# Patient Record
Sex: MALE | Race: BLACK OR AFRICAN AMERICAN | NOT HISPANIC OR LATINO | Employment: OTHER | URBAN - METROPOLITAN AREA
[De-identification: names, ages, dates, MRNs, and addresses within clinical notes are randomized per-mention and may not be internally consistent; named-entity substitution may affect disease eponyms.]

---

## 2018-01-23 LAB
DIFF CAP.CO: 13.93 ML/MMHG SEC
FEV1/FVC: 51 %
FEV1: 1.47 LITERS
FVC VOL RESPIRATORY: 2.89 LITERS
TLC: 7.59 LITERS

## 2018-01-23 ASSESSMENT — PULMONARY FUNCTION TESTS
FEV1/FVC: 51
FEV1: 1.47
FVC: 2.89

## 2018-06-06 ENCOUNTER — APPOINTMENT (EMERGENCY)
Dept: RADIOLOGY | Facility: HOSPITAL | Age: 58
DRG: 192 | End: 2018-06-06
Payer: COMMERCIAL

## 2018-06-06 ENCOUNTER — HOSPITAL ENCOUNTER (INPATIENT)
Facility: HOSPITAL | Age: 58
LOS: 1 days | Discharge: HOME | DRG: 192 | End: 2018-06-07
Attending: EMERGENCY MEDICINE | Admitting: HOSPITALIST
Payer: COMMERCIAL

## 2018-06-06 DIAGNOSIS — I21.4 NSTEMI (NON-ST ELEVATED MYOCARDIAL INFARCTION) (HCC): Primary | ICD-10-CM

## 2018-06-06 DIAGNOSIS — F17.200 TOBACCO DEPENDENCE: ICD-10-CM

## 2018-06-06 DIAGNOSIS — E87.6 HYPOKALEMIA: ICD-10-CM

## 2018-06-06 DIAGNOSIS — R07.89 NON-CARDIAC CHEST PAIN: ICD-10-CM

## 2018-06-06 DIAGNOSIS — R06.00 DYSPNEA ON EXERTION: ICD-10-CM

## 2018-06-06 LAB
ALBUMIN SERPL BCP-MCNC: 3.5 G/DL (ref 3.5–5)
ALP SERPL-CCNC: 85 U/L (ref 46–116)
ALT SERPL W P-5'-P-CCNC: 25 U/L (ref 12–78)
ANION GAP SERPL CALCULATED.3IONS-SCNC: 12 MMOL/L (ref 4–13)
AST SERPL W P-5'-P-CCNC: 21 U/L (ref 5–45)
ATRIAL RATE: 73 BPM
ATRIAL RATE: 80 BPM
ATRIAL RATE: 82 BPM
BASOPHILS # BLD AUTO: 0.04 THOUSANDS/ΜL (ref 0–0.1)
BASOPHILS NFR BLD AUTO: 1 % (ref 0–1)
BILIRUB SERPL-MCNC: 0.23 MG/DL (ref 0.2–1)
BUN SERPL-MCNC: 15 MG/DL (ref 5–25)
CALCIUM SERPL-MCNC: 8.5 MG/DL (ref 8.3–10.1)
CHLORIDE SERPL-SCNC: 106 MMOL/L (ref 100–108)
CO2 SERPL-SCNC: 25 MMOL/L (ref 21–32)
CREAT SERPL-MCNC: 1.31 MG/DL (ref 0.6–1.3)
EOSINOPHIL # BLD AUTO: 0.37 THOUSAND/ΜL (ref 0–0.61)
EOSINOPHIL NFR BLD AUTO: 5 % (ref 0–6)
ERYTHROCYTE [DISTWIDTH] IN BLOOD BY AUTOMATED COUNT: 14.7 % (ref 11.6–15.1)
GFR SERPL CREATININE-BSD FRML MDRD: 70 ML/MIN/1.73SQ M
GLUCOSE SERPL-MCNC: 86 MG/DL (ref 65–140)
HCT VFR BLD AUTO: 39.8 % (ref 36.5–49.3)
HGB BLD-MCNC: 13.5 G/DL (ref 12–17)
LYMPHOCYTES # BLD AUTO: 4.03 THOUSANDS/ΜL (ref 0.6–4.47)
LYMPHOCYTES NFR BLD AUTO: 49 % (ref 14–44)
MAGNESIUM SERPL-MCNC: 2 MG/DL (ref 1.6–2.6)
MCH RBC QN AUTO: 28.5 PG (ref 26.8–34.3)
MCHC RBC AUTO-ENTMCNC: 33.9 G/DL (ref 31.4–37.4)
MCV RBC AUTO: 84 FL (ref 82–98)
MONOCYTES # BLD AUTO: 0.61 THOUSAND/ΜL (ref 0.17–1.22)
MONOCYTES NFR BLD AUTO: 7 % (ref 4–12)
NEUTROPHILS # BLD AUTO: 3.24 THOUSANDS/ΜL (ref 1.85–7.62)
NEUTS SEG NFR BLD AUTO: 39 % (ref 43–75)
NRBC BLD AUTO-RTO: 0 /100 WBCS
P AXIS: 85 DEGREES
PLATELET # BLD AUTO: 344 THOUSANDS/UL (ref 149–390)
PMV BLD AUTO: 8.9 FL (ref 8.9–12.7)
POTASSIUM SERPL-SCNC: 2.9 MMOL/L (ref 3.5–5.3)
PR INTERVAL: 152 MS
PR INTERVAL: 152 MS
PR INTERVAL: 160 MS
PROT SERPL-MCNC: 7.6 G/DL (ref 6.4–8.2)
QRS AXIS: 76 DEGREES
QRS AXIS: 78 DEGREES
QRS AXIS: 78 DEGREES
QRSD INTERVAL: 88 MS
QRSD INTERVAL: 90 MS
QRSD INTERVAL: 90 MS
QT INTERVAL: 352 MS
QT INTERVAL: 376 MS
QT INTERVAL: 388 MS
QTC INTERVAL: 411 MS
QTC INTERVAL: 427 MS
QTC INTERVAL: 433 MS
RBC # BLD AUTO: 4.73 MILLION/UL (ref 3.88–5.62)
SODIUM SERPL-SCNC: 143 MMOL/L (ref 136–145)
T WAVE AXIS: 61 DEGREES
T WAVE AXIS: 77 DEGREES
T WAVE AXIS: 80 DEGREES
TROPONIN I SERPL-MCNC: 0.19 NG/ML
VENTRICULAR RATE: 73 BPM
VENTRICULAR RATE: 80 BPM
VENTRICULAR RATE: 82 BPM
WBC # BLD AUTO: 8.29 THOUSAND/UL (ref 4.31–10.16)

## 2018-06-06 PROCEDURE — 93010 ELECTROCARDIOGRAM REPORT: CPT | Performed by: INTERNAL MEDICINE

## 2018-06-06 PROCEDURE — 85730 THROMBOPLASTIN TIME PARTIAL: CPT | Performed by: EMERGENCY MEDICINE

## 2018-06-06 PROCEDURE — 84484 ASSAY OF TROPONIN QUANT: CPT | Performed by: EMERGENCY MEDICINE

## 2018-06-06 PROCEDURE — 85610 PROTHROMBIN TIME: CPT | Performed by: EMERGENCY MEDICINE

## 2018-06-06 PROCEDURE — 71046 X-RAY EXAM CHEST 2 VIEWS: CPT

## 2018-06-06 PROCEDURE — 36415 COLL VENOUS BLD VENIPUNCTURE: CPT | Performed by: EMERGENCY MEDICINE

## 2018-06-06 PROCEDURE — 93005 ELECTROCARDIOGRAM TRACING: CPT

## 2018-06-06 PROCEDURE — 80053 COMPREHEN METABOLIC PANEL: CPT | Performed by: EMERGENCY MEDICINE

## 2018-06-06 PROCEDURE — 85025 COMPLETE CBC W/AUTO DIFF WBC: CPT | Performed by: EMERGENCY MEDICINE

## 2018-06-06 PROCEDURE — 83735 ASSAY OF MAGNESIUM: CPT | Performed by: EMERGENCY MEDICINE

## 2018-06-06 RX ORDER — POTASSIUM CHLORIDE 20 MEQ/1
40 TABLET, EXTENDED RELEASE ORAL ONCE
Status: COMPLETED | OUTPATIENT
Start: 2018-06-06 | End: 2018-06-06

## 2018-06-06 RX ORDER — AMLODIPINE BESYLATE 10 MG/1
10 TABLET ORAL
COMMUNITY
End: 2018-07-27 | Stop reason: SDUPTHER

## 2018-06-06 RX ORDER — HEPARIN SODIUM 10000 [USP'U]/100ML
3-20 INJECTION, SOLUTION INTRAVENOUS
Status: DISCONTINUED | OUTPATIENT
Start: 2018-06-06 | End: 2018-06-07

## 2018-06-06 RX ORDER — HEPARIN SODIUM 1000 [USP'U]/ML
3900 INJECTION, SOLUTION INTRAVENOUS; SUBCUTANEOUS AS NEEDED
Status: DISCONTINUED | OUTPATIENT
Start: 2018-06-06 | End: 2018-06-07

## 2018-06-06 RX ORDER — ASPIRIN 81 MG/1
324 TABLET, CHEWABLE ORAL ONCE
Status: COMPLETED | OUTPATIENT
Start: 2018-06-06 | End: 2018-06-06

## 2018-06-06 RX ORDER — LORATADINE 10 MG/1
10 TABLET ORAL
COMMUNITY
End: 2019-05-02 | Stop reason: SDUPTHER

## 2018-06-06 RX ORDER — METOPROLOL TARTRATE 5 MG/5ML
5 INJECTION INTRAVENOUS ONCE
Status: DISCONTINUED | OUTPATIENT
Start: 2018-06-07 | End: 2018-06-07

## 2018-06-06 RX ORDER — HEPARIN SODIUM 1000 [USP'U]/ML
3900 INJECTION, SOLUTION INTRAVENOUS; SUBCUTANEOUS ONCE
Status: COMPLETED | OUTPATIENT
Start: 2018-06-06 | End: 2018-06-07

## 2018-06-06 RX ORDER — PANTOPRAZOLE SODIUM 40 MG/1
40 TABLET, DELAYED RELEASE ORAL
COMMUNITY
End: 2018-07-21

## 2018-06-06 RX ORDER — METHOCARBAMOL 750 MG/1
750 TABLET, FILM COATED ORAL 4 TIMES DAILY
Status: ON HOLD | COMMUNITY
Start: 2016-12-11 | End: 2018-06-07

## 2018-06-06 RX ORDER — HEPARIN SODIUM 1000 [USP'U]/ML
1950 INJECTION, SOLUTION INTRAVENOUS; SUBCUTANEOUS AS NEEDED
Status: DISCONTINUED | OUTPATIENT
Start: 2018-06-06 | End: 2018-06-07

## 2018-06-06 RX ORDER — ALBUTEROL SULFATE 2.5 MG/3ML
5 SOLUTION RESPIRATORY (INHALATION) ONCE
Status: COMPLETED | OUTPATIENT
Start: 2018-06-06 | End: 2018-06-06

## 2018-06-06 RX ORDER — DIAZEPAM 5 MG/1
5 TABLET ORAL EVERY 8 HOURS
COMMUNITY
Start: 2016-08-16 | End: 2018-06-07 | Stop reason: HOSPADM

## 2018-06-06 RX ADMIN — ASPIRIN 81 MG 324 MG: 81 TABLET ORAL at 23:34

## 2018-06-06 RX ADMIN — POTASSIUM CHLORIDE 40 MEQ: 1500 TABLET, EXTENDED RELEASE ORAL at 23:34

## 2018-06-06 RX ADMIN — IPRATROPIUM BROMIDE 0.5 MG: 0.5 SOLUTION RESPIRATORY (INHALATION) at 22:47

## 2018-06-06 RX ADMIN — ALBUTEROL SULFATE 5 MG: 2.5 SOLUTION RESPIRATORY (INHALATION) at 22:46

## 2018-06-07 ENCOUNTER — APPOINTMENT (INPATIENT)
Dept: NON INVASIVE DIAGNOSTICS | Facility: HOSPITAL | Age: 58
DRG: 192 | End: 2018-06-07
Attending: INTERNAL MEDICINE
Payer: COMMERCIAL

## 2018-06-07 ENCOUNTER — APPOINTMENT (INPATIENT)
Dept: NON INVASIVE DIAGNOSTICS | Facility: HOSPITAL | Age: 58
DRG: 192 | End: 2018-06-07
Payer: COMMERCIAL

## 2018-06-07 VITALS
HEART RATE: 62 BPM | SYSTOLIC BLOOD PRESSURE: 147 MMHG | WEIGHT: 138.67 LBS | RESPIRATION RATE: 16 BRPM | DIASTOLIC BLOOD PRESSURE: 88 MMHG | OXYGEN SATURATION: 99 % | TEMPERATURE: 98.5 F

## 2018-06-07 PROBLEM — I21.4 NSTEMI (NON-ST ELEVATED MYOCARDIAL INFARCTION) (HCC): Status: RESOLVED | Noted: 2018-06-07 | Resolved: 2018-06-07

## 2018-06-07 PROBLEM — J44.9 COPD (CHRONIC OBSTRUCTIVE PULMONARY DISEASE) (HCC): Status: ACTIVE | Noted: 2018-06-07

## 2018-06-07 PROBLEM — E87.6 HYPOKALEMIA: Status: ACTIVE | Noted: 2018-06-07

## 2018-06-07 PROBLEM — E87.6 HYPOKALEMIA: Status: RESOLVED | Noted: 2018-06-07 | Resolved: 2018-06-07

## 2018-06-07 PROBLEM — I21.4 NSTEMI (NON-ST ELEVATED MYOCARDIAL INFARCTION) (HCC): Status: ACTIVE | Noted: 2018-06-07

## 2018-06-07 PROBLEM — I10 HTN (HYPERTENSION): Status: ACTIVE | Noted: 2018-06-07

## 2018-06-07 LAB
ALBUMIN SERPL BCP-MCNC: 3.3 G/DL (ref 3.5–5)
ALP SERPL-CCNC: 74 U/L (ref 46–116)
ALT SERPL W P-5'-P-CCNC: 23 U/L (ref 12–78)
ANION GAP SERPL CALCULATED.3IONS-SCNC: 9 MMOL/L (ref 4–13)
APTT PPP: 115 SECONDS (ref 24–36)
APTT PPP: 34 SECONDS (ref 24–36)
AST SERPL W P-5'-P-CCNC: 25 U/L (ref 5–45)
BILIRUB SERPL-MCNC: 0.34 MG/DL (ref 0.2–1)
BUN SERPL-MCNC: 13 MG/DL (ref 5–25)
CALCIUM SERPL-MCNC: 8.4 MG/DL (ref 8.3–10.1)
CHLORIDE SERPL-SCNC: 106 MMOL/L (ref 100–108)
CHOLEST SERPL-MCNC: 124 MG/DL (ref 50–200)
CO2 SERPL-SCNC: 25 MMOL/L (ref 21–32)
CREAT SERPL-MCNC: 0.96 MG/DL (ref 0.6–1.3)
ERYTHROCYTE [DISTWIDTH] IN BLOOD BY AUTOMATED COUNT: 15.1 % (ref 11.6–15.1)
GFR SERPL CREATININE-BSD FRML MDRD: 102 ML/MIN/1.73SQ M
GLUCOSE SERPL-MCNC: 87 MG/DL (ref 65–140)
HCT VFR BLD AUTO: 38.4 % (ref 36.5–49.3)
HDLC SERPL-MCNC: 68 MG/DL (ref 40–60)
HGB BLD-MCNC: 12.8 G/DL (ref 12–17)
INR PPP: 1.12 (ref 0.86–1.17)
LDLC SERPL CALC-MCNC: 46 MG/DL (ref 0–100)
MAGNESIUM SERPL-MCNC: 2 MG/DL (ref 1.6–2.6)
MCH RBC QN AUTO: 28.1 PG (ref 26.8–34.3)
MCHC RBC AUTO-ENTMCNC: 33.3 G/DL (ref 31.4–37.4)
MCV RBC AUTO: 84 FL (ref 82–98)
NONHDLC SERPL-MCNC: 56 MG/DL
PLATELET # BLD AUTO: 351 THOUSANDS/UL (ref 149–390)
PMV BLD AUTO: 9.1 FL (ref 8.9–12.7)
POTASSIUM SERPL-SCNC: 3.8 MMOL/L (ref 3.5–5.3)
PROT SERPL-MCNC: 7.3 G/DL (ref 6.4–8.2)
PROTHROMBIN TIME: 14.5 SECONDS (ref 11.8–14.2)
RBC # BLD AUTO: 4.55 MILLION/UL (ref 3.88–5.62)
SODIUM SERPL-SCNC: 140 MMOL/L (ref 136–145)
TRIGL SERPL-MCNC: 50 MG/DL
TROPONIN I SERPL-MCNC: 0.18 NG/ML
TROPONIN I SERPL-MCNC: 0.19 NG/ML
WBC # BLD AUTO: 7.72 THOUSAND/UL (ref 4.31–10.16)

## 2018-06-07 PROCEDURE — 85027 COMPLETE CBC AUTOMATED: CPT | Performed by: HOSPITALIST

## 2018-06-07 PROCEDURE — C1894 INTRO/SHEATH, NON-LASER: HCPCS | Performed by: INTERNAL MEDICINE

## 2018-06-07 PROCEDURE — 94640 AIRWAY INHALATION TREATMENT: CPT

## 2018-06-07 PROCEDURE — 83735 ASSAY OF MAGNESIUM: CPT | Performed by: HOSPITALIST

## 2018-06-07 PROCEDURE — B2111ZZ FLUOROSCOPY OF MULTIPLE CORONARY ARTERIES USING LOW OSMOLAR CONTRAST: ICD-10-PCS | Performed by: INTERNAL MEDICINE

## 2018-06-07 PROCEDURE — 80061 LIPID PANEL: CPT | Performed by: HOSPITALIST

## 2018-06-07 PROCEDURE — 84484 ASSAY OF TROPONIN QUANT: CPT | Performed by: HOSPITALIST

## 2018-06-07 PROCEDURE — 99152 MOD SED SAME PHYS/QHP 5/>YRS: CPT | Performed by: INTERNAL MEDICINE

## 2018-06-07 PROCEDURE — 4A023N7 MEASUREMENT OF CARDIAC SAMPLING AND PRESSURE, LEFT HEART, PERCUTANEOUS APPROACH: ICD-10-PCS | Performed by: INTERNAL MEDICINE

## 2018-06-07 PROCEDURE — 93306 TTE W/DOPPLER COMPLETE: CPT | Performed by: INTERNAL MEDICINE

## 2018-06-07 PROCEDURE — 99285 EMERGENCY DEPT VISIT HI MDM: CPT

## 2018-06-07 PROCEDURE — 93306 TTE W/DOPPLER COMPLETE: CPT

## 2018-06-07 PROCEDURE — 99223 1ST HOSP IP/OBS HIGH 75: CPT | Performed by: HOSPITALIST

## 2018-06-07 PROCEDURE — C1769 GUIDE WIRE: HCPCS | Performed by: INTERNAL MEDICINE

## 2018-06-07 PROCEDURE — 99254 IP/OBS CNSLTJ NEW/EST MOD 60: CPT | Performed by: INTERNAL MEDICINE

## 2018-06-07 PROCEDURE — 80053 COMPREHEN METABOLIC PANEL: CPT | Performed by: HOSPITALIST

## 2018-06-07 PROCEDURE — 93458 L HRT ARTERY/VENTRICLE ANGIO: CPT | Performed by: INTERNAL MEDICINE

## 2018-06-07 PROCEDURE — 85730 THROMBOPLASTIN TIME PARTIAL: CPT | Performed by: HOSPITALIST

## 2018-06-07 PROCEDURE — 99239 HOSP IP/OBS DSCHRG MGMT >30: CPT | Performed by: PHYSICIAN ASSISTANT

## 2018-06-07 RX ORDER — SODIUM CHLORIDE 9 MG/ML
200 INJECTION, SOLUTION INTRAVENOUS CONTINUOUS
Status: DISPENSED | OUTPATIENT
Start: 2018-06-07 | End: 2018-06-07

## 2018-06-07 RX ORDER — METOPROLOL TARTRATE 5 MG/5ML
2.5 INJECTION INTRAVENOUS ONCE
Status: COMPLETED | OUTPATIENT
Start: 2018-06-07 | End: 2018-06-07

## 2018-06-07 RX ORDER — PANTOPRAZOLE SODIUM 40 MG/1
40 TABLET, DELAYED RELEASE ORAL
Status: DISCONTINUED | OUTPATIENT
Start: 2018-06-07 | End: 2018-06-07 | Stop reason: SDUPTHER

## 2018-06-07 RX ORDER — LISINOPRIL 5 MG/1
5 TABLET ORAL DAILY
Status: DISCONTINUED | OUTPATIENT
Start: 2018-06-07 | End: 2018-06-07 | Stop reason: HOSPADM

## 2018-06-07 RX ORDER — ATORVASTATIN CALCIUM 40 MG/1
40 TABLET, FILM COATED ORAL EVERY EVENING
Status: DISCONTINUED | OUTPATIENT
Start: 2018-06-07 | End: 2018-06-07 | Stop reason: HOSPADM

## 2018-06-07 RX ORDER — CLOPIDOGREL BISULFATE 75 MG/1
300 TABLET ORAL ONCE
Status: COMPLETED | OUTPATIENT
Start: 2018-06-07 | End: 2018-06-07

## 2018-06-07 RX ORDER — NICOTINE 21 MG/24HR
1 PATCH, TRANSDERMAL 24 HOURS TRANSDERMAL DAILY
Qty: 28 PATCH | Refills: 0 | Status: SHIPPED | OUTPATIENT
Start: 2018-06-08 | End: 2018-07-27 | Stop reason: SDUPTHER

## 2018-06-07 RX ORDER — ACETAMINOPHEN 325 MG/1
650 TABLET ORAL EVERY 6 HOURS PRN
Qty: 30 TABLET | Refills: 0 | Status: SHIPPED | OUTPATIENT
Start: 2018-06-07 | End: 2018-07-30 | Stop reason: ALTCHOICE

## 2018-06-07 RX ORDER — FENTANYL CITRATE 50 UG/ML
INJECTION, SOLUTION INTRAMUSCULAR; INTRAVENOUS CODE/TRAUMA/SEDATION MEDICATION
Status: COMPLETED | OUTPATIENT
Start: 2018-06-07 | End: 2018-06-07

## 2018-06-07 RX ORDER — NITROGLYCERIN 20 MG/100ML
INJECTION INTRAVENOUS CODE/TRAUMA/SEDATION MEDICATION
Status: COMPLETED | OUTPATIENT
Start: 2018-06-07 | End: 2018-06-07

## 2018-06-07 RX ORDER — POTASSIUM CHLORIDE 20 MEQ/1
20 TABLET, EXTENDED RELEASE ORAL ONCE
Status: DISCONTINUED | OUTPATIENT
Start: 2018-06-07 | End: 2018-06-07 | Stop reason: HOSPADM

## 2018-06-07 RX ORDER — IPRATROPIUM BROMIDE AND ALBUTEROL SULFATE 2.5; .5 MG/3ML; MG/3ML
3 SOLUTION RESPIRATORY (INHALATION) 4 TIMES DAILY PRN
Status: DISCONTINUED | OUTPATIENT
Start: 2018-06-07 | End: 2018-06-07 | Stop reason: HOSPADM

## 2018-06-07 RX ORDER — AMLODIPINE BESYLATE 10 MG/1
10 TABLET ORAL DAILY
Status: DISCONTINUED | OUTPATIENT
Start: 2018-06-07 | End: 2018-06-07 | Stop reason: HOSPADM

## 2018-06-07 RX ORDER — VERAPAMIL HYDROCHLORIDE 2.5 MG/ML
INJECTION, SOLUTION INTRAVENOUS CODE/TRAUMA/SEDATION MEDICATION
Status: COMPLETED | OUTPATIENT
Start: 2018-06-07 | End: 2018-06-07

## 2018-06-07 RX ORDER — LABETALOL HYDROCHLORIDE 5 MG/ML
10 INJECTION, SOLUTION INTRAVENOUS EVERY 4 HOURS PRN
Status: DISCONTINUED | OUTPATIENT
Start: 2018-06-07 | End: 2018-06-07 | Stop reason: HOSPADM

## 2018-06-07 RX ORDER — NITROGLYCERIN 0.4 MG/1
0.4 TABLET SUBLINGUAL
Status: DISCONTINUED | OUTPATIENT
Start: 2018-06-07 | End: 2018-06-07 | Stop reason: HOSPADM

## 2018-06-07 RX ORDER — PANTOPRAZOLE SODIUM 40 MG/1
40 TABLET, DELAYED RELEASE ORAL
Qty: 30 TABLET | Refills: 0 | Status: SHIPPED | OUTPATIENT
Start: 2018-06-08 | End: 2018-07-27 | Stop reason: SDUPTHER

## 2018-06-07 RX ORDER — HEPARIN SODIUM 1000 [USP'U]/ML
INJECTION, SOLUTION INTRAVENOUS; SUBCUTANEOUS CODE/TRAUMA/SEDATION MEDICATION
Status: COMPLETED | OUTPATIENT
Start: 2018-06-07 | End: 2018-06-07

## 2018-06-07 RX ORDER — METHOCARBAMOL 500 MG/1
750 TABLET, FILM COATED ORAL 4 TIMES DAILY
Status: DISCONTINUED | OUTPATIENT
Start: 2018-06-07 | End: 2018-06-07 | Stop reason: HOSPADM

## 2018-06-07 RX ORDER — PANTOPRAZOLE SODIUM 40 MG/1
40 TABLET, DELAYED RELEASE ORAL
Status: DISCONTINUED | OUTPATIENT
Start: 2018-06-08 | End: 2018-06-07 | Stop reason: HOSPADM

## 2018-06-07 RX ORDER — NICOTINE 21 MG/24HR
1 PATCH, TRANSDERMAL 24 HOURS TRANSDERMAL DAILY
Status: DISCONTINUED | OUTPATIENT
Start: 2018-06-07 | End: 2018-06-07

## 2018-06-07 RX ORDER — METHOCARBAMOL 500 MG/1
500 TABLET, FILM COATED ORAL EVERY 6 HOURS PRN
Qty: 10 TABLET | Refills: 0 | Status: SHIPPED | OUTPATIENT
Start: 2018-06-07 | End: 2019-01-09 | Stop reason: SDUPTHER

## 2018-06-07 RX ORDER — MORPHINE SULFATE 2 MG/ML
2 INJECTION, SOLUTION INTRAMUSCULAR; INTRAVENOUS EVERY 6 HOURS PRN
Status: DISCONTINUED | OUTPATIENT
Start: 2018-06-07 | End: 2018-06-07 | Stop reason: HOSPADM

## 2018-06-07 RX ORDER — LIDOCAINE HYDROCHLORIDE 10 MG/ML
INJECTION, SOLUTION INFILTRATION; PERINEURAL CODE/TRAUMA/SEDATION MEDICATION
Status: COMPLETED | OUTPATIENT
Start: 2018-06-07 | End: 2018-06-07

## 2018-06-07 RX ORDER — ACETAMINOPHEN 325 MG/1
650 TABLET ORAL EVERY 4 HOURS PRN
Status: DISCONTINUED | OUTPATIENT
Start: 2018-06-07 | End: 2018-06-07 | Stop reason: HOSPADM

## 2018-06-07 RX ORDER — NICOTINE 21 MG/24HR
21 PATCH, TRANSDERMAL 24 HOURS TRANSDERMAL DAILY
Status: DISCONTINUED | OUTPATIENT
Start: 2018-06-07 | End: 2018-06-07 | Stop reason: HOSPADM

## 2018-06-07 RX ORDER — ASPIRIN 81 MG/1
325 TABLET, CHEWABLE ORAL DAILY
Status: DISCONTINUED | OUTPATIENT
Start: 2018-06-08 | End: 2018-06-07 | Stop reason: HOSPADM

## 2018-06-07 RX ORDER — MIDAZOLAM HYDROCHLORIDE 1 MG/ML
INJECTION INTRAMUSCULAR; INTRAVENOUS CODE/TRAUMA/SEDATION MEDICATION
Status: COMPLETED | OUTPATIENT
Start: 2018-06-07 | End: 2018-06-07

## 2018-06-07 RX ADMIN — VERAPAMIL HYDROCHLORIDE 2.5 MG: 2.5 INJECTION, SOLUTION INTRAVENOUS at 11:40

## 2018-06-07 RX ADMIN — METOPROLOL TARTRATE 2.5 MG: 5 INJECTION, SOLUTION INTRAVENOUS at 00:22

## 2018-06-07 RX ADMIN — CLOPIDOGREL BISULFATE 300 MG: 75 TABLET ORAL at 11:19

## 2018-06-07 RX ADMIN — MORPHINE SULFATE 2 MG: 2 INJECTION, SOLUTION INTRAMUSCULAR; INTRAVENOUS at 07:51

## 2018-06-07 RX ADMIN — HEPARIN SODIUM 4000 UNITS: 1000 INJECTION INTRAVENOUS; SUBCUTANEOUS at 11:39

## 2018-06-07 RX ADMIN — LIDOCAINE HYDROCHLORIDE 1 ML: 10 INJECTION, SOLUTION INFILTRATION; PERINEURAL at 11:38

## 2018-06-07 RX ADMIN — LISINOPRIL 5 MG: 5 TABLET ORAL at 08:41

## 2018-06-07 RX ADMIN — METOPROLOL TARTRATE 25 MG: 25 TABLET ORAL at 08:41

## 2018-06-07 RX ADMIN — HEPARIN SODIUM AND DEXTROSE 12 UNITS/KG/HR: 10000; 5 INJECTION INTRAVENOUS at 00:01

## 2018-06-07 RX ADMIN — NICOTINE 21 MG: 21 PATCH, EXTENDED RELEASE TRANSDERMAL at 08:41

## 2018-06-07 RX ADMIN — MORPHINE SULFATE 2 MG: 2 INJECTION, SOLUTION INTRAMUSCULAR; INTRAVENOUS at 02:33

## 2018-06-07 RX ADMIN — METOPROLOL TARTRATE 2.5 MG: 5 INJECTION, SOLUTION INTRAVENOUS at 00:14

## 2018-06-07 RX ADMIN — HEPARIN SODIUM 3900 UNITS: 1000 INJECTION, SOLUTION INTRAVENOUS; SUBCUTANEOUS at 00:01

## 2018-06-07 RX ADMIN — SODIUM CHLORIDE 200 ML/HR: 0.9 INJECTION, SOLUTION INTRAVENOUS at 12:05

## 2018-06-07 RX ADMIN — AMLODIPINE BESYLATE 10 MG: 10 TABLET ORAL at 08:41

## 2018-06-07 RX ADMIN — FENTANYL CITRATE 50 MCG: 50 INJECTION, SOLUTION INTRAMUSCULAR; INTRAVENOUS at 11:30

## 2018-06-07 RX ADMIN — MIDAZOLAM 2 MG: 1 INJECTION INTRAMUSCULAR; INTRAVENOUS at 11:30

## 2018-06-07 RX ADMIN — METHOCARBAMOL 750 MG: 500 TABLET ORAL at 08:41

## 2018-06-07 RX ADMIN — IOHEXOL 45 ML: 350 INJECTION, SOLUTION INTRAVENOUS at 11:48

## 2018-06-07 RX ADMIN — NITROGLYCERIN 200 MCG: 20 INJECTION INTRAVENOUS at 11:39

## 2018-06-07 NOTE — CASE MANAGEMENT
Initial Clinical Review    Admission: Date/Time/Statement: 6/6/18 @ 2351     Orders Placed This Encounter   Procedures    Inpatient Admission (expected length of stay for this patient is greater than two midnights)     Standing Status:   Standing     Number of Occurrences:   1     Order Specific Question:   Admitting Physician     Answer:   Mayra Green     Order Specific Question:   Level of Care     Answer:   Med Surg [16]     Order Specific Question:   Estimated length of stay     Answer:   More than 2 Midnights     Order Specific Question:   Certification     Answer:   I certify that inpatient services are medically necessary for this patient for a duration of greater than two midnights  See H&P and MD Progress Notes for additional information about the patient's course of treatment  ED: Date/Time/Mode of Arrival:   ED Arrival Information     Expected Arrival Acuity Means of Arrival Escorted By Service Admission Type    - 6/6/2018 21:36 Urgent Walk-In Self General Medicine Urgent    Arrival Complaint    Sob; Knee pain        Chief Complaint:   Chief Complaint   Patient presents with    Shortness of Breath     Pt states "feeling sob  took inhalers and nebs and its not getting any better  also back and knees have been hurting"       History of Illness:  56 y o  old male who presents to the ED for evaluation of chest pain and shortness of breath  History of COPD  States his medications are not working for him  Has shooting chest pain that radiates to his right arm as well as shortness of breath at rest and with exertion  His dyspnea on exertion as well  He can't walk up 2 or 3 stairs without stopping huffing and puffing  His pain remits with rest   He is not diaphoretic  He has a cough productive for yellow sputum with an increase in the amount of sputum produced recently    Patient does report having an outpatient stress test done at [facility] her the records are not available  This was done about 5-6 months ago  Denies any history of MI or cardiac catheterization    ED Vital Signs:   ED Triage Vitals   Temperature Pulse Respirations Blood Pressure SpO2   06/06/18 2143 06/06/18 2143 06/06/18 2143 06/06/18 2143 06/06/18 2143   98 5 °F (36 9 °C) 82 18 (!) 189/80 99 %      Temp Source Heart Rate Source Patient Position - Orthostatic VS BP Location FiO2 (%)   06/07/18 0100 06/07/18 0005 06/07/18 0100 06/07/18 0005 --   Temporal Monitor Lying Right arm       Pain Score       06/06/18 2143       8        Wt Readings from Last 1 Encounters:   06/07/18 62 9 kg (138 lb 10 7 oz)       Abnormal Labs/Diagnostic Test Results:  K 2 9,   Cr 1 31,     TROP 0 19  CXR:  No acute cardiopulmonary disease    EKG:  NSR,  PVC's; No ST changes    ED Treatment:   Medication Administration from 06/06/2018 2136 to 06/07/2018 0250       Date/Time Order Dose Route Action Action by Comments     06/06/2018 2246 albuterol inhalation solution 5 mg 5 mg Nebulization Given Tank Rodriguez RN      06/06/2018 2247 ipratropium (ATROVENT) 0 02 % inhalation solution 0 5 mg 0 5 mg Nebulization Given Tank Rodriguez RN      06/06/2018 2334 potassium chloride (K-DUR,KLOR-CON) CR tablet 40 mEq 40 mEq Oral Given Tank Rodriguez RN      06/06/2018 2334 aspirin chewable tablet 324 mg 324 mg Oral Given Tank Rodriguez RN      06/07/2018 0001 heparin (porcine) injection 3,900 Units 3,900 Units Intravenous Given Tank Rodriguez RN      06/07/2018 0001 heparin (porcine) 25,000 units in 250 mL infusion (premix) 12 Units/kg/hr Intravenous [address] Tank Rodriguez RN      06/07/2018 0014 metoprolol (LOPRESSOR) injection 2 5 mg 2 5 mg Intravenous Given Tank Rodriguez RN      06/07/2018 0022 metoprolol (LOPRESSOR) injection 2 5 mg 2 5 mg Intravenous Given Tank Rodriguez RN      06/07/2018 0034 potassium chloride (K-DUR,KLOR-CON) CR tablet 20 mEq 0 mEq Oral Hold Tank Rodriguez RN           Past Medical/Surgical History:    Active Ambulatory Problems     Diagnosis Date Noted    No Active Ambulatory Problems     Resolved Ambulatory Problems     Diagnosis Date Noted    No Resolved Ambulatory Problems     Past Medical History:   Diagnosis Date    COPD (chronic obstructive pulmonary disease) ([facility] [place] [facility] )     Hypertension        Admitting Diagnosis: Hypokalemia [E87 6]  Tobacco dependence [F17 200]  SOB (shortness of breath) [R06 02]  Dyspnea on exertion [R06 09]  NSTEMI (non-ST elevated myocardial infarction) ([facility] [place] [facility] ) [I21 4]    Age/Sex: 56 y o  male    Assessment/Plan:   NSTEMI (non-ST elevated myocardial infarction) ([facility])   Assessment & Plan     - Worsening of CP/SOB  - +ve troponin  - Start patient on ASA/BB/Heparin and statins  - Serial cardiac markers  - PRN Nitro and morphine  - Consult Cardiology  - Echo to evaluate EF       Hypokalemia   Assessment & Plan     - Replace and recheck in am        HTN (hypertension)   Assessment & Plan     - Resume home medications  - Monitor and titrate as needed   - PRN labetalol        COPD (chronic obstructive pulmonary disease) ([facility])   Assessment & Plan     - PRN nebs      Admission Orders:  IP  TELE  Serial trops,   EKGs  EKG with CP or ST Changes  Consult Cardio  CBC,  CMP,  Lipid panel,  Mag  ECHO    Plavix 300 po x 1  6/7  For Cardiac Cath 6/7    Scheduled Meds:   Current Facility-Administered Medications:          amLODIPine 10 mg Oral Daily Linda Phan MD    [START ON 6/8/2018] aspirin 325 mg Oral Daily Linda Phan MD    atorvastatin 40 mg Oral QPM Linda Phan MD                    lisinopril 5 mg Oral Daily Linda Phan MD    methocarbamol 750 mg Oral 4x Daily Linda Phan MD    metoprolol tartrate 25 mg Oral Q12H [facility] Linda Phan MD            nicotine 21 mg Transdermal Daily Linda Phan MD            pantoprazole 40 mg Oral Early Morning Linda Phan MD    potassium chloride 20 mEq Oral Once [facility] Dave Quiñonez MD              Continuous Infusions:   Heparin Drip  sodium chloride 200 mL/hr Last Rate: 200 mL/hr (06/07/18 1205)     PRN Meds:   acetaminophen    ipratropium-albuterol    labetalol    morphine injection IV X 2  6/7    Nitroglycerin  Trops :  0 19,  0 19,   0 18    Per Cardio 6/7: 1  Chronic chest discomfort, with acute presentation associated with minimal troponin elevation at 0 19 and unremarkable ECG:  Overall, chest pain syndrome seems to have mostly atypical and noncardiac features, however significant increased left-sided chest pain yesterday radiating into the left arm associated mild diaphoresis and nausea may be suggestive of unstable angina, especially with minimal troponin elevation at 0 19 which has been noted  He has intermittent chest discomfort while here in the hospital with last episode occurring earlier this morning  In light of patient's risk factors of diabetes, hypertension, ongoing tobacco use as well as chronic chest discomfort and current clinical presentation, I recommended coronary angiography for definitive evaluation and possible PCI if applicable  Thank you,  [address] [place] [address] in the [facility] by Alex Torres for 2017  Network Utilization Review Department  Phone: [phone]; Fax [phone]  ATTENTION: The Network Utilization Review Department is now centralized for our 7 Facilities  Make a note that we have a new phone and fax numbers for our Department  Please call with any questions or concerns to [phone] and carefully follow the prompts so that you are directed to the right person  All voicemails are confidential  Fax any determinations, approvals, denials, and requests for initial or continue stay review clinical to [phone]   Due to HIGH CALL volume, it would be easier if you could please send faxed requests to expedite your requests and in part, help us provide discharge notifications faster

## 2018-06-07 NOTE — ED NOTES
Attempting to reach resident for clarification of lopressor order at this time       Ghazal Morris RN  06/07/18 1616

## 2018-06-07 NOTE — PLAN OF CARE
CARDIOVASCULAR - ADULT     Maintains optimal cardiac output and hemodynamic stability Progressing     Absence of cardiac dysrhythmias or at baseline rhythm Progressing        DISCHARGE PLANNING     Discharge to home or other facility with appropriate resources Progressing        Knowledge Deficit     Patient/family/caregiver demonstrates understanding of disease process, treatment plan, medications, and discharge instructions Progressing        PAIN - ADULT     Verbalizes/displays adequate comfort level or baseline comfort level Progressing        SAFETY ADULT     Patient will remain free of falls Progressing     Maintain or return to baseline ADL function Progressing     Maintain or return mobility status to optimal level Progressing

## 2018-06-07 NOTE — ED PROCEDURE NOTE
PROCEDURE  CriticalCare Time  Performed by: Dell Burr  Authorized by: Dell Burr     Critical care provider statement:     Critical care time (minutes):  30    Critical care time was exclusive of:  Separately billable procedures and treating other patients and teaching time    Critical care was necessary to treat or prevent imminent or life-threatening deterioration of the following conditions:  Cardiac failure    Critical care was time spent personally by me on the following activities:  Blood draw for specimens, obtaining history from patient or surrogate, development of treatment plan with patient or surrogate, discussions with consultants, evaluation of patient's response to treatment, examination of patient, ordering and performing treatments and interventions, ordering and review of laboratory studies, ordering and review of radiographic studies and re-evaluation of patient's condition    I assumed direction of critical care for this patient from another provider in my specialty: no    Comments:      Pt with NSTEMI           Jonathon Chávez 24, DO  06/07/18 Momo, DO  06/07/18 9097

## 2018-06-07 NOTE — ED NOTES
Due to patient just receiving IV lopressor, PO lopressor to be held to observe patient tolerance of medication given prior       Gerardo AKI Jerez  06/07/18 5848

## 2018-06-07 NOTE — H&P
H&P- Berenice Courser Sr  1960, 56 y o  male MRN: [id number]    Unit/Bed#: ED 29 Encounter: [id number]    Primary Care Provider: Nico Shi MD   Date and time admitted to hospital: 6/6/2018  9:44 PM      VTE Prophylaxis: Pharmacologic VTE Prophylaxis contraindicated due to on heparin drip  / reason for no mechanical VTE prophylaxis on heparin drip   Code Status: Full  Discussion with family: sister and girl friend    Anticipated Length of Stay:  Patient will be admitted on an Inpatient basis with an anticipated length of stay of 2 midnights  Justification for Hospital Stay: NSTEMI    Total Time for Visit, including Counseling / Coordination of Care: 1 hour  Greater than 50% of this total time spent on direct patient counseling and coordination of care  Chief Complaint:   Worsening chest, and shortness of breath  History of Present Illness:    Merlinda Joe  is a 56 y o  male who presents with to the complaint of worsening of CP, SOB for the patient few months  Patient said few months back had tredmill stress test which he could not complete due to SOB  Patient denied any LE edema, palpitation, dizzness, N/V  CP is left sided, radiate to the rt or lt arm  Moderate intensity, and intermittent  Workup was done in the ER and showed mild elevation of troponin    Review of Systems:    Review of Systems   Constitutional: Negative for activity change and appetite change  HENT: Negative for congestion  Respiratory: Positive for cough, chest tightness and shortness of breath  Negative for apnea  Cardiovascular: Positive for chest pain  Gastrointestinal: Negative for abdominal distention and abdominal pain  Genitourinary: Negative for dysuria, enuresis and flank pain  Musculoskeletal: Negative for arthralgias and back pain  Neurological: Negative for dizziness and headaches  Psychiatric/Behavioral: Negative for agitation and confusion         Past Medical and Surgical History:     Past Medical History:   Diagnosis Date    COPD [facility]chronic obstructive [facility] disease) ([facility] [address] )     Hypertension        History reviewed  No pertinent surgical history  Meds/Allergies:    Prior to Admission medications    Medication Sig Start Date End Date Taking? Authorizing Provider   diazepam (VALIUM) 5 mg tablet Take 5 mg by mouth every 8 (eight) hours 8/16/16  Yes Historical Provider, MD   methocarbamol (ROBAXIN) 750 mg tablet Take 750 mg by mouth 4 (four) times a day 12/11/16  Yes Historical Provider, MD   amLODIPine (NORVASC) 10 mg tablet Take 10 mg by mouth    Historical Provider, MD   loratadine (CLARITIN) 10 mg tablet Take 10 mg by mouth    Historical Provider, MD   pantoprazole (PROTONIX) 40 mg tablet Take 40 mg by mouth    Historical Provider, MD     I have reviewed home medications using allscripts  Allergies: No Known Allergies    Social History:     Marital Status: Legally    Substance Use History:   History   Alcohol Use    Yes     Comment: social     History   Smoking Status    Current Every Day Smoker   Smokeless Tobacco    Never Used     History   Drug Use    Types: Marijuana       Family History:    History reviewed  No pertinent family history  Physical Exam:     Vitals:   Blood Pressure: 145/71 (06/07/18 0005)  Pulse: 87 (06/07/18 0005)  Temperature: 98 5 °F (36 9 °C) (06/06/18 2143)  Respirations: 18 (06/07/18 0005)  Weight - Scale: 69 9 kg (154 lb) (06/06/18 2143)  SpO2: 100 % (06/06/18 2300)    Physical Exam   Constitutional: He is oriented to person, place, and time  He appears well-developed and well-nourished  No distress  HENT:   Head: Normocephalic and atraumatic  Eyes: EOM are normal  Pupils are equal, round, and reactive to light  Neck: Normal range of motion  Neck supple  No JVD present  Cardiovascular: Normal rate and regular rhythm  No murmur heard  Pulmonary/Chest: Effort normal and breath sounds normal  He has no wheezes  Abdominal: Soft   Bowel sounds are normal  He exhibits no distension  There is no tenderness  Musculoskeletal: Normal range of motion  He exhibits no edema or deformity  Neurological: He is alert and oriented to person, place, and time  Skin: Skin is warm and dry  Psychiatric: He has a normal mood and affect  His behavior is normal        Additional Data:     Lab Results: I have personally reviewed pertinent reports  Results from last 7 days  Lab Units 06/06/18  2246   WBC Thousand/uL 8 29   HEMOGLOBIN g/dL 13 5   HEMATOCRIT % 39 8   PLATELETS Thousands/uL 344   NEUTROS PCT % 39*   LYMPHS PCT % 49*   MONOS PCT % 7   EOS PCT % 5       Results from last 7 days  Lab Units 06/06/18  2246   SODIUM mmol/L 143   POTASSIUM mmol/L 2 9*   CHLORIDE mmol/L 106   CO2 mmol/L 25   BUN mg/dL 15   CREATININE mg/dL 1 31*   CALCIUM mg/dL 8 5   TOTAL PROTEIN g/dL 7 6   BILIRUBIN TOTAL mg/dL 0 23   ALK PHOS U/L 85   ALT U/L 25   AST U/L 21   GLUCOSE RANDOM mg/dL 86                   Imaging: I have personally reviewed pertinent films in PACS    XR chest 2 views   ED Interpretation by Audrey Gates MD (06/07 0026)   No evidence of focal consolidation, pleural effusion, osseous abnormality, or pneumothroax, as interpreted by me  EKG, Pathology, and Other Studies Reviewed on Admission:   · EKG: no ST changesm    Allscripts / Epic Records Reviewed: Yes     ** Please Note: This note has been constructed using a voice recognition system   **  NSTEMI (non-ST elevated myocardial infarction) ([facility] [address] )   Assessment & Plan    - Worsening of CP/SOB  - +ve troponin  - Start patient on ASA/BB/Heparin and statins  - Serial cardiac markers  - PRN Nitro and morphine  - Consult Cardiology  - Echo to evaluate EF        Hypokalemia   Assessment & Plan    - Replace and recheck in am         HTN (hypertension)   Assessment & Plan    - Resume home medications  - Monitor and titrate as needed   - PRN labetalol         [facility]   Assessment & Plan    - PRN nebs

## 2018-06-07 NOTE — ED ATTENDING ATTESTATION
Jonathon EWING 24, DO, saw and evaluated the patient  I have discussed the patient with the resident/non-physician practitioner and agree with the resident's/non-physician practitioner's findings, Plan of Care, and MDM as documented in the resident's/non-physician practitioner's note, except where noted  All available labs and Radiology studies were reviewed  At this point I agree with the current assessment done in the Emergency Department  I have conducted an independent evaluation of this patient a history and physical is as follows:    56 y o  M w/ h/o COPD, HTN p/w SOB/cough x 3-4 days  Having a cough with sputum  Having pleuritic CP and feels "winded "  Denies F/C, abd pain, N/V, LE edema  Doesn't feel better after a neb done in the ED  Lungs with slight wheezing  Plan: SOB/cough - Will check labs, EKG, reassess for dispo      Critical Care Time  CritCare Time    Procedures

## 2018-06-07 NOTE — ASSESSMENT & PLAN NOTE
- Worsening of CP/SOB  - +ve troponin  - Start patient on ASA/BB/Heparin and statins  - Serial cardiac markers  - PRN Nitro and morphine  - Consult Cardiology  - Echo to evaluate EF

## 2018-06-07 NOTE — PLAN OF CARE
CARDIOVASCULAR - ADULT     Maintains optimal cardiac output and hemodynamic stability Progressing     Absence of cardiac dysrhythmias or at baseline rhythm Progressing        DISCHARGE PLANNING     Discharge to home or other facility with appropriate resources Progressing        Knowledge Deficit     Patient/family/caregiver demonstrates understanding of disease process, treatment plan, medications, and discharge instructions Progressing        Nutrition/Hydration-ADULT     Nutrient/Hydration intake appropriate for improving, restoring or maintaining nutritional needs Progressing        PAIN - ADULT     Verbalizes/displays adequate comfort level or baseline comfort level Progressing        SAFETY ADULT     Patient will remain free of falls Progressing     Maintain or return to baseline ADL function Progressing     Maintain or return mobility status to optimal level Progressing

## 2018-06-07 NOTE — DISCHARGE INSTRUCTIONS
Your chest pain was not related to your heart  You had a cardiac catheterization with normal coronary arteries  Your chest pain may have been musculoskeletal from over exertion, the burning sensation may be related to acid reflux, and/or it could be related to stress/anxiety/panic attacks    For the musculoskeletal pain you may take a muscle relaxant as needed  Do not take this medication while driving  You may also take Tylenol or ibuprofen in moderation    For the burning sensation it may be related to acid reflux  You can do a trial Protonix  A prescription has been given to you  You may also take Tums or Mylanta    You should discuss with your family doctor about any stress anxiety or panic attacks you may be having    You must stop smoking!

## 2018-06-07 NOTE — ED NOTES
Per resident request for heparin bolus and drip to be initiated prior to blood work resulted  Spoke with nursing supervisor about orders and to request to initiate prior to return of blood work  Nursing supervisor stated to have resident place nursing communication order to give bolus and initiate heparin drip prior to blood work return  Resident aware of concerns by RN but wants orders completed at this time       Nolvia Bazzi RN  06/06/18 0284

## 2018-06-07 NOTE — ED NOTES
Provider at bedside to explain blood work and possible admission       Cesar Rodrigues, RN  06/06/18 1162

## 2018-06-07 NOTE — CONSULTS
Cardiology Consult  06/07/18    Referring Physian: DO ALISSA Jane    Chief Complain/Reason for Referal:     IMPRESSION/RECOMMENDATIONS/DISCUSSION:    1  Chronic chest discomfort, with acute presentation associated with minimal troponin elevation at 0 19 and unremarkable ECG:  Overall, chest pain syndrome seems to have mostly atypical and noncardiac features, however significant increased left-sided chest pain yesterday radiating into the left arm associated mild diaphoresis and nausea may be suggestive of unstable angina, especially with minimal troponin elevation at 0 19 which has been noted  He has intermittent chest discomfort while here in the hospital with last episode occurring earlier this morning  In light of patient's risk factors of diabetes, hypertension, ongoing tobacco use as well as chronic chest discomfort and current clinical presentation, I recommended coronary angiography for definitive evaluation and possible PCI if applicable  I reviewed extensively the risks of coronary angiography and PCI including but not limited to death, mi, CVA, vascular damage, renal failure, life-threatening bleeding, access site and coronary complications, as well as the indications and benefits of these procedures  Both the patient and his significant other expressed full understanding and are agreeable to proceed  Aspirin, atorvastatin, intravenous heparin will be continued  I will add clopidogrel 300 mg x1 at this time  2   Hypertension:  Controlled, continue at lisinopril, amlodipine, metoprolol   3  Self-reported diabetes:  Check hemoglobin A1c  4    Ongoing tobacco use encouraged complete and indefinite cessation    Will follow-up after coronary angiography    ======================================================    HPI:  I am seeing this patient in cardiology consultation for:  Elevated troponin    Amara Bragg Sr  is a 56 y o  male with history of hypertension and self-reported history of diabetes which he states was diagnosed last year presents with a 2 year history of chest discomfort  His chest pain somewhat atypical, occurring quite randomly, usually at rest, occurring spontaneously occasionally radiating into the left arm, occasionally occurring with exercise or physical exertion but not always  He admits to associated shortness of breath with exertion which has also been ongoing for 2 years  He states he has not had a stress test over the past 2 years, and states it was a few years back, but cannot give me details  2 days ago, he developed a left-sided chest pain radiating into left arm which he states was more intense than usual, intermittent and stuttering which became more severe last night while at home associated with mild nausea and lightheadedness/diaphoresis  This prompted him to present to the emergency department  Troponin was elevated at 0 18 and peaked at 0 19  At this time he is chest pain-free but admits to intermittent chest discomfort which last occurred earlier this morning  ECG has been unremarkable          Past Medical History:   Diagnosis Date    COPD [facility]chronic obstructive pulmonary disease) ([facility] [address] )     Hypertension          Scheduled Meds:  Current Facility-Administered Medications:  acetaminophen 650 mg Oral Q4H PRN Ronnell Landau, MD    amLODIPine 10 mg Oral Daily Ronnell Landau, MD    [START ON 6/8/2018] aspirin 325 mg Oral Daily Ronnell Landau, MD    atorvastatin 40 mg Oral QPM Ronnell Landau, MD    heparin (porcine) 3-20 Units/kg/hr (Order-Specific) Intravenous Titrated Emile Moore MD Last Rate: 10 Units/kg/hr (06/07/18 4560)   heparin (porcine) 1,950 Units Intravenous PRN Emile Moore MD    heparin (porcine) 3,900 Units Intravenous PRN Emile Moore MD    ipratropium-albuterol 3 mL Nebulization 4x Daily PRN Ronnell Landau, MD    labetalol 10 mg Intravenous Q4H PRN Ronnell Landau, MD    lisinopril 5 mg Oral Daily [facility] Selam Johnston MD    methocarbamol 750 mg Oral 4x Daily Denton Negron MD    metoprolol tartrate 25 mg Oral Q12H [address] Denton Negron MD    morphine injection 2 mg Intravenous Q6H PRN Denton Negron MD    nicotine 21 mg Transdermal Daily Denton Negron MD    nitroglycerin 0 4 mg Sublingual Q5 Min PRN Denton Negron MD    pantoprazole 40 mg Oral Early Morning Denton Negron MD    potassium chloride 20 mEq Oral Once Denton Negron MD      Continuous Infusions:  heparin (porcine) 3-20 Units/kg/hr (Order-Specific) Last Rate: 10 Units/kg/hr (06/07/18 0634)     PRN Meds:   acetaminophen    heparin (porcine)    heparin (porcine)    ipratropium-albuterol    labetalol    morphine injection    nitroglycerin  No Known Allergies  I reviewed the Home Medication list in the chart  History reviewed  No pertinent family history  Social History     Social History    Marital status: Legally      Spouse name: N/A    Number of children: N/A    Years of education: N/A     Occupational History    Not on file       Social History Main Topics    Smoking status: Current Every Day Smoker     Packs/day: 0 50     Types: Cigarettes    Smokeless tobacco: Never Used    Alcohol use Yes      Comment: social    Drug use: Yes     Types: Marijuana    Sexual activity: Not on file     Other Topics Concern    Not on file     Social History Narrative    No narrative on file       Review of Systems - as per HPI, all others reviewed and negative  Vitals:    06/07/18 0700   BP: 146/78   Pulse: 65   Resp: 18   Temp: 98 °F (36 7 °C)   SpO2: 97%     I/O     None        Weight (last 2 days)     Date/Time   Weight    06/07/18 0100  62 9 (138 67)    06/06/18 2143  69 9 (154)              GEN: NAD, Alert  HEENT: Mucus membranes moist, pink conjunctivae  EYES: Pupils equal, sclera anicteric  NECK: No JVD/HJR, no carotid bruit  CARDIOVASCULAR: RRR, No murmur, rub, gallops S1,S2, no parasternal heave/thrill  LUNGS: Clear To auscultation bilaterally  ABDOMEN: Soft, nondistended, no hepatic systolic pulsation  EXTREMITIES/VASCULAR: No edema  PSYCH: Normal Affect by limited examination  NEURO: Grossly intact by limited examination    HEME: No significant bleeding, bruising, petechia by limited examination  SKIN: No significant rashes by limited examination  MSK:  Normal upper extremity and trunk strengths by limited examination      EKG:  Sinus rhythm, normal ECG  TELE:  Sinus rhythm  CXR:  No acute pulmonary disease  Echocardiogram is pending    Results from last 7 days  Lab Units 06/07/18  0541 06/06/18  2246   WBC Thousand/uL 7 72 8 29   HEMOGLOBIN g/dL 12 8 13 5   HEMATOCRIT % 38 4 39 8   PLATELETS Thousands/uL 351 344   NEUTROS PCT %  --  39*   MONOS PCT %  --  7       Results from last 7 days  Lab Units 06/07/18  0542 06/06/18  2246   SODIUM mmol/L 140 143   POTASSIUM mmol/L 3 8 2 9*   CHLORIDE mmol/L 106 106   CO2 mmol/L 25 25   BUN mg/dL 13 15   CREATININE mg/dL 0 96 1 31*   GLUCOSE RANDOM mg/dL 87 86   CALCIUM mg/dL 8 4 8 5       Results from last 7 days  Lab Units 06/07/18  0542 06/06/18  2246   SODIUM mmol/L 140 143   POTASSIUM mmol/L 3 8 2 9*   CHLORIDE mmol/L 106 106   CO2 mmol/L 25 25   BUN mg/dL 13 15   CREATININE mg/dL 0 96 1 31*   CALCIUM mg/dL 8 4 8 5   TOTAL PROTEIN g/dL 7 3 7 6   BILIRUBIN TOTAL mg/dL 0 34 0 23   ALK PHOS U/L 74 85   ALT U/L 23 25   AST U/L 25 21   GLUCOSE RANDOM mg/dL 87 86     No results found for: TROPONINT    Results from last 7 days  Lab Units 06/07/18  0542   CHOLESTEROL mg/dL 124       Results from last 7 days  Lab Units 06/07/18  0542   TROPONIN I ng/mL 0 18*           Results from last 7 days  Lab Units 06/06/18  2329   INR  1 12               I have personally reviewed the EKG, CXR and Telemetry images directly        Patient Active Problem List    Diagnosis Date Noted    NSTEMI (non-ST elevated myocardial infarction) ([facility] [address] ) 06/07/2018     Priority: Low    COPD (chronic obstructive pulmonary disease) ([facility] [address] ) 06/07/2018     Priority: Low    HTN (hypertension) 06/07/2018     Priority: Low    Hypokalemia 06/07/2018     Priority: Low       Portions of the record may have been created with voice recognition software  Occasional wrong word or "sound a like" substitutions may have occurred due to the inherent limitations of voice recognition software  Read the chart carefully and recognize, using context, where substitutions have occurred

## 2018-06-07 NOTE — DISCHARGE SUMMARY
Addendum 6/11/18    Upon further review, diagnosis of NSTEMI is not clinically warranted  The patient has chronic chest discomfort and atypical non-cardiac symptoms  The minimal troponin spill is nonspecific, and CAD had been ruled out by cardiac catheterization  Discharge Summary -  Internal Medicine / Hospitalists  Kye Atwood Sr  56 y o  male MRN: [id number]    Jasson Maciel  [address] -* [id number]: [id number]      Admitting Provider: Floridalma Castillo MD  Discharge Provider: Shaun Roman PA-C  Admission Date: 6/6/2018     Discharge Date: 6/7/18  Primary Care Physician at Discharge: Jazmin Allison [phone]    Primary Discharge Diagnosis  Active Problems:    COPD (chronic obstructive pulmonary disease) ([facility] )    HTN (hypertension)  Resolved Problems:    Hypokalemia      Other Problems Addressed  Patient Active Problem List    Diagnosis Date Noted    COPD (chronic obstructive pulmonary disease) ([facility] ) 06/07/2018    HTN (hypertension) 06/07/2018       Consulting Providers   Dr Jacob Foot - Cardio    Diagnostic Procedures Performed  CXR - No acute cardiopulmonary disease  Treatments   Morphine, nitro, robaxin    Procedures   Cardiac Cath  CORONARY VESSELS:   --  The coronary circulation is right dominant  --  Left main: Normal   --  LAD: Normal   --  Circumflex: Normal   --  Mid RCA: There was a 20 % stenosis      IMPRESSIONS:  No significant obstructive epicardial CAD  No angiographic source elevated troponin      Other Pertinent Test Results    Results from last 7 days  Lab Units 06/07/18  0541 06/06/18  2329   WBC Thousand/uL 7 72  --    HEMOGLOBIN g/dL 12 8  --    HEMATOCRIT % 38 4  --    PLATELETS Thousands/uL 351  --    INR   --  1 12       Results from last 7 days  Lab Units 06/07/18  0542   SODIUM mmol/L 140   POTASSIUM mmol/L 3 8   CHLORIDE mmol/L 106   CO2 mmol/L 25   BUN mg/dL 13   CREATININE mg/dL 0 96   CALCIUM mg/dL 8 4   TOTAL PROTEIN g/dL 7 3   BILIRUBIN TOTAL mg/dL 0 34   ALK PHOS U/L 74   ALT U/L 23   AST U/L 25   GLUCOSE RANDOM mg/dL 87       Results from last 7 days  Lab Units 06/07/18  0542 06/07/18  0150 06/06/18  2246   TROPONIN I ng/mL 0 18* 0 19* 0 19*     No results found for: Tennille Sampson, SPUTUMCULTUR      Presenting Problem/History of Present Illness  <principal problem not specified>    HOSPITAL COURSE:  Maira Smith Sr  is a 56 y o  male with history of hypertension and self-reported history of diabetes which he states was diagnosed last year presents with a 2 year history of chest discomfort  His chest pain somewhat atypical, occurring quite randomly, usually at rest, occurring spontaneously occasionally radiating into the left arm, occasionally occurring with exercise or physical exertion but not always  He admits to associated shortness of breath with exertion which has also been ongoing for 2 years  He states he has not had a stress test over the past 2 years, and states it was a few years back, but cannot give me details  2 days ago, he developed a left-sided chest pain radiating into left arm which he states was more intense than usual, intermittent and stuttering which became more severe last night while at home associated with mild nausea and lightheadedness/diaphoresis  This prompted him to present to [facility] ER on 6/6/18  Troponin was elevated at 0 18 and peaked at 0 19  ECG has been unremarkable  He was admitted to rule out acute coronary syndrome and to be seen in consultation with Cardiology    1  Chronic chest discomfort with acute presentation associated with minimal troponin elevation and unremarkable EKG- the patient underwent a cardiac catheterization with normal coronaries  The troponin spill is nonspecific  His chest pain was reproducible to palpation, he admits to over exerting himself  His lipids were within acceptable limits  Will give a short course of Robaxin  2    Essential hypertension-continue outpatient regimen  3  GERD-patient describes a burning sensation at times will provide a trial of PPI  4  Anxiety with depression-may be related to panic attacks  The patient admits to more stressors recently  He is under the care of a therapist any should discuss possible medications with his primary care doctor  5  Hypokalemia-2 9 POA status post repletion 3 8  6  COPD with ongoing tobacco abuse-no exacerbation continue Advair outpatient nicotine patch and the patient was counseled he must stop smoking forever      PHYSICAL EXAM:  Vitals:        General Appearance:    Alert, cooperative, no distress   Head:    Normocephalic, without obvious abnormality, atraumatic   Eyes:    Conjunctiva/corneas clear, EOM's intact      Neck:   Supple, no adenopathy, no JVD   Chest wall:     Tender to palpation L chest wall   Lungs:     Coarse but clear to auscultation bilaterally, no wheezing or rhonchi   Heart:    Regular rate and rhythm, S1 and S2 normal, no murmur, rub   or gallop   Abdomen:     Soft, non-tender, bowel sounds active    Extremities:   Extremities normal, atraumatic, no cyanosis or edema   Psych:   Flat Affect   Neurologic:   CNII-XII intact  Normal strength, sensation and reflexes       Throughout       Discharge Disposition: Home/Self Care          Allergies  No Known Allergies    Diet restrictions: regular  Activity restrictions: as tolerated  Discharge Condition: good      Outpatient Follow-Up  Nico Shi MD  PCP - 0 [facility] [phone] [facility]  [address]     Next Steps: Follow up in 1 week(s)      Instructions: 1 week            Code Status: Prior  Advance Directive and Living Will: <no information>  Power of :    POLST:      Discharge Statement   I spent 33 minutes discharging the patient  This time was spent on the day of discharge   Greater than 50% of total time was spent with the patient and / or family counseling and / or coordination of care  Discharge Medications:  See after visit summary for reconciled discharge medications provided to patient and family        This note has been constructed using a voice recognition system

## 2018-06-07 NOTE — ED PROVIDER NOTES
History  Chief Complaint   Patient presents with    Shortness of Breath     Pt states "feeling sob  took inhalers and nebs and its not getting any better  also back and knees have been hurting"     This is a 56 y o  old male who presents to the ED for evaluation of chest pain and shortness of breath  History of COPD  States his medications are not working for him  Has shooting chest pain that radiates to his right arm as well as shortness of breath at rest and with exertion  His dyspnea on exertion as well  He can't walk up 2 or 3 stairs without stopping huffing and puffing  His pain remits with rest   He is not diaphoretic  He has a cough productive for yellow sputum with an increase in the amount of sputum produced recently  Patient does report having an outpatient stress test done at [facility] her the records are not available  This was done about 5-6 months ago  Denies any history of MI or cardiac catheterization  Otherwise, patient denies fevers, chills, night sweats, congestion, rhinorrhea, abdominal pain, nausea, vomiting, diarrhea, constipation, urinary symptoms, leg pain or swelling  Prior to Admission Medications   Prescriptions Last Dose Informant Patient Reported? Taking? amLODIPine (NORVASC) 10 mg tablet   Yes No   Sig: Take 10 mg by mouth   diazepam (VALIUM) 5 mg tablet   Yes Yes   Sig: Take 5 mg by mouth every 8 (eight) hours   loratadine (CLARITIN) 10 mg tablet   Yes No   Sig: Take 10 mg by mouth   methocarbamol (ROBAXIN) 750 mg tablet   Yes Yes   Sig: Take 750 mg by mouth 4 (four) times a day   pantoprazole (PROTONIX) 40 mg tablet   Yes No   Sig: Take 40 mg by mouth      Facility-Administered Medications: None     Past Medical History:   Diagnosis Date    COPD [facility]chronic obstructive [facility] disease) [facility] [address] [facility]     Hypertension      History reviewed  No pertinent surgical history  History reviewed  No pertinent family history    I have reviewed and agree with the history as documented  Social History   Substance Use Topics    Smoking status: Current Every Day Smoker    Smokeless tobacco: Never Used    Alcohol use Yes      Comment: social      Review of Systems   Constitutional: Positive for activity change  Negative for chills, fatigue, fever and unexpected weight change  HENT: Negative for congestion, rhinorrhea and sore throat  Eyes: Negative for redness and visual disturbance  Respiratory: Positive for cough and shortness of breath  Cardiovascular: Positive for chest pain  Negative for palpitations and leg swelling  Gastrointestinal: Negative for abdominal pain, constipation, diarrhea, nausea and vomiting  Endocrine: Negative for cold intolerance and heat intolerance  Genitourinary: Negative for dysuria, frequency and urgency  Musculoskeletal: Negative for back pain  Skin: Negative for rash  Neurological: Negative for dizziness, syncope and numbness  All other systems reviewed and are negative  Physical Exam  ED Triage Vitals   Temperature Pulse Respirations Blood Pressure SpO2   06/06/18 2143 06/06/18 2143 06/06/18 2143 06/06/18 2143 06/06/18 2143   98 5 °F (36 9 °C) 82 18 (!) 189/80 99 %      Temp src Heart Rate Source Patient Position - Orthostatic VS BP Location FiO2 (%)   -- 06/07/18 0005 -- 06/07/18 0005 --    Monitor  Right arm       Pain Score       06/06/18 2143       8         Physical Exam   Constitutional: He is oriented to person, place, and time  He appears well-developed and well-nourished  No distress  HENT:   Head: Normocephalic and atraumatic  Nose: Nose normal    Eyes: Conjunctivae and EOM are normal  Pupils are equal, round, and reactive to light  Neck: Normal range of motion  Neck supple  Cardiovascular: Normal rate, regular rhythm and normal heart sounds  Exam reveals no gallop  No murmur heard  Pulmonary/Chest: Effort normal  No respiratory distress  He has wheezes (faint SANDRINE wheezing)  He has no rales  Abdominal: Soft  Bowel sounds are normal  He exhibits no distension and no mass  There is no tenderness  There is no rebound and no guarding  Musculoskeletal: Normal range of motion  He exhibits no edema or deformity  Lymphadenopathy:     He has no cervical adenopathy  Neurological: He is alert and oriented to person, place, and time  No cranial nerve deficit  Skin: Skin is warm and dry  No rash noted  He is not diaphoretic  No erythema  Psychiatric: He has a normal mood and affect  Nursing note and vitals reviewed  ED Medications  Medications   heparin (porcine) 25,000 units in 250 mL infusion (premix) (12 Units/kg/hr × 65 kg (Order-Specific) Intravenous New Bag 6/7/18 0001)   heparin (porcine) injection 3,900 Units (not administered)   heparin (porcine) injection 1,950 Units (not administered)   metoprolol tartrate (LOPRESSOR) tablet 25 mg (not administered)   labetalol (NORMODYNE) injection 10 mg (not administered)   albuterol inhalation solution 5 mg (5 mg Nebulization Given 6/6/18 2246)   ipratropium (ATROVENT) 0 02 % inhalation solution 0 5 mg (0 5 mg Nebulization Given 6/6/18 2247)   potassium chloride (K-DUR,KLOR-CON) CR tablet 40 mEq (40 mEq Oral Given 6/6/18 2334)   aspirin chewable tablet 324 mg (324 mg Oral Given 6/6/18 2334)   heparin (porcine) injection 3,900 Units (3,900 Units Intravenous Given 6/7/18 0001)   metoprolol (LOPRESSOR) injection 2 5 mg (2 5 mg Intravenous Given 6/7/18 0014)   metoprolol (LOPRESSOR) injection 2 5 mg (2 5 mg Intravenous Given 6/7/18 0022)     Diagnostic Studies  Results Reviewed     Procedure Component Value Units Date/Time    Protime-INR [[id number]] Collected:  06/06/18 2329    Lab Status: In process Specimen:  Blood from Arm, Left Updated:  06/06/18 2334    APTT six (6) hours after Heparin bolus/drip initiation or dosing change [[id number]] Collected:  06/06/18 2329    Lab Status:   In process Specimen:  Blood from Arm, Left Updated:  06/06/18 2334 Troponin I [[id number]]  (Abnormal) Collected:  06/06/18 2246    Lab Status:  Final result Specimen:  Blood from Arm, Left Updated:  06/06/18 2317     Troponin I 0 19 (H) ng/mL     Narrative:         [employer] Chemistry analyzer 99% cutoff is > 0 04 ng/mL in network labs    o cTnI 99% cutoff is useful only when applied to patients in the clinical setting of myocardial ischemia  o cTnI 99% cutoff should be interpreted in the context of clinical history, ECG findings and possibly cardiac imaging to establish correct diagnosis  o cTnI 99% cutoff may be suggestive but clearly not indicative of a coronary event without the clinical setting of myocardial ischemia  Comprehensive metabolic panel [[id number]]  (Abnormal) Collected:  06/06/18 2246    Lab Status:  Final result Specimen:  Blood from Arm, Left Updated:  06/06/18 2309     Sodium 143 mmol/L      Potassium 2 9 (L) mmol/L      Chloride 106 mmol/L      CO2 25 mmol/L      Anion Gap 12 mmol/L      BUN 15 mg/dL      Creatinine 1 31 (H) mg/dL      Glucose 86 mg/dL      Calcium 8 5 mg/dL      AST 21 U/L      ALT 25 U/L      Alkaline Phosphatase 85 U/L      Total Protein 7 6 g/dL      Albumin 3 5 g/dL      Total Bilirubin 0 23 mg/dL      eGFR 70 ml/min/1 73sq m     Narrative:         National Kidney Disease Education Program recommendations are as follows:  GFR calculation is accurate only with a steady state creatinine  Chronic Kidney disease less than 60 ml/min/1 73 sq  meters  Kidney failure less than 15 ml/min/1 73 sq  meters      Magnesium [[id number]]  (Normal) Collected:  06/06/18 2246    Lab Status:  Final result Specimen:  Blood from Arm, Left Updated:  06/06/18 2309     Magnesium 2 0 mg/dL     CBC and differential [[id number]]  (Abnormal) Collected:  06/06/18 2246    Lab Status:  Final result Specimen:  Blood from Arm, Left Updated:  06/06/18 2255     WBC 8 29 Thousand/uL      RBC 4 73 Million/uL      Hemoglobin 13 5 g/dL      Hematocrit 39 8 %      MCV 84 fL      MCH 28 5 pg      MCHC 33 9 g/dL      RDW 14 7 %      MPV 8 9 fL      Platelets 885 Thousands/uL      nRBC 0 /100 WBCs      Neutrophils Relative 39 (L) %      Lymphocytes Relative 49 (H) %      Monocytes Relative 7 %      Eosinophils Relative 5 %      Basophils Relative 1 %      Neutrophils Absolute 3 24 Thousands/µL      Lymphocytes Absolute 4 03 Thousands/µL      Monocytes Absolute 0 61 Thousand/µL      Eosinophils Absolute 0 37 Thousand/µL      Basophils Absolute 0 04 Thousands/µL         XR chest 2 views   ED Interpretation by Lou Fan MD (06/07 0026)   No evidence of focal consolidation, pleural effusion, osseous abnormality, or pneumothroax, as interpreted by me  Procedures  ECG 12 Lead Documentation  Date/Time: 6/6/2018 10:40 PM  Performed by: Yousif Grant  Authorized by: Yousif Grant     Indications / Diagnosis:  CP  ECG reviewed by me, the ED Provider: yes    Patient location:  ED  Comments:      Normal sinus rhythm, rate 73, normal axis, normal intervals, no ST-T wave changes  No previous EKG available  ECG 12 Lead Documentation  Date/Time: 6/6/2018 11:38 PM  Performed by: Yousif Grant  Authorized by: Yousif Grant     Indications / Diagnosis:  CP  ECG reviewed by me, the ED Provider: yes    Patient location:  ED  Comments:      Normal sinus rhythm, rate 80, normal axis, normal intervals, no ST-T wave changes  Compared with previous EKG dated 6/6/208 2240 showing no significant changes  Phone Consults  ED Phone Contact    ED Course     A/P: This is a 56 y o  male who presents to the ED for evaluation of dypsnea and CP  DDx includes ACS, COPD exacerbation  Labs, EKG, CXR, treatment, reevaluate  2320 patient's troponin is elevated  Reviewed EKG no acute findings to indicate STEMI  Suspect NSTEMI/UA as cause of patient's symptoms  Will give aspirin, heparin, admit to Medicine      HEART Risk Score      Most Recent Value   History  1 Filed at: 06/06/2018 2246   ECG  0 Filed at: 06/06/2018 2246   Age  1 Filed at: 06/06/2018 2246   Risk Factors  1 Filed at: 06/06/2018 2246   Troponin  2 Filed at: 06/06/2018 2246   Heart Score Risk Calculator   History  1 Filed at: 06/06/2018 2246   ECG  0 Filed at: 06/06/2018 2246   Age  1 Filed at: 06/06/2018 2246   Risk Factors  1 Filed at: 06/06/2018 2246   Troponin  2 Filed at: 06/06/2018 2246   HEART Score  5 Filed at: 06/06/2018 2246   HEART Score  5 Filed at: 06/06/2018 2246        MDM  CritCare Time    Disposition  Final diagnoses:   NSTEMI (non-ST elevated myocardial infarction) ([facility] [address] )   Dyspnea on exertion   Tobacco dependence   Hypokalemia     Time reflects when diagnosis was documented in both MDM as applicable and the Disposition within this note     Time User Action Codes Description Comment    6/6/2018 11:27 PM [facility] Add [I21 4] NSTEMI (non-ST elevated myocardial infarction) ([facility] [address] )     6/6/2018 11:27 PM [facility] Add [R06 09] Dyspnea on exertion     6/6/2018 11:27 PM [facility] Add [F17 200] Tobacco dependence     6/6/2018 11:52 PM [facility] Add [E87 6] Hypokalemia       ED Disposition     ED Disposition Condition Comment    Admit  Case was discussed with ALISSA and the patient's admission status was agreed to be Admission Status: inpatient status to the service of Dr Ten Juarez    None       Patient's Medications   Discharge Prescriptions    No medications on file     No discharge procedures on file  ED Provider  Attending physically available and evaluated Yazmin Sauer I managed the patient along with the ED Attending      Electronically Signed by         Tina James MD  06/07/18 4529

## 2018-06-07 NOTE — ED NOTES
Post peak flow at 150  Ambulatory pulse ox  Between 93 and 96 percent with dyspnea on exertion reported       Kaylynn Jaffe RN  06/06/18 4806

## 2018-07-27 ENCOUNTER — OFFICE VISIT (OUTPATIENT)
Dept: FAMILY MEDICINE CLINIC | Facility: CLINIC | Age: 58
End: 2018-07-27
Payer: COMMERCIAL

## 2018-07-27 VITALS
SYSTOLIC BLOOD PRESSURE: 122 MMHG | TEMPERATURE: 97.5 F | RESPIRATION RATE: 16 BRPM | HEIGHT: 68 IN | HEART RATE: 86 BPM | WEIGHT: 139 LBS | DIASTOLIC BLOOD PRESSURE: 80 MMHG | BODY MASS INDEX: 21.07 KG/M2 | OXYGEN SATURATION: 99 %

## 2018-07-27 DIAGNOSIS — F17.200 TOBACCO DEPENDENCE: ICD-10-CM

## 2018-07-27 DIAGNOSIS — M25.511 CHRONIC RIGHT SHOULDER PAIN: Primary | ICD-10-CM

## 2018-07-27 DIAGNOSIS — G89.29 CHRONIC RIGHT SHOULDER PAIN: Primary | ICD-10-CM

## 2018-07-27 DIAGNOSIS — J44.9 CHRONIC OBSTRUCTIVE PULMONARY DISEASE, UNSPECIFIED COPD TYPE (HCC): ICD-10-CM

## 2018-07-27 DIAGNOSIS — G89.29 CHRONIC BILATERAL THORACIC BACK PAIN: ICD-10-CM

## 2018-07-27 DIAGNOSIS — G56.03 BILATERAL CARPAL TUNNEL SYNDROME: ICD-10-CM

## 2018-07-27 DIAGNOSIS — E11.9 DM (DIABETES MELLITUS) (HCC): ICD-10-CM

## 2018-07-27 DIAGNOSIS — R07.89 NON-CARDIAC CHEST PAIN: ICD-10-CM

## 2018-07-27 DIAGNOSIS — I10 HTN (HYPERTENSION): ICD-10-CM

## 2018-07-27 DIAGNOSIS — M54.6 CHRONIC BILATERAL THORACIC BACK PAIN: ICD-10-CM

## 2018-07-27 PROCEDURE — 99213 OFFICE O/P EST LOW 20 MIN: CPT | Performed by: FAMILY MEDICINE

## 2018-07-27 RX ORDER — AMLODIPINE BESYLATE 10 MG/1
10 TABLET ORAL DAILY
Qty: 30 TABLET | Refills: 1 | Status: SHIPPED | OUTPATIENT
Start: 2018-07-27 | End: 2019-05-02 | Stop reason: SDUPTHER

## 2018-07-27 RX ORDER — ALBUTEROL SULFATE 90 UG/1
2 AEROSOL, METERED RESPIRATORY (INHALATION) EVERY 4 HOURS PRN
Qty: 1 INHALER | Refills: 1 | Status: SHIPPED | OUTPATIENT
Start: 2018-07-27 | End: 2018-12-18 | Stop reason: SDUPTHER

## 2018-07-27 RX ORDER — NICOTINE 21 MG/24HR
1 PATCH, TRANSDERMAL 24 HOURS TRANSDERMAL DAILY
Qty: 28 PATCH | Refills: 0 | Status: SHIPPED | OUTPATIENT
Start: 2018-07-27 | End: 2018-07-30 | Stop reason: ALTCHOICE

## 2018-07-27 RX ORDER — PANTOPRAZOLE SODIUM 40 MG/1
40 TABLET, DELAYED RELEASE ORAL
Qty: 30 TABLET | Refills: 0 | Status: SHIPPED | OUTPATIENT
Start: 2018-07-27 | End: 2018-10-10

## 2018-07-27 NOTE — PROGRESS NOTES
Assessment/Plan:    Chronic right shoulder pain  Patient would like to see orthopedic surgery  Chronic bilateral thoracic back pain  Patient would like to see orthopedic surgery  Bilateral carpal tunnel syndrome  Patient says he wants to see the neurologist  Was told by neurology to come here to get a referral      [facility]  Smoked since he was 7years old  - Uses albuterol every day  - doesn't use symbicort, will Rx advair        Diagnoses and all orders for this visit:    Chronic right shoulder pain  -     Ambulatory referral to Orthopedic Surgery; Future    Chronic bilateral thoracic back pain  -     Ambulatory referral to Orthopedic Surgery; Future    Bilateral carpal tunnel syndrome  -     Ambulatory referral to Neurology; Future    Chronic obstructive pulmonary disease, unspecified COPD type (HCC)  -     albuterol (VENTOLIN HFA) 90 mcg/act inhaler; Inhale 2 puffs every 4 (four) hours as needed for wheezing  -     fluticasone-salmeterol (ADVAIR HFA) 45-21 MCG/ACT inhaler; Inhale 2 puffs 2 (two) times a day Rinse mouth after use  Tobacco dependence  -     nicotine (NICODERM CQ) 21 mg/24 hr TD 24 hr patch; Place 1 patch on the skin daily    Non-cardiac chest pain  -     pantoprazole (PROTONIX) 40 mg tablet; Take 1 tablet (40 mg total) by mouth daily in the early morning    HTN (hypertension)  -     amLODIPine (NORVASC) 10 mg tablet; Take 1 tablet (10 mg total) by mouth daily    DM (diabetes mellitus) ([facility] )  -     metFORMIN (GLUCOPHAGE) 500 mg tablet; Take 1 tablet (500 mg total) by mouth 2 (two) times a day with meals          Subjective:      Patient ID: Leroy Bravo  is a 56 y o  male  Patient is here due to complaints of his mid back and right shoulder which he has been experiencing for the past 10+ years  Patient only wants to see orthopedic surgery  Patient also whishes to quit smoking and wants a nicotine patch   Patient also needs a refill on all of his medications  Due to patients extensive medical problems, Patient will RTC in 2 weeks to manage chronic conditions  The following portions of the patient's history were reviewed and updated as appropriate: allergies, current medications, past family history, past medical history, past social history, past surgical history and problem list     Review of Systems   Constitutional: Negative for activity change, appetite change and fever  HENT: Negative for congestion, sore throat and trouble swallowing  Eyes: Negative for discharge and visual disturbance  Respiratory: Positive for cough, shortness of breath and wheezing  Negative for choking  Cardiovascular: Negative for chest pain, palpitations and leg swelling  Gastrointestinal: Negative for abdominal distention, abdominal pain, blood in stool, constipation, nausea and vomiting  Genitourinary: Negative for discharge, dysuria and enuresis  Musculoskeletal: Positive for back pain  Negative for arthralgias  Skin: Negative for rash and wound  Neurological: Negative for dizziness, seizures, syncope and facial asymmetry  Psychiatric/Behavioral: Negative for agitation and behavioral problems  Objective:      /80 (BP Location: Right arm, Patient Position: Sitting, Cuff Size: Standard)   Pulse 86   Temp 97 5 °F (36 4 °C) (Temporal)   Resp 16   Ht 5' 8" (1 727 m)   Wt 63 kg (139 lb)   SpO2 99%   BMI 21 13 kg/m²          Physical Exam   Constitutional: He is oriented to person, place, and time  He appears well-developed and well-nourished  No distress  HENT:   Head: Normocephalic and atraumatic  Nose: Nose normal    Mouth/Throat: Oropharynx is clear and moist    Eyes: Conjunctivae and EOM are normal  Pupils are equal, round, and reactive to light  Right eye exhibits no discharge  Left eye exhibits no discharge  Neck: Normal range of motion  No JVD present  No tracheal deviation present  No thyromegaly present  Cardiovascular: Normal rate, regular rhythm, normal heart sounds and intact distal pulses  Exam reveals no gallop and no friction rub  No murmur heard  Pulmonary/Chest: Effort normal and breath sounds normal  No respiratory distress  He has no wheezes  He exhibits no tenderness  Abdominal: He exhibits no distension and no mass  There is no tenderness  There is no rebound and no guarding  Musculoskeletal: Normal range of motion  He exhibits tenderness  He exhibits no edema or deformity  Patient has pain on thoracic back , right shoulder and B/L wrist + tannal sign  Neurological: He is alert and oriented to person, place, and time  Skin: Skin is warm and dry  No rash noted  He is not diaphoretic  No erythema  No pallor  Psychiatric: He has a normal mood and affect   His behavior is normal  Judgment and thought content normal

## 2018-07-30 ENCOUNTER — HOSPITAL ENCOUNTER (EMERGENCY)
Facility: HOSPITAL | Age: 58
Discharge: HOME | End: 2018-07-30
Attending: EMERGENCY MEDICINE
Payer: COMMERCIAL

## 2018-07-30 VITALS
RESPIRATION RATE: 18 BRPM | SYSTOLIC BLOOD PRESSURE: 113 MMHG | HEART RATE: 75 BPM | WEIGHT: 140 LBS | BODY MASS INDEX: 21.29 KG/M2 | TEMPERATURE: 97.6 F | OXYGEN SATURATION: 100 % | DIASTOLIC BLOOD PRESSURE: 62 MMHG

## 2018-07-30 DIAGNOSIS — Z20.2 TRICHOMONAS EXPOSURE: ICD-10-CM

## 2018-07-30 DIAGNOSIS — Z20.2 STD EXPOSURE: Primary | ICD-10-CM

## 2018-07-30 LAB
BILIRUB UR QL STRIP: NEGATIVE
CHLAMYDIA DNA CVX QL NAA+PROBE: NORMAL
CLARITY UR: CLEAR
COLOR UR: NORMAL
GLUCOSE UR STRIP-MCNC: NEGATIVE MG/DL
HGB UR QL STRIP.AUTO: NEGATIVE
KETONES UR STRIP-MCNC: NEGATIVE MG/DL
LEUKOCYTE ESTERASE UR QL STRIP: NEGATIVE
N GONORRHOEA DNA GENITAL QL NAA+PROBE: NORMAL
NITRITE UR QL STRIP: NEGATIVE
PH UR STRIP.AUTO: 5 [PH] (ref 4.5–8)
PROT UR STRIP-MCNC: NEGATIVE MG/DL
SP GR UR STRIP.AUTO: 1.01 (ref 1–1.04)
UROBILINOGEN UA: NEGATIVE MG/DL

## 2018-07-30 PROCEDURE — 99283 EMERGENCY DEPT VISIT LOW MDM: CPT

## 2018-07-30 PROCEDURE — 87491 CHLMYD TRACH DNA AMP PROBE: CPT | Performed by: PHYSICIAN ASSISTANT

## 2018-07-30 PROCEDURE — 87661 TRICHOMONAS VAGINALIS AMPLIF: CPT | Performed by: EMERGENCY MEDICINE

## 2018-07-30 PROCEDURE — 87591 N.GONORRHOEAE DNA AMP PROB: CPT | Performed by: PHYSICIAN ASSISTANT

## 2018-07-30 RX ORDER — METRONIDAZOLE 500 MG/1
TABLET ORAL
Status: COMPLETED
Start: 2018-07-30 | End: 2018-07-30

## 2018-07-30 RX ORDER — METRONIDAZOLE 500 MG/1
2000 TABLET ORAL ONCE
Status: COMPLETED | OUTPATIENT
Start: 2018-07-30 | End: 2018-07-30

## 2018-07-30 RX ADMIN — METRONIDAZOLE 2000 MG: 500 TABLET ORAL at 09:21

## 2018-07-30 NOTE — ED NOTES
Per lab, trichomonas test will still be run if vag test is cancelled     Diana Landry RN  07/30/18 4874

## 2018-07-30 NOTE — ED PROVIDER NOTES
History  Chief Complaint   Patient presents with    Exposure to STD     Pt received phone call that "a girl he was with has trich so now he got it"  Pt denies discharge or rashes     This is a 65-year-old male patient who received a call from his ex-girlfriend that she had Trichomonas  He is not having any symptoms  No urgency frequency dysuria or penile discharge  No testicular pain  No nausea vomiting diarrhea abdominal pain  No chills no cough congestion  He was seen here several weeks ago was treated for Chlamydia gonorrhea because when exposure  Was advised to use a condom  At this time I will treat him with 2 g of Flagyl p o  patient journal be sent up for Trichomonas and chlamydia gonorrhea  No other complaints            Prior to Admission Medications   Prescriptions Last Dose Informant Patient Reported? Taking?    albuterol (5 mg/mL) 0 5 % nebulizer solution 7/30/2018 at Unknown time  Yes Yes   Sig: 3 (three) times a day   albuterol (VENTOLIN HFA) 90 mcg/act inhaler 7/30/2018 at Unknown time  No Yes   Sig: Inhale 2 puffs every 4 (four) hours as needed for wheezing   amLODIPine (NORVASC) 10 mg tablet 7/29/2018 at Unknown time  No Yes   Sig: Take 1 tablet (10 mg total) by mouth daily   budesonide-formoterol (SYMBICORT) 160-4 5 mcg/act inhaler 7/29/2018 at Unknown time  Yes Yes   Sig: Every 12 hours   loratadine (CLARITIN) 10 mg tablet 7/29/2018 at Unknown time  Yes Yes   Sig: Take 10 mg by mouth   metFORMIN (GLUCOPHAGE) 500 mg tablet 7/29/2018 at Unknown time  No Yes   Sig: Take 1 tablet (500 mg total) by mouth 2 (two) times a day with meals   methocarbamol (ROBAXIN) 500 mg tablet 7/29/2018 at Unknown time  No Yes   Sig: Take 1 tablet (500 mg total) by mouth every 6 (six) hours as needed for muscle spasms   pantoprazole (PROTONIX) 40 mg tablet 7/29/2018 at Unknown time  No Yes   Sig: Take 1 tablet (40 mg total) by mouth daily in the early morning      Facility-Administered Medications: None       Past Medical History:   Diagnosis Date    Back ache     [facility]     Diabetes mellitus [facility]     Hypertension        History reviewed  No pertinent surgical history  History reviewed  No pertinent family history  I have reviewed and agree with the history as documented  Social History   Substance Use Topics    Smoking status: Current Every Day Smoker     Packs/day: 0 50     Types: Cigarettes    Smokeless tobacco: Never Used      Comment: current some day smoker as per [employer]    Alcohol use Yes      Comment: social        Review of Systems   All other systems reviewed and are negative  Physical Exam  Physical Exam   Constitutional: He appears well-developed and well-nourished  HENT:   Head: Normocephalic and atraumatic  Right Ear: External ear normal    Left Ear: External ear normal    Nose: Nose normal    Mouth/Throat: Oropharynx is clear and moist    Eyes: Conjunctivae are normal  Pupils are equal, round, and reactive to light  Neck: Normal range of motion  Neck supple  Cardiovascular: Normal rate and regular rhythm  Pulmonary/Chest: Effort normal and breath sounds normal    Abdominal: Soft  Bowel sounds are normal  There is no tenderness  Hernia confirmed negative in the right inguinal area and confirmed negative in the left inguinal area  Genitourinary: Testes normal and penis normal  Cremasteric reflex is present  Circumcised  No phimosis, paraphimosis, hypospadias, penile erythema or penile tenderness  No discharge found  Lymphadenopathy: No inguinal adenopathy noted on the left side  Neurological: He is alert  Skin: Skin is warm  Psychiatric: He has a normal mood and affect  His behavior is normal    Nursing note and vitals reviewed        Vital Signs  ED Triage Vitals [07/30/18 0851]   Temperature Pulse Respirations Blood Pressure SpO2   97 6 °F (36 4 °C) 75 18 113/62 100 %      Temp Source Heart Rate Source Patient Position - Orthostatic VS BP Location FiO2 (%)   Temporal Monitor Sitting Left arm --      Pain Score       8           Vitals:    07/30/18 0851   BP: 113/62   Pulse: 75   Patient Position - Orthostatic VS: Sitting       Visual Acuity      ED Medications  Medications   metroNIDAZOLE (FLAGYL) tablet 2,000 mg (not administered)       Diagnostic Studies  Results Reviewed     Procedure Component Value Units Date/Time    Vaginosis DNA Probe [[id number]]     Lab Status:  No result Specimen:  Genital from Vaginal     UA w Reflex to Microscopic [[id number]]     Lab Status:  No result Specimen:  Urine     Chlamydia/GC amplified DNA by PCR [[id number]]     Lab Status:  No result Specimen:  Urine from Urine, Other                  No orders to display              Procedures  Procedures       Phone Contacts  ED Phone Contact    ED Course                               MDM  CritCare Time    Disposition  Final diagnoses:   STD exposure   Trichomonas exposure     Time reflects when diagnosis was documented in both MDM as applicable and the Disposition within this note     Time User Action Codes Description Comment    7/30/2018  9:10 AM Ruben Rios Add [Z20 2] STD exposure     7/30/2018  9:10 AM Ruben Rios Add [Z20 2] Trichomonas exposure       ED Disposition     ED Disposition Condition Comment    Discharge  [facility]  discharge to home/self care  Condition at discharge: Good        Follow-up Information     Follow up With Specialties Details Why Contact Info    Laurence Nation MD [facility] Medicine Schedule an appointment as soon as possible for a visit  [address]  [address]  [place] [address]  [phone]            Patient's Medications   Discharge Prescriptions    No medications on file     No discharge procedures on file      ED Provider  Electronically Signed by           Oral Jones PA-C  07/30/18 9453

## 2018-07-30 NOTE — DISCHARGE INSTRUCTIONS
Postexposure Prophylaxis   WHAT YOU NEED TO KNOW:   Postexposure prophylaxis (PEP) is medical care given to prevent HIV, hepatitis B, and other diseases  PEP may include first aid, testing, and medicines  Exposure can occur when you have contact with certain body fluids from another person  These fluids include blood, semen, and vaginal fluid  They also include any other body fluid that may have blood in it, such as saliva or urine  You are exposed if these fluids touch an open area of your skin, such as a cut  You also are exposed if the fluids touch a mucus membrane  This is a moist area, such as in the eyes, nose, and mouth  You can be exposed by a needlestick through the skin  DISCHARGE INSTRUCTIONS:   Medicines:   · Antiretrovirals: These medicines help prevent HIV  They must be taken for 28 days, unless your healthcare provider tells you otherwise  You may be given a starter pack with enough medicine for 1 to 7 days  You may be given medicine for the full 28 days instead  If you receive a starter pack, you must return to your healthcare provider in 1 to 7 days  At this visit, you will receive the rest of the medicine  · Hepatitis B vaccine: This medicine helps prevent hepatitis B  You will need 3 doses (shots) of the vaccine  The second dose should be taken 1 to 2 months after the first dose  The third dose should be taken 4 to 6 months after the first dose  · Antibiotics: These are germ-killing medicines to help treat or prevent sexually transmitted diseases, such as gonorrhea  · Take your medicine as directed  Contact your healthcare provider if you think your medicine is not helping or if you have side effects  Tell him or her if you are allergic to any medicine  Keep a list of the medicines, vitamins, and herbs you take  Include the amounts, and when and why you take them  Bring the list or the pill bottles to follow-up visits   Carry your medicine list with you in case of an emergency  Follow up with your healthcare provider as directed: If you did not receive any treatment after the exposure, you will need to follow up with your healthcare provider within 1 week  If you were given antiretrovirals, you will need a follow-up visit in about 2 weeks  Further HIV testing will be needed 6 weeks, 3 months, and 6 months after the exposure  You may have HIV testing up to 12 months after the exposure  Precautions: In case you are infected, you will need to take steps to keep others from getting sick  These steps can help you avoid being exposed again:  · Avoid sex, or have safe sex  Safe sex means having sex only with one person who is not infected and who is only having sex with you  It also means using condoms each time you have sex  · Avoid injection drug use  If you cannot do this, always use needles that are sterile (germ-free)  · Follow all safety rules at your workplace if you are at risk of exposure  Be sure to use safety equipment as needed  · Get the hepatitis B vaccine if you have not already  · Do not breastfeed unless you know you are not infected  In case of future exposure: If you think you have been exposed, get first aid right away  If you are at work, follow work policy to report the exposure  The type of first aid you need depends on what part of your body was exposed:  · Open skin:  Wash the area right away with soap and water  Use a gel hand  if you do not have soap or running water  Do not use anything harsh, such as bleach  Do not squeeze or rub the skin  · Eyes:  Rinse your eyes with water or saline (a salt solution) right away  Be sure to clean well by moving your eyelids with your fingers as you rinse  Keep contact lenses in while rinsing your eyes, then remove and clean them as usual  Avoid soap or other   · Mouth:  Spit out the blood or body fluid right away  Rinse your mouth with water or saline several times   Avoid putting soap or other  in your mouth  For support and more information: It can be hard to cope if you think you have been exposed to a disease  You may feel scared and concerned about your health  This is normal  It can be helpful to find out all you can about the risk of exposure  Counseling or joining a support group also can help  Talk to your healthcare provider, or contact the following:   · [facility] for HIV/AIDS, Viral Hepatitis, STD, and TB Prevention, [facility]  Web Address: www [facility]/  · [employer] Post-Exposure Prophylaxis Hotline  Web Address: www [email or web address]_[facility]/pepline/  Contact your healthcare provider if:   · You have nausea or diarrhea  · You are more tired than usual      · You have new headaches, or you feel dizzy  · You have trouble sleeping  · Your eyes or skin turn yellow  · You are not eating because of appetite loss  · You are or may be pregnant  Return to the emergency department if:   · You have a fever  · You have a rash  · You have new muscle pain or pain in your back or abdomen  · You urinate more often than usual, have blood in your urine, or have pain while urinating  · You are more thirsty than usual      · You have trouble swallowing or breathing  © 2017 [employer]  Information is for End User's use only and may not be sold, redistributed or otherwise used for commercial purposes  All illustrations and images included in CareNotes® are the copyrighted property of [employer]  or Alex [employer]  The above information is an  only  It is not intended as medical advice for individual conditions or treatments  Talk to your doctor, nurse or pharmacist before following any medical regimen to see if it is safe and effective for you

## 2018-08-01 ENCOUNTER — OFFICE VISIT (OUTPATIENT)
Dept: NEUROLOGY | Facility: CLINIC | Age: 58
End: 2018-08-01
Payer: COMMERCIAL

## 2018-08-01 VITALS
DIASTOLIC BLOOD PRESSURE: 136 MMHG | WEIGHT: 141.4 LBS | RESPIRATION RATE: 18 BRPM | HEART RATE: 81 BPM | SYSTOLIC BLOOD PRESSURE: 171 MMHG | HEIGHT: 69 IN | BODY MASS INDEX: 20.94 KG/M2

## 2018-08-01 DIAGNOSIS — G56.03 BILATERAL CARPAL TUNNEL SYNDROME: ICD-10-CM

## 2018-08-01 DIAGNOSIS — G56.23 CUBITAL TUNNEL SYNDROME, BILATERAL: ICD-10-CM

## 2018-08-01 DIAGNOSIS — G56.22 CUBITAL TUNNEL SYNDROME ON LEFT: Primary | ICD-10-CM

## 2018-08-01 DIAGNOSIS — G56.21 CUBITAL TUNNEL SYNDROME ON RIGHT: ICD-10-CM

## 2018-08-01 PROBLEM — F41.8 OTHER SPECIFIED ANXIETY DISORDERS: Status: ACTIVE | Noted: 2018-08-01

## 2018-08-01 PROBLEM — F31.9 BIPOLAR 1 DISORDER (HCC): Status: ACTIVE | Noted: 2018-08-01

## 2018-08-01 PROBLEM — F43.10 PTSD (POST-TRAUMATIC STRESS DISORDER): Status: ACTIVE | Noted: 2018-08-01

## 2018-08-01 PROBLEM — K21.9 GASTROESOPHAGEAL REFLUX DISEASE WITHOUT ESOPHAGITIS: Status: ACTIVE | Noted: 2018-08-01

## 2018-08-01 PROCEDURE — 99204 OFFICE O/P NEW MOD 45 MIN: CPT | Performed by: PHYSICIAN ASSISTANT

## 2018-08-01 NOTE — ASSESSMENT & PLAN NOTE
Evidence on nerve conduction study of bilateral ulnar neuropathy  I am referring him back to [facility] and also giving him a referral to Neurosurgery

## 2018-08-01 NOTE — PATIENT INSTRUCTIONS
Continue wearing wrist splints at night to bed  We will send you for more occupational therapy and refer you to Neurosurgery for your bilateral carpal tunnel and cubital tunnel syndrome

## 2018-08-01 NOTE — ASSESSMENT & PLAN NOTE
He previously followed with Dr Sil King of [facility] neurosurgery and would like referral to a new neurosurgeon as the physician has left the system  He does have evidence of moderate to severe bilateral carpal tunnel syndrome, right greater than left, by nerve conduction study  He is also requesting referral back to occupational therapy at [facility] where he previously attended therapy for his carpal tunnel syndrome and he will continue to wear his neutral position wrist splints at night

## 2018-08-01 NOTE — LETTER
August 1, 2018     Ciara Marina MD  [address] [place] [address]  [address]    Patient: Adilson Stockton  YOB: 1960   Date of Visit: 8/1/2018       Dear Dr Do Heck: Thank you for referring Maira Smith to me for evaluation  Below are my notes for this consultation  If you have questions, please do not hesitate to call me  I look forward to following your patient along with you  Sincerely,        Ablerto Avery PA-C        CC: No Recipients  Alberto Avery PA-C  8/1/2018  9:53 AM  Sign at close encounter  Patient is here today for a new patient consult for carpal tunnel    Patient ID: Adilson Stockton  is a 56 y o  male  Assessment/Plan:    Bilateral carpal tunnel syndrome  He previously followed with Dr Daniela Billingsley of [facility] neurosurgery and would like referral to a new neurosurgeon as the physician has left the system  He does have evidence of moderate to severe bilateral carpal tunnel syndrome, right greater than left, by nerve conduction study  He is also requesting referral back to occupational therapy at [facility] where he previously attended therapy for his carpal tunnel syndrome and he will continue to wear his neutral position wrist splints at night  Cubital tunnel syndrome, bilateral  Evidence on nerve conduction study of bilateral ulnar neuropathy  I am referring him back to [facility] and also giving him a referral to Neurosurgery  He will follow up in 8 weeks  I spent a total of 45 min with the patient with greater than 50% of that time spent counseling and coordinating his care, specifically discussing his diagnosis and additional tests as detailed above  Subjective:    HPI  The patient is a right-handed 29-year-old male who presents to the office for a new patient appointment regarding his bilateral carpal tunnel syndrome      He previously followed with Dr Daniela Billingsley (neurosurgery) at [facility] until the physician's departure in February of this year  He had EMG/NCS of the bilateral upper extremities in July 2017 which revealed moderate to severe right greater than left median neuropathy involving both sensory and motor fibers and demyelination bilaterally in the median nerve and evidence of bilateral ulnar sensory neuropathy with both demyelinating and axonal loss  While following with Dr Amanda Guerrero, he attended several sessions of occupational therapy for his carpal tunnel syndrome and also started wearing neutral position wrist splints at night, which he still claims to be using  Surgical options were discussed with him but at that time he decided to continue conservative measures  Now, he is interested in possibly considering surgical release procedure  He complains of daily bilateral hand pain and at times numbness and tingling, especially when trying to use the hands, which has been ongoing for approximately 2 years  He does not believe the past attempts with therapy or wrist splinting helped much with the symptoms      Past Medical History:   Diagnosis Date    Back ache     Bipolar 1 disorder (HCC)     Carpal tunnel syndrome     Bilateral    COPD (chronic obstructive pulmonary disease) (HCC)     Cubital tunnel syndrome of both upper extremities     Diabetes mellitus (HCC)     Hypertension     Latent syphilis     Treated    PTSD (post-traumatic stress disorder)      Past Surgical History:   Procedure Laterality Date    HEMORROIDECTOMY      KNEE SURGERY       Social History     Social History    Marital status: Legally      Spouse name: N/A    Number of children: N/A    Years of education: N/A     Social History Main Topics    Smoking status: Current Every Day Smoker     Packs/day: 0 50     Types: Cigarettes    Smokeless tobacco: Current User      Comment: current some day smoker as per [employer]    Alcohol use Yes      Comment: social    Drug use: Yes     Types: Marijuana    Sexual activity: Yes     Other Topics Concern    None     Social History Narrative    None     Family History   Problem Relation Age of Onset    Heart disease Mother     Hypertension Father     Diabetes Family     Asthma Family     Heart disease Family     Cancer Family      Allergies   Allergen Reactions    Lisinopril Other (See Comments)       Current Outpatient Prescriptions:     albuterol (5 mg/mL) 0 5 % nebulizer solution, 3 (three) times a day, Disp: , Rfl:     albuterol (VENTOLIN HFA) 90 mcg/act inhaler, Inhale 2 puffs every 4 (four) hours as needed for wheezing, Disp: 1 Inhaler, Rfl: 1    amLODIPine (NORVASC) 10 mg tablet, Take 1 tablet (10 mg total) by mouth daily, Disp: 30 tablet, Rfl: 1    budesonide-formoterol (SYMBICORT) 160-4 5 mcg/act inhaler, Every 12 hours, Disp: , Rfl:     loratadine (CLARITIN) 10 mg tablet, Take 10 mg by mouth, Disp: , Rfl:     methocarbamol (ROBAXIN) 500 mg tablet, Take 1 tablet (500 mg total) by mouth every 6 (six) hours as needed for muscle spasms, Disp: 10 tablet, Rfl: 0    pantoprazole (PROTONIX) 40 mg tablet, Take 1 tablet (40 mg total) by mouth daily in the early morning, Disp: 30 tablet, Rfl: 0    metFORMIN (GLUCOPHAGE) 500 mg tablet, Take 1 tablet (500 mg total) by mouth 2 (two) times a day with meals, Disp: 60 tablet, Rfl: 1  Patient also reports taking Seroquel and Prozac but does not know the doses  Also recently prescribed an antibiotic by the emergency department  Objective:    Blood pressure (!) 171/136, pulse 81, resp  rate 18, height 5' 8 5" (1 74 m), weight 64 1 kg (141 lb 6 4 oz)  Physical Exam  Patient appears stated age and is in no acute distress  HEENT:  Head normocephalic  Eyes anicteric  Heart:  With regular rhythm  No anterior neck bruits auscultated  Lungs:  Clear to auscultation  Abdomen:  Soft nontender with bowel sounds present  Extremities:  With no significant edema present      Neurological Exam  Patient is alert and pleasantly interactive  No obvious symbolic language difficulty or dysarthria and fully oriented  Unremarkable spontaneous gait with patient able walk on heels and toes and in tandem without difficulty  Cranial Nerves:   I: Olfactory sense impaired bilaterally (patient is congested)  II: Visual fields full to confrontation  Pupils equal, round, reactive to light with normal accomodation  Fundus: normal cup to disc ratio with no edema  III,IV,VI: Extraocular muscles EOMI, no nystagmus  V: Masseter and pterygoid strength  Sensation in the V1 through V3 distributions intact to pinprick and light touch bilaterally  VII:  Left facial asymmetry noted  VIII: Audition intact to finger rub on the left but absent on the right  IX/X: Uvula midline  Soft palate elevation symmetric  XI: Trapezius and SCM strength 5/5 bilaterally  XII: Tongue midline with no atrophy or fasciculations with appropriate movement  Romberg was negative and patient was accurate with finger-to-nose and heel-to-shin maneuvers bilaterally  Motor testing with good symmetrical strength throughout the 4 extremities with the exception of mild thumb abduction weakness on the right; no upper extremity drift  Sensory testing grossly intact to pin position and vibration throughout with the exception of diminished pin appreciation globally in the left hand and on the dorsal aspect of the right hand  Tinel's test was positive only on the left and Phalen's tests was negative bilaterally  There was evidence of bilateral thenar wasting, right greater than left  He was diffusely and symmetrically hyporeflexic throughout  Toe responses were downgoing bilaterally  ROS:    Review of Systems   Constitutional: Negative  Negative for appetite change and fever  Chills:    Diaphoresis:    Unexpected weight change: HENT: Negative  Negative for hearing loss, tinnitus, trouble swallowing and voice change   Mouth sores:       Eyes: Negative  Negative for photophobia and pain  Respiratory: Positive for cough, choking and shortness of breath  COPD   Cardiovascular: Negative  Negative for palpitations  Gastrointestinal: Positive for constipation  Negative for nausea and vomiting  Endocrine: Negative  Negative for cold intolerance and heat intolerance  Genitourinary: Negative  Negative for dysuria, frequency and urgency  Musculoskeletal: Positive for back pain  Negative for myalgias and neck pain  Skin: Negative  Negative for rash  Allergic/Immunologic: Negative  Neurological: Positive for headaches  Negative for dizziness, tremors, seizures, syncope, facial asymmetry, speech difficulty, weakness, light-headedness and numbness  Hematological: Negative  Does not bruise/bleed easily  Psychiatric/Behavioral: Positive for agitation, behavioral problems and sleep disturbance  Negative for confusion and hallucinations  The patient is nervous/anxious  Bipolar       I personally reviewed the ROS that was entered by the medical assistant

## 2018-08-01 NOTE — PROGRESS NOTES
Patient is here today for a new patient consult for carpal tunnel    Patient ID: Sandra Medel  is a 56 y o  male  Assessment/Plan:    Bilateral carpal tunnel syndrome  He previously followed with Dr Tripp Treviño of [address] [place] [address] neurosurgery and would like referral to a new neurosurgeon as the physician has left the system  He does have evidence of moderate to severe bilateral carpal tunnel syndrome, right greater than left, by nerve conduction study  He is also requesting referral back to occupational therapy at [facility] where he previously attended therapy for his carpal tunnel syndrome and he will continue to wear his neutral position wrist splints at night  Cubital tunnel syndrome, bilateral  Evidence on nerve conduction study of bilateral ulnar neuropathy  I am referring him back to [facility] and also giving him a referral to Neurosurgery  He will follow up in 8 weeks  I spent a total of 45 min with the patient with greater than 50% of that time spent counseling and coordinating his care, specifically discussing his diagnosis and additional tests as detailed above  Subjective:    HPI  The patient is a right-handed 65-year-old male who presents to the office for a new patient appointment regarding his bilateral carpal tunnel syndrome  He previously followed with Dr Tripp Treviño (neurosurgery) at [address] [place] [address] until the physician's departure in February of this year  He had EMG/NCS of the bilateral upper extremities in July 2017 which revealed moderate to severe right greater than left median neuropathy involving both sensory and motor fibers and demyelination bilaterally in the median nerve and evidence of bilateral ulnar sensory neuropathy with both demyelinating and axonal loss      While following with Dr Tripp Treviño, he attended several sessions of occupational therapy for his carpal tunnel syndrome and also started wearing neutral position wrist splints at night, which he still claims to be using  Surgical options were discussed with him but at that time he decided to continue conservative measures  Now, he is interested in possibly considering surgical release procedure  He complains of daily bilateral hand pain and at times numbness and tingling, especially when trying to use the hands, which has been ongoing for approximately 2 years  He does not believe the past attempts with therapy or wrist splinting helped much with the symptoms      Past Medical History:   Diagnosis Date    Back ache     Bipolar 1 disorder (HCC)     Carpal tunnel syndrome     Bilateral    COPD (chronic obstructive pulmonary disease) (HCC)     Cubital tunnel syndrome of both upper extremities     Diabetes mellitus (HCC)     Hypertension     Latent syphilis     Treated    PTSD (post-traumatic stress disorder)      Past Surgical History:   Procedure Laterality Date    HEMORROIDECTOMY      KNEE SURGERY       Social History     Social History    Marital status: Legally      Spouse name: N/A    Number of children: N/A    Years of education: N/A     Social History Main Topics    Smoking status: Current Every Day Smoker     Packs/day: 0 50     Types: Cigarettes    Smokeless tobacco: Current User      Comment: current some day smoker as per [employer]    Alcohol use Yes      Comment: social    Drug use: Yes     Types: Marijuana    Sexual activity: Yes     Other Topics Concern    None     Social History Narrative    None     Family History   Problem Relation Age of Onset    Heart disease Mother     Hypertension Father     Diabetes Family     Asthma Family     Heart disease Family     Cancer Family      Allergies   Allergen Reactions    Lisinopril Other (See Comments)       Current Outpatient Prescriptions:     albuterol (5 mg/mL) 0 5 % nebulizer solution, 3 (three) times a day, Disp: , Rfl:     albuterol (VENTOLIN HFA) 90 mcg/act inhaler, Inhale 2 puffs every 4 (four) hours as needed for wheezing, Disp: 1 Inhaler, Rfl: 1    amLODIPine (NORVASC) 10 mg tablet, Take 1 tablet (10 mg total) by mouth daily, Disp: 30 tablet, Rfl: 1    budesonide-formoterol (SYMBICORT) 160-4 5 mcg/act inhaler, Every 12 hours, Disp: , Rfl:     loratadine (CLARITIN) 10 mg tablet, Take 10 mg by mouth, Disp: , Rfl:     methocarbamol (ROBAXIN) 500 mg tablet, Take 1 tablet (500 mg total) by mouth every 6 (six) hours as needed for muscle spasms, Disp: 10 tablet, Rfl: 0    pantoprazole (PROTONIX) 40 mg tablet, Take 1 tablet (40 mg total) by mouth daily in the early morning, Disp: 30 tablet, Rfl: 0    metFORMIN (GLUCOPHAGE) 500 mg tablet, Take 1 tablet (500 mg total) by mouth 2 (two) times a day with meals, Disp: 60 tablet, Rfl: 1  Patient also reports taking Seroquel and Prozac but does not know the doses  Also recently prescribed an antibiotic by the emergency department  Objective:    Blood pressure (!) 171/136, pulse 81, resp  rate 18, height 5' 8 5" (1 74 m), weight 64 1 kg (141 lb 6 4 oz)  Physical Exam  Patient appears stated age and is in no acute distress  HEENT:  Head normocephalic  Eyes anicteric  Heart:  With regular rhythm  No anterior neck bruits auscultated  Lungs:  Clear to auscultation  Abdomen:  Soft nontender with bowel sounds present  Extremities:  With no significant edema present  Neurological Exam  Patient is alert and pleasantly interactive  No obvious symbolic language difficulty or dysarthria and fully oriented  Unremarkable spontaneous gait with patient able walk on heels and toes and in tandem without difficulty  Cranial Nerves:   I: Olfactory sense impaired bilaterally (patient is congested)  II: Visual fields full to confrontation  Pupils equal, round, reactive to light with normal accomodation  Fundus: normal cup to disc ratio with no edema  III,IV,VI: Extraocular muscles EOMI, no nystagmus  V: Masseter and pterygoid strength   Sensation in the V1 through V3 distributions intact to pinprick and light touch bilaterally  VII:  Left facial asymmetry noted  VIII: Audition intact to finger rub on the left but absent on the right  IX/X: Uvula midline  Soft palate elevation symmetric  XI: Trapezius and SCM strength 5/5 bilaterally  XII: Tongue midline with no atrophy or fasciculations with appropriate movement  Romberg was negative and patient was accurate with finger-to-nose and heel-to-shin maneuvers bilaterally  Motor testing with good symmetrical strength throughout the 4 extremities with the exception of mild thumb abduction weakness on the right; no upper extremity drift  Sensory testing grossly intact to pin position and vibration throughout with the exception of diminished pin appreciation globally in the left hand and on the dorsal aspect of the right hand  Tinel's test was positive only on the left and Phalen's tests was negative bilaterally  There was evidence of bilateral thenar wasting, right greater than left  He was diffusely and symmetrically hyporeflexic throughout  Toe responses were downgoing bilaterally  ROS:    Review of Systems   Constitutional: Negative  Negative for appetite change and fever  Chills:    Diaphoresis:    Unexpected weight change: HENT: Negative  Negative for hearing loss, tinnitus, trouble swallowing and voice change  Mouth sores:       Eyes: Negative  Negative for photophobia and pain  Respiratory: Positive for cough, choking and shortness of breath  COPD   Cardiovascular: Negative  Negative for palpitations  Gastrointestinal: Positive for constipation  Negative for nausea and vomiting  Endocrine: Negative  Negative for cold intolerance and heat intolerance  Genitourinary: Negative  Negative for dysuria, frequency and urgency  Musculoskeletal: Positive for back pain  Negative for myalgias and neck pain  Skin: Negative  Negative for rash  Allergic/Immunologic: Negative  Neurological: Positive for headaches  Negative for dizziness, tremors, seizures, syncope, facial asymmetry, speech difficulty, weakness, light-headedness and numbness  Hematological: Negative  Does not bruise/bleed easily  Psychiatric/Behavioral: Positive for agitation, behavioral problems and sleep disturbance  Negative for confusion and hallucinations  The patient is nervous/anxious  Bipolar       I personally reviewed the ROS that was entered by the medical assistant

## 2018-08-02 ENCOUNTER — OFFICE VISIT (OUTPATIENT)
Dept: OBGYN CLINIC | Facility: MEDICAL CENTER | Age: 58
End: 2018-08-02
Payer: COMMERCIAL

## 2018-08-02 ENCOUNTER — APPOINTMENT (OUTPATIENT)
Dept: RADIOLOGY | Facility: CLINIC | Age: 58
End: 2018-08-02
Payer: COMMERCIAL

## 2018-08-02 VITALS
SYSTOLIC BLOOD PRESSURE: 147 MMHG | HEART RATE: 70 BPM | HEIGHT: 69 IN | WEIGHT: 141 LBS | DIASTOLIC BLOOD PRESSURE: 72 MMHG | BODY MASS INDEX: 20.88 KG/M2

## 2018-08-02 DIAGNOSIS — M25.551 BILATERAL HIP PAIN: ICD-10-CM

## 2018-08-02 DIAGNOSIS — M54.41 CHRONIC BILATERAL LOW BACK PAIN WITH BILATERAL SCIATICA: ICD-10-CM

## 2018-08-02 DIAGNOSIS — R63.4 UNINTENTIONAL WEIGHT LOSS: ICD-10-CM

## 2018-08-02 DIAGNOSIS — M54.42 CHRONIC BILATERAL LOW BACK PAIN WITH BILATERAL SCIATICA: ICD-10-CM

## 2018-08-02 DIAGNOSIS — M25.552 BILATERAL HIP PAIN: ICD-10-CM

## 2018-08-02 DIAGNOSIS — Z20.2 EXPOSURE TO STD: ICD-10-CM

## 2018-08-02 DIAGNOSIS — M54.50 LUMBAR PAIN: Primary | ICD-10-CM

## 2018-08-02 DIAGNOSIS — G89.29 CHRONIC BILATERAL LOW BACK PAIN WITH BILATERAL SCIATICA: ICD-10-CM

## 2018-08-02 DIAGNOSIS — M54.50 LUMBAR PAIN: ICD-10-CM

## 2018-08-02 DIAGNOSIS — R61 NIGHT SWEATS: ICD-10-CM

## 2018-08-02 LAB — T VAGINALIS RRNA SPEC QL NAA+PROBE: NEGATIVE

## 2018-08-02 PROCEDURE — 99204 OFFICE O/P NEW MOD 45 MIN: CPT | Performed by: FAMILY MEDICINE

## 2018-08-02 PROCEDURE — 73521 X-RAY EXAM HIPS BI 2 VIEWS: CPT

## 2018-08-02 PROCEDURE — 72100 X-RAY EXAM L-S SPINE 2/3 VWS: CPT

## 2018-08-02 NOTE — PROGRESS NOTES
1  Chronic bilateral low back pain with bilateral sciatica  MRI lumbar spine wo contrast   2  Bilateral hip pain  XR hips bilateral 2 vw w pelvis if performed   3  Unintentional weight loss  CBC and differential    CMP14+EGFR (HISTORICAL)    C-REACTIVE PROTEIN (CRP), HIGHLY SENSITIVE, CSF (HISTORICAL)    Sedimentation rate, automated    Quantiferon TB Gold    HIV 1/2 Antigen/Antibody,Fourth Generation W/Rfl    Hepatitis Panel, General    TSH Stimulation    MRI lumbar spine wo contrast   4  Exposure to STD     5  Night sweats  CBC and differential    CMP14+EGFR (HISTORICAL)    C-REACTIVE PROTEIN (CRP), HIGHLY SENSITIVE, CSF (HISTORICAL)    Sedimentation rate, automated    Quantiferon TB Gold    HIV 1/2 Antigen/Antibody,Fourth Generation W/Rfl    Hepatitis Panel, General    TSH Stimulation    MRI lumbar spine wo contrast     Orders Placed This Encounter   Procedures    XR hips bilateral 2 vw w pelvis if performed    MRI lumbar spine wo contrast    CBC and differential    CMP14+EGFR (HISTORICAL)    C-REACTIVE PROTEIN (CRP), HIGHLY SENSITIVE, CSF (HISTORICAL)    Sedimentation rate, automated    Quantiferon TB Gold    HIV 1/2 Antigen/Antibody,Fourth Generation W/Rfl    Hepatitis Panel, General        Imaging Studies (I personally reviewed images in PACS and report):  X-ray bilateral hips 08/02/2018:  Mild to moderate bilateral hip Oa  X-ray lumbar vertebra 08/02/2018:  Mild degenerative disc disease multilevel worst L5-S1 no acute osseous abnormality  IMPRESSION:  Chronic back pain worsening  Unintentional weight loss tended 15 lb  History of exposure recently to STDs  History of night sweats for 1 year  Currently homeless      Return for Follow-up after MRI  Patient Instructions   MRI evaluation for back pain in setting of unintentional weight loss and night sweats  Patient to have bloodwork performed for low back pain     I explained to patient risk of unintentional weight loss and night sweats including undiagnosed major medical problems that could result in death and recommended patient seek further evaluation with his PCP as soon as possible  CHIEF COMPLAINT:  Low back pain    HPI:  Elis Sotomayor  is a 56 y o  male  who presents for       Visit  08/02/2018:   Evaluation low back pain intermittent for 5+ years with worsening of pain last 3-4 years  Patient has not been evaluated by and/or special T for his back pain  Patient has his primary care physician who prescribed pain medicine as well as muscle relaxers which offer only minimal relief now  He has not performed any physical therapy  Recent history significant for exposure STT with ER visit within last few weeks  Gonorrhea and chlamydia negative  Was not tested for HIV  Patient states he has lost 15+ lb in last 1 month  He also has associated symptoms do include night sweats intermittently for 1 year  Patient points to the bilateral low back as source of pain  Achy sharp moderate intensity radiating down both legs to at least a calf  Associated symptoms do include occasion intermittent left-sided groin pain when entering and exiting his vehicle  Associated symptoms do include numbness and tingling of bilateral feet  Review of Systems   Constitutional: Positive for diaphoresis and unexpected weight change  Negative for chills and fever  HENT: Negative for hearing loss, nosebleeds and sore throat  Eyes: Negative for pain, redness and visual disturbance  Respiratory: Negative for cough, shortness of breath and wheezing  Cardiovascular: Negative for chest pain, palpitations and leg swelling  Gastrointestinal: Positive for constipation (  CHRONIC)  Negative for abdominal distention, diarrhea, nausea and vomiting  Endocrine: Negative for polydipsia and polyuria  Genitourinary: Negative for difficulty urinating, dysuria, hematuria and urgency  Skin: Negative for rash and wound     Neurological: Negative for dizziness, weakness, numbness and headaches  Psychiatric/Behavioral: Negative for decreased concentration and suicidal ideas  Following history reviewed and update:    Past Medical History:   Diagnosis Date    Back ache     Bipolar 1 disorder (HCC)     Carpal tunnel syndrome     Bilateral    COPD (chronic obstructive pulmonary disease) (HCC)     Cubital tunnel syndrome of both upper extremities     Diabetes mellitus (HCC)     Hypertension     Latent syphilis     Treated    PTSD (post-traumatic stress disorder)      Past Surgical History:   Procedure Laterality Date    HEMORROIDECTOMY      KNEE SURGERY       Social History   History   Alcohol Use    Yes     Comment: social     History   Drug Use    Types: Marijuana     History   Smoking Status    Current Every Day Smoker    Packs/day: 0 50    Types: Cigarettes   Smokeless Tobacco    Current User     Comment: current some day smoker as per [employer]     Family History   Problem Relation Age of Onset    Heart disease Mother     Hypertension Father     Diabetes Family     Asthma Family     Heart disease Family     Cancer Family      Allergies   Allergen Reactions    Lisinopril Other (See Comments)          Physical Exam  Constitutional:  see vital signs  Gen:   Well-developed, normocephalic/atraumatic, well-groomed  Eyes: No inflammation or discharge of conjunctiva or lids; sclera clear   Pharynx: no inflammation, lesion, or mass of lips  Neck: supple, no masses, non-distended  MSK: no inflammation, lesion, mass, or clubbing of nails and digits except for other than mentioned below  SKIN: no visible rashes or skin lesions  Pulmonary/Chest: Effort normal  No respiratory distress     NEURO: cranial nerves grossly intact  PSYCH:  Alert and oriented to person, place, and time; recent and remote memory intact; mood normal, no depression, anxiety, or agitation, judgment and insight good and intact     Ortho Exam  BACK EXAM:  Gait: normal, no trendelenberg gait, no antalgic gait    BACK TENDERNESS:  Spinous Processes: no  Paraspinal Muscles: + bilateral lower lumbar  SI Joint: no  Sacrum: no    ROM:  Flexion: intact  Extension: intact    DERMATOMAL SENSATION:  L1: normal   L2: normal   L3: normal   L4: normal   L5: normal   S1: normal    STRENGTH (bilateral):  Knee Extension: 5/5  Knee Flexion: 5/5  Foot Dorsiflexion: 5/5  Great Toe Extension: 5/5  Foot Plantarflexion: 5/5  Hip Flexion: 5/5  Hip Abduction: 5/5    REFLEXES:  Patellar: 2+ bilateral  Achilles: 2+ bilateral  Clonus: negative bilateral    BACK:   SUPINE STRAIGHT LEG: negative  PRONE STRAIGHT LEG:  SLUMP: negative    HIP:  LOG ROLL: negative  JOHNATHAN: negative  FADIR: negative    Procedures

## 2018-08-02 NOTE — PATIENT INSTRUCTIONS
MRI evaluation for back pain in setting of unintentional weight loss and night sweats  Patient to have bloodwork performed for low back pain  I explained to patient risk of unintentional weight loss and night sweats including undiagnosed major medical problems that could result in death and recommended patient seek further evaluation with his PCP as soon as possible

## 2018-08-06 ENCOUNTER — TELEPHONE (OUTPATIENT)
Dept: OBGYN CLINIC | Facility: MEDICAL CENTER | Age: 58
End: 2018-08-06

## 2018-08-06 NOTE — TELEPHONE ENCOUNTER
Patient is scheduled for MRI lumbar spine on 8/14 at [address] [facility]  This has been denied  For peer to peer please call [phone]    Tracking #[id number]

## 2018-08-07 ENCOUNTER — TRANSCRIBE ORDERS (OUTPATIENT)
Dept: ADMINISTRATIVE | Facility: HOSPITAL | Age: 58
End: 2018-08-07

## 2018-08-07 ENCOUNTER — APPOINTMENT (OUTPATIENT)
Dept: LAB | Facility: HOSPITAL | Age: 58
End: 2018-08-07
Payer: COMMERCIAL

## 2018-08-07 DIAGNOSIS — R63.4 UNINTENTIONAL WEIGHT LOSS: ICD-10-CM

## 2018-08-07 DIAGNOSIS — R61 NIGHT SWEATS: ICD-10-CM

## 2018-08-07 LAB
ALBUMIN SERPL BCP-MCNC: 4.3 G/DL (ref 3–5.2)
ALP SERPL-CCNC: 93 U/L (ref 43–122)
ALT SERPL W P-5'-P-CCNC: 31 U/L (ref 9–52)
ANION GAP SERPL CALCULATED.3IONS-SCNC: 9 MMOL/L (ref 5–14)
AST SERPL W P-5'-P-CCNC: 30 U/L (ref 17–59)
BASOPHILS # BLD AUTO: 0.1 THOUSANDS/ΜL (ref 0–0.1)
BASOPHILS NFR BLD AUTO: 1 % (ref 0–1)
BILIRUB SERPL-MCNC: 0.4 MG/DL
BUN SERPL-MCNC: 15 MG/DL (ref 5–25)
CALCIUM SERPL-MCNC: 9.4 MG/DL (ref 8.4–10.2)
CHLORIDE SERPL-SCNC: 103 MMOL/L (ref 97–108)
CO2 SERPL-SCNC: 29 MMOL/L (ref 22–30)
CREAT SERPL-MCNC: 0.98 MG/DL (ref 0.7–1.5)
CRP SERPL HS-MCNC: 12.8 MG/L
EOSINOPHIL # BLD AUTO: 0.4 THOUSAND/ΜL (ref 0–0.4)
EOSINOPHIL NFR BLD AUTO: 6 % (ref 0–6)
ERYTHROCYTE [DISTWIDTH] IN BLOOD BY AUTOMATED COUNT: 15.4 %
ERYTHROCYTE [SEDIMENTATION RATE] IN BLOOD: 43 MM/HOUR (ref 1–20)
GFR SERPL CREATININE-BSD FRML MDRD: 100 ML/MIN/1.73SQ M
GLUCOSE P FAST SERPL-MCNC: 88 MG/DL (ref 70–99)
HCT VFR BLD AUTO: 43.8 % (ref 41–53)
HGB BLD-MCNC: 14.7 G/DL (ref 13.5–17.5)
LYMPHOCYTES # BLD AUTO: 2.8 THOUSANDS/ΜL (ref 0.5–4)
LYMPHOCYTES NFR BLD AUTO: 48 % (ref 20–50)
MCH RBC QN AUTO: 28.1 PG (ref 26–34)
MCHC RBC AUTO-ENTMCNC: 33.6 G/DL (ref 31–36)
MCV RBC AUTO: 84 FL (ref 80–100)
MONOCYTES # BLD AUTO: 0.5 THOUSAND/ΜL (ref 0.2–0.9)
MONOCYTES NFR BLD AUTO: 9 % (ref 1–10)
NEUTROPHILS # BLD AUTO: 2.2 THOUSANDS/ΜL (ref 1.8–7.8)
NEUTS SEG NFR BLD AUTO: 37 % (ref 45–65)
PLATELET # BLD AUTO: 435 THOUSANDS/UL (ref 150–450)
PMV BLD AUTO: 7.5 FL (ref 8.9–12.7)
POTASSIUM SERPL-SCNC: 4 MMOL/L (ref 3.6–5)
PROT SERPL-MCNC: 8.3 G/DL (ref 5.9–8.4)
RBC # BLD AUTO: 5.23 MILLION/UL (ref 4.5–5.9)
SODIUM SERPL-SCNC: 141 MMOL/L (ref 137–147)
WBC # BLD AUTO: 6 THOUSAND/UL (ref 4.5–11)

## 2018-08-07 PROCEDURE — 80074 ACUTE HEPATITIS PANEL: CPT

## 2018-08-07 PROCEDURE — 86480 TB TEST CELL IMMUN MEASURE: CPT

## 2018-08-07 PROCEDURE — 36415 COLL VENOUS BLD VENIPUNCTURE: CPT | Performed by: FAMILY MEDICINE

## 2018-08-07 PROCEDURE — 87389 HIV-1 AG W/HIV-1&-2 AB AG IA: CPT

## 2018-08-07 PROCEDURE — 86141 C-REACTIVE PROTEIN HS: CPT

## 2018-08-07 PROCEDURE — 85652 RBC SED RATE AUTOMATED: CPT | Performed by: FAMILY MEDICINE

## 2018-08-07 PROCEDURE — 85025 COMPLETE CBC W/AUTO DIFF WBC: CPT | Performed by: FAMILY MEDICINE

## 2018-08-07 PROCEDURE — 80053 COMPREHEN METABOLIC PANEL: CPT

## 2018-08-08 ENCOUNTER — TELEPHONE (OUTPATIENT)
Dept: OBGYN CLINIC | Facility: MEDICAL CENTER | Age: 58
End: 2018-08-08

## 2018-08-08 LAB
HAV IGM SER QL: NORMAL
HBV CORE IGM SER QL: NORMAL
HBV SURFACE AG SER QL: NORMAL
HCV AB SER QL: NORMAL
HIV 1+2 AB+HIV1 P24 AG SERPL QL IA: NORMAL

## 2018-08-08 NOTE — TELEPHONE ENCOUNTER
Please inform patient that majority of blood tests normal  Only test positive is elevation for generic inflammation  I still want him to follow-up with his primary care doctor  He may have a diagnosis of polymyalgia rheumatica which causes chronic back pain and may require steroids for treatment  He needs to see his PCP to discuss his labs before finalizing diagnosis and may require a rheumatology visit

## 2018-08-09 LAB
ANNOTATION COMMENT IMP: NORMAL
GAMMA INTERFERON BACKGROUND BLD IA-ACNC: 0.15 IU/ML
M TB IFN-G BLD-IMP: NEGATIVE
M TB IFN-G CD4+ BCKGRND COR BLD-ACNC: <0.01 IU/ML
M TB IFN-G CD4+ T-CELLS BLD-ACNC: 0.07 IU/ML
MITOGEN IGNF BLD-ACNC: 8.12 IU/ML
QUANTIFERON-TB GOLD IN TUBE: NORMAL
SERVICE CMNT-IMP: NORMAL

## 2018-08-09 NOTE — TELEPHONE ENCOUNTER
Called and spoke to patient informing of below  Patient stated that he has an upcoming appt with his PCP

## 2018-08-28 ENCOUNTER — TELEPHONE (OUTPATIENT)
Dept: OBGYN CLINIC | Facility: MEDICAL CENTER | Age: 58
End: 2018-08-28

## 2018-08-28 ENCOUNTER — OFFICE VISIT (OUTPATIENT)
Dept: OBGYN CLINIC | Facility: MEDICAL CENTER | Age: 58
End: 2018-08-28
Payer: COMMERCIAL

## 2018-08-28 VITALS
SYSTOLIC BLOOD PRESSURE: 132 MMHG | DIASTOLIC BLOOD PRESSURE: 74 MMHG | WEIGHT: 140 LBS | HEART RATE: 82 BPM | BODY MASS INDEX: 20.73 KG/M2 | HEIGHT: 69 IN

## 2018-08-28 DIAGNOSIS — M54.41 CHRONIC BILATERAL LOW BACK PAIN WITH BILATERAL SCIATICA: ICD-10-CM

## 2018-08-28 DIAGNOSIS — M79.10 MYALGIA: Primary | ICD-10-CM

## 2018-08-28 DIAGNOSIS — R63.4 UNINTENTIONAL WEIGHT LOSS: ICD-10-CM

## 2018-08-28 DIAGNOSIS — M54.42 CHRONIC BILATERAL LOW BACK PAIN WITH BILATERAL SCIATICA: ICD-10-CM

## 2018-08-28 DIAGNOSIS — G89.29 CHRONIC BILATERAL LOW BACK PAIN WITH BILATERAL SCIATICA: ICD-10-CM

## 2018-08-28 PROCEDURE — 99214 OFFICE O/P EST MOD 30 MIN: CPT | Performed by: FAMILY MEDICINE

## 2018-08-28 RX ORDER — SELENIUM SULFIDE 22.5 MG/ML
SHAMPOO TOPICAL
COMMUNITY
Start: 2018-06-20 | End: 2020-08-05

## 2018-08-28 NOTE — PATIENT INSTRUCTIONS
Explained the patient that the majority of his blood work was normal however he did have elevation of inflammatory markers  I recommended further blood work to include CK to evaluate for irritation to his muscles  I have referred patient to Rheumatology for evaluation of his elevated inflammatory markers and explained to him that these could be elevated due to autoimmune disorder including possibly polymyalgia rheumatica and/or seronegative spondyloarthropathy which cause back pain  I recommend the patient proceed with MRI evaluation was low back pain  I explained that I was able to acquire an approval of his MRI and patient is still within the window to make appointment to have MRI performed  Patient expressed understanding agreed to plan

## 2018-08-28 NOTE — PROGRESS NOTES
1  Myalgia  Creatine Kinase, Total    Ambulatory referral to Rheumatology   2  Chronic bilateral low back pain with bilateral sciatica     3  Unintentional weight loss       Orders Placed This Encounter   Procedures    Creatine Kinase, Total    Ambulatory referral to Rheumatology        Imaging Studies (I personally reviewed images in PACS and report):      Past diagnostics:  X-ray bilateral hips 08/02/2018:  Mild to moderate bilateral hip Oa  X-ray lumbar vertebra 08/02/2018:  Mild degenerative disc disease multilevel worst L5-S1 no acute osseous abnormality  IMPRESSION:  Chronic back pain worsening  Unintentional weight loss 15 lb  History of exposure recently to STDs  History of night sweats for 1 year  Currently homeless    diagnostic investigation:   CMP, HIV, QuantiFERON gold, CBC within normal limits  Elevated CRP 12 8  ESR 43  CK pending    Differential diagnosis:   Axial back pain secondary to Degenerative disc disease  polymyalgia rheumatica or other rheumatism  Occult malignancy    Return for   Follow-up after MRI  Patient Instructions     Explained the patient that the majority of his blood work was normal however he did have elevation of inflammatory markers  I recommended further blood work to include CK to evaluate for irritation to his muscles  I have referred patient to Rheumatology for evaluation of his elevated inflammatory markers and explained to him that these could be elevated due to autoimmune disorder including possibly polymyalgia rheumatica and/or seronegative spondyloarthropathy which cause back pain  I recommend the patient proceed with MRI evaluation was low back pain  I explained that I was able to acquire an approval of his MRI and patient is still within the window to make appointment to have MRI performed  Patient expressed understanding agreed to plan            CHIEF COMPLAINT:  Follow-up  Low back pain    HPI:  Sandra Medel  is a 66 y o  male  who presents for       Visit  08/02/2018:   Evaluation low back pain intermittent for 5+ years with worsening of pain last 3-4 years  Patient has not been evaluated by and/or special T for his back pain  Patient has his primary care physician who prescribed pain medicine as well as muscle relaxers which offer only minimal relief now  He has not performed any physical therapy  Recent history significant for exposure STT with ER visit within last few weeks  Gonorrhea and chlamydia negative  Was not tested for HIV  Patient states he has lost 15+ lb in last 1 month  He also has associated symptoms do include night sweats intermittently for 1 year  Patient points to the bilateral low back as source of pain  Achy sharp moderate intensity radiating down both legs to at least a calf  Associated symptoms do include occasion intermittent left-sided groin pain when entering and exiting his vehicle  Associated symptoms do include numbness and tingling of bilateral feet  08/28/2018: Follow-up low back pain:  No improvement  Ongoing for 5+ years  Last visit patient did have laboratory work ordered for and investigation of weight loss associated with his back pain as well as occasional night sweats  Lab work was overall negative except for elevated inflammatory markers for both CRP as well as ESR  Today, patient states that he has no improvement  Patient states he has associated symptoms include radiation of pain into his thighs and muscle aches   With intermittent burning sensation  patient states weight loss stable  Denies any further weight loss  States that he loses weight usually every summer  Clarifies  that he did lose no more weight than usual this summer  Review of Systems   Constitutional: Negative for chills and fever  Neurological: Positive for numbness  Negative for weakness           Following history reviewed and update:    Past Medical History:   Diagnosis Date    Back ache     Bipolar 1 disorder ([facility] [address] )     Carpal tunnel syndrome     Bilateral    COPD (chronic obstructive pulmonary disease) (HCC)     Cubital tunnel syndrome of both upper extremities     Diabetes mellitus (HCC)     Hypertension     Latent syphilis     Treated    PTSD (post-traumatic stress disorder)      Past Surgical History:   Procedure Laterality Date    HEMORROIDECTOMY      KNEE SURGERY       Social History   History   Alcohol Use    Yes     Comment: social     History   Drug Use    Types: Marijuana     History   Smoking Status    Current Every Day Smoker    Packs/day: 0 50    Types: Cigarettes   Smokeless Tobacco    Current User     Comment: current some day smoker as per [employer]     Family History   Problem Relation Age of Onset    Heart disease Mother     Hypertension Father     Diabetes Family     Asthma Family     Heart disease Family     Cancer Family      Allergies   Allergen Reactions    Lisinopril Other (See Comments)          Physical Exam    Constitutional:  see vital signs  Gen: well-developed, normocephalic/atraumatic, well-groomed  Eyes: No inflammation or discharge of conjunctiva or lids; sclera clear   Pharynx: no inflammation, lesion, or mass of lips  Neck: supple, no masses, non-distended  MSK: no inflammation, lesion, mass, or clubbing of nails and digits except for other than mentioned below  SKIN: no visible rashes or skin lesions  Pulmonary/Chest: Effort normal  No respiratory distress     NEURO: cranial nerves grossly intact  PSYCH:  Alert and oriented to person, place, and time; recent and remote memory intact; mood normal, no depression, anxiety, or agitation, judgment and insight good and intact          Ortho Exam     BACK EXAM:  Gait: normal, no trendelenberg gait, no antalgic gait    BACK TENDERNESS:  Spinous Processes: no  Paraspinal Muscles: no  SI Joint: no  Sacrum: no    ROM:  Flexion: intact  Extension: intact  Sidebending: intact    DERMATOMAL SENSATION:  L1: normal   L2: normal   L3: normal   L4: normal   L5: normal   S1: normal    STRENGTH (bilateral):  Knee Extension: 5/5  Knee Flexion: 5/5  Foot Dorsiflexion: 5/5  Great Toe Extension: 5/5  Foot Plantarflexion: 5/5  Hip Flexion: 5/5  Hip Abduction: 5/5    REFLEXES:  Patellar: 2+ bilateral  Achilles: 2+ bilateral  Clonus: negative bilateral    BACK:   SLUMP: negative    Procedures       Total visit time was 25 minutes of which more than 50% was face to face counseling and/or coordination of care with patient regarding their treatment plan as outlined in note

## 2018-08-28 NOTE — TELEPHONE ENCOUNTER
Please call patient and inform personally of need for PCP  Also have patient sign release form stating we may discuss medical information with his significant other if he wishes us to do so

## 2018-08-28 NOTE — TELEPHONE ENCOUNTER
Please call patient and ensure he has follow-up with his PCP for his night sweats, unintentional weight loss, and back pain as well as recent elevated inflammatory markers  I have referred him to rheumatology  He had appointment scheduled prior to his visit with me today for his PCP  I want to make it clear that he follows up with PCP for these issues

## 2018-08-28 NOTE — TELEPHONE ENCOUNTER
Called and patient was unavailable  Spoke to a woman and Asked if message could be relayed to have patient make an appt  With his PCP with all of his symptoms that he has been having  She confirmed information and would let him know

## 2018-08-30 NOTE — TELEPHONE ENCOUNTER
Will have patient sign release form when he comes in for next visit   Also, checked pt's chart and has an appt scheduled with PCP

## 2018-09-04 ENCOUNTER — APPOINTMENT (OUTPATIENT)
Dept: LAB | Facility: HOSPITAL | Age: 58
End: 2018-09-04
Payer: COMMERCIAL

## 2018-09-04 DIAGNOSIS — M79.10 MYALGIA: ICD-10-CM

## 2018-09-04 LAB
CK MB SERPL-MCNC: 1.4 NG/ML (ref 0–2.4)
CK MB SERPL-MCNC: <1 % (ref 0–2.5)
CK SERPL-CCNC: 220 U/L (ref 55–170)

## 2018-09-04 PROCEDURE — 82550 ASSAY OF CK (CPK): CPT

## 2018-09-04 PROCEDURE — 82553 CREATINE MB FRACTION: CPT

## 2018-09-04 PROCEDURE — 36415 COLL VENOUS BLD VENIPUNCTURE: CPT

## 2018-09-07 ENCOUNTER — HOSPITAL ENCOUNTER (OUTPATIENT)
Dept: MRI IMAGING | Facility: HOSPITAL | Age: 58
Discharge: HOME | End: 2018-09-07
Attending: FAMILY MEDICINE
Payer: COMMERCIAL

## 2018-09-07 DIAGNOSIS — R61 NIGHT SWEATS: ICD-10-CM

## 2018-09-07 DIAGNOSIS — M54.42 CHRONIC BILATERAL LOW BACK PAIN WITH BILATERAL SCIATICA: ICD-10-CM

## 2018-09-07 DIAGNOSIS — R63.4 UNINTENTIONAL WEIGHT LOSS: ICD-10-CM

## 2018-09-07 DIAGNOSIS — M54.41 CHRONIC BILATERAL LOW BACK PAIN WITH BILATERAL SCIATICA: ICD-10-CM

## 2018-09-07 DIAGNOSIS — G89.29 CHRONIC BILATERAL LOW BACK PAIN WITH BILATERAL SCIATICA: ICD-10-CM

## 2018-09-07 PROCEDURE — 72148 MRI LUMBAR SPINE W/O DYE: CPT

## 2018-09-18 ENCOUNTER — TELEPHONE (OUTPATIENT)
Dept: NEUROLOGY | Facility: CLINIC | Age: 58
End: 2018-09-18

## 2018-09-18 DIAGNOSIS — G56.03 BILATERAL CARPAL TUNNEL SYNDROME: ICD-10-CM

## 2018-09-18 DIAGNOSIS — G56.12 LEFT MEDIAN NERVE NEUROPATHY: Primary | ICD-10-CM

## 2018-09-18 NOTE — TELEPHONE ENCOUNTER
Spoke to patient he stated that he completed his physical therapy and Park Daileyw who is his therapist feels that he needs to have surgery because therapy is not helping  I explained to the patient that Neurosurgery is trying to contact him and they have left several messages for him on his voicemail  The patient stated his phone has been dead and he has been without a phone  I gave him the number for Neurosurgery and told him to call right away to schedule and appointment  Progress notes in Epic from [facility]imes[facility]

## 2018-09-18 NOTE — TELEPHONE ENCOUNTER
Thank you, and please find out if he intends to come to his followup here or if he would like to continue to follow only with Neurosurgery

## 2018-09-23 ENCOUNTER — APPOINTMENT (EMERGENCY)
Dept: RADIOLOGY | Facility: HOSPITAL | Age: 58
DRG: 140 | End: 2018-09-23
Payer: COMMERCIAL

## 2018-09-23 ENCOUNTER — HOSPITAL ENCOUNTER (INPATIENT)
Facility: HOSPITAL | Age: 58
LOS: 3 days | Discharge: HOME | DRG: 140 | End: 2018-09-26
Attending: EMERGENCY MEDICINE | Admitting: INTERNAL MEDICINE
Payer: COMMERCIAL

## 2018-09-23 DIAGNOSIS — R77.8 ELEVATED TROPONIN: ICD-10-CM

## 2018-09-23 DIAGNOSIS — I10 HYPERTENSION, UNSPECIFIED TYPE: ICD-10-CM

## 2018-09-23 DIAGNOSIS — R06.00 DYSPNEA ON EXERTION: ICD-10-CM

## 2018-09-23 DIAGNOSIS — J44.1 COPD WITH ACUTE EXACERBATION (HCC): Primary | ICD-10-CM

## 2018-09-23 DIAGNOSIS — J44.1 ACUTE EXACERBATION OF CHRONIC OBSTRUCTIVE PULMONARY DISEASE (COPD) (HCC): ICD-10-CM

## 2018-09-23 PROBLEM — E11.9 DIABETES MELLITUS (HCC): Status: ACTIVE | Noted: 2018-09-23

## 2018-09-23 LAB
ALBUMIN SERPL BCP-MCNC: 3.7 G/DL (ref 3.5–5)
ALP SERPL-CCNC: 110 U/L (ref 46–116)
ALT SERPL W P-5'-P-CCNC: 25 U/L (ref 12–78)
ANION GAP SERPL CALCULATED.3IONS-SCNC: 8 MMOL/L (ref 4–13)
AST SERPL W P-5'-P-CCNC: 22 U/L (ref 5–45)
BASOPHILS # BLD AUTO: 0.05 THOUSANDS/ΜL (ref 0–0.1)
BASOPHILS NFR BLD AUTO: 1 % (ref 0–1)
BILIRUB SERPL-MCNC: 0.32 MG/DL (ref 0.2–1)
BUN SERPL-MCNC: 13 MG/DL (ref 5–25)
CALCIUM SERPL-MCNC: 9 MG/DL (ref 8.3–10.1)
CHLORIDE SERPL-SCNC: 103 MMOL/L (ref 100–108)
CO2 SERPL-SCNC: 29 MMOL/L (ref 21–32)
CREAT SERPL-MCNC: 1.05 MG/DL (ref 0.6–1.3)
EOSINOPHIL # BLD AUTO: 0.49 THOUSAND/ΜL (ref 0–0.61)
EOSINOPHIL NFR BLD AUTO: 6 % (ref 0–6)
ERYTHROCYTE [DISTWIDTH] IN BLOOD BY AUTOMATED COUNT: 15.5 % (ref 11.6–15.1)
GFR SERPL CREATININE-BSD FRML MDRD: 91 ML/MIN/1.73SQ M
GLUCOSE SERPL-MCNC: 96 MG/DL (ref 65–140)
HCT VFR BLD AUTO: 43 % (ref 36.5–49.3)
HGB BLD-MCNC: 14.2 G/DL (ref 12–17)
IMM GRANULOCYTES # BLD AUTO: 0.02 THOUSAND/UL (ref 0–0.2)
IMM GRANULOCYTES NFR BLD AUTO: 0 % (ref 0–2)
LYMPHOCYTES # BLD AUTO: 3.93 THOUSANDS/ΜL (ref 0.6–4.47)
LYMPHOCYTES NFR BLD AUTO: 52 % (ref 14–44)
MCH RBC QN AUTO: 27.4 PG (ref 26.8–34.3)
MCHC RBC AUTO-ENTMCNC: 33 G/DL (ref 31.4–37.4)
MCV RBC AUTO: 83 FL (ref 82–98)
MONOCYTES # BLD AUTO: 0.66 THOUSAND/ΜL (ref 0.17–1.22)
MONOCYTES NFR BLD AUTO: 9 % (ref 4–12)
NEUTROPHILS # BLD AUTO: 2.47 THOUSANDS/ΜL (ref 1.85–7.62)
NEUTS SEG NFR BLD AUTO: 32 % (ref 43–75)
NRBC BLD AUTO-RTO: 0 /100 WBCS
PLATELET # BLD AUTO: 454 THOUSANDS/UL (ref 149–390)
PMV BLD AUTO: 8.7 FL (ref 8.9–12.7)
POTASSIUM SERPL-SCNC: 3.8 MMOL/L (ref 3.5–5.3)
PROT SERPL-MCNC: 8.3 G/DL (ref 6.4–8.2)
RBC # BLD AUTO: 5.18 MILLION/UL (ref 3.88–5.62)
SODIUM SERPL-SCNC: 140 MMOL/L (ref 136–145)
TROPONIN I SERPL-MCNC: 0.09 NG/ML
WBC # BLD AUTO: 7.62 THOUSAND/UL (ref 4.31–10.16)

## 2018-09-23 PROCEDURE — 71046 X-RAY EXAM CHEST 2 VIEWS: CPT

## 2018-09-23 PROCEDURE — 93005 ELECTROCARDIOGRAM TRACING: CPT

## 2018-09-23 PROCEDURE — 99285 EMERGENCY DEPT VISIT HI MDM: CPT

## 2018-09-23 PROCEDURE — 96374 THER/PROPH/DIAG INJ IV PUSH: CPT

## 2018-09-23 PROCEDURE — 94640 AIRWAY INHALATION TREATMENT: CPT

## 2018-09-23 PROCEDURE — 99223 1ST HOSP IP/OBS HIGH 75: CPT | Performed by: INTERNAL MEDICINE

## 2018-09-23 PROCEDURE — 80053 COMPREHEN METABOLIC PANEL: CPT | Performed by: EMERGENCY MEDICINE

## 2018-09-23 PROCEDURE — 84484 ASSAY OF TROPONIN QUANT: CPT | Performed by: EMERGENCY MEDICINE

## 2018-09-23 PROCEDURE — 36415 COLL VENOUS BLD VENIPUNCTURE: CPT | Performed by: EMERGENCY MEDICINE

## 2018-09-23 PROCEDURE — 85025 COMPLETE CBC W/AUTO DIFF WBC: CPT | Performed by: EMERGENCY MEDICINE

## 2018-09-23 RX ORDER — AMLODIPINE BESYLATE 10 MG/1
10 TABLET ORAL DAILY
Status: DISCONTINUED | OUTPATIENT
Start: 2018-09-23 | End: 2018-09-26 | Stop reason: HOSPADM

## 2018-09-23 RX ORDER — METHYLPREDNISOLONE SODIUM SUCCINATE 40 MG/ML
40 INJECTION, POWDER, LYOPHILIZED, FOR SOLUTION INTRAMUSCULAR; INTRAVENOUS EVERY 8 HOURS SCHEDULED
Status: DISCONTINUED | OUTPATIENT
Start: 2018-09-23 | End: 2018-09-25

## 2018-09-23 RX ORDER — FLUOXETINE 10 MG/1
10 TABLET, FILM COATED ORAL
COMMUNITY
End: 2019-05-02

## 2018-09-23 RX ORDER — ALBUTEROL SULFATE 2.5 MG/3ML
5 SOLUTION RESPIRATORY (INHALATION) ONCE
Status: COMPLETED | OUTPATIENT
Start: 2018-09-23 | End: 2018-09-23

## 2018-09-23 RX ORDER — PANTOPRAZOLE SODIUM 40 MG/1
40 TABLET, DELAYED RELEASE ORAL
Status: DISCONTINUED | OUTPATIENT
Start: 2018-09-23 | End: 2018-09-26 | Stop reason: HOSPADM

## 2018-09-23 RX ORDER — FLUOXETINE 10 MG/1
10 CAPSULE ORAL DAILY
Status: DISCONTINUED | OUTPATIENT
Start: 2018-09-23 | End: 2018-09-26 | Stop reason: HOSPADM

## 2018-09-23 RX ORDER — QUETIAPINE FUMARATE 25 MG/1
25 TABLET, FILM COATED ORAL
COMMUNITY
End: 2020-10-29 | Stop reason: SDUPTHER

## 2018-09-23 RX ORDER — NICOTINE 21 MG/24HR
14 PATCH, TRANSDERMAL 24 HOURS TRANSDERMAL ONCE
Status: COMPLETED | OUTPATIENT
Start: 2018-09-23 | End: 2018-09-24

## 2018-09-23 RX ORDER — LEVALBUTEROL 1.25 MG/.5ML
1.25 SOLUTION, CONCENTRATE RESPIRATORY (INHALATION) EVERY 8 HOURS PRN
Status: DISCONTINUED | OUTPATIENT
Start: 2018-09-23 | End: 2018-09-26 | Stop reason: HOSPADM

## 2018-09-23 RX ORDER — MAGNESIUM HYDROXIDE/ALUMINUM HYDROXICE/SIMETHICONE 120; 1200; 1200 MG/30ML; MG/30ML; MG/30ML
30 SUSPENSION ORAL EVERY 6 HOURS PRN
Status: DISCONTINUED | OUTPATIENT
Start: 2018-09-23 | End: 2018-09-26 | Stop reason: HOSPADM

## 2018-09-23 RX ORDER — METHOCARBAMOL 500 MG/1
500 TABLET, FILM COATED ORAL EVERY 6 HOURS PRN
Status: DISCONTINUED | OUTPATIENT
Start: 2018-09-23 | End: 2018-09-26 | Stop reason: HOSPADM

## 2018-09-23 RX ORDER — ALBUTEROL SULFATE 90 UG/1
2 AEROSOL, METERED RESPIRATORY (INHALATION) EVERY 4 HOURS PRN
Status: DISCONTINUED | OUTPATIENT
Start: 2018-09-23 | End: 2018-09-26 | Stop reason: HOSPADM

## 2018-09-23 RX ORDER — METHYLPREDNISOLONE SODIUM SUCCINATE 125 MG/2ML
125 INJECTION, POWDER, LYOPHILIZED, FOR SOLUTION INTRAMUSCULAR; INTRAVENOUS ONCE
Status: COMPLETED | OUTPATIENT
Start: 2018-09-23 | End: 2018-09-23

## 2018-09-23 RX ORDER — NICOTINE 21 MG/24HR
1 PATCH, TRANSDERMAL 24 HOURS TRANSDERMAL DAILY
Status: DISCONTINUED | OUTPATIENT
Start: 2018-09-24 | End: 2018-09-26 | Stop reason: HOSPADM

## 2018-09-23 RX ORDER — ACETAMINOPHEN 325 MG/1
650 TABLET ORAL EVERY 4 HOURS PRN
Status: DISCONTINUED | OUTPATIENT
Start: 2018-09-23 | End: 2018-09-26 | Stop reason: HOSPADM

## 2018-09-23 RX ORDER — LORATADINE 10 MG/1
10 TABLET ORAL DAILY
Status: DISCONTINUED | OUTPATIENT
Start: 2018-09-23 | End: 2018-09-26 | Stop reason: HOSPADM

## 2018-09-23 RX ORDER — FLUTICASONE FUROATE AND VILANTEROL 100; 25 UG/1; UG/1
1 POWDER RESPIRATORY (INHALATION)
Status: DISCONTINUED | OUTPATIENT
Start: 2018-09-23 | End: 2018-09-26 | Stop reason: HOSPADM

## 2018-09-23 RX ORDER — QUETIAPINE FUMARATE 25 MG/1
25 TABLET, FILM COATED ORAL
Status: DISCONTINUED | OUTPATIENT
Start: 2018-09-23 | End: 2018-09-26 | Stop reason: HOSPADM

## 2018-09-23 RX ADMIN — LORATADINE 10 MG: 10 TABLET ORAL at 13:30

## 2018-09-23 RX ADMIN — FLUOXETINE 10 MG: 10 CAPSULE ORAL at 13:30

## 2018-09-23 RX ADMIN — IPRATROPIUM BROMIDE 0.5 MG: 0.5 SOLUTION RESPIRATORY (INHALATION) at 10:42

## 2018-09-23 RX ADMIN — METHYLPREDNISOLONE SODIUM SUCCINATE 40 MG: 40 INJECTION, POWDER, FOR SOLUTION INTRAMUSCULAR; INTRAVENOUS at 13:29

## 2018-09-23 RX ADMIN — PANTOPRAZOLE SODIUM 40 MG: 40 TABLET, DELAYED RELEASE ORAL at 13:30

## 2018-09-23 RX ADMIN — FLUTICASONE FUROATE AND VILANTEROL TRIFENATATE 1 PUFF: 100; 25 POWDER RESPIRATORY (INHALATION) at 13:29

## 2018-09-23 RX ADMIN — METHOCARBAMOL 500 MG: 500 TABLET ORAL at 13:30

## 2018-09-23 RX ADMIN — METHYLPREDNISOLONE SODIUM SUCCINATE 125 MG: 125 INJECTION, POWDER, FOR SOLUTION INTRAMUSCULAR; INTRAVENOUS at 09:37

## 2018-09-23 RX ADMIN — AMLODIPINE BESYLATE 10 MG: 10 TABLET ORAL at 13:30

## 2018-09-23 RX ADMIN — ALBUTEROL SULFATE 5 MG: 2.5 SOLUTION RESPIRATORY (INHALATION) at 10:42

## 2018-09-23 RX ADMIN — QUETIAPINE FUMARATE 25 MG: 25 TABLET ORAL at 21:19

## 2018-09-23 RX ADMIN — METFORMIN HYDROCHLORIDE 500 MG: 500 TABLET, FILM COATED ORAL at 17:27

## 2018-09-23 RX ADMIN — IPRATROPIUM BROMIDE 0.5 MG: 0.5 SOLUTION RESPIRATORY (INHALATION) at 09:37

## 2018-09-23 RX ADMIN — METHYLPREDNISOLONE SODIUM SUCCINATE 40 MG: 40 INJECTION, POWDER, FOR SOLUTION INTRAMUSCULAR; INTRAVENOUS at 21:20

## 2018-09-23 RX ADMIN — ALBUTEROL SULFATE 5 MG: 2.5 SOLUTION RESPIRATORY (INHALATION) at 09:37

## 2018-09-23 RX ADMIN — NICOTINE 14 MG: 14 PATCH, EXTENDED RELEASE TRANSDERMAL at 09:37

## 2018-09-23 RX ADMIN — ENOXAPARIN SODIUM 40 MG: 40 INJECTION SUBCUTANEOUS at 13:30

## 2018-09-23 NOTE — ED PROVIDER NOTES
History  Chief Complaint   Patient presents with    Chest Pain     pt c/o left sided chest pain for "few" days  pt reports pain radiates into left side of neck and arm  pt also reports SOB  A 51-year-old male with past history of bipolar disorder, COPD, hypertension and diabetes; presents with left-sided chest pain and shortness of breath  Patient states symptoms have been present for the past several days  Chest pain does radiate toward the left arm  Patient has been unable to identify a trigger for his chest pain  Patient states the shortness of breath does not seem to be related to the chest pain  Shortness of breath is worse with exertion, reporting he had to stop several times "gasping for air" on the walk to the ED  Patient also complains of a nonproductive cough, which has gotten worse over the past several days  Patient denies recent fever, chills, rhinorrhea, congestion, abdominal pain, nausea, vomiting, diarrhea, peripheral edema and rashes  Patient states he has been compliant with all of his medications  Patient is a smoker  A/P:  Chest pain and shortness of breath  On review of records, patient has been evaluate for chronic chest pain  Patient was admitted in June for similar symptoms and was found to have an elevated troponin, undergoing a cardiac catheterization which was within normal limits  Suspect shortness of breath is secondary to COPD given that patient has significantly diminished air entry throughout  Given patient's history, will check lab work for anemia, renal impairment, electrolyte abnormality and cardiac event  Will obtain chest x-ray to evaluate for pneumonia  Will give DuoNeb and Solu-Medrol  History provided by:  Patient and medical records  Chest Pain   Associated symptoms: cough and shortness of breath      Prior to Admission Medications   Prescriptions Last Dose Informant Patient Reported? Taking?    FLUoxetine (PROzac) 10 MG tablet   Yes Yes   Sig: Take 10 mg by mouth daily   QUEtiapine (SEROquel) 25 mg tablet   Yes Yes   Sig: Take 25 mg by mouth daily at bedtime   Selenium Sulfide 2 25 % SHAM   Yes Yes   Sig: apply by topical route  every week to the affected area(s)   albuterol (5 mg/mL) 0 5 % nebulizer solution   Yes Yes   Sig: 3 (three) times a day   albuterol (VENTOLIN HFA) 90 mcg/act inhaler   No Yes   Sig: Inhale 2 puffs every 4 (four) hours as needed for wheezing   amLODIPine (NORVASC) 10 mg tablet   No Yes   Sig: Take 1 tablet (10 mg total) by mouth daily   budesonide-formoterol (SYMBICORT) 160-4 5 mcg/act inhaler   Yes Yes   Sig: Every 12 hours   loratadine (CLARITIN) 10 mg tablet   Yes Yes   Sig: Take 10 mg by mouth   metFORMIN (GLUCOPHAGE) 500 mg tablet   No Yes   Sig: Take 1 tablet (500 mg total) by mouth 2 (two) times a day with meals   methocarbamol (ROBAXIN) 500 mg tablet   No Yes   Sig: Take 1 tablet (500 mg total) by mouth every 6 (six) hours as needed for muscle spasms   pantoprazole (PROTONIX) 40 mg tablet   No Yes   Sig: Take 1 tablet (40 mg total) by mouth daily in the early morning      Facility-Administered Medications: None       Past Medical History:   Diagnosis Date    Back ache     Bipolar 1 disorder (HCC)     Carpal tunnel syndrome     Bilateral    COPD (chronic obstructive pulmonary disease) (HCC)     Cubital tunnel syndrome of both upper extremities     Diabetes mellitus (HCC)     Hypertension     Latent syphilis     Treated    PTSD (post-traumatic stress disorder)        Past Surgical History:   Procedure Laterality Date    HEMORROIDECTOMY      KNEE SURGERY         Family History   Problem Relation Age of Onset    Heart disease Mother     Hypertension Father     Diabetes Family     Asthma Family     Heart disease Family     Cancer Family      I have reviewed and agree with the history as documented      Social History   Substance Use Topics    Smoking status: Current Every Day Smoker     Packs/day: 0 50     Types: Cigarettes    Smokeless tobacco: Current User      Comment: current some day smoker as per [employer]    Alcohol use Yes      Comment: social        Review of Systems   Respiratory: Positive for cough, shortness of breath and wheezing  Cardiovascular: Positive for chest pain  All other systems reviewed and are negative  Physical Exam  Physical Exam  General Appearance: alert and oriented, nad, non toxic appearing  Skin:  Warm, dry, intact  HEENT: atraumatic, normocephalic  Neck: Supple, trachea midline  Cardiac: RRR; no murmurs, rub, gallops  Pulmonary:  No acute respiratory distress  Significantly diminished air entry bilaterally with expiratory wheezing appreciated  No rhonchi or rales    Gastrointestinal: abdomen soft, nontender, nondistended; no guarding or rebound tenderness; good bowel sounds, no mass or bruits  Extremities:  no pedal edema, 2+ pulses; no calf tenderness, no clubbing, no cyanosis  Neuro:  no focal motor or sensory deficits, CN 2-12 grossly intact  Psych:  Normal mood and affect, normal judgement and insight      Vital Signs  ED Triage Vitals   Temperature Pulse Respirations Blood Pressure SpO2   09/23/18 0925 09/23/18 0910 09/23/18 0910 09/23/18 0910 09/23/18 0910   98 4 °F (36 9 °C) 85 18 152/91 99 %      Temp Source Heart Rate Source Patient Position - Orthostatic VS BP Location FiO2 (%)   09/23/18 1213 09/23/18 0910 09/23/18 0910 09/23/18 0910 --   Temporal Monitor Lying Right arm       Pain Score       09/23/18 1150       7           Vitals:    09/23/18 0910 09/23/18 1026 09/23/18 1115 09/23/18 1213   BP: 152/91 147/73 146/75 153/85   Pulse: 85 72 76 84   Patient Position - Orthostatic VS: Lying  Sitting Sitting       Visual Acuity      ED Medications  Medications   nicotine (NICODERM CQ) 14 mg/24hr TD 24 hr patch 14 mg (14 mg Transdermal Medication Applied 9/23/18 0937)   aluminum-magnesium hydroxide-simethicone (MYLANTA) 200-200-20 mg/5 mL oral suspension 30 mL (not administered) nicotine (NICODERM CQ) 14 mg/24hr TD 24 hr patch 1 patch (not administered)   enoxaparin (LOVENOX) subcutaneous injection 40 mg (40 mg Subcutaneous Given 9/23/18 1330)   acetaminophen (TYLENOL) tablet 650 mg (not administered)   levalbuterol (XOPENEX) inhalation solution 1 25 mg (not administered)   methylPREDNISolone sodium succinate (Solu-MEDROL) injection 40 mg (40 mg Intravenous Given 9/23/18 1329)   fluticasone-vilanterol (BREO ELLIPTA) 100-25 mcg/inh inhaler 1 puff (1 puff Inhalation Given 9/23/18 1329)   albuterol (PROVENTIL HFA,VENTOLIN HFA) inhaler 2 puff (not administered)   amLODIPine (NORVASC) tablet 10 mg (10 mg Oral Given 9/23/18 1330)   FLUoxetine (PROzac) capsule 10 mg (10 mg Oral Given 9/23/18 1330)   loratadine (CLARITIN) tablet 10 mg (10 mg Oral Given 9/23/18 1330)   metFORMIN (GLUCOPHAGE) tablet 500 mg (not administered)   methocarbamol (ROBAXIN) tablet 500 mg (500 mg Oral Given 9/23/18 1330)   pantoprazole (PROTONIX) EC tablet 40 mg (40 mg Oral Given 9/23/18 1330)   QUEtiapine (SEROquel) tablet 25 mg (not administered)   albuterol inhalation solution 5 mg (5 mg Nebulization Given 9/23/18 0937)   ipratropium (ATROVENT) 0 02 % inhalation solution 0 5 mg (0 5 mg Nebulization Given 9/23/18 0937)   methylPREDNISolone sodium succinate (Solu-MEDROL) injection 125 mg (125 mg Intravenous Given 9/23/18 0937)   albuterol inhalation solution 5 mg (5 mg Nebulization Given 9/23/18 1042)   ipratropium (ATROVENT) 0 02 % inhalation solution 0 5 mg (0 5 mg Nebulization Given 9/23/18 1042)       Diagnostic Studies  Results Reviewed     Procedure Component Value Units Date/Time    Troponin I [[id number]]  (Abnormal) Collected:  09/23/18 0942    Lab Status:  Final result Specimen:  Blood from Arm, Right Updated:  09/23/18 1014     Troponin I 0 09 (H) ng/mL     Comprehensive metabolic panel [[id number]]  (Abnormal) Collected:  09/23/18 0942    Lab Status:  Final result Specimen:  Blood from Arm, Right Updated: 09/23/18 1012     Sodium 140 mmol/L      Potassium 3 8 mmol/L      Chloride 103 mmol/L      CO2 29 mmol/L      ANION GAP 8 mmol/L      BUN 13 mg/dL      Creatinine 1 05 mg/dL      Glucose 96 mg/dL      Calcium 9 0 mg/dL      AST 22 U/L      ALT 25 U/L      Alkaline Phosphatase 110 U/L      Total Protein 8 3 (H) g/dL      Albumin 3 7 g/dL      Total Bilirubin 0 32 mg/dL      eGFR 91 ml/min/1 73sq m     Narrative:         National Kidney Disease Education Program recommendations are as follows:  GFR calculation is accurate only with a steady state creatinine  Chronic Kidney disease less than 60 ml/min/1 73 sq  meters  Kidney failure less than 15 ml/min/1 73 sq  meters  CBC and differential [[id number]]  (Abnormal) Collected:  09/23/18 0942    Lab Status:  Final result Specimen:  Blood from Arm, Right Updated:  09/23/18 0952     WBC 7 62 Thousand/uL      RBC 5 18 Million/uL      Hemoglobin 14 2 g/dL      Hematocrit 43 0 %      MCV 83 fL      MCH 27 4 pg      MCHC 33 0 g/dL      RDW 15 5 (H) %      MPV 8 7 (L) fL      Platelets 532 (H) Thousands/uL      nRBC 0 /100 WBCs      Neutrophils Relative 32 (L) %      Immat GRANS % 0 %      Lymphocytes Relative 52 (H) %      Monocytes Relative 9 %      Eosinophils Relative 6 %      Basophils Relative 1 %      Neutrophils Absolute 2 47 Thousands/µL      Immature Grans Absolute 0 02 Thousand/uL      Lymphocytes Absolute 3 93 Thousands/µL      Monocytes Absolute 0 66 Thousand/µL      Eosinophils Absolute 0 49 Thousand/µL      Basophils Absolute 0 05 Thousands/µL                  XR chest 2 views   ED Interpretation by Cisco Martínez DO (09/23 5700)   No acute disease      Final Result by Sara Castorena MD (09/23 5724)      No acute cardiopulmonary disease              Workstation performed: [id number]                    Procedures  Procedures   ECG 12 Lead Documentation  Date/Time: today/date: 9/23/2018  Performed by: Kenzie Guzman    ECG reviewed by me, the ED Provider: yes Patient location:  ED   Previous ECG:  Changes noted   Rate:  75  ECG rate assessment: normal    Rhythm: sinus rhythm    Ectopy:  none    QRS axis:  Normal  Intervals: normal   Q waves: None   ST segments:  Normal  T waves: inverted in aVL and V2      Impression: NSR with nonspecific T wave inversions (new changes)        Phone Contacts  ED Phone Contact    ED Course  ED Course as of Sep 23 1520   Sun Sep 23, 2018   1035 Pt reports SOB has somewhat improved at this time  Pt complains of persistent chest pain  Lungs now with improved air entry, increased wheezing bilaterally  Will give second duo-neb  Suspect symptoms are likely COPD exacerbation  1036 With a history of positive troponins in the past, however due to patient's persistent chest pain will admit for further evaluation  Troponin I: (!) 0 09                               MDM  CritCare Time    Disposition  Final diagnoses:   COPD with acute exacerbation (HCC)   Elevated troponin     Time reflects when diagnosis was documented in both MDM as applicable and the Disposition within this note     Time User Action Codes Description Comment    9/23/2018 10:57 AM Yashira Kay Add [J44 1] COPD with acute exacerbation ([place] [address] )     9/23/2018 10:57 AM Yashira Kay Add [R74 8] Elevated troponin       ED Disposition     ED Disposition Condition Comment    Admit  Case was discussed with ALISSA and the patient's admission status was agreed to be Admission Status: inpatient status to the service of Dr Ruben Bryan           Follow-up Information    None         Current Discharge Medication List      CONTINUE these medications which have NOT CHANGED    Details   albuterol (5 mg/mL) 0 5 % nebulizer solution 3 (three) times a day      albuterol (VENTOLIN HFA) 90 mcg/act inhaler Inhale 2 puffs every 4 (four) hours as needed for wheezing  Qty: 1 Inhaler, Refills: 1    Associated Diagnoses: Chronic obstructive pulmonary disease, unspecified COPD type ([facility] )      amLODIPine (NORVASC) 10 mg tablet Take 1 tablet (10 mg total) by mouth daily  Qty: 30 tablet, Refills: 1    Associated Diagnoses: HTN (hypertension)      budesonide-formoterol (SYMBICORT) 160-4 5 mcg/act inhaler Every 12 hours      FLUoxetine (PROzac) 10 MG tablet Take 10 mg by mouth daily      loratadine (CLARITIN) 10 mg tablet Take 10 mg by mouth      metFORMIN (GLUCOPHAGE) 500 mg tablet Take 1 tablet (500 mg total) by mouth 2 (two) times a day with meals  Qty: 60 tablet, Refills: 1    Associated Diagnoses: DM (diabetes mellitus) (HCC)      methocarbamol (ROBAXIN) 500 mg tablet Take 1 tablet (500 mg total) by mouth every 6 (six) hours as needed for muscle spasms  Qty: 10 tablet, Refills: 0    Associated Diagnoses: Non-cardiac chest pain      pantoprazole (PROTONIX) 40 mg tablet Take 1 tablet (40 mg total) by mouth daily in the early morning  Qty: 30 tablet, Refills: 0    Associated Diagnoses: Non-cardiac chest pain      QUEtiapine (SEROquel) 25 mg tablet Take 25 mg by mouth daily at bedtime      Selenium Sulfide 2 25 % SHAM apply by topical route  every week to the affected area(s)           No discharge procedures on file      ED Provider  Electronically Signed by           Marbella Douglass DO  09/23/18 8205

## 2018-09-23 NOTE — H&P
Unit/Bed#: E4 -01 Encounter: [id number]    Chief complaint:  Shortness of breath    History of Present Illness     HPI:  Edis Joy  is a 66 y o  male who presents with shortness of breath which has been progressive for the last several weeks  He has had some coughing and wheezing  He has had little sputum  He has had no fever  He has had no hemoptysis  The patient also has intermittent chest pain  This is sharp in nature  It is not clearly exercise-related  It does not radiate to his neck arms or back  He has had no palpitations, heartburn, nausea, etc   The patient underwent cardiac catheterization within the past few months  This showed essentially normal coronary arteries  The patient is admitted for exacerbation of COPD since his symptoms were not well responsive to bronchodilator therapy in the emergency room  The patient's past medical history is positive for hypertension and type 2 diabetes  He has a history of bipolar affective disorder  As mentioned, he has a history of COPD  The patient denies hyperlipidemia, congestive heart failure, stroke, peptic ulcer disease, or kidney disease  Past Surgical History:   Procedure Laterality Date    HEMORROIDECTOMY      KNEE SURGERY       The patient's medications at the time of admission include: Albuterol by nebulization 3 times daily as needed  Albuterol by metered dose inhaler 2 puffs every 4 hours as needed  Amlodipine 10 mg daily  Fluoxetine 10 mg daily  Claritin 10 mg daily  Metformin 500 mg twice daily  Methocarbamol 500 mg every 6 hours as needed  Pantoprazole 40 mg daily  Quetiapine 25 mg at bedtime  The patient's med list includes Symbicort but he says that he is not currently using this  The patient is allergic to lisinopril which causes angioedema      Family History:   Family History   Problem Relation Age of Onset    Heart disease Mother     Hypertension Father     Diabetes Family     Asthma Family     Heart disease Family     Cancer Family      Social history reveals the patient smokes about 6 cigarettes per day  He also smokes marijuana occasionally  He drinks in moderation  Review of Systems  A detailed 12 point review of systems was conducted and is negative apart from those mentioned in the HPI  Objective   Vitals: Blood pressure 124/75, pulse 70, temperature 99 5 °F (37 5 °C), temperature source Temporal, resp  rate 18, height 5' 8 5" (1 74 m), weight 62 2 kg (137 lb 2 oz), SpO2 99 %  Physical Exam   The patient is a well-developed, well-nourished man who appears in mild-to-moderate respiratory distress  Head is atraumatic and normocephalic  ENT examination is within normal limits  Eye examination revealed pupils to be equal, round, and reactive to light  Extraocular movements are intact  Neck is supple  Carotids are full without bruits  There is no lymphadenopathy or goiter  Lung examination revealed bilateral wheezing and rhonchi  The patient became significantly tachypneic with minimal exertion  Cardiac exam reveals a regular rhythm  I heard no murmur, gallop, or rub  The abdomen is soft with active bowel sounds  There is no mass, tenderness, or organomegaly  Extremities showed no clubbing, cyanosis, or edema  There was no calf tenderness neurologic examination revealed patient to be alert and oriented  No focal or lateralizing sign is noted      Lab Results:   Results for orders placed or performed during the hospital encounter of 09/23/18   CBC and differential   Result Value Ref Range    WBC 7 62 4 31 - 10 16 Thousand/uL    RBC 5 18 3 88 - 5 62 Million/uL    Hemoglobin 14 2 12 0 - 17 0 g/dL    Hematocrit 43 0 36 5 - 49 3 %    MCV 83 82 - 98 fL    MCH 27 4 26 8 - 34 3 pg    MCHC 33 0 31 4 - 37 4 g/dL    RDW 15 5 (H) 11 6 - 15 1 %    MPV 8 7 (L) 8 9 - 12 7 fL    Platelets 898 (H) 599 - 390 Thousands/uL    nRBC 0 /100 WBCs    Neutrophils Relative 32 (L) 43 - 75 %    Immat GRANS % 0 0 - 2 %    Lymphocytes Relative 52 (H) 14 - 44 %    Monocytes Relative 9 4 - 12 %    Eosinophils Relative 6 0 - 6 %    Basophils Relative 1 0 - 1 %    Neutrophils Absolute 2 47 1 85 - 7 62 Thousands/µL    Immature Grans Absolute 0 02 0 00 - 0 20 Thousand/uL    Lymphocytes Absolute 3 93 0 60 - 4 47 Thousands/µL    Monocytes Absolute 0 66 0 17 - 1 22 Thousand/µL    Eosinophils Absolute 0 49 0 00 - 0 61 Thousand/µL    Basophils Absolute 0 05 0 00 - 0 10 Thousands/µL   Comprehensive metabolic panel   Result Value Ref Range    Sodium 140 136 - 145 mmol/L    Potassium 3 8 3 5 - 5 3 mmol/L    Chloride 103 100 - 108 mmol/L    CO2 29 21 - 32 mmol/L    ANION GAP 8 4 - 13 mmol/L    BUN 13 5 - 25 mg/dL    Creatinine 1 05 0 60 - 1 30 mg/dL    Glucose 96 65 - 140 mg/dL    Calcium 9 0 8 3 - 10 1 mg/dL    AST 22 5 - 45 U/L    ALT 25 12 - 78 U/L    Alkaline Phosphatase 110 46 - 116 U/L    Total Protein 8 3 (H) 6 4 - 8 2 g/dL    Albumin 3 7 3 5 - 5 0 g/dL    Total Bilirubin 0 32 0 20 - 1 00 mg/dL    eGFR 91 ml/min/1 73sq m   Troponin I   Result Value Ref Range    Troponin I 0 09 (H) <=0 04 ng/mL       Assessment/Plan     Assessment:  1  Exacerbation of COPD, refractory to treatment in the emergency room  2  Noncardiac chest pain  3  Hypertension  4  Type 2 diabetes  5  Cigarette abuse   6  Minimally elevated troponin, chronic    Plan:  The patient will be admitted to the hospital and monitored carefully  He will be treated for exacerbation of COPD with bronchodilators and intravenous steroids  The patient's diabetes will require careful monitoring because of his steroid therapy  I emphasized to the patient the necessity to quit smoking  He has done this successfully in the past using a nicotine patch  The patient's chest pain is obviously not cardiac  His EKG remains normal   He had a negative cardiac catheterization within the past few months  I do not believe that any further testing is indicated for this      Considering the refractory nature of the patient's symptoms, I believe that his hospitalization will require at least 2 midnights  He is therefore sign to inpatient status    I discussed resuscitative measures with the patient and he would like all available modalities employed on his behalf in the event of a cardiac arrest   He is therefore sign to code blue level 1           Code Status: Level 1 - Full Code

## 2018-09-23 NOTE — NURSING NOTE
Pt brought to the floor with out a telemetry order  Ruben Mitchell was called at 1150 and asked if pt should be on telemetry since pt was reporting CP 7/10 and troponin  in ED was 0 09  Per Dr Fermin pt has chronic CP and elevated troponin  Pt states he has had CP for the last few months  Pt stated his chest was tight this morning and he was having trouble breathing and catching his breath  Pt is being admitted as a COPD work up per Ruben Mitchell  Pt given Robaxin and informed to ring if CP becomes worse  Will continue to closely monitor, call bell with in reach and pt resting comfortably

## 2018-09-24 PROBLEM — J44.1 ACUTE EXACERBATION OF CHRONIC OBSTRUCTIVE PULMONARY DISEASE (COPD) (HCC): Status: ACTIVE | Noted: 2018-06-07

## 2018-09-24 LAB
ANION GAP SERPL CALCULATED.3IONS-SCNC: 13 MMOL/L (ref 4–13)
ATRIAL RATE: 75 BPM
BASOPHILS # BLD AUTO: 0.01 THOUSANDS/ΜL (ref 0–0.1)
BASOPHILS NFR BLD AUTO: 0 % (ref 0–1)
BUN SERPL-MCNC: 13 MG/DL (ref 5–25)
CALCIUM SERPL-MCNC: 8.9 MG/DL (ref 8.3–10.1)
CHLORIDE SERPL-SCNC: 104 MMOL/L (ref 100–108)
CO2 SERPL-SCNC: 22 MMOL/L (ref 21–32)
CREAT SERPL-MCNC: 0.95 MG/DL (ref 0.6–1.3)
EOSINOPHIL # BLD AUTO: 0 THOUSAND/ΜL (ref 0–0.61)
EOSINOPHIL NFR BLD AUTO: 0 % (ref 0–6)
ERYTHROCYTE [DISTWIDTH] IN BLOOD BY AUTOMATED COUNT: 15.2 % (ref 11.6–15.1)
GFR SERPL CREATININE-BSD FRML MDRD: 103 ML/MIN/1.73SQ M
GLUCOSE SERPL-MCNC: 127 MG/DL (ref 65–140)
HCT VFR BLD AUTO: 37.8 % (ref 36.5–49.3)
HGB BLD-MCNC: 13 G/DL (ref 12–17)
IMM GRANULOCYTES # BLD AUTO: 0.05 THOUSAND/UL (ref 0–0.2)
IMM GRANULOCYTES NFR BLD AUTO: 0 % (ref 0–2)
LYMPHOCYTES # BLD AUTO: 1.96 THOUSANDS/ΜL (ref 0.6–4.47)
LYMPHOCYTES NFR BLD AUTO: 18 % (ref 14–44)
MCH RBC QN AUTO: 28 PG (ref 26.8–34.3)
MCHC RBC AUTO-ENTMCNC: 34.4 G/DL (ref 31.4–37.4)
MCV RBC AUTO: 81 FL (ref 82–98)
MONOCYTES # BLD AUTO: 0.26 THOUSAND/ΜL (ref 0.17–1.22)
MONOCYTES NFR BLD AUTO: 2 % (ref 4–12)
NEUTROPHILS # BLD AUTO: 8.93 THOUSANDS/ΜL (ref 1.85–7.62)
NEUTS SEG NFR BLD AUTO: 80 % (ref 43–75)
NRBC BLD AUTO-RTO: 0 /100 WBCS
NT-PROBNP SERPL-MCNC: 80 PG/ML
P AXIS: 83 DEGREES
PLATELET # BLD AUTO: 437 THOUSANDS/UL (ref 149–390)
PMV BLD AUTO: 8.9 FL (ref 8.9–12.7)
POTASSIUM SERPL-SCNC: 4.2 MMOL/L (ref 3.5–5.3)
PR INTERVAL: 156 MS
QRS AXIS: 77 DEGREES
QRSD INTERVAL: 74 MS
QT INTERVAL: 374 MS
QTC INTERVAL: 417 MS
RBC # BLD AUTO: 4.65 MILLION/UL (ref 3.88–5.62)
SODIUM SERPL-SCNC: 139 MMOL/L (ref 136–145)
T WAVE AXIS: 79 DEGREES
VENTRICULAR RATE: 75 BPM
WBC # BLD AUTO: 11.21 THOUSAND/UL (ref 4.31–10.16)

## 2018-09-24 PROCEDURE — 93010 ELECTROCARDIOGRAM REPORT: CPT | Performed by: INTERNAL MEDICINE

## 2018-09-24 PROCEDURE — 83880 ASSAY OF NATRIURETIC PEPTIDE: CPT | Performed by: HOSPITALIST

## 2018-09-24 PROCEDURE — 80048 BASIC METABOLIC PNL TOTAL CA: CPT | Performed by: INTERNAL MEDICINE

## 2018-09-24 PROCEDURE — 99232 SBSQ HOSP IP/OBS MODERATE 35: CPT | Performed by: HOSPITALIST

## 2018-09-24 PROCEDURE — 85025 COMPLETE CBC W/AUTO DIFF WBC: CPT | Performed by: INTERNAL MEDICINE

## 2018-09-24 RX ORDER — ONDANSETRON 2 MG/ML
4 INJECTION INTRAMUSCULAR; INTRAVENOUS EVERY 4 HOURS PRN
Status: DISCONTINUED | OUTPATIENT
Start: 2018-09-24 | End: 2018-09-26 | Stop reason: HOSPADM

## 2018-09-24 RX ADMIN — METHYLPREDNISOLONE SODIUM SUCCINATE 40 MG: 40 INJECTION, POWDER, FOR SOLUTION INTRAMUSCULAR; INTRAVENOUS at 21:19

## 2018-09-24 RX ADMIN — AMLODIPINE BESYLATE 10 MG: 10 TABLET ORAL at 08:51

## 2018-09-24 RX ADMIN — QUETIAPINE FUMARATE 25 MG: 25 TABLET ORAL at 22:04

## 2018-09-24 RX ADMIN — FLUTICASONE FUROATE AND VILANTEROL TRIFENATATE 1 PUFF: 100; 25 POWDER RESPIRATORY (INHALATION) at 08:51

## 2018-09-24 RX ADMIN — NICOTINE 1 PATCH: 14 PATCH, EXTENDED RELEASE TRANSDERMAL at 08:51

## 2018-09-24 RX ADMIN — METHYLPREDNISOLONE SODIUM SUCCINATE 40 MG: 40 INJECTION, POWDER, FOR SOLUTION INTRAMUSCULAR; INTRAVENOUS at 13:05

## 2018-09-24 RX ADMIN — ONDANSETRON 4 MG: 2 INJECTION INTRAMUSCULAR; INTRAVENOUS at 13:05

## 2018-09-24 RX ADMIN — LORATADINE 10 MG: 10 TABLET ORAL at 08:51

## 2018-09-24 RX ADMIN — FLUOXETINE 10 MG: 10 CAPSULE ORAL at 08:51

## 2018-09-24 RX ADMIN — PANTOPRAZOLE SODIUM 40 MG: 40 TABLET, DELAYED RELEASE ORAL at 05:44

## 2018-09-24 RX ADMIN — METHYLPREDNISOLONE SODIUM SUCCINATE 40 MG: 40 INJECTION, POWDER, FOR SOLUTION INTRAMUSCULAR; INTRAVENOUS at 05:46

## 2018-09-24 NOTE — PROGRESS NOTES
Progress Note - Lidia Hem Sr  1960, 66 y o  male MRN: [id number]    Unit/Bed#: E4 -01 Encounter: [id number]    Primary Care Provider: Vincenzo Ellis MD   Date and time admitted to hospital: 2018  9:06 AM        * Acute exacerbation of chronic obstructive pulmonary disease (COPD) ([address] )   Assessment & Plan    A little better with IV solumedrol  This dyspnea on exertion has been going on for over a year  He has tried multiple inhalers with minimal improvement  He has been trying to get in to see pulmonary, but has not yet been able to get an appointment  With his JVD, I certainly wonder if there is a large component of CHF  Will ask Pulm and cards to see  Check Echocardiogram          Tobacco dependence syndrome   Assessment & Plan    On nicotine patch   I stressed to him that if he wants to get better, he needs to completely quit smoking                Subjective:   Complains of dyspnea on exertion for a year  Cannot walk many steps without SOB  He does have wheezing  Inhalers have only minimally helped  Nebulizer at home helps  He denies leg swelling or orthopnea  Objective:     Vitals:   Temp (24hrs), Av 4 °F (36 9 °C), Min:97 7 °F (36 5 °C), Max:99 2 °F (37 3 °C)    HR:  [73-82] 82  Resp:  [18] 18  BP: (121-146)/(60-71) 121/69  SpO2:  [97 %-98 %] 97 %  Body mass index is 20 55 kg/m²  Input and Output Summary (last 24 hours): Intake/Output Summary (Last 24 hours) at 18 1621  Last data filed at 18 2201   Gross per 24 hour   Intake              580 ml   Output                0 ml   Net              580 ml       Physical Exam:     Physical Exam   HENT:   Head: Normocephalic and atraumatic  Eyes: EOM are normal  Pupils are equal, round, and reactive to light  Neck: JVD (1/2 way up) present  Cardiovascular: Normal rate and regular rhythm  Exam reveals no gallop and no friction rub  No murmur heard    Pulmonary/Chest: Effort normal  He has wheezes (bilat exp wheeze)  He has no rales  Abdominal: Soft  There is no tenderness  Musculoskeletal: He exhibits no edema  Nursing note and vitals reviewed              Additional Data:     Labs:      Results from last 7 days  Lab Units 09/24/18  0555   WBC Thousand/uL 11 21*   HEMOGLOBIN g/dL 13 0   HEMATOCRIT % 37 8   PLATELETS Thousands/uL 437*   NEUTROS PCT % 80*   LYMPHS PCT % 18   MONOS PCT % 2*   EOS PCT % 0       Results from last 7 days  Lab Units 09/24/18  0555 09/23/18  0942   SODIUM mmol/L 139 140   POTASSIUM mmol/L 4 2 3 8   CHLORIDE mmol/L 104 103   CO2 mmol/L 22 29   BUN mg/dL 13 13   CREATININE mg/dL 0 95 1 05   CALCIUM mg/dL 8 9 9 0   ALK PHOS U/L  --  110   ALT U/L  --  25   AST U/L  --  22                       * I Have Reviewed All Lab Data     Recent Cultures (last 7 days):             Last 24 Hours Medication List:     Current Facility-Administered Medications:  acetaminophen 650 mg Oral Q4H PRN Familia Vasquez MD   albuterol 2 puff Inhalation Q4H PRN Familia Vasquez MD   aluminum-magnesium hydroxide-simethicone 30 mL Oral Q6H PRN Familia Vasquez MD   amLODIPine 10 mg Oral Daily Familia Vasquez MD   enoxaparin 40 mg Subcutaneous Daily Familia Vasquez MD   FLUoxetine 10 mg Oral Daily Familia Vasquez MD   fluticasone-vilanterol 1 puff Inhalation Daily Familia Vasquez MD   levalbuterol 1 25 mg Nebulization Q8H PRN Familia Vasquez MD   loratadine 10 mg Oral Daily Familia Vasquez MD   metFORMIN 500 mg Oral BID With Meals Familia Vasquez MD   methocarbamol 500 mg Oral Q6H PRN Familia Vasquez MD   methylPREDNISolone sodium succinate 40 mg Intravenous [facility] Familia Vasquez MD   nicotine 1 patch Transdermal Daily Familia Vasquez MD   ondansetron 4 mg Intravenous Q4H PRN Thienro Green DO   pantoprazole 40 mg Oral Early Morning Familia Vasquez MD   QUEtiapine 25 mg Oral HS Familia Vasquez MD         VTE Pharmacologic Prophylaxis:   Pharmacologic: Enoxaparin (Lovenox)        Current Length of Stay: 1 day(s)    Current Patient Status: Inpatient       Discharge Plan:   Code Status: Level 1 - Full Code           Today, Patient Was Seen By: Tom Balderrama DO    ** Please Note: Dictation voice to text software may have been used in the creation of this document   **

## 2018-09-24 NOTE — CASE MANAGEMENT
Initial Clinical Review    Admission: Date/Time/Statement: 9/23/18 @ 1058     Orders Placed This Encounter   Procedures    Inpatient Admission (expected length of stay for this patient is greater than two midnights)     Standing Status:   Standing     Number of Occurrences:   1     Order Specific Question:   Admitting Physician     Answer:   ANISHA JAMES [[id number]]     Order Specific Question:   Level of Care     Answer:   Med Surg [16]     Order Specific Question:   Estimated length of stay     Answer:   More than 2 Midnights     Order Specific Question:   Certification     Answer:   I certify that inpatient services are medically necessary for this patient for a duration of greater than two midnights  See H&P and MD Progress Notes for additional information about the patient's course of treatment  ED: Date/Time/Mode of Arrival:   ED Arrival Information     Expected Arrival Acuity Means of Arrival Escorted By Service Admission Type    - 9/23/2018 09:04 Emergent Walk-In Self Hospitalist Emergency    Arrival Complaint    Shortness of breath          Chief Complaint:   Chief Complaint   Patient presents with    Chest Pain     pt c/o left sided chest pain for "few" days  pt reports pain radiates into left side of neck and arm  pt also reports SOB  History of Illness:    45-year-old male presents with left-sided chest pain and shortness of breath  symptoms have been present for the past several days       shortness of breath does not seem to be related to the chest pain - worse w/exertion, had to stop several times "gasping for air" on the walk to the ED   Phoebe Diallo   nonproductive cough, which has gotten worse over the past several days    Patient was admitted in June for similar symptoms and was found to have an elevated troponin, undergoing a cardiac catheterization which was within normal limits    Suspect shortness of breath is secondary to COPD (active smoker)  given that patient has significantly diminished air entry throughout    ED Vital Signs:   sats on RA  96 - 99%  ED Triage Vitals   Temperature Pulse Respirations Blood Pressure SpO2   09/23/18 0925 09/23/18 0910 09/23/18 0910 09/23/18 0910 09/23/18 0910   98 4 °F (36 9 °C) 85 18 152/91 99 %      Temp Source Heart Rate Source Patient Position - Orthostatic VS BP Location FiO2 (%)   09/23/18 1213 09/23/18 0910 09/23/18 0910 09/23/18 0910 --   Temporal Monitor Lying Right arm       Pain Score       09/23/18 1150       7        Wt Readings from Last 1 Encounters:   09/23/18 62 2 kg (137 lb 2 oz)     Abnormal Labs/Diagnostic Test Results:   Trop  0 09  Wbc  7 62   11 21    cxr - The lungs are clear   Stable scarring right midlung field   No pneumothorax or pleural effusion  ED Treatment:   Medication Administration from 09/23/2018 0904 to 09/23/2018 1142       Date/Time Order Dose Route Action Action by Comments     09/23/2018 0937 albuterol inhalation solution 5 mg 5 mg Nebulization Given Aubrey Runner, RN      09/23/2018 5617 ipratropium (ATROVENT) 0 02 % inhalation solution 0 5 mg 0 5 mg Nebulization Given Aubrey Runner, RN      09/23/2018 3713 methylPREDNISolone sodium succinate (Solu-MEDROL) injection 125 mg 125 mg Intravenous Given Aubrey Runner, RN      09/23/2018 5358 nicotine (NICODERM CQ) 14 mg/24hr TD 24 hr patch 14 mg 14 mg Transdermal Medication Applied Aubrey Runner, RN      09/23/2018 1042 albuterol inhalation solution 5 mg 5 mg Nebulization Given Aubrey Runner, RN      09/23/2018 1042 ipratropium (ATROVENT) 0 02 % inhalation solution 0 5 mg 0 5 mg Nebulization Given Aubrey Runner, RN           Past Medical/Surgical History:    Active Ambulatory Problems     Diagnosis Date Noted    COPD (chronic obstructive pulmonary disease) ([facility] [address] ) 06/07/2018    HTN (hypertension) 06/07/2018    Tobacco dependence syndrome 09/05/2014    Chronic bilateral thoracic back pain 07/27/2018    Chronic right shoulder pain 07/27/2018    Bilateral carpal tunnel syndrome 07/27/2018    Bipolar 1 disorder ([facility] ) 08/01/2018    Gastroesophageal reflux disease without esophagitis 08/01/2018    Other specified anxiety disorders 08/01/2018    Cubital tunnel syndrome, bilateral 08/01/2018     Resolved Ambulatory Problems     Diagnosis Date Noted    Hypokalemia 06/07/2018     Past Medical History:   Diagnosis Date    Bipolar 1 disorder ([place] [address] )     COPD (chronic obstructive pulmonary disease) ([place] [address] )     Diabetes mellitus ([place] [address] )     Hypertension     Latent syphilis     PTSD (post-traumatic stress disorder)        Admitting Diagnosis: Chest pain [R07 9]  Elevated troponin [R74 8]  COPD with acute exacerbation (HCC) [J44 1]    Assessment/Plan:   Exacerbation of COPD, refractory to treatment in the emergency room  Noncardiac chest pain  Hypertension  Type 2 diabetes  Cigarette abuse   Minimally elevated troponin, chronic   Plan:  Admit telemetry,  IV steroids, bronchodilators  patient's chest pain is obviously not cardiac  His EKG remains normal   He had a negative cardiac catheterization within the past few months  I do not believe that any further testing is indicated for this      Considering the refractory nature of the patient's symptoms, I believe that his hospitalization will require at least 2 midnights      Admission Orders:  Admit telemetry   Cardiac diet   Activity as tolerated   Sequential compression device to b/l LE      Scheduled Meds:   Current Facility-Administered Medications:  acetaminophen 650 mg Oral Q4H PRN Pat Varela, MD   albuterol 2 puff Inhalation Q4H PRN Pat Varela MD   aluminum-magnesium hydroxide-simethicone 30 mL Oral Q6H PRN Pat Varela MD   amLODIPine 10 mg Oral Daily Pat Varela MD   enoxaparin 40 mg Subcutaneous Daily Pat Varela MD   FLUoxetine 10 mg Oral Daily Pat Varela MD   fluticasone-vilanterol 1 puff Inhalation Daily Pat Varela MD   levalbuterol 1 25 mg Nebulization Q8H PRN Pat Varela MD   loratadine 10 mg Oral Daily Iona Castro MD   metFORMIN 500 mg Oral BID With Meals Iona Castro MD   methocarbamol 500 mg Oral Q6H PRN Iona Castro MD   methylPREDNISolone sodium succinate 40 mg Intravenous [facility] Iona Castro MD   nicotine 1 patch Transdermal Daily Iona Castro MD   pantoprazole 40 mg Oral Early Morning Iona Castro MD   QUEtiapine 25 mg Oral HS Iona Castro MD       9/24/2018  TMax  99 5       80   18    146/60    99% on room air              * Acute exacerbation of chronic obstructive pulmonary disease (COPD) ([facility] [address] )   Assessment & Plan     A little better with IV solumedrol  This dyspnea on exertion has been going on for over a year  He has tried multiple inhalers with minimal improvement  He has been trying to get in to see pulmonary, but has not yet been able to get an appointment  With his JVD, I certainly wonder if there is a large component of CHF  Will ask Pulm and cards to see  Check Echocardiogram

## 2018-09-24 NOTE — ASSESSMENT & PLAN NOTE
A little better with IV solumedrol  This dyspnea on exertion has been going on for over a year  He has tried multiple inhalers with minimal improvement  He has been trying to get in to see pulmonary, but has not yet been able to get an appointment  With his JVD, I certainly wonder if there is a large component of CHF  Will ask Pulm and cards to see  Check Echocardiogram

## 2018-09-24 NOTE — ASSESSMENT & PLAN NOTE
On nicotine patch   I stressed to him that if he wants to get better, he needs to completely quit smoking

## 2018-09-24 NOTE — PLAN OF CARE
Problem: PAIN - ADULT  Goal: Verbalizes/displays adequate comfort level or baseline comfort level  Interventions:  - Encourage patient to monitor pain and request assistance  - Assess pain using appropriate pain scale  - Administer analgesics based on type and severity of pain and evaluate response  - Implement non-pharmacological measures as appropriate and evaluate response  - Consider cultural and social influences on pain and pain management  - Notify physician/advanced practitioner if interventions unsuccessful or patient reports new pain   Outcome: Progressing      Problem: INFECTION - ADULT  Goal: Absence or prevention of progression during hospitalization  INTERVENTIONS:  - Assess and monitor for signs and symptoms of infection  - Monitor lab/diagnostic results  - Monitor all insertion sites, i e  indwelling lines, tubes, and drains  - Monitor endotracheal (as able) and nasal secretions for changes in amount and color  - Mobile appropriate cooling/warming therapies per order  - Administer medications as ordered  - Instruct and encourage patient and family to use good hand hygiene technique  - Identify and instruct in appropriate isolation precautions for identified infection/condition   Outcome: Progressing    Goal: Absence of fever/infection during neutropenic period  INTERVENTIONS:  - Monitor WBC  - Implement neutropenic guidelines   Outcome: Progressing      Problem: SAFETY ADULT  Goal: Patient will remain free of falls  INTERVENTIONS:  - Assess patient frequently for physical needs  -  Identify cognitive and physical deficits and behaviors that affect risk of falls    -  Mobile fall precautions as indicated by assessment   - Educate patient/family on patient safety including physical limitations  - Instruct patient to call for assistance with activity based on assessment  - Modify environment to reduce risk of injury  - Consider OT/PT consult to assist with strengthening/mobility   Outcome: Progressing    Goal: Maintain or return to baseline ADL function  INTERVENTIONS:  -  Assess patient's ability to carry out ADLs; assess patient's baseline for ADL function and identify physical deficits which impact ability to perform ADLs (bathing, care of mouth/teeth, toileting, grooming, dressing, etc )  - Assess/evaluate cause of self-care deficits   - Assess range of motion  - Assess patient's mobility; develop plan if impaired  - Assess patient's need for assistive devices and provide as appropriate  - Encourage maximum independence but intervene and supervise when necessary  ¯ Involve family in performance of ADLs  ¯ Assess for home care needs following discharge   ¯ Request OT consult to assist with ADL evaluation and planning for discharge  ¯ Provide patient education as appropriate   Outcome: Progressing    Goal: Maintain or return mobility status to optimal level  INTERVENTIONS:  - Assess patient's baseline mobility status (ambulation, transfers, stairs, etc )    - Identify cognitive and physical deficits and behaviors that affect mobility  - Identify mobility aids required to assist with transfers and/or ambulation (gait belt, sit-to-stand, lift, walker, cane, etc )  - [place] fall precautions as indicated by assessment  - Record patient progress and toleration of activity level on Mobility SBAR; progress patient to next Phase/Stage  - Instruct patient to call for assistance with activity based on assessment  - Request Rehabilitation consult to assist with strengthening/weightbearing, etc    Outcome: Progressing      Problem: DISCHARGE PLANNING  Goal: Discharge to home or other facility with appropriate resources  INTERVENTIONS:  - Identify barriers to discharge w/patient and caregiver  - Arrange for needed discharge resources and transportation as appropriate  - Identify discharge learning needs (meds, wound care, etc )  - Arrange for interpretive services to assist at discharge as needed  - Refer to Case Management Department for coordinating discharge planning if the patient needs post-hospital services based on physician/advanced practitioner order or complex needs related to functional status, cognitive ability, or social support system   Outcome: Progressing      Problem: Knowledge Deficit  Goal: Patient/family/caregiver demonstrates understanding of disease process, treatment plan, medications, and discharge instructions  Complete learning assessment and assess knowledge base    Interventions:  - Provide teaching at level of understanding  - Provide teaching via preferred learning methods   Outcome: Progressing      Problem: RESPIRATORY - ADULT  Goal: Achieves optimal ventilation and oxygenation  INTERVENTIONS:  - Assess for changes in respiratory status  - Assess for changes in mentation and behavior  - Position to facilitate oxygenation and minimize respiratory effort  - Oxygen administration by appropriate delivery method based on oxygen saturation (per order) or ABGs  - Initiate smoking cessation education as indicated  - Encourage broncho-pulmonary hygiene including cough, deep breathe, Incentive Spirometry  - Assess the need for suctioning and aspirate as needed  - Assess and instruct to report SOB or any respiratory difficulty  - Respiratory Therapy support as indicated   Outcome: Progressing

## 2018-09-24 NOTE — PLAN OF CARE
DISCHARGE PLANNING     Discharge to home or other facility with appropriate resources Progressing        INFECTION - ADULT     Absence or prevention of progression during hospitalization Progressing     Absence of fever/infection during neutropenic period Progressing        Knowledge Deficit     Patient/family/caregiver demonstrates understanding of disease process, treatment plan, medications, and discharge instructions Progressing        PAIN - ADULT     Verbalizes/displays adequate comfort level or baseline comfort level Progressing        RESPIRATORY - ADULT     Achieves optimal ventilation and oxygenation Progressing        SAFETY ADULT     Patient will remain free of falls Progressing     Maintain or return to baseline ADL function Progressing     Maintain or return mobility status to optimal level Progressing

## 2018-09-25 ENCOUNTER — APPOINTMENT (INPATIENT)
Dept: NON INVASIVE DIAGNOSTICS | Facility: HOSPITAL | Age: 58
DRG: 140 | End: 2018-09-25
Payer: COMMERCIAL

## 2018-09-25 LAB
ANION GAP SERPL CALCULATED.3IONS-SCNC: 6 MMOL/L (ref 4–13)
BUN SERPL-MCNC: 16 MG/DL (ref 5–25)
CALCIUM SERPL-MCNC: 8.8 MG/DL (ref 8.3–10.1)
CHLORIDE SERPL-SCNC: 106 MMOL/L (ref 100–108)
CO2 SERPL-SCNC: 28 MMOL/L (ref 21–32)
CREAT SERPL-MCNC: 0.97 MG/DL (ref 0.6–1.3)
ERYTHROCYTE [DISTWIDTH] IN BLOOD BY AUTOMATED COUNT: 15.9 % (ref 11.6–15.1)
GFR SERPL CREATININE-BSD FRML MDRD: 100 ML/MIN/1.73SQ M
GLUCOSE SERPL-MCNC: 140 MG/DL (ref 65–140)
HCT VFR BLD AUTO: 38.3 % (ref 36.5–49.3)
HGB BLD-MCNC: 12.8 G/DL (ref 12–17)
MCH RBC QN AUTO: 27.7 PG (ref 26.8–34.3)
MCHC RBC AUTO-ENTMCNC: 33.4 G/DL (ref 31.4–37.4)
MCV RBC AUTO: 83 FL (ref 82–98)
PLATELET # BLD AUTO: 423 THOUSANDS/UL (ref 149–390)
PMV BLD AUTO: 9.1 FL (ref 8.9–12.7)
POTASSIUM SERPL-SCNC: 4.3 MMOL/L (ref 3.5–5.3)
RBC # BLD AUTO: 4.62 MILLION/UL (ref 3.88–5.62)
SODIUM SERPL-SCNC: 140 MMOL/L (ref 136–145)
WBC # BLD AUTO: 18.94 THOUSAND/UL (ref 4.31–10.16)

## 2018-09-25 PROCEDURE — 93321 DOPPLER ECHO F-UP/LMTD STD: CPT | Performed by: INTERNAL MEDICINE

## 2018-09-25 PROCEDURE — 85027 COMPLETE CBC AUTOMATED: CPT | Performed by: HOSPITALIST

## 2018-09-25 PROCEDURE — 93325 DOPPLER ECHO COLOR FLOW MAPG: CPT | Performed by: INTERNAL MEDICINE

## 2018-09-25 PROCEDURE — 94760 N-INVAS EAR/PLS OXIMETRY 1: CPT

## 2018-09-25 PROCEDURE — 99255 IP/OBS CONSLTJ NEW/EST HI 80: CPT | Performed by: INTERNAL MEDICINE

## 2018-09-25 PROCEDURE — 80048 BASIC METABOLIC PNL TOTAL CA: CPT | Performed by: HOSPITALIST

## 2018-09-25 PROCEDURE — 93308 TTE F-UP OR LMTD: CPT | Performed by: INTERNAL MEDICINE

## 2018-09-25 PROCEDURE — 99254 IP/OBS CNSLTJ NEW/EST MOD 60: CPT | Performed by: INTERNAL MEDICINE

## 2018-09-25 PROCEDURE — 99232 SBSQ HOSP IP/OBS MODERATE 35: CPT | Performed by: HOSPITALIST

## 2018-09-25 PROCEDURE — 94640 AIRWAY INHALATION TREATMENT: CPT

## 2018-09-25 PROCEDURE — 93308 TTE F-UP OR LMTD: CPT

## 2018-09-25 RX ORDER — LEVALBUTEROL 1.25 MG/.5ML
1.25 SOLUTION, CONCENTRATE RESPIRATORY (INHALATION)
Status: DISCONTINUED | OUTPATIENT
Start: 2018-09-25 | End: 2018-09-26 | Stop reason: HOSPADM

## 2018-09-25 RX ORDER — PREDNISONE 20 MG/1
40 TABLET ORAL DAILY
Status: DISCONTINUED | OUTPATIENT
Start: 2018-09-26 | End: 2018-09-26 | Stop reason: HOSPADM

## 2018-09-25 RX ADMIN — QUETIAPINE FUMARATE 25 MG: 25 TABLET ORAL at 21:56

## 2018-09-25 RX ADMIN — IPRATROPIUM BROMIDE 0.5 MG: 0.5 SOLUTION RESPIRATORY (INHALATION) at 19:15

## 2018-09-25 RX ADMIN — METHYLPREDNISOLONE SODIUM SUCCINATE 40 MG: 40 INJECTION, POWDER, FOR SOLUTION INTRAMUSCULAR; INTRAVENOUS at 06:13

## 2018-09-25 RX ADMIN — LEVALBUTEROL HYDROCHLORIDE 1.25 MG: 1.25 SOLUTION, CONCENTRATE RESPIRATORY (INHALATION) at 19:15

## 2018-09-25 RX ADMIN — FLUOXETINE 10 MG: 10 CAPSULE ORAL at 08:47

## 2018-09-25 RX ADMIN — NICOTINE 1 PATCH: 14 PATCH, EXTENDED RELEASE TRANSDERMAL at 08:46

## 2018-09-25 RX ADMIN — PANTOPRAZOLE SODIUM 40 MG: 40 TABLET, DELAYED RELEASE ORAL at 06:13

## 2018-09-25 RX ADMIN — METHOCARBAMOL 500 MG: 500 TABLET ORAL at 08:48

## 2018-09-25 RX ADMIN — LORATADINE 10 MG: 10 TABLET ORAL at 08:47

## 2018-09-25 RX ADMIN — FLUTICASONE FUROATE AND VILANTEROL TRIFENATATE 1 PUFF: 100; 25 POWDER RESPIRATORY (INHALATION) at 10:01

## 2018-09-25 RX ADMIN — METHOCARBAMOL 500 MG: 500 TABLET ORAL at 22:27

## 2018-09-25 RX ADMIN — ALBUTEROL SULFATE 2 PUFF: 90 AEROSOL, METERED RESPIRATORY (INHALATION) at 16:55

## 2018-09-25 RX ADMIN — AMLODIPINE BESYLATE 10 MG: 10 TABLET ORAL at 08:47

## 2018-09-25 NOTE — PROGRESS NOTES
Progress Note - Melita Hill Sr  1960, 66 y o  male MRN: [id number]    Unit/Bed#: E4 -01 Encounter: [id number]    Primary Care Provider: Angelika Roldan MD   Date and time admitted to hospital: 2018  9:06 AM        * Acute exacerbation of chronic obstructive pulmonary disease (COPD) ([facility] [address] )   Assessment & Plan    Does feel a little better today  Walked out in hallway but became short winded, but that is farther than yesterday  Appreciate pulmonary help  Waiting on PFTs on from [address]:   A little better  Walked in hallway, which is farther than yesterday  But, he better than yesterday      Objective:     Vitals:   Temp (24hrs), Av 3 °F (37 4 °C), Min:98 5 °F (36 9 °C), Max:99 9 °F (37 7 °C)    HR:  [68-78] 68  Resp:  [18-19] 19  BP: (135-150)/(65-83) 135/67  SpO2:  [98 %-99 %] 98 %  Body mass index is 20 55 kg/m²  Input and Output Summary (last 24 hours): Intake/Output Summary (Last 24 hours) at 18 1556  Last data filed at 18 0900   Gross per 24 hour   Intake              330 ml   Output                0 ml   Net              330 ml       Physical Exam:     Physical Exam   HENT:   Head: Normocephalic and atraumatic  Eyes: EOM are normal  Pupils are equal, round, and reactive to light  Cardiovascular: Normal rate and regular rhythm  Exam reveals no gallop and no friction rub  No murmur heard  Pulmonary/Chest: Effort normal and breath sounds normal  He has no wheezes  He has no rales  Less wheezing than yesterday     Abdominal: Soft  There is no tenderness  Musculoskeletal: He exhibits no edema  Nursing note and vitals reviewed              Additional Data:     Labs:      Results from last 7 days  Lab Units 18  0610 18  0555   WBC Thousand/uL 18 94* 11 21*   HEMOGLOBIN g/dL 12 8 13 0   HEMATOCRIT % 38 3 37 8   PLATELETS Thousands/uL 423* 437*   NEUTROS PCT %  --  80*   LYMPHS PCT %  --  18   MONOS PCT %  --  2*   EOS PCT %  --  0       Results from last 7 days  Lab Units 09/25/18  0610  09/23/18  0942   SODIUM mmol/L 140  < > 140   POTASSIUM mmol/L 4 3  < > 3 8   CHLORIDE mmol/L 106  < > 103   CO2 mmol/L 28  < > 29   BUN mg/dL 16  < > 13   CREATININE mg/dL 0 97  < > 1 05   CALCIUM mg/dL 8 8  < > 9 0   ALK PHOS U/L  --   --  110   ALT U/L  --   --  25   AST U/L  --   --  22   < > = values in this interval not displayed  * I Have Reviewed All Lab Data     Recent Cultures (last 7 days):             Last 24 Hours Medication List:     Current Facility-Administered Medications:  acetaminophen 650 mg Oral Q4H PRN Maciej ySed MD   albuterol 2 puff Inhalation Q4H PRN Maciej Syed MD   aluminum-magnesium hydroxide-simethicone 30 mL Oral Q6H PRN Maciej Syed MD   amLODIPine 10 mg Oral Daily Maciej Syed MD   enoxaparin 40 mg Subcutaneous Daily Maciej Syed MD   FLUoxetine 10 mg Oral Daily Maciej Syed MD   fluticasone-vilanterol 1 puff Inhalation Daily Maciej Syed MD   ip[facility]ropium [facility]   levalbuterol 1 25 mg Nebulization Q8H PRN Maciej Syed MD   levalbuterol 1 25 mg Nebulization [facility]ID [facility]   loratadine 10 mg Oral Daily Maciej Syed MD   metFORMIN 500 mg Oral BID With Meals Maciej Syed MD   methocarbamol 500 mg Oral Q6H PRN Maciej Syed MD   nicotine 1 patch Transdermal Daily Maciej Syed MD   ondansetron 4 mg Intravenous Q4H PRN Chaka Sheriff DO   pantoprazole 40 mg Oral Early Morning Maciej Syed MD   [START ON 9/26/2018] predniSONE 40 mg Oral Daily BROOKS Stacy   QUEtiapine 25 mg Oral HS Maciej Syed MD         VTE Pharmacologic Prophylaxis:   Pharmacologic: Enoxaparin (Lovenox)      Current Length of Stay: 2 day(s)    Current Patient Status: Inpatient       Discharge Plan:       Code Status: Level 1 - Full Code           Today, Patient Was Seen By: Chaka Sheriff DO    ** Please Note: Dictation voice to text software may have been used in the creation of this document   **

## 2018-09-25 NOTE — ASSESSMENT & PLAN NOTE
Does feel a little better today  Walked out in hallway but became short winded, but that is farther than yesterday  Appreciate pulmonary help  Waiting on PFTs on from [facility]

## 2018-09-25 NOTE — CONSULTS
Consult - Cardiology   Maira Smith Sr  66 y o  male MRN: [id number]  Unit/Bed#: E4 -01 Encounter: [id number]        Reason For Consult:  Dyspnea               Assessment and Plan:     1  Chronic dyspnea  with some acute exacerbation:   -Symptoms present for 1-2 years  Specific etiology not well identified though COPD with possible exacerbation seems probable  Other pulmonary etiologies are also possible and we will await consultation of pulmonologist    -Somewhat subjectively improved with initiation of IV steroids   -Patient does not seem to have a cardiac etiology for his symptoms with no radiographic, exam, or laboratory testing to suggest CHF  Similarly, he has had recent cardiac catheterization and echocardiogram showing trivial single-vessel CAD, normal LVEF and no significant structural heart disease nor echocardiographic signs of pulmonary hypertension, RV strain  He does not have sign or symptom to endorse a dysrhythmia with ongoing symptoms in the setting of a normal sinus rhythm seemingly excluding this as well  -The patient had a repeat echocardiogram today which will be reviewed though this is unlikely to show any interval change since June    -No additional cardiac testing planned at this point  2  Ongoing tobacco abuse  3  Mild nonobstructive single-vessel CAD:  Catheterization 06/2000 18-20% mid RCA lesion   -Continue modulation of CAD risk factors - hypertension and tobacco abuse   -current lipids quite favorable with HDL 68, LDL 46, triglycerides 50 and total cholesterol 124 - no Rx needed  4  Hypertension:  Controlled  Continue amlodipine      History Of Present Illness: This gentleman is a 61-year-old who indicates the [address] at [facility] is where he receives PCP care    His medical history is notable for diabetes mellitus, bipolar disorder, PTSD, ADHD, prior stroke (details unclear), possible seizure disorder, hypertension, tobacco abuse and probable COPD   In June of this year the patient was hospitalized here at [facility] and seen in consultation by Dr Helena Heredia of our group  He had then reported a 2 year history of atypical chest discomfort as well as recurrent dyspnea present for an equal amount of time  On the day of admission he describes some chest pain which was stronger than usual and perhaps with some more typical features  He had had a troponin which peaked at 0 19  Catheterization was performed showing mild nonocclusive single-vessel CAD (20% mid RCA lesion)  Echocardiogram during that same hospitalization showed normal LVEF with no regional wall motion abnormalities and only mild TR and trace MR  Mr Santiago Anderson is currently hospitalized having come to the emergency department yesterday  He indicates that he continues to have continued symptoms of dyspnea  He states his symptoms occur on a daily basis with some variability regarding their recurrence with rest and/or effort  On the day of admission it seems he was particularly bothered so he walked to the hospital seeking care  In doing so his dyspnea worsened  Since arrival he reported or admitted to symptoms of atypical chest pain which again occur without clear correlation to activity often lasting hours at a time with the patient admitting to varying character including sharp, pinching, burning, or ache  Troponin 0 09 with proBNP level normal at 80  Chest x-ray is rather unremarkable  Room air oxygen saturation on arrival was 99%  Concern for a possible cardiology component to his dyspnea prompts our consultation request   This patient believes he has had previous pulmonary function testing with unknown results  It does not seem that he has had a dedicated CT evaluation of his lungs  He believes that he has not previously seen a pulmonologist   His home regimen does include inhaled beta agonist and steroid as well as albuterol             Past Medical History:        Past Medical History:   Diagnosis Date    Bipolar 1 disorder ([facility] )     [facility] disease) ([facility])     Diabetes mellitus [facility] )     Hypertension     Latent syphilis     Treated    PTSD (post-traumatic stress disorder)     Past Surgical History:   Procedure Laterality Date    HEMORROIDECTOMY      KNEE SURGERY          Allergy:        Allergies   Allergen Reactions    Lisinopril Other (See Comments)       Medications:       Prior to Admission medications    Medication Sig Start Date End Date Taking?  Authorizing Provider   albuterol (5 mg/mL) 0 5 % nebulizer solution 3 (three) times a day 2/15/18  Yes Historical Provider, MD   albuterol (VENTOLIN HFA) 90 mcg/act inhaler Inhale 2 puffs every 4 (four) hours as needed for wheezing 7/27/18  Yes Cait Alba MD   amLODIPine (NORVASC) 10 mg tablet Take 1 tablet (10 mg total) by mouth daily 7/27/18  Yes Cait Alba MD   budesonide-formoterol [facility]) 160-4 5 mcg/act inhaler Every 12 hours 3/19/18  Yes Historical Provider, MD   FLUoxetine (PROzac) 10 MG tablet Take 10 mg by mouth daily   Yes Historical Provider, MD   loratadine (CLARITIN) 10 mg tablet Take 10 mg by mouth   Yes Historical Provider, MD   metFORMIN (GLUCOPHAGE) 500 mg tablet Take 1 tablet (500 mg total) by mouth 2 (two) times a day with meals 7/27/18  Yes Cait Alba MD   methocarbamol (ROBAXIN) 500 mg tablet Take 1 tablet (500 mg total) by mouth every 6 (six) hours as needed for muscle spasms 6/7/18  Yes Mary Ann Wilson PA-C   pantoprazole (PROTONIX) 40 mg tablet Take 1 tablet (40 mg total) by mouth daily in the early morning 7/27/18  Yes Cait Alba MD   QUEtiapine (SEROquel) 25 mg tablet Take 25 mg by mouth daily at bedtime   Yes Historical Provider, MD   Selenium Sulfide 2 25 % SHAM apply by topical route  every week to the affected area(s) 6/20/18  Yes Historical Provider, MD       Family History:     Family History   Problem Relation Age of Onset    Heart disease Mother     Hypertension Father     Diabetes Family     Asthma Family     Heart disease Family     Cancer Family         Social History:       Social History     Social History    Marital status: Legally      Spouse name: N/A    Number of children: N/A    Years of education: N/A     Social History Main Topics    Smoking status: Current Every Day Smoker     Packs/day: 0 50     Types: Cigarettes    Smokeless tobacco: Current User      Comment: current some day smoker as per [employer]    Alcohol use Yes      Comment: social    Drug use: Yes     Types: Marijuana    Sexual activity: Yes     Other Topics Concern    None     Social History Narrative    None       ROS:  Symptoms per HPI  Remainder review of systems is negative    Exam:  General:  Alert, normally conversant, comfortable appearing  Head: Normocephalic, atraumatic  Eyes:  EOMI  Pupils - equal, round, reactive to accomodation  No icterus  Normal Conjunctiva  Oropharynx:  Moist without lesion  Neck:  Bilateral bruit, no thyromegaly, or lymphadenopathy  Heart:  Regular with controlled rate  No rub nor pathologic murmur  Lungs: Moderate excursion air exchange with diffuse end-expiratory wheezes  No rales  Abdomen:  Soft and nontender with normal bowel sounds  No organomegaly or mass  Lower Limbs:  No edema  Pulses[de-identified]  RLE - DP:  2+                 LLE - DP:  2+  Musculoskeletal: Independent movement of limbs observed, Formal ROM and strength eval not performed  Neurologic:    Oriented to: person , place, situation  Cranial Nerves: grossly intact - vision, smell, taste, and hearing  were not tested       Motor function:grossly normal, symmetric   Sensation: Was not tested

## 2018-09-25 NOTE — CONSULTS
Pulmonary Consultation   Janett Bhat   66 y o  male MRN: [id number]  Unit/Bed#: E4 -01 Encounter: [id number]      Reason for consultation: COPD exacerbation    Requesting physician: Dr Joan Marie    Impressions/Recommendations:    1  Suspected COPD of unknown severity with acute exacerbation  1  Discontinue Solumedrol-transition to Prednisone taper tomorrow: 40mg daily with reduction by 10 mg every 3 days  2  Xopenex/atrovent TID  3  Breo daily  4  Pulmonary toilet: increase ambulation and time OOB as tolerated  5  Per Roderick Prader PFTs completed at [facility], will attempt to obtain data, if not would recommend repeat PFTs  6  Outpatient pulmonary follow up  7  Home regimen at discharge: Breo-as he reports minimal improvement with Advair or symbicort, albuterol nebulizer PRN and albuterol MDI  2  Dyspnea  1  Likely secondary to suspected severe COPD and continued tobacco exposure  2  Echocardiogram ordered  3  Recent cardiac workup 6/2018 negative for cardiac cause of dyspnea or chest pain  3  Tobacco/marijuana abuse  1  Cessation encouraged  4  Atypical Chest pain  1  EKG NSR  2  Echocardiogram ordered  3  Cardiac catheterization  6/2018 normal      *outpatient pulmonary follow up per discharge    History of Present Illness   HPI:  Sandra Medel  is a 66 y o  male who was seen in consult for COPD exacerbation  He has a PMH significant for: suspected COPD, tobacco abuse with estimated 68 pack year smoking history, dyspnea, DM, HTN, and bipolar disorder  Room reports increasing shortness of breath over the last 4-5 years  He feels that his shortness of breath has increased in severity over the last 6 months  He was hospitalized most recently in June of 2018 with increasing shortness of breath that was thought to be contribute to cardiac causes he had an elevation troponins  Cardiac catheterization was negative-Negative for NSTEMI      Prior to his current admission he reports significant shortness of breath at rest and most noted with exertion  He reports he was walking few blocks from his son's home toward the hospital which time he had to rest every 5 feet  He also reports chronic atypical chest discomfort that started on his left side is reproducible with palpation and deep inspiration at times transfer to the right side  This chest pain is similar to the chronic chest pain he has been evaluated for in the past   At baseline he has a daily cough, but reports intermittent sputum production that time is white to  yellow and thick and difficult to expectorate-he denies change in cough or sputum production with this admission  He also reports unintentional weight loss and night sweats although he reports weight loss is typical for him the summer and he notices weight gain in the winter     He did not experience:  Fevers, chills,nausea, vomiting, diarrhea, headache, dizziness, bronchospasm or hemoptysis  Chest x-ray in the ED was notable for hyperinflated lungs, no acute cardiopulmonary process  He was started on IV Solu-Medrol and received bronchodilators and was admitted for further evaluation  From a pulmonary standpoint he does not follow patient with pulmonologist   He reports having pulmonary function tests in the past at [facility] within the last 6 months was diagnosed with  D  He maintains on a regimen of Symbicort b i d , albuterol nebulizer every other day PRN, and albuterol nebulizer 5 times a day  He he reports minimal improvement with this medication regimen, and all improvement short lived  He did not increase use of medications prior to admission  He denies significant exposures, sick contacts or recent travel  He does not have pets/birds exposure  He denies symptoms of dysphagia, but reports intermittent symptoms of GERD, treated with Protonix  He is a current half a pack per day smoker and reports intermittent marijuana use    He does have a estimated 68 pack year smoking history  She denies symptoms of obstructive sleep apnea                                                                                       Review of systems:  12 point review of systems was completed and was otherwise negative except as listed in HPI  Historical Information   Past Medical History:   Diagnosis Date    [facility] disorder ([facility] )     COPD [facility]chronic obstructive pulmonary disease) ([facility] )     Diabetes mellitus ([facility] )     Hypertension     Latent syphilis     Treated    PTSD (post-traumatic stress disorder)      Past Surgical History:   Procedure Laterality Date    HEMORROIDECTOMY      KNEE SURGERY       Family History   Problem Relation Age of Onset    Heart disease Mother     Hypertension Father     Diabetes Family     Asthma Family     Heart disease Family     Cancer Family        Occupational history: disabled, 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    Tobacco history: current 1/2 pack per day smoker    66 5pack year smoking history    Meds/Allergies   Current Facility-Administered Medications   Medication Dose Route Frequency    acetaminophen (TYLENOL) tablet 650 mg  650 mg Oral Q4H PRN    albuterol (PROVENTIL HFA,VENTOLIN HFA) inhaler 2 puff  2 puff Inhalation Q4H PRN    aluminum-magnesium hydroxide-simethicone (MYLANTA) 200-200-20 mg/5 mL oral suspension 30 mL  30 mL Oral Q6H PRN    amLODIPine (NORVASC) tablet 10 mg  10 mg Oral Daily    enoxaparin (LOVENOX) subcutaneous injection 40 mg  40 mg Subcutaneous Daily    FLUoxetine (PROzac) capsule 10 mg  10 mg Oral Daily    fluticasone-vilanterol (BREO ELLIPTA) 100-25 mcg/inh inhaler 1 puff  1 puff Inhalation Daily    ipratropium (ATROVENT) 0 02 % inhalation solution 0 5 mg  0 5 mg Nebulization TID    levalbuterol (XOPENEX) inhalation solution 1 25 mg  1 25 mg Nebulization Q8H PRN    levalbuterol (XOPENEX) inhalation solution 1 25 mg  1 25 mg Nebulization TID    loratadine (CLARITIN) tablet 10 mg  10 mg Oral Daily    metFORMIN (GLUCOPHAGE) tablet 500 mg  500 mg Oral BID With Meals    methocarbamol (ROBAXIN) tablet 500 mg  500 mg Oral Q6H PRN    nicotine (NICODERM CQ) 14 mg/24hr TD 24 hr patch 1 patch  1 patch Transdermal Daily    ondansetron (ZOFRAN) injection 4 mg  4 mg Intravenous Q4H PRN    pantoprazole (PROTONIX) EC tablet 40 mg  40 mg Oral Early Morning    [START ON 9/26/2018] predniSONE tablet 40 mg  40 mg Oral Daily    QUEtiapine (SEROquel) tablet 25 mg  25 mg Oral HS     Prescriptions Prior to Admission   Medication    albuterol (5 mg/mL) 0 5 % nebulizer solution    albuterol (VENTOLIN HFA) 90 mcg/act inhaler    amLODIPine (NORVASC) 10 mg tablet    budesonide-formoterol (SYMBICORT) 160-4 5 mcg/act inhaler    FLUoxetine (PROzac) 10 MG tablet    loratadine (CLARITIN) 10 mg tablet    metFORMIN (GLUCOPHAGE) 500 mg tablet    methocarbamol (ROBAXIN) 500 mg tablet    pantoprazole (PROTONIX) 40 mg tablet    QUEtiapine (SEROquel) 25 mg tablet    Selenium Sulfide 2 25 % SHAM     Allergies   Allergen Reactions    Lisinopril Other (See Comments)       Vitals: Blood pressure 150/83, pulse 68, temperature 99 6 °F (37 6 °C), temperature source Temporal, resp  rate 18, height 5' 8 5" (1 74 m), weight 62 2 kg (137 lb 2 oz), SpO2 98 % , RA, Body mass index is 20 55 kg/m²        Intake/Output Summary (Last 24 hours) at 09/25/18 1038  Last data filed at 09/25/18 0900   Gross per 24 hour   Intake              330 ml   Output                0 ml   Net              330 ml       Physical exam:    General Appearance:    Alert, cooperative, no conversational dyspnea or accessory     muscle use       Head/eyes:    Normocephalic, without obvious abnormality, atraumatic,         PERRL, extraocular muscles intact, no scleral icterus    Nose:   Nares normal, septum midline, mucosa normal, no drainage    or sinus tenderness   Throat:   Moist mucous membranes, no thrush   Neck:   Supple, trachea midline, no adenopathy; no carotid    bruit or JVD   Lungs:     Faint expiratory wheezes noted in the bases bilaterally, no rhonchi or rales   Chest Wall:    No tenderness or deformity    Heart:    Regular rate and rhythm, S1 and S2 normal, no murmur, rub   or gallop   Abdomen:     Soft, non-tender, bowel sounds active all four quadrants,     no masses, no organomegaly   Extremities:   Extremities normal, atraumatic, no cyanosis or edema   Skin:   Warm, dry, turgor normal, no rashes or lesions   Lymph nodes:   Cervical and supraclavicular nodes normal   Neurologic:    non-focal         Labs: I have personally reviewed pertinent lab results  , CBC:   Lab Results   Component Value Date    WBC 18 94 (H) 09/25/2018    HGB 12 8 09/25/2018    HCT 38 3 09/25/2018    MCV 83 09/25/2018     (H) 09/25/2018    MCH 27 7 09/25/2018    MCHC 33 4 09/25/2018    RDW 15 9 (H) 09/25/2018    MPV 9 1 09/25/2018   , CMP:   Lab Results   Component Value Date     09/25/2018    K 4 3 09/25/2018     09/25/2018    CO2 28 09/25/2018    BUN 16 09/25/2018    CREATININE 0 97 09/25/2018    CALCIUM 8 8 09/25/2018    EGFR 100 09/25/2018       Imaging and other studies: I have personally reviewed pertinent reports  and I have personally reviewed pertinent films in PACS  CXR 9/24/2018  Hyperinflated lungs, no acute consolidation/infiltrate or volume overload  Pulmonary function testing: none on file, [facility]    EKG, Pathology, and Other Studies: I have personally reviewed pertinent reports      EKG 9/24/2018: NSR    Code Status: Level 1 - Full Code      BROOKS Davenport

## 2018-09-25 NOTE — PLAN OF CARE
Problem: PAIN - ADULT  Goal: Verbalizes/displays adequate comfort level or baseline comfort level  Interventions:  - Encourage patient to monitor pain and request assistance  - Assess pain using appropriate pain scale  - Administer analgesics based on type and severity of pain and evaluate response  - Implement non-pharmacological measures as appropriate and evaluate response  - Consider cultural and social influences on pain and pain management  - Notify physician/advanced practitioner if interventions unsuccessful or patient reports new pain   Outcome: Progressing      Problem: INFECTION - ADULT  Goal: Absence or prevention of progression during hospitalization  INTERVENTIONS:  - Assess and monitor for signs and symptoms of infection  - Monitor lab/diagnostic results  - Monitor all insertion sites, i e  indwelling lines, tubes, and drains  - Monitor endotracheal (as able) and nasal secretions for changes in amount and color  - [place] appropriate cooling/warming therapies per order  - Administer medications as ordered  - Instruct and encourage patient and family to use good hand hygiene technique  - Identify and instruct in appropriate isolation precautions for identified infection/condition   Outcome: Progressing    Goal: Absence of fever/infection during neutropenic period  INTERVENTIONS:  - Monitor WBC  - Implement neutropenic guidelines   Outcome: Progressing      Problem: SAFETY ADULT  Goal: Patient will remain free of falls  INTERVENTIONS:  - Assess patient frequently for physical needs  -  Identify cognitive and physical deficits and behaviors that affect risk of falls    -  [place] fall precautions as indicated by assessment   - Educate patient/family on patient safety including physical limitations  - Instruct patient to call for assistance with activity based on assessment  - Modify environment to reduce risk of injury  - Consider OT/PT consult to assist with strengthening/mobility   Outcome: Progressing    Goal: Maintain or return to baseline ADL function  INTERVENTIONS:  -  Assess patient's ability to carry out ADLs; assess patient's baseline for ADL function and identify physical deficits which impact ability to perform ADLs (bathing, care of mouth/teeth, toileting, grooming, dressing, etc )  - Assess/evaluate cause of self-care deficits   - Assess range of motion  - Assess patient's mobility; develop plan if impaired  - Assess patient's need for assistive devices and provide as appropriate  - Encourage maximum independence but intervene and supervise when necessary  ¯ Involve family in performance of ADLs  ¯ Assess for home care needs following discharge   ¯ Request OT consult to assist with ADL evaluation and planning for discharge  ¯ Provide patient education as appropriate   Outcome: Progressing    Goal: Maintain or return mobility status to optimal level  INTERVENTIONS:  - Assess patient's baseline mobility status (ambulation, transfers, stairs, etc )    - Identify cognitive and physical deficits and behaviors that affect mobility  - Identify mobility aids required to assist with transfers and/or ambulation (gait belt, sit-to-stand, lift, walker, cane, etc )  - [place] fall precautions as indicated by assessment  - Record patient progress and toleration of activity level on Mobility SBAR; progress patient to next Phase/Stage  - Instruct patient to call for assistance with activity based on assessment  - Request Rehabilitation consult to assist with strengthening/weightbearing, etc    Outcome: Progressing      Problem: DISCHARGE PLANNING  Goal: Discharge to home or other facility with appropriate resources  INTERVENTIONS:  - Identify barriers to discharge w/patient and caregiver  - Arrange for needed discharge resources and transportation as appropriate  - Identify discharge learning needs (meds, wound care, etc )  - Arrange for interpretive services to assist at discharge as needed  - Refer to Case Management Department for coordinating discharge planning if the patient needs post-hospital services based on physician/advanced practitioner order or complex needs related to functional status, cognitive ability, or social support system   Outcome: Progressing      Problem: Knowledge Deficit  Goal: Patient/family/caregiver demonstrates understanding of disease process, treatment plan, medications, and discharge instructions  Complete learning assessment and assess knowledge base    Interventions:  - Provide teaching at level of understanding  - Provide teaching via preferred learning methods   Outcome: Progressing      Problem: RESPIRATORY - ADULT  Goal: Achieves optimal ventilation and oxygenation  INTERVENTIONS:  - Assess for changes in respiratory status  - Assess for changes in mentation and behavior  - Position to facilitate oxygenation and minimize respiratory effort  - Oxygen administration by appropriate delivery method based on oxygen saturation (per order) or ABGs  - Initiate smoking cessation education as indicated  - Encourage broncho-pulmonary hygiene including cough, deep breathe, Incentive Spirometry  - Assess the need for suctioning and aspirate as needed  - Assess and instruct to report SOB or any respiratory difficulty  - Respiratory Therapy support as indicated   Outcome: Progressing      Comments: Continuing to education pt on the dangers of smoking and importance of treatment compliance

## 2018-09-26 ENCOUNTER — APPOINTMENT (INPATIENT)
Dept: CT IMAGING | Facility: HOSPITAL | Age: 58
DRG: 140 | End: 2018-09-26
Payer: COMMERCIAL

## 2018-09-26 VITALS
DIASTOLIC BLOOD PRESSURE: 78 MMHG | RESPIRATION RATE: 16 BRPM | HEART RATE: 83 BPM | HEIGHT: 69 IN | WEIGHT: 137.13 LBS | TEMPERATURE: 98.7 F | SYSTOLIC BLOOD PRESSURE: 153 MMHG | OXYGEN SATURATION: 99 % | BODY MASS INDEX: 20.31 KG/M2

## 2018-09-26 PROCEDURE — 97166 OT EVAL MOD COMPLEX 45 MIN: CPT

## 2018-09-26 PROCEDURE — 94760 N-INVAS EAR/PLS OXIMETRY 1: CPT

## 2018-09-26 PROCEDURE — G8978 MOBILITY CURRENT STATUS: HCPCS

## 2018-09-26 PROCEDURE — G8979 MOBILITY GOAL STATUS: HCPCS

## 2018-09-26 PROCEDURE — 97163 PT EVAL HIGH COMPLEX 45 MIN: CPT

## 2018-09-26 PROCEDURE — 99239 HOSP IP/OBS DSCHRG MGMT >30: CPT | Performed by: HOSPITALIST

## 2018-09-26 PROCEDURE — G8988 SELF CARE GOAL STATUS: HCPCS

## 2018-09-26 PROCEDURE — G8989 SELF CARE D/C STATUS: HCPCS

## 2018-09-26 PROCEDURE — G8987 SELF CARE CURRENT STATUS: HCPCS

## 2018-09-26 PROCEDURE — 99232 SBSQ HOSP IP/OBS MODERATE 35: CPT | Performed by: INTERNAL MEDICINE

## 2018-09-26 PROCEDURE — 94640 AIRWAY INHALATION TREATMENT: CPT

## 2018-09-26 RX ORDER — LEVALBUTEROL 1.25 MG/.5ML
1.25 SOLUTION, CONCENTRATE RESPIRATORY (INHALATION)
Qty: 1 EACH | Refills: 0 | Status: SHIPPED | OUTPATIENT
Start: 2018-09-26 | End: 2018-11-12 | Stop reason: SDUPTHER

## 2018-09-26 RX ORDER — PREDNISONE 10 MG/1
TABLET ORAL
Qty: 42 TABLET | Refills: 0 | Status: SHIPPED | OUTPATIENT
Start: 2018-09-26 | End: 2018-10-10

## 2018-09-26 RX ORDER — NICOTINE 21 MG/24HR
1 PATCH, TRANSDERMAL 24 HOURS TRANSDERMAL DAILY
Qty: 28 PATCH | Refills: 0 | Status: SHIPPED | OUTPATIENT
Start: 2018-09-27 | End: 2018-11-13 | Stop reason: ALTCHOICE

## 2018-09-26 RX ORDER — LEVALBUTEROL 1.25 MG/.5ML
1.25 SOLUTION, CONCENTRATE RESPIRATORY (INHALATION) EVERY 8 HOURS PRN
Qty: 1 EACH | Refills: 0 | Status: SHIPPED | OUTPATIENT
Start: 2018-09-26 | End: 2019-06-19 | Stop reason: SDUPTHER

## 2018-09-26 RX ORDER — FLUTICASONE FUROATE AND VILANTEROL 100; 25 UG/1; UG/1
1 POWDER RESPIRATORY (INHALATION)
Qty: 60 EACH | Refills: 1 | Status: SHIPPED | OUTPATIENT
Start: 2018-09-27 | End: 2018-09-26 | Stop reason: HOSPADM

## 2018-09-26 RX ADMIN — AMLODIPINE BESYLATE 10 MG: 10 TABLET ORAL at 08:13

## 2018-09-26 RX ADMIN — METFORMIN HYDROCHLORIDE 500 MG: 500 TABLET, FILM COATED ORAL at 08:12

## 2018-09-26 RX ADMIN — LORATADINE 10 MG: 10 TABLET ORAL at 08:13

## 2018-09-26 RX ADMIN — LEVALBUTEROL HYDROCHLORIDE 1.25 MG: 1.25 SOLUTION, CONCENTRATE RESPIRATORY (INHALATION) at 13:43

## 2018-09-26 RX ADMIN — PREDNISONE 40 MG: 20 TABLET ORAL at 08:13

## 2018-09-26 RX ADMIN — NICOTINE 1 PATCH: 14 PATCH, EXTENDED RELEASE TRANSDERMAL at 08:13

## 2018-09-26 RX ADMIN — FLUTICASONE FUROATE AND VILANTEROL TRIFENATATE 1 PUFF: 100; 25 POWDER RESPIRATORY (INHALATION) at 08:12

## 2018-09-26 RX ADMIN — IPRATROPIUM BROMIDE 0.5 MG: 0.5 SOLUTION RESPIRATORY (INHALATION) at 13:43

## 2018-09-26 RX ADMIN — LEVALBUTEROL HYDROCHLORIDE 1.25 MG: 1.25 SOLUTION, CONCENTRATE RESPIRATORY (INHALATION) at 07:52

## 2018-09-26 RX ADMIN — PANTOPRAZOLE SODIUM 40 MG: 40 TABLET, DELAYED RELEASE ORAL at 06:11

## 2018-09-26 RX ADMIN — FLUOXETINE 10 MG: 10 CAPSULE ORAL at 08:13

## 2018-09-26 RX ADMIN — IPRATROPIUM BROMIDE 0.5 MG: 0.5 SOLUTION RESPIRATORY (INHALATION) at 07:52

## 2018-09-26 NOTE — ASSESSMENT & PLAN NOTE
Doing much better with steroids and nebulizers  Will send home with pulmonary recommendations  Will have the patient follow up with pulmonary as outpt   counselled patient that he needs to absolutely quit smoking

## 2018-09-26 NOTE — PLAN OF CARE

## 2018-09-26 NOTE — PHYSICAL THERAPY NOTE
PT EVALUATION    66 y o     [id number]    Chest pain [R07 9]  Elevated troponin [R74 8]  COPD with acute exacerbation (HCC) [J44 1]    Past Medical History:   Diagnosis Date    Bipolar 1 disorder ([facility] )     COPD (chronic obstructive pulmonary disease) ([facility] )     Diabetes mellitus ([place] [address] )     Hypertension     Latent syphilis     Treated    PTSD (post-traumatic stress disorder)          Past Surgical History:   Procedure Laterality Date    HEMORROIDECTOMY      KNEE SURGERY        09/26/18 1129   Note Type   Note type Eval only   Pain Assessment   Pain Assessment No/denies pain   Home Living   Type of [address] One level  (5 MARCIO with rail )   Bathroom Shower/Tub Walk-in shower   Bathroom Toilet Standard   Bathroom Accessibility Accessible   Home Equipment (had cane but lost it in the move )   Additional Comments Lives with family, was using cane at all times, but lost it in his recent move  Prior Function   Level of [place] Independent with ADLs and functional mobility   Lives With Family;Significant other  (never home alone)   Receives Help From Family   ADL Assistance Independent   IADLs Independent   Falls in the last 6 months 0   Comments I PTA  Notes used cane at all times, but has been lost   Care recently broke down and having to walk more  Notes just prior to admisison ambulating 4 blocks took him one hour  Restrictions/Precautions   Weight Bearing Precautions Per Order No   Other Precautions Fall Risk   General   Additional Pertinent History Pt is 65 y/o male admitted with COPD exacerbation  PT consulted     Family/Caregiver Present No   Cognition   Overall Cognitive Status WFL   RUE Assessment   RUE Assessment WFL  (decreased shoulder ROM but WFL)   RUE Strength   RUE Overall Strength Within Functional Limits - strength 5/5   LUE Assessment   LUE Assessment WFL   RLE Assessment   RLE Assessment X  (grossly 4/5, ankle 3+/5  DF with mild inversion with DF )   LLE Assessment LLE Assessment X   Strength LLE   L Hip Flexion 3-/5   L Knee Flexion 4-/5   L Knee Extension 4/5   L Ankle Plantar Flexion 4-/5   L Ankle Dorsiflexion 4-/5   Light Touch   RLE Light Touch Grossly intact   LLE Light Touch Grossly intact   Bed Mobility   Supine to Sit 7  Independent   Transfers   Sit to Stand 5  Supervision   Additional items Assist x 1   Stand to Sit 5  Supervision   Additional items Assist x 1   Ambulation/Elevation   Gait pattern Improper Weight shift;Decreased foot clearance; Inconsistent mc  (toed out gait LLE, decreased R foot clearance )   Gait Assistance 5  Supervision   Additional items Verbal cues   Assistive Device [facility]  (issued and fit for SPC  Pt prefers cane to be higher )   Distance Amb with 'x1, 100'x1, standing rest 2* CLINE  Sats 97% on RA despite  Balance   Static Sitting Normal   Dynamic Sitting Good   Static Standing Fair +   Dynamic Standing Fair   Ambulatory Fair   Endurance Deficit   Endurance Deficit Yes   Endurance Deficit Description fatigue, CLINE  Sats on RA 97% despite  Activity Tolerance   Activity Tolerance Patient tolerated treatment well;Patient limited by fatigue  (CLINE)   Medical Staff Made Aware yes   Nurse Made Aware per Jerry, appropriate for PT to see  Assessment   Prognosis Good   Problem List Decreased strength;Decreased range of motion;Decreased endurance; Impaired balance;Decreased mobility   Assessment Pt is 65 y/o male admitted on 9/23 with COPD exacerbation  PT consulted  Baseline independent with use of cane   however reports losing cane in his recent move  Notes fatigue and increased SOB requiring frequent rests with ambulation 2* CLINE  Presents with impairments in LLE proximal strength, R ankle ROM and DF strength limitations  Gait with deviations of increased LLE ER, decreased R foot clearance  Issued and sized cane, however pt requesting to have cane elevated above recommended height    S for transfers and ambulation with use of cane  No overt LOB but deviations noted  O2 sats on RA remain stable but fatigue and standing rests noted  May benefit from IPPT in order to optimize LE strength, minimize gait deviations and optimize activity tolerance for improved quality of life and ability to negotiate community distances  Barriers to Discharge Inaccessible home environment  (5 MARCOI)   Goals   Patient Goals go home   STG Expiration Date 10/03/18   Short Term Goal #1 7 days: 1) Amb with least restrictive AD > 500'x1 with mod I in order to demonstrate ability to negotiate in home environment and in community  2)  Improve overall strength and balance 1/2 grade in order to optimize ability to perform functional tasks and reduce fall risk  3) Increase activity tolerance to 45 minutes in order to improve endurance to functional tasks  4)  Negotiate stairs using most appropriate technique and Ty in order to be able to negotiate safely in home environment  5) PT for ongoing patient and family/caregiver education, DME needs and d/c planning in order to promote highest level of function in least restrictive environment  6) I with HEP for LE strengthening  Treatment Day 0   Plan   Treatment/Interventions Functional transfer training;LE strengthening/ROM; Elevations; Therapeutic exercise; Endurance training;Patient/family training;Equipment eval/education; Bed mobility;Gait training; Compensatory technique education;Continued evaluation;Spoke to nursing;OT   PT Frequency 2-3x/wk   Recommendation   Recommendation Home with family support; Outpatient PT   Equipment Recommended (issued SPC)   PT - OK to Discharge Yes   Modified Diana Scale   Modified [place] Scale 2   Barthel Index   Feeding 10   Bathing 5   Grooming Score 5   Dressing Score 10   Bladder Score 10   Bowels Score 10   Toilet Use Score 10   Transfers (Bed/Chair) Score 10   Mobility (Level Surface) Score 10   Stairs Score 0   Barthel Index Score 80   History: co - morbidities, fall risk, use of assistive device, MARCIO  Exam: impairments in locomotion, musculoskeletal, balance,posture,pulmonary, barthel 80  Clinical: unstable/unpredictable  Complexity:high    Ramona Hadley, PT

## 2018-09-26 NOTE — OCCUPATIONAL THERAPY NOTE
[address]  Evaluation     Patient Name: José Luis Valerio  Today's Date: 9/26/2018  Problem List  Patient Active Problem List   Diagnosis    [facility] exacerb[facility]D) ([facility] )    HTN (hypertension)    Tobacco dependence syndrome    Chronic bilateral thoracic back pain    Chronic right shoulder pain    Bilateral carpal tunnel syndrome    Bipolar 1 disorder ([place] [address] )    Gastroesophageal reflux disease without esophagitis    Other specified anxiety disorders    Cubital tunnel syndrome, bilateral    Hypertension    Diabetes mellitus ([place] [address] )     Past Medical History  Past Medical History:   Diagnosis Date    Bipolar 1 disorder ([place] [address] )     COPD (chronic obstructive pulmonary disease) ([place] [address] )     Diabetes mellitus ([place] [address] )     Hypertension     Latent syphilis     Treated    PTSD (post-traumatic stress disorder)      Past Surgical History  Past Surgical History:   Procedure Laterality Date    HEMORROIDECTOMY      KNEE SURGERY           09/26/18 1130   Note Type   Note type Eval only   Restrictions/Precautions   Weight Bearing Precautions Per Order No   Other Precautions Fall Risk   Pain Assessment   Pain Assessment No/denies pain   Pain Score No Pain   Home Living   Type of Home Apartment   Home Layout One level;Stairs to enter with rails  (5 MARCIO)   Bathroom Shower/Tub Walk-in shower   Bathroom Toilet Standard   Bathroom Equipment Other (Comment)  (none per pt report)   [address] Other (Comment)  (none per pt report; Recently lost SPC when moving)   Additional Comments Pt lives family (significant other, son, son's significant other, and son's two children) in a one level apt with 5 MARCIO     Prior Function   Level of [place] Independent with ADLs and functional mobility   Lives With Family;Significant other  (significant other, son, son's S O , and son's two children)   Receives Help From Family   ADL Assistance Independent IADLs Independent   Falls in the last 6 months 0   Comments At baseline, pt was I w/ ADLs, IADLs, and functional mobility/transfers w/ use of SPC (however lost SPC, so has been walking w/o AD recently), (+) -however car recently broke down, and reports 0 falls PTA  Lifestyle   Autonomy At baseline, pt was I w/ ADLs, IADLs, and functional mobility/transfers w/ use of SPC (however lost SPC, so has been walking w/o AD recently), (+) -however car recently broke down, and reports 0 falls PTA  Reciprocal Relationships Supportive family   Intrinsic Gratification Watching TV   Psychosocial   Psychosocial (WDL) WDL   ADL   Eating Assistance 7  Independent   Grooming Assistance 7  Independent   UB Bathing Assistance 6  Modified Independent   LB Bathing Assistance 6  Modified Independent   UB Dressing Assistance 6  Modified independent   LB Dressing Assistance 6  Modified independent   Toileting Assistance  6  Modified independent   Bed Mobility   Supine to Sit 7  Independent   Sit to Supine Unable to assess  (Pt seated EOB at end of session)   Additional Comments Pt seated EOB at end of session with call bell and phone within reach   All needs met and pt reports no further questions for OT at this time   Transfers   Sit to Stand 5  Supervision   Stand to Sit 5  Supervision   Functional Mobility   Functional Mobility 5  Supervision   Additional Comments Assist x1   Additional items SPC   Balance   Static Sitting Normal   Dynamic Sitting Good   Static Standing Fair +   Dynamic Standing Fair   Ambulatory Fair   Activity Tolerance   Activity Tolerance Patient tolerated treatment well;Patient limited by fatigue   Nurse Made Aware Pt appropriate to be seen per Nicole Olvera RN   RUE Assessment   RUE Assessment WFL  (decreased shldr ROM, however WFL )   RUE Strength   RUE Overall Strength Within Functional Limits - able to perform ADL tasks with strength  (3/5 throughout)   LUE Assessment   LUE Assessment WFL   LUE Strength LUE Overall Strength Within Functional Limits - able to perform ADL tasks with strength  (4/5 throughout)   Hand Function   Gross Motor Coordination Functional   Fine Motor Coordination Functional   Sensation   Light Touch No apparent deficits   Sharp/Dull No apparent deficits   Proprioception   Proprioception No apparent deficits   Vision-Basic Assessment   Current Vision No visual deficits   Vision - Complex Assessment   Ocular Range of Motion WFL   Acuity Able to read employee name badge without difficulty; Able to read clock/calendar on wall without difficulty   Perception   Inattention/Neglect Appears intact   Cognition   Overall Cognitive Status [facility]   Arousal/Participation Alert; Cooperative   Attention Within functional limits   Orientation Level Oriented X4   Memory Within functional limits   Following Commands Follows all commands and directions without difficulty   Assessment   Prognosis Good   Assessment Pt is a 66 y o  male seen for OT evaluation s/p adm to [facility] on 9/23/2018 w/ SOB, coughing, and wheezing and dx'd with acute exacerbation of COPD  Comorbidities affecting pts functional performance include a significant PMH of Bipolar 1 disorder, COPD, DM, HTN, Latent syphilis, and PTSD  Pt with active OT orders and activity orders for Activity as tolerated  Pt lives family (significant other, son, son's significant other, and son's two children) in a one level apt with 5 MARCIO  At baseline, pt was I w/ ADLs, IADLs, and functional mobility/transfers w/ use of SPC (however lost SPC, so has been walking w/o AD recently), (+) -however car recently broke down, and reports 0 falls PTA  Upon evaluation, pt currently functioning at an overall Mod I level for ADLs and Supervision  for functional mobility/transfers 2* the following deficits impacting occupational performance: weakness, decreased balance and decreased tolerance   These impairments, however do not limit pts ability to safely engage in all baseline areas of occupation, as pt is functioning at baseline level of performance and reports no concerns regarding ADLs or IADLs  Pt scored overall 80/100 on the Barthel Index  Based on the aforementioned OT evaluation, functional performance deficits, and assessments, pt has been identified as a moderate complexity evaluation  No further acute OT needs identified at this time  Recommend continued mobilization with hospital staff while in the hospital to increase pts endurance and strength upon D/C  From OT standpoint, recommend D/C home with family support when medically cleared  D/C pt from OT caseload at this time      Goals   Patient Goals To go home   Plan   OT Frequency Eval only   Recommendation   OT Discharge Recommendation Home with family support   OT - OK to Discharge Yes  (when medically cleared)   Barthel Index   Feeding 10   Bathing 5   Grooming Score 5   Dressing Score 10   Bladder Score 10   Bowels Score 10   Toilet Use Score 10   Transfers (Bed/Chair) Score 10   Mobility (Level Surface) Score 10   Stairs Score 0   Barthel Index Score 80   Modified Diana Scale   Modified Diana Scale 2        Paradise Asher OTR/L

## 2018-09-26 NOTE — PROGRESS NOTES
Progress Note - Pulmonary   Amber Godwin Sr  66 y o  male MRN: [id number]  Unit/Bed#: E4 -01 Encounter: [id number]      Assessment/Plan:    1  Moderate/severe Chronic obstructive pulmonary disease with mild acute exacerbation  1  Prednisone taper 40mg daily with reduction by 10 mg every 3 days: today is day 1 of taper  2  Xopenx/atrovent TID  3  Breo daily  4  Pulmonary toilet: IS, increase ambulation  5  Most recent PFTs completed 1/2018 per interpretation: normal TLC, markedly increased residual volume with air trapping, severe airflow limitation, no improvement with bronchodilators, reduced diffusing capacity  6  Home O2 evaluation prior to discharge  7  Home regimen at discharge: continue Breo-add Anoro Ellipta   2  Dyspnea  1  Echocardiogram demonstrated: EF 65-70% with grade 1 diastolic dysfunction, no signs of volume overload on CXR or clinical exam  3  Tobacco /marijuanna abuse  1  Cessation encouraged  2  NRT  3  Chest CT from [place] 12/2017 demonstrated multiple pulmonary nodules-no repeat CT  images found-recommend outpatient CT chest  4  Atypical chest pain  1  Echocardiogram EF 65-70% with grade 1 diastolic dysfucntion    *pending home O2 evaluation, appropriate for discharge from pulmonary standpoint  *outpatient pulmonary follow up per discharge    Subjective:     Jacques Valdez was seen in bed, he denies acute overnight events  He reports persistent chest discomfort/tightness and dyspnea on exertion  Currently denies:  Fevers, chills, night sweats, nausea, vomiting diarrhea, headache, dizziness, bronchospasm hemoptysis    Objective:         Vitals: Blood pressure 146/90, pulse 59, temperature 98 5 °F (36 9 °C), temperature source Temporal, resp  rate 18, height 5' 8 5" (1 74 m), weight 62 2 kg (137 lb 2 oz), SpO2 96 % , RA, Body mass index is 20 55 kg/m²        Intake/Output Summary (Last 24 hours) at 09/26/18 1300  Last data filed at 09/25/18 1809   Gross per 24 hour   Intake 240 ml   Output                0 ml   Net              240 ml         Physical Exam  Gen: Awake, alert, oriented x 3, no acute distress  HEENT: Mucous membranes moist, no oral lesions, no thrush  NECK: No accessory muscle use, JVP not elevated  Cardiac: Regular, single S1, single S2, no murmurs, no rubs, no gallops  Lungs: lungs clear bilaterally with a few scattered wheezes noted left upper lobe, no rhonchi or rales  Abdomen: normoactive bowel sounds, soft nontender, nondistended, no rebound or rigidity, no guarding  Extremities: no cyanosis, no clubbing, no edema    Labs: I have personally reviewed pertinent lab results  , CBC: No results found for: WBC, HGB, HCT, MCV, PLT, ADJUSTEDWBC, MCH, MCHC, RDW, MPV, NRBC, CMP: No results found for: NA, K, CL, CO2, ANIONGAP, BUN, CREATININE, GLUCOSE, CALCIUM, AST, ALT, ALKPHOS, PROT, ALBUMIN, BILITOT, EGFR  Imaging and other studies: none      Bailey Hurt

## 2018-09-26 NOTE — PLAN OF CARE
DISCHARGE PLANNING     Discharge to home or other facility with appropriate resources Completed        DISCHARGE PLANNING - CARE MANAGEMENT     Discharge to post-acute care or home with appropriate resources Completed        INFECTION - ADULT     Absence or prevention of progression during hospitalization Completed     Absence of fever/infection during neutropenic period Completed        Knowledge Deficit     Patient/family/caregiver demonstrates understanding of disease process, treatment plan, medications, and discharge instructions Completed        PAIN - ADULT     Verbalizes/displays adequate comfort level or baseline comfort level Completed        Potential for Falls     Patient will remain free of falls Completed        RESPIRATORY - ADULT     Achieves optimal ventilation and oxygenation Completed        SAFETY ADULT     Patient will remain free of falls Completed     Maintain or return to baseline ADL function Completed     Maintain or return mobility status to optimal level Completed

## 2018-09-26 NOTE — ASSESSMENT & PLAN NOTE
Patient counseled that he needs to quit smoking  He wants to try nicotine patch which I have provided for him

## 2018-09-26 NOTE — DISCHARGE SUMMARY
Discharge- Lidia Hem Sr  1960, 66 y o  male MRN: [id number]    Unit/Bed#: E4 -01 Encounter: [id number]    Primary Care Provider: Mateusz Mcdonald MD   Date and time admitted to hospital: 9/23/2018  9:06 AM        * Acute exacerbation of chronic obstructive pulmonary disease (COPD) ([address] )   Assessment & Plan    Doing much better with steroids and nebulizers  Will send home with pulmonary recommendations  Will have the patient follow up with pulmonary as outpt   counselled patient that he needs to absolutely quit smoking          Tobacco dependence syndrome   Assessment & Plan    Patient counseled that he needs to quit smoking  He wants to try nicotine patch which I have provided for him              Discharging Physician / Practitioner: Eric Barros DO  PCP: Mateusz Mcdonald MD  Admission Date:   Admission Orders     Ordered        09/23/18 1058  Inpatient Admission (expected length of stay for this patient is greater than two midnights)  Once             Discharge Date: 09/26/18    Resolved Problems  Date Reviewed: 9/26/2018    None            Consultations During Hospital Stay:  · Pulmonary  · cardiology      Procedures Performed:     · echocardiogram      Reason for Admission: shortness of breath      Hospital Course:     Gm Grover  is a 66 y o  male patient who originally presented to the hospital on 9/23/2018 due to shortness of breath and dsypnea on exertion  This has been ongoing for several months  He has been trying to get in to see pulmonary, but has not yet had a visit  He was seen by cardiology, echocardiogram was relatively normal, so they did not believe that this was cardiac  Patient was seen by pulmonary  PFTs from [facility] where reviewed  Pulmonary changed his inhalers to Cherilynn Viktoriya, and Xopenex/Atrovent Nebtreatments as well as a Prednisone taper  We will have the patient follow up with Pulmonary as an outpt      Please see above list of diagnoses and related plan for additional information  Condition at Discharge: good       Discharge Day Visit / Exam:     Subjective:  Doing better  Less dypsnea on exertion  Can ambulate in the hallways without oxygen      Vitals: Blood Pressure: 146/90 (09/26/18 0736)  Pulse: 71 (09/26/18 1346)  Temperature: 98 5 °F (36 9 °C) (09/26/18 0736)  Temp Source: Temporal (09/26/18 0736)  Respirations: 18 (09/26/18 1346)  Height: 5' 8 5" (174 cm) (09/23/18 1213)  Weight - Scale: 62 2 kg (137 lb 2 oz) (09/23/18 1213)  SpO2: 95 % (09/26/18 1346)    Exam:     Physical Exam   HENT:   Head: Normocephalic and atraumatic  Eyes: EOM are normal  Pupils are equal, round, and reactive to light  Cardiovascular: Normal rate and regular rhythm  Exam reveals no gallop and no friction rub  No murmur heard  Pulmonary/Chest: Effort normal and breath sounds normal  He has no wheezes  He has no rales  Abdominal: Soft  There is no tenderness  Musculoskeletal: He exhibits no edema  Nursing note and vitals reviewed            Discharge instructions/Information to patient and family:   See after visit summary for information provided to patient and family  Provisions for Follow-Up Care:  See after visit summary for information related to follow-up care and any pertinent home health orders  Disposition:     Home       Discharge Statement:  I spent 42 minutes discharging the patient  This time was spent on the day of discharge  I had direct contact with the patient on the day of discharge  Greater than 50% of the total time was spent examining patient, answering all patient questions, arranging and discussing plan of care with patient as well as directly providing post-discharge instructions  Additional time then spent on discharge activities  Discharge Medications:  See after visit summary for reconciled discharge medications provided to patient and family        ** Please Note: This note has been constructed using a voice recognition system **

## 2018-09-26 NOTE — SOCIAL WORK
CM met with patient to discuss d/c plan and do general open, introduced self and explained role  Patient lives in [place] in a one level apartment with his family, this includes his SO, son, son's SO and 2 grandchildren  PTA independent with adl's, used a SPC however recently lost it in a move, a new SPC was provided this admission, he still drives but his car recently broke down  Hx of Bipolar disorder noted in EPIC  No hx of VNA or STR  Patient's son should be able to  at d/c  Patient states he's currently, "fighting for SSD" and does not have any income  He does have insurance coverage with [place]  Rx coverage available and uses [address], patient states he would like to use [facility] at time of d/c  PCP is Leatha Evans MD, review of record shows he was seen 2 months ago for pain  Patient encouraged to follow up with PCP after discharge  No POA or AD, does not want information  No needs identified by CM or Patient  Plan: D/C home when medically cleared

## 2018-09-26 NOTE — PLAN OF CARE
Problem: PHYSICAL THERAPY ADULT  Goal: Performs mobility at highest level of function for planned discharge setting  See evaluation for individualized goals  Treatment/Interventions: Functional transfer training, LE strengthening/ROM, Elevations, Therapeutic exercise, Endurance training, Patient/family training, Equipment eval/education, Bed mobility, Gait training, Compensatory technique education, Continued evaluation, Spoke to nursing, OT  Equipment Recommended:  (issued [facility])       See flowsheet documentation for full assessment, interventions and recommendations  Outcome: Progressing  Prognosis: Good  Problem List: Decreased strength, Decreased range of motion, Decreased endurance, Impaired balance, Decreased mobility  Assessment: Pt is 67 y/o male admitted on 9/23 with COPD exacerbation  PT consulted  Baseline independent with use of cane   however reports losing cane in his recent move  Notes fatigue and increased SOB requiring frequent rests with ambulation 2* CLINE  Presents with impairments in LLE proximal strength, R ankle ROM and DF strength limitations  Gait with deviations of increased LLE ER, decreased R foot clearance  Issued and sized cane, however pt requesting to have cane elevated above recommended height  S for transfers and ambulation with use of cane  No overt LOB but deviations noted  O2 sats on RA remain stable but fatigue and standing rests noted  May benefit from IPPT in order to optimize LE strength, minimize gait deviations and optimize activity tolerance for improved quality of life and ability to negotiate community distances  Barriers to Discharge: Inaccessible home environment ([address])     Recommendation: Home with family support, Outpatient PT     PT - OK to Discharge: Yes    See flowsheet documentation for full assessment

## 2018-09-27 ENCOUNTER — TELEPHONE (OUTPATIENT)
Dept: PULMONOLOGY | Facility: CLINIC | Age: 58
End: 2018-09-27

## 2018-09-27 DIAGNOSIS — R93.89 ABNORMAL CHEST CT: Primary | ICD-10-CM

## 2018-09-27 DIAGNOSIS — J44.9 CHRONIC OBSTRUCTIVE PULMONARY DISEASE, UNSPECIFIED COPD TYPE (HCC): Primary | ICD-10-CM

## 2018-09-27 NOTE — TELEPHONE ENCOUNTER
Pharmacy called saying Jose Juan Lee can not be prescribed because patient is on Breo  They said he can have Spiriva

## 2018-09-27 NOTE — TELEPHONE ENCOUNTER
Patient was seen in the hospital and has a follow up with Travis Barber and Yg are not covered by insurance  Stiolto is

## 2018-09-27 NOTE — TELEPHONE ENCOUNTER
Wife calling saying Jessica Gonzalez was discharged from the hospital and prescribed Anoro which was not covered by patients insurance  So the doctor ordered [place] [employer] which is also nor covered  Please see if Stiolto is covered and if not complete a prior auth  Patient scheduled to see Lexis Monroe on 11/14

## 2018-09-28 ENCOUNTER — PATIENT OUTREACH (OUTPATIENT)
Dept: PULMONOLOGY | Age: 58
End: 2018-09-28

## 2018-10-03 ENCOUNTER — HOSPITAL ENCOUNTER (OUTPATIENT)
Dept: CT IMAGING | Facility: HOSPITAL | Age: 58
Discharge: HOME | End: 2018-10-03
Payer: COMMERCIAL

## 2018-10-03 DIAGNOSIS — R93.89 ABNORMAL CHEST CT: ICD-10-CM

## 2018-10-03 PROCEDURE — 71250 CT THORAX DX C-: CPT

## 2018-10-08 ENCOUNTER — TELEPHONE (OUTPATIENT)
Dept: OTHER | Facility: HOSPITAL | Age: 58
End: 2018-10-08

## 2018-10-08 DIAGNOSIS — R93.89 ABNORMAL CHEST CT: Primary | ICD-10-CM

## 2018-10-08 NOTE — TELEPHONE ENCOUNTER
I called Elvira Babb and provided abnormal chest Ct results  I have instructed him that the next step will be to obtain PET scan  I have instructed him that the office will call and help and set up scan    He verbalized understanding of plan and denied further questions

## 2018-10-08 NOTE — TELEPHONE ENCOUNTER
Called patient to provide CT results    Currently not home, left message with significant other for Amish Douglas to call the office when he is available to go over testing

## 2018-10-10 ENCOUNTER — OFFICE VISIT (OUTPATIENT)
Dept: FAMILY MEDICINE CLINIC | Facility: CLINIC | Age: 58
End: 2018-10-10
Payer: COMMERCIAL

## 2018-10-10 VITALS
DIASTOLIC BLOOD PRESSURE: 60 MMHG | TEMPERATURE: 97.6 F | SYSTOLIC BLOOD PRESSURE: 130 MMHG | OXYGEN SATURATION: 98 % | WEIGHT: 147 LBS | HEART RATE: 85 BPM | RESPIRATION RATE: 18 BRPM | BODY MASS INDEX: 22.03 KG/M2

## 2018-10-10 DIAGNOSIS — E11.9 TYPE 2 DIABETES MELLITUS WITHOUT COMPLICATION, WITHOUT LONG-TERM CURRENT USE OF INSULIN (HCC): Primary | ICD-10-CM

## 2018-10-10 DIAGNOSIS — K21.9 GASTROESOPHAGEAL REFLUX DISEASE WITHOUT ESOPHAGITIS: ICD-10-CM

## 2018-10-10 DIAGNOSIS — I10 ESSENTIAL HYPERTENSION: ICD-10-CM

## 2018-10-10 DIAGNOSIS — K59.00 CONSTIPATION, UNSPECIFIED CONSTIPATION TYPE: ICD-10-CM

## 2018-10-10 PROCEDURE — 3075F SYST BP GE 130 - 139MM HG: CPT | Performed by: FAMILY MEDICINE

## 2018-10-10 PROCEDURE — 99213 OFFICE O/P EST LOW 20 MIN: CPT | Performed by: FAMILY MEDICINE

## 2018-10-10 PROCEDURE — 3078F DIAST BP <80 MM HG: CPT | Performed by: FAMILY MEDICINE

## 2018-10-10 PROCEDURE — 1111F DSCHRG MED/CURRENT MED MERGE: CPT | Performed by: FAMILY MEDICINE

## 2018-10-10 RX ORDER — POLYETHYLENE GLYCOL 3350 17 G/17G
17 POWDER, FOR SOLUTION ORAL DAILY
Qty: 14 EACH | Refills: 0 | Status: SHIPPED | OUTPATIENT
Start: 2018-10-10 | End: 2019-01-07 | Stop reason: CLARIF

## 2018-10-10 RX ORDER — RANITIDINE 150 MG/1
150 CAPSULE ORAL 2 TIMES DAILY
Qty: 60 CAPSULE | Refills: 0 | Status: SHIPPED | OUTPATIENT
Start: 2018-10-10 | End: 2018-12-18 | Stop reason: SDUPTHER

## 2018-10-10 RX ORDER — METFORMIN HYDROCHLORIDE 500 MG/1
500 TABLET, EXTENDED RELEASE ORAL 2 TIMES DAILY WITH MEALS
Qty: 60 TABLET | Refills: 3 | Status: SHIPPED | OUTPATIENT
Start: 2018-10-10 | End: 2018-12-04 | Stop reason: SDUPTHER

## 2018-10-10 NOTE — ASSESSMENT & PLAN NOTE
Stop Metformin and start him on Metformin XR   He is advised to take this medicine with food and call us with questions

## 2018-10-10 NOTE — PROGRESS NOTES
Assessment/Plan:    Gastroesophageal reflux disease without esophagitis  Discussed harmful affects of using PPI on long term basis  Plan to start him on Zantac BID    Diabetes mellitus ([address] )  Stop Metformin and start him on Metformin XR   He is advised to take this medicine with food and call us with questions      HTN (hypertension)  BP is well controlled on Norvasc 10mg   He is advised to continue to take his meds as prescribed         Diagnoses and all orders for this visit:    Type 2 diabetes mellitus without complication, without long-term current use of insulin (HCC)  -     metFORMIN (GLUCOPHAGE-XR) 500 mg 24 hr tablet; Take 1 tablet (500 mg total) by mouth 2 (two) times a day with meals    Essential hypertension    Gastroesophageal reflux disease without esophagitis  -     ranitidine (ZANTAC) 150 MG capsule; Take 1 capsule (150 mg total) by mouth 2 (two) times a day    Constipation, unspecified constipation type  -     polyethylene glycol (MIRALAX) 17 g packet; Take 17 g by mouth daily          Subjective:      Patient ID: Mathew Webb  is a 66 y o  male  58M with PMH significant for COPD, DM 2, HTN, GERD presents for follow-up  Patient is a poor historian  Does not know the name of his meds or his medical problems  On review of records, he was hospitalized with COPD exacerbation in Sept, 2018  He was noted to have R lung mass on CT and is scheduled to undergo PET scan  He reports he has quit smoking completely since being discharged from hospital      DM: reports abdominal pain from taking Metformin  He was not taking it with food  I had a lengthy discussion with patient and educated him about his medications and proper way of taking them  He is advised to call his psychiatrist for refills of his seroquel and prozac as they meds are not prescribed to him by [facility]           The following portions of the patient's history were reviewed and updated as appropriate: allergies, current medications, past family history, past medical history, past social history, past surgical history and problem list     Review of Systems   Constitutional: Negative for appetite change, chills, fatigue and fever  HENT: Negative for congestion  Respiratory: Negative for cough and shortness of breath  Cardiovascular: Negative for chest pain, palpitations and leg swelling  Gastrointestinal: Negative for abdominal pain, constipation, diarrhea and nausea  Musculoskeletal: Negative for arthralgias  Skin: Negative for rash  Allergic/Immunologic: Negative for environmental allergies and food allergies  Neurological: Negative for syncope  Objective:      /60 (BP Location: Right arm, Patient Position: Sitting, Cuff Size: Standard)   Pulse 85   Temp 97 6 °F (36 4 °C) (Temporal)   Resp 18   Wt 66 7 kg (147 lb)   SpO2 98%   BMI 22 03 kg/m²          Physical Exam   Constitutional: He is oriented to person, place, and time  He appears well-developed and well-nourished  No distress  HENT:   Head: Normocephalic  Eyes: Pupils are equal, round, and reactive to light  Neck: Normal range of motion  Cardiovascular: Normal rate, regular rhythm and normal heart sounds  Pulmonary/Chest: Effort normal and breath sounds normal  No respiratory distress  He has no wheezes  Abdominal: Soft  Bowel sounds are normal  He exhibits no distension  There is no tenderness  Musculoskeletal: Normal range of motion  He exhibits no edema or tenderness  Neurological: He is alert and oriented to person, place, and time  Skin: Skin is warm and dry

## 2018-10-15 ENCOUNTER — APPOINTMENT (OUTPATIENT)
Dept: LAB | Facility: HOSPITAL | Age: 58
End: 2018-10-15
Payer: COMMERCIAL

## 2018-10-15 ENCOUNTER — OFFICE VISIT (OUTPATIENT)
Dept: OBGYN CLINIC | Facility: MEDICAL CENTER | Age: 58
End: 2018-10-15
Payer: COMMERCIAL

## 2018-10-15 VITALS
HEIGHT: 69 IN | DIASTOLIC BLOOD PRESSURE: 66 MMHG | HEART RATE: 69 BPM | WEIGHT: 147.8 LBS | BODY MASS INDEX: 21.89 KG/M2 | SYSTOLIC BLOOD PRESSURE: 143 MMHG

## 2018-10-15 DIAGNOSIS — M79.10 MYALGIA: ICD-10-CM

## 2018-10-15 DIAGNOSIS — M54.50 LUMBAR PAIN: ICD-10-CM

## 2018-10-15 DIAGNOSIS — M54.50 LUMBAR PAIN: Primary | ICD-10-CM

## 2018-10-15 LAB
CK SERPL-CCNC: 156 U/L (ref 55–170)
TSH SERPL DL<=0.05 MIU/L-ACNC: 1.25 UIU/ML (ref 0.47–4.68)

## 2018-10-15 PROCEDURE — 84443 ASSAY THYROID STIM HORMONE: CPT

## 2018-10-15 PROCEDURE — 82550 ASSAY OF CK (CPK): CPT

## 2018-10-15 PROCEDURE — 99214 OFFICE O/P EST MOD 30 MIN: CPT | Performed by: FAMILY MEDICINE

## 2018-10-15 PROCEDURE — 36415 COLL VENOUS BLD VENIPUNCTURE: CPT

## 2018-10-15 RX ORDER — GABAPENTIN 100 MG/1
100 CAPSULE ORAL 3 TIMES DAILY
Qty: 90 CAPSULE | Refills: 0 | Status: SHIPPED | OUTPATIENT
Start: 2018-10-15 | End: 2019-01-09 | Stop reason: SDUPTHER

## 2018-10-15 NOTE — PROGRESS NOTES
1  Lumbar pain  Creatine Kinase, Total    Ambulatory referral to Rheumatology    gabapentin (NEURONTIN) 100 mg capsule    Ambulatory referral to Pain Management   2  Myalgia  Creatine Kinase, Total    Ambulatory referral to Rheumatology     Orders Placed This Encounter   Procedures    Creatine Kinase, Total    Ambulatory referral to Rheumatology    Ambulatory referral to Pain Management        Imaging Studies (I personally reviewed images in PACS and report): MRI LUMBAR VERTEBRA 09/07/2018:  Mild degenerative discogenic disease in the lower lumbar spine at L4-L5 with mild left greater than right foraminal stenosis  Past diagnostics:  X-ray bilateral hips 08/02/2018:  Mild to moderate bilateral hip Oa  X-ray lumbar vertebra 08/02/2018:  Mild degenerative disc disease multilevel worst L5-S1 no acute osseous abnormality  IMPRESSION:  Chronic back pain worsening  Unintentional weight loss 15 lb  History of exposure recently to STDs  History of night sweats for 1 year  Currently homeless    diagnostic investigation:   CMP, HIV, QuantiFERON gold, CBC within normal limits  Elevated CRP 12 8  ESR 43      Differential diagnosis:   Axial back pain secondary to Degenerative disc disease  polymyalgia rheumatica or other rheumatism      Return for follow-up for right knee pain  Patient Instructions   Explained the patient has elevated inflammation in his blood tests as well as elevated CK indicative of myalgias  No recommend he follow-up with Rheumatology is for further evaluation and also repeat his CK lab test to see if there is any trending change  Start neurontin (gabapentin) 100mg by mouth once daily for 1 week, then increase to 100 mg twice a day for 1 week, then increase to 100mg three times a day  Please confirm with your primary care doctor than you may start neurontin (gabapentin) and that will not have any other indications with your other medications      I offered patient referral to pain management  Patient initially declined due to fear of back injections due to family member who had significant complication; however, he did agree to meet with pain management specialist and discuss options  See below for further details regarding back pain treatment plans  I recommended the patient follow-up with his primary care doctor to have his diabetes evaluated and have an hemoglobin A1c test performed to check control of his diabetes  Patient may follow up with sports medicine for his right knee for further evaluation  Back pain can often be caused by a spectrum of issues ranging from back strain which is a muscle tear in the back and heals with time to spasm associated with chronic back arthritis (degenerative disc disease) to Sciatica which is radiation of pain down the leg with or without numbness and tingling that is caused by a pinch of a nerve in the spine from bony arthritis or herniated disc or pinching of a nerve due to buttock muscle spasm known as piriformis syndrome  Mainstay of treatment is physical therapy coupled with pharmacologic management of pain  Bed rest is no longer recommended as it results in stiffness and delayed recovery  Physical therapy to toleration at first is recommended and proceeded by progression to strengthening  Drugs often used to treat back pain are anti-inflammatories (NSAIDs), Tylenol (acetaminophen), muscle relaxers  In recent years, study have shown that Tylenol does not offer much relief however it is used along with nsaids for increased relief especially when patients cannot take other medicines such as muscle relaxers  Occassionally, for refractory pain, steroid packs such as Medrol are used but studies show that these medications do not offer long term relief   Opiods/pain killers (Percocet, oxycodone, hydrocodone) carry a significant addictive and dependence risk and as such are used only in cases of severe pain and per the CDC limited to a few days of use to avoid dependence  Mris are reserved for patients with red flags including fevers, chills, unintentional weight loss, dropped foot or severe weakness, bowel or bladder incontinence which are signs of medical emergencies including cancer, infection, severely pinched nerve, and a medical complication known as cauda equina syndrome  If you ever have any of these symptoms then please notify physician immediately and present to the ER  If you continue to have symptoms then we will pursue MRI of your back after failing 4 weeks of physical therapy  Unless there are red flags as above, most insurances do not cover MRIs to be performed until after a trial of physical therapy  This is because most back pain resolves within 1-3 months and rarely requires invasive intervention  If back pain fails to improve with conservative measures (therapy, medicine, time) then I will order MRI and if appropriate refer to pain management to consider injections and other invasive intervention  Surgery is rarely warranted early on in back pain unless there is a significant red flag  CHIEF COMPLAINT:  Follow-up  Low back pain    HPI:  Ilene Borden  is a 66 y o  male  who presents for       Visit  08/02/2018:   Evaluation low back pain intermittent for 5+ years with worsening of pain last 3-4 years  Patient has not been evaluated by and/or special T for his back pain  Patient has his primary care physician who prescribed pain medicine as well as muscle relaxers which offer only minimal relief now  He has not performed any physical therapy  Recent history significant for exposure STT with ER visit within last few weeks  Gonorrhea and chlamydia negative  Was not tested for HIV  Patient states he has lost 15+ lb in last 1 month  He also has associated symptoms do include night sweats intermittently for 1 year  Patient points to the bilateral low back as source of pain    Achy sharp moderate intensity radiating down both legs to at least a calf  Associated symptoms do include occasion intermittent left-sided groin pain when entering and exiting his vehicle  Associated symptoms do include numbness and tingling of bilateral feet  08/28/2018: Follow-up low back pain:  No improvement  Ongoing for 5+ years  Last visit patient did have laboratory work ordered for and investigation of weight loss associated with his back pain as well as occasional night sweats  Lab work was overall negative except for elevated inflammatory markers for both CRP as well as ESR  Today, patient states that he has no improvement  Patient states he has associated symptoms include radiation of pain into his thighs and muscle aches   With intermittent burning sensation  patient states weight loss stable  Denies any further weight loss  States that he loses weight usually every summer  Clarifies  that he did lose no more weight than usual this summer  10/15/2018: Follow-up low back pain:  Persistent uncontrolled  Patient still has low back pain with pain radiating down to his right leg  Associated symptoms do include right lower extremity numbness and tingling below his knee into his foot  He has had extensive workup due to significant risk factors with recent MRI evaluation that showed lower lumbar foraminal stenosis  Follow-up abnormal CK: He also has had mildly elevated CK on past laboratory evaluation as well as CRP  He states that he does have associated right knee pain intermittent for a number of years but denies any other significant joint pains than his low back  He does have muscle aches but usually associated to his low back bilaterally moderate intensity intermittent worse with lying flat  Patient tells me that he has a family member at is significant complication associated with steroid injections into back and therefore is reluctant to undergo invasive intervention      Review of Systems   Constitutional: Negative for chills and fever  Neurological: Positive for weakness (Mild right lower extremity intermittent)  Following history reviewed and update:    Past Medical History:   Diagnosis Date    Bipolar 1 disorder ([facility] )     COPD (chronic obstructive pulmonary disease) ([facility] )     Diabetes mellitus ([facility] )     Hypertension     Latent syphilis     Treated    PTSD (post-traumatic stress disorder)      Past Surgical History:   Procedure Laterality Date    HEMORROIDECTOMY      KNEE SURGERY       Social History   History   Alcohol Use    Yes     Comment: social     History   Drug Use    Types: Marijuana     History   Smoking Status    Former Smoker    Packs/day: 0 50    Types: Cigarettes   Smokeless Tobacco    Current User     Comment: current some day smoker as per [employer]     Family History   Problem Relation Age of Onset    Heart disease Mother     Hypertension Father     Diabetes Family     Asthma Family     Heart disease Family     Cancer Family      Allergies   Allergen Reactions    Lisinopril Other (See Comments)          Physical Exam    Constitutional:  see vital signs  Gen: well-developed, normocephalic/atraumatic, well-groomed  Eyes: No inflammation or discharge of conjunctiva or lids; sclera clear   Pharynx: no inflammation, lesion, or mass of lips  Neck: supple, no masses, non-distended  MSK: no inflammation, lesion, mass, or clubbing of nails and digits except for other than mentioned below  SKIN: no visible rashes or skin lesions  Pulmonary/Chest: Effort normal  No respiratory distress     NEURO: cranial nerves grossly intact  PSYCH:  Alert and oriented to person, place, and time; recent and remote memory intact; mood normal, no depression, anxiety, or agitation, judgment and insight good and intact      Ortho Exam   BACK EXAM:  Gait: normal, no trendelenberg gait, no antalgic gait    BACK TENDERNESS:  Spinous Processes: no  Paraspinal Muscles: + bilateral lower lumbar  SI Joint: no  Sacrum: no    ROM:  Flexion: intact  Extension: intact  Sidebending: intact    DERMATOMAL SENSATION:  L1: normal   L2: normal   L3: normal   L4: normal   L5: normal   S1: normal    STRENGTH (bilateral):  Knee Extension: 5/5  Knee Flexion: 5/5  Foot Dorsiflexion: 5/5  Great Toe Extension: 5/5  Foot Plantarflexion: 5/5  Hip Flexion: 5/5  Hip Abduction: 5/5    REFLEXES:  Patellar: 2+ bilateral  Achilles: 2+ bilateral  Clonus: negative bilateral    BACK:   SUPINE STRAIGHT LEG: + right  PRONE STRAIGHT LEG:  SLUMP: negative    HIP:  LOG ROLL: negative  JOHNATHAN: negative  FADIR: negative    Procedures

## 2018-10-15 NOTE — PATIENT INSTRUCTIONS
Explained the patient has elevated inflammation in his blood tests as well as elevated CK indicative of myalgias  No recommend he follow-up with Rheumatology is for further evaluation and also repeat his CK lab test to see if there is any trending change  Start neurontin (gabapentin) 100mg by mouth once daily for 1 week, then increase to 100 mg twice a day for 1 week, then increase to 100mg three times a day  Please confirm with your primary care doctor than you may start neurontin (gabapentin) and that will not have any other indications with your other medications  I offered patient referral to pain management  Patient initially declined due to fear of back injections due to family member who had significant complication; however, he did agree to meet with pain management specialist and discuss options  See below for further details regarding back pain treatment plans  I recommended the patient follow-up with his primary care doctor to have his diabetes evaluated and have an hemoglobin A1c test performed to check control of his diabetes  Patient may follow up with sports medicine for his right knee for further evaluation  Back pain can often be caused by a spectrum of issues ranging from back strain which is a muscle tear in the back and heals with time to spasm associated with chronic back arthritis (degenerative disc disease) to Sciatica which is radiation of pain down the leg with or without numbness and tingling that is caused by a pinch of a nerve in the spine from bony arthritis or herniated disc or pinching of a nerve due to buttock muscle spasm known as piriformis syndrome  Mainstay of treatment is physical therapy coupled with pharmacologic management of pain  Bed rest is no longer recommended as it results in stiffness and delayed recovery  Physical therapy to toleration at first is recommended and proceeded by progression to strengthening       Drugs often used to treat back pain are anti-inflammatories (NSAIDs), Tylenol (acetaminophen), muscle relaxers  In recent years, study have shown that Tylenol does not offer much relief however it is used along with nsaids for increased relief especially when patients cannot take other medicines such as muscle relaxers  Occassionally, for refractory pain, steroid packs such as Medrol are used but studies show that these medications do not offer long term relief  Opiods/pain killers (Percocet, oxycodone, hydrocodone) carry a significant addictive and dependence risk and as such are used only in cases of severe pain and per the CDC limited to a few days of use to avoid dependence  Mris are reserved for patients with red flags including fevers, chills, unintentional weight loss, dropped foot or severe weakness, bowel or bladder incontinence which are signs of medical emergencies including cancer, infection, severely pinched nerve, and a medical complication known as cauda equina syndrome  If you ever have any of these symptoms then please notify physician immediately and present to the ER  If you continue to have symptoms then we will pursue MRI of your back after failing 4 weeks of physical therapy  Unless there are red flags as above, most insurances do not cover MRIs to be performed until after a trial of physical therapy  This is because most back pain resolves within 1-3 months and rarely requires invasive intervention  If back pain fails to improve with conservative measures (therapy, medicine, time) then I will order MRI and if appropriate refer to pain management to consider injections and other invasive intervention  Surgery is rarely warranted early on in back pain unless there is a significant red flag

## 2018-10-17 ENCOUNTER — HOSPITAL ENCOUNTER (OUTPATIENT)
Dept: NUCLEAR MEDICINE | Facility: HOSPITAL | Age: 58
Discharge: HOME | End: 2018-10-17
Attending: NURSE PRACTITIONER

## 2018-10-17 DIAGNOSIS — R93.89 ABNORMAL CHEST CT: ICD-10-CM

## 2018-10-24 ENCOUNTER — HOSPITAL ENCOUNTER (OUTPATIENT)
Dept: NUCLEAR MEDICINE | Facility: HOSPITAL | Age: 58
Discharge: HOME | End: 2018-10-24
Attending: NURSE PRACTITIONER
Payer: COMMERCIAL

## 2018-10-24 LAB — GLUCOSE SERPL-MCNC: 84 MG/DL (ref 70–99)

## 2018-10-24 PROCEDURE — A9552 F18 FDG: HCPCS

## 2018-10-24 PROCEDURE — 82948 REAGENT STRIP/BLOOD GLUCOSE: CPT

## 2018-10-24 PROCEDURE — 78815 PET IMAGE W/CT SKULL-THIGH: CPT

## 2018-10-26 ENCOUNTER — PATIENT OUTREACH (OUTPATIENT)
Dept: PULMONOLOGY | Age: 58
End: 2018-10-26

## 2018-10-26 NOTE — PROGRESS NOTES
Communication: follow-up call  Date of Discharge: 9/26/18  Readmission Date: N/A    Identified Risk for Readmission Per Risk Stratification: Medium Risk  Current Disposition: Home  Has patient seen PCP since discharge? Yes, 10/10/18  Has patient seen Pulmonologist since discharge? No    History   Smoking Status    Former Smoker    Packs/day: 0 50    Types: Cigarettes   Smokeless Tobacco    Current User     Comment: current some day smoker as per [employer]        mMrc Scale Rating:  II -- Walks slower than people of the same age on level ground because of SOB and/or have to stop for breath    Counseling and Topics Reviewed:  Maintenance Inhaler/Frequency: Anoro, Rescue Inhaler/Frequency: Albuterol, Nebulizer: Albuterol, Infection prevention, Medication adherence and Physician follow-up      Narrative Summary: (ie: respiratory status, sputum production, persistent wheezing or coughing)  Patient was contacted after hospital discharge a month ago  He is still breathing with slight effort, taking medication as ordered and following up with MD's  His Pulmonary MD visit is on 11/14  I encouraged him to call the office if he develops distress  Also encouraged rest, activity limitation and smoking cessation

## 2018-10-29 DIAGNOSIS — R93.89 ABNORMAL CHEST CT: Primary | ICD-10-CM

## 2018-10-29 NOTE — PROGRESS NOTES
Telephone note- Pulmonary Medicine   Lloyd Wylie Sr  66 y o  male MRN: [id number]    Reason for call:  Walter Chou underwent PET scan to follow pulmonary nodules seen on prior scans at outpatient site  Due to appearance on CT scan, PET scan was then obtained  The PET CT demonstrated:     Dominant right middle lung nodule demonstrates intense FDG activity, most compatible with malignancy  2   Numerous confluent hypermetabolic nodular densities along the right minor major fissure, as well as scattered right lung nodules, most concerning for metastases, as above  3   Mildly hypermetabolic right hilar and mediastinal nodes most concerning for metastases  4   Hypermetabolic periportal nodes, most concerning for metastases  Mildly hypermetabolic left retrocrural and retroperitoneal nodes may also be metastatic  5   Nonspecific right upper cervical hypermetabolic node may be reactive or possibly metastatic as well  This may be reassessed on follow-up exam            Pertinent details:  Images were reviewed and presented to Tumor board  It was determined that the post treatment plan would be to pursue lung biopsy with IR, and to obtain outpatient Pulmonary function tests  Possible differentials include: inflammatory process such as sarcoidosis vs  neoplasm    The results of PET scan and plan were called to patient  Our office will call Walter Chou tomorrow to help schedule PFTs and Lung biopsy      Her verbalized understanding of the plan and denied further questions    BROOKS Campuzano

## 2018-11-05 ENCOUNTER — HOSPITAL ENCOUNTER (OUTPATIENT)
Dept: NEUROLOGY | Facility: HOSPITAL | Age: 58
Discharge: HOME | End: 2018-11-05
Payer: COMMERCIAL

## 2018-11-05 ENCOUNTER — HOSPITAL ENCOUNTER (OUTPATIENT)
Dept: PULMONOLOGY | Facility: HOSPITAL | Age: 58
Discharge: HOME | End: 2018-11-05
Payer: COMMERCIAL

## 2018-11-05 DIAGNOSIS — G56.03 BILATERAL CARPAL TUNNEL SYNDROME: ICD-10-CM

## 2018-11-05 DIAGNOSIS — R93.89 ABNORMAL CHEST CT: ICD-10-CM

## 2018-11-05 PROCEDURE — 94726 PLETHYSMOGRAPHY LUNG VOLUMES: CPT | Performed by: INTERNAL MEDICINE

## 2018-11-05 PROCEDURE — 94060 EVALUATION OF WHEEZING: CPT | Performed by: INTERNAL MEDICINE

## 2018-11-05 PROCEDURE — 94060 EVALUATION OF WHEEZING: CPT

## 2018-11-05 PROCEDURE — 94726 PLETHYSMOGRAPHY LUNG VOLUMES: CPT

## 2018-11-05 PROCEDURE — 95908 NRV CNDJ TST 3-4 STUDIES: CPT | Performed by: PHYSICAL MEDICINE & REHABILITATION

## 2018-11-05 PROCEDURE — 95885 MUSC TST DONE W/NERV TST LIM: CPT | Performed by: PHYSICAL MEDICINE & REHABILITATION

## 2018-11-05 PROCEDURE — 94729 DIFFUSING CAPACITY: CPT

## 2018-11-05 PROCEDURE — 94760 N-INVAS EAR/PLS OXIMETRY 1: CPT

## 2018-11-05 PROCEDURE — 94729 DIFFUSING CAPACITY: CPT | Performed by: INTERNAL MEDICINE

## 2018-11-05 RX ORDER — ALBUTEROL SULFATE 2.5 MG/3ML
2.5 SOLUTION RESPIRATORY (INHALATION) ONCE AS NEEDED
Status: COMPLETED | OUTPATIENT
Start: 2018-11-05 | End: 2018-11-05

## 2018-11-05 RX ADMIN — ALBUTEROL SULFATE 2.5 MG: 2.5 SOLUTION RESPIRATORY (INHALATION) at 12:57

## 2018-11-13 ENCOUNTER — HOSPITAL ENCOUNTER (OUTPATIENT)
Dept: CT IMAGING | Facility: HOSPITAL | Age: 58
Discharge: HOME | DRG: 143 | End: 2018-11-13
Payer: COMMERCIAL

## 2018-11-13 ENCOUNTER — APPOINTMENT (OUTPATIENT)
Dept: RADIOLOGY | Facility: HOSPITAL | Age: 58
DRG: 143 | End: 2018-11-13
Payer: COMMERCIAL

## 2018-11-13 ENCOUNTER — HOSPITAL ENCOUNTER (OUTPATIENT)
Dept: RADIOLOGY | Facility: HOSPITAL | Age: 58
Discharge: HOME | End: 2018-11-13

## 2018-11-13 ENCOUNTER — HOSPITAL ENCOUNTER (INPATIENT)
Facility: HOSPITAL | Age: 58
LOS: 3 days | Discharge: HOME | DRG: 143 | End: 2018-11-17
Attending: FAMILY MEDICINE | Admitting: INTERNAL MEDICINE
Payer: COMMERCIAL

## 2018-11-13 VITALS
HEIGHT: 69 IN | BODY MASS INDEX: 22.22 KG/M2 | WEIGHT: 150 LBS | DIASTOLIC BLOOD PRESSURE: 78 MMHG | HEART RATE: 62 BPM | RESPIRATION RATE: 18 BRPM | SYSTOLIC BLOOD PRESSURE: 148 MMHG | TEMPERATURE: 97.8 F | OXYGEN SATURATION: 100 %

## 2018-11-13 DIAGNOSIS — J95.811 PNEUMOTHORAX OF RIGHT LUNG AFTER BIOPSY: Primary | ICD-10-CM

## 2018-11-13 DIAGNOSIS — C34.2 LUNG CANCER, MIDDLE LOBE (HCC): ICD-10-CM

## 2018-11-13 DIAGNOSIS — E11.9 TYPE 2 DIABETES MELLITUS WITHOUT COMPLICATION, WITHOUT LONG-TERM CURRENT USE OF INSULIN (HCC): ICD-10-CM

## 2018-11-13 DIAGNOSIS — R93.89 ABNORMAL CHEST CT: ICD-10-CM

## 2018-11-13 DIAGNOSIS — R07.9 CHEST PAIN: ICD-10-CM

## 2018-11-13 DIAGNOSIS — J95.811 PNEUMOTHORAX AFTER BIOPSY: Primary | ICD-10-CM

## 2018-11-13 PROBLEM — J43.1 PANLOBULAR EMPHYSEMA (HCC): Chronic | Status: ACTIVE | Noted: 2018-11-13

## 2018-11-13 PROBLEM — Z72.0 TOBACCO ABUSE: Chronic | Status: ACTIVE | Noted: 2018-11-13

## 2018-11-13 PROBLEM — R91.8 MASS OF RIGHT LUNG: Status: ACTIVE | Noted: 2018-11-13

## 2018-11-13 LAB
EST. AVERAGE GLUCOSE BLD GHB EST-MCNC: 120 MG/DL
GLUCOSE SERPL-MCNC: 141 MG/DL (ref 65–140)
HBA1C MFR BLD: 5.8 % (ref 4.2–6.3)
PLATELET # BLD AUTO: 359 THOUSANDS/UL (ref 149–390)
PMV BLD AUTO: 8.9 FL (ref 8.9–12.7)

## 2018-11-13 PROCEDURE — 32405 PR BIOPSY LUNG/MEDIASTINUM PERCUTANEOUS NEEDLE: CPT | Performed by: RADIOLOGY

## 2018-11-13 PROCEDURE — 88305 TISSUE EXAM BY PATHOLOGIST: CPT | Performed by: PATHOLOGY

## 2018-11-13 PROCEDURE — G0379 DIRECT REFER HOSPITAL OBSERV: HCPCS

## 2018-11-13 PROCEDURE — 88341 IMHCHEM/IMCYTCHM EA ADD ANTB: CPT | Performed by: PATHOLOGY

## 2018-11-13 PROCEDURE — 83036 HEMOGLOBIN GLYCOSYLATED A1C: CPT | Performed by: PHYSICIAN ASSISTANT

## 2018-11-13 PROCEDURE — 99153 MOD SED SAME PHYS/QHP EA: CPT

## 2018-11-13 PROCEDURE — 71045 X-RAY EXAM CHEST 1 VIEW: CPT

## 2018-11-13 PROCEDURE — 82948 REAGENT STRIP/BLOOD GLUCOSE: CPT

## 2018-11-13 PROCEDURE — 94762 N-INVAS EAR/PLS OXIMTRY CONT: CPT

## 2018-11-13 PROCEDURE — 71250 CT THORAX DX C-: CPT

## 2018-11-13 PROCEDURE — 99152 MOD SED SAME PHYS/QHP 5/>YRS: CPT | Performed by: RADIOLOGY

## 2018-11-13 PROCEDURE — 88363 XM ARCHIVE TISSUE MOLEC ANAL: CPT | Performed by: PATHOLOGY

## 2018-11-13 PROCEDURE — 88342 IMHCHEM/IMCYTCHM 1ST ANTB: CPT | Performed by: PATHOLOGY

## 2018-11-13 PROCEDURE — 85049 AUTOMATED PLATELET COUNT: CPT | Performed by: PHYSICIAN ASSISTANT

## 2018-11-13 PROCEDURE — 99220 PR INITIAL OBSERVATION CARE/DAY 70 MINUTES: CPT | Performed by: PHYSICIAN ASSISTANT

## 2018-11-13 PROCEDURE — 88333 PATH CONSLTJ SURG CYTO XM 1: CPT | Performed by: PATHOLOGY

## 2018-11-13 PROCEDURE — 99152 MOD SED SAME PHYS/QHP 5/>YRS: CPT

## 2018-11-13 PROCEDURE — 32405 HB PERCUT BX LUNG/MEDIASTINUM: CPT

## 2018-11-13 PROCEDURE — 77012 CT SCAN FOR NEEDLE BIOPSY: CPT | Performed by: RADIOLOGY

## 2018-11-13 RX ORDER — HEPARIN SODIUM 5000 [USP'U]/ML
5000 INJECTION, SOLUTION INTRAVENOUS; SUBCUTANEOUS EVERY 8 HOURS SCHEDULED
Status: DISCONTINUED | OUTPATIENT
Start: 2018-11-13 | End: 2018-11-17

## 2018-11-13 RX ORDER — ALBUTEROL SULFATE 90 UG/1
2 AEROSOL, METERED RESPIRATORY (INHALATION) EVERY 4 HOURS PRN
Status: DISCONTINUED | OUTPATIENT
Start: 2018-11-13 | End: 2018-11-17

## 2018-11-13 RX ORDER — FLUTICASONE FUROATE AND VILANTEROL 100; 25 UG/1; UG/1
1 POWDER RESPIRATORY (INHALATION)
COMMUNITY
End: 2019-01-09 | Stop reason: SDUPTHER

## 2018-11-13 RX ORDER — BUDESONIDE AND FORMOTEROL FUMARATE DIHYDRATE 160; 4.5 UG/1; UG/1
2 AEROSOL RESPIRATORY (INHALATION) 2 TIMES DAILY
Status: DISCONTINUED | OUTPATIENT
Start: 2018-11-13 | End: 2018-11-15

## 2018-11-13 RX ORDER — HYDROCODONE BITARTRATE AND ACETAMINOPHEN 5; 325 MG/1; MG/1
1 TABLET ORAL EVERY 6 HOURS PRN
Status: DISCONTINUED | OUTPATIENT
Start: 2018-11-13 | End: 2018-11-14

## 2018-11-13 RX ORDER — METFORMIN HYDROCHLORIDE 500 MG/1
500 TABLET, EXTENDED RELEASE ORAL 2 TIMES DAILY WITH MEALS
Status: DISCONTINUED | OUTPATIENT
Start: 2018-11-14 | End: 2018-11-17 | Stop reason: HOSPADM

## 2018-11-13 RX ORDER — GABAPENTIN 100 MG/1
100 CAPSULE ORAL 3 TIMES DAILY
Status: DISCONTINUED | OUTPATIENT
Start: 2018-11-13 | End: 2018-11-17 | Stop reason: HOSPADM

## 2018-11-13 RX ORDER — LEVALBUTEROL 1.25 MG/.5ML
1.25 SOLUTION, CONCENTRATE RESPIRATORY (INHALATION) EVERY 8 HOURS PRN
Status: DISCONTINUED | OUTPATIENT
Start: 2018-11-13 | End: 2018-11-13

## 2018-11-13 RX ORDER — AMLODIPINE BESYLATE 10 MG/1
10 TABLET ORAL DAILY
Status: DISCONTINUED | OUTPATIENT
Start: 2018-11-14 | End: 2018-11-17 | Stop reason: HOSPADM

## 2018-11-13 RX ORDER — BUDESONIDE AND FORMOTEROL FUMARATE DIHYDRATE 160; 4.5 UG/1; UG/1
2 AEROSOL RESPIRATORY (INHALATION) 2 TIMES DAILY
COMMUNITY
End: 2019-01-08 | Stop reason: SDUPTHER

## 2018-11-13 RX ORDER — METHOCARBAMOL 500 MG/1
500 TABLET, FILM COATED ORAL EVERY 6 HOURS PRN
Status: DISCONTINUED | OUTPATIENT
Start: 2018-11-13 | End: 2018-11-15

## 2018-11-13 RX ORDER — FENTANYL CITRATE 50 UG/ML
INJECTION, SOLUTION INTRAMUSCULAR; INTRAVENOUS CODE/TRAUMA/SEDATION MEDICATION
Status: COMPLETED | OUTPATIENT
Start: 2018-11-13 | End: 2018-11-13

## 2018-11-13 RX ORDER — POLYETHYLENE GLYCOL 3350 17 G/17G
17 POWDER, FOR SOLUTION ORAL DAILY
Status: DISCONTINUED | OUTPATIENT
Start: 2018-11-14 | End: 2018-11-17

## 2018-11-13 RX ORDER — HYDROCODONE BITARTRATE AND ACETAMINOPHEN 5; 325 MG/1; MG/1
1 TABLET ORAL EVERY 6 HOURS PRN
Status: DISCONTINUED | OUTPATIENT
Start: 2018-11-13 | End: 2018-11-14 | Stop reason: HOSPADM

## 2018-11-13 RX ORDER — LEVALBUTEROL 1.25 MG/.5ML
1.25 SOLUTION, CONCENTRATE RESPIRATORY (INHALATION) EVERY 6 HOURS PRN
Status: DISCONTINUED | OUTPATIENT
Start: 2018-11-13 | End: 2018-11-15

## 2018-11-13 RX ORDER — FAMOTIDINE 20 MG/1
20 TABLET, FILM COATED ORAL 2 TIMES DAILY
Status: DISCONTINUED | OUTPATIENT
Start: 2018-11-13 | End: 2018-11-17 | Stop reason: HOSPADM

## 2018-11-13 RX ORDER — QUETIAPINE FUMARATE 25 MG/1
25 TABLET, FILM COATED ORAL
Status: DISCONTINUED | OUTPATIENT
Start: 2018-11-13 | End: 2018-11-17 | Stop reason: HOSPADM

## 2018-11-13 RX ORDER — FLUOXETINE 10 MG/1
10 CAPSULE ORAL DAILY
Status: DISCONTINUED | OUTPATIENT
Start: 2018-11-14 | End: 2018-11-17 | Stop reason: HOSPADM

## 2018-11-13 RX ORDER — MIDAZOLAM HYDROCHLORIDE 1 MG/ML
INJECTION INTRAMUSCULAR; INTRAVENOUS CODE/TRAUMA/SEDATION MEDICATION
Status: COMPLETED | OUTPATIENT
Start: 2018-11-13 | End: 2018-11-13

## 2018-11-13 RX ORDER — FLUTICASONE FUROATE AND VILANTEROL 100; 25 UG/1; UG/1
1 POWDER RESPIRATORY (INHALATION) DAILY
Status: DISCONTINUED | OUTPATIENT
Start: 2018-11-14 | End: 2018-11-15

## 2018-11-13 RX ORDER — ACETAMINOPHEN 325 MG/1
650 TABLET ORAL EVERY 6 HOURS PRN
Status: DISCONTINUED | OUTPATIENT
Start: 2018-11-13 | End: 2018-11-17 | Stop reason: HOSPADM

## 2018-11-13 RX ORDER — SODIUM CHLORIDE 9 MG/ML
75 INJECTION, SOLUTION INTRAVENOUS CONTINUOUS
Status: DISCONTINUED | OUTPATIENT
Start: 2018-11-13 | End: 2018-11-14 | Stop reason: HOSPADM

## 2018-11-13 RX ADMIN — MIDAZOLAM 0.5 MG: 1 INJECTION INTRAMUSCULAR; INTRAVENOUS at 16:08

## 2018-11-13 RX ADMIN — SODIUM CHLORIDE 75 ML/HR: 0.9 INJECTION, SOLUTION INTRAVENOUS at 14:20

## 2018-11-13 RX ADMIN — FENTANYL CITRATE 50 MCG: 50 INJECTION INTRAMUSCULAR; INTRAVENOUS at 16:01

## 2018-11-13 RX ADMIN — MIDAZOLAM 0.5 MG: 1 INJECTION INTRAMUSCULAR; INTRAVENOUS at 16:01

## 2018-11-13 RX ADMIN — FENTANYL CITRATE 50 MCG: 50 INJECTION INTRAMUSCULAR; INTRAVENOUS at 15:51

## 2018-11-13 RX ADMIN — BUDESONIDE AND FORMOTEROL FUMARATE DIHYDRATE 2 PUFF: 160; 4.5 AEROSOL RESPIRATORY (INHALATION) at 22:36

## 2018-11-13 RX ADMIN — FENTANYL CITRATE 25 MCG: 50 INJECTION INTRAMUSCULAR; INTRAVENOUS at 16:08

## 2018-11-13 RX ADMIN — FAMOTIDINE 20 MG: 20 TABLET ORAL at 22:36

## 2018-11-13 RX ADMIN — FENTANYL CITRATE 50 MCG: 50 INJECTION INTRAMUSCULAR; INTRAVENOUS at 15:42

## 2018-11-13 RX ADMIN — MIDAZOLAM 1 MG: 1 INJECTION INTRAMUSCULAR; INTRAVENOUS at 15:51

## 2018-11-13 RX ADMIN — HYDROCODONE BITARTRATE AND ACETAMINOPHEN 1 TABLET: 5; 325 TABLET ORAL at 23:35

## 2018-11-13 RX ADMIN — SODIUM CHLORIDE 75 ML/HR: 0.9 INJECTION, SOLUTION INTRAVENOUS at 12:04

## 2018-11-13 RX ADMIN — FENTANYL CITRATE 25 MCG: 50 INJECTION INTRAMUSCULAR; INTRAVENOUS at 16:20

## 2018-11-13 RX ADMIN — QUETIAPINE FUMARATE 25 MG: 25 TABLET ORAL at 22:08

## 2018-11-13 RX ADMIN — METHOCARBAMOL 500 MG: 500 TABLET, FILM COATED ORAL at 22:08

## 2018-11-13 RX ADMIN — MIDAZOLAM 1 MG: 1 INJECTION INTRAMUSCULAR; INTRAVENOUS at 15:42

## 2018-11-13 RX ADMIN — HYDROCODONE BITARTRATE AND ACETAMINOPHEN 1 TABLET: 5; 325 TABLET ORAL at 17:20

## 2018-11-13 RX ADMIN — GABAPENTIN 100 MG: 100 CAPSULE ORAL at 22:08

## 2018-11-13 NOTE — H&P
H&P Exam - Luis Ott Sr  66 y o  male MRN: [id number]    Unit/Bed#:  Encounter: [id number]      Assessment/Plan     Assessment:  Right lung mass PET positive with a history of smoking    Plan:  Right middle lobe biopsy    Review of Systems    Historical Information   Past Medical History:   Diagnosis Date    Bipolar 1 disorder ([facility] )     COPD (chronic obstructive pulmonary disease) ([facility] )     Diabetes mellitus ([place] [address] )     Herniated lumbar intervertebral disc     Hypertension     Latent syphilis     Treated    PTSD (post-traumatic stress disorder)      Past Surgical History:   Procedure Laterality Date    HEMORROIDECTOMY      KNEE SURGERY       Social History   History   Alcohol Use    Yes     Comment: social     History   Drug Use    Types: Marijuana     History   Smoking Status    Former Smoker    Packs/day: 0 50    Types: Cigarettes    Quit date: 8/31/2018   Smokeless Tobacco    Current User     Comment: current some day smoker as per [employer]     Family History: non-contributory    Meds/Allergies   all medications and allergies reviewed  Allergies   Allergen Reactions    Lisinopril Other (See Comments)       Objective   Vitals: Blood pressure 142/67, pulse 75, temperature 98 8 °F (37 1 °C), temperature source Temporal, resp  rate 16, height 5' 8 5" (1 74 m), weight 68 kg (150 lb), SpO2 100 %  Intake/Output Summary (Last 24 hours) at 11/13/18 1630  Last data filed at 11/13/18 1420   Gross per 24 hour   Intake              900 ml   Output                0 ml   Net              900 ml       Invasive Devices     Peripheral Intravenous Line            Peripheral IV 11/13/18 Left Antecubital less than 1 day                Physical Exam    Lab Results: I have personally reviewed pertinent reports  Imaging: I have personally reviewed pertinent reports  EKG, Pathology, and Other Studies: I have personally reviewed pertinent reports        Code Status: Prior  Advance Directive and Living Will: Power of :    POLST:      Counseling / Coordination of Care  Total floor / unit time spent today 15 minutes  Greater than 50% of total time was spent with the patient and / or family counseling and / or coordination of care  A description of the counseling / coordination of care

## 2018-11-13 NOTE — DISCHARGE INSTRUCTIONS
Needle Biopsy of the Lung    WHAT YOU NEED TO KNOW:  A needle biopsy of the lung is a procedure to remove cells or tissue from your lung  You may have a fine needle aspiration biopsy (FNAB), or a core needle biopsy (CNB)  A FNAB is used to remove cells through a thin needle  CNB uses a thicker needle to remove lung tissue  The samples are collected and tested for inflammation, infection, or cancer  DISCHARGE INSTRUCTIONS:   Resume your normal diet  Small sips of flat soda will help with nausea  Limit your activity for 24 hours  Wound Care:      - Remove band aid in 24 hours      Contact Interventional Radiology at [phone] [place] PATIENTS: Contact Interventional Radiology at [phone]) Audi Santamaria PATIENTS: Contact Interventional Radiology at [phone]) if any of the following occur:    - You have a fever greater than 101*    - You cough up large amounts of bright red blood     - You have chest pain with breathing    - You have shortness of breath    -You have persistent nausea and vomiting    - You have pus, redness or swelling around your biopsy site    - You have questions or concerns about your condition or care

## 2018-11-13 NOTE — BRIEF OP NOTE (RAD/CATH)
IR IMAGE GUIDED BIOPSY/ASPIRATION LUNG  Procedure Note    PATIENT NAME: Lynette Ariza Sr   : 1960  MRN: [id number]     Pre-op Diagnosis:   1  Abnormal chest CT    2  Chest pain      Post-op Diagnosis:   1  Abnormal chest CT    2   Chest pain        Surgeon:   Aaron Grant MD  Assistants:     No qualified resident was available, Resident is only observing    Estimated Blood Loss: minimal  Findings: Successful right middle lobe lung biopsy    Specimens: 18 gauge cores x 5    Complications:  None immediate    Anesthesia: Conscious sedation    Aaron Grant MD     Date: 2018  Time: 4:32 PM

## 2018-11-14 ENCOUNTER — APPOINTMENT (OUTPATIENT)
Dept: RADIOLOGY | Facility: HOSPITAL | Age: 58
DRG: 143 | End: 2018-11-14
Payer: COMMERCIAL

## 2018-11-14 ENCOUNTER — APPOINTMENT (OUTPATIENT)
Dept: CT IMAGING | Facility: HOSPITAL | Age: 58
DRG: 143 | End: 2018-11-14
Payer: COMMERCIAL

## 2018-11-14 LAB
ANION GAP SERPL CALCULATED.3IONS-SCNC: 9 MMOL/L (ref 4–13)
BASOPHILS # BLD AUTO: 0.02 THOUSANDS/ΜL (ref 0–0.1)
BASOPHILS NFR BLD AUTO: 0 % (ref 0–1)
BUN SERPL-MCNC: 18 MG/DL (ref 5–25)
CALCIUM SERPL-MCNC: 8.2 MG/DL (ref 8.3–10.1)
CHLORIDE SERPL-SCNC: 105 MMOL/L (ref 100–108)
CO2 SERPL-SCNC: 25 MMOL/L (ref 21–32)
CREAT SERPL-MCNC: 1.06 MG/DL (ref 0.6–1.3)
EOSINOPHIL # BLD AUTO: 0.29 THOUSAND/ΜL (ref 0–0.61)
EOSINOPHIL NFR BLD AUTO: 3 % (ref 0–6)
ERYTHROCYTE [DISTWIDTH] IN BLOOD BY AUTOMATED COUNT: 16.1 % (ref 11.6–15.1)
GFR SERPL CREATININE-BSD FRML MDRD: 90 ML/MIN/1.73SQ M
GLUCOSE SERPL-MCNC: 102 MG/DL (ref 65–140)
GLUCOSE SERPL-MCNC: 119 MG/DL (ref 65–140)
GLUCOSE SERPL-MCNC: 85 MG/DL (ref 65–140)
GLUCOSE SERPL-MCNC: 89 MG/DL (ref 65–140)
GLUCOSE SERPL-MCNC: 90 MG/DL (ref 65–140)
HCT VFR BLD AUTO: 35.8 % (ref 36.5–49.3)
HGB BLD-MCNC: 11.8 G/DL (ref 12–17)
IMM GRANULOCYTES # BLD AUTO: 0.03 THOUSAND/UL (ref 0–0.2)
IMM GRANULOCYTES NFR BLD AUTO: 0 % (ref 0–2)
LYMPHOCYTES # BLD AUTO: 2.34 THOUSANDS/ΜL (ref 0.6–4.47)
LYMPHOCYTES NFR BLD AUTO: 27 % (ref 14–44)
MCH RBC QN AUTO: 28.2 PG (ref 26.8–34.3)
MCHC RBC AUTO-ENTMCNC: 33 G/DL (ref 31.4–37.4)
MCV RBC AUTO: 85 FL (ref 82–98)
MONOCYTES # BLD AUTO: 0.73 THOUSAND/ΜL (ref 0.17–1.22)
MONOCYTES NFR BLD AUTO: 8 % (ref 4–12)
NEUTROPHILS # BLD AUTO: 5.31 THOUSANDS/ΜL (ref 1.85–7.62)
NEUTS SEG NFR BLD AUTO: 62 % (ref 43–75)
NRBC BLD AUTO-RTO: 0 /100 WBCS
PLATELET # BLD AUTO: 303 THOUSANDS/UL (ref 149–390)
PMV BLD AUTO: 8.6 FL (ref 8.9–12.7)
POTASSIUM SERPL-SCNC: 4 MMOL/L (ref 3.5–5.3)
RBC # BLD AUTO: 4.19 MILLION/UL (ref 3.88–5.62)
SODIUM SERPL-SCNC: 139 MMOL/L (ref 136–145)
WBC # BLD AUTO: 8.72 THOUSAND/UL (ref 4.31–10.16)

## 2018-11-14 PROCEDURE — 71046 X-RAY EXAM CHEST 2 VIEWS: CPT

## 2018-11-14 PROCEDURE — C1729 CATH, DRAINAGE: HCPCS

## 2018-11-14 PROCEDURE — 85025 COMPLETE CBC W/AUTO DIFF WBC: CPT | Performed by: PHYSICIAN ASSISTANT

## 2018-11-14 PROCEDURE — 80048 BASIC METABOLIC PNL TOTAL CA: CPT | Performed by: PHYSICIAN ASSISTANT

## 2018-11-14 PROCEDURE — 32557 INSERT CATH PLEURA W/ IMAGE: CPT | Performed by: RADIOLOGY

## 2018-11-14 PROCEDURE — 0W9930Z DRAINAGE OF RIGHT PLEURAL CAVITY WITH DRAINAGE DEVICE, PERCUTANEOUS APPROACH: ICD-10-PCS | Performed by: RADIOLOGY

## 2018-11-14 PROCEDURE — 99232 SBSQ HOSP IP/OBS MODERATE 35: CPT | Performed by: INTERNAL MEDICINE

## 2018-11-14 PROCEDURE — 82948 REAGENT STRIP/BLOOD GLUCOSE: CPT

## 2018-11-14 PROCEDURE — 99255 IP/OBS CONSLTJ NEW/EST HI 80: CPT | Performed by: NURSE PRACTITIONER

## 2018-11-14 PROCEDURE — 32557 INSERT CATH PLEURA W/ IMAGE: CPT

## 2018-11-14 RX ORDER — OXYCODONE HYDROCHLORIDE 10 MG/1
10 TABLET ORAL EVERY 4 HOURS PRN
Status: DISCONTINUED | OUTPATIENT
Start: 2018-11-14 | End: 2018-11-15

## 2018-11-14 RX ORDER — FENTANYL CITRATE 50 UG/ML
INJECTION, SOLUTION INTRAMUSCULAR; INTRAVENOUS CODE/TRAUMA/SEDATION MEDICATION
Status: COMPLETED | OUTPATIENT
Start: 2018-11-14 | End: 2018-11-14

## 2018-11-14 RX ADMIN — AMLODIPINE BESYLATE 10 MG: 10 TABLET ORAL at 08:56

## 2018-11-14 RX ADMIN — METFORMIN HYDROCHLORIDE 500 MG: 500 TABLET, EXTENDED RELEASE ORAL at 08:57

## 2018-11-14 RX ADMIN — BUDESONIDE AND FORMOTEROL FUMARATE DIHYDRATE 2 PUFF: 160; 4.5 AEROSOL RESPIRATORY (INHALATION) at 08:56

## 2018-11-14 RX ADMIN — POLYETHYLENE GLYCOL (3350) 17 G: 17 POWDER, FOR SOLUTION ORAL at 09:01

## 2018-11-14 RX ADMIN — FENTANYL CITRATE 100 MCG: 50 INJECTION, SOLUTION INTRAMUSCULAR; INTRAVENOUS at 10:01

## 2018-11-14 RX ADMIN — ACETAMINOPHEN 650 MG: 325 TABLET, FILM COATED ORAL at 11:25

## 2018-11-14 RX ADMIN — GABAPENTIN 100 MG: 100 CAPSULE ORAL at 17:19

## 2018-11-14 RX ADMIN — GABAPENTIN 100 MG: 100 CAPSULE ORAL at 22:10

## 2018-11-14 RX ADMIN — BUDESONIDE AND FORMOTEROL FUMARATE DIHYDRATE 2 PUFF: 160; 4.5 AEROSOL RESPIRATORY (INHALATION) at 17:19

## 2018-11-14 RX ADMIN — FAMOTIDINE 20 MG: 20 TABLET ORAL at 17:18

## 2018-11-14 RX ADMIN — METFORMIN HYDROCHLORIDE 500 MG: 500 TABLET, EXTENDED RELEASE ORAL at 17:19

## 2018-11-14 RX ADMIN — HYDROCODONE BITARTRATE AND ACETAMINOPHEN 1 TABLET: 5; 325 TABLET ORAL at 09:02

## 2018-11-14 RX ADMIN — QUETIAPINE FUMARATE 25 MG: 25 TABLET ORAL at 22:08

## 2018-11-14 RX ADMIN — TIOTROPIUM BROMIDE 18 MCG: 18 CAPSULE ORAL; RESPIRATORY (INHALATION) at 08:56

## 2018-11-14 RX ADMIN — FAMOTIDINE 20 MG: 20 TABLET ORAL at 08:56

## 2018-11-14 RX ADMIN — FLUOXETINE 10 MG: 10 CAPSULE ORAL at 08:56

## 2018-11-14 RX ADMIN — GABAPENTIN 100 MG: 100 CAPSULE ORAL at 08:56

## 2018-11-14 RX ADMIN — OXYCODONE HYDROCHLORIDE 10 MG: 10 TABLET ORAL at 22:09

## 2018-11-14 RX ADMIN — OXYCODONE HYDROCHLORIDE 10 MG: 10 TABLET ORAL at 17:19

## 2018-11-14 RX ADMIN — FLUTICASONE FUROATE AND VILANTEROL TRIFENATATE 1 PUFF: 100; 25 POWDER RESPIRATORY (INHALATION) at 08:56

## 2018-11-14 RX ADMIN — OXYCODONE HYDROCHLORIDE 10 MG: 10 TABLET ORAL at 11:25

## 2018-11-14 NOTE — ASSESSMENT & PLAN NOTE
· S/P biopsy  · Pathology pending  · PET Scan 10/24/2018:  1  Dominant right middle lung nodule demonstrates intense FDG activity, most compatible with malignancy  2   Numerous confluent hypermetabolic nodular densities along the right minor major fissure, as well as scattered right lung nodules, most concerning for metastases, as above  3   Mildly hypermetabolic right hilar and mediastinal nodes most concerning for metastases  4   Hypermetabolic periportal nodes, most concerning for metastases  Mildly hypermetabolic left retrocrural and retroperitoneal nodes may also be metastatic  5   Nonspecific right upper cervical hypermetabolic node may be reactive or possibly metastatic as well    This may be reassessed on follow-up exam

## 2018-11-14 NOTE — ASSESSMENT & PLAN NOTE
· Repeat CXR now and repeat in AM  · IR Closely following as well  · Stable O2 sat on [facility] and will place continuous pulse ox

## 2018-11-14 NOTE — UTILIZATION REVIEW
Initial Clinical Review    Admission: Date/Time/Statement:   OBS  ORDER    11/13  @    2115     IP ORDER  ENTERED   73/79  @  0468  Certification Statement: The patient will continue to require additional inpatient hospital stay due to need for further acute intervention for enlarging pneumothorax, with chest tube placed, currently a water seal   Repeat x-ray in a m     11/14/18 1810  Inpatient Admission Once     Transfer Service: General Medicine    Expected Discharge Date: 11/14/18       Question Answer Comment   Admitting Physician FRANCINE CAST    Level of Care Med Surg    Estimated length of stay More than 2 Midnights    Certification I certify that inpatient services are medically necessary for this patient for a duration of greater than two midnights  See H&P and MD Progress Notes for additional information about the patient's course of treatment  11/14/18 1810              Chief Complaint: No chief complaint on file  History of Illness: Dilip Frias  is a 66 y o  male with a history of right lung masses, possible malignancy with metastatic disease, who presented to interventional Radiology for biopsy of the right lung mass  Biopsy was successful however he was found to have a small right-sided pneumothorax following the procedure  He was evaluated by interventional radiology and a chest tube was deemed not necessary  The a repeat chest x-ray was requested a few hours after the procedure to confirm stability of the pneumothorax        He is complaining of shortness of breath with deep inspiration and some global heaviness in the chest   Denies chest pain  He denies diaphoresis      He has had some weight fluctuation over the past year but this is considered normal for him as he normally fluctuates between cold weather and warm weather      He quit smoking on or about 9/26/18, following his admission for an acute exacerbation of COPD/emphysema     Denies:  Nausea, Vomiting, Diarrhea, Dysuria  Pulmonary/Chest: No tachypnea  He is not intubated  No respiratory distress  He has no decreased breath sounds  He has wheezes in the right upper field, the right middle field, the left upper field and the left middle field  He has rhonchi in the right middle field, the right lower field, the left middle field and the left lower field  He has no rales    ED Vital Signs:   ED Triage Vitals   Temperature Pulse Respirations Blood Pressure SpO2   11/13/18 2044 11/13/18 2044 11/13/18 2044 11/13/18 2044 11/13/18 2044   97 8 °F (36 6 °C) 73 18 156/74 97 %      Temp Source Heart Rate Source Patient Position - Orthostatic VS BP Location FiO2 (%)   11/14/18 0725 11/14/18 0725 11/14/18 0725 11/14/18 0725 --   Temporal Monitor Sitting Left arm       Pain Score       11/13/18 2057       7        Wt Readings from Last 1 Encounters:   11/13/18 68 kg (150 lb)       Vital Signs (abnormal):    above    Abnormal Labs/Diagnostic Test Results: CXR:  1 hour chest x-ray after biopsy:  Stable right middle lobe contusion unchanged from the PA chest performed approximately 1 hour prior; no pneumothorax  (reviewed by IR and felt to have small pneumothorax)                Past Medical/Surgical History:    Active Ambulatory Problems     Diagnosis Date Noted    Acute exacerbation of chronic obstructive pulmonary disease (COPD) ([facility] ) 06/07/2018    HTN (hypertension) 06/07/2018    Chronic bilateral thoracic back pain 07/27/2018    Chronic right shoulder pain 07/27/2018    Bilateral carpal tunnel syndrome 07/27/2018    [facility] ) 08/01/2018    Gastroesophageal reflux disease without esophagitis 08/01/2018    Other specified anxiety disorders 08/01/2018    Cubital tunnel syndrome, bilateral 08/01/2018    Diabetes mellitus ([facility] ) 09/23/2018     Resolved Ambulatory Problems     Diagnosis Date Noted    Hypokalemia 06/07/2018    Tobacco dependence syndrome 09/05/2014    Hypertension 09/23/2018     Past Medical History: Diagnosis Date    [facility] 1 disorder ([facility] )     COPD (chronic obstructive pulmonary disease) ([facility] )     Diabetes mellitus (HCC)     Herniated lumbar intervertebral disc     Hypertension     Latent syphilis     PTSD (post-traumatic stress disorder)     Tobacco abuse 11/13/2018       Admitting Diagnosis: Pneumothorax of right lung after biopsy [J95 [id number]]    Age/Sex: 66 y o  male    Assessment/Plan:   Pneumothorax of right lung after biopsy   Assessment & Plan     · Repeat CXR now and repeat in AM  · IR Closely following as well  · Stable O2 sat on [facility]      Mass of right lung   Assessment & Plan     · S/P biopsy  · Pathology pending  · PET Scan 10/24/2018:  1   Dominant right middle lung nodule demonstrates intense FDG activity, most compatible with malignancy  2   Numerous confluent hypermetabolic nodular densities along the right minor major fissure, as well as scattered right lung nodules, most concerning for metastases, as above  3   Mildly hypermetabolic right hilar and mediastinal nodes most concerning for metastases  4   Hypermetabolic periportal nodes, most concerning for metastases   Mildly hypermetabolic left retrocrural and retroperitoneal nodes may also be metastatic    5   Nonspecific right upper cervical hypermetabolic node may be reactive or possibly metastatic as well   This may be reassessed on follow-up exam      HTN (hypertension)   Assessment & Plan     · Continue Norvasc      Gastroesophageal reflux disease without esophagitis   Assessment & Plan     · Continue Zantac      Diabetes mellitus ([facility] )     Bipolar 1 disorder (HCC)   Assessment & Plan     · Continue outpatient regimen      Panlobular emphysema ([facility] )   Assessment & Plan     · Continue outpatient regimen      Tobacco abuse   Assessment & Plan     · Smoking cessation following 9/26/18  · Declines nicotine patch                 Admission Orders:   OBS  ORDER   11/13  @   2116  Scheduled Meds:   Current Facility-Administered Medications:  acetaminophen 650 mg Oral Q6H PRN Mauro Guerrero PA-C   albuterol 2 puff Inhalation Q4H PRN Mauro Guerrero PA-C   amLODIPine 10 mg Oral Daily Mauro Guerrero PA-C   budesonide-formoterol 2 puff Inhalation BID Mauro Guerrero PA-C   famotidine 20 mg Oral BID Mauro Guerrero PA-C   FLUoxetine 10 mg Oral Daily Mauro Guerrero PA-C   fluticasone-vilanterol 1 puff Inhalation Daily Mauro Guerrero PA-C   gabapentin 100 mg Oral TID Mauro Guerrero PA-C   heparin (porcine) 5,000 Units Subcutaneous [facility] Mauro Guerrero PA-C   HYDROcodone-acetaminophen 1 tablet Oral Q6H PRN Mauro Guerrero PA-C   ipratropium 0 5 mg Nebulization Q6H PRN Alecia Gomez MD   levalbuterol 1 25 mg Nebulization Q6H PRN Alecia Gomez MD   metFORMIN 500 mg Oral BID With Meals Muaro Guerrero PA-C   methocarbamol 500 mg Oral Q6H PRN Mauro Guerrero PA-C   polyethylene glycol 17 g Oral Daily Mauro Guerrero PA-C   QUEtiapine 25 mg Oral HS Mauro Guerrero PA-C   tiotropium 18 mcg Inhalation Daily Mauro Guerrero PA-C     Continuous Infusions:    PRN Meds:   acetaminophen    albuterol    HYDROcodone-acetaminophen    ipratropium    levalbuterol    methocarbamol     O2  2L  Reg  Diet    PROCEDURE  11/14  Pre-op Diagnosis: Right pneumothorax  Post-op Diagnosis: Right pneumothorax     Surgeon:   Vane Saleh MD     Estimated Blood Loss: None     Findings: Successful placement of right chest tube using 8 5 Fr catheter  Per  Pulmonary  Consult:  Traumatic pneumothorax       *  chest tube placed in right chest wall, no crepitus noted       *  chest tube set to -20 H2O water seal, no bubbling noted       *  will clamp the morning, and repeat chest x-ray 11/15/18 am       *  will continue 2 L nasal cannulae, encourage IS     2  Right pleuritic chest pain secondary to chest tube placement       *  analgesics per primary team        3   Moderate COPD group D without exacerbation       *  PFT's:11/9/18- FEV1/FVC ratio 52%, FEV1 49%, FVC 74%       *  Home regimen includes:  Symbicort b i d , Breo daily, Spiriva daily, albuterol rescue for p r n , Atrovent and Xopenex nebulizer q 2h p r n  *  Will continue home regimen      4  Right middle lobe 1 3 cm solid lung nodule        *  biopsy of lung mass taken on 11/13/2018        *  currently pending biopsy results, will follow-up outpatient     5  Tobacco/marijuana abuse       *  nicotine replacement therapy managed by primary team       *  encouraged alternative treatments       [address]  Utilization Review Department  Phone: [phone]; Fax [phone]  [email or web address]  ATTENTION: Please call with any questions or concerns to [phone]  and carefully listen to the prompts so that you are directed to the right person  Send all requests for admission clinical reviews, approved or denied determinations and any other requests to fax [phone]   All voicemails are confidential

## 2018-11-14 NOTE — H&P
History and Physical - [facility]    Patient Information: Mary Varma  66 y o  male MRN: [id number]  Unit/Bed#: E5 -01 Encounter: [id number]  Admitting Physician: Te Alberts PA-C  PCP: Sanaz Rod MD  Date of Admission:  11/13/18    * Pneumothorax of right lung after biopsy   Assessment & Plan    · Repeat CXR now and repeat in AM  · IR Closely following as well  · Stable O2 sat on [facility]     Mass of right lung   Assessment & Plan    · S/P biopsy  · Pathology pending  · PET Scan 10/24/2018:  1  Dominant right middle lung nodule demonstrates intense FDG activity, most compatible with malignancy  2   Numerous confluent hypermetabolic nodular densities along the right minor major fissure, as well as scattered right lung nodules, most concerning for metastases, as above  3   Mildly hypermetabolic right hilar and mediastinal nodes most concerning for metastases  4   Hypermetabolic periportal nodes, most concerning for metastases  Mildly hypermetabolic left retrocrural and retroperitoneal nodes may also be metastatic  5   Nonspecific right upper cervical hypermetabolic node may be reactive or possibly metastatic as well    This may be reassessed on follow-up exam      HTN (hypertension)   Assessment & Plan    · Continue Norvasc     Gastroesophageal reflux disease without esophagitis   Assessment & Plan    · Continue Zantac     Diabetes mellitus ([facility] )   Assessment & Plan    No results found for: HGBA1C    Recent Labs      11/13/18   2158   POCGLU  141*       Blood Sugar Average: Last 72 hrs:  (P) 141   · Check HgbA1C  · Continue PO medications and sliding scale     Bipolar 1 disorder (HCC)   Assessment & Plan    · Continue outpatient regimen     Panlobular emphysema (HCC)   Assessment & Plan    · Continue outpatient regimen     Tobacco abuse   Assessment & Plan    · Smoking cessation following 9/26/18  · Declines nicotine patch           HPI:   Elizabeth Eisenberg  is a 66 y o  male with a history of right lung masses, possible malignancy with metastatic disease, who presented to interventional Radiology for biopsy of the right lung mass  Biopsy was successful however he was found to have a small right-sided pneumothorax following the procedure  He was evaluated by interventional radiology and a chest tube was deemed not necessary  The a repeat chest x-ray was requested a few hours after the procedure to confirm stability of the pneumothorax  He is complaining of shortness of breath with deep inspiration and some global heaviness in the chest   Denies chest pain  He denies diaphoresis  He has had some weight fluctuation over the past year but this is considered normal for him as he normally fluctuates between cold weather and warm weather  He quit smoking on or about 9/26/18, following his admission for an acute exacerbation of COPD/emphysema    Denies:  Nausea, Vomiting, Diarrhea, Dysuria    ROS:  A 12-point review of systems was done  Please see the HPI for the full details  All other systems negative      PMH:  Principal Problem:    Pneumothorax of right lung after biopsy  Active Problems:    Mass of right lung    HTN (hypertension)    Gastroesophageal reflux disease without esophagitis    Diabetes mellitus (HCC)    Bipolar 1 disorder (HCC)    Panlobular emphysema (HCC)    Tobacco abuse      Past Medical History:   Diagnosis Date    [facility]     COPD (chronic obstructive pulmonary disease) (HCC)     [facility]     Herniated lumbar intervertebral disc     Hypertension     Latent syphilis     Treated    PTSD (post-traumatic stress disorder)     Tobacco abuse 11/13/2018     Past Surgical History:   Procedure Laterality Date    HEMORROIDECTOMY      IR IMAGE GUIDED BIOPSY/ASPIRATION LUNG  11/13/2018    KNEE SURGERY       Social History     Social History    Marital status: Legally      Spouse name: N/A    Number of children: N/A    Years of education: N/A     Social History Main Topics    Smoking status: Former Smoker     Packs/day: 0 50     Types: Cigarettes     Quit date: 8/31/2018    Smokeless tobacco: Current User      Comment: current some day smoker as per [employer]    Alcohol use Yes      Comment: social    Drug use: Yes     Types: Marijuana    Sexual activity: Yes     Other Topics Concern    None     Social History Narrative    None     Family History   Problem Relation Age of Onset    Heart disease Mother     Hypertension Father     Diabetes Family     Asthma Family     Heart disease Family     Cancer Family        MED/ALLERGIES:  Current Facility-Administered Medications   Medication Dose Route Frequency Provider Last Rate Last Dose    acetaminophen (TYLENOL) tablet 650 mg  650 mg Oral Q6H PRN Ally Six, PA-C        albuterol (PROVENTIL HFA,VENTOLIN HFA) inhaler 2 puff  2 puff Inhalation Q4H PRN Laly Six, PA-C        [START ON 11/14/2018] amLODIPine (NORVASC) tablet 10 mg  10 mg Oral Daily Ally Six, PA-C        budesonide-formoterol (SYMBICORT) 160-4 5 mcg/act inhaler 2 puff  2 puff Inhalation BID Ally Six, PA-C        famotidine (PEPCID) tablet 20 mg  20 mg Oral BID Ally Six, PA-C        [START ON 11/14/2018] FLUoxetine (PROzac) capsule 10 mg  10 mg Oral Daily Ally Six, PA-C        [START ON 11/14/2018] fluticasone-vilanterol (BREO ELLIPTA) 100-25 mcg/inh inhaler 1 puff  1 puff Inhalation Daily Ally Six, PA-C        gabapentin (NEURONTIN) capsule 100 mg  100 mg Oral TID Ally Six, PA-C   100 mg at 11/13/18 2208    heparin (porcine) subcutaneous injection 5,000 Units  5,000 Units Subcutaneous [facility] Ally Six, PA-C        ipratropium (ATROVENT) 0 02 % inhalation solution 0 5 mg  0 5 mg Nebulization TID Ally Six, PA-C        levalbuterol [facility]) inhalation solution 1 25 mg  1 25 mg Nebulization Q8H PRN Ally Six, PA-C        [START ON 11/14/2018] metFORMIN (GLUCOPHAGE-XR) 24 hr tablet 500 mg  500 mg Oral BID With Meals Mauro Guerrero PA-C        methocarbamol (ROBAXIN) tablet 500 mg  500 mg Oral Q6H PRN Mauro Guerrero PA-C   500 mg at 11/13/18 2208    [START ON 11/14/2018] polyethylene glycol (MIRALAX) packet 17 g  17 g Oral Daily Mauro Guerrero PA-C        QUEtiapine (SEROquel) tablet 25 mg  25 mg Oral HS Mauro Guerrero PA-C   25 mg at 11/13/18 2208    [START ON 11/14/2018] tiotropium (SPIRIVA) capsule for inhaler 18 mcg  18 mcg Inhalation Daily Mauro Guerrero PA-C         Facility-Administered Medications Ordered in Other Encounters   Medication Dose Route Frequency Provider Last Rate Last Dose    HYDROcodone-acetaminophen (NORCO) 5-325 mg per tablet 1 tablet  1 tablet Oral Q6H PRN Екатерина Henson MD   1 tablet at 11/13/18 1720    sodium chloride 0 9 % infusion  75 mL/hr Intravenous Continuous Екатерина Henson MD   Stopped at 11/13/18 2020     Allergies   Allergen Reactions    Lisinopril Other (See Comments)       OBJECTIVE:    Current Vitals:   Blood Pressure: 156/74 (11/13/18 2044)  Pulse: 73 (11/13/18 2044)  Temperature: 97 8 °F (36 6 °C) (11/13/18 2044)  Respirations: 18 (11/13/18 2044)  SpO2: 97 % (11/13/18 2044)    No intake or output data in the 24 hours ending 11/13/18 2220    Invasive Devices     Peripheral Intravenous Line            Peripheral IV 11/13/18 Left Antecubital less than 1 day                  Physical Exam   Constitutional: He is oriented to person, place, and time  He appears well-developed and well-nourished  No distress  He is not intubated  HENT:   Head: Normocephalic and atraumatic  Right Ear: External ear normal    Left Ear: External ear normal    Neck: Neck supple  No thyromegaly present  Cardiovascular: Normal rate, regular rhythm and normal heart sounds  Exam reveals no gallop  No murmur heard  Pulses:       Radial pulses are 2+ on the right side, and 2+ on the left side          Dorsalis pedis pulses are 2+ on the right side, and 2+ on the left side  Pulmonary/Chest: No tachypnea  He is not intubated  No respiratory distress  He has no decreased breath sounds  He has wheezes in the right upper field, the right middle field, the left upper field and the left middle field  He has rhonchi in the right middle field, the right lower field, the left middle field and the left lower field  He has no rales  Abdominal: Soft  Bowel sounds are normal  He exhibits no distension  There is no tenderness  Musculoskeletal: He exhibits no edema  Lymphadenopathy:     He has no cervical adenopathy  Neurological: He is alert and oriented to person, place, and time  No cranial nerve deficit  Skin: Skin is warm and dry  No erythema  Psychiatric: He has a normal mood and affect  His behavior is normal  Judgment and thought content normal                                                      Lab Results:   Results from last 7 days  Lab Units 11/13/18  2125   PLATELETS Thousands/uL 359          Invalid input(s): LABALBU  Lab Results   Component Value Date    CKTOTAL 156 10/15/2018    CKMB 1 4 09/04/2018    CKMBINDEX <1 0 09/04/2018    TROPONINI 0 09 (H) 09/23/2018       Imaging:     CXR:  1 hour chest x-ray after biopsy:  Stable right middle lobe contusion unchanged from the PA chest performed approximately 1 hour prior; no pneumothorax  (reviewed by IR and felt to have small pneumothorax)    VTE Prophylaxis: Heparin    Code Status: FULL    Counseling / Coordination of Care: Total floor / unit time spent today 45 minutes  Anticipated Length of Stay will be: LESS THAN 2 (TWO) Midnights:     Lonny Blanco PA-C    This note has been constructed using a voice recognition system

## 2018-11-14 NOTE — PLAN OF CARE
DISCHARGE PLANNING     Discharge to home or other facility with appropriate resources Progressing        INFECTION - ADULT     Absence or prevention of progression during hospitalization Progressing     Absence of fever/infection during neutropenic period Progressing        Knowledge Deficit     Patient/family/caregiver demonstrates understanding of disease process, treatment plan, medications, and discharge instructions Progressing        PAIN - ADULT     Verbalizes/displays adequate comfort level or baseline comfort level Progressing        RESPIRATORY - ADULT     Achieves optimal ventilation and oxygenation Progressing        SAFETY ADULT     Patient will remain free of falls Progressing     Maintain or return to baseline ADL function Progressing     Maintain or return mobility status to optimal level Progressing        SKIN/TISSUE INTEGRITY - ADULT     Incision(s), wounds(s) or drain site(s) healing without S/S of infection Progressing

## 2018-11-14 NOTE — PROGRESS NOTES
Patient developed a small delayed pneumothorax 1 hour after his right middle lobe lung biopsy  He does complain of mild anterior superior mid chest discomfort and difficulty breathing but his VSS are stable and pulse ox is in the high 90's unchanged  The repeat CXR does now show the very small apical pneumothorax which was not present 1 hour ago  This is confirmed on the CT scan which I ordered due to his chest discomfort  Plan:  Patient admitted to [facility] overnight  We ordered a CXR at 8:40pm and one in the AM   If the pneumothorax is stable or only increases mildly, we will just observe overnight  If the pneumothorax increases a large amount and the patient is more symptomatic, we will place a chest tube tonight  I spoke with the On-call IR Dr Radha Colin who will be following the patient tonight  Please notify Dr Radha Colin with any concerns

## 2018-11-14 NOTE — ASSESSMENT & PLAN NOTE
No results found for: HGBA1C    Recent Labs      11/13/18   2158   POCGLU  141*       Blood Sugar Average: Last 72 hrs:  (P) 141   · Check HgbA1C  · Continue PO medications and sliding scale

## 2018-11-14 NOTE — PROGRESS NOTES
Progress Note - Danilo Lopez Sr  66 y o  male MRN: [id number]    Unit/Bed#: E5 -01 Encounter: [id number]      Assessment/Plan:  1-postprocedural pneumothorax:  Patient was evaluated in intervention Radiology yesterday for a right middle lobe lung nodule and underwent  biopsy  Unfortunately he had a postprocedural pneumothorax  Patient was monitored overnight and given oxygen and supportive measures  Unfortunately the pneumothorax enlarged    -patient was evaluated by intervention Radiology and had a chest tube placed today    -was evaluated by the pulmonary team   Chest tube is set to water seal   Continue oxygen, incentive spirometry, and analgesia  -repeat chest x-ray in a m  to evaluate progression of pneumothorax    -patient will require greater than 2 midnight stay for further treatment of the pneumothorax, and chest tube    2-right middle lobe lung nodule/mass:  Patient is followed by the pulmonary service as an outpatient for a right middle lung nodule  Unfortunately this was positive on a PET scan, concerning for malignancy  Patient had satellite nodules concerning for metastasis in the lung, as well as hilar and mediastinal lymphadenopathy    -pathology currently pending  Patient follow up with his Pulmonary team as an outpatient    3-COPD:  Patient has a history of COPD without acute exacerbation  Home regimen includes Symbicort, Breo, Spiriva, albuterol p r n , as well as atrovent/Xopenex    -will confer with the pulmonary service regarding the duplication of spiriva plus atrovent, and breo/spiriva in his regimen  4-essential hypertension:  Blood pressure currently adequately controlled on Norvasc 10 mg daily  Continue outpatient follow-up    5-GERD:  Without esophagitis:  Continue H2 blocker    6-diabetes type 2:  Without complication:  Without long-term use of insulin  Home metformin continued      7-bipolar disorder:  Continue home medications and outpatient follow-up    8-bilateral carpal tunnel syndrome:  Patient follows in outpatient recently had EMG  Continue outpatient follow-up    9-chronic low back pain:  Patient follows with the orthopedic surgery service  He was noted to have axial back pain  This was possibly secondary to degenerative disc disease, versus PMR, or other rheumatic disease  He was referred to Rheumatology  4-tobacco/marijuana abuse:  Patient be counseled for cessation    VTE Pharmacologic Prophylaxis: Heparin  VTE Mechanical Prophylaxis:  SCDs    Certification Statement: The patient will continue to require additional inpatient hospital stay due to need for further acute intervention for enlarging pneumothorax, with chest tube placed, currently a water seal   Repeat x-ray in a m  Status: inpatient     ==================================================    Subjective:  Patient relates he currently has pain at the chest tube site  He notes his current doses of pain medication are improving his pain  He declines any change in his regimen  He denies any pain anywhere else  He denies any headache, chest pain, back pain, abdominal or extremity pain  Notes he feels as though he has difficulty taking a deep breath  He denies any chest congestion or cough  He denies any nausea, vomiting, diarrhea  He denies any dizziness or lightheadedness  He notes fatigue  Physical Exam:   Temp:  [97 8 °F (36 6 °C)-99 4 °F (37 4 °C)] 99 4 °F (37 4 °C)  HR:  [62-87] 75  Resp:  [16-18] 18  BP: (136-160)/(68-83) 153/70    Gen:  Pleasant, non-tachypnic, non-dyspnic  Conversant  Heart: regular rate and rhythm, S1S2 present, no murmur, rub or gallop  Lungs:  Decreased breath sounds right base  No wheezing, crackles, or rhonchi  No accessory muscle use or respiratory distress  Abd: soft, non-tender, non-distended  NABS, no guarding, rebound or peritoneal signs  Extremities: no clubbing, cyanosis or edema  2+pedal pulses bilaterally   Full range of motion  Neuro: awake, alert and oriented  Fluent and goal directed speech    Skin: warm and dry: no petechiae, purpura and rash  LABS:     Results from last 7 days  Lab Units 11/14/18  0536 11/13/18  2125   WBC Thousand/uL 8 72  --    HEMOGLOBIN g/dL 11 8*  --    HEMATOCRIT % 35 8*  --    PLATELETS Thousands/uL 303 359       Results from last 7 days  Lab Units 11/14/18  0536   POTASSIUM mmol/L 4 0   CHLORIDE mmol/L 105   CO2 mmol/L 25   BUN mg/dL 18   CREATININE mg/dL 1 06   CALCIUM mg/dL 8 2*       Chest x-ray 11/13/2018:  Small right pneumothorax  Procedure:  11/14/2018:  CT-guided right chest tube placement    ---------------------------------------------------------------------------------------------------------------  This note has been constructed using a voice recognition system

## 2018-11-14 NOTE — BRIEF OP NOTE (RAD/CATH)
Right chest tube   Procedure Note    PATIENT NAME: Mauri Moritz Sr   : 1960  MRN: [id number]     Pre-op Diagnosis: Right pneumothorax  Post-op Diagnosis: Right pneumothorax    Surgeon:   Vane Saleh MD    Estimated Blood Loss: None    Findings: Successful placement of right chest tube using 8 5 Fr catheter      Specimens: None    Complications:  None    Anesthesia: Local and Fentanyl IV    Vane Saleh MD     Date: 2018  Time: 10:25 AM

## 2018-11-14 NOTE — CONSULTS
Consultation - Pulmonary Medicine   Albaro Navarrete   66 y o  male MRN: [id number]  Unit/Bed#: E5 -01 Encounter: [id number]      Assessment/Plan:    1  Traumatic pneumothorax       *  chest tube placed in right chest wall, no crepitus noted       *  chest tube set to -20 H2O water seal, no bubbling noted       *  will clamp the morning, and repeat chest x-ray 11/15/18 am       *  will continue 2 L nasal cannulae, encourage IS    2  Right pleuritic chest pain secondary to chest tube placement       *  analgesics per primary team        3  Moderate COPD group D without exacerbation       *  PFT's:11/9/18- FEV1/FVC ratio 52%, FEV1 49%, FVC 74%       *  Home regimen includes:  Symbicort b i d , Breo daily, Spiriva daily, albuterol rescue for p r n , Atrovent and Xopenex nebulizer q 2h p r n  *  Will continue home regimen     4  Right middle lobe 1 3 cm solid lung nodule        *  biopsy of lung mass taken on 11/13/2018        *  currently pending biopsy results, will follow-up outpatient    5  Tobacco/marijuana abuse       *  nicotine replacement therapy managed by primary team       *  encouraged alternative treatments    Will schedule Outpatient Endobronchial ultrasound for next week    History of Present Illness   Physician Requesting Consult: Klaus Starks MD  Reason for Consult / Principal Problem:  Pneumothorax of right lung  Hx and PE limited by:  Nothing  Chief Complaint:  Pleuritic chest pain  HPI: Jovanna Vera  is a 66 y o  male who presented to [place] [id number]  for biopsy of his right middle lobe  Patient has a history of severe right median neuropathy, moderate COPD, hypertension, mid RCA lesion, diabetes mellitus, bipolar, PTSD, ADHD, CVA and positive smoker  Patient recently received a PET scan on 10/24/2018 which was positive for a right middle lobe lung nodule  He had a biopsy under IR guided imagery on 11/13/2018    Patient developed a small apical pneumothorax approximately 1 hr post biopsy  Patient was placed on surgical floor under observation  Repeat imaging in the morning revealed a significant increase in size the patient was symptomatic for shortness of breath and pleuritic chest pain  Interventional Radiology then placed an 8 5 fr   Chest tube in the patient's right chest wall  Mecca Moura was sitting on the side of his bed eating his lunch upon examination  He stated that his right chest wall is extremely sore  He reports that overnight his shortness of breath and chest pain began to increase  He stated after the chest tube was placed his shortness of breath seems to be resolving  He reports that he recently quit smoking and marijuana use on approximately 09/26/2018  Patient reports smoking approximately half a pack of cigarettes a day for approximately 30 years  Patient stated that approximately a year ago, 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  He reports that this is when he noticed all his respiratory symptoms beginning  Patient currently describes some mild right pleuritic chest pain with a dry cough  Patient denies any fever, chills, headache, palpitations, and edema  Inpatient consult to Pulmonology  Consult performed by: Sherry Salvage  Consult ordered by: Dalila Stewart          Review of Systems   Constitutional: Negative for activity change and appetite change  HENT: Negative for congestion, facial swelling and sinus pressure  Eyes: Negative for pain  Respiratory: Positive for cough, chest tightness and shortness of breath  Negative for choking, wheezing and stridor  Cardiovascular: Positive for chest pain  Negative for palpitations and leg swelling  Gastrointestinal: Negative for abdominal pain and nausea  Genitourinary: Negative for flank pain and frequency  Musculoskeletal: Negative for back pain and joint swelling  Neurological: Negative for dizziness and headaches  Psychiatric/Behavioral: Negative for agitation and behavioral problems  Historical Information   Past Medical History:   Diagnosis Date    B[facility] disorder ([facility] [address] [facility]     [facility] obstructive pulmonary disease) [facility]     Diabetes mellitus ([facility] )     Herniated lumbar intervertebral disc     Hypertension     Latent syphilis     Treated    PTSD (post-traumatic stress disorder)     Tobacco abuse 11/13/2018     Past Surgical History:   Procedure Laterality Date    HEMORROIDECTOMY      IR CHEST TUBE  11/14/2018    IR IMAGE GUIDED BIOPSY/ASPIRATION LUNG  11/13/2018    KNEE SURGERY       Social History   History   Alcohol Use    Yes     Comment: social     History   Drug Use    Types: Marijuana     History   Smoking Status    Former Smoker    Packs/day: 0 50    Types: Cigarettes    Quit date: 8/31/2018   Smokeless Tobacco    Current User     Comment: current some day smoker as per [employer]     Occupational History:      Family History:   Family History   Problem Relation Age of Onset    Heart disease Mother     Hypertension Father     Diabetes Family     Asthma Family     Heart disease Family     Cancer Family        Meds/Allergies   pertinent pulmonary meds have been reviewed    Allergies   Allergen Reactions    Lisinopril Other (See Comments)       Objective   Vitals: Blood pressure 150/72, pulse 74, temperature 98 3 °F (36 8 °C), temperature source Temporal, resp  rate 16, SpO2 99 %  2L,There is no height or weight on file to calculate BMI  No intake or output data in the 24 hours ending 11/14/18 1300  Invasive Devices     Peripheral Intravenous Line            Peripheral IV 11/13/18 Left Antecubital 1 day          Drain            Chest Tube 1 Right Pleural 8 Fr  less than 1 day                Physical Exam   Constitutional: He is oriented to person, place, and time  He appears well-developed and well-nourished  HENT:   Head: Normocephalic and atraumatic     Neck: Normal range of motion  Neck supple  No tracheal deviation present  No thyromegaly present  Cardiovascular: Normal rate and regular rhythm  Exam reveals no gallop and no friction rub  No murmur heard  Pulmonary/Chest: Effort normal  No accessory muscle usage  No respiratory distress  He has decreased breath sounds in the right upper field, the right middle field and the right lower field  He has wheezes in the right upper field, the right middle field and the right lower field  He has no rhonchi  He has no rales  He exhibits tenderness  8 5 FR catheter placed right upper chest wall, no crepitus felt on chest, back, neck  No bubbling noted in chest tube   Abdominal: Soft  Bowel sounds are normal    Musculoskeletal: Normal range of motion  He exhibits no edema or deformity  Neurological: He is alert and oriented to person, place, and time  Skin: Skin is warm and dry  Psychiatric: He has a normal mood and affect  His behavior is normal        Lab Results:   I have personally reviewed pertinent lab results  , ABG: No results found for: PHART, UVG1MIM, PO2ART, GXB4VRM, M7OMUOHC, BEART, SOURCE, BNP: No results found for: BNP, CBC:   Lab Results   Component Value Date    WBC 8 72 11/14/2018    HGB 11 8 (L) 11/14/2018    HCT 35 8 (L) 11/14/2018    MCV 85 11/14/2018     11/14/2018    MCH 28 2 11/14/2018    MCHC 33 0 11/14/2018    RDW 16 1 (H) 11/14/2018    MPV 8 6 (L) 11/14/2018    NRBC 0 11/14/2018   , CMP:   Lab Results   Component Value Date    SODIUM 139 11/14/2018    K 4 0 11/14/2018     11/14/2018    CO2 25 11/14/2018    BUN 18 11/14/2018    CREATININE 1 06 11/14/2018    CALCIUM 8 2 (L) 11/14/2018    EGFR 90 11/14/2018   , PT/INR: No results found for: PT, INR, Troponin: No results found for: TROPONINI    Imaging Studies: I have personally reviewed pertinent films in PACS   Chest x-ray 11/13/2018- right middle lobe contusion secondary to biopsy    Chest x-ray 11/13/2018- small right apical pneumothorax    Chest x-ray 11/14/2018- moderate right apical pneumothorax      EKG, Pathology, and Other Studies: I have personally reviewed pertinent films in PACS    Cardiac catheterization 06/2018- 18-20% mid RCA lesion  ECHO 09/25/2018- EF 12-51%-RAXFD 1 diastolic dysfunction    Pulmonary Results (PFTs, PSG): I have personally reviewed pertinent films in PACS    11/9/18- FEV1/FVC ratio 52%, FEV1 49%, FVC 74%    VTE Prophylaxis: Heparin    Code Status: Level 1 - Full Code    Counseling/Coordination of Care: Total floor / unit time spent today 20 minutes  Greater than 50% of total time was spent with the patient and / or family counseling and / or coordination of care  A description of the counseling / coordination of care: 20    Portions of the record may have been created with voice recognition software  Occasional wrong word or "sound a like" substitutions may have occurred due to the inherent limitations of voice recognition software  Read the chart carefully and recognize, using context, where substitutions have occurred

## 2018-11-15 ENCOUNTER — APPOINTMENT (INPATIENT)
Dept: RADIOLOGY | Facility: HOSPITAL | Age: 58
DRG: 143 | End: 2018-11-15
Payer: COMMERCIAL

## 2018-11-15 LAB
GLUCOSE SERPL-MCNC: 111 MG/DL (ref 65–140)
GLUCOSE SERPL-MCNC: 116 MG/DL (ref 65–140)
GLUCOSE SERPL-MCNC: 66 MG/DL (ref 65–140)
GLUCOSE SERPL-MCNC: 79 MG/DL (ref 65–140)
GLUCOSE SERPL-MCNC: 84 MG/DL (ref 65–140)
GLUCOSE SERPL-MCNC: 94 MG/DL (ref 65–140)

## 2018-11-15 PROCEDURE — 99232 SBSQ HOSP IP/OBS MODERATE 35: CPT | Performed by: INTERNAL MEDICINE

## 2018-11-15 PROCEDURE — 94640 AIRWAY INHALATION TREATMENT: CPT

## 2018-11-15 PROCEDURE — 94762 N-INVAS EAR/PLS OXIMTRY CONT: CPT

## 2018-11-15 PROCEDURE — 82948 REAGENT STRIP/BLOOD GLUCOSE: CPT

## 2018-11-15 PROCEDURE — 71046 X-RAY EXAM CHEST 2 VIEWS: CPT

## 2018-11-15 PROCEDURE — 71045 X-RAY EXAM CHEST 1 VIEW: CPT

## 2018-11-15 RX ORDER — BUDESONIDE 0.5 MG/2ML
0.5 INHALANT ORAL
Status: DISCONTINUED | OUTPATIENT
Start: 2018-11-15 | End: 2018-11-17

## 2018-11-15 RX ORDER — LEVALBUTEROL INHALATION SOLUTION 0.63 MG/3ML
0.63 SOLUTION RESPIRATORY (INHALATION) EVERY 6 HOURS PRN
Status: DISCONTINUED | OUTPATIENT
Start: 2018-11-15 | End: 2018-11-15

## 2018-11-15 RX ORDER — MORPHINE SULFATE 4 MG/ML
4 INJECTION, SOLUTION INTRAMUSCULAR; INTRAVENOUS EVERY 4 HOURS PRN
Status: DISCONTINUED | OUTPATIENT
Start: 2018-11-15 | End: 2018-11-16

## 2018-11-15 RX ORDER — METHOCARBAMOL 500 MG/1
750 TABLET, FILM COATED ORAL EVERY 6 HOURS PRN
Status: DISCONTINUED | OUTPATIENT
Start: 2018-11-15 | End: 2018-11-17

## 2018-11-15 RX ORDER — LEVALBUTEROL 1.25 MG/.5ML
1.25 SOLUTION, CONCENTRATE RESPIRATORY (INHALATION)
Status: DISCONTINUED | OUTPATIENT
Start: 2018-11-15 | End: 2018-11-17

## 2018-11-15 RX ADMIN — MORPHINE SULFATE 4 MG: 4 INJECTION INTRAVENOUS at 21:21

## 2018-11-15 RX ADMIN — IPRATROPIUM BROMIDE 0.5 MG: 0.5 SOLUTION RESPIRATORY (INHALATION) at 19:14

## 2018-11-15 RX ADMIN — FLUTICASONE FUROATE AND VILANTEROL TRIFENATATE 1 PUFF: 100; 25 POWDER RESPIRATORY (INHALATION) at 08:17

## 2018-11-15 RX ADMIN — METFORMIN HYDROCHLORIDE 500 MG: 500 TABLET, EXTENDED RELEASE ORAL at 16:04

## 2018-11-15 RX ADMIN — OXYCODONE HYDROCHLORIDE 10 MG: 10 TABLET ORAL at 08:19

## 2018-11-15 RX ADMIN — GABAPENTIN 100 MG: 100 CAPSULE ORAL at 08:19

## 2018-11-15 RX ADMIN — QUETIAPINE FUMARATE 25 MG: 25 TABLET ORAL at 21:21

## 2018-11-15 RX ADMIN — GABAPENTIN 100 MG: 100 CAPSULE ORAL at 16:04

## 2018-11-15 RX ADMIN — LEVALBUTEROL HYDROCHLORIDE 1.25 MG: 1.25 SOLUTION, CONCENTRATE RESPIRATORY (INHALATION) at 13:46

## 2018-11-15 RX ADMIN — AMLODIPINE BESYLATE 10 MG: 10 TABLET ORAL at 08:19

## 2018-11-15 RX ADMIN — BUDESONIDE AND FORMOTEROL FUMARATE DIHYDRATE 2 PUFF: 160; 4.5 AEROSOL RESPIRATORY (INHALATION) at 08:17

## 2018-11-15 RX ADMIN — GABAPENTIN 100 MG: 100 CAPSULE ORAL at 21:21

## 2018-11-15 RX ADMIN — TIOTROPIUM BROMIDE 18 MCG: 18 CAPSULE ORAL; RESPIRATORY (INHALATION) at 08:17

## 2018-11-15 RX ADMIN — LEVALBUTEROL HYDROCHLORIDE 1.25 MG: 1.25 SOLUTION, CONCENTRATE RESPIRATORY (INHALATION) at 19:14

## 2018-11-15 RX ADMIN — FAMOTIDINE 20 MG: 20 TABLET ORAL at 08:19

## 2018-11-15 RX ADMIN — FAMOTIDINE 20 MG: 20 TABLET ORAL at 17:14

## 2018-11-15 RX ADMIN — FLUOXETINE 10 MG: 10 CAPSULE ORAL at 08:19

## 2018-11-15 RX ADMIN — POLYETHYLENE GLYCOL (3350) 17 G: 17 POWDER, FOR SOLUTION ORAL at 08:19

## 2018-11-15 RX ADMIN — BUDESONIDE 0.5 MG: 0.5 INHALANT RESPIRATORY (INHALATION) at 19:14

## 2018-11-15 RX ADMIN — MORPHINE SULFATE 4 MG: 4 INJECTION INTRAVENOUS at 14:55

## 2018-11-15 RX ADMIN — IPRATROPIUM BROMIDE 0.5 MG: 0.5 SOLUTION RESPIRATORY (INHALATION) at 13:46

## 2018-11-15 RX ADMIN — METFORMIN HYDROCHLORIDE 500 MG: 500 TABLET, EXTENDED RELEASE ORAL at 08:18

## 2018-11-15 NOTE — PROGRESS NOTES
Progress Note - Jet Robles Sr  66 y o  male MRN: [id number]    Unit/Bed#: E5 -01 Encounter: [id number]    Assessment/Plan:    Postprocedure pneumothorax chest tube placed, chest tube was been clamped review x-ray with pulmonary, add morphine for pain control    Pulmonary adenocarcinoma  follow-up with Oncology    COPD     patient reports he has discontinued smoking, per Pulmonary continued nebulizer treatments    Diabetes    controlled with Glucophage    Hypertension    been elevated with pain continue Norvasc and monitor    GERD     continue Pepcid for acid control    Subjective:   Severe pain at the chest tube site/difficulty with deep breathing denies nausea vomiting diarrhea no fevers chills appetite is okay    Objective:     Vitals: Blood pressure 148/93, pulse 80, temperature 99 °F (37 2 °C), temperature source Temporal, resp  rate 18, SpO2 98 %  ,There is no height or weight on file to calculate BMI          Results from last 7 days  Lab Units 11/14/18  0536   WBC Thousand/uL 8 72   HEMOGLOBIN g/dL 11 8*   HEMATOCRIT % 35 8*   PLATELETS Thousands/uL 303       Results from last 7 days  Lab Units 11/14/18  0536   POTASSIUM mmol/L 4 0   CHLORIDE mmol/L 105   CO2 mmol/L 25   BUN mg/dL 18   CREATININE mg/dL 1 06   CALCIUM mg/dL 8 2*       Scheduled Meds:    Current Facility-Administered Medications:  acetaminophen 650 mg Oral Q6H PRN Brit Cifuentes PA-C   albuterol 2 puff Inhalation Q4H PRN Brit Cifuentes PA-C   amLODIPine 10 mg Oral Daily Roosevelt Arreola PA-C   budesonide 0 5 mg Nebulization Q12H Josefina Jean-Baptiste MD   famotidine 20 mg Oral BID Brit Cifuentes PA-C   FLUoxetine 10 mg Oral Daily Brit Cifuentes PA-C   gabapentin 100 mg Oral TID Brit Cifuentes PA-C   heparin (porcine) 5,000 Units Subcutaneous [facility] Brit Cifuentes PA-C   ipratropium 0 5 mg Nebulization TID Iuka Chard, DO   levalbuterol 1 25 mg Nebulization TID Iuka Chard, DO   metFORMIN 500 mg Oral BID With Meals Brit Cifuentes PA-C methocarbamol 750 mg Oral Q6H PRN [employer], DO   morphine injection 4 mg Intravenous Q4H PRN [employer], DO   polyethylene glycol 17 g Oral Daily Refugia RODNEY Brumfield   QUEtiapine 25 mg Oral HS Refugia JOSSELINE Brumfield-C       Continuous Infusions:     Physical exam:  General appearance:  Alert mild distress interaction appropriate  Head/Eyes:  Nonicteric PERRL EOMI  Neck:  Supple  Lungs:  Decreased BS bilateral no wheezing rhonchi or rales  Heart: normal S1 S2 regular  Abdomen: Soft nontender with bowel sounds  Extremities: no edema  Skin: no rash    Invasive Devices     Peripheral Intravenous Line            Peripheral IV 11/13/18 Left Antecubital 2 days          Drain            Chest Tube 1 Right Pleural 8 Fr  1 day                  VTE Pharmacologic Prophylaxis:  Heparin  VTE Mechanical Prophylaxis:  SCDs                     Counseling / Coordination of Care  Total floor / unit time spent today  30   minutes  Greater than 50% of total time was spent with the patient and / or family counseling and / or coordination of care    A description of the counseling / coordination of care:

## 2018-11-15 NOTE — PROGRESS NOTES
Progress Note - Pulmonary   Patricia Constantino Sr  66 y o  male MRN: [id number]  Unit/Bed#: E5 -01 Encounter: [id number]    Assessment/Plan:  1  Traumatic pneumothorax       *  chest tube placed in right chest wall, no crepitus noted       *  chest tube was set to -20 H2O water seal overnight, however; due to patient's increasing symptoms, chest to was set to -20 H2O water suction low continuous       *  chest tube has frequent intermittent bubbling especially upon coughing and deep inhalation       *  will repeat chest x-ray this a m  *  will continue 2 L nasal cannulae, encourage IS     2  Right pleuritic chest pain secondary to chest tube placement       *  analgesics per primary team        3  Severe COPD group D without exacerbation       *  PFT's:11/9/18- FEV1/FVC ratio 52%, FEV1 49%, FVC 74%, hospitalized >1 this year for COPD       *  Home regimen includes:  Symbicort b i d , Breo daily, Spiriva daily, albuterol rescue for p r n , Atrovent and Xopenex nebulizer q 2h p r n        *   will continue nebulizers q 4h and Pulmicort Q 12 per Dr Neva Ivey     4  Right middle lobe 1 3 cm solid lung nodule in the setting of lung adenocarcinoma        *  Dr Neva Ivey will schedule an EBUS for staging next week        *  biopsy of lung mass taken on 11/13/2018        *  currently pending biopsy results, will follow-up outpatient     5  Tobacco/marijuana abuse       *   continue nicotine replacement therapy managed by primary team       *  encouraged alternative treatments     Will schedule Outpatient Endobronchial ultrasound for next week    Follow-up appointment 12/14/2018 per discharge instructions      Chief Complaint:    "My chest is still sore"    Subjective:    Corry De Luna was sitting on the side of his bed eating breakfast   His chest tube was on water-seal throughout the evening and upon morning examination  He stated that he is having significantly increased pleuritic chest pain    He reports when he takes a deep breath or coughs, the pain becomes a 10/10  He reports that his pain is being adequately managed with analgesics  Patient denies cough, fever, chills, headache, palpation, and shortness of Breath  Objective:    Vitals: Blood pressure 148/93, pulse 80, temperature 99 °F (37 2 °C), temperature source Temporal, resp  rate 18, SpO2 99 %  2L,There is no height or weight on file to calculate BMI  No intake or output data in the 24 hours ending 11/15/18 0910    Invasive Devices     Peripheral Intravenous Line            Peripheral IV 11/13/18 Left Antecubital 1 day          Drain            Chest Tube 1 Right Pleural 8 Fr  less than 1 day                Physical Exam:   Physical Exam   Constitutional: He is oriented to person, place, and time  He appears well-developed and well-nourished  HENT:   Head: Normocephalic and atraumatic  Neck: Normal range of motion  Neck supple  No tracheal deviation present  Cardiovascular: Normal rate, regular rhythm and normal heart sounds  Exam reveals no gallop and no friction rub  No murmur heard  Pulmonary/Chest: Effort normal  No accessory muscle usage or stridor  No respiratory distress  He has decreased breath sounds in the right upper field, the right middle field and the right lower field  He has wheezes in the right upper field  He has no rhonchi  He has no rales  He exhibits tenderness  Chest tube inserted into right chest wall, dressing clean dry and intact, no crepitus on right chest back or neck, intermittent bubbling   Abdominal: Soft  Bowel sounds are normal    Musculoskeletal: Normal range of motion  He exhibits no edema or deformity  Neurological: He is alert and oriented to person, place, and time  Skin: Skin is warm and dry  Psychiatric: He has a normal mood and affect  His behavior is normal        Labs:  I have personally reviewed pertinent lab results from 11/14/18    Imaging and other studies: I have personally reviewed pertinent films in PACS Chest x-ray 11/13/2018- right middle lobe contusion secondary to biopsy     Chest x-ray 11/13/2018- small right apical pneumothorax     Chest x-ray 11/14/2018- moderate right apical pneumothorax

## 2018-11-16 ENCOUNTER — APPOINTMENT (INPATIENT)
Dept: RADIOLOGY | Facility: HOSPITAL | Age: 58
DRG: 143 | End: 2018-11-16
Payer: COMMERCIAL

## 2018-11-16 LAB
ANION GAP SERPL CALCULATED.3IONS-SCNC: 11 MMOL/L (ref 4–13)
APTT PPP: 33 SECONDS (ref 26–38)
BUN SERPL-MCNC: 11 MG/DL (ref 5–25)
CALCIUM SERPL-MCNC: 9 MG/DL (ref 8.3–10.1)
CHLORIDE SERPL-SCNC: 103 MMOL/L (ref 100–108)
CO2 SERPL-SCNC: 27 MMOL/L (ref 21–32)
CREAT SERPL-MCNC: 1.04 MG/DL (ref 0.6–1.3)
GFR SERPL CREATININE-BSD FRML MDRD: 92 ML/MIN/1.73SQ M
GLUCOSE SERPL-MCNC: 119 MG/DL (ref 65–140)
GLUCOSE SERPL-MCNC: 86 MG/DL (ref 65–140)
GLUCOSE SERPL-MCNC: 95 MG/DL (ref 65–140)
GLUCOSE SERPL-MCNC: 95 MG/DL (ref 65–140)
INR PPP: 1.09 (ref 0.86–1.17)
POTASSIUM SERPL-SCNC: 3.8 MMOL/L (ref 3.5–5.3)
PROTHROMBIN TIME: 14.2 SECONDS (ref 11.8–14.2)
SODIUM SERPL-SCNC: 141 MMOL/L (ref 136–145)

## 2018-11-16 PROCEDURE — 99232 SBSQ HOSP IP/OBS MODERATE 35: CPT | Performed by: INTERNAL MEDICINE

## 2018-11-16 PROCEDURE — 85730 THROMBOPLASTIN TIME PARTIAL: CPT | Performed by: INTERNAL MEDICINE

## 2018-11-16 PROCEDURE — 94760 N-INVAS EAR/PLS OXIMETRY 1: CPT

## 2018-11-16 PROCEDURE — 85610 PROTHROMBIN TIME: CPT | Performed by: INTERNAL MEDICINE

## 2018-11-16 PROCEDURE — 80048 BASIC METABOLIC PNL TOTAL CA: CPT | Performed by: INTERNAL MEDICINE

## 2018-11-16 PROCEDURE — 94762 N-INVAS EAR/PLS OXIMTRY CONT: CPT

## 2018-11-16 PROCEDURE — 71045 X-RAY EXAM CHEST 1 VIEW: CPT

## 2018-11-16 PROCEDURE — 94640 AIRWAY INHALATION TREATMENT: CPT

## 2018-11-16 PROCEDURE — 82948 REAGENT STRIP/BLOOD GLUCOSE: CPT

## 2018-11-16 RX ORDER — TRAMADOL HYDROCHLORIDE 50 MG/1
50 TABLET ORAL EVERY 6 HOURS PRN
Status: DISCONTINUED | OUTPATIENT
Start: 2018-11-16 | End: 2018-11-17 | Stop reason: HOSPADM

## 2018-11-16 RX ADMIN — ALBUTEROL SULFATE 2 PUFF: 90 AEROSOL, METERED RESPIRATORY (INHALATION) at 08:14

## 2018-11-16 RX ADMIN — IPRATROPIUM BROMIDE 0.5 MG: 0.5 SOLUTION RESPIRATORY (INHALATION) at 07:27

## 2018-11-16 RX ADMIN — FAMOTIDINE 20 MG: 20 TABLET ORAL at 07:58

## 2018-11-16 RX ADMIN — GABAPENTIN 100 MG: 100 CAPSULE ORAL at 07:59

## 2018-11-16 RX ADMIN — LEVALBUTEROL HYDROCHLORIDE 1.25 MG: 1.25 SOLUTION, CONCENTRATE RESPIRATORY (INHALATION) at 07:27

## 2018-11-16 RX ADMIN — AMLODIPINE BESYLATE 10 MG: 10 TABLET ORAL at 07:58

## 2018-11-16 RX ADMIN — QUETIAPINE FUMARATE 25 MG: 25 TABLET ORAL at 20:31

## 2018-11-16 RX ADMIN — LEVALBUTEROL HYDROCHLORIDE 1.25 MG: 1.25 SOLUTION, CONCENTRATE RESPIRATORY (INHALATION) at 13:45

## 2018-11-16 RX ADMIN — MORPHINE SULFATE 4 MG: 4 INJECTION INTRAVENOUS at 08:03

## 2018-11-16 RX ADMIN — BUDESONIDE 0.5 MG: 0.5 INHALANT RESPIRATORY (INHALATION) at 07:27

## 2018-11-16 RX ADMIN — LEVALBUTEROL HYDROCHLORIDE 1.25 MG: 1.25 SOLUTION, CONCENTRATE RESPIRATORY (INHALATION) at 19:20

## 2018-11-16 RX ADMIN — METFORMIN HYDROCHLORIDE 500 MG: 500 TABLET, EXTENDED RELEASE ORAL at 16:55

## 2018-11-16 RX ADMIN — GABAPENTIN 100 MG: 100 CAPSULE ORAL at 20:31

## 2018-11-16 RX ADMIN — POLYETHYLENE GLYCOL (3350) 17 G: 17 POWDER, FOR SOLUTION ORAL at 07:59

## 2018-11-16 RX ADMIN — BUDESONIDE 0.5 MG: 0.5 INHALANT RESPIRATORY (INHALATION) at 19:20

## 2018-11-16 RX ADMIN — METFORMIN HYDROCHLORIDE 500 MG: 500 TABLET, EXTENDED RELEASE ORAL at 07:58

## 2018-11-16 RX ADMIN — FAMOTIDINE 20 MG: 20 TABLET ORAL at 17:43

## 2018-11-16 RX ADMIN — GABAPENTIN 100 MG: 100 CAPSULE ORAL at 16:55

## 2018-11-16 RX ADMIN — HEPARIN SODIUM 5000 UNITS: 5000 INJECTION INTRAVENOUS; SUBCUTANEOUS at 20:31

## 2018-11-16 RX ADMIN — IPRATROPIUM BROMIDE 0.5 MG: 0.5 SOLUTION RESPIRATORY (INHALATION) at 19:20

## 2018-11-16 RX ADMIN — FLUOXETINE 10 MG: 10 CAPSULE ORAL at 07:58

## 2018-11-16 RX ADMIN — IPRATROPIUM BROMIDE 0.5 MG: 0.5 SOLUTION RESPIRATORY (INHALATION) at 13:45

## 2018-11-16 NOTE — PLAN OF CARE

## 2018-11-16 NOTE — PROGRESS NOTES
Progress Note - Luis Ott Sr  66 y o  male MRN: [id number]    Unit/Bed#: E5 -01 Encounter: [id number]    Assessment/Plan:    Pneumothorax   monitoring with serial x-rays, no residual pneumothorax removed chest tube    Adenocarcinoma  lung primary, procedure scheduled by Pulmonary for staging next week    Tobacco abuse  cessation counseling provided, promises to quit forever    Diabetes   well controlled with Glucophage    Hypertension   control with Norvasc    GERD    continue Pepcid    Subjective:   Less pain at CT site, denies shortness of breath, chronic cough, no nausea vomiting diarrhea no fevers chills appetite good    Objective:     Vitals: Blood pressure 144/70, pulse 85, temperature 98 5 °F (36 9 °C), temperature source Temporal, resp  rate 18, SpO2 95 %  ,There is no height or weight on file to calculate BMI          Results from last 7 days  Lab Units 11/16/18  0954 11/14/18  0536   WBC Thousand/uL  --  8 72   HEMOGLOBIN g/dL  --  11 8*   HEMATOCRIT %  --  35 8*   PLATELETS Thousands/uL  --  303   INR  1 09  --        Results from last 7 days  Lab Units 11/16/18  0954   POTASSIUM mmol/L 3 8   CHLORIDE mmol/L 103   CO2 mmol/L 27   BUN mg/dL 11   CREATININE mg/dL 1 04   CALCIUM mg/dL 9 0       Scheduled Meds:    Current Facility-Administered Medications:  acetaminophen 650 mg Oral Q6H PRN Beatriz Caballero PA-C   albuterol 2 puff Inhalation Q4H PRN Beatriz Caballero PA-C   amLODIPine 10 mg Oral Daily Roosevelt Arreola PA-C   budesonide 0 5 mg Nebulization Q12H Bernarda Johnson MD   famotidine 20 mg Oral BID Beatriz Caballero PA-C   FLUoxetine 10 mg Oral Daily Beatriz Caballero PA-C   gabapentin 100 mg Oral TID Beatriz Caballero PA-C   heparin (porcine) 5,000 Units Subcutaneous [facility] Beatriz Caballero PA-C   ipratropium 0 5 mg Nebulization TID Jessica Barfield,    levalbuterol 1 25 mg Nebulization TID Jessica Barfield, DO   metFORMIN 500 mg Oral BID With Meals Beatriz Caballero PA-C   methocarbamol 750 mg Oral Q6H PRN Evelyn Hale Marilynn White DO   polyethylene glycol 17 g Oral Daily Ny Banks PA-C   QUEtiapine 25 mg Oral HS Ny Banks PA-C   traMADol 50 mg Oral Q6H PRN Manohar Sebastian DO       Continuous Infusions:     Physical exam:  General appearance:  Alert no distress interaction appropriate  Head/Eyes:  Nonicteric PERRL EOMI  Neck:  Supple  Lungs:  Decreased BS bilateral no wheezing rhonchi or rales  Heart: normal S1 S2 regular  Abdomen: Soft nontender with bowel sounds  Extremities: no edema  Skin: no rash    Invasive Devices     Peripheral Intravenous Line            Peripheral IV 11/13/18 Left Antecubital 3 days                  Counseling / Coordination of Care  Total floor / unit time spent today 30  minutes  Greater than 50% of total time was spent with the patient and / or family counseling and / or coordination of care    A description of the counseling / coordination of care:  Discussed with Pulmonary

## 2018-11-16 NOTE — PROGRESS NOTES
Patient resting in bed, states pain is persistent at chest tube site  Medicated with PRN pain medication  Pt is requesting his inhalers, these were discontinued yesterday, will notify MD   Denies any SOB/questions/concerns at this time  No changes from previous assessment, will continue to monitor

## 2018-11-16 NOTE — SEDATION DOCUMENTATION
Patient's chest tube has been clamped since yesterday, requested to remove chest tube  No air leak noted

## 2018-11-16 NOTE — PROGRESS NOTES
Progress Note - Pulmonary   Dominik Ordaz Sr  66 y o  male MRN: [id number]  Unit/Bed#: E5 -01 Encounter: [id number]    Assessment/Plan:    1  Traumatic pneumothorax secondary to transthoracic needle aspiration       *  chest tube placed in right chest wall, no crepitus noted       *  chest tube was clamped overnight overnight, with no respiratory distress       *  chest tube will be discontinued by IR       *  will repeat chest x-ray this a m       *  will continue 2 L nasal cannulae p r n , encourage IS, and OOB as tolerated     2  Right pleuritic chest pain secondary to chest tube placement       *  analgesics per primary team     3  Severe COPD group D without exacerbation       *  PFT's:11/9/18- FEV1/FVC ratio 52%, FEV1 49%, FVC 74%, hospitalized >1 this year for COPD       *  Home regimen includes:  Symbicort b i d , Breo daily, Spiriva daily, albuterol rescue for p r n , Atrovent and Xopenex nebulizer q 2h p r n        *   will continue nebulizers q 4h and Pulmicort Q 12 per Dr Amira Santoro     4  Right middle lobe 1 3 cm solid lung nodule in the setting of lung adenocarcinoma        *  Dr Amira Santoro will schedule an EBUS for staging next week in [place]        *  biopsy of lung mass taken on 11/13/2018        *  biopsy results consistent with adenocarcinoma     5  Tobacco/marijuana abuse       *   continue nicotine replacement therapy managed by primary team       *  encouraged alternative treatments     Will schedule Outpatient Endobronchial ultrasound for next week     Follow-up appointment 12/14/2018 per discharge instructions         Chief Complaint:    "I am having intractable pain"    Subjective:    Patient was laying in the bed watching TV  He appeared to be in pain and uncomfortable  He reports that he is having uncontrollable pleuritic chest pain especially on deep inhalation and coughing  He stated that he was anxious to get the chest tube out    Patient denies fever, chills, shortness of breath, cough, and headache  Objective:    Vitals: Blood pressure 144/70, pulse 85, temperature 98 5 °F (36 9 °C), temperature source Temporal, resp  rate 18, SpO2 95 %  2L,There is no height or weight on file to calculate BMI  No intake or output data in the 24 hours ending 11/16/18 1453    Invasive Devices     Peripheral Intravenous Line            Peripheral IV 11/13/18 Left Antecubital 3 days                Physical Exam:   Physical Exam   Constitutional: He is oriented to person, place, and time  He appears well-developed and well-nourished  HENT:   Head: Normocephalic and atraumatic  Neck: Normal range of motion  Neck supple  Cardiovascular: Normal rate, regular rhythm and normal heart sounds  Exam reveals no gallop and no friction rub  No murmur heard  Pulmonary/Chest: Effort normal and breath sounds normal  No accessory muscle usage  No tachypnea  No respiratory distress  He has no decreased breath sounds  He has no wheezes  He has no rhonchi  He has no rales  He exhibits no tenderness  Chest tube inserted in right chest wall, chest tube was clamped  Negative for crepitus and right chest wall, neck, back  Abdominal: Soft  Bowel sounds are normal  He exhibits no distension  There is no tenderness  Musculoskeletal: Normal range of motion  He exhibits no edema  Neurological: He is alert and oriented to person, place, and time  Skin: Skin is warm and dry  Psychiatric: He has a normal mood and affect  His behavior is normal        Labs:    I have personally reviewed pertinent lab results CMP:   Lab Results   Component Value Date    SODIUM 141 11/16/2018    K 3 8 11/16/2018     11/16/2018    CO2 27 11/16/2018    BUN 11 11/16/2018    CREATININE 1 04 11/16/2018    CALCIUM 9 0 11/16/2018    EGFR 92 11/16/2018   , PT/INR:   Lab Results   Component Value Date    INR 1 09 11/16/2018     Imaging and other studies: I have personally reviewed pertinent films in PACS     Chest x-ray 11/13/2018- right middle lobe contusion secondary to biopsy     Chest x-ray 11/13/2018- small right apical pneumothorax     Chest x-ray 11/14/2018- moderate right apical pneumothorax

## 2018-11-16 NOTE — SEDATION DOCUMENTATION
Dry occlusive dressing placed over chest tube insertion site  Patient tolerated procedure without incident  Dry and intact  Report called to [employer]

## 2018-11-16 NOTE — SOCIAL WORK
Met with pt who provided information for initial assessment  Pt stated he lives in a 2nd floor apt with his cousin  Prior to admission, pt ambulated with a cane and independent with ADLs  Hx of VNA but unsure of agency name  No hx of STR  PCP- Dr Meek Blackmon with [facility]  Pharmacy- [facility]  Pt anticipated for discharge tomorrow to home and scheduled for an outpt [facility] on 11/21 at 2pm at [address] [place] [address] Way  Pt stating he does not have transport  CM attempted to see if he had an outpt coordinator to assist  Email received from Josef stating pt does not have an outpt coordinator in that office and would not be able to take the case  Contacted [employer] to see if Lyft could be setup but they would need to know which dept would cover the costs  Dr Dayana Braga stated he spoke with Pulmonary and the office will follow-up with the pt Monday and arrange transport  Pt informed

## 2018-11-17 VITALS
HEART RATE: 84 BPM | RESPIRATION RATE: 20 BRPM | TEMPERATURE: 98.2 F | SYSTOLIC BLOOD PRESSURE: 142 MMHG | OXYGEN SATURATION: 97 % | DIASTOLIC BLOOD PRESSURE: 67 MMHG

## 2018-11-17 LAB
GLUCOSE SERPL-MCNC: 93 MG/DL (ref 65–140)
GLUCOSE SERPL-MCNC: 97 MG/DL (ref 65–140)

## 2018-11-17 PROCEDURE — 94762 N-INVAS EAR/PLS OXIMTRY CONT: CPT

## 2018-11-17 PROCEDURE — 99239 HOSP IP/OBS DSCHRG MGMT >30: CPT | Performed by: INTERNAL MEDICINE

## 2018-11-17 PROCEDURE — 94760 N-INVAS EAR/PLS OXIMETRY 1: CPT

## 2018-11-17 PROCEDURE — 94640 AIRWAY INHALATION TREATMENT: CPT

## 2018-11-17 PROCEDURE — 82948 REAGENT STRIP/BLOOD GLUCOSE: CPT

## 2018-11-17 RX ORDER — TRAMADOL HYDROCHLORIDE 50 MG/1
50 TABLET ORAL EVERY 6 HOURS PRN
Qty: 20 TABLET | Refills: 0 | Status: SHIPPED | OUTPATIENT
Start: 2018-11-17 | End: 2018-11-27

## 2018-11-17 RX ADMIN — AMLODIPINE BESYLATE 10 MG: 10 TABLET ORAL at 08:20

## 2018-11-17 RX ADMIN — LEVALBUTEROL HYDROCHLORIDE 1.25 MG: 1.25 SOLUTION, CONCENTRATE RESPIRATORY (INHALATION) at 08:14

## 2018-11-17 RX ADMIN — FAMOTIDINE 20 MG: 20 TABLET ORAL at 08:19

## 2018-11-17 RX ADMIN — IPRATROPIUM BROMIDE 0.5 MG: 0.5 SOLUTION RESPIRATORY (INHALATION) at 08:14

## 2018-11-17 RX ADMIN — BUDESONIDE 0.5 MG: 0.5 INHALANT RESPIRATORY (INHALATION) at 08:14

## 2018-11-17 RX ADMIN — POLYETHYLENE GLYCOL (3350) 17 G: 17 POWDER, FOR SOLUTION ORAL at 08:20

## 2018-11-17 RX ADMIN — GABAPENTIN 100 MG: 100 CAPSULE ORAL at 08:20

## 2018-11-17 RX ADMIN — FLUOXETINE 10 MG: 10 CAPSULE ORAL at 08:20

## 2018-11-17 RX ADMIN — METFORMIN HYDROCHLORIDE 500 MG: 500 TABLET, EXTENDED RELEASE ORAL at 08:20

## 2018-11-17 NOTE — DISCHARGE SUMMARY
Discharge Summary - Medical Jacqueline Stable  66 y o  male MRN: [id number]    [address] [address] MED SURG Room / Bed: [address] [address] [address] 559/E5 -* Encounter: [id number]    BRIEF OVERVIEW    Admitting Provider: Marylen Kill, MD  Discharge Provider: Zeynep Chen DO  Admission Date: 11/13/2018     Discharge Date: No discharge date for patient encounter    Primary Care Physician at Discharge: Lorie Salmon [phone]    Primary Discharge Diagnosis  Right pneumothorax    Other Problems Addressed  Patient Active Problem List    Diagnosis Date Noted    Pneumothorax of right lung after biopsy 11/13/2018    Panlobular emphysema ([facility] [address] ) 11/13/2018    Mass of right lung 11/13/2018    Tobacco abuse 11/13/2018    Type 2 diabetes mellitus without complication, without long-term current use of insulin ([facility] [address] ) 09/23/2018    Bipolar 1 disorder ([facility] [address] ) 08/01/2018    Gastroesophageal reflux disease without esophagitis 08/01/2018    Other specified anxiety disorders 08/01/2018    Cubital tunnel syndrome, bilateral 08/01/2018    Chronic bilateral thoracic back pain 07/27/2018    Chronic right shoulder pain 07/27/2018    Bilateral carpal tunnel syndrome 07/27/2018    Acute exacerbation of chronic obstructive pulmonary disease (COPD) ([address] ) 06/07/2018    Essential hypertension 06/07/2018       Consulting Providers   Pulmonary Dr Radha Childers  Therapeutic Operative Procedures Performed  Right chest tube placement    Discharge Disposition: home    Allergies  Allergies   Allergen Reactions    Lisinopril Other (See Comments)     Diet restrictions:  diabetic low-salt diet   Activity restrictions:  none   Discharge Condition:  fair         Follow up with consulting providers  Pulmonary Dr Radha Childers 11/21/18      [address] STAY    Presenting Problem/History of Present Illness  Pneumothorax of right lung after biopsy  Hospital Course  53-year-old male presented Interventional Radiology for biopsy of right lung mass, biopsy successful but then had a small right-sided pneumothorax and a chest tube was placed  Right pneumothorax   chest tube placed and followed with serial x-rays, patient was also seen in consultation with Pulmonary, chest tube was removed on 11/16/2018, x-ray no residual right-sided pneumothorax evident  Adenocarcinoma of the lung  biopsy above revealed adenocarcinoma, he is to follow up next Wednesday with Pulmonary for brachial ultrasound to stage his cancer and determine treatment options    Tobacco abuse    recommend cessation patient promises to discontinue use for ever    Patient's other medical conditions stable at discharge    Discharge  Statement   I spent 35 minutes discharging the patient  This time was spent on the day of discharge  I had direct contact with the patient on the day of discharge  Additional documentation is required if more than 30 minutes were spent on discharge  Discussed follow-up    Discharge instructions/Information to patient and family:   See after visit summary for information provided to patient and family

## 2018-11-17 NOTE — PROGRESS NOTES
Progress Note - Az Acosta Sr  66 y o  male MRN: [id number]    Unit/Bed#: E5 -01 Encounter: [id number]    Assessment/Plan:    Pneumothorax    chest tube removed, pain controlled, chest x-ray no residual pneumothorax continue Pulmonary follow-up    Adenocarcinoma   appears primary lung, will follow up with Pulmonary for staging next week    Tobacco abuse   cessation counseling provided    Diabetes    controlled with Glucophage    Hypertension    controlled with Norvasc    Subjective:   Chest pain much last, denies shortness of breath nausea vomiting diarrhea no fevers chills appetite is okay    Objective:     Vitals: Blood pressure 142/67, pulse 84, temperature 98 2 °F (36 8 °C), temperature source Temporal, resp  rate 20, SpO2 97 %  ,There is no height or weight on file to calculate BMI          Results from last 7 days  Lab Units 11/16/18  0954 11/14/18  0536   WBC Thousand/uL  --  8 72   HEMOGLOBIN g/dL  --  11 8*   HEMATOCRIT %  --  35 8*   PLATELETS Thousands/uL  --  303   INR  1 09  --        Results from last 7 days  Lab Units 11/16/18  0954   POTASSIUM mmol/L 3 8   CHLORIDE mmol/L 103   CO2 mmol/L 27   BUN mg/dL 11   CREATININE mg/dL 1 04   CALCIUM mg/dL 9 0       Scheduled Meds:    Current Facility-Administered Medications:  acetaminophen 650 mg Oral Q6H PRN Cathryn White PA-C   amLODIPine 10 mg Oral Daily Cathryn White PA-C   famotidine 20 mg Oral BID Cathryn White PA-C   FLUoxetine 10 mg Oral Daily Cathryn White PA-C   gabapentin 100 mg Oral TID Cathryn White PA-C   metFORMIN 500 mg Oral BID With Meals Cathryn White PA-C   QUEtiapine 25 mg Oral HS Cathryn White PA-C   traMADol 50 mg Oral Q6H PRN Winston Daniele, DO       Continuous Infusions:     Physical exam:  General appearance:  Alert no distress interaction appropriate  Head/Eyes:  Nonicteric PERRL EOMI  Neck:  Supple  Lungs:  Decreased BS bilateral no wheezing rhonchi or rales  Heart: normal S1 S2 regular  Abdomen: Soft nontender with bowel sounds  Extremities: no edema  Skin: no rash    Invasive Devices     Peripheral Intravenous Line            Peripheral IV 11/13/18 Left Antecubital 3 days                        Counseling / Coordination of Care  Total floor / unit time spent today 30  minutes  Greater than 50% of total time was spent with the patient and / or family counseling and / or coordination of care    A description of the counseling / coordination of care:

## 2018-11-17 NOTE — PLAN OF CARE
DISCHARGE PLANNING     Discharge to home or other facility with appropriate resources Progressing        DISCHARGE PLANNING - CARE MANAGEMENT     Discharge to post-acute care or home with appropriate resources Progressing        INFECTION - ADULT     Absence or prevention of progression during hospitalization Progressing     Absence of fever/infection during neutropenic period Progressing        Knowledge Deficit     Patient/family/caregiver demonstrates understanding of disease process, treatment plan, medications, and discharge instructions Progressing        PAIN - ADULT     Verbalizes/displays adequate comfort level or baseline comfort level Progressing        Potential for Falls     Patient will remain free of falls Progressing        RESPIRATORY - ADULT     Achieves optimal ventilation and oxygenation Progressing        SAFETY ADULT     Patient will remain free of falls Progressing     Maintain or return to baseline ADL function Progressing     Maintain or return mobility status to optimal level Progressing        SKIN/TISSUE INTEGRITY - ADULT     Incision(s), wounds(s) or drain site(s) healing without S/S of infection Progressing

## 2018-11-19 ENCOUNTER — TELEPHONE (OUTPATIENT)
Dept: PULMONOLOGY | Facility: CLINIC | Age: 58
End: 2018-11-19

## 2018-11-19 NOTE — TELEPHONE ENCOUNTER
Called and spoke with pt about his EBUS that is scheuled on 11/21 with Dr Gonzalez  Informed the pt that we can provide transportation for the pt via lyft  Informed pt that he needs to stop into the office in [place] either today or tomorrow at the latest to sign the waiver for the lyft  The pt agreed and stated that he will stop by the office tomorrow sometime  Also, told pt that if the form is not signed that he will be responsible for the cost of the lyft

## 2018-11-19 NOTE — PRE-PROCEDURE INSTRUCTIONS
Pre-Surgery Instructions:   Medication Instructions    albuterol (VENTOLIN HFA) 90 mcg/act inhaler Instructed patient per Anesthesia Guidelines   amLODIPine (NORVASC) 10 mg tablet Instructed patient per Anesthesia Guidelines   budesonide-formoterol (SYMBICORT) 160-4 5 mcg/act inhaler Instructed patient per Anesthesia Guidelines   FLUoxetine (PROzac) 10 MG tablet Instructed patient per Anesthesia Guidelines   fluticasone-vilanterol (BREO ELLIPTA) 100-25 mcg/inh inhaler Instructed patient per Anesthesia Guidelines   gabapentin (NEURONTIN) 100 mg capsule Instructed patient per Anesthesia Guidelines   ipratropium (ATROVENT) 0 02 % nebulizer solution Instructed patient per Anesthesia Guidelines   levalbuterol (XOPENEX) 1 25 mg/0 5 mL nebulizer solution Instructed patient per Anesthesia Guidelines   loratadine (CLARITIN) 10 mg tablet Instructed patient per Anesthesia Guidelines   metFORMIN (GLUCOPHAGE-XR) 500 mg 24 hr tablet Instructed patient per Anesthesia Guidelines   methocarbamol (ROBAXIN) 500 mg tablet Instructed patient per Anesthesia Guidelines   polyethylene glycol (MIRALAX) 17 g packet Instructed patient per Anesthesia Guidelines   QUEtiapine (SEROquel) 25 mg tablet Instructed patient per Anesthesia Guidelines   ranitidine (ZANTAC) 150 MG capsule Instructed patient per Anesthesia Guidelines   Selenium Sulfide 2 25 % SHAM Instructed patient per Anesthesia Guidelines   tiotropium (SPIRIVA RESPIMAT) 2 5 MCG/ACT AERS inhaler Instructed patient per Anesthesia Guidelines   traMADol (ULTRAM) 50 mg tablet Instructed patient per Anesthesia Guidelines      Pre procedural instructions reviewed

## 2018-11-20 ENCOUNTER — PATIENT OUTREACH (OUTPATIENT)
Dept: FAMILY MEDICINE CLINIC | Facility: CLINIC | Age: 58
End: 2018-11-20

## 2018-11-20 ENCOUNTER — ANESTHESIA EVENT (OUTPATIENT)
Dept: PERIOP | Facility: HOSPITAL | Age: 58
End: 2018-11-20
Payer: COMMERCIAL

## 2018-11-20 DIAGNOSIS — Z78.9 NEED FOR FOLLOW-UP BY SOCIAL WORKER: Primary | ICD-10-CM

## 2018-11-21 ENCOUNTER — ANESTHESIA (OUTPATIENT)
Dept: PERIOP | Facility: HOSPITAL | Age: 58
End: 2018-11-21
Payer: COMMERCIAL

## 2018-11-21 ENCOUNTER — HOSPITAL ENCOUNTER (INPATIENT)
Facility: HOSPITAL | Age: 58
LOS: 6 days | Discharge: HOME | DRG: 143 | End: 2018-11-27
Attending: INTERNAL MEDICINE | Admitting: INTERNAL MEDICINE
Payer: COMMERCIAL

## 2018-11-21 ENCOUNTER — HOSPITAL ENCOUNTER (OUTPATIENT)
Facility: HOSPITAL | Age: 58
Setting detail: OUTPATIENT SURGERY
End: 2018-11-21
Attending: INTERNAL MEDICINE | Admitting: INTERNAL MEDICINE
Payer: COMMERCIAL

## 2018-11-21 ENCOUNTER — APPOINTMENT (EMERGENCY)
Dept: CT IMAGING | Facility: HOSPITAL | Age: 58
End: 2018-11-21
Attending: EMERGENCY MEDICINE
Payer: COMMERCIAL

## 2018-11-21 ENCOUNTER — TELEPHONE (OUTPATIENT)
Dept: PULMONOLOGY | Facility: CLINIC | Age: 58
End: 2018-11-21

## 2018-11-21 ENCOUNTER — HOSPITAL ENCOUNTER (EMERGENCY)
Facility: HOSPITAL | Age: 58
End: 2018-11-21
Attending: EMERGENCY MEDICINE | Admitting: EMERGENCY MEDICINE
Payer: COMMERCIAL

## 2018-11-21 ENCOUNTER — APPOINTMENT (OUTPATIENT)
Dept: RADIOLOGY | Facility: HOSPITAL | Age: 58
End: 2018-11-21
Payer: COMMERCIAL

## 2018-11-21 VITALS
TEMPERATURE: 98.3 F | RESPIRATION RATE: 18 BRPM | BODY MASS INDEX: 22.79 KG/M2 | HEART RATE: 78 BPM | WEIGHT: 149.91 LBS | DIASTOLIC BLOOD PRESSURE: 73 MMHG | OXYGEN SATURATION: 97 % | SYSTOLIC BLOOD PRESSURE: 133 MMHG

## 2018-11-21 VITALS
BODY MASS INDEX: 22.73 KG/M2 | HEART RATE: 68 BPM | HEIGHT: 68 IN | RESPIRATION RATE: 18 BRPM | DIASTOLIC BLOOD PRESSURE: 83 MMHG | OXYGEN SATURATION: 98 % | WEIGHT: 150 LBS | TEMPERATURE: 98.3 F | SYSTOLIC BLOOD PRESSURE: 144 MMHG

## 2018-11-21 DIAGNOSIS — C34.90 ADENOCARCINOMA, LUNG (HCC): ICD-10-CM

## 2018-11-21 DIAGNOSIS — J93.9 PNEUMOTHORAX: Primary | ICD-10-CM

## 2018-11-21 DIAGNOSIS — J93.9 PNEUMOTHORAX ON RIGHT: Primary | ICD-10-CM

## 2018-11-21 LAB — GLUCOSE SERPL-MCNC: 80 MG/DL (ref 65–140)

## 2018-11-21 PROCEDURE — 99284 EMERGENCY DEPT VISIT MOD MDM: CPT

## 2018-11-21 PROCEDURE — 99153 MOD SED SAME PHYS/QHP EA: CPT

## 2018-11-21 PROCEDURE — 99223 1ST HOSP IP/OBS HIGH 75: CPT | Performed by: INTERNAL MEDICINE

## 2018-11-21 PROCEDURE — C1729 CATH, DRAINAGE: HCPCS

## 2018-11-21 PROCEDURE — 32557 INSERT CATH PLEURA W/ IMAGE: CPT

## 2018-11-21 PROCEDURE — 99152 MOD SED SAME PHYS/QHP 5/>YRS: CPT

## 2018-11-21 PROCEDURE — C1769 GUIDE WIRE: HCPCS

## 2018-11-21 PROCEDURE — 82948 REAGENT STRIP/BLOOD GLUCOSE: CPT

## 2018-11-21 PROCEDURE — 96374 THER/PROPH/DIAG INJ IV PUSH: CPT

## 2018-11-21 PROCEDURE — 96375 TX/PRO/DX INJ NEW DRUG ADDON: CPT

## 2018-11-21 PROCEDURE — 71045 X-RAY EXAM CHEST 1 VIEW: CPT

## 2018-11-21 RX ORDER — MIDAZOLAM HYDROCHLORIDE 1 MG/ML
INJECTION INTRAMUSCULAR; INTRAVENOUS CODE/TRAUMA/SEDATION MEDICATION
Status: COMPLETED | OUTPATIENT
Start: 2018-11-21 | End: 2018-11-21

## 2018-11-21 RX ORDER — FENTANYL CITRATE 50 UG/ML
INJECTION, SOLUTION INTRAMUSCULAR; INTRAVENOUS CODE/TRAUMA/SEDATION MEDICATION
Status: COMPLETED | OUTPATIENT
Start: 2018-11-21 | End: 2018-11-21

## 2018-11-21 RX ORDER — QUETIAPINE FUMARATE 25 MG/1
25 TABLET, FILM COATED ORAL
Status: DISCONTINUED | OUTPATIENT
Start: 2018-11-22 | End: 2018-11-27 | Stop reason: HOSPADM

## 2018-11-21 RX ORDER — ALBUTEROL SULFATE 90 UG/1
2 AEROSOL, METERED RESPIRATORY (INHALATION) EVERY 4 HOURS PRN
Status: DISCONTINUED | OUTPATIENT
Start: 2018-11-21 | End: 2018-11-22

## 2018-11-21 RX ORDER — FAMOTIDINE 20 MG/1
20 TABLET, FILM COATED ORAL 2 TIMES DAILY
Status: DISCONTINUED | OUTPATIENT
Start: 2018-11-22 | End: 2018-11-27 | Stop reason: HOSPADM

## 2018-11-21 RX ORDER — KETOROLAC TROMETHAMINE 30 MG/ML
15 INJECTION, SOLUTION INTRAMUSCULAR; INTRAVENOUS ONCE
Status: COMPLETED | OUTPATIENT
Start: 2018-11-21 | End: 2018-11-21

## 2018-11-21 RX ORDER — SENNOSIDES 8.6 MG
1 TABLET ORAL
Status: DISCONTINUED | OUTPATIENT
Start: 2018-11-21 | End: 2018-11-27 | Stop reason: HOSPADM

## 2018-11-21 RX ORDER — OXYCODONE HYDROCHLORIDE AND ACETAMINOPHEN 5; 325 MG/1; MG/1
2 TABLET ORAL ONCE
Status: COMPLETED | OUTPATIENT
Start: 2018-11-21 | End: 2018-11-21

## 2018-11-21 RX ORDER — FLUTICASONE FUROATE AND VILANTEROL 100; 25 UG/1; UG/1
1 POWDER RESPIRATORY (INHALATION)
Status: DISCONTINUED | OUTPATIENT
Start: 2018-11-22 | End: 2018-11-24

## 2018-11-21 RX ORDER — OXYCODONE HYDROCHLORIDE AND ACETAMINOPHEN 5; 325 MG/1; MG/1
1 TABLET ORAL EVERY 4 HOURS PRN
Status: DISCONTINUED | OUTPATIENT
Start: 2018-11-21 | End: 2018-11-26

## 2018-11-21 RX ORDER — AMLODIPINE BESYLATE 10 MG/1
10 TABLET ORAL DAILY
Status: DISCONTINUED | OUTPATIENT
Start: 2018-11-22 | End: 2018-11-27 | Stop reason: HOSPADM

## 2018-11-21 RX ORDER — BUDESONIDE AND FORMOTEROL FUMARATE DIHYDRATE 160; 4.5 UG/1; UG/1
2 AEROSOL RESPIRATORY (INHALATION) 2 TIMES DAILY
Status: DISCONTINUED | OUTPATIENT
Start: 2018-11-22 | End: 2018-11-27 | Stop reason: HOSPADM

## 2018-11-21 RX ORDER — SODIUM CHLORIDE, SODIUM LACTATE, POTASSIUM CHLORIDE, CALCIUM CHLORIDE 600; 310; 30; 20 MG/100ML; MG/100ML; MG/100ML; MG/100ML
125 INJECTION, SOLUTION INTRAVENOUS CONTINUOUS
Status: DISCONTINUED | OUTPATIENT
Start: 2018-11-21 | End: 2018-11-21 | Stop reason: HOSPADM

## 2018-11-21 RX ORDER — OXYCODONE HYDROCHLORIDE AND ACETAMINOPHEN 5; 325 MG/1; MG/1
1 TABLET ORAL ONCE
Status: DISCONTINUED | OUTPATIENT
Start: 2018-11-21 | End: 2018-11-21

## 2018-11-21 RX ORDER — POLYETHYLENE GLYCOL 3350 17 G/17G
17 POWDER, FOR SOLUTION ORAL DAILY
Status: DISCONTINUED | OUTPATIENT
Start: 2018-11-22 | End: 2018-11-27 | Stop reason: HOSPADM

## 2018-11-21 RX ORDER — FLUOXETINE 10 MG/1
10 CAPSULE ORAL DAILY
Status: DISCONTINUED | OUTPATIENT
Start: 2018-11-22 | End: 2018-11-27 | Stop reason: HOSPADM

## 2018-11-21 RX ORDER — LORATADINE 10 MG/1
10 TABLET ORAL
Status: DISCONTINUED | OUTPATIENT
Start: 2018-11-22 | End: 2018-11-27 | Stop reason: HOSPADM

## 2018-11-21 RX ORDER — GABAPENTIN 100 MG/1
100 CAPSULE ORAL 3 TIMES DAILY
Status: DISCONTINUED | OUTPATIENT
Start: 2018-11-22 | End: 2018-11-27 | Stop reason: HOSPADM

## 2018-11-21 RX ORDER — LEVALBUTEROL 1.25 MG/.5ML
1.25 SOLUTION, CONCENTRATE RESPIRATORY (INHALATION) EVERY 8 HOURS PRN
Status: DISCONTINUED | OUTPATIENT
Start: 2018-11-21 | End: 2018-11-27 | Stop reason: HOSPADM

## 2018-11-21 RX ORDER — LIDOCAINE HYDROCHLORIDE 10 MG/ML
INJECTION, SOLUTION EPIDURAL; INFILTRATION; INTRACAUDAL; PERINEURAL
Status: DISCONTINUED
Start: 2018-11-21 | End: 2018-11-21 | Stop reason: HOSPADM

## 2018-11-21 RX ADMIN — MIDAZOLAM HYDROCHLORIDE 1 MG: 1 INJECTION, SOLUTION INTRAMUSCULAR; INTRAVENOUS at 17:07

## 2018-11-21 RX ADMIN — KETOROLAC TROMETHAMINE 15 MG: 30 INJECTION, SOLUTION INTRAMUSCULAR at 20:04

## 2018-11-21 RX ADMIN — MIDAZOLAM HYDROCHLORIDE 1 MG: 1 INJECTION, SOLUTION INTRAMUSCULAR; INTRAVENOUS at 17:13

## 2018-11-21 RX ADMIN — OXYCODONE HYDROCHLORIDE AND ACETAMINOPHEN 2 TABLET: 5; 325 TABLET ORAL at 17:57

## 2018-11-21 RX ADMIN — OXYCODONE HYDROCHLORIDE AND ACETAMINOPHEN 2 TABLET: 5; 325 TABLET ORAL at 23:22

## 2018-11-21 RX ADMIN — FENTANYL CITRATE 50 MCG: 50 INJECTION, SOLUTION INTRAMUSCULAR; INTRAVENOUS at 17:13

## 2018-11-21 RX ADMIN — FENTANYL CITRATE 50 MCG: 50 INJECTION, SOLUTION INTRAMUSCULAR; INTRAVENOUS at 17:07

## 2018-11-21 NOTE — INTERVAL H&P NOTE
Update: (This section must be completed if the H&P was completed greater than 24 hrs to procedure or admission)    H&P reviewed  After examining the patient, I find no changed to the H&P since it had been written  Patient re-evaluated   Accept as history and physical     Nakita Perera MD/November 21, 2018/5:53 PM

## 2018-11-21 NOTE — INTERVAL H&P NOTE
H&P reviewed  After examining the patient I find no changes in the patients condition since the H&P had been written  Labs were reviewed and the patient consented for the procedure  The patient had a pneumothorax a week ago after TTNA, and he has higher than usual risk for another pneumothorax due to his emphysema and recent pneumothorax  This was explained in details to the patient and his spouse  Madonna Suh MD/PhD,  [facility]

## 2018-11-21 NOTE — BRIEF OP NOTE (RAD/CATH)
CT guided right chest tube placement - Procedure Note    PATIENT NAME: Janeth Zafar Sr   : 1960  MRN: [id number]     Pre-op Diagnosis: No diagnosis found  Post-op Diagnosis: No diagnosis found  Surgeon:   Leighann Ferguson MD  Assistants:     No qualified resident was available, Resident is only observing    Estimated Blood Loss: minimal  Findings: Successful CT guided 10 [place] chest tube placement right side  PTX resolved after several minutes of suction      Specimens: none    Complications:  None immediate    Anesthesia: Conscious sedation    Leighann Ferguson MD     Date: 2018  Time: 5:54 PM

## 2018-11-21 NOTE — H&P (VIEW-ONLY)
Due to the patient history of pneumothorax last week I ordered chest X-ray that showed large right pneumothorax  I discussed the results with the patient and his spouce  He will go from here to [facility] for placement of a chest tube  The patient said he would like to be hospitalized in [facility]  I told him that after placing the chest tube his transfer will be considered based on his clinical situation  Regarding the EBUS - we will need to reschedule to [facility] as an outpatient after discharge  Sara Castle MD/PhD,  [facility]

## 2018-11-21 NOTE — TELEPHONE ENCOUNTER
Received a call from the pt and his girlfriend, they were both upset and not happy with being in the [facility] ER after the pt was supposed to have an EBUS done but could not because he had a pneumothorax they found on an xray before the procedure  The pt was advised to go to the ER at [facility]  They do not want to stay there because the girlfriend would have no ride home due to lack of transportation  There was a lyft ride to the hospital that I myself scheduled  They were both cursing and angry when speaking with me when I tried to explain the ride is for the pt the girlfriend was going as a responsible party to escort  the pt home from his procedure he was supposed to have  Due to him not having the procedure the girl friend had no ride home and was cursing at the nurses because they would not help her  I advised her to go out and cool off and try to go ask someone if they can help her  I did advise the pt if his signs out AMA we will not be paying for the lyft ride home  They began to yell and curse at me so I advised them that I am going to end the conversation until they both calm down and can speak to me and not yell at me  The girlfriend before I hung up called me explicit names  She then stated that she was going to call back and report myself and the staff for the way we all treated them  I then hung up the phone, then contacted the physician and advised him of the conversation

## 2018-11-21 NOTE — PROGRESS NOTES
Patient's significant other named Nadine/Rachell used profanity and inappropriate verbiage directed towards Iris and Arsenio Boudreaux while the patient was being prepared for surgery

## 2018-11-21 NOTE — CONSULTS
Due to the patient history of pneumothorax last week I ordered chest X-ray that showed large right pneumothorax  I discussed the results with the patient and his spouce  He will go from here to [facility] ED for placement of a chest tube  The patient said he would like to be hospitalized in [facility]  I told him that after placing the chest tube his transfer will be considered based on his clinical situation  Regarding the EBUS - we will need to reschedule to [facility] as an outpatient after discharge  Lance Mcnair MD/PhD,  [facility]

## 2018-11-21 NOTE — ANESTHESIA PREPROCEDURE EVALUATION
Review of Systems/Medical History  Patient summary reviewed  Chart reviewed      Cardiovascular  Hypertension ,    Pulmonary  Smoker (quit smoking 8/2018) , COPD (severe obstruction on PFTs) ,   Comment: Marijuana use  Pulmonary mass, new dx adenocarcinoma  Recent right PTX after lung biopsy  Cough     GI/Hepatic    GERD well controlled,        Negative  ROS        Endo/Other  Diabetes (BG 93) well controlled type 2 Oral agent,      GYN  Negative gynecology ROS          Hematology  Negative hematology ROS      Musculoskeletal  Negative musculoskeletal ROS        Neurology  Negative neurology ROS      Psychology   Anxiety, Depression , bipolar disorder,            Physical Exam    Airway    Mallampati score: II  TM Distance: >3 FB  Neck ROM: full     Dental   No notable dental hx     Cardiovascular      Pulmonary  Decreased breath sounds, No wheezes,     Other Findings       Lab Results   Component Value Date    WBC 8 72 11/14/2018    HGB 11 8 (L) 11/14/2018     11/14/2018     Lab Results   Component Value Date    K 3 8 11/16/2018    BUN 11 11/16/2018    CREATININE 1 04 11/16/2018     Lab Results   Component Value Date    PTT 33 11/16/2018      Lab Results   Component Value Date    INR 1 09 11/16/2018     Lab Results   Component Value Date    HGBA1C 5 8 11/13/2018     TTE 9/2018 SUMMARY  LEFT VENTRICLE:  Systolic function was vigorous  Ejection fraction was estimated in the range of 65 % to 70 %  There were no regional wall motion abnormalities  Doppler parameters were consistent with abnormal left ventricular relaxation (grade 1 diastolic dysfunction)      PFTs 11/2018   1  Severe obstructive ventilatory flow limitation without significant improvement after bronchodilator  2  Normal total lung capacity but with evidence of hyperinflation and air trapping  3  Severe diffusing capacity impairment  4   Elevated airway resistance    Anesthesia Plan  ASA Score- 3     Anesthesia Type- general with ASA Monitors  Additional Monitors:   Airway Plan: ETT  Plan Factors-    Induction- intravenous  Postoperative Plan-     Informed Consent- Anesthetic plan and risks discussed with patient  I personally reviewed this patient with the CRNA  Discussed and agreed on the Anesthesia Plan with the CRNA  Ludin Pascual

## 2018-11-21 NOTE — H&P (VIEW-ONLY)
Progress Note - Pulmonary   Sriram Cantu Sr  66 y o  male MRN: [id number]  Unit/Bed#: E5 -01 Encounter: [id number]    Assessment/Plan:    1  Traumatic pneumothorax secondary to transthoracic needle aspiration       *  chest tube placed in right chest wall, no crepitus noted       *  chest tube was clamped overnight overnight, with no respiratory distress       *  chest tube will be discontinued by IR       *  will repeat chest x-ray this a m       *  will continue 2 L nasal cannulae p r n , encourage IS, and OOB as tolerated     2  Right pleuritic chest pain secondary to chest tube placement       *  analgesics per primary team     3  Severe COPD group D without exacerbation       *  PFT's:11/9/18- FEV1/FVC ratio 52%, FEV1 49%, FVC 74%, hospitalized >1 this year for COPD       *  Home regimen includes:  Symbicort b i d , Breo daily, Spiriva daily, albuterol rescue for p r n , Atrovent and Xopenex nebulizer q 2h p r n        *   will continue nebulizers q 4h and Pulmicort Q 12 per Dr Radha Childers     4  Right middle lobe 1 3 cm solid lung nodule in the setting of lung adenocarcinoma        *  Dr Radha Childers will schedule an EBUS for staging next week in [place]        *  biopsy of lung mass taken on 11/13/2018        *  biopsy results consistent with adenocarcinoma     5  Tobacco/marijuana abuse       *   continue nicotine replacement therapy managed by primary team       *  encouraged alternative treatments     Will schedule Outpatient Endobronchial ultrasound for next week     Follow-up appointment 12/14/2018 per discharge instructions         Chief Complaint:    "I am having intractable pain"    Subjective:    Patient was laying in the bed watching TV  He appeared to be in pain and uncomfortable  He reports that he is having uncontrollable pleuritic chest pain especially on deep inhalation and coughing  He stated that he was anxious to get the chest tube out    Patient denies fever, chills, shortness of breath, cough, and headache  Objective:    Vitals: Blood pressure 144/70, pulse 85, temperature 98 5 °F (36 9 °C), temperature source Temporal, resp  rate 18, SpO2 95 %  2L,There is no height or weight on file to calculate BMI  No intake or output data in the 24 hours ending 11/16/18 1453    Invasive Devices     Peripheral Intravenous Line            Peripheral IV 11/13/18 Left Antecubital 3 days                Physical Exam:   Physical Exam   Constitutional: He is oriented to person, place, and time  He appears well-developed and well-nourished  HENT:   Head: Normocephalic and atraumatic  Neck: Normal range of motion  Neck supple  Cardiovascular: Normal rate, regular rhythm and normal heart sounds  Exam reveals no gallop and no friction rub  No murmur heard  Pulmonary/Chest: Effort normal and breath sounds normal  No accessory muscle usage  No tachypnea  No respiratory distress  He has no decreased breath sounds  He has no wheezes  He has no rhonchi  He has no rales  He exhibits no tenderness  Chest tube inserted in right chest wall, chest tube was clamped  Negative for crepitus and right chest wall, neck, back  Abdominal: Soft  Bowel sounds are normal  He exhibits no distension  There is no tenderness  Musculoskeletal: Normal range of motion  He exhibits no edema  Neurological: He is alert and oriented to person, place, and time  Skin: Skin is warm and dry  Psychiatric: He has a normal mood and affect  His behavior is normal        Labs:    I have personally reviewed pertinent lab results CMP:   Lab Results   Component Value Date    SODIUM 141 11/16/2018    K 3 8 11/16/2018     11/16/2018    CO2 27 11/16/2018    BUN 11 11/16/2018    CREATININE 1 04 11/16/2018    CALCIUM 9 0 11/16/2018    EGFR 92 11/16/2018   , PT/INR:   Lab Results   Component Value Date    INR 1 09 11/16/2018     Imaging and other studies: I have personally reviewed pertinent films in PACS     Chest x-ray 11/13/2018- right middle lobe contusion secondary to biopsy     Chest x-ray 11/13/2018- small right apical pneumothorax     Chest x-ray 11/14/2018- moderate right apical pneumothorax

## 2018-11-22 ENCOUNTER — APPOINTMENT (INPATIENT)
Dept: RADIOLOGY | Facility: HOSPITAL | Age: 58
DRG: 143 | End: 2018-11-22
Payer: COMMERCIAL

## 2018-11-22 LAB
ANION GAP SERPL CALCULATED.3IONS-SCNC: 9 MMOL/L (ref 4–13)
BASOPHILS # BLD AUTO: 0.04 THOUSANDS/ΜL (ref 0–0.1)
BASOPHILS NFR BLD AUTO: 1 % (ref 0–1)
BUN SERPL-MCNC: 20 MG/DL (ref 5–25)
CALCIUM SERPL-MCNC: 8 MG/DL (ref 8.3–10.1)
CHLORIDE SERPL-SCNC: 105 MMOL/L (ref 100–108)
CO2 SERPL-SCNC: 26 MMOL/L (ref 21–32)
CREAT SERPL-MCNC: 1.17 MG/DL (ref 0.6–1.3)
EOSINOPHIL # BLD AUTO: 0.9 THOUSAND/ΜL (ref 0–0.61)
EOSINOPHIL NFR BLD AUTO: 15 % (ref 0–6)
ERYTHROCYTE [DISTWIDTH] IN BLOOD BY AUTOMATED COUNT: 15.7 % (ref 11.6–15.1)
GFR SERPL CREATININE-BSD FRML MDRD: 80 ML/MIN/1.73SQ M
GLUCOSE SERPL-MCNC: 119 MG/DL (ref 65–140)
GLUCOSE SERPL-MCNC: 122 MG/DL (ref 65–140)
GLUCOSE SERPL-MCNC: 138 MG/DL (ref 65–140)
GLUCOSE SERPL-MCNC: 89 MG/DL (ref 65–140)
GLUCOSE SERPL-MCNC: 93 MG/DL (ref 65–140)
GLUCOSE SERPL-MCNC: 94 MG/DL (ref 65–140)
HCT VFR BLD AUTO: 35.4 % (ref 36.5–49.3)
HGB BLD-MCNC: 11.6 G/DL (ref 12–17)
IMM GRANULOCYTES # BLD AUTO: 0.01 THOUSAND/UL (ref 0–0.2)
IMM GRANULOCYTES NFR BLD AUTO: 0 % (ref 0–2)
LYMPHOCYTES # BLD AUTO: 2.69 THOUSANDS/ΜL (ref 0.6–4.47)
LYMPHOCYTES NFR BLD AUTO: 45 % (ref 14–44)
MCH RBC QN AUTO: 28.2 PG (ref 26.8–34.3)
MCHC RBC AUTO-ENTMCNC: 32.8 G/DL (ref 31.4–37.4)
MCV RBC AUTO: 86 FL (ref 82–98)
MONOCYTES # BLD AUTO: 0.42 THOUSAND/ΜL (ref 0.17–1.22)
MONOCYTES NFR BLD AUTO: 7 % (ref 4–12)
NEUTROPHILS # BLD AUTO: 1.9 THOUSANDS/ΜL (ref 1.85–7.62)
NEUTS SEG NFR BLD AUTO: 32 % (ref 43–75)
NRBC BLD AUTO-RTO: 0 /100 WBCS
PLATELET # BLD AUTO: 360 THOUSANDS/UL (ref 149–390)
PLATELET # BLD AUTO: 375 THOUSANDS/UL (ref 149–390)
PMV BLD AUTO: 8.6 FL (ref 8.9–12.7)
PMV BLD AUTO: 8.8 FL (ref 8.9–12.7)
POTASSIUM SERPL-SCNC: 3.9 MMOL/L (ref 3.5–5.3)
RBC # BLD AUTO: 4.12 MILLION/UL (ref 3.88–5.62)
SODIUM SERPL-SCNC: 140 MMOL/L (ref 136–145)
WBC # BLD AUTO: 5.96 THOUSAND/UL (ref 4.31–10.16)

## 2018-11-22 PROCEDURE — 99232 SBSQ HOSP IP/OBS MODERATE 35: CPT | Performed by: INTERNAL MEDICINE

## 2018-11-22 PROCEDURE — 99222 1ST HOSP IP/OBS MODERATE 55: CPT | Performed by: INTERNAL MEDICINE

## 2018-11-22 PROCEDURE — 80048 BASIC METABOLIC PNL TOTAL CA: CPT | Performed by: NURSE PRACTITIONER

## 2018-11-22 PROCEDURE — 71046 X-RAY EXAM CHEST 2 VIEWS: CPT

## 2018-11-22 PROCEDURE — 85049 AUTOMATED PLATELET COUNT: CPT | Performed by: NURSE PRACTITIONER

## 2018-11-22 PROCEDURE — 82948 REAGENT STRIP/BLOOD GLUCOSE: CPT

## 2018-11-22 PROCEDURE — 85025 COMPLETE CBC W/AUTO DIFF WBC: CPT | Performed by: NURSE PRACTITIONER

## 2018-11-22 RX ADMIN — FLUOXETINE 10 MG: 10 CAPSULE ORAL at 08:34

## 2018-11-22 RX ADMIN — AMLODIPINE BESYLATE 10 MG: 10 TABLET ORAL at 08:34

## 2018-11-22 RX ADMIN — POLYETHYLENE GLYCOL (3350) 17 G: 17 POWDER, FOR SOLUTION ORAL at 08:34

## 2018-11-22 RX ADMIN — QUETIAPINE FUMARATE 25 MG: 25 TABLET ORAL at 21:29

## 2018-11-22 RX ADMIN — BUDESONIDE AND FORMOTEROL FUMARATE DIHYDRATE 2 PUFF: 160; 4.5 AEROSOL RESPIRATORY (INHALATION) at 17:07

## 2018-11-22 RX ADMIN — FAMOTIDINE 20 MG: 20 TABLET ORAL at 08:34

## 2018-11-22 RX ADMIN — GABAPENTIN 100 MG: 100 CAPSULE ORAL at 08:34

## 2018-11-22 RX ADMIN — FLUTICASONE FUROATE AND VILANTEROL TRIFENATATE 1 PUFF: 100; 25 POWDER RESPIRATORY (INHALATION) at 06:24

## 2018-11-22 RX ADMIN — OXYCODONE AND ACETAMINOPHEN 1 TABLET: 5; 325 TABLET ORAL at 08:47

## 2018-11-22 RX ADMIN — GABAPENTIN 100 MG: 100 CAPSULE ORAL at 01:23

## 2018-11-22 RX ADMIN — GABAPENTIN 100 MG: 100 CAPSULE ORAL at 21:29

## 2018-11-22 RX ADMIN — LORATADINE 10 MG: 10 TABLET ORAL at 06:24

## 2018-11-22 RX ADMIN — FAMOTIDINE 20 MG: 20 TABLET ORAL at 17:07

## 2018-11-22 RX ADMIN — QUETIAPINE FUMARATE 25 MG: 25 TABLET ORAL at 01:24

## 2018-11-22 RX ADMIN — GABAPENTIN 100 MG: 100 CAPSULE ORAL at 17:07

## 2018-11-22 RX ADMIN — BUDESONIDE AND FORMOTEROL FUMARATE DIHYDRATE 2 PUFF: 160; 4.5 AEROSOL RESPIRATORY (INHALATION) at 08:38

## 2018-11-22 RX ADMIN — OXYCODONE AND ACETAMINOPHEN 1 TABLET: 5; 325 TABLET ORAL at 21:33

## 2018-11-22 RX ADMIN — OXYCODONE AND ACETAMINOPHEN 1 TABLET: 5; 325 TABLET ORAL at 17:09

## 2018-11-22 NOTE — ASSESSMENT & PLAN NOTE
Lab Results   Component Value Date    HGBA1C 5 8 11/13/2018     · Hold oral meds, start Accu-Cheks and sliding scale  Recent Labs      11/21/18   1326   POCGLU  80       Blood Sugar Average: Last 72 hrs:

## 2018-11-22 NOTE — H&P
H&P- Vanita Rasheed Sr  1960, 66 y o  male MRN: [id number]    Unit/Bed#: E2 -01 Encounter: [id number]    Primary Care Provider: Roseanna Brown MD   Date and time admitted to hospital: 11/21/2018  9:30 PM      * Pneumothorax on right   Assessment & Plan    · Recurrent pneumothorax  H/o traumatic pneumothorax after lung biopsy on 11/13/18, s/p chest tube insertion and removal   · Today patient had follow-up philipp't with his outpatient pulmonologist, CXR: showed 30% right-sided pneumothorax, s/p IR guided chest tube placement  · Supplemental O2 via nasal cannula p r n  SOB  · Repeat chest x-ray in the a m  · Pain control  · Pulmonary consult     Tobacco abuse   Assessment & Plan    · Reports he quit smoking     Panlobular emphysema (HCC)   Assessment & Plan    · No acute exacerbation, continue home inhalers     Type 2 diabetes mellitus without complication, without long-term current use of insulin ([facility] )   Assessment & Plan    Lab Results   Component Value Date    HGBA1C 5 8 11/13/2018     · Hold oral meds, start Accu-Cheks and sliding scale  Recent Labs      11/21/18   1326   POCGLU  80       Blood Sugar Average: Last 72 hrs:       Gastroesophageal reflux disease without esophagitis   Assessment & Plan    · On ranitidine, non formulary, will order Pepcid     Bipolar 1 disorder ([facility] [address] )   Assessment & Plan    · On Prozac and Seroquel, continue     Essential hypertension   Assessment & Plan    · On amlodipine, continue       VTE Prophylaxis: Enoxaparin (Lovenox)  / reason for no mechanical VTE prophylaxis Ambulate   Code Status:  Full code  POLST: POLST is not applicable to this patient  Discussion with family:     Anticipated Length of Stay:  Patient will be admitted on an Inpatient basis with an anticipated length of stay of  > 2 midnights  Justification for Hospital Stay:  Pneumothorax    Total Time for Visit, including Counseling / Coordination of Care: 45 minutes    Greater than 50% of this total time spent on direct patient counseling and coordination of care  Chief Complaint:   Shortness of breath and pain when breathing    History of Present Illness:    Issa Fu  is a 66 y o  male who presents with c/o pleuritic chest pain and dyspnea  Patient admitted to [facility] 11/13-11/17 for pneumothorax after lung biopsy requiring chest tube placement  Today he had a follow-up with Pulmonary for bronchoscopy but reported pleuritic chest pain so his pulmonologist ordered a CXR which showed a 30% right pneumothorax  Patient transferred to [address] [place] [address] for chest tube placement  Transferred here at patient's request  Reports blood-tinged sputum, pain with inspiration and mild nausea  Denies fever, abdominal pain, emesis, diarrhea or dysuria  Review of Systems:    Review of Systems   Respiratory: Positive for shortness of breath  CLINE, blood-tinged sputum   Cardiovascular: Positive for chest pain  Negative for palpitations and leg swelling  Gastrointestinal: Positive for constipation  Negative for abdominal distention, abdominal pain, diarrhea, nausea and vomiting  Past Medical and Surgical History:     Past Medical History:   Diagnosis Date    Bipolar 1 disorder ([place] [address] )     COPD (chronic obstructive pulmonary disease) ([place] [address] )     Diabetes mellitus ([place] [address] )     GERD (gastroesophageal reflux disease)     Herniated lumbar intervertebral disc     Hypertension     Latent syphilis     Treated    PTSD (post-traumatic stress disorder)     Tobacco abuse 11/13/2018       Past Surgical History:   Procedure Laterality Date    CT GUIDED CHEST TUBE  11/21/2018    HEMORROIDECTOMY      IR CHEST TUBE  11/14/2018    IR IMAGE GUIDED BIOPSY/ASPIRATION LUNG  11/13/2018    KNEE SURGERY         Meds/Allergies:    Prior to Admission medications    Medication Sig Start Date End Date Taking?  Authorizing Provider   albuterol (VENTOLIN HFA) 90 mcg/act inhaler Inhale 2 puffs every 4 (four) hours as needed for wheezing 7/27/18   Bia Mosley MD   amLODIPine (NORVASC) 10 mg tablet Take 1 tablet (10 mg total) by mouth daily  Patient taking differently: Take 10 mg by mouth daily in the early morning   7/27/18   Bia Mosley MD   budesonide-formoterol [facility]) 160-4 5 mcg/act inhaler Inhale 2 puffs 2 (two) times a day Rinse mouth after use  Historical Provider, MD   FLUoxetine (PROzac) 10 MG tablet Take 10 mg by mouth daily in the early morning      Historical Provider, MD   fluticasone-vilanterol (BREO ELLIPTA) 100-25 mcg/inh inhaler Inhale 1 puff daily in the early morning Rinse mouth after use        Historical Provider, MD   gabapentin (NEURONTIN) 100 mg capsule Take 1 capsule (100 mg total) by mouth 3 (three) times a day 10/15/18   Areli Gauthier III, DO   ipratropium (ATROVENT) 0 02 % nebulizer solution Take 1 vial (0 5 mg total) by nebulization 3 (three) times a day 9/26/18   Charly Ariza DO   levalbuterol (XOPENEX) 1 25 mg/0 5 mL nebulizer solution Take 0 5 mL (1 25 mg total) by nebulization every 8 (eight) hours as needed for wheezing 9/26/18   Charly Ariza DO   loratadine (CLARITIN) 10 mg tablet Take 10 mg by mouth daily in the early morning      Historical Provider, MD   metFORMIN (GLUCOPHAGE-XR) 500 mg 24 hr tablet Take 1 tablet (500 mg total) by mouth 2 (two) times a day with meals 10/10/18   Yoli Cm MD   methocarbamol (ROBAXIN) 500 mg tablet Take 1 tablet (500 mg total) by mouth every 6 (six) hours as needed for muscle spasms 6/7/18   Mary Ann Wilson PA-C   polyethylene glycol (MIRALAX) 17 g packet Take 17 g by mouth daily  Patient taking differently: Take 17 g by mouth daily in the early morning   10/10/18   Yoli Cm MD   QUEtiapine (SEROquel) 25 mg tablet Take 25 mg by mouth daily at bedtime    Historical Provider, MD   ranitidine (ZANTAC) 150 MG capsule Take 1 capsule (150 mg total) by mouth 2 (two) times a day 10/10/18   Yoli mC MD   Selenium Sulfide 2 25 % SHAM apply by topical route  every PM to the affected area(s) 6/20/18   Historical Provider, MD   tiotropium (SPIRIVA RESPIMAT) 2 5 MCG/ACT AERS inhaler Inhale 2 puffs daily  Patient taking differently: Inhale 2 puffs 2 (two) times a day   9/27/18   Grant Buitrago PA-C   traMADol [facility]) 50 mg tablet Take 1 tablet (50 mg total) by mouth every 6 (six) hours as needed for moderate pain for up to 10 days 11/17/18 11/27/18  Latoya Sheehan,      I have reviewed home medications with patient personally  Allergies: Allergies   Allergen Reactions    Lisinopril Anaphylaxis     Took medication a while ago and had a "swelling reaction"  Does not carry epi-pen       Social History:     Marital Status: Legally    Occupation:  Unemployed  Patient Pre-hospital Living Situation:  Resides with cousin  Patient Pre-hospital Level of Mobility:  Ambulates with cane  Patient Pre-hospital Diet Restrictions:   Substance Use History:   History   Alcohol Use    Yes     Comment: social     History   Smoking Status    Former Smoker    Packs/day: 0 50    Types: Cigarettes    Quit date: 8/31/2018   Smokeless Tobacco    Former User     Comment: "i quit one month ago when i quit weed"     History   Drug Use    Types: Marijuana     Comment: stopped 8-2018, "i smoked weed and stopped 1 month ago"       Family History:    Family History   Problem Relation Age of Onset    Heart disease Mother     Hypertension Father     Diabetes Family     Asthma Family     Heart disease Family     Cancer Family        Physical Exam:     Vitals:   Blood Pressure: 140/69 (11/21/18 2317)  Pulse: 77 (11/21/18 2317)  Temperature: (!) 97 3 °F (36 3 °C) (11/21/18 2317)  Temp Source: Tympanic (11/21/18 2317)  Respirations: 18 (11/21/18 2317)  SpO2: 98 % (11/21/18 2317)    Physical Exam   Constitutional: He is oriented to person, place, and time  He appears well-developed and well-nourished  No distress  HENT:   Head: Normocephalic and atraumatic     Eyes: Conjunctivae are normal  No scleral icterus  Neck: Neck supple  Cardiovascular: Normal rate, regular rhythm, normal heart sounds and intact distal pulses  No murmur heard  Pulmonary/Chest: Effort normal and breath sounds normal  No respiratory distress  He has no wheezes  He has no rales  He exhibits no tenderness  Right-sided chest tube with scant serosanguineous drainage   Abdominal: Soft  Bowel sounds are normal  He exhibits no distension and no mass  There is no tenderness  There is no rebound and no guarding  Musculoskeletal: He exhibits no edema  Neurological: He is alert and oriented to person, place, and time  Skin: Skin is warm and dry  No rash noted  He is not diaphoretic  No erythema  No pallor  Psychiatric: He has a normal mood and affect  His behavior is normal  Judgment and thought content normal      Additional Data:     Lab Results: I have personally reviewed pertinent reports  Results from last 7 days  Lab Units 11/16/18  0954   SODIUM mmol/L 141   POTASSIUM mmol/L 3 8   CHLORIDE mmol/L 103   CO2 mmol/L 27   BUN mg/dL 11   CREATININE mg/dL 1 04   ANION GAP mmol/L 11   CALCIUM mg/dL 9 0   GLUCOSE RANDOM mg/dL 119       Results from last 7 days  Lab Units 11/16/18  0954   INR  1 09       Results from last 7 days  Lab Units 11/21/18  1326 11/17/18  1212 11/17/18  0753 11/16/18  1538 11/16/18  1117 11/16/18  0748 11/15/18  2110 11/15/18  1716 11/15/18  1619 11/15/18  1528 11/15/18  1115 11/15/18  0733   POC GLUCOSE mg/dl 80 93 97 95 86 95 94 79 116 66 111 84               Imaging: I have personally reviewed pertinent reports  XR chest pa & lateral    (Results Pending)       EKG, Pathology, and Other Studies Reviewed on Admission:   · CXR    Allscripts / Epic Records Reviewed: Yes     ** Please Note: This note has been constructed using a voice recognition system   **

## 2018-11-22 NOTE — PROGRESS NOTES
[facility] Progress Note  Patient: Memo Ruth  66 y o  male   MRN: [id number]  PCP: Jeremi Thornton MD  Unit/Bed#: E2 -39 Encounter: [id number]  Date Of Visit: 11/22/18      Assessment/plan  1  Recurrent right pneumothorax- 1st pneumothorax was after a biopsy at the beginning of this month  He was recently discharged on 11/17  He had chest tube placed  Awaiting pulmonary eval  Question if pt would be a candidate for pleurodesis although pt may not be a candidate with history of copd  Continue pain medications  2  Right lung mass- s/p biopsy with result of invasive adenocarcinoma  Await pulmonary recommendations  Pt was to follow up for staging outpt    3  Tobacco abuse- pt reported he quit smoking  4  Copd- continue current treatment    5  Type 2 diabetes- last a1c was 5 8  Hold oral medications and continue insulin sliding scale    6  gerd- continue pepcid    7  Bipolar- continue current treatment    8  htn- continue current treatment  Subjective:   Pt seen and examined  Pt reports he is having increased pain from the chest tube  No f/c no sob no n/v/d no abd pain    Objective:     Vitals: Blood pressure 150/86, pulse 69, temperature (!) 95 9 °F (35 5 °C), temperature source Tympanic, resp  rate 16, SpO2 96 %  ,There is no height or weight on file to calculate BMI  Lab, Imaging and other studies:    Results from last 7 days  Lab Units 11/22/18  0616  11/16/18  0954   WBC Thousand/uL 5 96  --   --    HEMOGLOBIN g/dL 11 6*  --   --    HEMATOCRIT % 35 4*  --   --    PLATELETS Thousands/uL 360  < >  --    INR   --   --  1 09   < > = values in this interval not displayed      Results from last 7 days  Lab Units 11/22/18  0616   POTASSIUM mmol/L 3 9   CHLORIDE mmol/L 105   CO2 mmol/L 26   BUN mg/dL 20   CREATININE mg/dL 1 17   CALCIUM mg/dL 8 0*         No results found for: Vito Orlando, WOUNDCULT, SPUTUMCULTUR    Scheduled Meds:   Current Facility-Administered Medications:  amLODIPine 10 mg Oral Daily Colton Speck, CRNP   budesonide-formoterol 2 puff Inhalation BID Shama Speck, CRNP   enoxaparin 40 mg Subcutaneous Daily Shama Speck, CRNP   famotidine 20 mg Oral BID Shama Speck, CRNP   FLUoxetine 10 mg Oral Daily Colton Speck, CRNP   fluticasone-vilanterol 1 puff Inhalation Early Morning Colton Speck, CRNP   gabapentin 100 mg Oral TID Shama Speck, CRNP   insulin lispro 1-5 Units Subcutaneous TID AC Shama Speck, CRNP   insulin lispro 1-5 Units Subcutaneous HS Colton Speck, CRNP   ipratropium 0 5 mg Nebulization 4x Daily PRN Colton Speck, CRNP   levalbuterol 1 25 mg Nebulization Q8H PRN Shama Speck, CRNP   loratadine 10 mg Oral Early Morning Shama Speck, CRNP   morphine injection 2 mg Intravenous Q6H PRN Colton Speck, CRNP   oxyCODONE-acetaminophen 1 tablet Oral Q4H PRN Shama Speck, CRNP   polyethylene glycol 17 g Oral Daily Shama Speck, CRNP   QUEtiapine 25 mg Oral HS Shama Speck, CRNP   senna 1 tablet Oral HS PRN Shama Speck, CRNP     Continuous Infusions:    PRN Meds: ipratropium    levalbuterol    morphine injection    oxyCODONE-acetaminophen    senna      Physical exam:  Physical Exam  General appearance: alert and oriented, in no acute distress  Head: Normocephalic, without obvious abnormality, atraumatic  Eyes: conjunctivae/corneas clear  PERRL, EOM's intact  Fundi benign    Neck: no adenopathy, no carotid bruit, no JVD, supple, symmetrical, trachea midline and thyroid not enlarged, symmetric, no tenderness/mass/nodules  Lungs: clear to auscultation bilaterally and chest tube in place  Heart: regular rate and rhythm, S1, S2 normal, no murmur, click, rub or gallop  Abdomen: soft, non-tender; bowel sounds normal; no masses,  no organomegaly  Extremities: extremities normal, warm and well-perfused; no cyanosis, clubbing, or edema  Pulses: 2+ and symmetric  Skin: Skin color, texture, turgor normal  No rashes or lesions  Neurologic: Grossly normal      VTE Pharmacologic Prophylaxis: Enoxaparin (Lovenox)  VTE Mechanical Prophylaxis: sequential compression device    Counseling / Coordination of Care  Total floor / unit time spent today 20 minutes   Current Length of Stay: 1 day(s)    Current Patient Status: Inpatient       Code Status: Level 1 - Full Code

## 2018-11-22 NOTE — EMTALA/ACUTE CARE TRANSFER
[address] [place] [address]  Dept: [phone]      EMTALA TRANSFER CONSENT    NAME Eric Keys Sr   1960                              MRN [id number]    I have been informed of my rights regarding examination, treatment, and transfer   by Dr Cori Irizarry MD    Benefits: Patient preference    Risks: Potential for delay in receiving treatment, Potential deterioration of medical condition, Loss of IV, Increased discomfort during transfer, Possible worsening of condition or death during transfer      Transfer Request   I acknowledge that my medical condition has been evaluated and explained to me by the emergency department physician or other qualified medical person and/or my attending physician who has recommended and offered to me further medical examination and treatment  I understand the Hospital's obligation with respect to the treatment and stabilization of my emergency medical condition  I nevertheless request to be transferred  I release the Hospital, the doctor, and any other persons caring for me from all responsibility or liability for any injury or ill effects that may result from my transfer and agree to accept all responsibility for the consequences of my choice to transfer, rather than receive stabilizing treatment at the Hospital  I understand that because the transfer is my request, my insurance may not provide reimbursement for the services  The Hospital will assist and direct me and my family in how to make arrangements for transfer, but the hospital is not liable for any fees charged by the transport service  In spite of this understanding, I refuse to consent to further medical examination and treatment which has been offered to me, and request transfer to  Ellen Miranda Name, [address] : [employer]   I authorize the performance of emergency medical procedures and treatments upon me in both transit and upon arrival at the receiving facility  Additionally, I authorize the release of any and all medical records to the receiving facility and request they be transported with me, if possible  I authorize the performance of emergency medical procedures and treatments upon me in both transit and upon arrival at the receiving facility  Additionally, I authorize the release of any and all medical records to the receiving facility and request they be transported with me, if possible  I understand that the safest mode of transportation during a medical emergency is an ambulance and that the Hospital advocates the use of this mode of transport  Risks of traveling to the receiving facility by car, including absence of medical control, life sustaining equipment, such as oxygen, and medical personnel has been explained to me and I fully understand them  (TOÑITO CORRECT BOX BELOW)  [  ]  I consent to the stated transfer and to be transported by ambulance/helicopter  [  ]  I consent to the stated transfer, but refuse transportation by ambulance and accept full responsibility for my transportation by car  I understand the risks of non-ambulance transfers and I exonerate the Hospital and its staff from any deterioration in my condition that results from this refusal     X___________________________________________    DATE  18  TIME________  Signature of patient or legally responsible individual signing on patient behalf           RELATIONSHIP TO PATIENT_________________________          Provider Certification    NAME Roberto Hickman   1960                              MRN [id number]    A medical screening exam was performed on the above named patient  Based on the examination:    Condition Necessitating Transfer The encounter diagnosis was Pneumothorax      Patient Condition: The patient has been stabilized such that within reasonable medical probability, no material deterioration of the patient condition or the condition of the unborn child(vicente) is likely to result from the transfer    Reason for Transfer: Patient/Family request    Transfer Requirements: [address]  [facility]   · Space available and qualified personnel available for treatment as acknowledged by    · Agreed to accept transfer and to provide appropriate medical treatment as acknowledged by       Dr Neha Reed  · Appropriate medical records of the examination and treatment of the patient are provided at the time of transfer   [address] _______  · Transfer will be performed by qualified personnel from    and appropriate transfer equipment as required, including the use of necessary and appropriate life support measures  Provider Certification: I have examined the patient and explained the following risks and benefits of being transferred/refusing transfer to the patient/family:  General risk, such as traffic hazards, adverse weather conditions, rough terrain or turbulence, possible failure of equipment (including vehicle or aircraft), or consequences of actions of persons outside the control of the transport personnel      Based on these reasonable risks and benefits to the patient and/or the unborn child(vicente), and based upon the information available at the time of the patients examination, I certify that the medical benefits reasonably to be expected from the provision of appropriate medical treatments at another medical facility outweigh the increasing risks, if any, to the individuals medical condition, and in the case of labor to the unborn child, from effecting the transfer      X____________________________________________ DATE 11/21/18        TIME_______      ORIGINAL - SEND TO MEDICAL RECORDS   COPY - SEND WITH PATIENT DURING TRANSFER

## 2018-11-22 NOTE — PLAN OF CARE
Problem: Potential for Falls  Goal: Patient will remain free of falls  INTERVENTIONS:  - Assess patient frequently for physical needs  -  Identify cognitive and physical deficits and behaviors that affect risk of falls    -  [place] fall precautions as indicated by assessment   - Educate patient/family on patient safety including physical limitations  - Instruct patient to call for assistance with activity based on assessment  - Modify environment to reduce risk of injury  - Consider OT/PT consult to assist with strengthening/mobility   Outcome: Progressing

## 2018-11-22 NOTE — UTILIZATION REVIEW
Initial Clinical Review    Admission: Date/Time/Statement: 11/21/18 @ 2130     Orders Placed This Encounter   Procedures    Inpatient Admission     Standing Status:   Standing     Number of Occurrences:   1     Order Specific Question:   Admitting Physician     Answer:   Thong Patel [[id number]]     Order Specific Question:   Level of Care     Answer:   Med Surg [[id number]]     Order Specific Question:   Estimated length of stay     Answer:   More than 2 Midnights     Order Specific Question:   Certification     Answer:   I certify that inpatient services are medically necessary for this patient for a duration of greater than two midnights  See H&P and MD Progress Notes for additional information about the patient's course of treatment  PLEASE NOTE: Patient transferred to [place] [address]  from [facility] ED  @ Pt's request    ED: Date/Time/Mode of Arrival:   ED Arrival Information     Patient not seen in ED                       History of Illness: Patient admitted to [facility]  11/13-11/17 for pneumothorax after lung biopsy requiring chest tube placement   Today he had a follow-up with Pulmonary for bronchoscopy but reported pleuritic chest pain so his pulmonologist ordered a CXR which showed a 30% right pneumothorax   Patient transferred to [facility] for chest tube placement  At [facility] - Successful CT guided 10 [place] chest tube placement right side  PTX resolved after several minutes of suction  Transferred here at patient's request  Reports blood-tinged sputum, pain with inspiration and mild nausea    Denies fever, abdominal pain, emesis, diarrhea or dysuria    ADM Vital Signs:  99 7 - 77 - 18   144/79   97%  Wt: 68 kg    Abnormal Labs/Diagnostic Test Results:         Past Medical/Surgical History:   Past Medical History:   Diagnosis Date    Bipolar 1 disorder ([facility] )     [facility]chronic obstructive [facility] disease) ([facility]     Diabetes mellitus ([facility] )     GERD (gastroesophageal reflux disease)     Herniated lumbar intervertebral disc     Hypertension     Latent syphilis     PTSD (post-traumatic stress disorder)     Tobacco abuse 11/13/2018       Admitting Diagnosis: Pneumothorax [J93 9]    Age/Sex: 66 y o  male    Assessment/Plan:  67 yo male admitted with Recurrent Right Pneumothorax  Chest tube in place and to suction  Consult Pulm  Right lung mass- s/p biopsy with result of invasive adenocarcinoma  Await pulmonary recommendations   Pt was to follow up for staging outpt       Admission Orders:  IP  Consult Pulm  CBC, BMP in am  CXR in am  O2 prn for increased work of breathing  Chest Tube  To 20 cm wall suction  Scheduled Meds:   Current Facility-Administered Medications:  amLODIPine 10 mg Oral Daily Lilli Lizette, CRNP   budesonide-formoterol 2 puff Inhalation BID Lilli Lizette, CRNP   enoxaparin 40 mg Subcutaneous Daily Lilli Lizette, CRNP   famotidine 20 mg Oral BID Lilli Lizette, CRNP   FLUoxetine 10 mg Oral Daily Lilli Lizette, CRNP   fluticasone-vilanterol 1 puff Inhalation Early Morning Lilli Lizette, CRNP   gabapentin 100 mg Oral TID Lilli Lizette, CRNP   insulin lispro 1-5 Units Subcutaneous TID AC Lilli Lizette, CRNP   insulin lispro 1-5 Units Subcutaneous HS Lilli Lizette, CRNP   ipratropium 0 5 mg Nebulization 4x Daily PRN Lilli Lizette, CRNP   levalbuterol 1 25 mg Nebulization Q8H PRN Lilli Lizette, CRNP   loratadine 10 mg Oral Early Morning Lilli Lizette, CRNP   morphine injection 2 mg Intravenous Q6H PRN Lilli Lizette, CRNP   oxyCODONE-acetaminophen 1 tablet Oral Q4H PRN Lilli Lizette, CRNP   polyethylene glycol 17 g Oral Daily Lilli Lizette, CRNP   QUEtiapine 25 mg Oral HS Lilli Lizette, CRNP   senna 1 tablet Oral HS PRN Lilli Lizette, CRNP     Continuous Infusions:    PRN Meds: ipratropium    levalbuterol    morphine injection    oxyCODONE-acetaminophen x 1 11/22    senna  11/22: 95 9 - 69 - 16 150/86   RA   H&H 11 6 / 35 4,    CA 8 0  Per Pulm: Continue chest tube to suction, monitor air leak, repeat cxr  Continue Breo Ellipta,  P r n  Nebulizer treatments    [address] Utilization Review Department  Phone: [phone]; Fax [phone]  [email or web address]  ATTENTION: Please call with any questions or concerns to [phone]  and carefully listen to the prompts so that you are directed to the right person  Send all requests for admission clinical reviews, approved or denied determinations and any other requests to fax [phone]   All voicemails are confidential

## 2018-11-22 NOTE — CONSULTS
Consultation - Pulmonary Medicine   Issa Fu  66 y o  male MRN: [id number]  Unit/Bed#: E2 -01 Encounter: [id number]      Physician Requesting Consult: Jordan GUY  Reason for Consult:  Pneumothorax    Assessment/Plan:    1  Right-sided pneumothorax, recurrent  · Original pneumothorax was status post lung biopsy 11/13/2018  · Continue chest tube, monitor air leak  · Continue wall suction today  · Await repeat chest x-ray  · Anticipate transition to water seal and possible clamp trial tomorrow  · Pain control  2  Severe emphysema, FEV1 1 49 L, 49% predicted  · Continue Breo Ellipta  · P r n  Nebulizer treatments  3  Invasive adenocarcinoma of the lung, PET scan imaging is most consistent with advanced stage (suspect stage IV given the periportal and retrocrural nodes)  · Awaiting EBUS for lymph node confirmation  · Consider oncology consultation, will most likely need systemic chemotherapy based on PET imaging pattern  Could consider coordination of port placement for chemotherapy         ______________________________________________________________________    HPI:    Issa Fu  is a 66 y o  male who presents for recurrent pneumothorax  He was scheduled for outpatient endobronchial ultrasound with biopsy of the lymph nodes to confirm staging  PET scan imaging is highly suggestive of advanced stage adenocarcinoma of the lung  He has previously had needle biopsy which did confirm adenocarcinoma which was invasive  He has a heavy smoker with significant risk factors for malignancy  He does have emphysema  He has had shortness of breath  Shortness of breath has been chronic  Prior to the procedure a chest x-ray was performed which did show recurrence of the pneumothorax  He was previously hospitalized for a post biopsy pneumothorax with resolution    He did not have increasing symptoms however but on the chest x-ray did show return of the pneumothorax which was approximately a 30% pneumothorax  He was referred for repeat chest tube placement and then was transferred to the [facility] for hospitalization per his request     Denies fever, chills, or infection symptoms  He is having some mild chest pain  This is at the chest tube site  Denies any cough or phlegm production  No wheezing  No abdominal pain or GERD symptoms  Denies arthralgias or myalgias  No headache, lightheadedness, or dizziness    Review of Systems:    Full 12 point review of systems was performed  Aside from what was mentioned in the HPI, it is otherwise negative  Historical Information   Past Medical History:   Diagnosis Date    [facility] )     [facility]     Diabetes mellitus (H[facility])     GERD (gastroesophageal reflux disease)     Herniated lumbar intervertebral disc     Hypertension     Latent syphilis     Treated    PTSD (post-traumatic stress disorder)     Tobacco abuse 11/13/2018     Past Surgical History:   Procedure Laterality Date    CT GUIDED CHEST TUBE  11/21/2018    HEMORROIDECTOMY      IR CHEST TUBE  11/14/2018    IR IMAGE GUIDED BIOPSY/ASPIRATION LUNG  11/13/2018    KNEE SURGERY       Social History   History   Alcohol Use    Yes     Comment: social     History   Smoking Status    Former Smoker    Packs/day: 0 50    Types: Cigarettes    Quit date: 8/31/2018   Smokeless Tobacco    Former User     Comment: "i quit one month ago when i quit weed"       Family History:   Family History   Problem Relation Age of Onset    Heart disease Mother     Hypertension Father     Diabetes Family     Asthma Family     Heart disease Family     Cancer Family        Medications: The patient's active and prehospital medications were reviewed      Current Facility-Administered Medications:  amLODIPine 10 mg Oral Daily BROOKS Solis   budesonide-formoterol 2 puff Inhalation BID BROOKS Solis   enoxaparin 40 mg Subcutaneous Daily Parker Alexandra, CRNP   famotidine 20 mg Oral BID Rowley [place], CRNP   FLUoxetine 10 mg Oral Daily Parker [place], CRNP   fluticasone-vilanterol 1 puff Inhalation Early Morning Keith Morris, CRNP   gabapentin 100 mg Oral TID Parker Alexandra, CRNP   insulin lispro 1-5 Units Subcutaneous TID AC Keith Magañady, CRNP   insulin lispro 1-5 Units Subcutaneous HS Parker Alexandra, CRNP   ipratropium 0 5 mg Nebulization 4x Daily PRN Rowley [place], CRNP   levalbuterol 1 25 mg Nebulization Q8H PRN Rowley [place], CRNP   loratadine 10 mg Oral Early Morning [facility], CRNP   morphine injection 2 mg Intravenous Q6H PRN Parker Alexandra, CRNP   oxyCODONE-acetaminophen 1 tablet Oral Q4H PRN Rowley [place], CRNP   polyethylene glycol 17 g Oral Daily Parker [place], CRNP   QUEtiapine 25 mg Oral HS [facility], CRNP   senna 1 tablet Oral HS PRN [facility], CRNP         PhysicalExamination:  Vitals:   Vitals:    11/21/18 2133 11/21/18 2317   BP: 144/79 140/69   BP Location: Left arm Left arm   Pulse: 77 77   Resp: 18 18   Temp: 99 7 °F (37 6 °C) (!) 97 3 °F (36 3 °C)   TempSrc: Temporal Tympanic   SpO2: 97% 98%     There is no height or weight on file to calculate BMI  Temp  Min: 97 3 °F (36 3 °C)  Max: 99 7 °F (37 6 °C)       SpO2: 98 %,   SpO2 Activity: At Rest,   O2 Device: None (Room air)    Gen:  Comfortable on room air  HEENT: PERRL  Oropharynx is clear, moist  Neck: Supple  There is no JVD, lymphadenopathy or thyromegaly  Trachea is midline  Chest:  Chest excursion symmetric  Lungs are hyperinflated  Breath sounds are distant bilaterally but otherwise clear  Hyper-resonant to percussion  Cardiac:  Regular  There are no murmurs  Abdomen: Soft and nontender  Benign  Extremities: No clubbing, cyanosis or edema  Neurologic: No focal deficits  Normal affect  Skin: No appreciable rashes        Diagnostic Data:  CBC:     Results from last 7 days  Lab Units 11/22/18  0616 11/22/18  0120   WBC Thousand/uL 5 96  --    HEMOGLOBIN g/dL 11 6*  --    HEMATOCRIT % 35 4*  --    PLATELETS Thousands/uL 360 375       CMP:     Results from last 7 days  Lab Units 11/22/18  0616 11/16/18  0954   POTASSIUM mmol/L 3 9 3 8   CHLORIDE mmol/L 105 103   CO2 mmol/L 26 27   BUN mg/dL 20 11   CREATININE mg/dL 1 17 1 04   CALCIUM mg/dL 8 0* 9 0         Imaging:  I reviewed the studies personally on the [facility] system  PET scan was reviewed and is as described above    Chest x-ray 11/21/2018 shows 20 to 30% pneumothorax    Taras Bravo MD

## 2018-11-22 NOTE — ASSESSMENT & PLAN NOTE
· Recurrent pneumothorax  H/o traumatic pneumothorax after lung biopsy on 11/13/18, s/p chest tube insertion and removal   · Today patient had follow-up philipp't with his outpatient pulmonologist, CXR: showed 30% right-sided pneumothorax, s/p IR guided chest tube placement  · Supplemental O2 via nasal cannula p r n  SOB  · Repeat chest x-ray in the a m    · Pain control  · Pulmonary consult

## 2018-11-22 NOTE — EMTALA/ACUTE CARE TRANSFER
[address] [address] [address]  Dept: [phone]      EMTALA TRANSFER CONSENT    NAME Matthias Montenegro Sr   1960                              MRN [id number]    I have been informed of my rights regarding examination, treatment, and transfer   by Dr Danielle Palmer MD    Benefits: Patient preference    Risks: Potential for delay in receiving treatment, Potential deterioration of medical condition, Loss of IV, Increased discomfort during transfer, Possible worsening of condition or death during transfer      Transfer Request   I acknowledge that my medical condition has been evaluated and explained to me by the emergency department physician or other qualified medical person and/or my attending physician who has recommended and offered to me further medical examination and treatment  I understand the Hospital's obligation with respect to the treatment and stabilization of my emergency medical condition  I nevertheless request to be transferred  I release the Hospital, the doctor, and any other persons caring for me from all responsibility or liability for any injury or ill effects that may result from my transfer and agree to accept all responsibility for the consequences of my choice to transfer, rather than receive stabilizing treatment at the Hospital  I understand that because the transfer is my request, my insurance may not provide reimbursement for the services  The Hospital will assist and direct me and my family in how to make arrangements for transfer, but the hospital is not liable for any fees charged by the transport service  In spite of this understanding, I refuse to consent to further medical examination and treatment which has been offered to me, and request transfer to  Ellen Miranda Name, [address] : [address]   I authorize the performance of emergency medical procedures and treatments upon me in both transit and upon arrival at the receiving facility  Additionally, I authorize the release of any and all medical records to the receiving facility and request they be transported with me, if possible  I authorize the performance of emergency medical procedures and treatments upon me in both transit and upon arrival at the receiving facility  Additionally, I authorize the release of any and all medical records to the receiving facility and request they be transported with me, if possible  I understand that the safest mode of transportation during a medical emergency is an ambulance and that the Hospital advocates the use of this mode of transport  Risks of traveling to the receiving facility by car, including absence of medical control, life sustaining equipment, such as oxygen, and medical personnel has been explained to me and I fully understand them  (TOÑITO CORRECT BOX BELOW)  [  ]  I consent to the stated transfer and to be transported by ambulance/helicopter  [  ]  I consent to the stated transfer, but refuse transportation by ambulance and accept full responsibility for my transportation by car  I understand the risks of non-ambulance transfers and I exonerate the Hospital and its staff from any deterioration in my condition that results from this refusal     X___________________________________________    DATE  18  TIME________  Signature of patient or legally responsible individual signing on patient behalf           RELATIONSHIP TO PATIENT_________________________          Provider Certification    NAME Washington Swan  [facility] 1960                              MRN [id number]    A medical screening exam was performed on the above named patient  Based on the examination:    Condition Necessitating Transfer The encounter diagnosis was Pneumothorax      Patient Condition: The patient has been stabilized such that within reasonable medical probability, no material deterioration of the patient condition or the condition of the unborn child(vicente) is likely to result from the transfer    Reason for Transfer: Patient/Family request    Transfer Requirements: [address]  [facility]   · Space available and qualified personnel available for treatment as acknowledged by    · Agreed to accept transfer and to provide appropriate medical treatment as acknowledged by       Dr Ryan Schmitz  · Appropriate medical records of the examination and treatment of the patient are provided at the time of transfer   [address] _______  · Transfer will be performed by qualified personnel from    and appropriate transfer equipment as required, including the use of necessary and appropriate life support measures  Provider Certification: I have examined the patient and explained the following risks and benefits of being transferred/refusing transfer to the patient/family:  General risk, such as traffic hazards, adverse weather conditions, rough terrain or turbulence, possible failure of equipment (including vehicle or aircraft), or consequences of actions of persons outside the control of the transport personnel      Based on these reasonable risks and benefits to the patient and/or the unborn child(vicente), and based upon the information available at the time of the patients examination, I certify that the medical benefits reasonably to be expected from the provision of appropriate medical treatments at another medical facility outweigh the increasing risks, if any, to the individuals medical condition, and in the case of labor to the unborn child, from effecting the transfer      X____________________________________________ DATE 11/21/18        TIME_______      ORIGINAL - SEND TO MEDICAL RECORDS   COPY - SEND WITH PATIENT DURING TRANSFER

## 2018-11-22 NOTE — ED PROVIDER NOTES
History  Chief Complaint   Patient presents with    Chest Injury     patient from outpatient center with report of "small pneumothorax" and needs to go to IR for placement of chest tube  reports, "hating [facility] and i dont want to be here"     HPI   51-year-old male presenting from his pulmonologist's office for IR guided chest tube for 30% right pneumothorax  Patient was recently admitted to [facility] last week for lung biopsy of what has subsequently been found to be adenocarcinoma to his right long  Following that biopsy, he developed a right-sided pneumothorax requiring chest tube placement  The chest tube was subsequently removed 5 days ago and patient was discharged home  He had routine follow-up with his pulmonologist today, was experiencing some pleuritic chest pain so his pulmonologist ordered a chest x-ray which showed a 30% right pneumothorax  Patient sent here for chest tube placement  On arrival, patient denies shortness of breath  Does report some central pleuritic chest pain  Nonexertional, sharp in nature, feels like his prior pneumothorax  Denies lightheadedness, dizziness, or palpitations  No cough, fevers, or chills  Prior to Admission Medications   Prescriptions Last Dose Informant Patient Reported? Taking?    FLUoxetine (PROzac) 10 MG tablet   Yes No   Sig: Take 10 mg by mouth daily in the early morning     QUEtiapine (SEROquel) 25 mg tablet   Yes No   Sig: Take 25 mg by mouth daily at bedtime   Selenium Sulfide 2 25 % SHAM   Yes No   Sig: apply by topical route  every PM to the affected area(s)   albuterol (VENTOLIN HFA) 90 mcg/act inhaler   No No   Sig: Inhale 2 puffs every 4 (four) hours as needed for wheezing   amLODIPine (NORVASC) 10 mg tablet   No No   Sig: Take 1 tablet (10 mg total) by mouth daily   Patient taking differently: Take 10 mg by mouth daily in the early morning     budesonide-formoterol (SYMBICORT) 160-4 5 mcg/act inhaler   Yes No   Sig: Inhale 2 puffs 2 (two) times a day Rinse mouth after use     fluticasone-vilanterol (BREO ELLIPTA) 100-25 mcg/inh inhaler   Yes No   Sig: Inhale 1 puff daily in the early morning Rinse mouth after use      gabapentin (NEURONTIN) 100 mg capsule   No No   Sig: Take 1 capsule (100 mg total) by mouth 3 (three) times a day   ipratropium (ATROVENT) 0 02 % nebulizer solution   No No   Sig: Take 1 vial (0 5 mg total) by nebulization 3 (three) times a day   levalbuterol (XOPENEX) 1 25 mg/0 5 mL nebulizer solution   No No   Sig: Take 0 5 mL (1 25 mg total) by nebulization every 8 (eight) hours as needed for wheezing   loratadine (CLARITIN) 10 mg tablet   Yes No   Sig: Take 10 mg by mouth daily in the early morning     metFORMIN (GLUCOPHAGE-XR) 500 mg 24 hr tablet   No No   Sig: Take 1 tablet (500 mg total) by mouth 2 (two) times a day with meals   methocarbamol (ROBAXIN) 500 mg tablet   No No   Sig: Take 1 tablet (500 mg total) by mouth every 6 (six) hours as needed for muscle spasms   polyethylene glycol (MIRALAX) 17 g packet   No No   Sig: Take 17 g by mouth daily   Patient taking differently: Take 17 g by mouth daily in the early morning     ranitidine (ZANTAC) 150 MG capsule   No No   Sig: Take 1 capsule (150 mg total) by mouth 2 (two) times a day   tiotropium (SPIRIVA RESPIMAT) 2 5 MCG/ACT AERS inhaler   No No   Sig: Inhale 2 puffs daily   Patient taking differently: Inhale 2 puffs 2 (two) times a day     traMADol (ULTRAM) 50 mg tablet   No No   Sig: Take 1 tablet (50 mg total) by mouth every 6 (six) hours as needed for moderate pain for up to 10 days      Facility-Administered Medications: None       Past Medical History:   Diagnosis Date    Bipolar 1 disorder ([facility] [address] )     COPD (chronic obstructive pulmonary disease) (HCC)     Diabetes mellitus (HCC)     GERD (gastroesophageal reflux disease)     Herniated lumbar intervertebral disc     Hypertension     Latent syphilis     Treated    PTSD (post-traumatic stress disorder)     Tobacco abuse 11/13/2018       Past Surgical History:   Procedure Laterality Date    CT GUIDED CHEST TUBE  11/21/2018    HEMORROIDECTOMY      IR CHEST TUBE  11/14/2018    IR IMAGE GUIDED BIOPSY/ASPIRATION LUNG  11/13/2018    KNEE SURGERY         Family History   Problem Relation Age of Onset    Heart disease Mother     Hypertension Father     Diabetes Family     Asthma Family     Heart disease Family     Cancer Family      I have reviewed and agree with the history as documented  Social History   Substance Use Topics    Smoking status: Former Smoker     Packs/day: 0 50     Types: Cigarettes     Quit date: 8/31/2018    Smokeless tobacco: Former User      Comment: "i quit one month ago when i quit weed"    Alcohol use Yes      Comment: social        Review of Systems   Constitutional: Negative for chills and fever  HENT: Negative for congestion  Eyes: Negative for visual disturbance  Respiratory: Negative for cough, chest tightness and shortness of breath  Cardiovascular: Positive for chest pain (mid-chest, pleuritic)  Negative for leg swelling  Gastrointestinal: Negative for abdominal pain, diarrhea, nausea and vomiting  Genitourinary: Negative for dysuria and frequency  Musculoskeletal: Negative for arthralgias, back pain, neck pain and neck stiffness  Skin: Negative for rash  Neurological: Negative for dizziness, weakness, light-headedness, numbness and headaches  Psychiatric/Behavioral: Negative for agitation, behavioral problems and confusion  Physical Exam  Physical Exam   Constitutional: He is oriented to person, place, and time  He appears well-developed and well-nourished  No distress  HENT:   Head: Normocephalic and atraumatic  Right Ear: External ear normal    Left Ear: External ear normal    Nose: Nose normal    Mouth/Throat: Oropharynx is clear and moist    Eyes: Conjunctivae are normal    Neck: Normal range of motion  Neck supple     Cardiovascular: Normal rate, regular rhythm, normal heart sounds and intact distal pulses  Exam reveals no gallop and no friction rub  No murmur heard  Pulmonary/Chest: Effort normal and breath sounds normal  No respiratory distress  He has no wheezes  He has no rales  Abdominal: Soft  Bowel sounds are normal  He exhibits no distension  There is no tenderness  There is no guarding  Musculoskeletal: Normal range of motion  He exhibits no edema or deformity  Neurological: He is alert and oriented to person, place, and time  He exhibits normal muscle tone  Skin: Skin is warm and dry  He is not diaphoretic  Vital Signs  ED Triage Vitals [11/21/18 1452]   Temperature Pulse Respirations Blood Pressure SpO2   98 3 °F (36 8 °C) 64 18 163/77 98 %      Temp Source Heart Rate Source Patient Position - Orthostatic VS BP Location FiO2 (%)   Oral Monitor Lying Right arm --      Pain Score       6           Vitals:    11/21/18 1830 11/21/18 1845 11/21/18 1945 11/21/18 2007   BP:   148/76 133/73   Pulse: 72 74 80 78   Patient Position - Orthostatic VS:   Lying Lying       Visual Acuity      ED Medications  Medications   midazolam (VERSED) injection (1 mg Intravenous Given 11/21/18 1713)   fentanyl citrate (PF) 100 MCG/2ML (50 mcg Intravenous Given 11/21/18 1713)   oxyCODONE-acetaminophen (PERCOCET) 5-325 mg per tablet 2 tablet (2 tablets Oral Given 11/21/18 1757)   ketorolac (TORADOL) injection 15 mg (15 mg Intravenous Given 11/21/18 2004)       Diagnostic Studies  Results Reviewed     None                 CT guided chest tube   Final Result by Evita Lange MD (11/21 1809)   Impression:   1  Successful placement of a therapeutic 10 [place] all-purpose drainage catheter into the right pleural space under CT guidance   2  Complete resolution of the pneumothorax several minutes after suction  3   Follow-up chest x-ray tomorrow    The patient should probably have this tube left in place for several days due to the recurrent pneumothorax  The biopsy was performed adjacent to the right middle lobe fissure and may take more time to heal                   Workstation performed: [id number]                    Procedures  Procedures  Conscious Sedation Assessment      Classification Score   ASA Scale Assessment  3-Severe systemic disease that results in functional limitation filed at 11/21/2018 1653   Mallampati Classification  Class II: soft palate, uvula, fauces visible - No Difficulty filed at 11/21/2018 1653           Phone Contacts  ED Phone Contact    ED Course                               MDM  Number of Diagnoses or Management Options  Pneumothorax:   Diagnosis management comments: Generally well appearing  Afebrile and hemodynamically stable  Patient is satting well on room air  He has normal breath sounds bilaterally  Does report some mild central pleuritic CP, feels like his last pneumothorax, denies exertional component, diaphoresis, nausea, or radiation of his pain  I personally interpreted his EKG, which shows normal rate, normal sinus rhythm, normal axis, normal intervals, PVCs, no ischemic changes  History not consistent with cardiogenic pain  I reviewed his chest x-ray performed at his pulmonologist's office which does show a 30% right-sided pneumothorax  IR contacted, and successfully placed in IR guided chest tube to the right chest   Patient will require admission to the hospital for monitoring following chest tube placement  Unfortunately, both the patient and his girlfriend at bedside adamantly refused to be admitted here, stating that they prefer to be admitted closer to home in [facility]  We discussed my concerns about transfer given the lack of medical necessity  They became verbally abusive to both myself and emergency department staff, cursing and yelling  The hospital supervisor was contacted, who discussed the patient's case with Risk Management, and arranged for transfer to [facility]    Chest tube will be placed to water seal prior to transfer  Pt transferred in stable condition  Percocet and toradol given for chest wall pain around chest tube site prior to transfer  Amount and/or Complexity of Data Reviewed  Review and summarize past medical records: yes  Discuss the patient with other providers: yes  Independent visualization of images, tracings, or specimens: yes    Patient Progress  Patient progress: improved    CritCare Time         Disposition  Final diagnoses:   Pneumothorax     Time reflects when diagnosis was documented in both MDM as applicable and the Disposition within this note     Time User Action Codes Description Comment    11/21/2018  7:13 PM Winsomeashley Linear Add [J93 9] Pneumothorax       ED Disposition     ED Disposition Condition Comment    Transfer to Another [facility]  should be transferred out to [facility]        MD Documentation      Most Recent Value   Patient Condition  The patient has been stabilized such that within reasonable medical probability, no material deterioration of the patient condition or the condition of the unborn child(vicente) is likely to result from the transfer   Reason for Transfer  Patient/Family request   Benefits of Transfer  Patient preference   Risks of Transfer  Potential for delay in receiving treatment, Potential deterioration of medical condition, Loss of IV, Increased discomfort during transfer, Possible worsening of condition or death during transfer   Accepting Physician  Dr Luisa Wade Name, Clement Greene Has   Provider Certification  General risk, such as traffic hazards, adverse weather conditions, rough terrain or turbulence, possible failure of equipment (including vehicle or aircraft), or consequences of actions of persons outside the control of the transport personnel      RN Documentation      [facility] Name, [address] [place] [address]  [facility]   Level of Care  Advanced life support      Follow-up Information    None         Discharge Medication List as of 11/21/2018  8:43 PM      CONTINUE these medications which have NOT CHANGED    Details   albuterol (VENTOLIN HFA) 90 mcg/act inhaler Inhale 2 puffs every 4 (four) hours as needed for wheezing, Starting Fri 7/27/2018, Normal      amLODIPine (NORVASC) 10 mg tablet Take 1 tablet (10 mg total) by mouth daily, Starting Fri 7/27/2018, Normal      budesonide-formoterol (SYMBICORT) 160-4 5 mcg/act inhaler Inhale 2 puffs 2 (two) times a day Rinse mouth after use , Historical Med      FLUoxetine (PROzac) 10 MG tablet Take 10 mg by mouth daily in the early morning  , Historical Med      fluticasone-vilanterol (BREO ELLIPTA) 100-25 mcg/inh inhaler Inhale 1 puff daily in the early morning Rinse mouth after use   , Historical Med      gabapentin (NEURONTIN) 100 mg capsule Take 1 capsule (100 mg total) by mouth 3 (three) times a day, Starting Mon 10/15/2018, Normal      ipratropium (ATROVENT) 0 02 % nebulizer solution Take 1 vial (0 5 mg total) by nebulization 3 (three) times a day, Starting Wed 9/26/2018, Print      levalbuterol (XOPENEX) 1 25 mg/0 5 mL nebulizer solution Take 0 5 mL (1 25 mg total) by nebulization every 8 (eight) hours as needed for wheezing, Starting Wed 9/26/2018, Print      loratadine (CLARITIN) 10 mg tablet Take 10 mg by mouth daily in the early morning  , Historical Med      metFORMIN (GLUCOPHAGE-XR) 500 mg 24 hr tablet Take 1 tablet (500 mg total) by mouth 2 (two) times a day with meals, Starting Wed 10/10/2018, Normal      methocarbamol (ROBAXIN) 500 mg tablet Take 1 tablet (500 mg total) by mouth every 6 (six) hours as needed for muscle spasms, Starting Thu 6/7/2018, Print      polyethylene glycol (MIRALAX) 17 g packet Take 17 g by mouth daily, Starting Wed 10/10/2018, Normal      QUEtiapine (SEROquel) 25 mg tablet Take 25 mg by mouth daily at bedtime, Historical Med      ranitidine (ZANTAC) 150 MG capsule Take 1 capsule (150 mg total) by mouth 2 (two) times a day, Starting Wed 10/10/2018, Normal      Selenium Sulfide 2 25 % SHAM apply by topical route  every PM to the affected area(s), Historical Med      tiotropium (SPIRIVA RESPIMAT) 2 5 MCG/ACT AERS inhaler Inhale 2 puffs daily, Starting Thu 9/27/2018, Normal      traMADol (ULTRAM) 50 mg tablet Take 1 tablet (50 mg total) by mouth every 6 (six) hours as needed for moderate pain for up to 10 days, Starting Sat 11/17/2018, Until Tue 11/27/2018, Print           No discharge procedures on file      ED Provider  Electronically Signed by           Vicky Landrum MD  11/21/18 2040       Vicky Landrum MD  11/21/18 5401

## 2018-11-23 PROBLEM — C34.91 ADENOCARCINOMA OF LUNG, RIGHT (HCC): Status: ACTIVE | Noted: 2018-11-13

## 2018-11-23 LAB
GLUCOSE SERPL-MCNC: 118 MG/DL (ref 65–140)
GLUCOSE SERPL-MCNC: 90 MG/DL (ref 65–140)
GLUCOSE SERPL-MCNC: 92 MG/DL (ref 65–140)
GLUCOSE SERPL-MCNC: 99 MG/DL (ref 65–140)

## 2018-11-23 PROCEDURE — 82948 REAGENT STRIP/BLOOD GLUCOSE: CPT

## 2018-11-23 PROCEDURE — 99232 SBSQ HOSP IP/OBS MODERATE 35: CPT | Performed by: INTERNAL MEDICINE

## 2018-11-23 PROCEDURE — 99222 1ST HOSP IP/OBS MODERATE 55: CPT | Performed by: INTERNAL MEDICINE

## 2018-11-23 RX ORDER — KETOROLAC TROMETHAMINE 30 MG/ML
15 INJECTION, SOLUTION INTRAMUSCULAR; INTRAVENOUS DAILY PRN
Status: DISCONTINUED | OUTPATIENT
Start: 2018-11-23 | End: 2018-11-27 | Stop reason: HOSPADM

## 2018-11-23 RX ADMIN — BUDESONIDE AND FORMOTEROL FUMARATE DIHYDRATE 2 PUFF: 160; 4.5 AEROSOL RESPIRATORY (INHALATION) at 08:00

## 2018-11-23 RX ADMIN — BUDESONIDE AND FORMOTEROL FUMARATE DIHYDRATE 2 PUFF: 160; 4.5 AEROSOL RESPIRATORY (INHALATION) at 17:16

## 2018-11-23 RX ADMIN — TIOTROPIUM BROMIDE 18 MCG: 18 CAPSULE ORAL; RESPIRATORY (INHALATION) at 15:00

## 2018-11-23 RX ADMIN — POLYETHYLENE GLYCOL (3350) 17 G: 17 POWDER, FOR SOLUTION ORAL at 08:00

## 2018-11-23 RX ADMIN — ENOXAPARIN SODIUM 40 MG: 40 INJECTION SUBCUTANEOUS at 16:08

## 2018-11-23 RX ADMIN — FAMOTIDINE 20 MG: 20 TABLET ORAL at 17:16

## 2018-11-23 RX ADMIN — LORATADINE 10 MG: 10 TABLET ORAL at 06:10

## 2018-11-23 RX ADMIN — OXYCODONE AND ACETAMINOPHEN 1 TABLET: 5; 325 TABLET ORAL at 22:19

## 2018-11-23 RX ADMIN — GABAPENTIN 100 MG: 100 CAPSULE ORAL at 17:16

## 2018-11-23 RX ADMIN — OXYCODONE AND ACETAMINOPHEN 1 TABLET: 5; 325 TABLET ORAL at 07:59

## 2018-11-23 RX ADMIN — FLUTICASONE FUROATE AND VILANTEROL TRIFENATATE 1 PUFF: 100; 25 POWDER RESPIRATORY (INHALATION) at 06:14

## 2018-11-23 RX ADMIN — GABAPENTIN 100 MG: 100 CAPSULE ORAL at 08:00

## 2018-11-23 RX ADMIN — FAMOTIDINE 20 MG: 20 TABLET ORAL at 08:00

## 2018-11-23 RX ADMIN — KETOROLAC TROMETHAMINE 15 MG: 30 INJECTION, SOLUTION INTRAMUSCULAR at 15:00

## 2018-11-23 RX ADMIN — OXYCODONE AND ACETAMINOPHEN 1 TABLET: 5; 325 TABLET ORAL at 17:20

## 2018-11-23 RX ADMIN — AMLODIPINE BESYLATE 10 MG: 10 TABLET ORAL at 08:00

## 2018-11-23 RX ADMIN — GABAPENTIN 100 MG: 100 CAPSULE ORAL at 22:06

## 2018-11-23 RX ADMIN — FLUOXETINE 10 MG: 10 CAPSULE ORAL at 08:00

## 2018-11-23 NOTE — ASSESSMENT & PLAN NOTE
· Recurrent pneumothorax  H/o traumatic pneumothorax after lung biopsy on 11/13/18, s/p chest tube insertion and removal  · Unclear if candidate for pleurodesis given adenocarcinoma of lung if felt to be possible surgical candidate  · Today patient had follow-up philipp't with his outpatient pulmonologist, CXR: showed 30% right-sided pneumothorax, s/p IR guided chest tube placement  · Repeat CXR on 11/22/18 demonstrates resolution although still w/air leak    Per pulm hopeful for transition to water seal in 24H  · Pain control  ·

## 2018-11-23 NOTE — PROGRESS NOTES
Progress Note - Pulmonary   Lynette Ariza Sr  66 y o  male MRN: [id number]  Unit/Bed#: E2 -01 Encounter: [id number]      Assessment/Plan:    1  Recurrent right-sided pneumothorax  1  Initial pneumothorax status post lung biopsy on 11/13/2018  2  Continue current chest tube to suction, noted air leak  3  Repeat chest imaging yesterday demonstrated resolution of right-sided pneumothorax-although continues to have air leak  4  Possible transition to water seal tomorrow  5  Continue with pain control  2  Severe emphysema-FEV1 49% predicted  1  Continue daily Breo  2  Continue as needed nebulized bronchodilators  3  Invasive adenocarcinoma of the lung-suspected stage IV with lymph node involvement  1  Status post  EBUS for lymph node evaluation-results pending  2  Oncology consult placed as suspected stage IV adenocarcinoma based on PET scan imaging  * outpatient Pulmonary follow-up per discharge instructions    Subjective:     Fito Clark was seen lying in bed upon entering the room  He reports significant right-sided chest pain at chest tube insertion site  Denies significant cough or sputum production, fevers, chills, night sweats, nausea vomiting diarrhea, headache, dizziness, bronchospasm hemoptysis    Objective:         Vitals: Blood pressure 151/88, pulse 75, temperature (!) 96 5 °F (35 8 °C), temperature source Tympanic, resp  rate 16, height 5' 8" (1 727 m), weight 68 kg (149 lb 14 6 oz), SpO2 96 % , RA, Body mass index is 22 79 kg/m²        Intake/Output Summary (Last 24 hours) at 11/23/18 1121  Last data filed at 11/23/18 1100   Gross per 24 hour   Intake              540 ml   Output               25 ml   Net              515 ml         Physical Exam  Gen: Awake, alert, oriented x 3, no acute distress  HEENT: Mucous membranes moist, no oral lesions, no thrush  NECK: No accessory muscle use, JVP not elevated  Cardiac: Regular, single S1, single S2, no murmurs, no rubs, no gallops  Lungs:   Clear lung sounds bilaterally, no wheezes rhonchi or rales appreciated  Abdomen: normoactive bowel sounds, soft nontender, nondistended, no rebound or rigidity, no guarding  Extremities: no cyanosis, no clubbing, no edema    Labs: I have personally reviewed pertinent lab results  , CBC: No results found for: WBC, HGB, HCT, MCV, PLT, ADJUSTEDWBC, MCH, MCHC, RDW, MPV, NRBC, CMP: No results found for: SODIUM, K, CL, CO2, ANIONGAP, BUN, CREATININE, GLUCOSE, CALCIUM, AST, ALT, ALKPHOS, PROT, BILITOT, EGFR  Imaging and other studies: I have personally reviewed pertinent reports     and Chest x-ray 11/22/2018 demonstrate resolution of right-sided pneumothorax      Bailey Schmitt

## 2018-11-23 NOTE — CONSULTS
Oncology Consult Note  Veronica Fuchs Sr  66 y o  male MRN: [id number]  Unit/Bed#: E2 -01 Encounter: [id number]      Presenting Complaint:  Adenocarcinoma of the lung stage IV  History of Presenting Illness:    31-year-old  male heavy smoker used to smoke 2 packs of cigarettes daily for many years with subsequent COPD, he presented with dyspnea, CT scan of the chest on 10/03/2018 showed right middle lobe spiculated 1 3 mass with multiple solid and ground-glass nodules along the major and minor fissures, subcentimeter pulmonary nodules, left adrenal 1 2 cm nodule    A PET scan on 10/24/2018 showed 1 3 cm right middle lung nodule with SUV of 6 8, numerous nodular densities along the right major minor fissures, hilar right activity with SUV of 3 4, subcarinal lymph node measuring 7 mm with SUV of 2 7, periportal enlarged lymph node concerning for metastatic disease measuring 2 4 cm with SUV of 5, prominent left retrocrural lymph node measuring 1 1 x 0 9 cm with SUV of 1 1, retroperitoneal lymph node medial to the left kidney measuring 2 2 cm    Right core needle biopsies of the spiculated nodule showed invasive adenocarcinoma positive for CK7, TTF 1, napsin a consistent with adenocarcinoma of the lung    After the biopsy the patient had recurrent pneumothorax requiring admission to the hospital with chest tube insertion    He used to drink heavily in the past, he drinks beer occasionally    Denies any headache blurred vision diplopia odynophagia abdominal pain dysuria hematuria melena hematochezia or bone pain              Review of Systems - As stated in the HPI otherwise the fourteen point review of systems was negative      Past Medical History:   Diagnosis Date    Bipolar 1 disorder ([facility] [address] [facility]     COPD (chronic obstructive pulmonary disease) ([facility] [address] [facility]     Diabetes mellitus (HCC)     GERD (gastroesophageal reflux disease)     Herniated lumbar intervertebral disc     Hypertension     Latent syphilis Treated    PTSD (post-traumatic stress disorder)     Tobacco abuse 11/13/2018       Social History     Social History    Marital status: Legally      Spouse name: N/A    Number of children: N/A    Years of education: N/A     Social History Main Topics    Smoking status: Former Smoker     Packs/day: 0 50     Types: Cigarettes     Quit date: 8/31/2018    Smokeless tobacco: Former User      Comment: "i quit one month ago when i quit weed"    Alcohol use Yes      Comment: social    Drug use: Yes     Types: Marijuana      Comment: stopped 8-2018, "i smoked weed and stopped 1 month ago"    Sexual activity: Yes     Other Topics Concern    Not on file     Social History Narrative    No narrative on file       Family History   Problem Relation Age of Onset    Heart disease Mother     Hypertension Father     Diabetes Family     Asthma Family     Heart disease Family     Cancer Family        Allergies   Allergen Reactions    Lisinopril Anaphylaxis     Took medication a while ago and had a "swelling reaction"  Does not carry epi-pen         Current Facility-Administered Medications:     amLODIPine (NORVASC) tablet 10 mg, 10 mg, Oral, Daily, Bonifay Dearth, CRNP, 10 mg at 11/23/18 0800    budesonide-formoterol (SYMBICORT) 160-4 5 mcg/act inhaler 2 puff, 2 puff, Inhalation, BID, Bonifay Dearth, CRNP, 2 puff at 11/23/18 1716    enoxaparin (LOVENOX) subcutaneous injection 40 mg, 40 mg, Subcutaneous, Daily, Bonifay Dearth, CRNP, 40 mg at 11/23/18 1608    famotidine (PEPCID) tablet 20 mg, 20 mg, Oral, BID, Bonifay Dearth, CRNP, 20 mg at 11/23/18 1716    FLUoxetine (PROzac) capsule 10 mg, 10 mg, Oral, Daily, Bonifay Dearth, CRNP, 10 mg at 11/23/18 0800    fluticasone-vilanterol (BREO ELLIPTA) 100-25 mcg/inh inhaler 1 puff, 1 puff, Inhalation, Early Morning, Bonifay Dearth, CRNP, 1 puff at 11/23/18 0614    gabapentin (NEURONTIN) capsule 100 mg, 100 mg, Oral, TID, Orion Palmer BROOKS Butler, 100 mg at 11/23/18 1716    insulin lispro (HumaLOG) 100 units/mL subcutaneous injection 1-5 Units, 1-5 Units, Subcutaneous, TID AC **AND** Fingerstick Glucose (POCT), , , TID AC, BROOKS Lopes    insulin lispro (HumaLOG) 100 units/mL subcutaneous injection 1-5 Units, 1-5 Units, Subcutaneous, HS, BROOKS Lopes    ketorolac (TORADOL) injection 15 mg, 15 mg, Intravenous, Daily PRN, Rosette Erickson PA-C, 15 mg at 11/23/18 1500    levalbuterol (XOPENEX) inhalation solution 1 25 mg, 1 25 mg, Nebulization, Q8H PRN, BROOKS Lopes    loratadine (CLARITIN) tablet 10 mg, 10 mg, Oral, Early Morning, BROOKS Lopes, 10 mg at 11/23/18 0610    morphine injection 2 mg, 2 mg, Intravenous, Q6H PRN, BROOKS Lopes    oxyCODONE-acetaminophen (PERCOCET) 5-325 mg per tablet 1 tablet, 1 tablet, Oral, Q4H PRN, BROOKS Lopes, 1 tablet at 11/23/18 1720    polyethylene glycol (MIRALAX) packet 17 g, 17 g, Oral, Daily, BROOKS Lopes, 17 g at 11/23/18 0800    QUEtiapine (SEROquel) tablet 25 mg, 25 mg, Oral, HS, BROOKS Lopes, 25 mg at 11/22/18 2129    senna (SENOKOT) tablet 8 6 mg, 1 tablet, Oral, HS PRN, BROOKS Lopes    tiotropium (SPIRIVA) capsule for inhaler 18 mcg, 18 mcg, Inhalation, Daily, Rosette Erickson PA-C, 18 mcg at 11/23/18 1500      /95 (BP Location: Left arm)   Pulse 75   Temp 98 °F (36 7 °C) (Rectal)   Resp 18   Ht 5' 8" (1 727 m)   Wt 68 kg (149 lb 14 6 oz)   SpO2 96%   BMI 22 79 kg/m²       General Appearance:    Alert, oriented        Eyes:    PERRL   Ears:    Normal external ear canals, both ears   Nose:   Nares normal, septum midline   Throat:   Mucosa moist  Pharynx without injection      Neck:   Supple       Lungs:     Clear to auscultation bilaterally   Chest Wall:    No tenderness or deformity, chest tube on the right side    Heart:    Regular rate and rhythm       Abdomen: Soft, non-tender, bowel sounds +, no organomegaly           Extremities:   Extremities no cyanosis or edema       Skin:   no rash or icterus      Lymph nodes:   Cervical, supraclavicular, and axillary nodes normal   Neurologic:   CNII-XII intact, normal strength, sensation and reflexes     Throughout               Recent Results (from the past 48 hour(s))   Platelet count    Collection Time: 11/22/18  1:20 AM   Result Value Ref Range    Platelets 948 586 - 744 Thousands/uL    MPV 8 8 (L) 8 9 - 12 7 fL   Fingerstick Glucose (POCT)    Collection Time: 11/22/18  1:38 AM   Result Value Ref Range    POC Glucose 138 65 - 140 mg/dl   Basic metabolic panel    Collection Time: 11/22/18  6:16 AM   Result Value Ref Range    Sodium 140 136 - 145 mmol/L    Potassium 3 9 3 5 - 5 3 mmol/L    Chloride 105 100 - 108 mmol/L    CO2 26 21 - 32 mmol/L    ANION GAP 9 4 - 13 mmol/L    BUN 20 5 - 25 mg/dL    Creatinine 1 17 0 60 - 1 30 mg/dL    Glucose 94 65 - 140 mg/dL    Calcium 8 0 (L) 8 3 - 10 1 mg/dL    eGFR 80 ml/min/1 73sq m   CBC and differential    Collection Time: 11/22/18  6:16 AM   Result Value Ref Range    WBC 5 96 4 31 - 10 16 Thousand/uL    RBC 4 12 3 88 - 5 62 Million/uL    Hemoglobin 11 6 (L) 12 0 - 17 0 g/dL    Hematocrit 35 4 (L) 36 5 - 49 3 %    MCV 86 82 - 98 fL    MCH 28 2 26 8 - 34 3 pg    MCHC 32 8 31 4 - 37 4 g/dL    RDW 15 7 (H) 11 6 - 15 1 %    MPV 8 6 (L) 8 9 - 12 7 fL    Platelets 784 394 - 242 Thousands/uL    nRBC 0 /100 WBCs    Neutrophils Relative 32 (L) 43 - 75 %    Immat GRANS % 0 0 - 2 %    Lymphocytes Relative 45 (H) 14 - 44 %    Monocytes Relative 7 4 - 12 %    Eosinophils Relative 15 (H) 0 - 6 %    Basophils Relative 1 0 - 1 %    Neutrophils Absolute 1 90 1 85 - 7 62 Thousands/µL    Immature Grans Absolute 0 01 0 00 - 0 20 Thousand/uL    Lymphocytes Absolute 2 69 0 60 - 4 47 Thousands/µL    Monocytes Absolute 0 42 0 17 - 1 22 Thousand/µL    Eosinophils Absolute 0 90 (H) 0 00 - 0 61 Thousand/µL Basophils Absolute 0 04 0 00 - 0 10 Thousands/µL   Fingerstick Glucose (POCT)    Collection Time: 11/22/18  7:45 AM   Result Value Ref Range    POC Glucose 89 65 - 140 mg/dl   Fingerstick Glucose (POCT)    Collection Time: 11/22/18 11:04 AM   Result Value Ref Range    POC Glucose 119 65 - 140 mg/dl   Fingerstick Glucose (POCT)    Collection Time: 11/22/18  3:10 PM   Result Value Ref Range    POC Glucose 93 65 - 140 mg/dl   Fingerstick Glucose (POCT)    Collection Time: 11/22/18  9:05 PM   Result Value Ref Range    POC Glucose 122 65 - 140 mg/dl   Fingerstick Glucose (POCT)    Collection Time: 11/23/18  7:13 AM   Result Value Ref Range    POC Glucose 92 65 - 140 mg/dl   Fingerstick Glucose (POCT)    Collection Time: 11/23/18 10:52 AM   Result Value Ref Range    POC Glucose 90 65 - 140 mg/dl   Fingerstick Glucose (POCT)    Collection Time: 11/23/18  5:18 PM   Result Value Ref Range    POC Glucose 99 65 - 140 mg/dl         Xr Chest Portable    Result Date: 11/21/2018  Narrative: CHEST INDICATION:   Preprocedural assessment  Evaluate for pneumothorax  COMPARISON:  11/16/2018 EXAM PERFORMED/VIEWS:  XR CHEST PORTABLE FINDINGS:  Previously seen right pleural catheter is no longer present  Cardiomediastinal silhouette appears unremarkable  A right-sided pneumothorax is identified, of approximately 30% in size  There is a right perihilar opacity as on prior examination  The left hemithorax is unremarkable  Impression: Right-sided pneumothorax of approximately 30% in size  I personally discussed this study with Dr Rudolph Wagner on 11/21/2018 at 2:08 PM  Workstation performed: [id number]     Xr Chest Portable    Result Date: 11/16/2018  Narrative: CHEST INDICATION:   hypoxia  COMPARISON:  None EXAM PERFORMED/VIEWS:  XR CHEST PORTABLE FINDINGS: Cardiomediastinal silhouette appears unremarkable  There is persistent rounded opacity at the right infrahilar region without significant change    A right-sided pigtail catheter is present, coiled overlying the right upper thorax  No persistent pneumothorax evident  No pleural fluid collections  Osseous structures appear within normal limits for patient age  Persistent soft tissue emphysema seen within the right chest wall  Impression: No residual right-sided pneumothorax evident  Stable position pigtail catheter  Persistent stable right infrahilar opacity  Workstation performed: [id number]     Xr Chest Portable    Result Date: 11/15/2018  Narrative: CHEST INDICATION:   Pneumothorax chest tube off suction  COMPARISON:  11/15/2018  EXAM PERFORMED/VIEWS:  XR CHEST PORTABLE FINDINGS: Cardiomediastinal silhouette appears unremarkable  Right chest tube is again seen  No definite pneumothorax is identified  Right infrahilar lung mass again seen  Right axillary subcutaneous emphysema is present  Osseous structures appear within normal limits for patient age  Impression: No pneumothorax identified  Workstation performed: [id number]     Xr Chest Pa & Lateral    Result Date: 11/22/2018  Narrative: CHEST INDICATION:   Follow-up pneumothorax after chest tube placement  COMPARISON:  11/21/2018 EXAM PERFORMED/VIEWS:  XR CHEST PA & LATERAL FINDINGS: Cardiomediastinal silhouette appears unremarkable  Interval resolution of right apical pneumothorax status post pigtail thoracostomy tube placement  Stable appearance of right hilar opacity  Some linear atelectasis in the right upper lobe also noted  The left lung is clear  No pleural effusions  Osseous structures appear within normal limits for patient age  Impression: Resolution of right pneumothorax following chest tube placement  Workstation performed: [id number]     Xr Chest Pa & Lateral    Result Date: 11/15/2018  Narrative: CHEST INDICATION:   pneumothorax s/p chest tube  Postprocedure pneumothorax, status post CT-guided lung biopsy  Status post chest tube placement   COMPARISON:  CT lung biopsy November 14, 2018 EXAM PERFORMED/VIEWS:  XR CHEST PA & LATERAL  The frontal view was performed utilizing dual energy radiographic technique  Images: 4 FINDINGS: Heart shadow appears unremarkable  Atherosclerotic vascular calcifications are noted  Lungs are well aerated  Small bore right-sided chest tube is present  Persistent small right apical pneumothorax  No mediastinal shift  Stable right middle lobe soft tissue mass  Postbiopsy changes again identified  Left lung clear  Small amount of subcutaneous emphysema along the right lateral chest wall  Osseous structures appear within normal limits for patient age  Impression: Persistent small right apical pneumothorax, despite small bore chest tube  Workstation performed: [id number]     Xr Chest Pa & Lateral    Result Date: 11/15/2018  Narrative: CHEST INDICATION:   pneumothorax  COMPARISON:  Chest radiograph 11/13/2018 EXAM PERFORMED/VIEWS:  XR CHEST PA & LATERAL FINDINGS: Heart shadow appears unremarkable  Atherosclerotic vascular calcifications are noted  There has been slight interval enlargement of the right apical pneumothorax  Stable right infrahilar mass  No pleural effusion  The left lung is clear  Osseous structures appear within normal limits for patient age  Impression: 1  Slight interval enlargement of the right-sided pneumothorax  (Subsequent studies demonstrate placement of a right-sided chest tube prior to this dictation)  2   Stable right infrahilar mass  Workstation performed: [id number]     Ct Chest Wo Contrast    Result Date: 11/20/2018  Narrative: CT CHEST WITHOUT IV CONTRAST INDICATION:   Chest wall pain s/p right lung biopsy with anterior chest pain  COMPARISON:  Biopsy from same day and CT 10/3/2018 TECHNIQUE: CT examination of the chest was performed without intravenous contrast   Axial, sagittal, and coronal 2D reformatted images were created from the source data and submitted for interpretation   Radiation dose length product (DLP) for this visit:  224 mGy-cm   This examination, like all CT scans performed in the [facility], was performed utilizing techniques to minimize radiation dose exposure, including the use of iterative reconstruction and automated exposure control  FINDINGS: Please note, there was a technical error and images became available to interpretation on 11/20/2018 LUNGS:  Patchy airspace opacities are seen throughout the middle lobe  Pleural-based nodularity at the major and minor fissure are again noted, measuring up to 9 mm, this has increased from the prior CT PLEURA:  Small right pneumothorax HEART/GREAT VESSELS:  Unremarkable for patient's age  MEDIASTINUM AND GARIMA:  Unremarkable  CHEST WALL AND LOWER NECK:   Unremarkable  VISUALIZED STRUCTURES IN THE UPPER ABDOMEN:  Unremarkable  OSSEOUS STRUCTURES:  No acute fracture or destructive osseous lesion  Impression: 1  Diffuse opacities throughout the middle lobe, significantly increased compared to the CT in October 2  Pleural-based nodularity, consistent with malignancy 3  Small right anterior pneumothorax status post biopsy, this is known to the primary team Workstation performed: [id number]     Xr Chest 1 View    Result Date: 11/15/2018  Narrative: CHEST INDICATION:   ptx  COMPARISON:  Chest radiograph 11/13/2018 EXAM PERFORMED/VIEWS:  XR CHEST 1 VIEW FINDINGS: Heart shadow appears unremarkable  Atherosclerotic vascular calcifications are noted  There is a persistent small right apical pneumothorax  Right-sided infrahilar mass appears stable  No pleural effusion  The left lung is clear  Osseous structures appear within normal limits for patient age  Impression: 1  Slight interval enlargement of a small right apical pneumothorax  (Subsequent studies demonstrate placement of a right-sided chest tube prior to this dictation)  2   Persistent right infrahilar mass   Workstation performed: [id number]     Xr Chest 1 View    Result Date: 11/14/2018  Narrative: CHEST INDICATION:   Shortness of breath  COMPARISON:  11/13/2018 EXAM PERFORMED/VIEWS:  XR CHEST 1 VIEW FINDINGS: Cardiomediastinal silhouette appears unremarkable  Right middle lobe masslike opacity is again seen  Small right pneumothorax is present  Osseous structures appear within normal limits for patient age  Impression: Small right pneumothorax  Small right pneumothorax  Workstation performed: [id number]     Xr Chest 1 View    Result Date: 11/13/2018  Narrative: CHEST INDICATION:   right lung biopsy, 1 hour cxr after biopsy  COMPARISON:  None EXAM PERFORMED/VIEWS:  XR CHEST 1 VIEW FINDINGS: Cardiomediastinal silhouette appears unremarkable  Stable right middle lobe contusion unchanged from the PA chest performed approximately 1 hour earlier  No pneumothorax  Osseous structures appear within normal limits for patient age  Impression: Stable right middle lobe contusion unchanged from the PA chest performed approximately 1 hour earlier  No pneumothorax  Workstation performed: [id number]     Xr Chest 1 View    Result Date: 11/13/2018  Narrative: CHEST INDICATION:   R07 9: Chest pain, unspecified  Post lung biopsy  COMPARISON:  Chest series 9/23/2018; CT chest 10/3/2018 EXAM PERFORMED/VIEWS:  XR CHEST 1 VIEW  The frontal view was performed utilizing dual energy radiographic technique  Images: 3 FINDINGS: Cardiomediastinal silhouette appears unremarkable  Status post CT-guided lung biopsy, small contusion in the right middle lobe noted  No pneumothorax  Left lung clear  No pleural effusions  Osseous structures appear within normal limits for patient age  Impression: Small contusion right middle lobe status post CT-guided lung biopsy  No pneumothorax   Workstation performed: [id number]     Ct Guided Chest Tube    Result Date: 11/21/2018  Narrative: CT scan guided right chest tube placement Clinical History: Recurrent right pneumothorax status post lung biopsy on November 13 followed by chest tube placement the following day  Conscious sedation time: 23 minutes Technique: This examination, like all CT scans performed in the [facility], was performed utilizing techniques to minimize radiation dose exposure, including the use of iterative reconstruction and automated exposure control  The patient was brought to the interventional radiology area and placed supine on the CT scan table   Axial images through the region of interest were obtained  The skin was then marked, prepped, and draped in usual sterile fashion  Lidocaine was administered to the skin and a small skin incision was made  A 19 gauge needle was advanced into the right pleural space  A Acosta wire was advanced through the needle, and the needle was removed  After tract dilatation, a 10 Iranian all-purpose drainage catheter was advanced, and the loop formed in the pleural space  The catheter was connected to a Pleur-evac  Follow-up imaging demonstrated complete resolution of the pneumothorax after several minutes of suction   The patient tolerated the procedure well and suffered no complications  Impression: Impression: 1  Successful placement of a therapeutic 10 [place] all-purpose drainage catheter into the right pleural space under CT guidance 2  Complete resolution of the pneumothorax several minutes after suction  3   Follow-up chest x-ray tomorrow  The patient should probably have this tube left in place for several days due to the recurrent pneumothorax  The biopsy was performed adjacent to the right middle lobe fissure and may take more time to heal  Workstation performed: [id number]     Ir Image Guided Biopsy/aspiration Lung    Result Date: 11/13/2018  Narrative: CT-scan guided core biopsy of a right middle lobe lung lesion History: PET positive right lung mass Conscious sedation time: 40 minutes Technique:  This examination, like all CT scans performed in the [facility], was performed utilizing techniques to minimize radiation dose exposure, including the use of iterative reconstruction and automated exposure control  The patient was brought to the CT scanner and placed supine on the table  After axial images were obtained through the region of interest an area of the skin was then marked, prepped, and draped in usual sterile fashion  Lidocaine was administered to the  skin and a small skin incision was made  A 17 gauge cannula was advanced up to the lesion  Through the cannula, an18-gauge core biopsy was performed  Four additional passes were made  The specimen was found to be adequate for evaluation  The patient tolerated the procedure well and suffered no complications  There was parenchymal bleeding around tteh mass which was self-limiting with no pneumothorax     Impression: Impression: Successful percutaneous core biopsy of the PET positive right middle lobe lung lesion  A full pathology report will follow  Workstation performed: [id number]     Ir Chest Tube    Result Date: 11/14/2018  Narrative: EXAMINATION: CT-guided chest tube placement INDICATION: 82-year-old male underwent right lung biopsy and was noted to have slowly enlarging right pneumothorax  IMAGES:  124 ANESTHESIA: Local lidocaine and fentanyl IV PROCEDURE:  The patient was identified verbally and by wristband  Timeout was performed  Informed consent was obtained  Following obtaining informed consent, the patient was prepped and draped in the usual sterile fashion  All elements of maximal sterile barrier technique, cap and mask and sterile gown and sterile gloves and sterile drape and hand hygiene and 2% chlorhexidine for cutaneous antisepsis  1% local lidocaine was infiltrated in the skin and subcutaneous tissue overlying the right anterior chest wall  Under serial CT guidance, a 5-Armenian Yueh centesis needle catheter was advanced into the right anterior pleural space    A Heavy wire was advanced through the Yueh catheter into the right apical chest cavity  The Yueh centesis catheter was removed, tract dilated, and an 8 5-Malawian catheter was advanced over the wire into the right apical chest cavity  Chest tube was secured to skin using  2-0 Prolene suture and connected to Vacutainer  Postprocedure CT was performed  The patient tolerated the procedure well without complication  The patient left the IR department in stable condition  Findings: 1  Preprocedure CT showed right anterior chest wall subcutaneous emphysema and moderate right pneumothorax  2   Successful CT-guided right chest tube placement using 8 5-Malawian catheter  Impression: Impression: Successful CT-guided right chest tube placement  Workstation performed: [id number]       Assessment and plan:   Adenocarcinoma primary in the right middle lobe of the lung with involvement of the right minor and major fissures, right hilar involvement, possible mediastinal lymphadenopathy, portal lymphadenopathy, this is stage IV adenocarcinoma of the lung positive for CK7, TTF 1    We reviewed the PET scan to gather,    The patient would like to start chemotherapy as an outpatient after taking the right chest tube of  The patient would like to start the treatment at [facility] infusion center,    The patient to be evaluated by Dr Caleb Fallon next week would be please call and arrange every and Monday to see him

## 2018-11-23 NOTE — ASSESSMENT & PLAN NOTE
Agree likely stage IV given review of PET scan demonstrating R upper cervical LAD w/SUV 3 1, abd periportal LAD w/SUV 5 and L retrocural LAD SUV 1 2 and L retroperitoneal LAD w/SUV of 1 5 medial to left kidney  EBUS pending for LAD  Will await further pulm recommendations, may require CT surgical tx which preempts pleurodesis for recurrent PTX  Agree with consultation for hem/onc

## 2018-11-23 NOTE — PROGRESS NOTES
Progress Note - Maximilian Montano Sr  1960, 66 y o  male MRN: [id number]    Unit/Bed#: E2 -01 Encounter: [id number]    Primary Care Provider: Lia Mullen MD   Date and time admitted to hospital: 11/21/2018  9:30 PM        Tobacco abuse   Assessment & Plan    · Reports he quit smoking     Adenocarcinoma of lung, right [facility])   Assessment & Plan    Agree likely stage IV given review of PET scan demonstrating R upper cervical LAD w/SUV 3 1, abd periportal LAD w/SUV 5 and L retrocural LAD SUV 1 2 and L retroperitoneal LAD w/SUV of 1 5 medial to left kidney  EBUS pending for LAD  Will await further pulm recommendations, may require CT surgical tx which preempts pleurodesis for recurrent PTX  Agree with consultation for hem/onc     Panlobular emphysema ([facility] [address] )   Assessment & Plan    · No acute exacerbation, continue symbicort/breo, d/c atrovent and restart spiriva nebs prn     Type 2 diabetes mellitus without complication, without long-term current use of insulin [facility])   Assessment & Plan    Lab Results   Component Value Date    HGBA1C 5 8 11/13/2018     · Hold oral meds, start Accu-Cheks and sliding scale  Recent Labs      11/22/18   1510  11/22/18   2105  11/23/18   0713  11/23/18   1052   POCGLU  93  122  92  90       Blood Sugar Average: Last 72 hrs:  (P) 106 [id number]     Well controlled  Continue op regimen, hold metformin     Gastroesophageal reflux disease without esophagitis   Assessment & Plan    · Continue pepcid as substitute for zantac     Bipolar 1 disorder (HCC)   Assessment & Plan    · Continue seroquel prozac     Essential hypertension   Assessment & Plan    · bp acceptable  · Continue norvasc     * Pneumothorax on right   Assessment & Plan    · Recurrent pneumothorax   H/o traumatic pneumothorax after lung biopsy on 11/13/18, s/p chest tube insertion and removal  · Unclear if candidate for pleurodesis given adenocarcinoma of lung if felt to be possible surgical candidate  · Today patient had follow-up philipp't with his outpatient pulmonologist, CXR: showed 30% right-sided pneumothorax, s/p IR guided chest tube placement  · Repeat CXR on 18 demonstrates resolution although still w/air leak  Per pulm hopeful for transition to water seal in 24H  · Pain control  ·        VTE Pharmacologic Prophylaxis:   Pharmacologic: Enoxaparin (Lovenox)  Mechanical VTE Prophylaxis in Place: Yes    Patient Centered Rounds: I have performed bedside rounds with nursing staff today  Discussions with Specialists or Other Care Team Provider:     Education and Discussions with Family / Patient:     Time Spent for Care: 20 minutes  More than 50% of total time spent on counseling and coordination of care as described above  Current Length of Stay: 2 day(s)    Current Patient Status: Inpatient   Certification Statement: The patient will continue to require additional inpatient hospital stay due to ptx    Discharge Plan:     Code Status: Level 1 - Full Code      Subjective:   Patient seen examined  No events overnight  Patient reports that his shortness of breath is stable his cough is productive of dry blood in although less than 30 cc  Still some pleuritic chest pain, no nausea vomiting no lightheadedness  He is on room air  Objective:     Vitals:   Temp (24hrs), Av 2 °F (36 8 °C), Min:96 5 °F (35 8 °C), Max:99 3 °F (37 4 °C)    Temp:  [96 5 °F (35 8 °C)-99 3 °F (37 4 °C)] 96 5 °F (35 8 °C)  HR:  [67-75] 75  Resp:  [16] 16  BP: (126-151)/(67-88) 151/88  SpO2:  [91 %-96 %] 96 %  Body mass index is 22 79 kg/m²  Input and Output Summary (last 24 hours): Intake/Output Summary (Last 24 hours) at 18 1230  Last data filed at 18 1100   Gross per 24 hour   Intake              540 ml   Output               25 ml   Net              515 ml       Physical Exam:     Physical Exam   Constitutional: He appears well-developed  HENT:   Head: Normocephalic and atraumatic     Right Ear: External ear normal    Left Ear: External ear normal    Mouth/Throat: No oropharyngeal exudate  Eyes: Conjunctivae are normal  Right eye exhibits no discharge  Left eye exhibits no discharge  No scleral icterus  Cardiovascular: Normal rate, regular rhythm, normal heart sounds and intact distal pulses  Exam reveals no gallop and no friction rub  No murmur heard  Pulmonary/Chest: Effort normal  No respiratory distress  He has no wheezes  He has no rales  Abdominal: Soft  He exhibits no distension  There is no tenderness  There is no rebound and no guarding  Musculoskeletal: He exhibits no edema  Neurological: He is alert  Skin: Skin is warm and dry  He is not diaphoretic  Psychiatric: He has a normal mood and affect  Vitals reviewed  (  Be Sure to Include Physical Exam: Delete this entire line when you have entered your exam)    Additional Data:     Labs:      Results from last 7 days  Lab Units 11/22/18  0616   WBC Thousand/uL 5 96   HEMOGLOBIN g/dL 11 6*   HEMATOCRIT % 35 4*   PLATELETS Thousands/uL 360   NEUTROS PCT % 32*   LYMPHS PCT % 45*   MONOS PCT % 7   EOS PCT % 15*       Results from last 7 days  Lab Units 11/22/18  0616   SODIUM mmol/L 140   POTASSIUM mmol/L 3 9   CHLORIDE mmol/L 105   CO2 mmol/L 26   BUN mg/dL 20   CREATININE mg/dL 1 17   ANION GAP mmol/L 9   CALCIUM mg/dL 8 0*   GLUCOSE RANDOM mg/dL 94           Results from last 7 days  Lab Units 11/23/18  1052 11/23/18  0713 11/22/18  2105 11/22/18  1510 11/22/18  1104 11/22/18  0745 11/22/18  0138 11/21/18  1326 11/17/18  1212 11/17/18  0753 11/16/18  1538   POC GLUCOSE mg/dl 90 92 122 93 119 89 138 80 93 97 95                   * I Have Reviewed All Lab Data Listed Above  * Additional Pertinent Lab Tests Reviewed:  All Labs Within Last 24 Hours Reviewed    Imaging:    Imaging Reports Reviewed Today Include:   Imaging Personally Reviewed by Myself Includes:      Recent Cultures (last 7 days):           Last 24 Hours Medication List: Current Facility-Administered Medications:  amLODIPine 10 mg Oral Daily Omega Crank, CRNP   budesonide-formoterol 2 puff Inhalation BID Omega Crank, CRNP   enoxaparin 40 mg Subcutaneous Daily Omega Crank, CRNP   famotidine 20 mg Oral BID Omega Crank, CRNP   FLUoxetine 10 mg Oral Daily Omega Crank, CRNP   fluticasone-vilanterol 1 puff Inhalation Early Morning Omega Crank, CRNP   gabapentin 100 mg Oral TID Omega Crank, CRNP   insulin lispro 1-5 Units Subcutaneous TID AC Omega Crank, CRNP   insulin lispro 1-5 Units Subcutaneous HS Friendship Crank, CRNP   ketorolac 15 mg Intravenous Daily PRN Rosette Erickson PA-C   levalbuterol 1 25 mg Nebulization Q8H PRN Omega Crank, CRNP   loratadine 10 mg Oral Early Morning Omega Crank, CRNP   morphine injection 2 mg Intravenous Q6H PRN Omega Crank, CRNP   oxyCODONE-acetaminophen 1 tablet Oral Q4H PRN Omega Crank, CRNP   polyethylene glycol 17 g Oral Daily Omega Crank, CRNP   QUEtiapine 25 mg Oral HS Omega Crank, CRNP   senna 1 tablet Oral HS PRN Friend[place] Crank, CRNP   tiotropium 18 mcg Inhalation Daily Thuan Cee PA-C        Today, Patient Was Seen By: Thaun Cee PA-C    ** Please Note: Dictation voice to text software may have been used in the creation of this document   **

## 2018-11-23 NOTE — PLAN OF CARE
Problem: Potential for Falls  Goal: Patient will remain free of falls  INTERVENTIONS:  - Assess patient frequently for physical needs  -  Identify cognitive and physical deficits and behaviors that affect risk of falls  -  [place] fall precautions as indicated by assessment   - Educate patient/family on patient safety including physical limitations  - Instruct patient to call for assistance with activity based on assessment  - Modify environment to reduce risk of injury  - Consider OT/PT consult to assist with strengthening/mobility   Outcome: Progressing  No falls or injuries this shift  Pt remained in bed this shift 1900 - present  Problem: PAIN - ADULT  Goal: Verbalizes/displays adequate comfort level or baseline comfort level  Interventions:  - Encourage patient to monitor pain and request assistance  - Assess pain using appropriate pain scale  - Administer analgesics based on type and severity of pain and evaluate response  - Implement non-pharmacological measures as appropriate and evaluate response  - Consider cultural and social influences on pain and pain management  - Notify physician/advanced practitioner if interventions unsuccessful or patient reports new pain  Outcome: Progressing  Medicated once this shift 1900-present with one percocet tab for pain at site of chest tube in r anterior upper chest, with good relief obtained       Problem: INFECTION - ADULT  Goal: Absence or prevention of progression during hospitalization  INTERVENTIONS:  - Assess and monitor for signs and symptoms of infection  - Monitor lab/diagnostic results  - Monitor all insertion sites, i e  indwelling lines, tubes, and drains  - Monitor endotracheal (as able) and nasal secretions for changes in amount and color  - [place] appropriate cooling/warming therapies per order  - Administer medications as ordered  - Instruct and encourage patient and family to use good hand hygiene technique  - Identify and instruct in appropriate isolation precautions for identified infection/condition  Outcome: Progressing      Problem: SAFETY ADULT  Goal: Patient will remain free of falls  INTERVENTIONS:  - Assess patient frequently for physical needs  -  Identify cognitive and physical deficits and behaviors that affect risk of falls  -  [place] fall precautions as indicated by assessment   - Educate patient/family on patient safety including physical limitations  - Instruct patient to call for assistance with activity based on assessment  - Modify environment to reduce risk of injury  - Consider OT/PT consult to assist with strengthening/mobility   Outcome: Progressing  No falls or injuries this shift  Pt remained in bed this shift 1900 - present  Problem: DISCHARGE PLANNING  Goal: Discharge to home or other facility with appropriate resources  INTERVENTIONS:  - Identify barriers to discharge w/patient and caregiver  - Arrange for needed discharge resources and transportation as appropriate  - Identify discharge learning needs (meds, wound care, etc )  - Arrange for interpretive services to assist at discharge as needed  - Refer to Case Management Department for coordinating discharge planning if the patient needs post-hospital services based on physician/advanced practitioner order or complex needs related to functional status, cognitive ability, or social support system  Outcome: Progressing  Pending hospital course  Problem: Knowledge Deficit  Goal: Patient/family/caregiver demonstrates understanding of disease process, treatment plan, medications, and discharge instructions  Complete learning assessment and assess knowledge base    Interventions:  - Provide teaching at level of understanding  - Provide teaching via preferred learning methods  Outcome: Progressing      Problem: RESPIRATORY - ADULT  Goal: Achieves optimal ventilation and oxygenation  INTERVENTIONS:  - Assess for changes in respiratory status  - Assess for changes in mentation and behavior  - Position to facilitate oxygenation and minimize respiratory effort  - Oxygen administration by appropriate delivery method based on oxygen saturation (per order) or ABGs  - Initiate smoking cessation education as indicated  - Encourage broncho-pulmonary hygiene including cough, deep breathe, Incentive Spirometry  - Assess the need for suctioning and aspirate as needed  - Assess and instruct to report SOB or any respiratory difficulty  - Respiratory Therapy support as indicated  Outcome: Progressing  Chest tube remains in place to R anterior upper chest, with bloody drainage in collection device  o2 sats low to mid 90s on room air  Problem: METABOLIC, FLUID AND ELECTROLYTES - ADULT  Goal: Glucose maintained within target range  INTERVENTIONS:  - Monitor Blood Glucose as ordered  - Assess for signs and symptoms of hyperglycemia and hypoglycemia  - Administer ordered medications to maintain glucose within target range  - Assess nutritional intake and initiate nutrition service referral as needed  Outcome: Progressing  POCT glucose 122 at bedtime, no coverage needed

## 2018-11-23 NOTE — ASSESSMENT & PLAN NOTE
Lab Results   Component Value Date    HGBA1C 5 8 11/13/2018     · Hold oral meds, start Accu-Cheks and sliding scale  Recent Labs      11/22/18   1510  11/22/18   2105  11/23/18   0713  11/23/18   1052   POCGLU  93  122  92  90       Blood Sugar Average: Last 72 hrs:  (P) 474 [id number]     Well controlled  Continue op regimen, hold metformin

## 2018-11-24 ENCOUNTER — APPOINTMENT (INPATIENT)
Dept: RADIOLOGY | Facility: HOSPITAL | Age: 58
DRG: 143 | End: 2018-11-24
Payer: COMMERCIAL

## 2018-11-24 LAB
GLUCOSE SERPL-MCNC: 111 MG/DL (ref 65–140)
GLUCOSE SERPL-MCNC: 119 MG/DL (ref 65–140)
GLUCOSE SERPL-MCNC: 120 MG/DL (ref 65–140)
GLUCOSE SERPL-MCNC: 81 MG/DL (ref 65–140)

## 2018-11-24 PROCEDURE — 82948 REAGENT STRIP/BLOOD GLUCOSE: CPT

## 2018-11-24 PROCEDURE — 99232 SBSQ HOSP IP/OBS MODERATE 35: CPT | Performed by: INTERNAL MEDICINE

## 2018-11-24 PROCEDURE — 71046 X-RAY EXAM CHEST 2 VIEWS: CPT

## 2018-11-24 RX ADMIN — POLYETHYLENE GLYCOL (3350) 17 G: 17 POWDER, FOR SOLUTION ORAL at 08:53

## 2018-11-24 RX ADMIN — AMLODIPINE BESYLATE 10 MG: 10 TABLET ORAL at 08:53

## 2018-11-24 RX ADMIN — FLUOXETINE 10 MG: 10 CAPSULE ORAL at 08:53

## 2018-11-24 RX ADMIN — TIOTROPIUM BROMIDE 18 MCG: 18 CAPSULE ORAL; RESPIRATORY (INHALATION) at 08:57

## 2018-11-24 RX ADMIN — OXYCODONE AND ACETAMINOPHEN 1 TABLET: 5; 325 TABLET ORAL at 08:56

## 2018-11-24 RX ADMIN — OXYCODONE AND ACETAMINOPHEN 1 TABLET: 5; 325 TABLET ORAL at 14:29

## 2018-11-24 RX ADMIN — GABAPENTIN 100 MG: 100 CAPSULE ORAL at 08:53

## 2018-11-24 RX ADMIN — BUDESONIDE AND FORMOTEROL FUMARATE DIHYDRATE 2 PUFF: 160; 4.5 AEROSOL RESPIRATORY (INHALATION) at 08:57

## 2018-11-24 RX ADMIN — FAMOTIDINE 20 MG: 20 TABLET ORAL at 18:14

## 2018-11-24 RX ADMIN — FLUTICASONE FUROATE AND VILANTEROL TRIFENATATE 1 PUFF: 100; 25 POWDER RESPIRATORY (INHALATION) at 05:23

## 2018-11-24 RX ADMIN — GABAPENTIN 100 MG: 100 CAPSULE ORAL at 16:49

## 2018-11-24 RX ADMIN — BUDESONIDE AND FORMOTEROL FUMARATE DIHYDRATE 2 PUFF: 160; 4.5 AEROSOL RESPIRATORY (INHALATION) at 18:14

## 2018-11-24 RX ADMIN — LORATADINE 10 MG: 10 TABLET ORAL at 05:23

## 2018-11-24 RX ADMIN — QUETIAPINE FUMARATE 25 MG: 25 TABLET ORAL at 20:52

## 2018-11-24 RX ADMIN — ENOXAPARIN SODIUM 40 MG: 40 INJECTION SUBCUTANEOUS at 09:53

## 2018-11-24 RX ADMIN — FAMOTIDINE 20 MG: 20 TABLET ORAL at 08:53

## 2018-11-24 RX ADMIN — GABAPENTIN 100 MG: 100 CAPSULE ORAL at 20:00

## 2018-11-24 RX ADMIN — SENNOSIDES 8.6 MG: 8.6 TABLET, FILM COATED ORAL at 20:52

## 2018-11-24 RX ADMIN — OXYCODONE AND ACETAMINOPHEN 1 TABLET: 5; 325 TABLET ORAL at 19:57

## 2018-11-24 RX ADMIN — KETOROLAC TROMETHAMINE 15 MG: 30 INJECTION, SOLUTION INTRAMUSCULAR at 08:51

## 2018-11-24 NOTE — ASSESSMENT & PLAN NOTE
Lab Results   Component Value Date    HGBA1C 5 8 11/13/2018     · Hold oral meds, start Accu-Cheks and sliding scale  Recent Labs      11/23/18   1718  11/23/18   2110  11/24/18   0744  11/24/18   1123   POCGLU  99  118  81  120       Blood Sugar Average: Last 72 hrs:  (P) 082 [id number]     Well controlled  Continue op regimen, hold metformin

## 2018-11-24 NOTE — ASSESSMENT & PLAN NOTE
Agree likely stage IV given review of PET scan demonstrating R upper cervical LAD w/SUV 3 1, abd periportal LAD w/SUV 5 and L retrocural LAD SUV 1 2 and L retroperitoneal LAD w/SUV of 1 5 medial to left kidney  EBUS pending for LAD  Hem/onc recommendations appreciated for systemic chemotherapy    Pt to f/u with hem/onc at [facility] Dr Gloria Sarabia upon d/c

## 2018-11-24 NOTE — PLAN OF CARE
DISCHARGE PLANNING     Discharge to home or other facility with appropriate resources Progressing        INFECTION - ADULT     Absence or prevention of progression during hospitalization Progressing        Knowledge Deficit     Patient/family/caregiver demonstrates understanding of disease process, treatment plan, medications, and discharge instructions Progressing        METABOLIC, FLUID AND ELECTROLYTES - ADULT     Glucose maintained within target range Progressing        PAIN - ADULT     Verbalizes/displays adequate comfort level or baseline comfort level Progressing        Potential for Falls     Patient will remain free of falls Progressing        RESPIRATORY - ADULT     Achieves optimal ventilation and oxygenation Progressing        SAFETY ADULT     Patient will remain free of falls Progressing

## 2018-11-24 NOTE — PROGRESS NOTES
Progress Note - Cesia Sheth Sr  1960, 66 y o  male MRN: [id number]    Unit/Bed#: E2 -01 Encounter: [id number]    Primary Care Provider: Jass Torres MD   Date and time admitted to hospital: 11/21/2018  9:30 PM        * Pneumothorax on right   Assessment & Plan    · Recurrent pneumothorax  H/o traumatic pneumothorax after lung biopsy on 11/13/18, s/p chest tube insertion and removal  · Pt on water seal today  Repeat cxr today pending read but appears that ptx remains resolved  Possible clamp of CT tube x24H per pulm  · Pain control  ·      Tobacco abuse   Assessment & Plan    · Reports he quit smoking     Adenocarcinoma of lung, right ([address] )   Assessment & Plan    Agree likely stage IV given review of PET scan demonstrating R upper cervical LAD w/SUV 3 1, abd periportal LAD w/SUV 5 and L retrocural LAD SUV 1 2 and L retroperitoneal LAD w/SUV of 1 5 medial to left kidney  EBUS pending for LAD  Hem/onc recommendations appreciated for systemic chemotherapy    Pt to f/u with hem/onc at [facility] Dr Chrystal Dumont upon d/c     Panlobular emphysema [facility])   Assessment & Plan    · No acute exacerbation, continue symbicort/breo, t spiriva  · xopenex prn     Type 2 diabetes mellitus without complication, without long-term current use of insulin [facility])   Assessment & Plan    Lab Results   Component Value Date    HGBA1C 5 8 11/13/2018     · Hold oral meds, start Accu-Cheks and sliding scale  Recent Labs      11/23/18   1718  11/23/18   2110  11/24/18   0744  11/24/18   1123   POCGLU  99  118  81  120       Blood Sugar Average: Last 72 hrs:  (P) 815 [id number]     Well controlled  Continue op regimen, hold metformin     Gastroesophageal reflux disease without esophagitis   Assessment & Plan    · Continue pepcid as substitute for zantac     Bipolar 1 disorder (HCC)   Assessment & Plan    · Continue seroquel prozac     Essential hypertension   Assessment & Plan    · BP elevated likely 2* ptx/ct tube   · Continue norvasc, op f/u with pcp       Constipation   Add senna daily prn    VTE Pharmacologic Prophylaxis:   Pharmacologic: Enoxaparin (Lovenox)  Mechanical VTE Prophylaxis in Place: Yes    Patient Centered Rounds: I have performed bedside rounds with nursing staff today  Discussions with Specialists or Other Care Team Provider:     Education and Discussions with Family / Patient: disposition    Time Spent for Care: 20 minutes  More than 50% of total time spent on counseling and coordination of care as described above  Current Length of Stay: 3 day(s)    Current Patient Status: Inpatient   Certification Statement: The patient will continue to require additional inpatient hospital stay due to ptx    Discharge Plan:     Code Status: Level 1 - Full Code      Subjective:   Pt seen and examined no events overnight  Still w/pleuritic CP but sob improving  Cough still productive of dried blood but stable  No fevers/chills  Has had some issues w/constipation per nursing but no abd pain  Objective:     Vitals:   Temp (24hrs), Av 3 °F (36 8 °C), Min:98 °F (36 7 °C), Max:98 6 °F (37 °C)    Temp:  [98 °F (36 7 °C)-98 6 °F (37 °C)] 98 4 °F (36 9 °C)  HR:  [69-75] 69  Resp:  [16-18] 16  BP: (148-159)/(68-95) 158/80  SpO2:  [91 %-97 %] 97 %  Body mass index is 22 79 kg/m²  Input and Output Summary (last 24 hours): Intake/Output Summary (Last 24 hours) at 18 1206  Last data filed at 18 1100   Gross per 24 hour   Intake              740 ml   Output               10 ml   Net              730 ml       Physical Exam:     Physical Exam   Constitutional: He appears well-developed and well-nourished  No distress  HENT:   Head: Normocephalic and atraumatic  Right Ear: External ear normal    Left Ear: External ear normal    Mouth/Throat: No oropharyngeal exudate  Eyes: Conjunctivae are normal  Right eye exhibits no discharge  Left eye exhibits no discharge  No scleral icterus     Neck: Normal range of motion  Cardiovascular: Normal rate, regular rhythm and normal heart sounds  Exam reveals no gallop and no friction rub  No murmur heard  Pulmonary/Chest: Effort normal  No respiratory distress  He has no wheezes  He has no rales  Abdominal: Soft  He exhibits no distension  There is no tenderness  There is no rebound and no guarding  Musculoskeletal: He exhibits no edema  Neurological: He is alert  Skin: Skin is warm and dry  He is not diaphoretic  Psychiatric: He has a normal mood and affect  Vitals reviewed  (  Be Sure to Include Physical Exam: Delete this entire line when you have entered your exam)    Additional Data:     Labs:      Results from last 7 days  Lab Units 11/22/18  0616   WBC Thousand/uL 5 96   HEMOGLOBIN g/dL 11 6*   HEMATOCRIT % 35 4*   PLATELETS Thousands/uL 360   NEUTROS PCT % 32*   LYMPHS PCT % 45*   MONOS PCT % 7   EOS PCT % 15*       Results from last 7 days  Lab Units 11/22/18  0616   SODIUM mmol/L 140   POTASSIUM mmol/L 3 9   CHLORIDE mmol/L 105   CO2 mmol/L 26   BUN mg/dL 20   CREATININE mg/dL 1 17   ANION GAP mmol/L 9   CALCIUM mg/dL 8 0*   GLUCOSE RANDOM mg/dL 94           Results from last 7 days  Lab Units 11/24/18  1123 11/24/18  0744 11/23/18  2110 11/23/18  1718 11/23/18  1052 11/23/18  0713 11/22/18  2105 11/22/18  1510 11/22/18  1104 11/22/18  0745 11/22/18  0138 11/21/18  1326   POC GLUCOSE mg/dl 120 81 118 99 90 92 122 93 119 89 138 80                   * I Have Reviewed All Lab Data Listed Above  * Additional Pertinent Lab Tests Reviewed:  All Labs Within Last 24 Hours Reviewed    Imaging:    Imaging Reports Reviewed Today Include:   Imaging Personally Reviewed by Myself Includes:      Recent Cultures (last 7 days):           Last 24 Hours Medication List:     Current Facility-Administered Medications:  amLODIPine 10 mg Oral Daily BROOKS Potter   budesonide-formoterol 2 puff Inhalation BID BROOKS Potter   enoxaparin 40 mg Subcutaneous Daily Gurney Cables, CRNP   famotidine 20 mg Oral BID Gurney Cables, CRNP   FLUoxetine 10 mg Oral Daily Gurney Cables, CRNP   gabapentin 100 mg Oral TID Gurney Cables, CRNP   insulin lispro 1-5 Units Subcutaneous TID AC Gurney Cables, CRNP   insulin lispro 1-5 Units Subcutaneous HS Gurney Cables, CRNP   ketorolac 15 mg Intravenous Daily PRN Frederick Simms PA-C   levalbuterol 1 25 mg Nebulization Q8H PRN Gurney Cables, CRNP   loratadine 10 mg Oral Early Morning Gurney Cables, CRNP   morphine injection 2 mg Intravenous Q6H PRN Gur[place]ia Cables, CRNP   oxyCODONE-acetaminophen 1 tablet Oral Q4H PRN Gurney Cables, CRNP   polyethylene glycol 17 g Oral Daily Gurney Cables, CRNP   QUEtiapine 25 mg Oral HS Gurney Cables, CRNP   senna 1 tablet Oral HS PRN rColumbia Cables, CRNP   tiotropium 18 mcg Inhalation Daily Frederick Simms PA-C        Today, Patient Was Seen By: Frederick Simms PA-C    ** Please Note: Dictation voice to text software may have been used in the creation of this document   **

## 2018-11-24 NOTE — PROGRESS NOTES
Progress Note - Pulmonary   Winfield Faraz Sr  66 y o  male MRN: [id number]  Unit/Bed#: E2 -01 Encounter: [id number]    Assessment/Plan:  1  Recurrent pneumothorax after prior biopsy  · No air leak today  · Transition to water seal  · Check chest x-ray after 2 hours  · Will maintain on water seal until tomorrow  If no recurrence of the pneumothorax and no air leak, will clamp for 24 hours  2  Severe emphysema  · Continue Symbicort, no need for Breo in addition to Symbicort  · Continue Spiriva  · Nebulizers when necessary  · O2 for comfort while in the hospital  3  Stage IV adenocarcinoma of the lung  · Agree with Oncology that we should transition to chemotherapy as soon as possible  · Patient would like to go to [facility], Dr Mega Wooten after discharge per Dr Hawk Boswell note  · From my perspective, pursuing EBUS is likely unnecessary    ----------------------------------------------------------------------------------------------------------------------    HPI/Interval History:   Has some cough  Brought up a minor amount of blood clot  Has some chest discomfort in the right back at the site of the chest tube  Vitals:   Blood pressure 158/80, pulse 69, temperature 98 4 °F (36 9 °C), temperature source Tympanic, resp  rate 16, height 5' 8" (1 727 m), weight 68 kg (149 lb 14 6 oz), SpO2 97 %  ,Body mass index is 22 79 kg/m²  SpO2: 97 %  SpO2 Activity: At Rest  O2 Device: None (Room air)    Physical Exam:   Gen:  Affect is somewhat flat  HEENT:  Pupils reactive    Oropharynx moist  Neck:  No JVD or adenopathy  Chest:  Distant, no wheezes or rhonchi  Cardiac:  Regular, no murmur  Abdomen:  Soft, benign  Extremities:  No edema      Labs:  No new laboratory data      Imaging studies:  No new imaging studies      Duane Dias MD

## 2018-11-24 NOTE — ASSESSMENT & PLAN NOTE
Lab Results   Component Value Date    HGBA1C 5 8 11/13/2018   ·   Recent Labs      11/23/18   1718  11/23/18   2110  11/24/18   0744  11/24/18   1123   POCGLU  99  118  81  120       Blood Sugar Average: Last 72 hrs:  (P) 127 [id number]     Well controlled on SSI w/ metformin on hold    Restart upon d/c

## 2018-11-24 NOTE — ASSESSMENT & PLAN NOTE
· Recurrent pneumothorax  H/o traumatic pneumothorax after lung biopsy on 11/13/18, s/p chest tube insertion and removal  · Pt on water seal today  Repeat cxr today pending read but appears that ptx remains resolved    Possible clamp of CT tube x24H per pulm  · Pain control  ·

## 2018-11-25 LAB
GLUCOSE SERPL-MCNC: 110 MG/DL (ref 65–140)
GLUCOSE SERPL-MCNC: 119 MG/DL (ref 65–140)
GLUCOSE SERPL-MCNC: 121 MG/DL (ref 65–140)
GLUCOSE SERPL-MCNC: 85 MG/DL (ref 65–140)

## 2018-11-25 PROCEDURE — 82948 REAGENT STRIP/BLOOD GLUCOSE: CPT

## 2018-11-25 PROCEDURE — 99232 SBSQ HOSP IP/OBS MODERATE 35: CPT | Performed by: INTERNAL MEDICINE

## 2018-11-25 RX ADMIN — AMLODIPINE BESYLATE 10 MG: 10 TABLET ORAL at 08:17

## 2018-11-25 RX ADMIN — QUETIAPINE FUMARATE 25 MG: 25 TABLET ORAL at 21:00

## 2018-11-25 RX ADMIN — OXYCODONE AND ACETAMINOPHEN 1 TABLET: 5; 325 TABLET ORAL at 19:33

## 2018-11-25 RX ADMIN — SENNOSIDES 8.6 MG: 8.6 TABLET, FILM COATED ORAL at 21:05

## 2018-11-25 RX ADMIN — FAMOTIDINE 20 MG: 20 TABLET ORAL at 17:30

## 2018-11-25 RX ADMIN — GABAPENTIN 100 MG: 100 CAPSULE ORAL at 21:00

## 2018-11-25 RX ADMIN — POLYETHYLENE GLYCOL (3350) 17 G: 17 POWDER, FOR SOLUTION ORAL at 08:17

## 2018-11-25 RX ADMIN — TIOTROPIUM BROMIDE 18 MCG: 18 CAPSULE ORAL; RESPIRATORY (INHALATION) at 08:19

## 2018-11-25 RX ADMIN — FLUOXETINE 10 MG: 10 CAPSULE ORAL at 08:17

## 2018-11-25 RX ADMIN — OXYCODONE AND ACETAMINOPHEN 1 TABLET: 5; 325 TABLET ORAL at 00:19

## 2018-11-25 RX ADMIN — LORATADINE 10 MG: 10 TABLET ORAL at 05:41

## 2018-11-25 RX ADMIN — KETOROLAC TROMETHAMINE 15 MG: 30 INJECTION, SOLUTION INTRAMUSCULAR at 08:21

## 2018-11-25 RX ADMIN — OXYCODONE AND ACETAMINOPHEN 1 TABLET: 5; 325 TABLET ORAL at 15:05

## 2018-11-25 RX ADMIN — GABAPENTIN 100 MG: 100 CAPSULE ORAL at 17:30

## 2018-11-25 RX ADMIN — FAMOTIDINE 20 MG: 20 TABLET ORAL at 08:17

## 2018-11-25 RX ADMIN — BUDESONIDE AND FORMOTEROL FUMARATE DIHYDRATE 2 PUFF: 160; 4.5 AEROSOL RESPIRATORY (INHALATION) at 08:19

## 2018-11-25 RX ADMIN — OXYCODONE AND ACETAMINOPHEN 1 TABLET: 5; 325 TABLET ORAL at 05:41

## 2018-11-25 RX ADMIN — ENOXAPARIN SODIUM 40 MG: 40 INJECTION SUBCUTANEOUS at 09:32

## 2018-11-25 RX ADMIN — BUDESONIDE AND FORMOTEROL FUMARATE DIHYDRATE 2 PUFF: 160; 4.5 AEROSOL RESPIRATORY (INHALATION) at 17:30

## 2018-11-25 RX ADMIN — GABAPENTIN 100 MG: 100 CAPSULE ORAL at 08:17

## 2018-11-25 NOTE — SOCIAL WORK
CM met with patient to do initial assessment and discuss any d/c needs/concerns, patient known to CM from previous admissions and patient states no changes  Patient lives on 2nd floor apartment with his cousin and her   PTA patient independent with adl's, ambulated with [facility]PC, does not drive, uses Public transit  Patient states he is not working and his SSD is "pending"  Hx of VNA in past, does not remember name of agency and states he does not want any home care again until he is living in his own place  Rx coverage available and uses [facility], aware of [facility] if needed  PCP is David Bower  No POA/AD, did want info, provided, "Decide for yourslef" booklet  Emergency Contact is SO, Jannie Mcnair  Transportation is an issue for patient as his cousin is not dependable for rides and him and his SO do not have a car, Patient requested Bus Pass at discharge  CM will provide at time of d/c  No other needs or concerns at this time  CM will continue to follow as needed       Gena Tejada RN  11/25/18  5:18 PM

## 2018-11-25 NOTE — ASSESSMENT & PLAN NOTE
· BP elevated likely 2* ptx/ct tube   · Start low dose lisinopril 5mg daily  · Continue norvasc, op f/u with pcp

## 2018-11-25 NOTE — PROGRESS NOTES
Progress Note - Pulmonary   Patricia Constantino Sr  66 y o  male MRN: [id number]  Unit/Bed#: E2 -01 Encounter: [id number]    Assessment/Plan:  1  Recurrent pneumothorax after prior biopsy  · Doing well on water seal  · Follow-up chest x-ray does not show recurrent pneumothorax  · Will clamp the tube today and check a chest x-ray in the morning  · Would favor checking a chest x-ray again after tube removal to ensure there isn't recurrent collapse  2  Severe emphysema  · Continue Symbicort and Spiriva  3  Stage IV adenocarcinoma of the lung  · Outpatient follow-up with Dr Imani Xavier at [facility] per Dr Jackelin Davenport is recommendation  · Would not pursue endobronchial ultrasound at this point  Definitive therapy is primary chemotherapy    ----------------------------------------------------------------------------------------------------------------------    HPI/Interval History:   He has some anxiety about having the tube removed in having his lung collapse again  Vitals:   Blood pressure 155/76, pulse 62, temperature (!) 96 4 °F (35 8 °C), temperature source Tympanic, resp  rate 16, height 5' 8" (1 727 m), weight 68 kg (149 lb 14 6 oz), SpO2 97 %  ,Body mass index is 22 79 kg/m²  SpO2: 97 %  SpO2 Activity: At Rest  O2 Device: None (Room air)    Physical Exam:   Gen:  Comfortable on nasal cannula  Chest:  Distant breath sounds without any wheeze  No air leak in the chest tube chamber off of suction  We will clamp the chest tube  Cardiac:  Regular, no murmur  Abdomen:  Benign  Extremities:  No edema      Labs:  No new laboratory data    Imaging studies:  Chest x-ray from last night was viewed personally on the [facility] system  This was performed on water seal   There is no evidence of pneumothorax on the current film    Formal radiology interpretation however is pending      Mauri Calvert MD

## 2018-11-25 NOTE — PLAN OF CARE

## 2018-11-25 NOTE — PROGRESS NOTES
Progress Note - Christy Ybarra Sr  1960, 66 y o  male MRN: [id number]    Unit/Bed#: E2 -01 Encounter: [id number]    Primary Care Provider: Wing Salo MD   Date and time admitted to hospital: 11/21/2018  9:30 PM        * Pneumothorax on right   Assessment & Plan    · Recurrent pneumothorax  H/o traumatic pneumothorax after lung biopsy on 11/13/18, s/p chest tube insertion and removal  · S/p pig tail thoracostomy transitioned from suction to water seal   Repeat cxr pending read but appears that ptx remains resolved  Possible clamp of CT tube today per pulm  · Pain control  ·      Tobacco abuse   Assessment & Plan    · Reports he quit smoking     Panlobular emphysema ([address] )   Assessment & Plan    · No acute exacerbation, continue symbicort/breo, spiriva  · xopenex prn     Type 2 diabetes mellitus without complication, without long-term current use of insulin [facility])   Assessment & Plan    Lab Results   Component Value Date    HGBA1C 5 8 11/13/2018 ·     Recent Labs      11/23/18   1718  11/23/18   2110  11/24/18   0744  11/24/18   1123   POCGLU  99  118  81  120       Blood Sugar Average: Last 72 hrs:  (P) 614 [id number]     Well controlled on SSI w/ metformin on hold  Restart upon d/c     Gastroesophageal reflux disease without esophagitis   Assessment & Plan    · Continue pepcid as substitute for zantac     Essential hypertension   Assessment & Plan    · BP elevated likely 2* ptx/ct tube   · Start low dose lisinopril 5mg daily  · Continue norvasc, op f/u with pcp       VTE Pharmacologic Prophylaxis:   Pharmacologic: Enoxaparin (Lovenox)  Mechanical VTE Prophylaxis in Place: No    Patient Centered Rounds: I have performed bedside rounds with nursing staff today  Discussions with Specialists or Other Care Team Provider:     Education and Discussions with Family / Patient: dispo    Time Spent for Care: 20 minutes    More than 50% of total time spent on counseling and coordination of care as described above  Current Length of Stay: 4 day(s)    Current Patient Status: Inpatient   Certification Statement: The patient will continue to require additional inpatient hospital stay due to ptx    Discharge Plan: pending clamped CT tube w/o recurrent ptx and removal    Code Status: Level 1 - Full Code      Subjective:   Pt seen and examined no events overnight per nursing  His sob is improved  Still coughing up dried blood but this is stable  Pleuritic right sided chest pain at thoracostomy site w/rotation of back flexion/extension/e rotation of right shoulder  Objective:     Vitals:   Temp (24hrs), Av 9 °F (36 1 °C), Min:96 4 °F (35 8 °C), Max:97 5 °F (36 4 °C)    Temp:  [96 4 °F (35 8 °C)-97 5 °F (36 4 °C)] 96 4 °F (35 8 °C)  HR:  [62-71] 62  Resp:  [16-20] 16  BP: (142-165)/(69-85) 155/76  SpO2:  [97 %] 97 %  Body mass index is 22 79 kg/m²  Input and Output Summary (last 24 hours): Intake/Output Summary (Last 24 hours) at 18 1128  Last data filed at 18 0900   Gross per 24 hour   Intake              240 ml   Output                0 ml   Net              240 ml       Physical Exam:     Physical Exam   Constitutional: He appears well-developed  No distress  HENT:   Head: Normocephalic and atraumatic  Right Ear: External ear normal    Left Ear: External ear normal    Eyes: Conjunctivae are normal  Right eye exhibits no discharge  Left eye exhibits no discharge  No scleral icterus  Neck: Normal range of motion  Cardiovascular: Normal rate, regular rhythm and normal heart sounds  Exam reveals no gallop and no friction rub  No murmur heard  Pulmonary/Chest: Effort normal and breath sounds normal  No respiratory distress  He has no wheezes  He has no rales  Abdominal: Soft  He exhibits no distension  There is no tenderness  There is no rebound and no guarding  Musculoskeletal: He exhibits no edema  Neurological: He is alert  Skin: Skin is warm and dry   He is not diaphoretic  Psychiatric: He has a normal mood and affect  Vitals reviewed  (  Be Sure to Include Physical Exam: Delete this entire line when you have entered your exam)    Additional Data:     Labs:      Results from last 7 days  Lab Units 11/22/18  0616   WBC Thousand/uL 5 96   HEMOGLOBIN g/dL 11 6*   HEMATOCRIT % 35 4*   PLATELETS Thousands/uL 360   NEUTROS PCT % 32*   LYMPHS PCT % 45*   MONOS PCT % 7   EOS PCT % 15*       Results from last 7 days  Lab Units 11/22/18  0616   SODIUM mmol/L 140   POTASSIUM mmol/L 3 9   CHLORIDE mmol/L 105   CO2 mmol/L 26   BUN mg/dL 20   CREATININE mg/dL 1 17   ANION GAP mmol/L 9   CALCIUM mg/dL 8 0*   GLUCOSE RANDOM mg/dL 94           Results from last 7 days  Lab Units 11/25/18  0725 11/24/18  2054 11/24/18  1602 11/24/18  1123 11/24/18  0744 11/23/18  2110 11/23/18  1718 11/23/18  1052 11/23/18  0713 11/22/18  2105 11/22/18  1510 11/22/18  1104   POC GLUCOSE mg/dl 85 111 119 120 81 118 99 90 92 122 93 119                   * I Have Reviewed All Lab Data Listed Above  * Additional Pertinent Lab Tests Reviewed:  All Labs Within Last 24 Hours Reviewed    Imaging:    Imaging Reports Reviewed Today Include:   Imaging Personally Reviewed by Myself Includes:      Recent Cultures (last 7 days):           Last 24 Hours Medication List:     Current Facility-Administered Medications:  amLODIPine 10 mg Oral Daily Verba Forte, CRNP   budesonide-formoterol 2 puff Inhalation BID Verba Forte, CRNP   enoxaparin 40 mg Subcutaneous Daily Verba Forte, CRNP   famotidine 20 mg Oral BID Verba Forte, CRNP   FLUoxetine 10 mg Oral Daily Verba Forte, CRNP   gabapentin 100 mg Oral TID Verba Forte, CRNP   insulin lispro 1-5 Units Subcutaneous TID AC Verba Forte, CRNP   insulin lispro 1-5 Units Subcutaneous HS Verba Forte, CRNP   ketorolac 15 mg Intravenous Daily PRN Rosette Erickson PA-C   levalbuterol 1 25 mg Nebulization Q8H PRN Boogie Peterson Cira Guy, BROOKS   loratadine 10 mg Oral Early Morning Silvia Del Valle, BROOKS   morphine injection 2 mg Intravenous Q6H PRN Silvia Del Valle, BROOKS   oxyCODONE-acetaminophen 1 tablet Oral Q4H PRN Silvia Del Valle, BROOKS   polyethylene glycol 17 g Oral Daily Silvia Del Valle, BROOKS   QUEtiapine 25 mg Oral HS Silvia Del Valle, BROOKS   senna 1 tablet Oral HS PRN Silvia Del Valle, BROOKS   tiotropium 18 mcg Inhalation Daily Pamela Joyner PA-C        Today, Patient Was Seen By: Pamela Joyner PA-C    ** Please Note: Dictation voice to text software may have been used in the creation of this document   **

## 2018-11-25 NOTE — ASSESSMENT & PLAN NOTE
· Recurrent pneumothorax  H/o traumatic pneumothorax after lung biopsy on 11/13/18, s/p chest tube insertion and removal  · S/p pig tail thoracostomy transitioned from suction to water seal   Repeat cxr pending read but appears that ptx remains resolved    Possible clamp of CT tube today per pulm  · Pain control  ·

## 2018-11-26 ENCOUNTER — APPOINTMENT (INPATIENT)
Dept: RADIOLOGY | Facility: HOSPITAL | Age: 58
DRG: 143 | End: 2018-11-26
Payer: COMMERCIAL

## 2018-11-26 LAB
GLUCOSE SERPL-MCNC: 130 MG/DL (ref 65–140)
GLUCOSE SERPL-MCNC: 82 MG/DL (ref 65–140)
GLUCOSE SERPL-MCNC: 96 MG/DL (ref 65–140)

## 2018-11-26 PROCEDURE — 99232 SBSQ HOSP IP/OBS MODERATE 35: CPT | Performed by: INTERNAL MEDICINE

## 2018-11-26 PROCEDURE — 82948 REAGENT STRIP/BLOOD GLUCOSE: CPT

## 2018-11-26 PROCEDURE — 71046 X-RAY EXAM CHEST 2 VIEWS: CPT

## 2018-11-26 PROCEDURE — 99233 SBSQ HOSP IP/OBS HIGH 50: CPT | Performed by: FAMILY MEDICINE

## 2018-11-26 RX ORDER — OXYCODONE HYDROCHLORIDE 5 MG/1
5 TABLET ORAL EVERY 4 HOURS PRN
Status: DISCONTINUED | OUTPATIENT
Start: 2018-11-26 | End: 2018-11-27 | Stop reason: HOSPADM

## 2018-11-26 RX ORDER — ACETAMINOPHEN 325 MG/1
975 TABLET ORAL EVERY 8 HOURS SCHEDULED
Status: DISCONTINUED | OUTPATIENT
Start: 2018-11-26 | End: 2018-11-27 | Stop reason: HOSPADM

## 2018-11-26 RX ORDER — OXYCODONE HYDROCHLORIDE 10 MG/1
10 TABLET ORAL EVERY 4 HOURS PRN
Status: DISCONTINUED | OUTPATIENT
Start: 2018-11-26 | End: 2018-11-27 | Stop reason: HOSPADM

## 2018-11-26 RX ORDER — MORPHINE SULFATE 4 MG/ML
4 INJECTION, SOLUTION INTRAMUSCULAR; INTRAVENOUS EVERY 6 HOURS PRN
Status: DISCONTINUED | OUTPATIENT
Start: 2018-11-26 | End: 2018-11-27 | Stop reason: HOSPADM

## 2018-11-26 RX ADMIN — MORPHINE SULFATE 4 MG: 4 INJECTION, SOLUTION INTRAMUSCULAR; INTRAVENOUS at 17:11

## 2018-11-26 RX ADMIN — FLUOXETINE 10 MG: 10 CAPSULE ORAL at 08:06

## 2018-11-26 RX ADMIN — ENOXAPARIN SODIUM 40 MG: 40 INJECTION SUBCUTANEOUS at 09:10

## 2018-11-26 RX ADMIN — OXYCODONE HYDROCHLORIDE 10 MG: 10 TABLET ORAL at 15:23

## 2018-11-26 RX ADMIN — OXYCODONE HYDROCHLORIDE 10 MG: 10 TABLET ORAL at 11:03

## 2018-11-26 RX ADMIN — AMLODIPINE BESYLATE 10 MG: 10 TABLET ORAL at 08:06

## 2018-11-26 RX ADMIN — BUDESONIDE AND FORMOTEROL FUMARATE DIHYDRATE 2 PUFF: 160; 4.5 AEROSOL RESPIRATORY (INHALATION) at 08:06

## 2018-11-26 RX ADMIN — POLYETHYLENE GLYCOL (3350) 17 G: 17 POWDER, FOR SOLUTION ORAL at 08:06

## 2018-11-26 RX ADMIN — LORATADINE 10 MG: 10 TABLET ORAL at 06:32

## 2018-11-26 RX ADMIN — BUDESONIDE AND FORMOTEROL FUMARATE DIHYDRATE 2 PUFF: 160; 4.5 AEROSOL RESPIRATORY (INHALATION) at 17:13

## 2018-11-26 RX ADMIN — GABAPENTIN 100 MG: 100 CAPSULE ORAL at 21:12

## 2018-11-26 RX ADMIN — GABAPENTIN 100 MG: 100 CAPSULE ORAL at 15:18

## 2018-11-26 RX ADMIN — FAMOTIDINE 20 MG: 20 TABLET ORAL at 08:06

## 2018-11-26 RX ADMIN — KETOROLAC TROMETHAMINE 15 MG: 30 INJECTION, SOLUTION INTRAMUSCULAR at 08:13

## 2018-11-26 RX ADMIN — TIOTROPIUM BROMIDE 18 MCG: 18 CAPSULE ORAL; RESPIRATORY (INHALATION) at 08:06

## 2018-11-26 RX ADMIN — GABAPENTIN 100 MG: 100 CAPSULE ORAL at 08:06

## 2018-11-26 RX ADMIN — FAMOTIDINE 20 MG: 20 TABLET ORAL at 17:13

## 2018-11-26 RX ADMIN — QUETIAPINE FUMARATE 25 MG: 25 TABLET ORAL at 21:12

## 2018-11-26 RX ADMIN — OXYCODONE AND ACETAMINOPHEN 1 TABLET: 5; 325 TABLET ORAL at 06:32

## 2018-11-26 RX ADMIN — OXYCODONE HYDROCHLORIDE 10 MG: 10 TABLET ORAL at 21:16

## 2018-11-26 NOTE — PROGRESS NOTES
Progress Note - Pulmonary   Jet Robles Sr  66 y o  male MRN: [id number]  Unit/Bed#: E2 -01 Encounter: [id number]      Assessment/Plan:  1  Recurrent right pneumothorax         *  Initial PTX s/p lung biopsy 11/13/18        *  CT currently to water seal   Await repeat CXR today and if no PTX will remove CT  2  Severe emphysema without acute exacerbation        *  Continue Symbicort and Spiriva --> this should be continued at D/C  3  Stage IV adenocarcinoma of the right lung        *  Outpatient chemotherapy at [facility] with RN at [address] as able, increase activity as able  -Outpatient pulmonary follow up as per D/C instructions    Subjective:   Mr Geneva Chapman is seen laying in bed this morning  Chest tube remains in place and to water seal   He continues to complain of discomfort at chest tube site  He is otherwise comfortable on room air in denies any pulmonary complaints at this time  He denies any other chest pain, shortness of breath, dyspnea on exertion, cough, sputum production, fever or bronchospasm  He is very anxious and nervous to have chest tube removed as he tells me it really hurts    He is also very anxious that lung may collapse again  He is hoping to be able to remain hospitalized for 24 hours after chest tube is removed to be monitored  Objective:     Vitals: Blood pressure 158/84, pulse 69, temperature 97 5 °F (36 4 °C), temperature source Tympanic, resp  rate 16, height 5' 8" (1 727 m), weight 68 kg (149 lb 14 6 oz), SpO2 98 %  ,RA, Body mass index is 22 79 kg/m²        Intake/Output Summary (Last 24 hours) at 11/26/18 0923  Last data filed at 11/25/18 1933   Gross per 24 hour   Intake                0 ml   Output                0 ml   Net                0 ml         Physical Exam  Gen: Awake, alert, oriented x 3, no acute distress  HEENT: Mucous membranes moist, no oral lesions, no thrush  NECK: No accessory muscle use, JVP not elevated  Cardiac: Regular, single S1, single S2, no murmurs, no rubs, no gallops  Lungs:  Decreased breath sounds throughout bilaterally  No wheezes, rhonchi or rales noted  No air leak noted with chest tube to suction  Abdomen: normoactive bowel sounds, soft nontender, nondistended, no rebound or rigidity, no guarding  Extremities: no cyanosis, no clubbing, no edema    Labs: I have personally reviewed pertinent lab results  , ABG: No results found for: PHART, YMY3TVG, PO2ART, TDD1KXA, M6FBPYHF, BEART, SOURCE, BNP: No results found for: BNP, CBC: No results found for: WBC, HGB, HCT, MCV, PLT, ADJUSTEDWBC, MCH, MCHC, RDW, MPV, NRBC, CMP: No results found for: SODIUM, K, CL, CO2, ANIONGAP, BUN, CREATININE, GLUCOSE, CALCIUM, AST, ALT, ALKPHOS, PROT, BILITOT, EGFR, PT/INR: No results found for: PT, INR, Troponin: No results found for: TROPONINI     Imaging and other studies: I have personally reviewed pertinent films in PACS    PA & Lat CXR 11/26/18  Left chest tube in place    Stable right hilar mass and no PTX seen    Ada Self PA-C

## 2018-11-26 NOTE — PROGRESS NOTES
10f right sided apical chest tube removed  Xeroform, gauze, and tegaderm applied to site  Patient tolerated well

## 2018-11-26 NOTE — PLAN OF CARE
DISCHARGE PLANNING     Discharge to home or other facility with appropriate resources Progressing        DISCHARGE PLANNING - CARE MANAGEMENT     Discharge to post-acute care or home with appropriate resources Progressing        INFECTION - ADULT     Absence or prevention of progression during hospitalization Progressing        Knowledge Deficit     Patient/family/caregiver demonstrates understanding of disease process, treatment plan, medications, and discharge instructions Progressing        METABOLIC, FLUID AND ELECTROLYTES - ADULT     Glucose maintained within target range Progressing        PAIN - ADULT     Verbalizes/displays adequate comfort level or baseline comfort level Progressing        Potential for Falls     Patient will remain free of falls Progressing        RESPIRATORY - ADULT     Achieves optimal ventilation and oxygenation Progressing        SAFETY ADULT     Patient will remain free of falls Progressing

## 2018-11-26 NOTE — ASSESSMENT & PLAN NOTE
Appreciate Oncology input  Adenocarcinoma stage IV  S/p PET scan   Hem/onc recommendations appreciated for systemic chemotherapy    Pt to f/u with hem/onc at [facility] Dr Joy Rees upon d/c

## 2018-11-26 NOTE — UTILIZATION REVIEW
Continued Stay Review    Date:   11/26/2018    Vital Signs: /84 (BP Location: Left arm)   Pulse 69   Temp 97 5 °F (36 4 °C) (Tympanic)   Resp 16   Ht 5' 8" (1 727 m)   Wt 68 kg (149 lb 14 6 oz)   SpO2 98%   BMI 22 79 kg/m²     Medications:   Scheduled Meds:   Current Facility-Administered Medications:  acetaminophen 975 mg Oral Q8H [facility] Leila Hernandez MD   amLODIPine 10 mg Oral Daily Tiffanie Peon, CRNP   budesonide-formoterol 2 puff Inhalation BID Tiffanie Peon, CRNP   enoxaparin 40 mg Subcutaneous Daily Tiffanie Peon, CRNP   famotidine 20 mg Oral BID Tiffanie Peon, CRNP   FLUoxetine 10 mg Oral Daily Tiffanie Peon, CRNP   gabapentin 100 mg Oral TID Tiffanie Peon, CRNP   insulin lispro 1-5 Units Subcutaneous TID AC Tiffanie Peon, CRNP   insulin lispro 1-5 Units Subcutaneous HS Tiffanie Peon, CRNP   ketorolac 15 mg Intravenous Daily PRN Rosette Erickson PA-C   levalbuterol 1 25 mg Nebulization Q8H PRN Tiffanie Peon, CRNP   loratadine 10 mg Oral Early Morning Tiffanie Peon, CRNP   morphine injection 4 mg Intravenous Q6H PRN Jann Cook MD   oxyCODONE 10 mg Oral Q4H PRN Jann Cook MD   oxyCODONE 5 mg Oral Q4H PRN Jann Cook MD   polyethylene glycol 17 g Oral Daily Tiffanie Peon, CRNP   QUEtiapine 25 mg Oral HS Tiffanie Peon, CRNP   senna 1 tablet Oral HS PRN Tiffanie Peon, CRNP   tiotropium 18 mcg Inhalation Daily Rosette Erickson PA-C     Continuous Infusions:    PRN Meds: ketorolac    levalbuterol    morphine injection    oxyCODONE    oxyCODONE    senna    Abnormal Labs/Diagnostic Results:   No labs  11/26    Age/Sex: 66 y o  male     Assessment/Plan:   Pneumothorax on right   Assessment & Plan     · Recurrent pneumothorax   H/o traumatic pneumothorax after lung biopsy on 11/13/18, s/p chest tube insertion and removal  · S/p pig tail thoracostomy transitioned from suction to water seal   Repeat cxr shows ptx remains resolved  Will discuss with Pulmonology further plans on removal of CT  · Optimize pain control         Adenocarcinoma of lung, right [facility])   Assessment & Plan     Appreciate Oncology input  Adenocarcinoma stage IV  S/p PET scan   Hem/onc recommendations appreciated for systemic chemotherapy  Pt to f/u with hem/onc at [facility] Dr Jd Lin upon d/c      Panlobular emphysema [facility])   Assessment & Plan     · No acute exacerbation, continue symbicort/breo, spiriva  · Xopenex prn        Tobacco abuse   Assessment & Plan     · Reports he quit smoking      Type 2 diabetes mellitus without complication, without long-term current use of insulin (HCC)     Gastroesophageal reflux disease without esophagitis   Assessment & Plan     · Continue pepcid as substitute for zantac      Bipolar 1 disorder (HCC)   Assessment & Plan     · Continue Seroquel and Prozac      Essential hypertension   Assessment & Plan     · BP slightly elevated at times with pain likely contributing  · Continue Norvasc, op f/u with pcp   The patient will continue to require additional inpatient hospital stay due to need for close monitoring, pain control     Patient seen and examined  He is c/o pain associated with chest tube, states it is constant and pulsating improves to 5-7 after pain medications  Denies SOB  No o/n events  Remains afebrile  Discharge Plan:   [facility] Utilization Review Department  Phone: [phone]; Fax [phone]  [email or web address]  ATTENTION: Please call with any questions or concerns to [phone]  and carefully listen to the prompts so that you are directed to the right person  Send all requests for admission clinical reviews, approved or denied determinations and any other requests to fax [phone]   All voicemails are confidential

## 2018-11-26 NOTE — ASSESSMENT & PLAN NOTE
Lab Results   Component Value Date    HGBA1C 5 8 11/13/2018     Recent Labs      11/25/18   1637  11/25/18   2100  11/26/18   0722  11/26/18   1104   POCGLU  119  121  82  96       Blood Sugar Average: Last 72 hrs:  (P) 993 [id number]     Well controlled on SSI w/ metformin on hold    Restart upon d/c  Will d/c Accuchecks

## 2018-11-26 NOTE — PROGRESS NOTES
Dr Izzy Pro 'melvin IV access to remain intact anticipating patients discharge 11/27/2018  IV CDI, flushed

## 2018-11-26 NOTE — ASSESSMENT & PLAN NOTE
· Recurrent pneumothorax  H/o traumatic pneumothorax after lung biopsy on 11/13/18, s/p chest tube insertion and removal  · S/p pig tail thoracostomy transitioned from suction to water seal   Repeat cxr shows ptx remains resolved    Will discuss with Pulmonology further plans on removal of CT  · Optimize pain control

## 2018-11-27 ENCOUNTER — TRANSITIONAL CARE MANAGEMENT (OUTPATIENT)
Dept: FAMILY MEDICINE CLINIC | Facility: CLINIC | Age: 58
End: 2018-11-27

## 2018-11-27 VITALS
WEIGHT: 149.91 LBS | TEMPERATURE: 100.2 F | OXYGEN SATURATION: 97 % | BODY MASS INDEX: 22.72 KG/M2 | RESPIRATION RATE: 18 BRPM | HEART RATE: 73 BPM | DIASTOLIC BLOOD PRESSURE: 77 MMHG | HEIGHT: 68 IN | SYSTOLIC BLOOD PRESSURE: 144 MMHG

## 2018-11-27 LAB
ANION GAP SERPL CALCULATED.3IONS-SCNC: 10 MMOL/L (ref 4–13)
BASOPHILS # BLD AUTO: 0.07 THOUSANDS/ΜL (ref 0–0.1)
BASOPHILS NFR BLD AUTO: 1 % (ref 0–1)
BUN SERPL-MCNC: 15 MG/DL (ref 5–25)
CALCIUM SERPL-MCNC: 8.6 MG/DL (ref 8.3–10.1)
CHLORIDE SERPL-SCNC: 104 MMOL/L (ref 100–108)
CO2 SERPL-SCNC: 26 MMOL/L (ref 21–32)
CREAT SERPL-MCNC: 1.04 MG/DL (ref 0.6–1.3)
EOSINOPHIL # BLD AUTO: 0.86 THOUSAND/ΜL (ref 0–0.61)
EOSINOPHIL NFR BLD AUTO: 11 % (ref 0–6)
ERYTHROCYTE [DISTWIDTH] IN BLOOD BY AUTOMATED COUNT: 15.8 % (ref 11.6–15.1)
GFR SERPL CREATININE-BSD FRML MDRD: 92 ML/MIN/1.73SQ M
GLUCOSE SERPL-MCNC: 117 MG/DL (ref 65–140)
GLUCOSE SERPL-MCNC: 89 MG/DL (ref 65–140)
HCT VFR BLD AUTO: 36.7 % (ref 36.5–49.3)
HGB BLD-MCNC: 12 G/DL (ref 12–17)
IMM GRANULOCYTES # BLD AUTO: 0.04 THOUSAND/UL (ref 0–0.2)
IMM GRANULOCYTES NFR BLD AUTO: 1 % (ref 0–2)
LYMPHOCYTES # BLD AUTO: 2.55 THOUSANDS/ΜL (ref 0.6–4.47)
LYMPHOCYTES NFR BLD AUTO: 33 % (ref 14–44)
MCH RBC QN AUTO: 28 PG (ref 26.8–34.3)
MCHC RBC AUTO-ENTMCNC: 32.7 G/DL (ref 31.4–37.4)
MCV RBC AUTO: 86 FL (ref 82–98)
MONOCYTES # BLD AUTO: 0.81 THOUSAND/ΜL (ref 0.17–1.22)
MONOCYTES NFR BLD AUTO: 10 % (ref 4–12)
NEUTROPHILS # BLD AUTO: 3.47 THOUSANDS/ΜL (ref 1.85–7.62)
NEUTS SEG NFR BLD AUTO: 44 % (ref 43–75)
NRBC BLD AUTO-RTO: 0 /100 WBCS
PLATELET # BLD AUTO: 416 THOUSANDS/UL (ref 149–390)
PMV BLD AUTO: 9.1 FL (ref 8.9–12.7)
POTASSIUM SERPL-SCNC: 4.1 MMOL/L (ref 3.5–5.3)
RBC # BLD AUTO: 4.29 MILLION/UL (ref 3.88–5.62)
SODIUM SERPL-SCNC: 140 MMOL/L (ref 136–145)
WBC # BLD AUTO: 7.8 THOUSAND/UL (ref 4.31–10.16)

## 2018-11-27 PROCEDURE — 99232 SBSQ HOSP IP/OBS MODERATE 35: CPT | Performed by: INTERNAL MEDICINE

## 2018-11-27 PROCEDURE — 99239 HOSP IP/OBS DSCHRG MGMT >30: CPT | Performed by: FAMILY MEDICINE

## 2018-11-27 PROCEDURE — 80048 BASIC METABOLIC PNL TOTAL CA: CPT | Performed by: FAMILY MEDICINE

## 2018-11-27 PROCEDURE — 82948 REAGENT STRIP/BLOOD GLUCOSE: CPT

## 2018-11-27 PROCEDURE — 85025 COMPLETE CBC W/AUTO DIFF WBC: CPT | Performed by: FAMILY MEDICINE

## 2018-11-27 PROCEDURE — 94760 N-INVAS EAR/PLS OXIMETRY 1: CPT

## 2018-11-27 RX ADMIN — ACETAMINOPHEN 975 MG: 325 TABLET, FILM COATED ORAL at 09:01

## 2018-11-27 RX ADMIN — KETOROLAC TROMETHAMINE 15 MG: 30 INJECTION, SOLUTION INTRAMUSCULAR at 09:01

## 2018-11-27 RX ADMIN — POLYETHYLENE GLYCOL (3350) 17 G: 17 POWDER, FOR SOLUTION ORAL at 09:03

## 2018-11-27 RX ADMIN — TIOTROPIUM BROMIDE 18 MCG: 18 CAPSULE ORAL; RESPIRATORY (INHALATION) at 10:19

## 2018-11-27 RX ADMIN — FAMOTIDINE 20 MG: 20 TABLET ORAL at 09:00

## 2018-11-27 RX ADMIN — AMLODIPINE BESYLATE 10 MG: 10 TABLET ORAL at 09:00

## 2018-11-27 RX ADMIN — LORATADINE 10 MG: 10 TABLET ORAL at 06:06

## 2018-11-27 RX ADMIN — BUDESONIDE AND FORMOTEROL FUMARATE DIHYDRATE 2 PUFF: 160; 4.5 AEROSOL RESPIRATORY (INHALATION) at 09:03

## 2018-11-27 RX ADMIN — ENOXAPARIN SODIUM 40 MG: 40 INJECTION SUBCUTANEOUS at 10:19

## 2018-11-27 RX ADMIN — FLUOXETINE 10 MG: 10 CAPSULE ORAL at 08:59

## 2018-11-27 RX ADMIN — GABAPENTIN 100 MG: 100 CAPSULE ORAL at 09:01

## 2018-11-27 NOTE — PLAN OF CARE
DISCHARGE PLANNING     Discharge to home or other facility with appropriate resources Adequate for Discharge        DISCHARGE PLANNING - CARE MANAGEMENT     Discharge to post-acute care or home with appropriate resources Adequate for Discharge        INFECTION - ADULT     Absence or prevention of progression during hospitalization Adequate for Discharge        Knowledge Deficit     Patient/family/caregiver demonstrates understanding of disease process, treatment plan, medications, and discharge instructions Adequate for Discharge        METABOLIC, FLUID AND ELECTROLYTES - ADULT     Glucose maintained within target range Adequate for Discharge        PAIN - ADULT     Verbalizes/displays adequate comfort level or baseline comfort level Adequate for Discharge        Potential for Falls     Patient will remain free of falls Adequate for Discharge        RESPIRATORY - ADULT     Achieves optimal ventilation and oxygenation Adequate for Discharge        SAFETY ADULT     Patient will remain free of falls Adequate for Discharge

## 2018-11-27 NOTE — PROGRESS NOTES
Progress Note - Pulmonary   Maximilian Montano Sr  66 y o  male MRN: [id number]  Unit/Bed#: E2 -01 Encounter: [id number]      Assessment/Plan:  1  Recurrent right pneumothorax         *  Initial PTX s/p lung biopsy 11/13/18        *  CT removed 11/26/18  2  Severe emphysema without acute exacerbation        *  Continue Symbicort and Spiriva --> this should be continued at D/C  3  Stage IV adenocarcinoma of the right lung        *  Outpatient chemotherapy at [address] follow up as per D/C instructions  -Stable for D/C from a pulmonary standpoint  Will sign off, please call with questions    Subjective:   Mr Mallorie Douglas is seen lying in bed this morning  Chest tube was removed yesterday without any difficulty  He continues to complain of some occasional discomfort at old chest tube site  He remains very anxious and nervous about going home  He would like to stay another day to make sure that his lung stays up    He did have a mild temperature this morning at 100 2  He otherwise feels well and denies chest pain, resting shortness of breath, cough or bronchospasm  Objective:     Vitals: Blood pressure 144/77, pulse 74, temperature 100 2 °F (37 9 °C), temperature source Tympanic, resp  rate 18, height 5' 8" (1 727 m), weight 68 kg (149 lb 14 6 oz), SpO2 97 % , RA, Body mass index is 22 79 kg/m²  Intake/Output Summary (Last 24 hours) at 11/27/18 1046  Last data filed at 11/26/18 1055   Gross per 24 hour   Intake              240 ml   Output                0 ml   Net              240 ml         Physical Exam  Gen: Awake, alert, oriented x 3, no acute distress  HEENT: Mucous membranes moist, no oral lesions, no thrush  NECK: No accessory muscle use, JVP not elevated  Cardiac: Regular, single S1, single S2, no murmurs, no rubs, no gallops  Lungs:  Decreased breath sounds throughout bilaterally  No wheezes, rhonchi or rales noted    Abdomen: normoactive bowel sounds, soft nontender, nondistended, no rebound or rigidity, no guarding  Extremities: no cyanosis, no clubbing, no edema    Labs: I have personally reviewed pertinent lab results  , ABG: No results found for: PHART, ZDZ5VNF, PO2ART, TEW0RPZ, O6ZQHYUZ, BEART, SOURCE, BNP: No results found for: BNP, CBC:   Lab Results   Component Value Date    WBC 7 80 11/27/2018    HGB 12 0 11/27/2018    HCT 36 7 11/27/2018    MCV 86 11/27/2018     (H) 11/27/2018    MCH 28 0 11/27/2018    MCHC 32 7 11/27/2018    RDW 15 8 (H) 11/27/2018    MPV 9 1 11/27/2018    NRBC 0 11/27/2018   , CMP:   Lab Results   Component Value Date    SODIUM 140 11/27/2018    K 4 1 11/27/2018     11/27/2018    CO2 26 11/27/2018    BUN 15 11/27/2018    CREATININE 1 04 11/27/2018    CALCIUM 8 6 11/27/2018    EGFR 92 11/27/2018   , PT/INR: No results found for: PT, INR, Troponin: No results found for: TROPONINI     Imaging and other studies: I have personally reviewed pertinent films in PACS    No new pulmonary imaging since November 26, 2018    [place], [place]

## 2018-11-28 NOTE — ASSESSMENT & PLAN NOTE
· Recurrent pneumothorax   H/o traumatic pneumothorax after lung biopsy on 11/13/18, s/p chest tube insertion and removal  · S/p chest tube placement and removal  · Xray shows resolution of pneumothorax  · Patient cleared for discharge by Pulmonology

## 2018-11-28 NOTE — ASSESSMENT & PLAN NOTE
Appreciate Oncology input  Adenocarcinoma stage IV  S/p PET scan   Hem/onc recommendations appreciated for systemic chemotherapy    Pt to f/u with hem/onc at [facility] Dr Barillas Devon upon d/c

## 2018-11-28 NOTE — ASSESSMENT & PLAN NOTE
Lab Results   Component Value Date    HGBA1C 5 8 11/13/2018     Recent Labs      11/26/18   0722  11/26/18   1104  11/26/18   2111  11/27/18   1121   POCGLU  82  96  130  117       Blood Sugar Average: Last 72 hrs:  (P) 107 5     Well controlled on SSI w/ metformin on hold    Restart metformin upon d/c

## 2018-11-28 NOTE — DISCHARGE SUMMARY
Discharge- Lucinda Zacarias Sr  1960, 66 y o  male MRN: [id number]    Unit/Bed#: E2 -01 Encounter: [id number]    Primary Care Provider: Uyen Nagy MD   Date and time admitted to hospital: 11/21/2018  9:30 PM        * Pneumothorax on right   Assessment & Plan    · Recurrent pneumothorax  H/o traumatic pneumothorax after lung biopsy on 11/13/18, s/p chest tube insertion and removal  · S/p chest tube placement and removal  · Xray shows resolution of pneumothorax  · Patient cleared for discharge by Pulmonology         Adenocarcinoma of lung, right [facility])   Assessment & Plan    Appreciate Oncology input  Adenocarcinoma stage IV  S/p PET scan   Hem/onc recommendations appreciated for systemic chemotherapy  Pt to f/u with hem/onc at [facility] Dr Lizette Blanca upon d/c     Panlobular emphysema [facility])   Assessment & Plan    · No acute exacerbation, continue symbicort/breo, spiriva  · Outpatient Pulmonology f/u     Tobacco abuse   Assessment & Plan    · History of tobacco use, patient discontinued smoking     Type 2 diabetes mellitus without complication, without long-term current use of insulin [facility])   Assessment & Plan    Lab Results   Component Value Date    HGBA1C 5 8 11/13/2018     Recent Labs      11/26/18   0722  11/26/18   1104  11/26/18   2111  11/27/18   1121   POCGLU  82  96  130  117       Blood Sugar Average: Last 72 hrs:  (P) 107 5     Well controlled on SSI w/ metformin on hold    Restart metformin upon d/c       Gastroesophageal reflux disease without esophagitis   Assessment & Plan    · Continue pepcid as substitute for zantac     Bipolar 1 disorder (HCC)   Assessment & Plan    · Continue Seroquel and Prozac     Essential hypertension   Assessment & Plan    · BP trend generally improved  · Continue Norvasc, op f/u with pcp       Discharging Physician / Practitioner: Evelia Fajardo MD  PCP: Uyen Nagy MD  Admission Date:   Admission Orders     Ordered        11/21/18 2200  Inpatient Admission Once             Discharge Date: 11/27/18    Resolved Problems  Date Reviewed: 11/28/2018    None          Consultations During Hospital Stay:  · Pulmonology, Case management    Procedures Performed:     Right-sided chest tube placement and removal    Significant Findings / Test Results:     XR chest pa & lateral   Final Result by Addison Ponce DO (11/26 2867)      Stable chest with right hilar mass  No pneumothorax  Chest tube in place  Workstation performed: [id number]         XR chest pa & lateral   Final Result by Loluou Hinds MD (11/26 7480)      No acute cardiopulmonary disease  Workstation performed: [id number]         XR chest pa & lateral   Final Result by Hadley Rico DO (11/22 4995)      Resolution of right pneumothorax following chest tube placement  Workstation performed: [id number]               Incidental Findings:   · None    Test Results Pending at Discharge (will require follow up): · None     Outpatient Tests Requested:  · None    Complications:  None    Reason for Admission: pleuritic chest pain and SOB    Hospital Course:     Albaro Navarrete Sr  is a 66 y o  male patient with history of recently diagnosed lung adenocarcinoma s/p recent biopsy, history of tobacco use who originally presented to the hospital on 11/21/2018 due to pleuritic chest pain and SOB after recent lung biopsy  The patient was found to have pneumothorax requiring chest tube placement  The patient was noted for resolution of the pneumothorax s/p chest tube placement  The chest tube was subsequently removed with stable resolution of pneumothorax evident on chest xray  Pulmonology was following the patient and cleared him for discharge  The patient is to f/u with Pulmonology in 1 week upon discharge  Return precautions explained to the patient  Please see above list of diagnoses and related plan for additional information       Condition at Discharge: good     Discharge Day Visit / Exam:     Subjective:  Patient seen and examined  He states he is feeling well and significantly improved since admission in regards to respiratory status, ambulating around the room, states pleuritic pain mostly resolved  denies SOB or palpitations  Normal appetite, no nausea, vomiting, diarrhea or constipation  Vitals: Blood Pressure: 144/77 (11/27/18 0849)  Pulse: 73 (11/27/18 1307)  Temperature: 100 2 °F (37 9 °C) (11/27/18 0849)  Temp Source: Tympanic (11/27/18 0849)  Respirations: 18 (11/27/18 1307)  Height: 5' 8" (172 7 cm) (11/22/18 1506)  Weight - Scale: 68 kg (149 lb 14 6 oz) (11/22/18 1506)  SpO2: 97 % (11/27/18 1307)  Exam:     Physical Exam   Constitutional: He is oriented to person, place, and time  No distress  HENT:   Head: Normocephalic and atraumatic  Eyes: Conjunctivae are normal    Neck: No JVD present  Cardiovascular: Normal rate and regular rhythm  No murmur heard  Pulmonary/Chest: Effort normal  No respiratory distress  He has no wheezes  He has no rales  Abdominal: Soft  Musculoskeletal: He exhibits no edema  Neurological: He is alert and oriented to person, place, and time  Skin: Skin is warm and dry  Psychiatric: He has a normal mood and affect  Discussion with Family: Patient stated he would update family    Discharge instructions/Information to patient and family:   See after visit summary for information provided to patient and family  Provisions for Follow-Up Care:  See after visit summary for information related to follow-up care and any pertinent home health orders  Disposition:     Home    For Discharges to [facility]:   · Not Applicable to this Patient - Not Applicable to this Patient    Planned Readmission: No     Discharge Statement:  I spent 35 minutes discharging the patient  This time was spent on the day of discharge  I had direct contact with the patient on the day of discharge   Greater than 50% of the total time was spent examining patient, answering all patient questions, arranging and discussing plan of care with patient as well as directly providing post-discharge instructions  Additional time then spent on discharge activities  Discharge Medications:  See after visit summary for reconciled discharge medications provided to patient and family        ** Please Note: This note has been constructed using a voice recognition system **

## 2018-11-30 ENCOUNTER — PATIENT OUTREACH (OUTPATIENT)
Dept: FAMILY MEDICINE CLINIC | Facility: CLINIC | Age: 58
End: 2018-11-30

## 2018-12-04 ENCOUNTER — OFFICE VISIT (OUTPATIENT)
Dept: FAMILY MEDICINE CLINIC | Facility: CLINIC | Age: 58
End: 2018-12-04
Payer: COMMERCIAL

## 2018-12-04 ENCOUNTER — TELEPHONE (OUTPATIENT)
Dept: FAMILY MEDICINE CLINIC | Facility: CLINIC | Age: 58
End: 2018-12-04

## 2018-12-04 VITALS
OXYGEN SATURATION: 99 % | HEIGHT: 69 IN | BODY MASS INDEX: 22.07 KG/M2 | HEART RATE: 92 BPM | DIASTOLIC BLOOD PRESSURE: 80 MMHG | WEIGHT: 149 LBS | TEMPERATURE: 97.8 F | RESPIRATION RATE: 20 BRPM | SYSTOLIC BLOOD PRESSURE: 128 MMHG

## 2018-12-04 DIAGNOSIS — Z23 NEED FOR PNEUMOCOCCAL VACCINATION: Primary | ICD-10-CM

## 2018-12-04 DIAGNOSIS — R05.9 COUGH: ICD-10-CM

## 2018-12-04 DIAGNOSIS — E11.9 TYPE 2 DIABETES MELLITUS WITHOUT COMPLICATION, WITHOUT LONG-TERM CURRENT USE OF INSULIN (HCC): ICD-10-CM

## 2018-12-04 DIAGNOSIS — Z09 HOSPITAL DISCHARGE FOLLOW-UP: ICD-10-CM

## 2018-12-04 DIAGNOSIS — Z23 NEED FOR INFLUENZA VACCINATION: ICD-10-CM

## 2018-12-04 PROBLEM — C34.90 LUNG CANCER (HCC): Status: ACTIVE | Noted: 2018-12-04

## 2018-12-04 PROCEDURE — 99213 OFFICE O/P EST LOW 20 MIN: CPT | Performed by: FAMILY MEDICINE

## 2018-12-04 RX ORDER — METFORMIN HYDROCHLORIDE 500 MG/1
500 TABLET, EXTENDED RELEASE ORAL 2 TIMES DAILY WITH MEALS
Qty: 60 TABLET | Refills: 0 | Status: SHIPPED | OUTPATIENT
Start: 2018-12-04 | End: 2019-05-02 | Stop reason: SDUPTHER

## 2018-12-04 RX ORDER — GUAIFENESIN 600 MG
1200 TABLET, EXTENDED RELEASE 12 HR ORAL EVERY 12 HOURS SCHEDULED
Qty: 30 TABLET | Refills: 0 | Status: SHIPPED | OUTPATIENT
Start: 2018-12-04 | End: 2019-03-15 | Stop reason: SDUPTHER

## 2018-12-04 NOTE — ASSESSMENT & PLAN NOTE
Recurrent  Status post hospitalization    Encouraged patient to go to ER or come back to the office if he experiences recurrent chest pain and shortness of breath

## 2018-12-04 NOTE — ASSESSMENT & PLAN NOTE
Lab Results   Component Value Date    HGBA1C 5 8 11/13/2018       No results for input(s): POCGLU in the last 72 hours  Blood Sugar Average: Last 72 hrs:  Very well controlled  I offered him to stop metformin however he desires to continue it    I gave him refills

## 2018-12-04 NOTE — PROGRESS NOTES
The Assessment/Plan:    Adenocarcinoma of lung, right [facility])  Has an appointment with Oncology Brandon Mobley on December 6  Plan is to start chemotherapy  Requesting medication for cough  Will give Mucinex    Encounter for immunization  Refused a flu shot and PSSV 23     Pneumothorax of right lung after biopsy  Recurrent  Status post hospitalization  Encouraged patient to go to ER or come back to the office if he experiences recurrent chest pain and shortness of breath    Bipolar 1 disorder ([address] )  Follows up with Psychiatry  No homicidal suicidal ideation    Essential hypertension  Currently blood pressure is controlled at this time  Reviewed BP target goal with patient  Continue to maintain healthy balanced diet with focus on low salt intake  Limit alcohol intake  Type 2 diabetes mellitus without complication, without long-term current use of insulin (HCC)  Lab Results   Component Value Date    HGBA1C 5 8 11/13/2018       No results for input(s): POCGLU in the last 72 hours  Blood Sugar Average: Last 72 hrs:  Very well controlled  I offered him to stop metformin however he desires to continue it  I gave him refills         Diagnoses and all orders for this visit:    Need for pneumococcal vaccination  -     Pneumococcal Polysaccharide Vaccine 23-Valent =>3yo SQ IM    Need for influenza vaccination  -     PREFERRED: influenza vaccine, [id number], quadrivalent, recombinant, PF, 0 5 mL, for patients 18 yr+ (FLUBLOK)    Hospital discharge follow-up    Type 2 diabetes mellitus without complication, without long-term current use of insulin (HCC)  -     metFORMIN (GLUCOPHAGE-XR) 500 mg 24 hr tablet; Take 1 tablet (500 mg total) by mouth 2 (two) times a day with meals    Cough  -     guaiFENesin (MUCINEX) 600 mg 12 hr tablet; Take 2 tablets (1,200 mg total) by mouth every 12 (twelve) hours          Subjective:      Patient ID: Mary Varma  is a 66 y o  male      HPI   Rainer Srivastava is a pleasant but unfortunate 77-year-old male who was recently diagnosed with metastatic adenocarcinoma of the right lung, underwent lung biopsy  And had pneumothorax twice for which she was hospitalized and had a chest placed  Upon discharge his pneumothorax was resolved  Today he is here for follow-up after his hospitalization  He denies any chest tightness but repeat who reports occasional cough productive of sputum in addition to intermittent mild chest pain which is way milder than the chest pain he was experiencing while he had pneumothorax  He denies any anorexia fever or chills but reports sweating at night  He has a follow-up appointment with Oncology on December 6 to discuss chemotherapy  He seems at acceptance stage of his cancer diagnoses, said initially he was sad when he was informed about his diagnosis and prognosis however he he is determined to fight it and does not want to just sit down and cry as this will only make his family and to 13years old kids more sad  I offered him support and encouraged him to return to our office any time he desires to discuss this further  He also has a follow-up appointment with pulmonology  Declined both flu shot and PSSV 23  He is more worried about recurrence of pneumothorax  He desires to come back for a follow-up in 2 week as he has a lot going on and would like to be seen before his next appointment next month    The following portions of the patient's history were reviewed and updated as appropriate: allergies, current medications, past family history, past medical history, past social history, past surgical history and problem list     Review of Systems   Constitutional: Negative for chills, fatigue and fever  HENT: Negative for ear discharge, sneezing and sore throat  Eyes: Negative for pain and visual disturbance  Respiratory: Positive for cough  Negative for chest tightness and shortness of breath      Cardiovascular: Negative for chest pain and palpitations  Gastrointestinal: Negative for abdominal distention, abdominal pain, blood in stool, diarrhea, nausea and vomiting  Genitourinary: Negative for difficulty urinating, dysuria and flank pain  Musculoskeletal: Negative for arthralgias and joint swelling  Skin: Negative for pallor and rash  Neurological: Negative for dizziness, syncope and headaches  Hematological: Negative for adenopathy  Psychiatric/Behavioral: Negative for agitation and confusion  Objective:      /80 (BP Location: Right arm, Patient Position: Sitting, Cuff Size: Standard)   Pulse 92   Temp 97 8 °F (36 6 °C) (Temporal)   Resp 20   Ht 5' 8 5" (1 74 m)   Wt 67 6 kg (149 lb)   SpO2 99%   BMI 22 33 kg/m²          Physical Exam   Constitutional: He is oriented to person, place, and time  He appears well-developed and well-nourished  HENT:   Head: Normocephalic and atraumatic  Mouth/Throat: Oropharynx is clear and moist    Eyes: Right pupil is reactive  Left pupil is reactive  Neck: No tracheal deviation present  No thyromegaly present  Cardiovascular: Regular rhythm and normal heart sounds  Exam reveals no friction rub  No murmur heard  Pulmonary/Chest: No respiratory distress  He has no wheezes  He has no rales  He exhibits no tenderness  Abdominal: He exhibits no distension  There is no tenderness  Musculoskeletal: Normal range of motion  He exhibits no deformity  Neurological: He is alert and oriented to person, place, and time  Skin: Skin is warm and dry  No erythema

## 2018-12-04 NOTE — ASSESSMENT & PLAN NOTE
Has an appointment with Oncology Brandon Cleverly on December 6  Plan is to start chemotherapy  Requesting medication for cough    Will give Mucinex

## 2018-12-04 NOTE — TELEPHONE ENCOUNTER
Per Dr Narayan Marquez     Return in about 2 weeks (around 12/18/2018) for [address], [address] [place] to double book  recent dx of cancer, he prefers to come back before his appt with Nikos        Please let me know date/time I will contact pt

## 2018-12-05 ENCOUNTER — PATIENT OUTREACH (OUTPATIENT)
Dept: FAMILY MEDICINE CLINIC | Facility: CLINIC | Age: 58
End: 2018-12-05

## 2018-12-05 NOTE — PROGRESS NOTES
Pt presented for hospital d/c f/u  SW was not in the office at time of appt, but did ask PCP and RN to report any psychosocial needs to SW at time of visit  No psychosocial concerns expressed in any documentation at time of this encounter  SW will remove pt from active caseload at this time  SW is available for additional support as needed via order to ambulatory social work care management

## 2018-12-06 ENCOUNTER — DOCUMENTATION (OUTPATIENT)
Dept: HEMATOLOGY ONCOLOGY | Facility: CLINIC | Age: 58
End: 2018-12-06

## 2018-12-06 ENCOUNTER — OFFICE VISIT (OUTPATIENT)
Dept: HEMATOLOGY ONCOLOGY | Facility: CLINIC | Age: 58
End: 2018-12-06
Payer: COMMERCIAL

## 2018-12-06 VITALS
BODY MASS INDEX: 22.81 KG/M2 | RESPIRATION RATE: 18 BRPM | HEART RATE: 79 BPM | HEIGHT: 69 IN | SYSTOLIC BLOOD PRESSURE: 132 MMHG | DIASTOLIC BLOOD PRESSURE: 68 MMHG | WEIGHT: 154 LBS | TEMPERATURE: 98.1 F | OXYGEN SATURATION: 99 %

## 2018-12-06 DIAGNOSIS — C34.91 ADENOCARCINOMA OF LUNG, RIGHT (HCC): Primary | ICD-10-CM

## 2018-12-06 PROCEDURE — 1111F DSCHRG MED/CURRENT MED MERGE: CPT | Performed by: INTERNAL MEDICINE

## 2018-12-06 PROCEDURE — 99215 OFFICE O/P EST HI 40 MIN: CPT | Performed by: INTERNAL MEDICINE

## 2018-12-06 RX ORDER — OXYCODONE HYDROCHLORIDE AND ACETAMINOPHEN 5; 325 MG/1; MG/1
1 TABLET ORAL EVERY 4 HOURS PRN
Qty: 60 TABLET | Refills: 0 | Status: SHIPPED | OUTPATIENT
Start: 2018-12-06 | End: 2018-12-18 | Stop reason: SDUPTHER

## 2018-12-06 NOTE — LETTER
December 6, 2018     Lia Mullen MD  [address] [place] [address]  [address]  [address]  [id number]    Patient: Eric Valerio YOB: 1960   Date of Visit: 12/6/2018       Dear Dr Wu Islas: Thank you for referring Maximilian Montano to me for evaluation  Below are my notes for this consultation  If you have questions, please do not hesitate to call me  I look forward to following your patient along with you  Sincerely,        Dane Duncan MD        CC: MD Dane Moreno MD  12/6/2018  4:50 PM  Sign at close encounter  Hematology Outpatient Follow - Up Note  Maximilian Montano Sr  66 y o  male MRN: @ Encounter: [id number]        Date:  12/6/2018        Assessment/ Plan:      1  Stage IV adenocarcinoma of the lung with primary in the right middle lobe with major and minor fissure involvement, possible mediastinal, subcarinal, retrocrural lymphadenopathy, also evidence of domenic hepaticus lymph node involvement by PET scan positive for TTF 1, CK7, napsin a a core biopsy was obtained from the right middle lobe, complicated with pneumothorax in October 2018 requiring admission to the hospital and chest tube insertion    The patient is a heavy smoker he used to smoke 2 packs of cigarettes daily for many years    2  We talked in detail about stage IV lung cancer this is not a curable disease, I believe the patient will benefit from upfront treatment with Alimta 500 milligram/meter squared, carboplatin AUC 5, Pembrolizumab 200 mg flat dose every 3 weeks, patient to have vitamin B12 1 week prior to each Alimta and then with every chemotherapy, patient to have multivitamin with folic acid 1 mg p  O  Daily  3  Will send for molecular test hoping for target mutation at [facility]  4  Percocet 1 tab every 4-6 hours p r n  60 tab prescription was given for pleuritic right-sided chest pain after chest tube removal  5   Patient to have chemotherapy at [facility] and he will follow up with Dr Caleb Fallon HPI: 57-year-old [facility] American male heavy smoker who used to smoke 2 packs of cigarettes daily for many years, COPD, he presented with dyspnea, CT scan of the chest on October 2018 showed right middle lobe spiculated 1 3 cm mass with multiple solid and ground-glass nodules along the major and minor fissures  Sub cm pulmonary nodules bilaterally, left adrenal 1 2 cm nodule, PET scan showed 1 3 cm right middle lung nodule with SUV of 6 8, numerous nodular densities along the right major and minor fissures, right hilar activity with SUV of 3 4, subcarinal lymph node measuring 7 mm with SUV of 2 7, periportal enlarged lymph nodes concerning for metastatic disease measuring 2 4 cm with SUV of 5, prominent left retrocrural lymph node measuring 1 cm x 9 mm with SUV of 1 1, core biopsy of the right spiculated nodule showed invasive adenocarcinoma consistent with lung primary positive for CK7, TTF 1, napsin a      he used to drink heavily in the past he drinks beer occasionally  Biopsy was complicated with pneumothorax requiring admission to the hospital        Interval History:        Previous Treatment:         Test Results:    Imaging: Xr Chest Portable    Result Date: 11/21/2018  Narrative: CHEST INDICATION:   Preprocedural assessment  Evaluate for pneumothorax  COMPARISON:  11/16/2018 EXAM PERFORMED/VIEWS:  XR CHEST PORTABLE FINDINGS:  Previously seen right pleural catheter is no longer present  Cardiomediastinal silhouette appears unremarkable  A right-sided pneumothorax is identified, of approximately 30% in size  There is a right perihilar opacity as on prior examination  The left hemithorax is unremarkable  Impression: Right-sided pneumothorax of approximately 30% in size  I personally discussed this study with Dr Annice Holter on 11/21/2018 at 2:08 PM  Workstation performed: [id number]     Xr Chest Portable    Result Date: 11/16/2018  Narrative: CHEST INDICATION:   hypoxia   COMPARISON:  None EXAM PERFORMED/VIEWS:  XR CHEST PORTABLE FINDINGS: Cardiomediastinal silhouette appears unremarkable  There is persistent rounded opacity at the right infrahilar region without significant change  A right-sided pigtail catheter is present, coiled overlying the right upper thorax  No persistent pneumothorax evident  No pleural fluid collections  Osseous structures appear within normal limits for patient age  Persistent soft tissue emphysema seen within the right chest wall  Impression: No residual right-sided pneumothorax evident  Stable position pigtail catheter  Persistent stable right infrahilar opacity  Workstation performed: [id number]     Xr Chest Portable    Result Date: 11/15/2018  Narrative: CHEST INDICATION:   Pneumothorax chest tube off suction  COMPARISON:  11/15/2018  EXAM PERFORMED/VIEWS:  XR CHEST PORTABLE FINDINGS: Cardiomediastinal silhouette appears unremarkable  Right chest tube is again seen  No definite pneumothorax is identified  Right infrahilar lung mass again seen  Right axillary subcutaneous emphysema is present  Osseous structures appear within normal limits for patient age  Impression: No pneumothorax identified  Workstation performed: [id number]     Xr Chest Pa & Lateral    Result Date: 11/26/2018  Narrative: CHEST INDICATION:   Pneumothorax  COMPARISON:  November 24, 2018 EXAM PERFORMED/VIEWS:  XR CHEST PA & LATERAL  The frontal view was performed utilizing dual energy radiographic technique  FINDINGS:  Right-sided chest tube in place  Cardiomediastinal silhouette appears unremarkable  Stable appearance of right hilar mass  Osseous structures appear within normal limits for patient age  Impression: Stable chest with right hilar mass  No pneumothorax  Chest tube in place  Workstation performed: [id number]     Xr Chest Pa & Lateral    Result Date: 11/26/2018  Narrative: CHEST INDICATION:   Pneumothorax   COMPARISON:  Chest x-ray 11/22/2018 EXAM PERFORMED/VIEWS:  XR CHEST PA & LATERAL FINDINGS:  Right-sided pigtail thoracostomy tube is noted  Cardiomediastinal silhouette appears unremarkable  Stable appearance of right hilar opacity  No pneumothorax or pleural effusion  Osseous structures appear within normal limits for patient age  Impression: No acute cardiopulmonary disease  Workstation performed: [id number]     Xr Chest Pa & Lateral    Result Date: 11/22/2018  Narrative: CHEST INDICATION:   Follow-up pneumothorax after chest tube placement  COMPARISON:  11/21/2018 EXAM PERFORMED/VIEWS:  XR CHEST PA & LATERAL FINDINGS: Cardiomediastinal silhouette appears unremarkable  Interval resolution of right apical pneumothorax status post pigtail thoracostomy tube placement  Stable appearance of right hilar opacity  Some linear atelectasis in the right upper lobe also noted  The left lung is clear  No pleural effusions  Osseous structures appear within normal limits for patient age  Impression: Resolution of right pneumothorax following chest tube placement  Workstation performed: [id number]     Xr Chest Pa & Lateral    Result Date: 11/15/2018  Narrative: CHEST INDICATION:   pneumothorax s/p chest tube  Postprocedure pneumothorax, status post CT-guided lung biopsy  Status post chest tube placement  COMPARISON:  CT lung biopsy November 14, 2018 EXAM PERFORMED/VIEWS:  XR CHEST PA & LATERAL  The frontal view was performed utilizing dual energy radiographic technique  Images: 4 FINDINGS: Heart shadow appears unremarkable  Atherosclerotic vascular calcifications are noted  Lungs are well aerated  Small bore right-sided chest tube is present  Persistent small right apical pneumothorax  No mediastinal shift  Stable right middle lobe soft tissue mass  Postbiopsy changes again identified  Left lung clear  Small amount of subcutaneous emphysema along the right lateral chest wall  Osseous structures appear within normal limits for patient age       Impression: Persistent small right apical pneumothorax, despite small bore chest tube  Workstation performed: [id number]     Xr Chest Pa & Lateral    Result Date: 11/15/2018  Narrative: CHEST INDICATION:   pneumothorax  COMPARISON:  Chest radiograph 11/13/2018 EXAM PERFORMED/VIEWS:  XR CHEST PA & LATERAL FINDINGS: Heart shadow appears unremarkable  Atherosclerotic vascular calcifications are noted  There has been slight interval enlargement of the right apical pneumothorax  Stable right infrahilar mass  No pleural effusion  The left lung is clear  Osseous structures appear within normal limits for patient age  Impression: 1  Slight interval enlargement of the right-sided pneumothorax  (Subsequent studies demonstrate placement of a right-sided chest tube prior to this dictation)  2   Stable right infrahilar mass  Workstation performed: [id number]     Ct Chest Wo Contrast    Result Date: 11/20/2018  Narrative: CT CHEST WITHOUT IV CONTRAST INDICATION:   Chest wall pain s/p right lung biopsy with anterior chest pain  COMPARISON:  Biopsy from same day and CT 10/3/2018 TECHNIQUE: CT examination of the chest was performed without intravenous contrast   Axial, sagittal, and coronal 2D reformatted images were created from the source data and submitted for interpretation  Radiation dose length product (DLP) for this visit:  224 mGy-cm   This examination, like all CT scans performed in the [facility], was performed utilizing techniques to minimize radiation dose exposure, including the use of iterative reconstruction and automated exposure control  FINDINGS: Please note, there was a technical error and images became available to interpretation on 11/20/2018 LUNGS:  Patchy airspace opacities are seen throughout the middle lobe    Pleural-based nodularity at the major and minor fissure are again noted, measuring up to 9 mm, this has increased from the prior CT PLEURA:  Small right pneumothorax HEART/GREAT VESSELS: Unremarkable for patient's age  MEDIASTINUM AND GARIMA:  Unremarkable  CHEST WALL AND LOWER NECK:   Unremarkable  VISUALIZED STRUCTURES IN THE UPPER ABDOMEN:  Unremarkable  OSSEOUS STRUCTURES:  No acute fracture or destructive osseous lesion  Impression: 1  Diffuse opacities throughout the middle lobe, significantly increased compared to the CT in October 2  Pleural-based nodularity, consistent with malignancy 3  Small right anterior pneumothorax status post biopsy, this is known to the primary team Workstation performed: [id number]     Xr Chest 1 View    Result Date: 11/15/2018  Narrative: CHEST INDICATION:   ptx  COMPARISON:  Chest radiograph 11/13/2018 EXAM PERFORMED/VIEWS:  XR CHEST 1 VIEW FINDINGS: Heart shadow appears unremarkable  Atherosclerotic vascular calcifications are noted  There is a persistent small right apical pneumothorax  Right-sided infrahilar mass appears stable  No pleural effusion  The left lung is clear  Osseous structures appear within normal limits for patient age  Impression: 1  Slight interval enlargement of a small right apical pneumothorax  (Subsequent studies demonstrate placement of a right-sided chest tube prior to this dictation)  2   Persistent right infrahilar mass  Workstation performed: [id number]     Xr Chest 1 View    Result Date: 11/14/2018  Narrative: CHEST INDICATION:   Shortness of breath  COMPARISON:  11/13/2018 EXAM PERFORMED/VIEWS:  XR CHEST 1 VIEW FINDINGS: Cardiomediastinal silhouette appears unremarkable  Right middle lobe masslike opacity is again seen  Small right pneumothorax is present  Osseous structures appear within normal limits for patient age  Impression: Small right pneumothorax  Small right pneumothorax  Workstation performed: [id number]     Xr Chest 1 View    Result Date: 11/13/2018  Narrative: CHEST INDICATION:   right lung biopsy, 1 hour cxr after biopsy   COMPARISON:  None EXAM PERFORMED/VIEWS:  XR CHEST 1 VIEW FINDINGS: Cardiomediastinal silhouette appears unremarkable  Stable right middle lobe contusion unchanged from the PA chest performed approximately 1 hour earlier  No pneumothorax  Osseous structures appear within normal limits for patient age  Impression: Stable right middle lobe contusion unchanged from the PA chest performed approximately 1 hour earlier  No pneumothorax  Workstation performed: [id number]     Xr Chest 1 View    Result Date: 11/13/2018  Narrative: CHEST INDICATION:   R07 9: Chest pain, unspecified  Post lung biopsy  COMPARISON:  Chest series 9/23/2018; CT chest 10/3/2018 EXAM PERFORMED/VIEWS:  XR CHEST 1 VIEW  The frontal view was performed utilizing dual energy radiographic technique  Images: 3 FINDINGS: Cardiomediastinal silhouette appears unremarkable  Status post CT-guided lung biopsy, small contusion in the right middle lobe noted  No pneumothorax  Left lung clear  No pleural effusions  Osseous structures appear within normal limits for patient age  Impression: Small contusion right middle lobe status post CT-guided lung biopsy  No pneumothorax  Workstation performed: [id number]     Ct Guided Chest Tube    Result Date: 11/21/2018  Narrative: CT scan guided right chest tube placement Clinical History: Recurrent right pneumothorax status post lung biopsy on November 13 followed by chest tube placement the following day  Conscious sedation time: 23 minutes Technique: This examination, like all CT scans performed in the [facility], was performed utilizing techniques to minimize radiation dose exposure, including the use of iterative reconstruction and automated exposure control  The patient was brought to the interventional radiology area and placed supine on the CT scan table   Axial images through the region of interest were obtained  The skin was then marked, prepped, and draped in usual sterile fashion   Lidocaine was administered to the skin and a small skin incision was made  A 19 gauge needle was advanced into the right pleural space  A Acosta wire was advanced through the needle, and the needle was removed  After tract dilatation, a 10 Sami all-purpose drainage catheter was advanced, and the loop formed in the pleural space  The catheter was connected to a Pleur-evac  Follow-up imaging demonstrated complete resolution of the pneumothorax after several minutes of suction   The patient tolerated the procedure well and suffered no complications  Impression: Impression: 1  Successful placement of a therapeutic 10 [place] all-purpose drainage catheter into the right pleural space under CT guidance 2  Complete resolution of the pneumothorax several minutes after suction  3   Follow-up chest x-ray tomorrow  The patient should probably have this tube left in place for several days due to the recurrent pneumothorax  The biopsy was performed adjacent to the right middle lobe fissure and may take more time to heal  Workstation performed: [id number]     Ir Image Guided Biopsy/aspiration Lung    Result Date: 11/13/2018  Narrative: CT-scan guided core biopsy of a right middle lobe lung lesion History: PET positive right lung mass Conscious sedation time: 40 minutes Technique: This examination, like all CT scans performed in the [facility], was performed utilizing techniques to minimize radiation dose exposure, including the use of iterative reconstruction and automated exposure control  The patient was brought to the CT scanner and placed supine on the table  After axial images were obtained through the region of interest an area of the skin was then marked, prepped, and draped in usual sterile fashion  Lidocaine was administered to the  skin and a small skin incision was made  A 17 gauge cannula was advanced up to the lesion  Through the cannula, an18-gauge core biopsy was performed  Four additional passes were made   The specimen was found to be adequate for evaluation  The patient tolerated the procedure well and suffered no complications  There was parenchymal bleeding around tteh mass which was self-limiting with no pneumothorax     Impression: Impression: Successful percutaneous core biopsy of the PET positive right middle lobe lung lesion  A full pathology report will follow  Workstation performed: [id number]     Ir Chest Tube    Result Date: 11/14/2018  Narrative: EXAMINATION: CT-guided chest tube placement INDICATION: 19-year-old male underwent right lung biopsy and was noted to have slowly enlarging right pneumothorax  IMAGES:  124 ANESTHESIA: Local lidocaine and fentanyl IV PROCEDURE:  The patient was identified verbally and by wristband  Timeout was performed  Informed consent was obtained  Following obtaining informed consent, the patient was prepped and draped in the usual sterile fashion  All elements of maximal sterile barrier technique, cap and mask and sterile gown and sterile gloves and sterile drape and hand hygiene and 2% chlorhexidine for cutaneous antisepsis  1% local lidocaine was infiltrated in the skin and subcutaneous tissue overlying the right anterior chest wall  Under serial CT guidance, a 5-Thai Yueh centesis needle catheter was advanced into the right anterior pleural space  A Heavy wire was advanced through the Yueh catheter into the right apical chest cavity  The Yueh centesis catheter was removed, tract dilated, and an 8 5-Thai catheter was advanced over the wire into the right apical chest cavity  Chest tube was secured to skin using  2-0 Prolene suture and connected to Vacutainer  Postprocedure CT was performed  The patient tolerated the procedure well without complication  The patient left the IR department in stable condition  Findings: 1  Preprocedure CT showed right anterior chest wall subcutaneous emphysema and moderate right pneumothorax   2   Successful CT-guided right chest tube placement using 8 5-Thai catheter  Impression: Impression: Successful CT-guided right chest tube placement  Workstation performed: [id number]       Labs:   Lab Results   Component Value Date    WBC 7 80 11/27/2018    HGB 12 0 11/27/2018    HCT 36 7 11/27/2018    MCV 86 11/27/2018     (H) 11/27/2018     Lab Results   Component Value Date    K 4 1 11/27/2018     11/27/2018    CO2 26 11/27/2018    BUN 15 11/27/2018    CREATININE 1 04 11/27/2018    GLUF 88 08/07/2018    CALCIUM 8 6 11/27/2018    AST 22 09/23/2018    ALT 25 09/23/2018    ALKPHOS 110 09/23/2018    EGFR 92 11/27/2018       No results found for: IRON, TIBC, FERRITIN    No results found for: TGKGQGHQ11      ROS:   Review of Systems   Constitutional: Negative for activity change, appetite change, chills, diaphoresis, fatigue, fever and unexpected weight change  HENT: Negative for congestion, dental problem, facial swelling, hearing loss, mouth sores, nosebleeds, postnasal drip, rhinorrhea, sore throat, trouble swallowing and voice change  Eyes: Negative for photophobia, pain, discharge, redness, itching and visual disturbance  Respiratory: Positive for chest tightness ( right pleuritic chest pain)  Negative for cough, choking, shortness of breath and wheezing  Cardiovascular: Negative for chest pain, palpitations and leg swelling  Gastrointestinal: Negative for abdominal distention, abdominal pain, anal bleeding, blood in stool, constipation, diarrhea, nausea, rectal pain and vomiting  Endocrine: Negative for cold intolerance and heat intolerance  Genitourinary: Negative for decreased urine volume, difficulty urinating, dysuria, flank pain, frequency, hematuria and urgency  Musculoskeletal: Negative for arthralgias, back pain, gait problem, joint swelling, myalgias, neck pain and neck stiffness  Skin: Negative for color change, pallor, rash and wound  Allergic/Immunologic: Negative for immunocompromised state     Neurological: Negative for dizziness, tremors, seizures, syncope, facial asymmetry, speech difficulty, weakness, light-headedness, numbness and headaches  Hematological: Negative for adenopathy  Does not bruise/bleed easily  Psychiatric/Behavioral: Negative for agitation, confusion, decreased concentration, dysphoric mood and sleep disturbance  The patient is not nervous/anxious  All other systems reviewed and are negative  Current Medications: Reviewed  Allergies: Reviewed  PMH/FH/SH:  Reviewed      Physical Exam:    Body surface area is 1 84 meters squared  Wt Readings from Last 3 Encounters:   12/06/18 69 9 kg (154 lb)   12/04/18 67 6 kg (149 lb)   11/22/18 68 kg (149 lb 14 6 oz)        Temp Readings from Last 3 Encounters:   12/06/18 98 1 °F (36 7 °C) (Oral)   12/04/18 97 8 °F (36 6 °C) (Temporal)   11/27/18 100 2 °F (37 9 °C) (Tympanic)        BP Readings from Last 3 Encounters:   12/06/18 132/68   12/04/18 128/80   11/27/18 144/77         Pulse Readings from Last 3 Encounters:   12/06/18 79   12/04/18 92   11/27/18 73        Physical Exam   Constitutional: He is oriented to person, place, and time  He appears well-developed and well-nourished  No distress  HENT:   Head: Normocephalic and atraumatic  Mouth/Throat: Oropharynx is clear and moist  No oropharyngeal exudate  Eyes: Pupils are equal, round, and reactive to light  Conjunctivae and EOM are normal    Neck: Normal range of motion  Neck supple  No tracheal deviation present  No thyromegaly present  Cardiovascular: Normal rate and regular rhythm  Exam reveals no gallop and no friction rub  No murmur heard  Pulmonary/Chest: Effort normal and breath sounds normal  No respiratory distress  He has no wheezes  He has no rales  He exhibits no tenderness  Abdominal: Soft  Bowel sounds are normal  He exhibits no distension and no mass  There is no tenderness  There is no rebound and no guarding  Musculoskeletal: Normal range of motion  He exhibits no edema  Lymphadenopathy:     He has no cervical adenopathy  Neurological: He is alert and oriented to person, place, and time  Skin: Skin is warm and dry  No rash noted  He is not diaphoretic  No erythema  No pallor  Psychiatric: He has a normal mood and affect  His behavior is normal  Judgment and thought content normal    Vitals reviewed  Goals and Barriers:  Current Goal: Minimize effects of disease  Barriers: None  Patient's Capacity to Self Care:  Patient is able to self care      Code Status: @[facility]@

## 2018-12-06 NOTE — PROGRESS NOTES
Hematology Outpatient Follow - Up Note  Rolo Engle Sr  66 y o  male MRN: @ Encounter: [id number]        Date:  12/6/2018        Assessment/ Plan:      1  Stage IV adenocarcinoma of the lung with primary in the right middle lobe with major and minor fissure involvement, possible mediastinal, subcarinal, retrocrural lymphadenopathy, also evidence of domenic hepaticus lymph node involvement by PET scan positive for TTF 1, CK7, napsin a a core biopsy was obtained from the right middle lobe, complicated with pneumothorax in October 2018 requiring admission to the hospital and chest tube insertion    The patient is a heavy smoker he used to smoke 2 packs of cigarettes daily for many years    2  We talked in detail about stage IV lung cancer this is not a curable disease, I believe the patient will benefit from upfront treatment with Alimta 500 milligram/meter squared, carboplatin AUC 5, Pembrolizumab 200 mg flat dose every 3 weeks, patient to have vitamin B12 1 week prior to each Alimta and then with every chemotherapy, patient to have multivitamin with folic acid 1 mg p  O  Daily  3  Will send for molecular test hoping for target mutation at [facility]  4  Percocet 1 tab every 4-6 hours p r n  60 tab prescription was given for pleuritic right-sided chest pain after chest tube removal  5   Patient to have chemotherapy at [facility] and he will follow up with Dr Curtis Vick           HPI: 51-year-old  male heavy smoker who used to smoke 2 packs of cigarettes daily for many years, COPD, he presented with dyspnea, CT scan of the chest on October 2018 showed right middle lobe spiculated 1 3 cm mass with multiple solid and ground-glass nodules along the major and minor fissures  Sub cm pulmonary nodules bilaterally, left adrenal 1 2 cm nodule, PET scan showed 1 3 cm right middle lung nodule with SUV of 6 8, numerous nodular densities along the right major and minor fissures, right hilar activity with SUV of 3 4, subcarinal lymph node measuring 7 mm with SUV of 2 7, periportal enlarged lymph nodes concerning for metastatic disease measuring 2 4 cm with SUV of 5, prominent left retrocrural lymph node measuring 1 cm x 9 mm with SUV of 1 1, core biopsy of the right spiculated nodule showed invasive adenocarcinoma consistent with lung primary positive for CK7, TTF 1, napsin a      he used to drink heavily in the past he drinks beer occasionally  Biopsy was complicated with pneumothorax requiring admission to the hospital        Interval History:        Previous Treatment:         Test Results:    Imaging: Xr Chest Portable    Result Date: 11/21/2018  Narrative: CHEST INDICATION:   Preprocedural assessment  Evaluate for pneumothorax  COMPARISON:  11/16/2018 EXAM PERFORMED/VIEWS:  XR CHEST PORTABLE FINDINGS:  Previously seen right pleural catheter is no longer present  Cardiomediastinal silhouette appears unremarkable  A right-sided pneumothorax is identified, of approximately 30% in size  There is a right perihilar opacity as on prior examination  The left hemithorax is unremarkable  Impression: Right-sided pneumothorax of approximately 30% in size  I personally discussed this study with Dr Vielka Allison on 11/21/2018 at 2:08 PM  Workstation performed: [id number]     Xr Chest Portable    Result Date: 11/16/2018  Narrative: CHEST INDICATION:   hypoxia  COMPARISON:  None EXAM PERFORMED/VIEWS:  XR CHEST PORTABLE FINDINGS: Cardiomediastinal silhouette appears unremarkable  There is persistent rounded opacity at the right infrahilar region without significant change  A right-sided pigtail catheter is present, coiled overlying the right upper thorax  No persistent pneumothorax evident  No pleural fluid collections  Osseous structures appear within normal limits for patient age  Persistent soft tissue emphysema seen within the right chest wall  Impression: No residual right-sided pneumothorax evident    Stable position pigtail catheter  Persistent stable right infrahilar opacity  Workstation performed: [id number]     Xr Chest Portable    Result Date: 11/15/2018  Narrative: CHEST INDICATION:   Pneumothorax chest tube off suction  COMPARISON:  11/15/2018  EXAM PERFORMED/VIEWS:  XR CHEST PORTABLE FINDINGS: Cardiomediastinal silhouette appears unremarkable  Right chest tube is again seen  No definite pneumothorax is identified  Right infrahilar lung mass again seen  Right axillary subcutaneous emphysema is present  Osseous structures appear within normal limits for patient age  Impression: No pneumothorax identified  Workstation performed: [id number]     Xr Chest Pa & Lateral    Result Date: 11/26/2018  Narrative: CHEST INDICATION:   Pneumothorax  COMPARISON:  November 24, 2018 EXAM PERFORMED/VIEWS:  XR CHEST PA & LATERAL  The frontal view was performed utilizing dual energy radiographic technique  FINDINGS:  Right-sided chest tube in place  Cardiomediastinal silhouette appears unremarkable  Stable appearance of right hilar mass  Osseous structures appear within normal limits for patient age  Impression: Stable chest with right hilar mass  No pneumothorax  Chest tube in place  Workstation performed: [id number]     Xr Chest Pa & Lateral    Result Date: 11/26/2018  Narrative: CHEST INDICATION:   Pneumothorax  COMPARISON:  Chest x-ray 11/22/2018 EXAM PERFORMED/VIEWS:  XR CHEST PA & LATERAL FINDINGS:  Right-sided pigtail thoracostomy tube is noted  Cardiomediastinal silhouette appears unremarkable  Stable appearance of right hilar opacity  No pneumothorax or pleural effusion  Osseous structures appear within normal limits for patient age  Impression: No acute cardiopulmonary disease  Workstation performed: [id number]     Xr Chest Pa & Lateral    Result Date: 11/22/2018  Narrative: CHEST INDICATION:   Follow-up pneumothorax after chest tube placement   COMPARISON:  11/21/2018 EXAM PERFORMED/VIEWS:  XR CHEST PA & LATERAL FINDINGS: Cardiomediastinal silhouette appears unremarkable  Interval resolution of right apical pneumothorax status post pigtail thoracostomy tube placement  Stable appearance of right hilar opacity  Some linear atelectasis in the right upper lobe also noted  The left lung is clear  No pleural effusions  Osseous structures appear within normal limits for patient age  Impression: Resolution of right pneumothorax following chest tube placement  Workstation performed: [id number]     Xr Chest Pa & Lateral    Result Date: 11/15/2018  Narrative: CHEST INDICATION:   pneumothorax s/p chest tube  Postprocedure pneumothorax, status post CT-guided lung biopsy  Status post chest tube placement  COMPARISON:  CT lung biopsy November 14, 2018 EXAM PERFORMED/VIEWS:  XR CHEST PA & LATERAL  The frontal view was performed utilizing dual energy radiographic technique  Images: 4 FINDINGS: Heart shadow appears unremarkable  Atherosclerotic vascular calcifications are noted  Lungs are well aerated  Small bore right-sided chest tube is present  Persistent small right apical pneumothorax  No mediastinal shift  Stable right middle lobe soft tissue mass  Postbiopsy changes again identified  Left lung clear  Small amount of subcutaneous emphysema along the right lateral chest wall  Osseous structures appear within normal limits for patient age  Impression: Persistent small right apical pneumothorax, despite small bore chest tube  Workstation performed: [id number]     Xr Chest Pa & Lateral    Result Date: 11/15/2018  Narrative: CHEST INDICATION:   pneumothorax  COMPARISON:  Chest radiograph 11/13/2018 EXAM PERFORMED/VIEWS:  XR CHEST PA & LATERAL FINDINGS: Heart shadow appears unremarkable  Atherosclerotic vascular calcifications are noted  There has been slight interval enlargement of the right apical pneumothorax  Stable right infrahilar mass  No pleural effusion  The left lung is clear   Osseous structures appear within normal limits for patient age  Impression: 1  Slight interval enlargement of the right-sided pneumothorax  (Subsequent studies demonstrate placement of a right-sided chest tube prior to this dictation)  2   Stable right infrahilar mass  Workstation performed: [id number]     Ct Chest Wo Contrast    Result Date: 11/20/2018  Narrative: CT CHEST WITHOUT IV CONTRAST INDICATION:   Chest wall pain s/p right lung biopsy with anterior chest pain  COMPARISON:  Biopsy from same day and CT 10/3/2018 TECHNIQUE: CT examination of the chest was performed without intravenous contrast   Axial, sagittal, and coronal 2D reformatted images were created from the source data and submitted for interpretation  Radiation dose length product (DLP) for this visit:  224 mGy-cm   This examination, like all CT scans performed in the [facility], was performed utilizing techniques to minimize radiation dose exposure, including the use of iterative reconstruction and automated exposure control  FINDINGS: Please note, there was a technical error and images became available to interpretation on 11/20/2018 LUNGS:  Patchy airspace opacities are seen throughout the middle lobe  Pleural-based nodularity at the major and minor fissure are again noted, measuring up to 9 mm, this has increased from the prior CT PLEURA:  Small right pneumothorax HEART/GREAT VESSELS:  Unremarkable for patient's age  MEDIASTINUM AND GARIMA:  Unremarkable  CHEST WALL AND LOWER NECK:   Unremarkable  VISUALIZED STRUCTURES IN THE UPPER ABDOMEN:  Unremarkable  OSSEOUS STRUCTURES:  No acute fracture or destructive osseous lesion  Impression: 1  Diffuse opacities throughout the middle lobe, significantly increased compared to the CT in October 2  Pleural-based nodularity, consistent with malignancy 3    Small right anterior pneumothorax status post biopsy, this is known to the primary team Workstation performed: [id number]     Xr Chest 1 View    Result Date: 11/15/2018  Narrative: CHEST INDICATION:   ptx  COMPARISON:  Chest radiograph 11/13/2018 EXAM PERFORMED/VIEWS:  XR CHEST 1 VIEW FINDINGS: Heart shadow appears unremarkable  Atherosclerotic vascular calcifications are noted  There is a persistent small right apical pneumothorax  Right-sided infrahilar mass appears stable  No pleural effusion  The left lung is clear  Osseous structures appear within normal limits for patient age  Impression: 1  Slight interval enlargement of a small right apical pneumothorax  (Subsequent studies demonstrate placement of a right-sided chest tube prior to this dictation)  2   Persistent right infrahilar mass  Workstation performed: [id number]     Xr Chest 1 View    Result Date: 11/14/2018  Narrative: CHEST INDICATION:   Shortness of breath  COMPARISON:  11/13/2018 EXAM PERFORMED/VIEWS:  XR CHEST 1 VIEW FINDINGS: Cardiomediastinal silhouette appears unremarkable  Right middle lobe masslike opacity is again seen  Small right pneumothorax is present  Osseous structures appear within normal limits for patient age  Impression: Small right pneumothorax  Small right pneumothorax  Workstation performed: [id number]     Xr Chest 1 View    Result Date: 11/13/2018  Narrative: CHEST INDICATION:   right lung biopsy, 1 hour cxr after biopsy  COMPARISON:  None EXAM PERFORMED/VIEWS:  XR CHEST 1 VIEW FINDINGS: Cardiomediastinal silhouette appears unremarkable  Stable right middle lobe contusion unchanged from the PA chest performed approximately 1 hour earlier  No pneumothorax  Osseous structures appear within normal limits for patient age  Impression: Stable right middle lobe contusion unchanged from the PA chest performed approximately 1 hour earlier  No pneumothorax  Workstation performed: [id number]     Xr Chest 1 View    Result Date: 11/13/2018  Narrative: CHEST INDICATION:   R07 9: Chest pain, unspecified  Post lung biopsy   COMPARISON:  Chest series 9/23/2018; CT chest 10/3/2018 EXAM PERFORMED/VIEWS:  XR CHEST 1 VIEW  The frontal view was performed utilizing dual energy radiographic technique  Images: 3 FINDINGS: Cardiomediastinal silhouette appears unremarkable  Status post CT-guided lung biopsy, small contusion in the right middle lobe noted  No pneumothorax  Left lung clear  No pleural effusions  Osseous structures appear within normal limits for patient age  Impression: Small contusion right middle lobe status post CT-guided lung biopsy  No pneumothorax  Workstation performed: [id number]     Ct Guided Chest Tube    Result Date: 11/21/2018  Narrative: CT scan guided right chest tube placement Clinical History: Recurrent right pneumothorax status post lung biopsy on November 13 followed by chest tube placement the following day  Conscious sedation time: 23 minutes Technique: This examination, like all CT scans performed in the [facility], was performed utilizing techniques to minimize radiation dose exposure, including the use of iterative reconstruction and automated exposure control  The patient was brought to the interventional radiology area and placed supine on the CT scan table   Axial images through the region of interest were obtained  The skin was then marked, prepped, and draped in usual sterile fashion  Lidocaine was administered to the skin and a small skin incision was made  A 19 gauge needle was advanced into the right pleural space  A Acosta wire was advanced through the needle, and the needle was removed  After tract dilatation, a 10 Vietnamese all-purpose drainage catheter was advanced, and the loop formed in the pleural space  The catheter was connected to a Pleur-evac  Follow-up imaging demonstrated complete resolution of the pneumothorax after several minutes of suction   The patient tolerated the procedure well and suffered no complications  Impression: Impression: 1    Successful placement of a therapeutic 10 Velma Arabella all-purpose drainage catheter into the right pleural space under CT guidance 2  Complete resolution of the pneumothorax several minutes after suction  3   Follow-up chest x-ray tomorrow  The patient should probably have this tube left in place for several days due to the recurrent pneumothorax  The biopsy was performed adjacent to the right middle lobe fissure and may take more time to heal  Workstation performed: [id number]     Ir Image Guided Biopsy/aspiration Lung    Result Date: 11/13/2018  Narrative: CT-scan guided core biopsy of a right middle lobe lung lesion History: PET positive right lung mass Conscious sedation time: 40 minutes Technique: This examination, like all CT scans performed in the [place], was performed utilizing techniques to minimize radiation dose exposure, including the use of iterative reconstruction and automated exposure control  The patient was brought to the CT scanner and placed supine on the table  After axial images were obtained through the region of interest an area of the skin was then marked, prepped, and draped in usual sterile fashion  Lidocaine was administered to the  skin and a small skin incision was made  A 17 gauge cannula was advanced up to the lesion  Through the cannula, an18-gauge core biopsy was performed  Four additional passes were made  The specimen was found to be adequate for evaluation  The patient tolerated the procedure well and suffered no complications  There was parenchymal bleeding around tteh mass which was self-limiting with no pneumothorax     Impression: Impression: Successful percutaneous core biopsy of the PET positive right middle lobe lung lesion  A full pathology report will follow   Workstation performed: [id number]     Ir Chest Tube    Result Date: 11/14/2018  Narrative: EXAMINATION: CT-guided chest tube placement INDICATION: 70-year-old male underwent right lung biopsy and was noted to have slowly enlarging right pneumothorax  IMAGES:  124 ANESTHESIA: Local lidocaine and fentanyl IV PROCEDURE:  The patient was identified verbally and by wristband  Timeout was performed  Informed consent was obtained  Following obtaining informed consent, the patient was prepped and draped in the usual sterile fashion  All elements of maximal sterile barrier technique, cap and mask and sterile gown and sterile gloves and sterile drape and hand hygiene and 2% chlorhexidine for cutaneous antisepsis  1% local lidocaine was infiltrated in the skin and subcutaneous tissue overlying the right anterior chest wall  Under serial CT guidance, a 5-Norwegian Yueh centesis needle catheter was advanced into the right anterior pleural space  A Heavy wire was advanced through the Yueh catheter into the right apical chest cavity  The Yueh centesis catheter was removed, tract dilated, and an 8 5-Norwegian catheter was advanced over the wire into the right apical chest cavity  Chest tube was secured to skin using  2-0 Prolene suture and connected to Vacutainer  Postprocedure CT was performed  The patient tolerated the procedure well without complication  The patient left the IR department in stable condition  Findings: 1  Preprocedure CT showed right anterior chest wall subcutaneous emphysema and moderate right pneumothorax  2   Successful CT-guided right chest tube placement using 8 5-Norwegian catheter  Impression: Impression: Successful CT-guided right chest tube placement   Workstation performed: [id number]       Labs:   Lab Results   Component Value Date    WBC 7 80 11/27/2018    HGB 12 0 11/27/2018    HCT 36 7 11/27/2018    MCV 86 11/27/2018     (H) 11/27/2018     Lab Results   Component Value Date    K 4 1 11/27/2018     11/27/2018    CO2 26 11/27/2018    BUN 15 11/27/2018    CREATININE 1 04 11/27/2018    GLUF 88 08/07/2018    CALCIUM 8 6 11/27/2018    AST 22 09/23/2018    ALT 25 09/23/2018    ALKPHOS 110 09/23/2018    EGFR 92 11/27/2018       No results found for: IRON, TIBC, FERRITIN    No results found for: LTOCZOEF70      ROS:   Review of Systems   Constitutional: Negative for activity change, appetite change, chills, diaphoresis, fatigue, fever and unexpected weight change  HENT: Negative for congestion, dental problem, facial swelling, hearing loss, mouth sores, nosebleeds, postnasal drip, rhinorrhea, sore throat, trouble swallowing and voice change  Eyes: Negative for photophobia, pain, discharge, redness, itching and visual disturbance  Respiratory: Positive for chest tightness ( right pleuritic chest pain)  Negative for cough, choking, shortness of breath and wheezing  Cardiovascular: Negative for chest pain, palpitations and leg swelling  Gastrointestinal: Negative for abdominal distention, abdominal pain, anal bleeding, blood in stool, constipation, diarrhea, nausea, rectal pain and vomiting  Endocrine: Negative for cold intolerance and heat intolerance  Genitourinary: Negative for decreased urine volume, difficulty urinating, dysuria, flank pain, frequency, hematuria and urgency  Musculoskeletal: Negative for arthralgias, back pain, gait problem, joint swelling, myalgias, neck pain and neck stiffness  Skin: Negative for color change, pallor, rash and wound  Allergic/Immunologic: Negative for immunocompromised state  Neurological: Negative for dizziness, tremors, seizures, syncope, facial asymmetry, speech difficulty, weakness, light-headedness, numbness and headaches  Hematological: Negative for adenopathy  Does not bruise/bleed easily  Psychiatric/Behavioral: Negative for agitation, confusion, decreased concentration, dysphoric mood and sleep disturbance  The patient is not nervous/anxious  All other systems reviewed and are negative  Current Medications: Reviewed  Allergies: Reviewed  PMH/FH/SH:  Reviewed      Physical Exam:    Body surface area is 1 84 meters squared      Wt Readings from Last 3 Encounters:   12/06/18 69 9 kg (154 lb)   12/04/18 67 6 kg (149 lb)   11/22/18 68 kg (149 lb 14 6 oz)        Temp Readings from Last 3 Encounters:   12/06/18 98 1 °F (36 7 °C) (Oral)   12/04/18 97 8 °F (36 6 °C) (Temporal)   11/27/18 100 2 °F (37 9 °C) (Tympanic)        BP Readings from Last 3 Encounters:   12/06/18 132/68   12/04/18 128/80   11/27/18 144/77         Pulse Readings from Last 3 Encounters:   12/06/18 79   12/04/18 92   11/27/18 73        Physical Exam   Constitutional: He is oriented to person, place, and time  He appears well-developed and well-nourished  No distress  HENT:   Head: Normocephalic and atraumatic  Mouth/Throat: Oropharynx is clear and moist  No oropharyngeal exudate  Eyes: Pupils are equal, round, and reactive to light  Conjunctivae and EOM are normal    Neck: Normal range of motion  Neck supple  No tracheal deviation present  No thyromegaly present  Cardiovascular: Normal rate and regular rhythm  Exam reveals no gallop and no friction rub  No murmur heard  Pulmonary/Chest: Effort normal and breath sounds normal  No respiratory distress  He has no wheezes  He has no rales  He exhibits no tenderness  Abdominal: Soft  Bowel sounds are normal  He exhibits no distension and no mass  There is no tenderness  There is no rebound and no guarding  Musculoskeletal: Normal range of motion  He exhibits no edema  Lymphadenopathy:     He has no cervical adenopathy  Neurological: He is alert and oriented to person, place, and time  Skin: Skin is warm and dry  No rash noted  He is not diaphoretic  No erythema  No pallor  Psychiatric: He has a normal mood and affect  His behavior is normal  Judgment and thought content normal    Vitals reviewed  Goals and Barriers:  Current Goal: Minimize effects of disease  Barriers: None  Patient's Capacity to Self Care:  Patient is able to self care      Code Status: [unfilled]

## 2018-12-06 NOTE — PROGRESS NOTES
Nurse Navigator Note:  Patient seen during his visit with Dr Jone Holguin this afternoon  I gave him my contact information  Patient given paperwork for [employer] application  He is aware I will follow up on this with him  He is aware that the process for approval takes about 48 hours after its submitted  Patient currently using LYFT  Patient is aware I will follow up with him regarding his chemotherapy regimen to answer questions or concerns he has since his visit today  He requests to be followed and receive infusions at [facility]  Appointment for infusion is not scheduled due to they are closed for the day  I will follow up with the clinic team tomorrow

## 2018-12-07 ENCOUNTER — HOSPITAL ENCOUNTER (OUTPATIENT)
Dept: INFUSION CENTER | Facility: HOSPITAL | Age: 58
Discharge: HOME | End: 2018-12-07
Payer: COMMERCIAL

## 2018-12-07 ENCOUNTER — TELEPHONE (OUTPATIENT)
Dept: HEMATOLOGY ONCOLOGY | Facility: CLINIC | Age: 58
End: 2018-12-07

## 2018-12-07 DIAGNOSIS — T45.1X5A ADVERSE EFFECT OF CHEMOTHERAPY, INITIAL ENCOUNTER: Primary | ICD-10-CM

## 2018-12-07 DIAGNOSIS — C34.91 ADENOCARCINOMA OF LUNG, RIGHT (HCC): ICD-10-CM

## 2018-12-07 DIAGNOSIS — C34.31 MALIGNANT NEOPLASM OF LOWER LOBE OF RIGHT LUNG (HCC): Primary | ICD-10-CM

## 2018-12-07 LAB
ALBUMIN SERPL BCP-MCNC: 4.1 G/DL (ref 3–5.2)
ALP SERPL-CCNC: 75 U/L (ref 43–122)
ALT SERPL W P-5'-P-CCNC: 37 U/L (ref 9–52)
ANION GAP SERPL CALCULATED.3IONS-SCNC: 9 MMOL/L (ref 5–14)
AST SERPL W P-5'-P-CCNC: 32 U/L (ref 17–59)
BASOPHILS # BLD AUTO: 0.1 THOUSANDS/ΜL (ref 0–0.1)
BASOPHILS NFR BLD AUTO: 1 % (ref 0–1)
BILIRUB SERPL-MCNC: 0.3 MG/DL
BUN SERPL-MCNC: 15 MG/DL (ref 5–25)
CALCIUM SERPL-MCNC: 8.9 MG/DL (ref 8.4–10.2)
CHLORIDE SERPL-SCNC: 104 MMOL/L (ref 97–108)
CO2 SERPL-SCNC: 25 MMOL/L (ref 22–30)
CREAT SERPL-MCNC: 1.03 MG/DL (ref 0.7–1.5)
EOSINOPHIL # BLD AUTO: 0.5 THOUSAND/ΜL (ref 0–0.4)
EOSINOPHIL NFR BLD AUTO: 7 % (ref 0–6)
ERYTHROCYTE [DISTWIDTH] IN BLOOD BY AUTOMATED COUNT: 15.8 %
GFR SERPL CREATININE-BSD FRML MDRD: 93 ML/MIN/1.73SQ M
GLUCOSE SERPL-MCNC: 125 MG/DL (ref 70–99)
HCT VFR BLD AUTO: 39.9 % (ref 41–53)
HGB BLD-MCNC: 12.8 G/DL (ref 13.5–17.5)
LYMPHOCYTES # BLD AUTO: 3.3 THOUSANDS/ΜL (ref 0.5–4)
LYMPHOCYTES NFR BLD AUTO: 46 % (ref 25–45)
MCH RBC QN AUTO: 27 PG (ref 26–34)
MCHC RBC AUTO-ENTMCNC: 32.2 G/DL (ref 31–36)
MCV RBC AUTO: 84 FL (ref 80–100)
MONOCYTES # BLD AUTO: 0.6 THOUSAND/ΜL (ref 0.2–0.9)
MONOCYTES NFR BLD AUTO: 8 % (ref 1–10)
NEUTROPHILS # BLD AUTO: 2.8 THOUSANDS/ΜL (ref 1.8–7.8)
NEUTS SEG NFR BLD AUTO: 39 % (ref 45–65)
PLATELET # BLD AUTO: 422 THOUSANDS/UL (ref 150–450)
PMV BLD AUTO: 7.5 FL (ref 8.9–12.7)
POTASSIUM SERPL-SCNC: 4.1 MMOL/L (ref 3.6–5)
PROT SERPL-MCNC: 7.6 G/DL (ref 5.9–8.4)
RBC # BLD AUTO: 4.75 MILLION/UL (ref 4.5–5.9)
SODIUM SERPL-SCNC: 138 MMOL/L (ref 137–147)
WBC # BLD AUTO: 7.3 THOUSAND/UL (ref 4.5–11)

## 2018-12-07 PROCEDURE — 80053 COMPREHEN METABOLIC PANEL: CPT

## 2018-12-07 PROCEDURE — 96372 THER/PROPH/DIAG INJ SC/IM: CPT

## 2018-12-07 PROCEDURE — 85025 COMPLETE CBC W/AUTO DIFF WBC: CPT

## 2018-12-07 RX ORDER — FOLIC ACID 1 MG/1
1 TABLET ORAL DAILY
Qty: 30 TABLET | Refills: 2 | Status: SHIPPED | OUTPATIENT
Start: 2018-12-07 | End: 2020-01-20 | Stop reason: SDUPTHER

## 2018-12-07 RX ORDER — CYANOCOBALAMIN 1000 UG/ML
1000 INJECTION INTRAMUSCULAR; SUBCUTANEOUS ONCE
Status: COMPLETED | OUTPATIENT
Start: 2018-12-07 | End: 2018-12-07

## 2018-12-07 RX ORDER — DEXAMETHASONE 4 MG/1
4 TABLET ORAL 2 TIMES DAILY WITH MEALS
Qty: 60 TABLET | Refills: 0 | Status: SHIPPED | OUTPATIENT
Start: 2018-12-07 | End: 2019-05-06 | Stop reason: ALTCHOICE

## 2018-12-07 RX ADMIN — CYANOCOBALAMIN 1000 MCG: 1000 INJECTION INTRAMUSCULAR; SUBCUTANEOUS at 14:25

## 2018-12-07 NOTE — TELEPHONE ENCOUNTER
Nurse Navigator Note:  Patient completed [employer] Form Questionnaire with me over the phone  He is aware I will mail him copies of the completed form and update him with approval or not? He is aware he has B12 injection this afternoon and his first infusion starts on 12/13 at [facility]  He is needing a LMN for the Oxycodone prescription  I contacted [place]  And they will fax the form needed to me momentarily  The insurance rep said it may take 24 hours for review of the LMN  Patient reported the pharmacy did not receive a script for Decardon for prior to his infusion  I am alerting the team to submit this script for him  He is aware I will follow up with him shortly to update him on the medications and when he can expect to pick them up at his pharmacy  3:46PM: I called and left a message on patients [phone] phone to update him on the status of the pre authorization form submission and prescription submitted to his [address] [place] [address] pharmacy for the pre chemo decadron  [address] [place] [address] Avenue has received the LMN form and I'm told are currently reviewing it  I spoke to [address] [place] [address] pharmacy and they advised if I receive an approval letter I would need to call them to notify them  As it is nearing end of day and the approval review may take 24 hours, I was told patient may call the pharmacy at anytime and request they run the script and that way they would know if it has been approved   I advised Nelida Storey of this in my voice message in case I do not receive an approval letter by [facility]

## 2018-12-07 NOTE — TELEPHONE ENCOUNTER
Patient called with multiple concerns  He wants to make sure someone is working on the prior auth for the oxycodone that was prescribed by Dr Kari Blair  He also states there was a medication that was supposed to be sent to the pharmacy that he needs to take prior to chemo treatments - a steriod  He seems quite anxious to get everything in place prior to beginning treatment at [address] [place] [address]  Please advise and I would happy to follow up with him

## 2018-12-07 NOTE — TELEPHONE ENCOUNTER
I received a fax from pharmacy notifying us of the prior authorization needed for his pain medication  I forwarded it to Felix Wade who takes care of prior auths on meds

## 2018-12-07 NOTE — TELEPHONE ENCOUNTER
I called Shari, patient's significant other, to inform that patient needs to go to a Vitamin B12 injection today, 12/07/2018 @[facility]  Also his chemo is scheduled for 12/13/18 and and 01/03/2019 along with a follow up with Dr Klaus Mathias Heart for 12/31/18 as well  Shari was going to relate the message to the patient since the first number was not working to reach the patient

## 2018-12-07 NOTE — TELEPHONE ENCOUNTER
Called and spoke with the patients girlfriend who is on the patients communication consent  The patient is aware that his pain medication is approved  He also went to get his b12 shot prior to chemotherapy   If the patient has any more concerns please direct it to me

## 2018-12-07 NOTE — TELEPHONE ENCOUNTER
Rey Perez,    I did receive the notice of the pain medication needing an authorization  I am working on obtaining it        Thanks

## 2018-12-07 NOTE — TELEPHONE ENCOUNTER
Patient called to confirm his injection this afternoon, along with his chemo schedule and also to inform Dr Yousuf Saul that he needs to call patient's insurance regarding the prescription  Insurance has some questions regarding the prescription  Please call back and advise  Thank you

## 2018-12-07 NOTE — TELEPHONE ENCOUNTER
I called pt to relate message, and spoke to [place] who states pt isn't available and will relate the message to him

## 2018-12-10 ENCOUNTER — TELEPHONE (OUTPATIENT)
Dept: HEMATOLOGY ONCOLOGY | Facility: CLINIC | Age: 58
End: 2018-12-10

## 2018-12-10 ENCOUNTER — DOCUMENTATION (OUTPATIENT)
Dept: HEMATOLOGY ONCOLOGY | Facility: CLINIC | Age: 58
End: 2018-12-10

## 2018-12-10 NOTE — PROGRESS NOTES
12/7/2018 received notification from [facility] that the Oxycodone-Acetaminophen 5-325 mg needs authorization  Completed auth request form and sent it to Dr Obdulio Rich for review and signature  Received the form back and submitted for auth     12/10/2018  Received approval from [employer]  Auth is valid from 12/7/2018 through 6/7/2019  [address] [place] [address] at 9:29 ([phone]) spoke with Karyn Morgan and informed her of the auth  She stated that the claim went through and they will get the order ready for       Notified clinical

## 2018-12-10 NOTE — TELEPHONE ENCOUNTER
Nurse Navigator Note:  I spoke with the [address] [place] [address] pharmacy and they verified Mr Shi Bullard prescription is being filled  I was told the patient is aware and will be picking it up shortly

## 2018-12-11 ENCOUNTER — TELEPHONE (OUTPATIENT)
Dept: HEMATOLOGY ONCOLOGY | Facility: CLINIC | Age: 58
End: 2018-12-11

## 2018-12-11 DIAGNOSIS — C34.90 MALIGNANT NEOPLASM OF LUNG, UNSPECIFIED LATERALITY, UNSPECIFIED PART OF LUNG (HCC): Primary | ICD-10-CM

## 2018-12-11 NOTE — TELEPHONE ENCOUNTER
Nurse Navigator Note:  I returned Ms Ortiz's phone call from Monday 12/10/18  I explained to her how [facility] works and gave her the [employer] number  She is aware Mr Ebony España has his first chemotherapy infusion scheduled on Thursday 12/13 at 8:30am  She is aware that Mr Ebony España or herself will need to call the [facility] dispatch # to confirm  for this and all future appointments for him  I reassured her that I will be speaking with Mr Ebony España before Thursday to review what he can expect before, during, and after the infusion  I asked that she call me if any other questions or concerns arise

## 2018-12-12 ENCOUNTER — TELEPHONE (OUTPATIENT)
Dept: HEMATOLOGY ONCOLOGY | Facility: CLINIC | Age: 58
End: 2018-12-12

## 2018-12-12 RX ORDER — CYANOCOBALAMIN 1000 UG/ML
1000 INJECTION INTRAMUSCULAR; SUBCUTANEOUS ONCE
Status: COMPLETED | OUTPATIENT
Start: 2018-12-13 | End: 2018-12-13

## 2018-12-12 RX ORDER — HEPARIN SODIUM (PORCINE) LOCK FLUSH IV SOLN 100 UNIT/ML 100 UNIT/ML
300 SOLUTION INTRAVENOUS AS NEEDED
Status: ACTIVE | OUTPATIENT
Start: 2018-12-13 | End: 2018-12-14

## 2018-12-12 RX ORDER — SODIUM CHLORIDE 9 MG/ML
20 INJECTION, SOLUTION INTRAVENOUS ONCE
Status: COMPLETED | OUTPATIENT
Start: 2018-12-13 | End: 2018-12-13

## 2018-12-12 NOTE — TELEPHONE ENCOUNTER
Nurse Navigator Note:  I spoke with Jt Lovett to discuss expectations of his first infusion tomorrow morning  His questions were answered and emotion support given  He reports  He continues to have pain but is taking his pain medication as needed with some relief  I encouraged him to take the pain medication as needed before his pain level is high  He verbalized understanding  He reports no issues with constipation at this time  He reports that he has been taking the decadron medication since picking it up from the pharmacy last Friday  He is aware I will let Dr Summer Anne know  I reminded him that the [address] is to be taken the day before, day of, and the day after chemotherapy  He is aware I am planning to visit with him tomorrow during his infusion to provide support and answer any questions he may have as he progresses through treatment  He is aware to call [facility] [employer] for any transportation needs for appointments  The dispatch # for [facility] was provided  He confirmed [facility] has notified him of his  time for tomorrow morning

## 2018-12-13 ENCOUNTER — HOSPITAL ENCOUNTER (OUTPATIENT)
Dept: INFUSION CENTER | Facility: HOSPITAL | Age: 58
Discharge: HOME | End: 2018-12-13
Payer: COMMERCIAL

## 2018-12-13 ENCOUNTER — TELEPHONE (OUTPATIENT)
Dept: FAMILY MEDICINE CLINIC | Facility: CLINIC | Age: 58
End: 2018-12-13

## 2018-12-13 ENCOUNTER — DOCUMENTATION (OUTPATIENT)
Dept: HEMATOLOGY ONCOLOGY | Facility: CLINIC | Age: 58
End: 2018-12-13

## 2018-12-13 VITALS
SYSTOLIC BLOOD PRESSURE: 152 MMHG | TEMPERATURE: 98.9 F | RESPIRATION RATE: 18 BRPM | DIASTOLIC BLOOD PRESSURE: 70 MMHG | WEIGHT: 154.32 LBS | BODY MASS INDEX: 22.86 KG/M2 | HEART RATE: 80 BPM | HEIGHT: 69 IN

## 2018-12-13 DIAGNOSIS — C34.90 MALIGNANT NEOPLASM OF LUNG, UNSPECIFIED LATERALITY, UNSPECIFIED PART OF LUNG (HCC): Primary | ICD-10-CM

## 2018-12-13 DIAGNOSIS — R11.0 NAUSEA: ICD-10-CM

## 2018-12-13 PROCEDURE — 96372 THER/PROPH/DIAG INJ SC/IM: CPT

## 2018-12-13 PROCEDURE — 96413 CHEMO IV INFUSION 1 HR: CPT

## 2018-12-13 PROCEDURE — 96367 TX/PROPH/DG ADDL SEQ IV INF: CPT

## 2018-12-13 PROCEDURE — 96417 CHEMO IV INFUS EACH ADDL SEQ: CPT

## 2018-12-13 RX ORDER — ONDANSETRON 4 MG/1
4 TABLET, FILM COATED ORAL EVERY 8 HOURS PRN
Qty: 20 TABLET | Refills: 0 | Status: SHIPPED | OUTPATIENT
Start: 2018-12-13 | End: 2019-01-09 | Stop reason: SDUPTHER

## 2018-12-13 RX ORDER — ONDANSETRON HYDROCHLORIDE 8 MG/1
4 TABLET, FILM COATED ORAL EVERY 8 HOURS PRN
Qty: 20 TABLET | Refills: 0 | Status: CANCELLED | OUTPATIENT
Start: 2018-12-13

## 2018-12-13 RX ADMIN — CARBOPLATIN 519 MG: 10 INJECTION, SOLUTION INTRAVENOUS at 10:10

## 2018-12-13 RX ADMIN — SODIUM CHLORIDE 200 MG: 9 INJECTION, SOLUTION INTRAVENOUS at 11:24

## 2018-12-13 RX ADMIN — CYANOCOBALAMIN 1000 MCG: 1000 INJECTION INTRAMUSCULAR; SUBCUTANEOUS at 12:19

## 2018-12-13 RX ADMIN — SODIUM CHLORIDE 920 MG: 9 INJECTION, SOLUTION INTRAVENOUS at 09:36

## 2018-12-13 RX ADMIN — SODIUM CHLORIDE 20 ML/HR: 9 INJECTION, SOLUTION INTRAVENOUS at 08:44

## 2018-12-13 RX ADMIN — DEXAMETHASONE SODIUM PHOSPHATE: 10 INJECTION, SOLUTION INTRAMUSCULAR; INTRAVENOUS at 08:44

## 2018-12-13 NOTE — PROGRESS NOTES
Pt admitted to infusion center today for first chemotherapy treatment  Education given of carboplatin, keytruda, and alimta  Pt has been taking his vitamin b 12 injections as ordered  No recent changes in health  Afebrile  No cough noted today  Peripheral iv inserted without difficulty  Pt agitated while receiving multiple phone calls from family  Support given

## 2018-12-13 NOTE — PROGRESS NOTES
Pt tolerated all meds without side effects/ reactions  Vitamin b 12 injection given at the completion of chemotherapy  Iv discontinued jelco intact  pts nurse navigator in with pt for much of his visit  Pt states he has no questions upon discharge  Ambulated to lobby to meet [facility]

## 2018-12-13 NOTE — PROGRESS NOTES
Nurse Navigator Note:  I met with patient at the [place] [id number] this morning during his first chemotherapy infusion  I reviewed his current medication list with him  He was given the folic acid medication from his pharmacy and advised to take one tablet daily starting tomorrow  He confirms he has been taking the decadron tablets- 1 in AM and 1 in PM daily since last Friday  I advised he continue to take 1 tablet in AM and 1 in PM today and tomorrow then to take 1 tablet daily 12/15, 12/16, and 12/17, then stop taking this medication per Dr Janet Power  I reviewed with him that he only needs to take the steroid-Decadron, the day before his next cycle, day of chemotherapy and the day after  Dr Janet Power submitted for Zofran prn for nausea and I reviewed this medication with him  I gave him Shweta Tenorio's contact number and asked that he contact her for his financial concerns related to receiving cancer treatment  Emotional support and encouragement given related to beginning cancer treatment and managing other aspects of life  He was encouraged to stay active and to rest as needed  He has my contact number and was asked to call me with any questions or concerns  He asked that I contact [facility] [employer] to clarify with them his future appointment destinations  He mentioned there was some confusion about his drop off location for his infusion appointment at [facility] this morning  He has their dispatch number and is aware he should call them to confirm  times

## 2018-12-13 NOTE — TELEPHONE ENCOUNTER
I called the patient  He needs supplies to check his sugar  He doesn't have a machine either  Please send him everything that is needed ( the whole kit including glucometer, strips, lancet   etc)  Thanks !

## 2018-12-13 NOTE — TELEPHONE ENCOUNTER
Patient requesting Dr Narayan Marquez to send script over for him to check his blood sugars  Bere Mayberry states he is finishing up with his chemo therapy and [facility] and needs to check his sugars daily

## 2018-12-14 ENCOUNTER — CONSULT (OUTPATIENT)
Dept: PAIN MEDICINE | Facility: MEDICAL CENTER | Age: 58
End: 2018-12-14
Payer: COMMERCIAL

## 2018-12-14 VITALS
HEIGHT: 69 IN | SYSTOLIC BLOOD PRESSURE: 138 MMHG | WEIGHT: 153.2 LBS | BODY MASS INDEX: 22.69 KG/M2 | RESPIRATION RATE: 18 BRPM | HEART RATE: 73 BPM | DIASTOLIC BLOOD PRESSURE: 81 MMHG

## 2018-12-14 DIAGNOSIS — E11.9 TYPE 2 DIABETES MELLITUS WITHOUT COMPLICATION, WITHOUT LONG-TERM CURRENT USE OF INSULIN (HCC): Primary | ICD-10-CM

## 2018-12-14 DIAGNOSIS — M79.18 MYOFASCIAL PAIN SYNDROME: Primary | ICD-10-CM

## 2018-12-14 PROCEDURE — 99204 OFFICE O/P NEW MOD 45 MIN: CPT | Performed by: PHYSICAL MEDICINE & REHABILITATION

## 2018-12-14 NOTE — LETTER
December 14, 2018     [address] [place] [address]    Patient: Jacqui Vasquez  YOB: 1960   Date of Visit: 12/14/2018       Dear Dr Liliana Enriquez: Thank you for referring Dominik Ordaz to me for evaluation  Below are my notes for this consultation  If you have questions, please do not hesitate to call me  I look forward to following your patient along with you  Sincerely,        Teresita Kinney DO        CC: No Recipients  Teresita Kinney DO  12/14/2018 11:01 AM  Sign at close encounter  Assessment:  1  Myofascial pain syndrome        Plan:  1  At this time the patient is complaining of widespread myofascial pain  He does have a referral placed from Dr Liliana Enriquez for evaluation with Rheumatology  I reviewed with the patient that that is likely where he would obtain the best relief  He does not require any interventional approaches  His MRI does not reveal any significant disc changes, nerve root compression, or facet changes  He also states he is not interested in any interventional approaches  My impressions and treatment recommendations were discussed in detail with the patient who verbalized understanding and had no further questions  Discharge instructions were provided  I personally saw and examined the patient and I agree with the above discussed plan of care  No orders of the defined types were placed in this encounter  No orders of the defined types were placed in this encounter  History of Present Illness:    Dominik Ordaz Sr  is a 66 y o  male Sent from Dr Liliana Enriquez for further evaluation and management of his back pain complaints  The patient did undergo an MRI of his lumbar spine which is not reveal any significant disc herniations, nerve root compression, or arthritis  On exam he is tender to palpation throughout the musculature of essentially his entire back    He also has some tenderness over his posterior buttocks and hips as well as lateral thighs  He has been experiencing pain for the past 8 years without any inciting event or trauma  He states this is severe pain rated at its worst a 10/10 which is nearly constant without any typical pattern  He characterizes the pain as burning, shooting, numbness, sharp, pins and needles, pressure-like, and throbbing  Aggravating factors include lying down, standing, bending, sitting, walking, and bowel movements  Alleviating factors include coughing or sneezing  He has tried physical therapy without relief  Recently diagnosed with stage IV lung cancer  Continues to smoke  I have personally reviewed and/or updated the patient's past medical history, past surgical history, family history, social history, current medications, allergies, and vital signs today  Review of Systems:    Review of Systems   Constitutional: Positive for unexpected weight change  Negative for fever  HENT: Negative for trouble swallowing  Eyes: Negative for visual disturbance  Respiratory: Positive for shortness of breath and wheezing  Cardiovascular: Positive for chest pain  Negative for palpitations  Gastrointestinal: Positive for constipation  Negative for diarrhea, nausea and vomiting  Endocrine: Negative for cold intolerance, heat intolerance and polydipsia  Genitourinary: Negative for difficulty urinating and frequency  Musculoskeletal: Positive for back pain, gait problem and joint swelling  Negative for arthralgias and myalgias  Skin: Negative for rash  Neurological: Positive for weakness  Negative for dizziness, seizures, syncope and headaches  Hematological: Does not bruise/bleed easily  Psychiatric/Behavioral: Positive for dysphoric mood  All other systems reviewed and are negative        Patient Active Problem List   Diagnosis    Acute exacerbation of chronic obstructive pulmonary disease (COPD) ([facility] [address] )    Essential hypertension  Chronic bilateral thoracic back pain    Chronic right shoulder pain    Bilateral carpal tunnel syndrome    Bipolar 1 disorder (HCC)    Gastroesophageal reflux disease without esophagitis    Other specified anxiety disorders    Cubital tunnel syndrome, bilateral    Type 2 diabetes mellitus without complication, without long-term current use of insulin (HCC)    Pneumothorax of right lung after biopsy    Panlobular emphysema ([facility] [address] )    Adenocarcinoma of lung, right ([facility] [address] )    Tobacco abuse    Pneumothorax on right    Encounter for immunization       Past Medical History:   Diagnosis Date    Bipolar 1 disorder (HCC)     Chronic pain disorder     COPD (chronic obstructive pulmonary disease) (HCC)     Depression     Diabetes mellitus (HCC)     GERD (gastroesophageal reflux disease)     Herniated lumbar intervertebral disc     Hypertension     Latent syphilis     Treated    Low back pain     Lumbosacral disc disease     PTSD (post-traumatic stress disorder)     Tobacco abuse 11/13/2018       Past Surgical History:   Procedure Laterality Date    CT GUIDED CHEST TUBE  11/21/2018    HEMORROIDECTOMY      IR CHEST TUBE  11/14/2018    IR IMAGE GUIDED BIOPSY/ASPIRATION LUNG  11/13/2018    KNEE SURGERY         Family History   Problem Relation Age of Onset    Heart disease Mother     Hypertension Father     Diabetes Family     Asthma Family     Heart disease Family     Cancer Family        Social History     Occupational History    part time employment      Social History Main Topics    Smoking status: Former Smoker     Packs/day: 0 50     Types: Cigarettes     Quit date: 8/31/2018    Smokeless tobacco: Former User      Comment: "i quit one month ago when i quit weed"    Alcohol use Yes      Comment: social    Drug use: Yes     Types: Marijuana      Comment: stopped 8-2018, "i smoked weed and stopped 1 month ago"    Sexual activity: Yes       Current Outpatient Prescriptions on File Prior to Visit   Medication Sig    albuterol (VENTOLIN HFA) 90 mcg/act inhaler Inhale 2 puffs every 4 (four) hours as needed for wheezing    amLODIPine (NORVASC) 10 mg tablet Take 1 tablet (10 mg total) by mouth daily (Patient taking differently: Take 10 mg by mouth daily in the early morning  )    budesonide-formoterol (SYMBICORT) 160-4 5 mcg/act inhaler Inhale 2 puffs 2 (two) times a day Rinse mouth after use   dexamethasone (DECADRON) 4 mg tablet Take 1 tablet (4 mg total) by mouth 2 (two) times a day with meals P o  B i d  With food For 3 days  24 hr prior to chemotherapy    FLUoxetine (PROzac) 10 MG tablet Take 10 mg by mouth daily in the early morning      fluticasone-vilanterol (BREO ELLIPTA) 100-25 mcg/inh inhaler Inhale 1 puff daily in the early morning Rinse mouth after use        folic acid (FOLVITE) 1 mg tablet Take 1 tablet (1 mg total) by mouth daily    gabapentin (NEURONTIN) 100 mg capsule Take 1 capsule (100 mg total) by mouth 3 (three) times a day    guaiFENesin (MUCINEX) 600 mg 12 hr tablet Take 2 tablets (1,200 mg total) by mouth every 12 (twelve) hours    ipratropium (ATROVENT) 0 02 % nebulizer solution Take 1 vial (0 5 mg total) by nebulization 3 (three) times a day    levalbuterol (XOPENEX) 1 25 mg/0 5 mL nebulizer solution Take 0 5 mL (1 25 mg total) by nebulization every 8 (eight) hours as needed for wheezing    loratadine (CLARITIN) 10 mg tablet Take 10 mg by mouth daily in the early morning      metFORMIN (GLUCOPHAGE-XR) 500 mg 24 hr tablet Take 1 tablet (500 mg total) by mouth 2 (two) times a day with meals    methocarbamol (ROBAXIN) 500 mg tablet Take 1 tablet (500 mg total) by mouth every 6 (six) hours as needed for muscle spasms    ondansetron (ZOFRAN) 4 mg tablet Take 1 tablet (4 mg total) by mouth every 8 (eight) hours as needed for nausea or vomiting    oxyCODONE-acetaminophen (PERCOCET) 5-325 mg per tablet Take 1 tablet by mouth every 4 (four) hours as needed for moderate pain Max Daily Amount: 6 tablets    polyethylene glycol (MIRALAX) 17 g packet Take 17 g by mouth daily (Patient taking differently: Take 17 g by mouth daily in the early morning  )    QUEtiapine (SEROquel) 25 mg tablet Take 25 mg by mouth daily at bedtime    ranitidine (ZANTAC) 150 MG capsule Take 1 capsule (150 mg total) by mouth 2 (two) times a day    Selenium Sulfide 2 25 % SHAM apply by topical route  every PM to the affected area(s)    tiotropium (SPIRIVA RESPIMAT) 2 5 MCG/ACT AERS inhaler Inhale 2 puffs daily (Patient taking differently: Inhale 2 puffs 2 (two) times a day  )     Current Facility-Administered Medications on File Prior to Visit   Medication    [COMPLETED] CARBOplatin (PARAPLATIN) 519 mg in sodium chloride 0 9 % 250 mL IVPB    [COMPLETED] cyanocobalamin injection 1,000 mcg    [] heparin flush (porcine) 100 units/mL injection 300 Units    [COMPLETED] pembrolizumab (KEYTRUDA) 200 mg in sodium chloride 0 9 % 50 mL IVPB    [COMPLETED] sodium chloride 0 9 % infusion       Allergies   Allergen Reactions    Lisinopril Anaphylaxis     Took medication a while ago and had a "swelling reaction"  Does not carry epi-pen       Physical Exam:    /81   Pulse 73   Resp 18   Ht 5' 8 5" (1 74 m)   Wt 69 5 kg (153 lb 3 2 oz)   BMI 22 96 kg/m²        LUMBAR  General: Well-developed, well-nourished individual in no acute distress  Mental: Appropriate mood and affect  Grossly oriented with coherent speech and thought processing   Neuro:  Cranial nerves: Cranial nerve function is grossly intact bilaterally   Strength: Bilateral lower extremity strength is normal and symmetric   No atrophy or tone abnormalities noted   Reflexes: Bilateral lower extremity muscle stretch reflexes are physiologic and symmetric   No ankle clonus is noted   Sensation: No loss of sensation is noted     SLR/Foraminal Compression Maneuvers: Straight leg raising in the sitting position is negative for radicular pain   Gait:  Gait/gross motor: Gait is normal  Station is normal  Toe walking, heel walking  are normal     Musculoskeletal:  Palpation: Inspection and palpation of the spine and extremities are unremarkable except for tenderness to palpation widespread over the entire back, posterior hips, lateral thighs, and buttocks  Lymph: No lymphadenopathy is appreciated in the involved extremity   Vessels: No lower extremity edema   Lungs: Breathing is comfortable and regular  No dyspnea noted during examination   Eyes: Visual field grossly intact to confrontation  No redness appreciated  ENT: No craniofacial deformities or asymmetry  No neck masses appreciated  ImagingMRI lumbar spine wo contrast   Status: Final result   PACS Images     Show images for MRI lumbar spine wo contrast   Order Report      Order Details   Order Questions     Question Answer Comment   What is the patient's sedation requirement? No Sedation           Reason For Exam     back pain unintentional weight loss   Dx: Chronic bilateral low back pain with bilateral sciatica [M54 42, M54 41, G89 29 (ICD-10-CM)]; Unintentional weight loss [R63 4 (ICD-10-CM)]; Night sweats [R61 (ICD-10-CM)]   Study Result     MRI LUMBAR SPINE WITHOUT CONTRAST     INDICATION: M54 42: Lumbago with sciatica, left side  M54 41: Lumbago with sciatica, right side  G89 29: Other chronic pain  R63 4: Abnormal weight loss  R61: Generalized hyperhidrosis  28-year-old male with chronic back pain radiating down both legs to the knees for 5 years      COMPARISON:  Radiographs of the lumbar spine from August 2, 2018     TECHNIQUE:  Sagittal T1, sagittal T2, sagittal inversion recovery, axial T1 and axial T2, coronal T2        IMAGE QUALITY:  Diagnostic     FINDINGS:     ALIGNMENT:  There is a mild rightward curvature to the lumbar spine  The vertebral bodies are normal in alignment  A lordotic curvature is preserved    There is no evidence of spondylolisthesis      MARROW SIGNAL:  Mildly heterogeneous but no suspicious foci of marrow edema or focal signal abnormality      DISTAL CORD AND CONUS:  Normal appearance to the distal thoracic cord  The conus tapers at the L1 level with an unremarkable configuration  The cauda equina is normal in signal intensity and morphology         PARASPINAL SOFT TISSUES:  Paraspinal soft tissues are unremarkable      SACRUM:  Normal signal within the sacrum  No evidence of insufficiency or stress fracture      LOWER THORACIC DISC SPACES:  Normal disc height and signal   No disc herniation, canal stenosis or foraminal narrowing      LUMBAR DISC SPACES:  There is disc desiccation and mild disc height loss at L4-L5            L1-L2:  Normal      L2-L3:  Normal      L3-L4:  Normal      L4-L5:  Minimal bulging of the disc annulus  Left foraminal annular fissure  Slight accentuation of the disc bulge in the left foraminal location with mild left foraminal stenosis  There is minimal right inferior foraminal stenosis as well  The   spinal canal remains patent  There is no significant facet arthropathy      L5-S1:  Mild circumferential disc bulge without spinal canal or foraminal stenosis  No facet arthropathy      IMPRESSION:     Mild degenerative discogenic disease in the lower lumbar spine at L4-L5 with mild left greater than right foraminal stenosis          Workstation performed: [id number]

## 2018-12-14 NOTE — PROGRESS NOTES
Assessment:  1  Myofascial pain syndrome        Plan:  1  At this time the patient is complaining of widespread myofascial pain  He does have a referral placed from Dr Charleen Francois for evaluation with Rheumatology  I reviewed with the patient that that is likely where he would obtain the best relief  He does not require any interventional approaches  His MRI does not reveal any significant disc changes, nerve root compression, or facet changes  He also states he is not interested in any interventional approaches  My impressions and treatment recommendations were discussed in detail with the patient who verbalized understanding and had no further questions  Discharge instructions were provided  I personally saw and examined the patient and I agree with the above discussed plan of care  No orders of the defined types were placed in this encounter  No orders of the defined types were placed in this encounter  History of Present Illness:    Tracy Foote Sr  is a 66 y o  male Sent from Dr Charleen Francois for further evaluation and management of his back pain complaints  The patient did undergo an MRI of his lumbar spine which is not reveal any significant disc herniations, nerve root compression, or arthritis  On exam he is tender to palpation throughout the musculature of essentially his entire back  He also has some tenderness over his posterior buttocks and hips as well as lateral thighs  He has been experiencing pain for the past 8 years without any inciting event or trauma  He states this is severe pain rated at its worst a 10/10 which is nearly constant without any typical pattern  He characterizes the pain as burning, shooting, numbness, sharp, pins and needles, pressure-like, and throbbing  Aggravating factors include lying down, standing, bending, sitting, walking, and bowel movements  Alleviating factors include coughing or sneezing        He has tried physical therapy without relief  Recently diagnosed with stage IV lung cancer  Continues to smoke  I have personally reviewed and/or updated the patient's past medical history, past surgical history, family history, social history, current medications, allergies, and vital signs today  Review of Systems:    Review of Systems   Constitutional: Positive for unexpected weight change  Negative for fever  HENT: Negative for trouble swallowing  Eyes: Negative for visual disturbance  Respiratory: Positive for shortness of breath and wheezing  Cardiovascular: Positive for chest pain  Negative for palpitations  Gastrointestinal: Positive for constipation  Negative for diarrhea, nausea and vomiting  Endocrine: Negative for cold intolerance, heat intolerance and polydipsia  Genitourinary: Negative for difficulty urinating and frequency  Musculoskeletal: Positive for back pain, gait problem and joint swelling  Negative for arthralgias and myalgias  Skin: Negative for rash  Neurological: Positive for weakness  Negative for dizziness, seizures, syncope and headaches  Hematological: Does not bruise/bleed easily  Psychiatric/Behavioral: Positive for dysphoric mood  All other systems reviewed and are negative        Patient Active Problem List   Diagnosis    Acute exacerbation of chronic obstructive pulmonary disease (COPD) ([facility]    Essential hypertension    Chronic bilateral thoracic back pain    Chronic right shoulder pain    Bilateral carpal tunnel syndrome    Bipolar 1 disorder ([facility]    Gastroesophageal reflux disease without esophagitis    Other specified anxiety disorders    Cubital tunnel syndrome, bilateral    Type 2 diabetes mellitus without complication, without long-term current use of insulin ([facility] [address] )    Pneumothorax of right lung after biopsy    Panlobular emphysema ([facility] )    Adenocarcinoma of lung, right ([facility] )    Tobacco abuse    Pneumothorax on right    Encounter for immunization       Past Medical History:   Diagnosis Date    Bipolar 1 disorder (HCC)     Chronic pain disorder     COPD (chronic obstructive pulmonary disease) (HCC)     Depression     Diabetes mellitus (HCC)     GERD (gastroesophageal reflux disease)     Herniated lumbar intervertebral disc     Hypertension     Latent syphilis     Treated    Low back pain     Lumbosacral disc disease     PTSD (post-traumatic stress disorder)     Tobacco abuse 11/13/2018       Past Surgical History:   Procedure Laterality Date    CT GUIDED CHEST TUBE  11/21/2018    HEMORROIDECTOMY      IR CHEST TUBE  11/14/2018    IR IMAGE GUIDED BIOPSY/ASPIRATION LUNG  11/13/2018    KNEE SURGERY         Family History   Problem Relation Age of Onset    Heart disease Mother     Hypertension Father     Diabetes Family     Asthma Family     Heart disease Family     Cancer Family        Social History     Occupational History    part time employment      Social History Main Topics    Smoking status: Former Smoker     Packs/day: 0 50     Types: Cigarettes     Quit date: 8/31/2018    Smokeless tobacco: Former User      Comment: "i quit one month ago when i quit weed"    Alcohol use Yes      Comment: social    Drug use: Yes     Types: Marijuana      Comment: stopped 8-2018, "i smoked weed and stopped 1 month ago"    Sexual activity: Yes       Current Outpatient Prescriptions on File Prior to Visit   Medication Sig    albuterol (VENTOLIN HFA) 90 mcg/act inhaler Inhale 2 puffs every 4 (four) hours as needed for wheezing    amLODIPine (NORVASC) 10 mg tablet Take 1 tablet (10 mg total) by mouth daily (Patient taking differently: Take 10 mg by mouth daily in the early morning  )    budesonide-formoterol (SYMBICORT) 160-4 5 mcg/act inhaler Inhale 2 puffs 2 (two) times a day Rinse mouth after use   dexamethasone (DECADRON) 4 mg tablet Take 1 tablet (4 mg total) by mouth 2 (two) times a day with meals P o  B i d  With food For 3 days    24 hr prior to chemotherapy    FLUoxetine (PROzac) 10 MG tablet Take 10 mg by mouth daily in the early morning      fluticasone-vilanterol (BREO ELLIPTA) 100-25 mcg/inh inhaler Inhale 1 puff daily in the early morning Rinse mouth after use        folic acid (FOLVITE) 1 mg tablet Take 1 tablet (1 mg total) by mouth daily    gabapentin (NEURONTIN) 100 mg capsule Take 1 capsule (100 mg total) by mouth 3 (three) times a day    guaiFENesin (MUCINEX) 600 mg 12 hr tablet Take 2 tablets (1,200 mg total) by mouth every 12 (twelve) hours    ipratropium (ATROVENT) 0 02 % nebulizer solution Take 1 vial (0 5 mg total) by nebulization 3 (three) times a day    levalbuterol (XOPENEX) 1 25 mg/0 5 mL nebulizer solution Take 0 5 mL (1 25 mg total) by nebulization every 8 (eight) hours as needed for wheezing    loratadine (CLARITIN) 10 mg tablet Take 10 mg by mouth daily in the early morning      metFORMIN (GLUCOPHAGE-XR) 500 mg 24 hr tablet Take 1 tablet (500 mg total) by mouth 2 (two) times a day with meals    methocarbamol (ROBAXIN) 500 mg tablet Take 1 tablet (500 mg total) by mouth every 6 (six) hours as needed for muscle spasms    ondansetron (ZOFRAN) 4 mg tablet Take 1 tablet (4 mg total) by mouth every 8 (eight) hours as needed for nausea or vomiting    oxyCODONE-acetaminophen (PERCOCET) 5-325 mg per tablet Take 1 tablet by mouth every 4 (four) hours as needed for moderate pain Max Daily Amount: 6 tablets    polyethylene glycol (MIRALAX) 17 g packet Take 17 g by mouth daily (Patient taking differently: Take 17 g by mouth daily in the early morning  )    QUEtiapine (SEROquel) 25 mg tablet Take 25 mg by mouth daily at bedtime    ranitidine (ZANTAC) 150 MG capsule Take 1 capsule (150 mg total) by mouth 2 (two) times a day    Selenium Sulfide 2 25 % SHAM apply by topical route  every PM to the affected area(s)    tiotropium (SPIRIVA RESPIMAT) 2 5 MCG/ACT AERS inhaler Inhale 2 puffs daily (Patient taking differently: Inhale 2 puffs 2 (two) times a day  )     Current Facility-Administered Medications on File Prior to Visit   Medication    [COMPLETED] CARBOplatin (PARAPLATIN) 519 mg in sodium chloride 0 9 % 250 mL IVPB    [COMPLETED] cyanocobalamin injection 1,000 mcg    [] heparin flush (porcine) 100 units/mL injection 300 Units    [COMPLETED] pembrolizumab (KEYTRUDA) 200 mg in sodium chloride 0 9 % 50 mL IVPB    [COMPLETED] sodium chloride 0 9 % infusion       Allergies   Allergen Reactions    Lisinopril Anaphylaxis     Took medication a while ago and had a "swelling reaction"  Does not carry epi-pen       Physical Exam:    /81   Pulse 73   Resp 18   Ht 5' 8 5" (1 74 m)   Wt 69 5 kg (153 lb 3 2 oz)   BMI 22 96 kg/m²       LUMBAR  General: Well-developed, well-nourished individual in no acute distress  Mental: Appropriate mood and affect  Grossly oriented with coherent speech and thought processing   Neuro:  Cranial nerves: Cranial nerve function is grossly intact bilaterally   Strength: Bilateral lower extremity strength is normal and symmetric   No atrophy or tone abnormalities noted   Reflexes: Bilateral lower extremity muscle stretch reflexes are physiologic and symmetric   No ankle clonus is noted   Sensation: No loss of sensation is noted   SLR/Foraminal Compression Maneuvers: Straight leg raising in the sitting position is negative for radicular pain   Gait:  Gait/gross motor: Gait is normal  Station is normal  Toe walking, heel walking  are normal     Musculoskeletal:  Palpation: Inspection and palpation of the spine and extremities are unremarkable except for tenderness to palpation widespread over the entire back, posterior hips, lateral thighs, and buttocks  Lymph: No lymphadenopathy is appreciated in the involved extremity   Vessels: No lower extremity edema   Lungs: Breathing is comfortable and regular  No dyspnea noted during examination       Eyes: Visual field grossly intact to confrontation  No redness appreciated  ENT: No craniofacial deformities or asymmetry  No neck masses appreciated  ImagingMRI lumbar spine wo contrast   Status: Final result   PACS Images     Show images for MRI lumbar spine wo contrast   Order Report      Order Details   Order Questions     Question Answer Comment   What is the patient's sedation requirement? No Sedation           Reason For Exam     back pain unintentional weight loss   Dx: Chronic bilateral low back pain with bilateral sciatica [M54 42, M54 41, G89 29 (ICD-10-CM)]; Unintentional weight loss [R63 4 (ICD-10-CM)]; Night sweats [R61 (ICD-10-CM)]   Study Result     MRI LUMBAR SPINE WITHOUT CONTRAST     INDICATION: M54 42: Lumbago with sciatica, left side  M54 41: Lumbago with sciatica, right side  G89 29: Other chronic pain  R63 4: Abnormal weight loss  R61: Generalized hyperhidrosis  79-year-old male with chronic back pain radiating down both legs to the knees for 5 years      COMPARISON:  Radiographs of the lumbar spine from August 2, 2018     TECHNIQUE:  Sagittal T1, sagittal T2, sagittal inversion recovery, axial T1 and axial T2, coronal T2        IMAGE QUALITY:  Diagnostic     FINDINGS:     ALIGNMENT:  There is a mild rightward curvature to the lumbar spine  The vertebral bodies are normal in alignment  A lordotic curvature is preserved  There is no evidence of spondylolisthesis      MARROW SIGNAL:  Mildly heterogeneous but no suspicious foci of marrow edema or focal signal abnormality      DISTAL CORD AND CONUS:  Normal appearance to the distal thoracic cord  The conus tapers at the L1 level with an unremarkable configuration  The cauda equina is normal in signal intensity and morphology         PARASPINAL SOFT TISSUES:  Paraspinal soft tissues are unremarkable      SACRUM:  Normal signal within the sacrum   No evidence of insufficiency or stress fracture      LOWER THORACIC DISC SPACES:  Normal disc height and signal   No disc herniation, canal stenosis or foraminal narrowing      LUMBAR DISC SPACES:  There is disc desiccation and mild disc height loss at L4-L5            L1-L2:  Normal      L2-L3:  Normal      L3-L4:  Normal      L4-L5:  Minimal bulging of the disc annulus  Left foraminal annular fissure  Slight accentuation of the disc bulge in the left foraminal location with mild left foraminal stenosis  There is minimal right inferior foraminal stenosis as well  The   spinal canal remains patent  There is no significant facet arthropathy      L5-S1:  Mild circumferential disc bulge without spinal canal or foraminal stenosis  No facet arthropathy      IMPRESSION:     Mild degenerative discogenic disease in the lower lumbar spine at L4-L5 with mild left greater than right foraminal stenosis          Workstation performed: [id number]

## 2018-12-18 ENCOUNTER — TELEPHONE (OUTPATIENT)
Dept: HEMATOLOGY ONCOLOGY | Facility: CLINIC | Age: 58
End: 2018-12-18

## 2018-12-18 DIAGNOSIS — K21.9 GASTROESOPHAGEAL REFLUX DISEASE WITHOUT ESOPHAGITIS: ICD-10-CM

## 2018-12-18 DIAGNOSIS — J44.9 CHRONIC OBSTRUCTIVE PULMONARY DISEASE, UNSPECIFIED COPD TYPE (HCC): ICD-10-CM

## 2018-12-18 DIAGNOSIS — C34.91 ADENOCARCINOMA OF LUNG, RIGHT (HCC): ICD-10-CM

## 2018-12-18 RX ORDER — OXYCODONE HYDROCHLORIDE AND ACETAMINOPHEN 5; 325 MG/1; MG/1
1 TABLET ORAL EVERY 4 HOURS PRN
Qty: 60 TABLET | Refills: 0 | Status: SHIPPED | OUTPATIENT
Start: 2018-12-18 | End: 2018-12-27 | Stop reason: SDUPTHER

## 2018-12-18 RX ORDER — RANITIDINE 150 MG/1
150 CAPSULE ORAL 2 TIMES DAILY
Qty: 60 CAPSULE | Refills: 0 | Status: SHIPPED | OUTPATIENT
Start: 2018-12-18 | End: 2019-02-22 | Stop reason: SDUPTHER

## 2018-12-18 RX ORDER — ALBUTEROL SULFATE 90 UG/1
2 AEROSOL, METERED RESPIRATORY (INHALATION) EVERY 4 HOURS PRN
Qty: 1 INHALER | Refills: 0 | Status: SHIPPED | OUTPATIENT
Start: 2018-12-18 | End: 2019-01-03 | Stop reason: SDUPTHER

## 2018-12-20 ENCOUNTER — OFFICE VISIT (OUTPATIENT)
Dept: FAMILY MEDICINE CLINIC | Facility: CLINIC | Age: 58
End: 2018-12-20
Payer: COMMERCIAL

## 2018-12-20 ENCOUNTER — APPOINTMENT (OUTPATIENT)
Dept: LAB | Facility: HOSPITAL | Age: 58
End: 2018-12-20
Payer: COMMERCIAL

## 2018-12-20 VITALS
HEIGHT: 69 IN | RESPIRATION RATE: 16 BRPM | TEMPERATURE: 99.1 F | OXYGEN SATURATION: 98 % | HEART RATE: 81 BPM | WEIGHT: 155 LBS | BODY MASS INDEX: 22.96 KG/M2 | DIASTOLIC BLOOD PRESSURE: 64 MMHG | SYSTOLIC BLOOD PRESSURE: 116 MMHG

## 2018-12-20 DIAGNOSIS — R52 BODY ACHES: ICD-10-CM

## 2018-12-20 DIAGNOSIS — C34.91 ADENOCARCINOMA OF LUNG, RIGHT (HCC): Primary | ICD-10-CM

## 2018-12-20 DIAGNOSIS — C80.1 ADENOCARCINOMA (HCC): Primary | ICD-10-CM

## 2018-12-20 DIAGNOSIS — J44.9 CHRONIC OBSTRUCTIVE PULMONARY DISEASE, UNSPECIFIED COPD TYPE (HCC): ICD-10-CM

## 2018-12-20 DIAGNOSIS — Z23 NEED FOR VACCINATION: ICD-10-CM

## 2018-12-20 DIAGNOSIS — Z09 FOLLOW UP: Primary | ICD-10-CM

## 2018-12-20 LAB
ALBUMIN SERPL BCP-MCNC: 3.8 G/DL (ref 3–5.2)
ALP SERPL-CCNC: 82 U/L (ref 43–122)
ALT SERPL W P-5'-P-CCNC: 34 U/L (ref 9–52)
ANION GAP SERPL CALCULATED.3IONS-SCNC: 5 MMOL/L (ref 5–14)
AST SERPL W P-5'-P-CCNC: 20 U/L (ref 17–59)
BILIRUB SERPL-MCNC: 0.5 MG/DL
BUN SERPL-MCNC: 24 MG/DL (ref 5–25)
CALCIUM SERPL-MCNC: 8.8 MG/DL (ref 8.4–10.2)
CHLORIDE SERPL-SCNC: 96 MMOL/L (ref 97–108)
CK SERPL-CCNC: 58 U/L (ref 55–170)
CO2 SERPL-SCNC: 33 MMOL/L (ref 22–30)
CREAT SERPL-MCNC: 1.05 MG/DL (ref 0.7–1.5)
ERYTHROCYTE [DISTWIDTH] IN BLOOD BY AUTOMATED COUNT: 15.4 %
GFR SERPL CREATININE-BSD FRML MDRD: 91 ML/MIN/1.73SQ M
GLUCOSE SERPL-MCNC: 99 MG/DL (ref 70–99)
HCT VFR BLD AUTO: 41.1 % (ref 41–53)
HGB BLD-MCNC: 13.5 G/DL (ref 13.5–17.5)
LYMPHOCYTES # BLD AUTO: 3.4 THOUSAND/UL (ref 0.5–4)
LYMPHOCYTES # BLD AUTO: 42 % (ref 25–45)
MCH RBC QN AUTO: 27.5 PG (ref 26–34)
MCHC RBC AUTO-ENTMCNC: 32.8 G/DL (ref 31–36)
MCV RBC AUTO: 84 FL (ref 80–100)
MONOCYTES # BLD AUTO: 0.73 THOUSAND/UL (ref 0.2–0.9)
MONOCYTES NFR BLD AUTO: 9 % (ref 1–10)
NEUTS SEG # BLD: 3.97 THOUSAND/UL (ref 1.8–7.8)
NEUTS SEG NFR BLD AUTO: 49 %
PLATELET # BLD AUTO: 306 THOUSANDS/UL (ref 150–450)
PLATELET BLD QL SMEAR: ADEQUATE
PMV BLD AUTO: 7.1 FL (ref 8.9–12.7)
POTASSIUM SERPL-SCNC: 4.3 MMOL/L (ref 3.6–5)
PROT SERPL-MCNC: 7.1 G/DL (ref 5.9–8.4)
RBC # BLD AUTO: 4.89 MILLION/UL (ref 4.5–5.9)
RBC MORPH BLD: NORMAL
SODIUM SERPL-SCNC: 134 MMOL/L (ref 137–147)
TOTAL CELLS COUNTED SPEC: 100
WBC # BLD AUTO: 8.1 THOUSAND/UL (ref 4.5–11)

## 2018-12-20 PROCEDURE — 80053 COMPREHEN METABOLIC PANEL: CPT

## 2018-12-20 PROCEDURE — 85007 BL SMEAR W/DIFF WBC COUNT: CPT

## 2018-12-20 PROCEDURE — 99213 OFFICE O/P EST LOW 20 MIN: CPT | Performed by: FAMILY MEDICINE

## 2018-12-20 PROCEDURE — 82550 ASSAY OF CK (CPK): CPT

## 2018-12-20 PROCEDURE — 85027 COMPLETE CBC AUTOMATED: CPT

## 2018-12-20 PROCEDURE — 36415 COLL VENOUS BLD VENIPUNCTURE: CPT

## 2018-12-20 RX ORDER — METHYLPREDNISOLONE 4 MG/1
TABLET ORAL
Qty: 21 TABLET | Refills: 0 | Status: CANCELLED | OUTPATIENT
Start: 2018-12-20

## 2018-12-20 RX ORDER — METHYLPREDNISOLONE 4 MG/1
TABLET ORAL
Qty: 21 TABLET | Refills: 0 | Status: SHIPPED | OUTPATIENT
Start: 2018-12-20 | End: 2018-12-27

## 2018-12-20 NOTE — PROGRESS NOTES
The Assessment/Plan:    Generalized body aches  Most secondary to cancer diagnoses in addition to recent chemotherapy  however due to complaint of bilateral thighs pain and lower back pain I will check CPK for rhabdomyolysis especially with recent chemotherapy  Patient is on Percocet  Encouraged patient to call his oncologist and inform him of these symptoms and ask if pain medications can be increased  I encouraged him to do the blood work as well    Essential hypertension  Currently blood pressure is controlled at this time  Reviewed BP target goal with patient  Continue to maintain healthy balanced diet with focus on low salt intake  Limit alcohol intake  Adenocarcinoma of lung, right ([address] )  Follows up with Oncology and started chemotherapy on 12/13  Denies any fever cough  I encouraged him to call our office or oncologist or go to the ER if he develops fever as he is immunocompromised due to the chemotherapy  He verbalized understanding       Diagnoses and all orders for this visit:    Follow up    Need for vaccination  -     TDAP VACCINE GREATER THAN OR EQUAL TO 6YO IM  -     influenza vaccine, 1645-8303, quadrivalent, recombinant, PF, 0 5 mL, for patients 18 yr+ (FLUBLOK)    Body aches  -     CK (with reflex to MB); Future    Chronic obstructive pulmonary disease, unspecified COPD type (HCC)  -     tiotropium (SPIRIVA RESPIMAT) 2 5 MCG/ACT AERS inhaler; Inhale 2 puffs daily          Subjective:      Patient ID: Memo Ruth  is a 66 y o  male  ADONIS   Mitul Watson is a pleasant but unfortunate 47-year-old male who was recently diagnosed with metastatic adenocarcinoma of the right lung  who recently started chemotherapy  He is here for follow-up today  His complain generalized body ache mainly localized on his lower back and anterior thighs  Complain of nausea this morning which he contributes to his recent chemo  Denies any fever or chills    I had discussed with him on previous visit immunizations  I counseled him again today about the importance of vaccines like flu vaccine and PSSV 23 especially that he is immunocompromised and at higher risk of having severe infections  He verbalized understanding however still declined the vaccines  Denies any urinary symptoms including dysuria or blood in his urine    The following portions of the patient's history were reviewed and updated as appropriate: allergies, current medications, past family history, past medical history, past social history, past surgical history and problem list     Review of Systems   Constitutional: Negative for chills, fatigue and fever  HENT: Negative for ear discharge, sneezing and sore throat  Eyes: Negative for pain and visual disturbance  Respiratory: Positive for cough  Negative for chest tightness and shortness of breath  Cardiovascular: Negative for chest pain and palpitations  Gastrointestinal: Negative for abdominal distention, abdominal pain, blood in stool, diarrhea, nausea and vomiting  Genitourinary: Negative for difficulty urinating, dysuria and flank pain  Musculoskeletal: Negative for arthralgias and joint swelling  Skin: Negative for pallor and rash  Neurological: Negative for dizziness, syncope and headaches  Hematological: Negative for adenopathy  Psychiatric/Behavioral: Negative for agitation and confusion  Objective:      /64 (BP Location: Right arm, Patient Position: Sitting, Cuff Size: Adult)   Pulse 81   Temp 99 1 °F (37 3 °C) (Tympanic)   Resp 16   Ht 5' 8 5" (1 74 m)   Wt 70 3 kg (155 lb)   SpO2 98%   BMI 23 22 kg/m²          Physical Exam   Constitutional: He is oriented to person, place, and time  He appears well-developed  No distress  HENT:   Head: Normocephalic and atraumatic  Mouth/Throat: Oropharynx is clear and moist  No oropharyngeal exudate  Eyes: Pupils are equal, round, and reactive to light  Right eye exhibits no discharge   Left eye exhibits no discharge  Right pupil is reactive  Left pupil is reactive  Neck: Normal range of motion  Neck supple  No tracheal deviation present  No thyromegaly present  Cardiovascular: Regular rhythm and normal heart sounds  Exam reveals no friction rub  No murmur heard  Pulmonary/Chest: Effort normal and breath sounds normal  No respiratory distress  He has no wheezes  He has no rales  He exhibits no tenderness  Abdominal: Soft  Bowel sounds are normal  He exhibits no distension  There is no tenderness  Musculoskeletal: Normal range of motion  He exhibits no deformity  Arms:  Paraspinal muscle tenderness   Neurological: He is alert and oriented to person, place, and time  Skin: Skin is warm and dry  No erythema  Vitals reviewed

## 2018-12-20 NOTE — ASSESSMENT & PLAN NOTE
There is known history of chronic pain syndrome and previous complaints of lower back pain  His pain today is the probably secondary to cancer diagnoses in addition to recent chemotherapy  however due to complaint of bilateral thighs pain and lower back pain I will check CPK for rhabdomyolysis especially with recent chemotherapy  Patient is on Percocet  Encouraged patient to call his oncologist and inform him of these symptoms and ask if pain medications can be increased    I encouraged him to do the blood work as well

## 2018-12-20 NOTE — ASSESSMENT & PLAN NOTE
Follows up with Oncology and started chemotherapy on 12/13  Denies any fever cough  I encouraged him to call our office or oncologist or go to the ER if he develops fever as he is immunocompromised due to the chemotherapy    He verbalized understanding

## 2018-12-20 NOTE — TELEPHONE ENCOUNTER
Patient called reporting increased body aches from shoulder to ankles  He reports he always has body pain/ache but that when waking this morning his pain was increased greatly  He reported seeing his PCP this morning and advised to follow up with his oncology provider for pain management  He had blood work done today including CPK which was 58  His other lab work was not significantly out of normal limits  He is currently taking Percocet every 4 hours but reports he continues to have pain despite taking this medication  Dr Brittani Espinoza was notified, Methylprednisolone pack was prescribed and submitted to [address] [place] [address] pharmacy  Patient informed of plan  He was advised if pain is not controlled he should report to ED

## 2018-12-26 ENCOUNTER — TELEPHONE (OUTPATIENT)
Dept: HEMATOLOGY ONCOLOGY | Facility: CLINIC | Age: 58
End: 2018-12-26

## 2018-12-26 NOTE — TELEPHONE ENCOUNTER
req a refill of the Percocet sent to   [address] [place] [address]   [id number], [address], [address]

## 2018-12-26 NOTE — TELEPHONE ENCOUNTER
Pt was given a refill of percocet on 12/18/18 and reported to our nurse navigator that pain medication does not help with pain on 12/20/18  Please address/advise   Thanks

## 2018-12-27 ENCOUNTER — TELEPHONE (OUTPATIENT)
Dept: FAMILY MEDICINE CLINIC | Facility: CLINIC | Age: 58
End: 2018-12-27

## 2018-12-27 ENCOUNTER — TELEPHONE (OUTPATIENT)
Dept: PALLIATIVE MEDICINE | Facility: CLINIC | Age: 58
End: 2018-12-27

## 2018-12-27 ENCOUNTER — TELEPHONE (OUTPATIENT)
Dept: HEMATOLOGY ONCOLOGY | Facility: CLINIC | Age: 58
End: 2018-12-27

## 2018-12-27 ENCOUNTER — OFFICE VISIT (OUTPATIENT)
Dept: NEUROSURGERY | Facility: CLINIC | Age: 58
End: 2018-12-27
Payer: COMMERCIAL

## 2018-12-27 VITALS
TEMPERATURE: 97.7 F | RESPIRATION RATE: 16 BRPM | HEART RATE: 75 BPM | BODY MASS INDEX: 23.22 KG/M2 | WEIGHT: 156.8 LBS | SYSTOLIC BLOOD PRESSURE: 139 MMHG | HEIGHT: 69 IN | DIASTOLIC BLOOD PRESSURE: 68 MMHG

## 2018-12-27 DIAGNOSIS — G56.22 CUBITAL TUNNEL SYNDROME ON LEFT: ICD-10-CM

## 2018-12-27 DIAGNOSIS — G56.23 CUBITAL TUNNEL SYNDROME, BILATERAL: Primary | ICD-10-CM

## 2018-12-27 DIAGNOSIS — G56.03 BILATERAL CARPAL TUNNEL SYNDROME: ICD-10-CM

## 2018-12-27 DIAGNOSIS — C34.91 ADENOCARCINOMA OF LUNG, RIGHT (HCC): ICD-10-CM

## 2018-12-27 DIAGNOSIS — G56.21 CUBITAL TUNNEL SYNDROME ON RIGHT: ICD-10-CM

## 2018-12-27 PROCEDURE — 99203 OFFICE O/P NEW LOW 30 MIN: CPT | Performed by: NEUROLOGICAL SURGERY

## 2018-12-27 RX ORDER — OXYCODONE HYDROCHLORIDE AND ACETAMINOPHEN 5; 325 MG/1; MG/1
1 TABLET ORAL EVERY 4 HOURS PRN
Qty: 60 TABLET | Refills: 0 | Status: SHIPPED | OUTPATIENT
Start: 2018-12-27 | End: 2019-01-09

## 2018-12-27 NOTE — TELEPHONE ENCOUNTER
Nurse Navigator Note:  Mr Maria L Combs called to request a refill of his Percocet  Dr Thom Calderon aware  No refills given at this time  Patients last refill for 60 tablets was filled on 12/18/18  Patient reports that the medrol pack he took last week helped with his pain  He reports taking 4-6 tablets of Percocet for his body aches daily  He said he has not taken all of his pain pills but wanted to call to request a refill before he ran out of pills  I informed him that myself and Palliative Care are working to try to have his appointment sooner than 1/9/19 and that I will notify him if an appointment can be made sooner  He is aware he will see Dr Eliazar Roman on Monday 12/31

## 2018-12-27 NOTE — TELEPHONE ENCOUNTER
[facility] palliative care called for ins ref     Provider# [id number]  Group mpi #[id number]  Fax#[phone]  DX# [phone]    Group #[id number] for gateway because [employer] doesn't recognize palliative care     [address]     [phone]([id number])

## 2018-12-27 NOTE — TELEPHONE ENCOUNTER
Received call from [address] [place] [address] with Hem/Onc  Inquiring on availability of dates to possibly move up visit date before scheduled 01 09 19  Note pt cancelled ov 12/26/18 with José Wylie  This office reviewed insurance  Per pt pcp pt needs referral for visit  PCP office staff Jyoti Lockett to process referral  No change made for ov  Callback to [facility] re above   Await approval

## 2018-12-27 NOTE — LETTER
December 27, 2018     Skye Johnson PA-C  [address] [facility] [address]    Patient: Bogdan Trevino  YOB: 1960   Date of Visit: 12/27/2018       Dear Dr Shima Camacho: Thank you for referring Sourav Mcbride to me for evaluation  Below are my notes for this consultation  If you have questions, please do not hesitate to call me  I look forward to following your patient along with you  Sincerely,        Sofi Davenport MD        CC: No Recipients  Sofi Davenport MD  12/27/2018  4:25 PM  Sign at close encounter  Neurosurgery Office Note  Bogdan Trevino  is a 66 y o  male    Type of Visit: Consult        Diagnoses and all orders for this visit:    Cubital tunnel syndrome, bilateral    Bilateral carpal tunnel syndrome  -     Ambulatory referral to Neurosurgery    Cubital tunnel syndrome on left  -     Ambulatory referral to Neurosurgery    Cubital tunnel syndrome on right  -     Ambulatory referral to Neurosurgery          DISCUSSION SUMMARY  This is a 44-year-old gentleman who recently started chemotherapy for the treatment of a malignant neoplasm  He has a long-term history of carpal tunnel syndrome and cubital tunnel syndrome  A recent EMG nerve conduction study confirms this  I do believe he will ventrally come to need an ulnar nerve release although his carpal tunnel syndrome is slightly improved from how severe it had been previously  I recommend that he use cock-up splints  He is not a candidate for surgical intervention at this time  Was he has concluded his chemotherapy regimen that he may be a candidate for the surgery in the future  We will see him back on a p r n  basis  CHIEF COMPLAINT  Patient presents for initial consult for bilateral Carpal tunnel syndrome and bilateral Cubital tunnel syndrome      [facility] NEUROSURGERY PROCEDURES  None    TEATMENT HX  Physical therapy: Yes, at    Epidural injections: No      HISTORY OF PRESENT ILLNESS  Referred by Erum Eduardo PA-C for evaluation regarding complaints of hand pain  Per patient, ongoing for a couple years  He noticed 2-3 years ago he felt a very strong pain that dropped him to his knees when he was coming out of the shower  He went to see Dr Oksana Brown at [facility] and he recommended going for an EMG and he was diagnosed with CTS  He was offered surgery for carpal tunnel release by Dr Oksana Brown but due to the risks he held off on going through with it  Now he presents to re-discuss his options  Patient has a long history of carpal tunnel syndrome and cubital tunnel syndrome  He has numbness and tingling that wakes him from his sleep  He in fact, says that he has difficulty sleeping at all  He drops things with his hands  He recently was started on chemotherapy for an adenocarcinoma  He has tried cock-up splints which do help  He did go through physical therapy and occupational therapy which helped is symptoms of his hand  He denies having specific trauma to his hands or arms  REVIEW OF SYSTEMS  Review of Systems   Constitutional: Positive for activity change (poor grasp)  HENT: Negative  Eyes: Negative  Respiratory: Negative  Cardiovascular: Negative  Gastrointestinal: Negative  Endocrine: Negative  Genitourinary: Negative  Musculoskeletal: Positive for arthralgias (all over due to  history of cancer)  Specially the pain in both wrists and hands is severe at times   Skin: Negative  Allergic/Immunologic: Negative  Neurological: Positive for weakness (both hands) and numbness (and tingling in both hands)  Hematological: Negative  Psychiatric/Behavioral: Negative  All other systems reviewed and are negative        The patient's ROS was reviewed by MD   I reviewed the ROS    Active Ambulatory Problems     Diagnosis Date Noted    Acute exacerbation of chronic obstructive pulmonary disease (COPD) ([facility] ) 06/07/2018    Essential hypertension 06/07/2018    Chronic bilateral thoracic back pain 07/27/2018    Chronic right shoulder pain 07/27/2018    Bilateral carpal tunnel syndrome 07/27/2018    Bipolar 1 disorder ([facility] ) 08/01/2018    Gastroesophageal reflux disease without esophagitis 08/01/2018    Other specified anxiety disorders 08/01/2018    Cubital tunnel syndrome, bilateral 08/01/2018    Type 2 diabetes mellitus without complication, without long-term current use of insulin ([facility] ) 09/23/2018    Pneumothorax of right lung after biopsy 11/13/2018    Panlobular emphysema ([facility] ) 11/13/2018    Adenocarcinoma of lung, right ([facility] ) 11/13/2018    Tobacco abuse 11/13/2018    Pneumothorax on right 11/21/2018    Encounter for immunization 12/04/2018    Generalized body aches 12/20/2018     Resolved Ambulatory Problems     Diagnosis Date Noted    Hypokalemia 06/07/2018    Tobacco dependence syndrome 09/05/2014    Hypertension 09/23/2018     Past Medical History:   Diagnosis Date    Bipolar 1 disorder (HCC)     Chronic pain disorder     COPD (chronic obstructive pulmonary disease) ([facility] )     Depression     Diabetes mellitus (HCC)     GERD (gastroesophageal reflux disease)     Herniated lumbar intervertebral disc     Hypertension     Latent syphilis     Low back pain     Lumbosacral disc disease     PTSD (post-traumatic stress disorder)     Tobacco abuse 11/13/2018       Past Surgical History:   Procedure Laterality Date    CT GUIDED CHEST TUBE  11/21/2018    HEMORROIDECTOMY      IR CHEST TUBE  11/14/2018    IR IMAGE GUIDED BIOPSY/ASPIRATION LUNG  11/13/2018    KNEE SURGERY         History   Smoking Status    Former Smoker    Packs/day: 0 50    Types: Cigarettes    Quit date: 8/31/2018   Smokeless Tobacco    Never Used     Comment: "i quit one month ago when i quit weed"       History   Alcohol Use    Yes     Comment: social       History   Drug Use No     Comment: stopped 8-2018, "i smoked weed and stopped 1 month ago"       Vitals:    12/27/18 1024   BP: 139/68   BP Location: Right arm   Patient Position: Sitting   Cuff Size: Adult   Pulse: 75   Resp: 16   Temp: 97 7 °F (36 5 °C)   TempSrc: Tympanic   Weight: 71 1 kg (156 lb 12 8 oz)   Height: 5' 8 5" (1 74 m)         Current Outpatient Prescriptions:     albuterol (VENTOLIN HFA) 90 mcg/act inhaler, Inhale 2 puffs every 4 (four) hours as needed for wheezing, Disp: 1 Inhaler, Rfl: 0    amLODIPine (NORVASC) 10 mg tablet, Take 1 tablet (10 mg total) by mouth daily (Patient taking differently: Take 10 mg by mouth daily in the early morning  ), Disp: 30 tablet, Rfl: 1    Blood Glucose Monitoring Suppl KIT, by Does not apply route daily, Disp: 1 each, Rfl: 0    budesonide-formoterol (SYMBICORT) 160-4 5 mcg/act inhaler, Inhale 2 puffs 2 (two) times a day Rinse mouth after use , Disp: , Rfl:     dexamethasone (DECADRON) 4 mg tablet, Take 1 tablet (4 mg total) by mouth 2 (two) times a day with meals P o  B i d  With food For 3 days    24 hr prior to chemotherapy, Disp: 60 tablet, Rfl: 0    FLUoxetine (PROzac) 10 MG tablet, Take 10 mg by mouth daily in the early morning  , Disp: , Rfl:     fluticasone-vilanterol (BREO ELLIPTA) 100-25 mcg/inh inhaler, Inhale 1 puff daily in the early morning Rinse mouth after use   , Disp: , Rfl:     folic acid (FOLVITE) 1 mg tablet, Take 1 tablet (1 mg total) by mouth daily, Disp: 30 tablet, Rfl: 2    gabapentin (NEURONTIN) 100 mg capsule, Take 1 capsule (100 mg total) by mouth 3 (three) times a day, Disp: 90 capsule, Rfl: 0    guaiFENesin (MUCINEX) 600 mg 12 hr tablet, Take 2 tablets (1,200 mg total) by mouth every 12 (twelve) hours, Disp: 30 tablet, Rfl: 0    ipratropium (ATROVENT) 0 02 % nebulizer solution, Take 1 vial (0 5 mg total) by nebulization 3 (three) times a day, Disp: 90 vial, Rfl: 1    levalbuterol (XOPENEX) 1 25 mg/0 5 mL nebulizer solution, Take 0 5 mL (1 25 mg total) by nebulization every 8 (eight) hours as needed for wheezing, Disp: 1 each, Rfl: 0    loratadine (CLARITIN) 10 mg tablet, Take 10 mg by mouth daily in the early morning  , Disp: , Rfl:     metFORMIN (GLUCOPHAGE-XR) 500 mg 24 hr tablet, Take 1 tablet (500 mg total) by mouth 2 (two) times a day with meals, Disp: 60 tablet, Rfl: 0    methocarbamol (ROBAXIN) 500 mg tablet, Take 1 tablet (500 mg total) by mouth every 6 (six) hours as needed for muscle spasms, Disp: 10 tablet, Rfl: 0    ondansetron (ZOFRAN) 4 mg tablet, Take 1 tablet (4 mg total) by mouth every 8 (eight) hours as needed for nausea or vomiting, Disp: 20 tablet, Rfl: 0    polyethylene glycol (MIRALAX) 17 g packet, Take 17 g by mouth daily (Patient taking differently: Take 17 g by mouth daily in the early morning  ), Disp: 14 each, Rfl: 0    QUEtiapine (SEROquel) 25 mg tablet, Take 25 mg by mouth daily at bedtime, Disp: , Rfl:     ranitidine (ZANTAC) 150 MG capsule, Take 1 capsule (150 mg total) by mouth 2 (two) times a day, Disp: 60 capsule, Rfl: 0    Selenium Sulfide 2 25 % SHAM, apply by topical route  every PM to the affected area(s), Disp: , Rfl:     tiotropium (SPIRIVA RESPIMAT) 2 5 MCG/ACT AERS inhaler, Inhale 2 puffs daily, Disp: 1 Inhaler, Rfl: 2    oxyCODONE-acetaminophen (PERCOCET) 5-325 mg per tablet, Take 1 tablet by mouth every 4 (four) hours as needed for moderate pain Max Daily Amount: 6 tablets, Disp: 60 tablet, Rfl: 0    The following portions of the patient's history were reviewed by MD and updated by MA as appropriate: allergies, current medications, past family history, past medical history, past social history, past surgical history and problem list       Physical Exam  Awake alert and oriented  His facial expression is intact in grimace and at rest  Extraocular movements are intact  His speech is clear and comprehensible  He has sensory changes in the ulnar nerve distribution bilaterally more severe on the left than on the right   on the right side he has a 50 percent reduction in fine touch and pinprick in comparison to the face  On the left side this is nearly 90 percent  The abductor digiti minimi on the left is severely paretic  The intrinsics of the hand are also affected  But to a lesser extent  The ulnar nerve innervated muscles of the wrist are affected on the left at 4/5  He has a positive Tinel's over the median nerve at the wrist and at the ulnar nerves bilaterally at the cubital tunnel      RESULTS/DATA  An EMG nerve conduction study demonstrates bilateral cubital tunnel syndrome and carpal tunnel syndrome  The carpal tunnel is improved after his occupational therapy but there is no change in the cubital tunnel syndrome    This is an incomplete study interrupted by his inability to tolerate the needle EMG fully

## 2018-12-27 NOTE — PROGRESS NOTES
Neurosurgery Office Note  Bea Roblero  is a 66 y o  male    Type of Visit: Consult        Diagnoses and all orders for this visit:    Cubital tunnel syndrome, bilateral    Bilateral carpal tunnel syndrome  -     Ambulatory referral to Neurosurgery    Cubital tunnel syndrome on left  -     Ambulatory referral to Neurosurgery    Cubital tunnel syndrome on right  -     Ambulatory referral to Neurosurgery          DISCUSSION SUMMARY  This is a 51-year-old gentleman who recently started chemotherapy for the treatment of a malignant neoplasm  He has a long-term history of carpal tunnel syndrome and cubital tunnel syndrome  A recent EMG nerve conduction study confirms this  I do believe he will ventrally come to need an ulnar nerve release although his carpal tunnel syndrome is slightly improved from how severe it had been previously  I recommend that he use cock-up splints  He is not a candidate for surgical intervention at this time  Was he has concluded his chemotherapy regimen that he may be a candidate for the surgery in the future  We will see him back on a p r n  basis  CHIEF COMPLAINT  Patient presents for initial consult for bilateral Carpal tunnel syndrome and bilateral Cubital tunnel syndrome  [facility] NEUROSURGERY PROCEDURES  None    TEATMENT HX  Physical therapy: Yes, at    Epidural injections: No      HISTORY OF PRESENT ILLNESS  Referred by Estefania Post PA-C for evaluation regarding complaints of hand pain  Per patient, ongoing for a couple years  He noticed 2-3 years ago he felt a very strong pain that dropped him to his knees when he was coming out of the shower  He went to see Dr Denny Orlando at [facility] and he recommended going for an EMG and he was diagnosed with CTS  He was offered surgery for carpal tunnel release by Dr Denny Orlando but due to the risks he held off on going through with it  Now he presents to re-discuss his options       Patient has a long history of carpal tunnel syndrome and cubital tunnel syndrome  He has numbness and tingling that wakes him from his sleep  He in fact, says that he has difficulty sleeping at all  He drops things with his hands  He recently was started on chemotherapy for an adenocarcinoma  He has tried cock-up splints which do help  He did go through physical therapy and occupational therapy which helped is symptoms of his hand  He denies having specific trauma to his hands or arms  REVIEW OF SYSTEMS  Review of Systems   Constitutional: Positive for activity change (poor grasp)  HENT: Negative  Eyes: Negative  Respiratory: Negative  Cardiovascular: Negative  Gastrointestinal: Negative  Endocrine: Negative  Genitourinary: Negative  Musculoskeletal: Positive for arthralgias (all over due to  history of cancer)  Specially the pain in both wrists and hands is severe at times   Skin: Negative  Allergic/Immunologic: Negative  Neurological: Positive for weakness (both hands) and numbness (and tingling in both hands)  Hematological: Negative  Psychiatric/Behavioral: Negative  All other systems reviewed and are negative        The patient's ROS was reviewed by MD   I reviewed the ROS    Active Ambulatory Problems     Diagnosis Date Noted    Acute exacerbation of chronic obstructive pulmonary disease (COPD) ([facility] [address] ) 06/07/2018    Essential hypertension 06/07/2018    Chronic bilateral thoracic back pain 07/27/2018    Chronic right shoulder pain 07/27/2018    Bilateral carpal tunnel syndrome 07/27/2018    Bipolar 1 disorder ([address] ) 08/01/2018    Gastroesophageal reflux disease without esophagitis 08/01/2018    Other specified anxiety disorders 08/01/2018    Cubital tunnel syndrome, bilateral 08/01/2018    Type 2 diabetes mellitus without complication, without long-term current use of insulin ([address] ) 09/23/2018    Pneumothorax of right lung after biopsy 11/13/2018    Panlobular emphysema ([facility] [address] ) 11/13/2018    Adenocarcinoma of lung, right ([facility] [address] ) 11/13/2018    Tobacco abuse 11/13/2018    Pneumothorax on right 11/21/2018    Encounter for immunization 12/04/2018    Generalized body aches 12/20/2018     Resolved Ambulatory Problems     Diagnosis Date Noted    Hypokalemia 06/07/2018    Tobacco dependence syndrome 09/05/2014    Hypertension 09/23/2018     Past Medical History:   Diagnosis Date    Bipolar 1 disorder (HCC)     Chronic pain disorder     COPD (chronic obstructive pulmonary disease) ([facility] )     Depression     Diabetes mellitus ([facility] )     GERD (gastroesophageal reflux disease)     Herniated lumbar intervertebral disc     Hypertension     Latent syphilis     Low back pain     Lumbosacral disc disease     PTSD (post-traumatic stress disorder)     Tobacco abuse 11/13/2018       Past Surgical History:   Procedure Laterality Date    CT GUIDED CHEST TUBE  11/21/2018    HEMORROIDECTOMY      IR CHEST TUBE  11/14/2018    IR IMAGE GUIDED BIOPSY/ASPIRATION LUNG  11/13/2018    KNEE SURGERY         History   Smoking Status    Former Smoker    Packs/day: 0 50    Types: Cigarettes    Quit date: 8/31/2018   Smokeless Tobacco    Never Used     Comment: "i quit one month ago when i quit weed"       History   Alcohol Use    Yes     Comment: social       History   Drug Use No     Comment: stopped 8-2018, "i smoked weed and stopped 1 month ago"       Vitals:    12/27/18 1024   BP: 139/68   BP Location: Right arm   Patient Position: Sitting   Cuff Size: Adult   Pulse: 75   Resp: 16   Temp: 97 7 °F (36 5 °C)   TempSrc: Tympanic   Weight: 71 1 kg (156 lb 12 8 oz)   Height: 5' 8 5" (1 74 m)         Current Outpatient Prescriptions:     albuterol (VENTOLIN HFA) 90 mcg/act inhaler, Inhale 2 puffs every 4 (four) hours as needed for wheezing, Disp: 1 Inhaler, Rfl: 0    amLODIPine (NORVASC) 10 mg tablet, Take 1 tablet (10 mg total) by mouth daily (Patient taking differently: Take 10 mg by mouth daily in the early morning  ), Disp: 30 tablet, Rfl: 1    Blood Glucose Monitoring Suppl KIT, by Does not apply route daily, Disp: 1 each, Rfl: 0    budesonide-formoterol (SYMBICORT) 160-4 5 mcg/act inhaler, Inhale 2 puffs 2 (two) times a day Rinse mouth after use , Disp: , Rfl:     dexamethasone (DECADRON) 4 mg tablet, Take 1 tablet (4 mg total) by mouth 2 (two) times a day with meals P o  B i d  With food For 3 days    24 hr prior to chemotherapy, Disp: 60 tablet, Rfl: 0    FLUoxetine (PROzac) 10 MG tablet, Take 10 mg by mouth daily in the early morning  , Disp: , Rfl:     fluticasone-vilanterol (BREO ELLIPTA) 100-25 mcg/inh inhaler, Inhale 1 puff daily in the early morning Rinse mouth after use   , Disp: , Rfl:     folic acid (FOLVITE) 1 mg tablet, Take 1 tablet (1 mg total) by mouth daily, Disp: 30 tablet, Rfl: 2    gabapentin (NEURONTIN) 100 mg capsule, Take 1 capsule (100 mg total) by mouth 3 (three) times a day, Disp: 90 capsule, Rfl: 0    guaiFENesin (MUCINEX) 600 mg 12 hr tablet, Take 2 tablets (1,200 mg total) by mouth every 12 (twelve) hours, Disp: 30 tablet, Rfl: 0    ipratropium (ATROVENT) 0 02 % nebulizer solution, Take 1 vial (0 5 mg total) by nebulization 3 (three) times a day, Disp: 90 vial, Rfl: 1    levalbuterol (XOPENEX) 1 25 mg/0 5 mL nebulizer solution, Take 0 5 mL (1 25 mg total) by nebulization every 8 (eight) hours as needed for wheezing, Disp: 1 each, Rfl: 0    loratadine (CLARITIN) 10 mg tablet, Take 10 mg by mouth daily in the early morning  , Disp: , Rfl:     metFORMIN (GLUCOPHAGE-XR) 500 mg 24 hr tablet, Take 1 tablet (500 mg total) by mouth 2 (two) times a day with meals, Disp: 60 tablet, Rfl: 0    methocarbamol (ROBAXIN) 500 mg tablet, Take 1 tablet (500 mg total) by mouth every 6 (six) hours as needed for muscle spasms, Disp: 10 tablet, Rfl: 0    ondansetron (ZOFRAN) 4 mg tablet, Take 1 tablet (4 mg total) by mouth every 8 (eight) hours as needed for nausea or vomiting, Disp: 20 tablet, Rfl: 0    polyethylene glycol (MIRALAX) 17 g packet, Take 17 g by mouth daily (Patient taking differently: Take 17 g by mouth daily in the early morning  ), Disp: 14 each, Rfl: 0    QUEtiapine (SEROquel) 25 mg tablet, Take 25 mg by mouth daily at bedtime, Disp: , Rfl:     ranitidine (ZANTAC) 150 MG capsule, Take 1 capsule (150 mg total) by mouth 2 (two) times a day, Disp: 60 capsule, Rfl: 0    Selenium Sulfide 2 25 % SHAM, apply by topical route  every PM to the affected area(s), Disp: , Rfl:     tiotropium (SPIRIVA RESPIMAT) 2 5 MCG/ACT AERS inhaler, Inhale 2 puffs daily, Disp: 1 Inhaler, Rfl: 2    oxyCODONE-acetaminophen (PERCOCET) 5-325 mg per tablet, Take 1 tablet by mouth every 4 (four) hours as needed for moderate pain Max Daily Amount: 6 tablets, Disp: 60 tablet, Rfl: 0    The following portions of the patient's history were reviewed by MD and updated by MA as appropriate: allergies, current medications, past family history, past medical history, past social history, past surgical history and problem list       Physical Exam  Awake alert and oriented  His facial expression is intact in grimace and at rest  Extraocular movements are intact  His speech is clear and comprehensible  He has sensory changes in the ulnar nerve distribution bilaterally more severe on the left than on the right   on the right side he has a 50 percent reduction in fine touch and pinprick in comparison to the face  On the left side this is nearly 90 percent  The abductor digiti minimi on the left is severely paretic  The intrinsics of the hand are also affected  But to a lesser extent  The ulnar nerve innervated muscles of the wrist are affected on the left at 4/5  He has a positive Tinel's over the median nerve at the wrist and at the ulnar nerves bilaterally at the cubital tunnel      RESULTS/DATA  An EMG nerve conduction study demonstrates bilateral cubital tunnel syndrome and carpal tunnel syndrome    The carpal tunnel is improved after his occupational therapy but there is no change in the cubital tunnel syndrome    This is an incomplete study interrupted by his inability to tolerate the needle EMG fully

## 2018-12-31 ENCOUNTER — TELEPHONE (OUTPATIENT)
Dept: FAMILY MEDICINE CLINIC | Facility: CLINIC | Age: 58
End: 2018-12-31

## 2018-12-31 ENCOUNTER — OFFICE VISIT (OUTPATIENT)
Dept: HEMATOLOGY ONCOLOGY | Facility: CLINIC | Age: 58
End: 2018-12-31
Payer: COMMERCIAL

## 2018-12-31 VITALS
HEIGHT: 69 IN | WEIGHT: 153 LBS | OXYGEN SATURATION: 98 % | HEART RATE: 84 BPM | DIASTOLIC BLOOD PRESSURE: 82 MMHG | TEMPERATURE: 96.1 F | BODY MASS INDEX: 22.66 KG/M2 | SYSTOLIC BLOOD PRESSURE: 142 MMHG | RESPIRATION RATE: 16 BRPM

## 2018-12-31 DIAGNOSIS — C34.91 ADENOCARCINOMA OF LUNG, RIGHT (HCC): Primary | ICD-10-CM

## 2018-12-31 PROCEDURE — 99214 OFFICE O/P EST MOD 30 MIN: CPT | Performed by: INTERNAL MEDICINE

## 2018-12-31 NOTE — PROGRESS NOTES
Hematology/Oncology Outpatient Follow-up  Luis Ott Sr  66 y o  male 1960 [id number]    Date:  12/31/2018        Assessment and Plan:  1  Adenocarcinoma of lung, right ([facility] [address] )  I had a lengthy discussion with the patient regarding his overall diagnosis, prognosis and side effects of the current palliative chemotherapy in combination of immunotherapy for his metastatic non-small cell lung cancer  The patient was told that we will pursue cycle 2  On the 3rd of January as it was originally planned  We will pursue some blood work prior to that  He also needs to get an MRI of the brain to rule out metastatic disease since it was never done  We will also pursue genomic testing of his tumor to see if targeted therapy could be offered in the future  Regarding his pain, the exact etiology is not entirely clear he does have pre-existing carpal tunnel and cubital tunnel syndromes  I think would be appropriate to have his pain evaluated and managed by the palliative care team   - CBC and differential; Future  - Comprehensive metabolic panel; Future  - Magnesium  - MRI brain w wo contrast; Future  - CBC and differential; Future  - Comprehensive metabolic panel; Future  - Magnesium; Future        HPI:  The patient came in today for a follow-up visit  He received his 1st cycle of palliative chemotherapy in combination with immunotherapy with the carboplatin/Alimta plus Luvenia Waqar on the 13th December 2018 which she tolerated that relatively well  However, he complained about significant bony pain and achiness all over his body mainly in the upper extremities and lower extremities  He is currently taking Percocet 1 pill every 4 hr around the clock  He did also complain about insomnia, dyspnea on exertion and chronic constipation    Oncology History    12year-old  male heavy smoker who used to smoke 2 packs of cigarettes daily for many years, COPD, he presented with dyspnea, CT scan of the chest on October 2018 showed right middle lobe spiculated 1 3 cm mass with multiple solid and ground-glass nodules along the major and minor fissures  Sub cm pulmonary nodules bilaterally, left adrenal 1 2 cm nodule, PET scan showed 1 3 cm right middle lung nodule with SUV of 6 8, numerous nodular densities along the right major and minor fissures, right hilar activity with SUV of 3 4, subcarinal lymph node measuring 7 mm with SUV of 2 7, periportal enlarged lymph nodes concerning for metastatic disease measuring 2 4 cm with SUV of 5, prominent left retrocrural lymph node measuring 1 cm x 9 mm with SUV of 1 1, core biopsy of the right spiculated nodule showed invasive adenocarcinoma consistent with lung primary positive for CK7, TTF 1, napsin a        Adenocarcinoma of lung, right ([facility] [address] )    11/13/2018 Initial Diagnosis     Adenocarcinoma of lung, right ([facility] [address] )  Stage IV         12/13/2018 -  Chemotherapy     Started Alimta, Carboplatin (AUC 5) + Keytruda            Interval history:    ROS: Review of Systems   Constitutional: Positive for fatigue  Negative for appetite change, diaphoresis and fever  HENT: Negative for congestion, dental problem, facial swelling, hearing loss, tinnitus and trouble swallowing  Eyes: Negative for visual disturbance  Respiratory: Positive for shortness of breath (On exertion )  Negative for cough, chest tightness and wheezing  Cardiovascular: Negative for chest pain and leg swelling  Gastrointestinal: Positive for constipation  Negative for abdominal distention, abdominal pain, blood in stool, diarrhea, nausea and vomiting  Genitourinary: Negative for dysuria, hematuria and urgency  Musculoskeletal: Positive for arthralgias and myalgias  Negative for neck pain  Bone pain all or his body mainly in the upper and lower extremities   Skin: Negative  Negative for color change, pallor, rash and wound  Neurological: Positive for dizziness   Negative for weakness, numbness and headaches  Hematological: Negative for adenopathy  Psychiatric/Behavioral: Positive for sleep disturbance  Negative for agitation, behavioral problems, confusion, hallucinations and self-injury  The patient is not nervous/anxious and is not hyperactive          Past Medical History:   Diagnosis Date    Bipolar 1 disorder ([address] )     Chronic pain disorder     COPD (chronic obstructive pulmonary disease) (HCC)     Depression     Diabetes mellitus (HCC)     GERD (gastroesophageal reflux disease)     Herniated lumbar intervertebral disc     Hypertension     Latent syphilis     Treated    Low back pain     Lumbosacral disc disease     PTSD (post-traumatic stress disorder)     Tobacco abuse 11/13/2018       Past Surgical History:   Procedure Laterality Date    CT GUIDED CHEST TUBE  11/21/2018    HEMORROIDECTOMY      IR CHEST TUBE  11/14/2018    IR IMAGE GUIDED BIOPSY/ASPIRATION LUNG  11/13/2018    KNEE SURGERY         Social History     Social History    Marital status: Legally      Spouse name: N/A    Number of children: N/A    Years of education: N/A     Occupational History    part time employment      Social History Main Topics    Smoking status: Former Smoker     Packs/day: 0 50     Types: Cigarettes     Quit date: 8/31/2018    Smokeless tobacco: Never Used      Comment: "i quit one month ago when i quit weed"    Alcohol use Yes      Comment: social    Drug use: No      Comment: stopped 8-2018, "i smoked weed and stopped 1 month ago"    Sexual activity: Yes     Other Topics Concern    None     Social History Narrative    Lives with girlfriend       Family History   Problem Relation Age of Onset    Heart disease Mother     Hypertension Father     Diabetes Family     Asthma Family     Heart disease Family     Cancer Family        Allergies   Allergen Reactions    Lisinopril Anaphylaxis     Took medication a while ago and had a "swelling reaction"  Does not carry epi-pen Current Outpatient Prescriptions:     albuterol (VENTOLIN HFA) 90 mcg/act inhaler, Inhale 2 puffs every 4 (four) hours as needed for wheezing, Disp: 1 Inhaler, Rfl: 0    amLODIPine (NORVASC) 10 mg tablet, Take 1 tablet (10 mg total) by mouth daily (Patient taking differently: Take 10 mg by mouth daily in the early morning  ), Disp: 30 tablet, Rfl: 1    Blood Glucose Monitoring Suppl KIT, by Does not apply route daily, Disp: 1 each, Rfl: 0    budesonide-formoterol (SYMBICORT) 160-4 5 mcg/act inhaler, Inhale 2 puffs 2 (two) times a day Rinse mouth after use , Disp: , Rfl:     dexamethasone (DECADRON) 4 mg tablet, Take 1 tablet (4 mg total) by mouth 2 (two) times a day with meals P o  B i d  With food For 3 days    24 hr prior to chemotherapy, Disp: 60 tablet, Rfl: 0    FLUoxetine (PROzac) 10 MG tablet, Take 10 mg by mouth daily in the early morning  , Disp: , Rfl:     fluticasone-vilanterol (BREO ELLIPTA) 100-25 mcg/inh inhaler, Inhale 1 puff daily in the early morning Rinse mouth after use   , Disp: , Rfl:     folic acid (FOLVITE) 1 mg tablet, Take 1 tablet (1 mg total) by mouth daily, Disp: 30 tablet, Rfl: 2    gabapentin (NEURONTIN) 100 mg capsule, Take 1 capsule (100 mg total) by mouth 3 (three) times a day, Disp: 90 capsule, Rfl: 0    guaiFENesin (MUCINEX) 600 mg 12 hr tablet, Take 2 tablets (1,200 mg total) by mouth every 12 (twelve) hours, Disp: 30 tablet, Rfl: 0    ipratropium (ATROVENT) 0 02 % nebulizer solution, Take 1 vial (0 5 mg total) by nebulization 3 (three) times a day, Disp: 90 vial, Rfl: 1    levalbuterol (XOPENEX) 1 25 mg/0 5 mL nebulizer solution, Take 0 5 mL (1 25 mg total) by nebulization every 8 (eight) hours as needed for wheezing, Disp: 1 each, Rfl: 0    loratadine (CLARITIN) 10 mg tablet, Take 10 mg by mouth daily in the early morning  , Disp: , Rfl:     metFORMIN (GLUCOPHAGE-XR) 500 mg 24 hr tablet, Take 1 tablet (500 mg total) by mouth 2 (two) times a day with meals, Disp: 60 tablet, Rfl: 0    methocarbamol (ROBAXIN) 500 mg tablet, Take 1 tablet (500 mg total) by mouth every 6 (six) hours as needed for muscle spasms, Disp: 10 tablet, Rfl: 0    ondansetron (ZOFRAN) 4 mg tablet, Take 1 tablet (4 mg total) by mouth every 8 (eight) hours as needed for nausea or vomiting, Disp: 20 tablet, Rfl: 0    oxyCODONE-acetaminophen (PERCOCET) 5-325 mg per tablet, Take 1 tablet by mouth every 4 (four) hours as needed for moderate pain Max Daily Amount: 6 tablets, Disp: 60 tablet, Rfl: 0    polyethylene glycol (MIRALAX) 17 g packet, Take 17 g by mouth daily (Patient taking differently: Take 17 g by mouth daily in the early morning  ), Disp: 14 each, Rfl: 0    QUEtiapine (SEROquel) 25 mg tablet, Take 25 mg by mouth daily at bedtime, Disp: , Rfl:     ranitidine (ZANTAC) 150 MG capsule, Take 1 capsule (150 mg total) by mouth 2 (two) times a day, Disp: 60 capsule, Rfl: 0    Selenium Sulfide 2 25 % SHAM, apply by topical route  every PM to the affected area(s), Disp: , Rfl:     tiotropium (SPIRIVA RESPIMAT) 2 5 MCG/ACT AERS inhaler, Inhale 2 puffs daily, Disp: 1 Inhaler, Rfl: 2      Physical Exam:  /82 (BP Location: Left arm, Patient Position: Sitting, Cuff Size: Adult)   Pulse 84   Temp (!) 96 1 °F (35 6 °C) (Tympanic)   Resp 16   Ht 5' 8 5" (1 74 m)   Wt 69 4 kg (153 lb)   SpO2 98%   BMI 22 93 kg/m²     Physical Exam   Constitutional: He is oriented to person, place, and time  He appears well-developed and well-nourished  HENT:   Head: Normocephalic and atraumatic  Nose: Nose normal    Mouth/Throat: Oropharynx is clear and moist    Eyes: Pupils are equal, round, and reactive to light  Conjunctivae and EOM are normal  Right eye exhibits no discharge  Left eye exhibits no discharge  No scleral icterus  Neck: Normal range of motion  Neck supple  No tracheal deviation present  No thyromegaly present  Cardiovascular: Normal rate, regular rhythm and normal heart sounds    Exam reveals no friction rub  No murmur heard  Pulmonary/Chest: Effort normal and breath sounds normal  No respiratory distress  He has no wheezes  He has no rales  He exhibits no tenderness  Abdominal: Soft  Bowel sounds are normal  He exhibits no distension and no mass  There is no hepatosplenomegaly, splenomegaly or hepatomegaly  There is tenderness (To mild palpation all over his abdomen mainly in the epigastric area)  There is no rebound and no guarding  Musculoskeletal: Normal range of motion  He exhibits no edema, tenderness or deformity  Lymphadenopathy:     He has no cervical adenopathy  Neurological: He is alert and oriented to person, place, and time  He has normal reflexes  No cranial nerve deficit  Coordination normal    Skin: Skin is warm and dry  No rash noted  No erythema  No pallor  Psychiatric: He has a normal mood and affect  His behavior is normal  Judgment and thought content normal          Labs:  Lab Results   Component Value Date    WBC 8 10 12/20/2018    HGB 13 5 12/20/2018    HCT 41 1 12/20/2018    MCV 84 12/20/2018     12/20/2018     Lab Results   Component Value Date    K 4 3 12/20/2018    CL 96 (L) 12/20/2018    CO2 33 (H) 12/20/2018    BUN 24 12/20/2018    CREATININE 1 05 12/20/2018    GLUF 88 08/07/2018    CALCIUM 8 8 12/20/2018    AST 20 12/20/2018    ALT 34 12/20/2018    ALKPHOS 82 12/20/2018    EGFR 91 12/20/2018     No results found for: TSH    Patient voiced understanding and agreement in the above discussion  Aware to contact our office with questions/symptoms in the interim

## 2019-01-02 ENCOUNTER — TELEPHONE (OUTPATIENT)
Dept: OTHER | Facility: OTHER | Age: 59
End: 2019-01-02

## 2019-01-02 ENCOUNTER — HOSPITAL ENCOUNTER (EMERGENCY)
Facility: HOSPITAL | Age: 59
Discharge: HOME | End: 2019-01-02
Attending: EMERGENCY MEDICINE | Admitting: EMERGENCY MEDICINE
Payer: COMMERCIAL

## 2019-01-02 ENCOUNTER — APPOINTMENT (EMERGENCY)
Dept: RADIOLOGY | Facility: HOSPITAL | Age: 59
End: 2019-01-02
Payer: COMMERCIAL

## 2019-01-02 ENCOUNTER — APPOINTMENT (EMERGENCY)
Dept: CT IMAGING | Facility: HOSPITAL | Age: 59
End: 2019-01-02
Payer: COMMERCIAL

## 2019-01-02 ENCOUNTER — LAB (OUTPATIENT)
Dept: LAB | Facility: HOSPITAL | Age: 59
End: 2019-01-02
Attending: INTERNAL MEDICINE
Payer: COMMERCIAL

## 2019-01-02 VITALS
HEART RATE: 58 BPM | SYSTOLIC BLOOD PRESSURE: 127 MMHG | DIASTOLIC BLOOD PRESSURE: 62 MMHG | TEMPERATURE: 97.5 F | OXYGEN SATURATION: 98 % | RESPIRATION RATE: 16 BRPM

## 2019-01-02 DIAGNOSIS — R06.00 DYSPNEA: Primary | ICD-10-CM

## 2019-01-02 DIAGNOSIS — C34.91 ADENOCARCINOMA OF LUNG, RIGHT (HCC): ICD-10-CM

## 2019-01-02 DIAGNOSIS — R07.9 CHEST PAIN: ICD-10-CM

## 2019-01-02 LAB
ALBUMIN SERPL BCP-MCNC: 3.3 G/DL (ref 3.5–5)
ALP SERPL-CCNC: 94 U/L (ref 46–116)
ALT SERPL W P-5'-P-CCNC: 31 U/L (ref 12–78)
ANION GAP SERPL CALCULATED.3IONS-SCNC: 7 MMOL/L (ref 4–13)
AST SERPL W P-5'-P-CCNC: 19 U/L (ref 5–45)
ATRIAL RATE: 71 BPM
ATRIAL RATE: 83 BPM
BASOPHILS # BLD AUTO: 0.05 THOUSANDS/ΜL (ref 0–0.1)
BASOPHILS NFR BLD AUTO: 1 % (ref 0–1)
BILIRUB SERPL-MCNC: 0.16 MG/DL (ref 0.2–1)
BUN SERPL-MCNC: 15 MG/DL (ref 5–25)
CALCIUM SERPL-MCNC: 9 MG/DL (ref 8.3–10.1)
CHLORIDE SERPL-SCNC: 104 MMOL/L (ref 100–108)
CO2 SERPL-SCNC: 28 MMOL/L (ref 21–32)
CREAT SERPL-MCNC: 1.13 MG/DL (ref 0.6–1.3)
EOSINOPHIL # BLD AUTO: 0.29 THOUSAND/ΜL (ref 0–0.61)
EOSINOPHIL NFR BLD AUTO: 5 % (ref 0–6)
ERYTHROCYTE [DISTWIDTH] IN BLOOD BY AUTOMATED COUNT: 15.2 % (ref 11.6–15.1)
GFR SERPL CREATININE-BSD FRML MDRD: 84 ML/MIN/1.73SQ M
GLUCOSE SERPL-MCNC: 93 MG/DL (ref 65–140)
HCT VFR BLD AUTO: 38.1 % (ref 36.5–49.3)
HGB BLD-MCNC: 12.6 G/DL (ref 12–17)
IMM GRANULOCYTES # BLD AUTO: 0.05 THOUSAND/UL (ref 0–0.2)
IMM GRANULOCYTES NFR BLD AUTO: 1 % (ref 0–2)
LYMPHOCYTES # BLD AUTO: 2.23 THOUSANDS/ΜL (ref 0.6–4.47)
LYMPHOCYTES NFR BLD AUTO: 39 % (ref 14–44)
MAGNESIUM SERPL-MCNC: 1.9 MG/DL (ref 1.6–2.3)
MCH RBC QN AUTO: 27.9 PG (ref 26.8–34.3)
MCHC RBC AUTO-ENTMCNC: 33.1 G/DL (ref 31.4–37.4)
MCV RBC AUTO: 84 FL (ref 82–98)
MONOCYTES # BLD AUTO: 0.61 THOUSAND/ΜL (ref 0.17–1.22)
MONOCYTES NFR BLD AUTO: 11 % (ref 4–12)
NEUTROPHILS # BLD AUTO: 2.47 THOUSANDS/ΜL (ref 1.85–7.62)
NEUTS SEG NFR BLD AUTO: 43 % (ref 43–75)
NRBC BLD AUTO-RTO: 0 /100 WBCS
NT-PROBNP SERPL-MCNC: 42 PG/ML
P AXIS: 43 DEGREES
P AXIS: 47 DEGREES
PLATELET # BLD AUTO: 507 THOUSANDS/UL (ref 149–390)
PMV BLD AUTO: 8.5 FL (ref 8.9–12.7)
POTASSIUM SERPL-SCNC: 3.9 MMOL/L (ref 3.5–5.3)
PR INTERVAL: 154 MS
PR INTERVAL: 184 MS
PROT SERPL-MCNC: 7.4 G/DL (ref 6.4–8.2)
QRS AXIS: 67 DEGREES
QRS AXIS: 81 DEGREES
QRSD INTERVAL: 76 MS
QRSD INTERVAL: 82 MS
QT INTERVAL: 366 MS
QT INTERVAL: 374 MS
QTC INTERVAL: 406 MS
QTC INTERVAL: 430 MS
RBC # BLD AUTO: 4.52 MILLION/UL (ref 3.88–5.62)
SODIUM SERPL-SCNC: 139 MMOL/L (ref 136–145)
T WAVE AXIS: 65 DEGREES
T WAVE AXIS: 69 DEGREES
TROPONIN I SERPL-MCNC: 0.02 NG/ML
TROPONIN I SERPL-MCNC: <0.02 NG/ML
VENTRICULAR RATE: 71 BPM
VENTRICULAR RATE: 83 BPM
WBC # BLD AUTO: 5.7 THOUSAND/UL (ref 4.31–10.16)

## 2019-01-02 PROCEDURE — 93010 ELECTROCARDIOGRAM REPORT: CPT | Performed by: INTERNAL MEDICINE

## 2019-01-02 PROCEDURE — 96375 TX/PRO/DX INJ NEW DRUG ADDON: CPT

## 2019-01-02 PROCEDURE — 83880 ASSAY OF NATRIURETIC PEPTIDE: CPT | Performed by: EMERGENCY MEDICINE

## 2019-01-02 PROCEDURE — 71046 X-RAY EXAM CHEST 2 VIEWS: CPT

## 2019-01-02 PROCEDURE — 71275 CT ANGIOGRAPHY CHEST: CPT

## 2019-01-02 PROCEDURE — 93005 ELECTROCARDIOGRAM TRACING: CPT

## 2019-01-02 PROCEDURE — 84484 ASSAY OF TROPONIN QUANT: CPT | Performed by: EMERGENCY MEDICINE

## 2019-01-02 PROCEDURE — 83735 ASSAY OF MAGNESIUM: CPT | Performed by: INTERNAL MEDICINE

## 2019-01-02 PROCEDURE — 85025 COMPLETE CBC W/AUTO DIFF WBC: CPT | Performed by: EMERGENCY MEDICINE

## 2019-01-02 PROCEDURE — 80053 COMPREHEN METABOLIC PANEL: CPT | Performed by: EMERGENCY MEDICINE

## 2019-01-02 PROCEDURE — 96374 THER/PROPH/DIAG INJ IV PUSH: CPT

## 2019-01-02 PROCEDURE — 99285 EMERGENCY DEPT VISIT HI MDM: CPT

## 2019-01-02 PROCEDURE — 84484 ASSAY OF TROPONIN QUANT: CPT | Performed by: PHYSICIAN ASSISTANT

## 2019-01-02 RX ORDER — PREDNISONE 20 MG/1
60 TABLET ORAL ONCE
Status: COMPLETED | OUTPATIENT
Start: 2019-01-02 | End: 2019-01-02

## 2019-01-02 RX ORDER — SODIUM CHLORIDE 9 MG/ML
20 INJECTION, SOLUTION INTRAVENOUS ONCE
Status: COMPLETED | OUTPATIENT
Start: 2019-01-03 | End: 2019-01-03

## 2019-01-02 RX ORDER — MAGNESIUM HYDROXIDE/ALUMINUM HYDROXICE/SIMETHICONE 120; 1200; 1200 MG/30ML; MG/30ML; MG/30ML
30 SUSPENSION ORAL ONCE
Status: COMPLETED | OUTPATIENT
Start: 2019-01-02 | End: 2019-01-02

## 2019-01-02 RX ORDER — MORPHINE SULFATE 4 MG/ML
4 INJECTION, SOLUTION INTRAMUSCULAR; INTRAVENOUS ONCE
Status: COMPLETED | OUTPATIENT
Start: 2019-01-02 | End: 2019-01-02

## 2019-01-02 RX ORDER — PREDNISONE 20 MG/1
60 TABLET ORAL DAILY
Qty: 12 TABLET | Refills: 0 | Status: SHIPPED | OUTPATIENT
Start: 2019-01-02 | End: 2019-01-06

## 2019-01-02 RX ADMIN — FAMOTIDINE 20 MG: 10 INJECTION, SOLUTION INTRAVENOUS at 17:19

## 2019-01-02 RX ADMIN — LIDOCAINE HYDROCHLORIDE 10 ML: 20 SOLUTION ORAL; TOPICAL at 17:22

## 2019-01-02 RX ADMIN — MORPHINE SULFATE 4 MG: 4 INJECTION INTRAVENOUS at 15:18

## 2019-01-02 RX ADMIN — PREDNISONE 60 MG: 20 TABLET ORAL at 17:44

## 2019-01-02 RX ADMIN — ALUMINUM HYDROXIDE, MAGNESIUM HYDROXIDE, AND SIMETHICONE 30 ML: 200; 200; 20 SUSPENSION ORAL at 17:22

## 2019-01-02 RX ADMIN — IOHEXOL 85 ML: 350 INJECTION, SOLUTION INTRAVENOUS at 15:42

## 2019-01-02 NOTE — DISCHARGE INSTRUCTIONS
Dyspnea   WHAT YOU NEED TO KNOW:   Dyspnea is breathing difficulty or discomfort  You may have labored, painful, or shallow breathing  You may feel breathless or short of breath  Dyspnea can occur during rest or with activity  You may have dyspnea for a short time, or it might become chronic  Dyspnea is often a symptom of a disease or condition  DISCHARGE INSTRUCTIONS:   Return to the emergency department if:   · Your signs and symptoms are the same or worse within 24 hours of treatment  · You have shaking chills or a fever over 102°F      · You have new pain, pressure, or tightness in your chest      · You have a new or worse cough or wheezing, or you cough up blood  · You feel like you cannot get enough air  · The skin over your ribs or on your neck sinks in when you breathe  · You have a severe headache with vomiting and abdominal pain  · You feel confused or dizzy  Contact your healthcare provider or specialist if:   · You have questions or concerns about your condition or care  Medicines:   · Medicines  may be used to treat the cause of your dyspnea  Medicines may reduce swelling in your airway or decrease extra fluid from around your heart or lungs  Other medicines may be used to decrease anxiety and help you feel calm and relaxed  · Take your medicine as directed  Contact your healthcare provider if you think your medicine is not helping or if you have side effects  Tell him or her if you are allergic to any medicine  Keep a list of the medicines, vitamins, and herbs you take  Include the amounts, and when and why you take them  Bring the list or the pill bottles to follow-up visits  Carry your medicine list with you in case of an emergency  Manage long-term dyspnea:   · Create an action plan  You and your healthcare provider can work together to create a plan for how to handle episodes of dyspnea   The plan can include daily activities, treatment changes, and what to do if you have severe breathing problems  · Lean forward on your elbows when you sit  This helps your lungs expand and may make it easier to breathe  · Use pursed-lip breathing any time you feel short of breath  Breathe in through your nose and then slowly breathe out through your mouth with your lips slightly puckered  It should take you twice as long to breathe out as it did to breathe in  · Do not smoke  Nicotine and other chemicals in cigarettes and cigars can cause lung damage and make it harder to breathe  Ask your healthcare provider for information if you currently smoke and need help to quit  E-cigarettes or smokeless tobacco still contain nicotine  Talk to your healthcare provider before you use these products  · Reach or maintain a healthy weight  Your healthcare provider can help you create a safe weight loss plan if you are overweight  · Exercise as directed  Exercise can help your lungs work more easily  Exercise can also help you lose weight if needed  Try to get at least 30 minutes of exercise most days of the week  Your healthcare provider can help you create an exercise plan that is safe for you  Follow up with your healthcare provider or specialist as directed:  Write down your questions so you remember to ask them during your visits  © 2017 [employer]  Information is for End User's use only and may not be sold, redistributed or otherwise used for commercial purposes  All illustrations and images included in CareNotes® are the copyrighted property of [employer]  or [employer]  The above information is an  only  It is not intended as medical advice for individual conditions or treatments  Talk to your doctor, nurse or pharmacist before following any medical regimen to see if it is safe and effective for you  Chest Pain   WHAT YOU NEED TO KNOW:   What causes chest pain?   Chest pain can be caused by a range of conditions, from not serious to life-threatening  Chest pain can be a symptom of a digestive problem, such as acid reflux or a stomach ulcer  An anxiety attack or a strong emotion, such as anger, can also cause chest pain  Infection, inflammation, or a fracture in the bones or cartilage in your chest can cause pain or discomfort  Sometimes chest pain or pressure is caused by poor blood flow to your heart (angina)  Chest pain may also be caused by life-threatening conditions such as a heart attack or blood clot in your lungs  What other symptoms might I have with chest pain? · A burning feeling behind your breastbone    · A racing or slow heartbeat     · Fever or sweating     · Nausea or vomiting     · Shortness of breath     · Discomfort or pressure that spreads from your chest to your back, jaw, or arm     · Feeling weak, tired, or faint  How is the cause of chest pain diagnosed? Your healthcare provider will examine you  Describe your chest pain in as much detail as possible  Tell him or her where your pain is and when it began  Tell the provider if you notice anything that makes the pain worse or better  Tell him or her if it is constant or comes and goes  Your healthcare provider will ask about any medicines you use and medical conditions you have  He or she will also examine you  You may also need any of the following tests:  · An EKG  is a test that records your heart's electrical activity  · Blood tests  check for heart damage and signs of a heart attack  · An echocardiogram  uses sound waves to see if blood is flowing normally through your heart  · An ultrasound, x-ray, CT, or MRI scan  may show the cause of your chest pain  You may be given contrast liquid to help your heart show up better in the pictures  Tell the healthcare provider if you have ever had an allergic reaction to contrast liquid  Do not enter the MRI room with anything metal  Metal can cause serious injury   Tell the healthcare provider if you have any metal in or on your body  · An endoscopy  may be done to check for ulcers or problem with your esophagus  How is chest pain treated? Medicines may be given to treat the cause of your chest pain  Examples include pain medicine, anxiety medicine, or medicines to increase blood flow to your heart  Do not  take certain medicines without asking your healthcare provider first  These include NSAIDs, herbal or vitamin supplements, or hormones (estrogen or progestin)  What are some healthy living tips? The following are general healthy guidelines  If your chest pain is caused by a heart problem, your healthcare provider will give you specific guidelines to follow  · Do not smoke  Nicotine and other chemicals in cigarettes and cigars can cause lung and heart damage  Ask your healthcare provider for information if you currently smoke and need help to quit  E-cigarettes or smokeless tobacco still contain nicotine  Talk to your healthcare provider before you use these products  · Eat a variety of healthy, low-fat foods  Healthy foods include fruits, vegetables, whole-grain breads, low-fat dairy products, beans, lean meats, and fish  Ask for more information about a heart healthy diet  · Ask about activity  Your healthcare provider will tell you which activities to limit or avoid  Ask when you can drive, return to work, and have sex  Ask about the best exercise plan for you  · Maintain a healthy weight  Ask your healthcare provider how much you should weigh  Ask him or her to help you create a weight loss plan if you are overweight    Call 911 if:   · You have any of the following signs of a heart attack:      ¨ Squeezing, pressure, or pain in your chest that lasts longer than 5 minutes or returns    ¨ Discomfort or pain in your back, neck, jaw, stomach, or arm     ¨ Trouble breathing    ¨ Nausea or vomiting    ¨ Lightheadedness or a sudden cold sweat, especially with chest pain or trouble breathing    When should I seek immediate care? · You have chest discomfort that gets worse, even with medicine  · You cough or vomit blood  · Your bowel movements are black or bloody  · You cannot stop vomiting, or it hurts to swallow  When should I contact my healthcare provider? · You have questions or concerns about your condition or care  CARE AGREEMENT:   You have the right to help plan your care  Learn about your health condition and how it may be treated  Discuss treatment options with your caregivers to decide what care you want to receive  You always have the right to refuse treatment  The above information is an  only  It is not intended as medical advice for individual conditions or treatments  Talk to your doctor, nurse or pharmacist before following any medical regimen to see if it is safe and effective for you  © 2017 [employer] Information is for End User's use only and may not be sold, redistributed or otherwise used for commercial purposes  All illustrations and images included in CareNotes® are the copyrighted property of [employer]  or [employer]

## 2019-01-02 NOTE — ED NOTES
Pt requesting pain medication at this time  RN made aware        Forrest Zamarripa RN  01/02/19 [id number]

## 2019-01-02 NOTE — ED CARE HANDOFF
Emergency Department Sign Out Note        Sign out and transfer of care from [facility]  See Separate Emergency Department note  The patient, Radha Riding , was evaluated by the previous provider for Chest pain and Dyspnea  Workup Completed:  Labs, EKG, CT PE     ED Course / Workup Pending (followup):  Please seen note below  ED Course as of Jan 03 1224 Wed Jan 02, 2019   [phone] Patient received in sign-out to follow-up on delta EKG and troponin, and if negative patient can be discharged on prednisone for possible asthma/COPD exacerbation  Delta EKG and troponin results reviewed _ negative  We will discharge as per sign out, advised outpatient follow-up, return instructions for any worsening symptoms  Troponin I: 0 02     Procedures  MDM  CritCare Time      Disposition  Final diagnoses:   Dyspnea   Chest pain     Time reflects when diagnosis was documented in both MDM as applicable and the Disposition within this note     Time User Action Codes Description Comment    1/2/2019  5:55 PM Yoni Matters Add [R06 00] Dyspnea     1/2/2019  6:08 PM Yoni Matters Add [R07 9] Chest pain       ED Disposition     ED Disposition Condition Comment    Discharge  [facility]  discharge to home/self care  Condition at discharge: Stable        Follow-up Information     Follow up With Specialties Details Why [address], RETURN TO EMERGENCY FOR PERSISTENT OR WORSENING CHEST PAIN OR TROUBLE BREATHING OR ANY OTHER CONCERNS          Discharge Medication List as of 1/2/2019  6:09 PM      CONTINUE these medications which have NOT CHANGED    Details   albuterol (VENTOLIN HFA) 90 mcg/act inhaler Inhale 2 puffs every 4 (four) hours as needed for wheezing, Starting Tue 12/18/2018, Normal      amLODIPine (NORVASC) 10 mg tablet Take 1 tablet (10 mg total) by mouth daily, Starting Fri 7/27/2018, Normal      Blood Glucose Monitoring Suppl KIT by Does not apply route daily, Starting Fri 12/14/2018, Normal      budesonide-formoterol (SYMBICORT) 160-4 5 mcg/act inhaler Inhale 2 puffs 2 (two) times a day Rinse mouth after use , Historical Med      dexamethasone (DECADRON) 4 mg tablet Take 1 tablet (4 mg total) by mouth 2 (two) times a day with meals P o  B i d  With food For 3 days    24 hr prior to chemotherapy, Starting Fri 12/7/2018, Normal      FLUoxetine (PROzac) 10 MG tablet Take 10 mg by mouth daily in the early morning  , Historical Med      fluticasone-vilanterol (BREO ELLIPTA) 100-25 mcg/inh inhaler Inhale 1 puff daily in the early morning Rinse mouth after use   , Historical Med      folic acid (FOLVITE) 1 mg tablet Take 1 tablet (1 mg total) by mouth daily, Starting Fri 12/7/2018, Normal      gabapentin (NEURONTIN) 100 mg capsule Take 1 capsule (100 mg total) by mouth 3 (three) times a day, Starting Mon 10/15/2018, Normal      guaiFENesin (MUCINEX) 600 mg 12 hr tablet Take 2 tablets (1,200 mg total) by mouth every 12 (twelve) hours, Starting Tue 12/4/2018, Normal      ipratropium (ATROVENT) 0 02 % nebulizer solution Take 1 vial (0 5 mg total) by nebulization 3 (three) times a day, Starting Wed 9/26/2018, Print      levalbuterol (XOPENEX) 1 25 mg/0 5 mL nebulizer solution Take 0 5 mL (1 25 mg total) by nebulization every 8 (eight) hours as needed for wheezing, Starting Wed 9/26/2018, Print      loratadine (CLARITIN) 10 mg tablet Take 10 mg by mouth daily in the early morning  , Historical Med      metFORMIN (GLUCOPHAGE-XR) 500 mg 24 hr tablet Take 1 tablet (500 mg total) by mouth 2 (two) times a day with meals, Starting Tue 12/4/2018, Normal      methocarbamol (ROBAXIN) 500 mg tablet Take 1 tablet (500 mg total) by mouth every 6 (six) hours as needed for muscle spasms, Starting Thu 6/7/2018, Print      ondansetron (ZOFRAN) 4 mg tablet Take 1 tablet (4 mg total) by mouth every 8 (eight) hours as needed for nausea or vomiting, Starting u 12/13/2018, Normal oxyCODONE-acetaminophen (PERCOCET) 5-325 mg per tablet Take 1 tablet by mouth every 4 (four) hours as needed for moderate pain Max Daily Amount: 6 tablets, Starting Thu 12/27/2018, Normal      polyethylene glycol (MIRALAX) 17 g packet Take 17 g by mouth daily, Starting Wed 10/10/2018, Normal      QUEtiapine (SEROquel) 25 mg tablet Take 25 mg by mouth daily at bedtime, Historical Med      ranitidine (ZANTAC) 150 MG capsule Take 1 capsule (150 mg total) by mouth 2 (two) times a day, Starting Tue 12/18/2018, Normal      Selenium Sulfide 2 25 % SHAM apply by topical route  every PM to the affected area(s), Historical Med      tiotropium (SPIRIVA RESPIMAT) 2 5 MCG/ACT AERS inhaler Inhale 2 puffs daily, Starting Thu 12/20/2018, Normal           No discharge procedures on file         ED Provider  Electronically Signed by     Harman Barrett MD  01/03/19 0747

## 2019-01-02 NOTE — ED PROVIDER NOTES
History  Chief Complaint   Patient presents with    Shortness of Breath     Patient reports shortness of breath which began a few days ago  Reports shortness of breath does not resolve with rest  Also c/o left sided chest pain  Denies cardiac hx  History of cancer and COPD  Reports hx of multiple pneumothorax     65-year-old male with past medical history of COPD, GERD, pneumothorax, lung cancer on chemotherapy, who presents to the emergency department for shortness of breath and left-sided chest pain/tightness that started 2 days prior  Patient states that he has increasing difficulty with ambulation secondary to shortness of breath  States that he is only able to walk approximately 15 feet before feeling short of breath, which is new  Patient also states that he feels short of breath even at rest   He denies any fevers, chills, cough, palpitations, calf pain or swelling  Has been using albuterol nebulizer with minimal relief attained  History provided by:  Patient   used: No        Prior to Admission Medications   Prescriptions Last Dose Informant Patient Reported? Taking?    Blood Glucose Monitoring Suppl KIT  Self No No   Sig: by Does not apply route daily   FLUoxetine (PROzac) 10 MG tablet  Self Yes No   Sig: Take 10 mg by mouth daily in the early morning     QUEtiapine (SEROquel) 25 mg tablet  Self Yes No   Sig: Take 25 mg by mouth daily at bedtime   Selenium Sulfide 2 25 % SHAM  Self Yes No   Sig: apply by topical route  every PM to the affected area(s)   albuterol (VENTOLIN HFA) 90 mcg/act inhaler  Self No No   Sig: Inhale 2 puffs every 4 (four) hours as needed for wheezing   amLODIPine (NORVASC) 10 mg tablet  Self No No   Sig: Take 1 tablet (10 mg total) by mouth daily   Patient taking differently: Take 10 mg by mouth daily in the early morning     budesonide-formoterol (SYMBICORT) 160-4 5 mcg/act inhaler  Self Yes No   Sig: Inhale 2 puffs 2 (two) times a day Rinse mouth after use    dexamethasone (DECADRON) 4 mg tablet  Self No No   Sig: Take 1 tablet (4 mg total) by mouth 2 (two) times a day with meals P o  B i d  With food For 3 days  24 hr prior to chemotherapy   fluticasone-vilanterol (BREO ELLIPTA) 100-25 mcg/inh inhaler  Self Yes No   Sig: Inhale 1 puff daily in the early morning Rinse mouth after use       folic acid (FOLVITE) 1 mg tablet  Self No No   Sig: Take 1 tablet (1 mg total) by mouth daily   gabapentin (NEURONTIN) 100 mg capsule  Self No No   Sig: Take 1 capsule (100 mg total) by mouth 3 (three) times a day   guaiFENesin (MUCINEX) 600 mg 12 hr tablet  Self No No   Sig: Take 2 tablets (1,200 mg total) by mouth every 12 (twelve) hours   ipratropium (ATROVENT) 0 02 % nebulizer solution  Self No No   Sig: Take 1 vial (0 5 mg total) by nebulization 3 (three) times a day   levalbuterol (XOPENEX) 1 25 mg/0 5 mL nebulizer solution  Self No No   Sig: Take 0 5 mL (1 25 mg total) by nebulization every 8 (eight) hours as needed for wheezing   loratadine (CLARITIN) 10 mg tablet  Self Yes No   Sig: Take 10 mg by mouth daily in the early morning     metFORMIN (GLUCOPHAGE-XR) 500 mg 24 hr tablet  Self No No   Sig: Take 1 tablet (500 mg total) by mouth 2 (two) times a day with meals   methocarbamol (ROBAXIN) 500 mg tablet  Self No No   Sig: Take 1 tablet (500 mg total) by mouth every 6 (six) hours as needed for muscle spasms   ondansetron (ZOFRAN) 4 mg tablet  Self No No   Sig: Take 1 tablet (4 mg total) by mouth every 8 (eight) hours as needed for nausea or vomiting   oxyCODONE-acetaminophen (PERCOCET) 5-325 mg per tablet   No No   Sig: Take 1 tablet by mouth every 4 (four) hours as needed for moderate pain Max Daily Amount: 6 tablets   polyethylene glycol (MIRALAX) 17 g packet  Self No No   Sig: Take 17 g by mouth daily   Patient taking differently: Take 17 g by mouth daily in the early morning     ranitidine (ZANTAC) 150 MG capsule  Self No No   Sig: Take 1 capsule (150 mg total) by mouth 2 (two) times a day   tiotropium (SPIRIVA RESPIMAT) 2 5 MCG/ACT AERS inhaler  Self No No   Sig: Inhale 2 puffs daily      Facility-Administered Medications: None       Past Medical History:   Diagnosis Date    Bipolar 1 disorder (HCC)     Chronic pain disorder     COPD (chronic obstructive pulmonary disease) (HCC)     Depression     Diabetes mellitus (HCC)     GERD (gastroesophageal reflux disease)     Herniated lumbar intervertebral disc     Hypertension     Latent syphilis     Treated    Low back pain     Lumbosacral disc disease     PTSD (post-traumatic stress disorder)     Tobacco abuse 11/13/2018       Past Surgical History:   Procedure Laterality Date    CT GUIDED CHEST TUBE  11/21/2018    HEMORROIDECTOMY      IR CHEST TUBE  11/14/2018    IR IMAGE GUIDED BIOPSY/ASPIRATION LUNG  11/13/2018    KNEE SURGERY         Family History   Problem Relation Age of Onset    Heart disease Mother     Hypertension Father     Diabetes Family     Asthma Family     Heart disease Family     Cancer Family      I have reviewed and agree with the history as documented  Social History   Substance Use Topics    Smoking status: Former Smoker     Packs/day: 0 50     Types: Cigarettes     Quit date: 8/31/2018    Smokeless tobacco: Never Used      Comment: "i quit one month ago when i quit weed"    Alcohol use Yes      Comment: social        Review of Systems   Constitutional: Negative for chills and fever  HENT: Negative for congestion, ear pain, postnasal drip, rhinorrhea, sinus pain, sinus pressure, sore throat and trouble swallowing  Eyes: Negative  Respiratory: Positive for chest tightness and shortness of breath  Negative for apnea, cough, choking, wheezing and stridor  Cardiovascular: Positive for chest pain  Negative for palpitations and leg swelling  Gastrointestinal: Negative for abdominal pain, anal bleeding, constipation, diarrhea, nausea and vomiting     Genitourinary: Negative for dysuria, flank pain, frequency, hematuria and urgency  Musculoskeletal: Negative for arthralgias, back pain, gait problem, joint swelling, myalgias, neck pain and neck stiffness  Skin: Negative for color change, pallor, rash and wound  Neurological: Negative for dizziness, syncope, weakness, light-headedness, numbness and headaches  Psychiatric/Behavioral: Negative  Physical Exam  Physical Exam   Constitutional: He is oriented to person, place, and time  He appears well-developed and well-nourished  No distress  HENT:   Head: Normocephalic and atraumatic  Mouth/Throat: Oropharynx is clear and moist    Eyes: Pupils are equal, round, and reactive to light  Conjunctivae and EOM are normal    Neck: Normal range of motion  Neck supple  Cardiovascular: Normal rate, regular rhythm and intact distal pulses  Pulmonary/Chest: Effort normal and breath sounds normal  No respiratory distress  He has no wheezes  He has no rales  He exhibits no tenderness  Decreased breath sounds bilaterally  Speaking in full sentences  No tachypnea  No accessory muscle use  Abdominal: Soft  Bowel sounds are normal  He exhibits no distension  There is no tenderness  There is no rebound and no guarding  Musculoskeletal: Normal range of motion  He exhibits no edema or tenderness  Neurological: He is alert and oriented to person, place, and time  No cranial nerve deficit or sensory deficit  He exhibits normal muscle tone  Coordination normal    Skin: Skin is warm and dry  Capillary refill takes less than 2 seconds  He is not diaphoretic  Psychiatric: He has a normal mood and affect  His behavior is normal    Nursing note and vitals reviewed        Vital Signs  ED Triage Vitals   Temperature Pulse Respirations Blood Pressure SpO2   01/02/19 1324 01/02/19 1323 01/02/19 1323 01/02/19 1323 01/02/19 1323   97 5 °F (36 4 °C) 86 20 160/77 100 %      Temp Source Heart Rate Source Patient Position - Orthostatic VS BP Location FiO2 (%)   01/02/19 1324 01/02/19 1323 01/02/19 1323 01/02/19 1323 --   Oral Monitor Lying Right arm       Pain Score       01/02/19 1323       8           Vitals:    01/02/19 1323 01/02/19 1415 01/02/19 1519 01/02/19 1715   BP: 160/77 131/78 148/69 127/62   Pulse: 86 80 76 58   Patient Position - Orthostatic VS: Lying          Visual Acuity      ED Medications  Medications   morphine (PF) 4 mg/mL injection 4 mg (4 mg Intravenous Given 1/2/19 1518)   iohexol (OMNIPAQUE) 350 MG/ML injection (MULTI-DOSE) 85 mL (85 mL Intravenous Given 1/2/19 1542)   aluminum-magnesium hydroxide-simethicone (MYLANTA) 200-200-20 mg/5 mL oral suspension 30 mL (30 mL Oral Given 1/2/19 1722)   lidocaine viscous (XYLOCAINE) 2 % mucosal solution 10 mL (10 mL Oral Given 1/2/19 1722)   famotidine (PEPCID) injection 20 mg (20 mg Intravenous Given 1/2/19 1719)   predniSONE tablet 60 mg (60 mg Oral Given 1/2/19 1744)       Diagnostic Studies  Results Reviewed     Procedure Component Value Units Date/Time    Troponin I [[id number]]  (Normal) Collected:  01/02/19 1718    Lab Status:  Final result Specimen:  Blood from Arm, Right Updated:  01/02/19 1751     Troponin I 0 02 ng/mL     B-type natriuretic peptide [[id number]]  (Normal) Collected:  01/02/19 1356    Lab Status:  Final result Specimen:  Blood from Arm, Right Updated:  01/02/19 1423     NT-proBNP 42 pg/mL     Troponin I [[id number]]  (Normal) Collected:  01/02/19 1356    Lab Status:  Final result Specimen:  Blood from Arm, Right Updated:  01/02/19 1418     Troponin I <0 02 ng/mL     Comprehensive metabolic panel [[id number]]  (Abnormal) Collected:  01/02/19 1356    Lab Status:  Final result Specimen:  Blood from Arm, Right Updated:  01/02/19 1415     Sodium 139 mmol/L      Potassium 3 9 mmol/L      Chloride 104 mmol/L      CO2 28 mmol/L      ANION GAP 7 mmol/L      BUN 15 mg/dL      Creatinine 1 13 mg/dL      Glucose 93 mg/dL      Calcium 9 0 mg/dL      AST 19 U/L      ALT 31 U/L Alkaline Phosphatase 94 U/L      Total Protein 7 4 g/dL      Albumin 3 3 (L) g/dL      Total Bilirubin 0 16 (L) mg/dL      eGFR 84 ml/min/1 73sq m     Narrative:         National Kidney Disease Education Program recommendations are as follows:  GFR calculation is accurate only with a steady state creatinine  Chronic Kidney disease less than 60 ml/min/1 73 sq  meters  Kidney failure less than 15 ml/min/1 73 sq  meters  CBC and differential [[id number]]  (Abnormal) Collected:  01/02/19 1356    Lab Status:  Final result Specimen:  Blood from Arm, Right Updated:  01/02/19 1408     WBC 5 70 Thousand/uL      RBC 4 52 Million/uL      Hemoglobin 12 6 g/dL      Hematocrit 38 1 %      MCV 84 fL      MCH 27 9 pg      MCHC 33 1 g/dL      RDW 15 2 (H) %      MPV 8 5 (L) fL      Platelets 220 (H) Thousands/uL      nRBC 0 /100 WBCs      Neutrophils Relative 43 %      Immat GRANS % 1 %      Lymphocytes Relative 39 %      Monocytes Relative 11 %      Eosinophils Relative 5 %      Basophils Relative 1 %      Neutrophils Absolute 2 47 Thousands/µL      Immature Grans Absolute 0 05 Thousand/uL      Lymphocytes Absolute 2 23 Thousands/µL      Monocytes Absolute 0 61 Thousand/µL      Eosinophils Absolute 0 29 Thousand/µL      Basophils Absolute 0 05 Thousands/µL                  CTA ED chest PE study   Final Result by Aneesh Campbell MD (01/02 1564)      1  No pulmonary embolism, residual pneumothorax, or other acute thoracic findings  2   Resolving postbiopsy changes in the right middle lobe, largely obscuring the patient's known primary malignancy  Unchanged nodular studding of both fissures in the right lung concerning for pleural spread of disease  3   Mediastinal lymphadenopathy shows mild increase in size compared to October, but is stable since most recent prior  Largest node in the AP window  4   Suspicious left adrenal nodule suspicious for metastatic disease appears unchanged at 14 mm  Workstation performed: [id number]         X-ray chest 2 views   ED Interpretation by Jon Shaffer PA-C (01/02 1114)   No acute cardiopulmonary disease, No PTX  Final Result by Marion Tnag DO (01/02 1443)   Stable exam    No pneumothorax  Workstation performed: [id number]                    Procedures  ECG 12 Lead Documentation  Date/Time: 1/2/2019 1:27 PM  Performed by: Meera Jean-Baptiste  Authorized by: Diandra Cook     Indications / Diagnosis:  Shortness of breath  ECG reviewed by me, the ED Provider: no    Patient location:  ED  Interpretation:     Interpretation: normal    Rate:     ECG rate:  83    ECG rate assessment: normal    Rhythm:     Rhythm: sinus rhythm    Ectopy:     Ectopy: none    QRS:     QRS axis:  Normal    QRS intervals:  Normal  Conduction:     Conduction: normal    ST segments:     ST segments:  Normal  T waves:     T waves: inverted      Inverted:  AVL           Phone Contacts  ED Phone Contact    ED Course  ED Course as of Jan 03 0926   Wed Jan 02, 2019   1700 Discussed findings with patient  Patient able to ambulate with oxygen saturation at 97%  Now points to epigastric abdomen when describing chest pain that was previously left sided  Will trial maalox and viscous lidocaine and pepcid  [place] with Dr Surjit Fatima, who states that it is safe to start patient on steroids for possible COPD exacerbation  MDM  Number of Diagnoses or Management Options  Chest pain:   Dyspnea:   Diagnosis management comments: Differential Diagnosis includes but is not limited to:  ACS, pneumothorax, pulmonary embolism, pneumonia, viral URI, COPD exacerbation  Labs are generally unremarkable  Troponin is negative  Chest x-ray is negative for acute infiltrate or cardiopulmonary disease  As patient has a history of cancer, need to rule out PE  CT scan to of the chest to rule out pulmonary embolism is negative for PE   Does show cancer that is generally unchanged compared to previous scan  Impression is that the shortness of breath could be due to COPD exacerbation or from the cancer  Discussed patient with Dr Kevin Paige (patient's oncologist)  As patient is currently undergoing chemotherapy, discussed starting patient on prednisone course with his oncologist who states that it is safe to do so  Patient can follow up with him next week  Echo and catheterization performed June 2018 shows that patient has EF of 65-70% and Mid RCA with a 20 % stenosis  As patient is having continued chest discomfort, a 2nd troponin and EKG will be obtained  If this is negative, he can be discharged home with course of prednisone  Patient has albuterol nebulizer at home  Patient also given a trial of a GI cocktail as on reevaluation after the CT scan, he pointed to his mid-epigastrium instead of the previous left chest  Patient is signed out to Dr Zac Solorio pending repeat EKG and trop and final dispo  Amount and/or Complexity of Data Reviewed  Clinical lab tests: ordered and reviewed  Tests in the radiology section of CPT®: ordered and reviewed  Review and summarize past medical records: yes  Discuss the patient with other providers: yes      CritCare Time    Disposition  Final diagnoses:   Dyspnea   Chest pain     Time reflects when diagnosis was documented in both MDM as applicable and the Disposition within this note     Time User Action Codes Description Comment    1/2/2019  5:55 PM Maia Running Add [R06 00] Dyspnea     1/2/2019  6:08 PM Maia Running Add [R07 9] Chest pain       ED Disposition     ED Disposition Condition Comment    Discharge  [facility]  discharge to home/self care  Condition at discharge: Stable        Follow-up Information     Follow up With Specialties Details Why [address], RETURN TO EMERGENCY FOR PERSISTENT OR WORSENING CHEST PAIN OR TROUBLE BREATHING OR ANY OTHER CONCERNS            Discharge Medication List as of 1/2/2019  6:09 PM      CONTINUE these medications which have NOT CHANGED    Details   albuterol (VENTOLIN HFA) 90 mcg/act inhaler Inhale 2 puffs every 4 (four) hours as needed for wheezing, Starting Tue 12/18/2018, Normal      amLODIPine (NORVASC) 10 mg tablet Take 1 tablet (10 mg total) by mouth daily, Starting Fri 7/27/2018, Normal      Blood Glucose Monitoring Suppl KIT by Does not apply route daily, Starting Fri 12/14/2018, Normal      budesonide-formoterol (SYMBICORT) 160-4 5 mcg/act inhaler Inhale 2 puffs 2 (two) times a day Rinse mouth after use , Historical Med      dexamethasone (DECADRON) 4 mg tablet Take 1 tablet (4 mg total) by mouth 2 (two) times a day with meals P o  B i d  With food For 3 days    24 hr prior to chemotherapy, Starting Fri 12/7/2018, Normal      FLUoxetine (PROzac) 10 MG tablet Take 10 mg by mouth daily in the early morning  , Historical Med      fluticasone-vilanterol (BREO ELLIPTA) 100-25 mcg/inh inhaler Inhale 1 puff daily in the early morning Rinse mouth after use   , Historical Med      folic acid (FOLVITE) 1 mg tablet Take 1 tablet (1 mg total) by mouth daily, Starting Fri 12/7/2018, Normal      gabapentin (NEURONTIN) 100 mg capsule Take 1 capsule (100 mg total) by mouth 3 (three) times a day, Starting Mon 10/15/2018, Normal      guaiFENesin (MUCINEX) 600 mg 12 hr tablet Take 2 tablets (1,200 mg total) by mouth every 12 (twelve) hours, Starting Tue 12/4/2018, Normal      ipratropium (ATROVENT) 0 02 % nebulizer solution Take 1 vial (0 5 mg total) by nebulization 3 (three) times a day, Starting Wed 9/26/2018, Print      levalbuterol (XOPENEX) 1 25 mg/0 5 mL nebulizer solution Take 0 5 mL (1 25 mg total) by nebulization every 8 (eight) hours as needed for wheezing, Starting Wed 9/26/2018, Print      loratadine (CLARITIN) 10 mg tablet Take 10 mg by mouth daily in the early morning  , Historical Med      metFORMIN (GLUCOPHAGE-XR) 500 mg 24 hr tablet Take 1 tablet (500 mg total) by mouth 2 (two) times a day with meals, Starting Tue 12/4/2018, Normal      methocarbamol (ROBAXIN) 500 mg tablet Take 1 tablet (500 mg total) by mouth every 6 (six) hours as needed for muscle spasms, Starting Thu 6/7/2018, Print      ondansetron (ZOFRAN) 4 mg tablet Take 1 tablet (4 mg total) by mouth every 8 (eight) hours as needed for nausea or vomiting, Starting Thu 12/13/2018, Normal      oxyCODONE-acetaminophen (PERCOCET) 5-325 mg per tablet Take 1 tablet by mouth every 4 (four) hours as needed for moderate pain Max Daily Amount: 6 tablets, Starting Thu 12/27/2018, Normal      polyethylene glycol (MIRALAX) 17 g packet Take 17 g by mouth daily, Starting Wed 10/10/2018, Normal      QUEtiapine (SEROquel) 25 mg tablet Take 25 mg by mouth daily at bedtime, Historical Med      ranitidine (ZANTAC) 150 MG capsule Take 1 capsule (150 mg total) by mouth 2 (two) times a day, Starting Tue 12/18/2018, Normal      Selenium Sulfide 2 25 % SHAM apply by topical route  every PM to the affected area(s), Historical Med      tiotropium (SPIRIVA RESPIMAT) 2 5 MCG/ACT AERS inhaler Inhale 2 puffs daily, Starting Thu 12/20/2018, Normal           No discharge procedures on file      ED Provider  Electronically Signed by           Amadeo Davenport PA-C  01/03/19 1847

## 2019-01-03 ENCOUNTER — HOSPITAL ENCOUNTER (OUTPATIENT)
Dept: INFUSION CENTER | Facility: HOSPITAL | Age: 59
Discharge: HOME | End: 2019-01-03
Payer: COMMERCIAL

## 2019-01-03 VITALS
TEMPERATURE: 97.9 F | WEIGHT: 157.85 LBS | HEART RATE: 101 BPM | HEIGHT: 69 IN | SYSTOLIC BLOOD PRESSURE: 166 MMHG | DIASTOLIC BLOOD PRESSURE: 81 MMHG | RESPIRATION RATE: 16 BRPM | BODY MASS INDEX: 23.38 KG/M2

## 2019-01-03 DIAGNOSIS — J44.9 CHRONIC OBSTRUCTIVE PULMONARY DISEASE, UNSPECIFIED COPD TYPE (HCC): ICD-10-CM

## 2019-01-03 PROCEDURE — 96413 CHEMO IV INFUSION 1 HR: CPT

## 2019-01-03 PROCEDURE — 96417 CHEMO IV INFUS EACH ADDL SEQ: CPT

## 2019-01-03 PROCEDURE — 96367 TX/PROPH/DG ADDL SEQ IV INF: CPT

## 2019-01-03 RX ORDER — ALBUTEROL SULFATE 90 UG/1
2 AEROSOL, METERED RESPIRATORY (INHALATION) EVERY 4 HOURS PRN
Qty: 1 INHALER | Refills: 0 | Status: SHIPPED | OUTPATIENT
Start: 2019-01-03 | End: 2019-05-02 | Stop reason: SDUPTHER

## 2019-01-03 RX ADMIN — SODIUM CHLORIDE 920 MG: 9 INJECTION, SOLUTION INTRAVENOUS at 10:32

## 2019-01-03 RX ADMIN — SODIUM CHLORIDE 20 ML/HR: 9 INJECTION, SOLUTION INTRAVENOUS at 09:12

## 2019-01-03 RX ADMIN — DEXAMETHASONE SODIUM PHOSPHATE 10 MG: 10 INJECTION, SOLUTION INTRAMUSCULAR; INTRAVENOUS at 10:10

## 2019-01-03 RX ADMIN — CARBOPLATIN 519 MG: 10 INJECTION, SOLUTION INTRAVENOUS at 10:59

## 2019-01-03 RX ADMIN — SODIUM CHLORIDE 200 MG: 9 INJECTION, SOLUTION INTRAVENOUS at 09:12

## 2019-01-03 RX ADMIN — ONDANSETRON 16 MG: 2 INJECTION, SOLUTION INTRAMUSCULAR; INTRAVENOUS at 09:52

## 2019-01-03 NOTE — PROGRESS NOTES
Tolerated all medications without issue  Iv discontinued jelco intact  Pt given instructions to not take oral dexamethasone today as he received it iv but per Rosalie Camacho, he is to take his bid dose tomorrow  Pt verbalized understanding  Discharged ambulatory to meet [facility]

## 2019-01-03 NOTE — PROGRESS NOTES
Pt admitted to infusion dept today for chemotherapy/immunotherapy  Reports that he went to the er yesterday for acute shortness of breath  "felt like it did when they punctured my lung during the biopsy"  He states he feels "much better today"  Vitals noted  Yesterdays labs noted  Maylon Andes given without adverse reaction  Pt resting quietly at present

## 2019-01-03 NOTE — TELEPHONE ENCOUNTER
Patient is all out of inhaler needs it to be called in today would like to know if you can please give him refills on inhaler

## 2019-01-04 DIAGNOSIS — E11.65 TYPE 2 DIABETES MELLITUS WITH HYPERGLYCEMIA, WITHOUT LONG-TERM CURRENT USE OF INSULIN (HCC): Primary | ICD-10-CM

## 2019-01-04 RX ORDER — BLOOD-GLUCOSE METER
KIT MISCELLANEOUS
Qty: 100 EACH | Refills: 1 | Status: SHIPPED | OUTPATIENT
Start: 2019-01-04

## 2019-01-04 RX ORDER — LANCETS 28 GAUGE
EACH MISCELLANEOUS
Qty: 100 EACH | Refills: 4 | Status: SHIPPED | OUTPATIENT
Start: 2019-01-04

## 2019-01-04 RX ORDER — PSYLLIUM HUSK (WITH SUGAR) 3 G/12 G
POWDER (GRAM) ORAL
Qty: 369 G | Refills: 4 | Status: SHIPPED | OUTPATIENT
Start: 2019-01-04 | End: 2021-01-01

## 2019-01-07 DIAGNOSIS — K59.00 CONSTIPATION, UNSPECIFIED CONSTIPATION TYPE: Primary | ICD-10-CM

## 2019-01-08 ENCOUNTER — OFFICE VISIT (OUTPATIENT)
Dept: PULMONOLOGY | Facility: CLINIC | Age: 59
End: 2019-01-08
Payer: COMMERCIAL

## 2019-01-08 ENCOUNTER — TELEPHONE (OUTPATIENT)
Dept: PULMONOLOGY | Facility: CLINIC | Age: 59
End: 2019-01-08

## 2019-01-08 VITALS
TEMPERATURE: 98.4 F | WEIGHT: 151 LBS | RESPIRATION RATE: 19 BRPM | SYSTOLIC BLOOD PRESSURE: 118 MMHG | OXYGEN SATURATION: 96 % | DIASTOLIC BLOOD PRESSURE: 80 MMHG | HEART RATE: 99 BPM | HEIGHT: 69 IN | BODY MASS INDEX: 22.36 KG/M2

## 2019-01-08 DIAGNOSIS — C34.91 PRIMARY LUNG CANCER WITH METASTASIS FROM LUNG TO OTHER SITE, RIGHT (HCC): ICD-10-CM

## 2019-01-08 DIAGNOSIS — J44.9 OBSTRUCTIVE CHRONIC BRONCHITIS WITHOUT EXACERBATION (HCC): Primary | ICD-10-CM

## 2019-01-08 DIAGNOSIS — J43.2 CENTRILOBULAR EMPHYSEMA (HCC): Primary | ICD-10-CM

## 2019-01-08 PROCEDURE — 94618 PULMONARY STRESS TESTING: CPT | Performed by: INTERNAL MEDICINE

## 2019-01-08 PROCEDURE — 99215 OFFICE O/P EST HI 40 MIN: CPT | Performed by: INTERNAL MEDICINE

## 2019-01-08 RX ORDER — POLYETHYLENE GLYCOL 3350 17 G/17G
17 POWDER, FOR SOLUTION ORAL DAILY
Qty: 527 G | Refills: 1 | Status: SHIPPED | OUTPATIENT
Start: 2019-01-08 | End: 2019-04-22 | Stop reason: SDUPTHER

## 2019-01-08 NOTE — LETTER
January 8, 2019     Jass Torres MD  [address] [place] [address]  [address]  [id number]    Patient: Eugune Libman YOB: 1960   Date of Visit: 1/8/2019       Dear Dr Arleen Brenner: Thank you for referring Cesiasarah Sheth to me for evaluation  Below are my notes for this consultation  If you have questions, please do not hesitate to call me  I look forward to following your patient along with you  Sincerely,        Hair Santiago MD        CC: No Recipients  Hair Santiago MD  1/8/2019 11:08 AM  Sign at close encounter  Pulmonary outpatient note   Eugune Libman  66 y o  male MRN: [id number]  1/8/2019      Assessment and plan:  Eugune Libman  has the following medical problems:  1  COPD  Gold stage D  severe emphysema based on PFTs  The patient is taking Breo and Spiriva but not on daily basis  I told him to take them every day to prevent hospitalizations and decrease the risk for pneumothorax  We did a 6 min walk test in the office today and he desaturated down 85 with increase to 90 with 3 liters/minute of oxygen  We will prescribe oxygen for home  2   Lung cancer  Metastatic adenocarcinoma  Had to chemotherapy sessions so far  Will continue palliative chemotherapy with Oncology  3   Systemic pain  Secondary to metastatic cancer/chemotherapy side-effects  He is seeing tomorrow pain specialist to manage his symptoms  Follow-up in 2 months  Of note, he was not vaccinated for flu decision and is not interested to be vaccinated  Hair Santiago MD/PhD,  [facility]      No Follow-up on file  History of Present Illness  This is 66y o  year old male with previous medical history of severe COPD with heavy smoking history 100 pack years  He had a biopsy from right middle lobe lesion in November 2018 that showed adenocarcinoma  Then he had a pneumothorax after the procedure and was hospitalized for few days until the pneumothorax resolved    He came for an EBUS on 11/21/2018 that was canceled since the pneumothorax was seen on x-ray for the procedure  He was hospitalized again and discharge when the pneumothorax has resolved  He saw an oncologist the decided based on the imaging and the PET the patient has metastatic lung cancer any started chemotherapy  He completed 2 courses of chemotherapy so far  Regarding his COPD, he is using Breo and Spiriva not every day  He feels better from the respiratory standpoint since starting this treatment  Is mildly limited with physical activity  He went again to the ED on January 2nd 2019 due to shortness of breath and fear of another pneumothorax which was found negative  Social history:  Smoked 100 pack years  Drinks alcohol socially  Occupational history:    Review of Systems   Constitutional: Positive for fatigue  HENT: Negative  Eyes: Negative  Respiratory: Positive for shortness of breath  Cardiovascular: Negative  Gastrointestinal: Negative  Endocrine: Negative  Genitourinary: Negative  Musculoskeletal: Positive for arthralgias, back pain, myalgias and neck pain  Skin: Negative  Allergic/Immunologic: Negative  Neurological: Negative  Hematological: Negative  Psychiatric/Behavioral: Negative          Historical Information   Past Medical History:   Diagnosis Date    Bipolar 1 disorder ([facility] [address] )     Chronic pain disorder     COPD (chronic obstructive pulmonary disease) (HCC)     Depression     Diabetes mellitus (HCC)     GERD (gastroesophageal reflux disease)     Herniated lumbar intervertebral disc     Hypertension     Latent syphilis     Treated    Low back pain     Lumbosacral disc disease     PTSD (post-traumatic stress disorder)     Tobacco abuse 11/13/2018     Past Surgical History:   Procedure Laterality Date    CT GUIDED CHEST TUBE  11/21/2018    HEMORROIDECTOMY      IR CHEST TUBE  11/14/2018    IR IMAGE GUIDED BIOPSY/ASPIRATION LUNG  11/13/2018  KNEE SURGERY       Family History   Problem Relation Age of Onset    Heart disease Mother     Hypertension Father     Diabetes Family     Asthma Family     Heart disease Family     Cancer Family        Meds/Allergies     Current Outpatient Prescriptions:     albuterol (VENTOLIN HFA) 90 mcg/act inhaler, Inhale 2 puffs every 4 (four) hours as needed for wheezing, Disp: 1 Inhaler, Rfl: 0    amLODIPine (NORVASC) 10 mg tablet, Take 1 tablet (10 mg total) by mouth daily (Patient taking differently: Take 10 mg by mouth daily in the early morning  ), Disp: 30 tablet, Rfl: 1    Blood Glucose Monitoring Suppl KIT, by Does not apply route daily, Disp: 1 each, Rfl: 0    budesonide-formoterol (SYMBICORT) 160-4 5 mcg/act inhaler, Inhale 2 puffs 2 (two) times a day Rinse mouth after use , Disp: , Rfl:     dexamethasone (DECADRON) 4 mg tablet, Take 1 tablet (4 mg total) by mouth 2 (two) times a day with meals P o  B i d  With food For 3 days    24 hr prior to chemotherapy, Disp: 60 tablet, Rfl: 0    FLUoxetine (PROzac) 10 MG tablet, Take 10 mg by mouth daily in the early morning  , Disp: , Rfl:     fluticasone-vilanterol (BREO ELLIPTA) 100-25 mcg/inh inhaler, Inhale 1 puff daily in the early morning Rinse mouth after use   , Disp: , Rfl:     folic acid (FOLVITE) 1 mg tablet, Take 1 tablet (1 mg total) by mouth daily, Disp: 30 tablet, Rfl: 2    FREESTYLE LITE test strip, TEST BLOOD SUGAR DAILY, Disp: 100 each, Rfl: 1    gabapentin (NEURONTIN) 100 mg capsule, Take 1 capsule (100 mg total) by mouth 3 (three) times a day, Disp: 90 capsule, Rfl: 0    guaiFENesin (MUCINEX) 600 mg 12 hr tablet, Take 2 tablets (1,200 mg total) by mouth every 12 (twelve) hours, Disp: 30 tablet, Rfl: 0    ipratropium (ATROVENT) 0 02 % nebulizer solution, Take 1 vial (0 5 mg total) by nebulization 3 (three) times a day, Disp: 90 vial, Rfl: 1    Lancets (FREESTYLE) lancets, TEST BLOOD SUGAR DAILY, Disp: 100 each, Rfl: 4    levalbuterol Shiloh Bauman) 1 25 mg/0 5 mL nebulizer solution, Take 0 5 mL (1 25 mg total) by nebulization every 8 (eight) hours as needed for wheezing, Disp: 1 each, Rfl: 0    loratadine (CLARITIN) 10 mg tablet, Take 10 mg by mouth daily in the early morning  , Disp: , Rfl:     metFORMIN (GLUCOPHAGE-XR) 500 mg 24 hr tablet, Take 1 tablet (500 mg total) by mouth 2 (two) times a day with meals, Disp: 60 tablet, Rfl: 0    methocarbamol (ROBAXIN) 500 mg tablet, Take 1 tablet (500 mg total) by mouth every 6 (six) hours as needed for muscle spasms, Disp: 10 tablet, Rfl: 0    ondansetron (ZOFRAN) 4 mg tablet, Take 1 tablet (4 mg total) by mouth every 8 (eight) hours as needed for nausea or vomiting, Disp: 20 tablet, Rfl: 0    oxyCODONE-acetaminophen (PERCOCET) 5-325 mg per tablet, Take 1 tablet by mouth every 4 (four) hours as needed for moderate pain Max Daily Amount: 6 tablets, Disp: 60 tablet, Rfl: 0    polyethylene glycol (GLYCOLAX) powder, Take 17 g by mouth daily, Disp: 527 g, Rfl: 1    QUEtiapine (SEROquel) 25 mg tablet, Take 25 mg by mouth daily at bedtime, Disp: , Rfl:     ranitidine (ZANTAC) 150 MG capsule, Take 1 capsule (150 mg total) by mouth 2 (two) times a day, Disp: 60 capsule, Rfl: 0    REGULOID 28 3 % POWD, USE AS DIRECTED, Disp: 369 g, Rfl: 4    Selenium Sulfide 2 25 % SHAM, apply by topical route  every PM to the affected area(s), Disp: , Rfl:     tiotropium (SPIRIVA RESPIMAT) 2 5 MCG/ACT AERS inhaler, Inhale 2 puffs daily, Disp: 1 Inhaler, Rfl: 2  Allergies   Allergen Reactions    Lisinopril Anaphylaxis     Took medication a while ago and had a "swelling reaction"  Does not carry epi-pen       Vitals: Blood pressure 118/80, pulse 99, temperature 98 4 °F (36 9 °C), temperature source Tympanic, resp  rate 19, height 5' 8 5" (1 74 m), weight 68 5 kg (151 lb), SpO2 96 %  Body mass index is 22 63 kg/m²   Oxygen Therapy  SpO2: 96 %  Oxygen Therapy: None (Room air)    Physical Exam  Physical Exam   Constitutional: He is oriented to person, place, and time  He appears well-developed and well-nourished  No distress  HENT:   Head: Normocephalic and atraumatic  Eyes: Pupils are equal, round, and reactive to light  EOM are normal    Neck: Normal range of motion  Neck supple  No JVD present  Cardiovascular: Normal rate, regular rhythm and normal heart sounds  Exam reveals no friction rub  No murmur heard  Pulmonary/Chest: Effort normal  He has wheezes  He has no rales  Decreased breath sounds bilaterally   Abdominal: Soft  He exhibits no distension  Musculoskeletal: Normal range of motion  He exhibits no edema or deformity  Neurological: He is alert and oriented to person, place, and time  Skin: Skin is warm  He is not diaphoretic  Psychiatric: He has a normal mood and affect  Labs: I have personally reviewed pertinent lab results  Lab Results   Component Value Date    WBC 5 70 01/02/2019    HGB 12 6 01/02/2019    HCT 38 1 01/02/2019    MCV 84 01/02/2019     (H) 01/02/2019     Lab Results   Component Value Date    CALCIUM 9 0 01/02/2019    K 3 9 01/02/2019    CO2 28 01/02/2019     01/02/2019    BUN 15 01/02/2019    CREATININE 1 13 01/02/2019     No results found for: IGE  Lab Results   Component Value Date    ALT 31 01/02/2019    AST 19 01/02/2019    ALKPHOS 94 01/02/2019       Imaging and other studies:   I have personally reviewed pertinent imaging studies in PACS  The patient has multiple chest CTs in the system  The last 1 is from January 2018 that showed no pneumothorax, no pulmonary embolism and improved picture in the right middle lobe  Also severe emphysema is seen and suspicious left adrenal nodule of metastatic disease 14 mm  Pulmonary function testing: Dinora Valdovinos MD/PhD,  [facility]    FEV1/FVC Ratio is 52%  FEV1 is 1 49 L which is 49% predicted  FVC is 2 87 L which is 74% predicted    No improvement after the administration of bronchodilator     Flow volume loop:  Obstructed appearance     Lung volumes: Total lung capacity 101% predicted  Residual volume 175% predicted    RV/TLC ratio 174% predicted     Diffusing capacity:  DLCO 46% predicted      Airway resistance:  Airway conductance 38% predicted

## 2019-01-08 NOTE — PROGRESS NOTES
Pulmonary outpatient note   Phyllis Shelton  66 y o  male MRN: [id number]  1/8/2019      Assessment and plan:  Marli Lees Sr  has the following medical problems:  1  COPD  Gold stage D  severe emphysema based on PFTs  The patient is taking Breo and Spiriva but not on daily basis  I told him to take them every day to prevent hospitalizations and decrease the risk for pneumothorax  We did a 6 min walk test in the office today and he desaturated down 85 with increase to 90 with 3 liters/minute of oxygen  We will prescribe oxygen for home  2   Lung cancer  Metastatic adenocarcinoma  Had to chemotherapy sessions so far  Will continue palliative chemotherapy with Oncology  3   Systemic pain  Secondary to metastatic cancer/chemotherapy side-effects  He is seeing tomorrow pain specialist to manage his symptoms  Follow-up in 2 months  Of note, he was not vaccinated for flu decision and is not interested to be vaccinated  Linda Lombardi MD/PhD,  [facility]      No Follow-up on file  History of Present Illness  This is 66y o  year old male with previous medical history of severe COPD with heavy smoking history 100 pack years  He had a biopsy from right middle lobe lesion in November 2018 that showed adenocarcinoma  Then he had a pneumothorax after the procedure and was hospitalized for few days until the pneumothorax resolved  He came for an EBUS on 11/21/2018 that was canceled since the pneumothorax was seen on x-ray for the procedure  He was hospitalized again and discharge when the pneumothorax has resolved  He saw an oncologist the decided based on the imaging and the PET the patient has metastatic lung cancer any started chemotherapy  He completed 2 courses of chemotherapy so far  Regarding his COPD, he is using Breo and Spiriva not every day  He feels better from the respiratory standpoint since starting this treatment    Is mildly limited with physical activity  He went again to the ED on January 2nd 2019 due to shortness of breath and fear of another pneumothorax which was found negative  Social history:  Smoked 100 pack years  Drinks alcohol socially  Occupational history:    Review of Systems   Constitutional: Positive for fatigue  HENT: Negative  Eyes: Negative  Respiratory: Positive for shortness of breath  Cardiovascular: Negative  Gastrointestinal: Negative  Endocrine: Negative  Genitourinary: Negative  Musculoskeletal: Positive for arthralgias, back pain, myalgias and neck pain  Skin: Negative  Allergic/Immunologic: Negative  Neurological: Negative  Hematological: Negative  Psychiatric/Behavioral: Negative          Historical Information   Past Medical History:   Diagnosis Date    [facility] disorder ([facility] [address] )     Chronic pain disorder     [facility]     Depression     Diabetes mellitus (HCC)     GERD (gastroesophageal reflux disease)     Herniated lumbar intervertebral disc     Hypertension     Latent syphilis     Treated    Low back pain     Lumbosacral disc disease     PTSD (post-traumatic stress disorder)     Tobacco abuse 11/13/2018     Past Surgical History:   Procedure Laterality Date    CT GUIDED CHEST TUBE  11/21/2018    HEMORROIDECTOMY      IR CHEST TUBE  11/14/2018    IR IMAGE GUIDED BIOPSY/ASPIRATION LUNG  11/13/2018    KNEE SURGERY       Family History   Problem Relation Age of Onset    Heart disease Mother     Hypertension Father     Diabetes Family     Asthma Family     Heart disease Family     Cancer Family        Meds/Allergies     Current Outpatient Prescriptions:     albuterol (VENTOLIN HFA) 90 mcg/act inhaler, Inhale 2 puffs every 4 (four) hours as needed for wheezing, Disp: 1 Inhaler, Rfl: 0    amLODIPine (NORVASC) 10 mg tablet, Take 1 tablet (10 mg total) by mouth daily (Patient taking differently: Take 10 mg by mouth daily in the early morning  ), Disp: 30 tablet, Rfl: 1    Blood Glucose Monitoring Suppl KIT, by Does not apply route daily, Disp: 1 each, Rfl: 0    budesonide-formoterol (SYMBICORT) 160-4 5 mcg/act inhaler, Inhale 2 puffs 2 (two) times a day Rinse mouth after use , Disp: , Rfl:     dexamethasone (DECADRON) 4 mg tablet, Take 1 tablet (4 mg total) by mouth 2 (two) times a day with meals P o  B i d  With food For 3 days    24 hr prior to chemotherapy, Disp: 60 tablet, Rfl: 0    FLUoxetine (PROzac) 10 MG tablet, Take 10 mg by mouth daily in the early morning  , Disp: , Rfl:     fluticasone-vilanterol (BREO ELLIPTA) 100-25 mcg/inh inhaler, Inhale 1 puff daily in the early morning Rinse mouth after use   , Disp: , Rfl:     folic acid (FOLVITE) 1 mg tablet, Take 1 tablet (1 mg total) by mouth daily, Disp: 30 tablet, Rfl: 2    FREESTYLE LITE test strip, TEST BLOOD SUGAR DAILY, Disp: 100 each, Rfl: 1    gabapentin (NEURONTIN) 100 mg capsule, Take 1 capsule (100 mg total) by mouth 3 (three) times a day, Disp: 90 capsule, Rfl: 0    guaiFENesin (MUCINEX) 600 mg 12 hr tablet, Take 2 tablets (1,200 mg total) by mouth every 12 (twelve) hours, Disp: 30 tablet, Rfl: 0    ipratropium (ATROVENT) 0 02 % nebulizer solution, Take 1 vial (0 5 mg total) by nebulization 3 (three) times a day, Disp: 90 vial, Rfl: 1    Lancets (FREESTYLE) lancets, TEST BLOOD SUGAR DAILY, Disp: 100 each, Rfl: 4    levalbuterol (XOPENEX) 1 25 mg/0 5 mL nebulizer solution, Take 0 5 mL (1 25 mg total) by nebulization every 8 (eight) hours as needed for wheezing, Disp: 1 each, Rfl: 0    loratadine (CLARITIN) 10 mg tablet, Take 10 mg by mouth daily in the early morning  , Disp: , Rfl:     metFORMIN (GLUCOPHAGE-XR) 500 mg 24 hr tablet, Take 1 tablet (500 mg total) by mouth 2 (two) times a day with meals, Disp: 60 tablet, Rfl: 0    methocarbamol (ROBAXIN) 500 mg tablet, Take 1 tablet (500 mg total) by mouth every 6 (six) hours as needed for muscle spasms, Disp: 10 tablet, Rfl: 0    ondansetron (ZOFRAN) 4 mg tablet, Take 1 tablet (4 mg total) by mouth every 8 (eight) hours as needed for nausea or vomiting, Disp: 20 tablet, Rfl: 0    oxyCODONE-acetaminophen (PERCOCET) 5-325 mg per tablet, Take 1 tablet by mouth every 4 (four) hours as needed for moderate pain Max Daily Amount: 6 tablets, Disp: 60 tablet, Rfl: 0    polyethylene glycol (GLYCOLAX) powder, Take 17 g by mouth daily, Disp: 527 g, Rfl: 1    QUEtiapine (SEROquel) 25 mg tablet, Take 25 mg by mouth daily at bedtime, Disp: , Rfl:     ranitidine (ZANTAC) 150 MG capsule, Take 1 capsule (150 mg total) by mouth 2 (two) times a day, Disp: 60 capsule, Rfl: 0    REGULOID 28 3 % POWD, USE AS DIRECTED, Disp: 369 g, Rfl: 4    Selenium Sulfide 2 25 % SHAM, apply by topical route  every PM to the affected area(s), Disp: , Rfl:     tiotropium (SPIRIVA RESPIMAT) 2 5 MCG/ACT AERS inhaler, Inhale 2 puffs daily, Disp: 1 Inhaler, Rfl: 2  Allergies   Allergen Reactions    Lisinopril Anaphylaxis     Took medication a while ago and had a "swelling reaction"  Does not carry epi-pen       Vitals: Blood pressure 118/80, pulse 99, temperature 98 4 °F (36 9 °C), temperature source Tympanic, resp  rate 19, height 5' 8 5" (1 74 m), weight 68 5 kg (151 lb), SpO2 96 %  Body mass index is 22 63 kg/m²  Oxygen Therapy  SpO2: 96 %  Oxygen Therapy: None (Room air)    Physical Exam  Physical Exam   Constitutional: He is oriented to person, place, and time  He appears well-developed and well-nourished  No distress  HENT:   Head: Normocephalic and atraumatic  Eyes: Pupils are equal, round, and reactive to light  EOM are normal    Neck: Normal range of motion  Neck supple  No JVD present  Cardiovascular: Normal rate, regular rhythm and normal heart sounds  Exam reveals no friction rub  No murmur heard  Pulmonary/Chest: Effort normal  He has wheezes  He has no rales  Decreased breath sounds bilaterally   Abdominal: Soft  He exhibits no distension  Musculoskeletal: Normal range of motion  He exhibits no edema or deformity  Neurological: He is alert and oriented to person, place, and time  Skin: Skin is warm  He is not diaphoretic  Psychiatric: He has a normal mood and affect  Labs: I have personally reviewed pertinent lab results  Lab Results   Component Value Date    WBC 5 70 01/02/2019    HGB 12 6 01/02/2019    HCT 38 1 01/02/2019    MCV 84 01/02/2019     (H) 01/02/2019     Lab Results   Component Value Date    CALCIUM 9 0 01/02/2019    K 3 9 01/02/2019    CO2 28 01/02/2019     01/02/2019    BUN 15 01/02/2019    CREATININE 1 13 01/02/2019     No results found for: IGE  Lab Results   Component Value Date    ALT 31 01/02/2019    AST 19 01/02/2019    ALKPHOS 94 01/02/2019       Imaging and other studies:   I have personally reviewed pertinent imaging studies in PACS  The patient has multiple chest CTs in the system  The last 1 is from January 2018 that showed no pneumothorax, no pulmonary embolism and improved picture in the right middle lobe  Also severe emphysema is seen and suspicious left adrenal nodule of metastatic disease 14 mm  Pulmonary function testing: Santo Castaneda MD/PhD,  [facility]    FEV1/FVC Ratio is 52%  FEV1 is 1 49 L which is 49% predicted  FVC is 2 87 L which is 74% predicted  No improvement after the administration of bronchodilator     Flow volume loop:  Obstructed appearance     Lung volumes: Total lung capacity 101% predicted  Residual volume 175% predicted    RV/TLC ratio 174% predicted     Diffusing capacity:  DLCO 46% predicted      Airway resistance:  Airway conductance 38% predicted

## 2019-01-09 ENCOUNTER — SOCIAL WORK (OUTPATIENT)
Dept: PALLIATIVE MEDICINE | Facility: CLINIC | Age: 59
End: 2019-01-09
Payer: COMMERCIAL

## 2019-01-09 ENCOUNTER — OFFICE VISIT (OUTPATIENT)
Dept: PALLIATIVE MEDICINE | Facility: CLINIC | Age: 59
End: 2019-01-09
Payer: COMMERCIAL

## 2019-01-09 ENCOUNTER — TELEPHONE (OUTPATIENT)
Dept: PALLIATIVE MEDICINE | Facility: CLINIC | Age: 59
End: 2019-01-09

## 2019-01-09 VITALS
SYSTOLIC BLOOD PRESSURE: 111 MMHG | RESPIRATION RATE: 18 BRPM | DIASTOLIC BLOOD PRESSURE: 78 MMHG | TEMPERATURE: 98.7 F | WEIGHT: 155.4 LBS | HEIGHT: 68 IN | OXYGEN SATURATION: 97 % | BODY MASS INDEX: 23.55 KG/M2 | HEART RATE: 86 BPM

## 2019-01-09 DIAGNOSIS — G89.29 CHRONIC RIGHT SHOULDER PAIN: ICD-10-CM

## 2019-01-09 DIAGNOSIS — R52 GENERALIZED BODY ACHES: ICD-10-CM

## 2019-01-09 DIAGNOSIS — M54.6 CHRONIC BILATERAL THORACIC BACK PAIN: ICD-10-CM

## 2019-01-09 DIAGNOSIS — M25.511 CHRONIC RIGHT SHOULDER PAIN: ICD-10-CM

## 2019-01-09 DIAGNOSIS — Z71.89 COMPLEX CARE COORDINATION: Primary | ICD-10-CM

## 2019-01-09 DIAGNOSIS — G89.3 CANCER ASSOCIATED PAIN: Primary | ICD-10-CM

## 2019-01-09 DIAGNOSIS — G89.4 CHRONIC PAIN DISORDER: ICD-10-CM

## 2019-01-09 DIAGNOSIS — R07.89 NON-CARDIAC CHEST PAIN: ICD-10-CM

## 2019-01-09 DIAGNOSIS — G89.29 CHRONIC BILATERAL THORACIC BACK PAIN: ICD-10-CM

## 2019-01-09 DIAGNOSIS — M54.50 LUMBAR PAIN: ICD-10-CM

## 2019-01-09 DIAGNOSIS — C34.90 MALIGNANT NEOPLASM OF LUNG, UNSPECIFIED LATERALITY, UNSPECIFIED PART OF LUNG (HCC): ICD-10-CM

## 2019-01-09 DIAGNOSIS — R11.0 NAUSEA: ICD-10-CM

## 2019-01-09 DIAGNOSIS — J43.8 OTHER EMPHYSEMA (HCC): Primary | ICD-10-CM

## 2019-01-09 DIAGNOSIS — C34.91 ADENOCARCINOMA OF LUNG, RIGHT (HCC): ICD-10-CM

## 2019-01-09 PROCEDURE — 99204 OFFICE O/P NEW MOD 45 MIN: CPT | Performed by: NURSE PRACTITIONER

## 2019-01-09 RX ORDER — OXYCODONE HYDROCHLORIDE 10 MG/1
10 TABLET ORAL EVERY 4 HOURS PRN
Qty: 180 TABLET | Refills: 0 | Status: SHIPPED | OUTPATIENT
Start: 2019-01-09 | End: 2019-02-06 | Stop reason: SDUPTHER

## 2019-01-09 RX ORDER — ONDANSETRON 4 MG/1
4 TABLET, FILM COATED ORAL EVERY 4 HOURS PRN
Refills: 0
Start: 2019-01-09 | End: 2019-04-01

## 2019-01-09 RX ORDER — GABAPENTIN 100 MG/1
CAPSULE ORAL
Qty: 150 CAPSULE | Refills: 0 | Status: SHIPPED | OUTPATIENT
Start: 2019-01-09 | End: 2019-02-27 | Stop reason: SDUPTHER

## 2019-01-09 RX ORDER — METHOCARBAMOL 750 MG/1
750 TABLET, FILM COATED ORAL EVERY 6 HOURS PRN
Qty: 90 TABLET | Refills: 0 | Status: SHIPPED | OUTPATIENT
Start: 2019-01-09 | End: 2019-05-02 | Stop reason: SDUPTHER

## 2019-01-09 RX ORDER — ACETAMINOPHEN 500 MG
1000 TABLET ORAL EVERY 8 HOURS PRN
Refills: 0
Start: 2019-01-09 | End: 2019-05-02 | Stop reason: SDUPTHER

## 2019-01-09 RX ORDER — FLUTICASONE FUROATE AND VILANTEROL 100; 25 UG/1; UG/1
1 POWDER RESPIRATORY (INHALATION)
Qty: 1 INHALER | Refills: 5 | Status: SHIPPED | OUTPATIENT
Start: 2019-01-09 | End: 2019-05-02 | Stop reason: SDUPTHER

## 2019-01-09 NOTE — PROGRESS NOTES
Palliative and Supportive Care   New Patient Consultation  Deidra Vick  66 y o  male [id number]    Assessment/Plan:  1  Cancer associated pain    2  Generalized body aches    3  Adenocarcinoma of lung, right ([facility] [address] )    4  Chronic bilateral thoracic back pain    5  Lumbar pain    6  Chronic pain disorder    7  Nausea    8  Chronic right shoulder pain    9  Malignant neoplasm of lung, unspecified laterality, unspecified part of lung ([facility] [address] )    10   Non-cardiac chest pain        Requested Prescriptions     Signed Prescriptions Disp Refills    methocarbamol (ROBAXIN) 750 mg tablet 90 tablet 0     Sig: Take 1 tablet (750 mg total) by mouth every 6 (six) hours as needed for muscle spasms    gabapentin (NEURONTIN) 100 mg capsule 150 capsule 0     Sig: Take 1 capsule PO in morning, 1 capsule PO in afternoon, and 3 capsules PO at bedtime    ondansetron (ZOFRAN) 4 mg tablet  0     Sig: Take 1 tablet (4 mg total) by mouth every 4 (four) hours as needed for nausea or vomiting    acetaminophen (TYLENOL) 500 mg tablet  0     Sig: Take 2 tablets (1,000 mg total) by mouth every 8 (eight) hours as needed for mild pain    oxyCODONE (ROXICODONE) 10 MG TABS 180 tablet 0     Sig: Take 1 tablet (10 mg total) by mouth every 4 (four) hours as needed for severe pain Max Daily Amount: 60 mg     Pain- unclear etiology, history of chronic pain, significantly worsened after starting chemo  - stop Percocet  - start oxycodoneIR 10mg q4h PRN  - increase gabapentin to [phone]  - increase methocarbamol to 750mg q6h PRN muscle spasms  - acetaminophen 1000mg TID PRN    Constipation- will continue to happen due to pain meds  - Miralax daily - can increase to BID if needed  - encouraged fluid intake, especially with use of fiber supplement; recommend discontinuing fiber if does not have good fluid intake  - consider adding senna     Nausea  - change ondansetron to 4mg q 4h PRN  - encouraged to call if nausea is uncontrolled      Follow up in one month      Subjective    Chief Concern  New patient consultation for:  Pain, newly diagnosed lung cancer         History of Present Illness  Patient ID: Jacqueline Velazquez  is a 66 y o  male  with newly diagnosed metastatic NSCLC  He has completed two cycles of chemo thus far  He has brain MRI scheduled for tomorrow  He has been experiencing widespread, diffuse pain throughout his body, most especially in low back, shoulders, legs, head  Pain is located in joints but not exclusively  Described as sharp, occasionally burning pain  Pain sometimes has a throbbing character  Pain is constant but intermittently more intense  Has had some degree of chronic pain for years in low back, right knee, shoulder but was at a tolerable level and was not taking pain medication  Since beginning chemotherapy, this pain has worsened in intensity and is affecting quality of life  Currently taking Percocet for the pain, 5 mg does not provide relief however when he takes 2 tablets experiences some relief in the pain  Is taking about 4 or 5 tablets of 5-325mg  Has been taking gabapentin 100 mg t i d  for the past 2 months  Long-standing issues with difficulty sleeping have been worsened by the pain  Difficult both to fall asleep and stay asleep  Occasionally naps during the day  Sleep is an escape from the pain for him  Some constipation, uses a fiber supplement and gets benefit from this; some days takes Miralax  +nausea/ no vomiting  He is very fatigued  He is dyspneic on exertion  He was approved yesterday for oxygen  Stopped smoking after the diagnosis of cancer  Has a history of bipolar  Follows with psychiatry (Gwen in [facility])  Currently living with a cousin and cousin's   He has a girlfriend  He has 8 children  The youngest are twin 14yo boy and girl  He is not working anymore           The following portions of the patient's history were reviewed and updated as appropriate: allergies, current medications, past family history, past medical history, past social history, past surgical history and problem list       Visit Information    Accompanied By: No one  Source of History: Self  History Limitations: None    ROS  Review of Systems   Constitutional: Positive for fatigue  Respiratory: Positive for shortness of breath  Gastrointestinal: Positive for constipation and nausea  Musculoskeletal: Positive for arthralgias, back pain and myalgias  Psychiatric/Behavioral: Positive for sleep disturbance  Objective     Physical Exam   Constitutional: He is oriented to person, place, and time  He appears well-developed and well-nourished  He is cooperative  HENT:   Head: Normocephalic and atraumatic  Right Ear: Hearing and external ear normal    Left Ear: Hearing and external ear normal    Mouth/Throat: Mucous membranes are normal    Eyes: Conjunctivae, EOM and lids are normal    Neck: Trachea normal  Neck supple  Cardiovascular: Normal rate  Pulmonary/Chest: Effort normal    Neurological: He is alert and oriented to person, place, and time  Skin: Skin is warm and dry  Psychiatric: His mood appears anxious   Cognition and memory are normal          /78 (BP Location: Left arm, Patient Position: Sitting, Cuff Size: Standard)   Pulse 86   Temp 98 7 °F (37 1 °C) (Oral)   Resp 18   Ht 5' 8" (1 727 m)   Wt 70 5 kg (155 lb 6 4 oz)   SpO2 97%   BMI 23 63 kg/m²       - ECOG Performance Status: 0-1      Current Outpatient Prescriptions:     acetaminophen (TYLENOL) 500 mg tablet, Take 2 tablets (1,000 mg total) by mouth every 8 (eight) hours as needed for mild pain, Disp: , Rfl: 0    albuterol (VENTOLIN HFA) 90 mcg/act inhaler, Inhale 2 puffs every 4 (four) hours as needed for wheezing, Disp: 1 Inhaler, Rfl: 0    amLODIPine (NORVASC) 10 mg tablet, Take 1 tablet (10 mg total) by mouth daily (Patient taking differently: Take 10 mg by mouth daily in the early morning  ), Disp: 30 tablet, Rfl: 1    Blood Glucose Monitoring Suppl KIT, by Does not apply route daily, Disp: 1 each, Rfl: 0    dexamethasone (DECADRON) 4 mg tablet, Take 1 tablet (4 mg total) by mouth 2 (two) times a day with meals P o  B i d  With food For 3 days    24 hr prior to chemotherapy, Disp: 60 tablet, Rfl: 0    FLUoxetine (PROzac) 10 MG tablet, Take 10 mg by mouth daily in the early morning  , Disp: , Rfl:     fluticasone-vilanterol (BREO ELLIPTA) 100-25 mcg/inh inhaler, Inhale 1 puff daily in the early morning Rinse mouth after use , Disp: 1 Inhaler, Rfl: 5    folic acid (FOLVITE) 1 mg tablet, Take 1 tablet (1 mg total) by mouth daily, Disp: 30 tablet, Rfl: 2    FREESTYLE LITE test strip, TEST BLOOD SUGAR DAILY, Disp: 100 each, Rfl: 1    gabapentin (NEURONTIN) 100 mg capsule, Take 1 capsule PO in morning, 1 capsule PO in afternoon, and 3 capsules PO at bedtime, Disp: 150 capsule, Rfl: 0    guaiFENesin (MUCINEX) 600 mg 12 hr tablet, Take 2 tablets (1,200 mg total) by mouth every 12 (twelve) hours, Disp: 30 tablet, Rfl: 0    ipratropium (ATROVENT) 0 02 % nebulizer solution, Take 1 vial (0 5 mg total) by nebulization 3 (three) times a day, Disp: 90 vial, Rfl: 1    Lancets (FREESTYLE) lancets, TEST BLOOD SUGAR DAILY, Disp: 100 each, Rfl: 4    levalbuterol (XOPENEX) 1 25 mg/0 5 mL nebulizer solution, Take 0 5 mL (1 25 mg total) by nebulization every 8 (eight) hours as needed for wheezing, Disp: 1 each, Rfl: 0    loratadine (CLARITIN) 10 mg tablet, Take 10 mg by mouth daily in the early morning  , Disp: , Rfl:     metFORMIN (GLUCOPHAGE-XR) 500 mg 24 hr tablet, Take 1 tablet (500 mg total) by mouth 2 (two) times a day with meals, Disp: 60 tablet, Rfl: 0    methocarbamol (ROBAXIN) 750 mg tablet, Take 1 tablet (750 mg total) by mouth every 6 (six) hours as needed for muscle spasms, Disp: 90 tablet, Rfl: 0    ondansetron (ZOFRAN) 4 mg tablet, Take 1 tablet (4 mg total) by mouth every 4 (four) hours as needed for nausea or vomiting, Disp: , Rfl: 0    oxyCODONE (ROXICODONE) 10 MG TABS, Take 1 tablet (10 mg total) by mouth every 4 (four) hours as needed for severe pain Max Daily Amount: 60 mg, Disp: 180 tablet, Rfl: 0    polyethylene glycol (GLYCOLAX) powder, Take 17 g by mouth daily, Disp: 527 g, Rfl: 1    QUEtiapine (SEROquel) 25 mg tablet, Take 25 mg by mouth daily at bedtime, Disp: , Rfl:     ranitidine (ZANTAC) 150 MG capsule, Take 1 capsule (150 mg total) by mouth 2 (two) times a day, Disp: 60 capsule, Rfl: 0    REGULOID 28 3 % POWD, USE AS DIRECTED, Disp: 369 g, Rfl: 4    Selenium Sulfide 2 25 % SHAM, apply by topical route  every PM to the affected area(s), Disp: , Rfl:     tiotropium (SPIRIVA RESPIMAT) 2 5 MCG/ACT AERS inhaler, Inhale 2 puffs daily, Disp: 1 Inhaler, Rfl: [address], [address] [facility]  [phone]        Representatives have queried the patient's controlled substance dispensing history in the Prescription Drug Monitoring Program in compliance with the [facility] regulations before I have prescribed any controlled substances  The prescription history is consistent with prescribed therapy and our practice policies

## 2019-01-09 NOTE — TELEPHONE ENCOUNTER
Pt is need of PA for Oxycodone which has been initiated  Paperwork has been placed on [employer] for further clinical information      Phone#[phone]  XI#[id number]

## 2019-01-09 NOTE — PATIENT INSTRUCTIONS
Pain  - stop Percocet  - start oxycodone 10mg tabs- take 1 tab every 4 hr as needed for pain  - increase gabapentin to 100mg (1 capsule) morning, 100mg (1 capsule) afternoon, 300mg (3 capsules) bedtime  - increase methocarbamol (Robaxin) to 750mg every 6 hr as needed for muscle spasms  - take Tylenol (acetaminophen) - 2 extra-strength tabs (500mg each) up to 3 times daily as needed (max total 6 tabs daily)    Constipation- will continue to happen due to pain meds  - continue Miralax daily - can increase to twice daily if needed  - make sure you are taking in lots of fluids, especially with the fiber supplement  - try OTC senna or sennakot- 1 or 2 tabs once or twice daily    Nausea  - change ondansetron (Zofran) - 1 tab every 4 hr as needed for nausea  - call if nausea is uncontrolled

## 2019-01-10 ENCOUNTER — HOSPITAL ENCOUNTER (OUTPATIENT)
Dept: MRI IMAGING | Facility: HOSPITAL | Age: 59
Discharge: HOME | End: 2019-01-10
Attending: INTERNAL MEDICINE
Payer: COMMERCIAL

## 2019-01-10 ENCOUNTER — OFFICE VISIT (OUTPATIENT)
Dept: FAMILY MEDICINE CLINIC | Facility: CLINIC | Age: 59
End: 2019-01-10

## 2019-01-10 VITALS
TEMPERATURE: 97 F | HEART RATE: 84 BPM | WEIGHT: 153 LBS | OXYGEN SATURATION: 95 % | BODY MASS INDEX: 23.26 KG/M2 | SYSTOLIC BLOOD PRESSURE: 146 MMHG | RESPIRATION RATE: 18 BRPM | DIASTOLIC BLOOD PRESSURE: 80 MMHG

## 2019-01-10 DIAGNOSIS — K21.9 GASTROESOPHAGEAL REFLUX DISEASE WITHOUT ESOPHAGITIS: ICD-10-CM

## 2019-01-10 DIAGNOSIS — J44.9 CHRONIC OBSTRUCTIVE PULMONARY DISEASE, UNSPECIFIED COPD TYPE (HCC): ICD-10-CM

## 2019-01-10 DIAGNOSIS — C34.91 ADENOCARCINOMA OF LUNG, RIGHT (HCC): ICD-10-CM

## 2019-01-10 DIAGNOSIS — I10 ESSENTIAL HYPERTENSION: ICD-10-CM

## 2019-01-10 DIAGNOSIS — Z23 NEED FOR INFLUENZA VACCINATION: Primary | ICD-10-CM

## 2019-01-10 PROBLEM — G89.3 CANCER ASSOCIATED PAIN: Status: ACTIVE | Noted: 2019-01-10

## 2019-01-10 PROBLEM — G89.4 CHRONIC PAIN DISORDER: Status: ACTIVE | Noted: 2019-01-10

## 2019-01-10 PROCEDURE — A9585 GADOBUTROL INJECTION: HCPCS | Performed by: INTERNAL MEDICINE

## 2019-01-10 PROCEDURE — 99213 OFFICE O/P EST LOW 20 MIN: CPT | Performed by: FAMILY MEDICINE

## 2019-01-10 PROCEDURE — 70553 MRI BRAIN STEM W/O & W/DYE: CPT

## 2019-01-10 RX ORDER — PANTOPRAZOLE SODIUM 40 MG/1
40 TABLET, DELAYED RELEASE ORAL DAILY
Qty: 30 TABLET | Refills: 5 | Status: SHIPPED | OUTPATIENT
Start: 2019-01-10 | End: 2019-05-02 | Stop reason: SDUPTHER

## 2019-01-10 RX ADMIN — GADOBUTROL 8 ML: 604.72 INJECTION INTRAVENOUS at 10:53

## 2019-01-10 NOTE — PROGRESS NOTES
MAHESH joined [facility]) for patient's first appointment with palliative medicine  Patient is currently living with his cousin and her   He is residing with her because he struggles with everyday living activities  He recently stopped working and has been approved for [employer]  Patient has moved back and forth from [place] and [place]  Patient has family and supports in both states  Patient has a long-term significant other but continues to be legally  to another woman  Patient has a total of 8 children but worries the most about his two 13year old twins (male and female)  Patient is very interested in completing the 5 wishes document at a later time  Patient follows closely with [facility] for both psychiatry and psychology  He does feel that at times he needs a little more support and is grateful for the offer of MAHESH support

## 2019-01-10 NOTE — ASSESSMENT & PLAN NOTE
Will continue Breo- nely 100 - 25  Patient also has Dx of Adenocarcinoma   - Home Oxygen  - Follow up with Pulm

## 2019-01-10 NOTE — PROGRESS NOTES
Assessment/Plan:    Gastroesophageal reflux disease without esophagitis  Patient states that zantac isnt working  Will start on protonix     COPD (chronic obstructive pulmonary disease) ([facility] [address] )  Will continue Breo- elipta 100 - 25  Patient also has Dx of Adenocarcinoma   - Home Oxygen  - Follow up with Pulm     Essential hypertension  BP is well controlled at this time       Diagnoses and all orders for this visit:    Need for influenza vaccination  -     PREFERRED: influenza vaccine, 1117-6018, quadrivalent, recombinant, PF, 0 5 mL, for patients 18 yr+ (FLUBLOK)    Gastroesophageal reflux disease without esophagitis    Chronic obstructive pulmonary disease, unspecified COPD type ([facility] [address] )    Essential hypertension          Subjective:      Patient ID: Mauri Moritz Sr  is a 66 y o  male  This is a very pleasant 51-year-old gentleman who presents to the clinic for refills and management of his chronic medical conditions  Patient states that his acid reflux is been getting worse would like to try something else will start patient on Protonix  Patient also was recently diagnosed with adenocarcinoma of the lung was seen by pulmonology was started on home oxygen as well as Breo Ellipta  Patient states he is short of breath at times however the medication has helped him a lot  Patient defers getting the flu shot  Patient has no other complaints at this time  The following portions of the patient's history were reviewed and updated as appropriate: allergies, current medications, past family history, past medical history, past social history, past surgical history and problem list     Review of Systems   Constitutional: Negative for chills and fever  HENT: Negative for congestion and sore throat  Eyes: Negative for visual disturbance  Respiratory: Positive for shortness of breath  Negative for wheezing  Cardiovascular: Negative for chest pain and palpitations     Gastrointestinal: Negative for nausea and vomiting  Endocrine: Negative for polydipsia and polyphagia  Genitourinary: Negative for discharge and dysuria  Musculoskeletal: Negative for arthralgias and myalgias  Skin: Negative for rash and wound  Neurological: Negative for syncope and facial asymmetry  Psychiatric/Behavioral: Negative for agitation  Objective:      /80 (BP Location: Right arm, Patient Position: Sitting, Cuff Size: Standard)   Pulse 84   Temp (!) 97 °F (36 1 °C) (Temporal)   Resp 18   Wt 69 4 kg (153 lb)   SpO2 95%   BMI 23 26 kg/m²          Physical Exam   Constitutional: He is oriented to person, place, and time  He appears well-developed and well-nourished  No distress  HENT:   Head: Normocephalic and atraumatic  Nose: Nose normal    Mouth/Throat: Oropharynx is clear and moist    Eyes: Pupils are equal, round, and reactive to light  Conjunctivae and EOM are normal  Right eye exhibits no discharge  Left eye exhibits no discharge  Neck: Normal range of motion  No JVD present  No tracheal deviation present  No thyromegaly present  Cardiovascular: Normal rate, regular rhythm, normal heart sounds and intact distal pulses  Exam reveals no gallop and no friction rub  No murmur heard  Pulmonary/Chest: Effort normal and breath sounds normal  No respiratory distress  He has no wheezes  He exhibits no tenderness  Abdominal: He exhibits no distension and no mass  There is no tenderness  There is no rebound and no guarding  Musculoskeletal: Normal range of motion  He exhibits no edema, tenderness or deformity  Neurological: He is alert and oriented to person, place, and time  Skin: Skin is warm and dry  No rash noted  He is not diaphoretic  No erythema  No pallor  Psychiatric: He has a normal mood and affect   His behavior is normal  Judgment and thought content normal

## 2019-01-11 ENCOUNTER — TELEPHONE (OUTPATIENT)
Dept: PALLIATIVE MEDICINE | Facility: CLINIC | Age: 59
End: 2019-01-11

## 2019-01-11 NOTE — TELEPHONE ENCOUNTER
Patient contacted W for assistance with financial resources and housing  LSW suggested that he complete the [place] Application for people with disabilities - this was mailed to patient  LSW did state that there a a few agencies that assist with payment for something specific and usually only a one time payment  Patient was requesting assistance with paying regular bills  Patient also requested information about how to obtain furnishing  LSW did state that once a residence is identified that I could assist with finding furnishings

## 2019-01-23 ENCOUNTER — APPOINTMENT (OUTPATIENT)
Dept: LAB | Facility: HOSPITAL | Age: 59
End: 2019-01-23
Attending: INTERNAL MEDICINE
Payer: COMMERCIAL

## 2019-01-23 ENCOUNTER — OFFICE VISIT (OUTPATIENT)
Dept: HEMATOLOGY ONCOLOGY | Facility: CLINIC | Age: 59
End: 2019-01-23
Payer: COMMERCIAL

## 2019-01-23 VITALS
BODY MASS INDEX: 23.08 KG/M2 | HEART RATE: 94 BPM | OXYGEN SATURATION: 95 % | SYSTOLIC BLOOD PRESSURE: 130 MMHG | TEMPERATURE: 97.7 F | HEIGHT: 69 IN | WEIGHT: 155.8 LBS | DIASTOLIC BLOOD PRESSURE: 78 MMHG | RESPIRATION RATE: 20 BRPM

## 2019-01-23 DIAGNOSIS — C34.91 ADENOCARCINOMA OF LUNG, RIGHT (HCC): Primary | ICD-10-CM

## 2019-01-23 DIAGNOSIS — C34.91 ADENOCARCINOMA OF LUNG, RIGHT (HCC): ICD-10-CM

## 2019-01-23 LAB — MAGNESIUM SERPL-MCNC: 2 MG/DL (ref 1.6–2.3)

## 2019-01-23 PROCEDURE — 83735 ASSAY OF MAGNESIUM: CPT

## 2019-01-23 PROCEDURE — 99214 OFFICE O/P EST MOD 30 MIN: CPT | Performed by: INTERNAL MEDICINE

## 2019-01-23 RX ORDER — SODIUM CHLORIDE 9 MG/ML
20 INJECTION, SOLUTION INTRAVENOUS ONCE
Status: COMPLETED | OUTPATIENT
Start: 2019-01-24 | End: 2019-01-24

## 2019-01-23 NOTE — PROGRESS NOTES
Hematology/Oncology Outpatient Follow-up  Jeronimo Allen Sr  66 y o  male 1960 [id number]    Date:  1/23/2019        Assessment and Plan:  1  Adenocarcinoma of lung, right [facility])  The patient will be continue on the palliative chemotherapy/immunotherapy with carboplatin/Alimta/Kyetruda cycle 3 tomorrow  He was told that the genomic evaluation through [facility] showed in PDL 1 at 1% the rest of the evaluation cannot be done since the biopsy was too small for further analysis  We will aim for liquid genomic evaluation if possible  Otherwise a biopsy from the lung cancer could be done in the future  We discussed the prognosis and treatment recommendation again  We will aim to pursue at least 4-6 cycles before we repeated a CT scan or PET-CT scan for evaluation  The patient was educated about the need for Port-A-Cath placement he seems to agree with the plan  - Ambulatory referral to General Surgery; Future  - CBC and differential; Future  - Comprehensive metabolic panel; Future  - Magnesium; Future        HPI:  The patient came in today for a follow-up visit  He tolerated cycle 2 of palliative chemotherapy and immunotherapy relatively well  He had an MRI of the brain on the 10th of January which was negative for metastatic disease  He does have nasal polyp in the anterior nasal phos of the right side  He also had a CT scan of the chest with PE protocol on the 2nd of January which showed  IMPRESSION:     1  No pulmonary embolism, residual pneumothorax, or other acute thoracic findings      2  Resolving postbiopsy changes in the right middle lobe, largely obscuring the patient's known primary malignancy  Unchanged nodular studding of both fissures in the right lung concerning for pleural spread of disease      3   Mediastinal lymphadenopathy shows mild increase in size compared to October, but is stable since most recent prior    Largest node in the AP window      4   Suspicious left adrenal nodule suspicious for metastatic disease appears unchanged at 14 mm  Oncology History    12year-old [facility] American male heavy smoker who used to smoke 2 packs of cigarettes daily for many years, COPD, he presented with dyspnea, CT scan of the chest on October 2018 showed right middle lobe spiculated 1 3 cm mass with multiple solid and ground-glass nodules along the major and minor fissures  Sub cm pulmonary nodules bilaterally, left adrenal 1 2 cm nodule, PET scan showed 1 3 cm right middle lung nodule with SUV of 6 8, numerous nodular densities along the right major and minor fissures, right hilar activity with SUV of 3 4, subcarinal lymph node measuring 7 mm with SUV of 2 7, periportal enlarged lymph nodes concerning for metastatic disease measuring 2 4 cm with SUV of 5, prominent left retrocrural lymph node measuring 1 cm x 9 mm with SUV of 1 1, core biopsy of the right spiculated nodule showed invasive adenocarcinoma consistent with lung primary positive for CK7, TTF 1, napsin a        Adenocarcinoma of lung, right ([facility] [address] )    11/13/2018 Initial Diagnosis     Adenocarcinoma of lung, right ([facility] [address] )  Stage IV         12/13/2018 -  Chemotherapy     Started Alimta, Carboplatin (AUC 5) + Keytruda            Interval history:    ROS: Review of Systems   Constitutional: Positive for fatigue  Negative for appetite change, diaphoresis and fever  HENT: Negative for congestion, dental problem, facial swelling, hearing loss, tinnitus and trouble swallowing  Eyes: Negative for visual disturbance  Respiratory: Positive for cough and shortness of breath  Negative for chest tightness and wheezing  Cardiovascular: Negative for chest pain and leg swelling  Gastrointestinal: Positive for constipation  Negative for abdominal distention, abdominal pain, blood in stool, diarrhea, nausea and vomiting  Genitourinary: Negative for dysuria, hematuria and urgency  Musculoskeletal: Negative for arthralgias, myalgias and neck pain  Skin: Negative  Negative for color change, pallor, rash and wound  Neurological: Positive for numbness and headaches  Negative for dizziness and weakness  Hematological: Negative for adenopathy  Psychiatric/Behavioral: Positive for sleep disturbance  Negative for agitation, behavioral problems, confusion, hallucinations and self-injury  The patient is not nervous/anxious and is not hyperactive          Past Medical History:   Diagnosis Date    Bipolar 1 disorder ([facility] [address] )     Chronic pain disorder     COPD (chronic obstructive pulmonary disease) (HCC)     Depression     Diabetes mellitus (HCC)     GERD (gastroesophageal reflux disease)     Herniated lumbar intervertebral disc     Hypertension     Latent syphilis     Treated    Low back pain     Lumbosacral disc disease     PTSD (post-traumatic stress disorder)     Tobacco abuse 11/13/2018       Past Surgical History:   Procedure Laterality Date    CT GUIDED CHEST TUBE  11/21/2018    HEMORROIDECTOMY      IR CHEST TUBE  11/14/2018    IR IMAGE GUIDED BIOPSY/ASPIRATION LUNG  11/13/2018    KNEE SURGERY         Social History     Social History    Marital status: Legally      Spouse name: N/A    Number of children: N/A    Years of education: N/A     Occupational History    part time employment      Social History Main Topics    Smoking status: Former Smoker     Packs/day: 0 50     Types: Cigarettes     Quit date: 8/31/2018    Smokeless tobacco: Never Used      Comment: "i quit one month ago when i quit weed"    Alcohol use Yes      Comment: social    Drug use: No      Comment: stopped 8-2018, "i smoked weed and stopped 1 month ago"    Sexual activity: Yes     Other Topics Concern    None     Social History Narrative    Lives with girlfriend       Family History   Problem Relation Age of Onset    Heart disease Mother     Hypertension Father     Diabetes Family     Asthma Family     Heart disease Family     Cancer Family Allergies   Allergen Reactions    Lisinopril Anaphylaxis     Took medication a while ago and had a "swelling reaction"  Does not carry epi-pen         Current Outpatient Prescriptions:     acetaminophen (TYLENOL) 500 mg tablet, Take 2 tablets (1,000 mg total) by mouth every 8 (eight) hours as needed for mild pain, Disp: , Rfl: 0    albuterol (VENTOLIN HFA) 90 mcg/act inhaler, Inhale 2 puffs every 4 (four) hours as needed for wheezing, Disp: 1 Inhaler, Rfl: 0    amLODIPine (NORVASC) 10 mg tablet, Take 1 tablet (10 mg total) by mouth daily (Patient taking differently: Take 10 mg by mouth daily in the early morning  ), Disp: 30 tablet, Rfl: 1    Blood Glucose Monitoring Suppl KIT, by Does not apply route daily, Disp: 1 each, Rfl: 0    dexamethasone (DECADRON) 4 mg tablet, Take 1 tablet (4 mg total) by mouth 2 (two) times a day with meals P o  B i d  With food For 3 days    24 hr prior to chemotherapy, Disp: 60 tablet, Rfl: 0    FLUoxetine (PROzac) 10 MG tablet, Take 10 mg by mouth daily in the early morning  , Disp: , Rfl:     fluticasone-vilanterol (BREO ELLIPTA) 100-25 mcg/inh inhaler, Inhale 1 puff daily in the early morning Rinse mouth after use , Disp: 1 Inhaler, Rfl: 5    folic acid (FOLVITE) 1 mg tablet, Take 1 tablet (1 mg total) by mouth daily, Disp: 30 tablet, Rfl: 2    FREESTYLE LITE test strip, TEST BLOOD SUGAR DAILY, Disp: 100 each, Rfl: 1    gabapentin (NEURONTIN) 100 mg capsule, Take 1 capsule PO in morning, 1 capsule PO in afternoon, and 3 capsules PO at bedtime, Disp: 150 capsule, Rfl: 0    guaiFENesin (MUCINEX) 600 mg 12 hr tablet, Take 2 tablets (1,200 mg total) by mouth every 12 (twelve) hours, Disp: 30 tablet, Rfl: 0    ipratropium (ATROVENT) 0 02 % nebulizer solution, Take 1 vial (0 5 mg total) by nebulization 3 (three) times a day, Disp: 90 vial, Rfl: 1    Lancets (FREESTYLE) lancets, TEST BLOOD SUGAR DAILY, Disp: 100 each, Rfl: 4    levalbuterol (XOPENEX) 1 25 mg/0 5 mL nebulizer solution, Take 0 5 mL (1 25 mg total) by nebulization every 8 (eight) hours as needed for wheezing, Disp: 1 each, Rfl: 0    loratadine (CLARITIN) 10 mg tablet, Take 10 mg by mouth daily in the early morning  , Disp: , Rfl:     metFORMIN (GLUCOPHAGE-XR) 500 mg 24 hr tablet, Take 1 tablet (500 mg total) by mouth 2 (two) times a day with meals, Disp: 60 tablet, Rfl: 0    methocarbamol (ROBAXIN) 750 mg tablet, Take 1 tablet (750 mg total) by mouth every 6 (six) hours as needed for muscle spasms, Disp: 90 tablet, Rfl: 0    ondansetron (ZOFRAN) 4 mg tablet, Take 1 tablet (4 mg total) by mouth every 4 (four) hours as needed for nausea or vomiting, Disp: , Rfl: 0    oxyCODONE (ROXICODONE) 10 MG TABS, Take 1 tablet (10 mg total) by mouth every 4 (four) hours as needed for severe pain Max Daily Amount: 60 mg, Disp: 180 tablet, Rfl: 0    pantoprazole (PROTONIX) 40 mg tablet, Take 1 tablet (40 mg total) by mouth daily, Disp: 30 tablet, Rfl: 5    polyethylene glycol (GLYCOLAX) powder, Take 17 g by mouth daily, Disp: 527 g, Rfl: 1    QUEtiapine (SEROquel) 25 mg tablet, Take 25 mg by mouth daily at bedtime, Disp: , Rfl:     ranitidine (ZANTAC) 150 MG capsule, Take 1 capsule (150 mg total) by mouth 2 (two) times a day, Disp: 60 capsule, Rfl: 0    REGULOID 28 3 % POWD, USE AS DIRECTED, Disp: 369 g, Rfl: 4    Selenium Sulfide 2 25 % SHAM, apply by topical route  every PM to the affected area(s), Disp: , Rfl:     tiotropium (SPIRIVA RESPIMAT) 2 5 MCG/ACT AERS inhaler, Inhale 2 puffs daily, Disp: 1 Inhaler, Rfl: 2      Physical Exam:  /78 (BP Location: Left arm, Cuff Size: Standard)   Pulse 94   Temp 97 7 °F (36 5 °C) (Tympanic)   Resp 20   Ht 5' 8 5" (1 74 m)   Wt 70 7 kg (155 lb 12 8 oz)   SpO2 95%   BMI 23 34 kg/m²     Physical Exam   Constitutional: He is oriented to person, place, and time  He appears well-developed and well-nourished  HENT:   Head: Normocephalic and atraumatic     Nose: Nose normal    Mouth/Throat: Oropharynx is clear and moist    Eyes: Pupils are equal, round, and reactive to light  Conjunctivae and EOM are normal  Right eye exhibits no discharge  Left eye exhibits no discharge  No scleral icterus  Neck: Normal range of motion  Neck supple  No tracheal deviation present  No thyromegaly present  Cardiovascular: Normal rate, regular rhythm and normal heart sounds  Exam reveals no friction rub  No murmur heard  Pulmonary/Chest: Effort normal  No respiratory distress  He has wheezes (On the right more than left)  He exhibits no tenderness  Abdominal: Soft  Bowel sounds are normal  He exhibits no distension and no mass  There is no hepatosplenomegaly, splenomegaly or hepatomegaly  There is no tenderness  There is no rebound and no guarding  Musculoskeletal: Normal range of motion  He exhibits no edema, tenderness or deformity  Lymphadenopathy:     He has no cervical adenopathy  Neurological: He is alert and oriented to person, place, and time  He has normal reflexes  No cranial nerve deficit  Coordination normal    Skin: Skin is warm and dry  No rash noted  No erythema  No pallor  Psychiatric: He has a normal mood and affect  His behavior is normal  Judgment and thought content normal          Labs:  Lab Results   Component Value Date    WBC 5 70 01/02/2019    HGB 12 6 01/02/2019    HCT 38 1 01/02/2019    MCV 84 01/02/2019     (H) 01/02/2019     Lab Results   Component Value Date    K 3 9 01/02/2019     01/02/2019    CO2 28 01/02/2019    BUN 15 01/02/2019    CREATININE 1 13 01/02/2019    GLUF 88 01/02/2019    CALCIUM 9 0 01/02/2019    AST 19 01/02/2019    ALT 31 01/02/2019    ALKPHOS 94 01/02/2019    EGFR 84 01/02/2019     No results found for: TSH    Patient voiced understanding and agreement in the above discussion  Aware to contact our office with questions/symptoms in the interim

## 2019-01-24 ENCOUNTER — HOSPITAL ENCOUNTER (OUTPATIENT)
Dept: INFUSION CENTER | Facility: HOSPITAL | Age: 59
Discharge: HOME | End: 2019-01-24
Payer: COMMERCIAL

## 2019-01-24 VITALS
HEIGHT: 69 IN | HEART RATE: 93 BPM | TEMPERATURE: 98.9 F | BODY MASS INDEX: 23.25 KG/M2 | DIASTOLIC BLOOD PRESSURE: 77 MMHG | RESPIRATION RATE: 16 BRPM | WEIGHT: 156.97 LBS | SYSTOLIC BLOOD PRESSURE: 146 MMHG

## 2019-01-24 PROCEDURE — 96367 TX/PROPH/DG ADDL SEQ IV INF: CPT

## 2019-01-24 PROCEDURE — 96417 CHEMO IV INFUS EACH ADDL SEQ: CPT

## 2019-01-24 PROCEDURE — 96411 CHEMO IV PUSH ADDL DRUG: CPT

## 2019-01-24 PROCEDURE — 96413 CHEMO IV INFUSION 1 HR: CPT

## 2019-01-24 RX ADMIN — DEXAMETHASONE SODIUM PHOSPHATE: 10 INJECTION, SOLUTION INTRAMUSCULAR; INTRAVENOUS at 09:23

## 2019-01-24 RX ADMIN — SODIUM CHLORIDE 20 ML/HR: 0.9 INJECTION, SOLUTION INTRAVENOUS at 09:10

## 2019-01-24 RX ADMIN — CARBOPLATIN 481 MG: 10 INJECTION, SOLUTION INTRAVENOUS at 10:03

## 2019-01-24 RX ADMIN — SODIUM CHLORIDE 915 MG: 9 INJECTION, SOLUTION INTRAVENOUS at 09:50

## 2019-01-24 RX ADMIN — SODIUM CHLORIDE 200 MG: 9 INJECTION, SOLUTION INTRAVENOUS at 11:06

## 2019-01-24 NOTE — PLAN OF CARE
Problem: Potential for Falls  Goal: Patient will remain free of falls  INTERVENTIONS:  - Assess patient frequently for physical needs  -  Identify cognitive and physical deficits and behaviors that affect risk of falls    -  Smith fall precautions as indicated by assessment   - Educate patient/family on patient safety including physical limitations  - Instruct patient to call for assistance with activity based on assessment  - Modify environment to reduce risk of injury  - Consider OT/PT consult to assist with strengthening/mobility   Outcome: Progressing

## 2019-02-06 ENCOUNTER — TELEPHONE (OUTPATIENT)
Dept: HEMATOLOGY ONCOLOGY | Facility: CLINIC | Age: 59
End: 2019-02-06

## 2019-02-06 ENCOUNTER — SOCIAL WORK (OUTPATIENT)
Dept: PALLIATIVE MEDICINE | Facility: CLINIC | Age: 59
End: 2019-02-06
Payer: COMMERCIAL

## 2019-02-06 ENCOUNTER — OFFICE VISIT (OUTPATIENT)
Dept: PALLIATIVE MEDICINE | Facility: CLINIC | Age: 59
End: 2019-02-06
Payer: COMMERCIAL

## 2019-02-06 VITALS
HEART RATE: 79 BPM | TEMPERATURE: 98.4 F | SYSTOLIC BLOOD PRESSURE: 134 MMHG | BODY MASS INDEX: 23.42 KG/M2 | DIASTOLIC BLOOD PRESSURE: 72 MMHG | HEIGHT: 69 IN | WEIGHT: 158.13 LBS | RESPIRATION RATE: 16 BRPM | OXYGEN SATURATION: 93 %

## 2019-02-06 DIAGNOSIS — C34.90 MALIGNANT NEOPLASM OF LUNG, UNSPECIFIED LATERALITY, UNSPECIFIED PART OF LUNG (HCC): ICD-10-CM

## 2019-02-06 DIAGNOSIS — C34.91 ADENOCARCINOMA OF LUNG, RIGHT (HCC): ICD-10-CM

## 2019-02-06 DIAGNOSIS — R52 GENERALIZED BODY ACHES: ICD-10-CM

## 2019-02-06 DIAGNOSIS — Z71.89 COUNSELING AND COORDINATION OF CARE: Primary | ICD-10-CM

## 2019-02-06 DIAGNOSIS — R11.0 CHEMOTHERAPY-INDUCED NAUSEA: ICD-10-CM

## 2019-02-06 DIAGNOSIS — M54.50 LUMBAR PAIN: ICD-10-CM

## 2019-02-06 DIAGNOSIS — G89.3 CANCER ASSOCIATED PAIN: Primary | ICD-10-CM

## 2019-02-06 DIAGNOSIS — T45.1X5A CHEMOTHERAPY-INDUCED NAUSEA: ICD-10-CM

## 2019-02-06 PROCEDURE — 99214 OFFICE O/P EST MOD 30 MIN: CPT | Performed by: NURSE PRACTITIONER

## 2019-02-06 RX ORDER — PROCHLORPERAZINE MALEATE 10 MG
10 TABLET ORAL EVERY 6 HOURS PRN
Qty: 60 TABLET | Refills: 1 | Status: SHIPPED | OUTPATIENT
Start: 2019-02-06 | End: 2019-04-01

## 2019-02-06 RX ORDER — OXYCODONE HYDROCHLORIDE 15 MG/1
15 TABLET ORAL EVERY 4 HOURS PRN
Qty: 150 TABLET | Refills: 0 | Status: SHIPPED | OUTPATIENT
Start: 2019-02-06 | End: 2019-02-27 | Stop reason: SDUPTHER

## 2019-02-06 NOTE — PROGRESS NOTES
Outpatient Palliative and Supportive Care Social Work Note:    LCSW met with patient in the office for an follow up appointment  Family dynamics and social history:   Marital status:  but     Name of spouse or significant other: Unknown   Children: 8 children total    Name of children if any:  Cheko Farr and Jerel Garcia, 13year old twins, youngest of patients children  Other family information: Patient is living with his cousin and his wife in [place]  Patients two youngest children are also living with them  Patients other children are adults and live between [place] and [place]  Living Will: No   POA medical: No    POA agent: No   5 Wishes document: Provided 5 wishes today at visit, and will return at next visit   history: No   Employment history: Disability    Cultural information (if applicable): Important race, gender identification, cultural, or ethnicity areas to note:     Patient strengths, social supports, and resources:   - Patient does have support from his family even though they are spread out between states  - Patient as a strong spirit and has hope of getting thru his treatments  - Looking into resources for housing   - Consistently attending [place] [id number] counseling/psychiatry, and looking into getting a     Needs and areas of concern:   - Symptoms of pain and nausea   - patient is sleeping more than he ever has in his life   - Housing/supports    Environment concerns/barriers:   - Living with cousin and his wife and needs housing   - Limited finances    Discussed   - Medical history and how treatment is going   - Syptoms   - Patient and family dynamics    - Gave blank copy of 5 wishes for patient to review   - Housing    - Provided the address to mail his application into for submission   - Mental health counseling   - Any other additional support needs    Patient appeared alert, and oriented  Patient presented as tired and fatigued   Patient was not too open with his feelings but did answer questions as able regarding his life and family dynamics  Patient appears to be coping okay at this time and utilizing the social supports in his life ie  Family, friends, and counsleing  Pt did express understanding of and agreement with our discussion  We will continue to follow  I will return to assist/ patient in a month      Stefan Mario MSW, LCSW

## 2019-02-06 NOTE — TELEPHONE ENCOUNTER
Returned phone call to patient and explained the reason for port placement  Patient verbalizes understanding of the need for port but stated that he believed Dr told him eventually he would be on a pill, explained to patient that he would need at least 6 cycles of chemo and multiple blood draws  Patient is apprehensive about being "cut" for port placement

## 2019-02-06 NOTE — PATIENT INSTRUCTIONS
Start morphine ER 15mg (1 tab) twice daily (morning and bedtime)  This is a long-acting medication  Don't cut pills  Oxycodone is now 15mg tabs - take 1 tab every 4 hr as needed for breakthrough pain  Start prochlorperazine (Compazine) as needed for nausea/ vomiting  Ok to take with ondansetron

## 2019-02-06 NOTE — PROGRESS NOTES
Palliative and Supportive Care   Matthias Montenegro   66 y o  male [id number]    Assessment/Plan:  1  Cancer associated pain    2  Adenocarcinoma of lung, right ([facility] [address] )    3  Generalized body aches    4  Lumbar pain    5  Malignant neoplasm of lung, unspecified laterality, unspecified part of lung ([facility] [address] )    6  Chemotherapy-induced nausea        Requested Prescriptions     Signed Prescriptions Disp Refills    Morphine Sulfate ER 15 MG T12A 60 tablet 0     Sig: Take 15 mg by mouth every 12 (twelve) hours Max Daily Amount: 30 mg    oxyCODONE (ROXICODONE) 15 mg immediate release tablet 150 tablet 0     Sig: Take 1 tablet (15 mg total) by mouth every 4 (four) hours as needed for severe pain Max Daily Amount: 90 mg     Patient taking differently: Take 15 mg by mouth every 4 (four) hours as needed for severe pain 10mg is at present being increased to 15mg    prochlorperazine (COMPAZINE) 10 mg tablet 60 tablet 1     Sig: Take 1 tablet (10 mg total) by mouth every 6 (six) hours as needed for nausea or vomiting     Pain- unclear etiology, history of chronic pain, significantly worsened after starting chemo  - start morphine ER 15mg BID  - increase oxycodoneIR to 15mg q4h PRN  - gabapentin [phone]  - methocarbamol 750mg q6h PRN muscle spasms  - acetaminophen 1000mg TID PRN      Nausea  - ondansetron PRN  - add Compazine 10mg q 6h PRN    Goals of care:  - disease-directed plan of care  - is working on Five Wishes advance care planning document      Follow up in one month      Subjective    Chief Concern  Follow up visit for:  Pain, lung cancer         History of Present Illness  Patient ID: Ting Byrne  is a 66 y o  male with metastatic NSCLC diagnosed in fall 2018  He has completed three cycles of chemo thus far  He will be having port placement later this week  Ongoing diffuse pain, worst after chemo  PRN oxycodone sometimes but does not always improve pain  He has been taking 3-4x/day   It is unclear if increase in dose provided increased relief  Has been feeling fatigued  He has been nauseated following chemo  Ondansetron has not provided much relief  Constipation controlled with fiber supplement and Miralax  Living [facility] cousin and her   He has a girlfriend  He has 8 children  Youngest two children are 14yo twins who are living with him part time  The following portions of the patient's history were reviewed and updated as appropriate: allergies, current medications, past family history, past medical history, past social history, past surgical history and problem list       Visit Information    Accompanied By: No one  Source of History: Self  History Limitations: None    ROS  Review of Systems   Constitutional: Positive for fatigue  Respiratory: Positive for shortness of breath  Gastrointestinal: Positive for nausea  Negative for constipation  Musculoskeletal: Positive for arthralgias and myalgias  Objective     Physical Exam   Constitutional: He is oriented to person, place, and time  He appears well-developed and well-nourished  He is cooperative  fatigued   Pulmonary/Chest: Effort normal    Neurological: He is alert and oriented to person, place, and time  Psychiatric: He has a normal mood and affect  Cognition and memory are normal          /72 (BP Location: Left arm, Cuff Size: Standard)   Pulse 79   Temp 98 4 °F (36 9 °C) (Oral)   Resp 16   Ht 5' 8 5" (1 74 m)   Wt 71 7 kg (158 lb 2 oz)   SpO2 93%   BMI 23 69 kg/m²       - ECOG Performance Status: 1    No current facility-administered medications for this visit  No current outpatient prescriptions on file      Facility-Administered Medications Ordered in Other Visits:     acetaminophen (TYLENOL) tablet 650 mg, 650 mg, Oral, Q6H PRN, Kaden Hurt MD    lactated ringers infusion, 125 mL/hr, Intravenous, Continuous, Leatha Clarke MD, Last Rate: 125 mL/hr at 02/08/19 0855, 125 mL/hr at 02/08/19 0855    ondansetron [facility]) injection 4 mg, 4 mg, Intravenous, Q8H PRN, Radha Richmond MD    oxyCODONE-acetaminophen (PERCOCET) 5-325 mg per tablet 1 tablet, 1 tablet, Oral, Q6H PRN, Radha Richmond MD, 1 tablet at 02/08/19 1148    sodium chloride 0 9 % infusion, 50 mL/hr, Intravenous, Continuous, Radha Richmond MD      [address] and Supportive Care  [phone]        Representatives have queried the patient's controlled substance dispensing history in the Prescription Drug Monitoring Program in compliance with the [facility] regulations before I have prescribed any controlled substances  The prescription history is consistent with prescribed therapy and our practice policies

## 2019-02-06 NOTE — TELEPHONE ENCOUNTER
Patient is scheduled to have his port placed on Friday 02/08/19  Patient wants to know if there is a pill he can take instead of getting the port  Patient can be reached at [phone]

## 2019-02-07 ENCOUNTER — ANESTHESIA EVENT (OUTPATIENT)
Dept: PERIOP | Facility: HOSPITAL | Age: 59
End: 2019-02-07
Payer: COMMERCIAL

## 2019-02-07 NOTE — PRE-PROCEDURE INSTRUCTIONS
Pre-Surgery Instructions:   Medication Instructions    albuterol (VENTOLIN HFA) 90 mcg/act inhaler Instructed patient per Anesthesia Guidelines   amLODIPine (NORVASC) 10 mg tablet Instructed patient per Anesthesia Guidelines   FLUoxetine (PROzac) 10 MG tablet Instructed patient per Anesthesia Guidelines   fluticasone-vilanterol (BREO ELLIPTA) 100-25 mcg/inh inhaler Instructed patient per Anesthesia Guidelines   guaiFENesin (MUCINEX) 600 mg 12 hr tablet Instructed patient per Anesthesia Guidelines   ipratropium (ATROVENT) 0 02 % nebulizer solution Instructed patient per Anesthesia Guidelines   levalbuterol (XOPENEX) 1 25 mg/0 5 mL nebulizer solution Instructed patient per Anesthesia Guidelines   loratadine (CLARITIN) 10 mg tablet Instructed patient per Anesthesia Guidelines   oxyCODONE (ROXICODONE) 15 mg immediate release tablet Instructed patient per Anesthesia Guidelines  Pre-op Showering Instructions for Surgery Patients    Before your operation, you play an important role in decreasing your risk for infection by washing with special antiseptic soap  This is an effective way to reduce bacteria on the skin which may help to prevent infections at the surgical site  Please read the following directions in advance  1  In the week before your operation, purchase a 4 ounce bottle of antiseptic soap containing chlorhexidine gluconate (CHG)  4%  Some brand names include: Aplicare®, Endure, and Hibiclens®  The cost is usually less than $5 00   For your convenience, the [employer] carries the soap   It may also be available at your doctors office or pre-admission testing center, and at most retail pharmacies   If you are allergic or sensitive to soaps containing CHG, please let your doctor know so another antiseptic can be suggested   CHG antiseptic soap is for external use only  2   The day before your operation, follow these instructions carefully to get ready   Please clean linens (sheets) on your bed; you should sleep on clean sheets after your evening shower   Get clean towels and washcloth ready - you need enough for 2 showers   Set aside clean underwear, pajamas, and clothing to wear after the showers     Reminders:   DO NOT use any other soap or body rinse on your skin during or after the antiseptic showers   DO NOT use lotion, powder, deodorant, or perfume/aftershave of any kind on your skin after your antiseptic shower   DO NOT shave any body parts in the 24 hours/day before your operation   DO NOT get the antiseptic soap in your eyes, ears, nose, mouth, or vaginal area    3  You will need to shower the night before AND the morning of your surgery  Shower 1:   The first evening before the operation, take the first shower   First, shampoo your hair with regular shampoo and rinse it completely before you use the antiseptic soap  After washing and rinsing your hair, rinse your body   Next, use a clean washcloth to apply the antiseptic soap and wash your body from the neck down to your toes using ½ bottle of the antiseptic soap  You will use the other ½ bottle for the second shower   Clean the area where your incision will be; lather this area well for about 2 minutes   If you are having head or neck surgery, wash areas with the antiseptic soap   Rinse yourself completely with running water   Use a clean towel to dry off   Wear clean underwear and clothing/pajamas  Shower 2   The morning of your operation, take the second shower following the same steps as Shower 1 using the second ½ of the bottle of antiseptic soap   Use clean cloths and towels to wash and dry yourself   Wear clean underwear and clothing        pantoprazole (PROTONIX) 40 mg tablet Instructed patient per Anesthesia Guidelines   ranitidine (ZANTAC) 150 MG capsule Instructed patient per Anesthesia Guidelines      tiotropium (SPIRIVA RESPIMAT) 2 5 MCG/ACT AERS inhaler Instructed patient per Anesthesia Guidelines

## 2019-02-08 ENCOUNTER — APPOINTMENT (OUTPATIENT)
Dept: RADIOLOGY | Facility: HOSPITAL | Age: 59
End: 2019-02-08
Payer: COMMERCIAL

## 2019-02-08 ENCOUNTER — ANESTHESIA (OUTPATIENT)
Dept: PERIOP | Facility: HOSPITAL | Age: 59
End: 2019-02-08
Payer: COMMERCIAL

## 2019-02-08 ENCOUNTER — TELEPHONE (OUTPATIENT)
Dept: PALLIATIVE MEDICINE | Facility: CLINIC | Age: 59
End: 2019-02-08

## 2019-02-08 ENCOUNTER — HOSPITAL ENCOUNTER (OUTPATIENT)
Facility: HOSPITAL | Age: 59
Setting detail: OUTPATIENT SURGERY
Discharge: HOME | End: 2019-02-08
Attending: SURGERY | Admitting: SURGERY
Payer: COMMERCIAL

## 2019-02-08 VITALS
RESPIRATION RATE: 20 BRPM | DIASTOLIC BLOOD PRESSURE: 77 MMHG | SYSTOLIC BLOOD PRESSURE: 127 MMHG | OXYGEN SATURATION: 97 % | TEMPERATURE: 97.9 F | HEART RATE: 84 BPM

## 2019-02-08 LAB
GLUCOSE SERPL-MCNC: 83 MG/DL (ref 65–140)
GLUCOSE SERPL-MCNC: 87 MG/DL (ref 65–140)

## 2019-02-08 PROCEDURE — 82948 REAGENT STRIP/BLOOD GLUCOSE: CPT

## 2019-02-08 PROCEDURE — 71045 X-RAY EXAM CHEST 1 VIEW: CPT

## 2019-02-08 PROCEDURE — C1788 PORT, INDWELLING, IMP: HCPCS | Performed by: SURGERY

## 2019-02-08 PROCEDURE — 77001 FLUOROGUIDE FOR VEIN DEVICE: CPT

## 2019-02-08 DEVICE — 8F PLASTIC PRO-FUSE® CT PORT W/ATTACHABLE CHRONOFLEX® POLYURETHANE CATHETER
Type: IMPLANTABLE DEVICE | Site: CHEST | Status: FUNCTIONAL
Brand: PRO-FUSE® CT PORT

## 2019-02-08 RX ORDER — FENTANYL CITRATE 50 UG/ML
INJECTION, SOLUTION INTRAMUSCULAR; INTRAVENOUS AS NEEDED
Status: DISCONTINUED | OUTPATIENT
Start: 2019-02-08 | End: 2019-02-08 | Stop reason: SURG

## 2019-02-08 RX ORDER — SODIUM CHLORIDE 9 MG/ML
50 INJECTION, SOLUTION INTRAVENOUS CONTINUOUS
Status: DISPENSED | OUTPATIENT
Start: 2019-02-08 | End: 2019-02-08

## 2019-02-08 RX ORDER — ACETAMINOPHEN 325 MG/1
650 TABLET ORAL EVERY 6 HOURS PRN
Status: DISCONTINUED | OUTPATIENT
Start: 2019-02-08 | End: 2019-02-08 | Stop reason: HOSPADM

## 2019-02-08 RX ORDER — PROPOFOL 10 MG/ML
INJECTION, EMULSION INTRAVENOUS AS NEEDED
Status: DISCONTINUED | OUTPATIENT
Start: 2019-02-08 | End: 2019-02-08 | Stop reason: SURG

## 2019-02-08 RX ORDER — PROPOFOL 10 MG/ML
INJECTION, EMULSION INTRAVENOUS CONTINUOUS PRN
Status: DISCONTINUED | OUTPATIENT
Start: 2019-02-08 | End: 2019-02-08 | Stop reason: SURG

## 2019-02-08 RX ORDER — OXYCODONE HYDROCHLORIDE AND ACETAMINOPHEN 5; 325 MG/1; MG/1
1 TABLET ORAL EVERY 6 HOURS PRN
Status: DISCONTINUED | OUTPATIENT
Start: 2019-02-08 | End: 2019-02-08 | Stop reason: HOSPADM

## 2019-02-08 RX ORDER — SODIUM CHLORIDE, SODIUM LACTATE, POTASSIUM CHLORIDE, CALCIUM CHLORIDE 600; 310; 30; 20 MG/100ML; MG/100ML; MG/100ML; MG/100ML
125 INJECTION, SOLUTION INTRAVENOUS CONTINUOUS
Status: DISCONTINUED | OUTPATIENT
Start: 2019-02-08 | End: 2019-02-08 | Stop reason: HOSPADM

## 2019-02-08 RX ORDER — FENTANYL CITRATE/PF 50 MCG/ML
25 SYRINGE (ML) INJECTION
Status: COMPLETED | OUTPATIENT
Start: 2019-02-08 | End: 2019-02-08

## 2019-02-08 RX ORDER — ONDANSETRON 2 MG/ML
4 INJECTION INTRAMUSCULAR; INTRAVENOUS EVERY 8 HOURS PRN
Status: DISCONTINUED | OUTPATIENT
Start: 2019-02-08 | End: 2019-02-08 | Stop reason: HOSPADM

## 2019-02-08 RX ORDER — LIDOCAINE HYDROCHLORIDE 10 MG/ML
INJECTION, SOLUTION INFILTRATION; PERINEURAL AS NEEDED
Status: DISCONTINUED | OUTPATIENT
Start: 2019-02-08 | End: 2019-02-08 | Stop reason: SURG

## 2019-02-08 RX ORDER — CEFAZOLIN SODIUM 1 G/50ML
1000 SOLUTION INTRAVENOUS ONCE
Status: COMPLETED | OUTPATIENT
Start: 2019-02-08 | End: 2019-02-08

## 2019-02-08 RX ORDER — MIDAZOLAM HYDROCHLORIDE 1 MG/ML
INJECTION INTRAMUSCULAR; INTRAVENOUS AS NEEDED
Status: DISCONTINUED | OUTPATIENT
Start: 2019-02-08 | End: 2019-02-08 | Stop reason: SURG

## 2019-02-08 RX ADMIN — FENTANYL CITRATE 25 MCG: 50 INJECTION INTRAMUSCULAR; INTRAVENOUS at 10:54

## 2019-02-08 RX ADMIN — OXYCODONE HYDROCHLORIDE AND ACETAMINOPHEN 1 TABLET: 5; 325 TABLET ORAL at 11:48

## 2019-02-08 RX ADMIN — FENTANYL CITRATE 100 MCG: 50 INJECTION INTRAMUSCULAR; INTRAVENOUS at 09:18

## 2019-02-08 RX ADMIN — FENTANYL CITRATE 25 MCG: 50 INJECTION INTRAMUSCULAR; INTRAVENOUS at 11:02

## 2019-02-08 RX ADMIN — PROPOFOL 60 MG: 10 INJECTION, EMULSION INTRAVENOUS at 09:20

## 2019-02-08 RX ADMIN — CEFAZOLIN SODIUM 1000 MG: 1 SOLUTION INTRAVENOUS at 09:20

## 2019-02-08 RX ADMIN — FENTANYL CITRATE 25 MCG: 50 INJECTION INTRAMUSCULAR; INTRAVENOUS at 10:33

## 2019-02-08 RX ADMIN — SODIUM CHLORIDE, SODIUM LACTATE, POTASSIUM CHLORIDE, AND CALCIUM CHLORIDE 125 ML/HR: .6; .31; .03; .02 INJECTION, SOLUTION INTRAVENOUS at 08:55

## 2019-02-08 RX ADMIN — LIDOCAINE HYDROCHLORIDE 50 MG: 10 INJECTION, SOLUTION INFILTRATION; PERINEURAL at 09:20

## 2019-02-08 RX ADMIN — FENTANYL CITRATE 25 MCG: 50 INJECTION INTRAMUSCULAR; INTRAVENOUS at 10:42

## 2019-02-08 RX ADMIN — MIDAZOLAM HYDROCHLORIDE 2 MG: 1 INJECTION, SOLUTION INTRAMUSCULAR; INTRAVENOUS at 09:15

## 2019-02-08 RX ADMIN — PROPOFOL 75 MCG/KG/MIN: 10 INJECTION, EMULSION INTRAVENOUS at 09:20

## 2019-02-08 NOTE — DISCHARGE INSTRUCTIONS
Implanted Venous Access Port   WHAT YOU NEED TO KNOW:   An implanted venous access port is a device used to give treatments and take blood  It may also be called a central venous access device (CVAD)  The port is a small container that is placed under your skin, usually in your upper chest  A port can also be placed in your arm or abdomen  The port is attached to a catheter that enters a large vein  DISCHARGE INSTRUCTIONS:   Prevent an infection:   · Wash your hands often  Use soap and water  Clean your hands before and after you care for your port  Remind everyone who cares for your port to wash their hands  · Wear clean medical gloves when you care for your port  Do not touch or handle your port unless you need to care for it  · Clean the skin around your port every day  Ask your healthcare provider what to use to clean your skin  · Check your skin for infection every day  Look for redness, swelling, or fluid oozing from the port site  Care for your port:   · Use your port correctly at home  Your healthcare provider may show you or a family member how to give medicines or liquids through your port  A healthcare provider may also visit you at home to give you medicines or treatments  Do not use your port without proper training  Ask how often to change the needle and tubing  · Flush your port as directed  This helps prevent the catheter from becoming blocked and medicines from mixing  A syringe is used to push a small amount of saline (salt water) or heparin into the port and catheter  Heparin is a medicine that helps prevent blood clots from forming inside the catheter  Saline is usually flushed between medicines and treatments  Heparin is normally flushed between each port use  Medicines:   · Topical medicine  may be needed to numb your skin before your port is accessed with a needle  Ask your healthcare provider if and when you should use the medicine      · Take your medicine as directed  Contact your healthcare provider if you think your medicine is not helping or if you have side effects  Tell him if you are allergic to any medicine  Keep a list of the medicines, vitamins, and herbs you take  Include the amounts, and when and why you take them  Bring the list or the pill bottles to follow-up visits  Carry your medicine list with you in case of an emergency  Follow up with your healthcare provider as directed: You may need to return to have your stitches removed in 1 week  Medical glue will peel off on its own in 5 to 10 days  Write down your questions so you remember to ask them during your visits  Implanted venous access information card: Your healthcare provider will give you a card with information about your port type  Keep this information in a safe place that is easy to find  Activity:  You may return to your daily activities when the area heals  You will be able to bathe, shower, or swim once it heals  Contact your healthcare provider if:   · You have a fever  · You run out of supplies to care for your skin or port  · Your port site is red, swollen, or draining pus  · Your port site turns cold, changes color, or you cannot feel it  · The veins in your neck or chest bulge  · You have trouble using your port  · You have questions or concerns about your condition or care  Seek care immediately or call 911 if:  · Blood soaks through your bandage  · You hear a bubbling noise when your port is flushed  · The skin over or around your port breaks open  · Your heart is jumping or fluttering  · You have a headache, blurred vision, and feel confused  · You have pain in your arm, neck, shoulder, or chest     · You have trouble breathing that is getting worse over time  © 2017 [employer] Information is for End User's use only and may not be sold, redistributed or otherwise used for commercial purposes   All illustrations and images included in [facility] are the copyrighted property of [employer]  or Alex [employer]  The above information is an  only  It is not intended as medical advice for individual conditions or treatments  Talk to your doctor, nurse or pharmacist before following any medical regimen to see if it is safe and effective for you

## 2019-02-08 NOTE — TELEPHONE ENCOUNTER
Both PA'S have been approved and confirmed with Pharmacy   Family has been informed   /  Effective through 08/08/2019

## 2019-02-08 NOTE — NURSING NOTE
Pt is awake alert, ambulatory, voided  Pt somewhat upset about Rx for pain med  [facility] pharmacist could not fill the med due to the pt already having pain meds at home  Dr Karma Moody made aware by the pharmacist and the Rx for Tylenol #3 was canceled by him  Pt was advised to call Dr Carla Agustin for further pain med orders as needed  Steri strip was falling off the incision, area was dressed with a sterile 2X2 and tegaderm  Pt instructed in dressing care, what to do for any postop complications   ID card and information packet given for the port a cath  Printed instructions given  Pt verbalizes an understanding of all instructions  All questions were answered  Surgical site is clean dry and intact  Pt was very anxious to leave and refused the [facility] home, states his friend was in the lobby for him

## 2019-02-08 NOTE — OP NOTE
OPERATIVE REPORT  PATIENT NAME: Clara Yuan Sr     :  1960  MRN: [id number]  Pt Location:  OR ROOM 10    SURGERY DATE: 2019    Surgeon(s) and Role:    Cesia Manuel MD - Primary    Preop Diagnosis:  Malignant neoplasm of unspecified part of right bronchus or lung ([address] ) [C34 91]    Post-Op Diagnosis Codes:    Malignant neoplasm of unspecified part of right bronchus or lung ([address] ) [C34 91]    Procedure(s) (LRB):  PLACEMENT OF PORT-A-CATH (N/A)    Specimen(s):  No specimens in log *    Estimated Blood Loss:   Minimal    Drains:       Anesthesia Type:   Choice    Operative Indications:  Malignant neoplasm of unspecified part of right bronchus or lung ([address] ) [C34 91]  Requirement for chemotherapy  Operative Findings:  Patent right internal jugular vein with the small diameter  Complications:   None    Procedure and Technique:  Patient is adequately prepped draped and identified on the operating table and then placed in Trendelenburg position  Using a micropuncture needle and ultrasound guidance the internal jugular vein was entered venous flow was obtained guidewire was easily advanced towards the superior vena cava  Micropuncture sheath was introduced  Incision was then made over the right anterior chest wall and a pocket was created to accommodate the port  Catheter was then placed between these 2 incisions in the subcutaneous tissues and attached to the port  Port and the catheter were flushed  At this 0 035 guidewire was advanced through the lumen of the micropuncture sheath and placed was the superior vena cava  The trocar and the peel-away sheath were then introduced over the wire then the wire and the trocar were removed  Catheter was then advanced within the lumen of the peel-away sheath which was then peeled away  Position of the catheter was found to be satisfactory  Good venous return and inflow were ascertained  Port and catheter were then hep-locked  Incisions were closed  Dressings were done  Blood loss was minimal and no drains were used  Counts were correct  Patient tolerated the procedure     I was present for the entire procedure    Patient Disposition:  PACU     SIGNATURE: Cindy Sánchez MD  DATE: February 8, 2019  TIME: 10:25 AM

## 2019-02-08 NOTE — NURSING NOTE
Returned from PACU at [phone]  Drowsy, but easily arouseable  C/o pain 5/10 right chest/neck due to surgery and generalized chronic pain  Steri strips intact right neck and right chest  No drainage  Respirations easy and non labored  HOB elevated Oxygen via nasal cannula at 2L  IV fluids continue  Call bell in reach

## 2019-02-08 NOTE — TELEPHONE ENCOUNTER
PA paperwork has been initiated for both Oxycodone and Morphine Sulfate 15mg  Both have been marked as urgent and given to nurse for further clinical information      [place]  ID# [id number]

## 2019-02-08 NOTE — ANESTHESIA PREPROCEDURE EVALUATION
Review of Systems/Medical History  Patient summary reviewed  Chart reviewed      Cardiovascular  Hypertension ,    Pulmonary  Smoker ex-smoker  , COPD moderate- medication dependent , Pneumothorax (complicaiton of biopsy): history of  Comment: [place] cancer of right lung     GI/Hepatic    GERD well controlled,        Negative  ROS        Endo/Other  Diabetes type 2 Oral agent,      GYN       Hematology  Negative hematology ROS      Musculoskeletal  Back pain , lumbar pain,        Neurology  Negative neurology ROS      Psychology   Anxiety, Depression ,              Physical Exam    Airway    Mallampati score: II  TM Distance: >3 FB  Neck ROM: full     Dental   Comment: Missing many lower and upper,     Cardiovascular  Rhythm: regular, Rate: normal,     Pulmonary  Comment: Diminished breath sounds, Breath sounds clear to auscultation,     Other Findings        Anesthesia Plan  ASA Score- 3     Anesthesia Type- IV sedation with anesthesia with ASA Monitors  Additional Monitors:   Airway Plan:         Plan Factors-    Induction-     Postoperative Plan-     Informed Consent- Anesthetic plan and risks discussed with patient  I personally reviewed this patient with the CRNA  Discussed and agreed on the Anesthesia Plan with the CRNA  Otis Joy

## 2019-02-08 NOTE — TELEPHONE ENCOUNTER
Forms completed for PA on 1   Morphine Sulfate 15 mg  And Oxycodone 15 mg   Faxed to [facility]  At     Await response

## 2019-02-13 ENCOUNTER — OFFICE VISIT (OUTPATIENT)
Dept: HEMATOLOGY ONCOLOGY | Facility: CLINIC | Age: 59
End: 2019-02-13
Payer: COMMERCIAL

## 2019-02-13 ENCOUNTER — APPOINTMENT (OUTPATIENT)
Dept: LAB | Facility: HOSPITAL | Age: 59
End: 2019-02-13
Attending: INTERNAL MEDICINE
Payer: COMMERCIAL

## 2019-02-13 ENCOUNTER — TRANSCRIBE ORDERS (OUTPATIENT)
Dept: ADMINISTRATIVE | Facility: HOSPITAL | Age: 59
End: 2019-02-13

## 2019-02-13 VITALS
HEIGHT: 69 IN | WEIGHT: 158.2 LBS | SYSTOLIC BLOOD PRESSURE: 150 MMHG | DIASTOLIC BLOOD PRESSURE: 80 MMHG | BODY MASS INDEX: 23.43 KG/M2 | RESPIRATION RATE: 18 BRPM | OXYGEN SATURATION: 95 % | TEMPERATURE: 98.1 F | HEART RATE: 88 BPM

## 2019-02-13 DIAGNOSIS — C34.91 ADENOCARCINOMA OF LUNG, RIGHT (HCC): Primary | ICD-10-CM

## 2019-02-13 LAB — MAGNESIUM SERPL-MCNC: 1.9 MG/DL (ref 1.6–2.3)

## 2019-02-13 PROCEDURE — 99214 OFFICE O/P EST MOD 30 MIN: CPT | Performed by: INTERNAL MEDICINE

## 2019-02-13 PROCEDURE — 83735 ASSAY OF MAGNESIUM: CPT | Performed by: INTERNAL MEDICINE

## 2019-02-13 RX ORDER — CYANOCOBALAMIN 1000 UG/ML
1000 INJECTION INTRAMUSCULAR; SUBCUTANEOUS ONCE
Status: COMPLETED | OUTPATIENT
Start: 2019-02-14 | End: 2019-02-14

## 2019-02-13 RX ORDER — SODIUM CHLORIDE 9 MG/ML
20 INJECTION, SOLUTION INTRAVENOUS ONCE
Status: COMPLETED | OUTPATIENT
Start: 2019-02-14 | End: 2019-02-14

## 2019-02-13 NOTE — PROGRESS NOTES
Hematology/Oncology Outpatient Follow-up  Lucinda Zacarias Sr  66 y o  male 1960 [id number]    Date:  2/13/2019        Assessment and Plan:  1  Adenocarcinoma of lung, right [facility])  The patient will be getting cycle 4 of palliative chemotherapy and immunotherapy tomorrow  We will pursue then a PET-CT scan before his next visit in 3 weeks from now to evaluate the status of his metastatic non-small cell lung cancer  The patient was instructed to follow up with the surgeon who put the Port-A-Cath if he continues to have significant tenderness at the site of the insertion   - CBC and differential  - Comprehensive metabolic panel  - Magnesium  - NM PET CT skull base to mid thigh; Future        HPI:  The patient came in today for a follow-up visit  He tolerated cycle 3 of palliative chemotherapy and immunotherapy very well  He complained today about significant chest pain at the site of the Port-A-Cath insertion and right neck area  He stated that the pain is not getting better since the placement of the Port-A-Cath about a week ago  He also continues to complain about significant constipation which seems to be chronic  He did not get blood work today prior to the office visit    Oncology History    12year-old [facility] American male heavy smoker who used to smoke 2 packs of cigarettes daily for many years, COPD, he presented with dyspnea, CT scan of the chest on October 2018 showed right middle lobe spiculated 1 3 cm mass with multiple solid and ground-glass nodules along the major and minor fissures  Sub cm pulmonary nodules bilaterally, left adrenal 1 2 cm nodule, PET scan showed 1 3 cm right middle lung nodule with SUV of 6 8, numerous nodular densities along the right major and minor fissures, right hilar activity with SUV of 3 4, subcarinal lymph node measuring 7 mm with SUV of 2 7, periportal enlarged lymph nodes concerning for metastatic disease measuring 2 4 cm with SUV of 5, prominent left retrocrural lymph node measuring 1 cm x 9 mm with SUV of 1 1, core biopsy of the right spiculated nodule showed invasive adenocarcinoma consistent with lung primary positive for CK7, TTF 1, napsin a        [facility] right ([facility] )    11/13/2018 Initial Diagnosis     Adenocarcinoma of lung, right ([facility] )  Stage IV         12/13/2018 -  Chemotherapy     Started Alimta, Carboplatin (AUC 5) + Keytruda            Interval history:    ROS: Review of Systems   Constitutional: Positive for fatigue  Negative for appetite change, diaphoresis and fever  HENT: Negative for congestion, dental problem, facial swelling, hearing loss, tinnitus and trouble swallowing  Eyes: Negative for visual disturbance  Respiratory: Positive for shortness of breath  Negative for cough, chest tightness and wheezing  Cardiovascular: Positive for chest pain (On the right anterior chest wall where his Port-A-Cath was placed)  Negative for leg swelling  Gastrointestinal: Positive for constipation  Negative for abdominal distention, abdominal pain, blood in stool, diarrhea, nausea and vomiting  Genitourinary: Negative for dysuria, hematuria and urgency  Musculoskeletal: Negative for arthralgias, myalgias and neck pain  Skin: Negative  Negative for color change, pallor, rash and wound  Neurological: Positive for headaches  Negative for dizziness, weakness and numbness  Hematological: Negative for adenopathy  Psychiatric/Behavioral: Positive for sleep disturbance  Negative for agitation, behavioral problems, confusion, hallucinations and self-injury  The patient is not nervous/anxious and is not hyperactive          Past Medical History:   Diagnosis Date    Bipolar 1 disorder ([facility] )     Cancer ([place] [address] )     adeno lung    Chronic pain disorder     back and right shoulder    COPD (chronic obstructive pulmonary disease) (HCC)     Depression     Diabetes mellitus (HCC)     GERD (gastroesophageal reflux disease)     Herniated lumbar intervertebral disc     Hypertension     Insomnia     Latent syphilis     Treated    Low back pain     Lumbosacral disc disease     Pneumothorax after biopsy     PTSD (post-traumatic stress disorder)     PTSD (post-traumatic stress disorder)     Pulmonary emphysema (HCC)     Tobacco abuse 2018       Past Surgical History:   Procedure Laterality Date    CT GUIDED CHEST TUBE  2018    FL GUIDED CENTRAL VENOUS ACCESS REPLACEMENT  2019    HEMORROIDECTOMY      IR CHEST TUBE  2018    IR IMAGE GUIDED BIOPSY/ASPIRATION LUNG  2018    KNEE SURGERY      NE INSJ TUNNELED CTR VAD W/SUBQ PORT AGE 5 YR/> N/A 2019    Procedure: PLACEMENT OF PORT-A-CATH;  Surgeon: Lissette Thornton MD;  Location:  MAIN OR;  Service: Vascular       Social History     Socioeconomic History    Marital status: Legally      Spouse name: None    Number of children: None    Years of education: None    Highest education level: None   Occupational History    Occupation: part time employment   Social Needs    Financial resource strain: None    Food insecurity:     Worry: None     Inability: None    Transportation needs:     Medical: None     Non-medical: None   Tobacco Use    Smoking status: Former Smoker     Packs/day: 0 50     Types: Cigarettes     Last attempt to quit: 2018     Years since quittin 4    Smokeless tobacco: Never Used    Tobacco comment: "i quit one month ago when i quit weed"   Substance and Sexual Activity    Alcohol use: Yes     Comment: social    Drug use: No     Types: Marijuana     Comment: stopped , "i smoked weed and stopped 1 month ago"    Sexual activity: Yes   Lifestyle    Physical activity:     Days per week: None     Minutes per session: None    Stress: None   Relationships    Social connections:     Talks on phone: None     Gets together: None     Attends Worship service: None     Active member of club or organization: None     Attends meetings of clubs or organizations: None     Relationship status: None    Intimate partner violence:     Fear of current or ex partner: None     Emotionally abused: None     Physically abused: None     Forced sexual activity: None   Other Topics Concern    None   Social History Narrative    Lives with girlfriend       Family History   Problem Relation Age of Onset    Heart disease Mother     Hypertension Father     Diabetes Family     Asthma Family     Heart disease Family     Cancer Family        Allergies   Allergen Reactions    Lisinopril Anaphylaxis     Took medication a while ago and had a "swelling reaction"  Does not carry epi-pen         Current Outpatient Medications:     acetaminophen (TYLENOL) 500 mg tablet, Take 2 tablets (1,000 mg total) by mouth every 8 (eight) hours as needed for mild pain, Disp: , Rfl: 0    albuterol (VENTOLIN HFA) 90 mcg/act inhaler, Inhale 2 puffs every 4 (four) hours as needed for wheezing, Disp: 1 Inhaler, Rfl: 0    amLODIPine (NORVASC) 10 mg tablet, Take 1 tablet (10 mg total) by mouth daily (Patient taking differently: Take 10 mg by mouth daily in the early morning  ), Disp: 30 tablet, Rfl: 1    Blood Glucose Monitoring Suppl KIT, by Does not apply route daily, Disp: 1 each, Rfl: 0    dexamethasone (DECADRON) 4 mg tablet, Take 1 tablet (4 mg total) by mouth 2 (two) times a day with meals P o  B i d  With food For 3 days    24 hr prior to chemotherapy, Disp: 60 tablet, Rfl: 0    FLUoxetine (PROzac) 10 MG tablet, Take 10 mg by mouth daily in the early morning  , Disp: , Rfl:     fluticasone-vilanterol (BREO ELLIPTA) 100-25 mcg/inh inhaler, Inhale 1 puff daily in the early morning Rinse mouth after use , Disp: 1 Inhaler, Rfl: 5    folic acid (FOLVITE) 1 mg tablet, Take 1 tablet (1 mg total) by mouth daily, Disp: 30 tablet, Rfl: 2    FREESTYLE LITE test strip, TEST BLOOD SUGAR DAILY, Disp: 100 each, Rfl: 1    gabapentin (NEURONTIN) 100 mg capsule, Take 1 capsule PO in morning, 1 capsule PO in afternoon, and 3 capsules PO at bedtime (Patient taking differently: Take 300 mg by mouth daily at bedtime Take 1 capsule PO in morning, 1 capsule PO in afternoon, and 3 capsules PO at bedtime ), Disp: 150 capsule, Rfl: 0    guaiFENesin (MUCINEX) 600 mg 12 hr tablet, Take 2 tablets (1,200 mg total) by mouth every 12 (twelve) hours, Disp: 30 tablet, Rfl: 0    ipratropium (ATROVENT) 0 02 % nebulizer solution, Take 1 vial (0 5 mg total) by nebulization 3 (three) times a day, Disp: 90 vial, Rfl: 1    Lancets (FREESTYLE) lancets, TEST BLOOD SUGAR DAILY, Disp: 100 each, Rfl: 4    levalbuterol (XOPENEX) 1 25 mg/0 5 mL nebulizer solution, Take 0 5 mL (1 25 mg total) by nebulization every 8 (eight) hours as needed for wheezing, Disp: 1 each, Rfl: 0    loratadine (CLARITIN) 10 mg tablet, Take 10 mg by mouth daily in the early morning  , Disp: , Rfl:     metFORMIN (GLUCOPHAGE-XR) 500 mg 24 hr tablet, Take 1 tablet (500 mg total) by mouth 2 (two) times a day with meals, Disp: 60 tablet, Rfl: 0    methocarbamol (ROBAXIN) 750 mg tablet, Take 1 tablet (750 mg total) by mouth every 6 (six) hours as needed for muscle spasms, Disp: 90 tablet, Rfl: 0    Morphine Sulfate ER 15 MG T12A, Take 15 mg by mouth every 12 (twelve) hours Max Daily Amount: 30 mg, Disp: 60 tablet, Rfl: 0    ondansetron (ZOFRAN) 4 mg tablet, Take 1 tablet (4 mg total) by mouth every 4 (four) hours as needed for nausea or vomiting, Disp: , Rfl: 0    oxyCODONE (ROXICODONE) 15 mg immediate release tablet, Take 1 tablet (15 mg total) by mouth every 4 (four) hours as needed for severe pain Max Daily Amount: 90 mg (Patient taking differently: Take 15 mg by mouth every 4 (four) hours as needed for severe pain 10mg is at present being increased to 15mg ), Disp: 150 tablet, Rfl: 0    pantoprazole (PROTONIX) 40 mg tablet, Take 1 tablet (40 mg total) by mouth daily, Disp: 30 tablet, Rfl: 5    polyethylene glycol (GLYCOLAX) powder, Take 17 g by mouth daily, Disp: 527 g, Rfl: 1    prochlorperazine (COMPAZINE) 10 mg tablet, Take 1 tablet (10 mg total) by mouth every 6 (six) hours as needed for nausea or vomiting, Disp: 60 tablet, Rfl: 1    QUEtiapine (SEROquel) 25 mg tablet, Take 25 mg by mouth daily at bedtime, Disp: , Rfl:     ranitidine (ZANTAC) 150 MG capsule, Take 1 capsule (150 mg total) by mouth 2 (two) times a day, Disp: 60 capsule, Rfl: 0    REGULOID 28 3 % POWD, USE AS DIRECTED, Disp: 369 g, Rfl: 4    Selenium Sulfide 2 25 % SHAM, apply by topical route  every PM to the affected area(s), Disp: , Rfl:     tiotropium (SPIRIVA RESPIMAT) 2 5 MCG/ACT AERS inhaler, Inhale 2 puffs daily, Disp: 1 Inhaler, Rfl: 2  No current facility-administered medications for this visit       Facility-Administered Medications Ordered in Other Visits:     [START ON 2/14/2019] CARBOplatin (PARAPLATIN) 478 mg in sodium chloride 0 9 % 250 mL IVPB, 478 mg, Intravenous, Once, Ellen Gleason MD    [START ON 2/14/2019] cyanocobalamin injection 1,000 mcg, 1,000 mcg, Intramuscular, Once, Ellen Gleason MD    [START ON 2/14/2019] dexamethasone (DECADRON) 10 mg in sodium chloride 0 9 % 50 mL IVPB, 10 mg, Intravenous, Once PRN, Ellen Gleason MD    [START ON 2/14/2019] ondansetron (ZOFRAN) 16 mg in sodium chloride 0 9 % 50 mL IVPB, 16 mg, Intravenous, Once, Ellen Gleason MD    [START ON 2/14/2019] pembrolizumab (Serge Yaneli) 200 mg in sodium chloride 0 9 % 50 mL IVPB, 200 mg, Intravenous, Once, Ellen Gleason MD    [START ON 2/14/2019] PEMEtrexed (ALIMTA) 915 mg in sodium chloride 0 9 % 100 mL chemo infusion, 915 mg, Intravenous, Once, Ellen Gleason MD    [START ON 2/14/2019] sodium chloride 0 9 % infusion, 20 mL/hr, Intravenous, Once, Ellen Gleason MD      Physical Exam:  /80 (BP Location: Left arm, Cuff Size: Adult)   Pulse 88   Temp 98 1 °F (36 7 °C) (Tympanic)   Resp 18   Ht 5' 8 5" (1 74 m)   Wt 71 8 kg (158 lb 3 2 oz)   SpO2 95%   BMI 23 70 kg/m² Physical Exam   Constitutional: He is oriented to person, place, and time  He appears well-developed and well-nourished  HENT:   Head: Normocephalic and atraumatic  Nose: Nose normal    Mouth/Throat: Oropharynx is clear and moist    Eyes: Pupils are equal, round, and reactive to light  Conjunctivae and EOM are normal  Right eye exhibits no discharge  Left eye exhibits no discharge  No scleral icterus  Neck: Normal range of motion  Neck supple  No tracheal deviation present  No thyromegaly present  Cardiovascular: Normal rate, regular rhythm and normal heart sounds  Exam reveals no friction rub  No murmur heard  He has some tenderness at the site of the Port-A-Cath on the right anterior chest wall without any hint of infection   Pulmonary/Chest: Effort normal and breath sounds normal  No respiratory distress  He has no wheezes  He has no rales  He exhibits no tenderness  Abdominal: Soft  Bowel sounds are normal  He exhibits no distension and no mass  There is no hepatosplenomegaly, splenomegaly or hepatomegaly  There is no tenderness  There is no rebound and no guarding  Musculoskeletal: Normal range of motion  He exhibits no edema, tenderness or deformity  Lymphadenopathy:     He has no cervical adenopathy  Neurological: He is alert and oriented to person, place, and time  He has normal reflexes  He displays normal reflexes  No cranial nerve deficit  Coordination normal    Skin: Skin is warm and dry  No rash noted  No erythema  No pallor  Psychiatric: He has a normal mood and affect   His behavior is normal  Judgment and thought content normal          Labs:  Lab Results   Component Value Date    WBC 5 70 01/23/2019    HGB 12 5 (L) 01/23/2019    HCT 37 8 (L) 01/23/2019    MCV 83 01/23/2019     01/23/2019     Lab Results   Component Value Date    K 4 3 01/23/2019     01/23/2019    CO2 27 01/23/2019    BUN 17 01/23/2019    CREATININE 1 14 01/23/2019    GLUF 88 01/02/2019    CALCIUM 9 3 01/23/2019    AST 21 01/23/2019    ALT 23 01/23/2019    ALKPHOS 91 01/23/2019    EGFR 83 01/23/2019     No results found for: TSH    Patient voiced understanding and agreement in the above discussion  Aware to contact our office with questions/symptoms in the interim

## 2019-02-14 ENCOUNTER — HOSPITAL ENCOUNTER (OUTPATIENT)
Dept: INFUSION CENTER | Facility: HOSPITAL | Age: 59
Discharge: HOME | End: 2019-02-14
Payer: COMMERCIAL

## 2019-02-14 VITALS
TEMPERATURE: 99.3 F | WEIGHT: 158.5 LBS | HEIGHT: 69 IN | RESPIRATION RATE: 18 BRPM | BODY MASS INDEX: 23.47 KG/M2 | HEART RATE: 94 BPM | DIASTOLIC BLOOD PRESSURE: 77 MMHG | SYSTOLIC BLOOD PRESSURE: 132 MMHG

## 2019-02-14 DIAGNOSIS — Z51.11 ENCOUNTER FOR ANTINEOPLASTIC CHEMOTHERAPY: Primary | ICD-10-CM

## 2019-02-14 DIAGNOSIS — Z95.828 PORT-A-CATH IN PLACE: Primary | ICD-10-CM

## 2019-02-14 PROCEDURE — 96372 THER/PROPH/DIAG INJ SC/IM: CPT

## 2019-02-14 PROCEDURE — 96413 CHEMO IV INFUSION 1 HR: CPT

## 2019-02-14 PROCEDURE — 96411 CHEMO IV PUSH ADDL DRUG: CPT

## 2019-02-14 PROCEDURE — 96367 TX/PROPH/DG ADDL SEQ IV INF: CPT

## 2019-02-14 PROCEDURE — 96417 CHEMO IV INFUS EACH ADDL SEQ: CPT

## 2019-02-14 RX ORDER — LIDOCAINE 40 MG/G
CREAM TOPICAL AS NEEDED
Qty: 30 G | Refills: 5 | Status: SHIPPED | OUTPATIENT
Start: 2019-02-14 | End: 2019-07-11 | Stop reason: SDUPTHER

## 2019-02-14 RX ADMIN — SODIUM CHLORIDE 915 MG: 9 INJECTION, SOLUTION INTRAVENOUS at 10:21

## 2019-02-14 RX ADMIN — ONDANSETRON 16 MG: 2 INJECTION, SOLUTION INTRAMUSCULAR; INTRAVENOUS at 08:26

## 2019-02-14 RX ADMIN — SODIUM CHLORIDE 20 ML/HR: 9 INJECTION, SOLUTION INTRAVENOUS at 07:59

## 2019-02-14 RX ADMIN — CYANOCOBALAMIN 1000 MCG: 1000 INJECTION INTRAMUSCULAR; SUBCUTANEOUS at 08:15

## 2019-02-14 RX ADMIN — SODIUM CHLORIDE 200 MG: 9 INJECTION, SOLUTION INTRAVENOUS at 09:47

## 2019-02-14 RX ADMIN — CARBOPLATIN 550 MG: 10 INJECTION, SOLUTION INTRAVENOUS at 10:39

## 2019-02-14 NOTE — PLAN OF CARE
Problem: Potential for Falls  Goal: Patient will remain free of falls  Description  INTERVENTIONS:  - Assess patient frequently for physical needs  -  Identify cognitive and physical deficits and behaviors that affect risk of falls    -  [place] fall precautions as indicated by assessment   - Educate patient/family on patient safety including physical limitations  - Instruct patient to call for assistance with activity based on assessment  - Modify environment to reduce risk of injury  - Consider OT/PT consult to assist with strengthening/mobility  Outcome: Progressing

## 2019-02-22 DIAGNOSIS — K21.9 GASTROESOPHAGEAL REFLUX DISEASE WITHOUT ESOPHAGITIS: ICD-10-CM

## 2019-02-22 RX ORDER — RANITIDINE 150 MG/1
TABLET ORAL
Qty: 60 TABLET | Refills: 4 | Status: SHIPPED | OUTPATIENT
Start: 2019-02-22 | End: 2019-05-02 | Stop reason: SDUPTHER

## 2019-02-27 ENCOUNTER — OFFICE VISIT (OUTPATIENT)
Dept: PALLIATIVE MEDICINE | Facility: CLINIC | Age: 59
End: 2019-02-27
Payer: COMMERCIAL

## 2019-02-27 VITALS
WEIGHT: 157.8 LBS | RESPIRATION RATE: 18 BRPM | OXYGEN SATURATION: 98 % | DIASTOLIC BLOOD PRESSURE: 82 MMHG | TEMPERATURE: 98.3 F | HEIGHT: 69 IN | SYSTOLIC BLOOD PRESSURE: 150 MMHG | BODY MASS INDEX: 23.37 KG/M2 | HEART RATE: 79 BPM

## 2019-02-27 DIAGNOSIS — G89.3 CANCER ASSOCIATED PAIN: ICD-10-CM

## 2019-02-27 DIAGNOSIS — R52 GENERALIZED BODY ACHES: ICD-10-CM

## 2019-02-27 DIAGNOSIS — M54.50 LUMBAR PAIN: ICD-10-CM

## 2019-02-27 DIAGNOSIS — C34.90 MALIGNANT NEOPLASM OF LUNG, UNSPECIFIED LATERALITY, UNSPECIFIED PART OF LUNG (HCC): ICD-10-CM

## 2019-02-27 DIAGNOSIS — C34.91 ADENOCARCINOMA OF LUNG, RIGHT (HCC): ICD-10-CM

## 2019-02-27 PROCEDURE — 99214 OFFICE O/P EST MOD 30 MIN: CPT | Performed by: NURSE PRACTITIONER

## 2019-02-27 RX ORDER — GABAPENTIN 300 MG/1
300 CAPSULE ORAL 2 TIMES DAILY
Qty: 60 CAPSULE | Refills: 0 | Status: SHIPPED | OUTPATIENT
Start: 2019-02-27 | End: 2019-04-01 | Stop reason: SDUPTHER

## 2019-02-27 RX ORDER — OXYCODONE HYDROCHLORIDE 15 MG/1
15 TABLET ORAL EVERY 4 HOURS PRN
Qty: 150 TABLET | Refills: 0 | Status: SHIPPED | OUTPATIENT
Start: 2019-02-27 | End: 2019-04-01 | Stop reason: SDUPTHER

## 2019-02-27 NOTE — PROGRESS NOTES
Palliative and Supportive Care   Marli Lees   66 y o  male [id number]    Assessment/Plan:  1  Adenocarcinoma of lung, right ([facility] )    2  Generalized body aches    3  Lumbar pain    4  Malignant neoplasm of lung, unspecified laterality, unspecified part of lung ([facility] )    5  Cancer associated pain        Requested Prescriptions     Signed Prescriptions Disp Refills    oxyCODONE (ROXICODONE) 15 mg immediate release tablet 150 tablet 0     Sig: Take 1 tablet (15 mg total) by mouth every 4 (four) hours as needed for severe painMax Daily Amount: 90 mg    gabapentin (NEURONTIN) 300 mg capsule 60 capsule 0     Sig: Take 1 capsule (300 mg total) by mouth 2 (two) times a day    Morphine Sulfate ER 15 MG T12A 60 tablet 0     Sig: Take 15 mg by mouth every 12 (twelve) hoursMax Daily Amount: 30 mg       Pain- diffuse generalized pains, significantly worse after chemo  - morphine ER 15mg BID  - oxycodoneIR 15mg q4h PRN  - increase gabapentin to 300mg BID  - methocarbamol 750mg q6h PRN muscle spasms      Nausea  - ondansetron PRN  - Compazine 10mg q 6h PRN     Goals of care:  - disease-directed plan of care  - needs to complete advance care planning document      Follow up in one month      Subjective    Chief Concern  Follow up visit for:  Pain, lung cancer         History of Present Illness  Patient ID: Phyllis Shelton  is a 66 y o  male with metastatic NSCLC diagnosed in fall 2018  He has completed four cycles of palliative chemo and immunotherapy  He has a follow up PET scan later this week  He had port placed a couple weeks ago  Ongoing diffuse pains, described as generalized, achy, burning, worst for first few days after chemo  Some days morphine ER improves pain, oxycodone usually improves pain somewhat - taking 2-3x/day  He has been sleeping ok  He is still having pain at site of port placement from over two weeks ago although is slightly improved from initially    He had a fall in the snow a few days ago with resultant right knee and right ankle pain due to twisting  He has a history of trauma to this ankle and ankle pain  For first 6 days after chemo, he is nauseated and fatigued with poor appetite  Spends most of time in bed  Following that, appetite improved  Living wth cousin and her   Cousin has been very supportive  He has a girlfriend  He has 8 children  Youngest two children are 14yo twins who are living with him part time  The following portions of the patient's history were reviewed and updated as appropriate: allergies, current medications, past family history, past medical history, past social history, past surgical history and problem list       Visit Information    Accompanied By: No one  Source of History: Self  History Limitations: None    ROS  Review of Systems   Constitutional: Positive for fatigue  Negative for appetite change  Musculoskeletal: Positive for arthralgias  Psychiatric/Behavioral: Negative for sleep disturbance  Objective     Physical Exam   Constitutional: He is oriented to person, place, and time  He appears well-developed and well-nourished  He is cooperative  Pulmonary/Chest: Effort normal    Musculoskeletal:   Mild effusion of right knee and right ankle; no redness or warmth  Decreased ROM of right ankle   Neurological: He is alert and oriented to person, place, and time  Psychiatric: He has a normal mood and affect   Cognition and memory are normal          /82 (BP Location: Left arm, Patient Position: Sitting, Cuff Size: Standard)   Pulse 79   Temp 98 3 °F (36 8 °C) (Oral)   Resp 18   Ht 5' 8 5" (1 74 m)   Wt 71 6 kg (157 lb 12 8 oz)   SpO2 98%   BMI 23 64 kg/m²         Current Outpatient Medications:     acetaminophen (TYLENOL) 500 mg tablet, Take 2 tablets (1,000 mg total) by mouth every 8 (eight) hours as needed for mild pain, Disp: , Rfl: 0    albuterol (VENTOLIN HFA) 90 mcg/act inhaler, Inhale 2 puffs every 4 (four) hours as needed for wheezing, Disp: 1 Inhaler, Rfl: 0    amLODIPine (NORVASC) 10 mg tablet, Take 1 tablet (10 mg total) by mouth daily (Patient taking differently: Take 10 mg by mouth daily in the early morning  ), Disp: 30 tablet, Rfl: 1    dexamethasone (DECADRON) 4 mg tablet, Take 1 tablet (4 mg total) by mouth 2 (two) times a day with meals P o  B i d  With food For 3 days    24 hr prior to chemotherapy, Disp: 60 tablet, Rfl: 0    FLUoxetine (PROzac) 10 MG tablet, Take 10 mg by mouth daily in the early morning  , Disp: , Rfl:     fluticasone-vilanterol (BREO ELLIPTA) 100-25 mcg/inh inhaler, Inhale 1 puff daily in the early morning Rinse mouth after use , Disp: 1 Inhaler, Rfl: 5    folic acid (FOLVITE) 1 mg tablet, Take 1 tablet (1 mg total) by mouth daily, Disp: 30 tablet, Rfl: 2    gabapentin (NEURONTIN) 300 mg capsule, Take 1 capsule (300 mg total) by mouth 2 (two) times a day, Disp: 60 capsule, Rfl: 0    guaiFENesin (MUCINEX) 600 mg 12 hr tablet, Take 2 tablets (1,200 mg total) by mouth every 12 (twelve) hours, Disp: 30 tablet, Rfl: 0    ipratropium (ATROVENT) 0 02 % nebulizer solution, Take 1 vial (0 5 mg total) by nebulization 3 (three) times a day, Disp: 90 vial, Rfl: 1    levalbuterol (XOPENEX) 1 25 mg/0 5 mL nebulizer solution, Take 0 5 mL (1 25 mg total) by nebulization every 8 (eight) hours as needed for wheezing, Disp: 1 each, Rfl: 0    loratadine (CLARITIN) 10 mg tablet, Take 10 mg by mouth daily in the early morning  , Disp: , Rfl:     metFORMIN (GLUCOPHAGE-XR) 500 mg 24 hr tablet, Take 1 tablet (500 mg total) by mouth 2 (two) times a day with meals, Disp: 60 tablet, Rfl: 0    methocarbamol (ROBAXIN) 750 mg tablet, Take 1 tablet (750 mg total) by mouth every 6 (six) hours as needed for muscle spasms, Disp: 90 tablet, Rfl: 0    Morphine Sulfate ER 15 MG T12A, Take 15 mg by mouth every 12 (twelve) hoursMax Daily Amount: 30 mg, Disp: 60 tablet, Rfl: 0    ondansetron (ZOFRAN) 4 mg tablet, Take 1 tablet (4 mg total) by mouth every 4 (four) hours as needed for nausea or vomiting, Disp: , Rfl: 0    oxyCODONE (ROXICODONE) 15 mg immediate release tablet, Take 1 tablet (15 mg total) by mouth every 4 (four) hours as needed for severe painMax Daily Amount: 90 mg, Disp: 150 tablet, Rfl: 0    pantoprazole (PROTONIX) 40 mg tablet, Take 1 tablet (40 mg total) by mouth daily, Disp: 30 tablet, Rfl: 5    prochlorperazine (COMPAZINE) 10 mg tablet, Take 1 tablet (10 mg total) by mouth every 6 (six) hours as needed for nausea or vomiting, Disp: 60 tablet, Rfl: 1    QUEtiapine (SEROquel) 25 mg tablet, Take 25 mg by mouth daily at bedtime, Disp: , Rfl:     ranitidine (ZANTAC) 150 mg tablet, TAKE 1 TABLET BY MOUTH TWICE DAILY  , Disp: 60 tablet, Rfl: 4    tiotropium (SPIRIVA RESPIMAT) 2 5 MCG/ACT AERS inhaler, Inhale 2 puffs daily, Disp: 1 Inhaler, Rfl: 2    Blood Glucose Monitoring Suppl KIT, by Does not apply route daily, Disp: 1 each, Rfl: 0    FREESTYLE LITE test strip, TEST BLOOD SUGAR DAILY, Disp: 100 each, Rfl: 1    Lancets (FREESTYLE) lancets, TEST BLOOD SUGAR DAILY, Disp: 100 each, Rfl: 4    lidocaine (LMX) 4 % cream, Apply topically as needed for mild pain Apply 1/2-1 inch to port 30-60 mins prior to needle insertion, cover with serrain wrap, Disp: 30 g, Rfl: 5    polyethylene glycol (GLYCOLAX) powder, Take 17 g by mouth daily, Disp: 527 g, Rfl: 1    REGULOID 28 3 % POWD, USE AS DIRECTED, Disp: 369 g, Rfl: 4    Selenium Sulfide 2 25 % SHAM, apply by topical route  every PM to the affected area(s), Disp: , Rfl:       Eduardo Stewart [address] [facility]  [phone]        Representatives have queried the patient's controlled substance dispensing history in the Prescription Drug Monitoring Program in compliance with the [facility] regulations before I have prescribed any controlled substances  The prescription history is consistent with prescribed therapy and our practice policies

## 2019-02-28 ENCOUNTER — TELEPHONE (OUTPATIENT)
Dept: HEMATOLOGY ONCOLOGY | Facility: CLINIC | Age: 59
End: 2019-02-28

## 2019-02-28 NOTE — TELEPHONE ENCOUNTER
Detail Level: Detailed Patient states the dexamethasone he takes before his treatments are making him sick  Patient would like to know if there is anything else he can take  Patient can be reached at [phone]  Note Text (......Xxx Chief Complaint.): This diagnosis correlates with the Other (Free Text): Melissa stepped in to evaluate the site on the back. Patient thought the wound was healing well over the past few week and it is now pink, draining and very painful. She has been using Scar Away on the site. Plan to take a culture today and start Cipro. She will apply sample of Mupirocin bid with a bandage.

## 2019-03-01 ENCOUNTER — HOSPITAL ENCOUNTER (OUTPATIENT)
Dept: NUCLEAR MEDICINE | Facility: HOSPITAL | Age: 59
Discharge: HOME | End: 2019-03-01
Attending: INTERNAL MEDICINE
Payer: COMMERCIAL

## 2019-03-01 DIAGNOSIS — C34.91 ADENOCARCINOMA OF LUNG, RIGHT (HCC): ICD-10-CM

## 2019-03-01 LAB — GLUCOSE SERPL-MCNC: 91 MG/DL (ref 65–140)

## 2019-03-01 PROCEDURE — 78815 PET IMAGE W/CT SKULL-THIGH: CPT

## 2019-03-01 PROCEDURE — A9552 F18 FDG: HCPCS

## 2019-03-01 PROCEDURE — 82948 REAGENT STRIP/BLOOD GLUCOSE: CPT

## 2019-03-04 ENCOUNTER — TELEPHONE (OUTPATIENT)
Dept: PULMONOLOGY | Facility: CLINIC | Age: 59
End: 2019-03-04

## 2019-03-04 NOTE — TELEPHONE ENCOUNTER
Pt r/s todays apt and rescheduled to 4/25 with dr Bandar Sánchez and will need transportation arranged

## 2019-03-05 ENCOUNTER — TELEPHONE (OUTPATIENT)
Dept: HEMATOLOGY ONCOLOGY | Facility: CLINIC | Age: 59
End: 2019-03-05

## 2019-03-05 NOTE — TELEPHONE ENCOUNTER
I left  for Mr Nolberto Sue, Re: [facility] is aware of his upcoming appts through Dr Krista Archibald visit on April 25th  He will see Dr Loyda Reyes tomorrow at [facility] check in at 10:05  I left my contact information incase he has questions or concerns

## 2019-03-05 NOTE — TELEPHONE ENCOUNTER
Kelly Ponce returned my call from earlier this morning  I reminded him of my role and reassured him that I check in on him and read his latest physician visits noted in his medical record and am in communication with his teams as needed to ensure his care is moving forward and he is receiving his treatments  Also, I reassured him that I keep in communication with [facility] [employer] to ensure they are aware of his upcoming appointments  He is aware that his medical team will reach out to me to discuss any needs and that he has my contact information and can call me with any questions, concerns, or needs  Support and encouragement given related to continued treatment for his disease and his continued c/o all body ache  Palliative is managing his pain and chemotherapy induced nausea  I advised he take the pain and antinausea medication (Compazine) as prescribed  We reviewed his medications and schedule  He verbalized he always takes his medication as prescribed and understands the medications he is taking and when to take them  I will continue to follow

## 2019-03-06 ENCOUNTER — APPOINTMENT (OUTPATIENT)
Dept: LAB | Facility: HOSPITAL | Age: 59
End: 2019-03-06
Attending: INTERNAL MEDICINE
Payer: COMMERCIAL

## 2019-03-06 ENCOUNTER — OFFICE VISIT (OUTPATIENT)
Dept: HEMATOLOGY ONCOLOGY | Facility: CLINIC | Age: 59
End: 2019-03-06
Payer: COMMERCIAL

## 2019-03-06 VITALS
BODY MASS INDEX: 24.2 KG/M2 | HEART RATE: 84 BPM | SYSTOLIC BLOOD PRESSURE: 130 MMHG | OXYGEN SATURATION: 93 % | DIASTOLIC BLOOD PRESSURE: 78 MMHG | WEIGHT: 163.4 LBS | RESPIRATION RATE: 18 BRPM | TEMPERATURE: 96.9 F | HEIGHT: 69 IN

## 2019-03-06 DIAGNOSIS — C34.91 ADENOCARCINOMA OF LUNG, RIGHT (HCC): Primary | ICD-10-CM

## 2019-03-06 LAB — MAGNESIUM SERPL-MCNC: 2.1 MG/DL (ref 1.6–2.3)

## 2019-03-06 PROCEDURE — 83735 ASSAY OF MAGNESIUM: CPT | Performed by: INTERNAL MEDICINE

## 2019-03-06 PROCEDURE — 99214 OFFICE O/P EST MOD 30 MIN: CPT | Performed by: INTERNAL MEDICINE

## 2019-03-06 RX ORDER — SODIUM CHLORIDE 9 MG/ML
20 INJECTION, SOLUTION INTRAVENOUS ONCE
Status: COMPLETED | OUTPATIENT
Start: 2019-03-07 | End: 2019-03-07

## 2019-03-06 NOTE — PROGRESS NOTES
Hematology/Oncology Outpatient Follow-up  Patricia Constantino Sr  66 y o  male 1960 [id number]    Date:  3/6/2019        Assessment and Plan:  1  Adenocarcinoma of lung, right ([address] )  I did discuss the results findings of the recent PET-CT scan after 4 cycles  of palliative chemotherapy/immunotherapy with the patient, we reviewed also the images extensively  He was told that there is mild progression of his disease on the current therapy  I did discuss with him the fact that we were not able to obtain genomic analysis of his tumor tissue due to the small size of the biopsy  Genomic analysis from the peripheral blood through [facility] was also attempted but no tumor DNA were found  The decision was made to continue with the current line of therapy with cycle 5  Of Alimta, carboplatin, Docia Hire, hoping that this will stabilize his disease  We will aim for 2 more cycles then will have to discuss maintenance therapy  I will also send him to the interventional radiology team to see if we can obtain another a good sized biopsy for genomic analysis hoping that this may provide some clinical value to choose the right next step of therapy for his metastatic adenocarcinoma of the lung  The patient seems to be very upset due to the the news will continue to treat him and monitor him closely  - CBC and differential  - Comprehensive metabolic panel  - Magnesium  - IR consult; Future        HPI:  Patient came today for a follow-up visit  He had 4 cycles worth of palliative chemotherapy and immunotherapy with Alimta, carboplatin and Keytruda  He then had a PET-CT scan on the 1st of March which showed:  IMPRESSION:  1  Persistent hypermetabolic soft tissue in the right middle lung perihilar region, extending to the minor fissure  Additional hypermetabolic nodularity along the right major fissure and right upper medial pleura  These findings are most concerning   for viable tumor    2   Variable change in scattered mildly hypermetabolic nodes in the neck, mediastinum, bilateral axilla, and upper abdomen, with some nodes demonstrating decreased, stable or mildly increased FDG uptake  These remain concerning for metastases  3   Slightly increased activity in the nodular left adrenal, for which developing metastasis is not excluded  This may be reassessed on follow-up exam   The patient is relatively asymptomatic  Oncology History    61-year-old [facility] [employer] male heavy smoker who used to smoke 2 packs of cigarettes daily for many years, COPD, he presented with dyspnea, CT scan of the chest on October 2018 showed right middle lobe spiculated 1 3 cm mass with multiple solid and ground-glass nodules along the major and minor fissures  Sub cm pulmonary nodules bilaterally, left adrenal 1 2 cm nodule, PET scan showed 1 3 cm right middle lung nodule with SUV of 6 8, numerous nodular densities along the right major and minor fissures, right hilar activity with SUV of 3 4, subcarinal lymph node measuring 7 mm with SUV of 2 7, periportal enlarged lymph nodes concerning for metastatic disease measuring 2 4 cm with SUV of 5, prominent left retrocrural lymph node measuring 1 cm x 9 mm with SUV of 1 1, core biopsy of the right spiculated nodule showed invasive adenocarcinoma consistent with lung primary positive for CK7, TTF 1, napsin a        Adenocarcinoma of lung, right ([address] )    11/13/2018 Initial Diagnosis     Adenocarcinoma of lung, right ([address] )  Stage IV         12/13/2018 -  Chemotherapy     Started Alimta, Carboplatin (AUC 5) + Keytruda            Interval history:    ROS: Review of Systems   Constitutional: Positive for fatigue  Negative for appetite change, diaphoresis and fever  HENT: Negative for congestion, dental problem, facial swelling, hearing loss, tinnitus and trouble swallowing  Eyes: Negative for visual disturbance  Respiratory: Positive for shortness of breath   Negative for cough, chest tightness and wheezing  Cardiovascular: Negative for chest pain and leg swelling  Gastrointestinal: Positive for constipation and nausea  Negative for abdominal distention, abdominal pain, blood in stool, diarrhea and vomiting  Genitourinary: Negative for dysuria, hematuria and urgency  Musculoskeletal: Negative for arthralgias, myalgias and neck pain  Skin: Negative  Negative for color change, pallor, rash and wound  Neurological: Positive for numbness  Negative for dizziness, weakness and headaches  Hematological: Negative for adenopathy  Psychiatric/Behavioral: Negative for agitation, behavioral problems, confusion, hallucinations, self-injury and sleep disturbance  The patient is not nervous/anxious and is not hyperactive          Past Medical History:   Diagnosis Date    Bipolar 1 disorder ([place] [address] )     Cancer ([place] [address] )     adeno lung    Chronic pain disorder     back and right shoulder    [facility])     Depression     Diabetes mellitus ([place] [address] )     GERD (gastroesophageal reflux disease)     Herniated lumbar intervertebral disc     Hypertension     Insomnia     Latent syphilis     Treated    Low back pain     Lumbosacral disc disease     Pneumothorax after biopsy     PTSD (post-traumatic stress disorder)     PTSD (post-traumatic stress disorder)     Pulmonary emphysema ([place] [address] )     Tobacco abuse 11/13/2018       Past Surgical History:   Procedure Laterality Date    CT GUIDED CHEST TUBE  11/21/2018    FL GUIDED CENTRAL VENOUS ACCESS REPLACEMENT  2/8/2019    HEMORROIDECTOMY      IR CHEST TUBE  11/14/2018    IR IMAGE GUIDED BIOPSY/ASPIRATION LUNG  11/13/2018    KNEE SURGERY      DE INSJ TUNNELED CTR VAD W/SUBQ PORT AGE 5 YR/> N/A 2/8/2019    Procedure: PLACEMENT OF PORT-A-CATH;  Surgeon: Ramona Ignacio MD;  Location:  MAIN OR;  Service: Vascular       Social History     Socioeconomic History    Marital status: Legally      Spouse name: None    Number of children: None    Years of education: None    Highest education level: None   Occupational History    Occupation: part time employment   Social Needs    Financial resource strain: None    Food insecurity:     Worry: None     Inability: None    Transportation needs:     Medical: None     Non-medical: None   Tobacco Use    Smoking status: Former Smoker     Packs/day: 0 50     Types: Cigarettes     Last attempt to quit: 2018     Years since quittin 5    Smokeless tobacco: Never Used    Tobacco comment: "i quit one month ago when i quit weed"   Substance and Sexual Activity    Alcohol use: Yes     Comment: social    Drug use: No     Types: Marijuana     Comment: stopped , "i smoked weed and stopped 1 month ago"    Sexual activity: Yes   Lifestyle    Physical activity:     Days per week: None     Minutes per session: None    Stress: None   Relationships    Social connections:     Talks on phone: None     Gets together: None     Attends Jewish service: None     Active member of club or organization: None     Attends meetings of clubs or organizations: None     Relationship status: None    Intimate partner violence:     Fear of current or ex partner: None     Emotionally abused: None     Physically abused: None     Forced sexual activity: None   Other Topics Concern    None   Social History Narrative    Lives with girlfriend       Family History   Problem Relation Age of Onset    Heart disease Mother     Hypertension Father     Diabetes Family     Asthma Family     Heart disease Family     Cancer Family        Allergies   Allergen Reactions    Lisinopril Anaphylaxis     Took medication a while ago and had a "swelling reaction"  Does not carry epi-pen         Current Outpatient Medications:     acetaminophen (TYLENOL) 500 mg tablet, Take 2 tablets (1,000 mg total) by mouth every 8 (eight) hours as needed for mild pain, Disp: , Rfl: 0    albuterol (VENTOLIN HFA) 90 mcg/act inhaler, Inhale 2 puffs every 4 (four) hours as needed for wheezing, Disp: 1 Inhaler, Rfl: 0    amLODIPine (NORVASC) 10 mg tablet, Take 1 tablet (10 mg total) by mouth daily (Patient taking differently: Take 10 mg by mouth daily in the early morning  ), Disp: 30 tablet, Rfl: 1    Blood Glucose Monitoring Suppl KIT, by Does not apply route daily, Disp: 1 each, Rfl: 0    dexamethasone (DECADRON) 4 mg tablet, Take 1 tablet (4 mg total) by mouth 2 (two) times a day with meals P o  B i d  With food For 3 days    24 hr prior to chemotherapy, Disp: 60 tablet, Rfl: 0    FLUoxetine (PROzac) 10 MG tablet, Take 10 mg by mouth daily in the early morning  , Disp: , Rfl:     fluticasone-vilanterol (BREO ELLIPTA) 100-25 mcg/inh inhaler, Inhale 1 puff daily in the early morning Rinse mouth after use , Disp: 1 Inhaler, Rfl: 5    folic acid (FOLVITE) 1 mg tablet, Take 1 tablet (1 mg total) by mouth daily, Disp: 30 tablet, Rfl: 2    FREESTYLE LITE test strip, TEST BLOOD SUGAR DAILY, Disp: 100 each, Rfl: 1    gabapentin (NEURONTIN) 300 mg capsule, Take 1 capsule (300 mg total) by mouth 2 (two) times a day, Disp: 60 capsule, Rfl: 0    guaiFENesin (MUCINEX) 600 mg 12 hr tablet, Take 2 tablets (1,200 mg total) by mouth every 12 (twelve) hours, Disp: 30 tablet, Rfl: 0    ipratropium (ATROVENT) 0 02 % nebulizer solution, Take 1 vial (0 5 mg total) by nebulization 3 (three) times a day, Disp: 90 vial, Rfl: 1    Lancets (FREESTYLE) lancets, TEST BLOOD SUGAR DAILY, Disp: 100 each, Rfl: 4    levalbuterol (XOPENEX) 1 25 mg/0 5 mL nebulizer solution, Take 0 5 mL (1 25 mg total) by nebulization every 8 (eight) hours as needed for wheezing, Disp: 1 each, Rfl: 0    lidocaine (LMX) 4 % cream, Apply topically as needed for mild pain Apply 1/2-1 inch to port 30-60 mins prior to needle insertion, cover with serrain wrap, Disp: 30 g, Rfl: 5    loratadine (CLARITIN) 10 mg tablet, Take 10 mg by mouth daily in the early morning  , Disp: , Rfl:     metFORMIN (GLUCOPHAGE-XR) 500 mg 24 hr tablet, Take 1 tablet (500 mg total) by mouth 2 (two) times a day with meals, Disp: 60 tablet, Rfl: 0    methocarbamol (ROBAXIN) 750 mg tablet, Take 1 tablet (750 mg total) by mouth every 6 (six) hours as needed for muscle spasms, Disp: 90 tablet, Rfl: 0    Morphine Sulfate ER 15 MG T12A, Take 15 mg by mouth every 12 (twelve) hoursMax Daily Amount: 30 mg, Disp: 60 tablet, Rfl: 0    ondansetron (ZOFRAN) 4 mg tablet, Take 1 tablet (4 mg total) by mouth every 4 (four) hours as needed for nausea or vomiting, Disp: , Rfl: 0    oxyCODONE (ROXICODONE) 15 mg immediate release tablet, Take 1 tablet (15 mg total) by mouth every 4 (four) hours as needed for severe painMax Daily Amount: 90 mg, Disp: 150 tablet, Rfl: 0    pantoprazole (PROTONIX) 40 mg tablet, Take 1 tablet (40 mg total) by mouth daily, Disp: 30 tablet, Rfl: 5    polyethylene glycol (GLYCOLAX) powder, Take 17 g by mouth daily, Disp: 527 g, Rfl: 1    prochlorperazine (COMPAZINE) 10 mg tablet, Take 1 tablet (10 mg total) by mouth every 6 (six) hours as needed for nausea or vomiting, Disp: 60 tablet, Rfl: 1    QUEtiapine (SEROquel) 25 mg tablet, Take 25 mg by mouth daily at bedtime, Disp: , Rfl:     ranitidine (ZANTAC) 150 mg tablet, TAKE 1 TABLET BY MOUTH TWICE DAILY  , Disp: 60 tablet, Rfl: 4    REGULOID 28 3 % POWD, USE AS DIRECTED, Disp: 369 g, Rfl: 4    Selenium Sulfide 2 25 % SHAM, apply by topical route  every PM to the affected area(s), Disp: , Rfl:     tiotropium (SPIRIVA RESPIMAT) 2 5 MCG/ACT AERS inhaler, Inhale 2 puffs daily, Disp: 1 Inhaler, Rfl: 2  No current facility-administered medications for this visit       Facility-Administered Medications Ordered in Other Visits:     [START ON 3/7/2019] CARBOplatin (PARAPLATIN) 550 mg in sodium chloride 0 9 % 250 mL IVPB, 550 mg, Intravenous, Once, Lizette Blanca MD    [START ON 3/7/2019] ondansetron (ZOFRAN) 16 mg, dexamethasone (DECADRON) 10 mg in sodium chloride 0 9 % 50 mL IVPB, , Intravenous, Once, Gilma Beverly MD    [START ON 3/7/2019] pembrolizumab (KEYTRUDA) 200 mg in sodium chloride 0 9 % 50 mL IVPB, 200 mg, Intravenous, Once, Gilma Beverly MD    [START ON 3/7/2019] PEMEtrexed (ALIMTA) 930 mg in sodium chloride 0 9 % 100 mL chemo infusion, 930 mg, Intravenous, Once, Gilma Beverly MD    [START ON 3/7/2019] sodium chloride 0 9 % infusion, 20 mL/hr, Intravenous, Once, Gilma Beverly MD      Physical Exam:  /78 (BP Location: Left arm, Cuff Size: Adult)   Pulse 84   Temp (!) 96 9 °F (36 1 °C) (Tympanic)   Resp 18   Ht 5' 8 5" (1 74 m)   Wt 74 1 kg (163 lb 6 4 oz)   SpO2 93%   BMI 24 48 kg/m²     Physical Exam   Constitutional: He is oriented to person, place, and time  He appears well-developed and well-nourished  HENT:   Head: Normocephalic and atraumatic  Nose: Nose normal    Mouth/Throat: Oropharynx is clear and moist    Eyes: Pupils are equal, round, and reactive to light  Conjunctivae and EOM are normal  Right eye exhibits no discharge  Left eye exhibits no discharge  No scleral icterus  Neck: Normal range of motion  Neck supple  No tracheal deviation present  No thyromegaly present  Cardiovascular: Normal rate, regular rhythm and normal heart sounds  Exam reveals no friction rub  No murmur heard  Pulmonary/Chest: Effort normal and breath sounds normal  No respiratory distress  He has no wheezes  He has no rales  He exhibits no tenderness  Abdominal: Soft  Bowel sounds are normal  He exhibits no distension and no mass  There is no hepatosplenomegaly, splenomegaly or hepatomegaly  There is no tenderness  There is no rebound and no guarding  Musculoskeletal: Normal range of motion  He exhibits no edema, tenderness or deformity  Lymphadenopathy:     He has no cervical adenopathy  Neurological: He is alert and oriented to person, place, and time  He has normal reflexes  He displays normal reflexes  No cranial nerve deficit  Coordination normal    Skin: Skin is warm and dry  No rash noted  No erythema  No pallor  Psychiatric: He has a normal mood and affect  His behavior is normal  Judgment and thought content normal          Labs:  Lab Results   Component Value Date    WBC 4 60 02/13/2019    HGB 11 7 (L) 02/13/2019    HCT 35 3 (L) 02/13/2019    MCV 83 02/13/2019     02/13/2019     Lab Results   Component Value Date    K 4 2 02/13/2019     02/13/2019    CO2 26 02/13/2019    BUN 14 02/13/2019    CREATININE 0 98 02/13/2019    GLUF 126 (H) 02/13/2019    CALCIUM 9 2 02/13/2019    AST 31 02/13/2019    ALT 11 02/13/2019    ALKPHOS 93 02/13/2019    EGFR 99 02/13/2019     No results found for: TSH    Patient voiced understanding and agreement in the above discussion  Aware to contact our office with questions/symptoms in the interim

## 2019-03-07 ENCOUNTER — HOSPITAL ENCOUNTER (OUTPATIENT)
Dept: INFUSION CENTER | Facility: HOSPITAL | Age: 59
End: 2019-03-07

## 2019-03-07 ENCOUNTER — HOSPITAL ENCOUNTER (OUTPATIENT)
Dept: INFUSION CENTER | Facility: HOSPITAL | Age: 59
Discharge: HOME | End: 2019-03-07
Payer: COMMERCIAL

## 2019-03-07 VITALS
WEIGHT: 164.02 LBS | HEIGHT: 69 IN | HEART RATE: 92 BPM | BODY MASS INDEX: 24.29 KG/M2 | TEMPERATURE: 98 F | SYSTOLIC BLOOD PRESSURE: 140 MMHG | RESPIRATION RATE: 16 BRPM | DIASTOLIC BLOOD PRESSURE: 80 MMHG

## 2019-03-07 PROCEDURE — 96367 TX/PROPH/DG ADDL SEQ IV INF: CPT

## 2019-03-07 PROCEDURE — 96413 CHEMO IV INFUSION 1 HR: CPT

## 2019-03-07 PROCEDURE — 96417 CHEMO IV INFUS EACH ADDL SEQ: CPT

## 2019-03-07 PROCEDURE — 96411 CHEMO IV PUSH ADDL DRUG: CPT

## 2019-03-07 RX ADMIN — DEXAMETHASONE SODIUM PHOSPHATE: 10 INJECTION, SOLUTION INTRAMUSCULAR; INTRAVENOUS at 08:36

## 2019-03-07 RX ADMIN — CARBOPLATIN 449 MG: 10 INJECTION, SOLUTION INTRAVENOUS at 10:15

## 2019-03-07 RX ADMIN — SODIUM CHLORIDE 20 ML/HR: 9 INJECTION, SOLUTION INTRAVENOUS at 08:26

## 2019-03-07 RX ADMIN — SODIUM CHLORIDE 200 MG: 9 INJECTION, SOLUTION INTRAVENOUS at 09:14

## 2019-03-07 RX ADMIN — SODIUM CHLORIDE 930 MG: 9 INJECTION, SOLUTION INTRAVENOUS at 09:55

## 2019-03-07 NOTE — PLAN OF CARE
Problem: Potential for Falls  Goal: Patient will remain free of falls  Description  INTERVENTIONS:  - Assess patient frequently for physical needs  -  Identify cognitive and physical deficits and behaviors that affect risk of falls    -  [place] fall precautions as indicated by assessment   - Educate patient/family on patient safety including physical limitations  - Instruct patient to call for assistance with activity based on assessment  - Modify environment to reduce risk of injury  - Consider OT/PT consult to assist with strengthening/mobility  Outcome: Progressing

## 2019-03-07 NOTE — PROGRESS NOTES
Pt arrived today for treatment  Offers no complaints  States he had some nausea lasting 5 days, no vomiting after last treatment  States he does feel the nausea meds help and is aware to call Dr Lauren Huggins office if he needs more  Patient otherwise tolerated treatmentt well today  Stitches still in from port insertion and Dr Christina Calzada came to infusion center and removed them  Confirmed next appts and AVS provided

## 2019-03-07 NOTE — PLAN OF CARE
Problem: Potential for Falls  Goal: Patient will remain free of falls  Description  INTERVENTIONS:  - Assess patient frequently for physical needs  -  Identify cognitive and physical deficits and behaviors that affect risk of falls    -  [place] fall precautions as indicated by assessment   - Educate patient/family on patient safety including physical limitations  - Instruct patient to call for assistance with activity based on assessment  - Modify environment to reduce risk of injury  - Consider OT/PT consult to assist with strengthening/mobility  3/7/2019 0851 by Iwona Reynolds RN  Outcome: Progressing  3/7/2019 0848 by Iwona Reynolds RN  Outcome: Progressing

## 2019-03-12 ENCOUNTER — HOSPITAL ENCOUNTER (EMERGENCY)
Facility: HOSPITAL | Age: 59
Discharge: HOME | End: 2019-03-12
Attending: EMERGENCY MEDICINE | Admitting: EMERGENCY MEDICINE
Payer: COMMERCIAL

## 2019-03-12 ENCOUNTER — APPOINTMENT (EMERGENCY)
Dept: RADIOLOGY | Facility: HOSPITAL | Age: 59
End: 2019-03-12
Payer: COMMERCIAL

## 2019-03-12 VITALS
HEART RATE: 96 BPM | OXYGEN SATURATION: 98 % | WEIGHT: 152.56 LBS | RESPIRATION RATE: 18 BRPM | DIASTOLIC BLOOD PRESSURE: 73 MMHG | BODY MASS INDEX: 22.86 KG/M2 | TEMPERATURE: 96.9 F | SYSTOLIC BLOOD PRESSURE: 129 MMHG

## 2019-03-12 DIAGNOSIS — R05.9 COUGH: Primary | ICD-10-CM

## 2019-03-12 LAB
ALBUMIN SERPL BCP-MCNC: 4.5 G/DL (ref 3–5.2)
ALP SERPL-CCNC: 95 U/L (ref 43–122)
ALT SERPL W P-5'-P-CCNC: 34 U/L (ref 9–52)
ANION GAP SERPL CALCULATED.3IONS-SCNC: 11 MMOL/L (ref 5–14)
AST SERPL W P-5'-P-CCNC: 41 U/L (ref 17–59)
BASOPHILS # BLD AUTO: 0.1 THOUSANDS/ΜL (ref 0–0.1)
BASOPHILS NFR BLD AUTO: 1 % (ref 0–1)
BILIRUB SERPL-MCNC: 1.1 MG/DL
BUN SERPL-MCNC: 23 MG/DL (ref 5–25)
CALCIUM SERPL-MCNC: 9.8 MG/DL (ref 8.4–10.2)
CHLORIDE SERPL-SCNC: 100 MMOL/L (ref 97–108)
CO2 SERPL-SCNC: 26 MMOL/L (ref 22–30)
CREAT SERPL-MCNC: 1.51 MG/DL (ref 0.7–1.5)
EOSINOPHIL # BLD AUTO: 0.2 THOUSAND/ΜL (ref 0–0.4)
EOSINOPHIL NFR BLD AUTO: 4 % (ref 0–6)
ERYTHROCYTE [DISTWIDTH] IN BLOOD BY AUTOMATED COUNT: 18.2 %
GFR SERPL CREATININE-BSD FRML MDRD: 59 ML/MIN/1.73SQ M
GLUCOSE SERPL-MCNC: 103 MG/DL (ref 70–99)
HCT VFR BLD AUTO: 38.1 % (ref 41–53)
HGB BLD-MCNC: 12.7 G/DL (ref 13.5–17.5)
LIPASE SERPL-CCNC: 105 U/L (ref 23–300)
LYMPHOCYTES # BLD AUTO: 1.8 THOUSANDS/ΜL (ref 0.5–4)
LYMPHOCYTES NFR BLD AUTO: 43 % (ref 25–45)
MCH RBC QN AUTO: 27.6 PG (ref 26–34)
MCHC RBC AUTO-ENTMCNC: 33.3 G/DL (ref 31–36)
MCV RBC AUTO: 83 FL (ref 80–100)
MONOCYTES # BLD AUTO: 0.1 THOUSAND/ΜL (ref 0.2–0.9)
MONOCYTES NFR BLD AUTO: 3 % (ref 1–10)
NEUTROPHILS # BLD AUTO: 2 THOUSANDS/ΜL (ref 1.8–7.8)
NEUTS SEG NFR BLD AUTO: 48 % (ref 45–65)
PLATELET # BLD AUTO: 405 THOUSANDS/UL (ref 150–450)
PMV BLD AUTO: 6.7 FL (ref 8.9–12.7)
POTASSIUM SERPL-SCNC: 4.3 MMOL/L (ref 3.6–5)
PROT SERPL-MCNC: 8.5 G/DL (ref 5.9–8.4)
RBC # BLD AUTO: 4.6 MILLION/UL (ref 4.5–5.9)
SODIUM SERPL-SCNC: 137 MMOL/L (ref 137–147)
WBC # BLD AUTO: 4 THOUSAND/UL (ref 4.5–11)

## 2019-03-12 PROCEDURE — 83690 ASSAY OF LIPASE: CPT | Performed by: EMERGENCY MEDICINE

## 2019-03-12 PROCEDURE — 36415 COLL VENOUS BLD VENIPUNCTURE: CPT | Performed by: EMERGENCY MEDICINE

## 2019-03-12 PROCEDURE — 80053 COMPREHEN METABOLIC PANEL: CPT | Performed by: EMERGENCY MEDICINE

## 2019-03-12 PROCEDURE — 71046 X-RAY EXAM CHEST 2 VIEWS: CPT

## 2019-03-12 PROCEDURE — 96360 HYDRATION IV INFUSION INIT: CPT

## 2019-03-12 PROCEDURE — 85025 COMPLETE CBC W/AUTO DIFF WBC: CPT | Performed by: EMERGENCY MEDICINE

## 2019-03-12 PROCEDURE — 99284 EMERGENCY DEPT VISIT MOD MDM: CPT

## 2019-03-12 RX ORDER — ONDANSETRON 4 MG/1
4 TABLET, FILM COATED ORAL EVERY 8 HOURS PRN
Qty: 16 TABLET | Refills: 0 | Status: SHIPPED | OUTPATIENT
Start: 2019-03-12 | End: 2019-04-01

## 2019-03-12 RX ORDER — GUAIFENESIN AND CODEINE PHOSPHATE 100; 10 MG/5ML; MG/5ML
10 SOLUTION ORAL 3 TIMES DAILY PRN
Qty: 120 ML | Refills: 0 | Status: SHIPPED | OUTPATIENT
Start: 2019-03-12 | End: 2019-03-22

## 2019-03-12 RX ADMIN — SODIUM CHLORIDE 1000 ML: 9 INJECTION, SOLUTION INTRAVENOUS at 11:43

## 2019-03-12 NOTE — ED PROVIDER NOTES
History  Chief Complaint   Patient presents with    Cough     Patient is a 82-year-old male with a history of adenocarcinoma along who is currently taking a presents with a six-day history of feeling sick    Patient's of the symptoms started after his last round of chemo last week  Patient states that he is coughing up blood worse in the morning  Also reports some nausea and decreased appetite  Denies any fevers sweats or chills  Did not call his doctor or his oncologist   Patient is unsure the medications and has a home, but believes he does have some Zofran  Any attempted p  O  Makes it worse and nothing makes it better  Prior to Admission Medications   Prescriptions Last Dose Informant Patient Reported? Taking?    Blood Glucose Monitoring Suppl KIT  Self No No   Sig: by Does not apply route daily   FLUoxetine (PROzac) 10 MG tablet  Self Yes No   Sig: Take 10 mg by mouth daily in the early morning     FREESTYLE LITE test strip  Self No No   Sig: TEST BLOOD SUGAR DAILY   Lancets (FREESTYLE) lancets  Self No No   Sig: TEST BLOOD SUGAR DAILY   Morphine Sulfate ER 15 MG T12A   No No   Sig: Take 15 mg by mouth every 12 (twelve) hoursMax Daily Amount: 30 mg   QUEtiapine (SEROquel) 25 mg tablet  Self Yes No   Sig: Take 25 mg by mouth daily at bedtime   REGULOID 28 3 % POWD  Self No No   Sig: USE AS DIRECTED   Selenium Sulfide 2 25 % SHAM  Self Yes No   Sig: apply by topical route  every PM to the affected area(s)   acetaminophen (TYLENOL) 500 mg tablet   No No   Sig: Take 2 tablets (1,000 mg total) by mouth every 8 (eight) hours as needed for mild pain   albuterol (VENTOLIN HFA) 90 mcg/act inhaler  Self No No   Sig: Inhale 2 puffs every 4 (four) hours as needed for wheezing   amLODIPine (NORVASC) 10 mg tablet  Self No No   Sig: Take 1 tablet (10 mg total) by mouth daily   Patient taking differently: Take 10 mg by mouth daily in the early morning     dexamethasone (DECADRON) 4 mg tablet  Self No No   Sig: Take 1 tablet (4 mg total) by mouth 2 (two) times a day with meals P o  B i d  With food For 3 days  24 hr prior to chemotherapy   fluticasone-vilanterol (BREO ELLIPTA) 100-25 mcg/inh inhaler   No No   Sig: Inhale 1 puff daily in the early morning Rinse mouth after use     folic acid (FOLVITE) 1 mg tablet  Self No No   Sig: Take 1 tablet (1 mg total) by mouth daily   gabapentin (NEURONTIN) 300 mg capsule   No No   Sig: Take 1 capsule (300 mg total) by mouth 2 (two) times a day   guaiFENesin (MUCINEX) 600 mg 12 hr tablet  Self No No   Sig: Take 2 tablets (1,200 mg total) by mouth every 12 (twelve) hours   ipratropium (ATROVENT) 0 02 % nebulizer solution  Self No No   Sig: Take 1 vial (0 5 mg total) by nebulization 3 (three) times a day   levalbuterol (XOPENEX) 1 25 mg/0 5 mL nebulizer solution  Self No No   Sig: Take 0 5 mL (1 25 mg total) by nebulization every 8 (eight) hours as needed for wheezing   lidocaine (LMX) 4 % cream   No No   Sig: Apply topically as needed for mild pain Apply 1/2-1 inch to port 30-60 mins prior to needle insertion, cover with serrain wrap   loratadine (CLARITIN) 10 mg tablet  Self Yes No   Sig: Take 10 mg by mouth daily in the early morning     metFORMIN (GLUCOPHAGE-XR) 500 mg 24 hr tablet  Self No No   Sig: Take 1 tablet (500 mg total) by mouth 2 (two) times a day with meals   methocarbamol (ROBAXIN) 750 mg tablet   No No   Sig: Take 1 tablet (750 mg total) by mouth every 6 (six) hours as needed for muscle spasms   ondansetron (ZOFRAN) 4 mg tablet   No No   Sig: Take 1 tablet (4 mg total) by mouth every 4 (four) hours as needed for nausea or vomiting   oxyCODONE (ROXICODONE) 15 mg immediate release tablet   No No   Sig: Take 1 tablet (15 mg total) by mouth every 4 (four) hours as needed for severe painMax Daily Amount: 90 mg   pantoprazole (PROTONIX) 40 mg tablet   No No   Sig: Take 1 tablet (40 mg total) by mouth daily   polyethylene glycol (GLYCOLAX) powder  Self No No   Sig: Take 17 g by mouth daily   prochlorperazine (COMPAZINE) 10 mg tablet   No No   Sig: Take 1 tablet (10 mg total) by mouth every 6 (six) hours as needed for nausea or vomiting   ranitidine (ZANTAC) 150 mg tablet   No No   Sig: TAKE 1 TABLET BY MOUTH TWICE DAILY  tiotropium (SPIRIVA RESPIMAT) 2 5 MCG/ACT AERS inhaler  Self No No   Sig: Inhale 2 puffs daily      Facility-Administered Medications: None       Past Medical History:   Diagnosis Date    Bipolar 1 disorder (HCC)     Cancer ([facility] )     adeno lung    Chronic pain disorder     back and right shoulder    [facility]     Depression     Diabetes mellitus (HCC)     GERD (gastroesophageal reflux disease)     Herniated lumbar intervertebral disc     Hypertension     Insomnia     Latent syphilis     Treated    Low back pain     Lumbosacral disc disease     Pneumothorax after biopsy     PTSD (post-traumatic stress disorder)     PTSD (post-traumatic stress disorder)     [facility]     Tobacco abuse 2018       Past Surgical History:   Procedure Laterality Date    CT GUIDED CHEST TUBE  2018    FL GUIDED CENTRAL VENOUS ACCESS REPLACEMENT  2019    HEMORROIDECTOMY      IR CHEST TUBE  2018    IR IMAGE GUIDED BIOPSY/ASPIRATION LUNG  2018    KNEE SURGERY      [place] INSJ TUNNELED CTR VAD W/SUBQ PORT AGE 5 YR/> N/A 2019    Procedure: PLACEMENT OF PORT-A-CATH;  Surgeon: Amish Cedillo MD;  Location: [address] [place] [address];  Service: Vascular       Family History   Problem Relation Age of Onset    Heart disease Mother     Hypertension Father     Diabetes Family     Asthma Family     Heart disease Family     Cancer Family      I have reviewed and agree with the history as documented      Social History     Tobacco Use    Smoking status: Former Smoker     Packs/day: 0 50     Types: Cigarettes     Last attempt to quit: 2018     Years since quittin 5    Smokeless tobacco: Never Used   [facility] Tobacco comment: "i quit one month ago when i quit weed"   Substance Use Topics    Alcohol use: Not Currently     Comment: social    Drug use: No     Types: Marijuana     Comment: stopped 8-2018, "i smoked weed and stopped 1 month ago"        Review of Systems   Constitutional: Positive for appetite change  Negative for fever  HENT: Negative  Eyes: Negative  Respiratory: Positive for cough  Negative for shortness of breath  Cardiovascular: Negative  Negative for chest pain  Gastrointestinal: Positive for nausea  Endocrine: Negative  Genitourinary: Negative  Musculoskeletal: Negative  Skin: Negative  Allergic/Immunologic: Negative  Neurological: Negative  Hematological: Negative  Psychiatric/Behavioral: Negative  All other systems reviewed and are negative  Physical Exam  Physical Exam   Constitutional: He is oriented to person, place, and time  He appears well-developed and well-nourished  HENT:   Head: Normocephalic  Nose: Nose normal    Eyes: Pupils are equal, round, and reactive to light  Conjunctivae are normal    Neck: Normal range of motion  Neck supple  Cardiovascular: Normal rate, regular rhythm, normal heart sounds and intact distal pulses  Pulmonary/Chest: Effort normal and breath sounds normal  No stridor  No respiratory distress  He has no wheezes  Abdominal: Soft  Bowel sounds are normal    Musculoskeletal: Normal range of motion  Neurological: He is alert and oriented to person, place, and time  Skin: Skin is warm and dry  Capillary refill takes less than 2 seconds  Nursing note and vitals reviewed        Vital Signs  ED Triage Vitals [03/12/19 1123]   Temperature Pulse Respirations Blood Pressure SpO2   (!) 96 9 °F (36 1 °C) 101 20 (!) 163/101 98 %      Temp Source Heart Rate Source Patient Position - Orthostatic VS BP Location FiO2 (%)   Tympanic Monitor Sitting Left arm --      Pain Score       --           Vitals:    03/12/19 1123   BP: (!) 163/101   Pulse: 101   Patient Position - Orthostatic VS: Sitting       qSOFA     Row Name 03/12/19 1123                Altered mental status GCS < 15  --        Respiratory Rate > / =49  0        Systolic BP < / =710  0        Q Sofa Score  0              Visual Acuity      ED Medications  Medications   sodium chloride 0 9 % bolus 1,000 mL (has no administration in time range)       Diagnostic Studies  Results Reviewed     Procedure Component Value Units Date/Time    CBC and differential [[id number]]     Lab Status:  No result Specimen:  Blood     Comprehensive metabolic panel [[id number]]     Lab Status:  No result Specimen:  Blood     Lipase [[id number]]     Lab Status:  No result Specimen:  Blood                  XR chest pa & lateral    (Results Pending)              Procedures  Procedures       Phone Contacts  ED Phone Contact    ED Course                               MDM  Number of Diagnoses or Management Options  Cough:      Amount and/or Complexity of Data Reviewed  Clinical lab tests: ordered and reviewed  Tests in the radiology section of CPT®: ordered and reviewed  Independent visualization of images, tracings, or specimens: yes        Disposition  Final diagnoses:   None     ED Disposition     None      Follow-up Information    None         Patient's Medications   Discharge Prescriptions    No medications on file     No discharge procedures on file      ED Provider  Electronically Signed by           Mark Dior MD  03/12/19 8028

## 2019-03-12 NOTE — ED TRIAGE NOTES
Reports he is waking up spitting blood, losing weight, a lot of pain and aching  No fevers  Reports coughing - yellowish/green  Denies abd pain  Reports he has been sick for 6 days   Reports he had chemo last week - reports he has lung Ca

## 2019-03-15 DIAGNOSIS — R05.9 COUGH: ICD-10-CM

## 2019-03-15 RX ORDER — GUAIFENESIN 600 MG
1200 TABLET, EXTENDED RELEASE 12 HR ORAL EVERY 12 HOURS SCHEDULED
Qty: 30 TABLET | Refills: 1 | Status: SHIPPED | OUTPATIENT
Start: 2019-03-15 | End: 2019-04-21 | Stop reason: SDUPTHER

## 2019-03-22 DIAGNOSIS — G89.3 CANCER ASSOCIATED PAIN: ICD-10-CM

## 2019-03-22 DIAGNOSIS — T45.1X5A ADVERSE EFFECT OF CHEMOTHERAPY, INITIAL ENCOUNTER: ICD-10-CM

## 2019-03-22 DIAGNOSIS — E11.9 TYPE 2 DIABETES MELLITUS WITHOUT COMPLICATION, WITHOUT LONG-TERM CURRENT USE OF INSULIN (HCC): ICD-10-CM

## 2019-03-22 DIAGNOSIS — M54.50 LUMBAR PAIN: ICD-10-CM

## 2019-03-22 DIAGNOSIS — R52 GENERALIZED BODY ACHES: ICD-10-CM

## 2019-03-22 DIAGNOSIS — J44.1 ACUTE EXACERBATION OF CHRONIC OBSTRUCTIVE PULMONARY DISEASE (COPD) (HCC): ICD-10-CM

## 2019-03-22 DIAGNOSIS — C34.31 MALIGNANT NEOPLASM OF LOWER LOBE OF RIGHT LUNG (HCC): ICD-10-CM

## 2019-03-22 DIAGNOSIS — R05.9 COUGH: ICD-10-CM

## 2019-03-22 DIAGNOSIS — R11.0 NAUSEA: ICD-10-CM

## 2019-03-22 DIAGNOSIS — R07.89 NON-CARDIAC CHEST PAIN: ICD-10-CM

## 2019-03-22 DIAGNOSIS — J44.9 CHRONIC OBSTRUCTIVE PULMONARY DISEASE, UNSPECIFIED COPD TYPE (HCC): ICD-10-CM

## 2019-03-22 DIAGNOSIS — C34.91 ADENOCARCINOMA OF LUNG, RIGHT (HCC): ICD-10-CM

## 2019-03-22 DIAGNOSIS — C34.90 MALIGNANT NEOPLASM OF LUNG, UNSPECIFIED LATERALITY, UNSPECIFIED PART OF LUNG (HCC): ICD-10-CM

## 2019-03-22 RX ORDER — METFORMIN HYDROCHLORIDE 500 MG/1
500 TABLET, EXTENDED RELEASE ORAL 2 TIMES DAILY WITH MEALS
Qty: 60 TABLET | Refills: 0 | Status: CANCELLED | OUTPATIENT
Start: 2019-03-22

## 2019-03-22 RX ORDER — FOLIC ACID 1 MG/1
1 TABLET ORAL DAILY
Qty: 30 TABLET | Refills: 2 | Status: CANCELLED | OUTPATIENT
Start: 2019-03-22

## 2019-03-22 RX ORDER — OXYCODONE HYDROCHLORIDE 15 MG/1
15 TABLET ORAL EVERY 4 HOURS PRN
Qty: 150 TABLET | Refills: 0 | Status: CANCELLED | OUTPATIENT
Start: 2019-03-22

## 2019-03-22 RX ORDER — LEVALBUTEROL 1.25 MG/.5ML
1.25 SOLUTION, CONCENTRATE RESPIRATORY (INHALATION) EVERY 8 HOURS PRN
Status: CANCELLED | OUTPATIENT
Start: 2019-03-22

## 2019-03-22 RX ORDER — ALBUTEROL SULFATE 90 UG/1
2 AEROSOL, METERED RESPIRATORY (INHALATION) EVERY 4 HOURS PRN
Qty: 1 INHALER | Refills: 0 | Status: CANCELLED | OUTPATIENT
Start: 2019-03-22

## 2019-03-22 RX ORDER — ONDANSETRON 4 MG/1
4 TABLET, FILM COATED ORAL EVERY 4 HOURS PRN
Qty: 20 TABLET | Refills: 0 | Status: CANCELLED
Start: 2019-03-22

## 2019-03-22 RX ORDER — ONDANSETRON 4 MG/1
4 TABLET, FILM COATED ORAL EVERY 8 HOURS PRN
Qty: 16 TABLET | Refills: 0 | Status: CANCELLED | OUTPATIENT
Start: 2019-03-22

## 2019-03-22 RX ORDER — METHOCARBAMOL 750 MG/1
750 TABLET, FILM COATED ORAL EVERY 6 HOURS PRN
Qty: 90 TABLET | Refills: 0 | Status: CANCELLED | OUTPATIENT
Start: 2019-03-22

## 2019-03-22 RX ORDER — ACETAMINOPHEN 500 MG
1000 TABLET ORAL EVERY 8 HOURS PRN
Qty: 30 TABLET | Refills: 0 | Status: CANCELLED
Start: 2019-03-22

## 2019-03-22 RX ORDER — DEXAMETHASONE 4 MG/1
4 TABLET ORAL 2 TIMES DAILY WITH MEALS
Qty: 60 TABLET | Refills: 0 | Status: CANCELLED | OUTPATIENT
Start: 2019-03-22

## 2019-03-22 RX ORDER — GUAIFENESIN AND CODEINE PHOSPHATE 100; 10 MG/5ML; MG/5ML
10 SOLUTION ORAL 3 TIMES DAILY PRN
Qty: 120 ML | Refills: 0 | Status: CANCELLED | OUTPATIENT
Start: 2019-03-22 | End: 2019-04-01

## 2019-03-25 ENCOUNTER — TRANSCRIBE ORDERS (OUTPATIENT)
Dept: ADMINISTRATIVE | Facility: HOSPITAL | Age: 59
End: 2019-03-25

## 2019-03-25 ENCOUNTER — OFFICE VISIT (OUTPATIENT)
Dept: HEMATOLOGY ONCOLOGY | Facility: CLINIC | Age: 59
End: 2019-03-25
Payer: COMMERCIAL

## 2019-03-25 ENCOUNTER — APPOINTMENT (OUTPATIENT)
Dept: LAB | Facility: HOSPITAL | Age: 59
End: 2019-03-25
Attending: INTERNAL MEDICINE
Payer: COMMERCIAL

## 2019-03-25 ENCOUNTER — TELEPHONE (OUTPATIENT)
Dept: FAMILY MEDICINE CLINIC | Facility: CLINIC | Age: 59
End: 2019-03-25

## 2019-03-25 VITALS
HEART RATE: 85 BPM | HEIGHT: 69 IN | DIASTOLIC BLOOD PRESSURE: 70 MMHG | WEIGHT: 160.4 LBS | RESPIRATION RATE: 16 BRPM | SYSTOLIC BLOOD PRESSURE: 140 MMHG | OXYGEN SATURATION: 95 % | TEMPERATURE: 97.7 F | BODY MASS INDEX: 23.76 KG/M2

## 2019-03-25 DIAGNOSIS — C34.91 ADENOCARCINOMA OF LUNG, RIGHT (HCC): Primary | ICD-10-CM

## 2019-03-25 PROCEDURE — 99214 OFFICE O/P EST MOD 30 MIN: CPT | Performed by: NURSE PRACTITIONER

## 2019-03-25 RX ORDER — SODIUM CHLORIDE 9 MG/ML
75 INJECTION, SOLUTION INTRAVENOUS CONTINUOUS
Status: CANCELLED | OUTPATIENT
Start: 2019-03-25

## 2019-03-25 NOTE — PROGRESS NOTES
Hematology/Oncology Outpatient Follow-up  Petar Roca Sr  66 y o  male 1960 [id number]    Date:  3/25/2019      Assessment and Plan:  1  Adenocarcinoma of lung, right [facility])  Patient will proceed with cycle #6 Keytruda, Alimta and Carboplatin this Thursday as scheduled  We will add IV Emend to his pre med regimen as this will likely improve his chemo induced nausea and vomiting  His at home p r n  Nausea medications of Compazine and Zofran do not seem to be improving this symptom  Patient will be getting a repeat biopsy in IR on Wednesday 03/27/2019  Patient will be back in about 3 weeks after the completion of his final cycle for follow-up, review biopsy results and plan for maintenance/next line of therapy  - CBC and differential; Future  - Comprehensive metabolic panel; Future  - Magnesium; Future    HPI:  Patient presents today for his 3 week follow-up appointment  He had his lab work drawn this morning which is pending and is due for chemotherapy this Thursday 03/28/2019  He is also scheduled to have a repeat biopsy done in IR on Wednesday 03/27/2019  Patient reports that he is having generalized myalgias and arthralgias that he currently rates 7-1/2 out of 10  His pain is being managed by the palliative care team and he is currently on MS Contin 15 milligrams every 12 hours with oxycodone 15 milligrams as needed for breakthrough  He states that he has a follow-up appointment the palliative team on Wednesday and he will discuss his pain management with them  He also states that he was in the emergency department on 03/12/2019 after his last chemotherapy he was feeling ill, nauseous and coughing  He states at the time he did have a small amount of blood in his sputum x1 but believes that this was due to gum bleeding; he had no further episodes of blood in the sputum  He continues to have a mild cough which is typically dry, occasionally productive    The the patient reports to me that he has been having significant nausea after his chemotherapy despite using his p r n  Compazine and Zofran which do not completely resolve the nausea  Oncology History    53-year-old [facility] male heavy smoker who used to smoke 2 packs of cigarettes daily for many years, COPD, he presented with dyspnea, CT scan of the chest on October 2018 showed right middle lobe spiculated 1 3 cm mass with multiple solid and ground-glass nodules along the major and minor fissures  Sub cm pulmonary nodules bilaterally, left adrenal 1 2 cm nodule, PET scan showed 1 3 cm right middle lung nodule with SUV of 6 8, numerous nodular densities along the right major and minor fissures, right hilar activity with SUV of 3 4, subcarinal lymph node measuring 7 mm with SUV of 2 7, periportal enlarged lymph nodes concerning for metastatic disease measuring 2 4 cm with SUV of 5, prominent left retrocrural lymph node measuring 1 cm x 9 mm with SUV of 1 1, core biopsy of the right spiculated nodule showed invasive adenocarcinoma consistent with lung primary positive for CK7, TTF 1, napsin a        Adenocarcinoma of lung, right ([facility] )    11/13/2018 Initial Diagnosis     Adenocarcinoma of lung, right ([facility] )  Stage IV         12/13/2018 -  Chemotherapy     Started Alimta, Carboplatin (AUC 5) + Keytruda            Interval history:    ROS: Review of Systems   Constitutional: Positive for fatigue (at times, mild)  Negative for activity change, appetite change, chills, fever and unexpected weight change  HENT: Positive for hearing loss (right ear)  Negative for congestion, mouth sores, nosebleeds, sore throat and trouble swallowing  Eyes: Negative  Respiratory: Positive for cough, shortness of breath (exertional, has COPD) and wheezing (at times)  Negative for chest tightness  Cardiovascular: Negative for chest pain, palpitations and leg swelling     Gastrointestinal: Positive for constipation (mild occasional, using stool softeners and Miralax prn) and nausea  Negative for abdominal distention, abdominal pain, blood in stool, diarrhea and vomiting  Genitourinary: Negative for difficulty urinating, dysuria, frequency, hematuria and urgency  Musculoskeletal: Positive for arthralgias and myalgias  Negative for back pain, gait problem and joint swelling  Skin: Negative for color change, pallor and rash  Neurological: Positive for numbness (and tingling mild to the toes)  Negative for dizziness, weakness, light-headedness and headaches  Hematological: Negative for adenopathy  Does not bruise/bleed easily  Psychiatric/Behavioral: Positive for dysphoric mood and sleep disturbance (occasional)          Stress 5/10       Past Medical History:   Diagnosis Date    [facility]     Cancer [facility]     adeno lung    Chronic pain disorder     back and right shoulder    COPD (chronic obstructive pulmonary disease) (HCC)     Depression     Diabetes mellitus (HCC)     GERD (gastroesophageal reflux disease)     Herniated lumbar intervertebral disc     Hypertension     Insomnia     Latent syphilis     Treated    Low back pain     Lumbosacral disc disease     Pneumothorax after biopsy     PTSD (post-traumatic stress disorder)     PTSD (post-traumatic stress disorder)     [facility]ma [facility] )     Tobacco abuse 11/13/2018       Past Surgical History:   Procedure Laterality Date    CT GUIDED CHEST TUBE  11/21/2018    FL GUIDED CENTRAL VENOUS ACCESS REPLACEMENT  2/8/2019    HEMORROIDECTOMY      IR CHEST TUBE  11/14/2018    IR IMAGE GUIDED BIOPSY/ASPIRATION LUNG  11/13/2018    KNEE SURGERY      [place] INSJ TUNNELED CTR VAD W/SUBQ PORT AGE 5 YR/> N/A 2/8/2019    Procedure: PLACEMENT OF PORT-A-CATH;  Surgeon: Kaden Hurt MD;  Location:  MAIN OR;  Service: Vascular       Social History     Socioeconomic History    Marital status: Legally      Spouse name: None    Number of children: None    Years of education: None    Highest education level: None   Occupational History    Occupation: part time employment   Social Needs    Financial resource strain: None    Food insecurity:     Worry: None     Inability: None    Transportation needs:     Medical: None     Non-medical: None   Tobacco Use    Smoking status: Former Smoker     Packs/day: 0 50     Types: Cigarettes     Last attempt to quit: 2018     Years since quittin 5    Smokeless tobacco: Never Used    Tobacco comment: "i quit one month ago when i quit weed"   Substance and Sexual Activity    Alcohol use: Not Currently     Comment: social    Drug use: No     Types: Marijuana     Comment: stopped , "i smoked weed and stopped 1 month ago"    Sexual activity: Yes   Lifestyle    Physical activity:     Days per week: None     Minutes per session: None    Stress: None   Relationships    Social connections:     Talks on phone: None     Gets together: None     Attends [employer] service: None     Active member of club or organization: None     Attends meetings of clubs or organizations: None     Relationship status: None    Intimate partner violence:     Fear of current or ex partner: None     Emotionally abused: None     Physically abused: None     Forced sexual activity: None   Other Topics Concern    None   Social History Narrative    Lives with girlfriend       Family History   Problem Relation Age of Onset    Heart disease Mother     Hypertension Father     Diabetes Family     Asthma Family     Heart disease Family     Cancer Family        Allergies   Allergen Reactions    Lisinopril Anaphylaxis     Took medication a while ago and had a "swelling reaction"  Does not carry epi-pen         Current Outpatient Medications:     acetaminophen (TYLENOL) 500 mg tablet, Take 2 tablets (1,000 mg total) by mouth every 8 (eight) hours as needed for mild pain, Disp: , Rfl: 0    albuterol (VENTOLIN HFA) 90 mcg/act inhaler, Inhale 2 puffs every 4 (four) hours as needed for wheezing, Disp: 1 Inhaler, Rfl: 0    amLODIPine (NORVASC) 10 mg tablet, Take 1 tablet (10 mg total) by mouth daily (Patient taking differently: Take 10 mg by mouth daily in the early morning  ), Disp: 30 tablet, Rfl: 1    Blood Glucose Monitoring Suppl KIT, by Does not apply route daily, Disp: 1 each, Rfl: 0    dexamethasone (DECADRON) 4 mg tablet, Take 1 tablet (4 mg total) by mouth 2 (two) times a day with meals P o  B i d  With food For 3 days    24 hr prior to chemotherapy, Disp: 60 tablet, Rfl: 0    FLUoxetine (PROzac) 10 MG tablet, Take 10 mg by mouth daily in the early morning  , Disp: , Rfl:     fluticasone-vilanterol (BREO ELLIPTA) 100-25 mcg/inh inhaler, Inhale 1 puff daily in the early morning Rinse mouth after use , Disp: 1 Inhaler, Rfl: 5    folic acid (FOLVITE) 1 mg tablet, Take 1 tablet (1 mg total) by mouth daily, Disp: 30 tablet, Rfl: 2    FREESTYLE LITE test strip, TEST BLOOD SUGAR DAILY, Disp: 100 each, Rfl: 1    gabapentin (NEURONTIN) 300 mg capsule, Take 1 capsule (300 mg total) by mouth 2 (two) times a day, Disp: 60 capsule, Rfl: 0    guaiFENesin (MUCINEX) 600 mg 12 hr tablet, Take 2 tablets (1,200 mg total) by mouth every 12 (twelve) hours, Disp: 30 tablet, Rfl: 1    ipratropium (ATROVENT) 0 02 % nebulizer solution, Take 1 vial (0 5 mg total) by nebulization 3 (three) times a day, Disp: 90 vial, Rfl: 1    Lancets (FREESTYLE) lancets, TEST BLOOD SUGAR DAILY, Disp: 100 each, Rfl: 4    levalbuterol (XOPENEX) 1 25 mg/0 5 mL nebulizer solution, Take 0 5 mL (1 25 mg total) by nebulization every 8 (eight) hours as needed for wheezing, Disp: 1 each, Rfl: 0    lidocaine (LMX) 4 % cream, Apply topically as needed for mild pain Apply 1/2-1 inch to port 30-60 mins prior to needle insertion, cover with serrain wrap, Disp: 30 g, Rfl: 5    loratadine (CLARITIN) 10 mg tablet, Take 10 mg by mouth daily in the early morning  , Disp: , Rfl:     metFORMIN (GLUCOPHAGE-XR) 500 mg 24 hr tablet, Take 1 tablet (500 mg total) by mouth 2 (two) times a day with meals, Disp: 60 tablet, Rfl: 0    methocarbamol (ROBAXIN) 750 mg tablet, Take 1 tablet (750 mg total) by mouth every 6 (six) hours as needed for muscle spasms, Disp: 90 tablet, Rfl: 0    Morphine Sulfate ER 15 MG T12A, Take 15 mg by mouth every 12 (twelve) hoursMax Daily Amount: 30 mg, Disp: 60 tablet, Rfl: 0    ondansetron (ZOFRAN) 4 mg tablet, Take 1 tablet (4 mg total) by mouth every 4 (four) hours as needed for nausea or vomiting, Disp: , Rfl: 0    ondansetron (ZOFRAN) 4 mg tablet, Take 1 tablet (4 mg total) by mouth every 8 (eight) hours as needed for nausea or vomiting, Disp: 16 tablet, Rfl: 0    oxyCODONE (ROXICODONE) 15 mg immediate release tablet, Take 1 tablet (15 mg total) by mouth every 4 (four) hours as needed for severe painMax Daily Amount: 90 mg, Disp: 150 tablet, Rfl: 0    pantoprazole (PROTONIX) 40 mg tablet, Take 1 tablet (40 mg total) by mouth daily, Disp: 30 tablet, Rfl: 5    polyethylene glycol (GLYCOLAX) powder, Take 17 g by mouth daily, Disp: 527 g, Rfl: 1    prochlorperazine (COMPAZINE) 10 mg tablet, Take 1 tablet (10 mg total) by mouth every 6 (six) hours as needed for nausea or vomiting, Disp: 60 tablet, Rfl: 1    QUEtiapine (SEROquel) 25 mg tablet, Take 25 mg by mouth daily at bedtime, Disp: , Rfl:     ranitidine (ZANTAC) 150 mg tablet, TAKE 1 TABLET BY MOUTH TWICE DAILY  , Disp: 60 tablet, Rfl: 4    REGULOID 28 3 % POWD, USE AS DIRECTED, Disp: 369 g, Rfl: 4    Selenium Sulfide 2 25 % SHAM, apply by topical route  every PM to the affected area(s), Disp: , Rfl:     tiotropium (SPIRIVA RESPIMAT) 2 5 MCG/ACT AERS inhaler, Inhale 2 puffs daily, Disp: 1 Inhaler, Rfl: 2      Physical Exam:  /70 (BP Location: Left arm, Cuff Size: Adult)   Pulse 85   Temp 97 7 °F (36 5 °C) (Tympanic)   Resp 16   Ht 5' 8 5" (1 74 m)   Wt 72 8 kg (160 lb 6 4 oz)   SpO2 95%   BMI 24 03 kg/m²     Physical Exam Constitutional: He is oriented to person, place, and time  He appears well-developed and well-nourished  No distress  HENT:   Head: Normocephalic and atraumatic  Mouth/Throat: Oropharynx is clear and moist  No oropharyngeal exudate  Eyes: Pupils are equal, round, and reactive to light  Conjunctivae are normal  No scleral icterus  Neck: Normal range of motion  Neck supple  No thyromegaly present  Cardiovascular: Normal rate, regular rhythm, normal heart sounds and intact distal pulses  No murmur heard  Pulmonary/Chest: Effort normal  No respiratory distress  He has wheezes (expiratory) in the right upper field, the right middle field, the right lower field, the left upper field, the left middle field and the left lower field  Abdominal: Soft  Bowel sounds are normal  He exhibits no distension  There is no hepatosplenomegaly  There is no tenderness  Musculoskeletal: Normal range of motion  He exhibits no edema  Lymphadenopathy:     He has no cervical adenopathy  He has no axillary adenopathy  Neurological: He is alert and oriented to person, place, and time  Skin: Skin is warm and dry  No rash noted  He is not diaphoretic  No erythema  No pallor  Psychiatric: His behavior is normal  Judgment and thought content normal  His affect is blunt (flat affect)  Vitals reviewed  Labs:  Lab Results   Component Value Date    WBC 4 00 (L) 03/12/2019    HGB 12 7 (L) 03/12/2019    HCT 38 1 (L) 03/12/2019    MCV 83 03/12/2019     03/12/2019     Lab Results   Component Value Date    K 4 3 03/12/2019     03/12/2019    CO2 26 03/12/2019    BUN 23 03/12/2019    CREATININE 1 51 (H) 03/12/2019    GLUF 86 03/06/2019    CALCIUM 9 8 03/12/2019    AST 41 03/12/2019    ALT 34 03/12/2019    ALKPHOS 95 03/12/2019    EGFR 59 (L) 03/12/2019     Patient had repeat labs this morning results are pending  Will review on address when available      Patient voiced understanding and agreement in the above discussion  Aware to contact our office with questions/symptoms in the interim

## 2019-03-27 ENCOUNTER — HOSPITAL ENCOUNTER (OUTPATIENT)
Dept: CT IMAGING | Facility: HOSPITAL | Age: 59
Discharge: HOME | End: 2019-03-27
Attending: INTERNAL MEDICINE

## 2019-03-27 RX ORDER — SODIUM CHLORIDE 9 MG/ML
20 INJECTION, SOLUTION INTRAVENOUS ONCE
Status: COMPLETED | OUTPATIENT
Start: 2019-03-28 | End: 2019-03-28

## 2019-03-28 ENCOUNTER — HOSPITAL ENCOUNTER (OUTPATIENT)
Dept: INFUSION CENTER | Facility: HOSPITAL | Age: 59
Discharge: HOME | End: 2019-03-28
Payer: COMMERCIAL

## 2019-03-28 VITALS
HEIGHT: 69 IN | DIASTOLIC BLOOD PRESSURE: 77 MMHG | RESPIRATION RATE: 20 BRPM | HEART RATE: 77 BPM | SYSTOLIC BLOOD PRESSURE: 140 MMHG | TEMPERATURE: 98.3 F | BODY MASS INDEX: 23.8 KG/M2 | WEIGHT: 160.72 LBS

## 2019-03-28 DIAGNOSIS — C34.91 ADENOCARCINOMA OF LUNG, RIGHT (HCC): Primary | ICD-10-CM

## 2019-03-28 DIAGNOSIS — J06.9 UPPER RESPIRATORY TRACT INFECTION, UNSPECIFIED TYPE: ICD-10-CM

## 2019-03-28 PROCEDURE — 96413 CHEMO IV INFUSION 1 HR: CPT

## 2019-03-28 PROCEDURE — 96417 CHEMO IV INFUS EACH ADDL SEQ: CPT

## 2019-03-28 PROCEDURE — 96367 TX/PROPH/DG ADDL SEQ IV INF: CPT

## 2019-03-28 PROCEDURE — 96411 CHEMO IV PUSH ADDL DRUG: CPT

## 2019-03-28 RX ORDER — AZITHROMYCIN 250 MG/1
TABLET, FILM COATED ORAL
Qty: 6 TABLET | Refills: 0 | Status: SHIPPED | OUTPATIENT
Start: 2019-03-28 | End: 2019-04-01

## 2019-03-28 RX ADMIN — CARBOPLATIN 400 MG: 10 INJECTION, SOLUTION INTRAVENOUS at 11:10

## 2019-03-28 RX ADMIN — SODIUM CHLORIDE 200 MG: 9 INJECTION, SOLUTION INTRAVENOUS at 10:17

## 2019-03-28 RX ADMIN — SODIUM CHLORIDE 945 MG: 9 INJECTION, SOLUTION INTRAVENOUS at 10:53

## 2019-03-28 RX ADMIN — SODIUM CHLORIDE 20 ML/HR: 9 INJECTION, SOLUTION INTRAVENOUS at 09:00

## 2019-03-28 RX ADMIN — SODIUM CHLORIDE 16 MG: 9 INJECTION, SOLUTION INTRAVENOUS at 09:15

## 2019-03-28 RX ADMIN — SODIUM CHLORIDE 150 MG: 9 INJECTION, SOLUTION INTRAVENOUS at 09:38

## 2019-03-28 NOTE — PROGRESS NOTES
Patient here for chemotherapy  Patient afebrile, c/o 2 day duration of nonproductive cough, nasal stuffiness, no furhter complaints  Bhavesh Isabel RN aware

## 2019-04-01 ENCOUNTER — OFFICE VISIT (OUTPATIENT)
Dept: PALLIATIVE MEDICINE | Facility: CLINIC | Age: 59
End: 2019-04-01
Payer: COMMERCIAL

## 2019-04-01 ENCOUNTER — SOCIAL WORK (OUTPATIENT)
Dept: PALLIATIVE MEDICINE | Facility: CLINIC | Age: 59
End: 2019-04-01
Payer: COMMERCIAL

## 2019-04-01 VITALS
HEIGHT: 69 IN | WEIGHT: 153.8 LBS | HEART RATE: 78 BPM | BODY MASS INDEX: 22.78 KG/M2 | SYSTOLIC BLOOD PRESSURE: 130 MMHG | DIASTOLIC BLOOD PRESSURE: 80 MMHG | OXYGEN SATURATION: 97 % | RESPIRATION RATE: 18 BRPM | TEMPERATURE: 98.1 F

## 2019-04-01 DIAGNOSIS — R11.0 CHEMOTHERAPY-INDUCED NAUSEA: Primary | ICD-10-CM

## 2019-04-01 DIAGNOSIS — R52 GENERALIZED BODY ACHES: ICD-10-CM

## 2019-04-01 DIAGNOSIS — Z71.89 COUNSELING AND COORDINATION OF CARE: Primary | ICD-10-CM

## 2019-04-01 DIAGNOSIS — G89.3 CANCER ASSOCIATED PAIN: ICD-10-CM

## 2019-04-01 DIAGNOSIS — M54.50 LUMBAR PAIN: ICD-10-CM

## 2019-04-01 DIAGNOSIS — C34.91 ADENOCARCINOMA OF LUNG, RIGHT (HCC): ICD-10-CM

## 2019-04-01 DIAGNOSIS — C34.90 MALIGNANT NEOPLASM OF LUNG, UNSPECIFIED LATERALITY, UNSPECIFIED PART OF LUNG (HCC): ICD-10-CM

## 2019-04-01 DIAGNOSIS — T45.1X5A CHEMOTHERAPY-INDUCED NAUSEA: Primary | ICD-10-CM

## 2019-04-01 PROCEDURE — NC001 PR NO CHARGE

## 2019-04-01 PROCEDURE — 99214 OFFICE O/P EST MOD 30 MIN: CPT | Performed by: NURSE PRACTITIONER

## 2019-04-01 RX ORDER — GABAPENTIN 300 MG/1
300 CAPSULE ORAL 3 TIMES DAILY
Qty: 90 CAPSULE | Refills: 0 | Status: SHIPPED | OUTPATIENT
Start: 2019-04-01 | End: 2019-04-22 | Stop reason: SDUPTHER

## 2019-04-01 RX ORDER — CARVEDILOL 12.5 MG/1
12.5 TABLET ORAL 2 TIMES DAILY WITH MEALS
COMMUNITY
End: 2019-04-01 | Stop reason: CLARIF

## 2019-04-01 RX ORDER — ONDANSETRON 8 MG/1
8 TABLET, ORALLY DISINTEGRATING ORAL EVERY 8 HOURS PRN
Qty: 90 TABLET | Refills: 0 | Status: SHIPPED | OUTPATIENT
Start: 2019-04-01 | End: 2019-04-22

## 2019-04-01 RX ORDER — PROMETHAZINE HYDROCHLORIDE 12.5 MG/1
12.5 TABLET ORAL EVERY 6 HOURS PRN
Qty: 60 TABLET | Refills: 0 | Status: SHIPPED | OUTPATIENT
Start: 2019-04-01 | End: 2019-04-22

## 2019-04-01 RX ORDER — OXYCODONE HYDROCHLORIDE 15 MG/1
15 TABLET ORAL EVERY 4 HOURS PRN
Qty: 150 TABLET | Refills: 0 | Status: SHIPPED | OUTPATIENT
Start: 2019-04-01 | End: 2019-04-22 | Stop reason: SDUPTHER

## 2019-04-09 ENCOUNTER — HOSPITAL ENCOUNTER (OUTPATIENT)
Dept: CT IMAGING | Facility: HOSPITAL | Age: 59
Discharge: HOME | End: 2019-04-09
Attending: INTERNAL MEDICINE
Payer: COMMERCIAL

## 2019-04-09 VITALS
OXYGEN SATURATION: 97 % | BODY MASS INDEX: 23.67 KG/M2 | DIASTOLIC BLOOD PRESSURE: 63 MMHG | RESPIRATION RATE: 18 BRPM | SYSTOLIC BLOOD PRESSURE: 131 MMHG | WEIGHT: 158 LBS | TEMPERATURE: 97.3 F | HEART RATE: 72 BPM

## 2019-04-09 DIAGNOSIS — C34.91 ADENOCARCINOMA OF LUNG, RIGHT (HCC): ICD-10-CM

## 2019-04-09 DIAGNOSIS — G89.3 CANCER ASSOCIATED PAIN: ICD-10-CM

## 2019-04-09 LAB
INR PPP: 1.19 (ref 0.86–1.17)
PROTHROMBIN TIME: 15.2 SECONDS (ref 11.8–14.2)

## 2019-04-09 PROCEDURE — NC001 PR NO CHARGE: Performed by: RADIOLOGY

## 2019-04-09 PROCEDURE — 85610 PROTHROMBIN TIME: CPT | Performed by: RADIOLOGY

## 2019-04-09 PROCEDURE — 71250 CT THORAX DX C-: CPT

## 2019-04-09 RX ORDER — SODIUM CHLORIDE 9 MG/ML
75 INJECTION, SOLUTION INTRAVENOUS CONTINUOUS
Status: DISCONTINUED | OUTPATIENT
Start: 2019-04-09 | End: 2019-04-10 | Stop reason: HOSPADM

## 2019-04-09 RX ADMIN — SODIUM CHLORIDE 75 ML/HR: 0.9 INJECTION, SOLUTION INTRAVENOUS at 11:52

## 2019-04-09 RX ADMIN — IOHEXOL 100 ML: 350 INJECTION, SOLUTION INTRAVENOUS at 14:35

## 2019-04-15 ENCOUNTER — OFFICE VISIT (OUTPATIENT)
Dept: HEMATOLOGY ONCOLOGY | Facility: CLINIC | Age: 59
End: 2019-04-15
Payer: COMMERCIAL

## 2019-04-15 VITALS
HEIGHT: 69 IN | TEMPERATURE: 97.7 F | RESPIRATION RATE: 18 BRPM | WEIGHT: 154.6 LBS | SYSTOLIC BLOOD PRESSURE: 126 MMHG | DIASTOLIC BLOOD PRESSURE: 80 MMHG | HEART RATE: 92 BPM | BODY MASS INDEX: 22.9 KG/M2 | OXYGEN SATURATION: 97 %

## 2019-04-15 DIAGNOSIS — C34.91 ADENOCARCINOMA OF LUNG, RIGHT (HCC): Primary | ICD-10-CM

## 2019-04-15 PROCEDURE — 99214 OFFICE O/P EST MOD 30 MIN: CPT | Performed by: INTERNAL MEDICINE

## 2019-04-16 ENCOUNTER — APPOINTMENT (OUTPATIENT)
Dept: LAB | Facility: HOSPITAL | Age: 59
End: 2019-04-16
Payer: COMMERCIAL

## 2019-04-16 DIAGNOSIS — C34.91 ADENOCARCINOMA OF LUNG, RIGHT (HCC): ICD-10-CM

## 2019-04-16 LAB
ALBUMIN SERPL BCP-MCNC: 4.2 G/DL (ref 3–5.2)
ALP SERPL-CCNC: 78 U/L (ref 43–122)
ALT SERPL W P-5'-P-CCNC: 25 U/L (ref 9–52)
ANION GAP SERPL CALCULATED.3IONS-SCNC: 7 MMOL/L (ref 5–14)
AST SERPL W P-5'-P-CCNC: 34 U/L (ref 17–59)
BILIRUB SERPL-MCNC: 0.2 MG/DL
BUN SERPL-MCNC: 16 MG/DL (ref 5–25)
CALCIUM SERPL-MCNC: 9.4 MG/DL (ref 8.4–10.2)
CHLORIDE SERPL-SCNC: 105 MMOL/L (ref 97–108)
CO2 SERPL-SCNC: 29 MMOL/L (ref 22–30)
CREAT SERPL-MCNC: 1.35 MG/DL (ref 0.7–1.5)
EOSINOPHIL # BLD AUTO: 0.35 THOUSAND/UL (ref 0–0.4)
EOSINOPHIL NFR BLD MANUAL: 6 % (ref 0–6)
ERYTHROCYTE [DISTWIDTH] IN BLOOD BY AUTOMATED COUNT: 20.4 %
GFR SERPL CREATININE-BSD FRML MDRD: 67 ML/MIN/1.73SQ M
GLUCOSE SERPL-MCNC: 97 MG/DL (ref 70–99)
HCT VFR BLD AUTO: 33.4 % (ref 41–53)
HGB BLD-MCNC: 10.9 G/DL (ref 13.5–17.5)
LYMPHOCYTES # BLD AUTO: 2.73 THOUSAND/UL (ref 0.5–4)
LYMPHOCYTES # BLD AUTO: 47 % (ref 25–45)
MAGNESIUM SERPL-MCNC: 1.9 MG/DL (ref 1.6–2.3)
MCH RBC QN AUTO: 28.5 PG (ref 26–34)
MCHC RBC AUTO-ENTMCNC: 32.7 G/DL (ref 31–36)
MCV RBC AUTO: 87 FL (ref 80–100)
METAMYELOCYTES NFR BLD MANUAL: 1 % (ref 0–1)
MONOCYTES # BLD AUTO: 0.64 THOUSAND/UL (ref 0.2–0.9)
MONOCYTES NFR BLD AUTO: 11 % (ref 1–10)
NEUTS SEG # BLD: 1.97 THOUSAND/UL (ref 1.8–7.8)
NEUTS SEG NFR BLD AUTO: 34 %
PLATELET # BLD AUTO: 461 THOUSANDS/UL (ref 150–450)
PLATELET BLD QL SMEAR: ADEQUATE
PMV BLD AUTO: 6.9 FL (ref 8.9–12.7)
POTASSIUM SERPL-SCNC: 4.1 MMOL/L (ref 3.6–5)
PROT SERPL-MCNC: 8 G/DL (ref 5.9–8.4)
RBC # BLD AUTO: 3.84 MILLION/UL (ref 4.5–5.9)
RBC MORPH BLD: NORMAL
SODIUM SERPL-SCNC: 141 MMOL/L (ref 137–147)
TOTAL CELLS COUNTED SPEC: 100
VARIANT LYMPHS # BLD AUTO: 1 % (ref 0–0)
WBC # BLD AUTO: 5.8 THOUSAND/UL (ref 4.5–11)

## 2019-04-16 PROCEDURE — 83735 ASSAY OF MAGNESIUM: CPT | Performed by: INTERNAL MEDICINE

## 2019-04-16 PROCEDURE — 36415 COLL VENOUS BLD VENIPUNCTURE: CPT | Performed by: INTERNAL MEDICINE

## 2019-04-16 PROCEDURE — 80053 COMPREHEN METABOLIC PANEL: CPT | Performed by: INTERNAL MEDICINE

## 2019-04-16 PROCEDURE — 85007 BL SMEAR W/DIFF WBC COUNT: CPT | Performed by: INTERNAL MEDICINE

## 2019-04-16 PROCEDURE — 85027 COMPLETE CBC AUTOMATED: CPT | Performed by: INTERNAL MEDICINE

## 2019-04-16 RX ORDER — SODIUM CHLORIDE 9 MG/ML
20 INJECTION, SOLUTION INTRAVENOUS ONCE
Status: COMPLETED | OUTPATIENT
Start: 2019-04-17 | End: 2019-04-17

## 2019-04-17 ENCOUNTER — HOSPITAL ENCOUNTER (OUTPATIENT)
Dept: INFUSION CENTER | Facility: HOSPITAL | Age: 59
Discharge: HOME | End: 2019-04-17
Payer: COMMERCIAL

## 2019-04-17 VITALS
SYSTOLIC BLOOD PRESSURE: 143 MMHG | TEMPERATURE: 98.1 F | RESPIRATION RATE: 18 BRPM | HEIGHT: 69 IN | HEART RATE: 78 BPM | DIASTOLIC BLOOD PRESSURE: 86 MMHG | WEIGHT: 153.44 LBS | BODY MASS INDEX: 22.73 KG/M2

## 2019-04-17 PROCEDURE — 96367 TX/PROPH/DG ADDL SEQ IV INF: CPT

## 2019-04-17 PROCEDURE — 96411 CHEMO IV PUSH ADDL DRUG: CPT

## 2019-04-17 PROCEDURE — 96413 CHEMO IV INFUSION 1 HR: CPT

## 2019-04-17 PROCEDURE — 96372 THER/PROPH/DIAG INJ SC/IM: CPT

## 2019-04-17 RX ORDER — CYANOCOBALAMIN 1000 UG/ML
1000 INJECTION INTRAMUSCULAR; SUBCUTANEOUS ONCE
Status: COMPLETED | OUTPATIENT
Start: 2019-04-17 | End: 2019-04-17

## 2019-04-17 RX ADMIN — SODIUM CHLORIDE 200 MG: 9 INJECTION, SOLUTION INTRAVENOUS at 08:41

## 2019-04-17 RX ADMIN — SODIUM CHLORIDE 20 ML/HR: 9 INJECTION, SOLUTION INTRAVENOUS at 08:15

## 2019-04-17 RX ADMIN — SODIUM CHLORIDE 920 MG: 9 INJECTION, SOLUTION INTRAVENOUS at 09:15

## 2019-04-17 RX ADMIN — CYANOCOBALAMIN 1000 MCG: 1000 INJECTION INTRAMUSCULAR; SUBCUTANEOUS at 08:15

## 2019-04-17 RX ADMIN — DEXAMETHASONE SODIUM PHOSPHATE: 10 INJECTION, SOLUTION INTRAMUSCULAR; INTRAVENOUS at 08:15

## 2019-04-17 NOTE — PLAN OF CARE
Problem: Knowledge Deficit  Goal: Patient/family/caregiver demonstrates understanding of disease process, treatment plan, medications, and discharge instructions  Description  Complete learning assessment and assess knowledge base  Interventions:  - Provide teaching at level of understanding  - Provide teaching via preferred learning methods  Outcome: Progressing     Problem: SAFETY ADULT  Goal: Patient will remain free of falls  Description  INTERVENTIONS:  - Assess patient frequently for physical needs  -  Identify cognitive and physical deficits and behaviors that affect risk of falls    -  [place] fall precautions as indicated by assessment   - Educate patient/family on patient safety including physical limitations  - Instruct patient to call for assistance with activity based on assessment  - Modify environment to reduce risk of injury  - Consider OT/PT consult to assist with strengthening/mobility  Outcome: Progressing

## 2019-04-21 RX ORDER — GUAIFENESIN 600 MG
1200 TABLET, EXTENDED RELEASE 12 HR ORAL EVERY 12 HOURS SCHEDULED
Qty: 60 TABLET | Refills: 0 | Status: SHIPPED | OUTPATIENT
Start: 2019-04-21 | End: 2019-07-30

## 2019-04-22 ENCOUNTER — OFFICE VISIT (OUTPATIENT)
Dept: PALLIATIVE MEDICINE | Facility: CLINIC | Age: 59
End: 2019-04-22
Payer: COMMERCIAL

## 2019-04-22 ENCOUNTER — TELEPHONE (OUTPATIENT)
Dept: PALLIATIVE MEDICINE | Facility: CLINIC | Age: 59
End: 2019-04-22

## 2019-04-22 VITALS
HEART RATE: 75 BPM | HEIGHT: 69 IN | WEIGHT: 149.4 LBS | RESPIRATION RATE: 18 BRPM | BODY MASS INDEX: 22.13 KG/M2 | DIASTOLIC BLOOD PRESSURE: 74 MMHG | OXYGEN SATURATION: 95 % | SYSTOLIC BLOOD PRESSURE: 132 MMHG | TEMPERATURE: 98.1 F

## 2019-04-22 DIAGNOSIS — R52 GENERALIZED BODY ACHES: ICD-10-CM

## 2019-04-22 DIAGNOSIS — M54.50 LUMBAR PAIN: ICD-10-CM

## 2019-04-22 DIAGNOSIS — K59.00 CONSTIPATION, UNSPECIFIED CONSTIPATION TYPE: ICD-10-CM

## 2019-04-22 DIAGNOSIS — C34.91 ADENOCARCINOMA OF LUNG, RIGHT (HCC): ICD-10-CM

## 2019-04-22 DIAGNOSIS — G47.9 DIFFICULTY SLEEPING: ICD-10-CM

## 2019-04-22 DIAGNOSIS — R11.0 CHEMOTHERAPY-INDUCED NAUSEA: Primary | ICD-10-CM

## 2019-04-22 DIAGNOSIS — C34.90 MALIGNANT NEOPLASM OF LUNG, UNSPECIFIED LATERALITY, UNSPECIFIED PART OF LUNG (HCC): ICD-10-CM

## 2019-04-22 DIAGNOSIS — G89.3 CANCER ASSOCIATED PAIN: ICD-10-CM

## 2019-04-22 DIAGNOSIS — T45.1X5A CHEMOTHERAPY-INDUCED NAUSEA: Primary | ICD-10-CM

## 2019-04-22 PROCEDURE — 99214 OFFICE O/P EST MOD 30 MIN: CPT | Performed by: NURSE PRACTITIONER

## 2019-04-22 RX ORDER — POLYETHYLENE GLYCOL 3350 17 G/17G
17 POWDER, FOR SOLUTION ORAL DAILY
Qty: 527 G | Refills: 1 | Status: SHIPPED | OUTPATIENT
Start: 2019-04-22 | End: 2021-01-01

## 2019-04-22 RX ORDER — PROCHLORPERAZINE MALEATE 10 MG
10 TABLET ORAL EVERY 6 HOURS PRN
Qty: 90 TABLET | Refills: 1 | Status: CANCELLED | OUTPATIENT
Start: 2019-04-22

## 2019-04-22 RX ORDER — PROCHLORPERAZINE MALEATE 10 MG
10 TABLET ORAL EVERY 6 HOURS PRN
Qty: 90 TABLET | Refills: 0 | Status: SHIPPED | OUTPATIENT
Start: 2019-04-22 | End: 2019-05-02 | Stop reason: SDUPTHER

## 2019-04-22 RX ORDER — GABAPENTIN 300 MG/1
300 CAPSULE ORAL 3 TIMES DAILY
Qty: 90 CAPSULE | Refills: 0 | Status: SHIPPED | OUTPATIENT
Start: 2019-04-22 | End: 2019-05-02 | Stop reason: SDUPTHER

## 2019-04-22 RX ORDER — LANOLIN ALCOHOL/MO/W.PET/CERES
3 CREAM (GRAM) TOPICAL
Qty: 30 TABLET | Refills: 1 | Status: SHIPPED | OUTPATIENT
Start: 2019-04-22 | End: 2019-05-02 | Stop reason: SDUPTHER

## 2019-04-22 RX ORDER — CYANOCOBALAMIN 1000 UG/ML
1000 INJECTION INTRAMUSCULAR; SUBCUTANEOUS ONCE
Status: CANCELLED | OUTPATIENT
Start: 2019-05-30

## 2019-04-22 RX ORDER — DIPHENHYDRAMINE HCL 25 MG
25 TABLET ORAL EVERY 6 HOURS PRN
Qty: 30 TABLET | Refills: 0 | Status: SHIPPED | OUTPATIENT
Start: 2019-04-22 | End: 2019-05-02 | Stop reason: SDUPTHER

## 2019-04-22 RX ORDER — OXYCODONE HYDROCHLORIDE 20 MG/1
20 TABLET ORAL EVERY 4 HOURS PRN
Qty: 150 TABLET | Refills: 0 | Status: SHIPPED | OUTPATIENT
Start: 2019-04-22 | End: 2019-05-20 | Stop reason: SDUPTHER

## 2019-04-22 RX ORDER — SENNA AND DOCUSATE SODIUM 50; 8.6 MG/1; MG/1
1 TABLET, FILM COATED ORAL 2 TIMES DAILY
Qty: 60 TABLET | Refills: 2 | Status: SHIPPED | OUTPATIENT
Start: 2019-04-22 | End: 2020-02-19 | Stop reason: SDUPTHER

## 2019-04-22 RX ORDER — SODIUM CHLORIDE 9 MG/ML
20 INJECTION, SOLUTION INTRAVENOUS ONCE
Status: CANCELLED | OUTPATIENT
Start: 2019-05-30

## 2019-04-25 ENCOUNTER — OFFICE VISIT (OUTPATIENT)
Dept: PULMONOLOGY | Facility: CLINIC | Age: 59
End: 2019-04-25
Payer: COMMERCIAL

## 2019-04-25 VITALS
WEIGHT: 155 LBS | BODY MASS INDEX: 22.96 KG/M2 | RESPIRATION RATE: 18 BRPM | OXYGEN SATURATION: 97 % | HEART RATE: 76 BPM | SYSTOLIC BLOOD PRESSURE: 136 MMHG | HEIGHT: 69 IN | TEMPERATURE: 97.8 F | DIASTOLIC BLOOD PRESSURE: 80 MMHG

## 2019-04-25 DIAGNOSIS — J43.2 CENTRILOBULAR EMPHYSEMA (HCC): Primary | ICD-10-CM

## 2019-04-25 PROCEDURE — 99215 OFFICE O/P EST HI 40 MIN: CPT | Performed by: INTERNAL MEDICINE

## 2019-04-25 RX ORDER — PREDNISONE 20 MG/1
40 TABLET ORAL DAILY
Qty: 10 TABLET | Refills: 0 | Status: SHIPPED | OUTPATIENT
Start: 2019-04-25 | End: 2019-05-30 | Stop reason: ALTCHOICE

## 2019-04-25 RX ORDER — AZITHROMYCIN 250 MG/1
TABLET, FILM COATED ORAL
Qty: 6 TABLET | Refills: 0 | Status: SHIPPED | OUTPATIENT
Start: 2019-04-25 | End: 2019-04-29

## 2019-05-02 ENCOUNTER — TELEPHONE (OUTPATIENT)
Dept: FAMILY MEDICINE CLINIC | Facility: CLINIC | Age: 59
End: 2019-05-02

## 2019-05-02 ENCOUNTER — OFFICE VISIT (OUTPATIENT)
Dept: FAMILY MEDICINE CLINIC | Facility: CLINIC | Age: 59
End: 2019-05-02

## 2019-05-02 VITALS
RESPIRATION RATE: 16 BRPM | BODY MASS INDEX: 22.48 KG/M2 | DIASTOLIC BLOOD PRESSURE: 82 MMHG | OXYGEN SATURATION: 95 % | WEIGHT: 150 LBS | SYSTOLIC BLOOD PRESSURE: 140 MMHG | HEART RATE: 80 BPM | TEMPERATURE: 97.9 F

## 2019-05-02 DIAGNOSIS — J43.8 OTHER EMPHYSEMA (HCC): ICD-10-CM

## 2019-05-02 DIAGNOSIS — R11.0 CHEMOTHERAPY-INDUCED NAUSEA: ICD-10-CM

## 2019-05-02 DIAGNOSIS — R52 GENERALIZED BODY ACHES: ICD-10-CM

## 2019-05-02 DIAGNOSIS — C34.91 ADENOCARCINOMA OF LUNG, RIGHT (HCC): ICD-10-CM

## 2019-05-02 DIAGNOSIS — T45.1X5A CHEMOTHERAPY-INDUCED NAUSEA: ICD-10-CM

## 2019-05-02 DIAGNOSIS — I10 HTN (HYPERTENSION): ICD-10-CM

## 2019-05-02 DIAGNOSIS — J30.1 ALLERGIC RHINITIS DUE TO POLLEN, UNSPECIFIED SEASONALITY: ICD-10-CM

## 2019-05-02 DIAGNOSIS — E11.9 TYPE 2 DIABETES MELLITUS WITHOUT COMPLICATION, WITHOUT LONG-TERM CURRENT USE OF INSULIN (HCC): ICD-10-CM

## 2019-05-02 DIAGNOSIS — R07.89 NON-CARDIAC CHEST PAIN: ICD-10-CM

## 2019-05-02 DIAGNOSIS — M54.50 LUMBAR PAIN: ICD-10-CM

## 2019-05-02 DIAGNOSIS — G47.9 DIFFICULTY SLEEPING: ICD-10-CM

## 2019-05-02 DIAGNOSIS — F32.A DEPRESSION, UNSPECIFIED DEPRESSION TYPE: ICD-10-CM

## 2019-05-02 DIAGNOSIS — J44.9 CHRONIC OBSTRUCTIVE PULMONARY DISEASE, UNSPECIFIED COPD TYPE (HCC): ICD-10-CM

## 2019-05-02 DIAGNOSIS — N52.9 ERECTILE DYSFUNCTION, UNSPECIFIED ERECTILE DYSFUNCTION TYPE: Primary | ICD-10-CM

## 2019-05-02 DIAGNOSIS — K21.9 GASTROESOPHAGEAL REFLUX DISEASE WITHOUT ESOPHAGITIS: ICD-10-CM

## 2019-05-02 PROCEDURE — 99213 OFFICE O/P EST LOW 20 MIN: CPT | Performed by: INTERNAL MEDICINE

## 2019-05-02 RX ORDER — LORATADINE 10 MG/1
10 TABLET ORAL
Qty: 30 TABLET | Refills: 2 | Status: SHIPPED | OUTPATIENT
Start: 2019-05-02 | End: 2020-01-09 | Stop reason: SDUPTHER

## 2019-05-02 RX ORDER — PANTOPRAZOLE SODIUM 40 MG/1
40 TABLET, DELAYED RELEASE ORAL DAILY
Qty: 30 TABLET | Refills: 5 | Status: SHIPPED | OUTPATIENT
Start: 2019-05-02 | End: 2020-01-02 | Stop reason: SDUPTHER

## 2019-05-02 RX ORDER — METFORMIN HYDROCHLORIDE 500 MG/1
500 TABLET, EXTENDED RELEASE ORAL 2 TIMES DAILY WITH MEALS
Qty: 60 TABLET | Refills: 0 | Status: SHIPPED | OUTPATIENT
Start: 2019-05-02 | End: 2020-01-03 | Stop reason: SDUPTHER

## 2019-05-02 RX ORDER — AMLODIPINE BESYLATE 10 MG/1
10 TABLET ORAL DAILY
Qty: 30 TABLET | Refills: 1 | Status: SHIPPED | OUTPATIENT
Start: 2019-05-02 | End: 2019-07-24 | Stop reason: SDUPTHER

## 2019-05-02 RX ORDER — LANOLIN ALCOHOL/MO/W.PET/CERES
3 CREAM (GRAM) TOPICAL
Qty: 30 TABLET | Refills: 1 | Status: SHIPPED | OUTPATIENT
Start: 2019-05-02 | End: 2020-02-19 | Stop reason: SDUPTHER

## 2019-05-02 RX ORDER — ACETAMINOPHEN 500 MG
1000 TABLET ORAL EVERY 8 HOURS PRN
Qty: 30 TABLET | Refills: 0
Start: 2019-05-02 | End: 2019-08-22 | Stop reason: ALTCHOICE

## 2019-05-02 RX ORDER — RANITIDINE 150 MG/1
150 TABLET ORAL 2 TIMES DAILY
Qty: 60 TABLET | Refills: 4 | Status: SHIPPED | OUTPATIENT
Start: 2019-05-02 | End: 2020-07-09

## 2019-05-02 RX ORDER — SILDENAFIL 50 MG/1
50 TABLET, FILM COATED ORAL AS NEEDED
Qty: 6 TABLET | Refills: 0 | Status: SHIPPED | OUTPATIENT
Start: 2019-05-02 | End: 2020-10-29

## 2019-05-02 RX ORDER — FLUTICASONE FUROATE AND VILANTEROL 100; 25 UG/1; UG/1
1 POWDER RESPIRATORY (INHALATION)
Qty: 1 INHALER | Refills: 5 | Status: SHIPPED | OUTPATIENT
Start: 2019-05-02 | End: 2020-01-09

## 2019-05-02 RX ORDER — ALBUTEROL SULFATE 90 UG/1
2 AEROSOL, METERED RESPIRATORY (INHALATION) EVERY 4 HOURS PRN
Qty: 1 INHALER | Refills: 0 | Status: SHIPPED | OUTPATIENT
Start: 2019-05-02 | End: 2019-05-30 | Stop reason: SDUPTHER

## 2019-05-02 RX ORDER — GABAPENTIN 300 MG/1
300 CAPSULE ORAL 3 TIMES DAILY
Qty: 90 CAPSULE | Refills: 0 | Status: SHIPPED | OUTPATIENT
Start: 2019-05-02 | End: 2019-06-17 | Stop reason: SDUPTHER

## 2019-05-02 RX ORDER — DIPHENHYDRAMINE HCL 25 MG
25 TABLET ORAL EVERY 6 HOURS PRN
Qty: 30 TABLET | Refills: 0 | Status: SHIPPED | OUTPATIENT
Start: 2019-05-02 | End: 2020-07-09

## 2019-05-02 RX ORDER — METHOCARBAMOL 750 MG/1
750 TABLET, FILM COATED ORAL EVERY 6 HOURS PRN
Qty: 90 TABLET | Refills: 0 | Status: SHIPPED | OUTPATIENT
Start: 2019-05-02 | End: 2020-01-09

## 2019-05-02 RX ORDER — PROCHLORPERAZINE MALEATE 10 MG
10 TABLET ORAL EVERY 6 HOURS PRN
Qty: 90 TABLET | Refills: 0 | Status: SHIPPED | OUTPATIENT
Start: 2019-05-02 | End: 2019-11-04 | Stop reason: SDUPTHER

## 2019-05-02 RX ORDER — FLUOXETINE 10 MG/1
10 TABLET, FILM COATED ORAL DAILY
Qty: 30 TABLET | Refills: 5 | Status: SHIPPED | OUTPATIENT
Start: 2019-05-02

## 2019-05-03 ENCOUNTER — TELEPHONE (OUTPATIENT)
Dept: FAMILY MEDICINE CLINIC | Facility: CLINIC | Age: 59
End: 2019-05-03

## 2019-05-06 ENCOUNTER — OFFICE VISIT (OUTPATIENT)
Dept: HEMATOLOGY ONCOLOGY | Facility: CLINIC | Age: 59
End: 2019-05-06
Payer: COMMERCIAL

## 2019-05-06 ENCOUNTER — APPOINTMENT (OUTPATIENT)
Dept: LAB | Facility: HOSPITAL | Age: 59
End: 2019-05-06
Payer: COMMERCIAL

## 2019-05-06 ENCOUNTER — TRANSCRIBE ORDERS (OUTPATIENT)
Dept: ADMINISTRATIVE | Facility: HOSPITAL | Age: 59
End: 2019-05-06

## 2019-05-06 VITALS
SYSTOLIC BLOOD PRESSURE: 140 MMHG | BODY MASS INDEX: 22.93 KG/M2 | RESPIRATION RATE: 16 BRPM | TEMPERATURE: 98.9 F | HEART RATE: 88 BPM | DIASTOLIC BLOOD PRESSURE: 74 MMHG | HEIGHT: 69 IN | WEIGHT: 154.8 LBS | OXYGEN SATURATION: 97 %

## 2019-05-06 DIAGNOSIS — E11.9 TYPE 2 DIABETES MELLITUS WITHOUT COMPLICATION, WITHOUT LONG-TERM CURRENT USE OF INSULIN (HCC): ICD-10-CM

## 2019-05-06 DIAGNOSIS — C34.91 ADENOCARCINOMA OF LUNG, RIGHT (HCC): ICD-10-CM

## 2019-05-06 DIAGNOSIS — J30.89 ENVIRONMENTAL AND SEASONAL ALLERGIES: ICD-10-CM

## 2019-05-06 DIAGNOSIS — C34.91 ADENOCARCINOMA OF LUNG, RIGHT (HCC): Primary | ICD-10-CM

## 2019-05-06 LAB
ALBUMIN SERPL BCP-MCNC: 4.2 G/DL (ref 3–5.2)
ALP SERPL-CCNC: 88 U/L (ref 43–122)
ALT SERPL W P-5'-P-CCNC: 21 U/L (ref 9–52)
ANION GAP SERPL CALCULATED.3IONS-SCNC: 11 MMOL/L (ref 5–14)
AST SERPL W P-5'-P-CCNC: 27 U/L (ref 17–59)
BASOPHILS # BLD AUTO: 0.09 THOUSAND/UL (ref 0–0.1)
BASOPHILS NFR MAR MANUAL: 1 % (ref 0–1)
BILIRUB SERPL-MCNC: 0.4 MG/DL
BUN SERPL-MCNC: 13 MG/DL (ref 5–25)
CALCIUM SERPL-MCNC: 9.8 MG/DL (ref 8.4–10.2)
CHLORIDE SERPL-SCNC: 100 MMOL/L (ref 97–108)
CO2 SERPL-SCNC: 27 MMOL/L (ref 22–30)
CREAT SERPL-MCNC: 1.37 MG/DL (ref 0.7–1.5)
EOSINOPHIL # BLD AUTO: 2.09 THOUSAND/UL (ref 0–0.4)
EOSINOPHIL NFR BLD MANUAL: 24 % (ref 0–6)
ERYTHROCYTE [DISTWIDTH] IN BLOOD BY AUTOMATED COUNT: 20.4 %
EST. AVERAGE GLUCOSE BLD GHB EST-MCNC: 117 MG/DL
GFR SERPL CREATININE-BSD FRML MDRD: 66 ML/MIN/1.73SQ M
GLUCOSE P FAST SERPL-MCNC: 105 MG/DL (ref 70–99)
HBA1C MFR BLD: 5.7 % (ref 4.2–6.3)
HCT VFR BLD AUTO: 35.7 % (ref 41–53)
HGB BLD-MCNC: 11.7 G/DL (ref 13.5–17.5)
LYMPHOCYTES # BLD AUTO: 2.18 THOUSAND/UL (ref 0.5–4)
LYMPHOCYTES # BLD AUTO: 25 % (ref 25–45)
MCH RBC QN AUTO: 28.6 PG (ref 26–34)
MCHC RBC AUTO-ENTMCNC: 32.6 G/DL (ref 31–36)
MCV RBC AUTO: 88 FL (ref 80–100)
MONOCYTES # BLD AUTO: 0.78 THOUSAND/UL (ref 0.2–0.9)
MONOCYTES NFR BLD AUTO: 9 % (ref 1–10)
NEUTS SEG # BLD: 3.57 THOUSAND/UL (ref 1.8–7.8)
NEUTS SEG NFR BLD AUTO: 41 %
PLATELET # BLD AUTO: 501 THOUSANDS/UL (ref 150–450)
PLATELET BLD QL SMEAR: ABNORMAL
PMV BLD AUTO: 7.2 FL (ref 8.9–12.7)
POTASSIUM SERPL-SCNC: 4.7 MMOL/L (ref 3.6–5)
PROT SERPL-MCNC: 7.5 G/DL (ref 5.9–8.4)
RBC # BLD AUTO: 4.07 MILLION/UL (ref 4.5–5.9)
RBC MORPH BLD: NORMAL
SODIUM SERPL-SCNC: 138 MMOL/L (ref 137–147)
TOTAL CELLS COUNTED SPEC: 100
TSH SERPL DL<=0.05 MIU/L-ACNC: 1.26 UIU/ML (ref 0.47–4.68)
WBC # BLD AUTO: 8.7 THOUSAND/UL (ref 4.5–11)

## 2019-05-06 PROCEDURE — 85007 BL SMEAR W/DIFF WBC COUNT: CPT

## 2019-05-06 PROCEDURE — 80053 COMPREHEN METABOLIC PANEL: CPT

## 2019-05-06 PROCEDURE — 36415 COLL VENOUS BLD VENIPUNCTURE: CPT

## 2019-05-06 PROCEDURE — 84443 ASSAY THYROID STIM HORMONE: CPT

## 2019-05-06 PROCEDURE — 85027 COMPLETE CBC AUTOMATED: CPT

## 2019-05-06 PROCEDURE — 99214 OFFICE O/P EST MOD 30 MIN: CPT | Performed by: NURSE PRACTITIONER

## 2019-05-06 PROCEDURE — 83036 HEMOGLOBIN GLYCOSYLATED A1C: CPT

## 2019-05-06 RX ORDER — FLUTICASONE PROPIONATE 50 MCG
SPRAY, SUSPENSION (ML) NASAL
Qty: 1 BOTTLE | Refills: 3 | Status: SHIPPED | OUTPATIENT
Start: 2019-05-06 | End: 2021-01-01 | Stop reason: SDUPTHER

## 2019-05-08 ENCOUNTER — HOSPITAL ENCOUNTER (OUTPATIENT)
Dept: INFUSION CENTER | Facility: HOSPITAL | Age: 59
Discharge: HOME | End: 2019-05-08

## 2019-05-08 RX ORDER — SODIUM CHLORIDE 9 MG/ML
20 INJECTION, SOLUTION INTRAVENOUS ONCE
Status: COMPLETED | OUTPATIENT
Start: 2019-05-09 | End: 2019-05-09

## 2019-05-09 ENCOUNTER — HOSPITAL ENCOUNTER (OUTPATIENT)
Dept: INFUSION CENTER | Facility: HOSPITAL | Age: 59
Discharge: HOME | End: 2019-05-09
Payer: COMMERCIAL

## 2019-05-09 VITALS
HEIGHT: 69 IN | DIASTOLIC BLOOD PRESSURE: 84 MMHG | BODY MASS INDEX: 22.33 KG/M2 | HEART RATE: 94 BPM | SYSTOLIC BLOOD PRESSURE: 142 MMHG | TEMPERATURE: 97 F | WEIGHT: 150.79 LBS | RESPIRATION RATE: 16 BRPM

## 2019-05-09 PROCEDURE — 96411 CHEMO IV PUSH ADDL DRUG: CPT

## 2019-05-09 PROCEDURE — 96413 CHEMO IV INFUSION 1 HR: CPT

## 2019-05-09 PROCEDURE — 96367 TX/PROPH/DG ADDL SEQ IV INF: CPT

## 2019-05-09 RX ADMIN — DEXAMETHASONE SODIUM PHOSPHATE: 10 INJECTION, SOLUTION INTRAMUSCULAR; INTRAVENOUS at 11:51

## 2019-05-09 RX ADMIN — SODIUM CHLORIDE 200 MG: 9 INJECTION, SOLUTION INTRAVENOUS at 12:17

## 2019-05-09 RX ADMIN — SODIUM CHLORIDE 20 ML/HR: 9 INJECTION, SOLUTION INTRAVENOUS at 11:19

## 2019-05-09 RX ADMIN — SODIUM CHLORIDE 920 MG: 9 INJECTION, SOLUTION INTRAVENOUS at 12:53

## 2019-05-09 NOTE — PROGRESS NOTES
Pt here for treatment today  Tolerated well without complications  Confirmed next appts and AVS provided

## 2019-05-09 NOTE — PLAN OF CARE
Problem: Potential for Falls  Goal: Patient will remain free of falls  Description  INTERVENTIONS:  - Assess patient frequently for physical needs  -  Identify cognitive and physical deficits and behaviors that affect risk of falls    -  [place] fall precautions as indicated by assessment   - Educate patient/family on patient safety including physical limitations  - Instruct patient to call for assistance with activity based on assessment  - Modify environment to reduce risk of injury  - Consider OT/PT consult to assist with strengthening/mobility  Outcome: Progressing

## 2019-05-20 ENCOUNTER — OFFICE VISIT (OUTPATIENT)
Dept: PALLIATIVE MEDICINE | Facility: CLINIC | Age: 59
End: 2019-05-20
Payer: COMMERCIAL

## 2019-05-20 VITALS
TEMPERATURE: 98.3 F | HEIGHT: 69 IN | RESPIRATION RATE: 18 BRPM | DIASTOLIC BLOOD PRESSURE: 82 MMHG | SYSTOLIC BLOOD PRESSURE: 138 MMHG | OXYGEN SATURATION: 92 % | BODY MASS INDEX: 22.07 KG/M2 | WEIGHT: 149 LBS | HEART RATE: 83 BPM

## 2019-05-20 DIAGNOSIS — M54.50 LUMBAR PAIN: ICD-10-CM

## 2019-05-20 DIAGNOSIS — R52 GENERALIZED BODY ACHES: ICD-10-CM

## 2019-05-20 DIAGNOSIS — G89.3 CANCER ASSOCIATED PAIN: ICD-10-CM

## 2019-05-20 DIAGNOSIS — C34.91 ADENOCARCINOMA OF LUNG, RIGHT (HCC): ICD-10-CM

## 2019-05-20 DIAGNOSIS — C34.90 MALIGNANT NEOPLASM OF LUNG, UNSPECIFIED LATERALITY, UNSPECIFIED PART OF LUNG (HCC): ICD-10-CM

## 2019-05-20 PROCEDURE — 99214 OFFICE O/P EST MOD 30 MIN: CPT | Performed by: NURSE PRACTITIONER

## 2019-05-20 RX ORDER — OXYCODONE HYDROCHLORIDE 20 MG/1
20 TABLET ORAL EVERY 4 HOURS PRN
Qty: 150 TABLET | Refills: 0 | Status: SHIPPED | OUTPATIENT
Start: 2019-05-20 | End: 2019-06-17 | Stop reason: SDUPTHER

## 2019-05-21 NOTE — ASSESSMENT & PLAN NOTE
PAT - SURGICAL PRE-ADMISSION INSTRUCTIONS    NAME:  Tano Smiley                                                          TODAY'S DATE:  5/21/2019    SURGERY DATE:  5/23/2019                                  SURGERY ARRIVAL TIME:   TBV    1. Do NOT eat or drink anything, including candy or gum, after MIDNIGHT on 05/22/2019 , unless you have specific instructions from your Surgeon or Anesthesia Provider to do so. 2. No smoking on the day of surgery. 3. No alcohol 24 hours prior to the day of surgery. 4. No recreational drugs for one week prior to the day of surgery. 5. Leave all valuables, including money/purse, at home. 6. Remove all jewelry, nail polish, makeup (including mascara); no lotions, powders, deodorant, or perfume/cologne/after shave. 7. Glasses/Contact lenses and Dentures may be worn to the hospital.  They will be removed prior to surgery. 8. Call your doctor if symptoms of a cold or illness develop within 24 ours prior to surgery. 9. AN ADULT MUST DRIVE YOU HOME AFTER OUTPATIENT SURGERY. 10. If you are having an OUTPATIENT procedure, please make arrangements for a responsible adult to be with you for 24 hours after your surgery. 11. If you are admitted to the hospital, you will be assigned to a bed after surgery is complete. Normally a family member will not be able to see you until you are in your assigned bed. 15. Family is encouraged to accompany you to the hospital.  We ask visitors in the treatment area to be limited to ONE person at a time to ensure patient privacy. EXCEPTIONS WILL BE MADE AS NEEDED. 15. Children under 12 are discouraged from entering the treatment area and need to be supervised by an adult when in the waiting room.     Special Instructions:    STOP anticoagulants AT LEAST 1 WEEK PRIOR to your surgery or, follow other MD instructions:  No NSAIDS    Patient Prep:    shower with anti-bacterial soap    These surgical instructions were reviewed with Gunner Dickinson · Reports he quit smoking during the PAT phone call    Directions: On the morning of surgery, please go to the [facility]. Enter the building from the [facility] entrance, 1st floor (next to the Emergency Room entrance). Take the elevator to the 2nd floor. Sign in at the Registration Desk.     If you have any questions and/or concerns, please do not hesitate to call:  (Prior to the day of surgery)  [facility] unit:  [phone]  (Day of surgery)  [facility] unit:  [phone]

## 2019-05-29 ENCOUNTER — APPOINTMENT (OUTPATIENT)
Dept: LAB | Facility: HOSPITAL | Age: 59
End: 2019-05-29
Attending: INTERNAL MEDICINE
Payer: COMMERCIAL

## 2019-05-29 DIAGNOSIS — C34.91 ADENOCARCINOMA OF LUNG, RIGHT (HCC): ICD-10-CM

## 2019-05-29 LAB
ALBUMIN SERPL BCP-MCNC: 4.1 G/DL (ref 3–5.2)
ALP SERPL-CCNC: 106 U/L (ref 43–122)
ALT SERPL W P-5'-P-CCNC: 40 U/L (ref 9–52)
ANION GAP SERPL CALCULATED.3IONS-SCNC: 10 MMOL/L (ref 5–14)
ANISOCYTOSIS BLD QL SMEAR: PRESENT
AST SERPL W P-5'-P-CCNC: 50 U/L (ref 17–59)
BASOPHILS # BLD AUTO: 0.07 THOUSAND/UL (ref 0–0.1)
BASOPHILS NFR MAR MANUAL: 1 % (ref 0–1)
BILIRUB SERPL-MCNC: 0.3 MG/DL
BUN SERPL-MCNC: 17 MG/DL (ref 5–25)
CALCIUM SERPL-MCNC: 9.7 MG/DL (ref 8.4–10.2)
CHLORIDE SERPL-SCNC: 104 MMOL/L (ref 97–108)
CO2 SERPL-SCNC: 26 MMOL/L (ref 22–30)
CREAT SERPL-MCNC: 1.67 MG/DL (ref 0.7–1.5)
EOSINOPHIL # BLD AUTO: 1.43 THOUSAND/UL (ref 0–0.4)
EOSINOPHIL NFR BLD MANUAL: 21 % (ref 0–6)
ERYTHROCYTE [DISTWIDTH] IN BLOOD BY AUTOMATED COUNT: 18.8 %
GFR SERPL CREATININE-BSD FRML MDRD: 52 ML/MIN/1.73SQ M
GLUCOSE P FAST SERPL-MCNC: 100 MG/DL (ref 70–99)
HCT VFR BLD AUTO: 36 % (ref 41–53)
HGB BLD-MCNC: 12 G/DL (ref 13.5–17.5)
LYMPHOCYTES # BLD AUTO: 2.24 THOUSAND/UL (ref 0.5–4)
LYMPHOCYTES # BLD AUTO: 33 % (ref 25–45)
MCH RBC QN AUTO: 29.1 PG (ref 26–34)
MCHC RBC AUTO-ENTMCNC: 33.4 G/DL (ref 31–36)
MCV RBC AUTO: 87 FL (ref 80–100)
MONOCYTES # BLD AUTO: 0.34 THOUSAND/UL (ref 0.2–0.9)
MONOCYTES NFR BLD AUTO: 5 % (ref 1–10)
NEUTS SEG # BLD: 2.72 THOUSAND/UL (ref 1.8–7.8)
NEUTS SEG NFR BLD AUTO: 40 %
PLATELET # BLD AUTO: 474 THOUSANDS/UL (ref 150–450)
PLATELET BLD QL SMEAR: ABNORMAL
PMV BLD AUTO: 7.3 FL (ref 8.9–12.7)
POTASSIUM SERPL-SCNC: 5 MMOL/L (ref 3.6–5)
PROT SERPL-MCNC: 7.9 G/DL (ref 5.9–8.4)
RBC # BLD AUTO: 4.14 MILLION/UL (ref 4.5–5.9)
RBC MORPH BLD: ABNORMAL
SODIUM SERPL-SCNC: 140 MMOL/L (ref 137–147)
T3FREE SERPL-MCNC: 2.62 PG/ML (ref 2.3–4.2)
TOTAL CELLS COUNTED SPEC: 100
TSH SERPL DL<=0.05 MIU/L-ACNC: 1.37 UIU/ML (ref 0.47–4.68)
WBC # BLD AUTO: 6.8 THOUSAND/UL (ref 4.5–11)

## 2019-05-29 PROCEDURE — 84481 FREE ASSAY (FT-3): CPT

## 2019-05-29 PROCEDURE — 85027 COMPLETE CBC AUTOMATED: CPT

## 2019-05-29 PROCEDURE — 84443 ASSAY THYROID STIM HORMONE: CPT

## 2019-05-29 PROCEDURE — 85007 BL SMEAR W/DIFF WBC COUNT: CPT

## 2019-05-29 PROCEDURE — 80053 COMPREHEN METABOLIC PANEL: CPT

## 2019-05-29 PROCEDURE — 36415 COLL VENOUS BLD VENIPUNCTURE: CPT

## 2019-05-30 ENCOUNTER — HOSPITAL ENCOUNTER (OUTPATIENT)
Dept: INFUSION CENTER | Facility: HOSPITAL | Age: 59
Discharge: HOME | End: 2019-05-30
Payer: COMMERCIAL

## 2019-05-30 ENCOUNTER — TELEPHONE (OUTPATIENT)
Dept: HEMATOLOGY ONCOLOGY | Facility: CLINIC | Age: 59
End: 2019-05-30

## 2019-05-30 ENCOUNTER — OFFICE VISIT (OUTPATIENT)
Dept: HEMATOLOGY ONCOLOGY | Facility: CLINIC | Age: 59
End: 2019-05-30
Payer: COMMERCIAL

## 2019-05-30 VITALS
WEIGHT: 146 LBS | DIASTOLIC BLOOD PRESSURE: 70 MMHG | HEIGHT: 69 IN | HEART RATE: 76 BPM | TEMPERATURE: 97.8 F | OXYGEN SATURATION: 94 % | SYSTOLIC BLOOD PRESSURE: 120 MMHG | BODY MASS INDEX: 21.62 KG/M2 | RESPIRATION RATE: 18 BRPM

## 2019-05-30 VITALS
WEIGHT: 146 LBS | BODY MASS INDEX: 21.62 KG/M2 | RESPIRATION RATE: 18 BRPM | SYSTOLIC BLOOD PRESSURE: 120 MMHG | HEART RATE: 76 BPM | TEMPERATURE: 97.8 F | HEIGHT: 69 IN | DIASTOLIC BLOOD PRESSURE: 70 MMHG

## 2019-05-30 DIAGNOSIS — C34.91 ADENOCARCINOMA OF LUNG, RIGHT (HCC): Primary | ICD-10-CM

## 2019-05-30 DIAGNOSIS — N28.9 RENAL DYSFUNCTION: Primary | ICD-10-CM

## 2019-05-30 DIAGNOSIS — J44.9 CHRONIC OBSTRUCTIVE PULMONARY DISEASE, UNSPECIFIED COPD TYPE (HCC): ICD-10-CM

## 2019-05-30 DIAGNOSIS — N28.9 RENAL DYSFUNCTION: ICD-10-CM

## 2019-05-30 DIAGNOSIS — R63.4 WEIGHT LOSS: ICD-10-CM

## 2019-05-30 PROBLEM — J95.811 PNEUMOTHORAX OF RIGHT LUNG AFTER BIOPSY: Status: RESOLVED | Noted: 2018-11-13 | Resolved: 2019-05-30

## 2019-05-30 PROBLEM — J93.9 PNEUMOTHORAX ON RIGHT: Status: RESOLVED | Noted: 2018-11-21 | Resolved: 2019-05-30

## 2019-05-30 LAB
ANION GAP SERPL CALCULATED.3IONS-SCNC: 4 MMOL/L (ref 5–14)
BACTERIA UR QL AUTO: ABNORMAL /HPF
BILIRUB UR QL STRIP: NEGATIVE
BUN SERPL-MCNC: 17 MG/DL (ref 5–25)
CALCIUM SERPL-MCNC: 8.5 MG/DL (ref 8.4–10.2)
CHLORIDE SERPL-SCNC: 107 MMOL/L (ref 97–108)
CLARITY UR: CLEAR
CO2 SERPL-SCNC: 27 MMOL/L (ref 22–30)
COARSE GRAN CASTS URNS QL MICRO: ABNORMAL /LPF
COLOR UR: YELLOW
CREAT SERPL-MCNC: 1.42 MG/DL (ref 0.7–1.5)
CREAT UR-MCNC: 289 MG/DL
GFR SERPL CREATININE-BSD FRML MDRD: 63 ML/MIN/1.73SQ M
GLUCOSE SERPL-MCNC: 96 MG/DL (ref 70–99)
GLUCOSE UR STRIP-MCNC: NEGATIVE MG/DL
HGB UR QL STRIP.AUTO: NEGATIVE
HYALINE CASTS #/AREA URNS LPF: ABNORMAL /LPF
KETONES UR STRIP-MCNC: NEGATIVE MG/DL
LEUKOCYTE ESTERASE UR QL STRIP: NEGATIVE
NITRITE UR QL STRIP: NEGATIVE
NON-SQ EPI CELLS URNS QL MICRO: ABNORMAL /HPF
PH UR STRIP.AUTO: 5 [PH]
POTASSIUM SERPL-SCNC: 4.3 MMOL/L (ref 3.6–5)
PROT UR STRIP-MCNC: ABNORMAL MG/DL
PROT UR-MCNC: 20 MG/DL
PROT/CREAT UR: 0.07 MG/G{CREAT} (ref 0–0.1)
RBC #/AREA URNS AUTO: ABNORMAL /HPF
SODIUM SERPL-SCNC: 138 MMOL/L (ref 137–147)
SP GR UR STRIP.AUTO: 1.02 (ref 1–1.04)
UROBILINOGEN UA: NEGATIVE MG/DL
WBC #/AREA URNS AUTO: ABNORMAL /HPF

## 2019-05-30 PROCEDURE — 96372 THER/PROPH/DIAG INJ SC/IM: CPT

## 2019-05-30 PROCEDURE — 81001 URINALYSIS AUTO W/SCOPE: CPT

## 2019-05-30 PROCEDURE — 96361 HYDRATE IV INFUSION ADD-ON: CPT

## 2019-05-30 PROCEDURE — 99215 OFFICE O/P EST HI 40 MIN: CPT | Performed by: NURSE PRACTITIONER

## 2019-05-30 PROCEDURE — 96367 TX/PROPH/DG ADDL SEQ IV INF: CPT

## 2019-05-30 PROCEDURE — 96413 CHEMO IV INFUSION 1 HR: CPT

## 2019-05-30 PROCEDURE — 80048 BASIC METABOLIC PNL TOTAL CA: CPT

## 2019-05-30 PROCEDURE — 96411 CHEMO IV PUSH ADDL DRUG: CPT

## 2019-05-30 PROCEDURE — 82570 ASSAY OF URINE CREATININE: CPT

## 2019-05-30 PROCEDURE — 84156 ASSAY OF PROTEIN URINE: CPT

## 2019-05-30 RX ORDER — SODIUM CHLORIDE 9 MG/ML
20 INJECTION, SOLUTION INTRAVENOUS ONCE
Status: CANCELLED | OUTPATIENT
Start: 2019-06-20

## 2019-05-30 RX ORDER — SODIUM CHLORIDE 9 MG/ML
20 INJECTION, SOLUTION INTRAVENOUS ONCE
Status: COMPLETED | OUTPATIENT
Start: 2019-05-30 | End: 2019-05-30

## 2019-05-30 RX ORDER — CYANOCOBALAMIN 1000 UG/ML
1000 INJECTION INTRAMUSCULAR; SUBCUTANEOUS ONCE
Status: COMPLETED | OUTPATIENT
Start: 2019-05-30 | End: 2019-05-30

## 2019-05-30 RX ORDER — SODIUM CHLORIDE 9 MG/ML
999 INJECTION, SOLUTION INTRAVENOUS ONCE
Status: COMPLETED | OUTPATIENT
Start: 2019-05-30 | End: 2019-05-30

## 2019-05-30 RX ORDER — ALBUTEROL SULFATE 90 UG/1
2 AEROSOL, METERED RESPIRATORY (INHALATION) EVERY 4 HOURS PRN
Qty: 1 INHALER | Refills: 5 | Status: SHIPPED | OUTPATIENT
Start: 2019-05-30 | End: 2019-10-23 | Stop reason: SDUPTHER

## 2019-05-30 RX ADMIN — DEXAMETHASONE SODIUM PHOSPHATE: 10 INJECTION, SOLUTION INTRAMUSCULAR; INTRAVENOUS at 10:51

## 2019-05-30 RX ADMIN — SODIUM CHLORIDE 20 ML/HR: 9 INJECTION, SOLUTION INTRAVENOUS at 10:30

## 2019-05-30 RX ADMIN — SODIUM CHLORIDE 999 ML/HR: 9 INJECTION, SOLUTION INTRAVENOUS at 12:27

## 2019-05-30 RX ADMIN — CYANOCOBALAMIN 1000 MCG: 1000 INJECTION INTRAMUSCULAR; SUBCUTANEOUS at 13:31

## 2019-05-30 RX ADMIN — SODIUM CHLORIDE 200 MG: 9 INJECTION, SOLUTION INTRAVENOUS at 11:23

## 2019-05-30 RX ADMIN — SODIUM CHLORIDE 920 MG: 9 INJECTION, SOLUTION INTRAVENOUS at 12:08

## 2019-05-30 NOTE — PLAN OF CARE
Problem: Potential for Falls  Goal: Patient will remain free of falls  Description  INTERVENTIONS:  - Assess patient frequently for physical needs  -  Identify cognitive and physical deficits and behaviors that affect risk of falls    -  [place] fall precautions as indicated by assessment   - Educate patient/family on patient safety including physical limitations  - Instruct patient to call for assistance with activity based on assessment  - Modify environment to reduce risk of injury  - Consider OT/PT consult to assist with strengthening/mobility  Outcome: Progressing

## 2019-05-30 NOTE — PROGRESS NOTES
Spoke with Jeanne Connell RN/Lauren Meryle Kennedy and ok to give Vitamin B12 injection today at 6 weeks since last dose  Patient's schedule is going to continue at every 9 weeks after today

## 2019-05-30 NOTE — PROGRESS NOTES
Pt here for chemo today  Patient is also getting 1 liter NSS over one hour and then draw BMP, no need to wait for result post hydration per BROOKS Perez  U/a and urine protein/creat also done as well  Patient tolerated treatment well today  Printer down and unable to print AVS, hand written copy provided to pt with upcoming appts

## 2019-06-07 ENCOUNTER — TELEPHONE (OUTPATIENT)
Dept: HEMATOLOGY ONCOLOGY | Facility: CLINIC | Age: 59
End: 2019-06-07

## 2019-06-07 ENCOUNTER — APPOINTMENT (OUTPATIENT)
Dept: LAB | Facility: HOSPITAL | Age: 59
End: 2019-06-07
Attending: INTERNAL MEDICINE
Payer: COMMERCIAL

## 2019-06-07 DIAGNOSIS — N28.9 RENAL DYSFUNCTION: ICD-10-CM

## 2019-06-07 DIAGNOSIS — C34.91 ADENOCARCINOMA OF LUNG, RIGHT (HCC): ICD-10-CM

## 2019-06-07 DIAGNOSIS — N28.9 RENAL DYSFUNCTION: Primary | ICD-10-CM

## 2019-06-07 LAB
ALBUMIN SERPL BCP-MCNC: 4.1 G/DL (ref 3–5.2)
ALP SERPL-CCNC: 90 U/L (ref 43–122)
ALT SERPL W P-5'-P-CCNC: 19 U/L (ref 9–52)
ANION GAP SERPL CALCULATED.3IONS-SCNC: 9 MMOL/L (ref 5–14)
AST SERPL W P-5'-P-CCNC: 36 U/L (ref 17–59)
BILIRUB SERPL-MCNC: 0.6 MG/DL
BUN SERPL-MCNC: 23 MG/DL (ref 5–25)
CALCIUM SERPL-MCNC: 9.5 MG/DL (ref 8.4–10.2)
CHLORIDE SERPL-SCNC: 101 MMOL/L (ref 97–108)
CO2 SERPL-SCNC: 29 MMOL/L (ref 22–30)
CREAT SERPL-MCNC: 1.86 MG/DL (ref 0.7–1.5)
EOSINOPHIL # BLD AUTO: 0.46 THOUSAND/UL (ref 0–0.4)
EOSINOPHIL NFR BLD MANUAL: 14 % (ref 0–6)
ERYTHROCYTE [DISTWIDTH] IN BLOOD BY AUTOMATED COUNT: 17.2 %
GFR SERPL CREATININE-BSD FRML MDRD: 46 ML/MIN/1.73SQ M
GLUCOSE P FAST SERPL-MCNC: 155 MG/DL (ref 70–99)
HCT VFR BLD AUTO: 35.4 % (ref 41–53)
HGB BLD-MCNC: 11.5 G/DL (ref 13.5–17.5)
LYMPHOCYTES # BLD AUTO: 2.05 THOUSAND/UL (ref 0.5–4)
LYMPHOCYTES # BLD AUTO: 62 % (ref 25–45)
MAGNESIUM SERPL-MCNC: 1.9 MG/DL (ref 1.6–2.3)
MCH RBC QN AUTO: 28.5 PG (ref 26–34)
MCHC RBC AUTO-ENTMCNC: 32.5 G/DL (ref 31–36)
MCV RBC AUTO: 88 FL (ref 80–100)
MONOCYTES # BLD AUTO: 0.3 THOUSAND/UL (ref 0.2–0.9)
MONOCYTES NFR BLD AUTO: 9 % (ref 1–10)
NEUTS SEG # BLD: 0.5 THOUSAND/UL (ref 1.8–7.8)
NEUTS SEG NFR BLD AUTO: 15 %
PLATELET # BLD AUTO: 190 THOUSANDS/UL (ref 150–450)
PLATELET BLD QL SMEAR: ADEQUATE
PMV BLD AUTO: 7.3 FL (ref 8.9–12.7)
POTASSIUM SERPL-SCNC: 4.6 MMOL/L (ref 3.6–5)
PROT SERPL-MCNC: 8.2 G/DL (ref 5.9–8.4)
RBC # BLD AUTO: 4.04 MILLION/UL (ref 4.5–5.9)
RBC MORPH BLD: NORMAL
SODIUM SERPL-SCNC: 139 MMOL/L (ref 137–147)
T3FREE SERPL-MCNC: 2.44 PG/ML (ref 2.3–4.2)
TOTAL CELLS COUNTED SPEC: 100
TSH SERPL DL<=0.05 MIU/L-ACNC: 1.79 UIU/ML (ref 0.47–4.68)
URATE SERPL-MCNC: 8.3 MG/DL (ref 3.5–8.5)
WBC # BLD AUTO: 3.3 THOUSAND/UL (ref 4.5–11)

## 2019-06-07 PROCEDURE — 84550 ASSAY OF BLOOD/URIC ACID: CPT

## 2019-06-07 PROCEDURE — 80053 COMPREHEN METABOLIC PANEL: CPT

## 2019-06-07 PROCEDURE — 83735 ASSAY OF MAGNESIUM: CPT

## 2019-06-07 PROCEDURE — 84443 ASSAY THYROID STIM HORMONE: CPT

## 2019-06-07 PROCEDURE — 84481 FREE ASSAY (FT-3): CPT

## 2019-06-07 PROCEDURE — 85007 BL SMEAR W/DIFF WBC COUNT: CPT

## 2019-06-07 PROCEDURE — 85027 COMPLETE CBC AUTOMATED: CPT

## 2019-06-07 PROCEDURE — 36415 COLL VENOUS BLD VENIPUNCTURE: CPT

## 2019-06-10 ENCOUNTER — TELEPHONE (OUTPATIENT)
Dept: HEMATOLOGY ONCOLOGY | Facility: CLINIC | Age: 59
End: 2019-06-10

## 2019-06-17 ENCOUNTER — OFFICE VISIT (OUTPATIENT)
Dept: PALLIATIVE MEDICINE | Facility: CLINIC | Age: 59
End: 2019-06-17
Payer: COMMERCIAL

## 2019-06-17 VITALS
BODY MASS INDEX: 21.48 KG/M2 | RESPIRATION RATE: 18 BRPM | DIASTOLIC BLOOD PRESSURE: 78 MMHG | OXYGEN SATURATION: 92 % | TEMPERATURE: 97.8 F | HEART RATE: 76 BPM | HEIGHT: 69 IN | WEIGHT: 145 LBS | SYSTOLIC BLOOD PRESSURE: 124 MMHG

## 2019-06-17 DIAGNOSIS — C34.91 ADENOCARCINOMA OF LUNG, RIGHT (HCC): Primary | ICD-10-CM

## 2019-06-17 DIAGNOSIS — R63.4 WEIGHT LOSS: ICD-10-CM

## 2019-06-17 DIAGNOSIS — G89.3 CANCER ASSOCIATED PAIN: ICD-10-CM

## 2019-06-17 DIAGNOSIS — M54.50 LUMBAR PAIN: ICD-10-CM

## 2019-06-17 DIAGNOSIS — R52 GENERALIZED BODY ACHES: ICD-10-CM

## 2019-06-17 DIAGNOSIS — K59.00 CONSTIPATION, UNSPECIFIED CONSTIPATION TYPE: ICD-10-CM

## 2019-06-17 PROCEDURE — 99214 OFFICE O/P EST MOD 30 MIN: CPT | Performed by: NURSE PRACTITIONER

## 2019-06-17 RX ORDER — GABAPENTIN 300 MG/1
300 CAPSULE ORAL 3 TIMES DAILY
Qty: 90 CAPSULE | Refills: 0 | Status: SHIPPED | OUTPATIENT
Start: 2019-06-17 | End: 2019-07-17 | Stop reason: SDUPTHER

## 2019-06-17 RX ORDER — OXYCODONE HYDROCHLORIDE 20 MG/1
20 TABLET ORAL EVERY 4 HOURS PRN
Qty: 150 TABLET | Refills: 0 | Status: SHIPPED | OUTPATIENT
Start: 2019-06-17 | End: 2019-07-17 | Stop reason: SDUPTHER

## 2019-06-19 ENCOUNTER — TELEPHONE (OUTPATIENT)
Dept: HEMATOLOGY ONCOLOGY | Facility: CLINIC | Age: 59
End: 2019-06-19

## 2019-06-19 ENCOUNTER — APPOINTMENT (OUTPATIENT)
Dept: LAB | Facility: HOSPITAL | Age: 59
End: 2019-06-19
Attending: INTERNAL MEDICINE
Payer: COMMERCIAL

## 2019-06-19 ENCOUNTER — OFFICE VISIT (OUTPATIENT)
Dept: HEMATOLOGY ONCOLOGY | Facility: CLINIC | Age: 59
End: 2019-06-19
Payer: COMMERCIAL

## 2019-06-19 VITALS
WEIGHT: 143.8 LBS | DIASTOLIC BLOOD PRESSURE: 88 MMHG | HEART RATE: 79 BPM | OXYGEN SATURATION: 92 % | BODY MASS INDEX: 21.3 KG/M2 | HEIGHT: 69 IN | TEMPERATURE: 96.8 F | RESPIRATION RATE: 16 BRPM | SYSTOLIC BLOOD PRESSURE: 130 MMHG

## 2019-06-19 DIAGNOSIS — C34.91 ADENOCARCINOMA OF LUNG, RIGHT (HCC): Primary | ICD-10-CM

## 2019-06-19 DIAGNOSIS — N28.9 RENAL DYSFUNCTION: ICD-10-CM

## 2019-06-19 DIAGNOSIS — C34.91 ADENOCARCINOMA OF LUNG, RIGHT (HCC): ICD-10-CM

## 2019-06-19 DIAGNOSIS — J44.1 ACUTE EXACERBATION OF CHRONIC OBSTRUCTIVE PULMONARY DISEASE (COPD) (HCC): ICD-10-CM

## 2019-06-19 LAB
ALBUMIN SERPL BCP-MCNC: 4.1 G/DL (ref 3–5.2)
ALP SERPL-CCNC: 110 U/L (ref 43–122)
ALT SERPL W P-5'-P-CCNC: 48 U/L (ref 9–52)
ANION GAP SERPL CALCULATED.3IONS-SCNC: 9 MMOL/L (ref 5–14)
ANISOCYTOSIS BLD QL SMEAR: PRESENT
AST SERPL W P-5'-P-CCNC: 64 U/L (ref 17–59)
BASOPHILS # BLD AUTO: 0.07 THOUSAND/UL (ref 0–0.1)
BASOPHILS NFR MAR MANUAL: 1 % (ref 0–1)
BILIRUB SERPL-MCNC: 0.2 MG/DL
BUN SERPL-MCNC: 15 MG/DL (ref 5–25)
CALCIUM SERPL-MCNC: 9.3 MG/DL (ref 8.4–10.2)
CHLORIDE SERPL-SCNC: 103 MMOL/L (ref 97–108)
CO2 SERPL-SCNC: 30 MMOL/L (ref 22–30)
CREAT SERPL-MCNC: 1.79 MG/DL (ref 0.7–1.5)
EOSINOPHIL # BLD AUTO: 1.59 THOUSAND/UL (ref 0–0.4)
EOSINOPHIL NFR BLD MANUAL: 23 % (ref 0–6)
ERYTHROCYTE [DISTWIDTH] IN BLOOD BY AUTOMATED COUNT: 17.7 %
GFR SERPL CREATININE-BSD FRML MDRD: 48 ML/MIN/1.73SQ M
GLUCOSE P FAST SERPL-MCNC: 105 MG/DL (ref 70–99)
HCT VFR BLD AUTO: 34.1 % (ref 41–53)
HGB BLD-MCNC: 11.3 G/DL (ref 13.5–17.5)
LYMPHOCYTES # BLD AUTO: 2.97 THOUSAND/UL (ref 0.5–4)
LYMPHOCYTES # BLD AUTO: 43 % (ref 25–45)
MAGNESIUM SERPL-MCNC: 1.9 MG/DL (ref 1.6–2.3)
MCH RBC QN AUTO: 28.7 PG (ref 26–34)
MCHC RBC AUTO-ENTMCNC: 33.2 G/DL (ref 31–36)
MCV RBC AUTO: 86 FL (ref 80–100)
MONOCYTES # BLD AUTO: 0.9 THOUSAND/UL (ref 0.2–0.9)
MONOCYTES NFR BLD AUTO: 13 % (ref 1–10)
NEUTS SEG # BLD: 1.38 THOUSAND/UL (ref 1.8–7.8)
NEUTS SEG NFR BLD AUTO: 20 %
PLATELET # BLD AUTO: 545 THOUSANDS/UL (ref 150–450)
PLATELET BLD QL SMEAR: ABNORMAL
PMV BLD AUTO: 7.1 FL (ref 8.9–12.7)
POIKILOCYTOSIS BLD QL SMEAR: PRESENT
POTASSIUM SERPL-SCNC: 4.3 MMOL/L (ref 3.6–5)
PROT SERPL-MCNC: 8.3 G/DL (ref 5.9–8.4)
RBC # BLD AUTO: 3.95 MILLION/UL (ref 4.5–5.9)
RBC MORPH BLD: ABNORMAL
SCHISTOCYTES BLD QL SMEAR: PRESENT
SODIUM SERPL-SCNC: 142 MMOL/L (ref 137–147)
T3FREE SERPL-MCNC: 2.85 PG/ML (ref 2.3–4.2)
TOTAL CELLS COUNTED SPEC: 100
URATE SERPL-MCNC: 8.5 MG/DL (ref 3.5–8.5)
WBC # BLD AUTO: 6.9 THOUSAND/UL (ref 4.5–11)

## 2019-06-19 PROCEDURE — 80053 COMPREHEN METABOLIC PANEL: CPT

## 2019-06-19 PROCEDURE — 99215 OFFICE O/P EST HI 40 MIN: CPT | Performed by: INTERNAL MEDICINE

## 2019-06-19 PROCEDURE — 85027 COMPLETE CBC AUTOMATED: CPT

## 2019-06-19 PROCEDURE — 84550 ASSAY OF BLOOD/URIC ACID: CPT

## 2019-06-19 PROCEDURE — 85007 BL SMEAR W/DIFF WBC COUNT: CPT

## 2019-06-19 PROCEDURE — 36415 COLL VENOUS BLD VENIPUNCTURE: CPT

## 2019-06-19 PROCEDURE — 83735 ASSAY OF MAGNESIUM: CPT

## 2019-06-19 PROCEDURE — 84481 FREE ASSAY (FT-3): CPT

## 2019-06-19 RX ORDER — LEVALBUTEROL 1.25 MG/.5ML
1.25 SOLUTION, CONCENTRATE RESPIRATORY (INHALATION) EVERY 8 HOURS PRN
Qty: 1 VIAL | Refills: 3 | Status: SHIPPED | OUTPATIENT
Start: 2019-06-19 | End: 2019-06-19 | Stop reason: SDUPTHER

## 2019-06-19 RX ORDER — LEVALBUTEROL 1.25 MG/.5ML
1.25 SOLUTION, CONCENTRATE RESPIRATORY (INHALATION) EVERY 8 HOURS PRN
Qty: 90 VIAL | Refills: 5 | Status: SHIPPED | OUTPATIENT
Start: 2019-06-19 | End: 2019-06-21

## 2019-06-19 RX ORDER — LEVOFLOXACIN 750 MG/1
750 TABLET ORAL EVERY 24 HOURS
Qty: 7 TABLET | Refills: 0 | Status: SHIPPED | OUTPATIENT
Start: 2019-06-19 | End: 2019-06-26

## 2019-06-19 RX ORDER — SODIUM CHLORIDE 9 MG/ML
20 INJECTION, SOLUTION INTRAVENOUS ONCE
Status: CANCELLED | OUTPATIENT
Start: 2019-07-11

## 2019-06-20 ENCOUNTER — HOSPITAL ENCOUNTER (OUTPATIENT)
Dept: INFUSION CENTER | Facility: HOSPITAL | Age: 59
Discharge: HOME | End: 2019-06-20
Attending: INTERNAL MEDICINE
Payer: COMMERCIAL

## 2019-06-20 VITALS
HEART RATE: 83 BPM | DIASTOLIC BLOOD PRESSURE: 65 MMHG | HEIGHT: 69 IN | WEIGHT: 143 LBS | SYSTOLIC BLOOD PRESSURE: 130 MMHG | BODY MASS INDEX: 21.18 KG/M2 | TEMPERATURE: 97.3 F | RESPIRATION RATE: 16 BRPM

## 2019-06-20 DIAGNOSIS — N28.9 RENAL DYSFUNCTION: ICD-10-CM

## 2019-06-20 DIAGNOSIS — C34.91 ADENOCARCINOMA OF LUNG, RIGHT (HCC): Primary | ICD-10-CM

## 2019-06-20 PROCEDURE — 96367 TX/PROPH/DG ADDL SEQ IV INF: CPT

## 2019-06-20 PROCEDURE — 96413 CHEMO IV INFUSION 1 HR: CPT

## 2019-06-20 PROCEDURE — 96411 CHEMO IV PUSH ADDL DRUG: CPT

## 2019-06-20 RX ORDER — SODIUM CHLORIDE 9 MG/ML
20 INJECTION, SOLUTION INTRAVENOUS ONCE
Status: COMPLETED | OUTPATIENT
Start: 2019-06-20 | End: 2019-06-20

## 2019-06-20 RX ADMIN — DEXAMETHASONE SODIUM PHOSPHATE: 10 INJECTION, SOLUTION INTRAMUSCULAR; INTRAVENOUS at 08:39

## 2019-06-20 RX ADMIN — SODIUM CHLORIDE 200 MG: 9 INJECTION, SOLUTION INTRAVENOUS at 09:06

## 2019-06-20 RX ADMIN — SODIUM CHLORIDE 920 MG: 9 INJECTION, SOLUTION INTRAVENOUS at 09:56

## 2019-06-20 RX ADMIN — SODIUM CHLORIDE 20 ML/HR: 9 INJECTION, SOLUTION INTRAVENOUS at 08:05

## 2019-06-20 NOTE — PLAN OF CARE
Problem: Potential for Falls  Goal: Patient will remain free of falls  Description  INTERVENTIONS:  - Assess patient frequently for physical needs  -  Identify cognitive and physical deficits and behaviors that affect risk of falls    -  [place] fall precautions as indicated by assessment   - Educate patient/family on patient safety including physical limitations  - Instruct patient to call for assistance with activity based on assessment  - Modify environment to reduce risk of injury  - Consider OT/PT consult to assist with strengthening/mobility  Outcome: Progressing

## 2019-06-20 NOTE — PROGRESS NOTES
Pt refused to have port accessed today    Creatinine 1 79, Dr Allen Race aware and patient is seeing nephrology on 6-25-19

## 2019-06-21 ENCOUNTER — TELEPHONE (OUTPATIENT)
Dept: PULMONOLOGY | Facility: CLINIC | Age: 59
End: 2019-06-21

## 2019-06-21 DIAGNOSIS — J44.9 CHRONIC OBSTRUCTIVE PULMONARY DISEASE, UNSPECIFIED COPD TYPE (HCC): ICD-10-CM

## 2019-06-21 DIAGNOSIS — C34.91 ADENOCARCINOMA OF LUNG, RIGHT (HCC): Primary | ICD-10-CM

## 2019-06-21 RX ORDER — ALBUTEROL SULFATE 2.5 MG/3ML
2.5 SOLUTION RESPIRATORY (INHALATION) EVERY 4 HOURS PRN
Qty: 120 VIAL | Refills: 3 | Status: SHIPPED | OUTPATIENT
Start: 2019-06-21 | End: 2019-11-25 | Stop reason: SDUPTHER

## 2019-06-24 ENCOUNTER — TELEPHONE (OUTPATIENT)
Dept: NEPHROLOGY | Facility: CLINIC | Age: 59
End: 2019-06-24

## 2019-07-11 ENCOUNTER — OFFICE VISIT (OUTPATIENT)
Dept: HEMATOLOGY ONCOLOGY | Facility: CLINIC | Age: 59
End: 2019-07-11
Payer: COMMERCIAL

## 2019-07-11 ENCOUNTER — HOSPITAL ENCOUNTER (OUTPATIENT)
Dept: INFUSION CENTER | Facility: HOSPITAL | Age: 59
Discharge: HOME | End: 2019-07-11
Attending: INTERNAL MEDICINE
Payer: COMMERCIAL

## 2019-07-11 ENCOUNTER — APPOINTMENT (OUTPATIENT)
Dept: LAB | Facility: HOSPITAL | Age: 59
End: 2019-07-11
Attending: INTERNAL MEDICINE
Payer: COMMERCIAL

## 2019-07-11 VITALS
TEMPERATURE: 97.5 F | SYSTOLIC BLOOD PRESSURE: 132 MMHG | BODY MASS INDEX: 20.59 KG/M2 | HEIGHT: 69 IN | OXYGEN SATURATION: 94 % | WEIGHT: 139 LBS | HEART RATE: 104 BPM | RESPIRATION RATE: 16 BRPM | DIASTOLIC BLOOD PRESSURE: 79 MMHG

## 2019-07-11 VITALS — HEIGHT: 69 IN | BODY MASS INDEX: 20.57 KG/M2 | WEIGHT: 138.89 LBS

## 2019-07-11 DIAGNOSIS — C34.91 ADENOCARCINOMA OF LUNG, RIGHT (HCC): ICD-10-CM

## 2019-07-11 DIAGNOSIS — Z51.11 ENCOUNTER FOR ANTINEOPLASTIC CHEMOTHERAPY: ICD-10-CM

## 2019-07-11 DIAGNOSIS — N28.9 RENAL DYSFUNCTION: ICD-10-CM

## 2019-07-11 DIAGNOSIS — C34.91 ADENOCARCINOMA OF LUNG, RIGHT (HCC): Primary | ICD-10-CM

## 2019-07-11 DIAGNOSIS — N28.9 RENAL DYSFUNCTION: Primary | ICD-10-CM

## 2019-07-11 LAB
ALBUMIN SERPL BCP-MCNC: 4.1 G/DL (ref 3–5.2)
ALP SERPL-CCNC: 95 U/L (ref 43–122)
ALT SERPL W P-5'-P-CCNC: 48 U/L (ref 9–52)
ANION GAP SERPL CALCULATED.3IONS-SCNC: 11 MMOL/L (ref 5–14)
ANION GAP SERPL CALCULATED.3IONS-SCNC: 8 MMOL/L (ref 5–14)
AST SERPL W P-5'-P-CCNC: 67 U/L (ref 17–59)
BASOPHILS # BLD AUTO: 0.35 THOUSAND/UL (ref 0–0.1)
BASOPHILS NFR MAR MANUAL: 4 % (ref 0–1)
BILIRUB SERPL-MCNC: 0.2 MG/DL
BUN SERPL-MCNC: 10 MG/DL (ref 5–25)
BUN SERPL-MCNC: 11 MG/DL (ref 5–25)
CALCIUM SERPL-MCNC: 8.3 MG/DL (ref 8.4–10.2)
CALCIUM SERPL-MCNC: 9.6 MG/DL (ref 8.4–10.2)
CHLORIDE SERPL-SCNC: 103 MMOL/L (ref 97–108)
CHLORIDE SERPL-SCNC: 105 MMOL/L (ref 97–108)
CO2 SERPL-SCNC: 27 MMOL/L (ref 22–30)
CO2 SERPL-SCNC: 30 MMOL/L (ref 22–30)
CREAT SERPL-MCNC: 1.89 MG/DL (ref 0.7–1.5)
CREAT SERPL-MCNC: 2.23 MG/DL (ref 0.7–1.5)
CREAT UR-MCNC: 145 MG/DL
EOSINOPHIL # BLD AUTO: 0.78 THOUSAND/UL (ref 0–0.4)
EOSINOPHIL NFR BLD MANUAL: 9 % (ref 0–6)
ERYTHROCYTE [DISTWIDTH] IN BLOOD BY AUTOMATED COUNT: 18 %
GFR SERPL CREATININE-BSD FRML MDRD: 37 ML/MIN/1.73SQ M
GFR SERPL CREATININE-BSD FRML MDRD: 45 ML/MIN/1.73SQ M
GLUCOSE P FAST SERPL-MCNC: 115 MG/DL (ref 70–99)
GLUCOSE SERPL-MCNC: 107 MG/DL (ref 70–99)
HCT VFR BLD AUTO: 33.8 % (ref 41–53)
HGB BLD-MCNC: 11 G/DL (ref 13.5–17.5)
LYMPHOCYTES # BLD AUTO: 2.44 THOUSAND/UL (ref 0.5–4)
LYMPHOCYTES # BLD AUTO: 28 % (ref 25–45)
MCH RBC QN AUTO: 28.7 PG (ref 26–34)
MCHC RBC AUTO-ENTMCNC: 32.6 G/DL (ref 31–36)
MCV RBC AUTO: 88 FL (ref 80–100)
MONOCYTES # BLD AUTO: 1.31 THOUSAND/UL (ref 0.2–0.9)
MONOCYTES NFR BLD AUTO: 15 % (ref 1–10)
NEUTS SEG # BLD: 3.83 THOUSAND/UL (ref 1.8–7.8)
NEUTS SEG NFR BLD AUTO: 44 %
PLATELET # BLD AUTO: 555 THOUSANDS/UL (ref 150–450)
PLATELET BLD QL SMEAR: ABNORMAL
PMV BLD AUTO: 7.1 FL (ref 8.9–12.7)
POTASSIUM SERPL-SCNC: 3.3 MMOL/L (ref 3.6–5)
POTASSIUM SERPL-SCNC: 4.4 MMOL/L (ref 3.6–5)
PROT SERPL-MCNC: 8.4 G/DL (ref 5.9–8.4)
PROT UR-MCNC: 14 MG/DL
PROT/CREAT UR: 0.1 MG/G{CREAT} (ref 0–0.1)
RBC # BLD AUTO: 3.84 MILLION/UL (ref 4.5–5.9)
RBC MORPH BLD: NORMAL
SODIUM SERPL-SCNC: 140 MMOL/L (ref 137–147)
SODIUM SERPL-SCNC: 144 MMOL/L (ref 137–147)
T3FREE SERPL-MCNC: 3.15 PG/ML (ref 2.3–4.2)
TOTAL CELLS COUNTED SPEC: 100
TSH SERPL DL<=0.05 MIU/L-ACNC: 4.85 UIU/ML (ref 0.47–4.68)
URATE SERPL-MCNC: 7.2 MG/DL (ref 3.5–8.5)
WBC # BLD AUTO: 8.7 THOUSAND/UL (ref 4.5–11)

## 2019-07-11 PROCEDURE — 85027 COMPLETE CBC AUTOMATED: CPT

## 2019-07-11 PROCEDURE — 84550 ASSAY OF BLOOD/URIC ACID: CPT

## 2019-07-11 PROCEDURE — 99215 OFFICE O/P EST HI 40 MIN: CPT | Performed by: NURSE PRACTITIONER

## 2019-07-11 PROCEDURE — 80048 BASIC METABOLIC PNL TOTAL CA: CPT

## 2019-07-11 PROCEDURE — 84481 FREE ASSAY (FT-3): CPT

## 2019-07-11 PROCEDURE — 85007 BL SMEAR W/DIFF WBC COUNT: CPT

## 2019-07-11 PROCEDURE — 84156 ASSAY OF PROTEIN URINE: CPT

## 2019-07-11 PROCEDURE — 82570 ASSAY OF URINE CREATININE: CPT

## 2019-07-11 PROCEDURE — 80053 COMPREHEN METABOLIC PANEL: CPT

## 2019-07-11 PROCEDURE — 96360 HYDRATION IV INFUSION INIT: CPT

## 2019-07-11 PROCEDURE — 84443 ASSAY THYROID STIM HORMONE: CPT

## 2019-07-11 RX ORDER — CYANOCOBALAMIN 1000 UG/ML
1000 INJECTION INTRAMUSCULAR; SUBCUTANEOUS ONCE
Status: CANCELLED | OUTPATIENT
Start: 2019-08-01

## 2019-07-11 RX ORDER — DEXAMETHASONE 4 MG/1
4 TABLET ORAL 2 TIMES DAILY WITH MEALS
Qty: 4 TABLET | Refills: 3 | Status: SHIPPED | OUTPATIENT
Start: 2019-07-11 | End: 2019-12-23 | Stop reason: ALTCHOICE

## 2019-07-11 RX ORDER — SODIUM CHLORIDE 9 MG/ML
20 INJECTION, SOLUTION INTRAVENOUS ONCE
Status: CANCELLED | OUTPATIENT
Start: 2019-08-01

## 2019-07-11 RX ORDER — LIDOCAINE 40 MG/G
CREAM TOPICAL ONCE
Status: COMPLETED | OUTPATIENT
Start: 2019-07-11 | End: 2019-07-11

## 2019-07-11 RX ORDER — LIDOCAINE 40 MG/G
CREAM TOPICAL ONCE
Status: CANCELLED
Start: 2019-07-11

## 2019-07-11 RX ORDER — LIDOCAINE 40 MG/G
CREAM TOPICAL AS NEEDED
Qty: 30 G | Refills: 5 | Status: SHIPPED | OUTPATIENT
Start: 2019-07-11 | End: 2020-03-11 | Stop reason: SDUPTHER

## 2019-07-11 RX ORDER — SODIUM CHLORIDE 9 MG/ML
999 INJECTION, SOLUTION INTRAVENOUS ONCE
Status: COMPLETED | OUTPATIENT
Start: 2019-07-11 | End: 2019-07-11

## 2019-07-11 RX ORDER — DIPHENHYDRAMINE HYDROCHLORIDE 25 MG/1
CAPSULE ORAL
COMMUNITY
Start: 2019-05-02 | End: 2019-12-10 | Stop reason: HOSPADM

## 2019-07-11 RX ORDER — SODIUM CHLORIDE 9 MG/ML
999 INJECTION, SOLUTION INTRAVENOUS ONCE
Status: CANCELLED
Start: 2019-07-11

## 2019-07-11 RX ORDER — PREDNISONE 10 MG/1
30 TABLET ORAL DAILY
Qty: 90 TABLET | Refills: 0 | Status: SHIPPED | OUTPATIENT
Start: 2019-07-11 | End: 2019-10-09 | Stop reason: SDUPTHER

## 2019-07-11 RX ADMIN — LIDOCAINE: 40 CREAM TOPICAL at 10:00

## 2019-07-11 RX ADMIN — SODIUM CHLORIDE 999 ML/HR: 9 INJECTION, SOLUTION INTRAVENOUS at 12:09

## 2019-07-11 NOTE — PROGRESS NOTES
Hematology/Oncology Outpatient Follow-up  Morgan Mendiola Sr  66 y o  male 1960 [id number]    Date:  7/11/2019      Assessment and Plan:  1  Adenocarcinoma of lung, right ([address] )  After review of today's repeat blood work patient appears to have worsening renal dysfunction with a creatinine of 2 23 GFR 37 (his baseline appears to be about 1 3 creatinine)  We will hydrate him with 1 L of normal saline over an hour today and repeat his BMP post   Will also check a spot urine for protein/creatinine today and at add uric acid on level on to his labs from this morning  Patient's chemotherapy will be placed on hold today  Immune mediated renal dysfunction from his Keytruda cannot be ruled out  We will start patient on prednisone 0 5 milligrams/kilogram per day- his dose will be 30 mg if this is in fact an immune mediated process we will likely see some improvement in his renal function  His chemistry will be repeated Tuesday or Wednesday next week for close monitoring; patient with results/further instructions regarding his prednisone dosing  He will be back for follow-up in about 3 weeks with Dr Sterling Coats  We will repeat his PET-CT scan prior to his next visit to evaluate for progression of his disease with his current reported symptoms  - Infusion Calculated Appointment Request; Future  - CBC and differential; Future  - Comprehensive metabolic panel; Future  - TSH, 3rd generation with Free T4 reflex; Future  - T3, free; Future  - CBC and differential; Future  - Comprehensive metabolic panel; Future  - Magnesium; Future  - TSH, 3rd generation with Free T4 reflex; Future  - Uric acid; Future  - LD,Blood; Future  - NM PET CT skull base to mid thigh; Future  - CBC and differential; Future  - Comprehensive metabolic panel; Future  - Magnesium; Future  - TSH, 3rd generation with Free T4 reflex; Future  - dexamethasone (DECADRON) 4 mg tablet;  Take 1 tablet (4 mg total) by mouth 2 (two) times a day with meals For 2 days after chemo  Dispense: 4 tablet; Refill: 3  - predniSONE 10 mg tablet; Take 3 tablets (30 mg total) by mouth daily  Dispense: 90 tablet; Refill: 0    2  Renal dysfunction  As above  We will continue to monitor his kidney function closely  Patient was encouraged to keep his nephrology appointment that is scheduled for the 30th of July  If we do not see any improvement/worsening in his renal function over the next week or so we will see if the appointment can be moved up sooner  - Infusion Calculated Appointment Request; Future  - CBC and differential; Future  - Comprehensive metabolic panel; Future  - TSH, 3rd generation with Free T4 reflex; Future  - T3, free; Future  - Protein / creatinine ratio, urine; Future  - Basic metabolic panel; Future  - Uric acid; Future  - predniSONE 10 mg tablet; Take 3 tablets (30 mg total) by mouth daily  Dispense: 90 tablet; Refill: 0      HPI:  Patient presents today for a 3 week follow-up visit, he is due for his maintenance Alimta with Keytruda treatment # 5  He states that he has been feeling lousy over the past few weeks with significant exhaustion, poor appetite on further weight loss; he lost about 4 lb since his last office visit  He was having some abdominal pain but admits he was extremely constipated which was the likely cause  The abdominal pain has improved significantly now that he is back on taking his laxatives and having regular bowel movements  The patient states that his respiratory symptoms did improve somewhat after he completed his dose of Levaquin 3 weeks ago  He continues to report cough and dyspnea which are back to his baseline; he does get some improvement in his dyspnea when he uses his p r n  Albuterol inhaler  Patient also continues to complain about significant rhinorrhea and congestion secondary to his allergies; they are slightly improved when he takes his Claritin    Patient did not get his blood work done prior to the visit today and is planning to do it afterward  He was supposed to see Nephrology last week for his worsening renal dysfunction the appointment need to be rescheduled to the 30th of July due to a transportation issue  Oncology History    61-year-old [facility] American male heavy smoker who used to smoke 2 packs of cigarettes daily for many years, COPD, he presented with dyspnea, CT scan of the chest on October 2018 showed right middle lobe spiculated 1 3 cm mass with multiple solid and ground-glass nodules along the major and minor fissures sub cm pulmonary nodules bilaterally, left adrenal 1 2 cm nodule     PET scan showed 1 3 cm right middle lung nodule with SUV of 6 8, numerous nodular densities along the right major and minor fissures, right hilar activity with SUV of 3 4, subcarinal lymph node measuring 7 mm with SUV of 2 7, periportal enlarged lymph nodes concerning for metastatic disease measuring 2 4 cm with SUV of 5, prominent left retrocrural lymph node measuring 1 cm x 9 mm with SUV of 1 1    Core biopsy of the right spiculated nodule showed invasive adenocarcinoma consistent with lung primary positive for CK7, TTF 1, napsin a        Adenocarcinoma of lung, right ([address] )    11/13/2018 Initial Diagnosis     Adenocarcinoma of lung, right ([address] )  Stage IV      12/13/2018 - 3/28/2019 Chemotherapy      Alimta, Carboplatin (AUC 5) + Keytruda (6 cycles total)      4/17/2019 -  Chemotherapy     Started maintenance Alimta and Keytruda         Interval history:    ROS: Review of Systems   Constitutional: Positive for activity change, appetite change, fatigue and unexpected weight change  Negative for chills and fever  HENT: Positive for congestion, postnasal drip and rhinorrhea  Negative for mouth sores, nosebleeds, sore throat and trouble swallowing  Eyes: Negative  Respiratory: Positive for cough, shortness of breath and wheezing  Negative for chest tightness      Cardiovascular: Negative for chest pain, palpitations and leg swelling  Gastrointestinal: Positive for abdominal pain, constipation and nausea  Negative for abdominal distention, blood in stool, diarrhea and vomiting  Genitourinary: Negative for difficulty urinating, dysuria, frequency, hematuria and urgency  Musculoskeletal: Positive for arthralgias and myalgias  Negative for back pain, gait problem and joint swelling  Rates generalized arthralgias/myalgias/pain 7/10  Pain is managed by palliative care team    Skin: Negative for color change, pallor and rash  Allergic/Immunologic: Positive for environmental allergies  Neurological: Positive for numbness (and tingling moderate)  Negative for dizziness, weakness, light-headedness and headaches  Hematological: Negative for adenopathy  Does not bruise/bleed easily  Psychiatric/Behavioral: Positive for dysphoric mood (mild) and sleep disturbance  The patient is not nervous/anxious          Past Medical History:   Diagnosis Date    Bipolar 1 disorder ([address] )     Cancer ([address] )     adeno lung  dx 11/2018-lung bx today 4/9/2019-ongoing chemo    Chronic pain disorder     back and right shoulder    COPD (chronic obstructive pulmonary disease) ([address] )     Depression     Diabetes mellitus ([address] )     GERD (gastroesophageal reflux disease)     Herniated lumbar intervertebral disc     Hypertension     Insomnia     Latent syphilis     Treated    Low back pain     Lumbosacral disc disease     Pneumothorax after biopsy     R lung    PTSD (post-traumatic stress disorder)     PTSD (post-traumatic stress disorder)     PTSD (post-traumatic stress disorder)     Pulmonary emphysema ([address] )     Tobacco abuse 11/13/2018       Past Surgical History:   Procedure Laterality Date    CT GUIDED CHEST TUBE  11/21/2018    FL GUIDED CENTRAL VENOUS ACCESS DEVICE INSERTION  2/8/2019    HEMORROIDECTOMY      IR CHEST TUBE  11/14/2018    IR IMAGE GUIDED BIOPSY/ASPIRATION LUNG  11/13/2018    KNEE SURGERY      SD INSJ TUNNELED CTR VAD W/SUBQ PORT AGE 5 YR/> N/A 2019    Procedure: Cherise Roof;  Surgeon: Jam Lancaster MD;  Location:  MAIN OR;  Service: Vascular       Social History     Socioeconomic History    Marital status: Legally      Spouse name: Not on file    Number of children: Not on file    Years of education: Not on file    Highest education level: Not on file   Occupational History    Occupation: part time employment   Social Needs    Financial resource strain: Not on file    Food insecurity:     Worry: Not on file     Inability: Not on file    Transportation needs:     Medical: Not on file     Non-medical: Not on file   Tobacco Use    Smoking status: Former Smoker     Packs/day: 0 50     Years: 40 00     Pack years: 20 00     Types: Cigarettes     Last attempt to quit: 2018     Years since quittin 8    Smokeless tobacco: Never Used    Tobacco comment: "i quit one month ago when i quit weed"   Substance and Sexual Activity    Alcohol use: Not Currently     Comment: social    Drug use: No     Types: Marijuana     Comment: stopped , "i smoked weed and stopped 1 month ago"    Sexual activity: Yes   Lifestyle    Physical activity:     Days per week: Not on file     Minutes per session: Not on file    Stress: Not on file   Relationships    Social connections:     Talks on phone: Not on file     Gets together: Not on file     Attends Restorationism service: Not on file     Active member of club or organization: Not on file     Attends meetings of clubs or organizations: Not on file     Relationship status: Not on file    Intimate partner violence:     Fear of current or ex partner: Not on file     Emotionally abused: Not on file     Physically abused: Not on file     Forced sexual activity: Not on file   Other Topics Concern    Not on file   Social History Narrative    Lives with girlfriend       Family History   Problem Relation Age of Onset    Heart disease Mother    Maggie Jacob Hypertension Father     Diabetes Family     Asthma Family     Heart disease Family     Cancer Family        Allergies   Allergen Reactions    Lisinopril Anaphylaxis     Took medication a while ago and had a "swelling reaction"  Does not carry epi-pen         Current Outpatient Medications:     acetaminophen (TYLENOL) 500 mg tablet, Take 2 tablets (1,000 mg total) by mouth every 8 (eight) hours as needed for mild pain, Disp: 30 tablet, Rfl: 0    albuterol (2 5 mg/3 mL) 0 083 % nebulizer solution, Take 1 vial (2 5 mg total) by nebulization every 4 (four) hours as needed for wheezing or shortness of breath, Disp: 120 vial, Rfl: 3    albuterol (VENTOLIN HFA) 90 mcg/act inhaler, Inhale 2 puffs every 4 (four) hours as needed for wheezing, Disp: 1 Inhaler, Rfl: 5    amLODIPine (NORVASC) 10 mg tablet, Take 1 tablet (10 mg total) by mouth daily, Disp: 30 tablet, Rfl: 1    BANOPHEN 25 MG capsule, , Disp: , Rfl:     Blood Glucose Monitoring Suppl KIT, by Does not apply route daily, Disp: 1 each, Rfl: 0    diphenhydrAMINE (BENADRYL) 25 mg tablet, Take 1 tablet (25 mg total) by mouth every 6 (six) hours as needed (nausea), Disp: 30 tablet, Rfl: 0    FLUoxetine (PROzac) 10 MG tablet, Take 1 tablet (10 mg total) by mouth daily, Disp: 30 tablet, Rfl: 5    fluticasone (FLONASE) 50 mcg/act nasal spray, 1-2 sprays each nostril daily for allergies, Disp: 1 Bottle, Rfl: 3    fluticasone-vilanterol (BREO ELLIPTA) 100-25 mcg/inh inhaler, Inhale 1 puff daily in the early morning Rinse mouth after use , Disp: 1 Inhaler, Rfl: 5    folic acid (FOLVITE) 1 mg tablet, Take 1 tablet (1 mg total) by mouth daily, Disp: 30 tablet, Rfl: 2    FREESTYLE LITE test strip, TEST BLOOD SUGAR DAILY, Disp: 100 each, Rfl: 1    gabapentin (NEURONTIN) 300 mg capsule, Take 1 capsule (300 mg total) by mouth 3 (three) times a day, Disp: 90 capsule, Rfl: 0    guaiFENesin (MUCINEX) 600 mg 12 hr tablet, Take 2 tablets (1,200 mg total) by mouth every 12 (twelve) hours, Disp: 60 tablet, Rfl: 0    ipratropium (ATROVENT) 0 02 % nebulizer solution, Take 1 vial (0 5 mg total) by nebulization 3 (three) times a day, Disp: 90 vial, Rfl: 1    Lancets (FREESTYLE) lancets, TEST BLOOD SUGAR DAILY, Disp: 100 each, Rfl: 4    lidocaine (LMX) 4 % cream, Apply topically as needed for mild pain Apply 1/2-1 inch to port 30-60 mins prior to needle insertion, cover with serrain wrap, Disp: 30 g, Rfl: 5    loratadine (CLARITIN) 10 mg tablet, Take 1 tablet (10 mg total) by mouth daily in the early morning, Disp: 30 tablet, Rfl: 2    melatonin 3 mg, Take 1 tablet (3 mg total) by mouth daily at bedtime, Disp: 30 tablet, Rfl: 1    metFORMIN (GLUCOPHAGE-XR) 500 mg 24 hr tablet, Take 1 tablet (500 mg total) by mouth 2 (two) times a day with meals, Disp: 60 tablet, Rfl: 0    methocarbamol (ROBAXIN) 750 mg tablet, Take 1 tablet (750 mg total) by mouth every 6 (six) hours as needed for muscle spasms, Disp: 90 tablet, Rfl: 0    Morphine Sulfate ER 30 MG T12A, Take 30 mg by mouth every 12 (twelve) hoursMax Daily Amount: 60 mg, Disp: 60 tablet, Rfl: 0    oxyCODONE (ROXICODONE) 20 MG TABS, Take 1 tablet (20 mg total) by mouth every 4 (four) hours as needed for severe painMax Daily Amount: 120 mg, Disp: 150 tablet, Rfl: 0    pantoprazole (PROTONIX) 40 mg tablet, Take 1 tablet (40 mg total) by mouth daily, Disp: 30 tablet, Rfl: 5    polyethylene glycol (GLYCOLAX) powder, Take 17 g by mouth daily, Disp: 527 g, Rfl: 1    prochlorperazine (COMPAZINE) 10 mg tablet, Take 1 tablet (10 mg total) by mouth every 6 (six) hours as needed for nausea or vomiting, Disp: 90 tablet, Rfl: 0    QUEtiapine (SEROquel) 25 mg tablet, Take 25 mg by mouth daily at bedtime, Disp: , Rfl:     ranitidine (ZANTAC) 150 mg tablet, Take 1 tablet (150 mg total) by mouth 2 (two) times a day, Disp: 60 tablet, Rfl: 4    REGULOID 28 3 % POWD, USE AS DIRECTED, Disp: 369 g, Rfl: 4    Selenium Sulfide 2 25 % SHAM, apply by topical route  every PM to the affected area(s), Disp: , Rfl:     senna-docusate sodium (SENOKOT-S) 8 6-50 mg per tablet, Take 1 tablet by mouth 2 (two) times a day, Disp: 60 tablet, Rfl: 2    sildenafil (VIAGRA) 50 MG tablet, Take 1 tablet (50 mg total) by mouth as needed for erectile dysfunction, Disp: 6 tablet, Rfl: 0    tiotropium (SPIRIVA RESPIMAT) 2 5 MCG/ACT AERS inhaler, Inhale 2 puffs daily, Disp: 1 Inhaler, Rfl: 2    dexamethasone (DECADRON) 4 mg tablet, Take 1 tablet (4 mg total) by mouth 2 (two) times a day with meals For 2 days after chemo, Disp: 4 tablet, Rfl: 3    predniSONE 10 mg tablet, Take 3 tablets (30 mg total) by mouth daily, Disp: 90 tablet, Rfl: 0  No current facility-administered medications for this visit  Facility-Administered Medications Ordered in Other Visits:     lidocaine (LMX) 4 % cream, , Topical, Once, Macrina Bell MD      Physical Exam:  /79 (BP Location: Left arm, Cuff Size: Adult)   Pulse 104   Temp 97 5 °F (36 4 °C) (Tympanic)   Resp 16   Ht 5' 9" (1 753 m)   Wt 63 kg (139 lb)   SpO2 94%   BMI 20 53 kg/m²     Physical Exam   Constitutional: He is oriented to person, place, and time  He appears well-developed and well-nourished  No distress  HENT:   Head: Normocephalic and atraumatic  Mouth/Throat: Oropharynx is clear and moist  No oropharyngeal exudate  Eyes: Pupils are equal, round, and reactive to light  Conjunctivae are normal  No scleral icterus  Neck: Normal range of motion  Neck supple  No thyromegaly present  Cardiovascular: Normal rate, regular rhythm, normal heart sounds and intact distal pulses  No murmur heard  Pulmonary/Chest: Effort normal  No respiratory distress  He has wheezes (inspiratory and expiratory throughout)  He has rhonchi  Abdominal: Soft  Bowel sounds are normal  He exhibits no distension  There is no hepatosplenomegaly  There is tenderness (mild RUQ with deep palpation)     Musculoskeletal: Normal range of motion  He exhibits no edema  Lymphadenopathy:     He has no cervical adenopathy  He has no axillary adenopathy  Neurological: He is alert and oriented to person, place, and time  Skin: Skin is warm and dry  No rash noted  He is not diaphoretic  No erythema  No pallor  Psychiatric: His behavior is normal  Judgment and thought content normal  His affect is blunt  He exhibits a depressed mood  Vitals reviewed  Labs:  Lab Results   Component Value Date    WBC 8 70 07/11/2019    HGB 11 0 (L) 07/11/2019    HCT 33 8 (L) 07/11/2019    MCV 88 07/11/2019     (H) 07/11/2019     Lab Results   Component Value Date    K 4 4 07/11/2019     07/11/2019    CO2 30 07/11/2019    BUN 11 07/11/2019    CREATININE 2 23 (H) 07/11/2019    GLUF 115 (H) 07/11/2019    CALCIUM 9 6 07/11/2019    AST 67 (H) 07/11/2019    ALT 48 07/11/2019    ALKPHOS 95 07/11/2019    EGFR 37 (L) 07/11/2019         Patient voiced understanding and agreement in the above discussion  Aware to contact our office with questions/symptoms in the interim

## 2019-07-11 NOTE — PLAN OF CARE
Problem: Potential for Falls  Goal: Patient will remain free of falls  Description  INTERVENTIONS:  - Assess patient frequently for physical needs  -  Identify cognitive and physical deficits and behaviors that affect risk of falls    -  [place] fall precautions as indicated by assessment   - Educate patient/family on patient safety including physical limitations  - Instruct patient to call for assistance with activity based on assessment  - Modify environment to reduce risk of injury  - Consider OT/PT consult to assist with strengthening/mobility  Outcome: Progressing

## 2019-07-11 NOTE — PROGRESS NOTES
Hydration complete  Bmp uric acid and urine protein sent to the lab upon completion  Port flushed and deaccessed per routine  Discharged to pharmacy to  prednisone

## 2019-07-11 NOTE — PROGRESS NOTES
Pt arrived to unit this am for chemotherapy  Just had MD appt and stat labs drawn peripherally  Stated he needed "emla to numb the port"  Same applied  Creatinine 2 23 noted  Tc to leno GUY to relay labwork results  Order noted for hydration  Same initiated  Pt resting quietly in recliner

## 2019-07-11 NOTE — PROGRESS NOTES
Hydration continued  Pt informed that he would have a bmp and uric acid drawn at discharge today  Also that we need a urine specimen for a protein level  Per Aravind GUY, pt will start Prednisone 30 mg po daily  Repeat labs due next Wednesday and office will follow up with pt in regards to taper  Pt made aware of the same  Notes written out for pt for his records

## 2019-07-15 DIAGNOSIS — J44.9 CHRONIC OBSTRUCTIVE PULMONARY DISEASE, UNSPECIFIED COPD TYPE (HCC): ICD-10-CM

## 2019-07-17 ENCOUNTER — OFFICE VISIT (OUTPATIENT)
Dept: PALLIATIVE MEDICINE | Facility: CLINIC | Age: 59
End: 2019-07-17
Payer: COMMERCIAL

## 2019-07-17 ENCOUNTER — TELEPHONE (OUTPATIENT)
Dept: PALLIATIVE MEDICINE | Facility: CLINIC | Age: 59
End: 2019-07-17

## 2019-07-17 VITALS
RESPIRATION RATE: 16 BRPM | DIASTOLIC BLOOD PRESSURE: 90 MMHG | BODY MASS INDEX: 20.71 KG/M2 | WEIGHT: 139.8 LBS | OXYGEN SATURATION: 97 % | HEART RATE: 70 BPM | TEMPERATURE: 98 F | SYSTOLIC BLOOD PRESSURE: 160 MMHG

## 2019-07-17 DIAGNOSIS — C34.91 ADENOCARCINOMA OF LUNG, RIGHT (HCC): ICD-10-CM

## 2019-07-17 DIAGNOSIS — G89.3 CANCER ASSOCIATED PAIN: Primary | ICD-10-CM

## 2019-07-17 DIAGNOSIS — R52 GENERALIZED BODY ACHES: ICD-10-CM

## 2019-07-17 DIAGNOSIS — M54.50 LUMBAR PAIN: ICD-10-CM

## 2019-07-17 PROCEDURE — 99214 OFFICE O/P EST MOD 30 MIN: CPT | Performed by: NURSE PRACTITIONER

## 2019-07-17 RX ORDER — OXYCODONE HYDROCHLORIDE 20 MG/1
20 TABLET ORAL EVERY 4 HOURS PRN
Qty: 150 TABLET | Refills: 0 | Status: SHIPPED | OUTPATIENT
Start: 2019-07-17 | End: 2019-08-13 | Stop reason: SDUPTHER

## 2019-07-17 RX ORDER — OXYCODONE HCL 20 MG/1
20 TABLET, FILM COATED, EXTENDED RELEASE ORAL EVERY 12 HOURS SCHEDULED
Qty: 60 TABLET | Refills: 0 | Status: SHIPPED | OUTPATIENT
Start: 2019-07-17 | End: 2019-08-13 | Stop reason: SDUPTHER

## 2019-07-17 RX ORDER — GABAPENTIN 300 MG/1
300 CAPSULE ORAL 3 TIMES DAILY
Qty: 90 CAPSULE | Refills: 0 | Status: SHIPPED | OUTPATIENT
Start: 2019-07-17 | End: 2019-08-13 | Stop reason: SDUPTHER

## 2019-07-17 NOTE — PATIENT INSTRUCTIONS
New pain regimen:  - oxycodone ER (OxyContin) 20mg twice daily [long-acting, take morning and evening]  - stop morphine ER once you have OxyContin    - continue oxycodone IR - 1 tab every 4 hr as needed, no change    Call if you are not able to  new medication  Stop morphine ER once you have new medication

## 2019-07-17 NOTE — TELEPHONE ENCOUNTER
Prior authorization for pt's Oxycodone 20mg has been initiated and sent along with last office note  Awaiting determination      ID# [id number]  Phone# [phone]

## 2019-07-17 NOTE — PROGRESS NOTES
Palliative and Supportive Care   Janett Bhat   66 y o  male [id number]    Assessment/Plan:  1  Cancer associated pain    2  Adenocarcinoma of lung, right ([address] )    3  Generalized body aches    4  Lumbar pain        Requested Prescriptions     Signed Prescriptions Disp Refills    oxyCODONE (OxyCONTIN) 20 mg 12 hr tablet 60 tablet 0     Sig: Take 1 tablet (20 mg total) by mouth every 12 (twelve) hoursMax Daily Amount: 40 mg    oxyCODONE (ROXICODONE) 20 MG TABS 150 tablet 0     Sig: Take 1 tablet (20 mg total) by mouth every 4 (four) hours as needed for severe painMax Daily Amount: 120 mg    gabapentin (NEURONTIN) 300 mg capsule 90 capsule 0     Sig: Take 1 capsule (300 mg total) by mouth 3 (three) times a day         - Given worsened kidney function, will rotate from morphine ER to oxycodone ER/OxyContin as long-acting agent  - Continue oxycodone IR as PRN, no change to dose  (Selecting OxyContin over fentanyl patch due to patient's weight loss and decreased body fat; fentanyl patch is not best option )      Follow up in one month      Subjective    Chief Concern  Follow up visit for:  Symptom management         History of Present Illness  Patient ID: Sandra Medel  is a 66 y o  male with metastatic NSCLC diagnosed in fall 2018  He is s/p 6 cycles of palliative chemo Alimta, carboplatin, and keytruda, and s/p 4 cycles of maintenance Alimta and Keytruda, however this is currently on hold due to worsening kidney function and concern for possible immune-mediated renal dysfunction  He has been sleeping a lot lately  He continues with generalized arthralgias, worst in low back and legs  More intense than previously lately  He is taking morphine ER 30mg BID and oxycodone IR 20mg about 3-5x/day  Intermittent constipation is controlled with senna and Miralax  Occasional nausea, antiemetics are sometimes helpful    He is no longer living with cousin, he has his own apartment and his youngest two kids (16yo twins) are staying with him  He is still on list for housing assistance  The following portions of the patient's history were reviewed and updated as appropriate: allergies, current medications, past family history, past medical history, past social history, past surgical history and problem list       Visit Information    Accompanied By: No one  Source of History: Self  History Limitations: None    ROS  Review of Systems   Constitutional: Positive for fatigue  Gastrointestinal: Positive for constipation  Musculoskeletal: Positive for arthralgias  Objective     Physical Exam   Constitutional: He is oriented to person, place, and time  He is cooperative  fatigued   Pulmonary/Chest: Effort normal    Neurological: He is alert and oriented to person, place, and time     Psychiatric: Cognition and memory are normal          /90 (BP Location: Left arm, Patient Position: Sitting, Cuff Size: Standard)   Pulse 70   Temp 98 °F (36 7 °C) (Oral)   Resp 16   Wt 63 4 kg (139 lb 12 8 oz)   SpO2 97%   BMI 20 71 kg/m²           Current Outpatient Medications:     acetaminophen (TYLENOL) 500 mg tablet, Take 2 tablets (1,000 mg total) by mouth every 8 (eight) hours as needed for mild pain, Disp: 30 tablet, Rfl: 0    albuterol (2 5 mg/3 mL) 0 083 % nebulizer solution, Take 1 vial (2 5 mg total) by nebulization every 4 (four) hours as needed for wheezing or shortness of breath, Disp: 120 vial, Rfl: 3    albuterol (VENTOLIN HFA) 90 mcg/act inhaler, Inhale 2 puffs every 4 (four) hours as needed for wheezing, Disp: 1 Inhaler, Rfl: 5    amLODIPine (NORVASC) 10 mg tablet, Take 1 tablet (10 mg total) by mouth daily, Disp: 30 tablet, Rfl: 1    BANOPHEN 25 MG capsule, , Disp: , Rfl:     Blood Glucose Monitoring Suppl KIT, by Does not apply route daily, Disp: 1 each, Rfl: 0    dexamethasone (DECADRON) 4 mg tablet, Take 1 tablet (4 mg total) by mouth 2 (two) times a day with meals For 2 days after chemo, Disp: 4 tablet, Rfl: 3    diphenhydrAMINE (BENADRYL) 25 mg tablet, Take 1 tablet (25 mg total) by mouth every 6 (six) hours as needed (nausea), Disp: 30 tablet, Rfl: 0    FLUoxetine (PROzac) 10 MG tablet, Take 1 tablet (10 mg total) by mouth daily, Disp: 30 tablet, Rfl: 5    fluticasone (FLONASE) 50 mcg/act nasal spray, 1-2 sprays each nostril daily for allergies, Disp: 1 Bottle, Rfl: 3    fluticasone-vilanterol (BREO ELLIPTA) 100-25 mcg/inh inhaler, Inhale 1 puff daily in the early morning Rinse mouth after use , Disp: 1 Inhaler, Rfl: 5    folic acid (FOLVITE) 1 mg tablet, Take 1 tablet (1 mg total) by mouth daily, Disp: 30 tablet, Rfl: 2    FREESTYLE LITE test strip, TEST BLOOD SUGAR DAILY, Disp: 100 each, Rfl: 1    gabapentin (NEURONTIN) 300 mg capsule, Take 1 capsule (300 mg total) by mouth 3 (three) times a day, Disp: 90 capsule, Rfl: 0    ipratropium (ATROVENT) 0 02 % nebulizer solution, Take 1 vial (0 5 mg total) by nebulization 3 (three) times a day, Disp: 90 vial, Rfl: 1    Lancets (FREESTYLE) lancets, TEST BLOOD SUGAR DAILY, Disp: 100 each, Rfl: 4    lidocaine (LMX) 4 % cream, Apply topically as needed for mild pain Apply 1/2-1 inch to port 30-60 mins prior to needle insertion, cover with serrain wrap, Disp: 30 g, Rfl: 5    loratadine (CLARITIN) 10 mg tablet, Take 1 tablet (10 mg total) by mouth daily in the early morning, Disp: 30 tablet, Rfl: 2    melatonin 3 mg, Take 1 tablet (3 mg total) by mouth daily at bedtime, Disp: 30 tablet, Rfl: 1    metFORMIN (GLUCOPHAGE-XR) 500 mg 24 hr tablet, Take 1 tablet (500 mg total) by mouth 2 (two) times a day with meals, Disp: 60 tablet, Rfl: 0    methocarbamol (ROBAXIN) 750 mg tablet, Take 1 tablet (750 mg total) by mouth every 6 (six) hours as needed for muscle spasms, Disp: 90 tablet, Rfl: 0    oxyCODONE (ROXICODONE) 20 MG TABS, Take 1 tablet (20 mg total) by mouth every 4 (four) hours as needed for severe painMax Daily Amount: 120 mg, Disp: 150 tablet, Rfl: 0    pantoprazole (PROTONIX) 40 mg tablet, Take 1 tablet (40 mg total) by mouth daily, Disp: 30 tablet, Rfl: 5    polyethylene glycol (GLYCOLAX) powder, Take 17 g by mouth daily, Disp: 527 g, Rfl: 1    predniSONE 10 mg tablet, Take 3 tablets (30 mg total) by mouth daily, Disp: 90 tablet, Rfl: 0    prochlorperazine (COMPAZINE) 10 mg tablet, Take 1 tablet (10 mg total) by mouth every 6 (six) hours as needed for nausea or vomiting, Disp: 90 tablet, Rfl: 0    QUEtiapine (SEROquel) 25 mg tablet, Take 25 mg by mouth daily at bedtime, Disp: , Rfl:     ranitidine (ZANTAC) 150 mg tablet, Take 1 tablet (150 mg total) by mouth 2 (two) times a day, Disp: 60 tablet, Rfl: 4    Selenium Sulfide 2 25 % SHAM, apply by topical route  every PM to the affected area(s), Disp: , Rfl:     senna-docusate sodium (SENOKOT-S) 8 6-50 mg per tablet, Take 1 tablet by mouth 2 (two) times a day, Disp: 60 tablet, Rfl: 2    sildenafil (VIAGRA) 50 MG tablet, Take 1 tablet (50 mg total) by mouth as needed for erectile dysfunction, Disp: 6 tablet, Rfl: 0    tiotropium (SPIRIVA RESPIMAT) 2 5 MCG/ACT AERS inhaler, Inhale 2 puffs daily, Disp: 1 Inhaler, Rfl: 2    guaiFENesin (MUCINEX) 600 mg 12 hr tablet, Take 2 tablets (1,200 mg total) by mouth every 12 (twelve) hours, Disp: 60 tablet, Rfl: 0    oxyCODONE (OxyCONTIN) 20 mg 12 hr tablet, Take 1 tablet (20 mg total) by mouth every 12 (twelve) hoursMax Daily Amount: 40 mg, Disp: 60 tablet, Rfl: 0    REGULOID 28 3 % POWD, USE AS DIRECTED, Disp: 369 g, Rfl: [address], [address] [facility] and [facility]          Representatives have queried the patient's controlled substance dispensing history in the Prescription Drug Monitoring Program in compliance with the [facility] regulations before I have prescribed any controlled substances  The prescription history is consistent with prescribed therapy and our practice policies

## 2019-07-18 NOTE — TELEPHONE ENCOUNTER
Received prior authorization approval for Oxycodone ER 20mg through 07/18/2020  Confirmed with pharmacy  Pt has been informed

## 2019-07-19 ENCOUNTER — TELEPHONE (OUTPATIENT)
Dept: HEMATOLOGY ONCOLOGY | Facility: CLINIC | Age: 59
End: 2019-07-19

## 2019-07-19 NOTE — TELEPHONE ENCOUNTER
Phoned patient to remind him to have labs drawn as instructed by Julio C Echols, [address]   His girlfriend answered phone and said he did not start the Prednisone until two days after it was prescribed and he can not go for labs today, he will have them drawn on Monday  Hulan Gaucher understood the importance of having labs redrawn on Monday

## 2019-07-22 ENCOUNTER — APPOINTMENT (OUTPATIENT)
Dept: LAB | Facility: HOSPITAL | Age: 59
End: 2019-07-22
Payer: COMMERCIAL

## 2019-07-22 DIAGNOSIS — C34.91 ADENOCARCINOMA OF LUNG, RIGHT (HCC): ICD-10-CM

## 2019-07-22 LAB
ALBUMIN SERPL BCP-MCNC: 3.9 G/DL (ref 3–5.2)
ALP SERPL-CCNC: 98 U/L (ref 43–122)
ALT SERPL W P-5'-P-CCNC: 57 U/L (ref 9–52)
ANION GAP SERPL CALCULATED.3IONS-SCNC: 10 MMOL/L (ref 5–14)
ANISOCYTOSIS BLD QL SMEAR: PRESENT
AST SERPL W P-5'-P-CCNC: 42 U/L (ref 17–59)
BILIRUB SERPL-MCNC: 0.1 MG/DL
BUN SERPL-MCNC: 27 MG/DL (ref 5–25)
CALCIUM SERPL-MCNC: 9.2 MG/DL (ref 8.4–10.2)
CHLORIDE SERPL-SCNC: 104 MMOL/L (ref 97–108)
CO2 SERPL-SCNC: 29 MMOL/L (ref 22–30)
CREAT SERPL-MCNC: 1.9 MG/DL (ref 0.7–1.5)
ERYTHROCYTE [DISTWIDTH] IN BLOOD BY AUTOMATED COUNT: 19.6 %
GFR SERPL CREATININE-BSD FRML MDRD: 45 ML/MIN/1.73SQ M
GLUCOSE P FAST SERPL-MCNC: 96 MG/DL (ref 70–99)
HCT VFR BLD AUTO: 32.4 % (ref 41–53)
HGB BLD-MCNC: 10.4 G/DL (ref 13.5–17.5)
LDH SERPL-CCNC: 641 U/L (ref 313–618)
LYMPHOCYTES # BLD AUTO: 22 % (ref 25–45)
LYMPHOCYTES # BLD AUTO: 3.37 THOUSAND/UL (ref 0.5–4)
MAGNESIUM SERPL-MCNC: 2 MG/DL (ref 1.6–2.3)
MCH RBC QN AUTO: 28.3 PG (ref 26–34)
MCHC RBC AUTO-ENTMCNC: 32.2 G/DL (ref 31–36)
MCV RBC AUTO: 88 FL (ref 80–100)
MONOCYTES # BLD AUTO: 1.22 THOUSAND/UL (ref 0.2–0.9)
MONOCYTES NFR BLD AUTO: 8 % (ref 1–10)
NEUTS SEG # BLD: 10.71 THOUSAND/UL (ref 1.8–7.8)
NEUTS SEG NFR BLD AUTO: 70 %
PLATELET # BLD AUTO: 371 THOUSANDS/UL (ref 150–450)
PLATELET BLD QL SMEAR: ADEQUATE
PMV BLD AUTO: 7.6 FL (ref 8.9–12.7)
POTASSIUM SERPL-SCNC: 4.1 MMOL/L (ref 3.6–5)
PROT SERPL-MCNC: 7.9 G/DL (ref 5.9–8.4)
RBC # BLD AUTO: 3.68 MILLION/UL (ref 4.5–5.9)
RBC MORPH BLD: PRESENT
SODIUM SERPL-SCNC: 143 MMOL/L (ref 137–147)
TOTAL CELLS COUNTED SPEC: 100
TSH SERPL DL<=0.05 MIU/L-ACNC: 1.4 UIU/ML (ref 0.47–4.68)
URATE SERPL-MCNC: 6.6 MG/DL (ref 3.5–8.5)
WBC # BLD AUTO: 15.3 THOUSAND/UL (ref 4.5–11)

## 2019-07-22 PROCEDURE — 85007 BL SMEAR W/DIFF WBC COUNT: CPT

## 2019-07-22 PROCEDURE — 85027 COMPLETE CBC AUTOMATED: CPT

## 2019-07-22 PROCEDURE — 83615 LACTATE (LD) (LDH) ENZYME: CPT

## 2019-07-22 PROCEDURE — 36415 COLL VENOUS BLD VENIPUNCTURE: CPT

## 2019-07-22 PROCEDURE — 84550 ASSAY OF BLOOD/URIC ACID: CPT

## 2019-07-22 PROCEDURE — 84443 ASSAY THYROID STIM HORMONE: CPT

## 2019-07-22 PROCEDURE — 80053 COMPREHEN METABOLIC PANEL: CPT

## 2019-07-22 PROCEDURE — 83735 ASSAY OF MAGNESIUM: CPT

## 2019-07-23 ENCOUNTER — TELEPHONE (OUTPATIENT)
Dept: HEMATOLOGY ONCOLOGY | Facility: CLINIC | Age: 59
End: 2019-07-23

## 2019-07-23 DIAGNOSIS — C34.91 ADENOCARCINOMA OF LUNG, RIGHT (HCC): Primary | ICD-10-CM

## 2019-07-23 NOTE — TELEPHONE ENCOUNTER
Phoned patient per BROOKS Saenz request to instruct him to reduce his prednisone dose to 2 pills per day ( 20 mg) and repeat lab work on Monday 7/29/19  Advised patient to keep his appointment with nephrologist on 7/30/19  Jessica Gonzalez verbalized understanding

## 2019-07-24 ENCOUNTER — OFFICE VISIT (OUTPATIENT)
Dept: FAMILY MEDICINE CLINIC | Facility: CLINIC | Age: 59
End: 2019-07-24

## 2019-07-24 VITALS
BODY MASS INDEX: 20.74 KG/M2 | DIASTOLIC BLOOD PRESSURE: 94 MMHG | SYSTOLIC BLOOD PRESSURE: 142 MMHG | OXYGEN SATURATION: 97 % | HEART RATE: 68 BPM | RESPIRATION RATE: 18 BRPM | WEIGHT: 140 LBS | TEMPERATURE: 97 F

## 2019-07-24 DIAGNOSIS — E11.9 TYPE 2 DIABETES MELLITUS WITHOUT COMPLICATION, WITHOUT LONG-TERM CURRENT USE OF INSULIN (HCC): Primary | ICD-10-CM

## 2019-07-24 DIAGNOSIS — I10 ESSENTIAL HYPERTENSION: ICD-10-CM

## 2019-07-24 DIAGNOSIS — I10 HTN (HYPERTENSION): ICD-10-CM

## 2019-07-24 PROCEDURE — 99213 OFFICE O/P EST LOW 20 MIN: CPT | Performed by: FAMILY MEDICINE

## 2019-07-24 RX ORDER — AMLODIPINE BESYLATE 10 MG/1
10 TABLET ORAL DAILY
Qty: 30 TABLET | Refills: 1 | Status: SHIPPED | OUTPATIENT
Start: 2019-07-24 | End: 2019-10-09 | Stop reason: SDUPTHER

## 2019-07-24 NOTE — PROGRESS NOTES
Assessment/Plan:    Type 2 diabetes mellitus without complication, without long-term current use of insulin ([facility])  Lab Results   Component Value Date    HGBA1C 5 7 05/06/2019       No results for input(s): POCGLU in the last 72 hours  Blood Sugar Average: Last 72 hrs:  Appears well controlled, states he has been off of metformin for the past 6 months  Will monitor glucose levels and adjust as needed  Adenocarcinoma of lung, right ([facility] [address] )  Follows up with Hematology-Oncology, pulmonology, Nephrology and palliative care  Undergoes chemotherapy every 3 weeks, treatment initiated December 2018  Currently afebrile  Encouraged ambulation and hydration  Denies any pain appears to be well controlled    Essential hypertension  Appears well controlled  Amlodipine refilled         Diagnoses and all orders for this visit:    Type 2 diabetes mellitus without complication, without long-term current use of insulin ([facility] )    Essential hypertension    HTN (hypertension)  -     amLODIPine (NORVASC) 10 mg tablet; Take 1 tablet (10 mg total) by mouth daily          Subjective:      Patient ID: Ana Sinclair  is a 66 y o  male  HPI  17-year-old male presents to clinic for follow-up visit requesting medication refills of amlodipine  Has a history noted for hypertension adenocarcinoma right lung of which she is undergoing chemotherapy every 3 weeks since December 2018 and COPD  Overall patient states he is feeling Alla Mello  has followed up routinely with Hematology-Oncology and pulmonology palliative care and Nephrology  Has a follow-up with Oncology in the coming week and will be having his PET scan repeated to assess progress, encouraged to keep appointment    Appears have a good support system which is comprised of his girlfriend and twin daughters who are 13years of age as he states Tish Boswell keep me pretty active and busy"  Additionally patient complaining of nausea with occasional vomiting and diarrhea, states this is not new      The following portions of the patient's history were reviewed and updated as appropriate: allergies, current medications, past family history, past medical history, past social history, past surgical history and problem list     Review of Systems   Constitutional: Negative for activity change, appetite change, chills, diaphoresis, fatigue and fever  HENT: Negative for congestion, ear pain, hearing loss, postnasal drip, rhinorrhea, sneezing, sore throat and tinnitus  Eyes: Negative for pain  Respiratory: Positive for shortness of breath and wheezing  Negative for apnea, cough, choking and chest tightness  Cardiovascular: Negative for chest pain, palpitations and leg swelling  Gastrointestinal: Positive for constipation, diarrhea and nausea  Negative for abdominal distention, abdominal pain and vomiting  Genitourinary: Negative for decreased urine volume and dysuria  Musculoskeletal: Negative for back pain  Skin: Negative for rash  Neurological: Negative for dizziness, tremors and syncope  Psychiatric/Behavioral: Positive for decreased concentration and sleep disturbance  Negative for agitation and suicidal ideas  Objective:      /94 (BP Location: Left arm, Patient Position: Sitting, Cuff Size: Standard)   Pulse 68   Temp (!) 97 °F (36 1 °C) (Temporal)   Resp 18   Wt 63 5 kg (140 lb)   SpO2 97%   BMI 20 74 kg/m²          Physical Exam   Constitutional: He is oriented to person, place, and time  He appears well-developed and well-nourished  No distress  HENT:   Head: Normocephalic and atraumatic  Right Ear: External ear normal    Left Ear: External ear normal    Mouth/Throat: Oropharynx is clear and moist  No oropharyngeal exudate  Eyes: EOM are normal  No scleral icterus  Neck: Normal range of motion  Neck supple  No JVD present  No tracheal deviation present  No thyromegaly present  Cardiovascular: Normal rate and normal heart sounds     No murmur heard   Pulmonary/Chest: Effort normal  No respiratory distress  He has wheezes  Decreased breath sound at bases  Currently not in exacerbation   Abdominal: Soft  He exhibits no distension  There is no rebound  Musculoskeletal: Normal range of motion  He exhibits no edema  Lymphadenopathy:     He has no cervical adenopathy  Neurological: He is alert and oriented to person, place, and time  Skin: Skin is warm and dry  He is not diaphoretic     Port noted on right upper chest   Psychiatric:   Flat affect

## 2019-07-24 NOTE — ASSESSMENT & PLAN NOTE
Lab Results   Component Value Date    HGBA1C 5 7 05/06/2019       No results for input(s): POCGLU in the last 72 hours  Blood Sugar Average: Last 72 hrs:  Appears well controlled, states he has been off of metformin for the past 6 months  Will monitor glucose levels and adjust as needed

## 2019-07-24 NOTE — ASSESSMENT & PLAN NOTE
Follows up with Hematology-Oncology, pulmonology, Nephrology and palliative care  Undergoes chemotherapy every 3 weeks, treatment initiated December 2018  Currently afebrile  Encouraged ambulation and hydration    Denies any pain appears to be well controlled

## 2019-07-26 ENCOUNTER — HOSPITAL ENCOUNTER (OUTPATIENT)
Dept: NUCLEAR MEDICINE | Facility: HOSPITAL | Age: 59
Discharge: HOME | End: 2019-07-26
Payer: COMMERCIAL

## 2019-07-26 DIAGNOSIS — C34.91 ADENOCARCINOMA OF LUNG, RIGHT (HCC): ICD-10-CM

## 2019-07-26 LAB — GLUCOSE SERPL-MCNC: 98 MG/DL (ref 65–140)

## 2019-07-26 PROCEDURE — 78815 PET IMAGE W/CT SKULL-THIGH: CPT

## 2019-07-26 PROCEDURE — A9552 F18 FDG: HCPCS

## 2019-07-26 PROCEDURE — 82948 REAGENT STRIP/BLOOD GLUCOSE: CPT

## 2019-07-29 ENCOUNTER — OFFICE VISIT (OUTPATIENT)
Dept: HEMATOLOGY ONCOLOGY | Facility: CLINIC | Age: 59
End: 2019-07-29
Payer: COMMERCIAL

## 2019-07-29 ENCOUNTER — APPOINTMENT (OUTPATIENT)
Dept: LAB | Facility: HOSPITAL | Age: 59
End: 2019-07-29
Payer: COMMERCIAL

## 2019-07-29 ENCOUNTER — TELEPHONE (OUTPATIENT)
Dept: NEPHROLOGY | Facility: CLINIC | Age: 59
End: 2019-07-29

## 2019-07-29 VITALS
TEMPERATURE: 97.6 F | HEIGHT: 69 IN | WEIGHT: 144.4 LBS | RESPIRATION RATE: 16 BRPM | BODY MASS INDEX: 21.39 KG/M2 | DIASTOLIC BLOOD PRESSURE: 70 MMHG | SYSTOLIC BLOOD PRESSURE: 150 MMHG | HEART RATE: 92 BPM | OXYGEN SATURATION: 97 %

## 2019-07-29 DIAGNOSIS — C34.91 ADENOCARCINOMA OF LUNG, RIGHT (HCC): ICD-10-CM

## 2019-07-29 DIAGNOSIS — C34.91 ADENOCARCINOMA OF LUNG, RIGHT (HCC): Primary | ICD-10-CM

## 2019-07-29 LAB
ALBUMIN SERPL BCP-MCNC: 4.1 G/DL (ref 3–5.2)
ALP SERPL-CCNC: 101 U/L (ref 43–122)
ALT SERPL W P-5'-P-CCNC: 52 U/L (ref 9–52)
ANION GAP SERPL CALCULATED.3IONS-SCNC: 9 MMOL/L (ref 5–14)
AST SERPL W P-5'-P-CCNC: 47 U/L (ref 17–59)
BILIRUB SERPL-MCNC: 0.3 MG/DL
BUN SERPL-MCNC: 28 MG/DL (ref 5–25)
CALCIUM SERPL-MCNC: 9.4 MG/DL (ref 8.4–10.2)
CHLORIDE SERPL-SCNC: 103 MMOL/L (ref 97–108)
CO2 SERPL-SCNC: 29 MMOL/L (ref 22–30)
CREAT SERPL-MCNC: 1.94 MG/DL (ref 0.7–1.5)
GFR SERPL CREATININE-BSD FRML MDRD: 43 ML/MIN/1.73SQ M
GLUCOSE P FAST SERPL-MCNC: 57 MG/DL (ref 70–99)
POTASSIUM SERPL-SCNC: 4.5 MMOL/L (ref 3.6–5)
PROT SERPL-MCNC: 8.2 G/DL (ref 5.9–8.4)
SODIUM SERPL-SCNC: 141 MMOL/L (ref 137–147)

## 2019-07-29 PROCEDURE — 36415 COLL VENOUS BLD VENIPUNCTURE: CPT | Performed by: NURSE PRACTITIONER

## 2019-07-29 PROCEDURE — 99214 OFFICE O/P EST MOD 30 MIN: CPT | Performed by: INTERNAL MEDICINE

## 2019-07-29 PROCEDURE — 80053 COMPREHEN METABOLIC PANEL: CPT | Performed by: NURSE PRACTITIONER

## 2019-07-29 NOTE — PROGRESS NOTES
Hematology/Oncology Outpatient Follow-up  Wilmer Alpers Sr  66 y o  male 1960 [id number]    Date:  7/29/2019        Assessment and Plan:  1  Adenocarcinoma of lung, right [facility])  The patient was educated about the PET-CT scan findings which showed response of the therapy in the right middle lobe area without any hint of progression of his disease  The patient seems to be hesitant in going back on the treatment with Alimta and Arnaud Christiano since his quality of life has improved significantly after putting the treatment on hold  I did explain to the patient if we do not treat him then the cancer is going to get worse  He finally agreed to go back on the same therapy with the Alimta and Keytruda this week  I did also ask him to decrease the prednisone down to 10 mg once a day with the goal of tapering him off in the near future  The patient also was encouraged to see his nephrologist for further evaluation of his renal dysfunction     - CBC and differential; Future  - Comprehensive metabolic panel; Future  - Magnesium; Future        HPI:  The patient came today for a follow-up visit  He continues to be on a tapering dose of prednisone currently taking 20 mg once a day  His kidney function continues to be abnormal with stable creatinine around 1 9  He had a PET-CT scan done on the 26 of July which showed:     IMPRESSION:  1  Right middle lung opacity extending along the minor and major fissures demonstrate decreasing FDG activity, suggesting response to therapy  Hypermetabolic nodule along the right upper medial lung pleura and AP window node also has diminished  2   Scattered small retroperitoneal nodes appear similar  3   There are no new hypermetabolic lesions that are concerning for new metastases  4   Increased bladder wall thickening  Correlate clinically to exclude cystitis  He stated that he is doing much better while he is off of the chemotherapy most likely related to the steroid effect    He did gained about 5 lb since last visit  Oncology History    61-[facility]-old [facility] [employer] male heavy smoker who used to smoke 2 packs of cigarettes daily for many years, COPD, he presented with dyspnea, CT scan of the chest on October 2018 showed right middle lobe spiculated 1 3 cm mass with multiple solid and ground-glass nodules along the major and minor fissures sub cm pulmonary nodules bilaterally, left adrenal 1 2 cm nodule     PET scan showed 1 3 cm right middle lung nodule with SUV of 6 8, numerous nodular densities along the right major and minor fissures, right hilar activity with SUV of 3 4, subcarinal lymph node measuring 7 mm with SUV of 2 7, periportal enlarged lymph nodes concerning for metastatic disease measuring 2 4 cm with SUV of 5, prominent left retrocrural lymph node measuring 1 cm x 9 mm with SUV of 1 1    Core biopsy of the right spiculated nodule showed invasive adenocarcinoma consistent with lung primary positive for CK7, TTF 1, napsin a        Adenocarcinoma of lung, right ([facility] [address] )    11/13/2018 Initial Diagnosis     Adenocarcinoma of lung, right ([facility] [address] )  Stage IV      12/13/2018 - 3/28/2019 Chemotherapy      Alimta, Carboplatin (AUC 5) + Keytruda (6 cycles total)      4/17/2019 -  Chemotherapy     Started maintenance Alimta and Keytruda         Interval history:    ROS: Review of Systems   Constitutional: Positive for appetite change  Negative for diaphoresis, fatigue and fever  HENT: Negative for congestion, dental problem, facial swelling, hearing loss, tinnitus and trouble swallowing  Eyes: Negative for visual disturbance  Respiratory: Positive for shortness of breath  Negative for cough, chest tightness and wheezing  Cardiovascular: Negative for chest pain and leg swelling  Gastrointestinal: Positive for constipation  Negative for abdominal distention, abdominal pain, blood in stool, diarrhea, nausea and vomiting  Genitourinary: Negative for dysuria, hematuria and urgency  Musculoskeletal: Negative for arthralgias, myalgias and neck pain  Skin: Negative  Negative for color change, pallor, rash and wound  Neurological: Positive for numbness  Negative for dizziness, weakness and headaches  Hematological: Negative for adenopathy  Psychiatric/Behavioral: Positive for sleep disturbance  Negative for agitation, behavioral problems, confusion, hallucinations and self-injury  The patient is not nervous/anxious and is not hyperactive          Past Medical History:   Diagnosis Date    Bipolar 1 disorder ([facility] [address] )     Cancer ([facility] [address] )     adeno lung  dx 11/2018-lung bx today 4/9/2019-ongoing chemo    Chronic pain disorder     back and right shoulder    CKD (chronic kidney disease)     COPD (chronic obstructive pulmonary disease) (HCC)     Depression     Diabetes mellitus (HCC)     GERD (gastroesophageal reflux disease)     Herniated lumbar intervertebral disc     Hypertension     Insomnia     Latent syphilis     Treated    Low back pain     Lumbosacral disc disease     Pneumothorax after biopsy     R lung    PTSD (post-traumatic stress disorder)     PTSD (post-traumatic stress disorder)     PTSD (post-traumatic stress disorder)     Pulmonary emphysema ([facility] [address] )     Tobacco abuse 11/13/2018       Past Surgical History:   Procedure Laterality Date    COLONOSCOPY  2011    CT GUIDED CHEST TUBE  11/21/2018    FL GUIDED CENTRAL VENOUS ACCESS DEVICE INSERTION  2/8/2019    HEMORROIDECTOMY      IR CHEST TUBE  11/14/2018    IR IMAGE GUIDED BIOPSY/ASPIRATION LUNG  11/13/2018    KNEE SURGERY      RI INSJ TUNNELED CTR VAD W/SUBQ PORT AGE 5 YR/> N/A 2/8/2019    Procedure: PLACEMENT OF PORT-A-CATH;  Surgeon: Moe Montano MD;  Location:  MAIN OR;  Service: Vascular       Social History     Socioeconomic History    Marital status: Legally      Spouse name: None    Number of children: None    Years of education: None    Highest education level: None   Occupational History    Occupation: part time employment   Social Needs    Financial resource strain: None    Food insecurity:     Worry: None     Inability: None    Transportation needs:     Medical: None     Non-medical: None   Tobacco Use    Smoking status: Former Smoker     Packs/day: 0 50     Years: 40 00     Pack years: 20 00     Types: Cigarettes     Last attempt to quit: 2018     Years since quittin 9    Smokeless tobacco: Never Used    Tobacco comment: "i quit one month ago when i quit weed"   Substance and Sexual Activity    Alcohol use: Not Currently     Comment: social    Drug use: No     Types: Marijuana     Comment: stopped , "i smoked weed and stopped 1 month ago"    Sexual activity: Yes   Lifestyle    Physical activity:     Days per week: None     Minutes per session: None    Stress: None   Relationships    Social connections:     Talks on phone: None     Gets together: None     Attends Congregation service: None     Active member of club or organization: None     Attends meetings of clubs or organizations: None     Relationship status: None    Intimate partner violence:     Fear of current or ex partner: None     Emotionally abused: None     Physically abused: None     Forced sexual activity: None   Other Topics Concern    None   Social History Narrative    Lives with girlfriend       Family History   Problem Relation Age of Onset    Heart disease Mother     Hypertension Father     Diabetes Family     Asthma Family     Heart disease Family     Cancer Family        Allergies   Allergen Reactions    Lisinopril Anaphylaxis     Took medication a while ago and had a "swelling reaction"  Does not carry epi-pen         Current Outpatient Medications:     acetaminophen (TYLENOL) 500 mg tablet, Take 2 tablets (1,000 mg total) by mouth every 8 (eight) hours as needed for mild pain, Disp: 30 tablet, Rfl: 0    albuterol (2 5 mg/3 mL) 0 083 % nebulizer solution, Take 1 vial (2 5 mg total) by nebulization every 4 (four) hours as needed for wheezing or shortness of breath, Disp: 120 vial, Rfl: 3    albuterol (VENTOLIN HFA) 90 mcg/act inhaler, Inhale 2 puffs every 4 (four) hours as needed for wheezing, Disp: 1 Inhaler, Rfl: 5    amLODIPine (NORVASC) 10 mg tablet, Take 1 tablet (10 mg total) by mouth daily, Disp: 30 tablet, Rfl: 1    BANOPHEN 25 MG capsule, , Disp: , Rfl:     Blood Glucose Monitoring Suppl KIT, by Does not apply route daily, Disp: 1 each, Rfl: 0    dexamethasone (DECADRON) 4 mg tablet, Take 1 tablet (4 mg total) by mouth 2 (two) times a day with meals For 2 days after chemo, Disp: 4 tablet, Rfl: 3    diphenhydrAMINE (BENADRYL) 25 mg tablet, Take 1 tablet (25 mg total) by mouth every 6 (six) hours as needed (nausea), Disp: 30 tablet, Rfl: 0    FLUoxetine (PROzac) 10 MG tablet, Take 1 tablet (10 mg total) by mouth daily, Disp: 30 tablet, Rfl: 5    fluticasone (FLONASE) 50 mcg/act nasal spray, 1-2 sprays each nostril daily for allergies, Disp: 1 Bottle, Rfl: 3    fluticasone-vilanterol (BREO ELLIPTA) 100-25 mcg/inh inhaler, Inhale 1 puff daily in the early morning Rinse mouth after use , Disp: 1 Inhaler, Rfl: 5    folic acid (FOLVITE) 1 mg tablet, Take 1 tablet (1 mg total) by mouth daily, Disp: 30 tablet, Rfl: 2    FREESTYLE LITE test strip, TEST BLOOD SUGAR DAILY, Disp: 100 each, Rfl: 1    gabapentin (NEURONTIN) 300 mg capsule, Take 1 capsule (300 mg total) by mouth 3 (three) times a day, Disp: 90 capsule, Rfl: 0    guaiFENesin (MUCINEX) 600 mg 12 hr tablet, Take 2 tablets (1,200 mg total) by mouth every 12 (twelve) hours, Disp: 60 tablet, Rfl: 0    ipratropium (ATROVENT) 0 02 % nebulizer solution, Take 1 vial (0 5 mg total) by nebulization 3 (three) times a day, Disp: 90 vial, Rfl: 1    Lancets (FREESTYLE) lancets, TEST BLOOD SUGAR DAILY, Disp: 100 each, Rfl: 4    lidocaine (LMX) 4 % cream, Apply topically as needed for mild pain Apply 1/2-1 inch to port 30-60 mins prior to needle insertion, cover with serrain wrap, Disp: 30 g, Rfl: 5    loratadine (CLARITIN) 10 mg tablet, Take 1 tablet (10 mg total) by mouth daily in the early morning, Disp: 30 tablet, Rfl: 2    melatonin 3 mg, Take 1 tablet (3 mg total) by mouth daily at bedtime, Disp: 30 tablet, Rfl: 1    metFORMIN (GLUCOPHAGE-XR) 500 mg 24 hr tablet, Take 1 tablet (500 mg total) by mouth 2 (two) times a day with meals, Disp: 60 tablet, Rfl: 0    methocarbamol (ROBAXIN) 750 mg tablet, Take 1 tablet (750 mg total) by mouth every 6 (six) hours as needed for muscle spasms, Disp: 90 tablet, Rfl: 0    oxyCODONE (OxyCONTIN) 20 mg 12 hr tablet, Take 1 tablet (20 mg total) by mouth every 12 (twelve) hoursMax Daily Amount: 40 mg, Disp: 60 tablet, Rfl: 0    oxyCODONE (ROXICODONE) 20 MG TABS, Take 1 tablet (20 mg total) by mouth every 4 (four) hours as needed for severe painMax Daily Amount: 120 mg, Disp: 150 tablet, Rfl: 0    pantoprazole (PROTONIX) 40 mg tablet, Take 1 tablet (40 mg total) by mouth daily, Disp: 30 tablet, Rfl: 5    polyethylene glycol (GLYCOLAX) powder, Take 17 g by mouth daily, Disp: 527 g, Rfl: 1    predniSONE 10 mg tablet, Take 3 tablets (30 mg total) by mouth daily, Disp: 90 tablet, Rfl: 0    prochlorperazine (COMPAZINE) 10 mg tablet, Take 1 tablet (10 mg total) by mouth every 6 (six) hours as needed for nausea or vomiting, Disp: 90 tablet, Rfl: 0    QUEtiapine (SEROquel) 25 mg tablet, Take 25 mg by mouth daily at bedtime, Disp: , Rfl:     ranitidine (ZANTAC) 150 mg tablet, Take 1 tablet (150 mg total) by mouth 2 (two) times a day, Disp: 60 tablet, Rfl: 4    REGULOID 28 3 % POWD, USE AS DIRECTED, Disp: 369 g, Rfl: 4    Selenium Sulfide 2 25 % SHAM, apply by topical route  every PM to the affected area(s), Disp: , Rfl:     senna-docusate sodium (SENOKOT-S) 8 6-50 mg per tablet, Take 1 tablet by mouth 2 (two) times a day, Disp: 60 tablet, Rfl: 2    sildenafil (VIAGRA) 50 MG tablet, Take 1 tablet (50 mg total) by mouth as needed for erectile dysfunction, Disp: 6 tablet, Rfl: 0    tiotropium (SPIRIVA RESPIMAT) 2 5 MCG/ACT AERS inhaler, Inhale 2 puffs daily, Disp: 1 Inhaler, Rfl: 2    VENTOLIN  (90 Base) MCG/ACT inhaler, Inhale 2 puffs every 4 (four) hours as needed for wheezing, Disp: 18 g, Rfl: 0      Physical Exam:  /70 (BP Location: Left arm, Cuff Size: Adult)   Pulse 92   Temp 97 6 °F (36 4 °C) (Tympanic)   Resp 16   Ht 5' 8 9" (1 75 m)   Wt 65 5 kg (144 lb 6 4 oz)   SpO2 97%   BMI 21 39 kg/m²     Physical Exam   Constitutional: He is oriented to person, place, and time  He appears well-developed and well-nourished  HENT:   Head: Normocephalic and atraumatic  Nose: Nose normal    Mouth/Throat: Oropharynx is clear and moist    Eyes: Pupils are equal, round, and reactive to light  Conjunctivae and EOM are normal  Right eye exhibits no discharge  Left eye exhibits no discharge  No scleral icterus  Neck: Normal range of motion  Neck supple  No tracheal deviation present  No thyromegaly present  Cardiovascular: Normal rate, regular rhythm and normal heart sounds  Exam reveals no friction rub  No murmur heard  Pulmonary/Chest: Effort normal and breath sounds normal  No respiratory distress  He has no wheezes  He has no rales  He exhibits no tenderness  Abdominal: Soft  Bowel sounds are normal  He exhibits no distension and no mass  There is no hepatosplenomegaly, splenomegaly or hepatomegaly  There is no tenderness  There is no rebound and no guarding  Musculoskeletal: Normal range of motion  He exhibits no edema, tenderness or deformity  Lymphadenopathy:     He has no cervical adenopathy  Neurological: He is alert and oriented to person, place, and time  He has normal reflexes  He displays normal reflexes  No cranial nerve deficit  Coordination normal    Skin: Skin is warm and dry  No rash noted  No erythema  No pallor  Psychiatric: He has a normal mood and affect   His behavior is normal  Judgment and thought content normal          Labs:  Lab Results   Component Value Date    WBC 15 30 (H) 07/22/2019    HGB 10 4 (L) 07/22/2019    HCT 32 4 (L) 07/22/2019    MCV 88 07/22/2019     07/22/2019     Lab Results   Component Value Date    K 4 5 07/29/2019     07/29/2019    CO2 29 07/29/2019    BUN 28 (H) 07/29/2019    CREATININE 1 94 (H) 07/29/2019    GLUF 57 (L) 07/29/2019    CALCIUM 9 4 07/29/2019    AST 47 07/29/2019    ALT 52 07/29/2019    ALKPHOS 101 07/29/2019    EGFR 43 (L) 07/29/2019     No results found for: TSH    Patient voiced understanding and agreement in the above discussion  Aware to contact our office with questions/symptoms in the interim

## 2019-07-29 NOTE — TELEPHONE ENCOUNTER
I spoke with Consuelo Peters from [employer] I made him aware our office was unsure if the patient used their transportation or not  He confirmed that yes he does and I then set up for Ivan to pick and drop off the patient tomorrow for his appointment with Dr Shama Gonzalez

## 2019-07-30 ENCOUNTER — CONSULT (OUTPATIENT)
Dept: NEPHROLOGY | Facility: CLINIC | Age: 59
End: 2019-07-30
Payer: COMMERCIAL

## 2019-07-30 VITALS
RESPIRATION RATE: 22 BRPM | HEART RATE: 72 BPM | DIASTOLIC BLOOD PRESSURE: 76 MMHG | WEIGHT: 139 LBS | SYSTOLIC BLOOD PRESSURE: 140 MMHG | BODY MASS INDEX: 21.07 KG/M2 | HEIGHT: 68 IN

## 2019-07-30 DIAGNOSIS — J44.9 CHRONIC OBSTRUCTIVE PULMONARY DISEASE, UNSPECIFIED COPD TYPE (HCC): ICD-10-CM

## 2019-07-30 DIAGNOSIS — C34.91 ADENOCARCINOMA OF LUNG, RIGHT (HCC): ICD-10-CM

## 2019-07-30 DIAGNOSIS — E11.9 TYPE 2 DIABETES MELLITUS WITHOUT COMPLICATION, WITHOUT LONG-TERM CURRENT USE OF INSULIN (HCC): ICD-10-CM

## 2019-07-30 DIAGNOSIS — I10 ESSENTIAL HYPERTENSION: ICD-10-CM

## 2019-07-30 DIAGNOSIS — N18.30 STAGE 3 CHRONIC KIDNEY DISEASE (HCC): Primary | ICD-10-CM

## 2019-07-30 DIAGNOSIS — N28.9 RENAL DYSFUNCTION: ICD-10-CM

## 2019-07-30 DIAGNOSIS — K21.9 GASTROESOPHAGEAL REFLUX DISEASE WITHOUT ESOPHAGITIS: ICD-10-CM

## 2019-07-30 DIAGNOSIS — F31.9 BIPOLAR 1 DISORDER (HCC): ICD-10-CM

## 2019-07-30 PROCEDURE — 99244 OFF/OP CNSLTJ NEW/EST MOD 40: CPT | Performed by: INTERNAL MEDICINE

## 2019-07-30 RX ORDER — CYANOCOBALAMIN 1000 UG/ML
1000 INJECTION INTRAMUSCULAR; SUBCUTANEOUS ONCE
Status: CANCELLED | OUTPATIENT
Start: 2019-09-06

## 2019-07-30 RX ORDER — SODIUM CHLORIDE 9 MG/ML
20 INJECTION, SOLUTION INTRAVENOUS ONCE
Status: CANCELLED | OUTPATIENT
Start: 2019-09-06

## 2019-07-30 NOTE — PROGRESS NOTES
Nephrology Initial Consultation  Malik Acevedo  66 y o  male MRN: [id number]            REASON FOR CONSULTATION:  Malik Acevedo is a 66 y o male who was referred by Keiry Miller MD for evaluation of Consult and Chronic Kidney Disease    ASSESSMENT / PLAN:   66 y o   male with pmh of multiple co-morbidities including  hypertension (x 10yrs) , diabetes (x 3yrs) , GERD , bipolar disorder, emphysema, CHF, right lung adenocarcinoma and CKD stage 3 presented to the office for evaluation and management of abnormal renal parameters  1  CKD stage III :  - Patient has a baseline creatinine of 1 94 mg/dL  Most recent labs show a Creatinine of 1 7-2 mg/dL  Renal function remains stable  - likely has underlying CKD secondary to age-related nephron loss plus hypertensive nephrosclerosis plus diabetic glomerular sclerosis plus nephrotoxicity secondary to chemotherapy with Alimta and keytruda  Will still rule out paraproteinemia and cystic disease, check SPEP, UPEP, free light chains  - Will check renal ultrasound to evaluate kidney size, echogenicity and r/o cysts  - Proteinuria - most recent protein to creatinine ratio of 100 mg in July 2019  Will check UA, spot urine protein and creatinine    - Acid base and lytes stable  - Clinically the patient appears to be euvolemic  - Recommend to avoid use of NSAIDs, nephrotoxins  Caution advised with regards to exposure to IV contrast dye    - Discussed with the patient in depth his renal status, including the possible etiologies for CKD  - Advised the patient that when his GFR is close to 20mL/min then will start discussing about RRT(renal replacement therapy) options such as renal transplant, peritoneal dialysis and hemodialysis  - Informed the patient about the various options for Renal Replacement therapy    - Discussed with the patient how we need to work together to delay the progression of CKD with optimal BP control based on their age and co-morbidities, optimal BS control with HbA1c of <7% and trying to reduce proteinuria by the use of anti-proteinuric agents  2  Hypertension:  - Patient is on norvasc 10mg po Q24    - Goal BP of <  140/90 based on age and comorbidities  - Instructed to follow low sodium (2gm)diet  3  Hemoglobin:  - Goal Hb of 10-12 g/dL  - Most recent labs suggestive of 10 4 grams/deciliter  - recheck CBC  - no role for IV iron at this time    4  CKD-MBD(Mineral Bone Disease): - Based on patients CKD stage following is the goal of therapy  - Maintain calcium phosphorus product of < 55   - Stage 3 CKD - Goal Ca 8 5-10 mg/dL , goal Phos 2 7-4 6 mg/dL  , goal iPTH 30-70 pg/mL  - Continue patient on no vitamin-D  - check intact PTH and vitamin-D level    5  Lipids:  - goal LDL less than 70  - Management as per PCP    6  DM:  - management as per Primary team  - most recent A1c of 5 7% in May of 2019   - on metformin     7  CHF:  - Management as per primary team  - most recent echo from September 25, 2018 showed EF of 20-88%, grade 1 diastolic dysfunction    8  Right lung adenocarcinoma:  - follow up with Heme-Onc  - status post treatment with alimta and Keutruda 6 cycles from December 2018 to March 2019  - restarted on maintenance therapy with alimta and Keytruda from July 2019, next dose August 1, 2019   - discussed with the patient that both drugs are nephrotoxic and likely responsible for his elevated creatinine since March  All risks benefits of the medication explained to the patient and advised him to follow up with Heme-Onc in terms of dose adjustment of medications and possible other options, if he is to continue these medications that he needs close monitoring of his renal parameters  9  Nutrition:  - Encouraged patient to follow a renal diet comprising of moderate potassium, low phosphorus and protein restriction to 0 8gm/kg  - Will check serum albumin with next blood work       10  Followup:  - Patient is to follow-up in 4 months, with lab work to be performed after the visit and then again in a few days prior to the visit  Advised patient to call me in 10 days to review the results if they do not hear back from me, as I may have not received the results  Message sent over to Heme-Onc  Whit Nagy MD, [facility], 7/30/2019, 10:22 AM             HISTORY OF PRESENT ILLNESS: 66 y o  male with past medical history of hypertension (x 10yrs) , diabetes (x 3yrs) , GERD , bipolar disorder, emphysema, CHF, right lung adenocarcinoma and CKD stage 3 presented to the office for evaluation and management of abnormal renal parameters  Review of records shows that patient has a baseline creatinine of 1 7-2 mg/dL since May of 2019, prior to that his baseline creatinine was around 1 4 mg/dL  No h/o of NURIA needing RRT, no kidney stones  No recent IV contrast exposure  H/o kidney disease in family but not on dialysis  Not sure why they had kidney disease  No NSAID use  Next chemo dose is august 1st 2019, last infusion on July 11th 2019  Reports feels well off of chemo and while on prednisone  Not checking BP at home  Chronic pain from the cancer  Happy with all the care information that he has gotten today  Review of Systems   Constitutional: Negative for appetite change, fatigue and fever  HENT: Negative for congestion and sore throat  Respiratory: Negative for cough, shortness of breath and wheezing  Cardiovascular: Negative for chest pain, palpitations and leg swelling  Gastrointestinal: Positive for constipation  Negative for abdominal pain, diarrhea, nausea and vomiting  Genitourinary: Negative for difficulty urinating, dysuria and hematuria  Musculoskeletal: Positive for back pain  Skin: Negative for pallor and rash  Neurological: Negative for dizziness, light-headedness and headaches  Psychiatric/Behavioral: Negative for confusion  All other systems reviewed and are negative        PAST MEDICAL HISTORY:  Past Medical History:   Diagnosis Date    Bipolar 1 disorder ([address] )     Cancer ([address] )     adeno lung  dx 11/2018-lung bx today 4/9/2019-ongoing chemo    Chronic pain disorder     back and right shoulder    CKD (chronic kidney disease)     COPD (chronic obstructive pulmonary disease) (HCC)     Depression     Diabetes mellitus (HCC)     GERD (gastroesophageal reflux disease)     Herniated lumbar intervertebral disc     Hypertension     Insomnia     Latent syphilis     Treated    Low back pain     Lumbosacral disc disease     Pneumothorax after biopsy     R lung    PTSD (post-traumatic stress disorder)     PTSD (post-traumatic stress disorder)     PTSD (post-traumatic stress disorder)     Pulmonary emphysema ([address] )     Tobacco abuse 11/13/2018       PROBLEM LIST    Patient Active Problem List   Diagnosis    COPD (chronic obstructive pulmonary disease) ([address] )    Essential hypertension    Chronic bilateral thoracic back pain    Chronic right shoulder pain    Bilateral carpal tunnel syndrome    Bipolar 1 disorder ([address] )    Gastroesophageal reflux disease without esophagitis    Other specified anxiety disorders    Cubital tunnel syndrome, bilateral    Type 2 diabetes mellitus without complication, without long-term current use of insulin (HCC)    Panlobular emphysema ([address] )    Adenocarcinoma of lung, right ([address] )    Tobacco abuse    Encounter for immunization    Generalized body aches    Chronic pain disorder    Cancer associated pain    Chemotherapy-induced nausea    Erectile dysfunction    Renal dysfunction    Weight loss    Stage 3 chronic kidney disease ([address] )       PAST SURGICAL HISTORY:  Past Surgical History:   Procedure Laterality Date    COLONOSCOPY  2011    CT GUIDED CHEST TUBE  11/21/2018    FL GUIDED CENTRAL VENOUS ACCESS DEVICE INSERTION  2/8/2019    HEMORROIDECTOMY      IR CHEST TUBE  11/14/2018    IR IMAGE GUIDED BIOPSY/ASPIRATION LUNG  11/13/2018    KNEE SURGERY      [place] INSJ TUNNELED CTR VAD W/SUBQ PORT AGE 5 YR/> N/A 2/8/2019    Procedure: PLACEMENT OF PORT-A-CATH;  Surgeon: Juan Manuel Jain MD;  Location: [address] [place] [address];  Service: Vascular       SOCIAL HISTORY :   reports that he quit smoking about 10 months ago  His smoking use included cigarettes  He has a 20 00 pack-year smoking history  He has never used smokeless tobacco  He reports that he drank alcohol  He reports that he does not use drugs  FAMILY HISTORY:  Family History   Problem Relation Age of Onset    Heart disease Mother     Hypertension Father     Diabetes Family     Asthma Family     Heart disease Family     Cancer Family        ALLERGIES:  Allergies   Allergen Reactions    Lisinopril Anaphylaxis     Took medication a while ago and had a "swelling reaction"  Does not carry epi-pen           PHYSICAL EXAM:  Vitals:    07/30/19 0936   BP: 140/76   BP Location: Left arm   Patient Position: Sitting   Cuff Size: Standard   Pulse: 72   Resp: 22   Weight: 63 kg (139 lb)   Height: 5' 8" (1 727 m)     Body mass index is 21 13 kg/m²  Physical Exam   Constitutional: He is oriented to person, place, and time  He appears well-developed and well-nourished  No distress  HENT:   Head: Normocephalic and atraumatic  Mouth/Throat: Oropharynx is clear and moist  No oropharyngeal exudate  Eyes: Conjunctivae are normal  No scleral icterus  Neck: Neck supple  No JVD present  No tracheal deviation present  Cardiovascular: Normal heart sounds  Exam reveals no friction rub  Pulmonary/Chest: Effort normal  He has wheezes  He has no rales  Abdominal: Soft  He exhibits no mass  There is no tenderness  Musculoskeletal: He exhibits no edema  Neurological: He is alert and oriented to person, place, and time  Skin: Skin is warm  He is not diaphoretic  No pallor  Psychiatric: He has a normal mood and affect  His behavior is normal    Nursing note and vitals reviewed          LABORATORY DATA:     Results from last 6 Months Lab Units 07/29/19  0621 07/22/19  1336 07/11/19  1313 07/11/19  1043 06/19/19  1042 06/07/19  1131   WBC Thousand/uL  --  15 30*  --  8 70 6 90 3 30*   HEMOGLOBIN g/dL  --  10 4*  --  11 0* 11 3* 11 5*   HEMATOCRIT %  --  32 4*  --  33 8* 34 1* 35 4*   PLATELETS Thousands/uL  --  371  --  555* 545* 190   POTASSIUM mmol/L 4 5 4 1 3 3* 4 4 4 3 4 6   CHLORIDE mmol/L 103 104 105 103 103 101   CO2 mmol/L 29 29 27 30 30 29   BUN mg/dL 28* 27* 10 11 15 23   CREATININE mg/dL 1 94* 1 90* 1 89* 2 23* 1 79* 1 86*   CALCIUM mg/dL 9 4 9 2 8 3* 9 6 9 3 9 5   MAGNESIUM mg/dL  --  2 0  --   --  1 9 1 9        rest all reviewed    RADIOLOGY:  US retroperitoneal complete    (Results Pending)     Rest all reviewed        MEDICATIONS:    Current Outpatient Medications:     acetaminophen (TYLENOL) 500 mg tablet, Take 2 tablets (1,000 mg total) by mouth every 8 (eight) hours as needed for mild pain, Disp: 30 tablet, Rfl: 0    albuterol (2 5 mg/3 mL) 0 083 % nebulizer solution, Take 1 vial (2 5 mg total) by nebulization every 4 (four) hours as needed for wheezing or shortness of breath, Disp: 120 vial, Rfl: 3    albuterol (VENTOLIN HFA) 90 mcg/act inhaler, Inhale 2 puffs every 4 (four) hours as needed for wheezing, Disp: 1 Inhaler, Rfl: 5    amLODIPine (NORVASC) 10 mg tablet, Take 1 tablet (10 mg total) by mouth daily, Disp: 30 tablet, Rfl: 1    BANOPHEN 25 MG capsule, , Disp: , Rfl:     Blood Glucose Monitoring Suppl KIT, by Does not apply route daily, Disp: 1 each, Rfl: 0    dexamethasone (DECADRON) 4 mg tablet, Take 1 tablet (4 mg total) by mouth 2 (two) times a day with meals For 2 days after chemo, Disp: 4 tablet, Rfl: 3    diphenhydrAMINE (BENADRYL) 25 mg tablet, Take 1 tablet (25 mg total) by mouth every 6 (six) hours as needed (nausea), Disp: 30 tablet, Rfl: 0    FLUoxetine (PROzac) 10 MG tablet, Take 1 tablet (10 mg total) by mouth daily, Disp: 30 tablet, Rfl: 5    fluticasone (FLONASE) 50 mcg/act nasal spray, 1-2 sprays each nostril daily for allergies, Disp: 1 Bottle, Rfl: 3    fluticasone-vilanterol (BREO ELLIPTA) 100-25 mcg/inh inhaler, Inhale 1 puff daily in the early morning Rinse mouth after use , Disp: 1 Inhaler, Rfl: 5    folic acid (FOLVITE) 1 mg tablet, Take 1 tablet (1 mg total) by mouth daily, Disp: 30 tablet, Rfl: 2    FREESTYLE LITE test strip, TEST BLOOD SUGAR DAILY, Disp: 100 each, Rfl: 1    gabapentin (NEURONTIN) 300 mg capsule, Take 1 capsule (300 mg total) by mouth 3 (three) times a day, Disp: 90 capsule, Rfl: 0    ipratropium (ATROVENT) 0 02 % nebulizer solution, Take 1 vial (0 5 mg total) by nebulization 3 (three) times a day, Disp: 90 vial, Rfl: 1    Lancets (FREESTYLE) lancets, TEST BLOOD SUGAR DAILY, Disp: 100 each, Rfl: 4    lidocaine (LMX) 4 % cream, Apply topically as needed for mild pain Apply 1/2-1 inch to port 30-60 mins prior to needle insertion, cover with serrain wrap, Disp: 30 g, Rfl: 5    loratadine (CLARITIN) 10 mg tablet, Take 1 tablet (10 mg total) by mouth daily in the early morning, Disp: 30 tablet, Rfl: 2    melatonin 3 mg, Take 1 tablet (3 mg total) by mouth daily at bedtime, Disp: 30 tablet, Rfl: 1    metFORMIN (GLUCOPHAGE-XR) 500 mg 24 hr tablet, Take 1 tablet (500 mg total) by mouth 2 (two) times a day with meals, Disp: 60 tablet, Rfl: 0    methocarbamol (ROBAXIN) 750 mg tablet, Take 1 tablet (750 mg total) by mouth every 6 (six) hours as needed for muscle spasms, Disp: 90 tablet, Rfl: 0    oxyCODONE (OxyCONTIN) 20 mg 12 hr tablet, Take 1 tablet (20 mg total) by mouth every 12 (twelve) hoursMax Daily Amount: 40 mg, Disp: 60 tablet, Rfl: 0    oxyCODONE (ROXICODONE) 20 MG TABS, Take 1 tablet (20 mg total) by mouth every 4 (four) hours as needed for severe painMax Daily Amount: 120 mg, Disp: 150 tablet, Rfl: 0    pantoprazole (PROTONIX) 40 mg tablet, Take 1 tablet (40 mg total) by mouth daily, Disp: 30 tablet, Rfl: 5    polyethylene glycol (GLYCOLAX) powder, Take 17 g by mouth daily, Disp: 527 g, Rfl: 1    predniSONE 10 mg tablet, Take 3 tablets (30 mg total) by mouth daily, Disp: 90 tablet, Rfl: 0    prochlorperazine (COMPAZINE) 10 mg tablet, Take 1 tablet (10 mg total) by mouth every 6 (six) hours as needed for nausea or vomiting, Disp: 90 tablet, Rfl: 0    QUEtiapine (SEROquel) 25 mg tablet, Take 25 mg by mouth daily at bedtime, Disp: , Rfl:     ranitidine (ZANTAC) 150 mg tablet, Take 1 tablet (150 mg total) by mouth 2 (two) times a day, Disp: 60 tablet, Rfl: 4    REGULOID 28 3 % POWD, USE AS DIRECTED, Disp: 369 g, Rfl: 4    Selenium Sulfide 2 25 % SHAM, apply by topical route  every PM to the affected area(s), Disp: , Rfl:     senna-docusate sodium (SENOKOT-S) 8 6-50 mg per tablet, Take 1 tablet by mouth 2 (two) times a day, Disp: 60 tablet, Rfl: 2    sildenafil (VIAGRA) 50 MG tablet, Take 1 tablet (50 mg total) by mouth as needed for erectile dysfunction, Disp: 6 tablet, Rfl: 0    tiotropium (SPIRIVA RESPIMAT) 2 5 MCG/ACT AERS inhaler, Inhale 2 puffs daily, Disp: 1 Inhaler, Rfl: 2    VENTOLIN  (90 Base) MCG/ACT inhaler, Inhale 2 puffs every 4 (four) hours as needed for wheezing, Disp: 18 g, Rfl: 0        Portions of the record may have been created with voice recognition software  Occasional wrong word or "sound a like" substitutions may have occurred due to the inherent limitations of voice recognition software  Read the chart carefully and recognize, using context, where substitutions have occurred  If you have any questions, please contact the dictating provider

## 2019-07-30 NOTE — PATIENT INSTRUCTIONS
- get kidney ultrasound before next visit    - Please call me in 10 days after having your blood work done to review the results if you do not hear back from me or my office, as I may have not received the results  - please remember to perform blood work prior to the next visit  - Please call if the blood pressure top number is greater than 150 or less than 110 consistently  - Please call if you are gaining more than 2lbs in 2 days for adjustment of water pills   ~ Please AVOID the following pain medications  LIST OF NSAIDS (NONSTEROIDAL ANTI-INFLAMMATORY DRUGS) AND FOSS-2 INHIBITORS    DIFLUNISAL (DOLOBID)  IBUPROFEN (MOTRIN, ADVIL)  FLURBIPROFEN (ANSAID)  KETOPROFEN (ORUDIS, ORUVAIL)  FENOPROFEN (NALFON)  NABUMETONE (RELAFEN)  PIROXICAM (FELDENE)  NAPROXEN (ALEVE, NAPROSYN, NAPRELAN, ANAPROX)  DICLOFENAC (VOLTAREN, CATAFLAM)  INDOMETHACIN (INDOCIN)  SULINDAC (CLINORIL)  TOLMETIN (TOLETIN)  ETODOLAC (LODINE)  MELOXICAM (MOBIC)  KETOROLAC (TORADOL)  OXAPROZIN (DAYPRO)  CELECOXIB (CELEBREX)    Phosphorus diet  Follow a low phosphorus diet      Avoid these higher phosphorus foods: Choose these lower phosphorus foods:   Milk, pudding or yogurt (from animals and from many soy varieties) Rice milk (unfortified), nondairy creamer (if it doesn't have terms in the ingredients list that contain the letters "phos")   Hard cheeses, ricotta or cottage cheese, fat-free cream cheese Regular and low-fat cream cheese   Ice cream or frozen yogurt Sherbet or frozen fruit pops   Soups made with higher phosphorus ingredients (milk, dried peas, beans, lentils) Soups made with lower phosphorus ingredients (broth- or water-based with other lower phosphorus ingredients)   Whole grains, including whole-grain breads, crackers, cereal, rice and pasta Refined grains, including white bread, crackers, cereals, rice and pasta   Quick breads, biscuits, cornbread, muffins, pancakes or waffles Homemade refined (white) dinner rolls, bagels or English muffins   Dried peas (split, black-eyed), beans (black, garbanzo, lima, kidney, navy, juan) or lentils Green peas (canned, frozen), green beans or wax beans   Organ meats, walleye, pollock or sardines Lean beef, pork, lamb, poultry or other fish   Nuts and seeds Popcorn   Peanut butter and other nut butters Jam, jelly or honey   Chocolate, including chocolate drinks Carob (chocolate-flavored) candy, hard candy or gumdrops   Mason and pepper-type sodas, flavored mcguire, bottled teas (if a term in the ingredients list contains the letters "phos") Lemon-lime soda, ginger ale or root beer, plain water     Follow a moderate potassium diet

## 2019-07-31 DIAGNOSIS — N28.9 RENAL DYSFUNCTION: Primary | ICD-10-CM

## 2019-08-01 ENCOUNTER — HOSPITAL ENCOUNTER (OUTPATIENT)
Dept: INFUSION CENTER | Facility: HOSPITAL | Age: 59
Discharge: HOME | End: 2019-08-01
Attending: INTERNAL MEDICINE
Payer: COMMERCIAL

## 2019-08-01 ENCOUNTER — LAB REQUISITION (OUTPATIENT)
Dept: LAB | Facility: HOSPITAL | Age: 59
End: 2019-08-01
Payer: COMMERCIAL

## 2019-08-01 VITALS
HEART RATE: 91 BPM | DIASTOLIC BLOOD PRESSURE: 70 MMHG | TEMPERATURE: 97.4 F | HEIGHT: 69 IN | BODY MASS INDEX: 21.62 KG/M2 | SYSTOLIC BLOOD PRESSURE: 151 MMHG | WEIGHT: 145.94 LBS | RESPIRATION RATE: 18 BRPM

## 2019-08-01 DIAGNOSIS — C34.91 ADENOCARCINOMA OF LUNG, RIGHT (HCC): ICD-10-CM

## 2019-08-01 DIAGNOSIS — C34.91 ADENOCARCINOMA OF LUNG, RIGHT (HCC): Primary | ICD-10-CM

## 2019-08-01 DIAGNOSIS — N28.9 RENAL DYSFUNCTION: Primary | ICD-10-CM

## 2019-08-01 DIAGNOSIS — C34.91 MALIGNANT NEOPLASM OF UNSPECIFIED PART OF RIGHT BRONCHUS OR LUNG (HCC): ICD-10-CM

## 2019-08-01 DIAGNOSIS — N18.30 CHRONIC KIDNEY DISEASE, STAGE III (MODERATE) (HCC): ICD-10-CM

## 2019-08-01 LAB
25(OH)D3 SERPL-MCNC: 8.8 NG/ML (ref 30–100)
BACTERIA UR QL AUTO: ABNORMAL /HPF
BASOPHILS # BLD AUTO: 0.1 THOUSANDS/ΜL (ref 0–0.1)
BASOPHILS NFR BLD AUTO: 1 % (ref 0–1)
BILIRUB UR QL STRIP: NEGATIVE
CLARITY UR: CLEAR
COLOR UR: ABNORMAL
CREAT UR-MCNC: 49.4 MG/DL
EOSINOPHIL # BLD AUTO: 0.4 THOUSAND/ΜL (ref 0–0.4)
EOSINOPHIL NFR BLD AUTO: 4 % (ref 0–6)
ERYTHROCYTE [DISTWIDTH] IN BLOOD BY AUTOMATED COUNT: 20.4 %
GLUCOSE UR STRIP-MCNC: NEGATIVE MG/DL
HCT VFR BLD AUTO: 32.2 % (ref 41–53)
HGB BLD-MCNC: 10.5 G/DL (ref 13.5–17.5)
HGB UR QL STRIP.AUTO: NEGATIVE
KETONES UR STRIP-MCNC: NEGATIVE MG/DL
LEUKOCYTE ESTERASE UR QL STRIP: NEGATIVE
LYMPHOCYTES # BLD AUTO: 1.7 THOUSANDS/ΜL (ref 0.5–4)
LYMPHOCYTES NFR BLD AUTO: 18 % (ref 25–45)
MCH RBC QN AUTO: 28.5 PG (ref 26–34)
MCHC RBC AUTO-ENTMCNC: 32.6 G/DL (ref 31–36)
MCV RBC AUTO: 87 FL (ref 80–100)
MICROALBUMIN UR-MCNC: 6 MG/L (ref 0–20)
MICROALBUMIN/CREAT 24H UR: 12 MG/G CREATININE (ref 0–30)
MONOCYTES # BLD AUTO: 0.5 THOUSAND/ΜL (ref 0.2–0.9)
MONOCYTES NFR BLD AUTO: 6 % (ref 1–10)
NEUTROPHILS # BLD AUTO: 6.8 THOUSANDS/ΜL (ref 1.8–7.8)
NEUTS SEG NFR BLD AUTO: 72 % (ref 45–65)
NITRITE UR QL STRIP: NEGATIVE
NON-SQ EPI CELLS URNS QL MICRO: ABNORMAL /HPF
PH UR STRIP.AUTO: 6.5 [PH]
PLATELET # BLD AUTO: 354 THOUSANDS/UL (ref 150–450)
PMV BLD AUTO: 7.7 FL (ref 8.9–12.7)
PROT UR STRIP-MCNC: NEGATIVE MG/DL
PTH-INTACT SERPL-MCNC: 257.8 PG/ML (ref 16.7–78.9)
RBC # BLD AUTO: 3.68 MILLION/UL (ref 4.5–5.9)
RBC #/AREA URNS AUTO: ABNORMAL /HPF
SP GR UR STRIP.AUTO: 1.01 (ref 1–1.04)
UROBILINOGEN UA: NEGATIVE MG/DL
WBC # BLD AUTO: 9.5 THOUSAND/UL (ref 4.5–11)
WBC #/AREA URNS AUTO: ABNORMAL /HPF

## 2019-08-01 PROCEDURE — 3061F NEG MICROALBUMINURIA REV: CPT | Performed by: FAMILY MEDICINE

## 2019-08-01 PROCEDURE — 81001 URINALYSIS AUTO W/SCOPE: CPT | Performed by: INTERNAL MEDICINE

## 2019-08-01 PROCEDURE — 82570 ASSAY OF URINE CREATININE: CPT | Performed by: INTERNAL MEDICINE

## 2019-08-01 PROCEDURE — 82043 UR ALBUMIN QUANTITATIVE: CPT | Performed by: INTERNAL MEDICINE

## 2019-08-01 PROCEDURE — 96367 TX/PROPH/DG ADDL SEQ IV INF: CPT

## 2019-08-01 PROCEDURE — 82306 VITAMIN D 25 HYDROXY: CPT | Performed by: INTERNAL MEDICINE

## 2019-08-01 PROCEDURE — 85025 COMPLETE CBC W/AUTO DIFF WBC: CPT

## 2019-08-01 PROCEDURE — 96413 CHEMO IV INFUSION 1 HR: CPT

## 2019-08-01 PROCEDURE — 96411 CHEMO IV PUSH ADDL DRUG: CPT

## 2019-08-01 PROCEDURE — 84165 PROTEIN E-PHORESIS SERUM: CPT | Performed by: INTERNAL MEDICINE

## 2019-08-01 PROCEDURE — 84165 PROTEIN E-PHORESIS SERUM: CPT | Performed by: PATHOLOGY

## 2019-08-01 PROCEDURE — 96372 THER/PROPH/DIAG INJ SC/IM: CPT

## 2019-08-01 PROCEDURE — 83970 ASSAY OF PARATHORMONE: CPT | Performed by: INTERNAL MEDICINE

## 2019-08-01 RX ORDER — CYANOCOBALAMIN 1000 UG/ML
1000 INJECTION INTRAMUSCULAR; SUBCUTANEOUS ONCE
Status: COMPLETED | OUTPATIENT
Start: 2019-08-01 | End: 2019-08-01

## 2019-08-01 RX ORDER — SODIUM CHLORIDE 9 MG/ML
20 INJECTION, SOLUTION INTRAVENOUS ONCE
Status: COMPLETED | OUTPATIENT
Start: 2019-08-01 | End: 2019-08-01

## 2019-08-01 RX ADMIN — SODIUM CHLORIDE 20 ML/HR: 9 INJECTION, SOLUTION INTRAVENOUS at 09:50

## 2019-08-01 RX ADMIN — CYANOCOBALAMIN 1000 MCG: 1000 INJECTION INTRAMUSCULAR; SUBCUTANEOUS at 10:43

## 2019-08-01 RX ADMIN — SODIUM CHLORIDE 200 MG: 9 INJECTION, SOLUTION INTRAVENOUS at 11:16

## 2019-08-01 RX ADMIN — ONDANSETRON HYDROCHLORIDE: 2 INJECTION, SOLUTION INTRAMUSCULAR; INTRAVENOUS at 10:42

## 2019-08-01 RX ADMIN — SODIUM CHLORIDE 875 MG: 9 INJECTION, SOLUTION INTRAVENOUS at 12:00

## 2019-08-01 NOTE — PLAN OF CARE
Problem: Potential for Falls  Goal: Patient will remain free of falls  Description  INTERVENTIONS:  - Assess patient frequently for physical needs  -  Identify cognitive and physical deficits and behaviors that affect risk of falls    -  [place] fall precautions as indicated by assessment   - Educate patient/family on patient safety including physical limitations  - Instruct patient to call for assistance with activity based on assessment  - Modify environment to reduce risk of injury  - Consider OT/PT consult to assist with strengthening/mobility  Outcome: Progressing

## 2019-08-01 NOTE — PROGRESS NOTES
Pt here for chemo today  Central labs drawn, CBC for today's treatment and also labs and urine taken per orders from Dr Tiny Reed  Patient also advised per BROOKS Thakkar, pt must have weekly CMPs drawn, pt aware and agreeable

## 2019-08-03 LAB
ALBUMIN SERPL ELPH-MCNC: 3.88 G/DL (ref 3.5–5)
ALBUMIN SERPL ELPH-MCNC: 51 % (ref 52–65)
ALPHA1 GLOB SERPL ELPH-MCNC: 0.34 G/DL (ref 0.1–0.4)
ALPHA1 GLOB SERPL ELPH-MCNC: 4.5 % (ref 2.5–5)
ALPHA2 GLOB SERPL ELPH-MCNC: 0.87 G/DL (ref 0.4–1.2)
ALPHA2 GLOB SERPL ELPH-MCNC: 11.5 % (ref 7–13)
BETA GLOB ABNORMAL SERPL ELPH-MCNC: 0.41 G/DL (ref 0.4–0.8)
BETA1 GLOB SERPL ELPH-MCNC: 5.4 % (ref 5–13)
BETA2 GLOB SERPL ELPH-MCNC: 7 % (ref 2–8)
BETA2+GAMMA GLOB SERPL ELPH-MCNC: 0.53 G/DL (ref 0.2–0.5)
GAMMA GLOB ABNORMAL SERPL ELPH-MCNC: 1.57 G/DL (ref 0.5–1.6)
GAMMA GLOB SERPL ELPH-MCNC: 20.6 % (ref 12–22)
IGG/ALB SER: 1.04 {RATIO} (ref 1.1–1.8)
PROT PATTERN SERPL ELPH-IMP: ABNORMAL
PROT SERPL-MCNC: 7.6 G/DL (ref 6.4–8.2)

## 2019-08-05 DIAGNOSIS — E55.9 VITAMIN D DEFICIENCY: ICD-10-CM

## 2019-08-05 DIAGNOSIS — N25.81 SECONDARY HYPERPARATHYROIDISM OF RENAL ORIGIN (HCC): Primary | ICD-10-CM

## 2019-08-05 RX ORDER — ERGOCALCIFEROL (VITAMIN D2) 1250 MCG
50000 CAPSULE ORAL WEEKLY
Qty: 12 CAPSULE | Refills: 0 | Status: SHIPPED | OUTPATIENT
Start: 2019-08-05 | End: 2020-01-14

## 2019-08-05 NOTE — PROGRESS NOTES
Medical assistant to call patient inform him of most recent blood work stable creatinine  Vitamin-D deficiency with elevated parathyroidism  Patient to start on ergocalciferol 04042 units p o  Q weekly for 12 weeks

## 2019-08-06 ENCOUNTER — TELEPHONE (OUTPATIENT)
Dept: NEPHROLOGY | Facility: CLINIC | Age: 59
End: 2019-08-06

## 2019-08-06 NOTE — TELEPHONE ENCOUNTER
----- Message from Rani Santana MD sent at 8/5/2019  1:57 PM EDT -----  Please call the patient let him know most recent blood work creatinine stable at baseline electrolytes all workup is negative and stable  His vitamin-D level was low and subsequently parathyroid hormone was elevated  He needs to start on vitamin-D 47502 units once a week for 12 weeks  Scripts sent to his pharmacy  Thanks with me know if he has any questions or concerns

## 2019-08-06 NOTE — TELEPHONE ENCOUNTER
I spoke with the pt and informed him that blood work came back stable- he will start on the vitamin D once weekly for 12 weeks  No questions/concerns at this time

## 2019-08-13 ENCOUNTER — OFFICE VISIT (OUTPATIENT)
Dept: PALLIATIVE MEDICINE | Facility: CLINIC | Age: 59
End: 2019-08-13
Payer: COMMERCIAL

## 2019-08-13 VITALS
OXYGEN SATURATION: 96 % | DIASTOLIC BLOOD PRESSURE: 80 MMHG | SYSTOLIC BLOOD PRESSURE: 150 MMHG | HEIGHT: 69 IN | WEIGHT: 143 LBS | BODY MASS INDEX: 21.18 KG/M2 | TEMPERATURE: 98 F | HEART RATE: 83 BPM | RESPIRATION RATE: 18 BRPM

## 2019-08-13 DIAGNOSIS — C34.91 ADENOCARCINOMA OF LUNG, RIGHT (HCC): Primary | ICD-10-CM

## 2019-08-13 DIAGNOSIS — R52 GENERALIZED BODY ACHES: ICD-10-CM

## 2019-08-13 DIAGNOSIS — G89.3 CANCER ASSOCIATED PAIN: ICD-10-CM

## 2019-08-13 DIAGNOSIS — T45.1X5A CHEMOTHERAPY-INDUCED NAUSEA: ICD-10-CM

## 2019-08-13 DIAGNOSIS — M54.50 LUMBAR PAIN: ICD-10-CM

## 2019-08-13 DIAGNOSIS — R11.0 CHEMOTHERAPY-INDUCED NAUSEA: ICD-10-CM

## 2019-08-13 PROCEDURE — 99214 OFFICE O/P EST MOD 30 MIN: CPT | Performed by: NURSE PRACTITIONER

## 2019-08-13 RX ORDER — OXYCODONE HCL 20 MG/1
20 TABLET, FILM COATED, EXTENDED RELEASE ORAL EVERY 12 HOURS SCHEDULED
Qty: 60 TABLET | Refills: 0 | Status: SHIPPED | OUTPATIENT
Start: 2019-08-13 | End: 2019-09-09 | Stop reason: SDUPTHER

## 2019-08-13 RX ORDER — OXYCODONE HYDROCHLORIDE 20 MG/1
20 TABLET ORAL EVERY 4 HOURS PRN
Qty: 150 TABLET | Refills: 0 | Status: SHIPPED | OUTPATIENT
Start: 2019-08-13 | End: 2019-09-09 | Stop reason: SDUPTHER

## 2019-08-13 RX ORDER — GABAPENTIN 300 MG/1
300 CAPSULE ORAL 3 TIMES DAILY
Qty: 90 CAPSULE | Refills: 0 | Status: SHIPPED | OUTPATIENT
Start: 2019-08-13 | End: 2019-09-09 | Stop reason: SDUPTHER

## 2019-08-13 NOTE — PROGRESS NOTES
Palliative and Supportive Care   Becky Tidwell   66 y o  male [id number]    Assessment/Plan:  1  Adenocarcinoma of lung, right ([address] )    2  Generalized body aches    3  Cancer associated pain    4  Chemotherapy-induced nausea    5  Lumbar pain        Requested Prescriptions     Signed Prescriptions Disp Refills    gabapentin (NEURONTIN) 300 mg capsule 90 capsule 0     Sig: Take 1 capsule (300 mg total) by mouth 3 (three) times a day    oxyCODONE (OxyCONTIN) 20 mg 12 hr tablet 60 tablet 0     Sig: Take 1 tablet (20 mg total) by mouth every 12 (twelve) hoursMax Daily Amount: 40 mg    oxyCODONE (ROXICODONE) 20 MG TABS 150 tablet 0     Sig: Take 1 tablet (20 mg total) by mouth every 4 (four) hours as needed for severe painMax Daily Amount: 120 mg       Continue meds as above plus bowel regimen  Follow up in one month      Subjective    Chief Concern  Follow up visit for:  Symptom  management         History of Present Illness  Patient ID: Jeffrey Damon  is a 66 y o  male with metastatic NSCLC diagnosed in fall 2018  He is s/p 6 cycles of palliative chemo Alimta, carboplatin, and keytruda, and s/p 4 cycles of maintenance Alimta and Keytruda, stopped due to worsened kidney function but recently resumed  He has had good response to this treatment  Ongoing generalized arthralgias, constant achiness  Relatively stable on OxyContin BID and oxycodone IR taking 3-4x/day  He is much more fatigued since restarting chemotherapy and spends much of the day in bed for a time after receiving  Constipation is controlled  He is nauseated and getting benefit from antiemetic, he is unsure if he is taking Compazine or Zofran  He has also had improvement of nausea due to currently taking prednisone per onc instructions  No vomiting  He has been sleeping a lot lately  He continues with generalized arthralgias, worst in low back and legs  More intense than previously lately   He is taking morphine ER 30mg BID and oxycodone IR 20mg about 3-5x/day  Intermittent constipation is controlled with senna and Miralax  Occasional nausea, antiemetics are sometimes helpful  He is no longer living with cousin, he has his own apartment and his youngest two kids (16yo twins) are staying with him  He is still on list for housing assistance  The following portions of the patient's history were reviewed and updated as appropriate: allergies, current medications, past family history, past medical history, past social history, past surgical history and problem list       Visit Information    Accompanied By: No one  Source of History: Self  History Limitations: None    ROS  Review of Systems   Constitutional: Positive for fatigue  Gastrointestinal: Positive for nausea  Negative for constipation and vomiting  Musculoskeletal: Positive for arthralgias  Objective     Physical Exam   Constitutional: He is oriented to person, place, and time  He is cooperative  fatigued   Pulmonary/Chest: Effort normal and breath sounds normal    Neurological: He is alert and oriented to person, place, and time  Skin: Skin is warm and dry     Psychiatric: Cognition and memory are normal    Mildly flat affect         /80 (BP Location: Left arm, Patient Position: Sitting, Cuff Size: Standard)   Pulse 83   Temp 98 °F (36 7 °C) (Oral)   Resp 18   Ht 5' 8 9" (1 75 m)   Wt 64 9 kg (143 lb)   SpO2 96%   BMI 21 18 kg/m²           Current Outpatient Medications:     acetaminophen (TYLENOL) 500 mg tablet, Take 2 tablets (1,000 mg total) by mouth every 8 (eight) hours as needed for mild pain, Disp: 30 tablet, Rfl: 0    albuterol (2 5 mg/3 mL) 0 083 % nebulizer solution, Take 1 vial (2 5 mg total) by nebulization every 4 (four) hours as needed for wheezing or shortness of breath, Disp: 120 vial, Rfl: 3    albuterol (VENTOLIN HFA) 90 mcg/act inhaler, Inhale 2 puffs every 4 (four) hours as needed for wheezing, Disp: 1 Inhaler, Rfl: 5    amLODIPine (NORVASC) 10 mg tablet, Take 1 tablet (10 mg total) by mouth daily, Disp: 30 tablet, Rfl: 1    BANOPHEN 25 MG capsule, , Disp: , Rfl:     diphenhydrAMINE (BENADRYL) 25 mg tablet, Take 1 tablet (25 mg total) by mouth every 6 (six) hours as needed (nausea), Disp: 30 tablet, Rfl: 0    ergocalciferol (ERGOCALCIFEROL) 16007 units capsule, Take 1 capsule (50,000 Units total) by mouth once a week, Disp: 12 capsule, Rfl: 0    FLUoxetine (PROzac) 10 MG tablet, Take 1 tablet (10 mg total) by mouth daily, Disp: 30 tablet, Rfl: 5    fluticasone (FLONASE) 50 mcg/act nasal spray, 1-2 sprays each nostril daily for allergies, Disp: 1 Bottle, Rfl: 3    fluticasone-vilanterol (BREO ELLIPTA) 100-25 mcg/inh inhaler, Inhale 1 puff daily in the early morning Rinse mouth after use , Disp: 1 Inhaler, Rfl: 5    folic acid (FOLVITE) 1 mg tablet, Take 1 tablet (1 mg total) by mouth daily, Disp: 30 tablet, Rfl: 2    gabapentin (NEURONTIN) 300 mg capsule, Take 1 capsule (300 mg total) by mouth 3 (three) times a day, Disp: 90 capsule, Rfl: 0    ipratropium (ATROVENT) 0 02 % nebulizer solution, Take 1 vial (0 5 mg total) by nebulization 3 (three) times a day, Disp: 90 vial, Rfl: 1    loratadine (CLARITIN) 10 mg tablet, Take 1 tablet (10 mg total) by mouth daily in the early morning, Disp: 30 tablet, Rfl: 2    melatonin 3 mg, Take 1 tablet (3 mg total) by mouth daily at bedtime, Disp: 30 tablet, Rfl: 1    metFORMIN (GLUCOPHAGE-XR) 500 mg 24 hr tablet, Take 1 tablet (500 mg total) by mouth 2 (two) times a day with meals, Disp: 60 tablet, Rfl: 0    methocarbamol (ROBAXIN) 750 mg tablet, Take 1 tablet (750 mg total) by mouth every 6 (six) hours as needed for muscle spasms, Disp: 90 tablet, Rfl: 0    oxyCODONE (OxyCONTIN) 20 mg 12 hr tablet, Take 1 tablet (20 mg total) by mouth every 12 (twelve) hoursMax Daily Amount: 40 mg, Disp: 60 tablet, Rfl: 0    oxyCODONE (ROXICODONE) 20 MG TABS, Take 1 tablet (20 mg total) by mouth every 4 (four) hours as needed for severe painMax Daily Amount: 120 mg, Disp: 150 tablet, Rfl: 0    pantoprazole (PROTONIX) 40 mg tablet, Take 1 tablet (40 mg total) by mouth daily, Disp: 30 tablet, Rfl: 5    predniSONE 10 mg tablet, Take 3 tablets (30 mg total) by mouth daily (Patient taking differently: Take 10 mg by mouth daily ), Disp: 90 tablet, Rfl: 0    prochlorperazine (COMPAZINE) 10 mg tablet, Take 1 tablet (10 mg total) by mouth every 6 (six) hours as needed for nausea or vomiting, Disp: 90 tablet, Rfl: 0    QUEtiapine (SEROquel) 25 mg tablet, Take 25 mg by mouth daily at bedtime, Disp: , Rfl:     ranitidine (ZANTAC) 150 mg tablet, Take 1 tablet (150 mg total) by mouth 2 (two) times a day, Disp: 60 tablet, Rfl: 4    senna-docusate sodium (SENOKOT-S) 8 6-50 mg per tablet, Take 1 tablet by mouth 2 (two) times a day, Disp: 60 tablet, Rfl: 2    sildenafil (VIAGRA) 50 MG tablet, Take 1 tablet (50 mg total) by mouth as needed for erectile dysfunction, Disp: 6 tablet, Rfl: 0    tiotropium (SPIRIVA RESPIMAT) 2 5 MCG/ACT AERS inhaler, Inhale 2 puffs daily, Disp: 1 Inhaler, Rfl: 2    VENTOLIN  (90 Base) MCG/ACT inhaler, Inhale 2 puffs every 4 (four) hours as needed for wheezing, Disp: 18 g, Rfl: 0    Blood Glucose Monitoring Suppl KIT, by Does not apply route daily, Disp: 1 each, Rfl: 0    dexamethasone (DECADRON) 4 mg tablet, Take 1 tablet (4 mg total) by mouth 2 (two) times a day with meals For 2 days after chemo (Patient not taking: Reported on 8/14/2019), Disp: 4 tablet, Rfl: 3    FREESTYLE LITE test strip, TEST BLOOD SUGAR DAILY, Disp: 100 each, Rfl: 1    Lancets (FREESTYLE) lancets, TEST BLOOD SUGAR DAILY, Disp: 100 each, Rfl: 4    lidocaine (LMX) 4 % cream, Apply topically as needed for mild pain Apply 1/2-1 inch to port 30-60 mins prior to needle insertion, cover with serrain wrap, Disp: 30 g, Rfl: 5    polyethylene glycol (GLYCOLAX) powder, Take 17 g by mouth daily, Disp: 527 g, Rfl: 1    REGULOID 28 3 % POWD, USE AS DIRECTED, Disp: 369 g, Rfl: 4    Selenium Sulfide 2 25 % SHAM, apply by topical route  every PM to the affected area(s), Disp: , Rfl:       Pamella Desai, [facility] [place] [address] [phone]        Representatives have queried the patient's controlled substance dispensing history in the Prescription Drug Monitoring Program in compliance with the [facility] regulations before I have prescribed any controlled substances  The prescription history is consistent with prescribed therapy and our practice policies

## 2019-08-15 ENCOUNTER — OFFICE VISIT (OUTPATIENT)
Dept: PULMONOLOGY | Facility: CLINIC | Age: 59
End: 2019-08-15
Payer: COMMERCIAL

## 2019-08-15 VITALS
HEART RATE: 80 BPM | SYSTOLIC BLOOD PRESSURE: 138 MMHG | BODY MASS INDEX: 22.19 KG/M2 | RESPIRATION RATE: 16 BRPM | TEMPERATURE: 97.7 F | HEIGHT: 68 IN | WEIGHT: 146.4 LBS | DIASTOLIC BLOOD PRESSURE: 70 MMHG | OXYGEN SATURATION: 99 %

## 2019-08-15 DIAGNOSIS — J43.2 CENTRILOBULAR EMPHYSEMA (HCC): Primary | ICD-10-CM

## 2019-08-15 PROCEDURE — 99214 OFFICE O/P EST MOD 30 MIN: CPT | Performed by: INTERNAL MEDICINE

## 2019-08-15 NOTE — PROGRESS NOTES
Pulmonary outpatient note   Gm Grover  66 y o  male MRN: [id number]  8/15/2019      Assessment and plan:  Lidia Melton Sr  has the following medical problems:  1  COPD  Gold stage D COPD on maximal therapy with Breo, Spiriva and oxygen 24 hours  Stable from the breathing standpoint  He is complaining of suspected allergies to dogs in the building he lives in  He would like to move  I referred him to [facility] allergy blood test to evaluate allergies and if significant we recommend to prevent exposure to the specific allergens  Referred to pulmonary rehab  2   Lung cancer  Status post chemotherapy, currently on Alimta and Keytruda  Last PET-CT in July 2018 with good response of the disease to the treatment  He will continue chemotherapy/biologic treatment per Oncology  Follow-up in 3 months  I will call the patient with results of [facility] allergy blood test     Shamar Jay MD/PhD,  [facility]      No follow-ups on file  History of Present Illness  This is 66y o  year old male with previous medical history of severe COPD with heavy smoking history 100 pack years  He had a biopsy from right middle lobe lesion in November 2018 that showed lung adenocarcinoma  He was treated with chemotherapy and currently on Alimta and Keytruda every 3 weeks  He had PET-CT in July 2019 that showed good response of the disease to the treatment  He is being followed by Oncology for this and will continue Alimta and Keytruda  Regarding his COPD, he is using Breo and Spiriva on a daily basis  He feels better from the respiratory standpoint since starting this treatment  He is using oxygen 24 hours per day 2 liters/minute  Social history:  Smoked 100 pack years  Drinks alcohol socially   with 8 kids  Occupational history:  Worked as delivery and construction  Currently unemployed        Occupational history:  Review of Systems   Constitutional: Positive for fatigue  Negative for chills and fever  HENT: Negative for congestion  Eyes: Negative  Respiratory: Positive for shortness of breath (Improved)  Cardiovascular: Negative  Gastrointestinal: Negative  Endocrine: Negative  Genitourinary: Negative  Musculoskeletal: Negative  Skin: Negative  Allergic/Immunologic: Negative  Neurological: Negative  Hematological: Negative  Psychiatric/Behavioral: Negative          Historical Information   Past Medical History:   Diagnosis Date    Bipolar [address] disorder [address] [address] )     Cancer ([facility] [address] )     adeno lung  dx 11/2018-lung bx today 4/9/2019-ongoing chemo    Chronic pain disorder     back and right shoulder    CKD (chronic kidney disease)     COPD (chronic obstructive pulmonary disease) (HCC)     Depression     Diabetes mellitus (HCC)     GERD (gastroesophageal reflux disease)     Herniated lumbar intervertebral disc     Hypertension     Insomnia     Latent syphilis     Treated    Low back pain     Lumbosacral disc disease     Pneumothorax after biopsy     R lung    PTSD (post-traumatic stress disorder)     PTSD (post-traumatic stress disorder)     PTSD (post-traumatic stress disorder)     Pulmonary emphysema ([facility] [address] )     Tobacco abuse 11/13/2018     Past Surgical History:   Procedure Laterality Date    COLONOSCOPY  2011    CT GUIDED CHEST TUBE  11/21/2018    FL GUIDED CENTRAL VENOUS ACCESS DEVICE INSERTION  2/8/2019    HEMORROIDECTOMY      IR CHEST TUBE  11/14/2018    IR IMAGE GUIDED BIOPSY/ASPIRATION LUNG  11/13/2018    KNEE SURGERY      IL INSJ TUNNELED CTR VAD W/SUBQ PORT AGE 5 YR/> N/A 2/8/2019    Procedure: PLACEMENT OF PORT-A-CATH;  Surgeon: Lisa Carl MD;  Location: [facility] MAIN OR;  Service: Vascular     Family History   Problem Relation Age of Onset    Heart disease Mother     Hypertension Father     Diabetes Family     Asthma Family     Heart disease Family     Cancer Family        Meds/Allergies Current Outpatient Medications:     acetaminophen (TYLENOL) 500 mg tablet, Take 2 tablets (1,000 mg total) by mouth every 8 (eight) hours as needed for mild pain, Disp: 30 tablet, Rfl: 0    albuterol (2 5 mg/3 mL) 0 083 % nebulizer solution, Take 1 vial (2 5 mg total) by nebulization every 4 (four) hours as needed for wheezing or shortness of breath, Disp: 120 vial, Rfl: 3    albuterol (VENTOLIN HFA) 90 mcg/act inhaler, Inhale 2 puffs every 4 (four) hours as needed for wheezing, Disp: 1 Inhaler, Rfl: 5    amLODIPine (NORVASC) 10 mg tablet, Take 1 tablet (10 mg total) by mouth daily, Disp: 30 tablet, Rfl: 1    BANOPHEN 25 MG capsule, , Disp: , Rfl:     Blood Glucose Monitoring Suppl KIT, by Does not apply route daily, Disp: 1 each, Rfl: 0    dexamethasone (DECADRON) 4 mg tablet, Take 1 tablet (4 mg total) by mouth 2 (two) times a day with meals For 2 days after chemo, Disp: 4 tablet, Rfl: 3    diphenhydrAMINE (BENADRYL) 25 mg tablet, Take 1 tablet (25 mg total) by mouth every 6 (six) hours as needed (nausea), Disp: 30 tablet, Rfl: 0    ergocalciferol (ERGOCALCIFEROL) 85286 units capsule, Take 1 capsule (50,000 Units total) by mouth once a week, Disp: 12 capsule, Rfl: 0    FLUoxetine (PROzac) 10 MG tablet, Take 1 tablet (10 mg total) by mouth daily, Disp: 30 tablet, Rfl: 5    fluticasone (FLONASE) 50 mcg/act nasal spray, 1-2 sprays each nostril daily for allergies, Disp: 1 Bottle, Rfl: 3    fluticasone-vilanterol (BREO ELLIPTA) 100-25 mcg/inh inhaler, Inhale 1 puff daily in the early morning Rinse mouth after use , Disp: 1 Inhaler, Rfl: 5    folic acid (FOLVITE) 1 mg tablet, Take 1 tablet (1 mg total) by mouth daily, Disp: 30 tablet, Rfl: 2    FREESTYLE LITE test strip, TEST BLOOD SUGAR DAILY, Disp: 100 each, Rfl: 1    gabapentin (NEURONTIN) 300 mg capsule, Take 1 capsule (300 mg total) by mouth 3 (three) times a day, Disp: 90 capsule, Rfl: 0    ipratropium (ATROVENT) 0 02 % nebulizer solution, Take 1 vial (0 5 mg total) by nebulization 3 (three) times a day, Disp: 90 vial, Rfl: 1    Lancets (FREESTYLE) lancets, TEST BLOOD SUGAR DAILY, Disp: 100 each, Rfl: 4    lidocaine (LMX) 4 % cream, Apply topically as needed for mild pain Apply 1/2-1 inch to port 30-60 mins prior to needle insertion, cover with serrain wrap, Disp: 30 g, Rfl: 5    loratadine (CLARITIN) 10 mg tablet, Take 1 tablet (10 mg total) by mouth daily in the early morning, Disp: 30 tablet, Rfl: 2    melatonin 3 mg, Take 1 tablet (3 mg total) by mouth daily at bedtime, Disp: 30 tablet, Rfl: 1    metFORMIN (GLUCOPHAGE-XR) 500 mg 24 hr tablet, Take 1 tablet (500 mg total) by mouth 2 (two) times a day with meals, Disp: 60 tablet, Rfl: 0    methocarbamol (ROBAXIN) 750 mg tablet, Take 1 tablet (750 mg total) by mouth every 6 (six) hours as needed for muscle spasms, Disp: 90 tablet, Rfl: 0    oxyCODONE (OxyCONTIN) 20 mg 12 hr tablet, Take 1 tablet (20 mg total) by mouth every 12 (twelve) hoursMax Daily Amount: 40 mg, Disp: 60 tablet, Rfl: 0    oxyCODONE (ROXICODONE) 20 MG TABS, Take 1 tablet (20 mg total) by mouth every 4 (four) hours as needed for severe painMax Daily Amount: 120 mg, Disp: 150 tablet, Rfl: 0    pantoprazole (PROTONIX) 40 mg tablet, Take 1 tablet (40 mg total) by mouth daily, Disp: 30 tablet, Rfl: 5    polyethylene glycol (GLYCOLAX) powder, Take 17 g by mouth daily, Disp: 527 g, Rfl: 1    predniSONE 10 mg tablet, Take 3 tablets (30 mg total) by mouth daily (Patient taking differently: Take 10 mg by mouth daily ), Disp: 90 tablet, Rfl: 0    prochlorperazine (COMPAZINE) 10 mg tablet, Take 1 tablet (10 mg total) by mouth every 6 (six) hours as needed for nausea or vomiting, Disp: 90 tablet, Rfl: 0    QUEtiapine (SEROquel) 25 mg tablet, Take 25 mg by mouth daily at bedtime, Disp: , Rfl:     ranitidine (ZANTAC) 150 mg tablet, Take 1 tablet (150 mg total) by mouth 2 (two) times a day, Disp: 60 tablet, Rfl: 4    REGULOID 28 3 % POWD, USE AS DIRECTED, Disp: 369 g, Rfl: 4    Selenium Sulfide 2 25 % SHAM, apply by topical route  every PM to the affected area(s), Disp: , Rfl:     senna-docusate sodium (SENOKOT-S) 8 6-50 mg per tablet, Take 1 tablet by mouth 2 (two) times a day, Disp: 60 tablet, Rfl: 2    sildenafil (VIAGRA) 50 MG tablet, Take 1 tablet (50 mg total) by mouth as needed for erectile dysfunction, Disp: 6 tablet, Rfl: 0    tiotropium (SPIRIVA RESPIMAT) 2 5 MCG/ACT AERS inhaler, Inhale 2 puffs daily, Disp: 1 Inhaler, Rfl: 2    VENTOLIN  (90 Base) MCG/ACT inhaler, Inhale 2 puffs every 4 (four) hours as needed for wheezing, Disp: 18 g, Rfl: 0  Allergies   Allergen Reactions    Lisinopril Anaphylaxis     Took medication a while ago and had a "swelling reaction"  Does not carry epi-pen       Vitals: Blood pressure 138/70, pulse 80, temperature 97 7 °F (36 5 °C), temperature source Tympanic, resp  rate 16, height 5' 8" (1 727 m), weight 66 4 kg (146 lb 6 4 oz), SpO2 99 %  Body mass index is 22 26 kg/m²  Oxygen Therapy  SpO2: 99 %  Oxygen Therapy: Supplemental oxygen  O2 Delivery Method: Nasal cannula  O2 Flow Rate (L/min): 2 L/min    Physical Exam  Physical Exam   Constitutional: He is oriented to person, place, and time  He appears well-developed and well-nourished  No distress  Very thin   HENT:   Head: Normocephalic and atraumatic  Eyes: Pupils are equal, round, and reactive to light  EOM are normal    Neck: Normal range of motion  Neck supple  Cardiovascular: Normal rate, regular rhythm and normal heart sounds  Exam reveals no friction rub  No murmur heard  Pulmonary/Chest: No stridor  No respiratory distress  He has no wheezes  He has no rales  Using oxygen 2 liters/minute continuously   Abdominal: Soft  He exhibits no distension  Musculoskeletal: Normal range of motion  He exhibits no edema or deformity  Neurological: He is alert and oriented to person, place, and time  Skin: Skin is warm   He is not diaphoretic  Psychiatric: He has a normal mood and affect  Labs: I have personally reviewed pertinent lab results  Lab Results   Component Value Date    WBC 9 50 08/01/2019    HGB 10 5 (L) 08/01/2019    HCT 32 2 (L) 08/01/2019    MCV 87 08/01/2019     08/01/2019     Lab Results   Component Value Date    CALCIUM 9 4 07/29/2019    K 4 5 07/29/2019    CO2 29 07/29/2019     07/29/2019    BUN 28 (H) 07/29/2019    CREATININE 1 94 (H) 07/29/2019     No results found for: IGE  Lab Results   Component Value Date    ALT 52 07/29/2019    AST 47 07/29/2019    ALKPHOS 101 07/29/2019       Imaging and other studies:   I have personally reviewed pertinent imaging studies in PACS  The patient had PET-CT in July 2019  IMPRESSION:  1  Right middle lung opacity extending along the minor and major fissures demonstrate decreasing FDG activity, suggesting response to therapy  Hypermetabolic nodule along the right upper medial lung pleura and AP window node also has diminished  2   Scattered small retroperitoneal nodes appear similar  3   There are no new hypermetabolic lesions that are concerning for new metastases  4   Increased bladder wall thickening  Correlate clinically to exclude cystitis  Pulmonary function testing:   PFTs November 2018  Spirometry:  FEV1/FVC Ratio is 52%  FEV1 is 1 49 L which is 49% predicted  FVC is 2 87 L which is 74% predicted  No improvement after the administration of bronchodilator     Flow volume loop:  Obstructed appearance     Lung volumes: Total lung capacity 101% predicted  Residual volume 175% predicted    RV/TLC ratio 174% predicted     Diffusing capacity:  DLCO 46% predicted      Corey Pérez MD/PhD,  [facility]

## 2019-08-21 ENCOUNTER — APPOINTMENT (OUTPATIENT)
Dept: LAB | Facility: HOSPITAL | Age: 59
End: 2019-08-21
Attending: INTERNAL MEDICINE
Payer: COMMERCIAL

## 2019-08-21 DIAGNOSIS — E11.9 TYPE 2 DIABETES MELLITUS WITHOUT COMPLICATION, WITHOUT LONG-TERM CURRENT USE OF INSULIN (HCC): ICD-10-CM

## 2019-08-21 DIAGNOSIS — R77.9 ELEVATED BLOOD PROTEIN: ICD-10-CM

## 2019-08-21 DIAGNOSIS — J43.2 CENTRILOBULAR EMPHYSEMA (HCC): ICD-10-CM

## 2019-08-21 DIAGNOSIS — N28.9 RENAL DYSFUNCTION: ICD-10-CM

## 2019-08-21 DIAGNOSIS — C34.91 ADENOCARCINOMA OF LUNG, RIGHT (HCC): ICD-10-CM

## 2019-08-21 DIAGNOSIS — N18.30 STAGE 3 CHRONIC KIDNEY DISEASE (HCC): ICD-10-CM

## 2019-08-21 LAB
25(OH)D3 SERPL-MCNC: 18.2 NG/ML (ref 30–100)
ALBUMIN SERPL BCP-MCNC: 4.3 G/DL (ref 3–5.2)
ALP SERPL-CCNC: 111 U/L (ref 43–122)
ALT SERPL W P-5'-P-CCNC: 92 U/L (ref 9–52)
ANION GAP SERPL CALCULATED.3IONS-SCNC: 9 MMOL/L (ref 5–14)
ANISOCYTOSIS BLD QL SMEAR: PRESENT
AST SERPL W P-5'-P-CCNC: 68 U/L (ref 17–59)
BACTERIA UR QL AUTO: NORMAL /HPF
BILIRUB SERPL-MCNC: 0.2 MG/DL
BILIRUB UR QL STRIP: NEGATIVE
BUN SERPL-MCNC: 30 MG/DL (ref 5–25)
CALCIUM SERPL-MCNC: 9.3 MG/DL (ref 8.4–10.2)
CHLORIDE SERPL-SCNC: 99 MMOL/L (ref 97–108)
CLARITY UR: CLEAR
CO2 SERPL-SCNC: 30 MMOL/L (ref 22–30)
COLOR UR: NORMAL
CREAT SERPL-MCNC: 2.31 MG/DL (ref 0.7–1.5)
CREAT UR-MCNC: 112 MG/DL
EOSINOPHIL # BLD AUTO: 0.15 THOUSAND/UL (ref 0–0.4)
EOSINOPHIL NFR BLD MANUAL: 2 % (ref 0–6)
ERYTHROCYTE [DISTWIDTH] IN BLOOD BY AUTOMATED COUNT: 21.3 %
GFR SERPL CREATININE-BSD FRML MDRD: 35 ML/MIN/1.73SQ M
GLUCOSE P FAST SERPL-MCNC: 109 MG/DL (ref 70–99)
GLUCOSE UR STRIP-MCNC: NEGATIVE MG/DL
HCT VFR BLD AUTO: 34.3 % (ref 41–53)
HGB BLD-MCNC: 11.2 G/DL (ref 13.5–17.5)
HGB UR QL STRIP.AUTO: NEGATIVE
KETONES UR STRIP-MCNC: NEGATIVE MG/DL
LEUKOCYTE ESTERASE UR QL STRIP: NEGATIVE
LYMPHOCYTES # BLD AUTO: 0.77 THOUSAND/UL (ref 0.5–4)
LYMPHOCYTES # BLD AUTO: 10 % (ref 25–45)
MAGNESIUM SERPL-MCNC: 1.8 MG/DL (ref 1.6–2.3)
MCH RBC QN AUTO: 28.6 PG (ref 26–34)
MCHC RBC AUTO-ENTMCNC: 32.8 G/DL (ref 31–36)
MCV RBC AUTO: 87 FL (ref 80–100)
MICROALBUMIN UR-MCNC: 11.6 MG/L (ref 0–20)
MICROALBUMIN/CREAT 24H UR: 10 MG/G CREATININE (ref 0–30)
MONOCYTES # BLD AUTO: 0.62 THOUSAND/UL (ref 0.2–0.9)
MONOCYTES NFR BLD AUTO: 8 % (ref 1–10)
NEUTS SEG # BLD: 6.16 THOUSAND/UL (ref 1.8–7.8)
NEUTS SEG NFR BLD AUTO: 80 %
NITRITE UR QL STRIP: NEGATIVE
NON-SQ EPI CELLS URNS QL MICRO: NORMAL /HPF
PH UR STRIP.AUTO: 5 [PH]
PLATELET # BLD AUTO: 510 THOUSANDS/UL (ref 150–450)
PLATELET BLD QL SMEAR: ABNORMAL
PMV BLD AUTO: 7.2 FL (ref 8.9–12.7)
POTASSIUM SERPL-SCNC: 4.2 MMOL/L (ref 3.6–5)
PROT SERPL-MCNC: 8.7 G/DL (ref 5.9–8.4)
PROT UR STRIP-MCNC: NEGATIVE MG/DL
PTH-INTACT SERPL-MCNC: 105.4 PG/ML (ref 16.7–78.9)
RBC # BLD AUTO: 3.94 MILLION/UL (ref 4.5–5.9)
RBC #/AREA URNS AUTO: NORMAL /HPF
RBC MORPH BLD: ABNORMAL
SODIUM SERPL-SCNC: 138 MMOL/L (ref 137–147)
SP GR UR STRIP.AUTO: 1.01 (ref 1–1.04)
T3FREE SERPL-MCNC: 2.49 PG/ML (ref 2.3–4.2)
TOTAL CELLS COUNTED SPEC: 100
TSH SERPL DL<=0.05 MIU/L-ACNC: 2.07 UIU/ML (ref 0.47–4.68)
UROBILINOGEN UA: NEGATIVE MG/DL
WBC # BLD AUTO: 7.7 THOUSAND/UL (ref 4.5–11)
WBC #/AREA URNS AUTO: NORMAL /HPF

## 2019-08-21 PROCEDURE — 82232 ASSAY OF BETA-2 PROTEIN: CPT

## 2019-08-21 PROCEDURE — 84443 ASSAY THYROID STIM HORMONE: CPT

## 2019-08-21 PROCEDURE — 82785 ASSAY OF IGE: CPT

## 2019-08-21 PROCEDURE — 83883 ASSAY NEPHELOMETRY NOT SPEC: CPT

## 2019-08-21 PROCEDURE — 3061F NEG MICROALBUMINURIA REV: CPT | Performed by: FAMILY MEDICINE

## 2019-08-21 PROCEDURE — 36415 COLL VENOUS BLD VENIPUNCTURE: CPT

## 2019-08-21 PROCEDURE — 82306 VITAMIN D 25 HYDROXY: CPT

## 2019-08-21 PROCEDURE — 86003 ALLG SPEC IGE CRUDE XTRC EA: CPT

## 2019-08-21 PROCEDURE — 85027 COMPLETE CBC AUTOMATED: CPT

## 2019-08-21 PROCEDURE — 85007 BL SMEAR W/DIFF WBC COUNT: CPT

## 2019-08-21 PROCEDURE — 84165 PROTEIN E-PHORESIS SERUM: CPT | Performed by: PATHOLOGY

## 2019-08-21 PROCEDURE — 82570 ASSAY OF URINE CREATININE: CPT

## 2019-08-21 PROCEDURE — 80053 COMPREHEN METABOLIC PANEL: CPT | Performed by: NURSE PRACTITIONER

## 2019-08-21 PROCEDURE — 81001 URINALYSIS AUTO W/SCOPE: CPT

## 2019-08-21 PROCEDURE — 83735 ASSAY OF MAGNESIUM: CPT

## 2019-08-21 PROCEDURE — 84481 FREE ASSAY (FT-3): CPT

## 2019-08-21 PROCEDURE — 84166 PROTEIN E-PHORESIS/URINE/CSF: CPT | Performed by: PATHOLOGY

## 2019-08-21 PROCEDURE — 84166 PROTEIN E-PHORESIS/URINE/CSF: CPT

## 2019-08-21 PROCEDURE — 82043 UR ALBUMIN QUANTITATIVE: CPT

## 2019-08-21 PROCEDURE — 84165 PROTEIN E-PHORESIS SERUM: CPT

## 2019-08-21 PROCEDURE — 83970 ASSAY OF PARATHORMONE: CPT

## 2019-08-22 ENCOUNTER — HOSPITAL ENCOUNTER (OUTPATIENT)
Dept: INFUSION CENTER | Facility: HOSPITAL | Age: 59
Discharge: HOME | End: 2019-08-22
Attending: INTERNAL MEDICINE

## 2019-08-22 ENCOUNTER — OFFICE VISIT (OUTPATIENT)
Dept: HEMATOLOGY ONCOLOGY | Facility: CLINIC | Age: 59
End: 2019-08-22
Payer: COMMERCIAL

## 2019-08-22 VITALS
WEIGHT: 146.6 LBS | BODY MASS INDEX: 21.71 KG/M2 | SYSTOLIC BLOOD PRESSURE: 150 MMHG | TEMPERATURE: 97.6 F | HEIGHT: 69 IN | DIASTOLIC BLOOD PRESSURE: 78 MMHG | HEART RATE: 72 BPM | RESPIRATION RATE: 18 BRPM | OXYGEN SATURATION: 96 %

## 2019-08-22 DIAGNOSIS — R06.6 HICCUPS: ICD-10-CM

## 2019-08-22 DIAGNOSIS — C34.91 ADENOCARCINOMA OF LUNG, RIGHT (HCC): Primary | ICD-10-CM

## 2019-08-22 DIAGNOSIS — N28.9 RENAL DYSFUNCTION: ICD-10-CM

## 2019-08-22 DIAGNOSIS — R77.9 ELEVATED BLOOD PROTEIN: ICD-10-CM

## 2019-08-22 LAB
A ALTERNATA IGE QN: <0.1 KUA/I
A FUMIGATUS IGE QN: <0.1 KUA/I
ALBUMIN SERPL ELPH-MCNC: 4.03 G/DL (ref 3.5–5)
ALBUMIN SERPL ELPH-MCNC: 48.5 % (ref 52–65)
ALBUMIN UR ELPH-MCNC: 100 %
ALLERGEN COMMENT: ABNORMAL
ALPHA1 GLOB MFR UR ELPH: 0 %
ALPHA1 GLOB SERPL ELPH-MCNC: 0.46 G/DL (ref 0.1–0.4)
ALPHA1 GLOB SERPL ELPH-MCNC: 5.5 % (ref 2.5–5)
ALPHA2 GLOB MFR UR ELPH: 0 %
ALPHA2 GLOB SERPL ELPH-MCNC: 1 G/DL (ref 0.4–1.2)
ALPHA2 GLOB SERPL ELPH-MCNC: 12 % (ref 7–13)
B-GLOBULIN MFR UR ELPH: 0 %
BERMUDA GRASS IGE QN: <0.1 KUA/I
BETA GLOB ABNORMAL SERPL ELPH-MCNC: 0.42 G/DL (ref 0.4–0.8)
BETA1 GLOB SERPL ELPH-MCNC: 5.1 % (ref 5–13)
BETA2 GLOB SERPL ELPH-MCNC: 7.3 % (ref 2–8)
BETA2+GAMMA GLOB SERPL ELPH-MCNC: 0.61 G/DL (ref 0.2–0.5)
BOXELDER IGE QN: <0.1 KUA/I
C HERBARUM IGE QN: <0.1 KUA/I
CAT DANDER IGE QN: <0.1 KUA/I
CMN PIGWEED IGE QN: <0.1 KUA/I
COMMON RAGWEED IGE QN: <0.1 KUA/I
COTTONWOOD IGE QN: <0.1 KUA/I
D FARINAE IGE QN: <0.1 KUA/I
D PTERONYSS IGE QN: <0.1 KUA/I
DOG DANDER IGE QN: <0.1 KUA/I
GAMMA GLOB ABNORMAL SERPL ELPH-MCNC: 1.79 G/DL (ref 0.5–1.6)
GAMMA GLOB MFR UR ELPH: 0 %
GAMMA GLOB SERPL ELPH-MCNC: 21.6 % (ref 12–22)
IGG/ALB SER: 0.94 {RATIO} (ref 1.1–1.8)
KAPPA LC FREE SER-MCNC: 69.3 MG/L (ref 3.3–19.4)
KAPPA LC FREE/LAMBDA FREE SER: 2.22 {RATIO} (ref 0.26–1.65)
LAMBDA LC FREE SERPL-MCNC: 31.2 MG/L (ref 5.7–26.3)
LONDON PLANE IGE QN: <0.1 KUA/I
MOUSE URINE PROT IGE QN: <0.1 KUA/I
MT JUNIPER IGE QN: <0.1 KUA/I
MUGWORT IGE QN: <0.1 KUA/I
P NOTATUM IGE QN: <0.1 KUA/I
PROT PATTERN SERPL ELPH-IMP: ABNORMAL
PROT PATTERN UR ELPH-IMP: ABNORMAL
PROT SERPL-MCNC: 8.3 G/DL (ref 6.4–8.2)
PROT UR-MCNC: 18 MG/DL
ROACH IGE QN: <0.1 KUA/I
SHEEP SORREL IGE QN: <0.1 KUA/I
SILVER BIRCH IGE QN: <0.1 KUA/I
TIMOTHY IGE QN: <0.1 KUA/I
TOTAL IGE SMQN RAST: 237 KU/L (ref 0–113)
WALNUT IGE QN: <0.1 KUA/I
WHITE ASH IGE QN: <0.1 KUA/I
WHITE ELM IGE QN: <0.1 KUA/I
WHITE MULBERRY IGE QN: <0.1 KUA/I
WHITE OAK IGE QN: <0.1 KUA/I

## 2019-08-22 PROCEDURE — 99215 OFFICE O/P EST HI 40 MIN: CPT | Performed by: NURSE PRACTITIONER

## 2019-08-22 RX ORDER — CHLORPROMAZINE HYDROCHLORIDE 25 MG/1
25 TABLET, FILM COATED ORAL EVERY 6 HOURS PRN
Qty: 30 TABLET | Refills: 0 | Status: SHIPPED | OUTPATIENT
Start: 2019-08-22 | End: 2019-12-10 | Stop reason: HOSPADM

## 2019-08-22 NOTE — PROGRESS NOTES
Hematology/Oncology Outpatient Follow-up  Yessica Trinidad Sr  64 y o  male 1960 [id number]    Date:  8/22/2019      Assessment and Plan:  1  Adenocarcinoma of lung, right [facility])  Patient continues to have renal dysfunction which seems to be slightly worse this week we will hold his chemotherapy treatment this week  He is currently being worked up by the Nephrology team and has an ultrasound of the kidney scheduled in the near future  The etiology of his renal dysfunction is unclear at this time his chemotherapy may be a contributing factor likely the Alimta and less likely the immunotherapy [employer]ams since we did not see any improvement in his kidney function when he was on a trial of high-dose steroids  The patient was encouraged to continue to hydrate himself well least 2 L of fluid per day but she states he is doing  We will follow up again with him next week with repeat laboratory studies and make a decision regarding his treatment  We will continue the patient on the current low dose of prednisone 10 mg daily and monitor him closely since it is improving his chronic symptoms     - CBC and differential; Future  - Comprehensive metabolic panel; Future  - Magnesium; Future    2  Renal dysfunction  As above  - CBC and differential; Future  - Comprehensive metabolic panel; Future  - Beta 2 microglobulin, serum; Future  - Kappa / lambda light chains, urine, 24 hour; Future  - Immunoglobulin free LT chains blood; Future  - Magnesium; Future    3  Elevated blood protein  Patient's SPEP and UPEP were negative for monoclonal gammopathy  We will order additional plasma cell dyscrasia workup for completeness sake  - CBC and differential; Future  - Comprehensive metabolic panel; Future  - Beta 2 microglobulin, serum; Future  - Kappa / lambda light chains, urine, 24 hour; Future  - Immunoglobulin free LT chains blood; Future    4   Hiccups  Patient feels his hiccups are related to something he ate recently, may be secondary to corticosteroid use  They have been present for about 24 hours so we will do a trial of Thorazine to see if this will control his symptom  Patient was instructed on new medication and encouraged to call us if there is no improvement  - chlorproMAZINE (THORAZINE) 25 mg tablet; Take 1 tablet (25 mg total) by mouth every 6 (six) hours as needed (hiccups)  Dispense: 30 tablet; Refill: 0    HPI:  The patient presents today for a follow-up visit he is scheduled for his treatment today after the visit  The patient mentions that he is somewhat stressed due to issues with his significant other and her children at home; he is considering moving out in the near future  He reports that he has been having hiccups for the past 24 hours and attributes it to something that he ate  He continues to report fatigue, dyspnea, decreased appetite and occasional nausea no more than his baseline  His nausea is well controlled on his p r n  antiemetic  He continues to be on the low-dose prednisone 10 mg a day which she states is helping improve a lot of his chronic symptoms including the fatigue and a poor appetite  The patient continues to report chronic generalized pain which is being managed by the palliative care team   He saw Nephrology on the 30th of July for further evaluation of his worsening renal dysfunction which is being worked up  He had additional laboratory workup done for them and will be getting an ultrasound of the kidney stone in the near future  His most recent laboratory studies from yesterday show WBC 7 7, stable normocytic anemia H&H 11 2/34 3, MCV 87, and elevated platelets 092,862 which is chronic for him and likely reactive in nature  His renal dysfunction is slightly worse BUN 30 creatinine 2 31 GFR 35  He is mild increase of his LFTs AST 68, ALT 92 with normal bilirubin and alkaline phos  His total protein is slightly elevated 8 7  Magnesium and TSH are in the normal range    He is serum protein electrophoresis done by the Nephrology team which did not reveal any monoclonal band  He also had a urine electrophoresis done which showed selective proteinuria without any monoclonal bands noted  Oncology History    70-year-old [facility] American male heavy smoker who used to smoke 2 packs of cigarettes daily for many years, COPD, he presented with dyspnea, CT scan of the chest on October 2018 showed right middle lobe spiculated 1 3 cm mass with multiple solid and ground-glass nodules along the major and minor fissures sub cm pulmonary nodules bilaterally, left adrenal 1 2 cm nodule     PET scan showed 1 3 cm right middle lung nodule with SUV of 6 8, numerous nodular densities along the right major and minor fissures, right hilar activity with SUV of 3 4, subcarinal lymph node measuring 7 mm with SUV of 2 7, periportal enlarged lymph nodes concerning for metastatic disease measuring 2 4 cm with SUV of 5, prominent left retrocrural lymph node measuring 1 cm x 9 mm with SUV of 1 1    Core biopsy of the right spiculated nodule showed invasive adenocarcinoma consistent with lung primary positive for CK7, TTF 1, napsin a        Adenocarcinoma of lung, right ([address] )    11/13/2018 Initial Diagnosis     Adenocarcinoma of lung, right ([address] )  Stage IV      12/13/2018 - 3/28/2019 Chemotherapy      Alimta, Carboplatin (AUC 5) + Keytruda (6 cycles total)      4/17/2019 -  Chemotherapy     Started maintenance Alimta and Keytruda         Interval history:    ROS: Review of Systems   Constitutional: Positive for appetite change (Continues to drink boost supplements on a regular basis and his weight remains stable) and fatigue  Negative for activity change, chills, fever and unexpected weight change  HENT: Negative for congestion, mouth sores, nosebleeds, sore throat and trouble swallowing  Eyes: Negative  Respiratory: Positive for shortness of breath  Negative for cough and chest tightness  Cardiovascular: Negative for chest pain, palpitations and leg swelling  Gastrointestinal: Positive for constipation and nausea  Negative for abdominal distention, abdominal pain, blood in stool, diarrhea and vomiting  Genitourinary: Negative for difficulty urinating, dysuria, frequency, hematuria and urgency  Musculoskeletal: Positive for arthralgias and myalgias  Negative for back pain, gait problem and joint swelling  Skin: Negative for color change, pallor and rash  Neurological: Positive for numbness (and tingling)  Negative for dizziness, weakness, light-headedness and headaches  Hematological: Negative for adenopathy  Does not bruise/bleed easily  Psychiatric/Behavioral: Positive for dysphoric mood and sleep disturbance  The patient is not nervous/anxious          Past Medical History:   Diagnosis Date    Bipolar 1 disorder ([facility] )     Cancer ([facility] )     adeno lung  dx 11/2018-lung bx today 4/9/2019-ongoing chemo    Chronic pain disorder     back and right shoulder    CKD (chronic kidney disease)     COPD (chronic obstructive pulmonary disease) ([facility] )     Depression     Diabetes mellitus (HCC)     GERD (gastroesophageal reflux disease)     Herniated lumbar intervertebral disc     Hypertension     Insomnia     Latent syphilis     Treated    Low back pain     Lumbosacral disc disease     Pneumothorax after biopsy     R lung    PTSD (post-traumatic stress disorder)     PTSD (post-traumatic stress disorder)     PTSD (post-traumatic stress disorder)     Pulmonary emphysema ([facility] )     Tobacco abuse 11/13/2018       Past Surgical History:   Procedure Laterality Date    COLONOSCOPY  2011    CT GUIDED CHEST TUBE  11/21/2018    FL GUIDED CENTRAL VENOUS ACCESS DEVICE INSERTION  2/8/2019    HEMORROIDECTOMY      IR CHEST TUBE  11/14/2018    IR IMAGE GUIDED BIOPSY/ASPIRATION LUNG  11/13/2018    KNEE SURGERY      AR INSJ TUNNELED CTR VAD W/SUBQ PORT AGE 5 YR/> N/A 2/8/2019    Procedure: PLACEMENT OF PORT-A-CATH;  Surgeon: Landry Little MD;  Location:  MAIN OR;  Service: Vascular       Social History     Socioeconomic History    Marital status: Legally      Spouse name: Not on file    Number of children: Not on file    Years of education: Not on file    Highest education level: Not on file   Occupational History    Occupation: part time employment   Social Needs    Financial resource strain: Not on file    Food insecurity:     Worry: Not on file     Inability: Not on file    Transportation needs:     Medical: Not on file     Non-medical: Not on file   Tobacco Use    Smoking status: Former Smoker     Packs/day: 0 50     Years: 40 00     Pack years: 20 00     Types: Cigarettes     Start date:      Last attempt to quit: 2019     Years since quittin 0    Smokeless tobacco: Never Used   Substance and Sexual Activity    Alcohol use: Not Currently    Drug use: No     Types: Marijuana     Comment: stopped , "i smoked weed and stopped 1 month ago"    Sexual activity: Yes   Lifestyle    Physical activity:     Days per week: Not on file     Minutes per session: Not on file    Stress: Not on file   Relationships    Social connections:     Talks on phone: Not on file     Gets together: Not on file     Attends Anabaptist service: Not on file     Active member of club or organization: Not on file     Attends meetings of clubs or organizations: Not on file     Relationship status: Not on file    Intimate partner violence:     Fear of current or ex partner: Not on file     Emotionally abused: Not on file     Physically abused: Not on file     Forced sexual activity: Not on file   Other Topics Concern    Not on file   Social History Narrative    Lives with girlfriend       Family History   Problem Relation Age of Onset    Heart disease Mother     Hypertension Father     Diabetes Family     Asthma Family     Heart disease Family     Cancer Family        Allergies Allergen Reactions    Lisinopril Anaphylaxis     Took medication a while ago and had a "swelling reaction"  Does not carry epi-pen         Current Outpatient Medications:     albuterol (2 5 mg/3 mL) 0 083 % nebulizer solution, Take 1 vial (2 5 mg total) by nebulization every 4 (four) hours as needed for wheezing or shortness of breath, Disp: 120 vial, Rfl: 3    albuterol (VENTOLIN HFA) 90 mcg/act inhaler, Inhale 2 puffs every 4 (four) hours as needed for wheezing, Disp: 1 Inhaler, Rfl: 5    amLODIPine (NORVASC) 10 mg tablet, Take 1 tablet (10 mg total) by mouth daily, Disp: 30 tablet, Rfl: 1    BANOPHEN 25 MG capsule, , Disp: , Rfl:     Blood Glucose Monitoring Suppl KIT, by Does not apply route daily, Disp: 1 each, Rfl: 0    dexamethasone (DECADRON) 4 mg tablet, Take 1 tablet (4 mg total) by mouth 2 (two) times a day with meals For 2 days after chemo, Disp: 4 tablet, Rfl: 3    diphenhydrAMINE (BENADRYL) 25 mg tablet, Take 1 tablet (25 mg total) by mouth every 6 (six) hours as needed (nausea), Disp: 30 tablet, Rfl: 0    ergocalciferol (ERGOCALCIFEROL) 86511 units capsule, Take 1 capsule (50,000 Units total) by mouth once a week, Disp: 12 capsule, Rfl: 0    FLUoxetine (PROzac) 10 MG tablet, Take 1 tablet (10 mg total) by mouth daily, Disp: 30 tablet, Rfl: 5    fluticasone (FLONASE) 50 mcg/act nasal spray, 1-2 sprays each nostril daily for allergies, Disp: 1 Bottle, Rfl: 3    fluticasone-vilanterol (BREO ELLIPTA) 100-25 mcg/inh inhaler, Inhale 1 puff daily in the early morning Rinse mouth after use , Disp: 1 Inhaler, Rfl: 5    folic acid (FOLVITE) 1 mg tablet, Take 1 tablet (1 mg total) by mouth daily, Disp: 30 tablet, Rfl: 2    FREESTYLE LITE test strip, TEST BLOOD SUGAR DAILY, Disp: 100 each, Rfl: 1    gabapentin (NEURONTIN) 300 mg capsule, Take 1 capsule (300 mg total) by mouth 3 (three) times a day, Disp: 90 capsule, Rfl: 0    ipratropium (ATROVENT) 0 02 % nebulizer solution, Take 1 vial (0 5 mg total) by nebulization 3 (three) times a day, Disp: 90 vial, Rfl: 1    Lancets (FREESTYLE) lancets, TEST BLOOD SUGAR DAILY, Disp: 100 each, Rfl: 4    lidocaine (LMX) 4 % cream, Apply topically as needed for mild pain Apply 1/2-1 inch to port 30-60 mins prior to needle insertion, cover with serrain wrap, Disp: 30 g, Rfl: 5    loratadine (CLARITIN) 10 mg tablet, Take 1 tablet (10 mg total) by mouth daily in the early morning, Disp: 30 tablet, Rfl: 2    melatonin 3 mg, Take 1 tablet (3 mg total) by mouth daily at bedtime, Disp: 30 tablet, Rfl: 1    metFORMIN (GLUCOPHAGE-XR) 500 mg 24 hr tablet, Take 1 tablet (500 mg total) by mouth 2 (two) times a day with meals, Disp: 60 tablet, Rfl: 0    methocarbamol (ROBAXIN) 750 mg tablet, Take 1 tablet (750 mg total) by mouth every 6 (six) hours as needed for muscle spasms, Disp: 90 tablet, Rfl: 0    oxyCODONE (OxyCONTIN) 20 mg 12 hr tablet, Take 1 tablet (20 mg total) by mouth every 12 (twelve) hoursMax Daily Amount: 40 mg, Disp: 60 tablet, Rfl: 0    oxyCODONE (ROXICODONE) 20 MG TABS, Take 1 tablet (20 mg total) by mouth every 4 (four) hours as needed for severe painMax Daily Amount: 120 mg, Disp: 150 tablet, Rfl: 0    pantoprazole (PROTONIX) 40 mg tablet, Take 1 tablet (40 mg total) by mouth daily, Disp: 30 tablet, Rfl: 5    polyethylene glycol (GLYCOLAX) powder, Take 17 g by mouth daily, Disp: 527 g, Rfl: 1    predniSONE 10 mg tablet, Take 3 tablets (30 mg total) by mouth daily (Patient taking differently: Take 10 mg by mouth daily ), Disp: 90 tablet, Rfl: 0    prochlorperazine (COMPAZINE) 10 mg tablet, Take 1 tablet (10 mg total) by mouth every 6 (six) hours as needed for nausea or vomiting, Disp: 90 tablet, Rfl: 0    QUEtiapine (SEROquel) 25 mg tablet, Take 25 mg by mouth daily at bedtime, Disp: , Rfl:     ranitidine (ZANTAC) 150 mg tablet, Take 1 tablet (150 mg total) by mouth 2 (two) times a day, Disp: 60 tablet, Rfl: 4    REGULOID 28 3 % POWD, USE AS DIRECTED, Disp: 369 g, Rfl: 4    Selenium Sulfide 2 25 % SHAM, apply by topical route  every PM to the affected area(s), Disp: , Rfl:     senna-docusate sodium (SENOKOT-S) 8 6-50 mg per tablet, Take 1 tablet by mouth 2 (two) times a day, Disp: 60 tablet, Rfl: 2    sildenafil (VIAGRA) 50 MG tablet, Take 1 tablet (50 mg total) by mouth as needed for erectile dysfunction, Disp: 6 tablet, Rfl: 0    tiotropium (SPIRIVA RESPIMAT) 2 5 MCG/ACT AERS inhaler, Inhale 2 puffs daily, Disp: 1 Inhaler, Rfl: 2    VENTOLIN  (90 Base) MCG/ACT inhaler, Inhale 2 puffs every 4 (four) hours as needed for wheezing, Disp: 18 g, Rfl: 0    chlorproMAZINE (THORAZINE) 25 mg tablet, Take 1 tablet (25 mg total) by mouth every 6 (six) hours as needed (hiccups), Disp: 30 tablet, Rfl: 0      Physical Exam:  /78 (BP Location: Left arm)   Pulse 72   Temp 97 6 °F (36 4 °C) (Tympanic Core)   Resp 18   Ht 5' 8 5" (1 74 m)   Wt 66 5 kg (146 lb 9 6 oz)   SpO2 96%   BMI 21 97 kg/m²     Physical Exam   Constitutional: He is oriented to person, place, and time  He appears well-developed and well-nourished  No distress  Witnessed several bouts of hiccuping during encounter   HENT:   Head: Normocephalic and atraumatic  Mouth/Throat: Oropharynx is clear and moist  No oropharyngeal exudate  Eyes: Pupils are equal, round, and reactive to light  Conjunctivae are normal  No scleral icterus  Neck: Normal range of motion  Neck supple  No thyromegaly present  Cardiovascular: Normal rate, regular rhythm, normal heart sounds and intact distal pulses  No murmur heard  Pulmonary/Chest: Effort normal  No respiratory distress  He has decreased breath sounds  Abdominal: Soft  Bowel sounds are normal  He exhibits no distension  There is no hepatosplenomegaly  There is no tenderness  Musculoskeletal: Normal range of motion  He exhibits no edema  Lymphadenopathy:     He has no cervical adenopathy  He has no axillary adenopathy  Neurological: He is alert and oriented to person, place, and time  Skin: Skin is warm and dry  No rash noted  He is not diaphoretic  No erythema  No pallor  Psychiatric: His behavior is normal  Judgment and thought content normal  His affect is blunt  Vitals reviewed  Labs:  Lab Results   Component Value Date    WBC 7 70 08/21/2019    HGB 11 2 (L) 08/21/2019    HCT 34 3 (L) 08/21/2019    MCV 87 08/21/2019     (H) 08/21/2019     Lab Results   Component Value Date    K 4 2 08/21/2019    CL 99 08/21/2019    CO2 30 08/21/2019    BUN 30 (H) 08/21/2019    CREATININE 2 31 (H) 08/21/2019    GLUF 109 (H) 08/21/2019    CALCIUM 9 3 08/21/2019    AST 68 (H) 08/21/2019    ALT 92 (H) 08/21/2019    ALKPHOS 111 08/21/2019    EGFR 35 (L) 08/21/2019         Patient voiced understanding and agreement in the above discussion  Aware to contact our office with questions/symptoms in the interim

## 2019-08-22 NOTE — PROGRESS NOTES
Time out called by erma gaytan  pts treatment will be held today and rescheduled for next Thursday after md jim reddy and jennifer  [facility] in pharmacy notified at [phone]

## 2019-08-23 ENCOUNTER — HOSPITAL ENCOUNTER (OUTPATIENT)
Dept: ULTRASOUND IMAGING | Facility: HOSPITAL | Age: 59
Discharge: HOME | End: 2019-08-23
Attending: INTERNAL MEDICINE
Payer: COMMERCIAL

## 2019-08-23 DIAGNOSIS — C34.91 ADENOCARCINOMA OF LUNG, RIGHT (HCC): ICD-10-CM

## 2019-08-23 DIAGNOSIS — I10 ESSENTIAL HYPERTENSION: ICD-10-CM

## 2019-08-23 DIAGNOSIS — N18.30 STAGE 3 CHRONIC KIDNEY DISEASE (HCC): ICD-10-CM

## 2019-08-23 PROCEDURE — 76770 US EXAM ABDO BACK WALL COMP: CPT

## 2019-08-26 ENCOUNTER — TELEPHONE (OUTPATIENT)
Dept: NEPHROLOGY | Facility: CLINIC | Age: 59
End: 2019-08-26

## 2019-08-26 LAB — B2 MICROGLOB SERPL-MCNC: 4.9 MG/L (ref 0.6–2.4)

## 2019-08-26 NOTE — TELEPHONE ENCOUNTER
Called and spoke to patient with regards to blood work from 08/21/2019  Elevated kappa lambda light chain ratio  Advised patient to follow up with Heme-Onc for further evaluation for paraproteinemia  Message also sent over to Hematology Oncology following the patient  Patient is status post renal ultrasound results still pending will call him back with the results once they are received

## 2019-08-28 ENCOUNTER — DOCUMENTATION (OUTPATIENT)
Dept: HEMATOLOGY ONCOLOGY | Facility: CLINIC | Age: 59
End: 2019-08-28

## 2019-08-28 ENCOUNTER — TELEPHONE (OUTPATIENT)
Dept: HEMATOLOGY ONCOLOGY | Facility: CLINIC | Age: 59
End: 2019-08-28

## 2019-08-28 ENCOUNTER — APPOINTMENT (OUTPATIENT)
Dept: LAB | Facility: HOSPITAL | Age: 59
End: 2019-08-28
Payer: COMMERCIAL

## 2019-08-28 DIAGNOSIS — N28.9 RENAL DYSFUNCTION: ICD-10-CM

## 2019-08-28 DIAGNOSIS — R77.9 ELEVATED BLOOD PROTEIN: ICD-10-CM

## 2019-08-28 DIAGNOSIS — C34.91 ADENOCARCINOMA OF LUNG, RIGHT (HCC): ICD-10-CM

## 2019-08-28 LAB
ALBUMIN SERPL BCP-MCNC: 4.3 G/DL (ref 3–5.2)
ALP SERPL-CCNC: 110 U/L (ref 43–122)
ALT SERPL W P-5'-P-CCNC: 42 U/L (ref 9–52)
ANION GAP SERPL CALCULATED.3IONS-SCNC: 9 MMOL/L (ref 5–14)
ANISOCYTOSIS BLD QL SMEAR: PRESENT
AST SERPL W P-5'-P-CCNC: 40 U/L (ref 17–59)
BILIRUB SERPL-MCNC: 0.5 MG/DL
BUN SERPL-MCNC: 37 MG/DL (ref 5–25)
CALCIUM SERPL-MCNC: 9.6 MG/DL (ref 8.4–10.2)
CHLORIDE SERPL-SCNC: 97 MMOL/L (ref 97–108)
CO2 SERPL-SCNC: 31 MMOL/L (ref 22–30)
CREAT SERPL-MCNC: 2.42 MG/DL (ref 0.7–1.5)
EOSINOPHIL # BLD AUTO: 0.38 THOUSAND/UL (ref 0–0.4)
EOSINOPHIL NFR BLD MANUAL: 4 % (ref 0–6)
ERYTHROCYTE [DISTWIDTH] IN BLOOD BY AUTOMATED COUNT: 21.4 %
GFR SERPL CREATININE-BSD FRML MDRD: 33 ML/MIN/1.73SQ M
GLUCOSE P FAST SERPL-MCNC: 98 MG/DL (ref 70–99)
HCT VFR BLD AUTO: 34.4 % (ref 41–53)
HGB BLD-MCNC: 11.1 G/DL (ref 13.5–17.5)
HYPERCHROMIA BLD QL SMEAR: PRESENT
LYMPHOCYTES # BLD AUTO: 1.71 THOUSAND/UL (ref 0.5–4)
LYMPHOCYTES # BLD AUTO: 18 % (ref 25–45)
MAGNESIUM SERPL-MCNC: 1.9 MG/DL (ref 1.6–2.3)
MCH RBC QN AUTO: 28.3 PG (ref 26–34)
MCHC RBC AUTO-ENTMCNC: 32.4 G/DL (ref 31–36)
MCV RBC AUTO: 87 FL (ref 80–100)
MONOCYTES # BLD AUTO: 1.9 THOUSAND/UL (ref 0.2–0.9)
MONOCYTES NFR BLD AUTO: 20 % (ref 1–10)
NEUTS BAND NFR BLD MANUAL: 1 % (ref 0–8)
NEUTS SEG # BLD: 5.51 THOUSAND/UL (ref 1.8–7.8)
NEUTS SEG NFR BLD AUTO: 57 %
PLATELET # BLD AUTO: 360 THOUSANDS/UL (ref 150–450)
PLATELET BLD QL SMEAR: ADEQUATE
PMV BLD AUTO: 7.4 FL (ref 8.9–12.7)
POTASSIUM SERPL-SCNC: 4.8 MMOL/L (ref 3.6–5)
PROT SERPL-MCNC: 8.6 G/DL (ref 5.9–8.4)
RBC # BLD AUTO: 3.94 MILLION/UL (ref 4.5–5.9)
RBC MORPH BLD: ABNORMAL
SODIUM SERPL-SCNC: 137 MMOL/L (ref 137–147)
TOTAL CELLS COUNTED SPEC: 100
WBC # BLD AUTO: 9.5 THOUSAND/UL (ref 4.5–11)

## 2019-08-28 PROCEDURE — 36415 COLL VENOUS BLD VENIPUNCTURE: CPT

## 2019-08-28 PROCEDURE — 85007 BL SMEAR W/DIFF WBC COUNT: CPT

## 2019-08-28 PROCEDURE — 82784 ASSAY IGA/IGD/IGG/IGM EACH: CPT

## 2019-08-28 PROCEDURE — 85027 COMPLETE CBC AUTOMATED: CPT

## 2019-08-28 PROCEDURE — 80053 COMPREHEN METABOLIC PANEL: CPT

## 2019-08-28 PROCEDURE — 83735 ASSAY OF MAGNESIUM: CPT

## 2019-08-28 NOTE — TELEPHONE ENCOUNTER
Called and spoke with patient  I reminded him to have his labwork completed before his appt tomorrow with Jayleen Lopez  He stated that he will get them done today

## 2019-08-29 ENCOUNTER — HOSPITAL ENCOUNTER (OUTPATIENT)
Dept: INFUSION CENTER | Facility: HOSPITAL | Age: 59
Discharge: HOME | End: 2019-08-29
Attending: INTERNAL MEDICINE

## 2019-08-29 ENCOUNTER — APPOINTMENT (EMERGENCY)
Dept: RADIOLOGY | Facility: HOSPITAL | Age: 59
End: 2019-08-29
Payer: COMMERCIAL

## 2019-08-29 ENCOUNTER — HOSPITAL ENCOUNTER (EMERGENCY)
Facility: HOSPITAL | Age: 59
Discharge: HOME | End: 2019-08-29
Attending: EMERGENCY MEDICINE | Admitting: EMERGENCY MEDICINE
Payer: COMMERCIAL

## 2019-08-29 ENCOUNTER — APPOINTMENT (EMERGENCY)
Dept: CT IMAGING | Facility: HOSPITAL | Age: 59
End: 2019-08-29
Payer: COMMERCIAL

## 2019-08-29 ENCOUNTER — OFFICE VISIT (OUTPATIENT)
Dept: HEMATOLOGY ONCOLOGY | Facility: CLINIC | Age: 59
End: 2019-08-29
Payer: COMMERCIAL

## 2019-08-29 ENCOUNTER — TELEPHONE (OUTPATIENT)
Dept: UROLOGY | Facility: AMBULATORY SURGERY CENTER | Age: 59
End: 2019-08-29

## 2019-08-29 VITALS
TEMPERATURE: 97.4 F | DIASTOLIC BLOOD PRESSURE: 72 MMHG | WEIGHT: 138.4 LBS | HEART RATE: 93 BPM | SYSTOLIC BLOOD PRESSURE: 132 MMHG | RESPIRATION RATE: 18 BRPM | BODY MASS INDEX: 20.5 KG/M2 | HEIGHT: 69 IN | OXYGEN SATURATION: 98 %

## 2019-08-29 VITALS
SYSTOLIC BLOOD PRESSURE: 138 MMHG | HEART RATE: 81 BPM | TEMPERATURE: 98.1 F | WEIGHT: 141.54 LBS | BODY MASS INDEX: 21.21 KG/M2 | DIASTOLIC BLOOD PRESSURE: 73 MMHG | OXYGEN SATURATION: 92 % | RESPIRATION RATE: 20 BRPM

## 2019-08-29 DIAGNOSIS — R30.0 DYSURIA: ICD-10-CM

## 2019-08-29 DIAGNOSIS — C34.90 LUNG CANCER (HCC): ICD-10-CM

## 2019-08-29 DIAGNOSIS — N28.9 RENAL DYSFUNCTION: ICD-10-CM

## 2019-08-29 DIAGNOSIS — R77.9 ELEVATED BLOOD PROTEIN: Primary | ICD-10-CM

## 2019-08-29 DIAGNOSIS — R11.0 NAUSEA: ICD-10-CM

## 2019-08-29 DIAGNOSIS — R07.89 NON-CARDIAC CHEST PAIN: ICD-10-CM

## 2019-08-29 DIAGNOSIS — N32.89 BLADDER WALL THICKENING: Primary | ICD-10-CM

## 2019-08-29 DIAGNOSIS — G89.29 CHRONIC SUPRAPUBIC PAIN: ICD-10-CM

## 2019-08-29 DIAGNOSIS — R10.2 CHRONIC SUPRAPUBIC PAIN: ICD-10-CM

## 2019-08-29 DIAGNOSIS — R77.9 ELEVATED BLOOD PROTEIN: ICD-10-CM

## 2019-08-29 DIAGNOSIS — R39.89 SENSATION OF PRESSURE IN BLADDER AREA: ICD-10-CM

## 2019-08-29 DIAGNOSIS — N18.9 CHRONIC RENAL INSUFFICIENCY: ICD-10-CM

## 2019-08-29 DIAGNOSIS — C34.91 ADENOCARCINOMA OF LUNG, RIGHT (HCC): Primary | ICD-10-CM

## 2019-08-29 LAB
ALBUMIN SERPL BCP-MCNC: 3.2 G/DL (ref 3.5–5)
ALP SERPL-CCNC: 103 U/L (ref 46–116)
ALT SERPL W P-5'-P-CCNC: 41 U/L (ref 12–78)
ANION GAP SERPL CALCULATED.3IONS-SCNC: 9 MMOL/L (ref 4–13)
AST SERPL W P-5'-P-CCNC: 30 U/L (ref 5–45)
BACTERIA UR QL AUTO: ABNORMAL /HPF
BASOPHILS # BLD AUTO: 0.05 THOUSANDS/ΜL (ref 0–0.1)
BASOPHILS NFR BLD AUTO: 1 % (ref 0–1)
BILIRUB SERPL-MCNC: 0.48 MG/DL (ref 0.2–1)
BILIRUB UR QL STRIP: NEGATIVE
BUN SERPL-MCNC: 36 MG/DL (ref 5–25)
CALCIUM SERPL-MCNC: 9.3 MG/DL (ref 8.3–10.1)
CHLORIDE SERPL-SCNC: 96 MMOL/L (ref 100–108)
CLARITY UR: CLEAR
CO2 SERPL-SCNC: 28 MMOL/L (ref 21–32)
COARSE GRAN CASTS URNS QL MICRO: ABNORMAL /LPF
COLOR UR: YELLOW
CREAT SERPL-MCNC: 2.74 MG/DL (ref 0.6–1.3)
EOSINOPHIL # BLD AUTO: 0.13 THOUSAND/ΜL (ref 0–0.61)
EOSINOPHIL NFR BLD AUTO: 2 % (ref 0–6)
ERYTHROCYTE [DISTWIDTH] IN BLOOD BY AUTOMATED COUNT: 19 % (ref 11.6–15.1)
GFR SERPL CREATININE-BSD FRML MDRD: 28 ML/MIN/1.73SQ M
GLUCOSE SERPL-MCNC: 95 MG/DL (ref 65–140)
GLUCOSE UR STRIP-MCNC: NEGATIVE MG/DL
HCT VFR BLD AUTO: 35.8 % (ref 36.5–49.3)
HGB BLD-MCNC: 11.3 G/DL (ref 12–17)
HGB UR QL STRIP.AUTO: ABNORMAL
IGA SERPL-MCNC: 523 MG/DL (ref 70–400)
IGG SERPL-MCNC: 1580 MG/DL (ref 700–1600)
IGM SERPL-MCNC: 177 MG/DL (ref 40–230)
IMM GRANULOCYTES # BLD AUTO: 0.12 THOUSAND/UL (ref 0–0.2)
IMM GRANULOCYTES NFR BLD AUTO: 1 % (ref 0–2)
KETONES UR STRIP-MCNC: NEGATIVE MG/DL
LEUKOCYTE ESTERASE UR QL STRIP: NEGATIVE
LYMPHOCYTES # BLD AUTO: 1.21 THOUSANDS/ΜL (ref 0.6–4.47)
LYMPHOCYTES NFR BLD AUTO: 14 % (ref 14–44)
MCH RBC QN AUTO: 28.1 PG (ref 26.8–34.3)
MCHC RBC AUTO-ENTMCNC: 31.6 G/DL (ref 31.4–37.4)
MCV RBC AUTO: 89 FL (ref 82–98)
MONOCYTES # BLD AUTO: 1.07 THOUSAND/ΜL (ref 0.17–1.22)
MONOCYTES NFR BLD AUTO: 12 % (ref 4–12)
NEUTROPHILS # BLD AUTO: 6.27 THOUSANDS/ΜL (ref 1.85–7.62)
NEUTS SEG NFR BLD AUTO: 70 % (ref 43–75)
NITRITE UR QL STRIP: NEGATIVE
NON-SQ EPI CELLS URNS QL MICRO: ABNORMAL /HPF
NRBC BLD AUTO-RTO: 0 /100 WBCS
PH UR STRIP.AUTO: 5.5 [PH] (ref 4.5–8)
PLATELET # BLD AUTO: 304 THOUSANDS/UL (ref 149–390)
PMV BLD AUTO: 8.7 FL (ref 8.9–12.7)
POTASSIUM SERPL-SCNC: 4.6 MMOL/L (ref 3.5–5.3)
PROT SERPL-MCNC: 8.8 G/DL (ref 6.4–8.2)
PROT UR STRIP-MCNC: ABNORMAL MG/DL
RBC # BLD AUTO: 4.02 MILLION/UL (ref 3.88–5.62)
RBC #/AREA URNS AUTO: ABNORMAL /HPF
SODIUM SERPL-SCNC: 133 MMOL/L (ref 136–145)
SP GR UR STRIP.AUTO: 1.01 (ref 1–1.03)
TROPONIN I SERPL-MCNC: <0.02 NG/ML
UROBILINOGEN UR QL STRIP.AUTO: 0.2 E.U./DL
WBC # BLD AUTO: 8.85 THOUSAND/UL (ref 4.31–10.16)
WBC #/AREA URNS AUTO: ABNORMAL /HPF

## 2019-08-29 PROCEDURE — 96374 THER/PROPH/DIAG INJ IV PUSH: CPT

## 2019-08-29 PROCEDURE — 99285 EMERGENCY DEPT VISIT HI MDM: CPT

## 2019-08-29 PROCEDURE — 80053 COMPREHEN METABOLIC PANEL: CPT | Performed by: EMERGENCY MEDICINE

## 2019-08-29 PROCEDURE — 93005 ELECTROCARDIOGRAM TRACING: CPT

## 2019-08-29 PROCEDURE — 99215 OFFICE O/P EST HI 40 MIN: CPT | Performed by: NURSE PRACTITIONER

## 2019-08-29 PROCEDURE — 85025 COMPLETE CBC W/AUTO DIFF WBC: CPT | Performed by: EMERGENCY MEDICINE

## 2019-08-29 PROCEDURE — 96375 TX/PRO/DX INJ NEW DRUG ADDON: CPT

## 2019-08-29 PROCEDURE — 36415 COLL VENOUS BLD VENIPUNCTURE: CPT | Performed by: EMERGENCY MEDICINE

## 2019-08-29 PROCEDURE — 74176 CT ABD & PELVIS W/O CONTRAST: CPT

## 2019-08-29 PROCEDURE — 96361 HYDRATE IV INFUSION ADD-ON: CPT

## 2019-08-29 PROCEDURE — 71046 X-RAY EXAM CHEST 2 VIEWS: CPT

## 2019-08-29 PROCEDURE — 81001 URINALYSIS AUTO W/SCOPE: CPT

## 2019-08-29 PROCEDURE — 84484 ASSAY OF TROPONIN QUANT: CPT | Performed by: EMERGENCY MEDICINE

## 2019-08-29 PROCEDURE — 99285 EMERGENCY DEPT VISIT HI MDM: CPT | Performed by: EMERGENCY MEDICINE

## 2019-08-29 RX ORDER — ONDANSETRON 2 MG/ML
4 INJECTION INTRAMUSCULAR; INTRAVENOUS ONCE
Status: COMPLETED | OUTPATIENT
Start: 2019-08-29 | End: 2019-08-29

## 2019-08-29 RX ORDER — FENTANYL CITRATE 50 UG/ML
50 INJECTION, SOLUTION INTRAMUSCULAR; INTRAVENOUS
Status: DISCONTINUED | OUTPATIENT
Start: 2019-08-29 | End: 2019-08-30 | Stop reason: HOSPADM

## 2019-08-29 RX ADMIN — ONDANSETRON 4 MG: 2 INJECTION INTRAMUSCULAR; INTRAVENOUS at 22:11

## 2019-08-29 RX ADMIN — FENTANYL CITRATE 50 MCG: 50 INJECTION INTRAMUSCULAR; INTRAVENOUS at 22:12

## 2019-08-29 RX ADMIN — SODIUM CHLORIDE 1000 ML: 0.9 INJECTION, SOLUTION INTRAVENOUS at 22:10

## 2019-08-29 NOTE — PROGRESS NOTES
Time out with [facility]  Pt will get [place] only tomorrow  Pt is not feeling well and will be going to the er later this afternoon for evaluation  Treatment to be held today  Silvano in pharmacy made aware

## 2019-08-29 NOTE — TELEPHONE ENCOUNTER
Reason for appointment/Complaint/Diagnosis : bladder wall thickening / spt pain    Insurance: [employer]    History of Cancer? yes                       If yes, what kind? LUNG     Previous urologist?     no                 Records requested/where?  no    Outside testing/where? no    [facility] visit[facility] [place]    Patients  office called to schedule a new patient appointment for bladder wall thickening and spt pain/pressure  Scheduled for 9/11  They said that will be ok, but please triage just in case patient should be seen sooner  Imaging is in epic

## 2019-08-29 NOTE — PROGRESS NOTES
Hematology/Oncology Outpatient Follow-up  Jose Ramon Nunez Sr  64 y o  male 1960 [id number]    Date:  8/29/2019      Assessment and Plan:  1  Adenocarcinoma of lung, right [facility])  The patient has multiple complaints today and is not feeling well, he has not had any improvement in his renal dysfunction over the past week  He is particularly concerned about the bladder pressure and discomfort that he is experiencing  He did have a UA done 1 week ago that was completely normal without any hint of protein, blood or infection  His ultrasound of the kidneys and bladder was read as normal   Review of his recent PET-CT scan 07/26/2019 did reveal increase bladder wall thickening  Suspicion of interstitial cystitis which could possibly be immune mediated secondary to his immunotherapy treatment with Keytruda; he did have elevated IgE on his recent allergy testing not sure if this related or not  We will continue to hold patient's chemotherapy/immunotherapy until his symptoms are further evaluated  If other etiologies of his symptoms are ruled out a trial of high-dose steroids could be entertained if this is in fact an immune mediated process  The patient states that he is planning to go to the emergency department later today after he takes care of a few things at home because he feels so lousy and has been having significant discomfort with his bladder symptoms and pain which is affecting his quality of life  He was encouraged to do so  We will arrange for a follow-up in the office in approximately 1 week for close monitoring      - Comprehensive metabolic panel; Standing  - CBC and differential; Future  - TSH, 3rd generation with Free T4 reflex; Future  - Comprehensive metabolic panel    2  Renal dysfunction  Will continue to monitor his renal function closely and check CMP on a weekly basis  He is currently being worked up by Nephrology    Is still not clear the etiology of his worsening renal dysfunction could be multifactorial including history of diabetes, hypertension and treatment related  His SPEP and UPEP came back without evidence of monoclonal gammopathy but he did have elevated serum light chains with abnormal ratio which we will further investigate  24 hour Urine free light chain test was ordered but patient has not submitted his specimen yet and is aware to do so  We will complete the workup and make a decision if further evaluation will be warranted  It is unclear if his elevated light chain ratio is related to the abnormal renal dysfunction vs plasma cell dyscrasia  3  Elevated blood protein  As above, we will complete the plasma cell dyscrasia workup and continue to monitor  Immunoglobulins have been added to his lab work from yesterday results are pending  4  Sensation of pressure in bladder area  As above  We will refer patient to the Urology team for further workup and evaluation and if he ends up being admitted to the hospital which is likely- perhaps he could be evaluated during his admission     - Ambulatory referral to Urology; Future  - UA w Reflex to Microscopic w Reflex to Culture; Future      HPI:  The patient presents today for a 1 week follow-up appointment  His treatment with Keytruda and Alimta were held last week due to worsening renal dysfunction  Today he reports that he is feeling lousy and does not want to stay for his treatment  He states he is fatigued and his generalized pain is increased today that he is rating 9/10  The patient is also reporting nausea which is chronic but somewhat increased today  He mentions that he has been feeling lousy and ill for the past 2 days  He also states that he has been experiencing some suprapubic pressure and pain which is worse when he tries to urinate    He denies dysuria or trouble starting and stopping his stream   The patient admits that he has been experiencing this issue for a few months around the time he started having renal dysfunction but never mentioned it  It has gotten significantly worse over the past few days  He is also complaining of increased dyspnea with exertion over the past month  Patient's repeat laboratory studies from yesterday showed WBC 9 5 he does have evidence of monocytosis, stable normocytic anemia H&H 11 1/34 4 MCV 87 platelet count 961  His renal function is stable in comparison to last week BUN 37, creatinine 2 42 GFR 33, his total protein continues to be slightly elevated 0 6 remaining chemistry is within normal limits  His beta 2 microglobulin to come back elevated 4 9  And his serum free light chains showed elevated kappa 69 3 milligrams/liter, elevated lambda of 31 2 milligrams/liter with increased ratio of 2 22  He had allergy testing done by another physician which did not reveal any abnormalities other than an elevated IgE level 237  He had ultrasound of the kidneys and bladder done on 08/23/2019 the results came back shortly after patient checked out today but the report was read as a normal study      Oncology History    43-year-old [facility] male heavy smoker who used to smoke 2 packs of cigarettes daily for many years, COPD, he presented with dyspnea, CT scan of the chest on October 2018 showed right middle lobe spiculated 1 3 cm mass with multiple solid and ground-glass nodules along the major and minor fissures sub cm pulmonary nodules bilaterally, left adrenal 1 2 cm nodule     PET scan showed 1 3 cm right middle lung nodule with SUV of 6 8, numerous nodular densities along the right major and minor fissures, right hilar activity with SUV of 3 4, subcarinal lymph node measuring 7 mm with SUV of 2 7, periportal enlarged lymph nodes concerning for metastatic disease measuring 2 4 cm with SUV of 5, prominent left retrocrural lymph node measuring 1 cm x 9 mm with SUV of 1 1    Core biopsy of the right spiculated nodule showed invasive adenocarcinoma consistent with lung primary positive for CK7, TTF 1, napsin a        Adenocarcinoma of lung, right ([facility] )    11/13/2018 Initial Diagnosis     Adenocarcinoma of lung, right ([facility] )  Stage IV      12/13/2018 - 3/28/2019 Chemotherapy      Alimta, Carboplatin (AUC 5) + Keytruda (6 cycles total)      4/17/2019 -  Chemotherapy     Started maintenance Alimta and Keytruda         Interval history:    ROS: Review of Systems   Constitutional: Positive for appetite change (Worse this week)  Negative for activity change, chills, fatigue, fever and unexpected weight change  HENT: Positive for hearing loss  Negative for congestion, mouth sores, nosebleeds, sore throat and trouble swallowing  Eyes: Negative  Respiratory: Positive for shortness of breath  Negative for cough and chest tightness  Cardiovascular: Negative for chest pain, palpitations and leg swelling  Gastrointestinal: Positive for nausea  Negative for abdominal distention, abdominal pain, blood in stool, constipation, diarrhea and vomiting  Genitourinary: Positive for difficulty urinating  Negative for dysuria, frequency, hematuria and urgency  Suprapubic pressure and pain increased with urination   Musculoskeletal: Positive for arthralgias and myalgias  Negative for back pain, gait problem and joint swelling  Rates his arthralgias and myalgias 9/10 despite taking his pain medication   Skin: Negative for color change, pallor and rash  Neurological: Positive for numbness ( and tingling mild) and headaches (mild)  Negative for dizziness, weakness and light-headedness  Hematological: Negative for adenopathy  Does not bruise/bleed easily  Psychiatric/Behavioral: Positive for sleep disturbance  Negative for dysphoric mood         Past Medical History:   Diagnosis Date    Bipolar 1 disorder ([place] [address] )     Cancer ([place] [address] )     adeno lung  dx 11/2018-lung bx today 4/9/2019-ongoing chemo    Chronic pain disorder     back and right shoulder    CKD (chronic kidney disease)     COPD [facility]chronic obstructive pulmonary disease) [facility] )     Depression     Diabetes mellitus (HCC)     GERD (gastroesophageal reflux disease)     Herniated lumbar intervertebral disc     Hypertension     Insomnia     Latent syphilis     Treated    Low back pain     Lumbosacral disc disease     Pneumothorax after biopsy     R lung    PTSD (post-traumatic stress disorder)     PTSD (post-traumatic stress disorder)     PTSD (post-traumatic stress disorder)     [facility] )     Tobacco abuse 2018       Past Surgical History:   Procedure Laterality Date    COLONOSCOPY      CT GUIDED CHEST TUBE  2018    FL GUIDED CENTRAL VENOUS ACCESS DEVICE INSERTION  2019    HEMORROIDECTOMY      IR CHEST TUBE  2018    IR IMAGE GUIDED BIOPSY/ASPIRATION LUNG  2018    KNEE SURGERY      NH INSJ TUNNELED CTR VAD W/SUBQ PORT AGE 5 YR/> N/A 2019    Procedure: PLACEMENT OF PORT-A-CATH;  Surgeon: Bernard Fernando MD;  Location:  MAIN OR;  Service: Vascular       Social History     Socioeconomic History    Marital status: Legally      Spouse name: Not on file    Number of children: Not on file    Years of education: Not on file    Highest education level: Not on file   Occupational History    Occupation: part time employment   Social Needs    Financial resource strain: Not on file    Food insecurity:     Worry: Not on file     Inability: Not on file   [employer] needs:     Medical: Not on file     Non-medical: Not on file   Tobacco Use    Smoking status: Former Smoker     Packs/day: 0 50     Years: 40 00     Pack years: 20 00     Types: Cigarettes     Start date:      Last attempt to quit: 2019     Years since quittin 0    Smokeless tobacco: Never Used   Substance and Sexual Activity    Alcohol use: Not Currently    Drug use: No     Types: Marijuana     Comment: stopped , "i smoked weed and stopped 1 month ago"    Sexual activity: Yes Lifestyle    Physical activity:     Days per week: Not on file     Minutes per session: Not on file    Stress: Not on file   Relationships    Social connections:     Talks on phone: Not on file     Gets together: Not on file     Attends Druze service: Not on file     Active member of club or organization: Not on file     Attends meetings of clubs or organizations: Not on file     Relationship status: Not on file    Intimate partner violence:     Fear of current or ex partner: Not on file     Emotionally abused: Not on file     Physically abused: Not on file     Forced sexual activity: Not on file   Other Topics Concern    Not on file   Social History Narrative    Lives with girlfriend       Family History   Problem Relation Age of Onset    Heart disease Mother     Hypertension Father     Diabetes Family     Asthma Family     Heart disease Family     Cancer Family        Allergies   Allergen Reactions    Lisinopril Anaphylaxis     Took medication a while ago and had a "swelling reaction"  Does not carry epi-pen         Current Outpatient Medications:     albuterol (2 5 mg/3 mL) 0 083 % nebulizer solution, Take 1 vial (2 5 mg total) by nebulization every 4 (four) hours as needed for wheezing or shortness of breath, Disp: 120 vial, Rfl: 3    albuterol (VENTOLIN HFA) 90 mcg/act inhaler, Inhale 2 puffs every 4 (four) hours as needed for wheezing, Disp: 1 Inhaler, Rfl: 5    amLODIPine (NORVASC) 10 mg tablet, Take 1 tablet (10 mg total) by mouth daily, Disp: 30 tablet, Rfl: 1    BANOPHEN 25 MG capsule, , Disp: , Rfl:     Blood Glucose Monitoring Suppl KIT, by Does not apply route daily, Disp: 1 each, Rfl: 0    chlorproMAZINE (THORAZINE) 25 mg tablet, Take 1 tablet (25 mg total) by mouth every 6 (six) hours as needed (hiccups), Disp: 30 tablet, Rfl: 0    dexamethasone (DECADRON) 4 mg tablet, Take 1 tablet (4 mg total) by mouth 2 (two) times a day with meals For 2 days after chemo, Disp: 4 tablet, Rfl: 3    diphenhydrAMINE (BENADRYL) 25 mg tablet, Take 1 tablet (25 mg total) by mouth every 6 (six) hours as needed (nausea), Disp: 30 tablet, Rfl: 0    ergocalciferol (ERGOCALCIFEROL) 20802 units capsule, Take 1 capsule (50,000 Units total) by mouth once a week, Disp: 12 capsule, Rfl: 0    FLUoxetine (PROzac) 10 MG tablet, Take 1 tablet (10 mg total) by mouth daily, Disp: 30 tablet, Rfl: 5    fluticasone (FLONASE) 50 mcg/act nasal spray, 1-2 sprays each nostril daily for allergies, Disp: 1 Bottle, Rfl: 3    fluticasone-vilanterol (BREO ELLIPTA) 100-25 mcg/inh inhaler, Inhale 1 puff daily in the early morning Rinse mouth after use , Disp: 1 Inhaler, Rfl: 5    folic acid (FOLVITE) 1 mg tablet, Take 1 tablet (1 mg total) by mouth daily, Disp: 30 tablet, Rfl: 2    FREESTYLE LITE test strip, TEST BLOOD SUGAR DAILY, Disp: 100 each, Rfl: 1    gabapentin (NEURONTIN) 300 mg capsule, Take 1 capsule (300 mg total) by mouth 3 (three) times a day, Disp: 90 capsule, Rfl: 0    ipratropium (ATROVENT) 0 02 % nebulizer solution, Take 1 vial (0 5 mg total) by nebulization 3 (three) times a day, Disp: 90 vial, Rfl: 1    Lancets (FREESTYLE) lancets, TEST BLOOD SUGAR DAILY, Disp: 100 each, Rfl: 4    lidocaine (LMX) 4 % cream, Apply topically as needed for mild pain Apply 1/2-1 inch to port 30-60 mins prior to needle insertion, cover with serrain wrap, Disp: 30 g, Rfl: 5    loratadine (CLARITIN) 10 mg tablet, Take 1 tablet (10 mg total) by mouth daily in the early morning, Disp: 30 tablet, Rfl: 2    melatonin 3 mg, Take 1 tablet (3 mg total) by mouth daily at bedtime, Disp: 30 tablet, Rfl: 1    metFORMIN (GLUCOPHAGE-XR) 500 mg 24 hr tablet, Take 1 tablet (500 mg total) by mouth 2 (two) times a day with meals, Disp: 60 tablet, Rfl: 0    methocarbamol (ROBAXIN) 750 mg tablet, Take 1 tablet (750 mg total) by mouth every 6 (six) hours as needed for muscle spasms, Disp: 90 tablet, Rfl: 0    oxyCODONE (OxyCONTIN) 20 mg 12 hr tablet, Take 1 tablet (20 mg total) by mouth every 12 (twelve) hoursMax Daily Amount: 40 mg, Disp: 60 tablet, Rfl: 0    oxyCODONE (ROXICODONE) 20 MG TABS, Take 1 tablet (20 mg total) by mouth every 4 (four) hours as needed for severe painMax Daily Amount: 120 mg, Disp: 150 tablet, Rfl: 0    pantoprazole (PROTONIX) 40 mg tablet, Take 1 tablet (40 mg total) by mouth daily, Disp: 30 tablet, Rfl: 5    polyethylene glycol (GLYCOLAX) powder, Take 17 g by mouth daily, Disp: 527 g, Rfl: 1    predniSONE 10 mg tablet, Take 3 tablets (30 mg total) by mouth daily (Patient taking differently: Take 10 mg by mouth daily ), Disp: 90 tablet, Rfl: 0    prochlorperazine (COMPAZINE) 10 mg tablet, Take 1 tablet (10 mg total) by mouth every 6 (six) hours as needed for nausea or vomiting, Disp: 90 tablet, Rfl: 0    QUEtiapine (SEROquel) 25 mg tablet, Take 25 mg by mouth daily at bedtime, Disp: , Rfl:     ranitidine (ZANTAC) 150 mg tablet, Take 1 tablet (150 mg total) by mouth 2 (two) times a day, Disp: 60 tablet, Rfl: 4    REGULOID 28 3 % POWD, USE AS DIRECTED, Disp: 369 g, Rfl: 4    Selenium Sulfide 2 25 % SHAM, apply by topical route  every PM to the affected area(s), Disp: , Rfl:     senna-docusate sodium (SENOKOT-S) 8 6-50 mg per tablet, Take 1 tablet by mouth 2 (two) times a day, Disp: 60 tablet, Rfl: 2    sildenafil (VIAGRA) 50 MG tablet, Take 1 tablet (50 mg total) by mouth as needed for erectile dysfunction, Disp: 6 tablet, Rfl: 0    tiotropium (SPIRIVA RESPIMAT) 2 5 MCG/ACT AERS inhaler, Inhale 2 puffs daily, Disp: 1 Inhaler, Rfl: 2    VENTOLIN  (90 Base) MCG/ACT inhaler, Inhale 2 puffs every 4 (four) hours as needed for wheezing, Disp: 18 g, Rfl: 0      Physical Exam:  /72 (BP Location: Left arm)   Pulse 93   Temp (!) 97 4 °F (36 3 °C) (Tympanic Core)   Resp 18   Ht 5' 8 5" (1 74 m)   Wt 62 8 kg (138 lb 6 4 oz)   SpO2 98%   BMI 20 74 kg/m²     Physical Exam   Constitutional: He is oriented to person, place, and time  He appears well-developed and well-nourished  He appears distressed  HENT:   Head: Normocephalic and atraumatic  Mouth/Throat: Oropharynx is clear and moist  No oropharyngeal exudate  Eyes: Pupils are equal, round, and reactive to light  Conjunctivae are normal  No scleral icterus  Neck: Normal range of motion  Neck supple  No thyromegaly present  Cardiovascular: Normal rate, regular rhythm, normal heart sounds and intact distal pulses  No murmur heard  Pulmonary/Chest: Effort normal  No respiratory distress  He has decreased breath sounds  Abdominal: Soft  Bowel sounds are normal  He exhibits no distension  There is no hepatosplenomegaly  There is no tenderness  Musculoskeletal: Normal range of motion  He exhibits no edema  Lymphadenopathy:     He has no cervical adenopathy  He has no axillary adenopathy  Neurological: He is alert and oriented to person, place, and time  Skin: Skin is warm and dry  No rash noted  He is not diaphoretic  No erythema  No pallor  Psychiatric: He has a normal mood and affect  His behavior is normal  Judgment and thought content normal    Vitals reviewed  Labs:  Lab Results   Component Value Date    WBC 9 50 08/28/2019    HGB 11 1 (L) 08/28/2019    HCT 34 4 (L) 08/28/2019    MCV 87 08/28/2019     08/28/2019     Lab Results   Component Value Date    K 4 8 08/28/2019    CL 97 08/28/2019    CO2 31 (H) 08/28/2019    BUN 37 (H) 08/28/2019    CREATININE 2 42 (H) 08/28/2019    GLUF 98 08/28/2019    CALCIUM 9 6 08/28/2019    AST 40 08/28/2019    ALT 42 08/28/2019    ALKPHOS 110 08/28/2019    EGFR 33 (L) 08/28/2019         Patient voiced understanding and agreement in the above discussion  Aware to contact our office with questions/symptoms in the interim

## 2019-08-30 ENCOUNTER — TELEPHONE (OUTPATIENT)
Dept: NEPHROLOGY | Facility: CLINIC | Age: 59
End: 2019-08-30

## 2019-08-30 NOTE — ED PROVIDER NOTES
History  Chief Complaint   Patient presents with    Chest Pain     pt reports midsternal chest pain for three days, denies pain radiation, pt reports worsening SOB as well, hx of COPD    Abdominal Pain     pt reports abdominal pain, "feels like my bladder is full", pt reports finding out today "my bladder wall is thick"     Yet 82-year-old male presents for evaluation of several days of intermittent substernal nonradiating chest pressure  The patient is also complaining of about 1 month of bladder fullness with frequent urination and associated lower abdominal pain  The patient denies any associated fevers or chills  The patient is chronically short of breath  He states that his shortness of breath is worse with exertion  Prior to Admission Medications   Prescriptions Last Dose Informant Patient Reported? Taking?    BANOPHEN 25 MG capsule  Self Yes Yes   Blood Glucose Monitoring Suppl KIT  Self No No   Sig: by Does not apply route daily   FLUoxetine (PROzac) 10 MG tablet  Self No Yes   Sig: Take 1 tablet (10 mg total) by mouth daily   FREESTYLE LITE test strip  Self No No   Sig: TEST BLOOD SUGAR DAILY   Lancets (FREESTYLE) lancets  Self No No   Sig: TEST BLOOD SUGAR DAILY   QUEtiapine (SEROquel) 25 mg tablet  Self Yes Yes   Sig: Take 25 mg by mouth daily at bedtime   REGULOID 28 3 % POWD  Self No No   Sig: USE AS DIRECTED   Selenium Sulfide 2 25 % SHAM  Self Yes No   Sig: apply by topical route  every PM to the affected area(s)   VENTOLIN  (90 Base) MCG/ACT inhaler  Self No Yes   Sig: Inhale 2 puffs every 4 (four) hours as needed for wheezing   albuterol (2 5 mg/3 mL) 0 083 % nebulizer solution  Self No Yes   Sig: Take 1 vial (2 5 mg total) by nebulization every 4 (four) hours as needed for wheezing or shortness of breath   albuterol (VENTOLIN HFA) 90 mcg/act inhaler  Self No Yes   Sig: Inhale 2 puffs every 4 (four) hours as needed for wheezing   amLODIPine (NORVASC) 10 mg tablet  Self No Yes Sig: Take 1 tablet (10 mg total) by mouth daily   chlorproMAZINE (THORAZINE) 25 mg tablet  Self No Yes   Sig: Take 1 tablet (25 mg total) by mouth every 6 (six) hours as needed (hiccups)   dexamethasone (DECADRON) 4 mg tablet  Self No Yes   Sig: Take 1 tablet (4 mg total) by mouth 2 (two) times a day with meals For 2 days after chemo   diphenhydrAMINE (BENADRYL) 25 mg tablet  Self No Yes   Sig: Take 1 tablet (25 mg total) by mouth every 6 (six) hours as needed (nausea)   ergocalciferol (ERGOCALCIFEROL) 61532 units capsule  Self No Yes   Sig: Take 1 capsule (50,000 Units total) by mouth once a week   fluticasone (FLONASE) 50 mcg/act nasal spray  Self No Yes   Si-2 sprays each nostril daily for allergies   fluticasone-vilanterol (BREO ELLIPTA) 100-25 mcg/inh inhaler  Self No Yes   Sig: Inhale 1 puff daily in the early morning Rinse mouth after use     folic acid (FOLVITE) 1 mg tablet  Self No Yes   Sig: Take 1 tablet (1 mg total) by mouth daily   gabapentin (NEURONTIN) 300 mg capsule  Self No Yes   Sig: Take 1 capsule (300 mg total) by mouth 3 (three) times a day   ipratropium (ATROVENT) 0 02 % nebulizer solution  Self No Yes   Sig: Take 1 vial (0 5 mg total) by nebulization 3 (three) times a day   lidocaine (LMX) 4 % cream  Self No Yes   Sig: Apply topically as needed for mild pain Apply 1/2-1 inch to port 30-60 mins prior to needle insertion, cover with serrain wrap   loratadine (CLARITIN) 10 mg tablet  Self No Yes   Sig: Take 1 tablet (10 mg total) by mouth daily in the early morning   melatonin 3 mg  Self No Yes   Sig: Take 1 tablet (3 mg total) by mouth daily at bedtime   metFORMIN (GLUCOPHAGE-XR) 500 mg 24 hr tablet  Self No Yes   Sig: Take 1 tablet (500 mg total) by mouth 2 (two) times a day with meals   methocarbamol (ROBAXIN) 750 mg tablet  Self No Yes   Sig: Take 1 tablet (750 mg total) by mouth every 6 (six) hours as needed for muscle spasms   oxyCODONE (OxyCONTIN) 20 mg 12 hr tablet  Self No Yes Sig: Take 1 tablet (20 mg total) by mouth every 12 (twelve) hoursMax Daily Amount: 40 mg   oxyCODONE (ROXICODONE) 20 MG TABS  Self No Yes   Sig: Take 1 tablet (20 mg total) by mouth every 4 (four) hours as needed for severe painMax Daily Amount: 120 mg   pantoprazole (PROTONIX) 40 mg tablet  Self No Yes   Sig: Take 1 tablet (40 mg total) by mouth daily   polyethylene glycol (GLYCOLAX) powder  Self No No   Sig: Take 17 g by mouth daily   predniSONE 10 mg tablet  Self No No   Sig: Take 3 tablets (30 mg total) by mouth daily   Patient taking differently: Take 10 mg by mouth daily    prochlorperazine (COMPAZINE) 10 mg tablet  Self No Yes   Sig: Take 1 tablet (10 mg total) by mouth every 6 (six) hours as needed for nausea or vomiting   ranitidine (ZANTAC) 150 mg tablet  Self No Yes   Sig: Take 1 tablet (150 mg total) by mouth 2 (two) times a day   senna-docusate sodium (SENOKOT-S) 8 6-50 mg per tablet  Self No Yes   Sig: Take 1 tablet by mouth 2 (two) times a day   sildenafil (VIAGRA) 50 MG tablet  Self No No   Sig: Take 1 tablet (50 mg total) by mouth as needed for erectile dysfunction   tiotropium (SPIRIVA RESPIMAT) 2 5 MCG/ACT AERS inhaler  Self No Yes   Sig: Inhale 2 puffs daily      Facility-Administered Medications: None       Past Medical History:   Diagnosis Date    Bipolar 1 disorder ([facility] )     Cancer ([facility] )     adeno lung  dx 11/2018-lung bx today 4/9/2019-ongoing chemo    Chronic pain disorder     back and right shoulder    CKD (chronic kidney disease)     COPD (chronic obstructive pulmonary disease) (HCC)     Depression     Diabetes mellitus (HCC)     GERD (gastroesophageal reflux disease)     Herniated lumbar intervertebral disc     Hypertension     Insomnia     Latent syphilis     Treated    Low back pain     Lumbosacral disc disease     Pneumothorax after biopsy     R lung    PTSD (post-traumatic stress disorder)     PTSD (post-traumatic stress disorder)     PTSD (post-traumatic stress disorder)     [facility] emph[facility]ma [facility]     Tobacco abuse 2018       Past Surgical History:   Procedure Laterality Date    COLONOSCOPY      CT GUIDED CHEST TUBE  2018    FL GUIDED CENTRAL VENOUS ACCESS DEVICE INSERTION  2019    HEMORROIDECTOMY      IR CHEST TUBE  2018    IR IMAGE GUIDED BIOPSY/ASPIRATION LUNG  2018    KNEE SURGERY      [place] INSJ TUNNELED CTR VAD W/SUBQ PORT AGE 5 YR/> N/A 2019    Procedure: PLACEMENT OF PORT-A-CATH;  Surgeon: Deandra Pinto MD;  Location: [facility] MAIN OR;  Service: Vascular       Family History   Problem Relation Age of Onset    Heart disease Mother     Hypertension Father     Diabetes Family     Asthma Family     Heart disease Family     Cancer Family      I have reviewed and agree with the history as documented  Social History     Tobacco Use    Smoking status: Former Smoker     Packs/day: 0 50     Years: 40 00     Pack years: 20 00     Types: Cigarettes     Start date:      Last attempt to quit: 2019     Years since quittin 0    Smokeless tobacco: Never Used   Substance Use Topics    Alcohol use: Not Currently    Drug use: No     Types: Marijuana     Comment: stopped , "i smoked weed and stopped 1 month ago"        Review of Systems   Constitutional: Negative for chills and fever  Respiratory: Positive for chest tightness and shortness of breath  Negative for cough and wheezing  Cardiovascular: Positive for chest pain  Negative for palpitations and leg swelling  Gastrointestinal: Positive for abdominal pain and nausea  Negative for diarrhea and vomiting  Genitourinary: Positive for difficulty urinating  All other systems reviewed and are negative  Physical Exam  Physical Exam   Constitutional: He is oriented to person, place, and time  He appears well-developed and well-nourished  No distress  HENT:   Head: Normocephalic and atraumatic     Right Ear: External ear normal    Left Ear: External ear normal    Mouth/Throat: No oropharyngeal exudate  Eyes: Pupils are equal, round, and reactive to light  EOM are normal  No scleral icterus  Neck: Normal range of motion  Neck supple  Cardiovascular: Normal rate, regular rhythm and normal heart sounds  Pulmonary/Chest: Effort normal  No tachypnea  No respiratory distress  He has decreased breath sounds ( Diffuse)  Abdominal: Soft  Bowel sounds are normal  There is tenderness in the right lower quadrant, suprapubic area and left lower quadrant  There is no rebound and no guarding  Musculoskeletal: Normal range of motion  Neurological: He is alert and oriented to person, place, and time  Skin: Skin is warm and dry  No rash noted  Psychiatric: He has a normal mood and affect  Nursing note and vitals reviewed        Vital Signs  ED Triage Vitals   Temperature Pulse Respirations Blood Pressure SpO2   08/29/19 2129 08/29/19 2127 08/29/19 2127 08/29/19 2129 08/29/19 2129   98 1 °F (36 7 °C) 91 21 148/90 95 %      Temp src Heart Rate Source Patient Position - Orthostatic VS BP Location FiO2 (%)   -- 08/29/19 2127 -- -- --    Monitor         Pain Score       08/29/19 2127       9           Vitals:    08/29/19 2127 08/29/19 2129   BP:  148/90   Pulse: 91          Visual Acuity      ED Medications  Medications   sodium chloride 0 9 % bolus 1,000 mL (1,000 mL Intravenous New Bag 8/29/19 2210)   fentanyl citrate (PF) 100 MCG/2ML 50 mcg (50 mcg Intravenous Given 8/29/19 2212)   ondansetron (ZOFRAN) injection 4 mg (4 mg Intravenous Given 8/29/19 2211)       Diagnostic Studies  Results Reviewed     Procedure Component Value Units Date/Time    Urine Microscopic [[id number]]  (Abnormal) Collected:  08/29/19 2150    Lab Status:  Final result Specimen:  Urine, Clean Catch Updated:  08/29/19 2221     RBC, UA 1-2 /hpf      WBC, UA None Seen /hpf      Epithelial Cells None Seen /hpf      Bacteria, UA Occasional /hpf      COARSE GRANULAR CASTS 1-2 /lpf     Troponin I [[id number]]  (Normal) Collected:  08/29/19 2151    Lab Status:  Final result Specimen:  Blood from Arm, Left Updated:  08/29/19 2215     Troponin I <0 02 ng/mL     Comprehensive metabolic panel [[id number]]  (Abnormal) Collected:  08/29/19 2151    Lab Status:  Final result Specimen:  Blood from Arm, Left Updated:  08/29/19 2214     Sodium 133 mmol/L      Potassium 4 6 mmol/L      Chloride 96 mmol/L      CO2 28 mmol/L      ANION GAP 9 mmol/L      BUN 36 mg/dL      Creatinine 2 74 mg/dL      Glucose 95 mg/dL      Calcium 9 3 mg/dL      AST 30 U/L      ALT 41 U/L      Alkaline Phosphatase 103 U/L      Total Protein 8 8 g/dL      Albumin 3 2 g/dL      Total Bilirubin 0 48 mg/dL      eGFR 28 ml/min/1 73sq m     Narrative:       [place] guidelines for Chronic Kidney Disease (CKD):     Stage 1 with normal or high GFR (GFR > 90 mL/min/1 73 square meters)    Stage 2 Mild CKD (GFR = 60-89 mL/min/1 73 square meters)    Stage 3A Moderate CKD (GFR = 45-59 mL/min/1 73 square meters)    Stage 3B Moderate CKD (GFR = 30-44 mL/min/1 73 square meters)    Stage 4 Severe CKD (GFR = 15-29 mL/min/1 73 square meters)    Stage 5 End Stage CKD (GFR <15 mL/min/1 73 square meters)  Note: GFR calculation is accurate only with a steady state creatinine    CBC and differential [[id number]]  (Abnormal) Collected:  08/29/19 2151    Lab Status:  Final result Specimen:  Blood from Arm, Left Updated:  08/29/19 2159     WBC 8 85 Thousand/uL      RBC 4 02 Million/uL      Hemoglobin 11 3 g/dL      Hematocrit 35 8 %      MCV 89 fL      MCH 28 1 pg      MCHC 31 6 g/dL      RDW 19 0 %      MPV 8 7 fL      Platelets 149 Thousands/uL      nRBC 0 /100 WBCs      Neutrophils Relative 70 %      Immat GRANS % 1 %      Lymphocytes Relative 14 %      Monocytes Relative 12 %      Eosinophils Relative 2 %      Basophils Relative 1 %      Neutrophils Absolute 6 27 Thousands/µL      Immature Grans Absolute 0 12 Thousand/uL      Lymphocytes Absolute 1 21 Thousands/µL      Monocytes Absolute 1 07 Thousand/µL      Eosinophils Absolute 0 13 Thousand/µL      Basophils Absolute 0 05 Thousands/µL     POCT urinalysis dipstick [[id number]]  (Abnormal) Resulted:  08/29/19 2154    Lab Status:  Final result Specimen:  Urine Updated:  08/29/19 2154    ED Urine Macroscopic [[id number]]  (Abnormal) Collected:  08/29/19 2150    Lab Status:  Final result Specimen:  Urine Updated:  08/29/19 2152     Color, UA Yellow     Clarity, UA Clear     pH, UA 5 5     Leukocytes, UA Negative     Nitrite, UA Negative     Protein, UA 30 (1+) mg/dl      Glucose, UA Negative mg/dl      Ketones, UA Negative mg/dl      Urobilinogen, UA 0 2 E U /dl      Bilirubin, UA Negative     Blood, UA Trace     Specific Lutts, UA 1 010    Narrative:       CLINITEK RESULT                 XR chest 2 views   ED Interpretation by Destiny Ward DO (08/29 2220)   No acute cardiopulmonary pathology      CT abdomen pelvis wo contrast   Final Result by Britany Todd MD (08/29 2227)      Diffuse circumferential thickening of the urinary bladder wall  Correlate with urinalysis for cystitis  Workstation performed: [id number]                    Procedures  ECG 12 Lead Documentation Only  Date/Time: 8/29/2019 9:33 PM  Performed by: Destiny Ward DO  Authorized by:  Destiny Ward DO     Indications / Diagnosis:  Chest pain  ECG reviewed by me, the ED Provider: yes    Patient location:  ED  Previous ECG:     Previous ECG:  Compared to current    Comparison ECG info:  New PVC noted when compared to 1/2/2019    Similarity:  Changes noted  Interpretation:     Interpretation: normal    Rate:     ECG rate:  83    ECG rate assessment: normal    Rhythm:     Rhythm: sinus rhythm    Ectopy:     Ectopy: PVCs    QRS:     QRS axis:  Normal    QRS intervals:  Normal  Conduction:     Conduction: normal    ST segments:     ST segments:  Normal  T waves:     T waves: normal             ED Course         HEART Risk Score      Most Recent Value   History  0 Filed at: 08/29/2019 2242   ECG  0 Filed at: 08/29/2019 2242   Age  1 Filed at: 08/29/2019 2242   Risk Factors  1 Filed at: 08/29/2019 2242   Troponin  0 Filed at: 08/29/2019 2242   Heart Score Risk Calculator   History  0 Filed at: 08/29/2019 2242   ECG  0 Filed at: 08/29/2019 2242   Age  1 Filed at: 08/29/2019 2242   Risk Factors  1 Filed at: 08/29/2019 2242   Troponin  0 Filed at: 08/29/2019 2242   HEART Score  2 Filed at: 08/29/2019 2242   HEART Score  2 Filed at: 08/29/2019 2242                            MDM  Number of Diagnoses or Management Options  Bladder wall thickening:   Chronic renal insufficiency:   Chronic suprapubic pain:   Lung cancer [facility]):   Nausea:   Non-cardiac chest pain:   Diagnosis management comments: Differential diagnosis:  Urinary tract infection, worsening cancer, anemia, worsening renal failure, bowel obstruction, metastatic spread, myocardial infarction, pneumothorax, other    All labs reviewed and utilized in the medical decision making process    All radiology studies independently viewed by me and interpreted by the radiologist     Discussed the results of the laboratories and CT scan with the patient  The patient is aware the bladder wall thickening from an ultrasound already has a previously scheduled urology appointment which I encouraged the patient to keep  He was also encouraged to continue his Zofran and his pain medication follow-up with the rest of his care team including Oncology  The patient is aware of his chronic renal insufficiency was urged to continue fluid intake  The patient (and any family present) verbalized understanding of the discharge instructions and warnings that would necessitate return to the Emergency Department  All questions were answered prior to discharge           Amount and/or Complexity of Data Reviewed  Clinical lab tests: ordered and reviewed  Tests in the radiology section of CPT®: ordered and reviewed  Tests in the medicine section of CPT®: reviewed and ordered  Review and summarize past medical records: yes  Independent visualization of images, tracings, or specimens: yes        Disposition  Final diagnoses:   Nausea   Chronic suprapubic pain   Non-cardiac chest pain   Chronic renal insufficiency   Bladder wall thickening   Lung cancer ([address] )     Time reflects when diagnosis was documented in both MDM as applicable and the Disposition within this note     Time User Action Codes Description Comment    8/29/2019 10:40 PM Pedro Jackson Add [R11 0] Nausea     8/29/2019 10:40 PM Te Feeling [R10 2,  G89 29] Chronic suprapubic pain     8/29/2019 10:40 PM Pedro Jackson Add [R07 89] Non-cardiac chest pain     8/29/2019 10:40 PM Pedro Jackson Add [N18 9] Chronic renal insufficiency     8/29/2019 10:41 PM Blima Climes B Add [N32 89] Bladder wall thickening     8/29/2019 10:41 PM Pedro Jackson Modify [R11 0] Nausea     8/29/2019 10:41 PM Blima Climes B Modify [N32 89] Bladder wall thickening     8/29/2019 10:41 PM Pedro Jackson Add [C34 90] Lung cancer [facility])       ED Disposition     ED Disposition Condition Date/Time Comment    Discharge Stable Thu Aug 29, 2019 10:40 PM Shayna End Sr  discharge to home/self care  Follow-up Information     Follow up With Specialties Details Why Contact Info Additional [address] [facility]  For further evaluation of bladder wall thickening [facility] [address]  [place] [phone]  [phone]  [facility], [address] [facility] [place] [facility] [place], [id number]          Patient's Medications   Discharge Prescriptions    No medications on file     No discharge procedures on file      ED Provider  Electronically Signed by           Tamra Kent DO  08/29/19 [id number]

## 2019-08-30 NOTE — TELEPHONE ENCOUNTER
I spoke to the patient and he is aware his Renal US is normal and they will discuss it further at the next appointment

## 2019-08-30 NOTE — TELEPHONE ENCOUNTER
----- Message from Anselmo Ormond, MD sent at 8/30/2019  1:41 PM EDT -----  Please let the patient know that the renal US is normal and will discuss further at his upcoming visit  Let me know if any questions or concerns   Tks

## 2019-09-03 LAB
ATRIAL RATE: 83 BPM
P AXIS: 83 DEGREES
PR INTERVAL: 140 MS
QRS AXIS: 75 DEGREES
QRSD INTERVAL: 70 MS
QT INTERVAL: 342 MS
QTC INTERVAL: 401 MS
T WAVE AXIS: 82 DEGREES
VENTRICULAR RATE: 83 BPM

## 2019-09-03 PROCEDURE — 93010 ELECTROCARDIOGRAM REPORT: CPT | Performed by: INTERNAL MEDICINE

## 2019-09-06 ENCOUNTER — OFFICE VISIT (OUTPATIENT)
Dept: HEMATOLOGY ONCOLOGY | Facility: CLINIC | Age: 59
End: 2019-09-06
Payer: COMMERCIAL

## 2019-09-06 ENCOUNTER — APPOINTMENT (OUTPATIENT)
Dept: LAB | Facility: HOSPITAL | Age: 59
End: 2019-09-06
Payer: COMMERCIAL

## 2019-09-06 ENCOUNTER — HOSPITAL ENCOUNTER (OUTPATIENT)
Dept: INFUSION CENTER | Facility: HOSPITAL | Age: 59
Discharge: HOME | End: 2019-09-06
Payer: COMMERCIAL

## 2019-09-06 VITALS
TEMPERATURE: 96.5 F | BODY MASS INDEX: 21.24 KG/M2 | DIASTOLIC BLOOD PRESSURE: 80 MMHG | HEART RATE: 75 BPM | SYSTOLIC BLOOD PRESSURE: 140 MMHG | RESPIRATION RATE: 16 BRPM | OXYGEN SATURATION: 94 % | WEIGHT: 143.4 LBS | HEIGHT: 69 IN

## 2019-09-06 DIAGNOSIS — R39.89 SENSATION OF PRESSURE IN BLADDER AREA: ICD-10-CM

## 2019-09-06 DIAGNOSIS — C34.91 ADENOCARCINOMA OF LUNG, RIGHT (HCC): Primary | ICD-10-CM

## 2019-09-06 DIAGNOSIS — C34.91 ADENOCARCINOMA OF LUNG, RIGHT (HCC): ICD-10-CM

## 2019-09-06 DIAGNOSIS — R30.0 DYSURIA: ICD-10-CM

## 2019-09-06 DIAGNOSIS — N28.9 RENAL DYSFUNCTION: ICD-10-CM

## 2019-09-06 LAB
ALBUMIN SERPL BCP-MCNC: 4 G/DL (ref 3–5.2)
ALP SERPL-CCNC: 101 U/L (ref 43–122)
ALT SERPL W P-5'-P-CCNC: 36 U/L (ref 9–52)
ANION GAP SERPL CALCULATED.3IONS-SCNC: 11 MMOL/L (ref 5–14)
AST SERPL W P-5'-P-CCNC: 36 U/L (ref 17–59)
BASOPHILS # BLD AUTO: 0.07 THOUSAND/UL (ref 0–0.1)
BASOPHILS NFR MAR MANUAL: 1 % (ref 0–1)
BILIRUB SERPL-MCNC: 0.3 MG/DL
BILIRUB UR QL STRIP: NEGATIVE
BUN SERPL-MCNC: 31 MG/DL (ref 5–25)
CALCIUM SERPL-MCNC: 9.7 MG/DL (ref 8.4–10.2)
CHLORIDE SERPL-SCNC: 100 MMOL/L (ref 97–108)
CLARITY UR: CLEAR
CO2 SERPL-SCNC: 28 MMOL/L (ref 22–30)
COLOR UR: NORMAL
CREAT SERPL-MCNC: 2.46 MG/DL (ref 0.7–1.5)
EOSINOPHIL # BLD AUTO: 0.22 THOUSAND/UL (ref 0–0.4)
EOSINOPHIL NFR BLD MANUAL: 3 % (ref 0–6)
ERYTHROCYTE [DISTWIDTH] IN BLOOD BY AUTOMATED COUNT: 19.5 %
GFR SERPL CREATININE-BSD FRML MDRD: 32 ML/MIN/1.73SQ M
GLUCOSE P FAST SERPL-MCNC: 96 MG/DL (ref 70–99)
GLUCOSE UR STRIP-MCNC: NEGATIVE MG/DL
HCT VFR BLD AUTO: 32.1 % (ref 41–53)
HGB BLD-MCNC: 10.7 G/DL (ref 13.5–17.5)
HGB UR QL STRIP.AUTO: NEGATIVE
HYPERCHROMIA BLD QL SMEAR: PRESENT
KETONES UR STRIP-MCNC: NEGATIVE MG/DL
LEUKOCYTE ESTERASE UR QL STRIP: NEGATIVE
LYMPHOCYTES # BLD AUTO: 2.22 THOUSAND/UL (ref 0.5–4)
LYMPHOCYTES # BLD AUTO: 30 % (ref 25–45)
MCH RBC QN AUTO: 28.5 PG (ref 26–34)
MCHC RBC AUTO-ENTMCNC: 33.2 G/DL (ref 31–36)
MCV RBC AUTO: 86 FL (ref 80–100)
MONOCYTES # BLD AUTO: 0.81 THOUSAND/UL (ref 0.2–0.9)
MONOCYTES NFR BLD AUTO: 11 % (ref 1–10)
NEUTS SEG # BLD: 4.07 THOUSAND/UL (ref 1.8–7.8)
NEUTS SEG NFR BLD AUTO: 55 %
NITRITE UR QL STRIP: NEGATIVE
PH UR STRIP.AUTO: 6 [PH]
PLATELET # BLD AUTO: 437 THOUSANDS/UL (ref 150–450)
PLATELET BLD QL SMEAR: ABNORMAL
PMV BLD AUTO: 7.4 FL (ref 8.9–12.7)
POTASSIUM SERPL-SCNC: 5 MMOL/L (ref 3.6–5)
PROT SERPL-MCNC: 7.9 G/DL (ref 5.9–8.4)
PROT UR STRIP-MCNC: NEGATIVE MG/DL
RBC # BLD AUTO: 3.74 MILLION/UL (ref 4.5–5.9)
RBC MORPH BLD: ABNORMAL
SODIUM SERPL-SCNC: 139 MMOL/L (ref 137–147)
SP GR UR STRIP.AUTO: 1.01 (ref 1–1.04)
T3FREE SERPL-MCNC: 2.31 PG/ML (ref 2.3–4.2)
TOTAL CELLS COUNTED SPEC: 100
TSH SERPL DL<=0.05 MIU/L-ACNC: 1.83 UIU/ML (ref 0.47–4.68)
UROBILINOGEN UA: NEGATIVE MG/DL
WBC # BLD AUTO: 7.4 THOUSAND/UL (ref 4.5–11)

## 2019-09-06 PROCEDURE — 85007 BL SMEAR W/DIFF WBC COUNT: CPT

## 2019-09-06 PROCEDURE — 99214 OFFICE O/P EST MOD 30 MIN: CPT | Performed by: INTERNAL MEDICINE

## 2019-09-06 PROCEDURE — 84443 ASSAY THYROID STIM HORMONE: CPT

## 2019-09-06 PROCEDURE — 36415 COLL VENOUS BLD VENIPUNCTURE: CPT | Performed by: NURSE PRACTITIONER

## 2019-09-06 PROCEDURE — 81003 URINALYSIS AUTO W/O SCOPE: CPT

## 2019-09-06 PROCEDURE — 84481 FREE ASSAY (FT-3): CPT

## 2019-09-06 PROCEDURE — 80053 COMPREHEN METABOLIC PANEL: CPT | Performed by: NURSE PRACTITIONER

## 2019-09-06 PROCEDURE — 85027 COMPLETE CBC AUTOMATED: CPT

## 2019-09-06 NOTE — PROGRESS NOTES
Hematology/Oncology Outpatient Follow-up  Addie العراقي  64 y o  male 1960 [id number]    Date:  9/6/2019        Assessment and Plan:  1  Adenocarcinoma of lung, right ([address] )  I had a lengthy discussion with the patient regarding his symptoms and the palliative chemotherapy  He was told that we will wait for the evaluation of the urologist next week to see what is the etiology of the symptoms in the urinary bladder area  His symptoms could be related to the immunotherapy side effect which may need to be treated with high-dose prednisone with the low taper  The patient has renal dysfunction and poor creatinine clearance which would not allow us to continue with the Alimta chemotherapy  I discussed with the patient that most likely were going to switch the palliative chemotherapy to the 2nd line of chemotherapy with single agent Taxotere  The plan at this point is to wait for the urology evaluation and then see him again in about 7-10 days before we can get him restarted on second-line chemotherapy  - CBC and differential; Future  - Comprehensive metabolic panel; Future  - Urinalysis with reflex to microscopic; Future        HPI:  The patient came in today for a follow-up visit  He received 1 treatment of Alimta and Keytruda around the beginning of August after we discuss the PET-CT scan result  He was then seen by the nephrologist and going to be seen by the Urology team next week for his bladder issue  The ultrasound of the kidney on the 23rd of August showed normal studies  He also had a CT scan of the abdomen and pelvis on the 29th of August which showed diffuse circumferential thickening of the urinary bladder wall possibly due to cystitis  I urinalysis was checked on multiple occasions in came back normal on the 1st of August the 21st of August    The UA on the 29th of August showed trace protein and trace blood  The UA from today the 6 of September showed normal UA    His creatinine continues to be abnormal with of 2 46 with the GFR of 32  Continues to have some anemia with hemoglobin of 10 7  The patient continues to complain about pressure feeling in the bladder area  He denied burning on urination or bleeding from any site  Oncology History    42-year-old [facility] American male heavy smoker who used to smoke 2 packs of cigarettes daily for many years, COPD, he presented with dyspnea, CT scan of the chest on October 2018 showed right middle lobe spiculated 1 3 cm mass with multiple solid and ground-glass nodules along the major and minor fissures sub cm pulmonary nodules bilaterally, left adrenal 1 2 cm nodule     PET scan showed 1 3 cm right middle lung nodule with SUV of 6 8, numerous nodular densities along the right major and minor fissures, right hilar activity with SUV of 3 4, subcarinal lymph node measuring 7 mm with SUV of 2 7, periportal enlarged lymph nodes concerning for metastatic disease measuring 2 4 cm with SUV of 5, prominent left retrocrural lymph node measuring 1 cm x 9 mm with SUV of 1 1    Core biopsy of the right spiculated nodule showed invasive adenocarcinoma consistent with lung primary positive for CK7, TTF 1, napsin a        Adenocarcinoma of lung, right ([address] )    11/13/2018 Initial Diagnosis     Adenocarcinoma of lung, right ([address] )  Stage IV      12/13/2018 - 3/28/2019 Chemotherapy      Alimta, Carboplatin (AUC 5) + Keytruda (6 cycles total)      4/17/2019 -  Chemotherapy     Started maintenance Alimta and Keytruda         Interval history:    ROS: Review of Systems   Constitutional: Negative for appetite change, diaphoresis, fatigue and fever  HENT: Negative for congestion, dental problem, facial swelling, hearing loss, tinnitus and trouble swallowing  Eyes: Negative for visual disturbance  Respiratory: Positive for shortness of breath  Negative for cough, chest tightness and wheezing  Cardiovascular: Negative for chest pain and leg swelling     Gastrointestinal: Positive for constipation and nausea  Negative for abdominal distention, abdominal pain, blood in stool, diarrhea and vomiting  Genitourinary: Positive for dysuria  Negative for hematuria and urgency  Significant pressure feeling in the bladder  Musculoskeletal: Negative for arthralgias, myalgias and neck pain  Skin: Negative  Negative for color change, pallor, rash and wound  Neurological: Positive for numbness  Negative for dizziness, weakness and headaches  Hematological: Negative for adenopathy  Psychiatric/Behavioral: Negative for agitation, behavioral problems, confusion, hallucinations, self-injury and sleep disturbance  The patient is not nervous/anxious and is not hyperactive          Past Medical History:   Diagnosis Date    Bipolar 1 disorder ([facility] )     Cancer ([facility] )     adeno lung  dx 11/2018-lung bx today 4/9/2019-ongoing chemo    Chronic pain disorder     back and right shoulder    CKD (chronic kidney disease)     COPD (chronic obstructive pulmonary disease) ([facility] )     Depression     Diabetes mellitus (HCC)     GERD (gastroesophageal reflux disease)     Herniated lumbar intervertebral disc     Hypertension     Insomnia     Latent syphilis     Treated    Low back pain     Lumbosacral disc disease     Pneumothorax after biopsy     R lung    PTSD (post-traumatic stress disorder)     PTSD (post-traumatic stress disorder)     PTSD (post-traumatic stress disorder)     Pulmonary emphysema ([facility] )     Tobacco abuse 11/13/2018       Past Surgical History:   Procedure Laterality Date    COLONOSCOPY  2011    CT GUIDED CHEST TUBE  11/21/2018    FL GUIDED CENTRAL VENOUS ACCESS DEVICE INSERTION  2/8/2019    HEMORROIDECTOMY      IR CHEST TUBE  11/14/2018    IR IMAGE GUIDED BIOPSY/ASPIRATION LUNG  11/13/2018    KNEE SURGERY      LA INSJ TUNNELED CTR VAD W/SUBQ PORT AGE 5 YR/> N/A 2/8/2019    Procedure: PLACEMENT OF PORT-A-CATH;  Surgeon: Fan Quintana MD;  Location: [address] [place] [address] OR;  Service: Vascular       Social History     Socioeconomic History    Marital status: Legally      Spouse name: None    Number of children: None    Years of education: None    Highest education level: None   Occupational History    Occupation: part time employment   Social Needs    Financial resource strain: None    Food insecurity:     Worry: None     Inability: None    Transportation needs:     Medical: None     Non-medical: None   Tobacco Use    Smoking status: Former Smoker     Packs/day: 0 50     Years: 40 00     Pack years: 20 00     Types: Cigarettes     Start date:      Last attempt to quit: 2019     Years since quittin 1    Smokeless tobacco: Never Used   Substance and Sexual Activity    Alcohol use: Not Currently    Drug use: No     Types: Marijuana     Comment: stopped , "i smoked weed and stopped 1 month ago"    Sexual activity: Yes   Lifestyle    Physical activity:     Days per week: None     Minutes per session: None    Stress: None   Relationships    Social connections:     Talks on phone: None     Gets together: None     Attends Yazidism service: None     Active member of club or organization: None     Attends meetings of clubs or organizations: None     Relationship status: None    Intimate partner violence:     Fear of current or ex partner: None     Emotionally abused: None     Physically abused: None     Forced sexual activity: None   Other Topics Concern    None   Social History Narrative    Lives with girlfriend       Family History   Problem Relation Age of Onset    Heart disease Mother     Hypertension Father     Diabetes Family     Asthma Family     Heart disease Family     Cancer Family        Allergies   Allergen Reactions    Lisinopril Anaphylaxis     Took medication a while ago and had a "swelling reaction"  Does not carry epi-pen         Current Outpatient Medications:     albuterol (2 5 mg/3 mL) 0 083 % nebulizer solution, Take 1 vial (2 5 mg total) by nebulization every 4 (four) hours as needed for wheezing or shortness of breath, Disp: 120 vial, Rfl: 3    albuterol (VENTOLIN HFA) 90 mcg/act inhaler, Inhale 2 puffs every 4 (four) hours as needed for wheezing, Disp: 1 Inhaler, Rfl: 5    amLODIPine (NORVASC) 10 mg tablet, Take 1 tablet (10 mg total) by mouth daily, Disp: 30 tablet, Rfl: 1    BANOPHEN 25 MG capsule, , Disp: , Rfl:     Blood Glucose Monitoring Suppl KIT, by Does not apply route daily, Disp: 1 each, Rfl: 0    chlorproMAZINE (THORAZINE) 25 mg tablet, Take 1 tablet (25 mg total) by mouth every 6 (six) hours as needed (hiccups), Disp: 30 tablet, Rfl: 0    dexamethasone (DECADRON) 4 mg tablet, Take 1 tablet (4 mg total) by mouth 2 (two) times a day with meals For 2 days after chemo, Disp: 4 tablet, Rfl: 3    diphenhydrAMINE (BENADRYL) 25 mg tablet, Take 1 tablet (25 mg total) by mouth every 6 (six) hours as needed (nausea), Disp: 30 tablet, Rfl: 0    ergocalciferol (ERGOCALCIFEROL) 47624 units capsule, Take 1 capsule (50,000 Units total) by mouth once a week, Disp: 12 capsule, Rfl: 0    FLUoxetine (PROzac) 10 MG tablet, Take 1 tablet (10 mg total) by mouth daily, Disp: 30 tablet, Rfl: 5    fluticasone (FLONASE) 50 mcg/act nasal spray, 1-2 sprays each nostril daily for allergies, Disp: 1 Bottle, Rfl: 3    fluticasone-vilanterol (BREO ELLIPTA) 100-25 mcg/inh inhaler, Inhale 1 puff daily in the early morning Rinse mouth after use , Disp: 1 Inhaler, Rfl: 5    folic acid (FOLVITE) 1 mg tablet, Take 1 tablet (1 mg total) by mouth daily, Disp: 30 tablet, Rfl: 2    FREESTYLE LITE test strip, TEST BLOOD SUGAR DAILY, Disp: 100 each, Rfl: 1    gabapentin (NEURONTIN) 300 mg capsule, Take 1 capsule (300 mg total) by mouth 3 (three) times a day, Disp: 90 capsule, Rfl: 0    ipratropium (ATROVENT) 0 02 % nebulizer solution, Take 1 vial (0 5 mg total) by nebulization 3 (three) times a day, Disp: 90 vial, Rfl: 1    Lancets (FREESTYLE) lancets, TEST BLOOD SUGAR DAILY, Disp: 100 each, Rfl: 4    lidocaine (LMX) 4 % cream, Apply topically as needed for mild pain Apply 1/2-1 inch to port 30-60 mins prior to needle insertion, cover with serrain wrap, Disp: 30 g, Rfl: 5    loratadine (CLARITIN) 10 mg tablet, Take 1 tablet (10 mg total) by mouth daily in the early morning, Disp: 30 tablet, Rfl: 2    melatonin 3 mg, Take 1 tablet (3 mg total) by mouth daily at bedtime, Disp: 30 tablet, Rfl: 1    metFORMIN (GLUCOPHAGE-XR) 500 mg 24 hr tablet, Take 1 tablet (500 mg total) by mouth 2 (two) times a day with meals, Disp: 60 tablet, Rfl: 0    methocarbamol (ROBAXIN) 750 mg tablet, Take 1 tablet (750 mg total) by mouth every 6 (six) hours as needed for muscle spasms, Disp: 90 tablet, Rfl: 0    oxyCODONE (OxyCONTIN) 20 mg 12 hr tablet, Take 1 tablet (20 mg total) by mouth every 12 (twelve) hoursMax Daily Amount: 40 mg, Disp: 60 tablet, Rfl: 0    oxyCODONE (ROXICODONE) 20 MG TABS, Take 1 tablet (20 mg total) by mouth every 4 (four) hours as needed for severe painMax Daily Amount: 120 mg, Disp: 150 tablet, Rfl: 0    pantoprazole (PROTONIX) 40 mg tablet, Take 1 tablet (40 mg total) by mouth daily, Disp: 30 tablet, Rfl: 5    polyethylene glycol (GLYCOLAX) powder, Take 17 g by mouth daily, Disp: 527 g, Rfl: 1    predniSONE 10 mg tablet, Take 3 tablets (30 mg total) by mouth daily (Patient taking differently: Take 10 mg by mouth daily ), Disp: 90 tablet, Rfl: 0    prochlorperazine (COMPAZINE) 10 mg tablet, Take 1 tablet (10 mg total) by mouth every 6 (six) hours as needed for nausea or vomiting, Disp: 90 tablet, Rfl: 0    QUEtiapine (SEROquel) 25 mg tablet, Take 25 mg by mouth daily at bedtime, Disp: , Rfl:     ranitidine (ZANTAC) 150 mg tablet, Take 1 tablet (150 mg total) by mouth 2 (two) times a day, Disp: 60 tablet, Rfl: 4    REGULOID 28 3 % POWD, USE AS DIRECTED, Disp: 369 g, Rfl: 4    Selenium Sulfide 2 25 % SHAM, apply by topical route every PM to the affected area(s), Disp: , Rfl:     senna-docusate sodium (SENOKOT-S) 8 6-50 mg per tablet, Take 1 tablet by mouth 2 (two) times a day, Disp: 60 tablet, Rfl: 2    sildenafil (VIAGRA) 50 MG tablet, Take 1 tablet (50 mg total) by mouth as needed for erectile dysfunction, Disp: 6 tablet, Rfl: 0    tiotropium (SPIRIVA RESPIMAT) 2 5 MCG/ACT AERS inhaler, Inhale 2 puffs daily, Disp: 1 Inhaler, Rfl: 2    VENTOLIN  (90 Base) MCG/ACT inhaler, Inhale 2 puffs every 4 (four) hours as needed for wheezing, Disp: 18 g, Rfl: 0      Physical Exam:  /80 (BP Location: Left arm, Cuff Size: Adult)   Pulse 75   Temp (!) 96 5 °F (35 8 °C) (Tympanic)   Resp 16   Ht 5' 8 5" (1 74 m)   Wt 65 kg (143 lb 6 4 oz)   SpO2 94%   BMI 21 49 kg/m²     Physical Exam   Constitutional: He is oriented to person, place, and time  He appears well-developed and well-nourished  HENT:   Head: Normocephalic and atraumatic  Nose: Nose normal    Mouth/Throat: Oropharynx is clear and moist    Eyes: Pupils are equal, round, and reactive to light  Conjunctivae and EOM are normal  Right eye exhibits no discharge  Left eye exhibits no discharge  No scleral icterus  Neck: Normal range of motion  Neck supple  No tracheal deviation present  No thyromegaly present  Cardiovascular: Normal rate, regular rhythm and normal heart sounds  Exam reveals no friction rub  No murmur heard  Pulmonary/Chest: Effort normal and breath sounds normal  No respiratory distress  He has no wheezes  He has no rales  He exhibits no tenderness  Abdominal: Soft  Bowel sounds are normal  He exhibits no distension and no mass  There is no hepatosplenomegaly, splenomegaly or hepatomegaly  There is tenderness (On mild palpation in different area including the epigastric right upper quadrant and suprapubic area)  There is no rebound and no guarding  Musculoskeletal: Normal range of motion  He exhibits no edema, tenderness or deformity  Lymphadenopathy:     He has no cervical adenopathy  Neurological: He is alert and oriented to person, place, and time  He has normal reflexes  He displays normal reflexes  No cranial nerve deficit  Coordination normal    Skin: Skin is warm and dry  No rash noted  No erythema  No pallor  Psychiatric: He has a normal mood and affect  His behavior is normal  Judgment and thought content normal          Labs:  Lab Results   Component Value Date    WBC 7 40 09/06/2019    HGB 10 7 (L) 09/06/2019    HCT 32 1 (L) 09/06/2019    MCV 86 09/06/2019     09/06/2019     Lab Results   Component Value Date    K 5 0 09/06/2019     09/06/2019    CO2 28 09/06/2019    BUN 31 (H) 09/06/2019    CREATININE 2 46 (H) 09/06/2019    GLUF 96 09/06/2019    CALCIUM 9 7 09/06/2019    AST 36 09/06/2019    ALT 36 09/06/2019    ALKPHOS 101 09/06/2019    EGFR 32 (L) 09/06/2019     No results found for: TSH    Patient voiced understanding and agreement in the above discussion  Aware to contact our office with questions/symptoms in the interim

## 2019-09-06 NOTE — PROGRESS NOTES
Time out with Mirian De La O RN at Dr Duong Cali office  Pt will not have treamtent today due to uninary/kidney issues  Pt will see renal and Dr Wellington Nguyen again before scheduling treatments

## 2019-09-09 ENCOUNTER — TELEPHONE (OUTPATIENT)
Dept: PALLIATIVE MEDICINE | Facility: CLINIC | Age: 59
End: 2019-09-09

## 2019-09-09 DIAGNOSIS — R52 GENERALIZED BODY ACHES: ICD-10-CM

## 2019-09-09 DIAGNOSIS — G89.3 CANCER ASSOCIATED PAIN: ICD-10-CM

## 2019-09-09 DIAGNOSIS — C34.91 ADENOCARCINOMA OF LUNG, RIGHT (HCC): ICD-10-CM

## 2019-09-09 DIAGNOSIS — M54.50 LUMBAR PAIN: ICD-10-CM

## 2019-09-09 RX ORDER — GABAPENTIN 300 MG/1
300 CAPSULE ORAL 2 TIMES DAILY
Qty: 60 CAPSULE | Refills: 0 | Status: SHIPPED | OUTPATIENT
Start: 2019-09-09 | End: 2019-10-08 | Stop reason: SDUPTHER

## 2019-09-09 RX ORDER — OXYCODONE HCL 20 MG/1
20 TABLET, FILM COATED, EXTENDED RELEASE ORAL EVERY 12 HOURS SCHEDULED
Qty: 60 TABLET | Refills: 0 | Status: SHIPPED | OUTPATIENT
Start: 2019-09-09 | End: 2019-09-16 | Stop reason: SDUPTHER

## 2019-09-09 RX ORDER — OXYCODONE HYDROCHLORIDE 20 MG/1
20 TABLET ORAL EVERY 4 HOURS PRN
Qty: 150 TABLET | Refills: 0 | Status: SHIPPED | OUTPATIENT
Start: 2019-09-09 | End: 2019-09-16 | Stop reason: SDUPTHER

## 2019-09-11 ENCOUNTER — OFFICE VISIT (OUTPATIENT)
Dept: UROLOGY | Facility: MEDICAL CENTER | Age: 59
End: 2019-09-11
Payer: COMMERCIAL

## 2019-09-11 VITALS
WEIGHT: 143 LBS | SYSTOLIC BLOOD PRESSURE: 140 MMHG | BODY MASS INDEX: 21.43 KG/M2 | DIASTOLIC BLOOD PRESSURE: 80 MMHG | HEART RATE: 66 BPM

## 2019-09-11 DIAGNOSIS — N41.9 PROSTATITIS, UNSPECIFIED PROSTATITIS TYPE: ICD-10-CM

## 2019-09-11 DIAGNOSIS — R30.0 STRANGURIA: ICD-10-CM

## 2019-09-11 DIAGNOSIS — N32.89 BLADDER WALL THICKENING: Primary | ICD-10-CM

## 2019-09-11 LAB — POST-VOID RESIDUAL VOLUME, ML POC: 25 ML

## 2019-09-11 PROCEDURE — 99244 OFF/OP CNSLTJ NEW/EST MOD 40: CPT | Performed by: UROLOGY

## 2019-09-11 PROCEDURE — 51798 US URINE CAPACITY MEASURE: CPT | Performed by: UROLOGY

## 2019-09-11 RX ORDER — CIPROFLOXACIN 500 MG/1
500 TABLET, FILM COATED ORAL EVERY 12 HOURS SCHEDULED
Qty: 14 TABLET | Refills: 0 | Status: SHIPPED | OUTPATIENT
Start: 2019-09-11 | End: 2019-09-18

## 2019-09-11 NOTE — PROGRESS NOTES
Assessment/Plan:      Diagnoses and all orders for this visit:    Bladder wall thickening    Stranguria  -     ciprofloxacin (CIPRO) 500 mg tablet; Take 1 tablet (500 mg total) by mouth every 12 (twelve) hours for 7 days  -     POCT Measure PVR    Prostatitis, unspecified prostatitis type  -     ciprofloxacin (CIPRO) 500 mg tablet; Take 1 tablet (500 mg total) by mouth every 12 (twelve) hours for 7 days        Assessment/Plan:  Type of urinary symptoms he describes- probably involve his prostate, possible prostatitis  Patient does not have any obstructive voiding symptoms, nor does he have an elevated PVR  We will trial with Cipro empirically  Subjective:  Discomfort during burning     Patient ID: Illona Hashimoto is a 64 y o  male  HPI  Patient is receiving palliative chemotherapy for advanced adenocarcinoma of the lung  He was having urinary symptoms that involved more pain the dysuria  He denies any hematuria  CT scan the abdomen pelvis demonstrated bladder wall thickening  He denies any hematuria, history of nephrolithiasis, flank or abdominal pains  He has poor appetite weight loss related to treatment for his lung cancer  Review of Systems   Constitutional: Positive for fatigue and unexpected weight change  HENT: Positive for congestion and trouble swallowing  Eyes: Negative  Respiratory: Positive for choking and shortness of breath  Cardiovascular: Positive for chest pain  Gastrointestinal: Negative  Endocrine: Positive for cold intolerance  Genitourinary: Positive for difficulty urinating  Musculoskeletal: Negative  Skin: Negative  Allergic/Immunologic: Negative  Neurological: Negative  Hematological: Negative  Psychiatric/Behavioral: Negative  Objective:     Physical Exam   Constitutional: He is oriented to person, place, and time  He appears well-developed  No distress  HENT:   Head: Normocephalic and atraumatic     Nose: Nose normal  Mouth/Throat: Oropharynx is clear and moist    Eyes: Pupils are equal, round, and reactive to light  Conjunctivae and EOM are normal  No scleral icterus  Neck: Normal range of motion  Neck supple  Pulmonary/Chest: No respiratory distress  Abdominal: Soft  Bowel sounds are normal  He exhibits no distension and no mass  There is no tenderness  Musculoskeletal: Normal range of motion  He exhibits no edema or tenderness  Lymphadenopathy:     He has no cervical adenopathy  Neurological: He is alert and oriented to person, place, and time  No cranial nerve deficit  Skin: Skin is warm and dry  No rash noted  No erythema  No pallor  Psychiatric: He has a normal mood and affect  His behavior is normal  Judgment and thought content normal    Nursing note and vitals reviewed        Bladder scan PVR today was 25 cc

## 2019-09-16 ENCOUNTER — TRANSCRIBE ORDERS (OUTPATIENT)
Dept: ADMINISTRATIVE | Facility: HOSPITAL | Age: 59
End: 2019-09-16

## 2019-09-16 ENCOUNTER — OFFICE VISIT (OUTPATIENT)
Dept: PALLIATIVE MEDICINE | Facility: CLINIC | Age: 59
End: 2019-09-16
Payer: COMMERCIAL

## 2019-09-16 ENCOUNTER — OFFICE VISIT (OUTPATIENT)
Dept: HEMATOLOGY ONCOLOGY | Facility: CLINIC | Age: 59
End: 2019-09-16
Payer: COMMERCIAL

## 2019-09-16 ENCOUNTER — APPOINTMENT (OUTPATIENT)
Dept: LAB | Facility: HOSPITAL | Age: 59
End: 2019-09-16
Attending: INTERNAL MEDICINE
Payer: COMMERCIAL

## 2019-09-16 VITALS
OXYGEN SATURATION: 94 % | RESPIRATION RATE: 20 BRPM | HEIGHT: 69 IN | WEIGHT: 147.4 LBS | HEART RATE: 72 BPM | DIASTOLIC BLOOD PRESSURE: 80 MMHG | BODY MASS INDEX: 21.83 KG/M2 | TEMPERATURE: 98.4 F | SYSTOLIC BLOOD PRESSURE: 130 MMHG

## 2019-09-16 VITALS
TEMPERATURE: 97.6 F | HEART RATE: 80 BPM | HEIGHT: 69 IN | OXYGEN SATURATION: 95 % | DIASTOLIC BLOOD PRESSURE: 90 MMHG | RESPIRATION RATE: 18 BRPM | BODY MASS INDEX: 21.71 KG/M2 | SYSTOLIC BLOOD PRESSURE: 160 MMHG | WEIGHT: 146.6 LBS

## 2019-09-16 DIAGNOSIS — C34.91 ADENOCARCINOMA OF LUNG, RIGHT (HCC): Primary | ICD-10-CM

## 2019-09-16 DIAGNOSIS — N28.9 RENAL DYSFUNCTION: ICD-10-CM

## 2019-09-16 DIAGNOSIS — C34.91 ADENOCARCINOMA OF LUNG, RIGHT (HCC): ICD-10-CM

## 2019-09-16 DIAGNOSIS — M54.50 LUMBAR PAIN: ICD-10-CM

## 2019-09-16 DIAGNOSIS — R52 GENERALIZED BODY ACHES: ICD-10-CM

## 2019-09-16 DIAGNOSIS — N63.20 LEFT BREAST LUMP: ICD-10-CM

## 2019-09-16 DIAGNOSIS — G89.3 CANCER ASSOCIATED PAIN: ICD-10-CM

## 2019-09-16 DIAGNOSIS — R77.9 ELEVATED BLOOD PROTEIN: ICD-10-CM

## 2019-09-16 LAB
BASOPHILS # BLD AUTO: 0.1 THOUSANDS/ΜL (ref 0–0.1)
BASOPHILS NFR BLD AUTO: 1 % (ref 0–1)
EOSINOPHIL # BLD AUTO: 0.2 THOUSAND/ΜL (ref 0–0.4)
EOSINOPHIL NFR BLD AUTO: 2 % (ref 0–6)
ERYTHROCYTE [DISTWIDTH] IN BLOOD BY AUTOMATED COUNT: 19.5 %
HCT VFR BLD AUTO: 35.6 % (ref 41–53)
HGB BLD-MCNC: 11.9 G/DL (ref 13.5–17.5)
LYMPHOCYTES # BLD AUTO: 2.7 THOUSANDS/ΜL (ref 0.5–4)
LYMPHOCYTES NFR BLD AUTO: 26 % (ref 25–45)
MCH RBC QN AUTO: 28.4 PG (ref 26–34)
MCHC RBC AUTO-ENTMCNC: 33.3 G/DL (ref 31–36)
MCV RBC AUTO: 86 FL (ref 80–100)
MONOCYTES # BLD AUTO: 0.9 THOUSAND/ΜL (ref 0.2–0.9)
MONOCYTES NFR BLD AUTO: 9 % (ref 1–10)
NEUTROPHILS # BLD AUTO: 6.5 THOUSANDS/ΜL (ref 1.8–7.8)
NEUTS SEG NFR BLD AUTO: 62 % (ref 45–65)
PLATELET # BLD AUTO: 447 THOUSANDS/UL (ref 150–450)
PMV BLD AUTO: 6.7 FL (ref 8.9–12.7)
RBC # BLD AUTO: 4.17 MILLION/UL (ref 4.5–5.9)
WBC # BLD AUTO: 10.4 THOUSAND/UL (ref 4.5–11)

## 2019-09-16 PROCEDURE — 99215 OFFICE O/P EST HI 40 MIN: CPT | Performed by: NURSE PRACTITIONER

## 2019-09-16 PROCEDURE — 99214 OFFICE O/P EST MOD 30 MIN: CPT | Performed by: NURSE PRACTITIONER

## 2019-09-16 PROCEDURE — 85025 COMPLETE CBC W/AUTO DIFF WBC: CPT

## 2019-09-16 RX ORDER — OXYCODONE HCL 20 MG/1
20 TABLET, FILM COATED, EXTENDED RELEASE ORAL EVERY 12 HOURS SCHEDULED
Start: 2019-09-16 | End: 2019-10-08 | Stop reason: SDUPTHER

## 2019-09-16 RX ORDER — OXYCODONE HYDROCHLORIDE 20 MG/1
20 TABLET ORAL EVERY 4 HOURS PRN
Qty: 150 TABLET | Refills: 0 | Status: CANCELLED | OUTPATIENT
Start: 2019-09-16

## 2019-09-16 RX ORDER — OXYCODONE HCL 20 MG/1
20 TABLET, FILM COATED, EXTENDED RELEASE ORAL EVERY 12 HOURS SCHEDULED
Qty: 60 TABLET | Refills: 0 | Status: CANCELLED | OUTPATIENT
Start: 2019-09-16

## 2019-09-16 RX ORDER — OXYCODONE HYDROCHLORIDE 20 MG/1
30 TABLET ORAL EVERY 4 HOURS PRN
Start: 2019-09-16 | End: 2019-10-04 | Stop reason: SDUPTHER

## 2019-09-16 RX ORDER — SODIUM CHLORIDE 9 MG/ML
20 INJECTION, SOLUTION INTRAVENOUS ONCE
Status: CANCELLED | OUTPATIENT
Start: 2019-09-19

## 2019-09-16 NOTE — PATIENT INSTRUCTIONS
Increase oxycodone IR (immediate release) to 1 5 tabs every 4 hr as needed for pain    Continue OxyContin (long-acting) at 1 tab every 12 hr on schedule    Call early when you are getting low-  - will change oxycodone IR to 30mg tabs - 1 tab every 4 hr as needed    - will change OxyContin to 20mg every 8 hr scheduled (or three times daily)

## 2019-09-16 NOTE — PROGRESS NOTES
Palliative and Supportive Care   Amber Godwin   64 y o  male [id number]    Assessment/Plan:  1  Adenocarcinoma of lung, right ([address] )    2  Generalized body aches    3  Lumbar pain    4  Cancer associated pain        Requested Prescriptions     Signed Prescriptions Disp Refills    oxyCODONE (ROXICODONE) 20 MG TABS       Sig: Take 1 5 tablets (30 mg total) by mouth every 4 (four) hours as needed for severe painMax Daily Amount: 180 mg    oxyCODONE (OxyCONTIN) 20 mg 12 hr tablet       Sig: Take 1 tablet (20 mg total) by mouth every 12 (twelve) hoursMax Daily Amount: 40 mg       Increase in pain recently- will increase dose of oxycodone IR to 30mg q 4h PRN [he will use meds recently refilled (1 5 tabs of 20mg)]  In about two weeks or when next refill needed, will increase OxyContin to 20mg q 8h [currently 20mg q 12h]  Continue decreased dose of gabapentin 300mg BID due to kidney dysfunction  Follow up in one month      Subjective    Chief Concern  Follow up visit for:  Symptom management         History of Present Illness  Patient ID: Lewis Interiano  is a 64 y o  male with metastatic NSCLC diagnosed in fall 2018  He is s/p 6 cycles of palliative chemo Alimta, carboplatin, and keytruda, then on maintenance Alimta and Keytruda  This is currently on hold  Recent issues with worsened kidney function, also recently found to have thickening of urinary bladder wall, urinary symptoms etc  Recently started on course of abx by urologist for possible prostatitis  He is seeing oncology in follow up later today  Follows with Dr Ian White  He has been having increased pain over past 2 5 weeks  In addition to generalized arthralgias, he notes aching pain of lateral trunk / lower abdomen down into thighs  Pain is worse in morning after first getting up, for first couple hours  Pain is improved with pain medication but not fully relieved or adequately controlled  He is taking OxyContin BID and oxycodone IR about 3-4x/day  He is sleeping a lot, both day and night  Also having numbness of fingers and feet  The following portions of the patient's history were reviewed and updated as appropriate: allergies, current medications, past family history, past medical history, past social history, past surgical history and problem list       Visit Information    Accompanied By: No one  Source of History: Self  History Limitations: None    ROS  Review of Systems   Constitutional: Positive for fatigue  Musculoskeletal: Positive for arthralgias  Objective     Physical Exam   Constitutional: He is oriented to person, place, and time  He is cooperative  Pulmonary/Chest: Effort normal    Neurological: He is alert and oriented to person, place, and time  Psychiatric: He has a normal mood and affect   Cognition and memory are normal          /90 (BP Location: Left arm, Patient Position: Sitting, Cuff Size: Standard)   Pulse 80   Temp 97 6 °F (36 4 °C) (Oral)   Resp 18   Ht 5' 8 5" (1 74 m)   Wt 66 5 kg (146 lb 9 6 oz)   SpO2 95%   BMI 21 97 kg/m²           Current Outpatient Medications:     albuterol (2 5 mg/3 mL) 0 083 % nebulizer solution, Take 1 vial (2 5 mg total) by nebulization every 4 (four) hours as needed for wheezing or shortness of breath, Disp: 120 vial, Rfl: 3    albuterol (VENTOLIN HFA) 90 mcg/act inhaler, Inhale 2 puffs every 4 (four) hours as needed for wheezing, Disp: 1 Inhaler, Rfl: 5    amLODIPine (NORVASC) 10 mg tablet, Take 1 tablet (10 mg total) by mouth daily, Disp: 30 tablet, Rfl: 1    chlorproMAZINE (THORAZINE) 25 mg tablet, Take 1 tablet (25 mg total) by mouth every 6 (six) hours as needed (hiccups), Disp: 30 tablet, Rfl: 0    ciprofloxacin (CIPRO) 500 mg tablet, Take 1 tablet (500 mg total) by mouth every 12 (twelve) hours for 7 days, Disp: 14 tablet, Rfl: 0    dexamethasone (DECADRON) 4 mg tablet, Take 1 tablet (4 mg total) by mouth 2 (two) times a day with meals For 2 days after chemo, Disp: 4 tablet, Rfl: 3    diphenhydrAMINE (BENADRYL) 25 mg tablet, Take 1 tablet (25 mg total) by mouth every 6 (six) hours as needed (nausea), Disp: 30 tablet, Rfl: 0    ergocalciferol (ERGOCALCIFEROL) 31700 units capsule, Take 1 capsule (50,000 Units total) by mouth once a week, Disp: 12 capsule, Rfl: 0    FLUoxetine (PROzac) 10 MG tablet, Take 1 tablet (10 mg total) by mouth daily, Disp: 30 tablet, Rfl: 5    fluticasone-vilanterol (BREO ELLIPTA) 100-25 mcg/inh inhaler, Inhale 1 puff daily in the early morning Rinse mouth after use , Disp: 1 Inhaler, Rfl: 5    folic acid (FOLVITE) 1 mg tablet, Take 1 tablet (1 mg total) by mouth daily, Disp: 30 tablet, Rfl: 2    gabapentin (NEURONTIN) 300 mg capsule, Take 1 capsule (300 mg total) by mouth 2 (two) times a day, Disp: 60 capsule, Rfl: 0    ipratropium (ATROVENT) 0 02 % nebulizer solution, Take 1 vial (0 5 mg total) by nebulization 3 (three) times a day, Disp: 90 vial, Rfl: 1    loratadine (CLARITIN) 10 mg tablet, Take 1 tablet (10 mg total) by mouth daily in the early morning, Disp: 30 tablet, Rfl: 2    melatonin 3 mg, Take 1 tablet (3 mg total) by mouth daily at bedtime, Disp: 30 tablet, Rfl: 1    metFORMIN (GLUCOPHAGE-XR) 500 mg 24 hr tablet, Take 1 tablet (500 mg total) by mouth 2 (two) times a day with meals, Disp: 60 tablet, Rfl: 0    methocarbamol (ROBAXIN) 750 mg tablet, Take 1 tablet (750 mg total) by mouth every 6 (six) hours as needed for muscle spasms, Disp: 90 tablet, Rfl: 0    oxyCODONE (OxyCONTIN) 20 mg 12 hr tablet, Take 1 tablet (20 mg total) by mouth every 12 (twelve) hoursMax Daily Amount: 40 mg, Disp: , Rfl:     oxyCODONE (ROXICODONE) 20 MG TABS, Take 1 5 tablets (30 mg total) by mouth every 4 (four) hours as needed for severe painMax Daily Amount: 180 mg, Disp: , Rfl:     pantoprazole (PROTONIX) 40 mg tablet, Take 1 tablet (40 mg total) by mouth daily, Disp: 30 tablet, Rfl: 5    predniSONE 10 mg tablet, Take 3 tablets (30 mg total) by mouth daily (Patient taking differently: Take 10 mg by mouth daily ), Disp: 90 tablet, Rfl: 0    prochlorperazine (COMPAZINE) 10 mg tablet, Take 1 tablet (10 mg total) by mouth every 6 (six) hours as needed for nausea or vomiting, Disp: 90 tablet, Rfl: 0    QUEtiapine (SEROquel) 25 mg tablet, Take 25 mg by mouth daily at bedtime, Disp: , Rfl:     ranitidine (ZANTAC) 150 mg tablet, Take 1 tablet (150 mg total) by mouth 2 (two) times a day, Disp: 60 tablet, Rfl: 4    senna-docusate sodium (SENOKOT-S) 8 6-50 mg per tablet, Take 1 tablet by mouth 2 (two) times a day, Disp: 60 tablet, Rfl: 2    tiotropium (SPIRIVA RESPIMAT) 2 5 MCG/ACT AERS inhaler, Inhale 2 puffs daily, Disp: 1 Inhaler, Rfl: 2    VENTOLIN  (90 Base) MCG/ACT inhaler, Inhale 2 puffs every 4 (four) hours as needed for wheezing, Disp: 18 g, Rfl: 0    BANOPHEN 25 MG capsule, , Disp: , Rfl:     Blood Glucose Monitoring Suppl KIT, by Does not apply route daily, Disp: 1 each, Rfl: 0    fluticasone (FLONASE) 50 mcg/act nasal spray, 1-2 sprays each nostril daily for allergies (Patient not taking: Reported on 9/16/2019), Disp: 1 Bottle, Rfl: 3    FREESTYLE LITE test strip, TEST BLOOD SUGAR DAILY, Disp: 100 each, Rfl: 1    Lancets (FREESTYLE) lancets, TEST BLOOD SUGAR DAILY, Disp: 100 each, Rfl: 4    lidocaine (LMX) 4 % cream, Apply topically as needed for mild pain Apply 1/2-1 inch to port 30-60 mins prior to needle insertion, cover with serrain wrap, Disp: 30 g, Rfl: 5    polyethylene glycol (GLYCOLAX) powder, Take 17 g by mouth daily, Disp: 527 g, Rfl: 1    REGULOID 28 3 % POWD, USE AS DIRECTED, Disp: 369 g, Rfl: 4    Selenium Sulfide 2 25 % SHAM, apply by topical route  every PM to the affected area(s), Disp: , Rfl:     sildenafil (VIAGRA) 50 MG tablet, Take 1 tablet (50 mg total) by mouth as needed for erectile dysfunction, Disp: 6 tablet, Rfl: 0      Shayna Blinks, CRNP  [facility]          Representatives have queried the patient's controlled substance dispensing history in the Prescription Drug Monitoring Program in compliance with the [facility] regulations before I have prescribed any controlled substances  The prescription history is consistent with prescribed therapy and our practice policies

## 2019-09-16 NOTE — PROGRESS NOTES
Hematology/Oncology Outpatient Follow-up  Rosalva Mccain Sr  64 y o  male 1960 [id number]    Date:  9/16/2019      Assessment and Plan:  1  Adenocarcinoma of lung, right [facility])  Patient is no longer a candidate for Alimta chemotherapy due to his renal dysfunction which will be discontinued  His most recent PET-CT scan from the 26th of July did show that he was responding to the Alimta/Keytruda regimen  His options at this point are to continue with single agent immunotherapy every 3 weeks versus switching to his next line of chemotherapy with single agent Taxotere  We did discuss the 2 options and the side effects of both agents  Patient seems to be interested in continuing with this single agent Keytruda for now which will be scheduled for later this  We will continue to monitor him closely on an every three-week basis  He is aware to contact us in the interim should he develop any new or worsening symptoms, especially increase in his current urinary symptoms  Above plan discussed and confirmed with Dr Mcclure Feeling  - CBC and differential; Future  - Comprehensive metabolic panel; Future  - Magnesium; Future  - TSH, 3rd generation with Free T4 reflex; Future  - Infusion Calculated Appointment Request; Future  - CBC and differential; Future  - Comprehensive metabolic panel; Future  - TSH, 3rd generation with Free T4 reflex; Future  - T3, free; Future  - Infusion Calculated Appointment Request; Future  - CBC and differential; Future  - Comprehensive metabolic panel; Future  - TSH, 3rd generation with Free T4 reflex; Future  - T3, free; Future  - Infusion Calculated Appointment Request; Future  - CBC and differential; Future  - Comprehensive metabolic panel; Future  - TSH, 3rd generation with Free T4 reflex; Future  - T3, free; Future    2  Renal dysfunction  Patient's renal dysfunction is stable at this point possibly his new baseline    He was advised to continue to follow-up closely with his nephrologist  - Infusion Calculated Appointment Request; Future  - CBC and differential; Future  - Comprehensive metabolic panel; Future  - TSH, 3rd generation with Free T4 reflex; Future  - T3, free; Future  - Infusion Calculated Appointment Request; Future  - CBC and differential; Future  - Comprehensive metabolic panel; Future  - TSH, 3rd generation with Free T4 reflex; Future  - T3, free; Future  - Infusion Calculated Appointment Request; Future  - CBC and differential; Future  - Comprehensive metabolic panel; Future  - TSH, 3rd generation with Free T4 reflex; Future  - T3, free; Future    3  Elevated blood protein  Patient continues to have elevated serum total protein  He did have some additional workup done which showed slightly elevated IgA 523 an abnormal kappa lambda free light chain ratio which may or may not be due to his renal dysfunction  His serum and urine protein electrophoresis were negative for monoclonal bands  Patient never submitted his urine for free light chains  We will continue to monitor patient closely and repeat his workup for monoclonal gammopathy in a few months and make a decision at that point if further workup will be pursued  4  Left breast lump  Patient seems to be concerned about some tenderness that he has been experiencing below the left nipple for the past few months  We will further evaluate the area with an ultrasound  - US breast left limited (diagnostic); Future    HPI:  Patient presents today for a follow-up appointment  His chemotherapy treatment with Alimta and Rani Ramal has been on hold since 08/01/2019 due to worsening renal dysfunction and urinary symptoms  Patient was seen and evaluated by Urology on 09/11/2019 for his urinary symptoms and thickening of the bladder found on imaging  Urology felt that his symptoms were related to his prostate likely prostatitis since he was without obstructive voiding or elevated PVR    He is currently being treated with a 7 day course of Cipro and has a follow-up with his urologist in about 3 weeks  He continues to report some pressure with urination and occasionally bladder pressure at random times but mentions that he has noticed some improvement after starting the antibiotics  The patient continues to report chronic dyspnea and generalized arthralgias or myalgias which he feels is well controlled on his current pain regimen  Otherwise the patient states that he is feeling well with better energy and appetite  He continues to take the low-dose prednisone 10 mg daily  He seems to be concerned about some tenderness and swelling below his left nipple/areola that he mentions has been present for a few months  Patient's most recent laboratory studies from this morning show WBC 10 4, mild normocytic anemia H&H 11 9/35 6, MCV 86 normal platelet count 328  He continues to have renal dysfunction which appears to be stable BUN 38, creatinine 2 55 and GFR 31  His total protein is elevated 8 8 remaining metabolic panel is within normal limits      Oncology History    65-year-old [facility] American male heavy smoker who used to smoke 2 packs of cigarettes daily for many years, COPD, he presented with dyspnea, CT scan of the chest on October 2018 showed right middle lobe spiculated 1 3 cm mass with multiple solid and ground-glass nodules along the major and minor fissures sub cm pulmonary nodules bilaterally, left adrenal 1 2 cm nodule     PET scan showed 1 3 cm right middle lung nodule with SUV of 6 8, numerous nodular densities along the right major and minor fissures, right hilar activity with SUV of 3 4, subcarinal lymph node measuring 7 mm with SUV of 2 7, periportal enlarged lymph nodes concerning for metastatic disease measuring 2 4 cm with SUV of 5, prominent left retrocrural lymph node measuring 1 cm x 9 mm with SUV of 1 1    Core biopsy of the right spiculated nodule showed invasive adenocarcinoma consistent with lung primary positive for CK7, TTF 1, napsin a        [facility]    11/13/2018 Initial Diagnosis     Adenocarcinoma of lung, right ([facility] )  Stage IV      12/13/2018 - 3/28/2019 Chemotherapy      Alimta, Carboplatin (AUC 5) + Keytruda (6 cycles total)      4/17/2019 -  Chemotherapy     Started maintenance Alimta and Keytruda         Interval history:    ROS: Review of Systems   Constitutional: Negative for activity change, appetite change, chills, fatigue, fever and unexpected weight change  HENT: Positive for hearing loss  Negative for mouth sores, nosebleeds, sore throat and trouble swallowing  Eyes: Negative  Respiratory: Positive for shortness of breath  Negative for cough and chest tightness  Cardiovascular: Negative for chest pain, palpitations and leg swelling  Gastrointestinal: Negative for abdominal distention, abdominal pain, blood in stool, constipation, diarrhea, nausea and vomiting  Genitourinary: Negative for difficulty urinating, dysuria, frequency, hematuria and urgency  Musculoskeletal: Positive for arthralgias and myalgias  Negative for back pain, gait problem and joint swelling  Skin: Negative for color change, pallor and rash  Neurological: Negative for dizziness, weakness, light-headedness, numbness and headaches  Hematological: Negative for adenopathy  Does not bruise/bleed easily  Psychiatric/Behavioral: Positive for dysphoric mood  Negative for sleep disturbance  The patient is not nervous/anxious          Past Medical History:   Diagnosis Date    Bipolar 1 disorder ([place] [address] )     Cancer ([place] [address] )     adeno lung  dx 11/2018-lung bx today 4/9/2019-ongoing chemo    Chronic pain disorder     back and right shoulder    CKD (chronic kidney disease)     COPD (chronic obstructive pulmonary disease) ([place] [address] )     Depression     Diabetes mellitus (HCC)     GERD (gastroesophageal reflux disease)     Herniated lumbar intervertebral disc     Hypertension     Insomnia     Latent syphilis     Treated    Low back pain     Lumbosacral disc disease     Pneumothorax after biopsy     R lung    PTSD (post-traumatic stress disorder)     PTSD (post-traumatic stress disorder)     PTSD (post-traumatic stress disorder)     Pulmonary emphysema (HCC)     Tobacco abuse 2018       Past Surgical History:   Procedure Laterality Date    COLONOSCOPY      CT GUIDED CHEST TUBE  2018    FL GUIDED CENTRAL VENOUS ACCESS DEVICE INSERTION  2019    HEMORROIDECTOMY      IR CHEST TUBE  2018    IR IMAGE GUIDED BIOPSY/ASPIRATION LUNG  2018    KNEE SURGERY      IN INSJ TUNNELED CTR VAD W/SUBQ PORT AGE 5 YR/> N/A 2019    Procedure: PLACEMENT OF PORT-A-CATH;  Surgeon: Juan Yu MD;  Location:  MAIN OR;  Service: Vascular       Social History     Socioeconomic History    Marital status: Legally      Spouse name: Not on file    Number of children: Not on file    Years of education: Not on file    Highest education level: Not on file   Occupational History    Occupation: part time employment   Social Needs    Financial resource strain: Not on file    Food insecurity:     Worry: Not on file     Inability: Not on file   [employer] needs:     Medical: Not on file     Non-medical: Not on file   Tobacco Use    Smoking status: Former Smoker     Packs/day: 0 50     Years: 40 00     Pack years: 20 00     Types: Cigarettes     Start date:      Last attempt to quit: 2019     Years since quittin 1    Smokeless tobacco: Never Used   Substance and Sexual Activity    Alcohol use: Not Currently    Drug use: No     Types: Marijuana     Comment: stopped , "i smoked weed and stopped 1 month ago"    Sexual activity: Yes   Lifestyle    Physical activity:     Days per week: Not on file     Minutes per session: Not on file    Stress: Not on file   Relationships    Social connections:     Talks on phone: Not on file     Gets together: Not on file     Attends Denominational service: Not on file     Active member of club or organization: Not on file     Attends meetings of clubs or organizations: Not on file     Relationship status: Not on file    Intimate partner violence:     Fear of current or ex partner: Not on file     Emotionally abused: Not on file     Physically abused: Not on file     Forced sexual activity: Not on file   Other Topics Concern    Not on file   Social History Narrative    Lives with girlfriend       Family History   Problem Relation Age of Onset    Heart disease Mother     Hypertension Father     Diabetes Family     Asthma Family     Heart disease Family     Cancer Family        Allergies   Allergen Reactions    Lisinopril Anaphylaxis     Took medication a while ago and had a "swelling reaction"  Does not carry epi-pen         Current Outpatient Medications:     albuterol (2 5 mg/3 mL) 0 083 % nebulizer solution, Take 1 vial (2 5 mg total) by nebulization every 4 (four) hours as needed for wheezing or shortness of breath, Disp: 120 vial, Rfl: 3    albuterol (VENTOLIN HFA) 90 mcg/act inhaler, Inhale 2 puffs every 4 (four) hours as needed for wheezing, Disp: 1 Inhaler, Rfl: 5    amLODIPine (NORVASC) 10 mg tablet, Take 1 tablet (10 mg total) by mouth daily, Disp: 30 tablet, Rfl: 1    BANOPHEN 25 MG capsule, , Disp: , Rfl:     Blood Glucose Monitoring Suppl KIT, by Does not apply route daily, Disp: 1 each, Rfl: 0    chlorproMAZINE (THORAZINE) 25 mg tablet, Take 1 tablet (25 mg total) by mouth every 6 (six) hours as needed (hiccups), Disp: 30 tablet, Rfl: 0    ciprofloxacin (CIPRO) 500 mg tablet, Take 1 tablet (500 mg total) by mouth every 12 (twelve) hours for 7 days, Disp: 14 tablet, Rfl: 0    dexamethasone (DECADRON) 4 mg tablet, Take 1 tablet (4 mg total) by mouth 2 (two) times a day with meals For 2 days after chemo, Disp: 4 tablet, Rfl: 3    diphenhydrAMINE (BENADRYL) 25 mg tablet, Take 1 tablet (25 mg total) by mouth every 6 (six) hours as needed (nausea), Disp: 30 tablet, Rfl: 0    ergocalciferol (ERGOCALCIFEROL) 79444 units capsule, Take 1 capsule (50,000 Units total) by mouth once a week, Disp: 12 capsule, Rfl: 0    FLUoxetine (PROzac) 10 MG tablet, Take 1 tablet (10 mg total) by mouth daily, Disp: 30 tablet, Rfl: 5    fluticasone-vilanterol (BREO ELLIPTA) 100-25 mcg/inh inhaler, Inhale 1 puff daily in the early morning Rinse mouth after use , Disp: 1 Inhaler, Rfl: 5    folic acid (FOLVITE) 1 mg tablet, Take 1 tablet (1 mg total) by mouth daily, Disp: 30 tablet, Rfl: 2    FREESTYLE LITE test strip, TEST BLOOD SUGAR DAILY, Disp: 100 each, Rfl: 1    gabapentin (NEURONTIN) 300 mg capsule, Take 1 capsule (300 mg total) by mouth 2 (two) times a day, Disp: 60 capsule, Rfl: 0    ipratropium (ATROVENT) 0 02 % nebulizer solution, Take 1 vial (0 5 mg total) by nebulization 3 (three) times a day, Disp: 90 vial, Rfl: 1    Lancets (FREESTYLE) lancets, TEST BLOOD SUGAR DAILY, Disp: 100 each, Rfl: 4    lidocaine (LMX) 4 % cream, Apply topically as needed for mild pain Apply 1/2-1 inch to port 30-60 mins prior to needle insertion, cover with serrain wrap, Disp: 30 g, Rfl: 5    loratadine (CLARITIN) 10 mg tablet, Take 1 tablet (10 mg total) by mouth daily in the early morning, Disp: 30 tablet, Rfl: 2    melatonin 3 mg, Take 1 tablet (3 mg total) by mouth daily at bedtime, Disp: 30 tablet, Rfl: 1    metFORMIN (GLUCOPHAGE-XR) 500 mg 24 hr tablet, Take 1 tablet (500 mg total) by mouth 2 (two) times a day with meals, Disp: 60 tablet, Rfl: 0    methocarbamol (ROBAXIN) 750 mg tablet, Take 1 tablet (750 mg total) by mouth every 6 (six) hours as needed for muscle spasms, Disp: 90 tablet, Rfl: 0    oxyCODONE (OxyCONTIN) 20 mg 12 hr tablet, Take 1 tablet (20 mg total) by mouth every 12 (twelve) hoursMax Daily Amount: 40 mg, Disp: , Rfl:     oxyCODONE (ROXICODONE) 20 MG TABS, Take 1 5 tablets (30 mg total) by mouth every 4 (four) hours as needed for severe painMax Daily Amount: 180 mg, Disp: , Rfl:     pantoprazole (PROTONIX) 40 mg tablet, Take 1 tablet (40 mg total) by mouth daily, Disp: 30 tablet, Rfl: 5    polyethylene glycol (GLYCOLAX) powder, Take 17 g by mouth daily, Disp: 527 g, Rfl: 1    predniSONE 10 mg tablet, Take 3 tablets (30 mg total) by mouth daily (Patient taking differently: Take 10 mg by mouth daily ), Disp: 90 tablet, Rfl: 0    prochlorperazine (COMPAZINE) 10 mg tablet, Take 1 tablet (10 mg total) by mouth every 6 (six) hours as needed for nausea or vomiting, Disp: 90 tablet, Rfl: 0    QUEtiapine (SEROquel) 25 mg tablet, Take 25 mg by mouth daily at bedtime, Disp: , Rfl:     ranitidine (ZANTAC) 150 mg tablet, Take 1 tablet (150 mg total) by mouth 2 (two) times a day, Disp: 60 tablet, Rfl: 4    REGULOID 28 3 % POWD, USE AS DIRECTED, Disp: 369 g, Rfl: 4    Selenium Sulfide 2 25 % SHAM, apply by topical route  every PM to the affected area(s), Disp: , Rfl:     senna-docusate sodium (SENOKOT-S) 8 6-50 mg per tablet, Take 1 tablet by mouth 2 (two) times a day, Disp: 60 tablet, Rfl: 2    sildenafil (VIAGRA) 50 MG tablet, Take 1 tablet (50 mg total) by mouth as needed for erectile dysfunction, Disp: 6 tablet, Rfl: 0    tiotropium (SPIRIVA RESPIMAT) 2 5 MCG/ACT AERS inhaler, Inhale 2 puffs daily, Disp: 1 Inhaler, Rfl: 2    VENTOLIN  (90 Base) MCG/ACT inhaler, Inhale 2 puffs every 4 (four) hours as needed for wheezing, Disp: 18 g, Rfl: 0    fluticasone (FLONASE) 50 mcg/act nasal spray, 1-2 sprays each nostril daily for allergies (Patient not taking: Reported on 9/16/2019), Disp: 1 Bottle, Rfl: 3      Physical Exam:  /80 (BP Location: Left arm)   Pulse 72   Temp 98 4 °F (36 9 °C) (Oral)   Resp 20   Ht 5' 8 5" (1 74 m)   Wt 66 9 kg (147 lb 6 4 oz)   SpO2 94%   BMI 22 09 kg/m²     Physical Exam   Constitutional: He is oriented to person, place, and time  He appears well-developed and well-nourished   No distress  HENT:   Head: Normocephalic and atraumatic  Mouth/Throat: Oropharynx is clear and moist  No oropharyngeal exudate  Eyes: Pupils are equal, round, and reactive to light  Conjunctivae are normal  No scleral icterus  Neck: Normal range of motion  Neck supple  No thyromegaly present  Cardiovascular: Normal rate, regular rhythm, normal heart sounds and intact distal pulses  No murmur heard  Pulmonary/Chest: Effort normal  No respiratory distress  He has decreased breath sounds  Left breast exhibits tenderness (below nipple/areola- mild edema noted)  Left breast exhibits no mass, no nipple discharge and no skin change  Abdominal: Soft  Bowel sounds are normal  He exhibits no distension  There is no hepatosplenomegaly  There is no tenderness  Musculoskeletal: Normal range of motion  He exhibits no edema  Lymphadenopathy:     He has no cervical adenopathy  He has no axillary adenopathy  Neurological: He is alert and oriented to person, place, and time  Skin: Skin is warm and dry  No rash noted  He is not diaphoretic  No erythema  No pallor  Psychiatric: His behavior is normal  Judgment and thought content normal  He exhibits a depressed mood  Flat affect   Vitals reviewed  Labs:  Lab Results   Component Value Date    WBC 10 40 09/16/2019    HGB 11 9 (L) 09/16/2019    HCT 35 6 (L) 09/16/2019    MCV 86 09/16/2019     09/16/2019     Lab Results   Component Value Date    K 4 6 09/16/2019     09/16/2019    CO2 29 09/16/2019    BUN 38 (H) 09/16/2019    CREATININE 2 55 (H) 09/16/2019    GLUF 103 (H) 09/16/2019    CALCIUM 9 5 09/16/2019    AST 37 09/16/2019    ALT 33 09/16/2019    ALKPHOS 99 09/16/2019    EGFR 31 (L) 09/16/2019         Patient voiced understanding and agreement in the above discussion  Aware to contact our office with questions/symptoms in the interim

## 2019-09-19 ENCOUNTER — TRANSCRIBE ORDERS (OUTPATIENT)
Dept: ADMINISTRATIVE | Facility: HOSPITAL | Age: 59
End: 2019-09-19

## 2019-09-19 ENCOUNTER — HOSPITAL ENCOUNTER (OUTPATIENT)
Dept: INFUSION CENTER | Facility: HOSPITAL | Age: 59
Discharge: HOME | End: 2019-09-19
Attending: INTERNAL MEDICINE
Payer: COMMERCIAL

## 2019-09-19 VITALS
RESPIRATION RATE: 18 BRPM | SYSTOLIC BLOOD PRESSURE: 124 MMHG | HEIGHT: 69 IN | HEART RATE: 68 BPM | WEIGHT: 147.27 LBS | BODY MASS INDEX: 21.81 KG/M2 | DIASTOLIC BLOOD PRESSURE: 68 MMHG | TEMPERATURE: 97.7 F

## 2019-09-19 DIAGNOSIS — C34.91 ADENOCARCINOMA OF LUNG, RIGHT (HCC): ICD-10-CM

## 2019-09-19 DIAGNOSIS — N28.9 RENAL DYSFUNCTION: Primary | ICD-10-CM

## 2019-09-19 DIAGNOSIS — IMO0002 LUMP: Primary | ICD-10-CM

## 2019-09-19 LAB
T3FREE SERPL-MCNC: 2.1 PG/ML (ref 2.3–4.2)
TSH SERPL DL<=0.05 MIU/L-ACNC: 1.28 UIU/ML (ref 0.47–4.68)

## 2019-09-19 PROCEDURE — 96413 CHEMO IV INFUSION 1 HR: CPT

## 2019-09-19 PROCEDURE — 84481 FREE ASSAY (FT-3): CPT | Performed by: INTERNAL MEDICINE

## 2019-09-19 PROCEDURE — 84443 ASSAY THYROID STIM HORMONE: CPT | Performed by: INTERNAL MEDICINE

## 2019-09-19 RX ORDER — SODIUM CHLORIDE 9 MG/ML
20 INJECTION, SOLUTION INTRAVENOUS ONCE
Status: COMPLETED | OUTPATIENT
Start: 2019-09-19 | End: 2019-09-19

## 2019-09-19 RX ADMIN — SODIUM CHLORIDE 20 ML/HR: 9 INJECTION, SOLUTION INTRAVENOUS at 12:58

## 2019-09-19 RX ADMIN — SODIUM CHLORIDE 200 MG: 9 INJECTION, SOLUTION INTRAVENOUS at 13:11

## 2019-09-19 NOTE — PROGRESS NOTES
Pt admitted to infusion department today for [facility] ([place])  Thyroid studies not yet done therefore port accessed and tsh free t3 drawn as ordered  Tolerated keytruda without issue  Port flushed and deaccessed per routine  Discharged ambulatory to [facility]   Next appt confirmed

## 2019-10-02 ENCOUNTER — APPOINTMENT (OUTPATIENT)
Dept: LAB | Facility: HOSPITAL | Age: 59
End: 2019-10-02
Attending: INTERNAL MEDICINE
Payer: COMMERCIAL

## 2019-10-02 DIAGNOSIS — E11.9 TYPE 2 DIABETES MELLITUS WITHOUT COMPLICATION, WITHOUT LONG-TERM CURRENT USE OF INSULIN (HCC): ICD-10-CM

## 2019-10-02 DIAGNOSIS — N18.30 STAGE 3 CHRONIC KIDNEY DISEASE (HCC): ICD-10-CM

## 2019-10-02 DIAGNOSIS — N28.9 RENAL DYSFUNCTION: ICD-10-CM

## 2019-10-02 DIAGNOSIS — C34.91 ADENOCARCINOMA OF LUNG, RIGHT (HCC): ICD-10-CM

## 2019-10-02 DIAGNOSIS — I10 ESSENTIAL HYPERTENSION: ICD-10-CM

## 2019-10-02 LAB
ALBUMIN SERPL BCP-MCNC: 4.1 G/DL (ref 3–5.2)
ALP SERPL-CCNC: 88 U/L (ref 43–122)
ALT SERPL W P-5'-P-CCNC: 21 U/L (ref 9–52)
ANION GAP SERPL CALCULATED.3IONS-SCNC: 9 MMOL/L (ref 5–14)
AST SERPL W P-5'-P-CCNC: 32 U/L (ref 17–59)
BASOPHILS # BLD AUTO: 0.07 THOUSAND/UL (ref 0–0.1)
BASOPHILS NFR MAR MANUAL: 1 % (ref 0–1)
BILIRUB SERPL-MCNC: 0.5 MG/DL
BUN SERPL-MCNC: 28 MG/DL (ref 5–25)
CALCIUM SERPL-MCNC: 9.1 MG/DL (ref 8.4–10.2)
CHLORIDE SERPL-SCNC: 100 MMOL/L (ref 97–108)
CO2 SERPL-SCNC: 29 MMOL/L (ref 22–30)
CREAT SERPL-MCNC: 2.55 MG/DL (ref 0.7–1.5)
EOSINOPHIL # BLD AUTO: 0.7 THOUSAND/UL (ref 0–0.4)
EOSINOPHIL NFR BLD MANUAL: 10 % (ref 0–6)
ERYTHROCYTE [DISTWIDTH] IN BLOOD BY AUTOMATED COUNT: 17.7 %
GFR SERPL CREATININE-BSD FRML MDRD: 31 ML/MIN/1.73SQ M
GLUCOSE P FAST SERPL-MCNC: 90 MG/DL (ref 70–99)
HCT VFR BLD AUTO: 35.1 % (ref 41–53)
HGB BLD-MCNC: 11.5 G/DL (ref 13.5–17.5)
LYMPHOCYTES # BLD AUTO: 2.03 THOUSAND/UL (ref 0.5–4)
LYMPHOCYTES # BLD AUTO: 29 % (ref 25–45)
MAGNESIUM SERPL-MCNC: 1.8 MG/DL (ref 1.6–2.3)
MCH RBC QN AUTO: 28 PG (ref 26–34)
MCHC RBC AUTO-ENTMCNC: 32.6 G/DL (ref 31–36)
MCV RBC AUTO: 86 FL (ref 80–100)
MONOCYTES # BLD AUTO: 0.77 THOUSAND/UL (ref 0.2–0.9)
MONOCYTES NFR BLD AUTO: 11 % (ref 1–10)
NEUTS SEG # BLD: 3.43 THOUSAND/UL (ref 1.8–7.8)
NEUTS SEG NFR BLD AUTO: 49 %
PHOSPHATE SERPL-MCNC: 4 MG/DL (ref 2.5–4.8)
PLATELET # BLD AUTO: 366 THOUSANDS/UL (ref 150–450)
PLATELET BLD QL SMEAR: ADEQUATE
PMV BLD AUTO: 6.9 FL (ref 8.9–12.7)
POTASSIUM SERPL-SCNC: 4.4 MMOL/L (ref 3.6–5)
PROT SERPL-MCNC: 8.3 G/DL (ref 5.9–8.4)
RBC # BLD AUTO: 4.09 MILLION/UL (ref 4.5–5.9)
RBC MORPH BLD: NORMAL
SODIUM SERPL-SCNC: 138 MMOL/L (ref 137–147)
TOTAL CELLS COUNTED SPEC: 100
TSH SERPL DL<=0.05 MIU/L-ACNC: 2.28 UIU/ML (ref 0.47–4.68)
WBC # BLD AUTO: 7 THOUSAND/UL (ref 4.5–11)

## 2019-10-02 PROCEDURE — 85007 BL SMEAR W/DIFF WBC COUNT: CPT

## 2019-10-02 PROCEDURE — 36415 COLL VENOUS BLD VENIPUNCTURE: CPT

## 2019-10-02 PROCEDURE — 84443 ASSAY THYROID STIM HORMONE: CPT

## 2019-10-02 PROCEDURE — 84100 ASSAY OF PHOSPHORUS: CPT

## 2019-10-02 PROCEDURE — 80053 COMPREHEN METABOLIC PANEL: CPT

## 2019-10-02 PROCEDURE — 85027 COMPLETE CBC AUTOMATED: CPT

## 2019-10-02 PROCEDURE — 83735 ASSAY OF MAGNESIUM: CPT

## 2019-10-03 ENCOUNTER — OFFICE VISIT (OUTPATIENT)
Dept: UROLOGY | Facility: MEDICAL CENTER | Age: 59
End: 2019-10-03
Payer: COMMERCIAL

## 2019-10-03 VITALS
HEIGHT: 69 IN | DIASTOLIC BLOOD PRESSURE: 80 MMHG | WEIGHT: 148 LBS | SYSTOLIC BLOOD PRESSURE: 124 MMHG | BODY MASS INDEX: 21.92 KG/M2 | HEART RATE: 73 BPM

## 2019-10-03 DIAGNOSIS — N40.1 BENIGN PROSTATIC HYPERPLASIA WITH LOWER URINARY TRACT SYMPTOMS, SYMPTOM DETAILS UNSPECIFIED: ICD-10-CM

## 2019-10-03 DIAGNOSIS — R30.0 STRANGURIA: ICD-10-CM

## 2019-10-03 DIAGNOSIS — N32.89 BLADDER WALL THICKENING: Primary | ICD-10-CM

## 2019-10-03 DIAGNOSIS — N41.9 PROSTATITIS, UNSPECIFIED PROSTATITIS TYPE: ICD-10-CM

## 2019-10-03 PROCEDURE — 99213 OFFICE O/P EST LOW 20 MIN: CPT | Performed by: UROLOGY

## 2019-10-03 RX ORDER — TAMSULOSIN HYDROCHLORIDE 0.4 MG/1
0.4 CAPSULE ORAL
Qty: 30 CAPSULE | Refills: 3 | Status: SHIPPED | OUTPATIENT
Start: 2019-10-03 | End: 2019-11-15 | Stop reason: SDUPTHER

## 2019-10-03 NOTE — PROGRESS NOTES
Assessment/Plan:      Diagnoses and all orders for this visit:    Bladder wall thickening    Prostatitis, unspecified prostatitis type  -     tamsulosin (FLOMAX) 0 4 mg; Take 1 capsule (0 4 mg total) by mouth daily with dinner    Stranguria  -     tamsulosin (FLOMAX) 0 4 mg; Take 1 capsule (0 4 mg total) by mouth daily with dinner    Benign prostatic hyperplasia with lower urinary tract symptoms, symptom details unspecified  -     tamsulosin (FLOMAX) 0 4 mg; Take 1 capsule (0 4 mg total) by mouth daily with dinner          Subjective:  Ongoing pressure to void     Patient ID: Aranza Price  is a 64 y o  male  HPI  Patient is receiving palliative chemotherapy for advanced adenocarcinoma of the lung  He was having urinary symptoms that involved more pain the dysuria  He denies any hematuria  CT scan the abdomen pelvis demonstrated bladder wall thickening  He denies any hematuria, history of nephrolithiasis, flank or abdominal pains  He has poor appetite weight loss related to treatment for his lung cancer  Type of urinary symptoms he described- probably involve his prostate, possible prostatitis  Patient does not have any obstructive voiding symptoms, nor does he have an elevated PVR  We tried Cipro empirically, and he states it took some of his discomfort away but not completely       Review of Systems   Constitutional: Positive for fatigue and unexpected weight change  HENT: Positive for congestion and trouble swallowing  Eyes: Negative  Respiratory: Positive for choking and shortness of breath  Cardiovascular: Positive for chest pain  Gastrointestinal: Negative  Endocrine: Positive for cold intolerance  Genitourinary:  Negative  Musculoskeletal: Negative  Skin: Negative  Allergic/Immunologic: Negative  Neurological: Negative  Hematological: Negative      Psychiatric/Behavioral: Negative            Objective:      Physical Exam   Constitutional: He is oriented to person, place, and time  He appears well-developed  No distress  HENT:   Head: Normocephalic and atraumatic  Nose: Nose normal    Mouth/Throat: Oropharynx is clear and moist    Eyes: Pupils are equal, round, and reactive to light  Conjunctivae and EOM are normal  No scleral icterus  Neck: Normal range of motion  Neck supple  Pulmonary/Chest: No respiratory distress  Abdominal: Soft  Bowel sounds are normal  He exhibits no distension and no mass  There is no tenderness  Musculoskeletal: Normal range of motion  He exhibits no edema or tenderness  Lymphadenopathy:     He has no cervical adenopathy  Neurological: He is alert and oriented to person, place, and time  No cranial nerve deficit  Skin: Skin is warm and dry  No rash noted  No erythema  No pallor  Psychiatric: He has a normal mood and affect  His behavior is normal  Judgment and thought content normal    Nursing note and vitals reviewed

## 2019-10-04 DIAGNOSIS — C34.91 ADENOCARCINOMA OF LUNG, RIGHT (HCC): ICD-10-CM

## 2019-10-04 DIAGNOSIS — M54.50 LUMBAR PAIN: ICD-10-CM

## 2019-10-04 DIAGNOSIS — R52 GENERALIZED BODY ACHES: ICD-10-CM

## 2019-10-04 RX ORDER — SODIUM CHLORIDE 9 MG/ML
20 INJECTION, SOLUTION INTRAVENOUS ONCE
Status: CANCELLED | OUTPATIENT
Start: 2019-10-09

## 2019-10-04 RX ORDER — OXYCODONE HYDROCHLORIDE 20 MG/1
30 TABLET ORAL EVERY 4 HOURS PRN
Qty: 75 TABLET | Refills: 0 | Status: SHIPPED | OUTPATIENT
Start: 2019-10-04 | End: 2019-10-08 | Stop reason: SDUPTHER

## 2019-10-04 NOTE — TELEPHONE ENCOUNTER
I sent refill  You can let him know I refilled at same dose/ tab strength, for smaller amount, and we can discuss at visit next week regarding changing tab strength

## 2019-10-07 NOTE — PROGRESS NOTES
Palliative and Supportive Care   [facility]  64 y o  male [id number]    Assessment/Plan:  1  Adenocarcinoma of lung, right ([address] )    2  Generalized body aches    3  Lumbar pain    4  Peripheral polyneuropathy    5  Chemotherapy-induced nausea    6  Cancer associated pain        Requested Prescriptions     Signed Prescriptions Disp Refills    oxyCODONE (ROXICODONE) 30 MG immediate release tablet 150 tablet 0     Sig: Take 1 tablet (30 mg total) by mouth every 4 (four) hours as needed for severe painMax Daily Amount: 180 mg    ondansetron (ZOFRAN) 4 mg tablet 60 tablet 2     Sig: Take 1 tablet (4 mg total) by mouth every 6 (six) hours as needed for nausea or vomiting    gabapentin (NEURONTIN) 300 mg capsule       Sig: Decrease to 1 capsule at night for 5 nights then stop    pregabalin (LYRICA) 25 mg capsule 60 capsule 0     Sig: Take 1 capsule (25 mg total) by mouth 2 (two) times a day    oxyCODONE (OxyCONTIN) 20 mg 12 hr tablet 60 tablet 0     Sig: Take 1 tablet (20 mg total) by mouth every 12 (twelve) hoursMax Daily Amount: 40 mg       - Maintain OxyContin at 20mg BID  - Continue oxycodone IR 30mg q 4h PRN - (use supply of 20mg - 1 5 tabs, then switch to 30mg tabs - 1 tab q 4h PRN)    - Gabapentin was initially started as adjunct for generalized pain, however patient also has peripheral neuropathy, possibly due to chemotherapy, and gabapentin has been ineffective for this  Will taper off and rotate to Lyrica  Will use decreased dose due to kidney dysfunction  Follow up in one month      Subjective    Chief Concern  Follow up visit for:  Symptom management         History of Present Illness  Patient ID: Jayesh Rose  is a 64 y o  male with metastatic NSCLC diagnosed in fall 2018  He is s/p 6 cycles of palliative chemo Alimta, carboplatin, and keytruda, then on maintenance Alimta and Keytruda  He had response to this regimen however Alimta was d/c'd due to worsened renal function   Current plan to continue Keytruda alone q 3 weeks  Following with urology for urinary symptoms, possible prostatitis, completed course of abx, recent started on tamsulosin  Follows closely with Dr Memo Reyes  At last visit, patient reported increased pain over past few weeks, with generalized arthralgia, aching pain of lateral trunk / lower abdomen down into thighs  Plan had been to increase oxyIR to 30mg q 4h PRN (taking 1 5 tabs of oxyIR 20mg) and increase OxyContin to 20mg q 8h from q 12h  Gabapentin recently decreased to 300mg BID due to renal dysfunction  Overall has been feeling poorly, with ongoing generalized pains of back and legs, and now with generalized aching of arms, which seemed to begin with recent cooler weather changes  OxyContin was not increased and he remains on 20mg BID  He notes that this causes more drowsiness than other meds  He is taking oxycodone IR 30mg (1 5 tabs of 20mg) about 4x/day  He has had better relief with this than with 20mg  Continues to have numbness of hands and feet, which seemed to develop around start of chemotherapy  He has not gotten improvement of this with gabapentin  Constipation is controlled  He has nausea for first couple days following chemotherapy  He has improvement of this with antiemetics  He feels as if he is developing a cold for past couple days  He has dyspnea on exertion  The following portions of the patient's history were reviewed and updated as appropriate: allergies, current medications, past family history, past medical history, past social history, past surgical history and problem list       Visit Information    Accompanied By: No one  Source of History: Self  History Limitations: None    ROS  Review of Systems   HENT: Positive for rhinorrhea  Respiratory: Positive for shortness of breath  Gastrointestinal: Positive for nausea (following chemo only)  Negative for constipation     Musculoskeletal: Positive for arthralgias, back pain and myalgias  Objective     Physical Exam   Constitutional: He is oriented to person, place, and time  He is cooperative  Appears fatigue   HENT:   Sounds congested   Pulmonary/Chest: Effort normal    Neurological: He is alert and oriented to person, place, and time  Skin: Skin is warm and dry  Psychiatric: He has a normal mood and affect   Cognition and memory are normal          /82 (BP Location: Left arm, Patient Position: Sitting, Cuff Size: Standard)   Pulse 89   Temp 98 2 °F (36 8 °C) (Oral)   Resp 18   Ht 5' 8 5" (1 74 m)   Wt 67 6 kg (149 lb)   SpO2 96%   BMI 22 33 kg/m²           Current Outpatient Medications:     albuterol (2 5 mg/3 mL) 0 083 % nebulizer solution, Take 1 vial (2 5 mg total) by nebulization every 4 (four) hours as needed for wheezing or shortness of breath, Disp: 120 vial, Rfl: 3    albuterol (VENTOLIN HFA) 90 mcg/act inhaler, Inhale 2 puffs every 4 (four) hours as needed for wheezing, Disp: 1 Inhaler, Rfl: 5    amLODIPine (NORVASC) 10 mg tablet, Take 1 tablet (10 mg total) by mouth daily, Disp: 30 tablet, Rfl: 1    BANOPHEN 25 MG capsule, , Disp: , Rfl:     chlorproMAZINE (THORAZINE) 25 mg tablet, Take 1 tablet (25 mg total) by mouth every 6 (six) hours as needed (hiccups), Disp: 30 tablet, Rfl: 0    dexamethasone (DECADRON) 4 mg tablet, Take 1 tablet (4 mg total) by mouth 2 (two) times a day with meals For 2 days after chemo, Disp: 4 tablet, Rfl: 3    diphenhydrAMINE (BENADRYL) 25 mg tablet, Take 1 tablet (25 mg total) by mouth every 6 (six) hours as needed (nausea), Disp: 30 tablet, Rfl: 0    ergocalciferol (ERGOCALCIFEROL) 74331 units capsule, Take 1 capsule (50,000 Units total) by mouth once a week, Disp: 12 capsule, Rfl: 0    FLUoxetine (PROzac) 10 MG tablet, Take 1 tablet (10 mg total) by mouth daily, Disp: 30 tablet, Rfl: 5    fluticasone (FLONASE) 50 mcg/act nasal spray, 1-2 sprays each nostril daily for allergies, Disp: 1 Bottle, Rfl: 3   fluticasone-vilanterol (BREO ELLIPTA) 100-25 mcg/inh inhaler, Inhale 1 puff daily in the early morning Rinse mouth after use , Disp: 1 Inhaler, Rfl: 5    folic acid (FOLVITE) 1 mg tablet, Take 1 tablet (1 mg total) by mouth daily, Disp: 30 tablet, Rfl: 2    gabapentin (NEURONTIN) 300 mg capsule, Decrease to 1 capsule at night for 5 nights then stop, Disp: , Rfl:     ipratropium (ATROVENT) 0 02 % nebulizer solution, Take 1 vial (0 5 mg total) by nebulization 3 (three) times a day, Disp: 90 vial, Rfl: 1    loratadine (CLARITIN) 10 mg tablet, Take 1 tablet (10 mg total) by mouth daily in the early morning, Disp: 30 tablet, Rfl: 2    melatonin 3 mg, Take 1 tablet (3 mg total) by mouth daily at bedtime, Disp: 30 tablet, Rfl: 1    metFORMIN (GLUCOPHAGE-XR) 500 mg 24 hr tablet, Take 1 tablet (500 mg total) by mouth 2 (two) times a day with meals, Disp: 60 tablet, Rfl: 0    methocarbamol (ROBAXIN) 750 mg tablet, Take 1 tablet (750 mg total) by mouth every 6 (six) hours as needed for muscle spasms, Disp: 90 tablet, Rfl: 0    oxyCODONE (OxyCONTIN) 20 mg 12 hr tablet, Take 1 tablet (20 mg total) by mouth every 12 (twelve) hoursMax Daily Amount: 40 mg, Disp: 60 tablet, Rfl: 0    oxyCODONE (ROXICODONE) 30 MG immediate release tablet, Take 1 tablet (30 mg total) by mouth every 4 (four) hours as needed for severe painMax Daily Amount: 180 mg, Disp: 150 tablet, Rfl: 0    pantoprazole (PROTONIX) 40 mg tablet, Take 1 tablet (40 mg total) by mouth daily, Disp: 30 tablet, Rfl: 5    prochlorperazine (COMPAZINE) 10 mg tablet, Take 1 tablet (10 mg total) by mouth every 6 (six) hours as needed for nausea or vomiting, Disp: 90 tablet, Rfl: 0    QUEtiapine (SEROquel) 25 mg tablet, Take 25 mg by mouth daily at bedtime, Disp: , Rfl:     ranitidine (ZANTAC) 150 mg tablet, Take 1 tablet (150 mg total) by mouth 2 (two) times a day, Disp: 60 tablet, Rfl: 4    senna-docusate sodium (SENOKOT-S) 8 6-50 mg per tablet, Take 1 tablet by mouth 2 (two) times a day, Disp: 60 tablet, Rfl: 2    tiotropium (SPIRIVA RESPIMAT) 2 5 MCG/ACT AERS inhaler, Inhale 2 puffs daily, Disp: 1 Inhaler, Rfl: 2    VENTOLIN  (90 Base) MCG/ACT inhaler, Inhale 2 puffs every 4 (four) hours as needed for wheezing, Disp: 18 g, Rfl: 0    Blood Glucose Monitoring Suppl KIT, by Does not apply route daily, Disp: 1 each, Rfl: 0    FREESTYLE LITE test strip, TEST BLOOD SUGAR DAILY, Disp: 100 each, Rfl: 1    Lancets (FREESTYLE) lancets, TEST BLOOD SUGAR DAILY, Disp: 100 each, Rfl: 4    lidocaine (LMX) 4 % cream, Apply topically as needed for mild pain Apply 1/2-1 inch to port 30-60 mins prior to needle insertion, cover with serrain wrap, Disp: 30 g, Rfl: 5    ondansetron (ZOFRAN) 4 mg tablet, Take 1 tablet (4 mg total) by mouth every 6 (six) hours as needed for nausea or vomiting, Disp: 60 tablet, Rfl: 2    polyethylene glycol (GLYCOLAX) powder, Take 17 g by mouth daily, Disp: 527 g, Rfl: 1    predniSONE 10 mg tablet, Take 3 tablets (30 mg total) by mouth daily (Patient taking differently: Take 10 mg by mouth daily ), Disp: 90 tablet, Rfl: 0    pregabalin (LYRICA) 25 mg capsule, Take 1 capsule (25 mg total) by mouth 2 (two) times a day, Disp: 60 capsule, Rfl: 0    REGULOID 28 3 % POWD, USE AS DIRECTED, Disp: 369 g, Rfl: 4    Selenium Sulfide 2 25 % SHAM, apply by topical route  every PM to the affected area(s), Disp: , Rfl:     sildenafil (VIAGRA) 50 MG tablet, Take 1 tablet (50 mg total) by mouth as needed for erectile dysfunction, Disp: 6 tablet, Rfl: 0    tamsulosin (FLOMAX) 0 4 mg, Take 1 capsule (0 4 mg total) by mouth daily with dinner, Disp: 30 capsule, Rfl: [address] [id number] [facility]  [phone]        Representatives have queried the patient's controlled substance dispensing history in the Prescription Drug Monitoring Program in compliance with the [facility] regulations before I have prescribed any controlled substances  The prescription history is consistent with prescribed therapy and our practice policies

## 2019-10-08 ENCOUNTER — OFFICE VISIT (OUTPATIENT)
Dept: PALLIATIVE MEDICINE | Facility: CLINIC | Age: 59
End: 2019-10-08
Payer: COMMERCIAL

## 2019-10-08 VITALS
SYSTOLIC BLOOD PRESSURE: 120 MMHG | DIASTOLIC BLOOD PRESSURE: 82 MMHG | HEIGHT: 69 IN | OXYGEN SATURATION: 96 % | RESPIRATION RATE: 18 BRPM | HEART RATE: 89 BPM | TEMPERATURE: 98.2 F | WEIGHT: 149 LBS | BODY MASS INDEX: 22.07 KG/M2

## 2019-10-08 DIAGNOSIS — G89.3 CANCER ASSOCIATED PAIN: ICD-10-CM

## 2019-10-08 DIAGNOSIS — R11.0 CHEMOTHERAPY-INDUCED NAUSEA: ICD-10-CM

## 2019-10-08 DIAGNOSIS — T45.1X5A CHEMOTHERAPY-INDUCED NAUSEA: ICD-10-CM

## 2019-10-08 DIAGNOSIS — G62.9 PERIPHERAL POLYNEUROPATHY: ICD-10-CM

## 2019-10-08 DIAGNOSIS — R52 GENERALIZED BODY ACHES: ICD-10-CM

## 2019-10-08 DIAGNOSIS — M54.50 LUMBAR PAIN: ICD-10-CM

## 2019-10-08 DIAGNOSIS — C34.91 ADENOCARCINOMA OF LUNG, RIGHT (HCC): Primary | ICD-10-CM

## 2019-10-08 PROCEDURE — 99214 OFFICE O/P EST MOD 30 MIN: CPT | Performed by: NURSE PRACTITIONER

## 2019-10-08 RX ORDER — GABAPENTIN 300 MG/1
CAPSULE ORAL
Start: 2019-10-08 | End: 2019-11-04

## 2019-10-08 RX ORDER — OXYCODONE HYDROCHLORIDE 30 MG/1
30 TABLET ORAL EVERY 4 HOURS PRN
Qty: 150 TABLET | Refills: 0 | Status: SHIPPED | OUTPATIENT
Start: 2019-10-08 | End: 2019-11-04 | Stop reason: SDUPTHER

## 2019-10-08 RX ORDER — OXYCODONE HCL 20 MG/1
20 TABLET, FILM COATED, EXTENDED RELEASE ORAL EVERY 12 HOURS SCHEDULED
Qty: 60 TABLET | Refills: 0 | Status: SHIPPED | OUTPATIENT
Start: 2019-10-08 | End: 2019-11-04 | Stop reason: SDUPTHER

## 2019-10-08 RX ORDER — ONDANSETRON 4 MG/1
4 TABLET, FILM COATED ORAL EVERY 6 HOURS PRN
Qty: 60 TABLET | Refills: 2 | Status: SHIPPED | OUTPATIENT
Start: 2019-10-08 | End: 2020-10-29

## 2019-10-08 RX ORDER — PREGABALIN 25 MG/1
25 CAPSULE ORAL 2 TIMES DAILY
Qty: 60 CAPSULE | Refills: 0 | Status: SHIPPED | OUTPATIENT
Start: 2019-10-08 | End: 2019-11-04 | Stop reason: SDUPTHER

## 2019-10-08 NOTE — PATIENT INSTRUCTIONS
Decrease gabapentin to 1 capsule at bedtime only for 5 nights then stop    Wait two days then start Lyrica (pregabalin)    Continue OxyContin twice daily    Continue oxycodone 20mg tabs - take 1 5 tabs  When you run out, switch over to 30mg tabs- take 1 tab every 4 hr as needed

## 2019-10-09 ENCOUNTER — HOSPITAL ENCOUNTER (OUTPATIENT)
Dept: INFUSION CENTER | Facility: HOSPITAL | Age: 59
Discharge: HOME | End: 2019-10-09
Attending: INTERNAL MEDICINE
Payer: COMMERCIAL

## 2019-10-09 ENCOUNTER — OFFICE VISIT (OUTPATIENT)
Dept: HEMATOLOGY ONCOLOGY | Facility: CLINIC | Age: 59
End: 2019-10-09
Payer: COMMERCIAL

## 2019-10-09 VITALS
DIASTOLIC BLOOD PRESSURE: 95 MMHG | BODY MASS INDEX: 21.98 KG/M2 | RESPIRATION RATE: 16 BRPM | TEMPERATURE: 97.2 F | HEART RATE: 88 BPM | WEIGHT: 148.37 LBS | HEIGHT: 69 IN | SYSTOLIC BLOOD PRESSURE: 154 MMHG

## 2019-10-09 VITALS
TEMPERATURE: 96.6 F | WEIGHT: 148.4 LBS | HEIGHT: 69 IN | SYSTOLIC BLOOD PRESSURE: 160 MMHG | BODY MASS INDEX: 21.98 KG/M2 | RESPIRATION RATE: 16 BRPM | HEART RATE: 80 BPM | DIASTOLIC BLOOD PRESSURE: 90 MMHG | OXYGEN SATURATION: 94 %

## 2019-10-09 DIAGNOSIS — N28.9 RENAL DYSFUNCTION: ICD-10-CM

## 2019-10-09 DIAGNOSIS — I10 HTN (HYPERTENSION): ICD-10-CM

## 2019-10-09 DIAGNOSIS — C34.91 ADENOCARCINOMA OF LUNG, RIGHT (HCC): ICD-10-CM

## 2019-10-09 DIAGNOSIS — N28.9 RENAL DYSFUNCTION: Primary | ICD-10-CM

## 2019-10-09 DIAGNOSIS — C34.91 ADENOCARCINOMA OF LUNG, RIGHT (HCC): Primary | ICD-10-CM

## 2019-10-09 PROCEDURE — 99214 OFFICE O/P EST MOD 30 MIN: CPT | Performed by: INTERNAL MEDICINE

## 2019-10-09 PROCEDURE — 96413 CHEMO IV INFUSION 1 HR: CPT

## 2019-10-09 RX ORDER — SODIUM CHLORIDE 9 MG/ML
20 INJECTION, SOLUTION INTRAVENOUS ONCE
Status: COMPLETED | OUTPATIENT
Start: 2019-10-09 | End: 2019-10-09

## 2019-10-09 RX ORDER — SODIUM CHLORIDE 9 MG/ML
20 INJECTION, SOLUTION INTRAVENOUS ONCE
Status: CANCELLED | OUTPATIENT
Start: 2019-10-30

## 2019-10-09 RX ORDER — AMLODIPINE BESYLATE 10 MG/1
10 TABLET ORAL DAILY
Qty: 30 TABLET | Refills: 1 | Status: SHIPPED | OUTPATIENT
Start: 2019-10-09 | End: 2020-01-03 | Stop reason: SDUPTHER

## 2019-10-09 RX ORDER — PREDNISONE 10 MG/1
10 TABLET ORAL DAILY
Qty: 100 TABLET | Refills: 0 | Status: SHIPPED | OUTPATIENT
Start: 2019-10-09 | End: 2019-12-10 | Stop reason: HOSPADM

## 2019-10-09 RX ADMIN — SODIUM CHLORIDE 200 MG: 9 INJECTION, SOLUTION INTRAVENOUS at 12:47

## 2019-10-09 RX ADMIN — SODIUM CHLORIDE 20 ML/HR: 9 INJECTION, SOLUTION INTRAVENOUS at 12:21

## 2019-10-09 NOTE — PROGRESS NOTES
Hematology/Oncology Outpatient Follow-up  Harshal Wyman Sr  64 y o  male 1960 [id number]    Date:  10/9/2019        Assessment and Plan:  1  Adenocarcinoma of lung, right [facility])  The patient was told that we will continue him on the immunotherapy with Keytruda on every 3 week basis as a single agent  We will aim to pursue another imaging study most likely a PET-CT scan around the beginning of November for close monitoring of his metastatic non-small cell lung cancer     - predniSONE 10 mg tablet; Take 1 tablet (10 mg total) by mouth daily  Dispense: 100 tablet; Refill: 0  - CBC and differential; Future  - Comprehensive metabolic panel; Future  - LD,Blood; Future  - TSH, 3rd generation with Free T4 reflex; Future    2  Renal dysfunction  He seems to have stable renal dysfunction  3  HTN (hypertension)  He has me to refill the Norvasc to prevent further symptoms from his elevated blood pressure  I did also ask him to get in touch with his primary care physician  - amLODIPine (NORVASC) 10 mg tablet; Take 1 tablet (10 mg total) by mouth daily  Dispense: 30 tablet; Refill: 1        HPI:  The patient came in today for a follow-up visit  He complained today about some sinus pressure and clear drainage from his nose  He ran out of the his blood pressure medication about 2 days ago and seems to have high blood pressure 160/90 during this office visit  He was recently seen by the urologist who put him on Flomax for his prostatitis/benign prostatic hypertrophy  His most recent blood work on the 2nd of October continues to show mild anemia with normal white cells and platelets  He also continues to have renal dysfunction with creatinine of 2 5 and normal liver enzymes    Oncology History    82-year-old [facility] American male heavy smoker who used to smoke 2 packs of cigarettes daily for many years, COPD, he presented with dyspnea, CT scan of the chest on October 2018 showed right middle lobe spiculated 1 3 cm mass with multiple solid and ground-glass nodules along the major and minor fissures sub cm pulmonary nodules bilaterally, left adrenal 1 2 cm nodule     PET scan showed 1 3 cm right middle lung nodule with SUV of 6 8, numerous nodular densities along the right major and minor fissures, right hilar activity with SUV of 3 4, subcarinal lymph node measuring 7 mm with SUV of 2 7, periportal enlarged lymph nodes concerning for metastatic disease measuring 2 4 cm with SUV of 5, prominent left retrocrural lymph node measuring 1 cm x 9 mm with SUV of 1 1    Core biopsy of the right spiculated nodule showed invasive adenocarcinoma consistent with lung primary positive for CK7, TTF 1, napsin a        Adenocarcinoma of lung, right ([facility] [address] )    11/13/2018 Initial Diagnosis     Adenocarcinoma of lung, right ([facility] [address] )  Stage IV      12/13/2018 - 3/28/2019 Chemotherapy      Alimta, Carboplatin (AUC 5) + Keytruda (6 cycles total)      4/17/2019 -  Chemotherapy     Started maintenance Alimta and Keytruda         Interval history:    ROS: Review of Systems   Constitutional: Negative for appetite change, diaphoresis, fatigue and fever  HENT: Negative for congestion, dental problem, facial swelling, hearing loss, tinnitus and trouble swallowing  Eyes: Negative for visual disturbance  Respiratory: Positive for shortness of breath  Negative for cough, chest tightness and wheezing  Cardiovascular: Negative for chest pain and leg swelling  Gastrointestinal: Negative for abdominal distention, abdominal pain, blood in stool, constipation, diarrhea, nausea and vomiting  Genitourinary: Negative for dysuria, hematuria and urgency  Musculoskeletal: Negative for arthralgias, myalgias and neck pain  Skin: Negative  Negative for color change, pallor, rash and wound  Neurological: Positive for numbness and headaches  Negative for dizziness and weakness  Hematological: Negative for adenopathy     Psychiatric/Behavioral: Negative for agitation, behavioral problems, confusion, hallucinations, self-injury and sleep disturbance  The patient is not nervous/anxious and is not hyperactive          Past Medical History:   Diagnosis Date    Bipolar 1 disorder ([facility] [address] )     Cancer ([facility] [address] )     adeno lung  dx 11/2018-lung bx today 4/9/2019-ongoing chemo    Chronic pain disorder     back and right shoulder    CKD (chronic kidney disease)     COPD (chronic obstructive pulmonary disease) (HCC)     Depression     Diabetes mellitus (HCC)     GERD (gastroesophageal reflux disease)     Herniated lumbar intervertebral disc     Hypertension     Insomnia     Latent syphilis     Treated    Low back pain     Lumbosacral disc disease     Pneumothorax after biopsy     R lung    PTSD (post-traumatic stress disorder)     PTSD (post-traumatic stress disorder)     PTSD (post-traumatic stress disorder)     Pulmonary emphysema ([facility] [address] )     Tobacco abuse 11/13/2018       Past Surgical History:   Procedure Laterality Date    COLONOSCOPY  2011    CT GUIDED CHEST TUBE  11/21/2018    FL GUIDED CENTRAL VENOUS ACCESS DEVICE INSERTION  2/8/2019    HEMORROIDECTOMY      IR CHEST TUBE  11/14/2018    IR IMAGE GUIDED BIOPSY/ASPIRATION LUNG  11/13/2018    KNEE SURGERY      DE INSJ TUNNELED CTR VAD W/SUBQ PORT AGE 5 YR/> N/A 2/8/2019    Procedure: PLACEMENT OF PORT-A-CATH;  Surgeon: Maria Luisa Haque MD;  Location:  MAIN OR;  Service: Vascular       Social History     Socioeconomic History    Marital status: Legally      Spouse name: None    Number of children: None    Years of education: None    Highest education level: None   Occupational History    Occupation: part time employment   Social Needs    Financial resource strain: None    Food insecurity:     Worry: None     Inability: None    Transportation needs:     Medical: None     Non-medical: None   Tobacco Use    Smoking status: Former Smoker     Packs/day: 0 50     Years: 40 00     Pack years: 20 00 Types: Cigarettes     Start date:      Last attempt to quit: 2019     Years since quittin 1    Smokeless tobacco: Never Used   Substance and Sexual Activity    Alcohol use: Not Currently    Drug use: No     Types: Marijuana     Comment: stopped , "i smoked weed and stopped 1 month ago"    Sexual activity: Yes   Lifestyle    Physical activity:     Days per week: None     Minutes per session: None    Stress: None   Relationships    Social connections:     Talks on phone: None     Gets together: None     Attends Taoism service: None     Active member of club or organization: None     Attends meetings of clubs or organizations: None     Relationship status: None    Intimate partner violence:     Fear of current or ex partner: None     Emotionally abused: None     Physically abused: None     Forced sexual activity: None   Other Topics Concern    None   Social History Narrative    Lives with girlfriend       Family History   Problem Relation Age of Onset    Heart disease Mother     Hypertension Father     Diabetes Family     Asthma Family     Heart disease Family     Cancer Family        Allergies   Allergen Reactions    Lisinopril Anaphylaxis     Took medication a while ago and had a "swelling reaction"  Does not carry epi-pen         Current Outpatient Medications:     albuterol (2 5 mg/3 mL) 0 083 % nebulizer solution, Take 1 vial (2 5 mg total) by nebulization every 4 (four) hours as needed for wheezing or shortness of breath, Disp: 120 vial, Rfl: 3    albuterol (VENTOLIN HFA) 90 mcg/act inhaler, Inhale 2 puffs every 4 (four) hours as needed for wheezing, Disp: 1 Inhaler, Rfl: 5    amLODIPine (NORVASC) 10 mg tablet, Take 1 tablet (10 mg total) by mouth daily, Disp: 30 tablet, Rfl: 1    BANOPHEN 25 MG capsule, , Disp: , Rfl:     Blood Glucose Monitoring Suppl KIT, by Does not apply route daily, Disp: 1 each, Rfl: 0    chlorproMAZINE (THORAZINE) 25 mg tablet, Take 1 tablet (25 mg total) by mouth every 6 (six) hours as needed (hiccups), Disp: 30 tablet, Rfl: 0    dexamethasone (DECADRON) 4 mg tablet, Take 1 tablet (4 mg total) by mouth 2 (two) times a day with meals For 2 days after chemo, Disp: 4 tablet, Rfl: 3    diphenhydrAMINE (BENADRYL) 25 mg tablet, Take 1 tablet (25 mg total) by mouth every 6 (six) hours as needed (nausea), Disp: 30 tablet, Rfl: 0    ergocalciferol (ERGOCALCIFEROL) 09763 units capsule, Take 1 capsule (50,000 Units total) by mouth once a week, Disp: 12 capsule, Rfl: 0    FLUoxetine (PROzac) 10 MG tablet, Take 1 tablet (10 mg total) by mouth daily, Disp: 30 tablet, Rfl: 5    fluticasone (FLONASE) 50 mcg/act nasal spray, 1-2 sprays each nostril daily for allergies, Disp: 1 Bottle, Rfl: 3    fluticasone-vilanterol (BREO ELLIPTA) 100-25 mcg/inh inhaler, Inhale 1 puff daily in the early morning Rinse mouth after use , Disp: 1 Inhaler, Rfl: 5    folic acid (FOLVITE) 1 mg tablet, Take 1 tablet (1 mg total) by mouth daily, Disp: 30 tablet, Rfl: 2    FREESTYLE LITE test strip, TEST BLOOD SUGAR DAILY, Disp: 100 each, Rfl: 1    gabapentin (NEURONTIN) 300 mg capsule, Decrease to 1 capsule at night for 5 nights then stop, Disp: , Rfl:     ipratropium (ATROVENT) 0 02 % nebulizer solution, Take 1 vial (0 5 mg total) by nebulization 3 (three) times a day, Disp: 90 vial, Rfl: 1    Lancets (FREESTYLE) lancets, TEST BLOOD SUGAR DAILY, Disp: 100 each, Rfl: 4    lidocaine (LMX) 4 % cream, Apply topically as needed for mild pain Apply 1/2-1 inch to port 30-60 mins prior to needle insertion, cover with serrain wrap, Disp: 30 g, Rfl: 5    loratadine (CLARITIN) 10 mg tablet, Take 1 tablet (10 mg total) by mouth daily in the early morning, Disp: 30 tablet, Rfl: 2    melatonin 3 mg, Take 1 tablet (3 mg total) by mouth daily at bedtime, Disp: 30 tablet, Rfl: 1    metFORMIN (GLUCOPHAGE-XR) 500 mg 24 hr tablet, Take 1 tablet (500 mg total) by mouth 2 (two) times a day with meals, Disp: 60 tablet, Rfl: 0    methocarbamol (ROBAXIN) 750 mg tablet, Take 1 tablet (750 mg total) by mouth every 6 (six) hours as needed for muscle spasms, Disp: 90 tablet, Rfl: 0    ondansetron (ZOFRAN) 4 mg tablet, Take 1 tablet (4 mg total) by mouth every 6 (six) hours as needed for nausea or vomiting, Disp: 60 tablet, Rfl: 2    oxyCODONE (OxyCONTIN) 20 mg 12 hr tablet, Take 1 tablet (20 mg total) by mouth every 12 (twelve) hoursMax Daily Amount: 40 mg, Disp: 60 tablet, Rfl: 0    oxyCODONE (ROXICODONE) 30 MG immediate release tablet, Take 1 tablet (30 mg total) by mouth every 4 (four) hours as needed for severe painMax Daily Amount: 180 mg, Disp: 150 tablet, Rfl: 0    pantoprazole (PROTONIX) 40 mg tablet, Take 1 tablet (40 mg total) by mouth daily, Disp: 30 tablet, Rfl: 5    polyethylene glycol (GLYCOLAX) powder, Take 17 g by mouth daily, Disp: 527 g, Rfl: 1    predniSONE 10 mg tablet, Take 1 tablet (10 mg total) by mouth daily, Disp: 100 tablet, Rfl: 0    pregabalin (LYRICA) 25 mg capsule, Take 1 capsule (25 mg total) by mouth 2 (two) times a day, Disp: 60 capsule, Rfl: 0    prochlorperazine (COMPAZINE) 10 mg tablet, Take 1 tablet (10 mg total) by mouth every 6 (six) hours as needed for nausea or vomiting, Disp: 90 tablet, Rfl: 0    QUEtiapine (SEROquel) 25 mg tablet, Take 25 mg by mouth daily at bedtime, Disp: , Rfl:     ranitidine (ZANTAC) 150 mg tablet, Take 1 tablet (150 mg total) by mouth 2 (two) times a day, Disp: 60 tablet, Rfl: 4    REGULOID 28 3 % POWD, USE AS DIRECTED, Disp: 369 g, Rfl: 4    Selenium Sulfide 2 25 % SHAM, apply by topical route  every PM to the affected area(s), Disp: , Rfl:     senna-docusate sodium (SENOKOT-S) 8 6-50 mg per tablet, Take 1 tablet by mouth 2 (two) times a day, Disp: 60 tablet, Rfl: 2    sildenafil (VIAGRA) 50 MG tablet, Take 1 tablet (50 mg total) by mouth as needed for erectile dysfunction, Disp: 6 tablet, Rfl: 0    tamsulosin (FLOMAX) 0 4 mg, Take 1 capsule (0 4 mg total) by mouth daily with dinner, Disp: 30 capsule, Rfl: 3    tiotropium (SPIRIVA RESPIMAT) 2 5 MCG/ACT AERS inhaler, Inhale 2 puffs daily, Disp: 1 Inhaler, Rfl: 2    VENTOLIN  (90 Base) MCG/ACT inhaler, Inhale 2 puffs every 4 (four) hours as needed for wheezing, Disp: 18 g, Rfl: 0      Physical Exam:  /90 (BP Location: Left arm, Cuff Size: Adult)   Pulse 80   Temp (!) 96 6 °F (35 9 °C) (Tympanic)   Resp 16   Ht 5' 8 5" (1 74 m)   Wt 67 3 kg (148 lb 6 4 oz)   SpO2 94%   BMI 22 24 kg/m²     Physical Exam   Constitutional: He is oriented to person, place, and time  He appears well-developed and well-nourished  HENT:   Head: Normocephalic and atraumatic  Nose: Nose normal    Mouth/Throat: Oropharynx is clear and moist    Eyes: Pupils are equal, round, and reactive to light  Conjunctivae and EOM are normal  Right eye exhibits no discharge  Left eye exhibits no discharge  No scleral icterus  Neck: Normal range of motion  Neck supple  No tracheal deviation present  No thyromegaly present  Cardiovascular: Normal rate, regular rhythm and normal heart sounds  Exam reveals no friction rub  No murmur heard  Pulmonary/Chest: Effort normal and breath sounds normal  No respiratory distress  He has no wheezes  He has no rales  He exhibits no tenderness  Abdominal: Soft  Bowel sounds are normal  He exhibits no distension and no mass  There is no hepatosplenomegaly, splenomegaly or hepatomegaly  There is no tenderness  There is no rebound and no guarding  Musculoskeletal: Normal range of motion  He exhibits no edema, tenderness or deformity  Lymphadenopathy:     He has no cervical adenopathy  Neurological: He is alert and oriented to person, place, and time  He has normal reflexes  He displays normal reflexes  No cranial nerve deficit  Coordination normal    Skin: Skin is warm and dry  No rash noted  No erythema  No pallor  Psychiatric: He has a normal mood and affect  His behavior is normal  Judgment and thought content normal          Labs:  Lab Results   Component Value Date    WBC 7 00 10/02/2019    HGB 11 5 (L) 10/02/2019    HCT 35 1 (L) 10/02/2019    MCV 86 10/02/2019     10/02/2019     Lab Results   Component Value Date    K 4 4 10/02/2019     10/02/2019    CO2 29 10/02/2019    BUN 28 (H) 10/02/2019    CREATININE 2 55 (H) 10/02/2019    GLUF 90 10/02/2019    CALCIUM 9 1 10/02/2019    AST 32 10/02/2019    ALT 21 10/02/2019    ALKPHOS 88 10/02/2019    EGFR 31 (L) 10/02/2019     No results found for: TSH    Patient voiced understanding and agreement in the above discussion  Aware to contact our office with questions/symptoms in the interim

## 2019-10-09 NOTE — PLAN OF CARE
Problem: Knowledge Deficit  Goal: Patient/family/caregiver demonstrates understanding of disease process, treatment plan, medications, and discharge instructions  Description  Complete learning assessment and assess knowledge base    Interventions:  - Provide teaching at level of understanding  - Provide teaching via preferred learning methods  Outcome: Progressing Yes

## 2019-10-09 NOTE — PROGRESS NOTES
Pt tolerated todays dose of keytruda without adverse reaction  Port flsuhed and deaccessed per routine   Discharged ambulatory with avs

## 2019-10-22 ENCOUNTER — TELEPHONE (OUTPATIENT)
Dept: HEMATOLOGY ONCOLOGY | Facility: CLINIC | Age: 59
End: 2019-10-22

## 2019-10-23 ENCOUNTER — OFFICE VISIT (OUTPATIENT)
Dept: FAMILY MEDICINE CLINIC | Facility: CLINIC | Age: 59
End: 2019-10-23

## 2019-10-23 VITALS
SYSTOLIC BLOOD PRESSURE: 152 MMHG | TEMPERATURE: 98 F | OXYGEN SATURATION: 97 % | BODY MASS INDEX: 22.62 KG/M2 | DIASTOLIC BLOOD PRESSURE: 90 MMHG | HEART RATE: 81 BPM | RESPIRATION RATE: 16 BRPM | WEIGHT: 151 LBS

## 2019-10-23 DIAGNOSIS — H61.22 IMPACTED CERUMEN OF LEFT EAR: ICD-10-CM

## 2019-10-23 DIAGNOSIS — J32.0 MAXILLARY SINUSITIS, UNSPECIFIED CHRONICITY: Primary | ICD-10-CM

## 2019-10-23 DIAGNOSIS — I10 ESSENTIAL HYPERTENSION: ICD-10-CM

## 2019-10-23 DIAGNOSIS — E11.9 TYPE 2 DIABETES MELLITUS WITHOUT COMPLICATION, WITHOUT LONG-TERM CURRENT USE OF INSULIN (HCC): ICD-10-CM

## 2019-10-23 DIAGNOSIS — J44.9 CHRONIC OBSTRUCTIVE PULMONARY DISEASE, UNSPECIFIED COPD TYPE (HCC): ICD-10-CM

## 2019-10-23 LAB — SL AMB POCT HEMOGLOBIN AIC: 5.6 (ref ?–6.5)

## 2019-10-23 PROCEDURE — 99213 OFFICE O/P EST LOW 20 MIN: CPT | Performed by: FAMILY MEDICINE

## 2019-10-23 PROCEDURE — 83036 HEMOGLOBIN GLYCOSYLATED A1C: CPT | Performed by: FAMILY MEDICINE

## 2019-10-23 PROCEDURE — 3044F HG A1C LEVEL LT 7.0%: CPT | Performed by: FAMILY MEDICINE

## 2019-10-23 RX ORDER — AMOXICILLIN AND CLAVULANATE POTASSIUM 875; 125 MG/1; MG/1
1 TABLET, FILM COATED ORAL EVERY 12 HOURS SCHEDULED
Qty: 20 TABLET | Refills: 0 | Status: SHIPPED | OUTPATIENT
Start: 2019-10-23 | End: 2019-11-02

## 2019-10-23 RX ORDER — ALBUTEROL SULFATE 90 UG/1
2 AEROSOL, METERED RESPIRATORY (INHALATION) EVERY 4 HOURS PRN
Qty: 1 INHALER | Refills: 5 | Status: SHIPPED | OUTPATIENT
Start: 2019-10-23 | End: 2020-06-24

## 2019-10-23 NOTE — PROGRESS NOTES
Assessment/Plan:    Maxillary sinusitis  Given duration of greater than 14 days with worsening symptoms prescription for Augmentin given in addition to supportive treatment which consists of the increased rest (relatively), increased hydration, take Tylenol/ibuprofen as needed for fever  Patient advised to return to clinic if symptoms do not improve within 10 days  ER precautions are given  Type 2 diabetes mellitus without complication, without long-term current use of insulin ([facility])    Lab Results   Component Value Date    HGBA1C 5 6 10/23/2019    A1c 5 6 in office today  Currently on no medications for diabetes    Essential hypertension  Currently blood pressure is controlled at this time   Reviewed BP target goal with patient   Continue to maintain healthy balanced diet with focus on low salt intake   Limit alcohol intake        - amlodipine was recently refilled   - will provide 90 day supply with 3 refills mid November         Diagnoses and all orders for this visit:    Maxillary sinusitis, unspecified chronicity  -     amoxicillin-clavulanate (AUGMENTIN) 875-125 mg per tablet; Take 1 tablet by mouth every 12 (twelve) hours for 10 days    Chronic obstructive pulmonary disease, unspecified COPD type ([facility])  -     albuterol (VENTOLIN HFA) 90 mcg/act inhaler; Inhale 2 puffs every 4 (four) hours as needed for wheezing    Impacted cerumen of left ear  -     carbamide peroxide (DEBROX) 6 5 % otic solution; Administer 5 drops into both ears 2 (two) times a day    Type 2 diabetes mellitus without complication, without long-term current use of insulin ([facility])  -     POCT hemoglobin A1c    Essential hypertension          Subjective:      Patient ID: Verenice Hare  is a 64 y o  male  A 70-year-old male presents to clinic for sick visit  He has a history of hypertension, adenocarcinoma of right lung of which he undergoes chemotherapy Q 3 weeks which began December 2018 and COPD    Routinely follows up with Hematology, pulmonology, palliative Care and Nephrology  Declined flu shot on today's visit, uninterested in colonoscopy is uncertain if he received Pneumovax however states he is uninterested  States he has received Tdap less than 10 years ago encouraged to bring records  Upper Respiratory Infection  Patient complains of symptoms of a URI, possible sinusitis  Symptoms include congestion, coryza, facial pain, low grade fever, nasal congestion, post nasal drip and sneezing  Onset of symptoms was 2 weeks ago, and has been gradually worsening since that time  Treatment to date: none  The following portions of the patient's history were reviewed and updated as appropriate: allergies, current medications, past family history, past medical history, past social history, past surgical history and problem list     Review of Systems   Constitutional: Positive for fatigue and fever  Negative for activity change, appetite change, chills and diaphoresis  HENT: Positive for congestion, postnasal drip, rhinorrhea, sinus pressure, sinus pain, sneezing and sore throat  Negative for ear pain, hearing loss, tinnitus and trouble swallowing  Eyes: Negative for pain  Respiratory: Positive for shortness of breath  Negative for apnea, cough, choking, chest tightness and wheezing  Cardiovascular: Negative for chest pain, palpitations and leg swelling  Gastrointestinal: Negative for abdominal distention, abdominal pain, constipation, diarrhea, nausea and vomiting  Genitourinary: Negative for decreased urine volume and dysuria  Musculoskeletal: Negative for back pain  Skin: Negative for rash  Neurological: Negative for dizziness, tremors and syncope  Psychiatric/Behavioral: Negative for agitation and suicidal ideas           Objective:      /90 (BP Location: Left arm, Patient Position: Sitting, Cuff Size: Standard)   Pulse 81   Temp 98 °F (36 7 °C) (Temporal)   Resp 16   Wt 68 5 kg (151 lb)   SpO2 97%   BMI 22 62 kg/m²          Physical Exam   Constitutional: He is oriented to person, place, and time  No distress  HENT:   Head: Normocephalic and atraumatic  Right Ear: External ear normal    Left Ear: External ear normal    Mouth/Throat: Oropharynx is clear and moist  No oropharyngeal exudate  Left TM not visualized secondary to cerumen  Left maxillary tenderness, mild frontal sinus tenderness  No oropharyngeal exudate  Non palpable cervical lymph nodes   Eyes: Pupils are equal, round, and reactive to light  Conjunctivae are normal  No scleral icterus  Neck: Normal range of motion  Neck supple  No JVD present  No tracheal deviation present  No thyromegaly present  Cardiovascular: Normal rate, regular rhythm and normal heart sounds  No murmur heard  Pulmonary/Chest: Effort normal  No respiratory distress  He has no wheezes  Abdominal: Soft  Bowel sounds are normal  He exhibits no distension  There is no rebound  Musculoskeletal: Normal range of motion  He exhibits no edema  Lymphadenopathy:     He has no cervical adenopathy  Neurological: He is alert and oriented to person, place, and time  Skin: Skin is warm  He is not diaphoretic  Psychiatric: He has a normal mood and affect

## 2019-10-23 NOTE — ASSESSMENT & PLAN NOTE
Currently blood pressure is controlled at this time   Reviewed BP target goal with patient   Continue to maintain healthy balanced diet with focus on low salt intake   Limit alcohol intake        - amlodipine was recently refilled   - will provide 90 day supply with 3 refills mid November

## 2019-10-23 NOTE — ASSESSMENT & PLAN NOTE
Given duration of greater than 14 days with worsening symptoms prescription for Augmentin given in addition to supportive treatment which consists of the increased rest (relatively), increased hydration, take Tylenol/ibuprofen as needed for fever  Patient advised to return to clinic if symptoms do not improve within 10 days  ER precautions are given

## 2019-10-23 NOTE — ASSESSMENT & PLAN NOTE
Lab Results   Component Value Date    HGBA1C 5 6 10/23/2019    A1c 5 6 in office today  Currently on no medications for diabetes

## 2019-10-29 ENCOUNTER — HOSPITAL ENCOUNTER (OUTPATIENT)
Dept: MAMMOGRAPHY | Facility: CLINIC | Age: 59
Discharge: HOME | End: 2019-10-29
Payer: COMMERCIAL

## 2019-10-29 ENCOUNTER — APPOINTMENT (OUTPATIENT)
Dept: LAB | Facility: HOSPITAL | Age: 59
End: 2019-10-29
Attending: INTERNAL MEDICINE
Payer: COMMERCIAL

## 2019-10-29 ENCOUNTER — DOCUMENTATION (OUTPATIENT)
Dept: HEMATOLOGY ONCOLOGY | Facility: CLINIC | Age: 59
End: 2019-10-29

## 2019-10-29 VITALS — BODY MASS INDEX: 22.36 KG/M2 | WEIGHT: 151 LBS | HEIGHT: 69 IN

## 2019-10-29 DIAGNOSIS — N28.9 RENAL DYSFUNCTION: ICD-10-CM

## 2019-10-29 DIAGNOSIS — IMO0002 LUMP: ICD-10-CM

## 2019-10-29 DIAGNOSIS — R77.9 ELEVATED BLOOD PROTEIN: ICD-10-CM

## 2019-10-29 DIAGNOSIS — N62 GYNECOMASTIA, MALE: ICD-10-CM

## 2019-10-29 DIAGNOSIS — C34.91 ADENOCARCINOMA OF LUNG, RIGHT (HCC): ICD-10-CM

## 2019-10-29 DIAGNOSIS — N63.20 LEFT BREAST LUMP: ICD-10-CM

## 2019-10-29 LAB
ALBUMIN SERPL BCP-MCNC: 4.2 G/DL (ref 3–5.2)
ALP SERPL-CCNC: 107 U/L (ref 43–122)
ALT SERPL W P-5'-P-CCNC: 32 U/L (ref 9–52)
ANION GAP SERPL CALCULATED.3IONS-SCNC: 7 MMOL/L (ref 5–14)
AST SERPL W P-5'-P-CCNC: 37 U/L (ref 17–59)
BASOPHILS # BLD AUTO: 0.1 THOUSANDS/ΜL (ref 0–0.1)
BASOPHILS NFR BLD AUTO: 1 % (ref 0–1)
BILIRUB SERPL-MCNC: 0.3 MG/DL
BUN SERPL-MCNC: 20 MG/DL (ref 5–25)
CALCIUM SERPL-MCNC: 9.2 MG/DL (ref 8.4–10.2)
CHLORIDE SERPL-SCNC: 103 MMOL/L (ref 97–108)
CO2 SERPL-SCNC: 30 MMOL/L (ref 22–30)
CREAT SERPL-MCNC: 2.49 MG/DL (ref 0.7–1.5)
EOSINOPHIL # BLD AUTO: 0.6 THOUSAND/ΜL (ref 0–0.4)
EOSINOPHIL NFR BLD AUTO: 6 % (ref 0–6)
ERYTHROCYTE [DISTWIDTH] IN BLOOD BY AUTOMATED COUNT: 16.5 %
GFR SERPL CREATININE-BSD FRML MDRD: 32 ML/MIN/1.73SQ M
GLUCOSE P FAST SERPL-MCNC: 98 MG/DL (ref 70–99)
HCT VFR BLD AUTO: 36 % (ref 41–53)
HGB BLD-MCNC: 11.9 G/DL (ref 13.5–17.5)
LDH SERPL-CCNC: 589 U/L (ref 313–618)
LYMPHOCYTES # BLD AUTO: 3.5 THOUSANDS/ΜL (ref 0.5–4)
LYMPHOCYTES NFR BLD AUTO: 35 % (ref 25–45)
MCH RBC QN AUTO: 28.3 PG (ref 26–34)
MCHC RBC AUTO-ENTMCNC: 33.1 G/DL (ref 31–36)
MCV RBC AUTO: 86 FL (ref 80–100)
MONOCYTES # BLD AUTO: 1.1 THOUSAND/ΜL (ref 0.2–0.9)
MONOCYTES NFR BLD AUTO: 11 % (ref 1–10)
NEUTROPHILS # BLD AUTO: 4.7 THOUSANDS/ΜL (ref 1.8–7.8)
NEUTS SEG NFR BLD AUTO: 47 % (ref 45–65)
PLATELET # BLD AUTO: 343 THOUSANDS/UL (ref 150–450)
PMV BLD AUTO: 7.2 FL (ref 8.9–12.7)
POTASSIUM SERPL-SCNC: 4.1 MMOL/L (ref 3.6–5)
PROT SERPL-MCNC: 8.2 G/DL (ref 5.9–8.4)
RBC # BLD AUTO: 4.21 MILLION/UL (ref 4.5–5.9)
SODIUM SERPL-SCNC: 140 MMOL/L (ref 137–147)
T3FREE SERPL-MCNC: 2.28 PG/ML (ref 2.3–4.2)
TSH SERPL DL<=0.05 MIU/L-ACNC: 1.28 UIU/ML (ref 0.47–4.68)
WBC # BLD AUTO: 10 THOUSAND/UL (ref 4.5–11)

## 2019-10-29 PROCEDURE — 84481 FREE ASSAY (FT-3): CPT

## 2019-10-29 PROCEDURE — 83615 LACTATE (LD) (LDH) ENZYME: CPT

## 2019-10-29 PROCEDURE — 85025 COMPLETE CBC W/AUTO DIFF WBC: CPT

## 2019-10-29 PROCEDURE — 84443 ASSAY THYROID STIM HORMONE: CPT

## 2019-10-29 PROCEDURE — 76642 ULTRASOUND BREAST LIMITED: CPT

## 2019-10-29 PROCEDURE — 36415 COLL VENOUS BLD VENIPUNCTURE: CPT

## 2019-10-29 PROCEDURE — 77066 DX MAMMO INCL CAD BI: CPT

## 2019-10-29 PROCEDURE — 80053 COMPREHEN METABOLIC PANEL: CPT

## 2019-10-29 PROCEDURE — 84403 ASSAY OF TOTAL TESTOSTERONE: CPT

## 2019-10-30 ENCOUNTER — OFFICE VISIT (OUTPATIENT)
Dept: HEMATOLOGY ONCOLOGY | Facility: CLINIC | Age: 59
End: 2019-10-30
Payer: COMMERCIAL

## 2019-10-30 ENCOUNTER — HOSPITAL ENCOUNTER (OUTPATIENT)
Dept: INFUSION CENTER | Facility: HOSPITAL | Age: 59
Discharge: HOME | End: 2019-10-30
Attending: INTERNAL MEDICINE
Payer: COMMERCIAL

## 2019-10-30 VITALS
WEIGHT: 149 LBS | OXYGEN SATURATION: 95 % | SYSTOLIC BLOOD PRESSURE: 98 MMHG | HEART RATE: 74 BPM | BODY MASS INDEX: 22.07 KG/M2 | DIASTOLIC BLOOD PRESSURE: 80 MMHG | HEIGHT: 69 IN | RESPIRATION RATE: 18 BRPM | TEMPERATURE: 97.6 F

## 2019-10-30 VITALS
RESPIRATION RATE: 16 BRPM | TEMPERATURE: 98.7 F | HEIGHT: 69 IN | BODY MASS INDEX: 22.07 KG/M2 | WEIGHT: 149 LBS | DIASTOLIC BLOOD PRESSURE: 85 MMHG | SYSTOLIC BLOOD PRESSURE: 153 MMHG

## 2019-10-30 DIAGNOSIS — N28.9 RENAL DYSFUNCTION: ICD-10-CM

## 2019-10-30 DIAGNOSIS — R77.9 ELEVATED BLOOD PROTEIN: ICD-10-CM

## 2019-10-30 DIAGNOSIS — C34.91 ADENOCARCINOMA OF LUNG, RIGHT (HCC): Primary | ICD-10-CM

## 2019-10-30 DIAGNOSIS — N62 GYNECOMASTIA, MALE: ICD-10-CM

## 2019-10-30 DIAGNOSIS — C34.91 ADENOCARCINOMA OF LUNG, RIGHT (HCC): ICD-10-CM

## 2019-10-30 DIAGNOSIS — N28.9 RENAL DYSFUNCTION: Primary | ICD-10-CM

## 2019-10-30 LAB — TESTOST SERPL-MCNC: 515 NG/DL (ref 95–948)

## 2019-10-30 PROCEDURE — 96413 CHEMO IV INFUSION 1 HR: CPT

## 2019-10-30 PROCEDURE — 99215 OFFICE O/P EST HI 40 MIN: CPT | Performed by: NURSE PRACTITIONER

## 2019-10-30 RX ORDER — SODIUM CHLORIDE 9 MG/ML
20 INJECTION, SOLUTION INTRAVENOUS ONCE
Status: COMPLETED | OUTPATIENT
Start: 2019-10-30 | End: 2019-10-30

## 2019-10-30 RX ADMIN — SODIUM CHLORIDE 200 MG: 9 INJECTION, SOLUTION INTRAVENOUS at 10:44

## 2019-10-30 RX ADMIN — SODIUM CHLORIDE 20 ML/HR: 0.9 INJECTION, SOLUTION INTRAVENOUS at 10:30

## 2019-10-30 NOTE — PROGRESS NOTES
Hematology/Oncology Outpatient Follow-up  Raymon Frias Sr  64 y o  male 1960 [id number]    Date:  10/30/2019      Assessment and Plan:  1  Adenocarcinoma of lung, right [facility])  Patient will be continued on with his single agent immunotherapy Keytruda every 3 weeks she is tolerating well  He will be due for repeat imaging with a PET-CT scan prior to his next follow-up appointment in 3 weeks which we will order  He continues to take low-dose prednisone 10 mg a day which she states is helping him with his chronic symptoms  Patient will continue to follow up with the palliative care team for his symptom/pain management  Patient is slightly hypotensive today and does admit to mild dizziness  He is being compliant with his hypertensive agents  I have asked him to monitor his blood pressure closely at home since he has blood pressure monitoring machine and if he continues to have systolic blood pressure around 90 he will need to follow up with his PCP  I have also asked infusion center to re-evaluate his blood pressure prior to discharge today and report to us     - CBC and differential; Future  - Comprehensive metabolic panel; Future  - TSH, 3rd generation with Free T4 reflex; Future  - NM PET CT skull base to mid thigh; Future    2  Elevated blood protein  Patient was found to have elevated total protein, elevated IgA an abnormal serum free light chain in the past   He never submitted his 24 hour urine to complete the workup  We will repeat his monoclonal gammopathy workup again prior to his next follow-up appointment in 3 weeks  The patient was educated about the importance of the 24 hour urine and how to obtain and submit it  He agrees to doing the test on his next laboratory studies are due     - CBC and differential; Future  - Comprehensive metabolic panel; Future  - C-reactive protein; Future  - Sedimentation rate, automated; Future  - LD,Blood;  Future  - TSH, 3rd generation with Free T4 reflex; Future  - Uric acid; Future  - Protein electrophoresis, serum; Future  - IgG, IgA, IgM; Future  - Immunoglobulin free LT chains blood; Future  - Kappa / lambda light chains, urine, 24 hour; Future  - Protein electrophoresis, urine; Future    3  Renal dysfunction  See #2  Patient's renal function has been stable without any significant worsening  He will continue to follow up with his nephrologist on a regular basis and is scheduled to see them again 11/19/19     4  Gynecomastia, male  Patient's left breast mass was found to be gynecomastia possibly secondary to 1 of his medications or his chronic kidney disease  We will check a testosterone level continue to monitor     - Testosterone; Future    HPI:  Patient presents today for a follow-up he is due for his Sejal Kline today after the visit  States that he is doing well with the exception of a sinus infection which is being treated currently with a 10 day course of Augmentin that he started on 10/24/2019  Reports sinus pressure and clear rhinorrhea  He states that his chronic pain is well controlled on his current pain regimen  Continues to follow with palliative care for his symptom management and nephrology for his chronic renal dysfunction  His most recent laboratory studies from yesterday show WBC 10 0, H&H 11 9/36 0, MCV 86, platelet 858  Continues to have stable renal dysfunction BUN 20, creatinine 2 49, GFR 32 remaining metabolic panel is within normal limits  TSH and LDH are normal   Patient had mammogram and ultrasound on 10/29/2019 to follow-up on a lump that he found in the left breast which was resulted:    IMPRESSION:   The area of clinical concern is compatible with gynecomastia  Clinical follow up and management is recommended      Oncology History    61-year-old [facility] male heavy smoker who used to smoke 2 packs of cigarettes daily for many years, COPD, he presented with dyspnea, CT scan of the chest on October 2018 showed right middle lobe spiculated 1 3 cm mass with multiple solid and ground-glass nodules along the major and minor fissures sub cm pulmonary nodules bilaterally, left adrenal 1 2 cm nodule     PET scan showed 1 3 cm right middle lung nodule with SUV of 6 8, numerous nodular densities along the right major and minor fissures, right hilar activity with SUV of 3 4, subcarinal lymph node measuring 7 mm with SUV of 2 7, periportal enlarged lymph nodes concerning for metastatic disease measuring 2 4 cm with SUV of 5, prominent left retrocrural lymph node measuring 1 cm x 9 mm with SUV of 1 1    Core biopsy of the right spiculated nodule showed invasive adenocarcinoma consistent with lung primary positive for CK7, TTF 1, napsin a        Adenocarcinoma of lung, right ([address] )    11/13/2018 Initial Diagnosis     Adenocarcinoma of lung, right ([facility] [address] [address] )  Stage IV      12/13/2018 - 3/28/2019 Chemotherapy      Alimta, Carboplatin (AUC 5) + Keytruda (6 cycles total)      4/17/2019 -  Chemotherapy     Started maintenance Alimta and Keytruda  (Alimta d/c'd 9/16/19 due to worsening renal dysfunction)         Interval history:    ROS: Review of Systems   Constitutional: Negative for activity change, appetite change, chills, fatigue, fever and unexpected weight change  HENT: Positive for congestion, postnasal drip, rhinorrhea (clear) and sinus pressure  Negative for mouth sores, nosebleeds, sore throat and trouble swallowing  Eyes: Negative  Respiratory: Positive for shortness of breath  Negative for cough and chest tightness  Cardiovascular: Negative for chest pain, palpitations and leg swelling  Gastrointestinal: Negative for abdominal distention, abdominal pain, blood in stool, constipation, diarrhea, nausea and vomiting  Genitourinary: Negative for difficulty urinating, dysuria, frequency, hematuria and urgency  Musculoskeletal: Positive for arthralgias (7/10) and myalgias (7/10)   Negative for back pain, gait problem and joint swelling  Skin: Negative for color change, pallor and rash  Allergic/Immunologic: Positive for environmental allergies  Neurological: Positive for dizziness (mild) and numbness (And tingling mild chronic)  Negative for weakness, light-headedness and headaches  Hematological: Negative for adenopathy  Does not bruise/bleed easily  Psychiatric/Behavioral: Negative for dysphoric mood and sleep disturbance  The patient is not nervous/anxious          Past Medical History:   Diagnosis Date    Bipolar 1 disorder ([facility] )     Cancer ([place] [address] )     adeno lung  dx 11/2018-lung bx today 4/9/2019-ongoing chemo    Chronic pain disorder     back and right shoulder    CKD (chronic kidney disease)     COPD (chronic obstructive pulmonary disease) (HCC)     Depression     Diabetes mellitus (HCC)     GERD (gastroesophageal reflux disease)     Herniated lumbar intervertebral disc     Hypertension     Insomnia     Latent syphilis     Treated    Low back pain     Lumbosacral disc disease     Lung cancer ([facility] ) 04/2019    Pneumothorax after biopsy     R lung    PTSD (post-traumatic stress disorder)     PTSD (post-traumatic stress disorder)     PTSD (post-traumatic stress disorder)     Pulmonary emphysema ([facility] )     Tobacco abuse 11/13/2018       Past Surgical History:   Procedure Laterality Date    COLONOSCOPY  2011    CT GUIDED CHEST TUBE  11/21/2018    FL GUIDED CENTRAL VENOUS ACCESS DEVICE INSERTION  2/8/2019    HEMORROIDECTOMY      IR CHEST TUBE  11/14/2018    IR IMAGE GUIDED BIOPSY/ASPIRATION LUNG  11/13/2018    KNEE SURGERY      OK INSJ TUNNELED CTR VAD W/SUBQ PORT AGE 5 YR/> N/A 2/8/2019    Procedure: PLACEMENT OF PORT-A-CATH;  Surgeon: Luis Manuel Villanueva MD;  Location:  MAIN OR;  Service: Vascular       Social History     Socioeconomic History    Marital status: Legally      Spouse name: None    Number of children: None    Years of education: None    Highest education level: None Occupational History    Occupation: part time employment   Social Needs    Financial resource strain: None    Food insecurity:     Worry: None     Inability: None    Transportation needs:     Medical: None     Non-medical: None   Tobacco Use    Smoking status: Former Smoker     Packs/day: 0 50     Years: 40 00     Pack years: 20 00     Types: Cigarettes     Start date:      Last attempt to quit: 2019     Years since quittin 2    Smokeless tobacco: Never Used   Substance and Sexual Activity    Alcohol use: Not Currently    Drug use: No     Types: Marijuana     Comment: stopped , "i smoked weed and stopped 1 month ago"    Sexual activity: Yes   Lifestyle    Physical activity:     Days per week: None     Minutes per session: None    Stress: None   Relationships    Social connections:     Talks on phone: None     Gets together: None     Attends Yarsanism service: None     Active member of club or organization: None     Attends meetings of clubs or organizations: None     Relationship status: None    Intimate partner violence:     Fear of current or ex partner: None     Emotionally abused: None     Physically abused: None     Forced sexual activity: None   Other Topics Concern    None   Social History Narrative    Lives with girlfriend       Family History   Problem Relation Age of Onset    Heart disease Mother     Cancer Mother     Hypertension Father     Diabetes Family     Asthma Family     Heart disease Family     Cancer Family     Cancer Maternal Grandfather     Cancer Paternal Grandfather     Cancer Maternal Aunt        Allergies   Allergen Reactions    Lisinopril Anaphylaxis     Took medication a while ago and had a "swelling reaction"  Does not carry epi-pen         Current Outpatient Medications:     albuterol (2 5 mg/3 mL) 0 083 % nebulizer solution, Take 1 vial (2 5 mg total) by nebulization every 4 (four) hours as needed for wheezing or shortness of breath, Disp: 120 vial, Rfl: 3    albuterol (VENTOLIN HFA) 90 mcg/act inhaler, Inhale 2 puffs every 4 (four) hours as needed for wheezing, Disp: 1 Inhaler, Rfl: 5    amLODIPine (NORVASC) 10 mg tablet, Take 1 tablet (10 mg total) by mouth daily, Disp: 30 tablet, Rfl: 1    amoxicillin-clavulanate (AUGMENTIN) 875-125 mg per tablet, Take 1 tablet by mouth every 12 (twelve) hours for 10 days, Disp: 20 tablet, Rfl: 0    BANOPHEN 25 MG capsule, , Disp: , Rfl:     Blood Glucose Monitoring Suppl KIT, by Does not apply route daily, Disp: 1 each, Rfl: 0    carbamide peroxide (DEBROX) 6 5 % otic solution, Administer 5 drops into both ears 2 (two) times a day, Disp: 15 mL, Rfl: 0    chlorproMAZINE (THORAZINE) 25 mg tablet, Take 1 tablet (25 mg total) by mouth every 6 (six) hours as needed (hiccups), Disp: 30 tablet, Rfl: 0    dexamethasone (DECADRON) 4 mg tablet, Take 1 tablet (4 mg total) by mouth 2 (two) times a day with meals For 2 days after chemo, Disp: 4 tablet, Rfl: 3    diphenhydrAMINE (BENADRYL) 25 mg tablet, Take 1 tablet (25 mg total) by mouth every 6 (six) hours as needed (nausea), Disp: 30 tablet, Rfl: 0    ergocalciferol (ERGOCALCIFEROL) 64096 units capsule, Take 1 capsule (50,000 Units total) by mouth once a week, Disp: 12 capsule, Rfl: 0    FLUoxetine (PROzac) 10 MG tablet, Take 1 tablet (10 mg total) by mouth daily, Disp: 30 tablet, Rfl: 5    fluticasone (FLONASE) 50 mcg/act nasal spray, 1-2 sprays each nostril daily for allergies, Disp: 1 Bottle, Rfl: 3    fluticasone-vilanterol (BREO ELLIPTA) 100-25 mcg/inh inhaler, Inhale 1 puff daily in the early morning Rinse mouth after use , Disp: 1 Inhaler, Rfl: 5    folic acid (FOLVITE) 1 mg tablet, Take 1 tablet (1 mg total) by mouth daily, Disp: 30 tablet, Rfl: 2    FREESTYLE LITE test strip, TEST BLOOD SUGAR DAILY, Disp: 100 each, Rfl: 1    gabapentin (NEURONTIN) 300 mg capsule, Decrease to 1 capsule at night for 5 nights then stop, Disp: , Rfl:     ipratropium (ATROVENT) 0 02 % nebulizer solution, Take 1 vial (0 5 mg total) by nebulization 3 (three) times a day, Disp: 90 vial, Rfl: 1    Lancets (FREESTYLE) lancets, TEST BLOOD SUGAR DAILY, Disp: 100 each, Rfl: 4    lidocaine (LMX) 4 % cream, Apply topically as needed for mild pain Apply 1/2-1 inch to port 30-60 mins prior to needle insertion, cover with serrain wrap, Disp: 30 g, Rfl: 5    loratadine (CLARITIN) 10 mg tablet, Take 1 tablet (10 mg total) by mouth daily in the early morning, Disp: 30 tablet, Rfl: 2    melatonin 3 mg, Take 1 tablet (3 mg total) by mouth daily at bedtime, Disp: 30 tablet, Rfl: 1    metFORMIN (GLUCOPHAGE-XR) 500 mg 24 hr tablet, Take 1 tablet (500 mg total) by mouth 2 (two) times a day with meals, Disp: 60 tablet, Rfl: 0    methocarbamol (ROBAXIN) 750 mg tablet, Take 1 tablet (750 mg total) by mouth every 6 (six) hours as needed for muscle spasms, Disp: 90 tablet, Rfl: 0    ondansetron (ZOFRAN) 4 mg tablet, Take 1 tablet (4 mg total) by mouth every 6 (six) hours as needed for nausea or vomiting, Disp: 60 tablet, Rfl: 2    oxyCODONE (OxyCONTIN) 20 mg 12 hr tablet, Take 1 tablet (20 mg total) by mouth every 12 (twelve) hoursMax Daily Amount: 40 mg, Disp: 60 tablet, Rfl: 0    oxyCODONE (ROXICODONE) 30 MG immediate release tablet, Take 1 tablet (30 mg total) by mouth every 4 (four) hours as needed for severe painMax Daily Amount: 180 mg, Disp: 150 tablet, Rfl: 0    pantoprazole (PROTONIX) 40 mg tablet, Take 1 tablet (40 mg total) by mouth daily, Disp: 30 tablet, Rfl: 5    polyethylene glycol (GLYCOLAX) powder, Take 17 g by mouth daily, Disp: 527 g, Rfl: 1    predniSONE 10 mg tablet, Take 1 tablet (10 mg total) by mouth daily, Disp: 100 tablet, Rfl: 0    pregabalin (LYRICA) 25 mg capsule, Take 1 capsule (25 mg total) by mouth 2 (two) times a day, Disp: 60 capsule, Rfl: 0    prochlorperazine (COMPAZINE) 10 mg tablet, Take 1 tablet (10 mg total) by mouth every 6 (six) hours as needed for nausea or vomiting, Disp: 90 tablet, Rfl: 0    QUEtiapine (SEROquel) 25 mg tablet, Take 25 mg by mouth daily at bedtime, Disp: , Rfl:     ranitidine (ZANTAC) 150 mg tablet, Take 1 tablet (150 mg total) by mouth 2 (two) times a day, Disp: 60 tablet, Rfl: 4    REGULOID 28 3 % POWD, USE AS DIRECTED, Disp: 369 g, Rfl: 4    Selenium Sulfide 2 25 % SHAM, apply by topical route  every PM to the affected area(s), Disp: , Rfl:     senna-docusate sodium (SENOKOT-S) 8 6-50 mg per tablet, Take 1 tablet by mouth 2 (two) times a day, Disp: 60 tablet, Rfl: 2    sildenafil (VIAGRA) 50 MG tablet, Take 1 tablet (50 mg total) by mouth as needed for erectile dysfunction, Disp: 6 tablet, Rfl: 0    tamsulosin (FLOMAX) 0 4 mg, Take 1 capsule (0 4 mg total) by mouth daily with dinner, Disp: 30 capsule, Rfl: 3    tiotropium (SPIRIVA RESPIMAT) 2 5 MCG/ACT AERS inhaler, Inhale 2 puffs daily, Disp: 1 Inhaler, Rfl: 2    VENTOLIN  (90 Base) MCG/ACT inhaler, Inhale 2 puffs every 4 (four) hours as needed for wheezing, Disp: 18 g, Rfl: 0      Physical Exam:  BP 98/80 (BP Location: Left arm)   Pulse 74   Temp 97 6 °F (36 4 °C) (Oral)   Resp 18   Ht 5' 8 5" (1 74 m)   Wt 67 6 kg (149 lb)   SpO2 95%   BMI 22 33 kg/m²     Physical Exam   Constitutional: He is oriented to person, place, and time  He appears well-developed and well-nourished  No distress  HENT:   Head: Normocephalic and atraumatic  Mouth/Throat: Oropharynx is clear and moist  No oropharyngeal exudate  Eyes: Pupils are equal, round, and reactive to light  Conjunctivae are normal  No scleral icterus  Neck: Normal range of motion  Neck supple  No thyromegaly present  Cardiovascular: Normal rate, regular rhythm, normal heart sounds and intact distal pulses  No murmur heard  Pulmonary/Chest: Effort normal  No respiratory distress  He has decreased breath sounds  Abdominal: Soft  Bowel sounds are normal  He exhibits no distension   There is no hepatosplenomegaly  There is no tenderness  Musculoskeletal: Normal range of motion  He exhibits no edema  Lymphadenopathy:     He has no cervical adenopathy  He has no axillary adenopathy  Neurological: He is alert and oriented to person, place, and time  Skin: Skin is warm and dry  No rash noted  He is not diaphoretic  No erythema  No pallor  Psychiatric: His behavior is normal  Judgment and thought content normal  His affect is blunt  Vitals reviewed  Labs:  Lab Results   Component Value Date    WBC 10 00 10/29/2019    HGB 11 9 (L) 10/29/2019    HCT 36 0 (L) 10/29/2019    MCV 86 10/29/2019     10/29/2019     Lab Results   Component Value Date    K 4 1 10/29/2019     10/29/2019    CO2 30 10/29/2019    BUN 20 10/29/2019    CREATININE 2 49 (H) 10/29/2019    GLUF 98 10/29/2019    CALCIUM 9 2 10/29/2019    AST 37 10/29/2019    ALT 32 10/29/2019    ALKPHOS 107 10/29/2019    EGFR 32 (L) 10/29/2019         Patient voiced understanding and agreement in the above discussion  Aware to contact our office with questions/symptoms in the interim  This note has been generated by voice recognition software system  Therefore, there may be spelling, grammar, and or syntax errors  Please contact if questions arise

## 2019-10-30 NOTE — PROGRESS NOTES
Pt here for every 3 week keytruda  Tolerated well without complications   Confirmed next appt and aVS provided

## 2019-11-04 ENCOUNTER — OFFICE VISIT (OUTPATIENT)
Dept: PALLIATIVE MEDICINE | Facility: CLINIC | Age: 59
End: 2019-11-04
Payer: COMMERCIAL

## 2019-11-04 VITALS
TEMPERATURE: 98.2 F | SYSTOLIC BLOOD PRESSURE: 126 MMHG | WEIGHT: 150.8 LBS | HEART RATE: 95 BPM | OXYGEN SATURATION: 95 % | RESPIRATION RATE: 10 BRPM | BODY MASS INDEX: 22.33 KG/M2 | HEIGHT: 69 IN | DIASTOLIC BLOOD PRESSURE: 80 MMHG

## 2019-11-04 DIAGNOSIS — G89.3 CANCER ASSOCIATED PAIN: ICD-10-CM

## 2019-11-04 DIAGNOSIS — R11.0 CHEMOTHERAPY-INDUCED NAUSEA: ICD-10-CM

## 2019-11-04 DIAGNOSIS — R52 GENERALIZED BODY ACHES: ICD-10-CM

## 2019-11-04 DIAGNOSIS — M54.50 LUMBAR PAIN: ICD-10-CM

## 2019-11-04 DIAGNOSIS — G62.9 PERIPHERAL POLYNEUROPATHY: ICD-10-CM

## 2019-11-04 DIAGNOSIS — C34.91 ADENOCARCINOMA OF LUNG, RIGHT (HCC): Primary | ICD-10-CM

## 2019-11-04 DIAGNOSIS — T45.1X5A CHEMOTHERAPY-INDUCED NAUSEA: ICD-10-CM

## 2019-11-04 PROCEDURE — 99214 OFFICE O/P EST MOD 30 MIN: CPT | Performed by: NURSE PRACTITIONER

## 2019-11-04 RX ORDER — OXYCODONE HCL 20 MG/1
20 TABLET, FILM COATED, EXTENDED RELEASE ORAL EVERY 12 HOURS SCHEDULED
Qty: 60 TABLET | Refills: 0 | Status: ON HOLD | OUTPATIENT
Start: 2019-11-04 | End: 2019-12-03 | Stop reason: SDUPTHER

## 2019-11-04 RX ORDER — PREGABALIN 25 MG/1
CAPSULE ORAL
Qty: 90 CAPSULE | Refills: 0 | Status: SHIPPED | OUTPATIENT
Start: 2019-11-04 | End: 2020-01-09 | Stop reason: SDUPTHER

## 2019-11-04 RX ORDER — OXYCODONE HYDROCHLORIDE 30 MG/1
30 TABLET ORAL EVERY 4 HOURS PRN
Qty: 150 TABLET | Refills: 0 | Status: SHIPPED | OUTPATIENT
Start: 2019-11-04 | End: 2019-12-26 | Stop reason: SDUPTHER

## 2019-11-04 RX ORDER — PROCHLORPERAZINE MALEATE 10 MG
10 TABLET ORAL EVERY 6 HOURS PRN
Qty: 90 TABLET | Refills: 0 | Status: SHIPPED | OUTPATIENT
Start: 2019-11-04 | End: 2021-01-01

## 2019-11-04 NOTE — PATIENT INSTRUCTIONS
Increase Lyrica (pregabalin) to 1 capsule (25mg) in morning and 2 capsules (total 50mg) at bedtime    No changes to OxyContin or oxycodone IR (too soon to refill oxycodone)    Start prochlorperazine (Compazine) as needed for nausea [instead of ondansetron (Zofran)]    Call oncology office for refill of folic acid    Work on "[employer]" document  Does not need to be completed cover-to-cover, ok to complete only part of it  Bring back in if you do complete part of it  We can find people to witness if needed

## 2019-11-04 NOTE — PROGRESS NOTES
Palliative and Supportive Care   Issa Cruz Sr  64 y o  male [id number]    Assessment/Plan:  1  Adenocarcinoma of lung, right ([address] )    2  Cancer associated pain    3  Peripheral polyneuropathy    4  Chemotherapy-induced nausea    5  Generalized body aches    6  Lumbar pain        Requested Prescriptions     Signed Prescriptions Disp Refills    prochlorperazine (COMPAZINE) 10 mg tablet 90 tablet 0     Sig: Take 1 tablet (10 mg total) by mouth every 6 (six) hours as needed for nausea or vomiting    oxyCODONE (ROXICODONE) 30 MG immediate release tablet 150 tablet 0     Sig: Take 1 tablet (30 mg total) by mouth every 4 (four) hours as needed for severe painMax Daily Amount: 180 mg    pregabalin (LYRICA) 25 mg capsule 90 capsule 0     Sig: Take 1 capsule PO in morning and 2 capsules PO at bedtime    oxyCODONE (OxyCONTIN) 20 mg 12 hr tablet 60 tablet 0     Sig: Take 1 tablet (20 mg total) by mouth every 12 (twelve) hoursMax Daily Amount: 40 mg       Meds as above  No change to opioids  Increase Lyrica to 25mg morning, 50mg HS - conservative dosing due to kidney dysfunction  Has not completed advance care planning  Again encouraged, so that he could specify who he would want his medical decision-maker to be, in the event he became unable to do so  [employer] document provided  Follow up in one month      Subjective    Chief Concern  Follow up visit for:  Symptom management         History of Present Illness  Patient ID: Verenice Hare  is a 64 y o  male with metastatic NSCLC diagnosed in fall 2018  He is s/p 6 cycles of palliative chemo Alimta, carboplatin, and keytruda, then on maintenance Alimta and Keytruda  He had response to this regimen however Alimta was d/c'd due to worsened renal function  Currently on single-agent immunotherapy Keytruda q 3 weeks  He is scheduled for follow up PET scan next week     Following with urology for urinary symptoms, possible prostatitis, completed course of abx, started on tamsulosin  Follows with Dr Solange Hoffman  General fatigue and generalized pains are at baseline  He is feeling poorly lately with sinus infection, for which he is taking antibiotic  He is taking OxyContin BID, oxycodone IR about 3-4x/day  He has received oxyIR 30mg tabs  Stopped gabapentin and started lyrica with no side effects, for neuropathy of hands and feet  He is not constipated  He has nausea for few days after chemo; no vomiting  Minimal to no benefit w/ zofran  The following portions of the patient's history were reviewed and updated as appropriate: allergies, current medications, past family history, past medical history, past social history, past surgical history and problem list       Visit Information    Accompanied By: No one  Source of History: Self  History Limitations: None    ROS  Review of Systems   Constitutional: Positive for fatigue  Respiratory: Positive for cough  Gastrointestinal: Positive for nausea (for few days after chemo)  Negative for constipation and vomiting  Musculoskeletal: Positive for arthralgias  Objective     Physical Exam   Constitutional: He is oriented to person, place, and time  He is cooperative  Appears fatigued   Pulmonary/Chest: Effort normal    Neurological: He is alert and oriented to person, place, and time  Psychiatric: He has a normal mood and affect   Cognition and memory are normal          /80 (BP Location: Left arm, Cuff Size: Standard)   Pulse 95   Temp 98 2 °F (36 8 °C) (Oral)   Resp (!) 10   Ht 5' 8 5" (1 74 m)   Wt 68 4 kg (150 lb 12 8 oz)   SpO2 95%   BMI 22 60 kg/m²           Current Outpatient Medications:     albuterol (2 5 mg/3 mL) 0 083 % nebulizer solution, Take 1 vial (2 5 mg total) by nebulization every 4 (four) hours as needed for wheezing or shortness of breath, Disp: 120 vial, Rfl: 3    albuterol (VENTOLIN HFA) 90 mcg/act inhaler, Inhale 2 puffs every 4 (four) hours as needed for wheezing, Disp: 1 Inhaler, Rfl: 5    amLODIPine (NORVASC) 10 mg tablet, Take 1 tablet (10 mg total) by mouth daily, Disp: 30 tablet, Rfl: 1    BANOPHEN 25 MG capsule, , Disp: , Rfl:     carbamide peroxide (DEBROX) 6 5 % otic solution, Administer 5 drops into both ears 2 (two) times a day, Disp: 15 mL, Rfl: 0    chlorproMAZINE (THORAZINE) 25 mg tablet, Take 1 tablet (25 mg total) by mouth every 6 (six) hours as needed (hiccups), Disp: 30 tablet, Rfl: 0    dexamethasone (DECADRON) 4 mg tablet, Take 1 tablet (4 mg total) by mouth 2 (two) times a day with meals For 2 days after chemo, Disp: 4 tablet, Rfl: 3    diphenhydrAMINE (BENADRYL) 25 mg tablet, Take 1 tablet (25 mg total) by mouth every 6 (six) hours as needed (nausea), Disp: 30 tablet, Rfl: 0    ergocalciferol (ERGOCALCIFEROL) 30036 units capsule, Take 1 capsule (50,000 Units total) by mouth once a week, Disp: 12 capsule, Rfl: 0    FLUoxetine (PROzac) 10 MG tablet, Take 1 tablet (10 mg total) by mouth daily, Disp: 30 tablet, Rfl: 5    fluticasone (FLONASE) 50 mcg/act nasal spray, 1-2 sprays each nostril daily for allergies, Disp: 1 Bottle, Rfl: 3    fluticasone-vilanterol (BREO ELLIPTA) 100-25 mcg/inh inhaler, Inhale 1 puff daily in the early morning Rinse mouth after use , Disp: 1 Inhaler, Rfl: 5    folic acid (FOLVITE) 1 mg tablet, Take 1 tablet (1 mg total) by mouth daily, Disp: 30 tablet, Rfl: 2    ipratropium (ATROVENT) 0 02 % nebulizer solution, Take 1 vial (0 5 mg total) by nebulization 3 (three) times a day, Disp: 90 vial, Rfl: 1    loratadine (CLARITIN) 10 mg tablet, Take 1 tablet (10 mg total) by mouth daily in the early morning, Disp: 30 tablet, Rfl: 2    melatonin 3 mg, Take 1 tablet (3 mg total) by mouth daily at bedtime, Disp: 30 tablet, Rfl: 1    metFORMIN (GLUCOPHAGE-XR) 500 mg 24 hr tablet, Take 1 tablet (500 mg total) by mouth 2 (two) times a day with meals, Disp: 60 tablet, Rfl: 0    methocarbamol (ROBAXIN) 750 mg tablet, Take 1 tablet (750 mg total) by mouth every 6 (six) hours as needed for muscle spasms, Disp: 90 tablet, Rfl: 0    ondansetron (ZOFRAN) 4 mg tablet, Take 1 tablet (4 mg total) by mouth every 6 (six) hours as needed for nausea or vomiting, Disp: 60 tablet, Rfl: 2    oxyCODONE (OxyCONTIN) 20 mg 12 hr tablet, Take 1 tablet (20 mg total) by mouth every 12 (twelve) hoursMax Daily Amount: 40 mg, Disp: 60 tablet, Rfl: 0    oxyCODONE (ROXICODONE) 30 MG immediate release tablet, Take 1 tablet (30 mg total) by mouth every 4 (four) hours as needed for severe painMax Daily Amount: 180 mg, Disp: 150 tablet, Rfl: 0    pantoprazole (PROTONIX) 40 mg tablet, Take 1 tablet (40 mg total) by mouth daily, Disp: 30 tablet, Rfl: 5    polyethylene glycol (GLYCOLAX) powder, Take 17 g by mouth daily, Disp: 527 g, Rfl: 1    predniSONE 10 mg tablet, Take 1 tablet (10 mg total) by mouth daily, Disp: 100 tablet, Rfl: 0    pregabalin (LYRICA) 25 mg capsule, Take 1 capsule PO in morning and 2 capsules PO at bedtime, Disp: 90 capsule, Rfl: 0    prochlorperazine (COMPAZINE) 10 mg tablet, Take 1 tablet (10 mg total) by mouth every 6 (six) hours as needed for nausea or vomiting, Disp: 90 tablet, Rfl: 0    QUEtiapine (SEROquel) 25 mg tablet, Take 25 mg by mouth daily at bedtime, Disp: , Rfl:     ranitidine (ZANTAC) 150 mg tablet, Take 1 tablet (150 mg total) by mouth 2 (two) times a day, Disp: 60 tablet, Rfl: 4    REGULOID 28 3 % POWD, USE AS DIRECTED, Disp: 369 g, Rfl: 4    Selenium Sulfide 2 25 % SHAM, apply by topical route  every PM to the affected area(s), Disp: , Rfl:     senna-docusate sodium (SENOKOT-S) 8 6-50 mg per tablet, Take 1 tablet by mouth 2 (two) times a day, Disp: 60 tablet, Rfl: 2    sildenafil (VIAGRA) 50 MG tablet, Take 1 tablet (50 mg total) by mouth as needed for erectile dysfunction, Disp: 6 tablet, Rfl: 0    tamsulosin (FLOMAX) 0 4 mg, Take 1 capsule (0 4 mg total) by mouth daily with dinner, Disp: 30 capsule, Rfl: 3    tiotropium (SPIRIVA RESPIMAT) 2 5 MCG/ACT AERS inhaler, Inhale 2 puffs daily, Disp: 1 Inhaler, Rfl: 2    VENTOLIN  (90 Base) MCG/ACT inhaler, Inhale 2 puffs every 4 (four) hours as needed for wheezing, Disp: 18 g, Rfl: 0    Blood Glucose Monitoring Suppl KIT, by Does not apply route daily (Patient not taking: Reported on 11/4/2019), Disp: 1 each, Rfl: 0    FREESTYLE LITE test strip, TEST BLOOD SUGAR DAILY (Patient not taking: Reported on 11/4/2019), Disp: 100 each, Rfl: 1    Lancets (FREESTYLE) lancets, TEST BLOOD SUGAR DAILY (Patient not taking: Reported on 11/4/2019), Disp: 100 each, Rfl: 4    lidocaine (LMX) 4 % cream, Apply topically as needed for mild pain Apply 1/2-1 inch to port 30-60 mins prior to needle insertion, cover with serrain wrap (Patient not taking: Reported on 11/4/2019), Disp: 30 g, Rfl: [id number]          Representatives have queried the patient's controlled substance dispensing history in the Prescription Drug Monitoring Program in compliance with the [facility] regulations before I have prescribed any controlled substances  The prescription history is consistent with prescribed therapy and our practice policies

## 2019-11-08 ENCOUNTER — TELEPHONE (OUTPATIENT)
Dept: NEPHROLOGY | Facility: CLINIC | Age: 59
End: 2019-11-08

## 2019-11-08 NOTE — TELEPHONE ENCOUNTER
I called and spoke with Abram Treviño at [facility] and schedule the patient to be picked up for appointment on 11/19/19 at 9:30am in the [place] office with Dr Elmore  Per Abram Treviño he is schedule and they will contact the patient

## 2019-11-13 ENCOUNTER — HOSPITAL ENCOUNTER (OUTPATIENT)
Dept: NUCLEAR MEDICINE | Facility: HOSPITAL | Age: 59
Discharge: HOME | End: 2019-11-13
Payer: COMMERCIAL

## 2019-11-13 DIAGNOSIS — C34.91 ADENOCARCINOMA OF LUNG, RIGHT (HCC): ICD-10-CM

## 2019-11-13 LAB — GLUCOSE SERPL-MCNC: 92 MG/DL (ref 65–140)

## 2019-11-13 PROCEDURE — 82948 REAGENT STRIP/BLOOD GLUCOSE: CPT

## 2019-11-13 PROCEDURE — A9552 F18 FDG: HCPCS

## 2019-11-13 PROCEDURE — 78815 PET IMAGE W/CT SKULL-THIGH: CPT

## 2019-11-15 ENCOUNTER — OFFICE VISIT (OUTPATIENT)
Dept: UROLOGY | Facility: MEDICAL CENTER | Age: 59
End: 2019-11-15
Payer: COMMERCIAL

## 2019-11-15 VITALS
WEIGHT: 150 LBS | HEIGHT: 69 IN | DIASTOLIC BLOOD PRESSURE: 80 MMHG | BODY MASS INDEX: 22.22 KG/M2 | SYSTOLIC BLOOD PRESSURE: 134 MMHG | HEART RATE: 88 BPM

## 2019-11-15 DIAGNOSIS — N41.9 PROSTATITIS, UNSPECIFIED PROSTATITIS TYPE: ICD-10-CM

## 2019-11-15 DIAGNOSIS — R30.0 STRANGURIA: ICD-10-CM

## 2019-11-15 DIAGNOSIS — N32.89 BLADDER WALL THICKENING: Primary | ICD-10-CM

## 2019-11-15 DIAGNOSIS — N40.1 BENIGN PROSTATIC HYPERPLASIA WITH LOWER URINARY TRACT SYMPTOMS, SYMPTOM DETAILS UNSPECIFIED: ICD-10-CM

## 2019-11-15 LAB
SL AMB  POCT GLUCOSE, UA: NORMAL
SL AMB LEUKOCYTE ESTERASE,UA: NORMAL
SL AMB POCT BILIRUBIN,UA: NORMAL
SL AMB POCT BLOOD,UA: NORMAL
SL AMB POCT CLARITY,UA: CLEAR
SL AMB POCT COLOR,UA: YELLOW
SL AMB POCT KETONES,UA: NORMAL
SL AMB POCT NITRITE,UA: NORMAL
SL AMB POCT PH,UA: 6
SL AMB POCT SPECIFIC GRAVITY,UA: 1.01
SL AMB POCT URINE PROTEIN: NORMAL
SL AMB POCT UROBILINOGEN: 0.2

## 2019-11-15 PROCEDURE — 99214 OFFICE O/P EST MOD 30 MIN: CPT | Performed by: UROLOGY

## 2019-11-15 PROCEDURE — 81003 URINALYSIS AUTO W/O SCOPE: CPT | Performed by: UROLOGY

## 2019-11-15 RX ORDER — TAMSULOSIN HYDROCHLORIDE 0.4 MG/1
0.4 CAPSULE ORAL
Qty: 90 CAPSULE | Refills: 3 | Status: SHIPPED | OUTPATIENT
Start: 2019-11-15 | End: 2020-01-03 | Stop reason: SDUPTHER

## 2019-11-18 ENCOUNTER — TELEPHONE (OUTPATIENT)
Dept: HEMATOLOGY ONCOLOGY | Facility: CLINIC | Age: 59
End: 2019-11-18

## 2019-11-18 RX ORDER — SODIUM CHLORIDE 9 MG/ML
20 INJECTION, SOLUTION INTRAVENOUS ONCE
Status: CANCELLED | OUTPATIENT
Start: 2019-11-20

## 2019-11-18 NOTE — TELEPHONE ENCOUNTER
The patient was called to complete the lab work before his appointment on 11/20/2019    The patient said he would completed the lab work

## 2019-11-19 ENCOUNTER — TRANSCRIBE ORDERS (OUTPATIENT)
Dept: ADMINISTRATIVE | Facility: HOSPITAL | Age: 59
End: 2019-11-19

## 2019-11-19 ENCOUNTER — APPOINTMENT (OUTPATIENT)
Dept: LAB | Facility: HOSPITAL | Age: 59
End: 2019-11-19
Attending: INTERNAL MEDICINE
Payer: COMMERCIAL

## 2019-11-19 DIAGNOSIS — C34.91 ADENOCARCINOMA OF LUNG, RIGHT (HCC): ICD-10-CM

## 2019-11-19 DIAGNOSIS — N28.9 RENAL DYSFUNCTION: ICD-10-CM

## 2019-11-19 DIAGNOSIS — R77.9 ELEVATED BLOOD PROTEIN: ICD-10-CM

## 2019-11-19 LAB
ALBUMIN SERPL BCP-MCNC: 4.4 G/DL (ref 3–5.2)
ALP SERPL-CCNC: 100 U/L (ref 43–122)
ALT SERPL W P-5'-P-CCNC: 32 U/L (ref 9–52)
ANION GAP SERPL CALCULATED.3IONS-SCNC: 9 MMOL/L (ref 5–14)
AST SERPL W P-5'-P-CCNC: 29 U/L (ref 17–59)
BASOPHILS # BLD AUTO: 0.1 THOUSANDS/ΜL (ref 0–0.1)
BASOPHILS NFR BLD AUTO: 1 % (ref 0–1)
BILIRUB SERPL-MCNC: 0.4 MG/DL
BILIRUB UR QL STRIP: NEGATIVE
BUN SERPL-MCNC: 28 MG/DL (ref 5–25)
CALCIUM SERPL-MCNC: 9.4 MG/DL (ref 8.4–10.2)
CHLORIDE SERPL-SCNC: 101 MMOL/L (ref 97–108)
CLARITY UR: CLEAR
CO2 SERPL-SCNC: 26 MMOL/L (ref 22–30)
COLOR UR: NORMAL
CREAT SERPL-MCNC: 2.54 MG/DL (ref 0.7–1.5)
CRP SERPL QL: 18.6 MG/L
EOSINOPHIL # BLD AUTO: 0.3 THOUSAND/ΜL (ref 0–0.4)
EOSINOPHIL NFR BLD AUTO: 4 % (ref 0–6)
ERYTHROCYTE [DISTWIDTH] IN BLOOD BY AUTOMATED COUNT: 16.2 %
ERYTHROCYTE [SEDIMENTATION RATE] IN BLOOD: 93 MM/HOUR (ref 1–20)
GFR SERPL CREATININE-BSD FRML MDRD: 31 ML/MIN/1.73SQ M
GLUCOSE P FAST SERPL-MCNC: 122 MG/DL (ref 70–99)
GLUCOSE UR STRIP-MCNC: NEGATIVE MG/DL
HCT VFR BLD AUTO: 38 % (ref 41–53)
HGB BLD-MCNC: 12.4 G/DL (ref 13.5–17.5)
HGB UR QL STRIP.AUTO: NEGATIVE
IGA SERPL-MCNC: 452 MG/DL (ref 70–400)
IGG SERPL-MCNC: 1500 MG/DL (ref 700–1600)
IGM SERPL-MCNC: 139 MG/DL (ref 40–230)
KETONES UR STRIP-MCNC: NEGATIVE MG/DL
LDH SERPL-CCNC: 516 U/L (ref 313–618)
LEUKOCYTE ESTERASE UR QL STRIP: NEGATIVE
LYMPHOCYTES # BLD AUTO: 1.9 THOUSANDS/ΜL (ref 0.5–4)
LYMPHOCYTES NFR BLD AUTO: 27 % (ref 25–45)
MCH RBC QN AUTO: 27.3 PG (ref 26–34)
MCHC RBC AUTO-ENTMCNC: 32.7 G/DL (ref 31–36)
MCV RBC AUTO: 84 FL (ref 80–100)
MONOCYTES # BLD AUTO: 0.2 THOUSAND/ΜL (ref 0.2–0.9)
MONOCYTES NFR BLD AUTO: 3 % (ref 1–10)
NEUTROPHILS # BLD AUTO: 4.7 THOUSANDS/ΜL (ref 1.8–7.8)
NEUTS SEG NFR BLD AUTO: 65 % (ref 45–65)
NITRITE UR QL STRIP: NEGATIVE
PH UR STRIP.AUTO: 5 [PH]
PLATELET # BLD AUTO: 406 THOUSANDS/UL (ref 150–450)
PMV BLD AUTO: 7.1 FL (ref 8.9–12.7)
POTASSIUM SERPL-SCNC: 4.8 MMOL/L (ref 3.6–5)
PROT SERPL-MCNC: 8.5 G/DL (ref 5.9–8.4)
PROT UR STRIP-MCNC: NEGATIVE MG/DL
RBC # BLD AUTO: 4.55 MILLION/UL (ref 4.5–5.9)
SODIUM SERPL-SCNC: 136 MMOL/L (ref 137–147)
SP GR UR STRIP.AUTO: 1.01 (ref 1–1.04)
T3FREE SERPL-MCNC: 3.46 PG/ML (ref 2.3–4.2)
TSH SERPL DL<=0.05 MIU/L-ACNC: 1.5 UIU/ML (ref 0.47–4.68)
URATE SERPL-MCNC: 6.2 MG/DL (ref 3.5–8.5)
UROBILINOGEN UA: NEGATIVE MG/DL
WBC # BLD AUTO: 7.2 THOUSAND/UL (ref 4.5–11)

## 2019-11-19 PROCEDURE — 84165 PROTEIN E-PHORESIS SERUM: CPT

## 2019-11-19 PROCEDURE — 84165 PROTEIN E-PHORESIS SERUM: CPT | Performed by: PATHOLOGY

## 2019-11-19 PROCEDURE — 84166 PROTEIN E-PHORESIS/URINE/CSF: CPT

## 2019-11-19 PROCEDURE — 84550 ASSAY OF BLOOD/URIC ACID: CPT

## 2019-11-19 PROCEDURE — 84443 ASSAY THYROID STIM HORMONE: CPT

## 2019-11-19 PROCEDURE — 85652 RBC SED RATE AUTOMATED: CPT

## 2019-11-19 PROCEDURE — 86140 C-REACTIVE PROTEIN: CPT

## 2019-11-19 PROCEDURE — 36415 COLL VENOUS BLD VENIPUNCTURE: CPT

## 2019-11-19 PROCEDURE — 84481 FREE ASSAY (FT-3): CPT

## 2019-11-19 PROCEDURE — 82784 ASSAY IGA/IGD/IGG/IGM EACH: CPT

## 2019-11-19 PROCEDURE — 80053 COMPREHEN METABOLIC PANEL: CPT

## 2019-11-19 PROCEDURE — 83883 ASSAY NEPHELOMETRY NOT SPEC: CPT

## 2019-11-19 PROCEDURE — 84166 PROTEIN E-PHORESIS/URINE/CSF: CPT | Performed by: PATHOLOGY

## 2019-11-19 PROCEDURE — 85025 COMPLETE CBC W/AUTO DIFF WBC: CPT

## 2019-11-19 PROCEDURE — 83615 LACTATE (LD) (LDH) ENZYME: CPT

## 2019-11-20 ENCOUNTER — OFFICE VISIT (OUTPATIENT)
Dept: HEMATOLOGY ONCOLOGY | Facility: CLINIC | Age: 59
End: 2019-11-20
Payer: COMMERCIAL

## 2019-11-20 ENCOUNTER — HOSPITAL ENCOUNTER (OUTPATIENT)
Dept: INFUSION CENTER | Facility: HOSPITAL | Age: 59
Discharge: HOME | End: 2019-11-20
Attending: INTERNAL MEDICINE
Payer: COMMERCIAL

## 2019-11-20 VITALS
DIASTOLIC BLOOD PRESSURE: 84 MMHG | OXYGEN SATURATION: 94 % | WEIGHT: 156.7 LBS | BODY MASS INDEX: 23.75 KG/M2 | TEMPERATURE: 96.7 F | HEIGHT: 68 IN | RESPIRATION RATE: 16 BRPM | SYSTOLIC BLOOD PRESSURE: 143 MMHG

## 2019-11-20 DIAGNOSIS — N28.9 RENAL DYSFUNCTION: Primary | ICD-10-CM

## 2019-11-20 DIAGNOSIS — C34.91 ADENOCARCINOMA OF LUNG, RIGHT (HCC): Primary | ICD-10-CM

## 2019-11-20 DIAGNOSIS — C34.91 ADENOCARCINOMA OF LUNG, RIGHT (HCC): ICD-10-CM

## 2019-11-20 LAB
KAPPA LC FREE SER-MCNC: 53.2 MG/L (ref 3.3–19.4)
KAPPA LC FREE/LAMBDA FREE SER: 2.06 {RATIO} (ref 0.26–1.65)
LAMBDA LC FREE SERPL-MCNC: 25.8 MG/L (ref 5.7–26.3)

## 2019-11-20 PROCEDURE — 96413 CHEMO IV INFUSION 1 HR: CPT

## 2019-11-20 PROCEDURE — 99214 OFFICE O/P EST MOD 30 MIN: CPT | Performed by: INTERNAL MEDICINE

## 2019-11-20 RX ORDER — SODIUM CHLORIDE 9 MG/ML
20 INJECTION, SOLUTION INTRAVENOUS ONCE
Status: COMPLETED | OUTPATIENT
Start: 2019-11-20 | End: 2019-11-20

## 2019-11-20 RX ADMIN — SODIUM CHLORIDE 20 ML/HR: 9 INJECTION, SOLUTION INTRAVENOUS at 11:24

## 2019-11-20 RX ADMIN — SODIUM CHLORIDE 200 MG: 9 INJECTION, SOLUTION INTRAVENOUS at 11:24

## 2019-11-20 NOTE — PROGRESS NOTES
Hematology/Oncology Outpatient Follow-up  Neda Cisneros Sr  64 y o  male 1960 [id number]    Date:  11/20/2019        Assessment and Plan:  1  Adenocarcinoma of lung, right ([address] )  I had a lengthy discussion with the patient regarding the PET-CT scan result  We reviewed the imaging studies together  There is no obvious progression of the non-small cell lung cancer  He does have some hazy infiltrative process on the right more than hilar region  The patient was told that we will continue him on the immunotherapy with Ana Rosa Abo on every 3 weeks basis until we have good evidence of progression of his disease on the current treatment  The patient clinically doing well which is very encouraging  His kidney function seems to be stable off of chemotherapy  He did have a elevated protein level which will be monitored of for any hint of monoclonal gammopathy   - CBC and differential; Future  - Comprehensive metabolic panel; Future  - TSH, 3rd generation with Free T4 reflex; Future        HPI:  The patient came today for a follow-up visit  He had a PET-CT scan on 11/13/2019 which showed:     IMPRESSION:  1  Interval increased hypermetabolic bilateral lung infiltrates, right greater than left as above  This may be inflammatory/infectious, however coexistent tumor recurrence is not excluded  Short-term follow-up recommended  2   Increased left gynecomastia  3   New FDG activity in the left quadratus lumborum muscle above the left iliac crest is nonspecific but may be inflammatory /physiologic  This may be reassessed on follow-up exam   He is doing relatively well  His blood work from 11/19/2019 showed hemoglobin of 12 4 with normal white cells and platelets  His creatinine was 2 5 with normal TSH  His IgA was 452 the rest of the workup is still pending    Oncology History    32-year-old Counts include 234 beds at the [facility] American male heavy smoker who used to smoke 2 packs of cigarettes daily for many years, COPD, he presented with dyspnea, CT scan of the chest on October 2018 showed right middle lobe spiculated 1 3 cm mass with multiple solid and ground-glass nodules along the major and minor fissures sub cm pulmonary nodules bilaterally, left adrenal 1 2 cm nodule     PET scan showed 1 3 cm right middle lung nodule with SUV of 6 8, numerous nodular densities along the right major and minor fissures, right hilar activity with SUV of 3 4, subcarinal lymph node measuring 7 mm with SUV of 2 7, periportal enlarged lymph nodes concerning for metastatic disease measuring 2 4 cm with SUV of 5, prominent left retrocrural lymph node measuring 1 cm x 9 mm with SUV of 1 1    Core biopsy of the right spiculated nodule showed invasive adenocarcinoma consistent with lung primary positive for CK7, TTF 1, napsin a        Adenocarcinoma of lung, right ([facility] )    11/13/2018 Initial Diagnosis     Adenocarcinoma of lung, right ([facility] )  Stage IV      12/13/2018 - 3/28/2019 Chemotherapy      Alimta, Carboplatin (AUC 5) + Keytruda (6 cycles total)      4/17/2019 -  Chemotherapy     Started maintenance Alimta and Keytruda  (Alimta d/c'd 9/16/19 due to worsening renal dysfunction)         Interval history:    ROS: Review of Systems   Constitutional: Negative for appetite change, diaphoresis, fatigue and fever  HENT: Negative for congestion, dental problem, facial swelling, hearing loss, tinnitus and trouble swallowing  Eyes: Negative for visual disturbance  Respiratory: Positive for shortness of breath  Negative for cough, chest tightness and wheezing  Cardiovascular: Negative for chest pain and leg swelling  Gastrointestinal: Positive for constipation and nausea  Negative for abdominal distention, abdominal pain, blood in stool, diarrhea and vomiting  Genitourinary: Negative for dysuria, hematuria and urgency  Musculoskeletal: Negative for arthralgias, myalgias and neck pain  Skin: Negative  Negative for color change, pallor, rash and wound  Neurological: Positive for dizziness and numbness  Negative for weakness and headaches  Hematological: Negative for adenopathy  Psychiatric/Behavioral: Negative for agitation, behavioral problems, confusion, hallucinations, self-injury and sleep disturbance  The patient is not nervous/anxious and is not hyperactive          Past Medical History:   Diagnosis Date    Bipolar 1 disorder ([facility] )     Cancer ([facility] )     adeno lung  dx 11/2018-lung bx today 4/9/2019-ongoing chemo    Chronic pain disorder     back and right shoulder    CKD (chronic kidney disease)     COPD (chronic obstructive pulmonary disease) (HCC)     Depression     Diabetes mellitus (HCC)     GERD (gastroesophageal reflux disease)     Herniated lumbar intervertebral disc     Hypertension     Insomnia     Latent syphilis     Treated    Low back pain     Lumbosacral disc disease     Lung cancer ([facility] ) 04/2019    Pneumothorax after biopsy     R lung    PTSD (post-traumatic stress disorder)     Pulmonary emphysema ([place] [address] )     Tobacco abuse 11/13/2018       Past Surgical History:   Procedure Laterality Date    COLONOSCOPY  2011    CT GUIDED CHEST TUBE  11/21/2018    FL GUIDED CENTRAL VENOUS ACCESS DEVICE INSERTION  2/8/2019    HEMORROIDECTOMY      IR CHEST TUBE  11/14/2018    IR IMAGE GUIDED BIOPSY/ASPIRATION LUNG  11/13/2018    KNEE SURGERY      IA INSJ TUNNELED CTR VAD W/SUBQ PORT AGE 5 YR/> N/A 2/8/2019    Procedure: PLACEMENT OF PORT-A-CATH;  Surgeon: Sanchez Kauffman MD;  Location:  MAIN OR;  Service: Vascular       Social History     Socioeconomic History    Marital status: Legally      Spouse name: None    Number of children: None    Years of education: None    Highest education level: None   Occupational History    Occupation: part time employment   Social Needs    Financial resource strain: None    Food insecurity:     Worry: None     Inability: None    Transportation needs:     Medical: None Non-medical: None   Tobacco Use    Smoking status: Former Smoker     Packs/day: 0 50     Years: 40 00     Pack years: 20 00     Types: Cigarettes     Start date:      Last attempt to quit: 2019     Years since quittin 3    Smokeless tobacco: Never Used   Substance and Sexual Activity    Alcohol use: Not Currently    Drug use: No     Types: Marijuana     Comment: stopped , "i smoked weed and stopped 1 month ago"    Sexual activity: Yes   Lifestyle    Physical activity:     Days per week: None     Minutes per session: None    Stress: None   Relationships    Social connections:     Talks on phone: None     Gets together: None     Attends Yarsani service: None     Active member of club or organization: None     Attends meetings of clubs or organizations: None     Relationship status: None    Intimate partner violence:     Fear of current or ex partner: None     Emotionally abused: None     Physically abused: None     Forced sexual activity: None   Other Topics Concern    None   Social History Narrative    Lives with girlfriend       Family History   Problem Relation Age of Onset    Heart disease Mother     Cancer Mother     Hypertension Father     Diabetes Family     Asthma Family     Heart disease Family     Cancer Family     Cancer Maternal Grandfather     Cancer Paternal Grandfather     Cancer Maternal Aunt        Allergies   Allergen Reactions    Lisinopril Anaphylaxis     Took medication a while ago and had a "swelling reaction"  Does not carry epi-pen         Current Outpatient Medications:     albuterol (2 5 mg/3 mL) 0 083 % nebulizer solution, Take 1 vial (2 5 mg total) by nebulization every 4 (four) hours as needed for wheezing or shortness of breath, Disp: 120 vial, Rfl: 3    albuterol (VENTOLIN HFA) 90 mcg/act inhaler, Inhale 2 puffs every 4 (four) hours as needed for wheezing, Disp: 1 Inhaler, Rfl: 5    amLODIPine (NORVASC) 10 mg tablet, Take 1 tablet (10 mg total) by mouth daily, Disp: 30 tablet, Rfl: 1    BANOPHEN 25 MG capsule, , Disp: , Rfl:     Blood Glucose Monitoring Suppl KIT, by Does not apply route daily, Disp: 1 each, Rfl: 0    carbamide peroxide (DEBROX) 6 5 % otic solution, Administer 5 drops into both ears 2 (two) times a day, Disp: 15 mL, Rfl: 0    chlorproMAZINE (THORAZINE) 25 mg tablet, Take 1 tablet (25 mg total) by mouth every 6 (six) hours as needed (hiccups), Disp: 30 tablet, Rfl: 0    dexamethasone (DECADRON) 4 mg tablet, Take 1 tablet (4 mg total) by mouth 2 (two) times a day with meals For 2 days after chemo, Disp: 4 tablet, Rfl: 3    diphenhydrAMINE (BENADRYL) 25 mg tablet, Take 1 tablet (25 mg total) by mouth every 6 (six) hours as needed (nausea), Disp: 30 tablet, Rfl: 0    ergocalciferol (ERGOCALCIFEROL) 84524 units capsule, Take 1 capsule (50,000 Units total) by mouth once a week, Disp: 12 capsule, Rfl: 0    FLUoxetine (PROzac) 10 MG tablet, Take 1 tablet (10 mg total) by mouth daily, Disp: 30 tablet, Rfl: 5    fluticasone (FLONASE) 50 mcg/act nasal spray, 1-2 sprays each nostril daily for allergies, Disp: 1 Bottle, Rfl: 3    fluticasone-vilanterol (BREO ELLIPTA) 100-25 mcg/inh inhaler, Inhale 1 puff daily in the early morning Rinse mouth after use , Disp: 1 Inhaler, Rfl: 5    folic acid (FOLVITE) 1 mg tablet, Take 1 tablet (1 mg total) by mouth daily, Disp: 30 tablet, Rfl: 2    FREESTYLE LITE test strip, TEST BLOOD SUGAR DAILY, Disp: 100 each, Rfl: 1    ipratropium (ATROVENT) 0 02 % nebulizer solution, Take 1 vial (0 5 mg total) by nebulization 3 (three) times a day, Disp: 90 vial, Rfl: 1    Lancets (FREESTYLE) lancets, TEST BLOOD SUGAR DAILY, Disp: 100 each, Rfl: 4    lidocaine (LMX) 4 % cream, Apply topically as needed for mild pain Apply 1/2-1 inch to port 30-60 mins prior to needle insertion, cover with serrain wrap, Disp: 30 g, Rfl: 5    loratadine (CLARITIN) 10 mg tablet, Take 1 tablet (10 mg total) by mouth daily in the early morning, Disp: 30 tablet, Rfl: 2    melatonin 3 mg, Take 1 tablet (3 mg total) by mouth daily at bedtime, Disp: 30 tablet, Rfl: 1    metFORMIN (GLUCOPHAGE-XR) 500 mg 24 hr tablet, Take 1 tablet (500 mg total) by mouth 2 (two) times a day with meals, Disp: 60 tablet, Rfl: 0    methocarbamol (ROBAXIN) 750 mg tablet, Take 1 tablet (750 mg total) by mouth every 6 (six) hours as needed for muscle spasms, Disp: 90 tablet, Rfl: 0    ondansetron (ZOFRAN) 4 mg tablet, Take 1 tablet (4 mg total) by mouth every 6 (six) hours as needed for nausea or vomiting, Disp: 60 tablet, Rfl: 2    oxyCODONE (OxyCONTIN) 20 mg 12 hr tablet, Take 1 tablet (20 mg total) by mouth every 12 (twelve) hoursMax Daily Amount: 40 mg, Disp: 60 tablet, Rfl: 0    oxyCODONE (ROXICODONE) 30 MG immediate release tablet, Take 1 tablet (30 mg total) by mouth every 4 (four) hours as needed for severe painMax Daily Amount: 180 mg, Disp: 150 tablet, Rfl: 0    pantoprazole (PROTONIX) 40 mg tablet, Take 1 tablet (40 mg total) by mouth daily, Disp: 30 tablet, Rfl: 5    polyethylene glycol (GLYCOLAX) powder, Take 17 g by mouth daily, Disp: 527 g, Rfl: 1    predniSONE 10 mg tablet, Take 1 tablet (10 mg total) by mouth daily, Disp: 100 tablet, Rfl: 0    pregabalin (LYRICA) 25 mg capsule, Take 1 capsule PO in morning and 2 capsules PO at bedtime, Disp: 90 capsule, Rfl: 0    prochlorperazine (COMPAZINE) 10 mg tablet, Take 1 tablet (10 mg total) by mouth every 6 (six) hours as needed for nausea or vomiting, Disp: 90 tablet, Rfl: 0    QUEtiapine (SEROquel) 25 mg tablet, Take 25 mg by mouth daily at bedtime, Disp: , Rfl:     ranitidine (ZANTAC) 150 mg tablet, Take 1 tablet (150 mg total) by mouth 2 (two) times a day, Disp: 60 tablet, Rfl: 4    REGULOID 28 3 % POWD, USE AS DIRECTED, Disp: 369 g, Rfl: 4    Selenium Sulfide 2 25 % SHAM, apply by topical route  every PM to the affected area(s), Disp: , Rfl:     senna-docusate sodium (SENOKOT-S) 8 6-50 mg per tablet, Take 1 tablet by mouth 2 (two) times a day, Disp: 60 tablet, Rfl: 2    sildenafil (VIAGRA) 50 MG tablet, Take 1 tablet (50 mg total) by mouth as needed for erectile dysfunction, Disp: 6 tablet, Rfl: 0    tamsulosin (FLOMAX) 0 4 mg, Take 1 capsule (0 4 mg total) by mouth daily with dinner, Disp: 90 capsule, Rfl: 3    tiotropium (SPIRIVA RESPIMAT) 2 5 MCG/ACT AERS inhaler, Inhale 2 puffs daily, Disp: 1 Inhaler, Rfl: 2      Physical Exam:  /84 (BP Location: Left arm, Cuff Size: Adult)   Temp (!) 96 7 °F (35 9 °C) (Tympanic)   Resp 16   Ht 5' 8" (1 727 m)   Wt 71 1 kg (156 lb 11 2 oz)   SpO2 94%   BMI 23 83 kg/m²     Physical Exam   Constitutional: He is oriented to person, place, and time  He appears well-developed and well-nourished  HENT:   Head: Normocephalic and atraumatic  Nose: Nose normal    Mouth/Throat: Oropharynx is clear and moist    Eyes: Pupils are equal, round, and reactive to light  Conjunctivae and EOM are normal  Right eye exhibits no discharge  Left eye exhibits no discharge  No scleral icterus  Neck: Normal range of motion  Neck supple  No tracheal deviation present  No thyromegaly present  Cardiovascular: Normal rate, regular rhythm and normal heart sounds  Exam reveals no friction rub  No murmur heard  Pulmonary/Chest: Effort normal and breath sounds normal  No respiratory distress  He has no wheezes  He has no rales  He exhibits no tenderness  Abdominal: Soft  Bowel sounds are normal  He exhibits no distension and no mass  There is no hepatosplenomegaly, splenomegaly or hepatomegaly  There is no tenderness  There is no rebound and no guarding  Musculoskeletal: Normal range of motion  He exhibits no edema, tenderness or deformity  Lymphadenopathy:     He has no cervical adenopathy  Neurological: He is alert and oriented to person, place, and time  He has normal reflexes  He displays normal reflexes  No cranial nerve deficit   Coordination normal  Skin: Skin is warm and dry  No rash noted  No erythema  No pallor  Psychiatric: He has a normal mood and affect  His behavior is normal  Judgment and thought content normal          Labs:  Lab Results   Component Value Date    WBC 7 20 11/19/2019    HGB 12 4 (L) 11/19/2019    HCT 38 0 (L) 11/19/2019    MCV 84 11/19/2019     11/19/2019     Lab Results   Component Value Date    K 4 8 11/19/2019     11/19/2019    CO2 26 11/19/2019    BUN 28 (H) 11/19/2019    CREATININE 2 54 (H) 11/19/2019    GLUF 122 (H) 11/19/2019    CALCIUM 9 4 11/19/2019    AST 29 11/19/2019    ALT 32 11/19/2019    ALKPHOS 100 11/19/2019    EGFR 31 (L) 11/19/2019     No results found for: TSH    Patient voiced understanding and agreement in the above discussion  Aware to contact our office with questions/symptoms in the interim

## 2019-11-21 LAB
ALBUMIN SERPL ELPH-MCNC: 4.16 G/DL (ref 3.5–5)
ALBUMIN SERPL ELPH-MCNC: 52.6 % (ref 52–65)
ALBUMIN UR ELPH-MCNC: 100 %
ALPHA1 GLOB MFR UR ELPH: 0 %
ALPHA1 GLOB SERPL ELPH-MCNC: 0.32 G/DL (ref 0.1–0.4)
ALPHA1 GLOB SERPL ELPH-MCNC: 4 % (ref 2.5–5)
ALPHA2 GLOB MFR UR ELPH: 0 %
ALPHA2 GLOB SERPL ELPH-MCNC: 0.85 G/DL (ref 0.4–1.2)
ALPHA2 GLOB SERPL ELPH-MCNC: 10.8 % (ref 7–13)
B-GLOBULIN MFR UR ELPH: 0 %
BETA GLOB ABNORMAL SERPL ELPH-MCNC: 0.4 G/DL (ref 0.4–0.8)
BETA1 GLOB SERPL ELPH-MCNC: 5 % (ref 5–13)
BETA2 GLOB SERPL ELPH-MCNC: 7.2 % (ref 2–8)
BETA2+GAMMA GLOB SERPL ELPH-MCNC: 0.57 G/DL (ref 0.2–0.5)
GAMMA GLOB ABNORMAL SERPL ELPH-MCNC: 1.61 G/DL (ref 0.5–1.6)
GAMMA GLOB MFR UR ELPH: 0 %
GAMMA GLOB SERPL ELPH-MCNC: 20.4 % (ref 12–22)
IGG/ALB SER: 1.11 {RATIO} (ref 1.1–1.8)
PROT PATTERN SERPL ELPH-IMP: ABNORMAL
PROT PATTERN UR ELPH-IMP: ABNORMAL
PROT SERPL-MCNC: 7.9 G/DL (ref 6.4–8.2)
PROT UR-MCNC: 19 MG/DL

## 2019-11-25 ENCOUNTER — TELEPHONE (OUTPATIENT)
Dept: PULMONOLOGY | Facility: CLINIC | Age: 59
End: 2019-11-25

## 2019-11-25 DIAGNOSIS — J44.9 CHRONIC OBSTRUCTIVE PULMONARY DISEASE, UNSPECIFIED COPD TYPE (HCC): ICD-10-CM

## 2019-11-25 DIAGNOSIS — J44.1 ACUTE EXACERBATION OF CHRONIC OBSTRUCTIVE PULMONARY DISEASE (COPD) (HCC): ICD-10-CM

## 2019-11-25 RX ORDER — ALBUTEROL SULFATE 2.5 MG/3ML
2.5 SOLUTION RESPIRATORY (INHALATION) EVERY 4 HOURS PRN
Qty: 180 VIAL | Refills: 5 | Status: SHIPPED | OUTPATIENT
Start: 2019-11-25 | End: 2020-06-24

## 2019-11-25 RX ORDER — ALBUTEROL SULFATE 2.5 MG/3ML
2.5 SOLUTION RESPIRATORY (INHALATION) EVERY 4 HOURS PRN
Qty: 120 VIAL | Refills: 3 | Status: SHIPPED | OUTPATIENT
Start: 2019-11-25 | End: 2019-12-10 | Stop reason: HOSPADM

## 2019-11-25 NOTE — TELEPHONE ENCOUNTER
Patient cancelled appt for today saying he did not feel well from his chemo  Wife saying he is sick though and wants to know what to do  Patient is coughing up clear mucous  He has a cold and is congested  She said the chemo made it worse  The coughing wakes him up at night  She said he feels like he has the flu  He is SOB with exertion and has chills  She says he feels warm but they do not have a thermometer  She says he has been using his inhaler more so he must be wheezing, but she is unsure  He is using his nebulizer  Please advise

## 2019-11-25 NOTE — TELEPHONE ENCOUNTER
The patient is high risk for breathing complications due to h/o severe COPD and lung cancer on chemotherapy  If he is not getting better he needs to be evaluated clinically in a sick visit or go to the ER to treat possible pneumonia and/or acute exacerbation of COPD

## 2019-11-25 NOTE — TELEPHONE ENCOUNTER
Spoke with wife  He said patient is using oxygen and that is making him feel a little better  She is asking if we can send in his nebulizer medication so he can start using that  I asked if she wanted a sick appt and she declined saying he doesn't feel well and is in bed  She said "I said he is better now  It takes a while after the chemo"

## 2019-11-29 ENCOUNTER — HOSPITAL ENCOUNTER (INPATIENT)
Facility: HOSPITAL | Age: 59
LOS: 11 days | Discharge: NURSING, REHAB OR LONG-TERM CARE | DRG: 133 | End: 2019-12-10
Attending: EMERGENCY MEDICINE | Admitting: FAMILY MEDICINE
Payer: COMMERCIAL

## 2019-11-29 ENCOUNTER — APPOINTMENT (EMERGENCY)
Dept: RADIOLOGY | Facility: HOSPITAL | Age: 59
DRG: 133 | End: 2019-11-29
Payer: COMMERCIAL

## 2019-11-29 DIAGNOSIS — N18.9 CKD (CHRONIC KIDNEY DISEASE): ICD-10-CM

## 2019-11-29 DIAGNOSIS — I24.9 ACS (ACUTE CORONARY SYNDROME) (HCC): Primary | ICD-10-CM

## 2019-11-29 DIAGNOSIS — J44.1 CHRONIC OBSTRUCTIVE PULMONARY DISEASE WITH ACUTE EXACERBATION (HCC): ICD-10-CM

## 2019-11-29 DIAGNOSIS — R52 GENERALIZED BODY ACHES: ICD-10-CM

## 2019-11-29 DIAGNOSIS — R77.8 ELEVATED TROPONIN: ICD-10-CM

## 2019-11-29 DIAGNOSIS — C34.91 ADENOCARCINOMA OF LUNG, RIGHT (HCC): ICD-10-CM

## 2019-11-29 DIAGNOSIS — R25.1 OCCASIONAL TREMORS: ICD-10-CM

## 2019-11-29 DIAGNOSIS — R29.90 STROKE-LIKE SYMPTOMS: ICD-10-CM

## 2019-11-29 DIAGNOSIS — C34.90 LUNG CANCER (HCC): ICD-10-CM

## 2019-11-29 DIAGNOSIS — N18.30 STAGE 3 CHRONIC KIDNEY DISEASE (HCC): ICD-10-CM

## 2019-11-29 DIAGNOSIS — R77.8 ELEVATED TROPONIN LEVEL NOT DUE TO ACUTE CORONARY SYNDROME: ICD-10-CM

## 2019-11-29 DIAGNOSIS — G89.3 CANCER ASSOCIATED PAIN: ICD-10-CM

## 2019-11-29 DIAGNOSIS — J44.1 COPD EXACERBATION (HCC): ICD-10-CM

## 2019-11-29 LAB
ANION GAP SERPL CALCULATED.3IONS-SCNC: 10 MMOL/L (ref 4–13)
BASOPHILS # BLD MANUAL: 0.09 THOUSAND/UL (ref 0–0.1)
BASOPHILS NFR MAR MANUAL: 1 % (ref 0–1)
BUN SERPL-MCNC: 31 MG/DL (ref 5–25)
CALCIUM SERPL-MCNC: 9 MG/DL (ref 8.3–10.1)
CHLORIDE SERPL-SCNC: 100 MMOL/L (ref 100–108)
CO2 SERPL-SCNC: 26 MMOL/L (ref 21–32)
CREAT SERPL-MCNC: 2.44 MG/DL (ref 0.6–1.3)
EOSINOPHIL # BLD MANUAL: 0 THOUSAND/UL (ref 0–0.4)
EOSINOPHIL NFR BLD MANUAL: 0 % (ref 0–6)
ERYTHROCYTE [DISTWIDTH] IN BLOOD BY AUTOMATED COUNT: 15.1 % (ref 11.6–15.1)
GFR SERPL CREATININE-BSD FRML MDRD: 33 ML/MIN/1.73SQ M
GLUCOSE SERPL-MCNC: 133 MG/DL (ref 65–140)
HCT VFR BLD AUTO: 36.2 % (ref 36.5–49.3)
HGB BLD-MCNC: 11.5 G/DL (ref 12–17)
LYMPHOCYTES # BLD AUTO: 1.5 THOUSAND/UL (ref 0.6–4.47)
LYMPHOCYTES # BLD AUTO: 17 % (ref 14–44)
MCH RBC QN AUTO: 26.9 PG (ref 26.8–34.3)
MCHC RBC AUTO-ENTMCNC: 31.8 G/DL (ref 31.4–37.4)
MCV RBC AUTO: 85 FL (ref 82–98)
MONOCYTES # BLD AUTO: 0.88 THOUSAND/UL (ref 0–1.22)
MONOCYTES NFR BLD: 10 % (ref 4–12)
NEUTROPHILS # BLD MANUAL: 6.26 THOUSAND/UL (ref 1.85–7.62)
NEUTS BAND NFR BLD MANUAL: 1 % (ref 0–8)
NEUTS SEG NFR BLD AUTO: 70 % (ref 43–75)
NRBC BLD AUTO-RTO: 0 /100 WBCS
NT-PROBNP SERPL-MCNC: 65 PG/ML
PLATELET # BLD AUTO: 240 THOUSANDS/UL (ref 149–390)
PLATELET BLD QL SMEAR: ADEQUATE
PMV BLD AUTO: 9.6 FL (ref 8.9–12.7)
POTASSIUM SERPL-SCNC: 4.1 MMOL/L (ref 3.5–5.3)
RBC # BLD AUTO: 4.27 MILLION/UL (ref 3.88–5.62)
RBC MORPH BLD: NORMAL
SODIUM SERPL-SCNC: 136 MMOL/L (ref 136–145)
TOTAL CELLS COUNTED SPEC: 100
TROPONIN I SERPL-MCNC: 0.92 NG/ML
TROPONIN I SERPL-MCNC: 2.92 NG/ML
VARIANT LYMPHS # BLD AUTO: 1 %
WBC # BLD AUTO: 8.81 THOUSAND/UL (ref 4.31–10.16)

## 2019-11-29 PROCEDURE — 87631 RESP VIRUS 3-5 TARGETS: CPT | Performed by: PHYSICIAN ASSISTANT

## 2019-11-29 PROCEDURE — 85379 FIBRIN DEGRADATION QUANT: CPT | Performed by: PHYSICIAN ASSISTANT

## 2019-11-29 PROCEDURE — 80048 BASIC METABOLIC PNL TOTAL CA: CPT | Performed by: EMERGENCY MEDICINE

## 2019-11-29 PROCEDURE — 85730 THROMBOPLASTIN TIME PARTIAL: CPT | Performed by: PHYSICIAN ASSISTANT

## 2019-11-29 PROCEDURE — 99285 EMERGENCY DEPT VISIT HI MDM: CPT

## 2019-11-29 PROCEDURE — 94640 AIRWAY INHALATION TREATMENT: CPT

## 2019-11-29 PROCEDURE — 84484 ASSAY OF TROPONIN QUANT: CPT | Performed by: EMERGENCY MEDICINE

## 2019-11-29 PROCEDURE — 99223 1ST HOSP IP/OBS HIGH 75: CPT | Performed by: PHYSICIAN ASSISTANT

## 2019-11-29 PROCEDURE — 99291 CRITICAL CARE FIRST HOUR: CPT | Performed by: EMERGENCY MEDICINE

## 2019-11-29 PROCEDURE — 71045 X-RAY EXAM CHEST 1 VIEW: CPT

## 2019-11-29 PROCEDURE — 85027 COMPLETE CBC AUTOMATED: CPT | Performed by: EMERGENCY MEDICINE

## 2019-11-29 PROCEDURE — 85007 BL SMEAR W/DIFF WBC COUNT: CPT | Performed by: EMERGENCY MEDICINE

## 2019-11-29 PROCEDURE — 96374 THER/PROPH/DIAG INJ IV PUSH: CPT

## 2019-11-29 PROCEDURE — 83880 ASSAY OF NATRIURETIC PEPTIDE: CPT | Performed by: EMERGENCY MEDICINE

## 2019-11-29 PROCEDURE — 84145 PROCALCITONIN (PCT): CPT | Performed by: INTERNAL MEDICINE

## 2019-11-29 PROCEDURE — 36415 COLL VENOUS BLD VENIPUNCTURE: CPT | Performed by: EMERGENCY MEDICINE

## 2019-11-29 PROCEDURE — 96361 HYDRATE IV INFUSION ADD-ON: CPT

## 2019-11-29 PROCEDURE — 85610 PROTHROMBIN TIME: CPT | Performed by: PHYSICIAN ASSISTANT

## 2019-11-29 PROCEDURE — 84484 ASSAY OF TROPONIN QUANT: CPT | Performed by: INTERNAL MEDICINE

## 2019-11-29 PROCEDURE — 93005 ELECTROCARDIOGRAM TRACING: CPT

## 2019-11-29 RX ORDER — MAGNESIUM HYDROXIDE/ALUMINUM HYDROXICE/SIMETHICONE 120; 1200; 1200 MG/30ML; MG/30ML; MG/30ML
30 SUSPENSION ORAL EVERY 6 HOURS PRN
Status: DISCONTINUED | OUTPATIENT
Start: 2019-11-29 | End: 2019-12-10 | Stop reason: HOSPADM

## 2019-11-29 RX ORDER — FOLIC ACID 1 MG/1
1 TABLET ORAL DAILY
Status: DISCONTINUED | OUTPATIENT
Start: 2019-11-30 | End: 2019-12-10 | Stop reason: HOSPADM

## 2019-11-29 RX ORDER — DIPHENHYDRAMINE HCL 25 MG
25 TABLET ORAL EVERY 6 HOURS PRN
Status: DISCONTINUED | OUTPATIENT
Start: 2019-11-29 | End: 2019-12-10 | Stop reason: HOSPADM

## 2019-11-29 RX ORDER — HEPARIN SODIUM 1000 [USP'U]/ML
3900 INJECTION, SOLUTION INTRAVENOUS; SUBCUTANEOUS ONCE
Status: COMPLETED | OUTPATIENT
Start: 2019-11-29 | End: 2019-11-29

## 2019-11-29 RX ORDER — FLUTICASONE FUROATE AND VILANTEROL 100; 25 UG/1; UG/1
1 POWDER RESPIRATORY (INHALATION)
Status: DISCONTINUED | OUTPATIENT
Start: 2019-11-30 | End: 2019-12-02

## 2019-11-29 RX ORDER — LANOLIN ALCOHOL/MO/W.PET/CERES
3 CREAM (GRAM) TOPICAL
Status: DISCONTINUED | OUTPATIENT
Start: 2019-11-29 | End: 2019-12-10 | Stop reason: HOSPADM

## 2019-11-29 RX ORDER — HEPARIN SODIUM 1000 [USP'U]/ML
1950 INJECTION, SOLUTION INTRAVENOUS; SUBCUTANEOUS AS NEEDED
Status: DISCONTINUED | OUTPATIENT
Start: 2019-11-29 | End: 2019-11-30

## 2019-11-29 RX ORDER — ACETAMINOPHEN 325 MG/1
650 TABLET ORAL EVERY 6 HOURS PRN
Status: DISCONTINUED | OUTPATIENT
Start: 2019-11-29 | End: 2019-12-10 | Stop reason: HOSPADM

## 2019-11-29 RX ORDER — OXYCODONE HYDROCHLORIDE 10 MG/1
30 TABLET ORAL EVERY 4 HOURS PRN
Status: DISCONTINUED | OUTPATIENT
Start: 2019-11-29 | End: 2019-12-03

## 2019-11-29 RX ORDER — ONDANSETRON 2 MG/ML
4 INJECTION INTRAMUSCULAR; INTRAVENOUS EVERY 6 HOURS PRN
Status: DISCONTINUED | OUTPATIENT
Start: 2019-11-29 | End: 2019-12-10 | Stop reason: HOSPADM

## 2019-11-29 RX ORDER — SODIUM CHLORIDE FOR INHALATION 0.9 %
3 VIAL, NEBULIZER (ML) INHALATION ONCE
Status: COMPLETED | OUTPATIENT
Start: 2019-11-29 | End: 2019-11-29

## 2019-11-29 RX ORDER — LEVALBUTEROL 1.25 MG/.5ML
1.25 SOLUTION, CONCENTRATE RESPIRATORY (INHALATION)
Status: DISCONTINUED | OUTPATIENT
Start: 2019-11-29 | End: 2019-11-29

## 2019-11-29 RX ORDER — PREGABALIN 25 MG/1
25 CAPSULE ORAL DAILY
Status: DISCONTINUED | OUTPATIENT
Start: 2019-11-30 | End: 2019-12-10 | Stop reason: HOSPADM

## 2019-11-29 RX ORDER — METHYLPREDNISOLONE SODIUM SUCCINATE 40 MG/ML
40 INJECTION, POWDER, LYOPHILIZED, FOR SOLUTION INTRAMUSCULAR; INTRAVENOUS EVERY 8 HOURS
Status: DISCONTINUED | OUTPATIENT
Start: 2019-11-30 | End: 2019-12-03

## 2019-11-29 RX ORDER — OXYCODONE HCL 20 MG/1
20 TABLET, FILM COATED, EXTENDED RELEASE ORAL EVERY 12 HOURS SCHEDULED
Status: DISCONTINUED | OUTPATIENT
Start: 2019-11-29 | End: 2019-12-10 | Stop reason: HOSPADM

## 2019-11-29 RX ORDER — FLUTICASONE PROPIONATE 50 MCG
1 SPRAY, SUSPENSION (ML) NASAL DAILY
Status: DISCONTINUED | OUTPATIENT
Start: 2019-11-30 | End: 2019-12-10 | Stop reason: HOSPADM

## 2019-11-29 RX ORDER — FLUOXETINE 10 MG/1
10 CAPSULE ORAL DAILY
Status: DISCONTINUED | OUTPATIENT
Start: 2019-11-30 | End: 2019-12-10 | Stop reason: HOSPADM

## 2019-11-29 RX ORDER — ALBUTEROL SULFATE 2.5 MG/3ML
5 SOLUTION RESPIRATORY (INHALATION) ONCE
Status: COMPLETED | OUTPATIENT
Start: 2019-11-29 | End: 2019-11-29

## 2019-11-29 RX ORDER — HEPARIN SODIUM 10000 [USP'U]/100ML
3-20 INJECTION, SOLUTION INTRAVENOUS
Status: DISCONTINUED | OUTPATIENT
Start: 2019-11-29 | End: 2019-11-30

## 2019-11-29 RX ORDER — HEPARIN SODIUM 1000 [USP'U]/ML
3900 INJECTION, SOLUTION INTRAVENOUS; SUBCUTANEOUS AS NEEDED
Status: DISCONTINUED | OUTPATIENT
Start: 2019-11-29 | End: 2019-11-30

## 2019-11-29 RX ORDER — LEVALBUTEROL 1.25 MG/.5ML
1.25 SOLUTION, CONCENTRATE RESPIRATORY (INHALATION)
Status: DISCONTINUED | OUTPATIENT
Start: 2019-11-30 | End: 2019-11-30

## 2019-11-29 RX ORDER — QUETIAPINE FUMARATE 25 MG/1
25 TABLET, FILM COATED ORAL
Status: DISCONTINUED | OUTPATIENT
Start: 2019-11-29 | End: 2019-12-10 | Stop reason: HOSPADM

## 2019-11-29 RX ORDER — LORAZEPAM 2 MG/ML
1 INJECTION INTRAMUSCULAR ONCE
Status: COMPLETED | OUTPATIENT
Start: 2019-11-29 | End: 2019-11-29

## 2019-11-29 RX ORDER — CHLORPROMAZINE HYDROCHLORIDE 25 MG/1
25 TABLET, FILM COATED ORAL EVERY 6 HOURS PRN
Status: DISCONTINUED | OUTPATIENT
Start: 2019-11-29 | End: 2019-12-10 | Stop reason: HOSPADM

## 2019-11-29 RX ORDER — METHYLPREDNISOLONE SODIUM SUCCINATE 125 MG/2ML
125 INJECTION, POWDER, LYOPHILIZED, FOR SOLUTION INTRAMUSCULAR; INTRAVENOUS ONCE
Status: COMPLETED | OUTPATIENT
Start: 2019-11-29 | End: 2019-11-29

## 2019-11-29 RX ORDER — ALBUTEROL SULFATE 90 UG/1
2 AEROSOL, METERED RESPIRATORY (INHALATION) EVERY 4 HOURS PRN
Status: DISCONTINUED | OUTPATIENT
Start: 2019-11-29 | End: 2019-12-10 | Stop reason: HOSPADM

## 2019-11-29 RX ORDER — PREGABALIN 50 MG/1
50 CAPSULE ORAL
Status: DISCONTINUED | OUTPATIENT
Start: 2019-11-29 | End: 2019-12-10 | Stop reason: HOSPADM

## 2019-11-29 RX ORDER — PANTOPRAZOLE SODIUM 40 MG/1
40 TABLET, DELAYED RELEASE ORAL
Status: DISCONTINUED | OUTPATIENT
Start: 2019-11-30 | End: 2019-12-10 | Stop reason: HOSPADM

## 2019-11-29 RX ORDER — FAMOTIDINE 20 MG/1
20 TABLET, FILM COATED ORAL 2 TIMES DAILY
Status: DISCONTINUED | OUTPATIENT
Start: 2019-11-29 | End: 2019-12-10 | Stop reason: HOSPADM

## 2019-11-29 RX ORDER — AMLODIPINE BESYLATE 10 MG/1
10 TABLET ORAL DAILY
Status: DISCONTINUED | OUTPATIENT
Start: 2019-11-30 | End: 2019-11-30

## 2019-11-29 RX ORDER — AMOXICILLIN 250 MG
1 CAPSULE ORAL 2 TIMES DAILY PRN
Status: DISCONTINUED | OUTPATIENT
Start: 2019-11-29 | End: 2019-12-10 | Stop reason: HOSPADM

## 2019-11-29 RX ORDER — ASPIRIN 81 MG/1
324 TABLET, CHEWABLE ORAL ONCE
Status: COMPLETED | OUTPATIENT
Start: 2019-11-29 | End: 2019-11-29

## 2019-11-29 RX ORDER — TAMSULOSIN HYDROCHLORIDE 0.4 MG/1
0.4 CAPSULE ORAL
Status: DISCONTINUED | OUTPATIENT
Start: 2019-11-30 | End: 2019-12-10 | Stop reason: HOSPADM

## 2019-11-29 RX ORDER — METHOCARBAMOL 500 MG/1
750 TABLET, FILM COATED ORAL EVERY 6 HOURS PRN
Status: DISCONTINUED | OUTPATIENT
Start: 2019-11-29 | End: 2019-12-10 | Stop reason: HOSPADM

## 2019-11-29 RX ORDER — HEPARIN SODIUM 5000 [USP'U]/ML
5000 INJECTION, SOLUTION INTRAVENOUS; SUBCUTANEOUS EVERY 8 HOURS SCHEDULED
Status: DISCONTINUED | OUTPATIENT
Start: 2019-11-29 | End: 2019-11-29

## 2019-11-29 RX ORDER — POLYETHYLENE GLYCOL 3350 17 G/17G
17 POWDER, FOR SOLUTION ORAL DAILY PRN
Status: DISCONTINUED | OUTPATIENT
Start: 2019-11-29 | End: 2019-12-10 | Stop reason: HOSPADM

## 2019-11-29 RX ORDER — SODIUM CHLORIDE FOR INHALATION 0.9 %
3 VIAL, NEBULIZER (ML) INHALATION
Status: DISCONTINUED | OUTPATIENT
Start: 2019-11-29 | End: 2019-11-29

## 2019-11-29 RX ADMIN — ALBUTEROL SULFATE 10 MG: 2.5 SOLUTION RESPIRATORY (INHALATION) at 19:12

## 2019-11-29 RX ADMIN — HEPARIN SODIUM 5000 UNITS: 5000 INJECTION INTRAVENOUS; SUBCUTANEOUS at 22:41

## 2019-11-29 RX ADMIN — METHYLPREDNISOLONE SODIUM SUCCINATE 40 MG: 40 INJECTION, POWDER, FOR SOLUTION INTRAMUSCULAR; INTRAVENOUS at 23:56

## 2019-11-29 RX ADMIN — MELATONIN TAB 3 MG 3 MG: 3 TAB at 22:38

## 2019-11-29 RX ADMIN — ALBUTEROL SULFATE 5 MG: 2.5 SOLUTION RESPIRATORY (INHALATION) at 18:38

## 2019-11-29 RX ADMIN — METHYLPREDNISOLONE SODIUM SUCCINATE 125 MG: 125 INJECTION, POWDER, FOR SOLUTION INTRAMUSCULAR; INTRAVENOUS at 19:07

## 2019-11-29 RX ADMIN — PREGABALIN 50 MG: 50 CAPSULE ORAL at 22:39

## 2019-11-29 RX ADMIN — QUETIAPINE FUMARATE 25 MG: 25 TABLET ORAL at 22:39

## 2019-11-29 RX ADMIN — HEPARIN SODIUM 3900 UNITS: 1000 INJECTION INTRAVENOUS; SUBCUTANEOUS at 23:57

## 2019-11-29 RX ADMIN — ISODIUM CHLORIDE 3 ML: 0.03 SOLUTION RESPIRATORY (INHALATION) at 19:12

## 2019-11-29 RX ADMIN — LORAZEPAM 1 MG: 2 INJECTION INTRAMUSCULAR; INTRAVENOUS at 20:04

## 2019-11-29 RX ADMIN — OXYCODONE HYDROCHLORIDE 20 MG: 20 TABLET, FILM COATED, EXTENDED RELEASE ORAL at 22:38

## 2019-11-29 RX ADMIN — IPRATROPIUM BROMIDE 0.5 MG: 0.5 SOLUTION RESPIRATORY (INHALATION) at 18:38

## 2019-11-29 RX ADMIN — IPRATROPIUM BROMIDE 1 MG: 0.5 SOLUTION RESPIRATORY (INHALATION) at 19:12

## 2019-11-29 RX ADMIN — HEPARIN SODIUM AND DEXTROSE 12 UNITS/KG/HR: 10000; 5 INJECTION INTRAVENOUS at 23:58

## 2019-11-29 RX ADMIN — ASPIRIN 81 MG 324 MG: 81 TABLET ORAL at 20:06

## 2019-11-29 RX ADMIN — SODIUM CHLORIDE 1000 ML: 0.9 INJECTION, SOLUTION INTRAVENOUS at 19:06

## 2019-11-29 RX ADMIN — FAMOTIDINE 20 MG: 20 TABLET ORAL at 22:37

## 2019-11-29 NOTE — LETTER
To whom this concerns,     I, Dr Huyen Segovia, hereby certify that my patient Eliseo Bean is no longer able to live at his leased premises because the patient has a medical condition that substantially restricts the physical mobility of the patient from entering and exiting the leased premises, and the patient reported that he is not able to reasonably make modifications to remove this restriction  There are also issues within the current leased residence that exacerbate his illness  Please allow Yesi Abdalla Sr to end his lease early based on the above       Respectfully,      Dr Huyen Segovia

## 2019-11-30 ENCOUNTER — APPOINTMENT (INPATIENT)
Dept: CT IMAGING | Facility: HOSPITAL | Age: 59
DRG: 133 | End: 2019-11-30
Payer: COMMERCIAL

## 2019-11-30 ENCOUNTER — APPOINTMENT (INPATIENT)
Dept: MRI IMAGING | Facility: HOSPITAL | Age: 59
DRG: 133 | End: 2019-11-30
Payer: COMMERCIAL

## 2019-11-30 ENCOUNTER — APPOINTMENT (INPATIENT)
Dept: RADIOLOGY | Facility: HOSPITAL | Age: 59
DRG: 133 | End: 2019-11-30
Payer: COMMERCIAL

## 2019-11-30 PROBLEM — R79.89 ELEVATED D-DIMER: Status: ACTIVE | Noted: 2019-11-30

## 2019-11-30 PROBLEM — R29.90 STROKE-LIKE SYMPTOMS: Status: ACTIVE | Noted: 2019-11-30

## 2019-11-30 PROBLEM — R77.8 ELEVATED TROPONIN LEVEL NOT DUE TO ACUTE CORONARY SYNDROME: Status: ACTIVE | Noted: 2019-11-30

## 2019-11-30 LAB
AMMONIA PLAS-SCNC: <10 UMOL/L (ref 11–35)
ANION GAP SERPL CALCULATED.3IONS-SCNC: 14 MMOL/L (ref 4–13)
ANION GAP SERPL CALCULATED.3IONS-SCNC: 9 MMOL/L (ref 4–13)
APTT PPP: 115 SECONDS (ref 23–37)
APTT PPP: 37 SECONDS (ref 23–37)
APTT PPP: 71 SECONDS (ref 23–37)
APTT PPP: >210 SECONDS (ref 23–37)
ATRIAL RATE: 109 BPM
ATRIAL RATE: 94 BPM
BUN SERPL-MCNC: 30 MG/DL (ref 5–25)
BUN SERPL-MCNC: 32 MG/DL (ref 5–25)
CALCIUM SERPL-MCNC: 8.8 MG/DL (ref 8.3–10.1)
CALCIUM SERPL-MCNC: 8.8 MG/DL (ref 8.3–10.1)
CHLORIDE SERPL-SCNC: 101 MMOL/L (ref 100–108)
CHLORIDE SERPL-SCNC: 103 MMOL/L (ref 100–108)
CO2 SERPL-SCNC: 22 MMOL/L (ref 21–32)
CO2 SERPL-SCNC: 24 MMOL/L (ref 21–32)
CREAT SERPL-MCNC: 2.45 MG/DL (ref 0.6–1.3)
CREAT SERPL-MCNC: 2.61 MG/DL (ref 0.6–1.3)
D DIMER PPP FEU-MCNC: 0.94 UG/ML FEU
ERYTHROCYTE [DISTWIDTH] IN BLOOD BY AUTOMATED COUNT: 14.9 % (ref 11.6–15.1)
FLUAV RNA NPH QL NAA+PROBE: NORMAL
FLUBV RNA NPH QL NAA+PROBE: NORMAL
GFR SERPL CREATININE-BSD FRML MDRD: 30 ML/MIN/1.73SQ M
GFR SERPL CREATININE-BSD FRML MDRD: 33 ML/MIN/1.73SQ M
GLUCOSE SERPL-MCNC: 134 MG/DL (ref 65–140)
GLUCOSE SERPL-MCNC: 150 MG/DL (ref 65–140)
GLUCOSE SERPL-MCNC: 157 MG/DL (ref 65–140)
GLUCOSE SERPL-MCNC: 204 MG/DL (ref 65–140)
GLUCOSE SERPL-MCNC: 210 MG/DL (ref 65–140)
HCT VFR BLD AUTO: 33.9 % (ref 36.5–49.3)
HGB BLD-MCNC: 10.4 G/DL (ref 12–17)
INR PPP: 1.15 (ref 0.84–1.19)
MCH RBC QN AUTO: 26.5 PG (ref 26.8–34.3)
MCHC RBC AUTO-ENTMCNC: 30.7 G/DL (ref 31.4–37.4)
MCV RBC AUTO: 86 FL (ref 82–98)
P AXIS: 76 DEGREES
P AXIS: 86 DEGREES
PLATELET # BLD AUTO: 265 THOUSANDS/UL (ref 149–390)
PMV BLD AUTO: 10 FL (ref 8.9–12.7)
POTASSIUM SERPL-SCNC: 4.1 MMOL/L (ref 3.5–5.3)
POTASSIUM SERPL-SCNC: 4.3 MMOL/L (ref 3.5–5.3)
PR INTERVAL: 142 MS
PR INTERVAL: 142 MS
PROCALCITONIN SERPL-MCNC: 1.01 NG/ML
PROTHROMBIN TIME: 14.9 SECONDS (ref 11.6–14.5)
QRS AXIS: 73 DEGREES
QRS AXIS: 77 DEGREES
QRSD INTERVAL: 72 MS
QRSD INTERVAL: 74 MS
QT INTERVAL: 334 MS
QT INTERVAL: 348 MS
QTC INTERVAL: 417 MS
QTC INTERVAL: 468 MS
RBC # BLD AUTO: 3.93 MILLION/UL (ref 3.88–5.62)
RSV RNA NPH QL NAA+PROBE: NORMAL
SODIUM SERPL-SCNC: 136 MMOL/L (ref 136–145)
SODIUM SERPL-SCNC: 137 MMOL/L (ref 136–145)
T WAVE AXIS: 81 DEGREES
T WAVE AXIS: 83 DEGREES
TROPONIN I SERPL-MCNC: 3.03 NG/ML
TROPONIN I SERPL-MCNC: 3.58 NG/ML
VENTRICULAR RATE: 109 BPM
VENTRICULAR RATE: 94 BPM
WBC # BLD AUTO: 5.75 THOUSAND/UL (ref 4.31–10.16)

## 2019-11-30 PROCEDURE — 80048 BASIC METABOLIC PNL TOTAL CA: CPT | Performed by: PHYSICIAN ASSISTANT

## 2019-11-30 PROCEDURE — 94760 N-INVAS EAR/PLS OXIMETRY 1: CPT

## 2019-11-30 PROCEDURE — 84484 ASSAY OF TROPONIN QUANT: CPT | Performed by: INTERNAL MEDICINE

## 2019-11-30 PROCEDURE — 85730 THROMBOPLASTIN TIME PARTIAL: CPT | Performed by: FAMILY MEDICINE

## 2019-11-30 PROCEDURE — 93010 ELECTROCARDIOGRAM REPORT: CPT | Performed by: INTERNAL MEDICINE

## 2019-11-30 PROCEDURE — 99254 IP/OBS CNSLTJ NEW/EST MOD 60: CPT | Performed by: PSYCHIATRY & NEUROLOGY

## 2019-11-30 PROCEDURE — 82948 REAGENT STRIP/BLOOD GLUCOSE: CPT

## 2019-11-30 PROCEDURE — 85027 COMPLETE CBC AUTOMATED: CPT | Performed by: PHYSICIAN ASSISTANT

## 2019-11-30 PROCEDURE — 99223 1ST HOSP IP/OBS HIGH 75: CPT | Performed by: INTERNAL MEDICINE

## 2019-11-30 PROCEDURE — 93005 ELECTROCARDIOGRAM TRACING: CPT

## 2019-11-30 PROCEDURE — 70496 CT ANGIOGRAPHY HEAD: CPT

## 2019-11-30 PROCEDURE — 70551 MRI BRAIN STEM W/O DYE: CPT

## 2019-11-30 PROCEDURE — 99232 SBSQ HOSP IP/OBS MODERATE 35: CPT | Performed by: FAMILY MEDICINE

## 2019-11-30 PROCEDURE — 84484 ASSAY OF TROPONIN QUANT: CPT | Performed by: PHYSICIAN ASSISTANT

## 2019-11-30 PROCEDURE — 94640 AIRWAY INHALATION TREATMENT: CPT

## 2019-11-30 PROCEDURE — 99253 IP/OBS CNSLTJ NEW/EST LOW 45: CPT | Performed by: INTERNAL MEDICINE

## 2019-11-30 PROCEDURE — 94762 N-INVAS EAR/PLS OXIMTRY CONT: CPT

## 2019-11-30 PROCEDURE — 80048 BASIC METABOLIC PNL TOTAL CA: CPT | Performed by: FAMILY MEDICINE

## 2019-11-30 PROCEDURE — 99222 1ST HOSP IP/OBS MODERATE 55: CPT | Performed by: INTERNAL MEDICINE

## 2019-11-30 PROCEDURE — 85730 THROMBOPLASTIN TIME PARTIAL: CPT | Performed by: INTERNAL MEDICINE

## 2019-11-30 PROCEDURE — 70498 CT ANGIOGRAPHY NECK: CPT

## 2019-11-30 PROCEDURE — 71045 X-RAY EXAM CHEST 1 VIEW: CPT

## 2019-11-30 PROCEDURE — 82140 ASSAY OF AMMONIA: CPT | Performed by: PSYCHIATRY & NEUROLOGY

## 2019-11-30 RX ORDER — SODIUM CHLORIDE 9 MG/ML
75 INJECTION, SOLUTION INTRAVENOUS CONTINUOUS
Status: DISCONTINUED | OUTPATIENT
Start: 2019-11-30 | End: 2019-12-01

## 2019-11-30 RX ORDER — AMLODIPINE BESYLATE 10 MG/1
10 TABLET ORAL DAILY
Status: DISCONTINUED | OUTPATIENT
Start: 2019-12-01 | End: 2019-12-03

## 2019-11-30 RX ORDER — IPRATROPIUM BROMIDE AND ALBUTEROL SULFATE 2.5; .5 MG/3ML; MG/3ML
3 SOLUTION RESPIRATORY (INHALATION)
Status: DISCONTINUED | OUTPATIENT
Start: 2019-11-30 | End: 2019-12-10 | Stop reason: HOSPADM

## 2019-11-30 RX ORDER — HEPARIN SODIUM 1000 [USP'U]/ML
5200 INJECTION, SOLUTION INTRAVENOUS; SUBCUTANEOUS AS NEEDED
Status: DISCONTINUED | OUTPATIENT
Start: 2019-11-30 | End: 2019-12-03

## 2019-11-30 RX ORDER — HEPARIN SODIUM 1000 [USP'U]/ML
2600 INJECTION, SOLUTION INTRAVENOUS; SUBCUTANEOUS AS NEEDED
Status: DISCONTINUED | OUTPATIENT
Start: 2019-11-30 | End: 2019-12-03

## 2019-11-30 RX ORDER — HEPARIN SODIUM 10000 [USP'U]/100ML
3-30 INJECTION, SOLUTION INTRAVENOUS
Status: DISCONTINUED | OUTPATIENT
Start: 2019-11-30 | End: 2019-12-03

## 2019-11-30 RX ORDER — ACETYLCYSTEINE 200 MG/ML
1200 SOLUTION ORAL; RESPIRATORY (INHALATION) 2 TIMES DAILY
Status: COMPLETED | OUTPATIENT
Start: 2019-11-30 | End: 2019-11-30

## 2019-11-30 RX ADMIN — FLUTICASONE PROPIONATE 1 SPRAY: 50 SPRAY, METERED NASAL at 09:48

## 2019-11-30 RX ADMIN — SODIUM CHLORIDE 100 ML/HR: 0.9 INJECTION, SOLUTION INTRAVENOUS at 20:21

## 2019-11-30 RX ADMIN — ACETYLCYSTEINE 1200 MG: 200 SOLUTION ORAL; RESPIRATORY (INHALATION) at 12:30

## 2019-11-30 RX ADMIN — METHYLPREDNISOLONE SODIUM SUCCINATE 40 MG: 40 INJECTION, POWDER, FOR SOLUTION INTRAMUSCULAR; INTRAVENOUS at 23:13

## 2019-11-30 RX ADMIN — TAMSULOSIN HYDROCHLORIDE 0.4 MG: 0.4 CAPSULE ORAL at 17:03

## 2019-11-30 RX ADMIN — PREGABALIN 50 MG: 50 CAPSULE ORAL at 21:23

## 2019-11-30 RX ADMIN — IPRATROPIUM BROMIDE 0.5 MG: 0.5 SOLUTION RESPIRATORY (INHALATION) at 07:12

## 2019-11-30 RX ADMIN — FAMOTIDINE 20 MG: 20 TABLET ORAL at 17:03

## 2019-11-30 RX ADMIN — PANTOPRAZOLE SODIUM 40 MG: 40 TABLET, DELAYED RELEASE ORAL at 06:02

## 2019-11-30 RX ADMIN — MELATONIN TAB 3 MG 3 MG: 3 TAB at 21:23

## 2019-11-30 RX ADMIN — METHYLPREDNISOLONE SODIUM SUCCINATE 40 MG: 40 INJECTION, POWDER, FOR SOLUTION INTRAMUSCULAR; INTRAVENOUS at 09:49

## 2019-11-30 RX ADMIN — IPRATROPIUM BROMIDE AND ALBUTEROL SULFATE 3 ML: 2.5; .5 SOLUTION RESPIRATORY (INHALATION) at 14:11

## 2019-11-30 RX ADMIN — OXYCODONE HYDROCHLORIDE 20 MG: 20 TABLET, FILM COATED, EXTENDED RELEASE ORAL at 21:23

## 2019-11-30 RX ADMIN — OXYCODONE HYDROCHLORIDE 20 MG: 20 TABLET, FILM COATED, EXTENDED RELEASE ORAL at 09:48

## 2019-11-30 RX ADMIN — FLUOXETINE 10 MG: 10 CAPSULE ORAL at 09:49

## 2019-11-30 RX ADMIN — TIOTROPIUM BROMIDE 18 MCG: 18 CAPSULE ORAL; RESPIRATORY (INHALATION) at 09:48

## 2019-11-30 RX ADMIN — IPRATROPIUM BROMIDE AND ALBUTEROL SULFATE 3 ML: 2.5; .5 SOLUTION RESPIRATORY (INHALATION) at 19:38

## 2019-11-30 RX ADMIN — ACETYLCYSTEINE 1200 MG: 200 SOLUTION ORAL; RESPIRATORY (INHALATION) at 21:26

## 2019-11-30 RX ADMIN — PREGABALIN 25 MG: 25 CAPSULE ORAL at 09:48

## 2019-11-30 RX ADMIN — INSULIN LISPRO 1 UNITS: 100 INJECTION, SOLUTION INTRAVENOUS; SUBCUTANEOUS at 17:03

## 2019-11-30 RX ADMIN — FLUTICASONE FUROATE AND VILANTEROL TRIFENATATE 1 PUFF: 100; 25 POWDER RESPIRATORY (INHALATION) at 06:02

## 2019-11-30 RX ADMIN — IODIXANOL 85 ML: 320 INJECTION, SOLUTION INTRAVASCULAR at 09:36

## 2019-11-30 RX ADMIN — FAMOTIDINE 20 MG: 20 TABLET ORAL at 09:48

## 2019-11-30 RX ADMIN — LEVALBUTEROL HYDROCHLORIDE 1.25 MG: 1.25 SOLUTION, CONCENTRATE RESPIRATORY (INHALATION) at 07:12

## 2019-11-30 RX ADMIN — FOLIC ACID 1 MG: 1 TABLET ORAL at 09:48

## 2019-11-30 RX ADMIN — SODIUM CHLORIDE 100 ML/HR: 0.9 INJECTION, SOLUTION INTRAVENOUS at 09:48

## 2019-11-30 RX ADMIN — METHYLPREDNISOLONE SODIUM SUCCINATE 40 MG: 40 INJECTION, POWDER, FOR SOLUTION INTRAMUSCULAR; INTRAVENOUS at 17:03

## 2019-11-30 RX ADMIN — QUETIAPINE FUMARATE 25 MG: 25 TABLET ORAL at 21:23

## 2019-11-30 NOTE — ASSESSMENT & PLAN NOTE
Patient present with two weeks of worsening SOB   Worse with exertion, unchanged by home nebulizers   On home oxygen, will continue supplemental O2 as needed to maintain saturations above 92%   Scheduled nebulizers  Continue Solu-Medrol 40 mg BID   Continue home Lizetho   Will check procalcitonin and flu swab now

## 2019-11-30 NOTE — CONSULTS
Consultation - Pulmonary Medicine   Norma Brooks  64 y o  male MRN: [id number]  Unit/Bed#: E4 -01 Encounter: [id number]      Physician Requesting Consult: Dr Kaylan Wall  Reason for Consult: SOB    Assessment/Plan:    1  Shortness of breath  2  Severe COPD, exacerbation  3  Severe tobacco dependence in sustained remission  4  Chronic hypoxic respiratory failure  5  Stage IV adenocarcinoma of the lung  6  CKD stage 4    - Suspect this is an exacerbation of COPD  Cannot rule out PE- he is at high risk given his history of malignancy  D-dimer likely to be elevated in setting of stage IV cancer  Patient cannot receive IV dye since he had a CTA had neck and has stage 4 kidney disease  Patient cannot receive a V/Q scan since he has an abnormal chest x-ray  - I recommend continuing current heparin drip  If creatinine remains stable despite CTA head neck, consider CTA chest in next 24-48 hours  - check bilateral lower extremity Dopplers  - continue IV steroids  - continue nebulized bronchodilators  - out of bed as able  - encourage incentive spirometry   - DVT Px    Thank you for this consult! I discussed the plan of care in detail with Mr Marco Phan and his girlfriend  All concerns or questions addressed  We will follow along with you   ______________________________________________________________________    HPI:    Norma Brooks  is a 64 y o  male who presented to the emergency room with shortness of breath  The patient has had 2 weeks of shortness of breath on exertion  His wife states he is breathless walking to and from the bathroom, which is unusual for him  He is using oxygen nebulizers at home without relief  In the emergency room he was found to be afebrile and hypertensive  Chest x-ray showed no acute findings  Labs were unrevealing  He was admitted with a presumed COPD exacerbation  He was started on a heparin drip for concern for pulmonary embolus    We are consulted to help with pulmonary management  Found lying in bed on room air talking on the phone  He has mumbled speech that resolves when he is more deliberate with his speaking  He denies breathlessness at rest   He denies significant cough  He states his breathing feels unchanged compared to prior to admission  Mr Candice Jonas is known to our practice very follows with Dr Qasim Bradley  He has gold stage D COPD  He uses oxygen chronically  He also has known adenocarcinoma of the lung, stage IV and follows with Dr Lito Cali  He receives [employer] every 3 weeks  PFT results:  PFTs October 2018 show moderately severe obstruction, FEV1 49% predicted  Air trapping  Review of Systems:  Full 12 point review of systems was performed  Aside from what was mentioned in the HPI, it is otherwise negative      Historical Information   Past Medical History:   Diagnosis Date    Bipolar 1 disorder ([address] )     Cancer ([facility] )     adeno lung  dx 11/2018-lung bx today 4/9/2019-ongoing chemo    Chronic pain disorder     back and right shoulder    CKD (chronic kidney disease)     COPD (chronic obstructive pulmonary disease) (HCC)     Depression     Diabetes mellitus (HCC)     GERD (gastroesophageal reflux disease)     Herniated lumbar intervertebral disc     Hypertension     Insomnia     Latent syphilis     Treated    Low back pain     Lumbosacral disc disease     Lung cancer ([address] ) 04/2019    Pneumothorax after biopsy     R lung    PTSD (post-traumatic stress disorder)     Pulmonary emphysema ([facility] [address] )     Tobacco abuse 11/13/2018     Past Surgical History:   Procedure Laterality Date    COLONOSCOPY  2011    CT GUIDED CHEST TUBE  11/21/2018    FL GUIDED CENTRAL VENOUS ACCESS DEVICE INSERTION  2/8/2019    HEMORROIDECTOMY      IR CHEST TUBE  11/14/2018    IR IMAGE GUIDED BIOPSY/ASPIRATION LUNG  11/13/2018    KNEE SURGERY      IN [facility] TUNNELED CTR VAD W/SUBQ PORT AGE 5 YR/> N/A 2/8/2019    Procedure: PLACEMENT OF PORT-A-CATH;  Surgeon: Aldo Pretty MD;  Location: [address] [place] [address] MAIN OR;  Service: Vascular     Social History   Social History     Substance and Sexual Activity   Alcohol Use Not Currently     Social History     Tobacco Use   Smoking Status Former Smoker    Packs/day: 0 50    Years: 40 00    Pack years: 20 00    Types: Cigarettes    Start date:     Last attempt to quit: 2019    Years since quittin 3   Smokeless Tobacco Never Used       Occupational history:  Unemployed  Family History:   Family History   Problem Relation Age of Onset    Heart disease Mother     Cancer Mother     Hypertension Father     Diabetes Family     Asthma Family     Heart disease Family     Cancer Family     Cancer Maternal Grandfather     Cancer Paternal Grandfather     Cancer Maternal Aunt       No family history of lung disease  Medications: The patient's active and prehospital medications were reviewed      Current Facility-Administered Medications:  acetaminophen 650 mg Oral Q6H PRN Socorro Smudde, PA-C    acetylcysteine 1,200 mg Oral BID [facility], PA-C    albuterol 2 puff Inhalation Q4H PRN Socorro Smudde, PA-C    aluminum-magnesium hydroxide-simethicone 30 mL Oral Q6H PRN Socorro Smudde, PA-C    [START ON 2019] amLODIPine 10 mg Oral Daily [facility], PA-C    chlorproMAZINE 25 mg Oral Q6H PRN Socorro Smudde, PA-C    diphenhydrAMINE 25 mg Oral Q6H PRN Socorro Smudde, PA-C    famotidine 20 mg Oral BID Socorro Smudde, PA-C    FLUoxetine 10 mg Oral Daily Socorro Smudde, PA-C    fluticasone 1 spray Each Nare Daily Socorro Smudde, PA-C    fluticasone-vilanterol 1 puff Inhalation Early Morning Socorro Smudde, PA-C    folic acid 1 mg Oral Daily Socorro Smudde, PA-C    heparin (porcine) 3-30 Units/kg/hr (Order-Specific) Intravenous Titrated Socorro Smudde, PA-C Last Rate: 15 Units/kg/hr (19 0915)   heparin (porcine) 2,600 Units Intravenous PRN Socorro Smudde, PA-C    heparin (porcine) 5,200 Units Intravenous PRN Geradine Martini, PA-C    ipratropium-albuterol 3 mL Nebulization Q6H Jonathan Jimenez MD    melatonin 3 mg Oral HS Socorro Smudde, PA-C    methocarbamol 750 mg Oral Q6H PRN Socorro Smudde, PA-C    methylPREDNISolone sodium succinate 40 mg Intravenous Q8H Socorro Smudde, PA-C    ondansetron 4 mg Intravenous Q6H PRN Socorro Smudde, PA-C    oxyCODONE 20 mg Oral Q12H [address] Socorro Smudde, PA-C    oxyCODONE 30 mg Oral Q4H PRN Socorro Smudde, PA-C    pantoprazole 40 mg Oral Early Morning Socorro Smudde, PA-C    polyethylene glycol 17 g Oral Daily PRN Socorro Smudde, PA-C    pregabalin 25 mg Oral Daily Socorro Smudde, PA-C    pregabalin 50 mg Oral HS Socorro Smudde, PA-C    QUEtiapine 25 mg Oral HS Socorro Smudde, PA-C    senna-docusate sodium 1 tablet Oral BID PRN Valdemar Furbish Smudde, PA-C    sodium chloride 100 mL/hr Intravenous Continuous Willard Ornelas MD Last Rate: 100 mL/hr (11/30/19 0948)   tamsulosin 0 4 mg Oral Daily With Dinner Valdemar Furbish Smudde, PA-C    tiotropium 18 mcg Inhalation Daily Socorro Smudde, PA-C          PhysicalExamination:  Vitals:   Vitals:    11/30/19 1015 11/30/19 1030 11/30/19 1100 11/30/19 1200   BP: 141/73 138/68 142/63 125/67   BP Location:       Pulse: 89 88 88 86   Resp:       Temp:       TempSrc:       SpO2: 97% 97% 95% 91%   Weight:         Body mass index is 22 79 kg/m²  Temp  Min: 97 6 °F (36 4 °C)  Max: 99 1 °F (37 3 °C)  IBW: -88 kg    SpO2: 91 %,   SpO2 Activity: At Rest,   O2 Device: High flow nasal cannula    GEN: WDWN, nad, comfortable, mumbling, speaking in full sentences, no accessory muscle use  HEENT: NCAT, EOMI  CVS: Regular  LUNGS: Diminshed b/l b s , no wheezing  ABD: soft, nd    EXT: No c/c/e  NEURO: No focal deficits  MS: Moving all extremities  SKIN: warm, dry  PSYCH: calm, cooperative      Diagnostic Data:  CBC:   Results from last 7 days   Lab Units 11/30/19  0605 11/29/19  1900   WBC Thousand/uL 5 75 8 81   HEMOGLOBIN g/dL 10 4* 11 5*   HEMATOCRIT % 33 9* 36 2*   PLATELETS Thousands/uL 265 240       CMP: Results from last 7 days   Lab Units 11/30/19  0605 11/29/19  1900   SODIUM mmol/L 137 136   POTASSIUM mmol/L 4 3 4 1   CHLORIDE mmol/L 101 100   CO2 mmol/L 22 26   BUN mg/dL 30* 31*   CREATININE mg/dL 2 61* 2 44*   CALCIUM mg/dL 8 8 9 0     Labs reviewed by me personally reveal elevated creatinine, anemia, elevated procalcitonin  Microbiology:  Influenza A/B negative; RSV negative        Imaging:  PET-CT reviewed by me personally shows bilateral lung infiltrates, left quad dry best liberal room muscle above the left iliac crest with FDG factivity      Cardiac lab/EKG/telemetry/ECHO:   Results from last 7 days   Lab Units 11/30/19  0605 11/30/19  0115 11/29/19  2223 11/29/19  1900   TROPONIN I ng/mL 3 03* 3 58* 2 92* 0 92*   NT-PRO BNP pg/mL  --   --   --  65     Abnormal troponin    Charity Garland DO

## 2019-11-30 NOTE — ED NOTES
Pt ambulatory to restroom, pt became SOB with ambulation  Pt Instructed to use urinal next time he needs to urinate       Calos Reyes RN  11/29/19 4778

## 2019-11-30 NOTE — PLAN OF CARE
Problem: Potential for Falls  Goal: Patient will remain free of falls  Description  INTERVENTIONS:  - Assess patient frequently for physical needs  -  Identify cognitive and physical deficits and behaviors that affect risk of falls    -  [place] fall precautions as indicated by assessment   - Educate patient/family on patient safety including physical limitations  - Instruct patient to call for assistance with activity based on assessment  - Modify environment to reduce risk of injury  - Consider OT/PT consult to assist with strengthening/mobility  Outcome: Progressing     Problem: PAIN - ADULT  Goal: Verbalizes/displays adequate comfort level or baseline comfort level  Description  Interventions:  - Encourage patient to monitor pain and request assistance  - Assess pain using appropriate pain scale  - Administer analgesics based on type and severity of pain and evaluate response  - Implement non-pharmacological measures as appropriate and evaluate response  - Consider cultural and social influences on pain and pain management  - Notify physician/advanced practitioner if interventions unsuccessful or patient reports new pain  Outcome: Progressing     Problem: CARDIOVASCULAR - ADULT  Goal: Maintains optimal cardiac output and hemodynamic stability  Description  INTERVENTIONS:  - Monitor I/O, vital signs and rhythm  - Monitor for S/S and trends of decreased cardiac output  - Administer and titrate ordered vasoactive medications to optimize hemodynamic stability  - Assess quality of pulses, skin color and temperature  - Assess for signs of decreased coronary artery perfusion  - Instruct patient to report change in severity of symptoms  Outcome: Progressing  Goal: Absence of cardiac dysrhythmias or at baseline rhythm  Description  INTERVENTIONS:  - Continuous cardiac monitoring, vital signs, obtain 12 lead EKG if ordered  - Administer antiarrhythmic and heart rate control medications as ordered  - Monitor electrolytes and administer replacement therapy as ordered  Outcome: Progressing     Problem: RESPIRATORY - ADULT  Goal: Achieves optimal ventilation and oxygenation  Description  INTERVENTIONS:  - Assess for changes in respiratory status  - Assess for changes in mentation and behavior  - Position to facilitate oxygenation and minimize respiratory effort  - Oxygen administered by appropriate delivery if ordered  - Encourage broncho-pulmonary hygiene including cough, deep breathe, Incentive Spirometry  - Assess the need for suctioning and aspirate as needed  - Assess and instruct to report SOB or any respiratory difficulty  - Respiratory Therapy support as indicated   Outcome: Progressing     Problem: INFECTION - ADULT  Goal: Absence or prevention of progression during hospitalization  Description  INTERVENTIONS:  - Assess and monitor for signs and symptoms of infection  - Monitor lab/diagnostic results  - Monitor all insertion sites, i e  indwelling lines, tubes, and drains  - Monitor endotracheal if appropriate and nasal secretions for changes in amount and color  - [place] appropriate cooling/warming therapies per order  - Administer medications as ordered  - Instruct and encourage patient and family to use good hand hygiene technique  - Identify and instruct in appropriate isolation precautions for identified infection/condition  Outcome: Progressing  Goal: Absence of fever/infection during neutropenic period  Description  INTERVENTIONS:  - Monitor WBC    Outcome: Progressing     Problem: SAFETY ADULT  Goal: Maintain or return to baseline ADL function  Description  INTERVENTIONS:  -  Assess patient's ability to carry out ADLs; assess patient's baseline for ADL function and identify physical deficits which impact ability to perform ADLs (bathing, care of mouth/teeth, toileting, grooming, dressing, etc )  - Assess/evaluate cause of self-care deficits   - Assess range of motion  - Assess patient's mobility; develop plan if impaired  - Assess patient's need for assistive devices and provide as appropriate  - Encourage maximum independence but intervene and supervise when necessary  - Involve family in performance of ADLs  - Assess for home care needs following discharge   - Consider OT consult to assist with ADL evaluation and planning for discharge  - Provide patient education as appropriate  Outcome: Progressing  Goal: Maintain or return mobility status to optimal level  Description  INTERVENTIONS:  - Assess patient's baseline mobility status (ambulation, transfers, stairs, etc )    - Identify cognitive and physical deficits and behaviors that affect mobility  - Identify mobility aids required to assist with transfers and/or ambulation (gait belt, sit-to-stand, lift, walker, cane, etc )  - [place] fall precautions as indicated by assessment  - Record patient progress and toleration of activity level on Mobility SBAR; progress patient to next Phase/Stage  - Instruct patient to call for assistance with activity based on assessment  - Consider rehabilitation consult to assist with strengthening/weightbearing, etc   Outcome: Progressing     Problem: DISCHARGE PLANNING  Goal: Discharge to home or other facility with appropriate resources  Description  INTERVENTIONS:  - Identify barriers to discharge w/patient and caregiver  - Arrange for needed discharge resources and transportation as appropriate  - Identify discharge learning needs (meds, wound care, etc )  - Arrange for interpretive services to assist at discharge as needed  - Refer to Case Management Department for coordinating discharge planning if the patient needs post-hospital services based on physician/advanced practitioner order or complex needs related to functional status, cognitive ability, or social support system  Outcome: Progressing     Problem: Knowledge Deficit  Goal: Patient/family/caregiver demonstrates understanding of disease process, treatment plan, medications, and discharge instructions  Description  Complete learning assessment and assess knowledge base  Interventions:  - Provide teaching at level of understanding  - Provide teaching via preferred learning methods  Outcome: Progressing     Problem: Neurological Deficit  Goal: Neurological status is stable or improving  Description  Interventions:  - Monitor and assess patient's level of consciousness, motor function, sensory function, and level of assistance needed for ADLs  - Monitor and report changes from baseline  Collaborate with interdisciplinary team to initiate plan and implement interventions as ordered  - Provide and maintain a safe environment  - Consider seizure precautions  - Consider fall precautions  - Consider aspiration precautions  - Consider bleeding precautions  Outcome: Progressing     Problem: Activity Intolerance/Impaired Mobility  Goal: Mobility/activity is maintained at optimum level for patient  Description  Interventions:  - Assess and monitor patient  barriers to mobility and need for assistive/adaptive devices  - Assess patient's emotional response to limitations  - Collaborate with interdisciplinary team and initiate plans and interventions as ordered  - Encourage independent activity per ability   - Maintain proper body alignment  - Perform active/passive rom as tolerated/ordered  - Plan activities to conserve energy   - Turn patient as appropriate  Outcome: Progressing     Problem: Communication Impairment  Goal: Ability to express needs and understand communication  Description  Assess patient's communication skills and ability to understand information  Patient will demonstrate use of effective communication techniques, alternative methods of communication and understanding even if not able to speak  - Encourage communication and provide alternate methods of communication as needed    - Collaborate with case management/ for discharge needs   - Include patient/family/caregiver in decisions related to communication  Outcome: Progressing     Problem: Potential for Aspiration  Goal: Non-ventilated patient's risk of aspiration is minimized  Description  Assess and monitor vital signs, respiratory status, and labs (WBC)  Monitor for signs of aspiration (tachypnea, cough, rales, wheezing, cyanosis, fever)  - Assess and monitor patient's ability to swallow  - Place patient up in chair to eat if possible  - HOB up at 90 degrees to eat if unable to get patient up into chair   - Supervise patient during oral intake  - Instruct patient/ family to take small bites  - Instruct patient/ family to take small single sips when taking liquids  - Follow patient-specific strategies generated by speech pathologist   Outcome: Progressing  Goal: Ventilated patient's risk of aspiration is minimized  Description  Assess and monitor vital signs, respiratory status, airway cuff pressure, and labs (WBC)  Monitor for signs of aspiration (tachypnea, cough, rales, wheezing, cyanosis, fever)  - Elevate head of bed 30 degrees if patient has tube feeding   - Monitor tube feeding  Outcome: Progressing     Problem: Nutrition  Goal: Nutrition/Hydration status is improving  Description  Monitor and assess patient's nutrition/hydration status for malnutrition (ex- brittle hair, bruises, dry skin, pale skin and conjunctiva, muscle wasting, smooth red tongue, and disorientation)  Collaborate with interdisciplinary team and initiate plan and interventions as ordered  Monitor patient's weight and dietary intake as ordered or per policy  Utilize nutrition screening tool and intervene per policy  Determine patient's food preferences and provide high-protein, high-caloric foods as appropriate  - Assist patient with eating   - Allow adequate time for meals   - Encourage patient to take dietary supplement as ordered    - Collaborate with clinical nutritionist   - Include patient/family/caregiver in decisions related to nutrition  Outcome: Progressing     Problem: NEUROSENSORY - ADULT  Goal: Achieves stable or improved neurological status  Description  INTERVENTIONS  - Monitor and report changes in neurological status  - Monitor vital signs such as temperature, blood pressure, glucose, and any other labs ordered   - Initiate measures to prevent increased intracranial pressure  - Monitor for seizure activity and implement precautions if appropriate      Outcome: Progressing  Goal: Remains free of injury related to seizures activity  Description  INTERVENTIONS  - Maintain airway, patient safety  and administer oxygen as ordered  - Monitor patient for seizure activity, document and report duration and description of seizure to physician/advanced practitioner  - If seizure occurs,  ensure patient safety during seizure  - Reorient patient post seizure  - Seizure pads on all 4 side rails  - Instruct patient/family to notify RN of any seizure activity including if an aura is experienced  - Instruct patient/family to call for assistance with activity based on nursing assessment  - Administer anti-seizure medications if ordered    Outcome: Progressing  Goal: Achieves maximal functionality and self care  Description  INTERVENTIONS  - Monitor swallowing and airway patency with patient fatigue and changes in neurological status  - Encourage and assist patient to increase activity and self care     - Encourage visually impaired, hearing impaired and aphasic patients to use assistive/communication devices  Outcome: Progressing     Problem: METABOLIC, FLUID AND ELECTROLYTES - ADULT  Goal: Electrolytes maintained within normal limits  Description  INTERVENTIONS:  - Monitor labs and assess patient for signs and symptoms of electrolyte imbalances  - Administer electrolyte replacement as ordered  - Monitor response to electrolyte replacements, including repeat lab results as appropriate  - Instruct patient on fluid and nutrition as appropriate  Outcome: Progressing  Goal: Fluid balance maintained  Description  INTERVENTIONS:  - Monitor labs   - Monitor I/O and WT  - Instruct patient on fluid and nutrition as appropriate  - Assess for signs & symptoms of volume excess or deficit  Outcome: Progressing  Goal: Glucose maintained within target range  Description  INTERVENTIONS:  - Monitor Blood Glucose as ordered  - Assess for signs and symptoms of hyperglycemia and hypoglycemia  - Administer ordered medications to maintain glucose within target range  - Assess nutritional intake and initiate nutrition service referral as needed  Outcome: Progressing     Problem: SKIN/TISSUE INTEGRITY - ADULT  Goal: Skin integrity remains intact  Description  INTERVENTIONS  - Identify patients at risk for skin breakdown  - Assess and monitor skin integrity  - Assess and monitor nutrition and hydration status  - Monitor labs (i e  albumin)  - Assess for incontinence   - Turn and reposition patient  - Assist with mobility/ambulation  - Relieve pressure over bony prominences  - Avoid friction and shearing  - Provide appropriate hygiene as needed including keeping skin clean and dry  - Evaluate need for skin moisturizer/barrier cream  - Collaborate with interdisciplinary team (i e  Nutrition, Rehabilitation, etc )   - Patient/family teaching  Outcome: Progressing  Goal: Incision(s), wounds(s) or drain site(s) healing without S/S of infection  Description  INTERVENTIONS  - Assess and document risk factors for skin impairment   - Assess and document dressing, incision, wound bed, drain sites and surrounding tissue  - Consider nutrition services referral as needed  - Oral mucous membranes remain intact  - Provide patient/ family education  Outcome: Progressing  Goal: Oral mucous membranes remain intact  Description  INTERVENTIONS  - Assess oral mucosa and hygiene practices  - Implement preventative oral hygiene regimen  - Implement oral medicated treatments as ordered  - Initiate Nutrition services referral as needed  Outcome: Progressing     Problem: MUSCULOSKELETAL - ADULT  Goal: Maintain or return mobility to safest level of function  Description  INTERVENTIONS:  - Assess patient's ability to carry out ADLs; assess patient's baseline for ADL function and identify physical deficits which impact ability to perform ADLs (bathing, care of mouth/teeth, toileting, grooming, dressing, etc )  - Assess/evaluate cause of self-care deficits   - Assess range of motion  - Assess patient's mobility  - Assess patient's need for assistive devices and provide as appropriate  - Encourage maximum independence but intervene and supervise when necessary  - Involve family in performance of ADLs  - Assess for home care needs following discharge   - Consider OT consult to assist with ADL evaluation and planning for discharge  - Provide patient education as appropriate  Outcome: Progressing  Goal: Maintain proper alignment of affected body part  Description  INTERVENTIONS:  - Support, maintain and protect limb and body alignment  - Provide patient/ family with appropriate education  Outcome: Progressing

## 2019-11-30 NOTE — H&P
[address] [facility]  H&P- Tobi Blackwood Sr  1960, 64 y o  male MRN: [id number]    Unit/Bed#: E4 -01 Encounter: [id number]    Primary Care Provider: Ej Johnson MD   Date and time admitted to hospital: 11/29/2019  6:26 PM        * Chronic obstructive pulmonary disease with acute exacerbation [facility])  Assessment & Plan  Patient present with two weeks of worsening SOB   Worse with exertion, unchanged by home nebulizers   On home oxygen, will continue supplemental O2 as needed to maintain saturations above 92%   Scheduled nebulizers  Continue Solu-Medrol 40 mg BID   Continue home Breo   Will check procalcitonin and flu swab now     Elevated troponin  Assessment & Plan  Troponin 0 92 at time of presentation, repeat 2 92  Has had troponin elevations in the past as high as 0 19   Last cardiac cath June 2018 showing normal left main, LAD, circumflex, and 20% stenosis of mid RCA   Given 325 mg ASA in ED   Does report mild chest pain with respirations, reports this occurs on occasion at home as well  Will continue to trend troponins   EKG without acute ST or T wave changes, unchanged from prior- will continue to monitor EKGs with each troponin  Monitor on telemetry   Cardiology consult- Spoke with on call cardiology Dr Miguel Antonio regarding troponin elevation- will place on heparin drip tonight and continue to follow troponin and EKGs       Stage 3 chronic kidney disease ([address] )  Assessment & Plan  History of CKD  Creatinine stable at baseline of 2 44 at time of admission  Continue to monitor BMP while inpatient   Avoid nephrotoxins and hypotension  Will likely need nephrology consult if IV contrast if required for cardiac or pulmonary studies       Adenocarcinoma of lung, right [facility])  Assessment & Plan  Currently undergoing treatment for lung adenocarcinoma   Follows outpatient with Dr Kwame Miramontes   Continued on [address] every 3 weeks   Continue outpatient follow up with oncology     Type 2 diabetes mellitus without complication, without long-term current use of insulin [facility])  Assessment & Plan  Lab Results   Component Value Date    HGBA1C 5 6 10/23/2019       No results for input(s): POCGLU in the last 72 hours  Blood Sugar Average: Last 72 hrs:   hemoglobin A1C stable off medications x 6 months   Monitor glucose in the setting of steroid use     Essential hypertension  Assessment & Plan  Slightly elevated at time of presentation   Continue home Norvasc  Continue to monitor     VTE Prophylaxis: Heparin  / sequential compression device   Code Status: full code  POLST: POLST form is not discussed and not completed at this time  Discussion with family: significant other at bedside    Anticipated Length of Stay:  Patient will be admitted on an Inpatient basis with an anticipated length of stay of  More than 2 midnights  Justification for Hospital Stay: COPD exacerbation, elevated troponin     Total Time for Visit, including Counseling / Coordination of Care: 1 hour  Greater than 50% of this total time spent on direct patient counseling and coordination of care  Chief Complaint:   Shortness of breath    History of Present Illness:    Valdemar Vazquez  is a 64 y o  male with past medical history of non-small cell lung cancer, COPD, hypertension, diabetes who presents with shortness of breath  Patient reports he has been feeling more short of breath than usual for the past 2 weeks  Patient reports he has been using inhalers, nebulizers and his oxygen for the past few days due to shortness of breath without relief  Patient reports worsening shortness of breath with ambulation or exertion  Patient reports history of tobacco abuse, reporting he has mostly quit but does occasionally sneak a cigarette  Patient denies fevers or chills at home  Patient does report mild diffuse chest pain, worse with inspiration  Patient also reports cough, appears wet but is unable to bring anything up    Patient denies any associated abdominal pain, nausea or vomiting  Patient denies any associated diaphoresis  Review of Systems:    Review of Systems   Constitutional: Negative for chills and fever  HENT: Negative for congestion and trouble swallowing  Eyes: Negative for visual disturbance  Respiratory: Positive for cough, chest tightness, shortness of breath and wheezing  Cardiovascular: Positive for chest pain  Negative for palpitations and leg swelling  Gastrointestinal: Negative for abdominal pain, nausea and vomiting  Endocrine: Negative  Genitourinary: Negative for difficulty urinating  Musculoskeletal: Negative for arthralgias and back pain  Skin: Negative for rash  Allergic/Immunologic: Positive for immunocompromised state  Neurological: Negative for dizziness, weakness and light-headedness  Hematological: Negative  Psychiatric/Behavioral: Negative for confusion         Past Medical and Surgical History:     Past Medical History:   Diagnosis Date    Bipolar 1 disorder ([facility] )     Cancer ([facility] )     adeno lung  dx 11/2018-lung bx today 4/9/2019-ongoing chemo    Chronic pain disorder     back and right shoulder    CKD (chronic kidney disease)     COPD (chronic obstructive pulmonary disease) (HCC)     Depression     Diabetes mellitus (HCC)     GERD (gastroesophageal reflux disease)     Herniated lumbar intervertebral disc     Hypertension     Insomnia     Latent syphilis     Treated    Low back pain     Lumbosacral disc disease     Lung cancer [facility] ) 04/2019    Pneumothorax after biopsy     R lung    PTSD (post-traumatic stress disorder)     Pulmonary emphysema ([facility] )     Tobacco abuse 11/13/2018       Past Surgical History:   Procedure Laterality Date    COLONOSCOPY  2011    CT GUIDED CHEST TUBE  11/21/2018    FL GUIDED CENTRAL VENOUS ACCESS DEVICE INSERTION  2/8/2019    HEMORROIDECTOMY      IR CHEST TUBE  11/14/2018    IR IMAGE GUIDED BIOPSY/ASPIRATION LUNG  11/13/2018    KNEE SURGERY  MI INSJ TUNNELED CTR VAD W/SUBQ PORT AGE 5 YR/> N/A 2/8/2019    Procedure: PLACEMENT OF PORT-A-CATH;  Surgeon: Marina Faye MD;  Location: [address] [place] [address];  Service: Vascular       Meds/Allergies:    Prior to Admission medications    Medication Sig Start Date End Date Taking?  Authorizing Provider   albuterol (2 5 mg/3 mL) 0 083 % nebulizer solution Take 1 vial (2 5 mg total) by nebulization every 4 (four) hours as needed for wheezing or shortness of breath 11/25/19   Zulma Mixon MD   albuterol (2 5 mg/3 mL) 0 083 % nebulizer solution Take 1 vial (2 5 mg total) by nebulization every 4 (four) hours as needed for wheezing or shortness of breath 11/25/19   Zulma Mixon MD   albuterol (VENTOLIN HFA) 90 mcg/act inhaler Inhale 2 puffs every 4 (four) hours as needed for wheezing 10/23/19   Sergio Real MD   amLODIPine (NORVASC) 10 mg tablet Take 1 tablet (10 mg total) by mouth daily 10/9/19   Dena Ott MD   BANOPHEN 25 MG capsule  5/2/19   Historical Provider, MD   Blood Glucose Monitoring Suppl KIT by Does not apply route daily 12/14/18   Luis Hansen MD   carbamide peroxide (DEBROX) 6 5 % otic solution Administer 5 drops into both ears 2 (two) times a day 10/23/19   Sergio Real MD   chlorproMAZINE (THORAZINE) 25 mg tablet Take 1 tablet (25 mg total) by mouth every 6 (six) hours as needed (hiccups) 8/22/19   BROOKS Albrecht   dexamethasone (DECADRON) 4 mg tablet Take 1 tablet (4 mg total) by mouth 2 (two) times a day with meals For 2 days after chemo 7/11/19   BROOKS Albrecht   diphenhydrAMINE (BENADRYL) 25 mg tablet Take 1 tablet (25 mg total) by mouth every 6 (six) hours as needed (nausea) 5/2/19   Luis Hansen MD   ergocalciferol (ERGOCALCIFEROL) 56810 units capsule Take 1 capsule (50,000 Units total) by mouth once a week 8/5/19   Ranjana Clark MD   FLUoxetine (PROzac) 10 MG tablet Take 1 tablet (10 mg total) by mouth daily 5/2/19   Luis Hansen MD   fluticasone (FLONASE) 50 mcg/act nasal spray 1-2 sprays each nostril daily for allergies 5/6/19   BROOKS Lynn   fluticasone-vilanterol (BREO ELLIPTA) 100-25 mcg/inh inhaler Inhale 1 puff daily in the early morning Rinse mouth after use   5/2/19   Jair Davila MD   folic acid (FOLVITE) 1 mg tablet Take 1 tablet (1 mg total) by mouth daily 12/7/18   Catie Acuna MD   FREESTYLE LITE test strip TEST BLOOD SUGAR DAILY 1/4/19   Rehab Jose, DO   ipratropium (ATROVENT) 0 02 % nebulizer solution Take 1 vial (0 5 mg total) by nebulization 3 (three) times a day 11/25/19   Rayna Lakhani MD   Lancets (FREESTYLE) lancets TEST BLOOD SUGAR DAILY 1/4/19   Rehab Jose, DO   lidocaine (LMX) 4 % cream Apply topically as needed for mild pain Apply 1/2-1 inch to port 30-60 mins prior to needle insertion, cover with serrain wrap 7/11/19   BROOKS Raygoza   loratadine (CLARITIN) 10 mg tablet Take 1 tablet (10 mg total) by mouth daily in the early morning 5/2/19   Jair Davila MD   melatonin 3 mg Take 1 tablet (3 mg total) by mouth daily at bedtime 5/2/19   Jair Davila MD   metFORMIN (GLUCOPHAGE-XR) 500 mg 24 hr tablet Take 1 tablet (500 mg total) by mouth 2 (two) times a day with meals 5/2/19   Jair Davila MD   methocarbamol (ROBAXIN) 750 mg tablet Take 1 tablet (750 mg total) by mouth every 6 (six) hours as needed for muscle spasms 5/2/19   Jair Davila MD   ondansetron [facility]) 4 mg tablet Take 1 tablet (4 mg total) by mouth every 6 (six) hours as needed for nausea or vomiting 10/8/19   BROOKS Hayden   oxyCODONE (OxyCONTIN) 20 mg 12 hr tablet Take 1 tablet (20 mg total) by mouth every 12 (twelve) hoursMax Daily Amount: 40 mg 11/4/19   Cheryl Plants, CRNP   oxyCODONE (ROXICODONE) 30 MG immediate release tablet Take 1 tablet (30 mg total) by mouth every 4 (four) hours as needed for severe painMax Daily Amount: 180 mg 11/4/19   BROOKS Hayden   pantoprazole (PROTONIX) 40 mg tablet Take 1 tablet (40 mg total) by mouth daily 5/2/19 Rick Conn MD   polyethylene glycol [facility]) powder Take 17 g by mouth daily 4/22/19   BROOKS Harmon   predniSONE 10 mg tablet Take 1 tablet (10 mg total) by mouth daily 10/9/19   Cipriano Sinha MD   pregabalin (LYRICA) 25 mg capsule Take 1 capsule PO in morning and 2 capsules PO at bedtime 11/4/19   BROOKS Harmon   prochlorperazine (COMPAZINE) 10 mg tablet Take 1 tablet (10 mg total) by mouth every 6 (six) hours as needed for nausea or vomiting 11/4/19   BROOKS Harmon   QUEtiapine (SEROquel) 25 mg tablet Take 25 mg by mouth daily at bedtime    Historical Provider, MD   ranitidine (ZANTAC) 150 mg tablet Take 1 tablet (150 mg total) by mouth 2 (two) times a day 5/2/19   Rick Conn MD   REGULOID 28 3 % POWD USE AS DIRECTED 1/4/19   Rehab DO Jose   Selenium Sulfide 2 25 % SHAM apply by topical route  every PM to the affected area(s) 6/20/18   Historical Provider, MD   senna-docusate sodium (SENOKOT-S) 8 6-50 mg per tablet Take 1 tablet by mouth 2 (two) times a day 4/22/19   BROOKS Harmon   sildenafil (VIAGRA) 50 MG tablet Take 1 tablet (50 mg total) by mouth as needed for erectile dysfunction 5/2/19   Rick Conn MD   tamsulosin [facility]) 0 4 mg Take 1 capsule (0 4 mg total) by mouth daily with dinner 11/15/19   Yen Lawson MD   tiotropium (SPIRIVA RESPIMAT) 2 5 MCG/ACT AERS inhaler Inhale 2 puffs daily 5/2/19   Rick Conn MD     I have reviewed home medications with patient personally  Allergies:    Allergies   Allergen Reactions    Lisinopril Anaphylaxis     Took medication a while ago and had a "swelling reaction"  Does not carry epi-pen       Social History:     Marital Status: Legally    Occupation:  Unknown  Patient Pre-hospital Living Situation:  Home with family  Patient Pre-hospital Level of Mobility:  Ambulatory  Patient Pre-hospital Diet Restrictions:  None  Substance Use History:   Social History     Substance and Sexual Activity   Alcohol Use Not Currently     Social History     Tobacco Use   Smoking Status Former Smoker    Packs/day: 0 50    Years: 40 00    Pack years: 20 00    Types: Cigarettes    Start date:     Last attempt to quit: 2019    Years since quittin 3   Smokeless Tobacco Never Used     Social History     Substance and Sexual Activity   Drug Use Not Currently    Types: Marijuana    Comment: stopped , "i smoked weed and stopped 1 month ago"       Family History:    Family History   Problem Relation Age of Onset    Heart disease Mother     Cancer Mother     Hypertension Father     Diabetes Family     Asthma Family     Heart disease Family     Cancer Family     Cancer Maternal Grandfather     Cancer Paternal Grandfather     Cancer Maternal Aunt        Physical Exam:     Vitals:   Blood Pressure: 156/77 (19)  Pulse: (!) 114 (19)  Temperature: 99 1 °F (37 3 °C) (19)  Temp Source: Temporal (19)  Respirations: 18 (19)  Weight - Scale: 68 kg (149 lb 14 6 oz) (19)  SpO2: 96 % (19)    Physical Exam   Constitutional: He is oriented to person, place, and time  He appears well-nourished  HENT:   Head: Normocephalic and atraumatic  Mouth/Throat: Oropharynx is clear and moist    Eyes: Conjunctivae and EOM are normal  No scleral icterus  Neck: Neck supple  Cardiovascular: Regular rhythm and normal heart sounds  Tachycardia present  No murmur heard  Pulmonary/Chest: Tachypnea noted  He has decreased breath sounds (throughout)  He has wheezes (diffuse expiratory wheezes)  He has no rales  Abdominal: Soft  Bowel sounds are normal  He exhibits no distension  There is no tenderness  There is no guarding  Musculoskeletal: He exhibits no edema or deformity  Neurological: He is alert and oriented to person, place, and time  Skin: Skin is warm and dry  Psychiatric: He has a normal mood and affect   His behavior is normal  Thought content normal          Additional Data:     Lab Results: I have personally reviewed pertinent reports  Results from last 7 days   Lab Units 11/29/19  1900   WBC Thousand/uL 8 81   HEMOGLOBIN g/dL 11 5*   HEMATOCRIT % 36 2*   PLATELETS Thousands/uL 240   BANDS PCT % 1   LYMPHO PCT % 17   MONO PCT % 10   EOS PCT % 0     Results from last 7 days   Lab Units 11/29/19  1900   SODIUM mmol/L 136   POTASSIUM mmol/L 4 1   CHLORIDE mmol/L 100   CO2 mmol/L 26   BUN mg/dL 31*   CREATININE mg/dL 2 44*   ANION GAP mmol/L 10   CALCIUM mg/dL 9 0   GLUCOSE RANDOM mg/dL 133                       Imaging: I have personally reviewed pertinent reports  XR chest 1 view portable   ED Interpretation by Brant Todd MD (11/29 1954)   Primary reviewed  No acute abnormality  Final Result by Kristy Carvajal MD (11/29 1925)      Right infrahilar density correlating to an area of airspace density seen on the recent PET/CT  Workstation performed: [id number]             EKG, Pathology, and Other Studies Reviewed on Admission:   · EKG: normal sinus rhythm, no acute ST or T wave changes, unchanged from prior     Allscripts / Epic Records Reviewed: Yes     ** Please Note: This note has been constructed using a voice recognition system   **

## 2019-11-30 NOTE — QUICK NOTE
Spoke with cardiology  Due to concern for PE, will increase heparin drip to VTE/PE high  Check D dimer  Cardiology to scan in AM for PE if D dimer is elevated  Continue telemetry monitoring  Continue to trend troponin

## 2019-11-30 NOTE — ASSESSMENT & PLAN NOTE
History of CKD  Creatinine stable at baseline of 2 44 at time of admission  Continue to monitor BMP while inpatient   Avoid nephrotoxins and hypotension  Will likely need nephrology consult if IV contrast if required for cardiac or pulmonary studies

## 2019-11-30 NOTE — PROGRESS NOTES
Progress Note - Deborah Jean-Baptiste Sr  1960, 64 y o  male MRN: [id number]    Unit/Bed#: E4 -01 Encounter: [id number]    Primary Care Provider: Herberth Aguilar MD   Date and time admitted to hospital: 11/29/2019  6:26 PM        * Chronic obstructive pulmonary disease with acute exacerbation [facility])  Assessment & Plan  Patient presented with two weeks of worsening SOB  Continue IV Solu-Medrol and nebulizers  Supplemental O2 as needed  Flu swab negative  Elevated troponin level not due to acute coronary syndrome  Assessment & Plan  Patient presented with elevated troponins:0 92-->2 92--> 3 58--> 3 03  This is due to non MI troponin elevation in the setting of demand ischemia and chronic kidney disease  Patient also had negative cardiac catheterization in June 2018  No cardiac intervention required  Elevated d-dimer  Assessment & Plan  Patient presented with elevated D-dimer of 0 94  Likely elevated in the setting of stage IV cancer  Patient cannot get V/Q scan due to abnormal chest x-ray  Also recently received IV contrast dye for CTA of head and neck for stroke workup  Cannot rule out PE so will continue PE protocol heparin drip  If creatinine remains stable can get CTA chest PE protocol in 24-48 hours  Check bilateral lower extremity Dopplers  Continue incentive spirometry  Stroke-like symptoms  Assessment & Plan  Patient developed acute onset of stroke-like symptoms involving dysphagia and slurred speech  Stroke alert called  CT brain without contrast, CTA head and neck, MRI brain all negative for acute infarction  Neurology consult obtained and recommended discontinuing stroke pathway  No further workup required  Stage 3 chronic kidney disease ([facility] [address] )  Assessment & Plan  History of CKD3  Creatinine stable at baseline of 2 44 at time of admission  Continue to monitor BMP while inpatient  Nephrology consulted due to need for IV contrast dye studies    Patient given IV fluids and Mucomyst   Continue to monitor renal function  Can consider CTA chest PE protocol study in next 24-48 hours  Type 2 diabetes mellitus without complication, without long-term current use of insulin [facility])  Assessment & Plan  Lab Results   Component Value Date    HGBA1C 5 6 10/23/2019       Recent Labs     19  0907   POCGLU 204*       Blood Sugar Average: Last 72 hrs:  (P) 204 hemoglobin A1C stable off medications x 6 months   Monitor glucose in the setting of steroid use  Continue to hold metformin in the setting of IV contrast dye use and chronic kidney disease  Initiate sliding scale insulin, diabetic diet, Accu-Cheks q a c  And HS  Adenocarcinoma of lung, right [facility])  Assessment & Plan  Currently follows up with Dr Roz Kaur of Oncology in the outpatient setting  Is being treated with  which can have some renal complications  Will continue to monitor  Essential hypertension  Assessment & Plan  Slightly elevated at time of presentation   Continue home Norvasc  Continue to monitor         VTE Pharmacologic Prophylaxis:   Pharmacologic: Heparin Drip  Mechanical VTE Prophylaxis in Place: Yes    Patient Centered Rounds: I have performed bedside rounds with nursing staff today  Discussions with Specialists or Other Care Team Provider:  Yes    Education and Discussions with Family / Patient:  Yes with patient's family at bedside    Time Spent for Care: 15 minutes  More than 50% of total time spent on counseling and coordination of care as described above  Current Length of Stay: 1 day(s)    Current Patient Status: Inpatient   Certification Statement: The patient will continue to require additional inpatient hospital stay due to COPD exacerbation, elevated D-dimer    Discharge Plan: To be determined    Code Status: Level 1 - Full Code      Subjective:   Patient has no complaints at this time      Objective:     Vitals:   Temp (24hrs), Av 4 °F (36 9 °C), Min:97 6 °F (36 4 °C), Max:99 1 °F (37 3 °C)    Temp:  [97 6 °F (36 4 °C)-99 1 °F (37 3 °C)] 97 6 °F (36 4 °C)  HR:  [] 86  Resp:  [17-23] 20  BP: (105-174)/(63-87) 125/67  SpO2:  [90 %-100 %] 96 %  Body mass index is 22 79 kg/m²  Input and Output Summary (last 24 hours): Intake/Output Summary (Last 24 hours) at 11/30/2019 1518  Last data filed at 11/30/2019 1225  Gross per 24 hour   Intake 1535 97 ml   Output 750 ml   Net 785 97 ml       Physical Exam:     Physical Exam   Constitutional: He appears well-developed and well-nourished  No distress  HENT:   Head: Normocephalic and atraumatic  Eyes: Pupils are equal, round, and reactive to light  EOM are normal    Neck: Normal range of motion  Neck supple  Cardiovascular: Normal rate, regular rhythm and normal heart sounds  No murmur heard  Pulmonary/Chest: Effort normal  He has wheezes  He has no rales  Abdominal: Soft  Bowel sounds are normal    Musculoskeletal: He exhibits no edema  Neurological: He is alert  Skin: Skin is warm and dry  He is not diaphoretic  Additional Data:     Labs:    Results from last 7 days   Lab Units 11/30/19  0605 11/29/19  1900   WBC Thousand/uL 5 75 8 81   HEMOGLOBIN g/dL 10 4* 11 5*   HEMATOCRIT % 33 9* 36 2*   PLATELETS Thousands/uL 265 240   BANDS PCT %  --  1   LYMPHO PCT %  --  17   MONO PCT %  --  10   EOS PCT %  --  0     Results from last 7 days   Lab Units 11/30/19  1412   SODIUM mmol/L 136   POTASSIUM mmol/L 4 1   CHLORIDE mmol/L 103   CO2 mmol/L 24   BUN mg/dL 32*   CREATININE mg/dL 2 45*   ANION GAP mmol/L 9   CALCIUM mg/dL 8 8   GLUCOSE RANDOM mg/dL 134     Results from last 7 days   Lab Units 11/29/19  2333   INR  1 15     Results from last 7 days   Lab Units 11/30/19  0907   POC GLUCOSE mg/dl 204*         Results from last 7 days   Lab Units 11/29/19  2333   PROCALCITONIN ng/ml 1 01*           * I Have Reviewed All Lab Data Listed Above  * Additional Pertinent Lab Tests Reviewed:  [address] Admission Reviewed    Imaging:    Imaging Reports Reviewed Today Include:  CT brain without contrast, CTA head and neck, MRI brain  Imaging Personally Reviewed by Myself Includes:  None    Recent Cultures (last 7 days):           Last 24 Hours Medication List:     Current Facility-Administered Medications:  acetaminophen 650 mg Oral Q6H PRN Janneth Jacob MD    acetylcysteine 1,200 mg Oral BID Melissa Tiwari PA-C    albuterol 2 puff Inhalation Q4H PRN Janneth Jacob MD    aluminum-magnesium hydroxide-simethicone 30 mL Oral Q6H PRN Janneth Jacob MD    [START ON 12/1/2019] amLODIPine 10 mg Oral Daily Richard Sousa PA-C    chlorproMAZINE 25 mg Oral Q6H PRN Janneth Jacob MD    diphenhydrAMINE 25 mg Oral Q6H PRN Janneth Jacob MD    famotidine 20 mg Oral BID Janneth Jacob MD    FLUoxetine 10 mg Oral Daily Jonathan Jimenez MD    fluticasone 1 spray Each Nare Daily Janneth Jacob MD    fluticasone-vilanterol 1 puff Inhalation Early Morning Jonathan Jimenez MD    folic acid 1 mg Oral Daily Janneth Jacob MD    heparin (porcine) 3-30 Units/kg/hr (Order-Specific) Intravenous Titrated Janneth Jacob MD Last Rate: 15 Units/kg/hr (11/30/19 0915)   heparin (porcine) 2,600 Units Intravenous PRN Jonathan Jimenez MD    heparin (porcine) 5,200 Units Intravenous PRN Janneth Jacob MD    insulin lispro 1-5 Units Subcutaneous TID AC Jonathan Jimenez MD    ipratropium-albuterol 3 mL Nebulization Q6H Jonathan Jimenez MD    melatonin 3 mg Oral HS Jonathan Jimenez MD    methocarbamol 750 mg Oral Q6H PRN Janneth Jacob MD    methylPREDNISolone sodium succinate 40 mg Intravenous Q8H Jonathan Jimenez MD    ondansetron 4 mg Intravenous Q6H PRN Janneth Jacob MD    oxyCODONE 20 mg Oral Q12H [address] Jonathan Jimenez MD    oxyCODONE 30 mg Oral Q4H PRN Janneth Jacob MD    pantoprazole 40 mg Oral Early Morning Okwmone Jimenez MD    polyethylene glycol 17 g Oral Daily PRN Janneth Jacob MD    pregabalin 25 mg Oral Daily Okwu MD Barbara    pregabalin 50 mg Oral HS Okwu MD Barbara    QUEtiapine 25 mg Oral HS Willard Ornelas MD    senna-docusate sodium 1 tablet Oral BID PRN Willard Ornelas MD    sodium chloride 100 mL/hr Intravenous Continuous Willard Ornelas MD Last Rate: 100 mL/hr (11/30/19 0948)   tamsulosin 0 4 mg Oral Daily With Steffanie Mar MD    tiotropium 18 mcg Inhalation Daily Willard Ornelas MD         Today, Patient Was Seen By: Willard Ornelas MD    ** Please Note: Dictation voice to text software may have been used in the creation of this document   **

## 2019-11-30 NOTE — PROGRESS NOTES
At this time, girlfriend in patients room stating she is very concerned with the patients slurred speech stating "this is not like him, his speech is off " A neurological check at bedside performed at this time, because of patients new onset of slurred speech (talked with patient an hour prior to this and patient seemed tired, however speech was not slurred), a RR has been called

## 2019-11-30 NOTE — ASSESSMENT & PLAN NOTE
Patient developed acute onset of stroke-like symptoms involving dysphagia and slurred speech  Stroke alert called  CT brain without contrast, CTA head and neck, MRI brain all negative for acute infarction  Neurology consult obtained and recommended discontinuing stroke pathway  No further workup required

## 2019-11-30 NOTE — ASSESSMENT & PLAN NOTE
Lab Results   Component Value Date    HGBA1C 5 6 10/23/2019       No results for input(s): POCGLU in the last 72 hours      Blood Sugar Average: Last 72 hrs:   hemoglobin A1C stable off medications x 6 months   Monitor glucose in the setting of steroid use

## 2019-11-30 NOTE — ASSESSMENT & PLAN NOTE
History of CKD3  Creatinine stable at baseline of 2 44 at time of admission  Continue to monitor BMP while inpatient  Nephrology consulted due to need for IV contrast dye studies  Patient given IV fluids and Mucomyst   Continue to monitor renal function  Can consider CTA chest PE protocol study in next 24-48 hours

## 2019-11-30 NOTE — ASSESSMENT & PLAN NOTE
Patient presented with two weeks of worsening SOB  Continue IV Solu-Medrol and nebulizers  Supplemental O2 as needed  Flu swab negative

## 2019-11-30 NOTE — NURSING NOTE
Notified by RODNEY from [facility] that she was changing patient's heparin from a ACS/LOW to a [place] protocol and that she had called lab to add on a d-dimer  Per RODNEY, we should still check PTT @ 06:00 which is 6 hours from when the heparin was initially started

## 2019-11-30 NOTE — QUICK NOTE
Patient had MRI completed  There is no evidence of acute infarction, or mass lesion    Given this normal study, in combination with low clinical suspicion for stroke, he can be removed from the stroke pathway

## 2019-11-30 NOTE — ASSESSMENT & PLAN NOTE
Troponin 0 92 at time of presentation, repeat 2 92  Has had troponin elevations in the past as high as 0 19   Last cardiac cath June 2018 showing normal left main, LAD, circumflex, and 20% stenosis of mid RCA   Given 325 mg ASA in ED   Does report mild chest pain with respirations, reports this occurs on occasion at home as well  Will continue to trend troponins   EKG without acute ST or T wave changes, unchanged from prior- will continue to monitor EKGs with each troponin  Monitor on telemetry   Cardiology consult- Spoke with on call cardiology Dr Savage Norman regarding troponin elevation- will place on heparin drip tonight and continue to follow troponin and EKGs

## 2019-11-30 NOTE — PLAN OF CARE
Problem: Potential for Falls  Goal: Patient will remain free of falls  Description  INTERVENTIONS:  - Assess patient frequently for physical needs  -  Identify cognitive and physical deficits and behaviors that affect risk of falls    -  [place] fall precautions as indicated by assessment   - Educate patient/family on patient safety including physical limitations  - Instruct patient to call for assistance with activity based on assessment  - Modify environment to reduce risk of injury  - Consider OT/PT consult to assist with strengthening/mobility  Outcome: Progressing     Problem: PAIN - ADULT  Goal: Verbalizes/displays adequate comfort level or baseline comfort level  Description  Interventions:  - Encourage patient to monitor pain and request assistance  - Assess pain using appropriate pain scale  - Administer analgesics based on type and severity of pain and evaluate response  - Implement non-pharmacological measures as appropriate and evaluate response  - Consider cultural and social influences on pain and pain management  - Notify physician/advanced practitioner if interventions unsuccessful or patient reports new pain  Outcome: Progressing     Problem: CARDIOVASCULAR - ADULT  Goal: Maintains optimal cardiac output and hemodynamic stability  Description  INTERVENTIONS:  - Monitor I/O, vital signs and rhythm  - Monitor for S/S and trends of decreased cardiac output  - Administer and titrate ordered vasoactive medications to optimize hemodynamic stability  - Assess quality of pulses, skin color and temperature  - Assess for signs of decreased coronary artery perfusion  - Instruct patient to report change in severity of symptoms  Outcome: Progressing  Goal: Absence of cardiac dysrhythmias or at baseline rhythm  Description  INTERVENTIONS:  - Continuous cardiac monitoring, vital signs, obtain 12 lead EKG if ordered  - Administer antiarrhythmic and heart rate control medications as ordered  - Monitor electrolytes and administer replacement therapy as ordered  Outcome: Progressing     Problem: RESPIRATORY - ADULT  Goal: Achieves optimal ventilation and oxygenation  Description  INTERVENTIONS:  - Assess for changes in respiratory status  - Assess for changes in mentation and behavior  - Position to facilitate oxygenation and minimize respiratory effort  - Oxygen administered by appropriate delivery if ordered  - Encourage broncho-pulmonary hygiene including cough, deep breathe, Incentive Spirometry  - Assess the need for suctioning and aspirate as needed  - Assess and instruct to report SOB or any respiratory difficulty  - Respiratory Therapy support as indicated   Outcome: Progressing

## 2019-11-30 NOTE — ASSESSMENT & PLAN NOTE
Lab Results   Component Value Date    HGBA1C 5 6 10/23/2019       Recent Labs     11/30/19  0907   POCGLU 204*       Blood Sugar Average: Last 72 hrs:  (P) 204 hemoglobin A1C stable off medications x 6 months   Monitor glucose in the setting of steroid use  Continue to hold metformin in the setting of IV contrast dye use and chronic kidney disease  Initiate sliding scale insulin, diabetic diet, Accu-Cheks q a c  And HS

## 2019-11-30 NOTE — UTILIZATION REVIEW
Initial Clinical Review    Admission: Date/Time/Statement: Inpatient Admission Orders (From admission, onward)     Ordered        11/29/19 2002  Inpatient Admission (expected length of stay for this patient Order details is greater than two midnights)  Once                   Orders Placed This Encounter   Procedures    Inpatient Admission (expected length of stay for this patient Order details is greater than two midnights)     Standing Status:   Standing     Number of Occurrences:   1     Order Specific Question:   Admitting Physician     Answer:   Mary Batista [[id number]]     Order Specific Question:   Level of Care     Answer:   Med Surg [[id number]]     Order Specific Question:   Estimated length of stay     Answer:   More than 2 Midnights     Order Specific Question:   Certification     Answer:   I certify that inpatient services are medically necessary for this patient for a duration of greater than two midnights  See H&P and MD Progress Notes for additional information about the patient's course of treatment  ED Arrival Information     Expected Arrival Acuity Means of Arrival Escorted By Service Admission Type    - 11/29/2019 18:26 Urgent Ambulance [facility] ([address]) Hospitalist Urgent    Arrival Complaint    Shortness Of Breath        Chief Complaint   Patient presents with    Shortness of Breath     per ems pt has been having increased SOB  pt has hx COPD and is currently receiving chemo for lung cancer  Assessment/Plan: 65 yo male w/hx lung ca on [place] and DM to ED from home by EMS admitted as inpatient due to copd exacerbation and elevated troponin  Presents with 2 weeks of increased sob despite inhalers, nebs, and home o2  Worse w/exertion & ambulation  C/o diffuse inspiratory chest pain with wet cough-unable to expectorate mucus  Exam reveals tachypnea, diffuse expiratory wheezes and decreased breath sounds  Pulmonary, cardiology, and renal have been consulted   IV steroids with supplemental o2 and nebs are in place  Cardiac workup in progress with heparin gtt and tele  11/30 0249: concern for PE-increasing heparin gtt and checking d dimer     11/30 0900: having slurred speech, new onset  Rapid response team called and neuro consulted  11/30 @0948 upgraded to level 2 stepdown    11/30 @1004 per neuro: stroke alert was called (rapid imaging with contrast is performed with stroke team mobilized) has mild dysarthria, not convinced that this is a stroke and do not feel TPA is appropriate due to PTT significantly elevated and the fact that sx are quite mild and nondisabling with unclear onset and etiology  Will continue stroke workup and other care that is in progress  NIH score = 2 for sensory loss and slurred speech    11/30 per renal: baseline creat is 2 - 2 5  Did get IV contrast earlier thus will give IVF and mucomyst  Will recheck chem studies in am to ensure no contrast induced ATN  His Deya Batres can also have renal complications  11/30 @1234 per pulm: suspect severe sob is copd exacerbation  Has decreased breath sounds  Can not r/o PE due to already having IV dye for the stroke imaging in the setting of stage 4 CKD  Can not have VQ scan due to abnormal CXR  He is at risk for PE due to malignancy  Continue anticoag gtt and if creat stable, consider a CTA chest in 24-48 hours  Check BLE dopplers, continue IV steroids, nebs, and get out of bed as much as able  11/30 @1345 per neuro: MRI done and is normal, stroke workup can be discontinued         ED Triage Vitals   Temperature Pulse Respirations Blood Pressure SpO2   11/29/19 1830 11/29/19 1834 11/29/19 1830 11/29/19 1834 11/29/19 1834   98 2 °F (36 8 °C) 101 17 169/87 96 %      Temp Source Heart Rate Source Patient Position - Orthostatic VS BP Location FiO2 (%)   11/29/19 1830 11/29/19 1830 11/29/19 1834 11/29/19 1834 --   Oral Monitor Lying Right arm       Pain Score       11/29/19 1834       7        Wt Readings from Last 1 Encounters:   11/29/19 68 kg (149 lb 14 6 oz)     Additional Vital Signs:   11/30/19 1200    86    125/67  91 %       11/30/19 1100    88    142/63  95 %       11/30/19 1030    88    138/68  97 %       11/30/19 1015    89    141/73  97 %       11/30/19 1000    89    135/71  97 %       11/30/19 0945    90    135/69  90 %       11/30/19 0905    95  20  138/70  98 %    Lying   11/30/19 0716  97 6 °F (36 4 °C)  88  18  105/80  100 %  High flow nasal cannula  Sitting   11/30/19 0712          95 %       11/30/19 0300  98 6 °F (37 °C)  88  18  142/76  97 %  Nasal cannula  Lying   11/29/19 2132  99 1 °F (37 3 °C)  114Abnormal   18  156/77  96 %  Nasal cannula  Lying   11/29/19 2106        135/70      Lying   11/29/19 2027    125Abnormal   22  174/80Abnormal   97 %  Nasal cannula  Lying   11/29/19 1930    106Abnormal   23Abnormal     98 %  Nasal cannula         Pertinent Labs/Diagnostic Test Results:   11/29 PCXR: Right infrahilar density correlating to an area of airspace density seen on the recent PET/CT  11/29 EKGT: sinus tach, L atrial abnormality  11/30 CT stroke alert brain:1  Unremarkable unenhanced CT scan of the brain  2   Pansinusitis  11/30 CTA stoke alert head/neck: 1  Intact [place]  No evidence of vessel cut off or filling defects  2   No evidence of carotid or vertebral artery dissection  11/30 MRI brain: No mass effect, acute intracranial hemorrhage or evidence of recent infarction    Paranasal sinus disease, as described above  Frothy secretions within the right sphenoid sinus should be correlated clinically for the presence of acute sinusitis  Polypoid soft tissue within the anterior right nasal passage has slightly increased in size since 1/10/2019    11/30 BLE doppler ordered-unclear when it will be performed    Results from last 7 days   Lab Units 11/30/19  0605 11/29/19  1900   WBC Thousand/uL 5 75 8 81   HEMOGLOBIN g/dL 10 4* 11 5* HEMATOCRIT % 33 9* 36 2*   PLATELETS Thousands/uL 265 240   BANDS PCT %  --  1         Results from last 7 days   Lab Units 11/30/19  0605 11/29/19  1900   SODIUM mmol/L 137 136   POTASSIUM mmol/L 4 3 4 1   CHLORIDE mmol/L 101 100   CO2 mmol/L 22 26   ANION GAP mmol/L 14* 10   BUN mg/dL 30* 31*   CREATININE mg/dL 2 61* 2 44*   EGFR ml/min/1 73sq m 30 33   CALCIUM mg/dL 8 8 9 0         Results from last 7 days   Lab Units 11/30/19  0907   POC GLUCOSE mg/dl 204*     Results from last 7 days   Lab Units 11/30/19  0605 11/29/19  1900   GLUCOSE RANDOM mg/dL 210* 133     Results from last 7 days   Lab Units 11/30/19  0605 11/30/19  0115 11/29/19  2223 11/29/19  1900   TROPONIN I ng/mL 3 03* 3 58* 2 92* 0 92*     Results from last 7 days   Lab Units 11/29/19  2333   D-DIMER QUANTITATIVE ug/ml FEU 0 94*     Results from last 7 days   Lab Units 11/30/19  0605 11/29/19  2333   PROTIME seconds  --  14 9*   INR   --  1 15   PTT seconds >210* 37         Results from last 7 days   Lab Units 11/29/19  2333   PROCALCITONIN ng/ml 1 01*       Results from last 7 days   Lab Units 11/29/19  1900   NT-PRO BNP pg/mL 65     Results from last 7 days   Lab Units 11/29/19  2246   INFLUENZA A PCR  None Detected   RSV PCR  None Detected     ED Treatment:   Medication Administration from 11/29/2019 1826 to 11/29/2019 2128       Date/Time Order Dose Route Action     11/29/2019 1838 albuterol inhalation solution 5 mg 5 mg Nebulization Given     11/29/2019 1838 ipratropium (ATROVENT) 0 02 % inhalation solution 0 5 mg 0 5 mg Nebulization Given     11/29/2019 1906 sodium chloride 0 9 % bolus 1,000 mL 1,000 mL Intravenous New Bag     11/29/2019 1912 albuterol inhalation solution 10 mg 10 mg Nebulization Given     11/29/2019 1912 ipratropium (ATROVENT) 0 02 % inhalation solution 1 mg 1 mg Nebulization Given     11/29/2019 1907 methylPREDNISolone sodium succinate (Solu-MEDROL) injection 125 mg 125 mg Intravenous Given     11/29/2019 2006 aspirin chewable tablet 324 mg 324 mg Oral Given     11/29/2019 2004 LORazepam (ATIVAN) 2 mg/mL injection 1 mg 1 mg Intravenous Given        Past Medical History:   Diagnosis Date    Bipolar 1 disorder (HCC)     Cancer ([facility] )     adeno lung  dx 11/2018-lung bx today 4/9/2019-ongoing chemo    Chronic pain disorder     back and right shoulder    CKD (chronic kidney disease)     COPD (chronic obstructive pulmonary disease) (HCC)     Depression     Diabetes mellitus (HCC)     GERD (gastroesophageal reflux disease)     Herniated lumbar intervertebral disc     Hypertension     Insomnia     Latent syphilis     Treated    Low back pain     Lumbosacral disc disease     Lung cancer ([place] [address] ) 04/2019    Pneumothorax after biopsy     R lung    PTSD (post-traumatic stress disorder)     Pulmonary emphysema ([place] [address] )     Tobacco abuse 11/13/2018     Present on Admission:   Chronic obstructive pulmonary disease with acute exacerbation (HCC)   Essential hypertension   Type 2 diabetes mellitus without complication, without long-term current use of insulin (HCC)   Stage 3 chronic kidney disease (HCC)   Adenocarcinoma of lung, right ([place] [address] )      Admitting Diagnosis: Shortness of breath [R06 02]  Lung cancer ([place] [address] ) [C34 90]  ACS (acute coronary syndrome) (HCC) [I24 9]  CKD (chronic kidney disease) [N18 9]  Elevated troponin [R79 89]  COPD exacerbation (HCC) [J44 1]  Age/Sex: 64 y o  male  Admission Orders:  Scheduled Medications:    Medications:  acetylcysteine 1,200 mg Oral BID   [START ON 12/1/2019] amLODIPine 10 mg Oral Daily   famotidine 20 mg Oral BID   FLUoxetine 10 mg Oral Daily   fluticasone 1 spray Each Nare Daily   fluticasone-vilanterol 1 puff Inhalation Early Morning   folic acid 1 mg Oral Daily   ipratropium-albuterol 3 mL Nebulization Q6H   melatonin 3 mg Oral HS   methylPREDNISolone sodium succinate 40 mg Intravenous Q8H   oxyCODONE 20 mg Oral Q12H GREG   pantoprazole 40 mg Oral Early Morning   pregabalin 25 mg Oral Daily   pregabalin 50 mg Oral HS   QUEtiapine 25 mg Oral HS   tamsulosin 0 4 mg Oral Daily With Dinner   tiotropium 18 mcg Inhalation Daily     Continuous IV Infusions:    heparin (porcine) 3-30 Units/kg/hr (Order-Specific) Intravenous Titrated   sodium chloride 100 mL/hr Intravenous Continuous     PRN Meds:    acetaminophen 650 mg Oral Q6H PRN   albuterol 2 puff Inhalation Q4H PRN   aluminum-magnesium hydroxide-simethicone 30 mL Oral Q6H PRN   chlorproMAZINE 25 mg Oral Q6H PRN   diphenhydrAMINE 25 mg Oral Q6H PRN   heparin (porcine) 2,600 Units Intravenous PRN   heparin (porcine) 5,200 Units Intravenous PRN   methocarbamol 750 mg Oral Q6H PRN   ondansetron 4 mg Intravenous Q6H PRN   oxyCODONE 30 mg Oral Q4H PRN   polyethylene glycol 17 g Oral Daily PRN   senna-docusate sodium 1 tablet Oral BID PRN     Tele  Neuro checks  (done during stroke workup)  scd's    IP CONSULT TO CARDIOLOGY  IP CONSULT TO PULMONOLOGY  IP CONSULT TO CASE MANAGEMENT  IP CONSULT TO NEPHROLOGY  IP CONSULT TO NEUROLOGY    Network Utilization Review Department  [email or web address]  ATTENTION: Please call with any questions or concerns to [phone] and carefully listen to the prompts so that you are directed to the right person  All voicemails are confidential   Alfredolye Libertad all requests for admission clinical reviews, approved or denied determinations and any other requests to dedicated fax number below belonging to the campus where the patient is receiving treatment    FACILITY NAME UR FAX NUMBER   ADMISSION DENIALS (Administrative/Medical Necessity) [phone]   PARENT CHILD HEALTH (Maternity/NICU/Pediatrics) [phone]   [facility] [phone]   Pavel Mckenzie [phone]   [place] [address] [phone]     [place] [address] [phone]   [facility] [phone]

## 2019-11-30 NOTE — PROGRESS NOTES
Per attending MD, can DC the troponin trend since patient had troponins drawn  Also per him, patient will be started on per floor stroke vitals, Q15 not necessary at this time  Will place patient on stroke pathway, and assess as necessary per order

## 2019-11-30 NOTE — CONSULTS
Consultation - Neurology   Jayesh Rose  64 y o  male MRN: [id number]  Unit/Bed#: E4 -01 Encounter: [id number]      Assessment/Plan   Assessment:  82-year-old male with known history of small cell lung cancer, undergoing chemotherapy, admitted for worsening shortness of breath, not responsive to palpation treatments, initially AS with elevated troponin, started on heparin, AC intensity increased overnight given suspicion of possible PE  This morning the rapid response and subsequent stroke alert activated when his girlfriend reported slurred speech  He does exhibit mild dysarthria, and somewhat equivocal sensory asymmetry to pinprick  The symptoms are quite mild, and non disabling  I am not 100% convinced this is related to stroke  I do not think he is an appropriate tPA candidate for several reasons  His last PTT (0605) was significantly elevated, not clear that his symptoms are related to stroke, they are quite mild and clearly non disabling, exact onset is not entirely clear  Plan:  1  Patient is placed on stroke pathway for now  Plan MRI brain  2  Okay to resume heparin drip when appropriate  3  Investigation for possible PE ongoing  4  Exclude occult infection  5  Check LFTs and ammonia level   6  Minimize sedating agents as is practical    Physician Requesting Consult: Willard Ornelas MD  Reason for Consult / Principal Problem:  Stroke alert    Patient last known well:  Uncertain  The patient ate breakfast without difficulty, but was in a very verbal at the time per nursing  Slurred speech was noted by his girlfriend upon her arrival around 9:00 a m  Stroke alert called:  Yamila Salgado  Neurology time of arrival:  Met patient in the CT scanner at 9:22 a m  HPI: Jayesh Rose  is a 64y o  year old male who has a history of lung cancer, COPD, hypertension, and diabetes was admitted with increasing shortness of breath    His initial laboratory investigation on admission showed elevated troponin which increased further, prompting initiation of a heparin drip, following telephone discussion with Cardiology  Overnight there was increasing concern for possible PE, and the intensity of the anticoagulation was increased     A rapid response was activated due to his girlfriend's concern slurred speech this morning  This was confirmed on initial bedside exam and a stroke alert was activated  Patient denies headache, lateralizing weakness or numbness  No acute visual change  He has not received any sedating medications this morning, but did get 1 mg dose of Ativan at 8:00 p m  and OxyContin at 10:38 p m    Inpatient consult to Neurology  Consult performed by: Bhavesh Pantoja DO  Consult ordered by: Steve Dominguez MD          Review of Systems   Unable to perform ROS: Acuity of condition       Historical Information   Past Medical History:   Diagnosis Date    Bipolar 1 disorder ([facility] [address] )     Cancer ([facility] [address] )     adeno lung  dx 11/2018-lung bx today 4/9/2019-ongoing chemo    Chronic pain disorder     back and right shoulder    CKD (chronic kidney disease)     COPD (chronic obstructive pulmonary disease) ([address] )     Depression     Diabetes mellitus ([address] )     GERD (gastroesophageal reflux disease)     Herniated lumbar intervertebral disc     Hypertension     Insomnia     Latent syphilis     Treated    Low back pain     Lumbosacral disc disease     Lung cancer ([address] ) 04/2019    Pneumothorax after biopsy     R lung    PTSD (post-traumatic stress disorder)     Pulmonary emphysema ([address] )     Tobacco abuse 11/13/2018     Past Surgical History:   Procedure Laterality Date    COLONOSCOPY  2011    CT GUIDED CHEST TUBE  11/21/2018    FL GUIDED CENTRAL VENOUS ACCESS DEVICE INSERTION  2/8/2019    HEMORROIDECTOMY      IR CHEST TUBE  11/14/2018    IR IMAGE GUIDED BIOPSY/ASPIRATION LUNG  11/13/2018    KNEE SURGERY      CT INSJ TUNNELED CTR VAD W/SUBQ PORT AGE 5 YR/> N/A 2/8/2019    Procedure: PLACEMENT OF PORT-A-CATH;  Surgeon: Chao Martinez MD;  Location: [facility] MAIN OR;  Service: Vascular     Social History   Social History     Substance and Sexual Activity   Alcohol Use Not Currently     Social History     Substance and Sexual Activity   Drug Use Not Currently    Types: Marijuana    Comment: stopped , "i smoked weed and stopped 1 month ago"     Social History     Tobacco Use   Smoking Status Former Smoker    Packs/day: 0 50    Years: 40 00    Pack years: 20 00    Types: Cigarettes    Start date:     Last attempt to quit: 2019    Years since quittin 3   Smokeless Tobacco Never Used     Family History: non-contributory        Meds/Allergies   all current active meds have been reviewed and PTA meds:   Prior to Admission Medications   Prescriptions Last Dose Informant Patient Reported? Taking?    BANOPHEN 25 MG capsule  Self Yes No   Blood Glucose Monitoring Suppl KIT  Self No No   Sig: by Does not apply route daily   FLUoxetine (PROzac) 10 MG tablet  Self No No   Sig: Take 1 tablet (10 mg total) by mouth daily   FREESTYLE LITE test strip  Self No No   Sig: TEST BLOOD SUGAR DAILY   Lancets (FREESTYLE) lancets  Self No No   Sig: TEST BLOOD SUGAR DAILY   QUEtiapine (SEROquel) 25 mg tablet  Self Yes No   Sig: Take 25 mg by mouth daily at bedtime   REGULOID 28 3 % POWD  Self No No   Sig: USE AS DIRECTED   Selenium Sulfide 2 25 % SHAM  Self Yes No   Sig: apply by topical route  every PM to the affected area(s)   albuterol (2 5 mg/3 mL) 0 083 % nebulizer solution   No No   Sig: Take 1 vial (2 5 mg total) by nebulization every 4 (four) hours as needed for wheezing or shortness of breath   albuterol (2 5 mg/3 mL) 0 083 % nebulizer solution   No No   Sig: Take 1 vial (2 5 mg total) by nebulization every 4 (four) hours as needed for wheezing or shortness of breath   albuterol (VENTOLIN HFA) 90 mcg/act inhaler  Self No No   Sig: Inhale 2 puffs every 4 (four) hours as needed for wheezing   amLODIPine (NORVASC) 10 mg tablet  Self No No   Sig: Take 1 tablet (10 mg total) by mouth daily   carbamide peroxide (DEBROX) 6 5 % otic solution  Self No No   Sig: Administer 5 drops into both ears 2 (two) times a day   chlorproMAZINE (THORAZINE) 25 mg tablet  Self No No   Sig: Take 1 tablet (25 mg total) by mouth every 6 (six) hours as needed (hiccups)   dexamethasone (DECADRON) 4 mg tablet  Self No No   Sig: Take 1 tablet (4 mg total) by mouth 2 (two) times a day with meals For 2 days after chemo   diphenhydrAMINE (BENADRYL) 25 mg tablet  Self No No   Sig: Take 1 tablet (25 mg total) by mouth every 6 (six) hours as needed (nausea)   ergocalciferol (ERGOCALCIFEROL) 49877 units capsule  Self No No   Sig: Take 1 capsule (50,000 Units total) by mouth once a week   fluticasone (FLONASE) 50 mcg/act nasal spray  Self No No   Si-2 sprays each nostril daily for allergies   fluticasone-vilanterol (BREO ELLIPTA) 100-25 mcg/inh inhaler  Self No No   Sig: Inhale 1 puff daily in the early morning Rinse mouth after use     folic acid (FOLVITE) 1 mg tablet  Self No No   Sig: Take 1 tablet (1 mg total) by mouth daily   ipratropium (ATROVENT) 0 02 % nebulizer solution   No No   Sig: Take 1 vial (0 5 mg total) by nebulization 3 (three) times a day   lidocaine (LMX) 4 % cream  Self No No   Sig: Apply topically as needed for mild pain Apply 1/2-1 inch to port 30-60 mins prior to needle insertion, cover with serrain wrap   loratadine (CLARITIN) 10 mg tablet  Self No No   Sig: Take 1 tablet (10 mg total) by mouth daily in the early morning   melatonin 3 mg  Self No No   Sig: Take 1 tablet (3 mg total) by mouth daily at bedtime   metFORMIN (GLUCOPHAGE-XR) 500 mg 24 hr tablet  Self No No   Sig: Take 1 tablet (500 mg total) by mouth 2 (two) times a day with meals   methocarbamol (ROBAXIN) 750 mg tablet  Self No No   Sig: Take 1 tablet (750 mg total) by mouth every 6 (six) hours as needed for muscle spasms   ondansetron (ZOFRAN) 4 mg tablet  Self No No   Sig: Take 1 tablet (4 mg total) by mouth every 6 (six) hours as needed for nausea or vomiting   oxyCODONE (OxyCONTIN) 20 mg 12 hr tablet  Self No No   Sig: Take 1 tablet (20 mg total) by mouth every 12 (twelve) hoursMax Daily Amount: 40 mg   oxyCODONE (ROXICODONE) 30 MG immediate release tablet  Self No No   Sig: Take 1 tablet (30 mg total) by mouth every 4 (four) hours as needed for severe painMax Daily Amount: 180 mg   pantoprazole (PROTONIX) 40 mg tablet  Self No No   Sig: Take 1 tablet (40 mg total) by mouth daily   polyethylene glycol (GLYCOLAX) powder  Self No No   Sig: Take 17 g by mouth daily   predniSONE 10 mg tablet  Self No No   Sig: Take 1 tablet (10 mg total) by mouth daily   pregabalin (LYRICA) 25 mg capsule  Self No No   Sig: Take 1 capsule PO in morning and 2 capsules PO at bedtime   prochlorperazine (COMPAZINE) 10 mg tablet  Self No No   Sig: Take 1 tablet (10 mg total) by mouth every 6 (six) hours as needed for nausea or vomiting   ranitidine (ZANTAC) 150 mg tablet  Self No No   Sig: Take 1 tablet (150 mg total) by mouth 2 (two) times a day   senna-docusate sodium (SENOKOT-S) 8 6-50 mg per tablet  Self No No   Sig: Take 1 tablet by mouth 2 (two) times a day   sildenafil (VIAGRA) 50 MG tablet  Self No No   Sig: Take 1 tablet (50 mg total) by mouth as needed for erectile dysfunction   tamsulosin (FLOMAX) 0 4 mg   No No   Sig: Take 1 capsule (0 4 mg total) by mouth daily with dinner   tiotropium (SPIRIVA RESPIMAT) 2 5 MCG/ACT AERS inhaler  Self No No   Sig: Inhale 2 puffs daily      Facility-Administered Medications: None       Allergies   Allergen Reactions    Lisinopril Anaphylaxis     Took medication a while ago and had a "swelling reaction"  Does not carry epi-pen       Objective   Vitals:Blood pressure 135/71, pulse 89, temperature 97 6 °F (36 4 °C), temperature source Temporal, resp  rate 20, weight 68 kg (149 lb 14 6 oz), SpO2 97 %  ,Body mass index is 22 79 kg/m²      Intake/Output Summary (Last 24 hours) at 11/30/2019 1005  Last data filed at 11/30/2019 0601  Gross per 24 hour   Intake 1535 97 ml   Output    Net 1535 97 ml       Invasive Devices: Invasive Devices     Central Venous Catheter Line            Port A Cath Right Chest -- days          Peripheral Intravenous Line            Peripheral IV 11/29/19 Left Antecubital less than 1 day                Physical Exam   Constitutional: He appears well-developed  No distress  HENT:   Head: Normocephalic and atraumatic  Mouth/Throat: No oropharyngeal exudate  Eyes: Conjunctivae are normal  Right eye exhibits no discharge  Left eye exhibits no discharge  No scleral icterus  Neck: Normal range of motion  Neck supple  Cardiovascular: Normal rate and regular rhythm  Musculoskeletal: He exhibits no edema or deformity  Skin: Skin is warm and dry  He is not diaphoretic  Neurologic Exam     Mental Status   Awake and alert  Mildly impaired concentration/attention  Able to name objects, read, repeat, follow commands  Oriented to name, month, location     Cranial Nerves   Visual fields are full  Extraocular movements are intact  There is no gaze preference or palsy  Pupils are reactive to light and symmetric  No obvious resting facial asymmetry , questionable decreased spontaneous left lower facial movements during speech  Speech is mildly slurred  Tongue protrudes midline and is fully mobile  Facial sensation reported symmetric to light touch and pinprick  Motor Exam No drift on NIH testing x 4  For full hand , shoulder abduction, hip flexion, ankle dorsiflexion on testing  Does exhibit some occasional asterixis/myoclonus     Sensory Exam   Reported diminished pinprick right versus left hand, less clear that there was any cyst asymmetry more proximal   Symmetric perception in both knees    No neglect     Gait, Coordination, and Reflexes Finger-to-nose testing normal bilaterally, albeit with mild irregular tremor  Heel-to-shin testing likely within normal limits, mildly clumsy bilaterally  NIHSS:    1a Level of Consciousness: 0 = Alert   1b  LOC Questions: 0 = Answers both correctly   1c  LOC Commands: 0 = Obeys both correctly   2  Best Gaze: 0 = Normal   3  Visual: 0 = No visual field loss   4  Facial Palsy: 0=Normal symmetric movement   5a  Motor Right Arm: 0=No drift, limb holds 90 (or 45) degrees for full 10 seconds   5b  Motor Left Arm: 0=No drift, limb holds 90 (or 45) degrees for full 10 seconds   6a  Motor Right Le=No drift, limb holds 90 (or 45) degrees for full 10 seconds   6b  Motor Left Le=No drift, limb holds 90 (or 45) degrees for full 10 seconds   7  Limb Ataxia:  0=Absent   8  Sensory: 1=Mild to moderate sensory loss; patient feels pinprick is less sharp or is dull on the affected side; there is a loss of superficial pain with pinprick but patient is aware He is being touched   9  Best Language:  0=No aphasia, normal   10  Dysarthria: 1=Mild to moderate, patient slurs at least some words and at worst, can be understood with some difficulty   11  Extinction and Inattention (formerly Neglect): 0=No abnormality   Total Score: 2     Above NIH assessment performed by myself, at approximately 9:30 a m  Lab Results:   I have personally reviewed pertinent reports  , CBC:   Results from last 7 days   Lab Units 19  0605 19  1900   WBC Thousand/uL 5 75 8 81   RBC Million/uL 3 93 4 27   HEMOGLOBIN g/dL 10 4* 11 5*   HEMATOCRIT % 33 9* 36 2*   MCV fL 86 85   PLATELETS Thousands/uL 265 240   , BMP/CMP:   Results from last 7 days   Lab Units 19  0605 19  1900   SODIUM mmol/L 137 136   POTASSIUM mmol/L 4 3 4 1   CHLORIDE mmol/L 101 100   CO2 mmol/L 22 26   BUN mg/dL 30* 31*   CREATININE mg/dL 2 61* 2 44*   CALCIUM mg/dL 8 8 9 0   EGFR ml/min/1 73sq m 30 33   , Coagulation:   Results from last 7 days   Lab Units 19  2333   INR  1 15     PTT at 6:05 a m   Greater than 210    Imaging Studies: I have personally reviewed pertinent films in PACS  Stat CT head shows no acute changes  CTA head and neck shows no significant stenosis or large vessel occlusion  EKG, Pathology, and Other Studies: I have personally reviewed pertinent reports  PET imaging 11/13/2019    Code Status: Level 1 - Full Code  Advance Directive and Living Will:      Power of :    POLST:      Counseling / Coordination of Care  Total Critical Care time spent 41 minutes excluding procedures, teaching and family updates

## 2019-11-30 NOTE — ASSESSMENT & PLAN NOTE
Currently follows up with Dr Paradise Ferguson of Oncology in the outpatient setting  Is being treated with [facility] which can have some renal complications  Will continue to monitor

## 2019-11-30 NOTE — ASSESSMENT & PLAN NOTE
Patient presented with elevated D-dimer of 0 94  Likely elevated in the setting of stage IV cancer  Patient cannot get V/Q scan due to abnormal chest x-ray  Also recently received IV contrast dye for CTA of head and neck for stroke workup  Cannot rule out PE so will continue PE protocol heparin drip  If creatinine remains stable can get CTA chest PE protocol in 24-48 hours  Check bilateral lower extremity Dopplers  Continue incentive spirometry

## 2019-11-30 NOTE — ASSESSMENT & PLAN NOTE
Patient presented with elevated troponins:0 92-->2 92--> 3 58--> 3 03  This is due to non MI troponin elevation in the setting of demand ischemia and chronic kidney disease  Patient also had negative cardiac catheterization in June 2018  No cardiac intervention required

## 2019-11-30 NOTE — ED PROVIDER NOTES
History  Chief Complaint   Patient presents with    Shortness of Breath     per ems pt has been having increased SOB  pt has hx COPD and is currently receiving chemo for lung cancer  61-year-old male presents for evaluation of worsening shortness of breath over the past 2 weeks  Patient has this constant, currently severe, worse with movement and does not seem to improve with his breathing treatments  Associated with cough  He does have a history of lung cancer which is currently undergoing chemotherapy for  His lower extremity edema or calf pain, fevers, chills, chest pain  History provided by:  Patient  Shortness of Breath   Associated symptoms: cough and wheezing    Associated symptoms: no abdominal pain, no chest pain, no ear pain, no fever, no rash, no sore throat and no vomiting        Prior to Admission Medications   Prescriptions Last Dose Informant Patient Reported? Taking?    BANOPHEN 25 MG capsule  Self Yes No   Blood Glucose Monitoring Suppl KIT  Self No No   Sig: by Does not apply route daily   FLUoxetine (PROzac) 10 MG tablet  Self No No   Sig: Take 1 tablet (10 mg total) by mouth daily   FREESTYLE LITE test strip  Self No No   Sig: TEST BLOOD SUGAR DAILY   Lancets (FREESTYLE) lancets  Self No No   Sig: TEST BLOOD SUGAR DAILY   QUEtiapine (SEROquel) 25 mg tablet  Self Yes No   Sig: Take 25 mg by mouth daily at bedtime   REGULOID 28 3 % POWD  Self No No   Sig: USE AS DIRECTED   Selenium Sulfide 2 25 % SHAM  Self Yes No   Sig: apply by topical route  every PM to the affected area(s)   albuterol (2 5 mg/3 mL) 0 083 % nebulizer solution   No No   Sig: Take 1 vial (2 5 mg total) by nebulization every 4 (four) hours as needed for wheezing or shortness of breath   albuterol (2 5 mg/3 mL) 0 083 % nebulizer solution   No No   Sig: Take 1 vial (2 5 mg total) by nebulization every 4 (four) hours as needed for wheezing or shortness of breath   albuterol (VENTOLIN HFA) 90 mcg/act inhaler  Self No No Sig: Inhale 2 puffs every 4 (four) hours as needed for wheezing   amLODIPine (NORVASC) 10 mg tablet  Self No No   Sig: Take 1 tablet (10 mg total) by mouth daily   carbamide peroxide (DEBROX) 6 5 % otic solution  Self No No   Sig: Administer 5 drops into both ears 2 (two) times a day   chlorproMAZINE (THORAZINE) 25 mg tablet  Self No No   Sig: Take 1 tablet (25 mg total) by mouth every 6 (six) hours as needed (hiccups)   dexamethasone (DECADRON) 4 mg tablet  Self No No   Sig: Take 1 tablet (4 mg total) by mouth 2 (two) times a day with meals For 2 days after chemo   diphenhydrAMINE (BENADRYL) 25 mg tablet  Self No No   Sig: Take 1 tablet (25 mg total) by mouth every 6 (six) hours as needed (nausea)   ergocalciferol (ERGOCALCIFEROL) 76626 units capsule  Self No No   Sig: Take 1 capsule (50,000 Units total) by mouth once a week   fluticasone (FLONASE) 50 mcg/act nasal spray  Self No No   Si-2 sprays each nostril daily for allergies   fluticasone-vilanterol (BREO ELLIPTA) 100-25 mcg/inh inhaler  Self No No   Sig: Inhale 1 puff daily in the early morning Rinse mouth after use     folic acid (FOLVITE) 1 mg tablet  Self No No   Sig: Take 1 tablet (1 mg total) by mouth daily   ipratropium (ATROVENT) 0 02 % nebulizer solution   No No   Sig: Take 1 vial (0 5 mg total) by nebulization 3 (three) times a day   lidocaine (LMX) 4 % cream  Self No No   Sig: Apply topically as needed for mild pain Apply 1/2-1 inch to port 30-60 mins prior to needle insertion, cover with serrain wrap   loratadine (CLARITIN) 10 mg tablet  Self No No   Sig: Take 1 tablet (10 mg total) by mouth daily in the early morning   melatonin 3 mg  Self No No   Sig: Take 1 tablet (3 mg total) by mouth daily at bedtime   metFORMIN (GLUCOPHAGE-XR) 500 mg 24 hr tablet  Self No No   Sig: Take 1 tablet (500 mg total) by mouth 2 (two) times a day with meals   methocarbamol (ROBAXIN) 750 mg tablet  Self No No   Sig: Take 1 tablet (750 mg total) by mouth every 6 (six) hours as needed for muscle spasms   ondansetron (ZOFRAN) 4 mg tablet  Self No No   Sig: Take 1 tablet (4 mg total) by mouth every 6 (six) hours as needed for nausea or vomiting   oxyCODONE (OxyCONTIN) 20 mg 12 hr tablet  Self No No   Sig: Take 1 tablet (20 mg total) by mouth every 12 (twelve) hoursMax Daily Amount: 40 mg   oxyCODONE (ROXICODONE) 30 MG immediate release tablet  Self No No   Sig: Take 1 tablet (30 mg total) by mouth every 4 (four) hours as needed for severe painMax Daily Amount: 180 mg   pantoprazole (PROTONIX) 40 mg tablet  Self No No   Sig: Take 1 tablet (40 mg total) by mouth daily   polyethylene glycol (GLYCOLAX) powder  Self No No   Sig: Take 17 g by mouth daily   predniSONE 10 mg tablet  Self No No   Sig: Take 1 tablet (10 mg total) by mouth daily   pregabalin (LYRICA) 25 mg capsule  Self No No   Sig: Take 1 capsule PO in morning and 2 capsules PO at bedtime   prochlorperazine (COMPAZINE) 10 mg tablet  Self No No   Sig: Take 1 tablet (10 mg total) by mouth every 6 (six) hours as needed for nausea or vomiting   ranitidine (ZANTAC) 150 mg tablet  Self No No   Sig: Take 1 tablet (150 mg total) by mouth 2 (two) times a day   senna-docusate sodium (SENOKOT-S) 8 6-50 mg per tablet  Self No No   Sig: Take 1 tablet by mouth 2 (two) times a day   sildenafil (VIAGRA) 50 MG tablet  Self No No   Sig: Take 1 tablet (50 mg total) by mouth as needed for erectile dysfunction   tamsulosin (FLOMAX) 0 4 mg   No No   Sig: Take 1 capsule (0 4 mg total) by mouth daily with dinner   tiotropium (SPIRIVA RESPIMAT) 2 5 MCG/ACT AERS inhaler  Self No No   Sig: Inhale 2 puffs daily      Facility-Administered Medications: None       Past Medical History:   Diagnosis Date    Bipolar 1 disorder (HCC)     Cancer ([facility] )     adeno lung  dx 11/2018-lung bx today 4/9/2019-ongoing chemo    Chronic pain disorder     back and right shoulder    CKD (chronic kidney disease)     [facility] obstructive pulmonary disease) [facility] )     Depression     Diabetes mellitus ([facility] )     GERD (gastroesophageal reflux disease)     Herniated lumbar intervertebral disc     Hypertension     Insomnia     Latent syphilis     Treated    Low back pain     Lumbosacral disc disease     Lung cancer ([facility] ) 2019    Pneumothorax after biopsy     R lung    PTSD (post-traumatic stress disorder)     Pulmonary emphysema ([facility] )     Tobacco abuse 2018       Past Surgical History:   Procedure Laterality Date    COLONOSCOPY      CT GUIDED CHEST TUBE  2018    FL GUIDED CENTRAL VENOUS ACCESS DEVICE INSERTION  2019    HEMORROIDECTOMY      IR CHEST TUBE  2018    IR IMAGE GUIDED BIOPSY/ASPIRATION LUNG  2018    KNEE SURGERY      GA INSJ TUNNELED CTR VAD W/SUBQ PORT AGE 5 YR/> N/A 2019    Procedure: PLACEMENT OF PORT-A-CATH;  Surgeon: Sanchez Kauffman MD;  Location: [facility] MAIN OR;  Service: Vascular       Family History   Problem Relation Age of Onset    Heart disease Mother     Cancer Mother     Hypertension Father     Diabetes Family     Asthma Family     Heart disease Family     Cancer Family     Cancer Maternal Grandfather     Cancer Paternal Grandfather     Cancer Maternal Aunt      I have reviewed and agree with the history as documented  Social History     Tobacco Use    Smoking status: Former Smoker     Packs/day: 0 50     Years: 40 00     Pack years: 20 00     Types: Cigarettes     Start date:      Last attempt to quit: 2019     Years since quittin 3    Smokeless tobacco: Never Used   Substance Use Topics    Alcohol use: Not Currently    Drug use: No     Types: Marijuana     Comment: stopped , "i smoked weed and stopped 1 month ago"        Review of Systems   Constitutional: Negative for activity change, appetite change, fatigue and fever  HENT: Negative for congestion, dental problem, ear pain, rhinorrhea and sore throat  Eyes: Negative for pain and redness     Respiratory: Positive for cough, shortness of breath and wheezing  Negative for chest tightness  Cardiovascular: Negative for chest pain and palpitations  Gastrointestinal: Negative for abdominal pain, blood in stool, constipation, diarrhea, nausea and vomiting  Endocrine: Negative for cold intolerance and heat intolerance  Genitourinary: Negative for dysuria, frequency and hematuria  Musculoskeletal: Negative for arthralgias and myalgias  Skin: Negative for color change, pallor and rash  Neurological: Negative for weakness and numbness  Hematological: Does not bruise/bleed easily  Psychiatric/Behavioral: Negative for agitation, hallucinations and suicidal ideas  Physical Exam  Physical Exam   Constitutional: He is oriented to person, place, and time  He appears well-developed and well-nourished  HENT:   Mouth/Throat: No oropharyngeal exudate  TMs normal bilaterally no pharyngeal erythema no rhinorrhea nontender palpation of sinuses, normal looking turbinates   Eyes: Conjunctivae and EOM are normal    Neck: Normal range of motion  Neck supple  No meningeal signs   Cardiovascular: Normal rate, regular rhythm, normal heart sounds and intact distal pulses  Pulmonary/Chest: Effort normal  No respiratory distress  He has wheezes in the right upper field, the right middle field, the right lower field, the left upper field, the left middle field and the left lower field  He has no rales  He exhibits no tenderness  Abdominal: Soft  Bowel sounds are normal  He exhibits no distension and no mass  There is no tenderness  No hernia  No cvat   Musculoskeletal: Normal range of motion  He exhibits no edema  Lymphadenopathy:     He has no cervical adenopathy  Neurological: He is alert and oriented to person, place, and time  No cranial nerve deficit  Skin: No rash noted  No erythema  No edema   Psychiatric: He has a normal mood and affect   His behavior is normal    Nursing note and vitals reviewed  Vital Signs  ED Triage Vitals   Temperature Pulse Respirations Blood Pressure SpO2   11/29/19 1830 11/29/19 1834 11/29/19 1830 11/29/19 1834 11/29/19 1834   98 2 °F (36 8 °C) 101 17 169/87 96 %      Temp Source Heart Rate Source Patient Position - Orthostatic VS BP Location FiO2 (%)   11/29/19 1830 11/29/19 1830 11/29/19 1834 11/29/19 1834 --   Oral Monitor Lying Right arm       Pain Score       11/29/19 1834       7           Vitals:    11/29/19 1834 11/29/19 1930   BP: 169/87    Pulse: 101 (!) 106   Patient Position - Orthostatic VS: Lying          Visual Acuity      ED Medications  Medications   aspirin chewable tablet 324 mg (has no administration in time range)   albuterol inhalation solution 5 mg (5 mg Nebulization Given 11/29/19 1838)     And   ipratropium (ATROVENT) 0 02 % inhalation solution 0 5 mg (0 5 mg Nebulization Given 11/29/19 1838)   sodium chloride 0 9 % bolus 1,000 mL (1,000 mL Intravenous New Bag 11/29/19 1906)   albuterol inhalation solution 10 mg (10 mg Nebulization Given 11/29/19 1912)     And   ipratropium (ATROVENT) 0 02 % inhalation solution 1 mg (1 mg Nebulization Given 11/29/19 1912)     And   sodium chloride 0 9 % inhalation solution 3 mL (3 mL Nebulization Given 11/29/19 1912)   methylPREDNISolone sodium succinate (Solu-MEDROL) injection 125 mg (125 mg Intravenous Given 11/29/19 1907)   LORazepam (ATIVAN) 2 mg/mL injection 1 mg (1 mg Intravenous Given 11/29/19 2004)       Diagnostic Studies  Results Reviewed     Procedure Component Value Units Date/Time    CBC and differential [[id number]]  (Abnormal) Collected:  11/29/19 1900    Lab Status:  Final result Specimen:  Blood from Arm, Left Updated:  11/29/19 2000     WBC 8 81 Thousand/uL      RBC 4 27 Million/uL      Hemoglobin 11 5 g/dL      Hematocrit 36 2 %      MCV 85 fL      MCH 26 9 pg      MCHC 31 8 g/dL      RDW 15 1 %      MPV 9 6 fL      Platelets 499 Thousands/uL      nRBC 0 /100 WBCs     Narrative:        This is an appended report  These results have been appended to a previously verified report  Basic metabolic panel [[id number]]  (Abnormal) Collected:  11/29/19 1900    Lab Status:  Final result Specimen:  Blood from Arm, Left Updated:  11/29/19 1935     Sodium 136 mmol/L      Potassium 4 1 mmol/L      Chloride 100 mmol/L      CO2 26 mmol/L      ANION GAP 10 mmol/L      BUN 31 mg/dL      Creatinine 2 44 mg/dL      Glucose 133 mg/dL      Calcium 9 0 mg/dL      eGFR 33 ml/min/1 73sq m     Narrative:       Shelly guidelines for Chronic Kidney Disease (CKD):     Stage 1 with normal or high GFR (GFR > 90 mL/min/1 73 square meters)    Stage 2 Mild CKD (GFR = 60-89 mL/min/1 73 square meters)    Stage 3A Moderate CKD (GFR = 45-59 mL/min/1 73 square meters)    Stage 3B Moderate CKD (GFR = 30-44 mL/min/1 73 square meters)    Stage 4 Severe CKD (GFR = 15-29 mL/min/1 73 square meters)    Stage 5 End Stage CKD (GFR <15 mL/min/1 73 square meters)  Note: GFR calculation is accurate only with a steady state creatinine    NT-BNP PRO [[id number]]  (Normal) Collected:  11/29/19 1900    Lab Status:  Final result Specimen:  Blood from Arm, Left Updated:  11/29/19 1935     NT-proBNP 65 pg/mL     Troponin I [[id number]]  (Abnormal) Collected:  11/29/19 1900    Lab Status:  Final result Specimen:  Blood from Arm, Left Updated:  11/29/19 1933     Troponin I 0 92 ng/mL                  XR chest 1 view portable   ED Interpretation by Nena Goodwin MD (11/29 1954)   Primary reviewed  No acute abnormality  Final Result by Corky Tucker MD (11/29 1925)      Right infrahilar density correlating to an area of airspace density seen on the recent PET/CT              Workstation performed: [id number]                    Procedures  ECG 12 Lead Documentation Only  Date/Time: 11/29/2019 7:53 PM  Performed by: Nena Goodwin MD  Authorized by: Nena Goodwin MD     ECG reviewed by me, the ED Provider: yes    Patient location:  ED  Rate:     ECG rate:  94    ECG rate assessment: normal    Rhythm:     Rhythm: sinus rhythm    Ectopy:     Ectopy: none    QRS:     QRS axis:  Normal    QRS intervals:  Normal  Conduction:     Conduction: normal    ST segments:     ST segments:  Normal  T waves:     T waves: non-specific      CriticalCare Time  Performed by: Yrn Ivey MD  Authorized by: Yrn Ivey MD     Critical care provider statement:     Critical care time (minutes):  35    Critical care time was exclusive of:  Separately billable procedures and treating other patients and teaching time    Critical care was necessary to treat or prevent imminent or life-threatening deterioration of the following conditions:  Respiratory failure, circulatory failure and cardiac failure    Critical care was time spent personally by me on the following activities:  Blood draw for specimens, obtaining history from patient or surrogate, development of treatment plan with patient or surrogate, discussions with consultants, evaluation of patient's response to treatment, examination of patient, interpretation of cardiac output measurements, ordering and performing treatments and interventions, ordering and review of laboratory studies, ordering and review of radiographic studies, re-evaluation of patient's condition and review of old charts           ED Course  ED Course as of Nov 29 2006 Fri Nov 29, 2019 1954 Creatinine(!): 2 44   2002 Called to evaluate patient for difficulty breathing  Patient has symmetric air entry with diffuse wheezing, his oxygen saturation is good  Will treat for anxiety is patient does appear to be extremely anxious                                    MDM  Number of Diagnoses or Management Options  ACS (acute coronary syndrome) ([facility] ):   CKD (chronic kidney disease):   COPD exacerbation ([facility] ):   Lung cancer [facility]):   Diagnosis management comments: Two weeks worsening shortness of breath and patient with wheezing-will do cardiac workup, treat for acute COPD exacerbation      Disposition  Final diagnoses:   ACS (acute coronary syndrome) ([place] [address] )   COPD exacerbation ([place] [address] )   CKD (chronic kidney disease)   Lung cancer ([place] [address] )     Time reflects when diagnosis was documented in both MDM as applicable and the Disposition within this note     Time User Action Codes Description Comment    11/29/2019  8:03 PM Deya Pu Add [I24 9] ACS (acute coronary syndrome) ([place] [address] )     11/29/2019  8:03 PM Asif Coker Add [J44 1] COPD exacerbation ([place] [address] )     11/29/2019  8:04 PM HosakAsif Rochelle Add [N18 9] CKD (chronic kidney disease)     11/29/2019  8:04 PM Deya Pu Add [C34 90] Lung cancer [facility])       ED Disposition     ED Disposition Condition Date/Time Comment    Admit Stable Fri Nov 29, 2019  8:03 PM Case was discussed with Barrera Marie and the patient's admission status was agreed to be Admission Status: inpatient status to the service of Dr Be Goldsmith   Follow-up Information    None         Patient's Medications   Discharge Prescriptions    No medications on file     No discharge procedures on file      ED Provider  Electronically Signed by           Guillermo Sharma MD  11/29/19 2007

## 2019-11-30 NOTE — CONSULTS
Consult - Cardiology   Tobi Blackwood Sr  64 y o  male MRN: [id number]  Unit/Bed#: E4 -01 Encounter: [id number]          Reason For Consult:  Elevated troponin    Chest pain    History Of Present Illness: The patient is a 19-year-old male with a history of COPD and small cell lung cancer  He was seen by us in 2018 for chest pain and a mildly elevated troponin  He underwent cardiac catheterization which showed a 20% mid right coronary artery lesion  This was in June of 2018  He is now admitted with increasing dyspnea and sharp chest pain which is worse with a deep breath  He denies much in the way of edema  He denies lightheadedness or palpitations  We are being consulted since he has an elevated troponin  Past Medical History:   COPD  Chronic kidney disease  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  Diabetes type 2  Hypertension  Mild nonobstructive CAD on cardiac catheterization in June of 2018  Bipolar disorder  OA  PTSD  hemorroidectomy      Allergy:  Allergies   Allergen Reactions    Lisinopril Anaphylaxis     Took medication a while ago and had a "swelling reaction"  Does not carry epi-pen       Medications:  Albuterol  Amlodipine 10 mg daily  Thorazine  Decadron  Paxil  Flonase  Folic acid  Breo ellipse the  Metformin  Robaxin  Protonix  Seroquel    Family History:  Heart disease, cancer, hypertension, asthma, diabetes    Social History:  Smoking until July  No current alcohol use      ROS:  NO TIAS or claudication      Exam:  125/67  Pulse 88  General: middle aged male on oxygen and somewhat SOB  Head: Normocephalic, atraumatic  Eyes:  No Icterus    Pale  Conjunctiva  Oropharynx: normal-appearing mucosa and no pharyngitis, no exudate  Neck: supple, symmetrical, trachea midline, thyroid: not enlarged, symmetric, no tenderness/mass/nodules, no carotid bruit and no JVD   Heart: RRR, No: murmer, rub or gallop,   Lungs: Decreased breath sounds ([place] right lung) with exp wheezing    No rales or rhonchi  Abdomen: flat, normal findings: no masses palpable, no organomegaly and soft, non-tender  Lower Limbs: no edema    DP +2 bilaterally    ECG:  Sinus tachycardia  Left atrial abnl  Trop 3 58  BUN 30 creat 2 61 K 4 3  HB 10 4  probnp 65  CXR-right infrhilar density      ASSESSMENT AND PLAN:   1  Elevated troponin-consider non MI elevation considering negative CC in June 2018  L[facility]pez Sol[place] Alternate possibilities include PE and pericarditis with pleuritic type chest pain-agree with Heparin for now  Will check 2DE tomorrow  2  HTN-well controlled on amlodipine      Will review echo tomorrow and advise further  Addie Johnson MD

## 2019-11-30 NOTE — ED NOTES
Pt family came out of room saying pt could not breath  Went in to assess pt, pt visably short of breath  Dr Isela Stover called to room        Antoni Rosas RN  11/29/19 2033

## 2019-11-30 NOTE — ASSESSMENT & PLAN NOTE
Currently undergoing treatment for lung adenocarcinoma   Follows outpatient with Dr Rose Lawton   Continued on [place] every 3 weeks   Continue outpatient follow up with oncology

## 2019-12-01 ENCOUNTER — APPOINTMENT (INPATIENT)
Dept: NON INVASIVE DIAGNOSTICS | Facility: HOSPITAL | Age: 59
DRG: 133 | End: 2019-12-01
Payer: COMMERCIAL

## 2019-12-01 LAB
ANION GAP SERPL CALCULATED.3IONS-SCNC: 11 MMOL/L (ref 4–13)
APTT PPP: 66 SECONDS (ref 23–37)
BASOPHILS # BLD AUTO: 0.01 THOUSANDS/ΜL (ref 0–0.1)
BASOPHILS NFR BLD AUTO: 0 % (ref 0–1)
BUN SERPL-MCNC: 34 MG/DL (ref 5–25)
CALCIUM SERPL-MCNC: 8.5 MG/DL (ref 8.3–10.1)
CHLORIDE SERPL-SCNC: 104 MMOL/L (ref 100–108)
CO2 SERPL-SCNC: 24 MMOL/L (ref 21–32)
CREAT SERPL-MCNC: 2.4 MG/DL (ref 0.6–1.3)
EOSINOPHIL # BLD AUTO: 0 THOUSAND/ΜL (ref 0–0.61)
EOSINOPHIL NFR BLD AUTO: 0 % (ref 0–6)
ERYTHROCYTE [DISTWIDTH] IN BLOOD BY AUTOMATED COUNT: 15.3 % (ref 11.6–15.1)
GFR SERPL CREATININE-BSD FRML MDRD: 33 ML/MIN/1.73SQ M
GLUCOSE SERPL-MCNC: 108 MG/DL (ref 65–140)
GLUCOSE SERPL-MCNC: 124 MG/DL (ref 65–140)
GLUCOSE SERPL-MCNC: 133 MG/DL (ref 65–140)
GLUCOSE SERPL-MCNC: 137 MG/DL (ref 65–140)
GLUCOSE SERPL-MCNC: 192 MG/DL (ref 65–140)
HCT VFR BLD AUTO: 30.7 % (ref 36.5–49.3)
HGB BLD-MCNC: 9.6 G/DL (ref 12–17)
IMM GRANULOCYTES # BLD AUTO: 0.27 THOUSAND/UL (ref 0–0.2)
IMM GRANULOCYTES NFR BLD AUTO: 2 % (ref 0–2)
LYMPHOCYTES # BLD AUTO: 1.02 THOUSANDS/ΜL (ref 0.6–4.47)
LYMPHOCYTES NFR BLD AUTO: 6 % (ref 14–44)
MAGNESIUM SERPL-MCNC: 2.2 MG/DL (ref 1.6–2.6)
MCH RBC QN AUTO: 27.4 PG (ref 26.8–34.3)
MCHC RBC AUTO-ENTMCNC: 31.3 G/DL (ref 31.4–37.4)
MCV RBC AUTO: 88 FL (ref 82–98)
MONOCYTES # BLD AUTO: 0.88 THOUSAND/ΜL (ref 0.17–1.22)
MONOCYTES NFR BLD AUTO: 6 % (ref 4–12)
NEUTROPHILS # BLD AUTO: 13.77 THOUSANDS/ΜL (ref 1.85–7.62)
NEUTS SEG NFR BLD AUTO: 86 % (ref 43–75)
NRBC BLD AUTO-RTO: 0 /100 WBCS
PHOSPHATE SERPL-MCNC: 3.4 MG/DL (ref 2.7–4.5)
PLATELET # BLD AUTO: 311 THOUSANDS/UL (ref 149–390)
PMV BLD AUTO: 10 FL (ref 8.9–12.7)
POTASSIUM SERPL-SCNC: 4.4 MMOL/L (ref 3.5–5.3)
PROCALCITONIN SERPL-MCNC: 0.89 NG/ML
RBC # BLD AUTO: 3.5 MILLION/UL (ref 3.88–5.62)
SODIUM SERPL-SCNC: 139 MMOL/L (ref 136–145)
WBC # BLD AUTO: 15.95 THOUSAND/UL (ref 4.31–10.16)

## 2019-12-01 PROCEDURE — 93005 ELECTROCARDIOGRAM TRACING: CPT

## 2019-12-01 PROCEDURE — 85025 COMPLETE CBC W/AUTO DIFF WBC: CPT | Performed by: FAMILY MEDICINE

## 2019-12-01 PROCEDURE — 82948 REAGENT STRIP/BLOOD GLUCOSE: CPT

## 2019-12-01 PROCEDURE — 93306 TTE W/DOPPLER COMPLETE: CPT

## 2019-12-01 PROCEDURE — 83735 ASSAY OF MAGNESIUM: CPT | Performed by: FAMILY MEDICINE

## 2019-12-01 PROCEDURE — 84145 PROCALCITONIN (PCT): CPT | Performed by: FAMILY MEDICINE

## 2019-12-01 PROCEDURE — 84100 ASSAY OF PHOSPHORUS: CPT | Performed by: FAMILY MEDICINE

## 2019-12-01 PROCEDURE — 99232 SBSQ HOSP IP/OBS MODERATE 35: CPT | Performed by: INTERNAL MEDICINE

## 2019-12-01 PROCEDURE — 99233 SBSQ HOSP IP/OBS HIGH 50: CPT | Performed by: INTERNAL MEDICINE

## 2019-12-01 PROCEDURE — 99232 SBSQ HOSP IP/OBS MODERATE 35: CPT | Performed by: FAMILY MEDICINE

## 2019-12-01 PROCEDURE — 80048 BASIC METABOLIC PNL TOTAL CA: CPT | Performed by: FAMILY MEDICINE

## 2019-12-01 PROCEDURE — 94760 N-INVAS EAR/PLS OXIMETRY 1: CPT

## 2019-12-01 PROCEDURE — 94640 AIRWAY INHALATION TREATMENT: CPT

## 2019-12-01 PROCEDURE — 85730 THROMBOPLASTIN TIME PARTIAL: CPT | Performed by: FAMILY MEDICINE

## 2019-12-01 RX ORDER — FUROSEMIDE 10 MG/ML
40 INJECTION INTRAMUSCULAR; INTRAVENOUS ONCE
Status: COMPLETED | OUTPATIENT
Start: 2019-12-01 | End: 2019-12-01

## 2019-12-01 RX ORDER — DILTIAZEM HYDROCHLORIDE 5 MG/ML
15 INJECTION INTRAVENOUS ONCE
Status: COMPLETED | OUTPATIENT
Start: 2019-12-01 | End: 2019-12-01

## 2019-12-01 RX ADMIN — TIOTROPIUM BROMIDE 18 MCG: 18 CAPSULE ORAL; RESPIRATORY (INHALATION) at 09:01

## 2019-12-01 RX ADMIN — FOLIC ACID 1 MG: 1 TABLET ORAL at 09:04

## 2019-12-01 RX ADMIN — IPRATROPIUM BROMIDE AND ALBUTEROL SULFATE 3 ML: 2.5; .5 SOLUTION RESPIRATORY (INHALATION) at 12:59

## 2019-12-01 RX ADMIN — QUETIAPINE FUMARATE 25 MG: 25 TABLET ORAL at 21:58

## 2019-12-01 RX ADMIN — METHYLPREDNISOLONE SODIUM SUCCINATE 40 MG: 40 INJECTION, POWDER, FOR SOLUTION INTRAMUSCULAR; INTRAVENOUS at 09:04

## 2019-12-01 RX ADMIN — FLUTICASONE PROPIONATE 1 SPRAY: 50 SPRAY, METERED NASAL at 09:02

## 2019-12-01 RX ADMIN — PANTOPRAZOLE SODIUM 40 MG: 40 TABLET, DELAYED RELEASE ORAL at 05:11

## 2019-12-01 RX ADMIN — TAMSULOSIN HYDROCHLORIDE 0.4 MG: 0.4 CAPSULE ORAL at 18:10

## 2019-12-01 RX ADMIN — MELATONIN TAB 3 MG 3 MG: 3 TAB at 22:02

## 2019-12-01 RX ADMIN — FUROSEMIDE 40 MG: 10 INJECTION, SOLUTION INTRAMUSCULAR; INTRAVENOUS at 14:01

## 2019-12-01 RX ADMIN — SODIUM CHLORIDE 100 ML/HR: 0.9 INJECTION, SOLUTION INTRAVENOUS at 06:41

## 2019-12-01 RX ADMIN — DILTIAZEM HYDROCHLORIDE 15 MG: 5 INJECTION INTRAVENOUS at 14:06

## 2019-12-01 RX ADMIN — IPRATROPIUM BROMIDE AND ALBUTEROL SULFATE 3 ML: 2.5; .5 SOLUTION RESPIRATORY (INHALATION) at 07:17

## 2019-12-01 RX ADMIN — HEPARIN SODIUM AND DEXTROSE 12 UNITS/KG/HR: 10000; 5 INJECTION INTRAVENOUS at 06:38

## 2019-12-01 RX ADMIN — FAMOTIDINE 20 MG: 20 TABLET ORAL at 18:10

## 2019-12-01 RX ADMIN — FAMOTIDINE 20 MG: 20 TABLET ORAL at 09:03

## 2019-12-01 RX ADMIN — DILTIAZEM HYDROCHLORIDE 5 MG/HR: 5 INJECTION INTRAVENOUS at 16:16

## 2019-12-01 RX ADMIN — METHYLPREDNISOLONE SODIUM SUCCINATE 40 MG: 40 INJECTION, POWDER, FOR SOLUTION INTRAMUSCULAR; INTRAVENOUS at 16:15

## 2019-12-01 RX ADMIN — FLUTICASONE FUROATE AND VILANTEROL TRIFENATATE 1 PUFF: 100; 25 POWDER RESPIRATORY (INHALATION) at 05:12

## 2019-12-01 RX ADMIN — AMLODIPINE BESYLATE 10 MG: 10 TABLET ORAL at 09:03

## 2019-12-01 RX ADMIN — PREGABALIN 25 MG: 25 CAPSULE ORAL at 09:03

## 2019-12-01 RX ADMIN — IPRATROPIUM BROMIDE AND ALBUTEROL SULFATE 3 ML: 2.5; .5 SOLUTION RESPIRATORY (INHALATION) at 01:24

## 2019-12-01 RX ADMIN — OXYCODONE HYDROCHLORIDE 20 MG: 20 TABLET, FILM COATED, EXTENDED RELEASE ORAL at 21:58

## 2019-12-01 RX ADMIN — FLUOXETINE 10 MG: 10 CAPSULE ORAL at 09:03

## 2019-12-01 RX ADMIN — PREGABALIN 50 MG: 50 CAPSULE ORAL at 22:04

## 2019-12-01 RX ADMIN — OXYCODONE HYDROCHLORIDE 20 MG: 20 TABLET, FILM COATED, EXTENDED RELEASE ORAL at 09:04

## 2019-12-01 NOTE — UTILIZATION REVIEW
Notification of Inpatient Admission/Inpatient Authorization Request   This is a Notification of Inpatient Admission for [address]  Be advised that this patient was admitted to our facility under Inpatient Status  Contact Annie Phyllis at [phone] for additional admission information  [facility]  DEDICATED [phone]  Patient Name:   Daniel Broderick  YOB: 1960       [address]   [address]:   [address]   Tax ID: [id number]  NPI: [id number] Attending Provider/NPI: Tomasz Nguyễn, Elaine Raymond [[id number]]   Place of Service Code: 24     Place of Service Name:  [address] [place] [address]   Start Date: 11/29/19 2002     Discharge Date & Time: No discharge date for patient encounter  Type of Admission: Inpatient Status Discharge Disposition   (if discharged): Home/Self Care   Patient Diagnoses: Shortness of breath [R06 02]  Lung cancer ([facility] [address] ) [C34 90]  ACS (acute coronary syndrome) ([facility] ) [I24 9]  CKD (chronic kidney disease) [N18 9]  Elevated troponin [R79 89]  COPD exacerbation ([facility] ) [J44 1]     Orders: Admission Orders (From admission, onward)     Ordered        11/29/19 2002  Inpatient Admission (expected length of stay for this patient Order details is greater than two midnights)  Once                    Assigned Utilization Review Contact: Caitie Donis Review Department  Phone: [phone]; Fax [phone]  Email: [email or web address]   ATTENTION PAYERS: Please call the assigned Utilization  directly with any questions or concerns ALL voicemails in the department are confidential  Send all requests for admission clinical reviews, approved or denied determinations and any other requests to dedicated fax number belonging to the campus where the patient is receiving treatment

## 2019-12-01 NOTE — ASSESSMENT & PLAN NOTE
Patient presented with two weeks of worsening SOB  Still has considerable dyspnea and was seen sitting at the edge of the bed bed leaning forward this morning  Continue Solu-Medrol 40 mg IV q 8 hours and DuoNeb nebulized breathing treatments  Supplemental O2 to maintain O2 sats >92%  Pulmonology following patient  Continue incentive spirometry  Flu swab negative  Patient may eventually need pulmonary rehab upon discharge

## 2019-12-01 NOTE — PROGRESS NOTES
Progress Note - Sophie Galvan Sr  64 y o  male MRN: [id number]    Unit/Bed#: E4 -01 Encounter: [id number]  Subjective:   Still with dyspnea and coughing  Does have chest pain when he coughs  Denies edema  Does note some palpitations and lightheadedness  Objective:   Vitals: Blood pressure 139/74, pulse 99, temperature (!) 97 2 °F (36 2 °C), temperature source Temporal, resp  rate 20, weight 68 kg (149 lb 14 6 oz), SpO2 95 %  ,Body mass index is 22 79 kg/m²  CBC with diff:   Results from last 7 days   Lab Units 12/01/19  0339   WBC Thousand/uL 15 95*   RBC Million/uL 3 50*   HEMOGLOBIN g/dL 9 6*   HEMATOCRIT % 30 7*   MCV fL 88   MCH pg 27 4   MCHC g/dL 31 3*   RDW % 15 3*   MPV fL 10 0   PLATELETS Thousands/uL 311     CMP:   Results from last 7 days   Lab Units 12/01/19  0339   SODIUM mmol/L 139   POTASSIUM mmol/L 4 4   CHLORIDE mmol/L 104   CO2 mmol/L 24   BUN mg/dL 34*   CREATININE mg/dL 2 40*   CALCIUM mg/dL 8 5   EGFR ml/min/1 73sq m 33         Physical exam:  Lungs-decreased breath sounds bilaterally especially the right lung with bilateral expiratory wheezing  Rhonchi noted  No rales  Heart-regular without murmur rub or gallop  Abdomen-soft nontender without mass organomegaly  Extremities-no edema  Dorsalis pedal pulses 2+      Assessment:  1  Elevated troponin  Likely unrelated to obstructive coronary disease since cardiac catheterization in 2018 showed his worst lesion to be a 20% right coronary artery lesion  Possibilities for elevated troponin include pulmonary embolism and perhaps hypoxemia related to exacerbation of COPD  Less likely related to acute on chronic renal failure with troponin level reaching 3 58  He does have chest pain but is tender on palpation and I suspect this is musculoskeletal related to excessive coughing  I do not think he has pericarditis based on his history and EKG  2  Hypertension  Well controlled on amlodipine    Plan:  Echo being done today    Will review    Continue amlodipine for blood pressure  Patient on IV  Heparin until we can absolutely exclude a pulmonary embolism      Lavetta Opitz, MD

## 2019-12-01 NOTE — ASSESSMENT & PLAN NOTE
Currently follows up with Dr Neetu Parrish of Oncology in the outpatient setting  Is being treated with [facility] which can have some renal complications  Will continue to monitor

## 2019-12-01 NOTE — ASSESSMENT & PLAN NOTE
Slightly elevated at time of presentation, but now BP currently stable and optimal   Continue home Norvasc  Continue to monitor

## 2019-12-01 NOTE — PROGRESS NOTES
NEPHROLOGY PROGRESS NOTE   Carina Sultana Sr  64 y o  male MRN: [id number]  Unit/Bed#: E4 -01 Encounter: [id number]  Reason for Consult: CKD    ASSESSMENT/PLAN:  1  Chronic Kidney Disease stage IV- Baseline creatinine appears to be progressive and is now in the mid 2s  Patient is currently at baseline however he did receive IV contrast for a CTA/stroke alert earlier today  He saw Dr Gita Diamond once in consultation in the office in August and at that time his creatinine was 2 0  In January 2019, his creatinine was 1 0  Likely secondary to chemotherapy agents including keytruda and alimta + hypertensive nephrosclerosis + diabetic glomerular sclerosis  - of note, SPEP/UPEP negative but FLC elevated to 2 0  - renal ultrasound benign in past  - UA bland  - PVR: 215ml 11/30  2  Monitor for contrast induced nephropathy- received IV contrast 85ml 11/30  - started on NSS at 100ml/hr, reduce to 75ml/hr 12/1  - status post two doses of mucomyst 11/30  3  Hypertension- BP acceptable, avoid hypotension, hold parameters  4  Anemia- mild  5  COPD with acute exacerbation- on solumedrol per primary team  6  Elevated Troponin- cardiology consulted, may need CTA chest to rule out PE in a couple days if renal function remains stable  7  Stroke Alert- received IV contrast 11/30 for CTA  - neurology on board  - MRI negative for stroke  8  Adenocarcinoma of Lung- undergoing chemotherapy currently with Dr So Horowitz    Disposition:  Continue lower dose of ivf    SUBJECTIVE:  Patient ate breakfast this am   Denies urinary complaints  Denies worsening sob      OBJECTIVE:  Current Weight: Weight - Scale: 68 kg (149 lb 14 6 oz)  Vitals:    12/01/19 0124 12/01/19 0300 12/01/19 0719 12/01/19 0731   BP:  116/70  129/78   BP Location:  Right arm  Right arm   Pulse:  92  83   Resp:  18  18   Temp:  98 °F (36 7 °C)  97 6 °F (36 4 °C)   TempSrc:  Tympanic  Temporal   SpO2: 96% 96% 96% 98%   Weight:           Intake/Output Summary (Last 24 hours) at 12/1/2019 0953  Last data filed at 12/1/2019 0901  Gross per 24 hour   Intake 2420 ml   Output 1350 ml   Net 1070 ml     General: NAD  Skin: no rash  Eyes: anicteric  ENMT: mm dry  Neck: no masses  Respiratory: CTAB  Cardiac: RRR  Extremities: no edema  GI: soft nt nd  Neuro: alert awake  Psych: mood and affect appropriate    Medications:    Current Facility-Administered Medications:     acetaminophen (TYLENOL) tablet 650 mg, 650 mg, Oral, Q6H PRN, Steph Méndez MD    albuterol (PROVENTIL HFA,VENTOLIN HFA) inhaler 2 puff, 2 puff, Inhalation, Q4H PRN, Steph Méndez MD    aluminum-magnesium hydroxide-simethicone (MYLANTA) 200-200-20 mg/5 mL oral suspension 30 mL, 30 mL, Oral, Q6H PRN, Steph Méndez MD    amLODIPine (NORVASC) tablet 10 mg, 10 mg, Oral, Daily, Richard Raymundo 473, PA-C, 10 mg at 12/01/19 9496    chlorproMAZINE (THORAZINE) tablet 25 mg, 25 mg, Oral, Q6H PRN, Steph Méndez MD    diphenhydrAMINE (BENADRYL) tablet 25 mg, 25 mg, Oral, Q6H PRN, Steph Méndez MD    famotidine (PEPCID) tablet 20 mg, 20 mg, Oral, BID, Jonathan Jimenez MD, 20 mg at 12/01/19 0903    FLUoxetine (PROzac) capsule 10 mg, 10 mg, Oral, Daily, Jonathan Jimenez MD, 10 mg at 12/01/19 0903    fluticasone (FLONASE) 50 mcg/act nasal spray 1 spray, 1 spray, Each Nare, Daily, Jonathan Jimenez MD, 1 spray at 12/01/19 0902    fluticasone-vilanterol (BREO ELLIPTA) 100-25 mcg/inh inhaler 1 puff, 1 puff, Inhalation, Early Morning, Jonathan Jimenez MD, 1 puff at 98/58/91 8185    folic acid (FOLVITE) tablet 1 mg, 1 mg, Oral, Daily, Jonathan Jimenez MD, 1 mg at 12/01/19 0904    heparin (porcine) 25,000 units in 250 mL infusion (premix), 3-30 Units/kg/hr (Order-Specific), Intravenous, Titrated, Jonathan Jimenez MD, Last Rate: 7 8 mL/hr at 12/01/19 0638, 12 Units/kg/hr at 12/01/19 0638    heparin (porcine) injection 2,600 Units, 2,600 Units, Intravenous, PRN, Steph Méndez MD    heparin (porcine) injection 5,200 Units, 5,200 Units, Intravenous, PRN, Jose Upton MD    insulin lispro (HumaLOG) 100 units/mL subcutaneous injection 1-5 Units, 1-5 Units, Subcutaneous, TID AC, 1 Units at 11/30/19 1703 **AND** Fingerstick Glucose (POCT), , , TID AC, Jose Upton MD    ipratropium-albuterol (DUO-NEB) 0 5-2 5 mg/3 mL inhalation solution 3 mL, 3 mL, Nebulization, Q6H, Jonathan Jimenez MD, 3 mL at 12/01/19 0717    melatonin tablet 3 mg, 3 mg, Oral, HS, Jose Upton MD, 3 mg at 11/30/19 2123    methocarbamol (ROBAXIN) tablet 750 mg, 750 mg, Oral, Q6H PRN, Jose Upton MD    methylPREDNISolone sodium succinate (Solu-MEDROL) injection 40 mg, 40 mg, Intravenous, Q8H, Jonathan Jimenez MD, 40 mg at 12/01/19 0904    ondansetron (ZOFRAN) injection 4 mg, 4 mg, Intravenous, Q6H PRN, Jose Upton MD    oxyCODONE (OxyCONTIN) 12 hr tablet 20 mg, 20 mg, Oral, Q12H [facility], Jose Upton MD, 20 mg at 12/01/19 0904    oxyCODONE (ROXICODONE) immediate release tablet 30 mg, 30 mg, Oral, Q4H PRN, Jose Upton MD    pantoprazole (PROTONIX) EC tablet 40 mg, 40 mg, Oral, Early Morning, Jonathan Jimenez MD, 40 mg at 12/01/19 0511    polyethylene glycol (MIRALAX) packet 17 g, 17 g, Oral, Daily PRN, Jose Upton MD    pregabalin (LYRICA) capsule 25 mg, 25 mg, Oral, Daily, Jonathan Jimenez MD, 25 mg at 12/01/19 0903    pregabalin (LYRICA) capsule 50 mg, 50 mg, Oral, HS, Jonathan Jimenez MD, 50 mg at 11/30/19 2123    QUEtiapine (SEROquel) tablet 25 mg, 25 mg, Oral, HS, Jonathan Jimenez MD, 25 mg at 11/30/19 2123    senna-docusate sodium (SENOKOT S) 8 6-50 mg per tablet 1 tablet, 1 tablet, Oral, BID PRN, Jose Upton MD    sodium chloride 0 9 % infusion, 75 mL/hr, Intravenous, Continuous, RODNEY Jackson, Last Rate: 100 mL/hr at 12/01/19 0641, 100 mL/hr at 12/01/19 0641    tamsulosin (FLOMAX) capsule 0 4 mg, 0 4 mg, Oral, Daily With Dinner, Jose Upton MD, 0 4 mg at 11/30/19 1703    tiotropium (SPIRIVA) capsule for inhaler 18 mcg, 18 mcg, Inhalation, Daily, Annamaria Garcia MD, 18 mcg at 12/01/19 0901    Laboratory Results:  Results from last 7 days   Lab Units 12/01/19  0339 11/30/19  1412 11/30/19  0605 11/29/19  1900   WBC Thousand/uL 15 95*  --  5 75 8 81   HEMOGLOBIN g/dL 9 6*  --  10 4* 11 5*   HEMATOCRIT % 30 7*  --  33 9* 36 2*   PLATELETS Thousands/uL 311  --  265 240   POTASSIUM mmol/L 4 4 4 1 4 3 4 1   CHLORIDE mmol/L 104 103 101 100   CO2 mmol/L 24 24 22 26   BUN mg/dL 34* 32* 30* 31*   CREATININE mg/dL 2 40* 2 45* 2 61* 2 44*   CALCIUM mg/dL 8 5 8 8 8 8 9 0   MAGNESIUM mg/dL 2 2  --   --   --    PHOSPHORUS mg/dL 3 4  --   --   --

## 2019-12-01 NOTE — PROGRESS NOTES
Progress Note - Christi Pfeiffer Sr  1960, 64 y o  male MRN: [id number]    Unit/Bed#: E4 -01 Encounter: [id number]    Primary Care Provider: Geovanny Kramer MD   Date and time admitted to hospital: 11/29/2019  6:26 PM        * Chronic obstructive pulmonary disease with acute exacerbation [facility])  Assessment & Plan  Patient presented with two weeks of worsening SOB  Still has considerable dyspnea and was seen sitting at the edge of the bed bed leaning forward this morning  Continue Solu-Medrol 40 mg IV q 8 hours and DuoNeb nebulized breathing treatments  Supplemental O2 to maintain O2 sats >92%  Pulmonology following patient  Continue incentive spirometry  Flu swab negative  Patient may eventually need pulmonary rehab upon discharge  Elevated troponin level not due to acute coronary syndrome  Assessment & Plan  Patient presented with elevated troponins:0 92-->2 92--> 3 58--> 3 03  This is due to non MI troponin elevation in the setting of demand ischemia and chronic kidney disease  Patient also had negative cardiac catheterization in June 2018  No cardiac intervention required  Elevated d-dimer  Assessment & Plan  Patient presented with elevated D-dimer of 0 94  Likely elevated in the setting of stage IV cancer  Patient cannot get V/Q scan due to abnormal chest x-ray  Also recently received IV contrast dye for CTA of head and neck for stroke workup  Cannot rule out PE so will continue PE protocol heparin drip  Creatinine is improving, so can get CTA chest PE protocol in 48 hours which will be tomorrow 12/2  Check bilateral lower extremity Dopplers  If CTA chest negative for PE, can discontinue heparin drip  Continue incentive spirometry  Stroke-like symptoms  Assessment & Plan  Patient developed acute onset of stroke-like symptoms involving dysphagia and slurred speech  Stroke alert called    CT brain without contrast, CTA head and neck, MRI brain all negative for acute infarction  Neurology consult obtained and recommended discontinuing stroke pathway  No further workup required  Stage 3 chronic kidney disease [facility])  Assessment & Plan  History of CKD3-which may be due to use of chemotherapy in the form of keytruda as well as hypertensive nephrosclerosis and diabetic glomerular sclerosis  Creatinine was 2 61 at time of admission  Patient's creatinine was 2 0 in August 2019 and 1 0 in January 2019  Creatinine now currently 2 40  Continue to monitor BMP while inpatient  Appreciate Nephrology consult and recommendations  Has already received IV fluids and Mucomyst for contrast studies performed yesterday  Continue normal saline at 75 mL/hour  Continue to monitor renal function  Can consider CTA chest PE protocol study 48 hours from prior contrast study which will be tomorrow 12/2  Type 2 diabetes mellitus without complication, without long-term current use of insulin [facility])  Assessment & Plan  Lab Results   Component Value Date    HGBA1C 5 6 10/23/2019       Recent Labs     11/30/19  0907 11/30/19  1541 11/30/19  2051 12/01/19  0733   POCGLU 204* 150* 157* 137       Blood Sugar Average: Last 72 hrs:  (P) 162 hemoglobin A1C stable off medications x 6 months   Monitor glucose in the setting of steroid use-patient with currently mildly hyperglycemic readings due to IV Solu-Medrol  Continue to hold metformin in the setting of IV contrast dye use and chronic kidney disease  Continue sliding scale insulin, diabetic diet, Accu-Cheks q a c  And HS  Adenocarcinoma of lung, right [facility])  Assessment & Plan  Currently follows up with Dr Kwame Miramontes of Oncology in the outpatient setting  Is being treated with [facility] which can have some renal complications  Will continue to monitor      Essential hypertension  Assessment & Plan  Slightly elevated at time of presentation, but now BP currently stable and optimal   Continue home Norvasc  Continue to monitor         VTE Pharmacologic Prophylaxis:   Pharmacologic: Heparin Drip  Mechanical VTE Prophylaxis in Place: Yes    Patient Centered Rounds: I have performed bedside rounds with nursing staff today  Discussions with Specialists or Other Care Team Provider:  Yes    Education and Discussions with Family / Patient:  Yes    Time Spent for Care: 15 minutes  More than 50% of total time spent on counseling and coordination of care as described above  Current Length of Stay: 2 day(s)    Current Patient Status: Inpatient   Certification Statement: The patient will continue to require additional inpatient hospital stay due to Acute COPD exacerbation with elevated D-dimer requiring CT of chest to rule out PE    Discharge Plan: To be determined    Code Status: Level 1 - Full Code      Subjective:   Patient states that his still extremely short of breath  He denies any chest pain  He denies any cough  Objective:     Vitals:   Temp (24hrs), Av 9 °F (36 6 °C), Min:97 6 °F (36 4 °C), Max:98 2 °F (36 8 °C)    Temp:  [97 6 °F (36 4 °C)-98 2 °F (36 8 °C)] 97 6 °F (36 4 °C)  HR:  [83-94] 83  Resp:  [18] 18  BP: (114-129)/(60-84) 129/78  SpO2:  [91 %-98 %] 98 %  Body mass index is 22 79 kg/m²  Input and Output Summary (last 24 hours): Intake/Output Summary (Last 24 hours) at 2019 1120  Last data filed at 2019 0901  Gross per 24 hour   Intake 2420 ml   Output 900 ml   Net 1520 ml       Physical Exam:     Physical Exam   Constitutional: He is oriented to person, place, and time  He appears well-developed  He appears distressed  HENT:   Head: Normocephalic and atraumatic  Eyes: Pupils are equal, round, and reactive to light  EOM are normal    Neck: Normal range of motion  Neck supple  Cardiovascular: Normal rate, regular rhythm and normal heart sounds  Pulmonary/Chest: He is in respiratory distress  He has wheezes  He has no rales  Abdominal: Soft  Bowel sounds are normal  He exhibits no distension   There is no tenderness  Musculoskeletal: He exhibits no edema  Neurological: He is alert and oriented to person, place, and time  Skin: Skin is warm and dry  He is not diaphoretic  Additional Data:     Labs:    Results from last 7 days   Lab Units 12/01/19  0339  11/29/19  1900   WBC Thousand/uL 15 95*   < > 8 81   HEMOGLOBIN g/dL 9 6*   < > 11 5*   HEMATOCRIT % 30 7*   < > 36 2*   PLATELETS Thousands/uL 311   < > 240   BANDS PCT %  --   --  1   NEUTROS PCT % 86*  --   --    LYMPHS PCT % 6*  --   --    LYMPHO PCT %  --   --  17   MONOS PCT % 6  --   --    MONO PCT %  --   --  10   EOS PCT % 0  --  0    < > = values in this interval not displayed  Results from last 7 days   Lab Units 12/01/19  0339   SODIUM mmol/L 139   POTASSIUM mmol/L 4 4   CHLORIDE mmol/L 104   CO2 mmol/L 24   BUN mg/dL 34*   CREATININE mg/dL 2 40*   ANION GAP mmol/L 11   CALCIUM mg/dL 8 5   GLUCOSE RANDOM mg/dL 192*     Results from last 7 days   Lab Units 11/29/19  2333   INR  1 15     Results from last 7 days   Lab Units 12/01/19  0733 11/30/19  2051 11/30/19  1541 11/30/19  0907   POC GLUCOSE mg/dl 137 157* 150* 204*         Results from last 7 days   Lab Units 12/01/19  0339 11/29/19  2333   PROCALCITONIN ng/ml 0 89* 1 01*           * I Have Reviewed All Lab Data Listed Above  * Additional Pertinent Lab Tests Reviewed:  [facility] 68 Admission Reviewed    Imaging:    Imaging Reports Reviewed Today Include:  None available  Imaging Personally Reviewed by Myself Includes:  None    Recent Cultures (last 7 days):           Last 24 Hours Medication List:     Current Facility-Administered Medications:  acetaminophen 650 mg Oral Q6H PRN Perfecto Nice MD    albuterol 2 puff Inhalation Q4H PRN Perfecto Nice MD    aluminum-magnesium hydroxide-simethicone 30 mL Oral Q6H PRN Perfecto Nice MD    amLODIPine 10 mg Oral Daily Richard Sousa PA-C    chlorproMAZINE 25 mg Oral Q6H PRN Perfecto Nice MD    diphenhydrAMINE 25 mg Oral Q6H PRN Adrian Ratliff MD    famotidine 20 mg Oral BID Adrian Ratliff MD    FLUoxetine 10 mg Oral Daily Adrian Ratliff MD    fluticasone 1 spray Each Nare Daily Adrian Ratliff MD    fluticasone-vilanterol 1 puff Inhalation Early Morning Adrian Ratliff MD    folic acid 1 mg Oral Daily Adrian Ratliff MD    heparin (porcine) 3-30 Units/kg/hr (Order-Specific) Intravenous Titrated Adrian Ratliff MD Last Rate: 12 Units/kg/hr (12/01/19 1420)   heparin (porcine) 2,600 Units Intravenous PRN Adrian Ratliff MD    heparin (porcine) 5,200 Units Intravenous PRN Adrian Ratliff MD    insulin lispro 1-5 Units Subcutaneous TID AC Adrian Ratliff MD    ipratropium-albuterol 3 mL Nebulization Q6H Jonathan Jimenez MD    melatonin 3 mg Oral HS Jonathan Jimenez MD    methocarbamol 750 mg Oral Q6H PRN Adrian Ratliff MD    methylPREDNISolone sodium succinate 40 mg Intravenous Q8H Jonathan Jimenez MD    ondansetron 4 mg Intravenous Q6H PRN Adrian Ratliff MD    oxyCODONE 20 mg Oral Q12H [address] Jonathan Jimenez MD    oxyCODONE 30 mg Oral Q4H PRN Adrian Ratliff MD    pantoprazole 40 mg Oral Early Morning Jonathan Jimenez MD    polyethylene glycol 17 g Oral Daily PRN Adrian Ratliff MD    pregabalin 25 mg Oral Daily Jonathan Jimenez MD    pregabalin 50 mg Oral HS Jonathan Jimenez MD    QUEtiapine 25 mg Oral HS Adiran Ratliff MD    senna-docusate sodium 1 tablet Oral BID PRN Adrian Ratliff MD    sodium chloride 75 mL/hr Intravenous Continuous [facility] Last Rate: 75 mL/hr (12/01/19 1100)   tamsulosin 0 4 mg Oral Daily With Angelita Lawrence MD    tiotropium 18 mcg Inhalation Daily Adrian Ratliff MD         Today, Patient Was Seen By: Adrian Ratliff MD    ** Please Note: Dictation voice to text software may have been used in the creation of this document   **

## 2019-12-01 NOTE — PLAN OF CARE
Problem: Potential for Falls  Goal: Patient will remain free of falls  Description  INTERVENTIONS:  - Assess patient frequently for physical needs  -  Identify cognitive and physical deficits and behaviors that affect risk of falls    -  [place] fall precautions as indicated by assessment   - Educate patient/family on patient safety including physical limitations  - Instruct patient to call for assistance with activity based on assessment  - Modify environment to reduce risk of injury  - Consider OT/PT consult to assist with strengthening/mobility  Outcome: Progressing     Problem: PAIN - ADULT  Goal: Verbalizes/displays adequate comfort level or baseline comfort level  Description  Interventions:  - Encourage patient to monitor pain and request assistance  - Assess pain using appropriate pain scale  - Administer analgesics based on type and severity of pain and evaluate response  - Implement non-pharmacological measures as appropriate and evaluate response  - Consider cultural and social influences on pain and pain management  - Notify physician/advanced practitioner if interventions unsuccessful or patient reports new pain  Outcome: Progressing     Problem: CARDIOVASCULAR - ADULT  Goal: Maintains optimal cardiac output and hemodynamic stability  Description  INTERVENTIONS:  - Monitor I/O, vital signs and rhythm  - Monitor for S/S and trends of decreased cardiac output  - Administer and titrate ordered vasoactive medications to optimize hemodynamic stability  - Assess quality of pulses, skin color and temperature  - Assess for signs of decreased coronary artery perfusion  - Instruct patient to report change in severity of symptoms  Outcome: Progressing  Goal: Absence of cardiac dysrhythmias or at baseline rhythm  Description  INTERVENTIONS:  - Continuous cardiac monitoring, vital signs, obtain 12 lead EKG if ordered  - Administer antiarrhythmic and heart rate control medications as ordered  - Monitor electrolytes and administer replacement therapy as ordered  Outcome: Progressing     Problem: RESPIRATORY - ADULT  Goal: Achieves optimal ventilation and oxygenation  Description  INTERVENTIONS:  - Assess for changes in respiratory status  - Assess for changes in mentation and behavior  - Position to facilitate oxygenation and minimize respiratory effort  - Oxygen administered by appropriate delivery if ordered  - Encourage broncho-pulmonary hygiene including cough, deep breathe, Incentive Spirometry  - Assess the need for suctioning and aspirate as needed  - Assess and instruct to report SOB or any respiratory difficulty  - Respiratory Therapy support as indicated   Outcome: Progressing     Problem: INFECTION - ADULT  Goal: Absence or prevention of progression during hospitalization  Description  INTERVENTIONS:  - Assess and monitor for signs and symptoms of infection  - Monitor lab/diagnostic results  - Monitor all insertion sites, i e  indwelling lines, tubes, and drains  - Monitor endotracheal if appropriate and nasal secretions for changes in amount and color  - [place] appropriate cooling/warming therapies per order  - Administer medications as ordered  - Instruct and encourage patient and family to use good hand hygiene technique  - Identify and instruct in appropriate isolation precautions for identified infection/condition  Outcome: Progressing  Goal: Absence of fever/infection during neutropenic period  Description  INTERVENTIONS:  - Monitor WBC    Outcome: Progressing     Problem: SAFETY ADULT  Goal: Maintain or return to baseline ADL function  Description  INTERVENTIONS:  -  Assess patient's ability to carry out ADLs; assess patient's baseline for ADL function and identify physical deficits which impact ability to perform ADLs (bathing, care of mouth/teeth, toileting, grooming, dressing, etc )  - Assess/evaluate cause of self-care deficits   - Assess range of motion  - Assess patient's mobility; develop plan if impaired  - Assess patient's need for assistive devices and provide as appropriate  - Encourage maximum independence but intervene and supervise when necessary  - Involve family in performance of ADLs  - Assess for home care needs following discharge   - Consider OT consult to assist with ADL evaluation and planning for discharge  - Provide patient education as appropriate  Outcome: Progressing  Goal: Maintain or return mobility status to optimal level  Description  INTERVENTIONS:  - Assess patient's baseline mobility status (ambulation, transfers, stairs, etc )    - Identify cognitive and physical deficits and behaviors that affect mobility  - Identify mobility aids required to assist with transfers and/or ambulation (gait belt, sit-to-stand, lift, walker, cane, etc )  - [place] fall precautions as indicated by assessment  - Record patient progress and toleration of activity level on Mobility SBAR; progress patient to next Phase/Stage  - Instruct patient to call for assistance with activity based on assessment  - Consider rehabilitation consult to assist with strengthening/weightbearing, etc   Outcome: Progressing     Problem: DISCHARGE PLANNING  Goal: Discharge to home or other facility with appropriate resources  Description  INTERVENTIONS:  - Identify barriers to discharge w/patient and caregiver  - Arrange for needed discharge resources and transportation as appropriate  - Identify discharge learning needs (meds, wound care, etc )  - Arrange for interpretive services to assist at discharge as needed  - Refer to Case Management Department for coordinating discharge planning if the patient needs post-hospital services based on physician/advanced practitioner order or complex needs related to functional status, cognitive ability, or social support system  Outcome: Progressing     Problem: Knowledge Deficit  Goal: Patient/family/caregiver demonstrates understanding of disease process, treatment plan, medications, and discharge instructions  Description  Complete learning assessment and assess knowledge base  Interventions:  - Provide teaching at level of understanding  - Provide teaching via preferred learning methods  Outcome: Progressing     Problem: Neurological Deficit  Goal: Neurological status is stable or improving  Description  Interventions:  - Monitor and assess patient's level of consciousness, motor function, sensory function, and level of assistance needed for ADLs  - Monitor and report changes from baseline  Collaborate with interdisciplinary team to initiate plan and implement interventions as ordered  - Provide and maintain a safe environment  - Consider seizure precautions  - Consider fall precautions  - Consider aspiration precautions  - Consider bleeding precautions  Outcome: Progressing     Problem: Activity Intolerance/Impaired Mobility  Goal: Mobility/activity is maintained at optimum level for patient  Description  Interventions:  - Assess and monitor patient  barriers to mobility and need for assistive/adaptive devices  - Assess patient's emotional response to limitations  - Collaborate with interdisciplinary team and initiate plans and interventions as ordered  - Encourage independent activity per ability   - Maintain proper body alignment  - Perform active/passive rom as tolerated/ordered  - Plan activities to conserve energy   - Turn patient as appropriate  Outcome: Progressing     Problem: Communication Impairment  Goal: Ability to express needs and understand communication  Description  Assess patient's communication skills and ability to understand information  Patient will demonstrate use of effective communication techniques, alternative methods of communication and understanding even if not able to speak  - Encourage communication and provide alternate methods of communication as needed    - Collaborate with case management/ for discharge needs   - Include patient/family/caregiver in decisions related to communication  Outcome: Progressing     Problem: Potential for Aspiration  Goal: Non-ventilated patient's risk of aspiration is minimized  Description  Assess and monitor vital signs, respiratory status, and labs (WBC)  Monitor for signs of aspiration (tachypnea, cough, rales, wheezing, cyanosis, fever)  - Assess and monitor patient's ability to swallow  - Place patient up in chair to eat if possible  - HOB up at 90 degrees to eat if unable to get patient up into chair   - Supervise patient during oral intake  - Instruct patient/ family to take small bites  - Instruct patient/ family to take small single sips when taking liquids  - Follow patient-specific strategies generated by speech pathologist   Outcome: Progressing  Goal: Ventilated patient's risk of aspiration is minimized  Description  Assess and monitor vital signs, respiratory status, airway cuff pressure, and labs (WBC)  Monitor for signs of aspiration (tachypnea, cough, rales, wheezing, cyanosis, fever)  - Elevate head of bed 30 degrees if patient has tube feeding   - Monitor tube feeding  Outcome: Progressing     Problem: Nutrition  Goal: Nutrition/Hydration status is improving  Description  Monitor and assess patient's nutrition/hydration status for malnutrition (ex- brittle hair, bruises, dry skin, pale skin and conjunctiva, muscle wasting, smooth red tongue, and disorientation)  Collaborate with interdisciplinary team and initiate plan and interventions as ordered  Monitor patient's weight and dietary intake as ordered or per policy  Utilize nutrition screening tool and intervene per policy  Determine patient's food preferences and provide high-protein, high-caloric foods as appropriate  - Assist patient with eating   - Allow adequate time for meals   - Encourage patient to take dietary supplement as ordered    - Collaborate with clinical nutritionist   - Include patient/family/caregiver in decisions related to nutrition  Outcome: Progressing     Problem: NEUROSENSORY - ADULT  Goal: Achieves stable or improved neurological status  Description  INTERVENTIONS  - Monitor and report changes in neurological status  - Monitor vital signs such as temperature, blood pressure, glucose, and any other labs ordered   - Initiate measures to prevent increased intracranial pressure  - Monitor for seizure activity and implement precautions if appropriate      Outcome: Progressing  Goal: Remains free of injury related to seizures activity  Description  INTERVENTIONS  - Maintain airway, patient safety  and administer oxygen as ordered  - Monitor patient for seizure activity, document and report duration and description of seizure to physician/advanced practitioner  - If seizure occurs,  ensure patient safety during seizure  - Reorient patient post seizure  - Seizure pads on all 4 side rails  - Instruct patient/family to notify RN of any seizure activity including if an aura is experienced  - Instruct patient/family to call for assistance with activity based on nursing assessment  - Administer anti-seizure medications if ordered    Outcome: Progressing  Goal: Achieves maximal functionality and self care  Description  INTERVENTIONS  - Monitor swallowing and airway patency with patient fatigue and changes in neurological status  - Encourage and assist patient to increase activity and self care     - Encourage visually impaired, hearing impaired and aphasic patients to use assistive/communication devices  Outcome: Progressing     Problem: METABOLIC, FLUID AND ELECTROLYTES - ADULT  Goal: Electrolytes maintained within normal limits  Description  INTERVENTIONS:  - Monitor labs and assess patient for signs and symptoms of electrolyte imbalances  - Administer electrolyte replacement as ordered  - Monitor response to electrolyte replacements, including repeat lab results as appropriate  - Instruct patient on fluid and nutrition as appropriate  Outcome: Progressing  Goal: Fluid balance maintained  Description  INTERVENTIONS:  - Monitor labs   - Monitor I/O and WT  - Instruct patient on fluid and nutrition as appropriate  - Assess for signs & symptoms of volume excess or deficit  Outcome: Progressing  Goal: Glucose maintained within target range  Description  INTERVENTIONS:  - Monitor Blood Glucose as ordered  - Assess for signs and symptoms of hyperglycemia and hypoglycemia  - Administer ordered medications to maintain glucose within target range  - Assess nutritional intake and initiate nutrition service referral as needed  Outcome: Progressing     Problem: SKIN/TISSUE INTEGRITY - ADULT  Goal: Skin integrity remains intact  Description  INTERVENTIONS  - Identify patients at risk for skin breakdown  - Assess and monitor skin integrity  - Assess and monitor nutrition and hydration status  - Monitor labs (i e  albumin)  - Assess for incontinence   - Turn and reposition patient  - Assist with mobility/ambulation  - Relieve pressure over bony prominences  - Avoid friction and shearing  - Provide appropriate hygiene as needed including keeping skin clean and dry  - Evaluate need for skin moisturizer/barrier cream  - Collaborate with interdisciplinary team (i e  Nutrition, Rehabilitation, etc )   - Patient/family teaching  Outcome: Progressing  Goal: Incision(s), wounds(s) or drain site(s) healing without S/S of infection  Description  INTERVENTIONS  - Assess and document risk factors for skin impairment   - Assess and document dressing, incision, wound bed, drain sites and surrounding tissue  - Consider nutrition services referral as needed  - Oral mucous membranes remain intact  - Provide patient/ family education  Outcome: Progressing  Goal: Oral mucous membranes remain intact  Description  INTERVENTIONS  - Assess oral mucosa and hygiene practices  - Implement preventative oral hygiene regimen  - Implement oral medicated treatments as ordered  - Initiate Nutrition services referral as needed  Outcome: Progressing     Problem: MUSCULOSKELETAL - ADULT  Goal: Maintain or return mobility to safest level of function  Description  INTERVENTIONS:  - Assess patient's ability to carry out ADLs; assess patient's baseline for ADL function and identify physical deficits which impact ability to perform ADLs (bathing, care of mouth/teeth, toileting, grooming, dressing, etc )  - Assess/evaluate cause of self-care deficits   - Assess range of motion  - Assess patient's mobility  - Assess patient's need for assistive devices and provide as appropriate  - Encourage maximum independence but intervene and supervise when necessary  - Involve family in performance of ADLs  - Assess for home care needs following discharge   - Consider OT consult to assist with ADL evaluation and planning for discharge  - Provide patient education as appropriate  Outcome: Progressing  Goal: Maintain proper alignment of affected body part  Description  INTERVENTIONS:  - Support, maintain and protect limb and body alignment  - Provide patient/ family with appropriate education  Outcome: Progressing

## 2019-12-01 NOTE — ASSESSMENT & PLAN NOTE
Patient presented with elevated D-dimer of 0 94  Likely elevated in the setting of stage IV cancer  Patient cannot get V/Q scan due to abnormal chest x-ray  Also recently received IV contrast dye for CTA of head and neck for stroke workup  Cannot rule out PE so will continue PE protocol heparin drip  Creatinine is improving, so can get CTA chest PE protocol in 48 hours which will be tomorrow 12/2  Check bilateral lower extremity Dopplers  If CTA chest negative for PE, can discontinue heparin drip  Continue incentive spirometry

## 2019-12-01 NOTE — PROGRESS NOTES
Progress Note - Pulmonary   Penny Score Sr  64 y o  male MRN: [id number]  Unit/Bed#: E4 -01 Encounter: [id number]      Assessment and Plan:  Severe COPD with exacerbation  Severe tobacco dependence in sustained remission  Chronic hypoxic respiratory failure  Stage IV adenocarcinoma of lung  CKD stage 4    - Suspect this is an exacerbation of COPD  Cannot rule out PE- he is at high risk given his history of malignancy  D-dimer likely to be elevated in setting of stage IV cancer  Patient cannot receive IV dye since he had a CTA had neck and has stage 4 kidney disease  Patient cannot receive a V/Q scan since he has an abnormal chest x-ray  - Some of his wheezing is from his upper airway  - Continue IV steroids at current dose and likely lower tomorrow  - Continue nebulized bronchodilators  - Cont  Heparin drip for now as can not rule out PE   - Await bilateral lower extremity Dopplers  - If creatinine remains stable over next 24 hours, check CTA chest if OK with nephrology   - Out of bed as able  Consult PT   - Encourage incentive spirometry  - Patient was scheduled to see palliative care is outpatient tomorrow but will not be able to make his appointment  Consider inpatient consult  - DVT prophylaxis  I discussed this plan of care at length with the patient and his significant other and sister  They have multiple concerns regarding resources at home (scooter, medical alert system, letter to change apartment), which I offered to have case management to primary team attempt to help them address  Some of them may need to be handled by his outpatient primary doctors  Subjective:   Found lying in bed with family members at bedside  Reportedly, patient ambulated to bathroom to washout that was very short of breath with audible wheezing  Family reports that this is not consistent with his baseline  He can usually walk further distances    Patient has occasional cough but states he cannot clear the mucus  Family has multiple concerns  The patient has to ambulate up a flight of stairs to enter his apartment  They would like a doctor's note to help him get out of the release so they can move  They feel the patient will benefit from a [employer] scooter  They feel the patient will benefit from a medical alert system  They would like assistance setting up all of these resources  Objective:   Afebrile  Vitals: Blood pressure 139/74, pulse 99, temperature (!) 97 2 °F (36 2 °C), temperature source Temporal, resp  rate 20, weight 68 kg (149 lb 14 6 oz), SpO2 95 %  , 3LPM, Body mass index is 22 79 kg/m²  Intake/Output Summary (Last 24 hours) at 12/1/2019 1251  Last data filed at 12/1/2019 0901  Gross per 24 hour   Intake 2420 ml   Output 600 ml   Net 1820 ml     Physical Exam  GEN: WDWN, nad, comfortable, speaking in full sentences  HEENT: NCAT, EOMI  CVS: Regular  LUNGS: +expiratory wheezing, +upper airway wheezing  ABD: soft, nd  EXT: No c/c/e  NEURO: No focal deficits  MS: Moving all extremities  SKIN: warm, dry  PSYCH: calm, cooperative    Labs: I have personally reviewed pertinent lab results    Results from last 7 days   Lab Units 12/01/19 0339 11/30/19  0605 11/29/19  1900   WBC Thousand/uL 15 95* 5 75 8 81   HEMOGLOBIN g/dL 9 6* 10 4* 11 5*   HEMATOCRIT % 30 7* 33 9* 36 2*   PLATELETS Thousands/uL 311 265 240   NEUTROS PCT % 86*  --   --    MONOS PCT % 6  --   --    MONO PCT %  --   --  10      Results from last 7 days   Lab Units 12/01/19  0339 11/30/19  1412 11/30/19  0605   POTASSIUM mmol/L 4 4 4 1 4 3   CHLORIDE mmol/L 104 103 101   CO2 mmol/L 24 24 22   BUN mg/dL 34* 32* 30*   CREATININE mg/dL 2 40* 2 45* 2 61*   CALCIUM mg/dL 8 5 8 8 8 8     Results from last 7 days   Lab Units 12/01/19  0339   MAGNESIUM mg/dL 2 2     Results from last 7 days   Lab Units 12/01/19  0339   PHOSPHORUS mg/dL 3 4      Results from last 7 days   Lab Units 12/01/19  0339 11/30/19  2136 11/30/19  1414 11/29/19  2333   INR   --   --   --   --  1 15   PTT seconds 66* 71* 115*   < > 37    < > = values in this interval not displayed  0   Lab Value Date/Time    TROPONINI 3 03 (H) 11/30/2019 0605    TROPONINI 3 58 (H) 11/30/2019 0115    TROPONINI 2 92 (H) 11/29/2019 2223    TROPONINI 0 92 (H) 11/29/2019 1900    TROPONINI <0 02 08/29/2019 2151    TROPONINI 0 02 01/02/2019 1718    TROPONINI <0 02 01/02/2019 1356    TROPONINI 0 09 (H) 09/23/2018 0942    TROPONINI 0 18 (H) 06/07/2018 0542    TROPONINI 0 19 (H) 06/07/2018 0150    TROPONINI 0 19 (H) 06/06/2018 2246     Labs reviewed by me personally reveal elevated creatinine, stable; leukocytosis worsened, anemia  Microbiology:  Influenza A/B negative  Imaging and other studies: I have personally reviewed pertinent films in PACS  MRI brain showed no acute findings  There is paranasal sinus disease with secretions in the right sphenoid sinus and soft tissue in the anterior right nasal passage  DEVON Figueroa [facility]

## 2019-12-01 NOTE — ASSESSMENT & PLAN NOTE
Lab Results   Component Value Date    HGBA1C 5 6 10/23/2019       Recent Labs     11/30/19  0907 11/30/19  1541 11/30/19  2051 12/01/19  0733   POCGLU 204* 150* 157* 137       Blood Sugar Average: Last 72 hrs:  (P) 162 hemoglobin A1C stable off medications x 6 months   Monitor glucose in the setting of steroid use-patient with currently mildly hyperglycemic readings due to IV Solu-Medrol  Continue to hold metformin in the setting of IV contrast dye use and chronic kidney disease  Continue sliding scale insulin, diabetic diet, Accu-Cheks q a c  And HS

## 2019-12-01 NOTE — ASSESSMENT & PLAN NOTE
History of CKD3-which may be due to use of chemotherapy in the form of keytruda as well as hypertensive nephrosclerosis and diabetic glomerular sclerosis  Creatinine was 2 61 at time of admission  Patient's creatinine was 2 0 in August 2019 and 1 0 in January 2019  Creatinine now currently 2 40  Continue to monitor BMP while inpatient  Appreciate Nephrology consult and recommendations  Has already received IV fluids and Mucomyst for contrast studies performed yesterday  Continue normal saline at 75 mL/hour  Continue to monitor renal function  Can consider CTA chest PE protocol study 48 hours from prior contrast study which will be tomorrow 12/2

## 2019-12-02 ENCOUNTER — APPOINTMENT (INPATIENT)
Dept: NON INVASIVE DIAGNOSTICS | Facility: HOSPITAL | Age: 59
DRG: 133 | End: 2019-12-02
Payer: COMMERCIAL

## 2019-12-02 LAB
ANION GAP SERPL CALCULATED.3IONS-SCNC: 8 MMOL/L (ref 4–13)
APTT PPP: 28 SECONDS (ref 23–37)
APTT PPP: 31 SECONDS (ref 23–37)
APTT PPP: 39 SECONDS (ref 23–37)
ATRIAL RATE: 93 BPM
BASOPHILS # BLD AUTO: 0.01 THOUSANDS/ΜL (ref 0–0.1)
BASOPHILS NFR BLD AUTO: 0 % (ref 0–1)
BUN SERPL-MCNC: 42 MG/DL (ref 5–25)
CALCIUM SERPL-MCNC: 8.7 MG/DL (ref 8.3–10.1)
CHLORIDE SERPL-SCNC: 107 MMOL/L (ref 100–108)
CO2 SERPL-SCNC: 26 MMOL/L (ref 21–32)
CREAT SERPL-MCNC: 2.5 MG/DL (ref 0.6–1.3)
EOSINOPHIL # BLD AUTO: 0 THOUSAND/ΜL (ref 0–0.61)
EOSINOPHIL NFR BLD AUTO: 0 % (ref 0–6)
ERYTHROCYTE [DISTWIDTH] IN BLOOD BY AUTOMATED COUNT: 15.4 % (ref 11.6–15.1)
GFR SERPL CREATININE-BSD FRML MDRD: 32 ML/MIN/1.73SQ M
GLUCOSE SERPL-MCNC: 129 MG/DL (ref 65–140)
GLUCOSE SERPL-MCNC: 135 MG/DL (ref 65–140)
GLUCOSE SERPL-MCNC: 137 MG/DL (ref 65–140)
GLUCOSE SERPL-MCNC: 180 MG/DL (ref 65–140)
GLUCOSE SERPL-MCNC: 184 MG/DL (ref 65–140)
HCT VFR BLD AUTO: 31.5 % (ref 36.5–49.3)
HGB BLD-MCNC: 9.8 G/DL (ref 12–17)
IMM GRANULOCYTES # BLD AUTO: 0.2 THOUSAND/UL (ref 0–0.2)
IMM GRANULOCYTES NFR BLD AUTO: 1 % (ref 0–2)
LYMPHOCYTES # BLD AUTO: 0.86 THOUSANDS/ΜL (ref 0.6–4.47)
LYMPHOCYTES NFR BLD AUTO: 6 % (ref 14–44)
MAGNESIUM SERPL-MCNC: 2.2 MG/DL (ref 1.6–2.6)
MCH RBC QN AUTO: 26.9 PG (ref 26.8–34.3)
MCHC RBC AUTO-ENTMCNC: 31.1 G/DL (ref 31.4–37.4)
MCV RBC AUTO: 87 FL (ref 82–98)
MONOCYTES # BLD AUTO: 0.65 THOUSAND/ΜL (ref 0.17–1.22)
MONOCYTES NFR BLD AUTO: 5 % (ref 4–12)
NEUTROPHILS # BLD AUTO: 12.62 THOUSANDS/ΜL (ref 1.85–7.62)
NEUTS SEG NFR BLD AUTO: 88 % (ref 43–75)
NRBC BLD AUTO-RTO: 0 /100 WBCS
P AXIS: 74 DEGREES
PHOSPHATE SERPL-MCNC: 4 MG/DL (ref 2.7–4.5)
PLATELET # BLD AUTO: 413 THOUSANDS/UL (ref 149–390)
PMV BLD AUTO: 9.5 FL (ref 8.9–12.7)
POTASSIUM SERPL-SCNC: 4.5 MMOL/L (ref 3.5–5.3)
PR INTERVAL: 144 MS
QRS AXIS: 73 DEGREES
QRSD INTERVAL: 84 MS
QT INTERVAL: 382 MS
QTC INTERVAL: 474 MS
RBC # BLD AUTO: 3.64 MILLION/UL (ref 3.88–5.62)
SODIUM SERPL-SCNC: 141 MMOL/L (ref 136–145)
T WAVE AXIS: 79 DEGREES
VENTRICULAR RATE: 93 BPM
WBC # BLD AUTO: 14.34 THOUSAND/UL (ref 4.31–10.16)

## 2019-12-02 PROCEDURE — 94762 N-INVAS EAR/PLS OXIMTRY CONT: CPT

## 2019-12-02 PROCEDURE — 94668 MNPJ CHEST WALL SBSQ: CPT

## 2019-12-02 PROCEDURE — 97163 PT EVAL HIGH COMPLEX 45 MIN: CPT

## 2019-12-02 PROCEDURE — 99232 SBSQ HOSP IP/OBS MODERATE 35: CPT | Performed by: INTERNAL MEDICINE

## 2019-12-02 PROCEDURE — 85730 THROMBOPLASTIN TIME PARTIAL: CPT | Performed by: INTERNAL MEDICINE

## 2019-12-02 PROCEDURE — 85025 COMPLETE CBC W/AUTO DIFF WBC: CPT | Performed by: FAMILY MEDICINE

## 2019-12-02 PROCEDURE — 82948 REAGENT STRIP/BLOOD GLUCOSE: CPT

## 2019-12-02 PROCEDURE — 99233 SBSQ HOSP IP/OBS HIGH 50: CPT | Performed by: INTERNAL MEDICINE

## 2019-12-02 PROCEDURE — 94640 AIRWAY INHALATION TREATMENT: CPT

## 2019-12-02 PROCEDURE — G8978 MOBILITY CURRENT STATUS: HCPCS

## 2019-12-02 PROCEDURE — 83735 ASSAY OF MAGNESIUM: CPT | Performed by: FAMILY MEDICINE

## 2019-12-02 PROCEDURE — 93306 TTE W/DOPPLER COMPLETE: CPT

## 2019-12-02 PROCEDURE — 80048 BASIC METABOLIC PNL TOTAL CA: CPT | Performed by: FAMILY MEDICINE

## 2019-12-02 PROCEDURE — 93970 EXTREMITY STUDY: CPT

## 2019-12-02 PROCEDURE — 93010 ELECTROCARDIOGRAM REPORT: CPT | Performed by: INTERNAL MEDICINE

## 2019-12-02 PROCEDURE — G8979 MOBILITY GOAL STATUS: HCPCS

## 2019-12-02 PROCEDURE — 85730 THROMBOPLASTIN TIME PARTIAL: CPT | Performed by: FAMILY MEDICINE

## 2019-12-02 PROCEDURE — 84100 ASSAY OF PHOSPHORUS: CPT | Performed by: FAMILY MEDICINE

## 2019-12-02 PROCEDURE — 93970 EXTREMITY STUDY: CPT | Performed by: SURGERY

## 2019-12-02 RX ORDER — BUDESONIDE 0.5 MG/2ML
0.5 INHALANT ORAL
Status: DISCONTINUED | OUTPATIENT
Start: 2019-12-02 | End: 2019-12-10 | Stop reason: HOSPADM

## 2019-12-02 RX ORDER — GUAIFENESIN 600 MG
600 TABLET, EXTENDED RELEASE 12 HR ORAL EVERY 12 HOURS SCHEDULED
Status: DISCONTINUED | OUTPATIENT
Start: 2019-12-02 | End: 2019-12-10 | Stop reason: HOSPADM

## 2019-12-02 RX ORDER — LORAZEPAM 1 MG/1
0.5 TABLET ORAL EVERY 4 HOURS PRN
Status: DISCONTINUED | OUTPATIENT
Start: 2019-12-02 | End: 2019-12-06

## 2019-12-02 RX ADMIN — HEPARIN SODIUM 5200 UNITS: 1000 INJECTION INTRAVENOUS; SUBCUTANEOUS at 06:24

## 2019-12-02 RX ADMIN — METHYLPREDNISOLONE SODIUM SUCCINATE 40 MG: 40 INJECTION, POWDER, FOR SOLUTION INTRAMUSCULAR; INTRAVENOUS at 00:18

## 2019-12-02 RX ADMIN — HEPARIN SODIUM 5200 UNITS: 1000 INJECTION INTRAVENOUS; SUBCUTANEOUS at 19:07

## 2019-12-02 RX ADMIN — OXYCODONE HYDROCHLORIDE 20 MG: 20 TABLET, FILM COATED, EXTENDED RELEASE ORAL at 08:28

## 2019-12-02 RX ADMIN — METHYLPREDNISOLONE SODIUM SUCCINATE 40 MG: 40 INJECTION, POWDER, FOR SOLUTION INTRAMUSCULAR; INTRAVENOUS at 17:08

## 2019-12-02 RX ADMIN — PREGABALIN 50 MG: 50 CAPSULE ORAL at 21:05

## 2019-12-02 RX ADMIN — FLUOXETINE 10 MG: 10 CAPSULE ORAL at 08:32

## 2019-12-02 RX ADMIN — FLUTICASONE PROPIONATE 1 SPRAY: 50 SPRAY, METERED NASAL at 08:34

## 2019-12-02 RX ADMIN — IPRATROPIUM BROMIDE AND ALBUTEROL SULFATE 3 ML: 2.5; .5 SOLUTION RESPIRATORY (INHALATION) at 07:38

## 2019-12-02 RX ADMIN — GUAIFENESIN 600 MG: 600 TABLET ORAL at 10:12

## 2019-12-02 RX ADMIN — TAMSULOSIN HYDROCHLORIDE 0.4 MG: 0.4 CAPSULE ORAL at 17:08

## 2019-12-02 RX ADMIN — CEFTRIAXONE 1000 MG: 1 INJECTION, POWDER, FOR SOLUTION INTRAMUSCULAR; INTRAVENOUS at 14:23

## 2019-12-02 RX ADMIN — OXYCODONE HYDROCHLORIDE 30 MG: 10 TABLET ORAL at 10:12

## 2019-12-02 RX ADMIN — FAMOTIDINE 20 MG: 20 TABLET ORAL at 08:28

## 2019-12-02 RX ADMIN — AMLODIPINE BESYLATE 10 MG: 10 TABLET ORAL at 08:28

## 2019-12-02 RX ADMIN — FAMOTIDINE 20 MG: 20 TABLET ORAL at 17:08

## 2019-12-02 RX ADMIN — OXYCODONE HYDROCHLORIDE 20 MG: 20 TABLET, FILM COATED, EXTENDED RELEASE ORAL at 21:06

## 2019-12-02 RX ADMIN — METHYLPREDNISOLONE SODIUM SUCCINATE 40 MG: 40 INJECTION, POWDER, FOR SOLUTION INTRAMUSCULAR; INTRAVENOUS at 08:36

## 2019-12-02 RX ADMIN — DILTIAZEM HYDROCHLORIDE 5 MG/HR: 5 INJECTION INTRAVENOUS at 10:02

## 2019-12-02 RX ADMIN — PREGABALIN 25 MG: 25 CAPSULE ORAL at 08:28

## 2019-12-02 RX ADMIN — HEPARIN SODIUM AND DEXTROSE 16 UNITS/KG/HR: 10000; 5 INJECTION INTRAVENOUS at 10:00

## 2019-12-02 RX ADMIN — IPRATROPIUM BROMIDE AND ALBUTEROL SULFATE 3 ML: 2.5; .5 SOLUTION RESPIRATORY (INHALATION) at 13:08

## 2019-12-02 RX ADMIN — HEPARIN SODIUM 5200 UNITS: 1000 INJECTION INTRAVENOUS; SUBCUTANEOUS at 13:16

## 2019-12-02 RX ADMIN — IPRATROPIUM BROMIDE AND ALBUTEROL SULFATE 3 ML: 2.5; .5 SOLUTION RESPIRATORY (INHALATION) at 19:10

## 2019-12-02 RX ADMIN — TIOTROPIUM BROMIDE 18 MCG: 18 CAPSULE ORAL; RESPIRATORY (INHALATION) at 08:35

## 2019-12-02 RX ADMIN — BUDESONIDE 0.5 MG: 0.5 INHALANT RESPIRATORY (INHALATION) at 09:32

## 2019-12-02 RX ADMIN — PANTOPRAZOLE SODIUM 40 MG: 40 TABLET, DELAYED RELEASE ORAL at 05:44

## 2019-12-02 RX ADMIN — GUAIFENESIN 600 MG: 600 TABLET ORAL at 21:05

## 2019-12-02 RX ADMIN — QUETIAPINE FUMARATE 25 MG: 25 TABLET ORAL at 21:07

## 2019-12-02 RX ADMIN — AZITHROMYCIN MONOHYDRATE 500 MG: 500 INJECTION, POWDER, LYOPHILIZED, FOR SOLUTION INTRAVENOUS at 15:32

## 2019-12-02 RX ADMIN — FLUTICASONE FUROATE AND VILANTEROL TRIFENATATE 1 PUFF: 100; 25 POWDER RESPIRATORY (INHALATION) at 05:44

## 2019-12-02 RX ADMIN — MELATONIN TAB 3 MG 3 MG: 3 TAB at 21:05

## 2019-12-02 RX ADMIN — OXYCODONE HYDROCHLORIDE 30 MG: 10 TABLET ORAL at 17:35

## 2019-12-02 RX ADMIN — FOLIC ACID 1 MG: 1 TABLET ORAL at 08:28

## 2019-12-02 RX ADMIN — BUDESONIDE 0.5 MG: 0.5 INHALANT RESPIRATORY (INHALATION) at 19:10

## 2019-12-02 RX ADMIN — INSULIN LISPRO 1 UNITS: 100 INJECTION, SOLUTION INTRAVENOUS; SUBCUTANEOUS at 08:32

## 2019-12-02 NOTE — PROGRESS NOTES
CARDIOLOGY INPATIENT FOLLOW-UP PROGRESS NOTE    ENCOUNTER DATE: 12/02/19 10:22 AM  PATIENT NAME: Nik Awad     1960    [id number]  Age: 64 y o  Sex: male  AUTHOR: Caleb Espitia MD  PRIMARYCARE PHYSICIAN: Colonel Jeannie MD  PRIMARY INPATIENT PHYSICIAN: Pablito Booker DO  *-*-*-*-*-*-*-*-*-*-*-*-*-*-*-*-*-*-*-*-*-*-*-*-*-*-*-*-*-*-*-*-*-*-*-*-*-*-*-*-*-*-*-*-*-*-*-*-*-*-*-*-*-*-  FOLLOW-UP FOR:  1  Suspected non MI troponin elevation  2  Shortness of breath, rule out DVT/PE  3  Benign essential hypertension  4  Severe COPD with acute on chronic hypoxic respiratory failure  5  History of lung CA- stage IV adeno carcinoma with bilateral lesions  6  Stage 4 chronic kidney disease  CARDIOLOGY PLAN:  - patient symptom of chest pain is atypical for angina  Clinically picture does not suggest pulmonary embolism 2 0 this cannot be definitively excluded  - recommend continued supportive care  Agree with evaluation for DVT  If overall suspicion for PE from a pulmonary perspective is low then may discontinue heparin drip  - agree with palliative care consultation to assess goals of treatment  - would withhold from doing any stress testing at this time  *-*-*-*-*-*-*-*-*-*-*-*-*-*-*-*-*-*-*-*-*-*-*-*-*-*-*-*-*-*-*-*-*-*-*-*-*-*-*-*-*-*-*-*-*-*-*-*-*-*-*-*-*-*-  INTERVAL CHANGES / HISTORY OF PRESENT ILLNESS:  Patient reports unchanged shortness of breath at rest and with minimal activity and pain in the chest   Pain is more localized to right lower anterior chest region  Denies palpitations  Reports of headache and fatigue  Echocardiogram yesterday showed normal left ventricular function and no evidence of right ventricular strain  REVIEW OF SYMPTOMS:    Positive for:  Chest pain, shortness of breath, somnolence  Negative for: All remaining as reviewed below and in HPI      SYSTEM SYMPTOMS REVIEWED:  Generalweight change, fever, night sweats  Respiratoryl Wheezing, shortness of breath, cough, URI symptoms, sputum, blood  Cardiovascularchest pain, syncope, dyspnea on exertion, edema, decline in exercise tolerance, claudication   Gastrointestinalpersistent vomiting, diarrhea, abdominal distention, blood in stool   Muscular or skeletaljoint pain or swelling   Neurologicheadaches, syncope, abnormal movement  Hematologichistory of easy bruising and bleeding   Endocrinethyroid enlargement, heat or cold intolerance, polyuria   Psychiatricanxiety, depression   *-*-*-*-*-*-*-*-*-*-*-*-*-*-*-*-*-*-*-*-*-*-*-*-*-*-*-*-*-*-*-*-*-*-*-*-*-*-*-*-*-*-*-*-*-*-*-*-*-*-*-*-*-*-    VITAL SIGNS:  Vitals:    19 0300 19 0738 19 0739 19 1008   BP: 153/76  132/61 121/86   BP Location: Right arm  Left arm Left arm   Pulse: 85  75 89   Resp: 20  20 20   Temp: (!) 97 1 °F (36 2 °C)  (!) 97 4 °F (36 3 °C)    TempSrc: Temporal  Temporal    SpO2: 92% 94% 93% 95%   Weight:          Temp (24hrs), Av 3 °F (36 3 °C), Min:97 1 °F (36 2 °C), Max:97 5 °F (36 4 °C)  Current: Temperature: (!) 97 4 °F (36 3 °C)      Intake/Output Summary (Last 24 hours) at 2019 1022  Last data filed at 2019 0630  Gross per 24 hour   Intake 250 ml   Output 1625 ml   Net -1375 ml      Weight (last 2 days)     None       ,   Wt Readings from Last 3 Encounters:   19 68 kg (149 lb 14 6 oz)   19 71 1 kg (156 lb 11 2 oz)   11/15/19 68 kg (150 lb)    , Body mass index is 22 79 kg/m²  *-*-*-*-*-*-*-*-*-*-*-*-*-*-*-*-*-*-*-*-*-*-*-*-*-*-*-*-*-*-*-*-*-*-*-*-*-*-*-*-*-*-*-*-*-*-*-*-*-*-*-*-*-*-  PHYSICAL EXAM:  General Appearance:    Alert, cooperative, no distress, appears stated age, slightly somnolent, response to questions appropriately  Head, Eyes, ENT:    No obvious abnormality, moist mucous mebranes  Neck:   Supple, no carotid bruit or JVD   Back:     Symmetric, no curvature  Lungs:     Respirations labored  bilateral diffuse decreased breath sounds with rhonchi with few scattered rales  Chest wall:    No tenderness or deformity   Heart:    Regular rate and rhythm, S1 and S2 normal, no murmur, rub  or gallop  Abdomen:     Soft, non-tender, No obvious masses, or organomegaly   Extremities:   Extremities normal, no cyanosis or edema    Skin:   Skin color, texture, turgor normal, no rashes or lesions   *-*-*-*-*-*-*-*-*-*-*-*-*-*-*-*-*-*-*-*-*-*-*-*-*-*-*-*-*-*-*-*-*-*-*-*-*-*-*-*-*-*-*-*-*-*-*-*-*-*-*-*-*-*-    Telemetry reviewed    Sinus rhythm with tendency towards tachycardia with occasional premature ventricular contractions  Significant artifact  Last ECG     Results for orders placed or performed during the hospital encounter of 11/29/19   ECG 12 lead   Result Value    Ventricular Rate 93    Atrial Rate 93    CA Interval 144    QRSD Interval 84    QT Interval 382    QTC Interval 474    P Axis 74    QRS Axis 73    T Wave Axis 79    Narrative    Normal sinus rhythm  Normal ECG  When compared with ECG of 29-NOV-2019 22:14, (unconfirmed)  No significant change was found  Confirmed by Yolis Treviño ([id number]) on 12/2/2019 9:51:33 AM       Medications reviewed         Current Facility-Administered Medications:     acetaminophen (TYLENOL) tablet 650 mg, 650 mg, Oral, Q6H PRN, César Sanford MD    albuterol (PROVENTIL HFA,VENTOLIN HFA) inhaler 2 puff, 2 puff, Inhalation, Q4H PRN, César Sanford MD    aluminum-magnesium hydroxide-simethicone (MYLANTA) 200-200-20 mg/5 mL oral suspension 30 mL, 30 mL, Oral, Q6H PRN, César Sanford MD    amLODIPine (NORVASC) tablet 10 mg, 10 mg, Oral, Daily, [facility], PA-C, 10 mg at 12/02/19 1817    budesonide (PULMICORT) inhalation solution 0 5 mg, 0 5 mg, Nebulization, Q12H, BROOKS Torrez, 0 5 mg at 12/02/19 0932    chlorproMAZINE (THORAZINE) tablet 25 mg, 25 mg, Oral, Q6H PRN, César Sanford MD    diltiazem (CARDIZEM) 125 mg in sodium chloride 0 9 % 125 mL infusion, 1-15 mg/hr, Intravenous, Titrated, Jonathan Jimenez MD, Last Rate: 5 mL/hr at 12/02/19 1002, 5 mg/hr at 12/02/19 1002    diphenhydrAMINE (BENADRYL) tablet 25 mg, 25 mg, Oral, Q6H PRN, Franklyn Steward MD    famotidine (PEPCID) tablet 20 mg, 20 mg, Oral, BID, Franklyn Steward MD, 20 mg at 12/02/19 0828    FLUoxetine (PROzac) capsule 10 mg, 10 mg, Oral, Daily, Franklyn Steward MD, 10 mg at 12/02/19 0832    fluticasone (FLONASE) 50 mcg/act nasal spray 1 spray, 1 spray, Each Nare, Daily, Franklyn Steward MD, 1 spray at 96/13/82 5988    folic acid (FOLVITE) tablet 1 mg, 1 mg, Oral, Daily, Franklyn Steward MD, 1 mg at 12/02/19 0828    guaiFENesin (MUCINEX) 12 hr tablet 600 mg, 600 mg, Oral, Q12H [address], Velvet Antes, CRNP, 600 mg at 12/02/19 1012    heparin (porcine) 25,000 units in 250 mL infusion (premix), 3-30 Units/kg/hr (Order-Specific), Intravenous, Titrated, Franklyn Steward MD, Last Rate: 10 4 mL/hr at 12/02/19 1000, 16 Units/kg/hr at 12/02/19 1000    heparin (porcine) injection 2,600 Units, 2,600 Units, Intravenous, PRN, Franklyn Steward MD    heparin (porcine) injection 5,200 Units, 5,200 Units, Intravenous, PRN, Franklyn Steward MD, 5,200 Units at 12/02/19 0624    insulin lispro (HumaLOG) 100 units/mL subcutaneous injection 1-5 Units, 1-5 Units, Subcutaneous, TID AC, 1 Units at 12/02/19 0832 **AND** Fingerstick Glucose (POCT), , , TID AC, Jonathan Jimenez MD    ipratropium-albuterol (DUO-NEB) 0 5-2 5 mg/3 mL inhalation solution 3 mL, 3 mL, Nebulization, Q6H, Jonathan Jimenez MD, 3 mL at 12/02/19 0738    melatonin tablet 3 mg, 3 mg, Oral, HS, Jonathan Jimenez MD, 3 mg at 12/01/19 2202    methocarbamol (ROBAXIN) tablet 750 mg, 750 mg, Oral, Q6H PRN, Franklyn Steward MD    methylPREDNISolone sodium succinate (Solu-MEDROL) injection 40 mg, 40 mg, Intravenous, Q8H, Jonathan Jimenez MD, 40 mg at 12/02/19 0836    ondansetron (ZOFRAN) injection 4 mg, 4 mg, Intravenous, Q6H PRN, Franklyn Steward MD    oxyCODONE (OxyCONTIN) 12 hr tablet 20 mg, 20 mg, Oral, Q12H [address], Jonathan Jimenez MD, 20 mg at 12/02/19 5082    oxyCODONE (ROXICODONE) immediate release tablet 30 mg, 30 mg, Oral, Q4H PRN, Tobi Varner MD, 30 mg at 12/02/19 1012    pantoprazole (PROTONIX) EC tablet 40 mg, 40 mg, Oral, Early Morning, Jonathan Jimenez MD, 40 mg at 12/02/19 0544    polyethylene glycol (MIRALAX) packet 17 g, 17 g, Oral, Daily PRN, Tobi Varner MD    pregabalin (LYRICA) capsule 25 mg, 25 mg, Oral, Daily, Jonathan Jimenez MD, 25 mg at 12/02/19 0828    pregabalin (LYRICA) capsule 50 mg, 50 mg, Oral, HS, Jonathan Jimenez MD, 50 mg at 12/01/19 2204    QUEtiapine (SEROquel) tablet 25 mg, 25 mg, Oral, HS, Jonathan Jimenez MD, 25 mg at 12/01/19 2158    senna-docusate sodium (SENOKOT S) 8 6-50 mg per tablet 1 tablet, 1 tablet, Oral, BID PRN, Tobi Varner MD    tamsulosin (FLOMAX) capsule 0 4 mg, 0 4 mg, Oral, Daily With Dinner, Tobi Varner MD, 0 4 mg at 12/01/19 1810    tiotropium (SPIRIVA) capsule for inhaler 18 mcg, 18 mcg, Inhalation, Daily, Tobi Varner MD, 18 mcg at 12/02/19 3804    Imaging studies results reviewed    No procedure found  *-*-*-*-*-*-*-*-*-*-*-*-*-*-*-*-*-*-*-*-*-*-*-*-*-*-*-*-*-*-*-*-*-*-*-*-*-*-*-*-*-*-*-*-*-*-*-*-*-*-*-*-*-*-    LABORATORY DATA:  I have personally reviewed pertinent labs  CMP:   Results from last 7 days   Lab Units 12/02/19  0453 12/01/19  0339 11/30/19  1412   POTASSIUM mmol/L 4 5 4 4 4 1   CHLORIDE mmol/L 107 104 103   CO2 mmol/L 26 24 24   BUN mg/dL 42* 34* 32*   CREATININE mg/dL 2 50* 2 40* 2 45*   CALCIUM mg/dL 8 7 8 5 8 8       Cardiac Profile:   Troponin I   Date Value Ref Range Status   11/30/2019 3 03 (H) <=0 04 ng/mL Final     Comment:       [employer] Chemistry analyzer 99% cutoff is > 0 04 ng/mL in network labs     o cTnI 99% cutoff is useful only when applied to patients in the clinical setting of myocardial ischemia   o cTnI 99% cutoff should be interpreted in the context of clinical history, ECG findings and possibly cardiac imaging to establish correct diagnosis     o cTnI 99% cutoff may be suggestive but clearly not indicative of a coronary event without the clinical setting of myocardial ischemia  11/30/2019 3 58 (H) <=0 04 ng/mL Final     Comment:       [employer] Chemistry analyzer 99% cutoff is > 0 04 ng/mL in network labs     o cTnI 99% cutoff is useful only when applied to patients in the clinical setting of myocardial ischemia   o cTnI 99% cutoff should be interpreted in the context of clinical history, ECG findings and possibly cardiac imaging to establish correct diagnosis  o cTnI 99% cutoff may be suggestive but clearly not indicative of a coronary event without the clinical setting of myocardial ischemia  11/29/2019 2 92 (H) <=0 04 ng/mL Final     Comment:       [employer] Chemistry analyzer 99% cutoff is > 0 04 ng/mL in network labs     o cTnI 99% cutoff is useful only when applied to patients in the clinical setting of myocardial ischemia   o cTnI 99% cutoff should be interpreted in the context of clinical history, ECG findings and possibly cardiac imaging to establish correct diagnosis  o cTnI 99% cutoff may be suggestive but clearly not indicative of a coronary event without the clinical setting of myocardial ischemia         CK-MB   Date Value Ref Range Status   09/04/2018 1 4 0 0 - 2 4 ng/mL Final     NT-proBNP   Date Value Ref Range Status   11/29/2019 65 <125 pg/mL Final   01/02/2019 42 <125 pg/mL Final   09/24/2018 80 <125 pg/mL Final       CBC:   Results from last 7 days   Lab Units 12/02/19 0453 12/01/19 0339 11/30/19  0605   WBC Thousand/uL 14 34* 15 95* 5 75   HEMOGLOBIN g/dL 9 8* 9 6* 10 4*   HEMATOCRIT % 31 5* 30 7* 33 9*   PLATELETS Thousands/uL 413* 311 265       PT/INR: No results found for: PT, INR,     Magnesium:   Results from last 7 days   Lab Units 12/02/19  0453 12/01/19  0339   MAGNESIUM mg/dL 2 2 2 2       Phosphorous:   Results from last 7 days   Lab Units 12/02/19 0453 12/01/19  0339   PHOSPHORUS mg/dL 4 0 3 4       Microbiology: *-*-*-*-*-*-*-*-*-*-*-*-*-*-*-*-*-*-*-*-*-*-*-*-*-*-*-*-*-*-*-*-*-*-*-*-*-*-*-*-*-*-*-*-*-*-*-*-*-*-*-*-*-*-  ECHOCARDIOGRAM AND OTHER CARDIOLOGY RESULTS:  Results for orders placed during the hospital encounter of 19   Echo complete with contrast if indicated    Narrative [address] [place] [address] 35  [place], [address]  [phone]    Transthoracic Echocardiogram  2D, M-mode, Doppler, and Color Doppler    Study date:  01-Dec-2019    Patient: Imelda Smith  MR number: [id number]  Account number: [de-identified]  : 1960  Age: 64 years  Gender: Male  Status: Inpatient  Location: Bedside  Height: 68 in  Weight: 149 lb  BP: 129/ 78 mmHg    Indications: Chest pain  Diagnoses: R07 9 - Chest pain, unspecified    Sonographer:  SOLE Dumont  Primary Physician:  Candice Ralph  Referring Physician:  Keila Tatum MD  Group:  [facility]  Interpreting Physician:  DEVON Michael     SUMMARY    LEFT VENTRICLE:  Systolic function was normal by visual assessment  Ejection fraction was estimated to be 55 %  Although no diagnostic regional wall motion abnormality was identified, this possibility cannot be completely excluded on the basis of this study  TRICUSPID VALVE:  There was trace regurgitation  HISTORY: PRIOR HISTORY: lung Ca with chemotherapy, GERD, depression, COPD Risk factors: hypertension, diabetes, renal failure, and a history of current cigarette use (within the last month)  PROCEDURE: The procedure was performed at the bedside  This was a routine study  The transthoracic approach was used  The study included complete 2D imaging, M-mode, complete spectral Doppler, and color Doppler  The heart rate was 94 bpm,  at the start of the study  Image quality was adequate  LEFT VENTRICLE: Size was normal  Systolic function was normal by visual assessment  Ejection fraction was estimated to be 55 %   Although no diagnostic regional wall motion abnormality was identified, this possibility cannot be completely  excluded on the basis of this study  Wall thickness was normal  DOPPLER: Left ventricular diastolic function parameters were normal     RIGHT VENTRICLE: The size was normal  Systolic function was normal  Wall thickness was normal     LEFT ATRIUM: Size was normal     RIGHT ATRIUM: Size was normal     MITRAL VALVE: Valve structure was normal  There was normal leaflet separation  DOPPLER: The transmitral velocity was within the normal range  There was no evidence for stenosis  There was no regurgitation  AORTIC VALVE: The valve was trileaflet  Leaflets exhibited normal thickness, normal cuspal separation, and sclerosis  DOPPLER: Transaortic velocity was within the normal range  There was no evidence for stenosis  There was no  regurgitation  TRICUSPID VALVE: The valve structure was normal  There was normal leaflet separation  DOPPLER: The transtricuspid velocity was within the normal range  There was no evidence for stenosis  There was trace regurgitation  PULMONIC VALVE: Not well visualized  DOPPLER: There was no evidence for stenosis  There was no regurgitation  PERICARDIUM: There was no pericardial effusion  The pericardium was normal in appearance  AORTA: The root exhibited normal size  SYSTEMIC VEINS: IVC: The inferior vena cava was normal in size and course   Respirophasic changes were normal     SYSTEM MEASUREMENT TABLES    2D  %FS: 34 6 %  Ao Diam: 3 1 cm  EDV(Teich): 74 2 ml  EF(Teich): 64 3 %  ESV(Teich): 26 5 ml  IVSd: 0 9 cm  LA Area: 12 7 cm2  LA Diam: 2 8 cm  LVIDd: 4 1 cm  LVIDs: 2 7 cm  LVPWd: 0 9 cm  RA Area: 11 8 cm2  RVIDd: 3 cm  SV(Teich): 47 7 ml    MM  TAPSE: 2 5 cm    PW  E' Av 1 m/s  E' Lat: 0 1 m/s  E' Sept: 0 1 m/s  E/E' Av 6  E/E' Lat: 11 2  E/E' Sept: 12  MV A [place]: 1 3 m/s  MV Dec [place]: 6 8 m/s2  MV DecT: 185 3 ms  MV E [place]: 1 3 m/s  MV E/A Ratio: 1  MV PHT: 53 7 ms  MVA By PHT: 4 1 cm2    [facility] [place] [facility]    Prepared and electronically signed by    DEVON Coronado  Signed 02-Dec-2019 08:28:12       No results found for this or any previous visit  No results found for this or any previous visit  No results found for this or any previous visit        *-*-*-*-*-*-*-*-*-*-*-*-*-*-*-*-*-*-*-*-*-*-*-*-*-*-*-*-*-*-*-*-*-*-*-*-*-*-*-*-*-*-*-*-*-*-*-*-*-*-*-*-*-*-  ALLERGIES:  Allergies   Allergen Reactions    Lisinopril Anaphylaxis     Took medication a while ago and had a "swelling reaction"  Does not carry epi-pen       *-*-*-*-*-*-*-*-*-*-*-*-*-*-*-*-*-*-*-*-*-*-*-*-*-*-*-*-*-*-*-*-*-*-*-*-*-*-*-*-*-*-*-*-*-*-*-*-*-*-*-*-*-*-    SIGNATURES:   @SIG@   Yolis Treviño MD

## 2019-12-02 NOTE — CONSULTS
Consultation - Palliative and Supportive Care   Issa Anthony Sr  64 y o  male [id number]      IDENTIFICATION:  Inpatient consult to Palliative Care  Consult performed by: Saskia Syed MD  Consult ordered by: BROOKS Haley        Physician Requesting Consult: Bibiana Jacob DO  Reason for Consult / Principal Problem: assistance with goals of care, symptom management  Hx and PE limited by: none     HISTORY OF PRESENT ILLNESS:  Verenice Hare  is a 64 y o  male with PMHx significant for lung adenocarcinoma on chemotherapy, CAD, COPD, CKD3 (baseline creatinine of 2-2 5), DM2, HTN who was admitted with shortness of breath thought to be related to secondary to acute COPD exacerbation  PCS is consulted to help with goals setting and symptom management  He was admitted on 11/29 from home with shortness of breath x 2 weeks  He was found to have acute on chronic hypoxic respiratory failure thought secondary to acute COPD and was started on steroids  He was also complaining of chest pains and was found to have elevated troponins  He placed on heparin drip for presumed NSTEMI and/or VTE/PE  On 11/30, he was noted by his girlfriend to be slurring his speech  An RRT was called for acute stroke  Neurology was consulted and a contrasted MRI brain was obtained that showed no acute pathologies  He was subsequently removed from the stroke pathway given negative imaging and low suspicion for stroke  Suspicion for acute PE remains high and a CT chest PE protocol is being planned pending nephrology's opinion and stable creatinine in the next 24-48H given that he received contrast not too long ago  He was diagnosed with NSCLC in 2018  He is s/p 6 cycles of palliative chemo Alimta, carboplatin, and keytruda, then on maintenance Alimta and Keytruda  He had response to this regimen however Alimta was d/c'd due to worsened renal function  Currently on single-agent immunotherapy Keytruda q 3 weeks      He is known to our service where he sees our [address] as an OP  We were helping him manage his cancer-related pain and peripheral neuropathy  He was last seen on 11/4 where he reports his CRP was at baseline  His current regimen for neuropathy and CRP includes lyrica 25mg OD am and 50mg ODHS, and oxycodone ER 20mg q12H with oxycodone IR 30mg q4H prn  He takes the IR meds 3-4 times a day  He was also encouraged to continue and finish the 5 wishes document on that last office visit  Currently, his pain is slightly worse than his baseline  His chronic pain is mostly in his shoulders and hips  His home regimen was continued upon admission and this seems to be providing him adequate pain relief  His baseline chronic pain on a good day is 6/10  Today, he is averaging at 7/10  He is more concerned about his shortness of breath than his chronic CRP  He appears mildly uncomfortable due to his breathing  Review of Systems   Cardiovascular: Positive for chest pain and dyspnea on exertion  Respiratory: Positive for shortness of breath  Negative for cough  Gastrointestinal: Negative for abdominal pain  All other systems reviewed and are negative        Past Medical History:   Diagnosis Date    Bipolar 1 disorder ([facility] )     Cancer ([facility] )     adeno lung  dx 11/2018-lung bx today 4/9/2019-ongoing chemo    Chronic pain disorder     back and right shoulder    CKD (chronic kidney disease)     COPD (chronic obstructive pulmonary disease) (HCC)     Depression     Diabetes mellitus (HCC)     GERD (gastroesophageal reflux disease)     Herniated lumbar intervertebral disc     Hypertension     Insomnia     Latent syphilis     Treated    Low back pain     Lumbosacral disc disease     Lung cancer ([facility] ) 04/2019    Pneumothorax after biopsy     R lung    PTSD (post-traumatic stress disorder)     Pulmonary emphysema ([facility] )     Tobacco abuse 11/13/2018     Past Surgical History:   Procedure Laterality Date    COLONOSCOPY  2011  CT GUIDED CHEST TUBE  2018    FL GUIDED CENTRAL VENOUS ACCESS DEVICE INSERTION  2019    HEMORROIDECTOMY      IR CHEST TUBE  2018    IR IMAGE GUIDED BIOPSY/ASPIRATION LUNG  2018    KNEE SURGERY      IL INSJ TUNNELED CTR VAD W/SUBQ PORT AGE 5 YR/> N/A 2019    Procedure: PLACEMENT OF PORT-A-CATH;  Surgeon: Avani Suarez MD;  Location:  MAIN OR;  Service: Vascular     Social History     Socioeconomic History    Marital status: Legally      Spouse name: Not on file    Number of children: Not on file    Years of education: Not on file    Highest education level: Not on file   Occupational History    Occupation: part time employment   Social Needs    Financial resource strain: Not on file    Food insecurity:     Worry: Not on file     Inability: Not on file   [place] needs:     Medical: Not on file     Non-medical: Not on file   Tobacco Use    Smoking status: Former Smoker     Packs/day: 0 50     Years: 40 00     Pack years: 20 00     Types: Cigarettes     Start date:      Last attempt to quit: 2019     Years since quittin 3    Smokeless tobacco: Never Used   Substance and Sexual Activity    Alcohol use: Not Currently    Drug use: Not Currently     Types: Marijuana     Comment: stopped , "i smoked weed and stopped 1 month ago"    Sexual activity: Yes   Lifestyle    Physical activity:     Days per week: Not on file     Minutes per session: Not on file    Stress: Not on file   Relationships    Social connections:     Talks on phone: Not on file     Gets together: Not on file     Attends Hinduism service: Not on file     Active member of club or organization: Not on file     Attends meetings of clubs or organizations: Not on file     Relationship status: Not on file    Intimate partner violence:     Fear of current or ex partner: Not on file     Emotionally abused: Not on file     Physically abused: Not on file     Forced sexual activity: Not on file   Other Topics Concern    Not on file   Social History Narrative    Lives with girlfriend     Family History   Problem Relation Age of Onset    Heart disease Mother     Cancer Mother     Hypertension Father     Diabetes Family     Asthma Family     Heart disease Family     Cancer Family     Cancer Maternal Grandfather     Cancer Paternal Grandfather     Cancer Maternal Aunt        MEDICATIONS / ALLERGIES:    all current active meds have been reviewed    Allergies   Allergen Reactions    Lisinopril Anaphylaxis     Took medication a while ago and had a "swelling reaction"  Does not carry epi-pen       OBJECTIVE:    Physical Exam  Physical Exam   Constitutional: He is oriented to person, place, and time  He appears well-developed and well-nourished  He is not intubated  Appears chronically ill, appears as stated age, appears slightly anxious - almost apprehensive - about his breathing   HENT:   Head: Normocephalic and atraumatic  Eyes: Pupils are equal, round, and reactive to light  EOM are normal    Cardiovascular: Normal rate, regular rhythm and intact distal pulses  Pulmonary/Chest: Breath sounds normal  No accessory muscle usage  No apnea, no tachypnea and no bradypnea  He is not intubated  No respiratory distress  He has no wheezes  Shallow breathing, almost anticipating that his breathing will get worse at any time   Abdominal: Soft  Bowel sounds are normal  He exhibits distension  There is no tenderness  There is no guarding  Neurological: He is alert and oriented to person, place, and time  He is not disoriented  No cranial nerve deficit  Psychiatric: His behavior is normal  His mood appears anxious  He is not agitated  Appears slightly anxious       Lab Results: I have personally reviewed pertinent labs  as reviewed in the HPI  Imaging Studies: I have personally reviewed pertinent reports     as reviewed in the HPI  EKG, Pathology, and Other Studies: I have personally reviewed pertinent reports  as reviewed in the HPI      Assessment:  Lung adenocarcinoma  COPD  Shortness of breath  Dyspnea on exerion  Acute on chronic hypoxic respiratory failure  Suspected VTE/PE  Chest pains  Elevated troponin  Chronic cancer-related pain  Peripheral neuropathy  Anxiety  PTSD  DM2  CKD3  HTN      Plan:  1  Symptom management   Chronic CRP - close or at baseline currently   - only required 1 prn oxy overnight   - continue home regimen: oxycodone ER 20mg q12H   - continue home regimen: oxycodone IR 30mg q4H prn for moderate-severe pain   - if CRP remains at baseline upon d/c, we will provide scripts for Oxy ER only  He has enough IR pills to supposedly last him until 12/22  He will follow up with our service upon d/c and the script will be provided then  Peripheral neuropathy   - continue home regimen: lyrica 25mg am and 50mg at HS     Shortness of breath, with seemingly anticipatory anxiety    - add low dose ativan prn for SOB/anxiety    2  Goals    - Patient demonstrates good insight and judgement into his medical condition  He seems competent on my exam today  - Power of :  presumed to be adult children by [place] if no POA designated  - Goal: did not address today given acute symptoms that are still being worked up  Will focus on symptom management today and will readdress goals tomorrow  He seems treatment-focused during this hospital stay and during our many encounters with him as an OP   - Code Status: Full - Level 1    We appreciate the invitation to be involved in this patient's care  We will cont to follow along  Please do not hesitate to reach our on call provider through our clinic answering service at  should you have acute symptom control concerns  Counseling / Coordination of Care  Total floor / unit time spent today 60 minutes   Greater than 50% of total time was spent with the patient and / or family counseling and / or coordination of care  A description of the counseling / coordination of care: provided medical updates, discussed palliative care, determined competency/capacity, determined goals of care through review of previous encounters, determined POA, determined social/family support, discussed plans of care, discussed symptom management       Sariah Paul MD  Palliative Medicine & Supportive Care  Internal Medicine  Available via [address] [place] [address] Text  Office: [phone]  Fax: [phone]

## 2019-12-02 NOTE — SOCIAL WORK
Cm reviewed pt care coordination rounds  Pt is not medically cleared for d/c  Pt is on IV steroids  Cat scan with contrast tbd  Anticipating d/c in 48 hrs  Cm will f/u for final medical clearance and dcp

## 2019-12-02 NOTE — PLAN OF CARE
Problem: Potential for Falls  Goal: Patient will remain free of falls  Description  INTERVENTIONS:  - Assess patient frequently for physical needs  -  Identify cognitive and physical deficits and behaviors that affect risk of falls    -  [place] fall precautions as indicated by assessment   - Educate patient/family on patient safety including physical limitations  - Instruct patient to call for assistance with activity based on assessment  - Modify environment to reduce risk of injury  - Consider OT/PT consult to assist with strengthening/mobility  Outcome: Progressing     Problem: PAIN - ADULT  Goal: Verbalizes/displays adequate comfort level or baseline comfort level  Description  Interventions:  - Encourage patient to monitor pain and request assistance  - Assess pain using appropriate pain scale  - Administer analgesics based on type and severity of pain and evaluate response  - Implement non-pharmacological measures as appropriate and evaluate response  - Consider cultural and social influences on pain and pain management  - Notify physician/advanced practitioner if interventions unsuccessful or patient reports new pain  Outcome: Progressing     Problem: INFECTION - ADULT  Goal: Absence or prevention of progression during hospitalization  Description  INTERVENTIONS:  - Assess and monitor for signs and symptoms of infection  - Monitor lab/diagnostic results  - Monitor all insertion sites, i e  indwelling lines, tubes, and drains  - Monitor endotracheal if appropriate and nasal secretions for changes in amount and color  - [place] appropriate cooling/warming therapies per order  - Administer medications as ordered  - Instruct and encourage patient and family to use good hand hygiene technique  - Identify and instruct in appropriate isolation precautions for identified infection/condition  Outcome: Progressing  Goal: Absence of fever/infection during neutropenic period  Description  INTERVENTIONS:  - Monitor WBC    Outcome: Progressing     Problem: SAFETY ADULT  Goal: Maintain or return to baseline ADL function  Description  INTERVENTIONS:  -  Assess patient's ability to carry out ADLs; assess patient's baseline for ADL function and identify physical deficits which impact ability to perform ADLs (bathing, care of mouth/teeth, toileting, grooming, dressing, etc )  - Assess/evaluate cause of self-care deficits   - Assess range of motion  - Assess patient's mobility; develop plan if impaired  - Assess patient's need for assistive devices and provide as appropriate  - Encourage maximum independence but intervene and supervise when necessary  - Involve family in performance of ADLs  - Assess for home care needs following discharge   - Consider OT consult to assist with ADL evaluation and planning for discharge  - Provide patient education as appropriate  Outcome: Progressing  Goal: Maintain or return mobility status to optimal level  Description  INTERVENTIONS:  - Assess patient's baseline mobility status (ambulation, transfers, stairs, etc )    - Identify cognitive and physical deficits and behaviors that affect mobility  - Identify mobility aids required to assist with transfers and/or ambulation (gait belt, sit-to-stand, lift, walker, cane, etc )  - [place] fall precautions as indicated by assessment  - Record patient progress and toleration of activity level on Mobility SBAR; progress patient to next Phase/Stage  - Instruct patient to call for assistance with activity based on assessment  - Consider rehabilitation consult to assist with strengthening/weightbearing, etc   Outcome: Progressing     Problem: DISCHARGE PLANNING  Goal: Discharge to home or other facility with appropriate resources  Description  INTERVENTIONS:  - Identify barriers to discharge w/patient and caregiver  - Arrange for needed discharge resources and transportation as appropriate  - Identify discharge learning needs (meds, wound care, etc )  - Arrange for interpretive services to assist at discharge as needed  - Refer to Case Management Department for coordinating discharge planning if the patient needs post-hospital services based on physician/advanced practitioner order or complex needs related to functional status, cognitive ability, or social support system  Outcome: Progressing     Problem: Knowledge Deficit  Goal: Patient/family/caregiver demonstrates understanding of disease process, treatment plan, medications, and discharge instructions  Description  Complete learning assessment and assess knowledge base    Interventions:  - Provide teaching at level of understanding  - Provide teaching via preferred learning methods  Outcome: Progressing     Problem: CARDIOVASCULAR - ADULT  Goal: Maintains optimal cardiac output and hemodynamic stability  Description  INTERVENTIONS:  - Monitor I/O, vital signs and rhythm  - Monitor for S/S and trends of decreased cardiac output  - Administer and titrate ordered vasoactive medications to optimize hemodynamic stability  - Assess quality of pulses, skin color and temperature  - Assess for signs of decreased coronary artery perfusion  - Instruct patient to report change in severity of symptoms  Outcome: Progressing  Goal: Absence of cardiac dysrhythmias or at baseline rhythm  Description  INTERVENTIONS:  - Continuous cardiac monitoring, vital signs, obtain 12 lead EKG if ordered  - Administer antiarrhythmic and heart rate control medications as ordered  - Monitor electrolytes and administer replacement therapy as ordered  Outcome: Progressing     Problem: RESPIRATORY - ADULT  Goal: Achieves optimal ventilation and oxygenation  Description  INTERVENTIONS:  - Assess for changes in respiratory status  - Assess for changes in mentation and behavior  - Position to facilitate oxygenation and minimize respiratory effort  - Oxygen administered by appropriate delivery if ordered  - Encourage broncho-pulmonary hygiene including cough, deep breathe, Incentive Spirometry  - Assess the need for suctioning and aspirate as needed  - Assess and instruct to report SOB or any respiratory difficulty  - Respiratory Therapy support as indicated   Outcome: Progressing     Problem: NEUROSENSORY - ADULT  Goal: Achieves stable or improved neurological status  Description  INTERVENTIONS  - Monitor and report changes in neurological status  - Monitor vital signs such as temperature, blood pressure, glucose, and any other labs ordered   - Initiate measures to prevent increased intracranial pressure  - Monitor for seizure activity and implement precautions if appropriate      Outcome: Progressing  Goal: Remains free of injury related to seizures activity  Description  INTERVENTIONS  - Maintain airway, patient safety  and administer oxygen as ordered  - Monitor patient for seizure activity, document and report duration and description of seizure to physician/advanced practitioner  - If seizure occurs,  ensure patient safety during seizure  - Reorient patient post seizure  - Seizure pads on all 4 side rails  - Instruct patient/family to notify RN of any seizure activity including if an aura is experienced  - Instruct patient/family to call for assistance with activity based on nursing assessment  - Administer anti-seizure medications if ordered    Outcome: Progressing  Goal: Achieves maximal functionality and self care  Description  INTERVENTIONS  - Monitor swallowing and airway patency with patient fatigue and changes in neurological status  - Encourage and assist patient to increase activity and self care     - Encourage visually impaired, hearing impaired and aphasic patients to use assistive/communication devices  Outcome: Progressing     Problem: METABOLIC, FLUID AND ELECTROLYTES - ADULT  Goal: Electrolytes maintained within normal limits  Description  INTERVENTIONS:  - Monitor labs and assess patient for signs and symptoms of electrolyte imbalances  - Administer electrolyte replacement as ordered  - Monitor response to electrolyte replacements, including repeat lab results as appropriate  - Instruct patient on fluid and nutrition as appropriate  Outcome: Progressing  Goal: Fluid balance maintained  Description  INTERVENTIONS:  - Monitor labs   - Monitor I/O and WT  - Instruct patient on fluid and nutrition as appropriate  - Assess for signs & symptoms of volume excess or deficit  Outcome: Progressing  Goal: Glucose maintained within target range  Description  INTERVENTIONS:  - Monitor Blood Glucose as ordered  - Assess for signs and symptoms of hyperglycemia and hypoglycemia  - Administer ordered medications to maintain glucose within target range  - Assess nutritional intake and initiate nutrition service referral as needed  Outcome: Progressing     Problem: SKIN/TISSUE INTEGRITY - ADULT  Goal: Skin integrity remains intact  Description  INTERVENTIONS  - Identify patients at risk for skin breakdown  - Assess and monitor skin integrity  - Assess and monitor nutrition and hydration status  - Monitor labs (i e  albumin)  - Assess for incontinence   - Turn and reposition patient  - Assist with mobility/ambulation  - Relieve pressure over bony prominences  - Avoid friction and shearing  - Provide appropriate hygiene as needed including keeping skin clean and dry  - Evaluate need for skin moisturizer/barrier cream  - Collaborate with interdisciplinary team (i e  Nutrition, Rehabilitation, etc )   - Patient/family teaching  Outcome: Progressing  Goal: Incision(s), wounds(s) or drain site(s) healing without S/S of infection  Description  INTERVENTIONS  - Assess and document risk factors for skin impairment   - Assess and document dressing, incision, wound bed, drain sites and surrounding tissue  - Consider nutrition services referral as needed  - Oral mucous membranes remain intact  - Provide patient/ family education  Outcome: Progressing  Goal: Oral mucous membranes remain intact  Description  INTERVENTIONS  - Assess oral mucosa and hygiene practices  - Implement preventative oral hygiene regimen  - Implement oral medicated treatments as ordered  - Initiate Nutrition services referral as needed  Outcome: Progressing     Problem: MUSCULOSKELETAL - ADULT  Goal: Maintain or return mobility to safest level of function  Description  INTERVENTIONS:  - Assess patient's ability to carry out ADLs; assess patient's baseline for ADL function and identify physical deficits which impact ability to perform ADLs (bathing, care of mouth/teeth, toileting, grooming, dressing, etc )  - Assess/evaluate cause of self-care deficits   - Assess range of motion  - Assess patient's mobility  - Assess patient's need for assistive devices and provide as appropriate  - Encourage maximum independence but intervene and supervise when necessary  - Involve family in performance of ADLs  - Assess for home care needs following discharge   - Consider OT consult to assist with ADL evaluation and planning for discharge  - Provide patient education as appropriate  Outcome: Progressing  Goal: Maintain proper alignment of affected body part  Description  INTERVENTIONS:  - Support, maintain and protect limb and body alignment  - Provide patient/ family with appropriate education  Outcome: Progressing     Problem: Neurological Deficit  Goal: Neurological status is stable or improving  Description  Interventions:  - Monitor and assess patient's level of consciousness, motor function, sensory function, and level of assistance needed for ADLs  - Monitor and report changes from baseline  Collaborate with interdisciplinary team to initiate plan and implement interventions as ordered  - Provide and maintain a safe environment  - Consider seizure precautions  - Consider fall precautions  - Consider aspiration precautions  - Consider bleeding precautions  Outcome: Progressing     Problem:  Activity Intolerance/Impaired Mobility  Goal: Mobility/activity is maintained at optimum level for patient  Description  Interventions:  - Assess and monitor patient  barriers to mobility and need for assistive/adaptive devices  - Assess patient's emotional response to limitations  - Collaborate with interdisciplinary team and initiate plans and interventions as ordered  - Encourage independent activity per ability   - Maintain proper body alignment  - Perform active/passive rom as tolerated/ordered  - Plan activities to conserve energy   - Turn patient as appropriate  Outcome: Progressing     Problem: Communication Impairment  Goal: Ability to express needs and understand communication  Description  Assess patient's communication skills and ability to understand information  Patient will demonstrate use of effective communication techniques, alternative methods of communication and understanding even if not able to speak  - Encourage communication and provide alternate methods of communication as needed  - Collaborate with case management/ for discharge needs  - Include patient/family/caregiver in decisions related to communication  Outcome: Progressing     Problem: Potential for Aspiration  Goal: Non-ventilated patient's risk of aspiration is minimized  Description  Assess and monitor vital signs, respiratory status, and labs (WBC)  Monitor for signs of aspiration (tachypnea, cough, rales, wheezing, cyanosis, fever)  - Assess and monitor patient's ability to swallow  - Place patient up in chair to eat if possible  - HOB up at 90 degrees to eat if unable to get patient up into chair   - Supervise patient during oral intake  - Instruct patient/ family to take small bites  - Instruct patient/ family to take small single sips when taking liquids    - Follow patient-specific strategies generated by speech pathologist   Outcome: Progressing  Goal: Ventilated patient's risk of aspiration is minimized  Description  Assess and monitor vital signs, respiratory status, airway cuff pressure, and labs (WBC)  Monitor for signs of aspiration (tachypnea, cough, rales, wheezing, cyanosis, fever)  - Elevate head of bed 30 degrees if patient has tube feeding   - Monitor tube feeding  Outcome: Progressing     Problem: Nutrition  Goal: Nutrition/Hydration status is improving  Description  Monitor and assess patient's nutrition/hydration status for malnutrition (ex- brittle hair, bruises, dry skin, pale skin and conjunctiva, muscle wasting, smooth red tongue, and disorientation)  Collaborate with interdisciplinary team and initiate plan and interventions as ordered  Monitor patient's weight and dietary intake as ordered or per policy  Utilize nutrition screening tool and intervene per policy  Determine patient's food preferences and provide high-protein, high-caloric foods as appropriate  - Assist patient with eating   - Allow adequate time for meals   - Encourage patient to take dietary supplement as ordered  - Collaborate with clinical nutritionist   - Include patient/family/caregiver in decisions related to nutrition    Outcome: Progressing

## 2019-12-02 NOTE — PROGRESS NOTES
[facility] Progress Note  Patient: Kevin Burleson  64 y o  male   MRN: [id number]  PCP: Gianna Delaney MD  Unit/Bed#: E4 -01 Encounter: [id number]  Date Of Visit: 12/02/19      Assessment/plan  1  Acute on chronic hypoxic respiratory failure due to severe copd with acute exacerbation- appreciate pulmonary recommendations  Pt is currently on 3 liters of nc  He usually is on 2 liters at baseline  He will continue with pulmonary toilet  He will continue solumedrol 40mg IV q8 hours, budesonide bid, duoneb, and mucinex    2  Possible pulmonary embolism- Pt is at high risk for pe due to adenocarcinoma  Venous duplex is pending  He has ckd4 and unable to have cta chest to r/o pe due to recent contrast  Will continue heparin gtt and re assess pt after venous duplex  3  Tobacco dependence- encourage pt to stop smoking    4  Questionable afib- pt was started on a cardizem gtt  Will await cardiology follow up  Possibly change to cardizem po tomorrow  5  Non mi troponin elevation- echo reviewed  Likely due to  Copd exacerbation and tachycardia    6  Stroke like symptoms- pt was a stroke alert  Work up was negative  7  ckd4- appreciate nephrology recommendations  Creatinine increased to 2 5 from 2 4  Check bmp in am      8  Type 2 diabetes- a1c is 5 6  Hold metformin  Continue insulin sliding scale  9  Adenocarcinoma of the lung- pt follows with Dr Larissa Whitley  He is being treated with [facility]  Appreciate palliative care recommendations    10  htn- stable  Continue current treatment    Subjective:   Pt seen and examined  Pt still short of breath with minimal movement  No chest pain  No n/v/d no abd pain  Objective:     Vitals: Blood pressure 145/85, pulse 88, temperature 98 °F (36 7 °C), temperature source Temporal, resp  rate 20, weight 68 kg (149 lb 14 6 oz), SpO2 95 %  ,Body mass index is 22 79 kg/m²      Lab, Imaging and other studies:  Results from last 7 days   Lab Units 12/02/19  0453  11/29/19  2333   WBC Thousand/uL 14 34*   < >  --    HEMOGLOBIN g/dL 9 8*   < >  --    HEMATOCRIT % 31 5*   < >  --    PLATELETS Thousands/uL 413*   < >  --    INR   --   --  1 15    < > = values in this interval not displayed       Results from last 7 days   Lab Units 12/02/19  0453   POTASSIUM mmol/L 4 5   CHLORIDE mmol/L 107   CO2 mmol/L 26   BUN mg/dL 42*   CREATININE mg/dL 2 50*   CALCIUM mg/dL 8 7     Results from last 7 days   Lab Units 11/30/19  0605 11/30/19  0115 11/29/19  2223   TROPONIN I ng/mL 3 03* 3 58* 2 92*     No results found for: [place] Colby, WOUNDCULT, SPUTUMCULTUR    Scheduled Meds:   Current Facility-Administered Medications:  acetaminophen 650 mg Oral Q6H PRN Willard Ornelas MD    albuterol 2 puff Inhalation Q4H PRN Willard Ornelas MD    aluminum-magnesium hydroxide-simethicone 30 mL Oral Q6H PRN Willard Ornelas MD    amLODIPine 10 mg Oral Daily [address], PAMICHELLE    azithromycin 500 mg Intravenous Q24H Landon Mean, CRNP    budesonide 0 5 mg Nebulization Q12H Landon Mean, CRNP    cefTRIAXone 1,000 mg Intravenous Q24H Landon Mean, CRNP    chlorproMAZINE 25 mg Oral Q6H PRN Willard Ornelas MD    diltiazem 1-15 mg/hr Intravenous Titrated Willard Ornelas MD Last Rate: 5 mg/hr (12/02/19 1002)   diphenhydrAMINE 25 mg Oral Q6H PRN Willard Ornelas MD    famotidine 20 mg Oral BID Willard Ornelas MD    FLUoxetine 10 mg Oral Daily Jonathan Jimenez MD    fluticasone 1 spray Each Nare Daily Jonathan Jimenez MD    folic acid 1 mg Oral Daily Jonathan Jimenez MD    guaiFENesin 600 mg Oral Q12H [facility] Landon Mean, CRNP    heparin (porcine) 3-30 Units/kg/hr (Order-Specific) Intravenous Titrated Willard Ornelas MD Last Rate: 20 Units/kg/hr (12/02/19 1315)   heparin (porcine) 2,600 Units Intravenous PRN Willard Ornelas MD    heparin (porcine) 5,200 Units Intravenous PRN Jonathan Jimenez MD    insulin lispro 1-5 Units Subcutaneous TID AC Willard Ornelas MD    ipratropium-albuterol 3 mL Nebulization Q6H Steph Méndez MD    LORazepam 0 5 mg Oral Q4H PRN Allie Lea MD    melatonin 3 mg Oral HS Steph Méndez MD    methocarbamol 750 mg Oral Q6H PRN Steph Méndez MD    methylPREDNISolone sodium succinate 40 mg Intravenous Q8H Jonathan Jimenez MD    ondansetron 4 mg Intravenous Q6H PRN Steph Méndez MD    oxyCODONE 20 mg Oral Q12H [facility] [place] Steph Méndez MD    oxyCODONE 30 mg Oral Q4H PRN Steph Méndez MD    pantoprazole 40 mg Oral Early Morning Steph Méndez MD    polyethylene glycol 17 g Oral Daily PRN Steph Méndez MD    pregabalin 25 mg Oral Daily Steph Méndez MD    pregabalin 50 mg Oral HS Jonathan Jimenez MD    QUEtiapine 25 mg Oral HS Steph Méndez MD    senna-docusate sodium 1 tablet Oral BID PRN Steph Méndez MD    tamsulosin 0 4 mg Oral Daily With Edwin Vallejo MD    tiotropium 18 mcg Inhalation Daily Steph Méndez MD      Continuous Infusions:   diltiazem 1-15 mg/hr Last Rate: 5 mg/hr (12/02/19 1002)   heparin (porcine) 3-30 Units/kg/hr (Order-Specific) Last Rate: 20 Units/kg/hr (12/02/19 1315)     PRN Meds:   acetaminophen    albuterol    aluminum-magnesium hydroxide-simethicone    chlorproMAZINE    diphenhydrAMINE    heparin (porcine)    heparin (porcine)    LORazepam    methocarbamol    ondansetron    oxyCODONE    polyethylene glycol    senna-docusate sodium      Physical exam:  Physical Exam  General appearance: alert and oriented, in no acute distress  Head: Normocephalic, without obvious abnormality, atraumatic  Eyes: conjunctivae/corneas clear  PERRL, EOM's intact  Fundi benign    Neck: no adenopathy, no carotid bruit, no JVD, supple, symmetrical, trachea midline and thyroid not enlarged, symmetric, no tenderness/mass/nodules  Lungs: wheezing diffuse and upper airway  Heart: tachy s1 s2  Abdomen: soft, non-tender; bowel sounds normal; no masses,  no organomegaly  Extremities: extremities normal, warm and well-perfused; no cyanosis, clubbing, or edema  Pulses: 2+ and symmetric  Skin: Skin color, texture, turgor normal  No rashes or lesions  Neurologic: Mental status: Alert, oriented, thought content appropriate      VTE Pharmacologic Prophylaxis: Heparin  VTE Mechanical Prophylaxis: sequential compression device    Counseling / Coordination of Care  Total floor / unit time spent today 20 minutes     Current Length of Stay: 3 day(s)    Current Patient Status: Inpatient       Code Status: Level 1 - Full Code

## 2019-12-02 NOTE — PLAN OF CARE
Problem: PHYSICAL THERAPY ADULT  Goal: Performs mobility at highest level of function for planned discharge setting  See evaluation for individualized goals  Description  Treatment/Interventions: Functional transfer training, LE strengthening/ROM, Elevations, Therapeutic exercise, Endurance training, Cognitive reorientation, Patient/family training, Equipment eval/education, Bed mobility, Gait training, Compensatory technique education, Spoke to nursing, ADL retraining  Equipment Recommended: Jus Call       See flowsheet documentation for full assessment, interventions and recommendations  Note:   Prognosis: Fair  Problem List: Decreased strength, Decreased endurance, Impaired balance, Decreased mobility, Decreased cognition, Impaired judgement, Decreased safety awareness, Pain  Assessment: Cece Thomas Sr  is a 64 y o  [email or web address] Chronic obstructive pulmonary disease with acute exacerbation ([facility] )  Pt  has a past medical history of Bipolar 1 disorder ([facility] ), Cancer ([facility] ), Chronic pain disorder, CKD (chronic kidney disease), COPD (chronic obstructive pulmonary disease) ([facility] ), Depression, Diabetes mellitus ([facility] ), GERD (gastroesophageal reflux disease), Herniated lumbar intervertebral disc, Hypertension, Insomnia, Latent syphilis, Low back pain, Lumbosacral disc disease, Lung cancer ([facility] ) (04/2019), Pneumothorax after biopsy, PTSD (post-traumatic stress disorder), Pulmonary emphysema ([facility] ), and Tobacco abuse (11/13/2018)    PT was consulted and pt was seen on 12/2/2019 for a high complexity PT EVALUATION  Pt current medical needs include 3L NC, continuous telemetry, PIV, fall risk precautions, monitoring of abn labs and vitals  Social hx limited secondary to pt lethargy/impaired cognition, w repetitive vc to redirect attention to task and encourage participation  Pt lives with his girlfriend in an apartment with 5? MARCIO and first level bed and bath   Prior to admission pt was indep and amb w/o an AD  At baseline he was on 2L NC  Pt is currently functioning at a minimum assistance x1 level for bed mobility, supervision assistance x1 level for transfers, minimum assistance x1 level for ambulation with Rolling Walker  Pt demonstrated slight impulsivity during transfers and amb with cues for safety and line management  Pt with audible wheezing throughout session; O2 sats maintained at 92-93% on supplemental O2  Pt will benefit from continued skilled IP PT to address the above mentioned impairments  in order to maximize recovery and increase functional independence when completing mobility and ADLs  Currently PT recommendations for DME include RW  At this time PT recommendations for d/c are STR when medically cleared  Barriers to Discharge: Decreased caregiver support, Inaccessible home environment  Barriers to Discharge Comments: MARCIO, alone during the day  Recommendation: OT consult, Short-term skilled PT     PT - OK to Discharge: Yes    See flowsheet documentation for full assessment

## 2019-12-02 NOTE — PHYSICAL THERAPY NOTE
PHYSICAL THERAPY EVALUATION          Patient Name: Aranza Price  Today's Date: 12/2/2019   64 y o   [phone]    Shortness of breath [R06 02]  Lung cancer (HCC) [C34 90]  ACS (acute coronary syndrome) (HCC) [I24 9]  CKD (chronic kidney disease) [N18 9]  Elevated troponin [R79 89]  COPD exacerbation ([facility] ) [J44 1]    Past Medical History:   Diagnosis Date    Bipolar 1 disorder ([facility] )     Cancer ([facility] )     adeno lung  dx 11/2018-lung bx today 4/9/2019-ongoing chemo    Chronic pain disorder     back and right shoulder    CKD (chronic kidney disease)     COPD (chronic obstructive pulmonary disease) (HCC)     Depression     Diabetes mellitus (HCC)     GERD (gastroesophageal reflux disease)     Herniated lumbar intervertebral disc     Hypertension     Insomnia     Latent syphilis     Treated    Low back pain     Lumbosacral disc disease     Lung cancer ([facility] ) 04/2019    Pneumothorax after biopsy     R lung    PTSD (post-traumatic stress disorder)     [facility]     Tobacco abuse 11/13/2018       Past Surgical History:   Procedure Laterality Date    COLONOSCOPY  2011    CT GUIDED CHEST TUBE  11/21/2018    FL GUIDED CENTRAL VENOUS ACCESS DEVICE INSERTION  2/8/2019    HEMORROIDECTOMY      IR CHEST TUBE  11/14/2018    IR IMAGE GUIDED BIOPSY/ASPIRATION LUNG  11/13/2018    KNEE SURGERY      OK INSJ TUNNELED CTR VAD W/SUBQ PORT AGE 5 YR/> N/A 2/8/2019    Procedure: PLACEMENT OF PORT-A-CATH;  Surgeon: Vasiliy Plascencia MD;  Location: [address] [place] [address];  Service: Vascular        12/02/19 1341   Note Type   Note type Eval only   Pain Assessment   Pain Assessment 0-10   Pain Score 7   Pain Location Chest;Leg   Pain Orientation Bilateral   Hospital Pain Intervention(s) Repositioned; Ambulation/increased activity   Response to Interventions tolerated w no reported increase in pain   Home Living   Type of Home Apartment Home Layout One level;Stairs to enter with rails  (5? MARCIO)   Bathroom Shower/Tub Walk-in shower   Bathroom Toilet Standard   Bathroom Accessibility Accessible   Additional Comments home set up difficult to obtain secondary to lethargy/mental status  brief hx confirmed from prev admission   Prior Function   Level of [place] Independent with ADLs and functional mobility   Lives With Significant other   Receives Help From Other (Comment)  (girlfriend)   ADL Assistance Independent   IADLs Independent   Falls in the last 6 months 0   Comments PLOF difficult to obtain secondary to lethargy/mental status  brief hx confirmed from prev admission  pt reports his gf goes to work everyday at [employer] and he is alone  denies use of an AD pta   Restrictions/Precautions   Weight Bearing Precautions Per Order No   Other Precautions Cognitive;Multiple lines;Telemetry;O2;Fall Risk;Pain  (3L NC, telemetry, PIV)   General   Additional Pertinent History pt [facility] for c/o of SOB; admitted 11/29/19 for SOB  pt currently receiving OP tx(chemo) for lung CA  pt on 2L O2 at baseline   Family/Caregiver Present No   Cognition   Overall Cognitive Status Impaired   Arousal/Participation Lethargic   Orientation Level Oriented to person;Oriented to place; Disoriented to time  (disoriented to time despite cues)   Memory Decreased recall of recent events;Decreased short term memory   Following Commands Follows one step commands with increased time or repetition   Comments pt lethargic upon PT arrival with jarbled words with verbal cues to encourage participation   RUE Assessment   RUE Assessment WFL   LUE Assessment   LUE Assessment WFL   RLE Assessment   RLE Assessment WFL  (grossly 4/5)   LLE Assessment   LLE Assessment WFL  (grossly 4/5)   Coordination   Sensation WFL   Light Touch   RLE Light Touch Grossly intact   LLE Light Touch Grossly intact   Bed Mobility   Supine to Sit 4  Minimal assistance   Additional items Assist x 1;HOB elevated; Bedrails; Increased time required;Verbal cues   Additional Comments pt seated OOB end of session   Transfers   Sit to Stand 5  Supervision   Additional items Impulsive;Verbal cues   Stand to Sit 5  Supervision   Additional items Armrests; Verbal cues   Stand pivot 4  Minimal assistance   Additional items Assist x 1;Verbal cues  (w RW)   Additional Comments verbal cues for safe technique and hand placement during transf   Ambulation/Elevation   Gait pattern Short stride; Inconsistent mc  (impulsive)   Gait Assistance 4  Minimal assist   Additional items Assist x 1;Verbal cues   Assistive Device Rolling walker   Distance 10'   Stair Management Assistance Not tested   Balance   Static Standing Fair   Dynamic Standing Fair -   Ambulatory Poor +  (w RW)   Endurance Deficit   Endurance Deficit Yes   Endurance Deficit Description pt limited by fatigue, SOB   Activity Tolerance   Activity Tolerance Patient limited by fatigue   Nurse Made Aware Roger Schulz RN; cleared for PT   Assessment   Prognosis Fair   Problem List Decreased strength;Decreased endurance; Impaired balance;Decreased mobility; Decreased cognition; Impaired judgement;Decreased safety awareness;Pain   Assessment Gulshan Lombardi Sr  is a 64 y o  male admitted to [facility] on 11/29/2019 for Chronic obstructive pulmonary disease with acute exacerbation ([address] )  Pt  has a past medical history of Bipolar 1 disorder ([address] ), Cancer ([address] ), Chronic pain disorder, CKD (chronic kidney disease), COPD (chronic obstructive pulmonary disease) ([address] ), Depression, Diabetes mellitus ([address] ), GERD (gastroesophageal reflux disease), Herniated lumbar intervertebral disc, Hypertension, Insomnia, Latent syphilis, Low back pain, Lumbosacral disc disease, Lung cancer ([address] ) (04/2019), Pneumothorax after biopsy, PTSD (post-traumatic stress disorder), Pulmonary emphysema ([address] ), and Tobacco abuse (11/13/2018)    PT was consulted and pt was seen on 12/2/2019 for a high complexity PT EVALUATION   Pt current medical needs include 3L NC, continuous telemetry, PIV, fall risk precautions, monitoring of abn labs and vitals  Social hx limited secondary to pt lethargy/impaired cognition, w repetitive vc to redirect attention to task and encourage participation  Pt lives with his girlfriend in an apartment with 5? MARCIO and first level bed and bath  Prior to admission pt was indep and amb w/o an AD  At baseline he was on 2L NC  Pt is currently functioning at a minimum assistance x1 level for bed mobility, supervision assistance x1 level for transfers, minimum assistance x1 level for ambulation with Rolling Walker  Pt demonstrated slight impulsivity during transfers and amb with cues for safety and line management  Pt with audible wheezing throughout session; O2 sats maintained at 92-93% on supplemental O2  Pt will benefit from continued skilled IP PT to address the above mentioned impairments  in order to maximize recovery and increase functional independence when completing mobility and ADLs  Currently PT recommendations for DME include RW  At this time PT recommendations for d/c are STR when medically cleared  Barriers to Discharge Decreased caregiver support; Inaccessible home environment   Barriers to Discharge Comments MARCIO, alone during the day   Goals   Patient Goals pt respond "no goals" when asked   STG Expiration Date 12/12/19   Short Term Goal #1 To be completed in 10 days: 1)  Pt will perform bed mobility with indep demonstrating appropriate technique 100% of the time in order to improve function  2)  Perform all transfers with indep demonstrating safe and appropriate technique 100% of the time in order to improve ability to negotiate safely in home environment  3) Amb with least restrictive AD > 300'x1 with mod I in order to demonstrate ability to negotiate community distances  4)  Improve overall strength and balance 1/2 grade in order to optimize ability to perform functional tasks and reduce fall risk  5) Increase activity tolerance to 45 minutes in order to improve endurance to functional tasks  6)  Negotiate stairs using most appropriate technique mod I in order to be able to negotiate safely in home environment  7) PT for ongoing patient and family/caregiver education, DME needs and d/c planning in order to promote highest level of function in least restrictive environment  PT Treatment Day 0   Plan   Treatment/Interventions Functional transfer training;LE strengthening/ROM; Elevations; Therapeutic exercise; Endurance training;Cognitive reorientation;Patient/family training;Equipment eval/education; Bed mobility;Gait training; Compensatory technique education;Spoke to nursing;ADL retraining   PT Frequency Other (Comment)  (3-5x/wk)   Recommendation   Recommendation OT consult; Short-term skilled PT   Equipment Recommended Walker   PT - OK to Discharge Yes   Additional Comments when medically cleared   Modified Diana Scale   Modified [place] Scale 4   Barthel Index   Feeding 10   Bathing 0   Grooming Score 5   Dressing Score 5   Bladder Score 10   Bowels Score 10   Toilet Use Score 5   Transfers (Bed/Chair) Score 10   Mobility (Level Surface) Score 0   Stairs Score 0   Barthel Index Score 55   History: co - morbidities, psychological hx, social background (decrease social support), hx prev hospital admissions (Oct, Nov x2), fall risk, use of assistive device?, cognition, multiple lines  Exam: impairments in systems including musculoskeletal (ROM, strength, posture), neuromuscular (balance,locomotion, gait, transfers, motor function and learning), integumentary, cardiopulmonary, cognition  Clinical: unstable/unpredictable  Complexity:high Stephane Love, PT

## 2019-12-02 NOTE — PROGRESS NOTES
Progress Note - Pulmonary   Yesi Sensor Sr  64 y o  male MRN: [id number]  Unit/Bed#: E4 -01 Encounter: [id number]  Addendum 1540: venous duplex negative, low suspicion for PE, believe symptoms are more likely contributed to COPD exacerbation with suspected Pneumonia: obtain sputum culture and start rocephin/zithromax     Assessment/Plan:  1  Acute on Chronic hypoxic respiratory failure  1  Currently on 3 LNC-baseline 2LNC  2  Titrate oxygen to maintain SpO2>/=88%  3  Pulmonary toilet: IS, cough deep breath, increase time OOB-add flutter valve  4  Concern for possible PE as with his adenocarcinoma he is at high risk for PE and continues to complain of chest pain and significant CLINE over baseline  Will discuss with nephrology the possibility of CTA  Follow venous duplex results  Will continue heparin now pending further testing  5  PT evaluation  2  Severe COPD with acute exacerbation  1  Continue solumedrol at 40 mg Q 8 hrs  2  Change to budesonide BID  3  Continue Duo-neb Q 6 hrs  4  Continue Flonase  5  Aggressive pulmonary toilet  6  Add mucinex BID  3  Severe tobacco abuse  1  Continue cessation encouraged  4  Stage IV adenocarcinoma of the lung  1  Place palliative care consult-planned outpatient consult, Chris Lainez is interested in obtaining consult while hospitalized  2  Oncology follow up  5  CKD stage 4  1  Creatinine remained stable 2 5, will discuss with nephrology team ability to check CTA today        Subjective:     Chris Lainez was seen laying in bed upon entering the room  Denies acute overnight events  Reports very minimal improvement in presenting symptoms  Continues to report: chest pain, generalized leg pain, SOB, CLINE and nonproductive cough  Denies: chills, night sweats, or hemoptysis    Objective:         Vitals: Blood pressure 132/61, pulse 75, temperature (!) 97 4 °F (36 3 °C), temperature source Temporal, resp  rate 20, weight 68 kg (149 lb 14 6 oz), SpO2 93 %  , 3LNC, Body mass index is 22 79 kg/m²  Intake/Output Summary (Last 24 hours) at 12/2/2019 0837  Last data filed at 12/2/2019 0630  Gross per 24 hour   Intake 430 ml   Output 1625 ml   Net -1195 ml         Physical Exam  Gen: Awake, alert, oriented x 3, no acute distress  HEENT: Mucous membranes moist, no oral lesions, no thrush, oxygen via NC  NECK: +accessory muscle use, JVP not elevated  Cardiac: Regular, single S1, single S2, no murmurs, no rubs, no gallops  Lungs: diffuse expiratory wheezes  Abdomen: normoactive bowel sounds, soft nontender, nondistended, no rebound or rigidity, no guarding  Extremities: no cyanosis, no clubbing, no edema    Labs: I have personally reviewed pertinent lab results  , CBC:   Lab Results   Component Value Date    WBC 14 34 (H) 12/02/2019    HGB 9 8 (L) 12/02/2019    HCT 31 5 (L) 12/02/2019    MCV 87 12/02/2019     (H) 12/02/2019    MCH 26 9 12/02/2019    MCHC 31 1 (L) 12/02/2019    RDW 15 4 (H) 12/02/2019    MPV 9 5 12/02/2019    NRBC 0 12/02/2019   , CMP:   Lab Results   Component Value Date    SODIUM 141 12/02/2019    K 4 5 12/02/2019     12/02/2019    CO2 26 12/02/2019    BUN 42 (H) 12/02/2019    CREATININE 2 50 (H) 12/02/2019    CALCIUM 8 7 12/02/2019    EGFR 32 12/02/2019     Imaging and other studies: no new imaging to review      BROOKS Crain

## 2019-12-02 NOTE — PROGRESS NOTES
NEPHROLOGY PROGRESS NOTE   Daron Dominguez Sr  64 y o  male MRN: [id number]  Unit/Bed#: E4 -01 Encounter: [id number]      ASSESSMENT/PLAN:  Chronic Kidney Disease, stage IV: Upon review of medical records, baseline creatinine has increased to the mid 2 range  - Creatinine today 2 50 from 2 40 yesterday after receiving IV contrast for stroke alert  - Received IV contrast on 11/30/2019    - Patient follows with Dr Cali Don as outpatient  - Patient s/p 6 cycles of palliative chemotherapy with Alimta, Carboplatin, Keytruda, then on maintance Alimta and Keytruda  Alimta has since been disocntinued, however is currently on single-agent immunotherapy Keytruda Q 3 weeks  - Last dose of Keytruda on 11/20/2019     - Alimta discontinued on 9/19/2019 due to worsening renal function  - Creatinine in August was 2 0     - Creatinine in January was 1 0   - SPEP/UPEP negative, FLC elevated to 2 0   - UA bland   - Renal US 08/23/2019 was normal   - PVR completed on 11/30/2019: 215 mL  - Monitor volume status/ intake and output, daily weight  - Avoid nephrotoxic agents, hypotension   - Avoid IV contrast until creatinine back to baseline    - Repeat BMP in am      Possible PE: Venous duplex pending   - Plan and care per primary team      Hypertension: Blood pressure stable  - Continue current regimen  - Trend and monitor  Anemia: Hemoglobin stable at 9 8 today  - Continue to trend  Severe COPD with acute exacerbation:   - Continue regimen per pulmonary    - Patient is currently on 3 L NC, he is usually on 2 L at baseline  Elevated Troponin: Plan per Cardiology   - Non MI elevation  Adenocarcinoma of Lung: Palliative care following    - S/P 6 cycles of palliative chemo  - Oncology follow up  SUBJECTIVE:  Patient resting on edge of bed  Patient reports he still has shortness of breath with chest discomfort  Patient denies nausea, vomiting, diarrhea      OBJECTIVE:  Current Weight: Weight - Scale: 68 kg (149 lb 14 6 oz)  Vitals:    12/02/19 1127   BP: 145/85   Pulse: 88   Resp: 20   Temp: 98 °F (36 7 °C)   SpO2: 93%       Intake/Output Summary (Last 24 hours) at 12/2/2019 1207  Last data filed at 12/2/2019 0630  Gross per 24 hour   Intake 250 ml   Output 1625 ml   Net -1375 ml       General:  No distress and cooperative  Skin:  Warm, no rash  Eyes:  Sclera anicteric  ENT:  Moist lips and mucous membranes  Neck:  Supple, no JVD, trachea midline  Chest:  Labored respirations with decreased breath sounds bilaterally   CVS:  Normal rate rhythm  Abdomen:  Soft, nontender, normoactive bowel sounds  Extremities:  No edema bilaterally   Neuro:  Alert and oriented  Psych:  Appropriate affect      Medications:  Scheduled Meds:  Current Facility-Administered Medications:  acetaminophen 650 mg Oral Q6H PRN Maricel Choudhury MD    albuterol 2 puff Inhalation Q4H PRN Maricel Choudhury MD    aluminum-magnesium hydroxide-simethicone 30 mL Oral Q6H PRN Maricel Choudhury MD    amLODIPine 10 mg Oral Daily RODNEY Jackson    budesonide 0 5 mg Nebulization Q12H BROOKS Hlaey    chlorproMAZINE 25 mg Oral Q6H PRN Maricel Choudhury MD    diltiazem 1-15 mg/hr Intravenous Titrated Maricel Choudhury MD Last Rate: 5 mg/hr (12/02/19 1002)   diphenhydrAMINE 25 mg Oral Q6H PRN Maricel Choudhury MD    famotidine 20 mg Oral BID Maricel Choudhury MD    FLUoxetine 10 mg Oral Daily Jonathan Jimenez MD    fluticasone 1 spray Each Nare Daily Jonathan Jimenez MD    folic acid 1 mg Oral Daily Maricel Choudhury MD    guaiFENesin 600 mg Oral Q12H [address] BROOKS Haley    heparin (porcine) 3-30 Units/kg/hr (Order-Specific) Intravenous Titrated Maricel Choudhury MD Last Rate: 16 Units/kg/hr (12/02/19 1000)   heparin (porcine) 2,600 Units Intravenous PRN Maricel Choudhury MD    heparin (porcine) 5,200 Units Intravenous PRN Jonathan Jimenez MD    insulin lispro 1-5 Units Subcutaneous TID AC Jonathan Jimenez MD    ipratropium-albuterol 3 mL Nebulization Q6H Douglas Larios MD    LORazepam 0 5 mg Oral Q4H PRN Janie Triana MD    melatonin 3 mg Oral HS Douglas Larios MD    methocarbamol 750 mg Oral Q6H PRN Douglas Larios MD    methylPREDNISolone sodium succinate 40 mg Intravenous Q8H Jonathan Jimenez MD    ondansetron 4 mg Intravenous Q6H PRN Douglas Larios MD    oxyCODONE 20 mg Oral Q12H [address] Douglas Larios MD    oxyCODONE 30 mg Oral Q4H PRN Douglas Larios MD    pantoprazole 40 mg Oral Early Morning Douglas Larios MD    polyethylene glycol 17 g Oral Daily PRN Douglas Larios MD    pregabalin 25 mg Oral Daily Douglas Larios MD    pregabalin 50 mg Oral HS Douglas Larios MD    QUEtiapine 25 mg Oral HS Douglas Larios MD    senna-docusate sodium 1 tablet Oral BID PRN Douglas Larios MD    tamsulosin 0 4 mg Oral Daily With Holden Schroeder MD    tiotropium 18 mcg Inhalation Daily Douglas Larios MD        PRN Meds:   acetaminophen    albuterol    aluminum-magnesium hydroxide-simethicone    chlorproMAZINE    diphenhydrAMINE    heparin (porcine)    heparin (porcine)    LORazepam    methocarbamol    ondansetron    oxyCODONE    polyethylene glycol    senna-docusate sodium    Continuous Infusions:  diltiazem 1-15 mg/hr Last Rate: 5 mg/hr (12/02/19 1002)   heparin (porcine) 3-30 Units/kg/hr (Order-Specific) Last Rate: 16 Units/kg/hr (12/02/19 1000)       Laboratory Results:  Results from last 7 days   Lab Units 12/02/19  0453 12/01/19  0339 11/30/19  1412 11/30/19  0605 11/29/19  1900   WBC Thousand/uL 14 34* 15 95*  --  5 75 8 81   HEMOGLOBIN g/dL 9 8* 9 6*  --  10 4* 11 5*   HEMATOCRIT % 31 5* 30 7*  --  33 9* 36 2*   PLATELETS Thousands/uL 413* 311  --  265 240   SODIUM mmol/L 141 139 136 137 136   POTASSIUM mmol/L 4 5 4 4 4 1 4 3 4 1   CHLORIDE mmol/L 107 104 103 101 100   CO2 mmol/L 26 24 24 22 26   BUN mg/dL 42* 34* 32* 30* 31*   CREATININE mg/dL 2 50* 2 40* 2 45* 2 61* 2 44*   CALCIUM mg/dL 8 7 8 5 8 8 8 8 9 0   MAGNESIUM mg/dL 2 2 2 2  --   --   -- PHOSPHORUS mg/dL 4 0 3 4  --   --   --

## 2019-12-03 LAB
ANION GAP SERPL CALCULATED.3IONS-SCNC: 12 MMOL/L (ref 4–13)
ANION GAP SERPL CALCULATED.3IONS-SCNC: 14 MMOL/L (ref 4–13)
APTT PPP: >210 SECONDS (ref 23–37)
APTT PPP: >210 SECONDS (ref 23–37)
ATRIAL RATE: 113 BPM
BACTERIA UR QL AUTO: ABNORMAL /HPF
BILIRUB UR QL STRIP: NEGATIVE
BUN SERPL-MCNC: 71 MG/DL (ref 5–25)
BUN SERPL-MCNC: 75 MG/DL (ref 5–25)
CALCIUM SERPL-MCNC: 8.4 MG/DL (ref 8.3–10.1)
CALCIUM SERPL-MCNC: 8.8 MG/DL (ref 8.3–10.1)
CHLORIDE SERPL-SCNC: 99 MMOL/L (ref 100–108)
CHLORIDE SERPL-SCNC: 99 MMOL/L (ref 100–108)
CHLORIDE UR-SCNC: 30 MMOL/L
CLARITY UR: CLEAR
CO2 SERPL-SCNC: 20 MMOL/L (ref 21–32)
CO2 SERPL-SCNC: 20 MMOL/L (ref 21–32)
COLOR UR: YELLOW
CREAT SERPL-MCNC: 3.89 MG/DL (ref 0.6–1.3)
CREAT SERPL-MCNC: 4.07 MG/DL (ref 0.6–1.3)
CREAT UR-MCNC: 113.1 MG/DL
FINE GRAN CASTS URNS QL MICRO: ABNORMAL /LPF
GFR SERPL CREATININE-BSD FRML MDRD: 18 ML/MIN/1.73SQ M
GFR SERPL CREATININE-BSD FRML MDRD: 19 ML/MIN/1.73SQ M
GLUCOSE SERPL-MCNC: 141 MG/DL (ref 65–140)
GLUCOSE SERPL-MCNC: 143 MG/DL (ref 65–140)
GLUCOSE SERPL-MCNC: 153 MG/DL (ref 65–140)
GLUCOSE SERPL-MCNC: 157 MG/DL (ref 65–140)
GLUCOSE SERPL-MCNC: 233 MG/DL (ref 65–140)
GLUCOSE SERPL-MCNC: 249 MG/DL (ref 65–140)
GLUCOSE UR STRIP-MCNC: NEGATIVE MG/DL
HGB UR QL STRIP.AUTO: ABNORMAL
KETONES UR STRIP-MCNC: NEGATIVE MG/DL
LEUKOCYTE ESTERASE UR QL STRIP: NEGATIVE
NITRITE UR QL STRIP: NEGATIVE
NON-SQ EPI CELLS URNS QL MICRO: ABNORMAL /HPF
PH UR STRIP.AUTO: 5 [PH]
POTASSIUM SERPL-SCNC: 4.6 MMOL/L (ref 3.5–5.3)
POTASSIUM SERPL-SCNC: 6.1 MMOL/L (ref 3.5–5.3)
PROT UR STRIP-MCNC: NEGATIVE MG/DL
QRS AXIS: 64 DEGREES
QRSD INTERVAL: 82 MS
QT INTERVAL: 266 MS
QTC INTERVAL: 392 MS
RBC #/AREA URNS AUTO: ABNORMAL /HPF
SODIUM 24H UR-SCNC: 7 MOL/L
SODIUM SERPL-SCNC: 131 MMOL/L (ref 136–145)
SODIUM SERPL-SCNC: 133 MMOL/L (ref 136–145)
SP GR UR STRIP.AUTO: 1.01 (ref 1–1.03)
T WAVE AXIS: 93 DEGREES
UROBILINOGEN UR QL STRIP.AUTO: 0.2 E.U./DL
VENTRICULAR RATE: 131 BPM
WBC #/AREA URNS AUTO: ABNORMAL /HPF

## 2019-12-03 PROCEDURE — 94640 AIRWAY INHALATION TREATMENT: CPT

## 2019-12-03 PROCEDURE — 93010 ELECTROCARDIOGRAM REPORT: CPT | Performed by: INTERNAL MEDICINE

## 2019-12-03 PROCEDURE — 99232 SBSQ HOSP IP/OBS MODERATE 35: CPT | Performed by: INTERNAL MEDICINE

## 2019-12-03 PROCEDURE — 85730 THROMBOPLASTIN TIME PARTIAL: CPT | Performed by: INTERNAL MEDICINE

## 2019-12-03 PROCEDURE — 99233 SBSQ HOSP IP/OBS HIGH 50: CPT | Performed by: INTERNAL MEDICINE

## 2019-12-03 PROCEDURE — 94760 N-INVAS EAR/PLS OXIMETRY 1: CPT

## 2019-12-03 PROCEDURE — 84300 ASSAY OF URINE SODIUM: CPT | Performed by: INTERNAL MEDICINE

## 2019-12-03 PROCEDURE — 81001 URINALYSIS AUTO W/SCOPE: CPT | Performed by: INTERNAL MEDICINE

## 2019-12-03 PROCEDURE — 80048 BASIC METABOLIC PNL TOTAL CA: CPT | Performed by: NURSE PRACTITIONER

## 2019-12-03 PROCEDURE — 82948 REAGENT STRIP/BLOOD GLUCOSE: CPT

## 2019-12-03 PROCEDURE — 82570 ASSAY OF URINE CREATININE: CPT | Performed by: INTERNAL MEDICINE

## 2019-12-03 PROCEDURE — 94762 N-INVAS EAR/PLS OXIMTRY CONT: CPT

## 2019-12-03 PROCEDURE — 82436 ASSAY OF URINE CHLORIDE: CPT | Performed by: INTERNAL MEDICINE

## 2019-12-03 RX ORDER — AZITHROMYCIN 250 MG/1
500 TABLET, FILM COATED ORAL EVERY 24 HOURS
Status: COMPLETED | OUTPATIENT
Start: 2019-12-03 | End: 2019-12-04

## 2019-12-03 RX ORDER — SODIUM CHLORIDE 9 MG/ML
50 INJECTION, SOLUTION INTRAVENOUS CONTINUOUS
Status: DISPENSED | OUTPATIENT
Start: 2019-12-03 | End: 2019-12-04

## 2019-12-03 RX ORDER — DILTIAZEM HYDROCHLORIDE 120 MG/1
120 CAPSULE, COATED, EXTENDED RELEASE ORAL DAILY
Status: DISCONTINUED | OUTPATIENT
Start: 2019-12-03 | End: 2019-12-10 | Stop reason: HOSPADM

## 2019-12-03 RX ORDER — SODIUM BICARBONATE 650 MG/1
650 TABLET ORAL
Status: DISCONTINUED | OUTPATIENT
Start: 2019-12-03 | End: 2019-12-05

## 2019-12-03 RX ORDER — HEPARIN SODIUM 5000 [USP'U]/ML
5000 INJECTION, SOLUTION INTRAVENOUS; SUBCUTANEOUS EVERY 8 HOURS SCHEDULED
Status: DISCONTINUED | OUTPATIENT
Start: 2019-12-03 | End: 2019-12-03

## 2019-12-03 RX ORDER — OXYCODONE HCL 20 MG/1
20 TABLET, FILM COATED, EXTENDED RELEASE ORAL EVERY 12 HOURS SCHEDULED
Qty: 60 TABLET | Refills: 0 | Status: SHIPPED | OUTPATIENT
Start: 2019-12-03 | End: 2020-01-02

## 2019-12-03 RX ORDER — OXYCODONE HYDROCHLORIDE 10 MG/1
20 TABLET ORAL EVERY 6 HOURS PRN
Status: DISCONTINUED | OUTPATIENT
Start: 2019-12-03 | End: 2019-12-10 | Stop reason: HOSPADM

## 2019-12-03 RX ORDER — METHYLPREDNISOLONE SODIUM SUCCINATE 40 MG/ML
40 INJECTION, POWDER, LYOPHILIZED, FOR SOLUTION INTRAMUSCULAR; INTRAVENOUS EVERY 12 HOURS SCHEDULED
Status: COMPLETED | OUTPATIENT
Start: 2019-12-03 | End: 2019-12-04

## 2019-12-03 RX ADMIN — FAMOTIDINE 20 MG: 20 TABLET ORAL at 17:59

## 2019-12-03 RX ADMIN — FLUTICASONE PROPIONATE 1 SPRAY: 50 SPRAY, METERED NASAL at 09:20

## 2019-12-03 RX ADMIN — TAMSULOSIN HYDROCHLORIDE 0.4 MG: 0.4 CAPSULE ORAL at 17:59

## 2019-12-03 RX ADMIN — INSULIN LISPRO 2 UNITS: 100 INJECTION, SOLUTION INTRAVENOUS; SUBCUTANEOUS at 11:58

## 2019-12-03 RX ADMIN — SODIUM CHLORIDE 50 ML/HR: 0.9 INJECTION, SOLUTION INTRAVENOUS at 14:49

## 2019-12-03 RX ADMIN — IPRATROPIUM BROMIDE AND ALBUTEROL SULFATE 3 ML: 2.5; .5 SOLUTION RESPIRATORY (INHALATION) at 13:37

## 2019-12-03 RX ADMIN — HEPARIN SODIUM 5000 UNITS: 5000 INJECTION INTRAVENOUS; SUBCUTANEOUS at 14:52

## 2019-12-03 RX ADMIN — GUAIFENESIN 600 MG: 600 TABLET ORAL at 09:17

## 2019-12-03 RX ADMIN — PREGABALIN 50 MG: 50 CAPSULE ORAL at 21:06

## 2019-12-03 RX ADMIN — TIOTROPIUM BROMIDE 18 MCG: 18 CAPSULE ORAL; RESPIRATORY (INHALATION) at 09:20

## 2019-12-03 RX ADMIN — METHYLPREDNISOLONE SODIUM SUCCINATE 40 MG: 40 INJECTION, POWDER, FOR SOLUTION INTRAMUSCULAR; INTRAVENOUS at 09:18

## 2019-12-03 RX ADMIN — IPRATROPIUM BROMIDE AND ALBUTEROL SULFATE 3 ML: 2.5; .5 SOLUTION RESPIRATORY (INHALATION) at 19:04

## 2019-12-03 RX ADMIN — HEPARIN SODIUM AND DEXTROSE 21 UNITS/KG/HR: 10000; 5 INJECTION INTRAVENOUS at 09:11

## 2019-12-03 RX ADMIN — MELATONIN TAB 3 MG 3 MG: 3 TAB at 21:06

## 2019-12-03 RX ADMIN — FOLIC ACID 1 MG: 1 TABLET ORAL at 09:15

## 2019-12-03 RX ADMIN — INSULIN LISPRO 1 UNITS: 100 INJECTION, SOLUTION INTRAVENOUS; SUBCUTANEOUS at 17:59

## 2019-12-03 RX ADMIN — PREGABALIN 25 MG: 25 CAPSULE ORAL at 09:15

## 2019-12-03 RX ADMIN — OXYCODONE HYDROCHLORIDE 20 MG: 20 TABLET, FILM COATED, EXTENDED RELEASE ORAL at 21:06

## 2019-12-03 RX ADMIN — GUAIFENESIN 600 MG: 600 TABLET ORAL at 21:06

## 2019-12-03 RX ADMIN — BUDESONIDE 0.5 MG: 0.5 INHALANT RESPIRATORY (INHALATION) at 19:04

## 2019-12-03 RX ADMIN — BUDESONIDE 0.5 MG: 0.5 INHALANT RESPIRATORY (INHALATION) at 07:48

## 2019-12-03 RX ADMIN — PANTOPRAZOLE SODIUM 40 MG: 40 TABLET, DELAYED RELEASE ORAL at 06:04

## 2019-12-03 RX ADMIN — CEFTRIAXONE 1000 MG: 1 INJECTION, POWDER, FOR SOLUTION INTRAMUSCULAR; INTRAVENOUS at 13:01

## 2019-12-03 RX ADMIN — APIXABAN 5 MG: 5 TABLET, FILM COATED ORAL at 17:59

## 2019-12-03 RX ADMIN — FLUOXETINE 10 MG: 10 CAPSULE ORAL at 09:15

## 2019-12-03 RX ADMIN — METHYLPREDNISOLONE SODIUM SUCCINATE 40 MG: 40 INJECTION, POWDER, FOR SOLUTION INTRAMUSCULAR; INTRAVENOUS at 00:02

## 2019-12-03 RX ADMIN — IPRATROPIUM BROMIDE AND ALBUTEROL SULFATE 3 ML: 2.5; .5 SOLUTION RESPIRATORY (INHALATION) at 07:47

## 2019-12-03 RX ADMIN — AZITHROMYCIN 500 MG: 250 TABLET, FILM COATED ORAL at 14:49

## 2019-12-03 RX ADMIN — METHYLPREDNISOLONE SODIUM SUCCINATE 40 MG: 40 INJECTION, POWDER, FOR SOLUTION INTRAMUSCULAR; INTRAVENOUS at 21:06

## 2019-12-03 RX ADMIN — SODIUM BICARBONATE 650 MG TABLET 650 MG: at 14:49

## 2019-12-03 RX ADMIN — IPRATROPIUM BROMIDE AND ALBUTEROL SULFATE 3 ML: 2.5; .5 SOLUTION RESPIRATORY (INHALATION) at 00:55

## 2019-12-03 RX ADMIN — FAMOTIDINE 20 MG: 20 TABLET ORAL at 09:14

## 2019-12-03 RX ADMIN — OXYCODONE HYDROCHLORIDE 20 MG: 20 TABLET, FILM COATED, EXTENDED RELEASE ORAL at 09:16

## 2019-12-03 RX ADMIN — QUETIAPINE FUMARATE 25 MG: 25 TABLET ORAL at 21:06

## 2019-12-03 RX ADMIN — DILTIAZEM HYDROCHLORIDE 120 MG: 120 CAPSULE, COATED, EXTENDED RELEASE ORAL at 12:55

## 2019-12-03 NOTE — SOCIAL WORK
Cm met with patient at bedside to discuss cm role and dcp needs  Pt lives with girlfriend and 3 children in an apt  22 erin in front entrance  Pt is independent with all ADL's and ambulation  DME's: O2, portable and concentrator, Nebulizer machine through [employer]  Reports no hx of SNF/HHC  Denies any hx of MH or D/A IP treatments  Pt sees a therapist Shon Aldrich on [address] in [place] for his depression and sees a psychiatrist there for medication management  Pt uses [facility] pharmacy for his rx needs  Pt uses [employer] or [employer] to go to and from doc appts  Girlfriend will transport once pt gets d/c  PCP is Hernandez   Emergency contact is [facility] [phone]  Pt was inquiring about how to obtain a med alert as well as a letter from [place] to get out of his lease  Pt verbalized having trouble getting up the stairs without getting really sob  Girlfriend is looking for new apt with no erin to ease with getting pt out of the house without requiring 2 to 3 people to assist him  Information about med alert will be provided by cm  Cameron Moya made aware of request  Cm will f/u  CM reviewed d/c planning process including the following: identifying help at home, patient preference for d/c planning needs, [facility] Meds to Bed program, availability of treatment team to discuss questions or concerns patient and/or family may have regarding understanding medications and recognizing signs and symptoms once discharged  CM also encouraged patient to follow up with all recommended appointments after discharge  Patient advised of importance for patient and family to participate in managing patients medical well being

## 2019-12-03 NOTE — PLAN OF CARE
Problem: Potential for Falls  Goal: Patient will remain free of falls  Description  INTERVENTIONS:  - Assess patient frequently for physical needs  -  Identify cognitive and physical deficits and behaviors that affect risk of falls  -  [place] fall precautions as indicated by assessment   - Educate patient/family on patient safety including physical limitations  - Instruct patient to call for assistance with activity based on assessment  - Modify environment to reduce risk of injury  - Consider OT/PT consult to assist with strengthening/mobility  Outcome: Progressing     Problem: Potential for Falls  Goal: Patient will remain free of falls  Description  INTERVENTIONS:  - Assess patient frequently for physical needs  -  Identify cognitive and physical deficits and behaviors that affect risk of falls  -  [place] fall precautions as indicated by assessment   - Educate patient/family on patient safety including physical limitations  - Instruct patient to call for assistance with activity based on assessment  - Modify environment to reduce risk of injury  - Consider OT/PT consult to assist with strengthening/mobility  Outcome: Progressing     Problem: Potential for Falls  Goal: Patient will remain free of falls  Description  INTERVENTIONS:  - Assess patient frequently for physical needs  -  Identify cognitive and physical deficits and behaviors that affect risk of falls    -  [place] fall precautions as indicated by assessment   - Educate patient/family on patient safety including physical limitations  - Instruct patient to call for assistance with activity based on assessment  - Modify environment to reduce risk of injury  - Consider OT/PT consult to assist with strengthening/mobility  Outcome: Progressing

## 2019-12-03 NOTE — PROGRESS NOTES
Progress note - Palliative and Supportive Care   [facility]  64 [facility]  male [id number]    Interval history:  Chart reviewed  No acute events overnight  This noon, saw Mr Cuauhtemoc Escobedo in bed, sitting upright and eating his lunch  He looks better than yesterday  He seems to be breathing better as well and less short of breath  I reviewed with him again his pain  He said this time that on a good day, his pain at home is at a 5/10  Currently, it is at a 7/10  We discussed about continuing his home regime which he was agreeable to  Will send scripts for oxycodone ER  Patient is made aware  No other issues today  MEDICATIONS / ALLERGIES:    all current active meds have been reviewed    Allergies   Allergen Reactions    Lisinopril Anaphylaxis     Took medication a while ago and had a "swelling reaction"  Does not carry epi-pen       OBJECTIVE:    Physical Exam  Physical Exam   Constitutional: He is oriented to person, place, and time  He appears well-developed and well-nourished  Non-toxic appearance  No distress  Appears chronically ill   HENT:   Head: Normocephalic and atraumatic  Mouth/Throat: Oropharynx is clear and moist    Eyes: Pupils are equal, round, and reactive to light  EOM are normal    Cardiovascular: Normal rate and intact distal pulses  Pulmonary/Chest: Effort normal and breath sounds normal  No accessory muscle usage  No tachypnea  No respiratory distress  Abdominal: Soft  There is no tenderness  Musculoskeletal:        Right lower leg: He exhibits no edema  Left lower leg: He exhibits no edema  Neurological: He is alert and oriented to person, place, and time  Skin: Skin is warm and dry  No erythema  No pallor  Psychiatric: He has a normal mood and affect  His behavior is normal  His mood appears not anxious  He is not agitated  Lab Results: I have personally reviewed pertinent labs   creatinine uptrending (4 07 <-- 3 89 <-- 2 50)  Imaging Studies: I have personally reviewed pertinent reports  Venous duplex study 12/2: no evidence of DVTs on either extremities  EKG, Pathology, and Other Studies: I have personally reviewed pertinent reports  Assessment:  Lung adenocarcinoma  COPD  Shortness of breath  Dyspnea on exerion  Acute on chronic hypoxic respiratory failure  Suspected VTE/PE  Chest pains  Elevated troponin  Chronic cancer-related pain  Peripheral neuropathy  Anxiety  PTSD  DM2  CKD3  HTN    Plan:  1  Symptom management    - continue current regimen   - scripts for oxycodone ER sent to pharmacy   - our office will reach out to schedule another appointment with Ms Miguel Fierro    2  Goals    - Patient demonstrates good insight and judgement into his medical condition  He seems competent on my exam today  - Power of :  presumed to be adult children by [place] if none appointed  - Did not directly discussed goals, appears to be treatment-directed   - Code Status: Full - Level 1      Counseling / Coordination of Care  Total floor / unit time spent today 35 minutes  Greater than 50% of total time was spent with the patient and / or family counseling and / or coordination of care  A description of the counseling / coordination of care: provided medical updates, determined capacity/competency,discussed plans of care, discussed symptom management      Wendy Freeman MD  Palliative Medicine & Supportive Care  Internal Medicine  Available via [facility] Text  Office: [phone]  Fax: [phone]

## 2019-12-03 NOTE — NURSING NOTE
Patient has not voided in twelve hour shift  Patient was given urinal  He tried to go standing as well as in the bathroom and was unsuccessful  Bladder scan showed 491ml  Patient refuses to be straight cathed and wants to go on his own  Will continue to monitor

## 2019-12-03 NOTE — PROGRESS NOTES
[facility] Internal [facility] Progress Note  Patient: Lidia Filter  64 y o  male   MRN: [id number]  PCP: Herberth Aguilar MD  Unit/Bed#: E4 -46 Encounter: [id number]  Date Of Visit: 12/03/19   Addendum: started on eliquis  If gfr worsens will need to decrease to 2 5mg bid  Assessment/plan  1  Acute on chronic hypoxic respiratory failure due to severe copd with acute exacerbation- appreciate pulmonary recommendations  Pt is currently on 2 liters of oxygen which is his baseline  He will continue with pulmonary toilet  He will continue solumedrol 40mg IV q8 hours, budesonide bid, duoneb, and mucinex  Azithromycin and ceftriaxone added for possible bronchitis       2  Possible pulmonary embolism- Pt is at high risk for pe due to adenocarcinoma  Venous duplex is negative  He has ckd4 and unable to have cta chest to r/o pe  unlikely pe as pt has improved with above treatment  Pulmonary had d/albert heparin gtt  Will monitor      3  Tobacco dependence- encourage pt to stop smoking     4 afib- pt was started on a cardizem gtt  He was changed to cardizem 120mg daily  Will await cardiology follow up  Will start eliquis bid     5  Non mi troponin elevation- echo reviewed  Likely due to  Copd exacerbation and tachycardia     6  Stroke like symptoms- pt was a stroke alert  Work up was negative       7  ckd4- appreciate nephrology recommendations  Creatinine increased to 4 0 from 2 4  Spoke with nephrology  Possibly that this is due to contrast nephropathy vrs atn vrs immune mediated nephritis from [place]  No emergent HD needed today  He was started on IV fluids  Spoke with pt about the possibility of HD  Pt did not decide for or against but said he thinks he needs to keep fighting for his kids  Have minimally adjusted pain medications due to worsening creatinine clearance  Will continue to monitor and see if needs to be adjusted further       8  Type 2 diabetes- a1c is 5 6  Hold metformin   Continue insulin sliding scale       9  Adenocarcinoma of the lung- pt follows with Dr Tomasz Mcfadden  He is being treated with [place]  Appreciate palliative care recommendations     10  htn- stable  Continue current treatment    11  Tremor- pt reports essential tremor at baseline  It is worsening  Possibly related to steroid or [place]  Will monitor  dispo- wrote letter for apartment andre  Continue to monitor pt for renal disease  Possible HD in 24 to 48 hours if no improvement     Subjective:   Pt seen and examined  Discussed creatinine and Hd  He is visibly upset and states he doesn't know exactly his answer to HD but he needs to keep fighting for his children who around the age of 12  He states he always has tremors and that usually it is with movement and only slightly at rest  He has noticed the tremors are worse today  No f/c no worsening sob  No n/v/d no abd pain  He feels worn out    Objective:     Vitals: Blood pressure 141/94, pulse 99, temperature 98 °F (36 7 °C), temperature source Tympanic, resp  rate 18, weight 68 kg (149 lb 14 6 oz), SpO2 94 %  ,Body mass index is 22 79 kg/m²  Lab, Imaging and other studies:  Results from last 7 days   Lab Units 12/02/19  0453  11/29/19  2333   WBC Thousand/uL 14 34*   < >  --    HEMOGLOBIN g/dL 9 8*   < >  --    HEMATOCRIT % 31 5*   < >  --    PLATELETS Thousands/uL 413*   < >  --    INR   --   --  1 15    < > = values in this interval not displayed  Results from last 7 days   Lab Units 12/03/19  1043   POTASSIUM mmol/L 4 6   CHLORIDE mmol/L 99*   CO2 mmol/L 20*   BUN mg/dL 75*   CREATININE mg/dL 4 07*   CALCIUM mg/dL 8 4     Results from last 7 days   Lab Units 11/30/19  0605 11/30/19  0115 11/29/19  2223   TROPONIN I ng/mL 3 03* 3 58* 2 92*     No results found for: Ruthann Cogan, SPUTUMCULTUR    Vas Lower Limb Venous Duplex Study, Complete Bilateral    Result Date: 12/2/2019  Narrative:  [facility] REPORT CLINICAL: Indications:  The patient was admitted with shortness of breath, exacerbation of COPD  He denies leg pain or swelling  Risk Factors The patient has history of HTN, Diabetes (Yes), CKD and CAD  FINDINGS:  Segment  Right            Left              Impression       Impression       CFV      Normal (Patent)  Normal (Patent)     CONCLUSION: Impression: RIGHT LOWER LIMB: No evidence of acute or chronic deep vein thrombosis  No evidence of superficial thrombophlebitis noted  Doppler evaluation shows a normal response to augmentation maneuvers  Popliteal, posterior tibial and anterior tibial arterial Doppler waveforms are triphasic/monophasic  LEFT LOWER LIMB: No evidence of acute or chronic deep vein thrombosis  No evidence of superficial thrombophlebitis noted  Doppler evaluation shows a normal response to augmentation maneuvers  Popliteal, posterior tibial and anterior tibial arterial Doppler waveforms are triphasic    SIGNATURE: Electronically Signed by: Roula Marks on 2019-12-02 02:27:44 PM      Scheduled Meds:   Current Facility-Administered Medications:  acetaminophen 650 mg Oral Q6H PRN César Sanford MD    albuterol 2 puff Inhalation Q4H PRN César Sanford MD    aluminum-magnesium hydroxide-simethicone 30 mL Oral Q6H PRN César Sanford MD    azithromycin 500 mg Oral Q24H BROOKS Torrez    budesonide 0 5 mg Nebulization Q12H BROOKS Torrez    cefTRIAXone 1,000 mg Intravenous Q24H BROOKS Torrez Last Rate: 1,000 mg (12/03/19 1301)   chlorproMAZINE 25 mg Oral Q6H PRN César Sanford MD    diltiazem 120 mg Oral Daily Vicki Dorantes MD    diphenhydrAMINE 25 mg Oral Q6H PRN César Sanford MD    famotidine 20 mg Oral BID César Sanford MD    FLUoxetine 10 mg Oral Daily César Sanford MD    fluticasone 1 spray Each Nare Daily César Sanford MD    folic acid 1 mg Oral Daily César Sanford MD    guaiFENesin 600 mg Oral Q12H [address] BROOKS Torrez    insulin lispro 1-5 Units Subcutaneous TID AC César Sanford MD ipratropium-albuterol 3 mL Nebulization Q6H Jonathan Jimenez MD    LORazepam 0 5 mg Oral Q4H PRN Tripp Champagne MD    melatonin 3 mg Oral HS Claudene Can, MD    methocarbamol 750 mg Oral Q6H PRN Claudene Can, MD    methylPREDNISolone sodium succinate 40 mg Intravenous Q12H [address] Kameron Belt, CRNP    ondansetron 4 mg Intravenous Q6H PRN Claudene Can, MD    oxyCODONE 20 mg Oral Q12H [address] Claudene Can, MD    oxyCODONE 20 mg Oral Q6H PRN Sybil Tenorio DO    pantoprazole 40 mg Oral Early Morning Jonathan Jimenez MD    polyethylene glycol 17 g Oral Daily PRN Claudene Can, MD    pregabalin 25 mg Oral Daily Claudene Can, MD    pregabalin 50 mg Oral HS Jonathan Jimenez MD    QUEtiapine 25 mg Oral HS Jonathan Jimenez MD    senna-docusate sodium 1 tablet Oral BID PRN Claudene Can, MD    sodium bicarbonate 650 mg Oral BID after meals Pam Vera MD    sodium chloride 50 mL/hr Intravenous Continuous Parish Kulkarni MD    tamsulosin 0 4 mg Oral Daily With Mai Aguirre MD      Continuous Infusions:   sodium chloride 50 mL/hr     PRN Meds:   acetaminophen    albuterol    aluminum-magnesium hydroxide-simethicone    chlorproMAZINE    diphenhydrAMINE    LORazepam    methocarbamol    ondansetron    oxyCODONE    polyethylene glycol    senna-docusate sodium      Physical exam:  Physical Exam  General appearance: alert and oriented, in no acute distress  Head: Normocephalic, without obvious abnormality, atraumatic  Eyes: conjunctivae/corneas clear  PERRL, EOM's intact  Fundi benign    Neck: no adenopathy, no carotid bruit, no JVD, supple, symmetrical, trachea midline and thyroid not enlarged, symmetric, no tenderness/mass/nodules  Lungs: crackles through out  Heart: regular rate and rhythm, S1, S2 normal, no murmur, click, rub or gallop  Abdomen: soft, non-tender; bowel sounds normal; no masses,  no organomegaly  Extremities: extremities normal, warm and well-perfused; no cyanosis, clubbing, or edema  Pulses: 2+ and symmetric  Skin: Skin color, texture, turgor normal  No rashes or lesions  Neurologic: Mental status: Alert, oriented, thought content appropriate, tremors in upper ext  bilateral      VTE Pharmacologic Prophylaxis: Heparin  VTE Mechanical Prophylaxis: sequential compression device    Counseling / Coordination of Care  Total floor / unit time spent today 20 minutes     Current Length of Stay: 4 day(s)    Current Patient Status: Inpatient     Code Status: Level 1 - Full Code

## 2019-12-03 NOTE — PROGRESS NOTES
Progress Note - Pulmonary   Indu Reese Sr  64 y o  male MRN: [id number]  Unit/Bed#: E4 -01 Encounter: [id number]      Assessment/Plan:  1  Acute on chronic Chronic hypoxic respiratory failure  1  Currently oxygenating well on 3 L nasal cannula, 2 L nasal cannula baseline  2  Titrate oxygen as needed maintain SpO2 greater than equal to 88%  3  Pulmonary toilet:  Incentive spirometry, flutter valve, out of bed with increasing activity as tolerated  2  Severe Chronic obstructive pulmonary disease with acute exacerbation  1  Decrease IV Solu-Medrol to 40 mg b i d   2  Continue DuoNeb q 6 hours  3  Rocephin/azithromycin ordered for concern for acute bronchitis  4  Continue Flonase and Mucinex  5  Continue budesonide b i d   6  Discontinue Spiriva while on nebulized ipratropium  3  Severe tobacco abuse  1  Continue cessation encouraged  4  Stage IV adenocarcinoma of the lung  1  Oncology follow-up outpatient-currently on immunotherapy  2  Palliative Care is following for pain management  5  CKD stage 4  1  Creatinine peers stable at this time  6  Anxiety  1  Palliative care evaluated in feel that this is greatly contributing to his shortness of breath,  2  Medication regimen per palliative care  7  Chronic pain  1  Palliative care following and managing medication regimen    * heparin drip discontinued as very low suspicion for pulmonary embolism, venous duplex negative    Subjective:     Elvira Bridges was seen lying in bed upon entering the room  Noted to be lethargic this morning  Reports all symptoms are unchanged  Objective:         Vitals: Blood pressure 106/70, pulse 85, temperature 98 5 °F (36 9 °C), temperature source Tympanic, resp  rate 18, weight 68 kg (149 lb 14 6 oz), SpO2 94 %  , 3LNC, Body mass index is 22 79 kg/m²        Intake/Output Summary (Last 24 hours) at 12/3/2019 1019  Last data filed at 12/3/2019 0932  Gross per 24 hour   Intake 410 06 ml   Output 275 ml   Net 135 06 ml         Physical Exam  Gen: lethargic, oriented x 3, no acute distress  HEENT: Mucous membranes moist, no oral lesions, no thrush, oxygen via NC  NECK: no accessory muscle use, JVP not elevated  Cardiac: Regular, single S1, single S2, no murmurs, no rubs, no gallops  Lungs: decreased bilaterally, with end expiratory wheezes  Abdomen: normoactive bowel sounds, soft nontender, nondistended, no rebound or rigidity, no guarding  Extremities: no cyanosis, no clubbing, no edema    Labs: I have personally reviewed pertinent lab results  , CBC: No results found for: WBC, HGB, HCT, MCV, PLT, ADJUSTEDWBC, MCH, MCHC, RDW, MPV, NRBC, CMP:   Lab Results   Component Value Date    SODIUM 131 (L) 12/03/2019    K 6 1 (H) 12/03/2019    CL 99 (L) 12/03/2019    CO2 20 (L) 12/03/2019    BUN 71 (H) 12/03/2019    CREATININE 3 89 (H) 12/03/2019    CALCIUM 8 8 12/03/2019    EGFR 19 12/03/2019     Imaging and other studies: No new imaging to review today      BROOKS Fisher

## 2019-12-03 NOTE — PROGRESS NOTES
NEPHROLOGY PROGRESS NOTE   Issa Para Sr  64 y o  male MRN: [id number]  Unit/Bed#: E4 -01 Encounter: [id number]      ASSESSMENT/PLAN:  Chronic kidney disease, stage IV:  Per review of medical records, baseline creatinine is around 2 4- 2 5 since August 2019  - In early 2019, creatinine 1 3-1 5, then increased to 1 7-1 9 mid year  - Creatinine today:  Pending as a m  labs were hemolyzed  - Patient is status post IV contrast on 11/30/2019    - Status post 6 cycles of palliative chemotherapy with Alimta, Carboplatin, Keytruda  - Alimta discontinued on 09/19/2019 due to worsening renal function     - Last dose of Keytruda was on 11/20/2019   - UA bland  Renal U S  Completed on 08/23/2019 was normal   - Continue to monitor volume status/intake and output, daily weight  - Avoid nephrotoxic agents, hypotension/fluctuations in blood pressure, NSAIDs   - Recommend to avoid IV contrast, unless absolutely indicated  - Repeat BMP in a m  Hypertension:  Blood pressure stable  - Continue to trend and monitor with current regimen  Anemia:  Hemoglobin stable yesterday at 9 8   - Trend and monitor  Severe COPD with acute exacerbation:   - No evidence of DVT on venous Doppler that was completed yesterday  - Management and regimen per Pulmonary  Adenocarcinoma of lung:  Status post 6 cycles of palliative chemotherapy  - Palliative Care following   - Oncology follow-up  SUBJECTIVE:  Patient resting in bed with family at bedside  Patient denies shortness of breath, chest pain, nausea, vomiting, diarrhea      OBJECTIVE:  Current Weight: Weight - Scale: 68 kg (149 lb 14 6 oz)  Vitals:    12/03/19 0748   BP:    Pulse:    Resp:    Temp:    SpO2: 94%       Intake/Output Summary (Last 24 hours) at 12/3/2019 1102  Last data filed at 12/3/2019 0932  Gross per 24 hour   Intake 410 06 ml   Output 275 ml   Net 135 06 ml       General:  No acute distress  Skin:  Warm, no rash  Eyes:  Sclerae anicteric  ENT: Moist lips mucous membranes  Neck:  Supple, no JVD, trachea midline  Chest:  Clear breath sounds bilaterally   CVS:  Regular rate and rhythm  Abdomen:  Soft, nontender, normoactive bowel sounds  Extremities:  No edema bilaterally   Neuro:  Lethargic but oriented  Psych:  Flat affect      Medications:  Scheduled Meds:  Current Facility-Administered Medications:  acetaminophen 650 mg Oral Q6H PRN Jonathan Jimenez MD    albuterol 2 puff Inhalation Q4H PRN Claudene Can, MD    aluminum-magnesium hydroxide-simethicone 30 mL Oral Q6H PRN Claudene Can, MD    amLODIPine 10 mg Oral Daily RODNEY Jackson    azithromycin 500 mg Intravenous Q24H Kameron Belt, CRNP Last Rate: 500 mg (12/02/19 1532)   budesonide 0 5 mg Nebulization Q12H Kameron Belt, CRNP    cefTRIAXone 1,000 mg Intravenous Q24H Kameron Belt, CRNP Last Rate: 1,000 mg (12/02/19 1423)   chlorproMAZINE 25 mg Oral Q6H PRN Claudene Can, MD    diltiazem 1-15 mg/hr Intravenous Titrated Claudene Can, MD Last Rate: 5 mg/hr (12/02/19 1002)   diphenhydrAMINE 25 mg Oral Q6H PRN Claudene Can, MD    famotidine 20 mg Oral BID Claudene Can, MD    FLUoxetine 10 mg Oral Daily Jonathan Jimenez MD    fluticasone 1 spray Each Nare Daily Jonathan Jimenez MD    folic acid 1 mg Oral Daily Jonathan Jimenez MD    guaiFENesin 600 mg Oral Q12H [address] Kameron Belt, CRNP    insulin lispro 1-5 Units Subcutaneous TID AC Claudene Can, MD    ipratropium-albuterol 3 mL Nebulization Q6H Jonathan Jimenez MD    LORazepam 0 5 mg Oral Q4H PRN Tripp Champagne MD    melatonin 3 mg Oral HS Jonathan Jimenez MD    methocarbamol 750 mg Oral Q6H PRN Claudene Can, MD    methylPREDNISolone sodium succinate 40 mg Intravenous Q12H [address] BROOKS Arzate    ondansetron 4 mg Intravenous Q6H PRN Claudene Can, MD    oxyCODONE 20 mg Oral Q12H [address] Claudene Can, MD    oxyCODONE 30 mg Oral Q4H PRN Claudene Can, MD    pantoprazole 40 mg Oral Early Morning Claudene Can, MD    polyethylene glycol 17 g Oral Daily PRN Osvaldo Worthington MD    pregabalin 25 mg Oral Daily Osvaldo Worthington MD    pregabalin 50 mg Oral HS Osvaldo Worthington MD    QUEtiapine 25 mg Oral HS Osvaldo Worthington MD    senna-docusate sodium 1 tablet Oral BID PRN Osvaldo Worthington MD    tamsulosin 0 4 mg Oral Daily With Antonio Puentes MD        PRN Meds:   acetaminophen    albuterol    aluminum-magnesium hydroxide-simethicone    chlorproMAZINE    diphenhydrAMINE    LORazepam    methocarbamol    ondansetron    oxyCODONE    polyethylene glycol    senna-docusate sodium    Continuous Infusions:  diltiazem 1-15 mg/hr Last Rate: 5 mg/hr (12/02/19 1002)       Laboratory Results:  Results from last 7 days   Lab Units 12/03/19  0556 12/02/19  0453 12/01/19  0339 11/30/19  1412 11/30/19  0605 11/29/19  1900   WBC Thousand/uL  --  14 34* 15 95*  --  5 75 8 81   HEMOGLOBIN g/dL  --  9 8* 9 6*  --  10 4* 11 5*   HEMATOCRIT %  --  31 5* 30 7*  --  33 9* 36 2*   PLATELETS Thousands/uL  --  413* 311  --  265 240   SODIUM mmol/L 131* 141 139 136 137 136   POTASSIUM mmol/L 6 1* 4 5 4 4 4 1 4 3 4 1   CHLORIDE mmol/L 99* 107 104 103 101 100   CO2 mmol/L 20* 26 24 24 22 26   BUN mg/dL 71* 42* 34* 32* 30* 31*   CREATININE mg/dL 3 89* 2 50* 2 40* 2 45* 2 61* 2 44*   CALCIUM mg/dL 8 8 8 7 8 5 8 8 8 8 9 0   MAGNESIUM mg/dL  --  2 2 2 2  --   --   --    PHOSPHORUS mg/dL  --  4 0 3 4  --   --   --

## 2019-12-03 NOTE — PHYSICAL THERAPY NOTE
PHYSICAL THERAPY NOTE          Patient Name: Neetu Wilkins  Today's Date: 12/3/2019     Received pt in bed  Pt refused any PT today due to fatigue  Will f/u as able to resume PT services

## 2019-12-03 NOTE — PROGRESS NOTES
The azithromycin has  been converted to Oral per [facility] IV-to-PO Auto-Conversion Protocol for Adults as approved by the Pharmacy and Therapeutics Committee  The patient met all eligible criteria:  3 Age = 27years old   2) Received at least one dose of the IV form   3) Receiving at least one other scheduled oral/enteral medication   4) Tolerating an oral/enteral diet   and did not have any exclusions:   1) Critical care patient   2) Active GI bleed (IF assessing H2RAs or PPIs)   3) Continuous tube feeding (IF assessing cipro, doxycycline, levofloxacin, minocycline, rifampin, or voriconazole)   4) Receiving PO vancomycin (IF assessing metronidazole)   5) Persistent nausea and/or vomiting   6) Ileus or gastrointestinal obstruction   7) Adina/nasogastric tube set for continuous suction   8) Specific order not to automatically convert to PO (in the order's comments or if discussed in the most recent Infectious Disease or primary team's progress notes)

## 2019-12-03 NOTE — PROGRESS NOTES
Cardiology Progress Note - Yuni Schirmer Sr  64 y o  male MRN: [id number]    Unit/Bed#: E4 -01 Encounter: [id number]      Assessment:  1  Non-MI troponin elevation  · Tn 0 92 --> 2 92 --> 3 58 --> 3 03 (on 11/30/19)  · C - 6/2018 - mRCA 20%, otherwise normal  · Echo - 12/1/19 - LVEF 55%, no diagnostic regional wall motion abnormality  2  Atrial fibrillation   3  Acute on chronic respiratory hypoxic failure  · 2/2 COPD exacerbation  4  NURIA on CKD  · Baseline Cr 1 9-2 5  5  Hypertension  6  Diabetes Mellitus Type 2   7  COPD  8  Lung adenocarcinoma, right  · On chemotherapy for about 6-7 months, with recent change in chemotherapy    Plan  Non-MI troponin elevation  · Has intermittent chest pain, which appears to be possibly pleuritis or musculoskeletal pain  · No other clear signs to suggest coronary artery disease  · Has advanced cancer, and is on chemotherapy for same  Palliative care following  · No further inpatient cardiac workup for now    Atrial fibrillation, new onset  · On cardizem [email or web address] for past 2 days, and heart rate well controlled  · Switch cardizem drip to cardizem   · Was on heparin drip, which was discontinued today morning  · High CHADs-VASC, needs oral anticoagulation for stroke prevention  · No recent h/o bleeding or falls  · Start eliquis 5mg bid    Subjective:   No significant events overnight  Afebrile, on 3L NC oxygen    Review of Systems  All other systems negative    Telemetry Review: NSR currently  Was in atrial fibrillation on 12/1, and seems to have converted overnight on early morning 12/2/19    Objective:   Vitals: Blood pressure 106/70, pulse 85, temperature 98 5 °F (36 9 °C), temperature source Tympanic, resp  rate 18, weight 68 kg (149 lb 14 6 oz), SpO2 94 %  , Body mass index is 22 79 kg/m² , Orthostatic Blood Pressures      Most Recent Value   Blood Pressure  106/70 filed at 12/03/2019 0743   Patient Position - Orthostatic VS  Lying filed at 12/03/2019 7804 Systolic (95FOM), NWM:248 , Min:106 , QRO:406     Diastolic (91WXR), KUF:12, Min:57, Max:91    Wt Readings from Last 5 Encounters:   11/29/19 68 kg (149 lb 14 6 oz)   11/20/19 71 1 kg (156 lb 11 2 oz)   11/15/19 68 kg (150 lb)   11/04/19 68 4 kg (150 lb 12 8 oz)   10/30/19 67 6 kg (149 lb)     I/O       12/01 0701 - 12/02 0700 12/02 0701 - 12/03 0700 12/03 0701 - 12/04 0700    P  O  180 150     I V  (mL/kg) 250 (3 7) 260 1 (3 8)     Total Intake(mL/kg) 430 (6 3) 410 1 (6)     Urine (mL/kg/hr) 1625 (1)      Stool       Total Output 1625      Net -1195 +410 1                      Physical Exam    Vitals and nursing note reviewed  Constitutional:  Deirdre Lott Sr  appears chronically ill, cooperative, calm  No acute distress   Eyes:    No icterus  Neck:  Supple, no JVD   Lungs:  Effort normal, No wheezing/rhonchi, no rales+, respirations unlabored    Chest Wall:  No tenderness or deformity    Heart[de-identified]  Regular rate, Regular rhythm, S1 and S2 normal, no murmur, rub or gallop    Abdomen:  Soft, non-tender, non-distended   Neurological:  Awake, alert, oriented x3   Non-focal exam    Extremities:  No pedal edema, No calf tenderness         Laboratory Results: personally reviewed  Results from last 7 days   Lab Units 11/30/19  0605 11/30/19  0115 11/29/19  2223   TROPONIN I ng/mL 3 03* 3 58* 2 92*       CBC with diff:   Results from last 7 days   Lab Units 12/02/19  0453 12/01/19  0339 11/30/19  0605 11/29/19  1900   WBC Thousand/uL 14 34* 15 95* 5 75 8 81   HEMOGLOBIN g/dL 9 8* 9 6* 10 4* 11 5*   HEMATOCRIT % 31 5* 30 7* 33 9* 36 2*   MCV fL 87 88 86 85   PLATELETS Thousands/uL 413* 311 265 240   MCH pg 26 9 27 4 26 5* 26 9   MCHC g/dL 31 1* 31 3* 30 7* 31 8   RDW % 15 4* 15 3* 14 9 15 1   MPV fL 9 5 10 0 10 0 9 6   NRBC AUTO /100 WBCs 0 0  --  0         CMP:  Results from last 7 days   Lab Units 12/03/19  0556 12/02/19  0453 12/01/19  0339 11/30/19  1412 11/30/19  0605 11/29/19  1900   POTASSIUM mmol/L 6 1* 4 5 4 4 4 1 4 3 4 1   CHLORIDE mmol/L 99* 107 104 103 101 100   CO2 mmol/L 20* 26 24 24 22 26   BUN mg/dL 71* 42* 34* 32* 30* 31*   CREATININE mg/dL 3 89* 2 50* 2 40* 2 45* 2 61* 2 44*   CALCIUM mg/dL 8 8 8 7 8 5 8 8 8 8 9 0   EGFR ml/min/1 73sq m 19 32 33 33 30 33         BMP:  Results from last 7 days   Lab Units 12/03/19  0556 12/02/19  0453 12/01/19  0339 11/30/19  1412 11/30/19  0605 11/29/19  1900   POTASSIUM mmol/L 6 1* 4 5 4 4 4 1 4 3 4 1   CHLORIDE mmol/L 99* 107 104 103 101 100   CO2 mmol/L 20* 26 24 24 22 26   BUN mg/dL 71* 42* 34* 32* 30* 31*   CREATININE mg/dL 3 89* 2 50* 2 40* 2 45* 2 61* 2 44*   CALCIUM mg/dL 8 8 8 7 8 5 8 8 8 8 9 0       BNP: No results for input(s): BNP in the last 72 hours  Magnesium:   Results from last 7 days   Lab Units 12/02/19  0453 12/01/19  0339   MAGNESIUM mg/dL 2 2 2 2       Coags:   Results from last 7 days   Lab Units 12/03/19  0104 12/02/19  1725 12/02/19  1248 12/02/19  0453 12/01/19  0339 11/30/19  2136 11/30/19  1411  11/29/19  2333   PTT seconds >210* 39* 31 28 66* 71* 115*   < > 37   INR   --   --   --   --   --   --   --   --  1 15    < > = values in this interval not displayed         TSH:        Hemoglobin A1C       Lipid Profile:       Meds/Allergies   all current active meds have been reviewed and current meds: Current Facility-Administered Medications   Medication Dose Route Frequency    acetaminophen (TYLENOL) tablet 650 mg  650 mg Oral Q6H PRN    albuterol (PROVENTIL HFA,VENTOLIN HFA) inhaler 2 puff  2 puff Inhalation Q4H PRN    aluminum-magnesium hydroxide-simethicone (MYLANTA) 200-200-20 mg/5 mL oral suspension 30 mL  30 mL Oral Q6H PRN    amLODIPine (NORVASC) tablet 10 mg  10 mg Oral Daily    azithromycin (ZITHROMAX) 500 mg in sodium chloride 0 9 % 250 mL IVPB  500 mg Intravenous Q24H    budesonide (PULMICORT) inhalation solution 0 5 mg  0 5 mg Nebulization Q12H    cefTRIAXone (ROCEPHIN) 1,000 mg in dextrose 5 % 50 mL IVPB  1,000 mg Intravenous Q24H    chlorproMAZINE (THORAZINE) tablet 25 mg  25 mg Oral Q6H PRN    diltiazem (CARDIZEM) 125 mg in sodium chloride 0 9 % 125 mL infusion  1-15 mg/hr Intravenous Titrated    diphenhydrAMINE (BENADRYL) tablet 25 mg  25 mg Oral Q6H PRN    famotidine (PEPCID) tablet 20 mg  20 mg Oral BID    FLUoxetine (PROzac) capsule 10 mg  10 mg Oral Daily    fluticasone (FLONASE) 50 mcg/act nasal spray 1 spray  1 spray Each Nare Daily    folic acid (FOLVITE) tablet 1 mg  1 mg Oral Daily    guaiFENesin (MUCINEX) 12 hr tablet 600 mg  600 mg Oral Q12H [address]    heparin (porcine) 25,000 units in 250 mL infusion (premix)  3-30 Units/kg/hr (Order-Specific) Intravenous Titrated    heparin (porcine) injection 2,600 Units  2,600 Units Intravenous PRN    heparin (porcine) injection 5,200 Units  5,200 Units Intravenous PRN    insulin lispro (HumaLOG) 100 units/mL subcutaneous injection 1-5 Units  1-5 Units Subcutaneous TID AC    ipratropium-albuterol (DUO-NEB) 0 5-2 5 mg/3 mL inhalation solution 3 mL  3 mL Nebulization Q6H    LORazepam (ATIVAN) tablet 0 5 mg  0 5 mg Oral Q4H PRN    melatonin tablet 3 mg  3 mg Oral HS    methocarbamol (ROBAXIN) tablet 750 mg  750 mg Oral Q6H PRN    methylPREDNISolone sodium succinate (Solu-MEDROL) injection 40 mg  40 mg Intravenous Q8H    ondansetron (ZOFRAN) injection 4 mg  4 mg Intravenous Q6H PRN    oxyCODONE (OxyCONTIN) 12 hr tablet 20 mg  20 mg Oral Q12H GREG    oxyCODONE (ROXICODONE) immediate release tablet 30 mg  30 mg Oral Q4H PRN    pantoprazole (PROTONIX) EC tablet 40 mg  40 mg Oral Early Morning    polyethylene glycol (MIRALAX) packet 17 g  17 g Oral Daily PRN    pregabalin (LYRICA) capsule 25 mg  25 mg Oral Daily    pregabalin (LYRICA) capsule 50 mg  50 mg Oral HS    QUEtiapine (SEROquel) tablet 25 mg  25 mg Oral HS    senna-docusate sodium (SENOKOT S) 8 6-50 mg per tablet 1 tablet  1 tablet Oral BID PRN    tamsulosin (FLOMAX) capsule 0 4 mg  0 4 mg Oral Daily With [facility] [facility] tiotropium (SPIRIVA) capsule for inhaler 18 mcg  18 mcg Inhalation Daily     Medications Prior to Admission   Medication    albuterol (2 5 mg/3 mL) 0 083 % nebulizer solution    albuterol (2 5 mg/3 mL) 0 083 % nebulizer solution    albuterol (VENTOLIN HFA) 90 mcg/act inhaler    amLODIPine (NORVASC) 10 mg tablet    BANOPHEN 25 MG capsule    Blood Glucose Monitoring Suppl KIT    carbamide peroxide (DEBROX) 6 5 % otic solution    chlorproMAZINE (THORAZINE) 25 mg tablet    dexamethasone (DECADRON) 4 mg tablet    diphenhydrAMINE (BENADRYL) 25 mg tablet    ergocalciferol (ERGOCALCIFEROL) 80171 units capsule    FLUoxetine (PROzac) 10 MG tablet    fluticasone (FLONASE) 50 mcg/act nasal spray    fluticasone-vilanterol (BREO ELLIPTA) 100-25 mcg/inh inhaler    folic acid (FOLVITE) 1 mg tablet    FREESTYLE LITE test strip    ipratropium (ATROVENT) 0 02 % nebulizer solution    Lancets (FREESTYLE) lancets    lidocaine (LMX) 4 % cream    loratadine (CLARITIN) 10 mg tablet    melatonin 3 mg    metFORMIN (GLUCOPHAGE-XR) 500 mg 24 hr tablet    methocarbamol (ROBAXIN) 750 mg tablet    ondansetron (ZOFRAN) 4 mg tablet    oxyCODONE (OxyCONTIN) 20 mg 12 hr tablet    oxyCODONE (ROXICODONE) 30 MG immediate release tablet    pantoprazole (PROTONIX) 40 mg tablet    polyethylene glycol (GLYCOLAX) powder    predniSONE 10 mg tablet    pregabalin (LYRICA) 25 mg capsule    prochlorperazine (COMPAZINE) 10 mg tablet    QUEtiapine (SEROquel) 25 mg tablet    ranitidine (ZANTAC) 150 mg tablet    REGULOID 28 3 % POWD    Selenium Sulfide 2 25 % SHAM    senna-docusate sodium (SENOKOT-S) 8 6-50 mg per tablet    sildenafil (VIAGRA) 50 MG tablet    tamsulosin (FLOMAX) 0 4 mg    tiotropium (SPIRIVA RESPIMAT) 2 5 MCG/ACT AERS inhaler       diltiazem 1-15 mg/hr Last Rate: 5 mg/hr (12/02/19 1002)   heparin (porcine) 3-30 Units/kg/hr (Order-Specific) Last Rate: 21 Units/kg/hr (12/03/19 0911)         Cardiac testing: reviewed  Results for orders placed during the hospital encounter of 19   Echo complete with contrast if indicated    Narrative [address]  [address]  [facility] [place], [address]  [phone]    Transthoracic Echocardiogram  2D, M-mode, Doppler, and Color Doppler    Study date:  01-Dec-2019    Patient: Cesia Prajapati  MR number: [id number]  Account number: [de-identified]  : 1960  Age: 64 years  Gender: Male  Status: Inpatient  Location: Bedside  Height: 68 in  Weight: 149 lb  BP: 129/ 78 mmHg    Indications: Chest pain  Diagnoses: R07 9 - Chest pain, unspecified    Sonographer:  SOLE Lynn  Primary Physician:  Tex Contreras  Referring Physician:  Isael Hernandez MD  Group:  [facility]  Interpreting Physician:  DEVON Thompson     SUMMARY    LEFT VENTRICLE:  Systolic function was normal by visual assessment  Ejection fraction was estimated to be 55 %  Although no diagnostic regional wall motion abnormality was identified, this possibility cannot be completely excluded on the basis of this study  TRICUSPID VALVE:  There was trace regurgitation  HISTORY: PRIOR HISTORY: lung Ca with chemotherapy, GERD, depression, COPD Risk factors: hypertension, diabetes, renal failure, and a history of current cigarette use (within the last month)  PROCEDURE: The procedure was performed at the bedside  This was a routine study  The transthoracic approach was used  The study included complete 2D imaging, M-mode, complete spectral Doppler, and color Doppler  The heart rate was 94 bpm,  at the start of the study  Image quality was adequate  LEFT VENTRICLE: Size was normal  Systolic function was normal by visual assessment  Ejection fraction was estimated to be 55 %  Although no diagnostic regional wall motion abnormality was identified, this possibility cannot be completely  excluded on the basis of this study   Wall thickness was normal  DOPPLER: Left ventricular diastolic function parameters were normal     RIGHT VENTRICLE: The size was normal  Systolic function was normal  Wall thickness was normal     LEFT ATRIUM: Size was normal     RIGHT ATRIUM: Size was normal     MITRAL VALVE: Valve structure was normal  There was normal leaflet separation  DOPPLER: The transmitral velocity was within the normal range  There was no evidence for stenosis  There was no regurgitation  AORTIC VALVE: The valve was trileaflet  Leaflets exhibited normal thickness, normal cuspal separation, and sclerosis  DOPPLER: Transaortic velocity was within the normal range  There was no evidence for stenosis  There was no  regurgitation  TRICUSPID VALVE: The valve structure was normal  There was normal leaflet separation  DOPPLER: The transtricuspid velocity was within the normal range  There was no evidence for stenosis  There was trace regurgitation  PULMONIC VALVE: Not well visualized  DOPPLER: There was no evidence for stenosis  There was no regurgitation  PERICARDIUM: There was no pericardial effusion  The pericardium was normal in appearance  AORTA: The root exhibited normal size  SYSTEMIC VEINS: IVC: The inferior vena cava was normal in size and course  Respirophasic changes were normal     SYSTEM MEASUREMENT TABLES    2D  %FS: 34 6 %  Ao Diam: 3 1 cm  EDV(Teich): 74 2 ml  EF(Teich): 64 3 %  ESV(Teich): 26 5 ml  IVSd: 0 9 cm  LA Area: 12 7 cm2  LA Diam: 2 8 cm  LVIDd: 4 1 cm  LVIDs: 2 7 cm  LVPWd: 0 9 cm  RA Area: 11 8 cm2  RVIDd: 3 cm  SV(Teich): 47 7 ml    MM  TAPSE: 2 5 cm    PW  E' Av 1 m/s  E' Lat: 0 1 m/s  E' Sept: 0 1 m/s  E/E' Av 6  E/E' Lat: 11 2  E/E' Sept: 12  MV A [place]: 1 3 m/s  MV Dec [place]: 6 8 m/s2  MV DecT: 185 3 ms  MV E Jon[place]: 1 3 m/s  MV E/A Ratio: 1  MV PHT: 53 7 ms  MVA By PHT: 4 1 cm2    [employer] Accredited Echocardiography Laboratory    Prepared and electronically signed by    DEVON Sears    Signed 02-Dec-2019 99:91:40       No results found for this or any previous visit  No results found for this or any previous visit  No results found for this or any previous visit

## 2019-12-04 LAB
ALBUMIN SERPL BCP-MCNC: 2.9 G/DL (ref 3.5–5)
ALP SERPL-CCNC: 83 U/L (ref 46–116)
ALT SERPL W P-5'-P-CCNC: 45 U/L (ref 12–78)
ANION GAP SERPL CALCULATED.3IONS-SCNC: 12 MMOL/L (ref 4–13)
AST SERPL W P-5'-P-CCNC: 33 U/L (ref 5–45)
BILIRUB DIRECT SERPL-MCNC: 0.08 MG/DL (ref 0–0.2)
BILIRUB SERPL-MCNC: 0.19 MG/DL (ref 0.2–1)
BUN SERPL-MCNC: 75 MG/DL (ref 5–25)
CALCIUM SERPL-MCNC: 8.3 MG/DL (ref 8.3–10.1)
CHLORIDE SERPL-SCNC: 101 MMOL/L (ref 100–108)
CK MB SERPL-MCNC: 1.3 % (ref 0–2.5)
CK MB SERPL-MCNC: 3 NG/ML (ref 0–5)
CK SERPL-CCNC: 236 U/L (ref 39–308)
CO2 SERPL-SCNC: 23 MMOL/L (ref 21–32)
CREAT SERPL-MCNC: 3.79 MG/DL (ref 0.6–1.3)
ERYTHROCYTE [DISTWIDTH] IN BLOOD BY AUTOMATED COUNT: 15.7 % (ref 11.6–15.1)
GFR SERPL CREATININE-BSD FRML MDRD: 19 ML/MIN/1.73SQ M
GLUCOSE SERPL-MCNC: 131 MG/DL (ref 65–140)
GLUCOSE SERPL-MCNC: 147 MG/DL (ref 65–140)
GLUCOSE SERPL-MCNC: 152 MG/DL (ref 65–140)
GLUCOSE SERPL-MCNC: 169 MG/DL (ref 65–140)
GLUCOSE SERPL-MCNC: 185 MG/DL (ref 65–140)
HCT VFR BLD AUTO: 29.8 % (ref 36.5–49.3)
HGB BLD-MCNC: 9.5 G/DL (ref 12–17)
MCH RBC QN AUTO: 27.3 PG (ref 26.8–34.3)
MCHC RBC AUTO-ENTMCNC: 31.9 G/DL (ref 31.4–37.4)
MCV RBC AUTO: 86 FL (ref 82–98)
PLATELET # BLD AUTO: 432 THOUSANDS/UL (ref 149–390)
PMV BLD AUTO: 9.5 FL (ref 8.9–12.7)
POTASSIUM SERPL-SCNC: 4.9 MMOL/L (ref 3.5–5.3)
PROT SERPL-MCNC: 7.3 G/DL (ref 6.4–8.2)
RBC # BLD AUTO: 3.48 MILLION/UL (ref 3.88–5.62)
SODIUM SERPL-SCNC: 136 MMOL/L (ref 136–145)
WBC # BLD AUTO: 13.3 THOUSAND/UL (ref 4.31–10.16)

## 2019-12-04 PROCEDURE — 94760 N-INVAS EAR/PLS OXIMETRY 1: CPT

## 2019-12-04 PROCEDURE — 99232 SBSQ HOSP IP/OBS MODERATE 35: CPT | Performed by: INTERNAL MEDICINE

## 2019-12-04 PROCEDURE — 94762 N-INVAS EAR/PLS OXIMTRY CONT: CPT

## 2019-12-04 PROCEDURE — 99231 SBSQ HOSP IP/OBS SF/LOW 25: CPT | Performed by: INTERNAL MEDICINE

## 2019-12-04 PROCEDURE — 82948 REAGENT STRIP/BLOOD GLUCOSE: CPT

## 2019-12-04 PROCEDURE — 85027 COMPLETE CBC AUTOMATED: CPT | Performed by: INTERNAL MEDICINE

## 2019-12-04 PROCEDURE — 94640 AIRWAY INHALATION TREATMENT: CPT

## 2019-12-04 PROCEDURE — 82550 ASSAY OF CK (CPK): CPT | Performed by: INTERNAL MEDICINE

## 2019-12-04 PROCEDURE — 80076 HEPATIC FUNCTION PANEL: CPT | Performed by: INTERNAL MEDICINE

## 2019-12-04 PROCEDURE — 82553 CREATINE MB FRACTION: CPT | Performed by: INTERNAL MEDICINE

## 2019-12-04 PROCEDURE — 80048 BASIC METABOLIC PNL TOTAL CA: CPT | Performed by: NURSE PRACTITIONER

## 2019-12-04 PROCEDURE — 94668 MNPJ CHEST WALL SBSQ: CPT

## 2019-12-04 RX ORDER — PREDNISONE 20 MG/1
40 TABLET ORAL DAILY
Status: DISCONTINUED | OUTPATIENT
Start: 2019-12-05 | End: 2019-12-05

## 2019-12-04 RX ORDER — SODIUM CHLORIDE 9 MG/ML
50 INJECTION, SOLUTION INTRAVENOUS CONTINUOUS
Status: DISPENSED | OUTPATIENT
Start: 2019-12-04 | End: 2019-12-04

## 2019-12-04 RX ORDER — LEVALBUTEROL INHALATION SOLUTION 0.63 MG/3ML
SOLUTION RESPIRATORY (INHALATION)
Status: COMPLETED
Start: 2019-12-04 | End: 2019-12-04

## 2019-12-04 RX ORDER — LEVALBUTEROL INHALATION SOLUTION 0.63 MG/3ML
0.63 SOLUTION RESPIRATORY (INHALATION) ONCE
Status: COMPLETED | OUTPATIENT
Start: 2019-12-04 | End: 2019-12-04

## 2019-12-04 RX ADMIN — TAMSULOSIN HYDROCHLORIDE 0.4 MG: 0.4 CAPSULE ORAL at 16:38

## 2019-12-04 RX ADMIN — PREGABALIN 50 MG: 50 CAPSULE ORAL at 21:12

## 2019-12-04 RX ADMIN — AZITHROMYCIN 500 MG: 250 TABLET, FILM COATED ORAL at 16:38

## 2019-12-04 RX ADMIN — QUETIAPINE FUMARATE 25 MG: 25 TABLET ORAL at 21:11

## 2019-12-04 RX ADMIN — BUDESONIDE 0.5 MG: 0.5 INHALANT RESPIRATORY (INHALATION) at 19:15

## 2019-12-04 RX ADMIN — SODIUM BICARBONATE 650 MG TABLET 650 MG: at 09:41

## 2019-12-04 RX ADMIN — SODIUM BICARBONATE 650 MG TABLET 650 MG: at 16:38

## 2019-12-04 RX ADMIN — APIXABAN 5 MG: 5 TABLET, FILM COATED ORAL at 16:41

## 2019-12-04 RX ADMIN — OXYCODONE HYDROCHLORIDE 20 MG: 20 TABLET, FILM COATED, EXTENDED RELEASE ORAL at 09:41

## 2019-12-04 RX ADMIN — LEVALBUTEROL HYDROCHLORIDE 0.63 MG: 0.63 SOLUTION RESPIRATORY (INHALATION) at 16:12

## 2019-12-04 RX ADMIN — INSULIN LISPRO 1 UNITS: 100 INJECTION, SOLUTION INTRAVENOUS; SUBCUTANEOUS at 13:45

## 2019-12-04 RX ADMIN — IPRATROPIUM BROMIDE AND ALBUTEROL SULFATE 3 ML: 2.5; .5 SOLUTION RESPIRATORY (INHALATION) at 19:15

## 2019-12-04 RX ADMIN — FAMOTIDINE 20 MG: 20 TABLET ORAL at 16:42

## 2019-12-04 RX ADMIN — INSULIN LISPRO 1 UNITS: 100 INJECTION, SOLUTION INTRAVENOUS; SUBCUTANEOUS at 16:46

## 2019-12-04 RX ADMIN — DILTIAZEM HYDROCHLORIDE 120 MG: 120 CAPSULE, COATED, EXTENDED RELEASE ORAL at 09:42

## 2019-12-04 RX ADMIN — METHYLPREDNISOLONE SODIUM SUCCINATE 40 MG: 40 INJECTION, POWDER, FOR SOLUTION INTRAMUSCULAR; INTRAVENOUS at 21:13

## 2019-12-04 RX ADMIN — CEFTRIAXONE 1000 MG: 1 INJECTION, POWDER, FOR SOLUTION INTRAMUSCULAR; INTRAVENOUS at 13:43

## 2019-12-04 RX ADMIN — OXYCODONE HYDROCHLORIDE 20 MG: 20 TABLET, FILM COATED, EXTENDED RELEASE ORAL at 21:12

## 2019-12-04 RX ADMIN — METHYLPREDNISOLONE SODIUM SUCCINATE 40 MG: 40 INJECTION, POWDER, FOR SOLUTION INTRAMUSCULAR; INTRAVENOUS at 09:43

## 2019-12-04 RX ADMIN — IPRATROPIUM BROMIDE AND ALBUTEROL SULFATE 3 ML: 2.5; .5 SOLUTION RESPIRATORY (INHALATION) at 01:28

## 2019-12-04 RX ADMIN — FAMOTIDINE 20 MG: 20 TABLET ORAL at 09:42

## 2019-12-04 RX ADMIN — BUDESONIDE 0.5 MG: 0.5 INHALANT RESPIRATORY (INHALATION) at 07:01

## 2019-12-04 RX ADMIN — SODIUM CHLORIDE 50 ML/HR: 0.9 INJECTION, SOLUTION INTRAVENOUS at 13:43

## 2019-12-04 RX ADMIN — GUAIFENESIN 600 MG: 600 TABLET ORAL at 21:12

## 2019-12-04 RX ADMIN — IPRATROPIUM BROMIDE AND ALBUTEROL SULFATE 3 ML: 2.5; .5 SOLUTION RESPIRATORY (INHALATION) at 13:08

## 2019-12-04 RX ADMIN — FLUOXETINE 10 MG: 10 CAPSULE ORAL at 09:41

## 2019-12-04 RX ADMIN — PANTOPRAZOLE SODIUM 40 MG: 40 TABLET, DELAYED RELEASE ORAL at 05:31

## 2019-12-04 RX ADMIN — LEVALBUTEROL HYDROCHLORIDE: 0.63 SOLUTION RESPIRATORY (INHALATION) at 17:19

## 2019-12-04 RX ADMIN — IPRATROPIUM BROMIDE AND ALBUTEROL SULFATE 3 ML: 2.5; .5 SOLUTION RESPIRATORY (INHALATION) at 07:01

## 2019-12-04 RX ADMIN — FOLIC ACID 1 MG: 1 TABLET ORAL at 09:42

## 2019-12-04 RX ADMIN — MELATONIN TAB 3 MG 3 MG: 3 TAB at 21:12

## 2019-12-04 RX ADMIN — GUAIFENESIN 600 MG: 600 TABLET ORAL at 09:42

## 2019-12-04 RX ADMIN — FLUTICASONE PROPIONATE 1 SPRAY: 50 SPRAY, METERED NASAL at 09:43

## 2019-12-04 RX ADMIN — APIXABAN 5 MG: 5 TABLET, FILM COATED ORAL at 09:42

## 2019-12-04 RX ADMIN — PREGABALIN 25 MG: 25 CAPSULE ORAL at 09:41

## 2019-12-04 NOTE — PROGRESS NOTES
Patient was asking for salt to eat his lunch  I informed him and his girlfriend that he has a cardiac diet and he should avoid it if possible  Education done and he was upset, said he will eat what he wants when he goes home  I did give him one packet  MD made aware

## 2019-12-04 NOTE — PROGRESS NOTES
NEPHROLOGY PROGRESS NOTE   Enio Alberto Sr  64 y o  male MRN: [id number]  Unit/Bed#: E4 -01 Encounter: [id number]      ASSESSMENT/PLAN:  NURIA with Chronic kidney disease stage IIIB:  Upon review of records, creatinine 2 4 - 2 5 since August 2019    - Creatinine 1 5 in early 2019 and 1 7-1 9 in mid 2019   - Creatinine significantly worsened to 4 0 yesterday likely secondary to contrast nephropathy versus ATN versus immune mediated nephritis from [facility]  - Last dose of Keytruda was on 11/20/2019   - Creatinine improved today to 3 79 post gentle IV hydration with 500 mL normal saline   - UA revealed trace blood, 0-1 WBC  - Continue to avoid nephrotoxins, NSAIDs, IV contrast, hypotension   - PVR to be completed Q shift  Last scan yesterday was 12 mL  - Patient receiving IV Solu-Medrol due to COPD  - Monitor volume status closely  Strict intake and output  Daily weight   - Repeat BMP in a m  Hypertension:  Blood pressure remains stable  - Avoid hypotension/fluctuations in blood pressure to prevent decreased renal perfusion  Anemia:  Hemoglobin stable at 9 5 today  - Trend and monitor  Low bicarbonate: CO2 improving today to 23    - Will plan to maintain sodium bicarbonate tablets 650 mg 2 times daily after meals until levels stabilize  - Monitor with daily BMP  Severe COPD with acute exacerbation:  - Plan per Pulmonary    - Plan to transition to prednisone taper tomorrow  - Venous Doppler revealed no evidence of DVT  Adenocarcinoma of lung: s/p 6 cycles of palliative chemotherapy  - Last dose of Keytruda on 11/20/2019   - Palliative Care following  SUBJECTIVE:  Patient resting in bed with family at the bedside  Patient denies shortness of breath, chest pain, nausea, vomiting, diarrhea       OBJECTIVE:  Current Weight: Weight - Scale: 68 kg (149 lb 14 6 oz)  Vitals:    12/04/19 0915   BP:    Pulse:    Resp:    Temp:    SpO2: 94%       Intake/Output Summary (Last 24 hours) at 12/4/2019 1023  Last data filed at 12/4/2019 0531  Gross per 24 hour   Intake 700 ml   Output 1075 ml   Net -375 ml       General:  No acute distress  Skin:  Warm, no rash  Eyes:  Sclerae anicteric  ENT:  Moist lips and mucous membranes  Neck:  Supple, no JVD, trachea midline  Chest:  Expiratory wheezing bilaterally  CVS:  Normal rate rhythm  Abdomen:  Soft, nontender, normoactive bowel sounds  Extremities:  No edema bilaterally   Neuro:  Alert and oriented  Psych:  Appropriate affect      Medications:  Scheduled Meds:  Current Facility-Administered Medications:  acetaminophen 650 mg Oral Q6H PRN Katherine Blair MD    albuterol 2 puff Inhalation Q4H PRN Katherine Blair MD    aluminum-magnesium hydroxide-simethicone 30 mL Oral Q6H PRN Katherine Blair MD    apixaban 5 mg Oral BID Sybil Ambron,     azithromycin 500 mg Oral Q24H BROOKS Herrera    budesonide 0 5 mg Nebulization Q12H BROOKS Herrera    cefTRIAXone 1,000 mg Intravenous Q24H BROOKS Herrera Last Rate: 1,000 mg (12/03/19 1301)   chlorproMAZINE 25 mg Oral Q6H PRN Katherine Blair MD    diltiazem 120 mg Oral Daily Ruby Heredia MD    diphenhydrAMINE 25 mg Oral Q6H PRN Katherine Blair MD    famotidine 20 mg Oral BID Katherine Blair MD    FLUoxetine 10 mg Oral Daily Jonathan Jimenez MD    fluticasone 1 spray Each Nare Daily Jonathan Jimenez MD    folic acid 1 mg Oral Daily Jonathan Jimenez MD    guaiFENesin 600 mg Oral Q12H [address] BROOKS Arzate    insulin lispro 1-5 Units Subcutaneous TID AC Jonathan Jimenez MD    ipratropium-albuterol 3 mL Nebulization Q6H Jonathan Jimenez MD    LORazepam 0 5 mg Oral Q4H PRN Mercedes Jean MD    melatonin 3 mg Oral HS Jonathan Jimenez MD    methocarbamol 750 mg Oral Q6H PRN Katherine Blair MD    methylPREDNISolone sodium succinate 40 mg Intravenous Q12H [address] BROOKS Arzate    ondansetron 4 mg Intravenous Q6H PRN Katherine Blair MD    oxyCODONE 20 mg Oral Q12H [address] Katherine Blair MD    oxyCODONE 20 mg Oral Q6H PRN Sybil Tenorio DO    pantoprazole 40 mg Oral Early Morning Quinn Mitchell MD    polyethylene glycol 17 g Oral Daily PRN Quinn Mitchell MD    pregabalin 25 mg Oral Daily Quinn Mitchell MD    pregabalin 50 mg Oral HS Quinn Mitchell MD    QUEtiapine 25 mg Oral HS Quinn Mitchell MD    senna-docusate sodium 1 tablet Oral BID PRN Quinn Mitchell MD    sodium bicarbonate 650 mg Oral BID after meals Nga Tracy MD    tamsulosin 0 4 mg Oral Daily With Michelle Byrd MD        PRN Meds:   acetaminophen    albuterol    aluminum-magnesium hydroxide-simethicone    chlorproMAZINE    diphenhydrAMINE    LORazepam    methocarbamol    ondansetron    oxyCODONE    polyethylene glycol    senna-docusate sodium    Continuous Infusions:     Laboratory Results:  Results from last 7 days   Lab Units 12/04/19  0444 12/03/19  1043 12/03/19  0556 12/02/19  0453 12/01/19  0339 11/30/19  1412 11/30/19  0605 11/29/19  1900   WBC Thousand/uL 13 30*  --   --  14 34* 15 95*  --  5 75 8 81   HEMOGLOBIN g/dL 9 5*  --   --  9 8* 9 6*  --  10 4* 11 5*   HEMATOCRIT % 29 8*  --   --  31 5* 30 7*  --  33 9* 36 2*   PLATELETS Thousands/uL 432*  --   --  413* 311  --  265 240   SODIUM mmol/L 136 133* 131* 141 139 136 137 136   POTASSIUM mmol/L 4 9 4 6 6 1* 4 5 4 4 4 1 4 3 4 1   CHLORIDE mmol/L 101 99* 99* 107 104 103 101 100   CO2 mmol/L 23 20* 20* 26 24 24 22 26   BUN mg/dL 75* 75* 71* 42* 34* 32* 30* 31*   CREATININE mg/dL 3 79* 4 07* 3 89* 2 50* 2 40* 2 45* 2 61* 2 44*   CALCIUM mg/dL 8 3 8 4 8 8 8 7 8 5 8 8 8 8 9 0   MAGNESIUM mg/dL  --   --   --  2 2 2 2  --   --   --    PHOSPHORUS mg/dL  --   --   --  4 0 3 4  --   --   --

## 2019-12-04 NOTE — PROGRESS NOTES
[facility] Progress Note  Patient: Neetu Wilkins  64 y o  male   MRN: [id number]  PCP: Carlos Navarro MD  Unit/Bed#: E4 -01 Encounter: [id number]  Date Of Visit: 12/04/19      Assessment/plan  1  Acute on chronic hypoxic respiratory failure due to severe copd with acute exacerbation- appreciate pulmonary recommendations  Pt is currently on 2 liters of oxygen which is his baseline  He will continue with pulmonary toilet  He will continue solumedrol 40mg IV q12 hours, budesonide bid, duoneb, and mucinex  Pt will be changed to po prednisone tomorrow of 40mg  Azithromycin and ceftriaxone added for possible bronchitis  Pt is to be on azithromycin for 3 days total and ceftriaxone for 7 days       2  Possible pulmonary embolism- Pt is at high risk for pe due to adenocarcinoma  Venous duplex is negative  He has ckd4 and unable to have cta chest to r/o pe  unlikely pe as pt has improved with above treatment  Pulmonary had d/albert heparin gtt  Will monitor      3  Tobacco dependence- encourage pt to stop smoking     4 afib- pt was started on a cardizem gtt  He was changed to cardizem 120mg daily  Will await cardiology follow up  Will continue eliquis bid     5  Non mi troponin elevation- echo reviewed  Likely due to  Copd exacerbation and tachycardia     6  Stroke like symptoms- pt was a stroke alert  Work up was negative       7  ana/ckd4- appreciate nephrology recommendations  Creatinine has improved from 4 07 to 3 79  Spoke with nephrology  Possibly that this is due to contrast nephropathy vrs atn vrs immune mediated nephritis from [place]  No emergent HD needed  Nephrology will continue IV fluids as pt is responding    8  Type 2 diabetes- a1c is 5 6  Hold metformin  Continue insulin sliding scale       9  Adenocarcinoma of the lung- pt follows with Dr Kimmy Mcbride  [facility]  Appreciate palliative care recommendations     10  htn- stable  Continue current treatment     11  Tremor- per pts family tremor is new  Family would like neurology to re eval pt  Could be a combination of steroids, keytruda, and anxiety       dispo-pt will need str once stable  He originally wanted to be transferred to [place] but now is wants to stay at this hospital                 His sisters name is June and contact is     Subjective:   Pt seen and examined  Pt is breathing better  He is still having the tremors in upper ext bilateral  No f/c no cp no n/v/d no abd pain  Objective:     Vitals: Blood pressure 145/63, pulse 95, temperature (!) 96 8 °F (36 °C), temperature source Tympanic, resp  rate 22, weight 68 kg (149 lb 14 6 oz), SpO2 95 %  ,Body mass index is 22 79 kg/m²  Lab, Imaging and other studies:  Results from last 7 days   Lab Units 12/04/19  0444  11/29/19  2333   WBC Thousand/uL 13 30*   < >  --    HEMOGLOBIN g/dL 9 5*   < >  --    HEMATOCRIT % 29 8*   < >  --    PLATELETS Thousands/uL 432*   < >  --    INR   --   --  1 15    < > = values in this interval not displayed       Results from last 7 days   Lab Units 12/04/19  0444   POTASSIUM mmol/L 4 9   CHLORIDE mmol/L 101   CO2 mmol/L 23   BUN mg/dL 75*   CREATININE mg/dL 3 79*   CALCIUM mg/dL 8 3   ALK PHOS U/L 83   ALT U/L 45   AST U/L 33     Results from last 7 days   Lab Units 12/04/19  0444 11/30/19  0605 11/30/19  0115 11/29/19  2223   CK TOTAL U/L 236  --   --   --    TROPONIN I ng/mL  --  3 03* 3 58* 2 92*   CK MB INDEX % 1 3  --   --   --      No results found for: BLOODCX, Izola Danes, WOUNDCULT, SPUTUMCULTUR    Scheduled Meds:   Current Facility-Administered Medications:  acetaminophen 650 mg Oral Q6H PRN Jonathan Jimenez MD    albuterol 2 puff Inhalation Q4H PRN Carmina Arreguin MD    aluminum-magnesium hydroxide-simethicone 30 mL Oral Q6H PRN Carmina Arreguin MD    apixaban 5 mg Oral BID Sybil Ambron, DO    azithromycin 500 mg Oral Q24H Basil Gardner, CRNP    budesonide 0 5 mg Nebulization Q12H Arjun Gardner BROOKS    cefTRIAXone 1,000 mg Intravenous Q24H BROOKS Gordon Last Rate: 1,000 mg (12/04/19 1343)   chlorproMAZINE 25 mg Oral Q6H PRN Jose Upton MD    diltiazem 120 mg Oral Daily Dedrick Walker MD    diphenhydrAMINE 25 mg Oral Q6H PRN Jose Upton MD    famotidine 20 mg Oral BID Jose Upton MD    FLUoxetine 10 mg Oral Daily Jonathan Jimenez MD    fluticasone 1 spray Each Nare Daily Jose Upton MD    folic acid 1 mg Oral Daily Jose Upton MD    guaiFENesin 600 mg Oral Q12H [address] BROOKS Gordon    insulin lispro 1-5 Units Subcutaneous TID AC Jose Upton MD    ipratropium-albuterol 3 mL Nebulization Q6H Jonathan Jimenez MD    LORazepam 0 5 mg Oral Q4H PRN Marques Cooper MD    melatonin 3 mg Oral HS Jose Upton MD    methocarbamol 750 mg Oral Q6H PRN Jose Upton MD    methylPREDNISolone sodium succinate 40 mg Intravenous Q12H [address] BROOKS Gordon    ondansetron 4 mg Intravenous Q6H PRN Jose Upton MD    oxyCODONE 20 mg Oral Q12H [address] Jose Upton MD    oxyCODONE 20 mg Oral Q6H PRN Sybil Tenorio DO    pantoprazole 40 mg Oral Early Morning Jonathan Jimenez MD    polyethylene glycol 17 g Oral Daily PRN Jose Upton MD    [START ON 12/5/2019] predniSONE 40 mg Oral Daily BROOKS Gordon    pregabalin 25 mg Oral Daily Jonathan Jimenez MD    pregabalin 50 mg Oral HS Jonathan Jimenez MD    QUEtiapine 25 mg Oral HS Jose Upton MD    senna-docusate sodium 1 tablet Oral BID PRN Jose Upton MD    sodium bicarbonate 650 mg Oral BID after meals Nataliya Johnson MD    sodium chloride 50 mL/hr Intravenous Continuous Nataliya Johnson MD Last Rate: 50 mL/hr (12/04/19 1343)   tamsulosin 0 4 mg Oral Daily With Chrystal Talavera MD      Continuous Infusions:   sodium chloride 50 mL/hr Last Rate: 50 mL/hr (12/04/19 7609)     PRN Meds:   acetaminophen    albuterol    aluminum-magnesium hydroxide-simethicone    chlorproMAZINE    diphenhydrAMINE    LORazepam   methocarbamol    ondansetron    oxyCODONE    polyethylene glycol    senna-docusate sodium      Physical exam:  Physical Exam  General appearance: alert and oriented, in no acute distress  Head: Normocephalic, without obvious abnormality, atraumatic  Eyes: conjunctivae/corneas clear  PERRL, EOM's intact  Fundi benign    Neck: no adenopathy, no carotid bruit, no JVD, supple, symmetrical, trachea midline and thyroid not enlarged, symmetric, no tenderness/mass/nodules  Lungs: mild coarse breath sounds bilateral   Heart: irregularly irregular rhythm  Abdomen: soft, non-tender; bowel sounds normal; no masses,  no organomegaly  Extremities: extremities normal, warm and well-perfused; no cyanosis, clubbing, or edema  Pulses: 2+ and symmetric  Skin: Skin color, texture, turgor normal  No rashes or lesions  Neurologic: Mental status: Alert, oriented, thought content appropriate, tremors of upper extremities at rest worsened with movement      VTE Pharmacologic Prophylaxis: eliquis  VTE Mechanical Prophylaxis: sequential compression device    Counseling / Coordination of Care  Total floor / unit time spent today 20 minutes     Current Length of Stay: 5 day(s)    Current Patient Status: Inpatient       Code Status: Level 1 - Full Code

## 2019-12-04 NOTE — PLAN OF CARE
Problem: Potential for Falls  Goal: Patient will remain free of falls  Description  INTERVENTIONS:  - Assess patient frequently for physical needs  -  Identify cognitive and physical deficits and behaviors that affect risk of falls    -  [place] fall precautions as indicated by assessment   - Educate patient/family on patient safety including physical limitations  - Instruct patient to call for assistance with activity based on assessment  - Modify environment to reduce risk of injury  - Consider OT/PT consult to assist with strengthening/mobility  Outcome: Progressing     Problem: PAIN - ADULT  Goal: Verbalizes/displays adequate comfort level or baseline comfort level  Description  Interventions:  - Encourage patient to monitor pain and request assistance  - Assess pain using appropriate pain scale  - Administer analgesics based on type and severity of pain and evaluate response  - Implement non-pharmacological measures as appropriate and evaluate response  - Consider cultural and social influences on pain and pain management  - Notify physician/advanced practitioner if interventions unsuccessful or patient reports new pain  Outcome: Progressing     Problem: CARDIOVASCULAR - ADULT  Goal: Maintains optimal cardiac output and hemodynamic stability  Description  INTERVENTIONS:  - Monitor I/O, vital signs and rhythm  - Monitor for S/S and trends of decreased cardiac output  - Administer and titrate ordered vasoactive medications to optimize hemodynamic stability  - Assess quality of pulses, skin color and temperature  - Assess for signs of decreased coronary artery perfusion  - Instruct patient to report change in severity of symptoms  Outcome: Progressing  Goal: Absence of cardiac dysrhythmias or at baseline rhythm  Description  INTERVENTIONS:  - Continuous cardiac monitoring, vital signs, obtain 12 lead EKG if ordered  - Administer antiarrhythmic and heart rate control medications as ordered  - Monitor electrolytes and administer replacement therapy as ordered  Outcome: Progressing     Problem: RESPIRATORY - ADULT  Goal: Achieves optimal ventilation and oxygenation  Description  INTERVENTIONS:  - Assess for changes in respiratory status  - Assess for changes in mentation and behavior  - Position to facilitate oxygenation and minimize respiratory effort  - Oxygen administered by appropriate delivery if ordered  - Encourage broncho-pulmonary hygiene including cough, deep breathe, Incentive Spirometry  - Assess the need for suctioning and aspirate as needed  - Assess and instruct to report SOB or any respiratory difficulty  - Respiratory Therapy support as indicated   Outcome: Progressing     Problem: INFECTION - ADULT  Goal: Absence or prevention of progression during hospitalization  Description  INTERVENTIONS:  - Assess and monitor for signs and symptoms of infection  - Monitor lab/diagnostic results  - Monitor all insertion sites, i e  indwelling lines, tubes, and drains  - Monitor endotracheal if appropriate and nasal secretions for changes in amount and color  - [place] appropriate cooling/warming therapies per order  - Administer medications as ordered  - Instruct and encourage patient and family to use good hand hygiene technique  - Identify and instruct in appropriate isolation precautions for identified infection/condition  Outcome: Progressing  Goal: Absence of fever/infection during neutropenic period  Description  INTERVENTIONS:  - Monitor WBC    Outcome: Progressing     Problem: SAFETY ADULT  Goal: Maintain or return to baseline ADL function  Description  INTERVENTIONS:  -  Assess patient's ability to carry out ADLs; assess patient's baseline for ADL function and identify physical deficits which impact ability to perform ADLs (bathing, care of mouth/teeth, toileting, grooming, dressing, etc )  - Assess/evaluate cause of self-care deficits   - Assess range of motion  - Assess patient's mobility; develop plan if impaired  - Assess patient's need for assistive devices and provide as appropriate  - Encourage maximum independence but intervene and supervise when necessary  - Involve family in performance of ADLs  - Assess for home care needs following discharge   - Consider OT consult to assist with ADL evaluation and planning for discharge  - Provide patient education as appropriate  Outcome: Progressing  Goal: Maintain or return mobility status to optimal level  Description  INTERVENTIONS:  - Assess patient's baseline mobility status (ambulation, transfers, stairs, etc )    - Identify cognitive and physical deficits and behaviors that affect mobility  - Identify mobility aids required to assist with transfers and/or ambulation (gait belt, sit-to-stand, lift, walker, cane, etc )  - [place] fall precautions as indicated by assessment  - Record patient progress and toleration of activity level on Mobility SBAR; progress patient to next Phase/Stage  - Instruct patient to call for assistance with activity based on assessment  - Consider rehabilitation consult to assist with strengthening/weightbearing, etc   Outcome: Progressing     Problem: DISCHARGE PLANNING  Goal: Discharge to home or other facility with appropriate resources  Description  INTERVENTIONS:  - Identify barriers to discharge w/patient and caregiver  - Arrange for needed discharge resources and transportation as appropriate  - Identify discharge learning needs (meds, wound care, etc )  - Arrange for interpretive services to assist at discharge as needed  - Refer to Case Management Department for coordinating discharge planning if the patient needs post-hospital services based on physician/advanced practitioner order or complex needs related to functional status, cognitive ability, or social support system  Outcome: Progressing     Problem: Knowledge Deficit  Goal: Patient/family/caregiver demonstrates understanding of disease process, treatment plan, medications, and discharge instructions  Description  Complete learning assessment and assess knowledge base  Interventions:  - Provide teaching at level of understanding  - Provide teaching via preferred learning methods  Outcome: Progressing     Problem: Potential for Aspiration  Goal: Non-ventilated patient's risk of aspiration is minimized  Description  Assess and monitor vital signs, respiratory status, and labs (WBC)  Monitor for signs of aspiration (tachypnea, cough, rales, wheezing, cyanosis, fever)  - Assess and monitor patient's ability to swallow  - Place patient up in chair to eat if possible  - HOB up at 90 degrees to eat if unable to get patient up into chair   - Supervise patient during oral intake  - Instruct patient/ family to take small bites  - Instruct patient/ family to take small single sips when taking liquids  - Follow patient-specific strategies generated by speech pathologist   Outcome: Progressing  Goal: Ventilated patient's risk of aspiration is minimized  Description  Assess and monitor vital signs, respiratory status, airway cuff pressure, and labs (WBC)  Monitor for signs of aspiration (tachypnea, cough, rales, wheezing, cyanosis, fever)  - Elevate head of bed 30 degrees if patient has tube feeding   - Monitor tube feeding    Outcome: Progressing     Problem: NEUROSENSORY - ADULT  Goal: Achieves stable or improved neurological status  Description  INTERVENTIONS  - Monitor and report changes in neurological status  - Monitor vital signs such as temperature, blood pressure, glucose, and any other labs ordered   - Initiate measures to prevent increased intracranial pressure  - Monitor for seizure activity and implement precautions if appropriate      Outcome: Progressing  Goal: Remains free of injury related to seizures activity  Description  INTERVENTIONS  - Maintain airway, patient safety  and administer oxygen as ordered  - Monitor patient for seizure activity, document and report duration and description of seizure to physician/advanced practitioner  - If seizure occurs,  ensure patient safety during seizure  - Reorient patient post seizure  - Seizure pads on all 4 side rails  - Instruct patient/family to notify RN of any seizure activity including if an aura is experienced  - Instruct patient/family to call for assistance with activity based on nursing assessment  - Administer anti-seizure medications if ordered    Outcome: Progressing  Goal: Achieves maximal functionality and self care  Description  INTERVENTIONS  - Monitor swallowing and airway patency with patient fatigue and changes in neurological status  - Encourage and assist patient to increase activity and self care     - Encourage visually impaired, hearing impaired and aphasic patients to use assistive/communication devices  Outcome: Progressing     Problem: METABOLIC, FLUID AND ELECTROLYTES - ADULT  Goal: Electrolytes maintained within normal limits  Description  INTERVENTIONS:  - Monitor labs and assess patient for signs and symptoms of electrolyte imbalances  - Administer electrolyte replacement as ordered  - Monitor response to electrolyte replacements, including repeat lab results as appropriate  - Instruct patient on fluid and nutrition as appropriate  Outcome: Progressing  Goal: Fluid balance maintained  Description  INTERVENTIONS:  - Monitor labs   - Monitor I/O and WT  - Instruct patient on fluid and nutrition as appropriate  - Assess for signs & symptoms of volume excess or deficit  Outcome: Progressing  Goal: Glucose maintained within target range  Description  INTERVENTIONS:  - Monitor Blood Glucose as ordered  - Assess for signs and symptoms of hyperglycemia and hypoglycemia  - Administer ordered medications to maintain glucose within target range  - Assess nutritional intake and initiate nutrition service referral as needed  Outcome: Progressing     Problem: SKIN/TISSUE INTEGRITY - ADULT  Goal: Skin integrity remains intact  Description  INTERVENTIONS  - Identify patients at risk for skin breakdown  - Assess and monitor skin integrity  - Assess and monitor nutrition and hydration status  - Monitor labs (i e  albumin)  - Assess for incontinence   - Turn and reposition patient  - Assist with mobility/ambulation  - Relieve pressure over bony prominences  - Avoid friction and shearing  - Provide appropriate hygiene as needed including keeping skin clean and dry  - Evaluate need for skin moisturizer/barrier cream  - Collaborate with interdisciplinary team (i e  Nutrition, Rehabilitation, etc )   - Patient/family teaching  Outcome: Progressing  Goal: Incision(s), wounds(s) or drain site(s) healing without S/S of infection  Description  INTERVENTIONS  - Assess and document risk factors for skin impairment   - Assess and document dressing, incision, wound bed, drain sites and surrounding tissue  - Consider nutrition services referral as needed  - Oral mucous membranes remain intact  - Provide patient/ family education  Outcome: Progressing  Goal: Oral mucous membranes remain intact  Description  INTERVENTIONS  - Assess oral mucosa and hygiene practices  - Implement preventative oral hygiene regimen  - Implement oral medicated treatments as ordered  - Initiate Nutrition services referral as needed  Outcome: Progressing     Problem: MUSCULOSKELETAL - ADULT  Goal: Maintain or return mobility to safest level of function  Description  INTERVENTIONS:  - Assess patient's ability to carry out ADLs; assess patient's baseline for ADL function and identify physical deficits which impact ability to perform ADLs (bathing, care of mouth/teeth, toileting, grooming, dressing, etc )  - Assess/evaluate cause of self-care deficits   - Assess range of motion  - Assess patient's mobility  - Assess patient's need for assistive devices and provide as appropriate  - Encourage maximum independence but intervene and supervise when necessary  - Involve family in performance of ADLs  - Assess for home care needs following discharge   - Consider OT consult to assist with ADL evaluation and planning for discharge  - Provide patient education as appropriate  Outcome: Progressing  Goal: Maintain proper alignment of affected body part  Description  INTERVENTIONS:  - Support, maintain and protect limb and body alignment  - Provide patient/ family with appropriate education  Outcome: Progressing     Problem: Prexisting or High Potential for Compromised Skin Integrity  Goal: Skin integrity is maintained or improved  Description  INTERVENTIONS:  - Identify patients at risk for skin breakdown  - Assess and monitor skin integrity  - Assess and monitor nutrition and hydration status  - Monitor labs   - Assess for incontinence   - Turn and reposition patient  - Assist with mobility/ambulation  - Relieve pressure over bony prominences  - Avoid friction and shearing  - Provide appropriate hygiene as needed including keeping skin clean and dry  - Evaluate need for skin moisturizer/barrier cream  - Collaborate with interdisciplinary team   - Patient/family teaching  - Consider wound care consult   Outcome: Progressing

## 2019-12-04 NOTE — PROGRESS NOTES
Progress Note - Pulmonary   Yesi Sensor Sr  64 y o  male MRN: [id number]  Unit/Bed#: E4 -01 Encounter: [id number]      Assessment/Plan:    1  Acute on chronic hypoxic respiratory failure  1  Oxygen while on 3 L nasal cannula, continue titrate oxygen maintain SpO2 greater than equal to 88%  2  Baseline 2 L nasal cannula  3  Pulmonary toilet incentive spirometry, flutter valve, out of bed increasing activity as tolerated  2  Severe Chronic obstructive pulmonary disease with acute exacerbation  1  Will continue Solu-Medrol 40 mg b i d  With transition to prednisone taper tomorrow 40 mg daily reduction by 10 mg every 3 days  2  Continue duo nebs q 6 hours  3  Continue Rocephin/azithromycin for an estimated 7 day course of Rocephin and 3 day course of azithromycin  4  Continue Flonase and Mucinex  5  Continue budesonide b i d  3  Severe tobacco abuse  1  Cessation encouraged  2  Nicotine replacement therapy as needed  4  Stage IV adenocarcinoma   1  Currently on immunotherapy  2  Oncology following  3  Pat of care following  5  CKD stage 4-stable  6  Anxiety  1  Pad of care following and Ativan added for anxiety contributing to shortness of breath  7  Chronic pain  1  Palliative care following for management        Subjective:     Chris Lainez was seen sitting in bed upon entering the room  He is more alert today, denies acute overnight events  Reports mild improvement symptoms today  Continues to have a cough that is productive of scant amounts of sputum  Denies:  Chest pain, pain inspiration, fevers, chills, night sweats or hemoptysis  Objective:         Vitals: Blood pressure 132/97, pulse 68, temperature 97 8 °F (36 6 °C), temperature source Tympanic, resp  rate 18, weight 68 kg (149 lb 14 6 oz), SpO2 94 %  , 3LNC, Body mass index is 22 79 kg/m²        Intake/Output Summary (Last 24 hours) at 12/4/2019 1052  Last data filed at 12/4/2019 0531  Gross per 24 hour   Intake 700 ml   Output 1075 ml   Net -375 ml Physical Exam  Gen: Awake, alert, oriented x 3, no acute distress  HEENT: Mucous membranes moist, no oral lesions, no thrush, oxygen via NC  NECK: no accessory muscle use, JVP not elevated  Cardiac: Regular, single S1, single S2, no murmurs, no rubs, no gallops  Lungs: end expiratory wheezes bilaterally  Abdomen: normoactive bowel sounds, soft nontender, nondistended, no rebound or rigidity, no guarding  Extremities: no cyanosis, no clubbing, no edema    Labs: I have personally reviewed pertinent lab results  , CBC:   Lab Results   Component Value Date    WBC 13 30 (H) 12/04/2019    HGB 9 5 (L) 12/04/2019    HCT 29 8 (L) 12/04/2019    MCV 86 12/04/2019     (H) 12/04/2019    MCH 27 3 12/04/2019    MCHC 31 9 12/04/2019    RDW 15 7 (H) 12/04/2019    MPV 9 5 12/04/2019   , CMP:   Lab Results   Component Value Date    SODIUM 136 12/04/2019    K 4 9 12/04/2019     12/04/2019    CO2 23 12/04/2019    BUN 75 (H) 12/04/2019    CREATININE 3 79 (H) 12/04/2019    CALCIUM 8 3 12/04/2019    AST 33 12/04/2019    ALT 45 12/04/2019    ALKPHOS 83 12/04/2019    EGFR 19 12/04/2019     Imaging and other studies: none      Bailey Morgan

## 2019-12-04 NOTE — PHYSICAL THERAPY NOTE
Physical Therapy Cancellation Note    CHART REVIEWED  PT ORDERS REMAINS ACTIVE  APPROPRIATE TO SEE PER RN  ATTEMPTED TO SEE PATIENT THIS AM HOWEVER PATIENT SPEAKING WITH FAMILY/FRIEND AND REQUESTED PT COME BACK  PT RE-ATTEMPTED AGAIN THIS PM HOWEVER PATIENT REFUSING REPORTING "COME BACK TOMORROW"  PT PROVIDED EDUCATION ON PT'S ROLE (FAMILY/FRIEND REPORTING QUESTIONS) AND IMPORTANCE OF MOBILITY  PATIENT CONTINUED TO REFUSE AND REQUESTED PT COME BACK TOMORROW AFTER LUNCH   PT WILL CONTINUE TO FOLLOW ON CASELOAD AS APPROPRIATE    Marjan French, PT

## 2019-12-04 NOTE — PROGRESS NOTES
Cardiology Progress Note - Gaston Marquez Sr  64 y o  male MRN: [id number]    Unit/Bed#: E4 -01 Encounter: [id number]      Assessment:  1  Non-MI troponin elevation  · Tn 0 92 --> 2 92 --> 3 58 --> 3 03 (on 11/30/19)  · LHC - 6/2018 - mRCA 20%, otherwise normal  · Echo - 12/1/19 - LVEF 55%, no diagnostic regional wall motion abnormality  2  Atrial fibrillation   3  Acute on chronic respiratory hypoxic failure  · 2/2 COPD exacerbation  4  NURIA on CKD  · Baseline Cr 1 9-2 5  5  Hypertension  6  Diabetes Mellitus Type 2   7  COPD  8  Lung adenocarcinoma, right  · On chemotherapy for about 6-7 months, with recent change in chemotherapy    Plan  Non-MI troponin elevation  · Has intermittent chest pain, which appears to be possibly pleuritis or musculoskeletal pain  · No other clear signs to suggest coronary artery disease  · Has advanced cancer, and is on chemotherapy for same  Palliative care following  · No further inpatient cardiac workup for now  · Continues to have mild inspiratory chest discomfort intermittently     Atrial fibrillation, new onset  · Single episode on 12/1/19  · Back in sinus rhythm  · On cardizem , eliquis 5 bid  · High CHADs-VASC, needs oral anticoagulation for stroke prevention, No recent h/o bleeding or falls    Will need to follow up outpatient with Cardiology  Will have office contact him for follow up  Subjective:   Shortness of breath mildly improved, and he did walk a little today  He does not feel back to his baseline respiratory status  No complains worsening or acute chest pain, although does mild intermittent inspiratory chest discomfort  Review of Systems  All other systems negative    Telemetry Review: NSR currently  Was in atrial fibrillation on 12/1, and seems to have converted overnight on early morning 12/2/19    Objective:   Vitals: Blood pressure 167/73, pulse 97, temperature 97 7 °F (36 5 °C), temperature source Tympanic, resp   rate 20, weight 68 kg (149 lb 14 6 oz), SpO2 96 %  , Body mass index is 22 79 kg/m² ,   Orthostatic Blood Pressures      Most Recent Value   Blood Pressure  167/73 filed at 12/04/2019 1540   Patient Position - Orthostatic VS  Sitting filed at 12/04/2019 0868         Systolic (04XSH), KOH:924 , Min:124 , MWV:250     Diastolic (56UBC), GWP:10, Min:63, Max:97    Wt Readings from Last 5 Encounters:   11/29/19 68 kg (149 lb 14 6 oz)   11/20/19 71 1 kg (156 lb 11 2 oz)   11/15/19 68 kg (150 lb)   11/04/19 68 4 kg (150 lb 12 8 oz)   10/30/19 67 6 kg (149 lb)     I/O       12/01 0701 - 12/02 0700 12/02 0701 - 12/03 0700 12/03 0701 - 12/04 0700    P  O  180 150     I V  (mL/kg) 250 (3 7) 260 1 (3 8)     Total Intake(mL/kg) 430 (6 3) 410 1 (6)     Urine (mL/kg/hr) 1625 (1)      Stool       Total Output 1625      Net -1195 +410 1                      Physical Exam    Vitals and nursing note reviewed  Constitutional:  Unique Fulling Sr  appears chronically ill, cooperative, calm  No acute distress   Eyes:    No icterus  Neck:  Supple, no JVD   Lungs:  Effort normal, No wheezing/rhonchi, no rales+, respirations unlabored    Chest Wall:  No tenderness or deformity    Heart[de-identified]  Regular rate, Regular rhythm, S1 and S2 normal, no murmur, rub or gallop    Abdomen:  Soft, non-tender, non-distended   Neurological:  Awake, alert, oriented x3   Non-focal exam    Extremities:  No pedal edema, No calf tenderness         Laboratory Results: personally reviewed  Results from last 7 days   Lab Units 12/04/19  0444 11/30/19  0605 11/30/19  0115 11/29/19  2223   CK TOTAL U/L 236  --   --   --    TROPONIN I ng/mL  --  3 03* 3 58* 2 92*   CK MB INDEX % 1 3  --   --   --        CBC with diff:   Results from last 7 days   Lab Units 12/04/19  0444 12/02/19  0453 12/01/19  0339 11/30/19  0605 11/29/19  1900   WBC Thousand/uL 13 30* 14 34* 15 95* 5 75 8 81   HEMOGLOBIN g/dL 9 5* 9 8* 9 6* 10 4* 11 5*   HEMATOCRIT % 29 8* 31 5* 30 7* 33 9* 36 2*   MCV fL 86 87 88 86 85   PLATELETS Thousands/uL 432* 413* 311 265 240   MCH pg 27 3 26 9 27 4 26 5* 26 9   MCHC g/dL 31 9 31 1* 31 3* 30 7* 31 8   RDW % 15 7* 15 4* 15 3* 14 9 15 1   MPV fL 9 5 9 5 10 0 10 0 9 6   NRBC AUTO /100 WBCs  --  0 0  --  0         CMP:  Results from last 7 days   Lab Units 12/04/19  0444 12/03/19  1043 12/03/19  0556 12/02/19  0453 12/01/19  0339 11/30/19  1412 11/30/19  0605   POTASSIUM mmol/L 4 9 4 6 6 1* 4 5 4 4 4 1 4 3   CHLORIDE mmol/L 101 99* 99* 107 104 103 101   CO2 mmol/L 23 20* 20* 26 24 24 22   BUN mg/dL 75* 75* 71* 42* 34* 32* 30*   CREATININE mg/dL 3 79* 4 07* 3 89* 2 50* 2 40* 2 45* 2 61*   CALCIUM mg/dL 8 3 8 4 8 8 8 7 8 5 8 8 8 8   AST U/L 33  --   --   --   --   --   --    ALT U/L 45  --   --   --   --   --   --    ALK PHOS U/L 83  --   --   --   --   --   --    EGFR ml/min/1 73sq m 19 18 19 32 33 33 30         BMP:  Results from last 7 days   Lab Units 12/04/19  0444 12/03/19  1043 12/03/19  0556 12/02/19  0453 12/01/19  0339 11/30/19  1412 11/30/19  0605   POTASSIUM mmol/L 4 9 4 6 6 1* 4 5 4 4 4 1 4 3   CHLORIDE mmol/L 101 99* 99* 107 104 103 101   CO2 mmol/L 23 20* 20* 26 24 24 22   BUN mg/dL 75* 75* 71* 42* 34* 32* 30*   CREATININE mg/dL 3 79* 4 07* 3 89* 2 50* 2 40* 2 45* 2 61*   CALCIUM mg/dL 8 3 8 4 8 8 8 7 8 5 8 8 8 8       BNP: No results for input(s): BNP in the last 72 hours  Magnesium:   Results from last 7 days   Lab Units 12/02/19  0453 12/01/19  0339   MAGNESIUM mg/dL 2 2 2 2       Coags:   Results from last 7 days   Lab Units 12/03/19  0939 12/03/19  0104 12/02/19  1725 12/02/19  1248 12/02/19  0453 12/01/19  0339 11/30/19  2136  11/29/19  2333   PTT seconds >210* >210* 39* 31 28 66* 71*   < > 37   INR   --   --   --   --   --   --   --   --  1 15    < > = values in this interval not displayed         TSH:        Hemoglobin A1C       Lipid Profile:       Meds/Allergies   all current active meds have been reviewed and current meds:   Current Facility-Administered Medications   Medication Dose Route Frequency    acetaminophen (TYLENOL) tablet 650 mg  650 mg Oral Q6H PRN    albuterol (PROVENTIL HFA,VENTOLIN HFA) inhaler 2 puff  2 puff Inhalation Q4H PRN    aluminum-magnesium hydroxide-simethicone (MYLANTA) 200-200-20 mg/5 mL oral suspension 30 mL  30 mL Oral Q6H PRN    apixaban (ELIQUIS) tablet 5 mg  5 mg Oral BID    budesonide (PULMICORT) inhalation solution 0 5 mg  0 5 mg Nebulization Q12H    cefTRIAXone (ROCEPHIN) 1,000 mg in dextrose 5 % 50 mL IVPB  1,000 mg Intravenous Q24H    chlorproMAZINE (THORAZINE) tablet 25 mg  25 mg Oral Q6H PRN    diltiazem (CARDIZEM CD) 24 hr capsule 120 mg  120 mg Oral Daily    diphenhydrAMINE (BENADRYL) tablet 25 mg  25 mg Oral Q6H PRN    famotidine (PEPCID) tablet 20 mg  20 mg Oral BID    FLUoxetine (PROzac) capsule 10 mg  10 mg Oral Daily    fluticasone (FLONASE) 50 mcg/act nasal spray 1 spray  1 spray Each Nare Daily    folic acid (FOLVITE) tablet 1 mg  1 mg Oral Daily    guaiFENesin (MUCINEX) 12 hr tablet 600 mg  600 mg Oral Q12H GREG    insulin lispro (HumaLOG) 100 units/mL subcutaneous injection 1-5 Units  1-5 Units Subcutaneous TID AC    ipratropium-albuterol (DUO-NEB) 0 5-2 5 mg/3 mL inhalation solution 3 mL  3 mL Nebulization Q6H    LORazepam (ATIVAN) tablet 0 5 mg  0 5 mg Oral Q4H PRN    melatonin tablet 3 mg  3 mg Oral HS    methocarbamol (ROBAXIN) tablet 750 mg  750 mg Oral Q6H PRN    methylPREDNISolone sodium succinate (Solu-MEDROL) injection 40 mg  40 mg Intravenous Q12H GREG    ondansetron (ZOFRAN) injection 4 mg  4 mg Intravenous Q6H PRN    oxyCODONE (OxyCONTIN) 12 hr tablet 20 mg  20 mg Oral Q12H GREG    oxyCODONE (ROXICODONE) immediate release tablet 20 mg  20 mg Oral Q6H PRN    pantoprazole (PROTONIX) EC tablet 40 mg  40 mg Oral Early Morning    polyethylene glycol (MIRALAX) packet 17 g  17 g Oral Daily PRN    [START ON 12/5/2019] predniSONE tablet 40 mg  40 mg Oral Daily    pregabalin (LYRICA) capsule 25 mg  25 mg Oral Daily  pregabalin (LYRICA) capsule 50 mg  50 mg Oral HS    QUEtiapine (SEROquel) tablet 25 mg  25 mg Oral HS    senna-docusate sodium (SENOKOT S) 8 6-50 mg per tablet 1 tablet  1 tablet Oral BID PRN    sodium bicarbonate tablet 650 mg  650 mg Oral BID after meals    sodium chloride 0 9 % infusion  50 mL/hr Intravenous Continuous    tamsulosin (FLOMAX) capsule 0 4 mg  0 4 mg Oral Daily With Dinner     Medications Prior to Admission   Medication    albuterol (2 5 mg/3 mL) 0 083 % nebulizer solution    albuterol (2 5 mg/3 mL) 0 083 % nebulizer solution    albuterol (VENTOLIN HFA) 90 mcg/act inhaler    amLODIPine (NORVASC) 10 mg tablet    BANOPHEN 25 MG capsule    Blood Glucose Monitoring Suppl KIT    carbamide peroxide (DEBROX) 6 5 % otic solution    chlorproMAZINE (THORAZINE) 25 mg tablet    dexamethasone (DECADRON) 4 mg tablet    diphenhydrAMINE (BENADRYL) 25 mg tablet    ergocalciferol (ERGOCALCIFEROL) 68010 units capsule    FLUoxetine (PROzac) 10 MG tablet    fluticasone (FLONASE) 50 mcg/act nasal spray    fluticasone-vilanterol (BREO ELLIPTA) 100-25 mcg/inh inhaler    folic acid (FOLVITE) 1 mg tablet    FREESTYLE LITE test strip    ipratropium (ATROVENT) 0 02 % nebulizer solution    Lancets (FREESTYLE) lancets    lidocaine (LMX) 4 % cream    loratadine (CLARITIN) 10 mg tablet    melatonin 3 mg    metFORMIN (GLUCOPHAGE-XR) 500 mg 24 hr tablet    methocarbamol (ROBAXIN) 750 mg tablet    ondansetron (ZOFRAN) 4 mg tablet    oxyCODONE (ROXICODONE) 30 MG immediate release tablet    pantoprazole (PROTONIX) 40 mg tablet    polyethylene glycol (GLYCOLAX) powder    predniSONE 10 mg tablet    pregabalin (LYRICA) 25 mg capsule    prochlorperazine (COMPAZINE) 10 mg tablet    QUEtiapine (SEROquel) 25 mg tablet    ranitidine (ZANTAC) 150 mg tablet    REGULOID 28 3 % POWD    Selenium Sulfide 2 25 % SHAM    senna-docusate sodium (SENOKOT-S) 8 6-50 mg per tablet    sildenafil (VIAGRA) 50 MG tablet    tamsulosin (FLOMAX) 0 4 mg    tiotropium (SPIRIVA RESPIMAT) 2 5 MCG/ACT AERS inhaler    [DISCONTINUED] oxyCODONE (OxyCONTIN) 20 mg 12 hr tablet       sodium chloride 50 mL/hr Last Rate: 50 mL/hr (19 1343)         Cardiac testing: reviewed  Results for orders placed during the hospital encounter of 19   Echo complete with contrast if indicated    Narrative J[address]  [facility]  [place], [address]  [phone]    Transthoracic Echocardiogram  2D, M-mode, Doppler, and Color Doppler    Study date:  01-Dec-2019    Patient: Rayna Ni  MR number: [id number]  Account number: [de-identified]  : 1960  Age: 64 years  Gender: Male  Status: Inpatient  Location: Bedside  Height: 68 in  Weight: 149 lb  BP: 129/ 78 mmHg    Indications: Chest pain  Diagnoses: R07 9 - Chest pain, unspecified    Sonographer:  SOLE Saul  Primary Physician:  Allison Lacey  Referring Physician:  Enoch Huber MD  Group:  [id number]  Interpreting Physician:  DEVON Sommer     SUMMARY    LEFT VENTRICLE:  Systolic function was normal by visual assessment  Ejection fraction was estimated to be 55 %  Although no diagnostic regional wall motion abnormality was identified, this possibility cannot be completely excluded on the basis of this study  TRICUSPID VALVE:  There was trace regurgitation  HISTORY: PRIOR HISTORY: lung Ca with chemotherapy, GERD, depression, COPD Risk factors: hypertension, diabetes, renal failure, and a history of current cigarette use (within the last month)  PROCEDURE: The procedure was performed at the bedside  This was a routine study  The transthoracic approach was used  The study included complete 2D imaging, M-mode, complete spectral Doppler, and color Doppler  The heart rate was 94 bpm,  at the start of the study  Image quality was adequate      LEFT VENTRICLE: Size was normal  Systolic function was normal by visual assessment  Ejection fraction was estimated to be 55 %  Although no diagnostic regional wall motion abnormality was identified, this possibility cannot be completely  excluded on the basis of this study  Wall thickness was normal  DOPPLER: Left ventricular diastolic function parameters were normal     RIGHT VENTRICLE: The size was normal  Systolic function was normal  Wall thickness was normal     LEFT ATRIUM: Size was normal     RIGHT ATRIUM: Size was normal     MITRAL VALVE: Valve structure was normal  There was normal leaflet separation  DOPPLER: The transmitral velocity was within the normal range  There was no evidence for stenosis  There was no regurgitation  AORTIC VALVE: The valve was trileaflet  Leaflets exhibited normal thickness, normal cuspal separation, and sclerosis  DOPPLER: Transaortic velocity was within the normal range  There was no evidence for stenosis  There was no  regurgitation  TRICUSPID VALVE: The valve structure was normal  There was normal leaflet separation  DOPPLER: The transtricuspid velocity was within the normal range  There was no evidence for stenosis  There was trace regurgitation  PULMONIC VALVE: Not well visualized  DOPPLER: There was no evidence for stenosis  There was no regurgitation  PERICARDIUM: There was no pericardial effusion  The pericardium was normal in appearance  AORTA: The root exhibited normal size  SYSTEMIC VEINS: IVC: The inferior vena cava was normal in size and course   Respirophasic changes were normal     SYSTEM MEASUREMENT TABLES    2D  %FS: 34 6 %  Ao Diam: 3 1 cm  EDV(Teich): 74 2 ml  EF(Teich): 64 3 %  ESV(Teich): 26 5 ml  IVSd: 0 9 cm  LA Area: 12 7 cm2  LA Diam: 2 8 cm  LVIDd: 4 1 cm  LVIDs: 2 7 cm  LVPWd: 0 9 cm  RA Area: 11 8 cm2  RVIDd: 3 cm  SV(Teich): 47 7 ml    MM  TAPSE: 2 5 cm    PW  E' Av 1 m/s  E' Lat: 0 1 m/s  E' Sept: 0 1 m/s  E/E' Av 6  E/E' Lat: 11 2  E/E' Sept: 12  MV A Jonathon: 1 3 m/s  MV Dec Trumbull: 6 8 m/s2  MV DecT: 185 3 ms  MV E Jonathon: 1 3 m/s  MV E/A Ratio: 1  MV PHT: 53 7 ms  MVA By PHT: 4 1 cm2    [employer] Accredited Echocardiography Laboratory    Prepared and electronically signed by    DEVON Morales  Signed 02-Dec-2019 08:28:12       No results found for this or any previous visit  No results found for this or any previous visit  No results found for this or any previous visit

## 2019-12-04 NOTE — SOCIAL WORK
Cm reviewed pt care coordination rounds  Pt is not medically cleared for d/c  A post acute care recommendation was made by your care team for STR  Discussed Freedom of Choice with both patient and caregiver  List of agencies given to both patient and caregiver via in person  both patient and caregiver aware the list is custom filtered for them by zip code location and that [facility] post acute providers are designated  Cm will f/u with family for choices

## 2019-12-05 ENCOUNTER — APPOINTMENT (INPATIENT)
Dept: RADIOLOGY | Facility: HOSPITAL | Age: 59
DRG: 133 | End: 2019-12-05
Payer: COMMERCIAL

## 2019-12-05 LAB
ANION GAP SERPL CALCULATED.3IONS-SCNC: 9 MMOL/L (ref 4–13)
BUN SERPL-MCNC: 61 MG/DL (ref 5–25)
CALCIUM SERPL-MCNC: 8.8 MG/DL (ref 8.3–10.1)
CHLORIDE SERPL-SCNC: 109 MMOL/L (ref 100–108)
CO2 SERPL-SCNC: 26 MMOL/L (ref 21–32)
CREAT SERPL-MCNC: 2.97 MG/DL (ref 0.6–1.3)
GFR SERPL CREATININE-BSD FRML MDRD: 26 ML/MIN/1.73SQ M
GLUCOSE SERPL-MCNC: 141 MG/DL (ref 65–140)
GLUCOSE SERPL-MCNC: 143 MG/DL (ref 65–140)
GLUCOSE SERPL-MCNC: 153 MG/DL (ref 65–140)
GLUCOSE SERPL-MCNC: 158 MG/DL (ref 65–140)
GLUCOSE SERPL-MCNC: 171 MG/DL (ref 65–140)
POTASSIUM SERPL-SCNC: 4.8 MMOL/L (ref 3.5–5.3)
SODIUM SERPL-SCNC: 144 MMOL/L (ref 136–145)

## 2019-12-05 PROCEDURE — 82948 REAGENT STRIP/BLOOD GLUCOSE: CPT

## 2019-12-05 PROCEDURE — 99232 SBSQ HOSP IP/OBS MODERATE 35: CPT | Performed by: PSYCHIATRY & NEUROLOGY

## 2019-12-05 PROCEDURE — 99232 SBSQ HOSP IP/OBS MODERATE 35: CPT | Performed by: INTERNAL MEDICINE

## 2019-12-05 PROCEDURE — 94640 AIRWAY INHALATION TREATMENT: CPT

## 2019-12-05 PROCEDURE — 94760 N-INVAS EAR/PLS OXIMETRY 1: CPT

## 2019-12-05 PROCEDURE — NC001 PR NO CHARGE: Performed by: PSYCHIATRY & NEUROLOGY

## 2019-12-05 PROCEDURE — 80048 BASIC METABOLIC PNL TOTAL CA: CPT | Performed by: NURSE PRACTITIONER

## 2019-12-05 PROCEDURE — 71045 X-RAY EXAM CHEST 1 VIEW: CPT

## 2019-12-05 RX ORDER — METHYLPREDNISOLONE SODIUM SUCCINATE 40 MG/ML
40 INJECTION, POWDER, LYOPHILIZED, FOR SOLUTION INTRAMUSCULAR; INTRAVENOUS EVERY 12 HOURS SCHEDULED
Status: DISCONTINUED | OUTPATIENT
Start: 2019-12-05 | End: 2019-12-06

## 2019-12-05 RX ORDER — DIVALPROEX SODIUM 250 MG/1
250 TABLET, DELAYED RELEASE ORAL 2 TIMES DAILY
Status: DISCONTINUED | OUTPATIENT
Start: 2019-12-05 | End: 2019-12-10 | Stop reason: HOSPADM

## 2019-12-05 RX ADMIN — FLUOXETINE 10 MG: 10 CAPSULE ORAL at 08:36

## 2019-12-05 RX ADMIN — APIXABAN 5 MG: 5 TABLET, FILM COATED ORAL at 08:37

## 2019-12-05 RX ADMIN — PANTOPRAZOLE SODIUM 40 MG: 40 TABLET, DELAYED RELEASE ORAL at 05:10

## 2019-12-05 RX ADMIN — IPRATROPIUM BROMIDE AND ALBUTEROL SULFATE 3 ML: 2.5; .5 SOLUTION RESPIRATORY (INHALATION) at 01:10

## 2019-12-05 RX ADMIN — LORAZEPAM 0.5 MG: 1 TABLET ORAL at 09:50

## 2019-12-05 RX ADMIN — GUAIFENESIN 600 MG: 600 TABLET ORAL at 21:10

## 2019-12-05 RX ADMIN — GUAIFENESIN 600 MG: 600 TABLET ORAL at 08:36

## 2019-12-05 RX ADMIN — BUDESONIDE 0.5 MG: 0.5 INHALANT RESPIRATORY (INHALATION) at 18:40

## 2019-12-05 RX ADMIN — QUETIAPINE FUMARATE 25 MG: 25 TABLET ORAL at 21:10

## 2019-12-05 RX ADMIN — FOLIC ACID 1 MG: 1 TABLET ORAL at 08:36

## 2019-12-05 RX ADMIN — IPRATROPIUM BROMIDE AND ALBUTEROL SULFATE 3 ML: 2.5; .5 SOLUTION RESPIRATORY (INHALATION) at 23:51

## 2019-12-05 RX ADMIN — CEFTRIAXONE 1000 MG: 1 INJECTION, POWDER, FOR SOLUTION INTRAMUSCULAR; INTRAVENOUS at 14:46

## 2019-12-05 RX ADMIN — APIXABAN 5 MG: 5 TABLET, FILM COATED ORAL at 17:23

## 2019-12-05 RX ADMIN — FAMOTIDINE 20 MG: 20 TABLET ORAL at 17:23

## 2019-12-05 RX ADMIN — OXYCODONE HYDROCHLORIDE 20 MG: 20 TABLET, FILM COATED, EXTENDED RELEASE ORAL at 21:10

## 2019-12-05 RX ADMIN — DILTIAZEM HYDROCHLORIDE 120 MG: 120 CAPSULE, COATED, EXTENDED RELEASE ORAL at 08:37

## 2019-12-05 RX ADMIN — TAMSULOSIN HYDROCHLORIDE 0.4 MG: 0.4 CAPSULE ORAL at 17:23

## 2019-12-05 RX ADMIN — FAMOTIDINE 20 MG: 20 TABLET ORAL at 08:36

## 2019-12-05 RX ADMIN — BUDESONIDE 0.5 MG: 0.5 INHALANT RESPIRATORY (INHALATION) at 08:06

## 2019-12-05 RX ADMIN — SODIUM BICARBONATE 650 MG TABLET 650 MG: at 08:37

## 2019-12-05 RX ADMIN — OXYCODONE HYDROCHLORIDE 20 MG: 10 TABLET ORAL at 09:49

## 2019-12-05 RX ADMIN — METHYLPREDNISOLONE SODIUM SUCCINATE 40 MG: 40 INJECTION, POWDER, FOR SOLUTION INTRAMUSCULAR; INTRAVENOUS at 21:11

## 2019-12-05 RX ADMIN — PREGABALIN 25 MG: 25 CAPSULE ORAL at 08:36

## 2019-12-05 RX ADMIN — IPRATROPIUM BROMIDE AND ALBUTEROL SULFATE 3 ML: 2.5; .5 SOLUTION RESPIRATORY (INHALATION) at 08:06

## 2019-12-05 RX ADMIN — METHYLPREDNISOLONE SODIUM SUCCINATE 40 MG: 40 INJECTION, POWDER, FOR SOLUTION INTRAMUSCULAR; INTRAVENOUS at 10:46

## 2019-12-05 RX ADMIN — FLUTICASONE PROPIONATE 1 SPRAY: 50 SPRAY, METERED NASAL at 08:36

## 2019-12-05 RX ADMIN — PREDNISONE 40 MG: 20 TABLET ORAL at 08:36

## 2019-12-05 RX ADMIN — OXYCODONE HYDROCHLORIDE 20 MG: 20 TABLET, FILM COATED, EXTENDED RELEASE ORAL at 08:36

## 2019-12-05 RX ADMIN — DIVALPROEX SODIUM 250 MG: 250 TABLET, DELAYED RELEASE ORAL at 17:23

## 2019-12-05 RX ADMIN — PREGABALIN 50 MG: 50 CAPSULE ORAL at 21:11

## 2019-12-05 RX ADMIN — IPRATROPIUM BROMIDE AND ALBUTEROL SULFATE 3 ML: 2.5; .5 SOLUTION RESPIRATORY (INHALATION) at 18:40

## 2019-12-05 RX ADMIN — MELATONIN TAB 3 MG 3 MG: 3 TAB at 21:11

## 2019-12-05 NOTE — PROGRESS NOTES
Progress Note - Neurology   Kerwin Lou  64 y o  male MRN: [id number]  Unit/Bed#: E4 -01 Encounter: [id number]    Assessment/ Plan:  1)  Bilateral upper extremity tremor - toxic metabolic in setting of adenocarcinoma on Kaytruda, acute on chronic hypoxic respiratory failure, NURIA/CKD 4, and ? PE     -MRI brain from 11/30/2019 without acute intracranial abnormality   -initiate Depakote 250 mg p o  B i d  For myoclonic jerking   -supportive care and medication management per primary team   -neurology will be available as needed, please call with questions    Subjective:   Patient is a 32-year-old male with adenocarcinoma of the lung, on chemotherapy, COPD, AFib, current smoker, originally admitted on 11/29/2019 for worsening shortness of breath over the past 2 weeks  Neurology saw this patient in consultation originally on 11/30/2019 after stroke alert was called due to slurred speech  Stroke workup was negative  Neurology is asked to see this patient again due to bilateral upper extremity tremor, new onset according to family  On exam today, the patient is resting in bed in no acute distress  He does demonstrate myoclonic jerking of his bilateral upper extremity throughout examination, worse antigravity  A 12 point review systems was completed and is negative with exception of and having these tremors  Patient demonstrates a grossly nonfocal neurological exam as detailed below      ROS:  See subjective    Medications:  Scheduled Meds:  Current Facility-Administered Medications:  acetaminophen 650 mg Oral Q6H PRN Sayra Higgins MD    albuterol 2 puff Inhalation Q4H PRN Sayra Higgins MD    aluminum-magnesium hydroxide-simethicone 30 mL Oral Q6H PRN Sayra Higgins MD    apixaban 5 mg Oral BID Sybil Tenorio DO    budesonide 0 5 mg Nebulization Q12H BROOKS Denson    cefTRIAXone 1,000 mg Intravenous Q24H BROOKS Denson Last Rate: 1,000 mg (12/04/19 1343)   chlorproMAZINE 25 mg Oral Q6H PRN Adrian Ratliff MD    diltiazem 120 mg Oral Daily Ashli Barnes MD    diphenhydrAMINE 25 mg Oral Q6H PRN Adrian Ratliff MD    famotidine 20 mg Oral BID Adrian Ratliff MD    FLUoxetine 10 mg Oral Daily Adrian Ratliff MD    fluticasone 1 spray Each Nare Daily Adrian Ratliff MD    folic acid 1 mg Oral Daily Adrian Ratliff MD    guaiFENesin 600 mg Oral Q12H [address] BROOKS Daily    insulin lispro 1-5 Units Subcutaneous TID AC Adrian Ratliff MD    ipratropium-albuterol 3 mL Nebulization Q6H Jonathan Jimenez MD    LORazepam 0 5 mg Oral Q4H PRN Michela Lakhani MD    melatonin 3 mg Oral HS Adrian Ratliff MD    methocarbamol 750 mg Oral Q6H PRN Adrian Ratliff MD    ondansetron 4 mg Intravenous Q6H PRN Adrian Ratliff MD    oxyCODONE 20 mg Oral Q12H [address] Adrian Ratliff MD    oxyCODONE 20 mg Oral Q6H PRN Sybil Tenorio DO    pantoprazole 40 mg Oral Early Morning Adrian Ratliff MD    polyethylene glycol 17 g Oral Daily PRN Adrian Ratliff MD    predniSONE 40 mg Oral Daily BROOKS Daily    pregabalin 25 mg Oral Daily Adrian Ratliff MD    pregabalin 50 mg Oral HS Adrian Ratliff MD    QUEtiapine 25 mg Oral HS Adrian Ratliff MD    senna-docusate sodium 1 tablet Oral BID PRN Adrian Ratliff MD    sodium bicarbonate 650 mg Oral BID after meals Isadora Dang MD    tamsulosin 0 4 mg Oral Daily With Angelita Lawrence MD      Continuous Infusions:   PRN Meds:   acetaminophen    albuterol    aluminum-magnesium hydroxide-simethicone    chlorproMAZINE    diphenhydrAMINE    LORazepam    methocarbamol    ondansetron    oxyCODONE    polyethylene glycol    senna-docusate sodium      Vitals: Blood pressure 149/73, pulse 98, temperature (!) 97 2 °F (36 2 °C), temperature source Tympanic, resp  rate 18, weight 68 kg (149 lb 14 6 oz), SpO2 94 %  ,Body mass index is 22 79 kg/m²  Physical Exam:  Physical Exam   Constitutional: He is oriented to person, place, and time   He appears well-developed and well-nourished  No distress  HENT:   Head: Normocephalic and atraumatic  Right Ear: External ear normal    Left Ear: External ear normal    Mouth/Throat: Oropharynx is clear and moist  No oropharyngeal exudate  Eyes: Conjunctivae are normal  Right eye exhibits no discharge  Left eye exhibits no discharge  Neck: Normal range of motion  Neck supple  Pulmonary/Chest: Effort normal  No respiratory distress  Musculoskeletal: Normal range of motion  He exhibits no edema, tenderness or deformity  Neurological: He is oriented to person, place, and time  He has normal strength  Skin: Skin is warm and dry  No rash noted  He is not diaphoretic  No erythema  No pallor  Psychiatric: He has a normal mood and affect  His speech is normal and behavior is normal    Nursing note and vitals reviewed  Neurologic Exam     Mental Status   Oriented to person, place, and time  Follows 2 step commands  Attention: normal    Speech: speech is normal   Level of consciousness: alert  Knowledge: good  Normal comprehension  Cranial Nerves   Cranial nerves II through XII intact  Motor Exam   Muscle bulk: normal  Overall muscle tone: normal    Strength   Strength 5/5 throughout  Sensory Exam   Light touch normal      Gait, Coordination, and Reflexes     Gait  Gait: (Deferred for safety)    Reflexes   Right plantar: normal  Left plantar: normal  Right ankle clonus: absent  Left ankle clonus: absent  Patient with intermittent myoclonic jerking as well as negative myoclonus with bilateral upper extremities antigravity       Lab, Imaging and other studies: I have personally reviewed pertinent reports     I have personally reviewed pertinent imaging in PACs  Recent Results (from the past 24 hour(s))   Fingerstick Glucose (POCT)    Collection Time: 12/04/19 11:16 AM   Result Value Ref Range    POC Glucose 169 (H) 65 - 140 mg/dl   Fingerstick Glucose (POCT)    Collection Time: 12/04/19  3:43 PM   Result Value Ref Range    POC Glucose 152 (H) 65 - 140 mg/dl   Fingerstick Glucose (POCT)    Collection Time: 12/04/19  8:51 PM   Result Value Ref Range    POC Glucose 185 (H) 65 - 140 mg/dl   Basic metabolic panel    Collection Time: 12/05/19  5:08 AM   Result Value Ref Range    Sodium 144 136 - 145 mmol/L    Potassium 4 8 3 5 - 5 3 mmol/L    Chloride 109 (H) 100 - 108 mmol/L    CO2 26 21 - 32 mmol/L    ANION GAP 9 4 - 13 mmol/L    BUN 61 (H) 5 - 25 mg/dL    Creatinine 2 97 (H) 0 60 - 1 30 mg/dL    Glucose 153 (H) 65 - 140 mg/dL    Calcium 8 8 8 3 - 10 1 mg/dL    eGFR 26 ml/min/1 73sq m   Fingerstick Glucose (POCT)    Collection Time: 12/05/19  7:15 AM   Result Value Ref Range    POC Glucose 143 (H) 65 - 140 mg/dl   ]    VTE Prophylaxis: Sequential compression device (Venodyne)  and Eliquis    Counseling / Coordination of Care  Total time spent today 30 minutes  Greater than 50% of total time was spent with the patient and / or family counseling and / or coordination of care  A description of the counseling / coordination of care: Silver Elkins The pt was seen and examined by myself and the attending physician  The chart was reviewed thoroughly, including laboratory values and imaging studies  The pt was counseled in the room

## 2019-12-05 NOTE — PROGRESS NOTES
[facility] Progress Note  Patient: Jani Stewart  64 y o  male   MRN: [id number]  PCP: Rubén Guerrero MD  Unit/Bed#: E4 -01 Encounter: [id number]  Date Of Visit: 12/05/19      Assessment/plan  1  Acute on chronic hypoxic respiratory failure due to severe copd with acute exacerbation- appreciate pulmonary recommendations  Pt is currently on 2 liters of oxygen which is his baseline  He will continue with pulmonary toilet  He will continue solumedrol 40mg IV q12 hours, budesonide bid, duoneb, and mucinex  pulmonary held on changing him to po prednisone as pt is still having dyspnea  Pt completed course of Azithromycin  He is on ceftriaxone added day 4/7       2  Possible pulmonary embolism- Pt is at high risk for pe due to adenocarcinoma  Venous duplex is negative  He has ckd4 and unable to have cta chest to r/o pe  unlikely pe as pt has improved with above treatment  Pulmonary had d/albert heparin gtt  Will monitor      3  Tobacco dependence- encourage pt to stop smoking     4 afib- pt was started on a cardizem gtt  He was changed to cardizem 120mg daily  Will continue eliquis bid  He may follow up with cardiology outpt       5  Non mi troponin elevation- echo reviewed  Likely due to copd exacerbation and tachycardia     6  Stroke like symptoms- pt was a stroke alert  Work up was negative       7  ana/ckd4- appreciate nephrology recommendations  Creatinine has improved from 4 07 to 2 97  Possibly that this is due to contrast nephropathy vrs atn vrs immune mediated nephritis from [place]  No emergent HD needed  Holding further IV fluids  Will check bmp in am       8  Type 2 diabetes- a1c is 5 6  Hold metformin  Continue insulin sliding scale       9  Adenocarcinoma of the lung- pt follows with Dr Anival Butler  He is being treated with [place]  [facility] palliative care recommendations     10  htn- stable  Continue current treatment     11   Bilateral upper ext tremor- appreciate neurology recommendations  It is likely result of toxic metabolic in the setting of adenocarcinoma on kaytruda  Pt was started on depakote       dispo-pt will need str once stable  Spoke with Narayan Pascual on the phone and informed her  She was very polite and thankful for the phone call  His sisters name is June and contact is     Subjective:   Pt seen and examined  Pt states he is still short of breath at times  He states his tremor has decreased with the addition of Depakote  No other problems noted  No f/c no cp no n/v/d no abd pain  Objective:     Vitals: Blood pressure 138/65, pulse 89, temperature 97 5 °F (36 4 °C), temperature source Tympanic, resp  rate 20, weight 68 kg (149 lb 14 6 oz), SpO2 98 %  ,Body mass index is 22 79 kg/m²  Lab, Imaging and other studies:  Results from last 7 days   Lab Units 12/04/19  0444  11/29/19  2333   WBC Thousand/uL 13 30*   < >  --    HEMOGLOBIN g/dL 9 5*   < >  --    HEMATOCRIT % 29 8*   < >  --    PLATELETS Thousands/uL 432*   < >  --    INR   --   --  1 15    < > = values in this interval not displayed  Results from last 7 days   Lab Units 12/05/19  0508 12/04/19  0444   POTASSIUM mmol/L 4 8 4 9   CHLORIDE mmol/L 109* 101   CO2 mmol/L 26 23   BUN mg/dL 61* 75*   CREATININE mg/dL 2 97* 3 79*   CALCIUM mg/dL 8 8 8 3   ALK PHOS U/L  --  83   ALT U/L  --  45   AST U/L  --  33     Results from last 7 days   Lab Units 12/04/19  0444 11/30/19  0605 11/30/19  0115 11/29/19  2223   CK TOTAL U/L 236  --   --   --    TROPONIN I ng/mL  --  3 03* 3 58* 2 92*   CK MB INDEX % 1 3  --   --   --      No results found for: Taco Rasmussen      Xr Chest Portable    Result Date: 12/5/2019  Narrative: CHEST INDICATION:   pneumonia  COMPARISON:  11/30/2019 EXAM PERFORMED/VIEWS:  XR CHEST PORTABLE FINDINGS:  Right chest wall port  Stable cardiac mediastinal silhouette  Stable prominent right hilum  The lungs are clear    No pneumothorax or pleural effusion  Osseous structures appear within normal limits for patient age  Impression: Stable exam  No consolidation   Workstation performed: [id number]     Scheduled Meds:   Current Facility-Administered Medications:  acetaminophen 650 mg Oral Q6H PRN Annabelle Heck MD    albuterol 2 puff Inhalation Q4H PRN Annabelle Heck MD    aluminum-magnesium hydroxide-simethicone 30 mL Oral Q6H PRN Annabelle Heck MD    apixaban 5 mg Oral BID Sybil Tenorio DO    budesonide 0 5 mg Nebulization Q12H BROOKS Francois    cefTRIAXone 1,000 mg Intravenous Q24H BROOKS Fracnois Last Rate: 1,000 mg (12/05/19 1446)   chlorproMAZINE 25 mg Oral Q6H PRN Annabelle Heck MD    diltiazem 120 mg Oral Daily Angelita Velasquez MD    diphenhydrAMINE 25 mg Oral Q6H PRN Annabelle Heck MD    divalproex sodium 250 mg Oral BID Lola Wooten PA-C    famotidine 20 mg Oral BID Annabelle Heck MD    FLUoxetine 10 mg Oral Daily Jonathan Jimenez MD    fluticasone 1 spray Each Nare Daily Jonathan Jimenez MD    folic acid 1 mg Oral Daily Jonathan Jimenez MD    guaiFENesin 600 mg Oral Q12H [address] BROOKS Francois    insulin lispro 1-5 Units Subcutaneous TID AC Annabelle Heck MD    ipratropium-albuterol 3 mL Nebulization Q6H Jonathan Jimenez MD    LORazepam 0 5 mg Oral Q4H PRN Melo Glover MD    melatonin 3 mg Oral HS Jonathan Jimenez MD    methocarbamol 750 mg Oral Q6H PRN Annabelle Heck MD    methylPREDNISolone sodium succinate 40 mg Intravenous Q12H [address] BROOKS Arzate    ondansetron 4 mg Intravenous Q6H PRN Annabelle Heck MD    oxyCODONE 20 mg Oral Q12H [address] Jonathan Jimenez MD    oxyCODONE 20 mg Oral Q6H PRN Sybil Tenorio DO    pantoprazole 40 mg Oral Early Morning Jonathan Jimenez MD    polyethylene glycol 17 g Oral Daily PRN Annabelle Heck MD    pregabalin 25 mg Oral Daily Jonathan Jimenez MD    pregabalin 50 mg Oral HS Jonathan Jimenez MD    QUEtiapine 25 mg Oral HS Jonathan Jimenez MD    senna-docusate sodium 1 tablet Oral BID PRN Adrian Ratliff MD    tamsulosin 0 4 mg Oral Daily With Angelita Lawrence MD      Continuous Infusions:    PRN Meds:   acetaminophen    albuterol    aluminum-magnesium hydroxide-simethicone    chlorproMAZINE    diphenhydrAMINE    LORazepam    methocarbamol    ondansetron    oxyCODONE    polyethylene glycol    senna-docusate sodium      Physical exam:  Physical Exam  General appearance: alert and oriented, in no acute distress  Head: Normocephalic, without obvious abnormality, atraumatic  Eyes: conjunctivae/corneas clear  PERRL, EOM's intact  Fundi benign    Neck: no adenopathy, no carotid bruit, no JVD, supple, symmetrical, trachea midline and thyroid not enlarged, symmetric, no tenderness/mass/nodules  Lungs: crackles bilateral mid to lower lobes  Heart: regular rate and rhythm, S1, S2 normal, no murmur, click, rub or gallop  Abdomen: soft, non-tender; bowel sounds normal; no masses,  no organomegaly  Extremities: extremities normal, warm and well-perfused; no cyanosis, clubbing, or edema  Pulses: 2+ and symmetric  Skin: Skin color, texture, turgor normal  No rashes or lesions  Neurologic: Mental status: Alert, oriented, thought content appropriate, minimal tremors of shoulders bilateral with movement      VTE Pharmacologic Prophylaxis: eliquis  VTE Mechanical Prophylaxis: sequential compression device    Counseling / Coordination of Care  Total floor / unit time spent today 20 minutes      Current Length of Stay: 6 day(s)    Current Patient Status: Inpatient     Code Status: Level 1 - Full Code

## 2019-12-05 NOTE — PLAN OF CARE
Problem: Potential for Falls  Goal: Patient will remain free of falls  Description  INTERVENTIONS:  - Assess patient frequently for physical needs  -  Identify cognitive and physical deficits and behaviors that affect risk of falls    -  [place] fall precautions as indicated by assessment   - Educate patient/family on patient safety including physical limitations  - Instruct patient to call for assistance with activity based on assessment  - Modify environment to reduce risk of injury  - Consider OT/PT consult to assist with strengthening/mobility  Outcome: Progressing     Problem: PAIN - ADULT  Goal: Verbalizes/displays adequate comfort level or baseline comfort level  Description  Interventions:  - Encourage patient to monitor pain and request assistance  - Assess pain using appropriate pain scale  - Administer analgesics based on type and severity of pain and evaluate response  - Implement non-pharmacological measures as appropriate and evaluate response  - Consider cultural and social influences on pain and pain management  - Notify physician/advanced practitioner if interventions unsuccessful or patient reports new pain  Outcome: Progressing     Problem: CARDIOVASCULAR - ADULT  Goal: Maintains optimal cardiac output and hemodynamic stability  Description  INTERVENTIONS:  - Monitor I/O, vital signs and rhythm  - Monitor for S/S and trends of decreased cardiac output  - Administer and titrate ordered vasoactive medications to optimize hemodynamic stability  - Assess quality of pulses, skin color and temperature  - Assess for signs of decreased coronary artery perfusion  - Instruct patient to report change in severity of symptoms  Outcome: Progressing  Goal: Absence of cardiac dysrhythmias or at baseline rhythm  Description  INTERVENTIONS:  - Continuous cardiac monitoring, vital signs, obtain 12 lead EKG if ordered  - Administer antiarrhythmic and heart rate control medications as ordered  - Monitor electrolytes and administer replacement therapy as ordered  Outcome: Progressing     Problem: RESPIRATORY - ADULT  Goal: Achieves optimal ventilation and oxygenation  Description  INTERVENTIONS:  - Assess for changes in respiratory status  - Assess for changes in mentation and behavior  - Position to facilitate oxygenation and minimize respiratory effort  - Oxygen administered by appropriate delivery if ordered  - Encourage broncho-pulmonary hygiene including cough, deep breathe, Incentive Spirometry  - Assess the need for suctioning and aspirate as needed  - Assess and instruct to report SOB or any respiratory difficulty  - Respiratory Therapy support as indicated   Outcome: Progressing     Problem: INFECTION - ADULT  Goal: Absence or prevention of progression during hospitalization  Description  INTERVENTIONS:  - Assess and monitor for signs and symptoms of infection  - Monitor lab/diagnostic results  - Monitor all insertion sites, i e  indwelling lines, tubes, and drains  - Monitor endotracheal if appropriate and nasal secretions for changes in amount and color  - [place] appropriate cooling/warming therapies per order  - Administer medications as ordered  - Instruct and encourage patient and family to use good hand hygiene technique  - Identify and instruct in appropriate isolation precautions for identified infection/condition  Outcome: Progressing  Goal: Absence of fever/infection during neutropenic period  Description  INTERVENTIONS:  - Monitor WBC    Outcome: Progressing     Problem: SAFETY ADULT  Goal: Maintain or return to baseline ADL function  Description  INTERVENTIONS:  -  Assess patient's ability to carry out ADLs; assess patient's baseline for ADL function and identify physical deficits which impact ability to perform ADLs (bathing, care of mouth/teeth, toileting, grooming, dressing, etc )  - Assess/evaluate cause of self-care deficits   - Assess range of motion  - Assess patient's mobility; develop plan if impaired  - Assess patient's need for assistive devices and provide as appropriate  - Encourage maximum independence but intervene and supervise when necessary  - Involve family in performance of ADLs  - Assess for home care needs following discharge   - Consider OT consult to assist with ADL evaluation and planning for discharge  - Provide patient education as appropriate  Outcome: Progressing  Goal: Maintain or return mobility status to optimal level  Description  INTERVENTIONS:  - Assess patient's baseline mobility status (ambulation, transfers, stairs, etc )    - Identify cognitive and physical deficits and behaviors that affect mobility  - Identify mobility aids required to assist with transfers and/or ambulation (gait belt, sit-to-stand, lift, walker, cane, etc )  - [place] fall precautions as indicated by assessment  - Record patient progress and toleration of activity level on Mobility SBAR; progress patient to next Phase/Stage  - Instruct patient to call for assistance with activity based on assessment  - Consider rehabilitation consult to assist with strengthening/weightbearing, etc   Outcome: Progressing     Problem: DISCHARGE PLANNING  Goal: Discharge to home or other facility with appropriate resources  Description  INTERVENTIONS:  - Identify barriers to discharge w/patient and caregiver  - Arrange for needed discharge resources and transportation as appropriate  - Identify discharge learning needs (meds, wound care, etc )  - Arrange for interpretive services to assist at discharge as needed  - Refer to Case Management Department for coordinating discharge planning if the patient needs post-hospital services based on physician/advanced practitioner order or complex needs related to functional status, cognitive ability, or social support system  Outcome: Progressing     Problem: Knowledge Deficit  Goal: Patient/family/caregiver demonstrates understanding of disease process, treatment plan, medications, and discharge instructions  Description  Complete learning assessment and assess knowledge base  Interventions:  - Provide teaching at level of understanding  - Provide teaching via preferred learning methods  Outcome: Progressing     Problem: Potential for Aspiration  Goal: Non-ventilated patient's risk of aspiration is minimized  Description  Assess and monitor vital signs, respiratory status, and labs (WBC)  Monitor for signs of aspiration (tachypnea, cough, rales, wheezing, cyanosis, fever)  - Assess and monitor patient's ability to swallow  - Place patient up in chair to eat if possible  - HOB up at 90 degrees to eat if unable to get patient up into chair   - Supervise patient during oral intake  - Instruct patient/ family to take small bites  - Instruct patient/ family to take small single sips when taking liquids  - Follow patient-specific strategies generated by speech pathologist   Outcome: Progressing  Goal: Ventilated patient's risk of aspiration is minimized  Description  Assess and monitor vital signs, respiratory status, airway cuff pressure, and labs (WBC)  Monitor for signs of aspiration (tachypnea, cough, rales, wheezing, cyanosis, fever)  - Elevate head of bed 30 degrees if patient has tube feeding   - Monitor tube feeding    Outcome: Progressing     Problem: NEUROSENSORY - ADULT  Goal: Achieves stable or improved neurological status  Description  INTERVENTIONS  - Monitor and report changes in neurological status  - Monitor vital signs such as temperature, blood pressure, glucose, and any other labs ordered   - Initiate measures to prevent increased intracranial pressure  - Monitor for seizure activity and implement precautions if appropriate      Outcome: Progressing  Goal: Remains free of injury related to seizures activity  Description  INTERVENTIONS  - Maintain airway, patient safety  and administer oxygen as ordered  - Monitor patient for seizure activity, document and report duration and description of seizure to physician/advanced practitioner  - If seizure occurs,  ensure patient safety during seizure  - Reorient patient post seizure  - Seizure pads on all 4 side rails  - Instruct patient/family to notify RN of any seizure activity including if an aura is experienced  - Instruct patient/family to call for assistance with activity based on nursing assessment  - Administer anti-seizure medications if ordered    Outcome: Progressing  Goal: Achieves maximal functionality and self care  Description  INTERVENTIONS  - Monitor swallowing and airway patency with patient fatigue and changes in neurological status  - Encourage and assist patient to increase activity and self care     - Encourage visually impaired, hearing impaired and aphasic patients to use assistive/communication devices  Outcome: Progressing     Problem: METABOLIC, FLUID AND ELECTROLYTES - ADULT  Goal: Electrolytes maintained within normal limits  Description  INTERVENTIONS:  - Monitor labs and assess patient for signs and symptoms of electrolyte imbalances  - Administer electrolyte replacement as ordered  - Monitor response to electrolyte replacements, including repeat lab results as appropriate  - Instruct patient on fluid and nutrition as appropriate  Outcome: Progressing  Goal: Fluid balance maintained  Description  INTERVENTIONS:  - Monitor labs   - Monitor I/O and WT  - Instruct patient on fluid and nutrition as appropriate  - Assess for signs & symptoms of volume excess or deficit  Outcome: Progressing  Goal: Glucose maintained within target range  Description  INTERVENTIONS:  - Monitor Blood Glucose as ordered  - Assess for signs and symptoms of hyperglycemia and hypoglycemia  - Administer ordered medications to maintain glucose within target range  - Assess nutritional intake and initiate nutrition service referral as needed  Outcome: Progressing     Problem: SKIN/TISSUE INTEGRITY - ADULT  Goal: Skin integrity remains intact  Description  INTERVENTIONS  - Identify patients at risk for skin breakdown  - Assess and monitor skin integrity  - Assess and monitor nutrition and hydration status  - Monitor labs (i e  albumin)  - Assess for incontinence   - Turn and reposition patient  - Assist with mobility/ambulation  - Relieve pressure over bony prominences  - Avoid friction and shearing  - Provide appropriate hygiene as needed including keeping skin clean and dry  - Evaluate need for skin moisturizer/barrier cream  - Collaborate with interdisciplinary team (i e  Nutrition, Rehabilitation, etc )   - Patient/family teaching  Outcome: Progressing  Goal: Incision(s), wounds(s) or drain site(s) healing without S/S of infection  Description  INTERVENTIONS  - Assess and document risk factors for skin impairment   - Assess and document dressing, incision, wound bed, drain sites and surrounding tissue  - Consider nutrition services referral as needed  - Oral mucous membranes remain intact  - Provide patient/ family education  Outcome: Progressing  Goal: Oral mucous membranes remain intact  Description  INTERVENTIONS  - Assess oral mucosa and hygiene practices  - Implement preventative oral hygiene regimen  - Implement oral medicated treatments as ordered  - Initiate Nutrition services referral as needed  Outcome: Progressing     Problem: MUSCULOSKELETAL - ADULT  Goal: Maintain or return mobility to safest level of function  Description  INTERVENTIONS:  - Assess patient's ability to carry out ADLs; assess patient's baseline for ADL function and identify physical deficits which impact ability to perform ADLs (bathing, care of mouth/teeth, toileting, grooming, dressing, etc )  - Assess/evaluate cause of self-care deficits   - Assess range of motion  - Assess patient's mobility  - Assess patient's need for assistive devices and provide as appropriate  - Encourage maximum independence but intervene and supervise when necessary  - Involve family in performance of ADLs  - Assess for home care needs following discharge   - Consider OT consult to assist with ADL evaluation and planning for discharge  - Provide patient education as appropriate  Outcome: Progressing  Goal: Maintain proper alignment of affected body part  Description  INTERVENTIONS:  - Support, maintain and protect limb and body alignment  - Provide patient/ family with appropriate education  Outcome: Progressing     Problem: Prexisting or High Potential for Compromised Skin Integrity  Goal: Skin integrity is maintained or improved  Description  INTERVENTIONS:  - Identify patients at risk for skin breakdown  - Assess and monitor skin integrity  - Assess and monitor nutrition and hydration status  - Monitor labs   - Assess for incontinence   - Turn and reposition patient  - Assist with mobility/ambulation  - Relieve pressure over bony prominences  - Avoid friction and shearing  - Provide appropriate hygiene as needed including keeping skin clean and dry  - Evaluate need for skin moisturizer/barrier cream  - Collaborate with interdisciplinary team   - Patient/family teaching  - Consider wound care consult   Outcome: Progressing

## 2019-12-05 NOTE — PROGRESS NOTES
Progress Note - Pulmonary   Valaria Para Sr  64 y o  male MRN: [id number]  Unit/Bed#: E4 -01 Encounter: [id number]      Assessment/Plan:    1  Acute on chronic hypoxic respiratory failure  1  Continues oxygenating well on 3 L nasal cannula this appears to be more for comfort, baseline 2 L nasal cannula  2  Titrate oxygen maintain SpO2 greater than equal to 80%  3  Please encourage aggressive pulmonary toilet including incentive spirometry, flutter valve, out of bed with increasing activity * all this was emphasized to Lee  2  Severe Chronic obstructive pulmonary disease with acute exacerbation as well as presumed pneumonia  1  Will continue Solu-Medrol 40 mg b i d , had planned on starting prednisone taper although due to lack of improvement will continue IV steroids  2  Continue budesonide b i d   3  Continue nebulizer treatments q 6 hours  4  Mucinex 600 mg b i d   5  Flonase  6  Completed 3 days of Zithromax 500 mg, today is day 4 of 7 of Rocephin  7  Will obtain repeat chest x-ray due to lack of improvement  8  Will need outpatient pulmonary follow-up at discharge  9  Home regimen at discharge consists of:  Breo and Spiriva with albuterol p r n   3  Tobacco abuse  1  Cessation encouraged  2  Nicotine replacement therapy as needed  4  Stage IV adenocarcinoma  1  Previously maintained on immunotherapy  2  Outpatient oncology follow-up  3  Palliative care following  5  Stable CKD stage 4  6  Anxiety  1  Palliative following for treatment  7  Chronic pain  1  Palliative care following for medical management  2  Denies significant increase in pain at this time        Subjective:   Rodriguez Rafael was seen resting comfortably in bed  Denies acute overnight events, although denies and any improvement in his breathing  Denies:  Fevers, chills, night sweats, or hemoptysis    He continues with a nonproductive cough with difficulty clearing secretions, and shortness of Breath as well as chest tightness    Objective: Vitals: Blood pressure 149/73, pulse 98, temperature (!) 97 2 °F (36 2 °C), temperature source Tympanic, resp  rate 18, weight 68 kg (149 lb 14 6 oz), SpO2 94 %  , 3LNC, Body mass index is 22 79 kg/m²  No intake or output data in the 24 hours ending 12/05/19 0946      Physical Exam  Gen: Awake, alert, oriented x 3, no  acute distress  HEENT: Mucous membranes moist, no oral lesions, no thrush, oxygen via NC  NECK: no accessory muscle use, JVP not elevated  Cardiac: Regular, single S1, single S2, no murmurs, no rubs, no gallops  Lungs: diffuse expiratory wheezes and coarse rhonchi  Abdomen: normoactive bowel sounds, soft nontender, nondistended, no rebound or rigidity, no guarding  Extremities: no cyanosis, no clubbing, no edema    Labs: I have personally reviewed pertinent lab results  , CBC: No results found for: WBC, HGB, HCT, MCV, PLT, ADJUSTEDWBC, MCH, MCHC, RDW, MPV, NRBC, CMP:   Lab Results   Component Value Date    SODIUM 144 12/05/2019    K 4 8 12/05/2019     (H) 12/05/2019    CO2 26 12/05/2019    BUN 61 (H) 12/05/2019    CREATININE 2 97 (H) 12/05/2019    CALCIUM 8 8 12/05/2019    EGFR 26 12/05/2019     Imaging and other studies: Repeat chest x-ray ordered      Bailey Garza

## 2019-12-05 NOTE — PLAN OF CARE
Problem: Potential for Falls  Goal: Patient will remain free of falls  Description  INTERVENTIONS:  - Assess patient frequently for physical needs  -  Identify cognitive and physical deficits and behaviors that affect risk of falls    -  [place] fall precautions as indicated by assessment   - Educate patient/family on patient safety including physical limitations  - Instruct patient to call for assistance with activity based on assessment  - Modify environment to reduce risk of injury  - Consider OT/PT consult to assist with strengthening/mobility  Outcome: Progressing     Problem: PAIN - ADULT  Goal: Verbalizes/displays adequate comfort level or baseline comfort level  Description  Interventions:  - Encourage patient to monitor pain and request assistance  - Assess pain using appropriate pain scale  - Administer analgesics based on type and severity of pain and evaluate response  - Implement non-pharmacological measures as appropriate and evaluate response  - Consider cultural and social influences on pain and pain management  - Notify physician/advanced practitioner if interventions unsuccessful or patient reports new pain  Outcome: Progressing     Problem: CARDIOVASCULAR - ADULT  Goal: Maintains optimal cardiac output and hemodynamic stability  Description  INTERVENTIONS:  - Monitor I/O, vital signs and rhythm  - Monitor for S/S and trends of decreased cardiac output  - Administer and titrate ordered vasoactive medications to optimize hemodynamic stability  - Assess quality of pulses, skin color and temperature  - Assess for signs of decreased coronary artery perfusion  - Instruct patient to report change in severity of symptoms  Outcome: Progressing  Goal: Absence of cardiac dysrhythmias or at baseline rhythm  Description  INTERVENTIONS:  - Continuous cardiac monitoring, vital signs, obtain 12 lead EKG if ordered  - Administer antiarrhythmic and heart rate control medications as ordered  - Monitor electrolytes and administer replacement therapy as ordered  Outcome: Progressing     Problem: RESPIRATORY - ADULT  Goal: Achieves optimal ventilation and oxygenation  Description  INTERVENTIONS:  - Assess for changes in respiratory status  - Assess for changes in mentation and behavior  - Position to facilitate oxygenation and minimize respiratory effort  - Oxygen administered by appropriate delivery if ordered  - Encourage broncho-pulmonary hygiene including cough, deep breathe, Incentive Spirometry  - Assess the need for suctioning and aspirate as needed  - Assess and instruct to report SOB or any respiratory difficulty  - Respiratory Therapy support as indicated   Outcome: Progressing     Problem: INFECTION - ADULT  Goal: Absence or prevention of progression during hospitalization  Description  INTERVENTIONS:  - Assess and monitor for signs and symptoms of infection  - Monitor lab/diagnostic results  - Monitor all insertion sites, i e  indwelling lines, tubes, and drains  - Monitor endotracheal if appropriate and nasal secretions for changes in amount and color  - [place] appropriate cooling/warming therapies per order  - Administer medications as ordered  - Instruct and encourage patient and family to use good hand hygiene technique  - Identify and instruct in appropriate isolation precautions for identified infection/condition  Outcome: Progressing  Goal: Absence of fever/infection during neutropenic period  Description  INTERVENTIONS:  - Monitor WBC    Outcome: Progressing     Problem: SAFETY ADULT  Goal: Maintain or return to baseline ADL function  Description  INTERVENTIONS:  -  Assess patient's ability to carry out ADLs; assess patient's baseline for ADL function and identify physical deficits which impact ability to perform ADLs (bathing, care of mouth/teeth, toileting, grooming, dressing, etc )  - Assess/evaluate cause of self-care deficits   - Assess range of motion  - Assess patient's mobility; develop plan if impaired  - Assess patient's need for assistive devices and provide as appropriate  - Encourage maximum independence but intervene and supervise when necessary  - Involve family in performance of ADLs  - Assess for home care needs following discharge   - Consider OT consult to assist with ADL evaluation and planning for discharge  - Provide patient education as appropriate  Outcome: Progressing  Goal: Maintain or return mobility status to optimal level  Description  INTERVENTIONS:  - Assess patient's baseline mobility status (ambulation, transfers, stairs, etc )    - Identify cognitive and physical deficits and behaviors that affect mobility  - Identify mobility aids required to assist with transfers and/or ambulation (gait belt, sit-to-stand, lift, walker, cane, etc )  - [place] fall precautions as indicated by assessment  - Record patient progress and toleration of activity level on Mobility SBAR; progress patient to next Phase/Stage  - Instruct patient to call for assistance with activity based on assessment  - Consider rehabilitation consult to assist with strengthening/weightbearing, etc   Outcome: Progressing     Problem: DISCHARGE PLANNING  Goal: Discharge to home or other facility with appropriate resources  Description  INTERVENTIONS:  - Identify barriers to discharge w/patient and caregiver  - Arrange for needed discharge resources and transportation as appropriate  - Identify discharge learning needs (meds, wound care, etc )  - Arrange for interpretive services to assist at discharge as needed  - Refer to Case Management Department for coordinating discharge planning if the patient needs post-hospital services based on physician/advanced practitioner order or complex needs related to functional status, cognitive ability, or social support system  Outcome: Progressing     Problem: Knowledge Deficit  Goal: Patient/family/caregiver demonstrates understanding of disease process, treatment plan, medications, and discharge instructions  Description  Complete learning assessment and assess knowledge base  Interventions:  - Provide teaching at level of understanding  - Provide teaching via preferred learning methods  Outcome: Progressing     Problem: Potential for Aspiration  Goal: Non-ventilated patient's risk of aspiration is minimized  Description  Assess and monitor vital signs, respiratory status, and labs (WBC)  Monitor for signs of aspiration (tachypnea, cough, rales, wheezing, cyanosis, fever)  - Assess and monitor patient's ability to swallow  - Place patient up in chair to eat if possible  - HOB up at 90 degrees to eat if unable to get patient up into chair   - Supervise patient during oral intake  - Instruct patient/ family to take small bites  - Instruct patient/ family to take small single sips when taking liquids  - Follow patient-specific strategies generated by speech pathologist   Outcome: Progressing  Goal: Ventilated patient's risk of aspiration is minimized  Description  Assess and monitor vital signs, respiratory status, airway cuff pressure, and labs (WBC)  Monitor for signs of aspiration (tachypnea, cough, rales, wheezing, cyanosis, fever)  - Elevate head of bed 30 degrees if patient has tube feeding   - Monitor tube feeding    Outcome: Progressing     Problem: NEUROSENSORY - ADULT  Goal: Achieves stable or improved neurological status  Description  INTERVENTIONS  - Monitor and report changes in neurological status  - Monitor vital signs such as temperature, blood pressure, glucose, and any other labs ordered   - Initiate measures to prevent increased intracranial pressure  - Monitor for seizure activity and implement precautions if appropriate      Outcome: Progressing  Goal: Remains free of injury related to seizures activity  Description  INTERVENTIONS  - Maintain airway, patient safety  and administer oxygen as ordered  - Monitor patient for seizure activity, document and report duration and description of seizure to physician/advanced practitioner  - If seizure occurs,  ensure patient safety during seizure  - Reorient patient post seizure  - Seizure pads on all 4 side rails  - Instruct patient/family to notify RN of any seizure activity including if an aura is experienced  - Instruct patient/family to call for assistance with activity based on nursing assessment  - Administer anti-seizure medications if ordered    Outcome: Progressing  Goal: Achieves maximal functionality and self care  Description  INTERVENTIONS  - Monitor swallowing and airway patency with patient fatigue and changes in neurological status  - Encourage and assist patient to increase activity and self care     - Encourage visually impaired, hearing impaired and aphasic patients to use assistive/communication devices  Outcome: Progressing     Problem: METABOLIC, FLUID AND ELECTROLYTES - ADULT  Goal: Electrolytes maintained within normal limits  Description  INTERVENTIONS:  - Monitor labs and assess patient for signs and symptoms of electrolyte imbalances  - Administer electrolyte replacement as ordered  - Monitor response to electrolyte replacements, including repeat lab results as appropriate  - Instruct patient on fluid and nutrition as appropriate  Outcome: Progressing  Goal: Fluid balance maintained  Description  INTERVENTIONS:  - Monitor labs   - Monitor I/O and WT  - Instruct patient on fluid and nutrition as appropriate  - Assess for signs & symptoms of volume excess or deficit  Outcome: Progressing  Goal: Glucose maintained within target range  Description  INTERVENTIONS:  - Monitor Blood Glucose as ordered  - Assess for signs and symptoms of hyperglycemia and hypoglycemia  - Administer ordered medications to maintain glucose within target range  - Assess nutritional intake and initiate nutrition service referral as needed  Outcome: Progressing     Problem: SKIN/TISSUE INTEGRITY - ADULT  Goal: Skin integrity remains intact  Description  INTERVENTIONS  - Identify patients at risk for skin breakdown  - Assess and monitor skin integrity  - Assess and monitor nutrition and hydration status  - Monitor labs (i e  albumin)  - Assess for incontinence   - Turn and reposition patient  - Assist with mobility/ambulation  - Relieve pressure over bony prominences  - Avoid friction and shearing  - Provide appropriate hygiene as needed including keeping skin clean and dry  - Evaluate need for skin moisturizer/barrier cream  - Collaborate with interdisciplinary team (i e  Nutrition, Rehabilitation, etc )   - Patient/family teaching  Outcome: Progressing  Goal: Incision(s), wounds(s) or drain site(s) healing without S/S of infection  Description  INTERVENTIONS  - Assess and document risk factors for skin impairment   - Assess and document dressing, incision, wound bed, drain sites and surrounding tissue  - Consider nutrition services referral as needed  - Oral mucous membranes remain intact  - Provide patient/ family education  Outcome: Progressing  Goal: Oral mucous membranes remain intact  Description  INTERVENTIONS  - Assess oral mucosa and hygiene practices  - Implement preventative oral hygiene regimen  - Implement oral medicated treatments as ordered  - Initiate Nutrition services referral as needed  Outcome: Progressing     Problem: MUSCULOSKELETAL - ADULT  Goal: Maintain or return mobility to safest level of function  Description  INTERVENTIONS:  - Assess patient's ability to carry out ADLs; assess patient's baseline for ADL function and identify physical deficits which impact ability to perform ADLs (bathing, care of mouth/teeth, toileting, grooming, dressing, etc )  - Assess/evaluate cause of self-care deficits   - Assess range of motion  - Assess patient's mobility  - Assess patient's need for assistive devices and provide as appropriate  - Encourage maximum independence but intervene and supervise when necessary  - Involve family in performance of ADLs  - Assess for home care needs following discharge   - Consider OT consult to assist with ADL evaluation and planning for discharge  - Provide patient education as appropriate  Outcome: Progressing  Goal: Maintain proper alignment of affected body part  Description  INTERVENTIONS:  - Support, maintain and protect limb and body alignment  - Provide patient/ family with appropriate education  Outcome: Progressing     Problem: Prexisting or High Potential for Compromised Skin Integrity  Goal: Skin integrity is maintained or improved  Description  INTERVENTIONS:  - Identify patients at risk for skin breakdown  - Assess and monitor skin integrity  - Assess and monitor nutrition and hydration status  - Monitor labs   - Assess for incontinence   - Turn and reposition patient  - Assist with mobility/ambulation  - Relieve pressure over bony prominences  - Avoid friction and shearing  - Provide appropriate hygiene as needed including keeping skin clean and dry  - Evaluate need for skin moisturizer/barrier cream  - Collaborate with interdisciplinary team   - Patient/family teaching  - Consider wound care consult   Outcome: Progressing

## 2019-12-05 NOTE — PROGRESS NOTES
NEPHROLOGY PROGRESS NOTE   Nik Awad Sr  64 y o  male MRN: [id number]  Unit/Bed#: E4 -01 Encounter: [id number]      ASSESSMENT/PLAN:  Acute kidney injury with chronic kidney disease, stage IIIB:  Per medical records, creatinine approximately 2 4- 2 5 since August 2019     - Creatinine significantly worsened to 4 0 on 12/03/2019 likely to prerenal, contrast nephropathy (IV contrast with CT scan on 11/30/2019), component of ATN  - Creatinine continues to improve today to 2 97 post 2 doses of gentle IVF hydration    - UA revealed trace blood, 0-1 WBC  Urine sodium 7, FENA 0 19%  - Avoid NSAIDs, nephrotoxins, IV contrast, hypotension   - Monitor volume status, intake output and daily weight    - Repeat BMP in am      Hypertension:  Blood pressure stable  - Amlodipine on hold  If patient becomes hypertensive, may need to consider resuming at lower dose than previously on    - Avoid fluctuations/hypotension to prevent decreased renal perfusion    - Trend and monitor  Anemia:  Hemoglobin stable yesterday at 9 5   - Trend H&H  Low bicarbonate: CO2 26   - On bicarb supplement  - Trend with BMP  Severe COPD with acute exacerbation:   - Venous Doppler revealed no evidence of DVT  - Had planned on starting prednisone taper, however will resume IV steroids today per Pulmonary    - Plan per pulmonary  Adenocarcinoma of lung:  Patient has received 6 cycles of palliative chemotherapy  - Last dose of Keytruda on 11/20/2019   - Palliative care on board  SUBJECTIVE:  Patient resting in bed  Patient denies shortness of breath, chest pain, nausea, vomiting, diarrhea      OBJECTIVE:  Current Weight: Weight - Scale: 68 kg (149 lb 14 6 oz)  Vitals:    12/05/19 0808   BP:    Pulse:    Resp:    Temp:    SpO2: 94%     No intake or output data in the 24 hours ending 12/05/19 1048    General:  No acute distress  Skin:  Warm, no rash  Eyes:  Sclerae anicteric  ENT:  Moist lips and mucous membranes  Neck: Supple, no JVD, trachea midline  Chest:  Clear breath sounds bilaterally   CVS:  Normal rate and rhythm  Abdomen:  Soft, nontender, normoactive bowel sounds  Extremities:  No edema bilaterally   Neuro:  Alert and oriented  Psych:  Flat affect      Medications:  Scheduled Meds:  Current Facility-Administered Medications:  acetaminophen 650 mg Oral Q6H PRN Michelle Torres MD    albuterol 2 puff Inhalation Q4H PRN Michelle Torres MD    aluminum-magnesium hydroxide-simethicone 30 mL Oral Q6H PRN Michelle Torres MD    apixaban 5 mg Oral BID Sybil Tenorio DO    budesonide 0 5 mg Nebulization Q12H BROOKS Cadet    cefTRIAXone 1,000 mg Intravenous Q24H BROOKS Cadet Last Rate: 1,000 mg (12/04/19 1343)   chlorproMAZINE 25 mg Oral Q6H PRN Michelle Torres MD    diltiazem 120 mg Oral Daily Laura Matos MD    diphenhydrAMINE 25 mg Oral Q6H PRN Michelle Torres MD    famotidine 20 mg Oral BID Michelle Torres MD    FLUoxetine 10 mg Oral Daily Jonathan Jimenez MD    fluticasone 1 spray Each Nare Daily Jonathan Jimenez MD    folic acid 1 mg Oral Daily Jonathan Jimenez MD    guaiFENesin 600 mg Oral Q12H [address] BROOKS Arzate    insulin lispro 1-5 Units Subcutaneous TID AC Michelle Torres MD    ipratropium-albuterol 3 mL Nebulization Q6H Jonathan Jimenez MD    LORazepam 0 5 mg Oral Q4H PRN Tonie Ballard MD    melatonin 3 mg Oral HS Jonathan Jimenez MD    methocarbamol 750 mg Oral Q6H PRN Michelle Torres MD    methylPREDNISolone sodium succinate 40 mg Intravenous Q12H [address] BROOKS Arzate    ondansetron 4 mg Intravenous Q6H PRN Michelle Torres MD    oxyCODONE 20 mg Oral Q12H [address] Jonathan Jimenez MD    oxyCODONE 20 mg Oral Q6H PRN Sybil Tenorio DO    pantoprazole 40 mg Oral Early Morning Jonathan Jimenez MD    polyethylene glycol 17 g Oral Daily PRN Michelle Torres, MD    pregabalin 25 mg Oral Daily Michelle Torres MD    pregabalin 50 mg Oral HS Earzach Torres MD    QUEtiapine 25 mg Oral HS Earzach Torres MD senna-docusate sodium 1 tablet Oral BID PRN Arnoldo Perkins MD    sodium bicarbonate 650 mg Oral BID after meals Philipp Stone MD    tamsulosin 0 4 mg Oral Daily With Bill Dakin, MD        PRN Meds:   acetaminophen    albuterol    aluminum-magnesium hydroxide-simethicone    chlorproMAZINE    diphenhydrAMINE    LORazepam    methocarbamol    ondansetron    oxyCODONE    polyethylene glycol    senna-docusate sodium    Continuous Infusions:     Laboratory Results:  Results from last 7 days   Lab Units 12/05/19  0508 12/04/19  0444 12/03/19  1043 12/03/19  0556 12/02/19  0453 12/01/19  0339 11/30/19  1412 11/30/19  0605 11/29/19  1900   WBC Thousand/uL  --  13 30*  --   --  14 34* 15 95*  --  5 75 8 81   HEMOGLOBIN g/dL  --  9 5*  --   --  9 8* 9 6*  --  10 4* 11 5*   HEMATOCRIT %  --  29 8*  --   --  31 5* 30 7*  --  33 9* 36 2*   PLATELETS Thousands/uL  --  432*  --   --  413* 311  --  265 240   SODIUM mmol/L 144 136 133* 131* 141 139 136 137 136   POTASSIUM mmol/L 4 8 4 9 4 6 6 1* 4 5 4 4 4 1 4 3 4 1   CHLORIDE mmol/L 109* 101 99* 99* 107 104 103 101 100   CO2 mmol/L 26 23 20* 20* 26 24 24 22 26   BUN mg/dL 61* 75* 75* 71* 42* 34* 32* 30* 31*   CREATININE mg/dL 2 97* 3 79* 4 07* 3 89* 2 50* 2 40* 2 45* 2 61* 2 44*   CALCIUM mg/dL 8 8 8 3 8 4 8 8 8 7 8 5 8 8 8 8 9 0   MAGNESIUM mg/dL  --   --   --   --  2 2 2 2  --   --   --    PHOSPHORUS mg/dL  --   --   --   --  4 0 3 4  --   --   --

## 2019-12-06 LAB
ANION GAP SERPL CALCULATED.3IONS-SCNC: 8 MMOL/L (ref 4–13)
BUN SERPL-MCNC: 51 MG/DL (ref 5–25)
CALCIUM SERPL-MCNC: 8.9 MG/DL (ref 8.3–10.1)
CHLORIDE SERPL-SCNC: 107 MMOL/L (ref 100–108)
CO2 SERPL-SCNC: 28 MMOL/L (ref 21–32)
CREAT SERPL-MCNC: 2.52 MG/DL (ref 0.6–1.3)
GFR SERPL CREATININE-BSD FRML MDRD: 31 ML/MIN/1.73SQ M
GLUCOSE SERPL-MCNC: 124 MG/DL (ref 65–140)
GLUCOSE SERPL-MCNC: 147 MG/DL (ref 65–140)
GLUCOSE SERPL-MCNC: 149 MG/DL (ref 65–140)
GLUCOSE SERPL-MCNC: 170 MG/DL (ref 65–140)
GLUCOSE SERPL-MCNC: 179 MG/DL (ref 65–140)
POTASSIUM SERPL-SCNC: 5.1 MMOL/L (ref 3.5–5.3)
SODIUM SERPL-SCNC: 143 MMOL/L (ref 136–145)

## 2019-12-06 PROCEDURE — 97116 GAIT TRAINING THERAPY: CPT

## 2019-12-06 PROCEDURE — 99232 SBSQ HOSP IP/OBS MODERATE 35: CPT | Performed by: INTERNAL MEDICINE

## 2019-12-06 PROCEDURE — 99233 SBSQ HOSP IP/OBS HIGH 50: CPT | Performed by: INTERNAL MEDICINE

## 2019-12-06 PROCEDURE — 80048 BASIC METABOLIC PNL TOTAL CA: CPT | Performed by: NURSE PRACTITIONER

## 2019-12-06 PROCEDURE — 97530 THERAPEUTIC ACTIVITIES: CPT

## 2019-12-06 PROCEDURE — 82948 REAGENT STRIP/BLOOD GLUCOSE: CPT

## 2019-12-06 PROCEDURE — 94640 AIRWAY INHALATION TREATMENT: CPT

## 2019-12-06 PROCEDURE — 99232 SBSQ HOSP IP/OBS MODERATE 35: CPT | Performed by: PSYCHIATRY & NEUROLOGY

## 2019-12-06 PROCEDURE — 94760 N-INVAS EAR/PLS OXIMETRY 1: CPT

## 2019-12-06 RX ORDER — AMLODIPINE BESYLATE 2.5 MG/1
2.5 TABLET ORAL DAILY
Status: DISCONTINUED | OUTPATIENT
Start: 2019-12-06 | End: 2019-12-06

## 2019-12-06 RX ORDER — HYDRALAZINE HYDROCHLORIDE 20 MG/ML
5 INJECTION INTRAMUSCULAR; INTRAVENOUS EVERY 6 HOURS PRN
Status: DISCONTINUED | OUTPATIENT
Start: 2019-12-06 | End: 2019-12-10 | Stop reason: HOSPADM

## 2019-12-06 RX ORDER — PREDNISONE 20 MG/1
40 TABLET ORAL DAILY
Status: DISCONTINUED | OUTPATIENT
Start: 2019-12-07 | End: 2019-12-08

## 2019-12-06 RX ADMIN — PANTOPRAZOLE SODIUM 40 MG: 40 TABLET, DELAYED RELEASE ORAL at 06:01

## 2019-12-06 RX ADMIN — METHYLPREDNISOLONE SODIUM SUCCINATE 40 MG: 40 INJECTION, POWDER, FOR SOLUTION INTRAMUSCULAR; INTRAVENOUS at 09:01

## 2019-12-06 RX ADMIN — QUETIAPINE FUMARATE 25 MG: 25 TABLET ORAL at 22:22

## 2019-12-06 RX ADMIN — HYDRALAZINE HYDROCHLORIDE 5 MG: 20 INJECTION INTRAMUSCULAR; INTRAVENOUS at 16:13

## 2019-12-06 RX ADMIN — BUDESONIDE 0.5 MG: 0.5 INHALANT RESPIRATORY (INHALATION) at 18:42

## 2019-12-06 RX ADMIN — FAMOTIDINE 20 MG: 20 TABLET ORAL at 09:02

## 2019-12-06 RX ADMIN — DIVALPROEX SODIUM 250 MG: 250 TABLET, DELAYED RELEASE ORAL at 09:02

## 2019-12-06 RX ADMIN — PREGABALIN 25 MG: 25 CAPSULE ORAL at 09:02

## 2019-12-06 RX ADMIN — FOLIC ACID 1 MG: 1 TABLET ORAL at 09:02

## 2019-12-06 RX ADMIN — HYDRALAZINE HYDROCHLORIDE 5 MG: 20 INJECTION INTRAMUSCULAR; INTRAVENOUS at 22:30

## 2019-12-06 RX ADMIN — FLUOXETINE 10 MG: 10 CAPSULE ORAL at 09:02

## 2019-12-06 RX ADMIN — TAMSULOSIN HYDROCHLORIDE 0.4 MG: 0.4 CAPSULE ORAL at 16:13

## 2019-12-06 RX ADMIN — MELATONIN TAB 3 MG 3 MG: 3 TAB at 22:22

## 2019-12-06 RX ADMIN — DIVALPROEX SODIUM 250 MG: 250 TABLET, DELAYED RELEASE ORAL at 16:13

## 2019-12-06 RX ADMIN — BUDESONIDE 0.5 MG: 0.5 INHALANT RESPIRATORY (INHALATION) at 07:55

## 2019-12-06 RX ADMIN — FAMOTIDINE 20 MG: 20 TABLET ORAL at 16:13

## 2019-12-06 RX ADMIN — IPRATROPIUM BROMIDE AND ALBUTEROL SULFATE 3 ML: 2.5; .5 SOLUTION RESPIRATORY (INHALATION) at 07:55

## 2019-12-06 RX ADMIN — OXYCODONE HYDROCHLORIDE 20 MG: 20 TABLET, FILM COATED, EXTENDED RELEASE ORAL at 21:55

## 2019-12-06 RX ADMIN — APIXABAN 5 MG: 5 TABLET, FILM COATED ORAL at 16:13

## 2019-12-06 RX ADMIN — CEFTRIAXONE 1000 MG: 1 INJECTION, POWDER, FOR SOLUTION INTRAMUSCULAR; INTRAVENOUS at 12:42

## 2019-12-06 RX ADMIN — PREGABALIN 50 MG: 50 CAPSULE ORAL at 22:22

## 2019-12-06 RX ADMIN — IPRATROPIUM BROMIDE AND ALBUTEROL SULFATE 3 ML: 2.5; .5 SOLUTION RESPIRATORY (INHALATION) at 18:42

## 2019-12-06 RX ADMIN — GUAIFENESIN 600 MG: 600 TABLET ORAL at 09:02

## 2019-12-06 RX ADMIN — APIXABAN 5 MG: 5 TABLET, FILM COATED ORAL at 09:02

## 2019-12-06 RX ADMIN — OXYCODONE HYDROCHLORIDE 20 MG: 20 TABLET, FILM COATED, EXTENDED RELEASE ORAL at 09:02

## 2019-12-06 RX ADMIN — INSULIN LISPRO 1 UNITS: 100 INJECTION, SOLUTION INTRAVENOUS; SUBCUTANEOUS at 16:16

## 2019-12-06 RX ADMIN — DILTIAZEM HYDROCHLORIDE 120 MG: 120 CAPSULE, COATED, EXTENDED RELEASE ORAL at 09:01

## 2019-12-06 RX ADMIN — FLUTICASONE PROPIONATE 1 SPRAY: 50 SPRAY, METERED NASAL at 09:04

## 2019-12-06 RX ADMIN — IPRATROPIUM BROMIDE AND ALBUTEROL SULFATE 3 ML: 2.5; .5 SOLUTION RESPIRATORY (INHALATION) at 13:54

## 2019-12-06 NOTE — PROGRESS NOTES
Progress Note - Neurology   Gillis Paget  64 y o  male MRN: [id number]  Unit/Bed#: E4 -01 Encounter: [id number]    Assessment/ Plan:  1)  Bilateral upper extremity tremor - toxic metabolic in setting of adenocarcinoma on Kaytruda, acute on chronic hypoxic respiratory failure, NURIA/CKD 4, and ? PE     -MRI brain from 11/30/2019 without acute intracranial abnormality   -Continue Depakote 250 mg p o  B i d  For myoclonic jerking    -May consider increase to 250mg PO Qam and 500mg PO QHS but would defer for at least an additional 48hours    -supportive care and medication management per primary team   -neurology will be available as needed, please call with questions    Subjective:   Patient is a 44-year-old male with adenocarcinoma of the lung, on chemotherapy, COPD, AFib, current smoker, originally admitted on 11/29/2019 for worsening shortness of breath over the past 2 weeks  Neurology saw this patient in consultation originally on 11/30/2019 after stroke alert was called due to slurred speech  Stroke workup was negative  Neurology was asked to see the patient again due to tremor, found to be myoclonic jerking  On exam today, the pt is resting in bed  He is irritable and somewhat uncooperative with exam  Pt is uncooperative with ROS  Neurological exam as detailed below           ROS:  See subjective    Medications:  Scheduled Meds:    Current Facility-Administered Medications:  acetaminophen 650 mg Oral Q6H PRN Bonnie Borja MD    albuterol 2 puff Inhalation Q4H PRN Bonnie Borja MD    aluminum-magnesium hydroxide-simethicone 30 mL Oral Q6H PRN Bonnie Borja MD    apixaban 5 mg Oral BID Sybil Tenorio DO    budesonide 0 5 mg Nebulization Q12H BROOKS Mandel    cefTRIAXone 1,000 mg Intravenous Q24H BROOKS Mandel Last Rate: 1,000 mg (12/06/19 1242)   chlorproMAZINE 25 mg Oral Q6H PRN Bonnie Borja MD    diltiazem 120 mg Oral Daily Nell Dinh MD    diphenhydrAMINE 25 mg Oral Q6H PRN Perfecto Nice MD    divalproex sodium 250 mg Oral BID Lola Wooten PA-C    famotidine 20 mg Oral BID Perfecto Nice MD    FLUoxetine 10 mg Oral Daily Jonathan Jimenez MD    fluticasone 1 spray Each Nare Daily Perfecto Nice MD    folic acid 1 mg Oral Daily Perfecto Nice MD    guaiFENesin 600 mg Oral Q12H [facility] BROOKS Cui    insulin lispro 1-5 Units Subcutaneous TID AC Perfecto Nice MD    ipratropium-albuterol 3 mL Nebulization Q6H Jonathan Jimenez MD    melatonin 3 mg Oral HS Perfecto Nice MD    methocarbamol 750 mg Oral Q6H PRN Perfecto Nice MD    ondansetron 4 mg Intravenous Q6H PRN Perfecto Nice MD    oxyCODONE 20 mg Oral Q12H [facility] Perfecto Nice MD    oxyCODONE 20 mg Oral Q6H PRN Sybil Tenorio DO    pantoprazole 40 mg Oral Early Morning Jonathan Jimenez MD    polyethylene glycol 17 g Oral Daily PRN Perfecto Nice MD    [START ON 12/7/2019] predniSONE 40 mg Oral Daily BROOKS Cui    pregabalin 25 mg Oral Daily Perfecto Nice MD    pregabalin 50 mg Oral HS Jonathan Jimenez MD    QUEtiapine 25 mg Oral HS Perfecto Nice MD    senna-docusate sodium 1 tablet Oral BID PRN Perfecto Nice MD    tamsulosin 0 4 mg Oral Daily With Thomas Sofia MD      Continuous Infusions:   PRN Meds:   acetaminophen    albuterol    aluminum-magnesium hydroxide-simethicone    chlorproMAZINE    diphenhydrAMINE    methocarbamol    ondansetron    oxyCODONE    polyethylene glycol    senna-docusate sodium      Vitals: Blood pressure 166/79, pulse 80, temperature 98 °F (36 7 °C), temperature source Temporal, resp  rate 18, weight 68 kg (149 lb 14 6 oz), SpO2 95 %  ,Body mass index is 22 79 kg/m²  Physical Exam:  Physical Exam   Constitutional: He is oriented to person, place, and time  Ill appearing   Eyes: Conjunctivae are normal  Right eye exhibits no discharge  Left eye exhibits no discharge  No scleral icterus  Neck: Normal range of motion  Neck supple     Pulmonary/Chest: Effort normal  No respiratory distress  Musculoskeletal: Normal range of motion  He exhibits no edema, tenderness or deformity  Neurological: He is oriented to person, place, and time  He has normal strength  Skin: Skin is warm and dry  No rash noted  No erythema  No pallor  Psychiatric: His speech is normal    Irritable    Nursing note and vitals reviewed  Neurologic Exam     Mental Status   Oriented to person, place, and time  Follows 2 step commands  Attention: normal  Concentration: normal    Speech: speech is normal   Level of consciousness: alert  Knowledge: good  Normal comprehension  Cranial Nerves   Cranial nerves II through XII intact  Motor Exam   Muscle bulk: normal  Overall muscle tone: normal    Strength   Strength 5/5 throughout  Sensory Exam   Light touch normal      Gait, Coordination, and Reflexes   Myoclonic jerking and asterixis noted b/l LE        Lab, Imaging and other studies: I have personally reviewed pertinent reports     I have personally reviewed pertinent imaging in PACs  Recent Results (from the past 24 hour(s))   Fingerstick Glucose (POCT)    Collection Time: 12/05/19  4:41 PM   Result Value Ref Range    POC Glucose 171 (H) 65 - 140 mg/dl   Fingerstick Glucose (POCT)    Collection Time: 12/05/19  8:49 PM   Result Value Ref Range    POC Glucose 158 (H) 65 - 140 mg/dl   Basic metabolic panel    Collection Time: 12/06/19  5:52 AM   Result Value Ref Range    Sodium 143 136 - 145 mmol/L    Potassium 5 1 3 5 - 5 3 mmol/L    Chloride 107 100 - 108 mmol/L    CO2 28 21 - 32 mmol/L    ANION GAP 8 4 - 13 mmol/L    BUN 51 (H) 5 - 25 mg/dL    Creatinine 2 52 (H) 0 60 - 1 30 mg/dL    Glucose 149 (H) 65 - 140 mg/dL    Calcium 8 9 8 3 - 10 1 mg/dL    eGFR 31 ml/min/1 73sq m   Fingerstick Glucose (POCT)    Collection Time: 12/06/19  7:12 AM   Result Value Ref Range    POC Glucose 147 (H) 65 - 140 mg/dl   Fingerstick Glucose (POCT)    Collection Time: 12/06/19 11:11 AM Result Value Ref Range    POC Glucose 179 (H) 65 - 140 mg/dl   ]    VTE Prophylaxis: Sequential compression device (Venodyne)  and Eliquis    Counseling / Coordination of Care  Total time spent today 25 minutes  Greater than 50% of total time was spent with the patient and / or family counseling and / or coordination of care  A description of the counseling / coordination of care: Alexis Ovalle The pt was seen and examined by myself and the attending physician  The chart was reviewed thoroughly, including laboratory values and imaging studies  The pt was counseled in the room

## 2019-12-06 NOTE — PLAN OF CARE
Problem: PHYSICAL THERAPY ADULT  Goal: Performs mobility at highest level of function for planned discharge setting  See evaluation for individualized goals  Description  Treatment/Interventions: Functional transfer training, LE strengthening/ROM, Elevations, Therapeutic exercise, Endurance training, Cognitive reorientation, Patient/family training, Equipment eval/education, Bed mobility, Gait training, Compensatory technique education, Spoke to nursing, ADL retraining  Equipment Recommended: [place]       See flowsheet documentation for full assessment, interventions and recommendations  Outcome: Progressing  Note:   Prognosis: Fair  Problem List: Decreased strength, Decreased endurance, Impaired balance, Decreased mobility, Impaired judgement  Assessment: Mr Leonard Hart participated in PT session this am focused on ambulatory endurance and balance with mobility  He has made good progress since eval on Monday  He is ambulating without AD, manages portable O2 tank on evens on unit, x200'  He does fatigue during ambulation and cues needed for PLB  He declines stair negotiation and LE TE due to fatigue  Lengthy seated rest break after ambulation  Session was limited by toileting needs as well  Despite progress made at this time, pt would benefit from STR upon d/c as he has [address] home and is not able to manage this currently  Ongoing PT recommended for strength, endurance, safety with all mobility  Barriers to Discharge: Inaccessible home environment, Decreased caregiver support  Barriers to Discharge Comments: MARCIO, alone during the day  Recommendation: Short-term skilled PT     PT - OK to Discharge: Yes(to rehab when med philipp)    See flowsheet documentation for full assessment

## 2019-12-06 NOTE — PLAN OF CARE
Problem: Potential for Falls  Goal: Patient will remain free of falls  Description  INTERVENTIONS:  - Assess patient frequently for physical needs  -  Identify cognitive and physical deficits and behaviors that affect risk of falls    -  [place] fall precautions as indicated by assessment   - Educate patient/family on patient safety including physical limitations  - Instruct patient to call for assistance with activity based on assessment  - Modify environment to reduce risk of injury  - Consider OT/PT consult to assist with strengthening/mobility  Outcome: Progressing     Problem: PAIN - ADULT  Goal: Verbalizes/displays adequate comfort level or baseline comfort level  Description  Interventions:  - Encourage patient to monitor pain and request assistance  - Assess pain using appropriate pain scale  - Administer analgesics based on type and severity of pain and evaluate response  - Implement non-pharmacological measures as appropriate and evaluate response  - Consider cultural and social influences on pain and pain management  - Notify physician/advanced practitioner if interventions unsuccessful or patient reports new pain  Outcome: Progressing     Problem: CARDIOVASCULAR - ADULT  Goal: Maintains optimal cardiac output and hemodynamic stability  Description  INTERVENTIONS:  - Monitor I/O, vital signs and rhythm  - Monitor for S/S and trends of decreased cardiac output  - Administer and titrate ordered vasoactive medications to optimize hemodynamic stability  - Assess quality of pulses, skin color and temperature  - Assess for signs of decreased coronary artery perfusion  - Instruct patient to report change in severity of symptoms  Outcome: Progressing  Goal: Absence of cardiac dysrhythmias or at baseline rhythm  Description  INTERVENTIONS:  - Continuous cardiac monitoring, vital signs, obtain 12 lead EKG if ordered  - Administer antiarrhythmic and heart rate control medications as ordered  - Monitor electrolytes and administer replacement therapy as ordered  Outcome: Progressing     Problem: RESPIRATORY - ADULT  Goal: Achieves optimal ventilation and oxygenation  Description  INTERVENTIONS:  - Assess for changes in respiratory status  - Assess for changes in mentation and behavior  - Position to facilitate oxygenation and minimize respiratory effort  - Oxygen administered by appropriate delivery if ordered  - Encourage broncho-pulmonary hygiene including cough, deep breathe, Incentive Spirometry  - Assess the need for suctioning and aspirate as needed  - Assess and instruct to report SOB or any respiratory difficulty  - Respiratory Therapy support as indicated   Outcome: Progressing     Problem: INFECTION - ADULT  Goal: Absence or prevention of progression during hospitalization  Description  INTERVENTIONS:  - Assess and monitor for signs and symptoms of infection  - Monitor lab/diagnostic results  - Monitor all insertion sites, i e  indwelling lines, tubes, and drains  - Monitor endotracheal if appropriate and nasal secretions for changes in amount and color  - [place] appropriate cooling/warming therapies per order  - Administer medications as ordered  - Instruct and encourage patient and family to use good hand hygiene technique  - Identify and instruct in appropriate isolation precautions for identified infection/condition  Outcome: Progressing  Goal: Absence of fever/infection during neutropenic period  Description  INTERVENTIONS:  - Monitor WBC    Outcome: Progressing     Problem: SAFETY ADULT  Goal: Maintain or return to baseline ADL function  Description  INTERVENTIONS:  -  Assess patient's ability to carry out ADLs; assess patient's baseline for ADL function and identify physical deficits which impact ability to perform ADLs (bathing, care of mouth/teeth, toileting, grooming, dressing, etc )  - Assess/evaluate cause of self-care deficits   - Assess range of motion  - Assess patient's mobility; develop plan if impaired  - Assess patient's need for assistive devices and provide as appropriate  - Encourage maximum independence but intervene and supervise when necessary  - Involve family in performance of ADLs  - Assess for home care needs following discharge   - Consider OT consult to assist with ADL evaluation and planning for discharge  - Provide patient education as appropriate  Outcome: Progressing  Goal: Maintain or return mobility status to optimal level  Description  INTERVENTIONS:  - Assess patient's baseline mobility status (ambulation, transfers, stairs, etc )    - Identify cognitive and physical deficits and behaviors that affect mobility  - Identify mobility aids required to assist with transfers and/or ambulation (gait belt, sit-to-stand, lift, walker, cane, etc )  - [place] fall precautions as indicated by assessment  - Record patient progress and toleration of activity level on Mobility SBAR; progress patient to next Phase/Stage  - Instruct patient to call for assistance with activity based on assessment  - Consider rehabilitation consult to assist with strengthening/weightbearing, etc   Outcome: Progressing     Problem: DISCHARGE PLANNING  Goal: Discharge to home or other facility with appropriate resources  Description  INTERVENTIONS:  - Identify barriers to discharge w/patient and caregiver  - Arrange for needed discharge resources and transportation as appropriate  - Identify discharge learning needs (meds, wound care, etc )  - Arrange for interpretive services to assist at discharge as needed  - Refer to Case Management Department for coordinating discharge planning if the patient needs post-hospital services based on physician/advanced practitioner order or complex needs related to functional status, cognitive ability, or social support system  Outcome: Progressing     Problem: Knowledge Deficit  Goal: Patient/family/caregiver demonstrates understanding of disease process, treatment plan, medications, and discharge instructions  Description  Complete learning assessment and assess knowledge base  Interventions:  - Provide teaching at level of understanding  - Provide teaching via preferred learning methods  Outcome: Progressing     Problem: Potential for Aspiration  Goal: Non-ventilated patient's risk of aspiration is minimized  Description  Assess and monitor vital signs, respiratory status, and labs (WBC)  Monitor for signs of aspiration (tachypnea, cough, rales, wheezing, cyanosis, fever)  - Assess and monitor patient's ability to swallow  - Place patient up in chair to eat if possible  - HOB up at 90 degrees to eat if unable to get patient up into chair   - Supervise patient during oral intake  - Instruct patient/ family to take small bites  - Instruct patient/ family to take small single sips when taking liquids  - Follow patient-specific strategies generated by speech pathologist   Outcome: Progressing  Goal: Ventilated patient's risk of aspiration is minimized  Description  Assess and monitor vital signs, respiratory status, airway cuff pressure, and labs (WBC)  Monitor for signs of aspiration (tachypnea, cough, rales, wheezing, cyanosis, fever)  - Elevate head of bed 30 degrees if patient has tube feeding   - Monitor tube feeding    Outcome: Progressing     Problem: NEUROSENSORY - ADULT  Goal: Achieves stable or improved neurological status  Description  INTERVENTIONS  - Monitor and report changes in neurological status  - Monitor vital signs such as temperature, blood pressure, glucose, and any other labs ordered   - Initiate measures to prevent increased intracranial pressure  - Monitor for seizure activity and implement precautions if appropriate      Outcome: Progressing  Goal: Remains free of injury related to seizures activity  Description  INTERVENTIONS  - Maintain airway, patient safety  and administer oxygen as ordered  - Monitor patient for seizure activity, document and report duration and description of seizure to physician/advanced practitioner  - If seizure occurs,  ensure patient safety during seizure  - Reorient patient post seizure  - Seizure pads on all 4 side rails  - Instruct patient/family to notify RN of any seizure activity including if an aura is experienced  - Instruct patient/family to call for assistance with activity based on nursing assessment  - Administer anti-seizure medications if ordered    Outcome: Progressing  Goal: Achieves maximal functionality and self care  Description  INTERVENTIONS  - Monitor swallowing and airway patency with patient fatigue and changes in neurological status  - Encourage and assist patient to increase activity and self care     - Encourage visually impaired, hearing impaired and aphasic patients to use assistive/communication devices  Outcome: Progressing     Problem: METABOLIC, FLUID AND ELECTROLYTES - ADULT  Goal: Electrolytes maintained within normal limits  Description  INTERVENTIONS:  - Monitor labs and assess patient for signs and symptoms of electrolyte imbalances  - Administer electrolyte replacement as ordered  - Monitor response to electrolyte replacements, including repeat lab results as appropriate  - Instruct patient on fluid and nutrition as appropriate  Outcome: Progressing  Goal: Fluid balance maintained  Description  INTERVENTIONS:  - Monitor labs   - Monitor I/O and WT  - Instruct patient on fluid and nutrition as appropriate  - Assess for signs & symptoms of volume excess or deficit  Outcome: Progressing  Goal: Glucose maintained within target range  Description  INTERVENTIONS:  - Monitor Blood Glucose as ordered  - Assess for signs and symptoms of hyperglycemia and hypoglycemia  - Administer ordered medications to maintain glucose within target range  - Assess nutritional intake and initiate nutrition service referral as needed  Outcome: Progressing     Problem: SKIN/TISSUE INTEGRITY - ADULT  Goal: Skin integrity remains intact  Description  INTERVENTIONS  - Identify patients at risk for skin breakdown  - Assess and monitor skin integrity  - Assess and monitor nutrition and hydration status  - Monitor labs (i e  albumin)  - Assess for incontinence   - Turn and reposition patient  - Assist with mobility/ambulation  - Relieve pressure over bony prominences  - Avoid friction and shearing  - Provide appropriate hygiene as needed including keeping skin clean and dry  - Evaluate need for skin moisturizer/barrier cream  - Collaborate with interdisciplinary team (i e  Nutrition, Rehabilitation, etc )   - Patient/family teaching  Outcome: Progressing  Goal: Incision(s), wounds(s) or drain site(s) healing without S/S of infection  Description  INTERVENTIONS  - Assess and document risk factors for skin impairment   - Assess and document dressing, incision, wound bed, drain sites and surrounding tissue  - Consider nutrition services referral as needed  - Oral mucous membranes remain intact  - Provide patient/ family education  Outcome: Progressing  Goal: Oral mucous membranes remain intact  Description  INTERVENTIONS  - Assess oral mucosa and hygiene practices  - Implement preventative oral hygiene regimen  - Implement oral medicated treatments as ordered  - Initiate Nutrition services referral as needed  Outcome: Progressing     Problem: MUSCULOSKELETAL - ADULT  Goal: Maintain or return mobility to safest level of function  Description  INTERVENTIONS:  - Assess patient's ability to carry out ADLs; assess patient's baseline for ADL function and identify physical deficits which impact ability to perform ADLs (bathing, care of mouth/teeth, toileting, grooming, dressing, etc )  - Assess/evaluate cause of self-care deficits   - Assess range of motion  - Assess patient's mobility  - Assess patient's need for assistive devices and provide as appropriate  - Encourage maximum independence but intervene and supervise when necessary  - Involve family in performance of ADLs  - Assess for home care needs following discharge   - Consider OT consult to assist with ADL evaluation and planning for discharge  - Provide patient education as appropriate  Outcome: Progressing  Goal: Maintain proper alignment of affected body part  Description  INTERVENTIONS:  - Support, maintain and protect limb and body alignment  - Provide patient/ family with appropriate education  Outcome: Progressing     Problem: Prexisting or High Potential for Compromised Skin Integrity  Goal: Skin integrity is maintained or improved  Description  INTERVENTIONS:  - Identify patients at risk for skin breakdown  - Assess and monitor skin integrity  - Assess and monitor nutrition and hydration status  - Monitor labs   - Assess for incontinence   - Turn and reposition patient  - Assist with mobility/ambulation  - Relieve pressure over bony prominences  - Avoid friction and shearing  - Provide appropriate hygiene as needed including keeping skin clean and dry  - Evaluate need for skin moisturizer/barrier cream  - Collaborate with interdisciplinary team   - Patient/family teaching  - Consider wound care consult   Outcome: Progressing

## 2019-12-06 NOTE — PROGRESS NOTES
NEPHROLOGY PROGRESS NOTE   Barbara Alves Sr  64 y o  male MRN: [id number]  Unit/Bed#: E4 -01 Encounter: [id number]      ASSESSMENT/PLAN:  Acute kidney injury with chronic kidney disease, stage IIIB:  Suspect progressive worsening CKD due to use of Carboplatin, Alimta, and Keytruda with history of hypertension    -Baseline creatinine around 2 4- 2 5 since August 2019  - Early 2019, creatinine 1 3-1 5  Mid 2019, creatinine 1 7-1 9    - Creatinine peaked at 4 0 on 12/03/2019 likely due to prerenal, contrast nephropathy, component of ATN  - Creatinine continues to improve at 2 52 post gentle IVF  - UA revealed trace blood, 0-1 WBC  Urine sodium 7, FENA 0 19%  - Continue to avoid NSAIDs, nephrotoxins, hypotension, IV contrast   - Monitor volume status, intake/output  - Trend daily weight   - Repeat BMP in a m  Hypertension:  Blood pressure on the higher side, overall stable  - Amlodipine remains on hold  - Avoid hypotension to prevent decreased renal perfusion  Low bicarbonate: CO2 stable at 28  - Bicarb supplement discontinued yesterday  - Trend with BMP  Hyperkalemia:  Potassium 5 1 today  - Maintain low-potassium diet  - Trend with BMP  Severe COPD with acute exacerbation as well as presumed pneumonia:  - Management and plan per Pulmonary   - Plan to switch IV steroids to p o  and complete course of Rocephin  Adenocarcinoma of lung:  Receiving immunotherapy   -Last dose of Keytruda on 11/20/2019   - Will follow with Oncology  SUBJECTIVE:  Patient is sitting at edge of bed, feels slightly better today  Patient denies shortness of breath, chest pain, nausea, vomiting, diarrhea      OBJECTIVE:  Current Weight: Weight - Scale: 68 kg (149 lb 14 6 oz)  Vitals:    12/06/19 0755   BP:    Pulse:    Resp:    Temp:    SpO2: 95%       Intake/Output Summary (Last 24 hours) at 12/6/2019 1008  Last data filed at 12/6/2019 0644  Gross per 24 hour   Intake 600 ml   Output    Net 600 ml General:  No acute distress  Skin:  Warm, no rash  Eyes:  Sclerae anicteric  ENT:  Moist lips and mucous membranes  Neck:  Supple, no JVD, trachea midline  Chest:  Expiratory wheezes bilaterally  CVS:  Normal rate and rhythm  Abdomen:  Soft, nontender, normoactive bowel sounds  Extremities:  No edema bilaterally   Neuro:  Alert and oriented  Psych:  Appropriate affect      Medications:  Scheduled Meds:  Current Facility-Administered Medications:  acetaminophen 650 mg Oral Q6H PRN Franklyn Steward MD    albuterol 2 puff Inhalation Q4H PRN Franklyn Steward MD    aluminum-magnesium hydroxide-simethicone 30 mL Oral Q6H PRN Franklyn Steward MD    apixaban 5 mg Oral BID Sybil Tenorio DO    budesonide 0 5 mg Nebulization Q12H Velvehonorio Reinoso, BROOKS    cefTRIAXone 1,000 mg Intravenous Q24H Velvet Antes, EVINNP Last Rate: 1,000 mg (12/05/19 1446)   chlorproMAZINE 25 mg Oral Q6H PRN Franklyn Steward MD    diltiazem 120 mg Oral Daily Jermaine Ayers MD    diphenhydrAMINE 25 mg Oral Q6H PRN Franklyn Steward MD    divalproex sodium 250 mg Oral BID Lola Wooten PA-C    famotidine 20 mg Oral BID Franklyn Steward MD    FLUoxetine 10 mg Oral Daily Jonathan Jimenez MD    fluticasone 1 spray Each Nare Daily Jonathan Jimenez MD    folic acid 1 mg Oral Daily Jonathan Jimenez MD    guaiFENesin 600 mg Oral Q12H [address] BROOKS Arzate    insulin lispro 1-5 Units Subcutaneous TID AC Jonathan Jimenez MD    ipratropium-albuterol 3 mL Nebulization Q6H Jonathan Jimenez MD    LORazepam 0 5 mg Oral Q4H PRN Arianna Estrada MD    melatonin 3 mg Oral HS Jonathan Jimenez MD    methocarbamol 750 mg Oral Q6H PRN Jonathan Jimenez MD    ondansetron 4 mg Intravenous Q6H PRN Franklyn Steward MD    oxyCODONE 20 mg Oral Q12H [address] Jonathan Jimenez MD    oxyCODONE 20 mg Oral Q6H PRN Sybil Tenorio DO    pantoprazole 40 mg Oral Early Morning Jonathan Jimenez MD    polyethylene glycol 17 g Oral Daily PRN Franklyn Steward MD    [START ON 12/7/2019] predniSONE 40 mg Oral Daily BROOKS Agarwal    pregabalin 25 mg Oral Daily Franklyn Steward MD    pregabalin 50 mg Oral HS Franklyn Steward MD    QUEtiapine 25 mg Oral HS Franklyn Steward MD    senna-docusate sodium 1 tablet Oral BID PRN Franklyn Steward MD    tamsulosin 0 4 mg Oral Daily With Sasha Brown MD        PRN Meds:   acetaminophen    albuterol    aluminum-magnesium hydroxide-simethicone    chlorproMAZINE    diphenhydrAMINE    LORazepam    methocarbamol    ondansetron    oxyCODONE    polyethylene glycol    senna-docusate sodium    Continuous Infusions:     Laboratory Results:  Results from last 7 days   Lab Units 12/06/19  0552 12/05/19  0508 12/04/19  0444 12/03/19  1043 12/03/19  0556 12/02/19  0453 12/01/19  0339  11/30/19  0605 11/29/19  1900   WBC Thousand/uL  --   --  13 30*  --   --  14 34* 15 95*  --  5 75 8 81   HEMOGLOBIN g/dL  --   --  9 5*  --   --  9 8* 9 6*  --  10 4* 11 5*   HEMATOCRIT %  --   --  29 8*  --   --  31 5* 30 7*  --  33 9* 36 2*   PLATELETS Thousands/uL  --   --  432*  --   --  413* 311  --  265 240   SODIUM mmol/L 143 144 136 133* 131* 141 139   < > 137 136   POTASSIUM mmol/L 5 1 4 8 4 9 4 6 6 1* 4 5 4 4   < > 4 3 4 1   CHLORIDE mmol/L 107 109* 101 99* 99* 107 104   < > 101 100   CO2 mmol/L 28 26 23 20* 20* 26 24   < > 22 26   BUN mg/dL 51* 61* 75* 75* 71* 42* 34*   < > 30* 31*   CREATININE mg/dL 2 52* 2 97* 3 79* 4 07* 3 89* 2 50* 2 40*   < > 2 61* 2 44*   CALCIUM mg/dL 8 9 8 8 8 3 8 4 8 8 8 7 8 5   < > 8 8 9 0   MAGNESIUM mg/dL  --   --   --   --   --  2 2 2 2  --   --   --    PHOSPHORUS mg/dL  --   --   --   --   --  4 0 3 4  --   --   --     < > = values in this interval not displayed

## 2019-12-06 NOTE — PLAN OF CARE
Problem: Potential for Falls  Goal: Patient will remain free of falls  Description  INTERVENTIONS:  - Assess patient frequently for physical needs  -  Identify cognitive and physical deficits and behaviors that affect risk of falls    -  Albers fall precautions as indicated by assessment   - Educate patient/family on patient safety including physical limitations  - Instruct patient to call for assistance with activity based on assessment  - Modify environment to reduce risk of injury  - Consider OT/PT consult to assist with strengthening/mobility  Outcome: Progressing     Problem: PAIN - ADULT  Goal: Verbalizes/displays adequate comfort level or baseline comfort level  Description  Interventions:  - Encourage patient to monitor pain and request assistance  - Assess pain using appropriate pain scale  - Administer analgesics based on type and severity of pain and evaluate response  - Implement non-pharmacological measures as appropriate and evaluate response  - Consider cultural and social influences on pain and pain management  - Notify physician/advanced practitioner if interventions unsuccessful or patient reports new pain  Outcome: Progressing     Problem: CARDIOVASCULAR - ADULT  Goal: Maintains optimal cardiac output and hemodynamic stability  Description  INTERVENTIONS:  - Monitor I/O, vital signs and rhythm  - Monitor for S/S and trends of decreased cardiac output  - Administer and titrate ordered vasoactive medications to optimize hemodynamic stability  - Assess quality of pulses, skin color and temperature  - Assess for signs of decreased coronary artery perfusion  - Instruct patient to report change in severity of symptoms  Outcome: Progressing  Goal: Absence of cardiac dysrhythmias or at baseline rhythm  Description  INTERVENTIONS:  - Continuous cardiac monitoring, vital signs, obtain 12 lead EKG if ordered  - Administer antiarrhythmic and heart rate control medications as ordered  - Monitor electrolytes and administer replacement therapy as ordered  Outcome: Progressing     Problem: RESPIRATORY - ADULT  Goal: Achieves optimal ventilation and oxygenation  Description  INTERVENTIONS:  - Assess for changes in respiratory status  - Assess for changes in mentation and behavior  - Position to facilitate oxygenation and minimize respiratory effort  - Oxygen administered by appropriate delivery if ordered  - Encourage broncho-pulmonary hygiene including cough, deep breathe, Incentive Spirometry  - Assess the need for suctioning and aspirate as needed  - Assess and instruct to report SOB or any respiratory difficulty  - Respiratory Therapy support as indicated   Outcome: Progressing     Problem: INFECTION - ADULT  Goal: Absence or prevention of progression during hospitalization  Description  INTERVENTIONS:  - Assess and monitor for signs and symptoms of infection  - Monitor lab/diagnostic results  - Monitor all insertion sites, i e  indwelling lines, tubes, and drains  - Monitor endotracheal if appropriate and nasal secretions for changes in amount and color  - [place] appropriate cooling/warming therapies per order  - Administer medications as ordered  - Instruct and encourage patient and family to use good hand hygiene technique  - Identify and instruct in appropriate isolation precautions for identified infection/condition  Outcome: Progressing  Goal: Absence of fever/infection during neutropenic period  Description  INTERVENTIONS:  - Monitor WBC    Outcome: Progressing     Problem: SAFETY ADULT  Goal: Maintain or return to baseline ADL function  Description  INTERVENTIONS:  -  Assess patient's ability to carry out ADLs; assess patient's baseline for ADL function and identify physical deficits which impact ability to perform ADLs (bathing, care of mouth/teeth, toileting, grooming, dressing, etc )  - Assess/evaluate cause of self-care deficits   - Assess range of motion  - Assess patient's mobility; develop plan if impaired  - Assess patient's need for assistive devices and provide as appropriate  - Encourage maximum independence but intervene and supervise when necessary  - Involve family in performance of ADLs  - Assess for home care needs following discharge   - Consider OT consult to assist with ADL evaluation and planning for discharge  - Provide patient education as appropriate  Outcome: Progressing  Goal: Maintain or return mobility status to optimal level  Description  INTERVENTIONS:  - Assess patient's baseline mobility status (ambulation, transfers, stairs, etc )    - Identify cognitive and physical deficits and behaviors that affect mobility  - Identify mobility aids required to assist with transfers and/or ambulation (gait belt, sit-to-stand, lift, walker, cane, etc )  - [place] fall precautions as indicated by assessment  - Record patient progress and toleration of activity level on Mobility SBAR; progress patient to next Phase/Stage  - Instruct patient to call for assistance with activity based on assessment  - Consider rehabilitation consult to assist with strengthening/weightbearing, etc   Outcome: Progressing     Problem: DISCHARGE PLANNING  Goal: Discharge to home or other facility with appropriate resources  Description  INTERVENTIONS:  - Identify barriers to discharge w/patient and caregiver  - Arrange for needed discharge resources and transportation as appropriate  - Identify discharge learning needs (meds, wound care, etc )  - Arrange for interpretive services to assist at discharge as needed  - Refer to Case Management Department for coordinating discharge planning if the patient needs post-hospital services based on physician/advanced practitioner order or complex needs related to functional status, cognitive ability, or social support system  Outcome: Progressing     Problem: Knowledge Deficit  Goal: Patient/family/caregiver demonstrates understanding of disease process, treatment plan, medications, and discharge instructions  Description  Complete learning assessment and assess knowledge base  Interventions:  - Provide teaching at level of understanding  - Provide teaching via preferred learning methods  Outcome: Progressing     Problem: Potential for Aspiration  Goal: Non-ventilated patient's risk of aspiration is minimized  Description  Assess and monitor vital signs, respiratory status, and labs (WBC)  Monitor for signs of aspiration (tachypnea, cough, rales, wheezing, cyanosis, fever)  - Assess and monitor patient's ability to swallow  - Place patient up in chair to eat if possible  - HOB up at 90 degrees to eat if unable to get patient up into chair   - Supervise patient during oral intake  - Instruct patient/ family to take small bites  - Instruct patient/ family to take small single sips when taking liquids  - Follow patient-specific strategies generated by speech pathologist   Outcome: Progressing  Goal: Ventilated patient's risk of aspiration is minimized  Description  Assess and monitor vital signs, respiratory status, airway cuff pressure, and labs (WBC)  Monitor for signs of aspiration (tachypnea, cough, rales, wheezing, cyanosis, fever)  - Elevate head of bed 30 degrees if patient has tube feeding   - Monitor tube feeding    Outcome: Progressing     Problem: NEUROSENSORY - ADULT  Goal: Achieves stable or improved neurological status  Description  INTERVENTIONS  - Monitor and report changes in neurological status  - Monitor vital signs such as temperature, blood pressure, glucose, and any other labs ordered   - Initiate measures to prevent increased intracranial pressure  - Monitor for seizure activity and implement precautions if appropriate      Outcome: Progressing  Goal: Remains free of injury related to seizures activity  Description  INTERVENTIONS  - Maintain airway, patient safety  and administer oxygen as ordered  - Monitor patient for seizure activity, document and report duration and description of seizure to physician/advanced practitioner  - If seizure occurs,  ensure patient safety during seizure  - Reorient patient post seizure  - Seizure pads on all 4 side rails  - Instruct patient/family to notify RN of any seizure activity including if an aura is experienced  - Instruct patient/family to call for assistance with activity based on nursing assessment  - Administer anti-seizure medications if ordered    Outcome: Progressing  Goal: Achieves maximal functionality and self care  Description  INTERVENTIONS  - Monitor swallowing and airway patency with patient fatigue and changes in neurological status  - Encourage and assist patient to increase activity and self care     - Encourage visually impaired, hearing impaired and aphasic patients to use assistive/communication devices  Outcome: Progressing     Problem: METABOLIC, FLUID AND ELECTROLYTES - ADULT  Goal: Electrolytes maintained within normal limits  Description  INTERVENTIONS:  - Monitor labs and assess patient for signs and symptoms of electrolyte imbalances  - Administer electrolyte replacement as ordered  - Monitor response to electrolyte replacements, including repeat lab results as appropriate  - Instruct patient on fluid and nutrition as appropriate  Outcome: Progressing  Goal: Fluid balance maintained  Description  INTERVENTIONS:  - Monitor labs   - Monitor I/O and WT  - Instruct patient on fluid and nutrition as appropriate  - Assess for signs & symptoms of volume excess or deficit  Outcome: Progressing  Goal: Glucose maintained within target range  Description  INTERVENTIONS:  - Monitor Blood Glucose as ordered  - Assess for signs and symptoms of hyperglycemia and hypoglycemia  - Administer ordered medications to maintain glucose within target range  - Assess nutritional intake and initiate nutrition service referral as needed  Outcome: Progressing     Problem: SKIN/TISSUE INTEGRITY - ADULT  Goal: Skin integrity remains intact  Description  INTERVENTIONS  - Identify patients at risk for skin breakdown  - Assess and monitor skin integrity  - Assess and monitor nutrition and hydration status  - Monitor labs (i e  albumin)  - Assess for incontinence   - Turn and reposition patient  - Assist with mobility/ambulation  - Relieve pressure over bony prominences  - Avoid friction and shearing  - Provide appropriate hygiene as needed including keeping skin clean and dry  - Evaluate need for skin moisturizer/barrier cream  - Collaborate with interdisciplinary team (i e  Nutrition, Rehabilitation, etc )   - Patient/family teaching  Outcome: Progressing  Goal: Incision(s), wounds(s) or drain site(s) healing without S/S of infection  Description  INTERVENTIONS  - Assess and document risk factors for skin impairment   - Assess and document dressing, incision, wound bed, drain sites and surrounding tissue  - Consider nutrition services referral as needed  - Oral mucous membranes remain intact  - Provide patient/ family education  Outcome: Progressing  Goal: Oral mucous membranes remain intact  Description  INTERVENTIONS  - Assess oral mucosa and hygiene practices  - Implement preventative oral hygiene regimen  - Implement oral medicated treatments as ordered  - Initiate Nutrition services referral as needed  Outcome: Progressing     Problem: MUSCULOSKELETAL - ADULT  Goal: Maintain or return mobility to safest level of function  Description  INTERVENTIONS:  - Assess patient's ability to carry out ADLs; assess patient's baseline for ADL function and identify physical deficits which impact ability to perform ADLs (bathing, care of mouth/teeth, toileting, grooming, dressing, etc )  - Assess/evaluate cause of self-care deficits   - Assess range of motion  - Assess patient's mobility  - Assess patient's need for assistive devices and provide as appropriate  - Encourage maximum independence but intervene and supervise when necessary  - Involve family in performance of ADLs  - Assess for home care needs following discharge   - Consider OT consult to assist with ADL evaluation and planning for discharge  - Provide patient education as appropriate  Outcome: Progressing  Goal: Maintain proper alignment of affected body part  Description  INTERVENTIONS:  - Support, maintain and protect limb and body alignment  - Provide patient/ family with appropriate education  Outcome: Progressing     Problem: Prexisting or High Potential for Compromised Skin Integrity  Goal: Skin integrity is maintained or improved  Description  INTERVENTIONS:  - Identify patients at risk for skin breakdown  - Assess and monitor skin integrity  - Assess and monitor nutrition and hydration status  - Monitor labs   - Assess for incontinence   - Turn and reposition patient  - Assist with mobility/ambulation  - Relieve pressure over bony prominences  - Avoid friction and shearing  - Provide appropriate hygiene as needed including keeping skin clean and dry  - Evaluate need for skin moisturizer/barrier cream  - Collaborate with interdisciplinary team   - Patient/family teaching  - Consider wound care consult   Outcome: Progressing

## 2019-12-06 NOTE — SOCIAL WORK
Cm reviewed pt care coordination rounds  Pt is a tentative d/c today or Sunday  Choices for STR was given  [facility] do not have a bed available until Monday  Pt will need Auth prior to d/c  Informed PT/OT to update note for auth  Cm will f/u for final medical clearance and dcp

## 2019-12-06 NOTE — SOCIAL WORK
Spoke with pt regarding choices for STR  Pt states that he hasn't made any decisions yet and advised cm to call girlfriend  Cm called GF, she stated that [facility] is close to her home and that she would consider it as an option  She will check with brother before giving cm other choices  Cm will f/u with her after 1 pm today  Ref[place] [facility] [place] [facility]

## 2019-12-06 NOTE — UTILIZATION REVIEW
Continued Stay Review    Date: 12/6                          Current Patient Class: IP Current Level of Care: tele     HPI:59 y o  male initially admitted on 11/29    Assessment/Plan: admitted w/ NURIA w/ CKD suspect worsening d/t carboplatin , alimta and [place]  Baseline creat 2 4-2 5 was 1 7-1 9, peaked at 4 on 12/3   BUN creat improved  Cont on iv rocephin , transitioned to po prednisone       Pertinent Labs/Diagnostic Results:   Results from last 7 days   Lab Units 12/04/19  0444 12/02/19  0453 12/01/19  0339 11/30/19  0605 11/29/19  1900   WBC Thousand/uL 13 30* 14 34* 15 95* 5 75 8 81   HEMOGLOBIN g/dL 9 5* 9 8* 9 6* 10 4* 11 5*   HEMATOCRIT % 29 8* 31 5* 30 7* 33 9* 36 2*   PLATELETS Thousands/uL 432* 413* 311 265 240   NEUTROS ABS Thousands/µL  --  12 62* 13 77*  --   --    BANDS PCT %  --   --   --   --  1     Results from last 7 days   Lab Units 12/06/19  0552 12/05/19  0508 12/04/19  0444 12/03/19  1043 12/03/19  0556 12/02/19  0453 12/01/19  0339   SODIUM mmol/L 143 144 136 133* 131* 141 139   POTASSIUM mmol/L 5 1 4 8 4 9 4 6 6 1* 4 5 4 4   CHLORIDE mmol/L 107 109* 101 99* 99* 107 104   CO2 mmol/L 28 26 23 20* 20* 26 24   ANION GAP mmol/L 8 9 12 14* 12 8 11   BUN mg/dL 51* 61* 75* 75* 71* 42* 34*   CREATININE mg/dL 2 52* 2 97* 3 79* 4 07* 3 89* 2 50* 2 40*   EGFR ml/min/1 73sq m 31 26 19 18 19 32 33   CALCIUM mg/dL 8 9 8 8 8 3 8 4 8 8 8 7 8 5   MAGNESIUM mg/dL  --   --   --   --   --  2 2 2 2   PHOSPHORUS mg/dL  --   --   --   --   --  4 0 3 4     Results from last 7 days   Lab Units 12/04/19  0444 11/30/19  2136   AST U/L 33  --    ALT U/L 45  --    ALK PHOS U/L 83  --    TOTAL PROTEIN g/dL 7 3  --    ALBUMIN g/dL 2 9*  --    TOTAL BILIRUBIN mg/dL 0 19*  --    BILIRUBIN DIRECT mg/dL 0 08  --    AMMONIA umol/L  --  <10*     Results from last 7 days   Lab Units 12/06/19  1111 12/06/19  0712 12/05/19  2049 12/05/19  1641 12/05/19  1113 12/05/19  0715 12/04/19  2051 12/04/19  1543 12/04/19  1116 12/04/19  0724   POC GLUCOSE mg/dl 179* 147* 158* 171* 141* 143* 185* 152* 169* 131     Results from last 7 days   Lab Units 12/06/19  0552 12/05/19  0508 12/04/19  0444 12/03/19  1043 12/03/19  0556 12/02/19  0453 12/01/19  0339 11/30/19  1412 11/30/19  0605 11/29/19  1900   GLUCOSE RANDOM mg/dL 149* 153* 147* 249* 157* 137 192* 134 210* 133       Results from last 7 days   Lab Units 12/04/19  0444   CK TOTAL U/L 236   CK MB INDEX % 1 3   CK MB ng/mL 3 0     Results from last 7 days   Lab Units 11/30/19  0605 11/30/19  0115 11/29/19  2223 11/29/19  1900   TROPONIN I ng/mL 3 03* 3 58* 2 92* 0 92*     Results from last 7 days   Lab Units 11/29/19  2333   D-DIMER QUANTITATIVE ug/ml FEU 0 94*     Results from last 7 days   Lab Units 12/03/19  0939 12/03/19  0104 12/02/19  1725  11/29/19  2333   PROTIME seconds  --   --   --   --  14 9*   INR   --   --   --   --  1 15   PTT seconds >210* >210* 39*   < > 37    < > = values in this interval not displayed       Results from last 7 days   Lab Units 12/01/19  0339 11/29/19  2333   PROCALCITONIN ng/ml 0 89* 1 01*     Results from last 7 days   Lab Units 11/29/19  1900   NT-PRO BNP pg/mL 65     Results from last 7 days   Lab Units 12/03/19  1427   CLARITY UA  Clear   COLOR UA  Yellow   SPEC GRAV UA  1 015   PH UA  5 0   GLUCOSE UA mg/dl Negative   KETONES UA mg/dl Negative   BLOOD UA  Trace-Intact*   PROTEIN UA mg/dl Negative   NITRITE UA  Negative   BILIRUBIN UA  Negative   UROBILINOGEN UA E U /dl 0 2   LEUKOCYTES UA  Negative   WBC UA /hpf 0-1*   RBC UA /hpf None Seen   BACTERIA UA /hpf Occasional   EPITHELIAL CELLS WET PREP /hpf Occasional   SODIUM UR  7   CREATININE UR mg/dL 113 1     Results from last 7 days   Lab Units 11/29/19  2246   INFLUENZA A PCR  None Detected   RSV PCR  None Detected       Results from last 7 days   Lab Units 11/29/19  1900   TOTAL COUNTED  100       Vital Signs:   12/06/19 0755            95 %  Nasal cannula     12/06/19 0700  98 °F (36 7 °C)  80  18  166/79  114  100 %  Nasal cannula         Medications:   Scheduled Medications:    Medications:  apixaban 5 mg Oral BID   budesonide 0 5 mg Nebulization Q12H   cefTRIAXone 1,000 mg Intravenous Q24H   diltiazem 120 mg Oral Daily   divalproex sodium 250 mg Oral BID   famotidine 20 mg Oral BID   FLUoxetine 10 mg Oral Daily   fluticasone 1 spray Each Nare Daily   folic acid 1 mg Oral Daily   guaiFENesin 600 mg Oral Q12H GREG   insulin lispro 1-5 Units Subcutaneous TID AC   ipratropium-albuterol 3 mL Nebulization Q6H   melatonin 3 mg Oral HS   oxyCODONE 20 mg Oral Q12H GREG   pantoprazole 40 mg Oral Early Morning   [START ON 12/7/2019] predniSONE 40 mg Oral Daily   pregabalin 25 mg Oral Daily   pregabalin 50 mg Oral HS   QUEtiapine 25 mg Oral HS   tamsulosin 0 4 mg Oral Daily With Dinner     Continuous IV Infusions:     PRN Meds:    acetaminophen 650 mg Oral Q6H PRN   albuterol 2 puff Inhalation Q4H PRN   aluminum-magnesium hydroxide-simethicone 30 mL Oral Q6H PRN   chlorproMAZINE 25 mg Oral Q6H PRN   diphenhydrAMINE 25 mg Oral Q6H PRN   methocarbamol 750 mg Oral Q6H PRN   ondansetron 4 mg Intravenous Q6H PRN   oxyCODONE 20 mg Oral Q6H PRN   polyethylene glycol 17 g Oral Daily PRN   senna-docusate sodium 1 tablet Oral BID PRN       Discharge Plan: TBD    Network Utilization Review Department  [email or web address]  ATTENTION: Please call with any questions or concerns to [phone] and carefully listen to the prompts so that you are directed to the right person  All voicemails are confidential   Rissa Renee all requests for admission clinical reviews, approved or denied determinations and any other requests to dedicated fax number below belonging to the campus where the patient is receiving treatment   List of dedicated fax numbers for the Facilities:  [address] [place] [address] DENIALS (Administrative/Medical Necessity) [phone]   [address] (Maternity/NICU/Pediatrics) [phone]   [facility] [phone]   [facility] [phone]   Guevara Haq [phone]   [address] [place] [address] Joseph [address] [facility] [place] [address] [phone]   Qasim Cabrera 2000 [facility] [address] [place] [address] [place] [phone]

## 2019-12-06 NOTE — NURSING NOTE
Pt aggravated by bed alarm and refusing for it to be put back on  Pt is steady on feet  Pt is not calling for assistance but is again instructed to call for assistance as needed  Hourly rounding continued, call bell within reach, will continue to monitor

## 2019-12-06 NOTE — PHYSICAL THERAPY NOTE
PHYSICAL THERAPY NOTE       12/06/19 1205   Pain Assessment   Pain Assessment No/denies pain   Restrictions/Precautions   Weight Bearing Precautions Per Order No   Other Precautions Multiple lines;Telemetry;O2   General   Chart Reviewed Yes   Additional Pertinent History pt presents to [facility] ED for c/o of SOB; admitted 11/29/19 for SOB  pt currently receiving OP tx(chemo) for lung CA  pt on 2L O2 at baseline   Family/Caregiver Present No   Cognition   Overall Cognitive Status WFL   Arousal/Participation Cooperative   Attention Within functional limits   Orientation Level Oriented X4   Memory Within functional limits   Following Commands Follows all commands and directions without difficulty   Subjective   Subjective agreeable to walk with PT in hallway; declines LE TE due to fatigue after walking   Bed Mobility   Additional Comments pt received seated EOB   Transfers   Sit to Stand 5  Supervision   Additional items Bedrails   Stand to Sit 5  Supervision   Additional items Bedrails   Stand pivot 5  Supervision   Additional items Verbal cues   Additional Comments verbal cues for safety with O2 tubing   Ambulation/Elevation   Gait pattern Wide CLIFTON; Short stride  (BLE ER)   Gait Assistance 5  Supervision   Additional items Verbal cues   Assistive Device None  (pt manages O2 tank)   Distance 200'   Stair Management Assistance Not tested  (pt declined due to fatigue)   Balance   Static Sitting Good   Dynamic Sitting Fair +   Static Standing Fair +   Dynamic Standing Fair   Ambulatory Fair   Endurance Deficit   Endurance Deficit Yes   Endurance Deficit Description lengthy seated rest break after walking on unit   Activity Tolerance   Activity Tolerance Patient limited by fatigue   Assessment   Prognosis Fair   Problem List Decreased strength;Decreased endurance; Impaired balance;Decreased mobility; Impaired judgement   Assessment Mr Kailash Benoit participated in PT session this am focused on ambulatory endurance and balance with mobility  He has made good progress since eval on Monday  He is ambulating without AD, manages portable O2 tank on evens on unit, x200'  He does fatigue during ambulation and cues needed for PLB  He declines stair negotiation and LE TE due to fatigue  Lengthy seated rest break after ambulation  Session was limited by toileting needs as well  Despite progress made at this time, pt would benefit from STR upon d/c as he has [address] home and is not able to manage this currently  Ongoing PT recommended for strength, endurance, safety with all mobility  Barriers to Discharge Inaccessible home environment;Decreased caregiver support   Barriers to Discharge Comments MARCIO, alone during the day   Goals   Patient Goals not stated   STG Expiration Date 12/12/19   Short Term Goal #1 To be completed in 10 days: 1)  Pt will perform bed mobility with indep demonstrating appropriate technique 100% of the time in order to improve function  2)  Perform all transfers with indep demonstrating safe and appropriate technique 100% of the time in order to improve ability to negotiate safely in home environment  3) Amb with least restrictive AD > 300'x1 with mod I in order to demonstrate ability to negotiate community distances  4)  Improve overall strength and balance 1/2 grade in order to optimize ability to perform functional tasks and reduce fall risk  5) Increase activity tolerance to 45 minutes in order to improve endurance to functional tasks  6)  Negotiate stairs using most appropriate technique mod I in order to be able to negotiate safely in home environment  7) PT for ongoing patient and family/caregiver education, DME needs and d/c planning in order to promote highest level of function in least restrictive environment  PT Treatment Day 1   Plan   Treatment/Interventions Functional transfer training;LE strengthening/ROM; Elevations; Therapeutic exercise; Endurance training;Patient/family training;Equipment eval/education; Bed mobility;Gait training   Progress Slow progress, decreased activity tolerance   PT Frequency   (3-5x/wk)   Recommendation   Recommendation Short-term skilled PT   Equipment Recommended Walker  (walker for energy conservation as needed)   PT - OK to Discharge Yes  (to rehab when med philipp)     Bernarda Evans, PT

## 2019-12-06 NOTE — PROGRESS NOTES
Progress Note - Pulmonary   Harshal Wyman Sr  64 y o  male MRN: [id number]  Unit/Bed#: E4 -01 Encounter: [id number]      Assessment/Plan:  1  Acute hypoxic respiratory failure-acute component resolved  1  Currently oxygenating well on 2 L nasal cannula which is baseline requirement  2  Continue titrate oxygen as needed maintain SpO2 greater than equal to 88%  3  Pulmonary toilet:  Flutter valve, incentive spirometry, increasing activity as tolerated  2  Severe Chronic obstructive pulmonary disease with acute exacerbation as well as presumed pneumonia  1  Discontinue Solu-Medrol transition to prednisone taper at 40 mg daily with reduction by 10 mg every 4 days  2  Continue budesonide b i d   3  Continue nebulizer treatments q 6 hours  4  Continue Mucinex b i d   5  Continue daily Flonase  6  Today will be 5 of 7 of Rocephin therapy, completed 3 day course of 500 mg of Zithromax  7  Chest x-ray unchanged-no sign of acute consolidation  3  Tobacco abuse  1  NRT  2  cessation  4  Stage IV adenocarcinoma-currently on immunotherapy will follow with Oncology  5  Stable CKD 4  6  Anxiety  1  Palliative care managing   7  Chronic pain  1  Pain management per palliative Care Internal Medicine    * outpatient Pulmonary follow-up per discharge instructions  * we will see again 12/9/2019, please call with questions during the interim    Subjective:     Particia Jaci was seen sitting on the side of bed eating breakfast is  He appeared comfortable without acute distress  He denies acute overnight events  Reports his breathing is not at baseline but is significantly improved from time of admission  He continues with some shortness of breath as well as a nonproductive cough  Denies:  Chest pain, pain inspiration, fevers, chills, or hemoptysis    Objective:         Vitals: Blood pressure 166/79, pulse 80, temperature 98 °F (36 7 °C), temperature source Temporal, resp  rate 18, weight 68 kg (149 lb 14 6 oz), SpO2 95 %  , 2LNC, Body mass index is 22 79 kg/m²  Intake/Output Summary (Last 24 hours) at 12/6/2019 0911  Last data filed at 12/6/2019 0644  Gross per 24 hour   Intake 600 ml   Output    Net 600 ml         Physical Exam  Gen: Awake, alert, oriented x 3, no acute distress  HEENT: Mucous membranes moist, no oral lesions, no thrush, oxygen via NC  NECK: no accessory muscle use, JVP not elevated  Cardiac: Regular, single S1, single S2, no murmurs, no rubs, no gallops  Lungs: scattered rhonchi with faint end expiratory wheezes noted bilaterally  Abdomen: normoactive bowel sounds, soft nontender, nondistended, no rebound or rigidity, no guarding  Extremities: no cyanosis, no clubbing, no edema    Labs: I have personally reviewed pertinent lab results  , CBC: No results found for: WBC, HGB, HCT, MCV, PLT, ADJUSTEDWBC, MCH, MCHC, RDW, MPV, NRBC, CMP:   Lab Results   Component Value Date    SODIUM 143 12/06/2019    K 5 1 12/06/2019     12/06/2019    CO2 28 12/06/2019    BUN 51 (H) 12/06/2019    CREATININE 2 52 (H) 12/06/2019    CALCIUM 8 9 12/06/2019    EGFR 31 12/06/2019     Imaging and other studies: none      BROOKS Boateng

## 2019-12-06 NOTE — PROGRESS NOTES
[facility] Progress Note  Patient: Valdemar Vazquez  64 y o  male   MRN: [id number]  PCP: Kanika Allen MD  Unit/Bed#: E4 -01 Encounter: [id number]  Date Of Visit: 12/06/19      Assessment/plan  1  Acute on chronic hypoxic respiratory failure due to severe copd with acute exacerbation- appreciate pulmonary recommendations  Pt is currently on 2 liters of oxygen which is his baseline  He will continue with pulmonary toilet  He was changed from solumedrol to prednisone taper  budesonide bid, duoneb, and mucinex  Pt completed course of Azithromycin  He is on ceftriaxone day 5/7       2  Possible pulmonary embolism- Pt is at high risk for pe due to adenocarcinoma  Venous duplex is negative  He has ckd4 and unable to have cta chest to r/o pe  unlikely pe as pt has improved with above treatment  Pulmonary had d/albert heparin gtt  Will monitor      3  Tobacco dependence- encourage pt to stop smoking     4 afib- pt was started on a cardizem gtt  He was changed to cardizem 120mg daily  Will continue eliquis bid  He may follow up with cardiology outpt       5  Non mi troponin elevation- echo reviewed  Likely due to copd exacerbation and tachycardia     6  Stroke like symptoms- pt was a stroke alert  Work up was negative       7  ana/ckd4- appreciate nephrology recommendations  Creatinine has improved from 4 07 to 2 52  Possibly that this is due to contrast nephropathy vrs atn vrs immune mediated nephritis from [place]  No emergent HD needed  Holding further IV fluids  Will check bmp in am       8  Type 2 diabetes- a1c is 5 6  Hold metformin  Continue insulin sliding scale       9  Adenocarcinoma of the lung- pt follows with Dr Ibanez Forward  He is being treated with [place]  Appreciate palliative care recommendations     10  htn- elevated this afternoon but pt anxious about str  Will start hydralazine Iv prn      11  Bilateral upper ext tremor- appreciate neurology recommendations   It is likely result of toxic metabolic in the setting of adenocarcinoma on kaytruda  Pt was started on depakote  neurology discussed about adjusting dose but wanted to monitor pt on current dose for at least 48 hours prior to adjustment       dispo-pt will need str once stable  Spoke with [facility] on the phone and informed her  [facility] is okay with [facility] for str  Anticipate d/c on Monday to str if pt is stable                 His sisters name is June and contact is     Subjective:   Pt seen and examined  Pt is anxious about rehab  His breathing has improved  No f/c no cp no n/v/d no abd pain  Tremors have decreased  Objective:     Vitals: Blood pressure (!) 190/86, pulse 78, temperature 98 2 °F (36 8 °C), temperature source Temporal, resp  rate 18, weight 68 kg (149 lb 14 6 oz), SpO2 97 %  ,Body mass index is 22 79 kg/m²  Lab, Imaging and other studies:  Results from last 7 days   Lab Units 12/04/19 0444 11/29/19  2333   WBC Thousand/uL 13 30*   < >  --    HEMOGLOBIN g/dL 9 5*   < >  --    HEMATOCRIT % 29 8*   < >  --    PLATELETS Thousands/uL 432*   < >  --    INR   --   --  1 15    < > = values in this interval not displayed  Results from last 7 days   Lab Units 12/06/19  0552  12/04/19 0444   POTASSIUM mmol/L 5 1   < > 4 9   CHLORIDE mmol/L 107   < > 101   CO2 mmol/L 28   < > 23   BUN mg/dL 51*   < > 75*   CREATININE mg/dL 2 52*   < > 3 79*   CALCIUM mg/dL 8 9   < > 8 3   ALK PHOS U/L  --   --  83   ALT U/L  --   --  45   AST U/L  --   --  33    < > = values in this interval not displayed       Results from last 7 days   Lab Units 12/04/19  0444 11/30/19  0605 11/30/19  0115 11/29/19  2223   CK TOTAL U/L 236  --   --   --    TROPONIN I ng/mL  --  3 03* 3 58* 2 92*   CK MB INDEX % 1 3  --   --   --      No results found for: Jitendra Perez, WOUNDCULT, SPUTUMCULTUR    Scheduled Meds:   Current Facility-Administered Medications:  acetaminophen 650 mg Oral Q6H PRN rAnoldo Perkins MD    albuterol 2 puff Inhalation Q4H PRN Katherine Blair MD    aluminum-magnesium hydroxide-simethicone 30 mL Oral Q6H PRN Katherine Blair MD    apixaban 5 mg Oral BID Sybil Tenorio DO    budesonide 0 5 mg Nebulization Q12H BROOKS Herrera    cefTRIAXone 1,000 mg Intravenous Q24H BROOKS Herrera Last Rate: 1,000 mg (12/06/19 1242)   chlorproMAZINE 25 mg Oral Q6H PRN Katherine Blair MD    diltiazem 120 mg Oral Daily Ruby Heredia MD    diphenhydrAMINE 25 mg Oral Q6H PRN Katherine Blair MD    divalproex sodium 250 mg Oral BID Lola Wooten PA-C    famotidine 20 mg Oral BID Katherine Blair MD    FLUoxetine 10 mg Oral Daily Jonathan Jimenez MD    fluticasone 1 spray Each Nare Daily Katherine Blair MD    folic acid 1 mg Oral Daily Katherine Blair MD    guaiFENesin 600 mg Oral Q12H [address] BROOKS Herrera    insulin lispro 1-5 Units Subcutaneous TID AC Jonathan Jimenez MD    ipratropium-albuterol 3 mL Nebulization Q6H Jonathan Jimenez MD    melatonin 3 mg Oral HS Jonathan Jimenez MD    methocarbamol 750 mg Oral Q6H PRN Jonathan Jimenez MD    ondansetron 4 mg Intravenous Q6H PRN Katherine Blair MD    oxyCODONE 20 mg Oral Q12H [address] Jonathan Jimenez MD    oxyCODONE 20 mg Oral Q6H PRN Sybil Tenorio DO    pantoprazole 40 mg Oral Early Morning Jonathan Jimenez MD    polyethylene glycol 17 g Oral Daily PRN Katherine Blair MD    [START ON 12/7/2019] predniSONE 40 mg Oral Daily BROOKS Herrera    pregabalin 25 mg Oral Daily Jonathan Jimenez MD    pregabalin 50 mg Oral HS Jonathan Jimenez MD    QUEtiapine 25 mg Oral HS Jonathan Jimenez MD    senna-docusate sodium 1 tablet Oral BID PRN Katherine Blair MD    tamsulosin 0 4 mg Oral Daily With Meenu Hermosillo MD      Continuous Infusions:    PRN Meds:   acetaminophen    albuterol    aluminum-magnesium hydroxide-simethicone    chlorproMAZINE    diphenhydrAMINE    methocarbamol    ondansetron    oxyCODONE    polyethylene glycol    senna-docusate sodium      Physical exam:  Physical Exam  General appearance: alert and oriented, in no acute distress  Head: Normocephalic, without obvious abnormality, atraumatic  Eyes: conjunctivae/corneas clear  PERRL, EOM's intact  Fundi benign    Neck: no adenopathy, no carotid bruit, no JVD, supple, symmetrical, trachea midline and thyroid not enlarged, symmetric, no tenderness/mass/nodules  Lungs: mild wheezing left lower lobe  Heart: regular rate and rhythm, S1, S2 normal, no murmur, click, rub or gallop  Abdomen: soft, non-tender; bowel sounds normal; no masses,  no organomegaly  Extremities: extremities normal, warm and well-perfused; no cyanosis, clubbing, or edema  Pulses: 2+ and symmetric  Skin: Skin color, texture, turgor normal  No rashes or lesions  Neurologic: Mental status: Alert, oriented, thought content appropriate      VTE Pharmacologic Prophylaxis: eliquis  VTE Mechanical Prophylaxis: sequential compression device    Counseling / Coordination of Care  Total floor / unit time spent today 20 minutes      Current Length of Stay: 7 day(s)    Current Patient Status: Inpatient     Code Status: Level 1 - Full Code

## 2019-12-07 LAB
ANION GAP SERPL CALCULATED.3IONS-SCNC: 5 MMOL/L (ref 4–13)
BUN SERPL-MCNC: 45 MG/DL (ref 5–25)
CALCIUM SERPL-MCNC: 8.7 MG/DL (ref 8.3–10.1)
CHLORIDE SERPL-SCNC: 107 MMOL/L (ref 100–108)
CO2 SERPL-SCNC: 32 MMOL/L (ref 21–32)
CREAT SERPL-MCNC: 2.39 MG/DL (ref 0.6–1.3)
ERYTHROCYTE [DISTWIDTH] IN BLOOD BY AUTOMATED COUNT: 15.7 % (ref 11.6–15.1)
GFR SERPL CREATININE-BSD FRML MDRD: 34 ML/MIN/1.73SQ M
GLUCOSE SERPL-MCNC: 108 MG/DL (ref 65–140)
GLUCOSE SERPL-MCNC: 140 MG/DL (ref 65–140)
GLUCOSE SERPL-MCNC: 168 MG/DL (ref 65–140)
GLUCOSE SERPL-MCNC: 89 MG/DL (ref 65–140)
HCT VFR BLD AUTO: 31.5 % (ref 36.5–49.3)
HGB BLD-MCNC: 9.9 G/DL (ref 12–17)
MCH RBC QN AUTO: 27.4 PG (ref 26.8–34.3)
MCHC RBC AUTO-ENTMCNC: 31.4 G/DL (ref 31.4–37.4)
MCV RBC AUTO: 87 FL (ref 82–98)
PLATELET # BLD AUTO: 441 THOUSANDS/UL (ref 149–390)
PMV BLD AUTO: 9.4 FL (ref 8.9–12.7)
POTASSIUM SERPL-SCNC: 4.7 MMOL/L (ref 3.5–5.3)
RBC # BLD AUTO: 3.61 MILLION/UL (ref 3.88–5.62)
SODIUM SERPL-SCNC: 144 MMOL/L (ref 136–145)
WBC # BLD AUTO: 11.44 THOUSAND/UL (ref 4.31–10.16)

## 2019-12-07 PROCEDURE — 99232 SBSQ HOSP IP/OBS MODERATE 35: CPT | Performed by: INTERNAL MEDICINE

## 2019-12-07 PROCEDURE — 94640 AIRWAY INHALATION TREATMENT: CPT

## 2019-12-07 PROCEDURE — 94760 N-INVAS EAR/PLS OXIMETRY 1: CPT

## 2019-12-07 PROCEDURE — 80048 BASIC METABOLIC PNL TOTAL CA: CPT | Performed by: NURSE PRACTITIONER

## 2019-12-07 PROCEDURE — 85027 COMPLETE CBC AUTOMATED: CPT | Performed by: INTERNAL MEDICINE

## 2019-12-07 PROCEDURE — 82948 REAGENT STRIP/BLOOD GLUCOSE: CPT

## 2019-12-07 RX ADMIN — PREGABALIN 25 MG: 25 CAPSULE ORAL at 09:45

## 2019-12-07 RX ADMIN — FLUTICASONE PROPIONATE 1 SPRAY: 50 SPRAY, METERED NASAL at 09:43

## 2019-12-07 RX ADMIN — FOLIC ACID 1 MG: 1 TABLET ORAL at 09:46

## 2019-12-07 RX ADMIN — IPRATROPIUM BROMIDE AND ALBUTEROL SULFATE 3 ML: 2.5; .5 SOLUTION RESPIRATORY (INHALATION) at 00:49

## 2019-12-07 RX ADMIN — GUAIFENESIN 600 MG: 600 TABLET ORAL at 09:46

## 2019-12-07 RX ADMIN — MELATONIN TAB 3 MG 3 MG: 3 TAB at 21:37

## 2019-12-07 RX ADMIN — APIXABAN 5 MG: 5 TABLET, FILM COATED ORAL at 18:06

## 2019-12-07 RX ADMIN — FLUOXETINE 10 MG: 10 CAPSULE ORAL at 09:45

## 2019-12-07 RX ADMIN — IPRATROPIUM BROMIDE AND ALBUTEROL SULFATE 3 ML: 2.5; .5 SOLUTION RESPIRATORY (INHALATION) at 14:08

## 2019-12-07 RX ADMIN — FAMOTIDINE 20 MG: 20 TABLET ORAL at 09:45

## 2019-12-07 RX ADMIN — PREDNISONE 40 MG: 20 TABLET ORAL at 09:45

## 2019-12-07 RX ADMIN — OXYCODONE HYDROCHLORIDE 20 MG: 20 TABLET, FILM COATED, EXTENDED RELEASE ORAL at 09:44

## 2019-12-07 RX ADMIN — DILTIAZEM HYDROCHLORIDE 120 MG: 120 CAPSULE, COATED, EXTENDED RELEASE ORAL at 09:45

## 2019-12-07 RX ADMIN — BUDESONIDE 0.5 MG: 0.5 INHALANT RESPIRATORY (INHALATION) at 19:49

## 2019-12-07 RX ADMIN — DIVALPROEX SODIUM 250 MG: 250 TABLET, DELAYED RELEASE ORAL at 18:06

## 2019-12-07 RX ADMIN — IPRATROPIUM BROMIDE AND ALBUTEROL SULFATE 3 ML: 2.5; .5 SOLUTION RESPIRATORY (INHALATION) at 19:49

## 2019-12-07 RX ADMIN — TAMSULOSIN HYDROCHLORIDE 0.4 MG: 0.4 CAPSULE ORAL at 18:07

## 2019-12-07 RX ADMIN — PREGABALIN 50 MG: 50 CAPSULE ORAL at 21:37

## 2019-12-07 RX ADMIN — APIXABAN 5 MG: 5 TABLET, FILM COATED ORAL at 09:46

## 2019-12-07 RX ADMIN — QUETIAPINE FUMARATE 25 MG: 25 TABLET ORAL at 21:37

## 2019-12-07 RX ADMIN — CEFTRIAXONE 1000 MG: 1 INJECTION, POWDER, FOR SOLUTION INTRAMUSCULAR; INTRAVENOUS at 13:15

## 2019-12-07 RX ADMIN — DIVALPROEX SODIUM 250 MG: 250 TABLET, DELAYED RELEASE ORAL at 09:46

## 2019-12-07 RX ADMIN — GUAIFENESIN 600 MG: 600 TABLET ORAL at 21:38

## 2019-12-07 RX ADMIN — FAMOTIDINE 20 MG: 20 TABLET ORAL at 18:06

## 2019-12-07 RX ADMIN — PANTOPRAZOLE SODIUM 40 MG: 40 TABLET, DELAYED RELEASE ORAL at 05:39

## 2019-12-07 RX ADMIN — OXYCODONE HYDROCHLORIDE 20 MG: 20 TABLET, FILM COATED, EXTENDED RELEASE ORAL at 21:37

## 2019-12-07 RX ADMIN — BUDESONIDE 0.5 MG: 0.5 INHALANT RESPIRATORY (INHALATION) at 07:43

## 2019-12-07 RX ADMIN — IPRATROPIUM BROMIDE AND ALBUTEROL SULFATE 3 ML: 2.5; .5 SOLUTION RESPIRATORY (INHALATION) at 07:43

## 2019-12-07 NOTE — PROGRESS NOTES
NEPHROLOGY PROGRESS NOTE   Zonia Lopez Sr  64 y o  male MRN: [id number]  Unit/Bed#: E4 -01 Encounter: [id number]  Reason for Consult: NURIA/CKD    ASSESSMENT/PLAN:  1  Acute kidney injury on top of chronic kidney disease IIB:  Suspect progression  -etiology suspected to be secondary to prerenal, contrast nephropathy, component of ATN  -previous baseline creatinine after reviewing medical records appears to be 2 4-2 5 since August of 2019  -prior in mid 2019 was 1 7-1 9 and 1 3-1 5 in early 2019  -patient received IV contrast on 11/30/2019 for CT imaging  -peak creatinine 4 0 and continues to improve slowly-current creatinine 2 39  -workup with urinalysis was bland without hematuria proteinuria  -off IV fluids and maintaining diet  -avoid nephrotoxins  -avoid hypotension  -continue to trend I/O, lab values in volume status  -with progression of Chronic Kidney Disease-concern secondary to use of Carboplatin, Alimta and Keytruda in the setting of hypertension    2  Hypertension:  BP currently acceptable  -Amlodipine currently on hold  -avoid hypotension  -echocardiogram reveals EF of 55% without obvious diastolic dysfunction    3  Low bicarbonate:  Resolved and stable  -bicarbonate supplement discontinued  -continue to monitor    4  Hyperkalemia:  Likely secondary to AK I  -Potassium better 4 7  -currently remains on low potassium diet    5  COPD exacerbation:  With suspected pneumonia  -pulmonary following  -no evidence of DVT on venous Doppler  -initially treated with IV steroids now on prednisone 40 mg daily    6  Adenocarcinoma of lung:  Currently receiving immunotherapy  -last dose of Keytruda was 11/20/2019  -falls oncology as outpatient    SUBJECTIVE:  Patient seen and examined  No issues reported overnight    Denies chest pain or shortness of Breath    OBJECTIVE:  Current Weight: Weight - Scale: 68 kg (149 lb 14 6 oz)  Vitals:    12/07/19 0744 12/07/19 0812 12/07/19 1409 12/07/19 1530   BP:  151/65  139/78 BP Location:  Left arm  Right arm   Pulse:  87  82   Resp:  20  19   Temp:  97 7 °F (36 5 °C)  98 6 °F (37 °C)   TempSrc:  Temporal  Temporal   SpO2: 98% 100% 99% 92%   Weight:           Intake/Output Summary (Last 24 hours) at 12/7/2019 1703  Last data filed at 12/7/2019 0501  Gross per 24 hour   Intake 300 ml   Output 350 ml   Net -50 ml     General:  No acute distress, cooperative, lying flat  Skin:  Warm and dry  Eyes:  Sclera anicteric  ENMT:  Mucous membranes moist  Neck:  Supple without JVD noted  Respiratory:  Coarse throughout few expiratory wheezes noted  Cardiac:  Regular rate and rhythm without rub  Extremities:  No significant edema noted bilaterally  GI:  Soft, nontender, no distention, active bowel sounds  Neuro:  Alert and awake  Psych:  Appropriate affect    Medications:    Current Facility-Administered Medications:     acetaminophen (TYLENOL) tablet 650 mg, 650 mg, Oral, Q6H PRN, Maricel Choudhury MD    albuterol (PROVENTIL HFA,VENTOLIN HFA) inhaler 2 puff, 2 puff, Inhalation, Q4H PRN, Maricel Choudhury MD    aluminum-magnesium hydroxide-simethicone (MYLANTA) 200-200-20 mg/5 mL oral suspension 30 mL, 30 mL, Oral, Q6H PRN, Maricel Choudhury MD    apixaban (ELIQUIS) tablet 5 mg, 5 mg, Oral, BID, Sybil Ambron, DO, 5 mg at 12/07/19 0946    budesonide (PULMICORT) inhalation solution 0 5 mg, 0 5 mg, Nebulization, Q12H, BROOKS Haley, 0 5 mg at 12/07/19 0743    cefTRIAXone (ROCEPHIN) 1,000 mg in dextrose 5 % 50 mL IVPB, 1,000 mg, Intravenous, Q24H, BROOKS Haley, Last Rate: 100 mL/hr at 12/07/19 1315, 1,000 mg at 12/07/19 1315    chlorproMAZINE (THORAZINE) tablet 25 mg, 25 mg, Oral, Q6H PRN, Maricel Choudhury MD    diltiazem (CARDIZEM CD) 24 hr capsule 120 mg, 120 mg, Oral, Daily, Jermaine Solis MD, 120 mg at 12/07/19 0938    diphenhydrAMINE (BENADRYL) tablet 25 mg, 25 mg, Oral, Q6H PRN, Maricel Choudhury MD    divalproex sodium (DEPAKOTE) EC tablet 250 mg, 250 mg, Oral, BID, Lola Wooten PA-C, 250 mg at 12/07/19 0946    famotidine (PEPCID) tablet 20 mg, 20 mg, Oral, BID, Steve Dominguez MD, 20 mg at 12/07/19 0945    FLUoxetine (PROzac) capsule 10 mg, 10 mg, Oral, Daily, Jonathan Jimenez MD, 10 mg at 12/07/19 0945    fluticasone (FLONASE) 50 mcg/act nasal spray 1 spray, 1 spray, Each Nare, Daily, Steve Dominguez MD, 1 spray at 28/76/23 2298    folic acid (FOLVITE) tablet 1 mg, 1 mg, Oral, Daily, Steve Dominguez MD, 1 mg at 12/07/19 0946    guaiFENesin (MUCINEX) 12 hr tablet 600 mg, 600 mg, Oral, Q12H [address], BROOKS Sargent, 600 mg at 12/07/19 0946    hydrALAZINE (APRESOLINE) injection 5 mg, 5 mg, Intravenous, Q6H PRN, Sybil Tenorio DO, 5 mg at 12/06/19 2230    insulin lispro (HumaLOG) 100 units/mL subcutaneous injection 1-5 Units, 1-5 Units, Subcutaneous, TID AC, 1 Units at 12/06/19 1616 **AND** Fingerstick Glucose (POCT), , , TID AC, Jonathan Jimenez MD    ipratropium-albuterol (DUO-NEB) 0 5-2 5 mg/3 mL inhalation solution 3 mL, 3 mL, Nebulization, Q6H, Jonathan Jimenez MD, 3 mL at 12/07/19 1408    melatonin tablet 3 mg, 3 mg, Oral, HS, Jonathan Jimenez MD, 3 mg at 12/06/19 2222    methocarbamol (ROBAXIN) tablet 750 mg, 750 mg, Oral, Q6H PRN, Steve Dominguez MD    ondansetron (ZOFRAN) injection 4 mg, 4 mg, Intravenous, Q6H PRN, Steve Dominguez MD    oxyCODONE (OxyCONTIN) 12 hr tablet 20 mg, 20 mg, Oral, Q12H [address], Jonathan Jimenez MD, 20 mg at 12/07/19 0944    oxyCODONE (ROXICODONE) immediate release tablet 20 mg, 20 mg, Oral, Q6H PRN, Sybil Tenorio DO, 20 mg at 12/05/19 0949    pantoprazole (PROTONIX) EC tablet 40 mg, 40 mg, Oral, Early Morning, Jonathan Jimenez MD, 40 mg at 12/07/19 0539    polyethylene glycol (MIRALAX) packet 17 g, 17 g, Oral, Daily PRN, Steve Dominguez MD    predniSONE tablet 40 mg, 40 mg, Oral, Daily, BROOKS Sargent, 40 mg at 12/07/19 0945    pregabalin (LYRICA) capsule 25 mg, 25 mg, Oral, Daily, Jonathan Jimenez MD, 25 mg at 12/07/19 0945   pregabalin (LYRICA) capsule 50 mg, 50 mg, Oral, HS, Jonathan Jimenez MD, 50 mg at 12/06/19 2222    QUEtiapine (SEROquel) tablet 25 mg, 25 mg, Oral, HS, Jonathan Jimenez MD, 25 mg at 12/06/19 2222    senna-docusate sodium (SENOKOT S) 8 6-50 mg per tablet 1 tablet, 1 tablet, Oral, BID PRN, Domingo Estevez MD    [facility]) capsule 0 4 mg, 0 4 mg, Oral, Daily With Dinner, Domingo Estevez MD, 0 4 mg at 12/06/19 1613    Laboratory Results:  Results from last 7 days   Lab Units 12/07/19  0537 12/06/19  0552 12/05/19  0508 12/04/19  0444 12/03/19  1043 12/03/19  0556 12/02/19  0453 12/01/19  0339   WBC Thousand/uL 11 44*  --   --  13 30*  --   --  14 34* 15 95*   HEMOGLOBIN g/dL 9 9*  --   --  9 5*  --   --  9 8* 9 6*   HEMATOCRIT % 31 5*  --   --  29 8*  --   --  31 5* 30 7*   PLATELETS Thousands/uL 441*  --   --  432*  --   --  413* 311   POTASSIUM mmol/L 4 7 5 1 4 8 4 9 4 6 6 1* 4 5 4 4   CHLORIDE mmol/L 107 107 109* 101 99* 99* 107 104   CO2 mmol/L 32 28 26 23 20* 20* 26 24   BUN mg/dL 45* 51* 61* 75* 75* 71* 42* 34*   CREATININE mg/dL 2 39* 2 52* 2 97* 3 79* 4 07* 3 89* 2 50* 2 40*   CALCIUM mg/dL 8 7 8 9 8 8 8 3 8 4 8 8 8 7 8 5   MAGNESIUM mg/dL  --   --   --   --   --   --  2 2 2 2   PHOSPHORUS mg/dL  --   --   --   --   --   --  4 0 3 4

## 2019-12-07 NOTE — PROGRESS NOTES
Progress Note - Pulmonary   Yesi Sensor Sr  64 y o  male MRN: [id number]  Unit/Bed#: E4 -01 Encounter: [id number]      Assessment:  1- acute on chronic hypoxic respiratory failure due to COPD, lung CA  2- severe COPD with acute exacerbation, presumed pneumonia being treated with antibiotics  3- tobacco abuse  4- stage IV adeno CA on immunotherapy followed by Oncology  5- chronic kidney disease stage 4  6- chronic pain      Plan:  I do think clinically he looks better at least breathing easier  Finish the course of antibiotics  Continue with duo nebs  Already switched to oral prednisone since yesterday and he seems to do okay with that  Taper down by 10 mg every 3 days  He is back on his home O2 of 2 L  He will be seen on Monday for pulmonary follow-up otherwise please call if needed    Subjective:   No new complaints    Objective:   Seems stable      Vitals: Blood pressure 151/65, pulse 87, temperature 97 7 °F (36 5 °C), temperature source Temporal, resp  rate 20, weight 68 kg (149 lb 14 6 oz), SpO2 100 %  , 2 L nasal cannula Body mass index is 22 79 kg/m²  Intake/Output Summary (Last 24 hours) at 12/7/2019 1031  Last data filed at 12/7/2019 0501  Gross per 24 hour   Intake 355 ml   Output 350 ml   Net 5 ml         Physical Exam  Gen: Awake, alert, oriented x 3, no acute distress  HEENT: Mucous membranes moist, no oral lesions, no thrush  NECK: No accessory muscle use, JVP not elevated  Cardiac: Regular, single S1, single S2, no murmurs, no rubs, no gallops  Lungs:  Rhonchi scattered cracklesAbdomen: normoactive bowel sounds, soft nontender, nondistended, no rebound or rigidity, no guarding  Extremities: no cyanosis, no clubbing, no edema    Labs: I have personally reviewed pertinent lab results  , ABG: No results found for: PHART, CJZ0VXZ, PO2ART, LAA0JRW, O4EJDJJW, BEART, SOURCE, BNP: No results found for: BNP, CBC:   Lab Results   Component Value Date    WBC 11 44 (H) 12/07/2019    HGB 9 9 (L) 12/07/2019    HCT 31 5 (L) 12/07/2019    MCV 87 12/07/2019     (H) 12/07/2019    MCH 27 4 12/07/2019    MCHC 31 4 12/07/2019    RDW 15 7 (H) 12/07/2019    MPV 9 4 12/07/2019   , CMP:   Lab Results   Component Value Date    SODIUM 144 12/07/2019    K 4 7 12/07/2019     12/07/2019    CO2 32 12/07/2019    BUN 45 (H) 12/07/2019    CREATININE 2 39 (H) 12/07/2019    CALCIUM 8 7 12/07/2019    EGFR 34 12/07/2019   , PT/INR: No results found for: PT, INR, Troponin: No results found for: TROPONINI  Results from last 7 days   Lab Units 12/07/19  0537 12/04/19  0444 12/02/19  0453 12/01/19  0339   WBC Thousand/uL 11 44* 13 30* 14 34* 15 95*   HEMOGLOBIN g/dL 9 9* 9 5* 9 8* 9 6*   HEMATOCRIT % 31 5* 29 8* 31 5* 30 7*   PLATELETS Thousands/uL 441* 432* 413* 311   NEUTROS PCT %  --   --  88* 86*   MONOS PCT %  --   --  5 6      Results from last 7 days   Lab Units 12/07/19  0537 12/06/19  0552 12/05/19  0508 12/04/19  0444   POTASSIUM mmol/L 4 7 5 1 4 8 4 9   CHLORIDE mmol/L 107 107 109* 101   CO2 mmol/L 32 28 26 23   BUN mg/dL 45* 51* 61* 75*   CREATININE mg/dL 2 39* 2 52* 2 97* 3 79*   CALCIUM mg/dL 8 7 8 9 8 8 8 3   ALK PHOS U/L  --   --   --  83   ALT U/L  --   --   --  45   AST U/L  --   --   --  33     Results from last 7 days   Lab Units 12/02/19  0453 12/01/19  0339   MAGNESIUM mg/dL 2 2 2 2     Results from last 7 days   Lab Units 12/02/19  0453 12/01/19  0339   PHOSPHORUS mg/dL 4 0 3 4      Results from last 7 days   Lab Units 12/03/19  0939 12/03/19  0104 12/02/19  1725   PTT seconds >210* >210* 39*         0   Lab Value Date/Time    TROPONINI 3 03 (H) 11/30/2019 0605    TROPONINI 3 58 (H) 11/30/2019 0115    TROPONINI 2 92 (H) 11/29/2019 2223    TROPONINI 0 92 (H) 11/29/2019 1900    TROPONINI <0 02 08/29/2019 2151    TROPONINI 0 02 01/02/2019 1718    TROPONINI <0 02 01/02/2019 1356    TROPONINI 0 09 (H) 09/23/2018 0942    TROPONINI 0 18 (H) 06/07/2018 0542    TROPONINI 0 19 (H) 06/07/2018 0150    TROPONINI 0 19 (H) 06/06/2018 2246       Imaging and other studies: I have personally reviewed pertinent reports     and I have personally reviewed pertinent films in PACS        Travis Naylor MD  [address]

## 2019-12-07 NOTE — PROGRESS NOTES
[facility] Medicine Progress Note  Patient: Ignacio Brown  64 y o  male   MRN: [id number]  PCP: Geovanny Kramer MD  Unit/Bed#: E4 -01 Encounter: [id number]  Date Of Visit: 12/07/19      Assessment/plan  1  Acute on chronic hypoxic respiratory failure due to severe copd with acute exacerbation- appreciate pulmonary recommendations  Pt is currently on 2 liters of oxygen which is his baseline  He will continue with pulmonary toilet  He was changed from solumedrol to prednisone taper  He will continue with taper by 10mg every 3 days  He will continue with budesonide bid, duoneb, and mucinex  Pt completed course of Azithromycin  He is on ceftriaxone day 6/7       2  Possible pulmonary embolism- Pt is at high risk for pe due to adenocarcinoma  Venous duplex is negative  He has ckd4 and unable to have cta chest to r/o pe  unlikely pe as pt has improved with above treatment  Pulmonary had d/albert heparin gtt  Will monitor      3  Tobacco dependence- encourage pt to stop smoking     4 afib- pt was started on a cardizem gtt  He was changed to cardizem 120mg daily  Will continue eliquis bid  He may follow up with cardiology outpt       5  Non mi troponin elevation- echo reviewed  Likely due to copd exacerbation and tachycardia     6  Stroke like symptoms- pt was a stroke alert  Work up was negative       7  ana/ckd4- appreciate nephrology recommendations  Creatinine has improved from 4 07 to 2  39  Possibly that this is due to contrast nephropathy vrs atn vrs immune mediated neph[facility] from [facility]  No emergent HD needed  Holding further IV fluids  Will check bmp in am       8  Type 2 diabetes- a1c is 5 6  Hold metformin  Continue insulin sliding scale       9  Adenocarcinoma of the lung- pt follows with Dr Steffen Brunner  He is being [facility] with [facility]  Samuel palliative care recommendations     10  htn- bp has improved   Will continue with prn hydralazine and monitor     11  Bilateral upper ext tremor- appreciate neurology recommendations  It is likely result of toxic metabolic in the setting of adenocarcinoma on kaytruda  Pt was started on depakote  neurology discussed about adjusting dose but wanted to monitor pt on current dose for at least 48 hours prior to adjustment       dispo-pt will need str once stable  kendrick mercer via phone  Faby Gunterr is okay with [facility] for str  Anticipate d/c on Monday to str if pt is stable                 His sisters name is June and contact is     Subjective:   Pt seen and examined  Pt states he is doing okay  He wanted to talk to his brother but no other problems  No f/c no cp no worsening sob  No worsening cough  No n/v/d no abd pain  Objective:     Vitals: Blood pressure 151/65, pulse 87, temperature 97 7 °F (36 5 °C), temperature source Temporal, resp  rate 20, weight 68 kg (149 lb 14 6 oz), SpO2 100 %  ,Body mass index is 22 79 kg/m²  Lab, Imaging and other studies:  Results from last 7 days   Lab Units 12/07/19  0537   WBC Thousand/uL 11 44*   HEMOGLOBIN g/dL 9 9*   HEMATOCRIT % 31 5*   PLATELETS Thousands/uL 441*     Results from last 7 days   Lab Units 12/07/19  0537  12/04/19  0444   POTASSIUM mmol/L 4 7   < > 4 9   CHLORIDE mmol/L 107   < > 101   CO2 mmol/L 32   < > 23   BUN mg/dL 45*   < > 75*   CREATININE mg/dL 2 39*   < > 3 79*   CALCIUM mg/dL 8 7   < > 8 3   ALK PHOS U/L  --   --  83   ALT U/L  --   --  45   AST U/L  --   --  33    < > = values in this interval not displayed       Results from last 7 days   Lab Units 12/04/19  0444   CK TOTAL U/L 236   CK MB INDEX % 1 3     No results found for: Raissa Primer, WOUNDCULT, SPUTUMCULTUR    Scheduled Meds:   Current Facility-Administered Medications:  acetaminophen 650 mg Oral Q6H PRN Michelle Torres MD    albuterol 2 puff Inhalation Q4H PRN Michelle Torres MD    aluminum-magnesium hydroxide-simethicone 30 mL Oral Q6H PRN Michelle Torres MD    apixaban 5 mg Oral BID Kvng Bledsoe DO budesonide 0 5 mg Nebulization Q12H BROOKS Haley    cefTRIAXone 1,000 mg Intravenous Q24H BROOKS Haley Last Rate: 1,000 mg (12/07/19 1315)   chlorproMAZINE 25 mg Oral Q6H PRN Maricel Choudhury MD    diltiazem 120 mg Oral Daily Jermaine Solis MD    diphenhydrAMINE 25 mg Oral Q6H PRN Maricel Choudhury MD    divalproex sodium 250 mg Oral BID Lola Wooten PA-C    famotidine 20 mg Oral BID Maricel Choudhury MD    FLUoxetine 10 mg Oral Daily Jonathan Jimenez MD    fluticasone 1 spray Each Nare Daily Maricel Choudhury MD    folic acid 1 mg Oral Daily Maricel Choudhury MD    guaiFENesin 600 mg Oral Q12H [address] BROOKS Haley    hydrALAZINE 5 mg Intravenous Q6H PRN Sybil Tenorio DO    insulin lispro 1-5 Units Subcutaneous TID AC Maricel Choudhury MD    ipratropium-albuterol 3 mL Nebulization Q6H Jonathan Jimenez MD    melatonin 3 mg Oral HS Jonathan Jimenez MD    methocarbamol 750 mg Oral Q6H PRN Jonathan Jimenez MD    ondansetron 4 mg Intravenous Q6H PRN Maricel Choudhury MD    oxyCODONE 20 mg Oral Q12H [address] Jonathan Jimenez MD    oxyCODONE 20 mg Oral Q6H PRN Sybil Tenorio DO    pantoprazole 40 mg Oral Early Morning Jonathan Jimenez MD    polyethylene glycol 17 g Oral Daily PRN Maricel Choudhury MD    predniSONE 40 mg Oral Daily BROOKS Haley    pregabalin 25 mg Oral Daily Jonathan Jimenez MD    pregabalin 50 mg Oral HS Jonathan Jimenez MD    QUEtiapine 25 mg Oral HS Jonathan Jimenez MD    senna-docusate sodium 1 tablet Oral BID PRN Maricel Choudhury MD    tamsulosin 0 4 mg Oral Daily With Kiara Tinajero MD      Continuous Infusions:    PRN Meds:   acetaminophen    albuterol    aluminum-magnesium hydroxide-simethicone    chlorproMAZINE    diphenhydrAMINE    hydrALAZINE    methocarbamol    ondansetron    oxyCODONE    polyethylene glycol    senna-docusate sodium      Physical exam:  Physical Exam  General appearance: alert and oriented, in no acute distress  Head: Normocephalic, without obvious abnormality, atraumatic  Eyes: conjunctivae/corneas clear  PERRL, EOM's intact  Fundi benign    Neck: no adenopathy, no carotid bruit, no JVD, supple, symmetrical, trachea midline and thyroid not enlarged, symmetric, no tenderness/mass/nodules  Lungs: mild coarse breath sounds through out  Heart: regular rate and rhythm, S1, S2 normal, no murmur, click, rub or gallop  Abdomen: soft, non-tender; bowel sounds normal; no masses,  no organomegaly  Extremities: extremities normal, warm and well-perfused; no cyanosis, clubbing, or edema  Pulses: 2+ and symmetric  Skin: Skin color, texture, turgor normal  No rashes or lesions  Neurologic: Grossly normal      VTE Pharmacologic Prophylaxis: eliquis  VTE Mechanical Prophylaxis: sequential compression device    Counseling / Coordination of Care  Total floor / unit time spent today 20 minutes      Current Length of Stay: 8 day(s)    Current Patient Status: Inpatient       Code Status: Level 1 - Full Code

## 2019-12-08 LAB
ANION GAP SERPL CALCULATED.3IONS-SCNC: 4 MMOL/L (ref 4–13)
BUN SERPL-MCNC: 41 MG/DL (ref 5–25)
CALCIUM SERPL-MCNC: 8.8 MG/DL (ref 8.3–10.1)
CHLORIDE SERPL-SCNC: 105 MMOL/L (ref 100–108)
CO2 SERPL-SCNC: 33 MMOL/L (ref 21–32)
CREAT SERPL-MCNC: 2.36 MG/DL (ref 0.6–1.3)
GFR SERPL CREATININE-BSD FRML MDRD: 34 ML/MIN/1.73SQ M
GLUCOSE SERPL-MCNC: 104 MG/DL (ref 65–140)
GLUCOSE SERPL-MCNC: 148 MG/DL (ref 65–140)
GLUCOSE SERPL-MCNC: 177 MG/DL (ref 65–140)
GLUCOSE SERPL-MCNC: 98 MG/DL (ref 65–140)
POTASSIUM SERPL-SCNC: 4.6 MMOL/L (ref 3.5–5.3)
SODIUM SERPL-SCNC: 142 MMOL/L (ref 136–145)

## 2019-12-08 PROCEDURE — 99232 SBSQ HOSP IP/OBS MODERATE 35: CPT | Performed by: INTERNAL MEDICINE

## 2019-12-08 PROCEDURE — 94760 N-INVAS EAR/PLS OXIMETRY 1: CPT

## 2019-12-08 PROCEDURE — 82948 REAGENT STRIP/BLOOD GLUCOSE: CPT

## 2019-12-08 PROCEDURE — 80048 BASIC METABOLIC PNL TOTAL CA: CPT | Performed by: INTERNAL MEDICINE

## 2019-12-08 PROCEDURE — 94640 AIRWAY INHALATION TREATMENT: CPT

## 2019-12-08 RX ADMIN — FAMOTIDINE 20 MG: 20 TABLET ORAL at 17:17

## 2019-12-08 RX ADMIN — IPRATROPIUM BROMIDE AND ALBUTEROL SULFATE 3 ML: 2.5; .5 SOLUTION RESPIRATORY (INHALATION) at 20:15

## 2019-12-08 RX ADMIN — BUDESONIDE 0.5 MG: 0.5 INHALANT RESPIRATORY (INHALATION) at 20:15

## 2019-12-08 RX ADMIN — DIVALPROEX SODIUM 250 MG: 250 TABLET, DELAYED RELEASE ORAL at 10:00

## 2019-12-08 RX ADMIN — BUDESONIDE 0.5 MG: 0.5 INHALANT RESPIRATORY (INHALATION) at 07:56

## 2019-12-08 RX ADMIN — INSULIN LISPRO 1 UNITS: 100 INJECTION, SOLUTION INTRAVENOUS; SUBCUTANEOUS at 17:18

## 2019-12-08 RX ADMIN — PANTOPRAZOLE SODIUM 40 MG: 40 TABLET, DELAYED RELEASE ORAL at 06:17

## 2019-12-08 RX ADMIN — DIVALPROEX SODIUM 250 MG: 250 TABLET, DELAYED RELEASE ORAL at 17:17

## 2019-12-08 RX ADMIN — OXYCODONE HYDROCHLORIDE 20 MG: 20 TABLET, FILM COATED, EXTENDED RELEASE ORAL at 10:00

## 2019-12-08 RX ADMIN — FLUOXETINE 10 MG: 10 CAPSULE ORAL at 10:00

## 2019-12-08 RX ADMIN — IPRATROPIUM BROMIDE AND ALBUTEROL SULFATE 3 ML: 2.5; .5 SOLUTION RESPIRATORY (INHALATION) at 01:15

## 2019-12-08 RX ADMIN — FAMOTIDINE 20 MG: 20 TABLET ORAL at 10:00

## 2019-12-08 RX ADMIN — FOLIC ACID 1 MG: 1 TABLET ORAL at 10:00

## 2019-12-08 RX ADMIN — IPRATROPIUM BROMIDE AND ALBUTEROL SULFATE 3 ML: 2.5; .5 SOLUTION RESPIRATORY (INHALATION) at 07:57

## 2019-12-08 RX ADMIN — MELATONIN TAB 3 MG 3 MG: 3 TAB at 21:00

## 2019-12-08 RX ADMIN — APIXABAN 5 MG: 5 TABLET, FILM COATED ORAL at 10:00

## 2019-12-08 RX ADMIN — PREDNISONE 40 MG: 20 TABLET ORAL at 10:00

## 2019-12-08 RX ADMIN — GUAIFENESIN 600 MG: 600 TABLET ORAL at 20:50

## 2019-12-08 RX ADMIN — QUETIAPINE FUMARATE 25 MG: 25 TABLET ORAL at 21:00

## 2019-12-08 RX ADMIN — OXYCODONE HYDROCHLORIDE 20 MG: 10 TABLET ORAL at 06:17

## 2019-12-08 RX ADMIN — GUAIFENESIN 600 MG: 600 TABLET ORAL at 10:00

## 2019-12-08 RX ADMIN — TAMSULOSIN HYDROCHLORIDE 0.4 MG: 0.4 CAPSULE ORAL at 17:16

## 2019-12-08 RX ADMIN — OXYCODONE HYDROCHLORIDE 20 MG: 20 TABLET, FILM COATED, EXTENDED RELEASE ORAL at 20:51

## 2019-12-08 RX ADMIN — DILTIAZEM HYDROCHLORIDE 120 MG: 120 CAPSULE, COATED, EXTENDED RELEASE ORAL at 10:00

## 2019-12-08 RX ADMIN — CEFTRIAXONE 1000 MG: 1 INJECTION, POWDER, FOR SOLUTION INTRAMUSCULAR; INTRAVENOUS at 14:00

## 2019-12-08 RX ADMIN — APIXABAN 5 MG: 5 TABLET, FILM COATED ORAL at 17:17

## 2019-12-08 RX ADMIN — PREGABALIN 25 MG: 25 CAPSULE ORAL at 10:00

## 2019-12-08 RX ADMIN — PREGABALIN 50 MG: 50 CAPSULE ORAL at 21:00

## 2019-12-08 NOTE — PLAN OF CARE
Problem: Potential for Falls  Goal: Patient will remain free of falls  Description  INTERVENTIONS:  - Assess patient frequently for physical needs  -  Identify cognitive and physical deficits and behaviors that affect risk of falls    -  Hester fall precautions as indicated by assessment   - Educate patient/family on patient safety including physical limitations  - Instruct patient to call for assistance with activity based on assessment  - Modify environment to reduce risk of injury  - Consider OT/PT consult to assist with strengthening/mobility  Outcome: Progressing     Problem: PAIN - ADULT  Goal: Verbalizes/displays adequate comfort level or baseline comfort level  Description  Interventions:  - Encourage patient to monitor pain and request assistance  - Assess pain using appropriate pain scale  - Administer analgesics based on type and severity of pain and evaluate response  - Implement non-pharmacological measures as appropriate and evaluate response  - Consider cultural and social influences on pain and pain management  - Notify physician/advanced practitioner if interventions unsuccessful or patient reports new pain  Outcome: Progressing     Problem: INFECTION - ADULT  Goal: Absence or prevention of progression during hospitalization  Description  INTERVENTIONS:  - Assess and monitor for signs and symptoms of infection  - Monitor lab/diagnostic results  - Monitor all insertion sites, i e  indwelling lines, tubes, and drains  - Monitor endotracheal if appropriate and nasal secretions for changes in amount and color  - Hester appropriate cooling/warming therapies per order  - Administer medications as ordered  - Instruct and encourage patient and family to use good hand hygiene technique  - Identify and instruct in appropriate isolation precautions for identified infection/condition  Outcome: Progressing     Problem: SAFETY ADULT  Goal: Maintain or return to baseline ADL function  Description  INTERVENTIONS:  -  Assess patient's ability to carry out ADLs; assess patient's baseline for ADL function and identify physical deficits which impact ability to perform ADLs (bathing, care of mouth/teeth, toileting, grooming, dressing, etc )  - Assess/evaluate cause of self-care deficits   - Assess range of motion  - Assess patient's mobility; develop plan if impaired  - Assess patient's need for assistive devices and provide as appropriate  - Encourage maximum independence but intervene and supervise when necessary  - Involve family in performance of ADLs  - Assess for home care needs following discharge   - Consider OT consult to assist with ADL evaluation and planning for discharge  - Provide patient education as appropriate  Outcome: Progressing     Problem: DISCHARGE PLANNING  Goal: Discharge to home or other facility with appropriate resources  Description  INTERVENTIONS:  - Identify barriers to discharge w/patient and caregiver  - Arrange for needed discharge resources and transportation as appropriate  - Identify discharge learning needs (meds, wound care, etc )  - Arrange for interpretive services to assist at discharge as needed  - Refer to Case Management Department for coordinating discharge planning if the patient needs post-hospital services based on physician/advanced practitioner order or complex needs related to functional status, cognitive ability, or social support system  Outcome: Progressing     Problem: Knowledge Deficit  Goal: Patient/family/caregiver demonstrates understanding of disease process, treatment plan, medications, and discharge instructions  Description  Complete learning assessment and assess knowledge base    Interventions:  - Provide teaching at level of understanding  - Provide teaching via preferred learning methods  Outcome: Progressing     Problem: CARDIOVASCULAR - ADULT  Goal: Maintains optimal cardiac output and hemodynamic stability  Description  INTERVENTIONS:  - Monitor I/O, vital signs and rhythm  - Monitor for S/S and trends of decreased cardiac output  - Administer and titrate ordered vasoactive medications to optimize hemodynamic stability  - Assess quality of pulses, skin color and temperature  - Assess for signs of decreased coronary artery perfusion  - Instruct patient to report change in severity of symptoms  Outcome: Progressing     Problem: RESPIRATORY - ADULT  Goal: Achieves optimal ventilation and oxygenation  Description  INTERVENTIONS:  - Assess for changes in respiratory status  - Assess for changes in mentation and behavior  - Position to facilitate oxygenation and minimize respiratory effort  - Oxygen administered by appropriate delivery if ordered  - Encourage broncho-pulmonary hygiene including cough, deep breathe, Incentive Spirometry  - Assess the need for suctioning and aspirate as needed  - Assess and instruct to report SOB or any respiratory difficulty  - Respiratory Therapy support as indicated   Outcome: Progressing     Problem: METABOLIC, FLUID AND ELECTROLYTES - ADULT  Goal: Electrolytes maintained within normal limits  Description  INTERVENTIONS:  - Monitor labs and assess patient for signs and symptoms of electrolyte imbalances  - Administer electrolyte replacement as ordered  - Monitor response to electrolyte replacements, including repeat lab results as appropriate  - Instruct patient on fluid and nutrition as appropriate  Outcome: Progressing     Problem: SKIN/TISSUE INTEGRITY - ADULT  Goal: Skin integrity remains intact  Description  INTERVENTIONS  - Identify patients at risk for skin breakdown  - Assess and monitor skin integrity  - Assess and monitor nutrition and hydration status  - Monitor labs (i e  albumin)  - Assess for incontinence   - Turn and reposition patient  - Assist with mobility/ambulation  - Relieve pressure over bony prominences  - Avoid friction and shearing  - Provide appropriate hygiene as needed including keeping skin clean and dry  - Evaluate need for skin moisturizer/barrier cream  - Collaborate with interdisciplinary team (i e  Nutrition, Rehabilitation, etc )   - Patient/family teaching  Outcome: Progressing

## 2019-12-08 NOTE — PROGRESS NOTES
[facility] Progress Note  Patient: Svetlana Brooks  64 y o  male   MRN: [id number]  PCP: Shelia Day MD  Unit/Bed#: E4 -01 Encounter: [id number]  Date Of Visit: 12/08/19      Assessment/plan  1  Acute on chronic hypoxic respiratory failure due to severe copd with acute exacerbation- appreciate pulmonary recommendations  Pt is currently on 2 liters of oxygen which is his baseline  He will continue with pulmonary toilet  He was changed from solumedrol to prednisone taper  He will continue with taper by 10mg every 3 days  He will continue with budesonide bid, duoneb, and mucinex  Pt completed course of Azithromycin  He is on ceftriaxone day 7/7       2  Possible pulmonary embolism- Pt is at high risk for pe due to adenocarcinoma  Venous duplex is negative  He has ckd4 and unable to have cta chest to r/o pe  unlikely pe as pt has improved with above treatment  Pulmonary had d/albert heparin gtt  Will monitor      3  Tobacco dependence- encourage pt to stop smoking     4 afib- pt was started on a cardizem gtt  He was changed to cardizem 120mg daily  Will continue eliquis bid  He may follow up with cardiology outpt       5  Non mi troponin elevation- echo reviewed  Likely due to copd exacerbation and tachycardia     6  Stroke like symptoms- pt was a stroke alert  Work up was negative       7  ana/ckd4- appreciate nephrology recommendations  Creatinine has improved from 4 07 to 2 36 which is his baseline  Possibly that this is due to contrast nephropathy vrs atn vrs immune mediated nephritis from [place]       8  Type 2 diabetes- a1c is 5 6  Hold metformin  Continue insulin sliding scale       9  Adenocarcinoma of the lung- pt follows with Dr Dora Lu  He is being treated with [place]  Appreciate palliative care recommendations     10  htn- bp has improved  Will continue with prn hydralazine and monitor     11  Bilateral upper ext tremor- appreciate neurology recommendations   It is likely result of toxic metabolic in the setting of adenocarcinoma on kaytruda  Pt was started on depakote  neurology discussed about adjusting dose but wanted to monitor pt on current dose for at least 48 hours prior to adjustment       dispo-pt will need str once stable  updated nadine via phone  [facility]  Anticipate d/c on Monday to str if pt is stable               Subjective:   Pt seen and examined  Pt doing well  No worsening sob  No f/c no cp no n/v/d no abd pain  Objective:     Vitals: Blood pressure 150/71, pulse 87, temperature 99 3 °F (37 4 °C), temperature source Temporal, resp  rate 20, weight 68 kg (149 lb 14 6 oz), SpO2 98 %  ,Body mass index is 22 79 kg/m²  Lab, Imaging and other studies:  Results from last 7 days   Lab Units 12/07/19  0537   WBC Thousand/uL 11 44*   HEMOGLOBIN g/dL 9 9*   HEMATOCRIT % 31 5*   PLATELETS Thousands/uL 441*     Results from last 7 days   Lab Units 12/08/19  0630  12/04/19  0444   POTASSIUM mmol/L 4 6   < > 4 9   CHLORIDE mmol/L 105   < > 101   CO2 mmol/L 33*   < > 23   BUN mg/dL 41*   < > 75*   CREATININE mg/dL 2 36*   < > 3 79*   CALCIUM mg/dL 8 8   < > 8 3   ALK PHOS U/L  --   --  83   ALT U/L  --   --  45   AST U/L  --   --  33    < > = values in this interval not displayed       Results from last 7 days   Lab Units 12/04/19  0444   CK TOTAL U/L 236   CK MB INDEX % 1 3     No results found for: Mae Padilla, WOUNDCULT, SPUTUMCULTUR    Scheduled Meds:   Current Facility-Administered Medications:  acetaminophen 650 mg Oral Q6H PRN Silvestre Peters MD    albuterol 2 puff Inhalation Q4H PRN Silvestre Peters MD    aluminum-magnesium hydroxide-simethicone 30 mL Oral Q6H PRN Silvestre ePters MD    apixaban 5 mg Oral BID Sybil Tenorio DO    budesonide 0 5 mg Nebulization Q12H BROOKS Banks    cefTRIAXone 1,000 mg Intravenous Q24H BROOKS Banks Last Rate: 1,000 mg (12/07/19 1315)   chlorproMAZINE 25 mg Oral Q6H PRN Okwu MD Barbara    diltiazem 120 mg Oral Daily Jermaine Escobedo MD    diphenhydrAMINE 25 mg Oral Q6H PRN Tashi Sauer MD    divalproex sodium 250 mg Oral BID Lola Wooten PA-C    famotidine 20 mg Oral BID Tashi Sauer MD    FLUoxetine 10 mg Oral Daily Jonathan Jimenez MD    fluticasone 1 spray Each Nare Daily Tashi Sauer MD    folic acid 1 mg Oral Daily Tashi Sauer MD    guaiFENesin 600 mg [facility] Q12H [address] [facility]    hydrALAZINE 5 mg Intravenous Q6H PRN Sybil Tenorio DO    insulin lispro 1-5 Units Subcutaneous TID AC Tashi Sauer MD    ipratropium-albuterol 3 mL Nebulization Q6H Jonathan Jimenez MD    melatonin 3 mg Oral HS Jonathan Jimenez MD    methocarbamol 750 mg Oral Q6H PRN Jonathan Jimenez MD    ondansetron 4 mg Intravenous Q6H PRN Tashi Sauer MD    oxyCODONE 20 mg Oral Q12H [address] Jonathan Jimenez MD    oxyCODONE 20 mg Oral Q6H PRN Sybil Tenorio DO    pantoprazole 40 mg Oral Early Morning Jonathan Jimenez MD    polyethylene glycol 17 g Oral Daily PRN Tashi Sauer MD    predniSONE 40 mg Oral Daily BROOKS Barrera    pregabalin 25 mg Oral Daily Jonathan Jimenez MD    pregabalin 50 mg Oral HS Jonathan Jimenez MD    QUEtiapine 25 mg Oral HS Jonathan Jimenez MD    senna-docusate sodium 1 tablet Oral BID PRN Tashi Sauer MD    tamsulosin 0 4 mg Oral Daily With Lennox Melendez MD      Continuous Infusions:    PRN Meds:   acetaminophen    albuterol    aluminum-magnesium hydroxide-simethicone    chlorproMAZINE    diphenhydrAMINE    hydrALAZINE    methocarbamol    ondansetron    oxyCODONE    polyethylene glycol    senna-docusate sodium      Physical exam:  Physical Exam  General appearance: alert and oriented, in no acute distress  Head: Normocephalic, without obvious abnormality, atraumatic  Eyes: conjunctivae/corneas clear  PERRL, EOM's intact  Fundi benign    Neck: no adenopathy, no carotid bruit, no JVD, supple, symmetrical, trachea midline and thyroid not enlarged, symmetric, no tenderness/mass/nodules  Lungs: clear to auscultation bilaterally  Heart: regular rate and rhythm, S1, S2 normal, no murmur, click, rub or gallop  Abdomen: soft, non-tender; bowel sounds normal; no masses,  no organomegaly  Extremities: extremities normal, warm and well-perfused; no cyanosis, clubbing, or edema  Pulses: 2+ and symmetric  Skin: Skin color, texture, turgor normal  No rashes or lesions  Neurologic: Mental status: Alert, oriented, thought content appropriate      VTE Pharmacologic Prophylaxis: eliquis  VTE Mechanical Prophylaxis: sequential compression device    Counseling / Coordination of Care  Total floor / unit time spent today 20 minutes      Current Length of Stay: 9 day(s)    Current Patient Status: Inpatient     Code Status: Level 1 - Full Code

## 2019-12-08 NOTE — PROGRESS NOTES
NEPHROLOGY PROGRESS NOTE   Daron Dominguez Sr  64 y o  male MRN: [id number]  Unit/Bed#: E4 -01 Encounter: [id number]  Reason for Consult: NURIA/CKD    ASSESSMENT/PLAN:  1  Acute kidney injury on top of chronic kidney disease IIB:  Suspect progression  -etiology suspected to be secondary to prerenal, contrast nephropathy, component of ATN  -previous baseline creatinine after reviewing medical records appears to be 2 4-2 5 since August of 2019  -prior in mid 2019 was 1 7-1 9 and 1 3-1 5 in early 2019  -patient received IV contrast on 11/30/2019 for CT imaging  -peak creatinine 4 0 and continues to improve slowly-current creatinine 2 36-back to baseline and stable  -workup with urinalysis was bland without hematuria proteinuria  -off IV fluids and maintaining diet  -avoid nephrotoxins  -avoid hypotension  -continue to trend I/O, lab values in volume status  -with progression of Chronic Kidney Disease-concern secondary to use of Carboplatin, Alimta and Keytruda in the setting of hypertension     2  Hypertension:  BP currently acceptable  -Amlodipine currently on hold  -avoid hypotension  -echocardiogram reveals EF of 55% without obvious diastolic dysfunction     3  Low bicarbonate:  Resolved and stable  -bicarbonate supplement discontinued  -continue to monitor     4  Hyperkalemia:  Likely secondary to AK I  -Potassium better 4 6  -currently remains on low potassium diet     5  COPD exacerbation:  With suspected pneumonia  -pulmonary following  -no evidence of DVT on venous Doppler  -initially treated with IV steroids now on prednisone 40 mg daily     6  Adenocarcinoma of lung:  Currently receiving immunotherapy  -last dose of Keytruda was 11/20/2019  -falls oncology as outpatient      SUBJECTIVE:  Patient seen and examined  Denies chest pain or shortness of Breath    No issues reported overnight    OBJECTIVE:  Current Weight: Weight - Scale: 68 kg (149 lb 14 6 oz)  Vitals:    12/07/19 1949 12/07/19 2300 12/08/19 0115 12/08/19 0803   BP:  150/71     BP Location:  Left arm     Pulse:  87     Resp:  20     Temp:  99 3 °F (37 4 °C)     TempSrc:  Temporal     SpO2: 97% 100% 96% 98%   Weight:           Intake/Output Summary (Last 24 hours) at 12/8/2019 1209  Last data filed at 12/8/2019 1101  Gross per 24 hour   Intake 480 ml   Output    Net 480 ml     General:  No acute distress, cooperative, lying on right side  Skin:  Warm and dry  Eyes:  Sclera anicteric  ENMT:  Mucous membranes moist  Neck:  Supple without JVD  Respiratory:  Essentially Clear on auscultation, slightly decreased bases, few expiratory wheezes  Cardiac:  Regular rate and rhythm  Extremities:  No significant edema noted bilaterally  GI:  Soft, nontender, no distention, positive bowel sounds  Neuro:  Alert awake and oriented  Psych:  Appropriate affect    Medications:    Current Facility-Administered Medications:     acetaminophen (TYLENOL) tablet 650 mg, 650 mg, Oral, Q6H PRN, Franklyn Steward MD    albuterol (PROVENTIL HFA,VENTOLIN HFA) inhaler 2 puff, 2 puff, Inhalation, Q4H PRN, Franklyn Steward MD    aluminum-magnesium hydroxide-simethicone (MYLANTA) 200-200-20 mg/5 mL oral suspension 30 mL, 30 mL, Oral, Q6H PRN, Franklyn Steward MD    apixaban (ELIQUIS) tablet 5 mg, 5 mg, Oral, BID, Sybil Ambron, DO, 5 mg at 12/08/19 1000    budesonide (PULMICORT) inhalation solution 0 5 mg, 0 5 mg, Nebulization, Q12H, Velvet Antes, CRNP, 0 5 mg at 12/08/19 0756    cefTRIAXone (ROCEPHIN) 1,000 mg in dextrose 5 % 50 mL IVPB, 1,000 mg, Intravenous, Q24H, Velvet Antes, CRNP, Last Rate: 100 mL/hr at 12/07/19 1315, 1,000 mg at 12/07/19 1315    chlorproMAZINE (THORAZINE) tablet 25 mg, 25 mg, Oral, Q6H PRN, Franklyn Steward MD    diltiazem (CARDIZEM CD) 24 hr capsule 120 mg, 120 mg, Oral, Daily, Jermaine Ayers MD, 120 mg at 12/08/19 1000    diphenhydrAMINE (BENADRYL) tablet 25 mg, 25 mg, Oral, Q6H PRN, Franklyn Steward MD    divalproex sodium (DEPAKOTE) EC tablet 250 mg, 250 mg, Oral, BID, Lola Wooten PA-C, 250 mg at 12/08/19 1000    famotidine (PEPCID) tablet 20 mg, 20 mg, Oral, BID, Carmina Arreguin MD, 20 mg at 12/08/19 1000    FLUoxetine (PROzac) capsule 10 mg, 10 mg, Oral, Daily, Jonathan Jimenez MD, 10 mg at 12/08/19 1000    fluticasone (FLONASE) 50 mcg/act nasal spray 1 spray, 1 spray, Each Nare, Daily, Carmina Arreguin MD, 1 spray at 63/31/91 5394    folic acid (FOLVITE) tablet 1 mg, 1 mg, Oral, Daily, Jonathan Jimenez MD, 1 mg at 12/08/19 1000    guaiFENesin (MUCINEX) 12 hr tablet 600 mg, 600 mg, Oral, Q12H [address], BROOKS Saldana, 600 mg at 12/08/19 1000    hydrALAZINE (APRESOLINE) injection 5 mg, 5 mg, Intravenous, Q6H PRN, Sybil Tenorio DO, 5 mg at 12/06/19 2230    insulin lispro (HumaLOG) 100 units/mL subcutaneous injection 1-5 Units, 1-5 Units, Subcutaneous, TID AC, 1 Units at 12/06/19 1616 **AND** Fingerstick Glucose (POCT), , , TID AC, Jonathan Jimenez MD    ipratropium-albuterol (DUO-NEB) 0 5-2 5 mg/3 mL inhalation solution 3 mL, 3 mL, Nebulization, Q6H, Jonathan Jimenez MD, 3 mL at 12/08/19 0757    melatonin tablet 3 mg, 3 mg, Oral, HS, Carmina Arreguin MD, 3 mg at 12/07/19 2137    methocarbamol (ROBAXIN) tablet 750 mg, 750 mg, Oral, Q6H PRN, Carmina Arreguin MD    ondansetron (ZOFRAN) injection 4 mg, 4 mg, Intravenous, Q6H PRN, Carmina Arreguin MD    oxyCODONE (OxyCONTIN) 12 hr tablet 20 mg, 20 mg, Oral, Q12H [address], Jonathan Jimenez MD, 20 mg at 12/08/19 1000    oxyCODONE (ROXICODONE) immediate release tablet 20 mg, 20 mg, Oral, Q6H PRN, Sybil Tenorio DO, 20 mg at 12/08/19 0617    pantoprazole (PROTONIX) EC tablet 40 mg, 40 mg, Oral, Early Morning, Jonathan Jimenez MD, 40 mg at 12/08/19 0617    polyethylene glycol (MIRALAX) packet 17 g, 17 g, Oral, Daily PRN, Carmina Arreguin MD    predniSONE tablet 40 mg, 40 mg, Oral, Daily, BROOKS Saldana, 40 mg at 12/08/19 1000    pregabalin (LYRICA) capsule 25 mg, 25 mg, Oral, Daily, Carmina Arreguin MD, 25 mg at 12/08/19 1000    pregabalin (LYRICA) capsule 50 mg, 50 mg, Oral, HS, Jonathan Jimenez MD, 50 mg at 12/07/19 2137    QUEtiapine (SEROquel) tablet 25 mg, 25 mg, Oral, HS, Jonathan Jimenez MD, 25 mg at 12/07/19 2137    senna-docusate sodium (SENOKOT S) 8 6-50 mg per tablet 1 tablet, 1 tablet, Oral, BID PRN, Annabelle Heck MD    tamsulosin [facility]) capsule 0 4 mg, 0 4 mg, Oral, Daily With Dinner, Annabelle Heck MD, 0 4 mg at 12/07/19 1807    Laboratory Results:  Results from last 7 days   Lab Units 12/08/19  0630 12/07/19  0537 12/06/19  0552 12/05/19  0508 12/04/19  0444 12/03/19  1043 12/03/19  0556 12/02/19  0453   WBC Thousand/uL  --  11 44*  --   --  13 30*  --   --  14 34*   HEMOGLOBIN g/dL  --  9 9*  --   --  9 5*  --   --  9 8*   HEMATOCRIT %  --  31 5*  --   --  29 8*  --   --  31 5*   PLATELETS Thousands/uL  --  441*  --   --  432*  --   --  413*   POTASSIUM mmol/L 4 6 4 7 5 1 4 8 4 9 4 6 6 1* 4 5   CHLORIDE mmol/L 105 107 107 109* 101 99* 99* 107   CO2 mmol/L 33* 32 28 26 23 20* 20* 26   BUN mg/dL 41* 45* 51* 61* 75* 75* 71* 42*   CREATININE mg/dL 2 36* 2 39* 2 52* 2 97* 3 79* 4 07* 3 89* 2 50*   CALCIUM mg/dL 8 8 8 7 8 9 8 8 8 3 8 4 8 8 8 7   MAGNESIUM mg/dL  --   --   --   --   --   --   --  2 2   PHOSPHORUS mg/dL  --   --   --   --   --   --   --  4 0

## 2019-12-09 PROBLEM — E87.3 ALKALOSIS: Status: ACTIVE | Noted: 2019-12-09

## 2019-12-09 LAB
ANION GAP SERPL CALCULATED.3IONS-SCNC: 3 MMOL/L (ref 4–13)
BUN SERPL-MCNC: 39 MG/DL (ref 5–25)
CALCIUM SERPL-MCNC: 8.6 MG/DL (ref 8.3–10.1)
CHLORIDE SERPL-SCNC: 105 MMOL/L (ref 100–108)
CO2 SERPL-SCNC: 35 MMOL/L (ref 21–32)
CREAT SERPL-MCNC: 2.32 MG/DL (ref 0.6–1.3)
GFR SERPL CREATININE-BSD FRML MDRD: 35 ML/MIN/1.73SQ M
GLUCOSE SERPL-MCNC: 104 MG/DL (ref 65–140)
GLUCOSE SERPL-MCNC: 110 MG/DL (ref 65–140)
GLUCOSE SERPL-MCNC: 136 MG/DL (ref 65–140)
GLUCOSE SERPL-MCNC: 151 MG/DL (ref 65–140)
GLUCOSE SERPL-MCNC: 159 MG/DL (ref 65–140)
POTASSIUM SERPL-SCNC: 4.6 MMOL/L (ref 3.5–5.3)
SODIUM SERPL-SCNC: 143 MMOL/L (ref 136–145)

## 2019-12-09 PROCEDURE — 87070 CULTURE OTHR SPECIMN AEROBIC: CPT | Performed by: NURSE PRACTITIONER

## 2019-12-09 PROCEDURE — 99232 SBSQ HOSP IP/OBS MODERATE 35: CPT | Performed by: INTERNAL MEDICINE

## 2019-12-09 PROCEDURE — 80048 BASIC METABOLIC PNL TOTAL CA: CPT | Performed by: NURSE PRACTITIONER

## 2019-12-09 PROCEDURE — 94760 N-INVAS EAR/PLS OXIMETRY 1: CPT

## 2019-12-09 PROCEDURE — 87205 SMEAR GRAM STAIN: CPT | Performed by: NURSE PRACTITIONER

## 2019-12-09 PROCEDURE — 94640 AIRWAY INHALATION TREATMENT: CPT

## 2019-12-09 PROCEDURE — 97110 THERAPEUTIC EXERCISES: CPT

## 2019-12-09 PROCEDURE — 87186 SC STD MICRODIL/AGAR DIL: CPT | Performed by: NURSE PRACTITIONER

## 2019-12-09 PROCEDURE — 87106 FUNGI IDENTIFICATION YEAST: CPT | Performed by: NURSE PRACTITIONER

## 2019-12-09 PROCEDURE — 87077 CULTURE AEROBIC IDENTIFY: CPT | Performed by: NURSE PRACTITIONER

## 2019-12-09 PROCEDURE — 82948 REAGENT STRIP/BLOOD GLUCOSE: CPT

## 2019-12-09 PROCEDURE — 97116 GAIT TRAINING THERAPY: CPT

## 2019-12-09 RX ADMIN — OXYCODONE HYDROCHLORIDE 20 MG: 20 TABLET, FILM COATED, EXTENDED RELEASE ORAL at 20:41

## 2019-12-09 RX ADMIN — IPRATROPIUM BROMIDE AND ALBUTEROL SULFATE 3 ML: 2.5; .5 SOLUTION RESPIRATORY (INHALATION) at 01:32

## 2019-12-09 RX ADMIN — PANTOPRAZOLE SODIUM 40 MG: 40 TABLET, DELAYED RELEASE ORAL at 06:14

## 2019-12-09 RX ADMIN — PREDNISONE 30 MG: 20 TABLET ORAL at 09:37

## 2019-12-09 RX ADMIN — TAMSULOSIN HYDROCHLORIDE 0.4 MG: 0.4 CAPSULE ORAL at 18:14

## 2019-12-09 RX ADMIN — PREGABALIN 50 MG: 50 CAPSULE ORAL at 21:06

## 2019-12-09 RX ADMIN — FOLIC ACID 1 MG: 1 TABLET ORAL at 09:37

## 2019-12-09 RX ADMIN — DIVALPROEX SODIUM 250 MG: 250 TABLET, DELAYED RELEASE ORAL at 09:37

## 2019-12-09 RX ADMIN — FAMOTIDINE 20 MG: 20 TABLET ORAL at 09:37

## 2019-12-09 RX ADMIN — PREGABALIN 25 MG: 25 CAPSULE ORAL at 09:37

## 2019-12-09 RX ADMIN — DIVALPROEX SODIUM 250 MG: 250 TABLET, DELAYED RELEASE ORAL at 18:14

## 2019-12-09 RX ADMIN — BUDESONIDE 0.5 MG: 0.5 INHALANT RESPIRATORY (INHALATION) at 19:08

## 2019-12-09 RX ADMIN — IPRATROPIUM BROMIDE AND ALBUTEROL SULFATE 3 ML: 2.5; .5 SOLUTION RESPIRATORY (INHALATION) at 13:35

## 2019-12-09 RX ADMIN — MELATONIN TAB 3 MG 3 MG: 3 TAB at 21:03

## 2019-12-09 RX ADMIN — DILTIAZEM HYDROCHLORIDE 120 MG: 120 CAPSULE, COATED, EXTENDED RELEASE ORAL at 09:37

## 2019-12-09 RX ADMIN — INSULIN LISPRO 1 UNITS: 100 INJECTION, SOLUTION INTRAVENOUS; SUBCUTANEOUS at 18:14

## 2019-12-09 RX ADMIN — OXYCODONE HYDROCHLORIDE 20 MG: 20 TABLET, FILM COATED, EXTENDED RELEASE ORAL at 09:38

## 2019-12-09 RX ADMIN — FAMOTIDINE 20 MG: 20 TABLET ORAL at 18:14

## 2019-12-09 RX ADMIN — APIXABAN 5 MG: 5 TABLET, FILM COATED ORAL at 18:14

## 2019-12-09 RX ADMIN — APIXABAN 5 MG: 5 TABLET, FILM COATED ORAL at 09:38

## 2019-12-09 RX ADMIN — CEFTRIAXONE 1000 MG: 1 INJECTION, POWDER, FOR SOLUTION INTRAMUSCULAR; INTRAVENOUS at 13:44

## 2019-12-09 RX ADMIN — FLUOXETINE 10 MG: 10 CAPSULE ORAL at 09:38

## 2019-12-09 RX ADMIN — QUETIAPINE FUMARATE 25 MG: 25 TABLET ORAL at 21:04

## 2019-12-09 RX ADMIN — IPRATROPIUM BROMIDE AND ALBUTEROL SULFATE 3 ML: 2.5; .5 SOLUTION RESPIRATORY (INHALATION) at 19:08

## 2019-12-09 RX ADMIN — INSULIN LISPRO 1 UNITS: 100 INJECTION, SOLUTION INTRAVENOUS; SUBCUTANEOUS at 12:38

## 2019-12-09 RX ADMIN — GUAIFENESIN 600 MG: 600 TABLET ORAL at 09:38

## 2019-12-09 RX ADMIN — GUAIFENESIN 600 MG: 600 TABLET ORAL at 20:42

## 2019-12-09 NOTE — PROGRESS NOTES
NEPHROLOGY PROGRESS NOTE   Amita Conklin Sr  64 y o  male MRN: [id number]  Unit/Bed#: E4 -01 Encounter: [id number]  Reason for Consult: NURIA    ASSESSMENT/PLAN:  1  Acute kidney injury on top of chronic kidney disease IIB:  Suspect progression  -etiology suspected to be secondary to prerenal, contrast nephropathy, component of ATN  -previous baseline creatinine after reviewing medical records appears to be 2 4-2 5 since August of 2019  -prior in mid 2019 was 1 7-1 9 and 1 3-1 5 in early 2019  -patient received IV contrast on 11/30/2019 for CT imaging  -peak creatinine 4 0  Improved and remains stable at 2 32-back to baseline and stable  -workup with urinalysis was bland without hematuria proteinuria  -avoid nephrotoxins  -avoid hypotension  -continue to trend I/O, lab values in volume status  -with progression of Chronic Kidney Disease-concern secondary to use of Carboplatin, Alimta and Keytruda in the setting of hypertension     2  Hypertension:  BP remains acceptable  -Amlodipine currently on hold  -avoid hypotension  -echocardiogram reveals EF of 55% without obvious diastolic dysfunction     3  Low bicarbonate:  Resolved  -slightly elevated at 35  -bicarbonate supplement discontinued  -continue to monitor     4  Hyperkalemia:  Likely secondary to AK I  -Potassium stable at 4 6  -currently remains on low potassium diet-would continue on discharge     5  COPD exacerbation:  With suspected pneumonia  -pulmonary following  -no evidence of DVT on venous Doppler  -initially treated with IV steroids now on prednisone 40 mg daily     6  Adenocarcinoma of lung:  Currently receiving immunotherapy  -last dose of Keytruda was 11/20/2019  -[facility] oncology as outpatient      Disposition:  Renal function is stable and okay from renal standpoint for discharge  Patient for hospital follow-up on 12/17/2019 at 3:00 p m  In the [facility] office with Dr Malcolm Rodriguez    Will get BMP in one week review 9/16/2019      SUBJECTIVE:  Patient seen and examined  Patient denies chest pain or shortness of Breath    Anticipating discharge to rehab    OBJECTIVE:  Current Weight: Weight - Scale: 68 kg (149 lb 14 6 oz)  Vitals:    12/08/19 2015 12/08/19 2305 12/09/19 0132 12/09/19 0734   BP:  134/60  134/69   BP Location:  Left arm  Left arm   Pulse:  84  81   Resp:  18  18   Temp:  97 9 °F (36 6 °C)  (!) 97 4 °F (36 3 °C)   TempSrc:  Tympanic  Tympanic   SpO2: 97% 97% 97% 98%   Weight:           Intake/Output Summary (Last 24 hours) at 12/9/2019 1006  Last data filed at 12/9/2019 0734  Gross per 24 hour   Intake 600 ml   Output    Net 600 ml     General:  No acute distress, lying flat, cooperative  Skin:  Warm and dry without rash  Eyes:  Sclera anicteric  ENMT:  Mucous membranes moist  Neck:  Supple without JVD  Respiratory:  Clear on auscultation without crackles, rhonchi, wheezes  Cardiac:  Regular rate and rhythm without rub  Extremities:  No significant edema noted bilaterally  GI:  Soft, nontender, no distention, active bowel sounds  Neuro:  Alert oriented and awake  Psych:  Appropriate affect    Medications:    Current Facility-Administered Medications:     acetaminophen (TYLENOL) tablet 650 mg, 650 mg, Oral, Q6H PRN, Sarah Singletary MD    albuterol (PROVENTIL HFA,VENTOLIN HFA) inhaler 2 puff, 2 puff, Inhalation, Q4H PRN, Sarah Singletary MD    aluminum-magnesium hydroxide-simethicone (MYLANTA) 200-200-20 mg/5 mL oral suspension 30 mL, 30 mL, Oral, Q6H PRN, Sarah Singletary MD    apixaban (ELIQUIS) tablet 5 mg, 5 mg, Oral, BID, Sybil Tenorio DO, 5 mg at 12/09/19 0938    budesonide (PULMICORT) inhalation solution 0 5 mg, 0 5 mg, Nebulization, Q12H, Ian Fountain, CRNP, 0 5 mg at 12/08/19 2015    cefTRIAXone (ROCEPHIN) 1,000 mg in dextrose 5 % 50 mL IVPB, 1,000 mg, Intravenous, Q24H, Sybil Tenorio DO, Last Rate: 100 mL/hr at 12/08/19 1400, 1,000 mg at 12/08/19 1400    chlorproMAZINE (THORAZINE) tablet 25 mg, 25 mg, Oral, Q6H PRN, César Sanford MD    diltiazem (CARDIZEM CD) 24 hr capsule 120 mg, 120 mg, Oral, Daily, Vicki Dorantes MD, 120 mg at 12/09/19 0937    diphenhydrAMINE (BENADRYL) tablet 25 mg, 25 mg, Oral, Q6H PRN, César Sanford MD    divalproex sodium (DEPAKOTE) EC tablet 250 mg, 250 mg, Oral, BID, Lola Wooten PA-C, 250 mg at 12/09/19 0937    famotidine (PEPCID) tablet 20 mg, 20 mg, Oral, BID, César Sanford MD, 20 mg at 12/09/19 0937    FLUoxetine (PROzac) capsule 10 mg, 10 mg, Oral, Daily, César Sanford MD, 10 mg at 12/09/19 0938    fluticasone (FLONASE) 50 mcg/act nasal spray 1 spray, 1 spray, Each Nare, Daily, César Sanford MD, 1 spray at 15/52/43 6459    folic acid (FOLVITE) tablet 1 mg, 1 mg, Oral, Daily, César Sanford MD, 1 mg at 12/09/19 0937    guaiFENesin (MUCINEX) 12 hr tablet 600 mg, 600 mg, Oral, Q12H [address], BROOKS Torrez, 600 mg at 12/09/19 7384    hydrALAZINE (APRESOLINE) injection 5 mg, 5 mg, Intravenous, Q6H PRN, Sybil Tenorio DO, 5 mg at 12/06/19 2230    insulin lispro (HumaLOG) 100 units/mL subcutaneous injection 1-5 Units, 1-5 Units, Subcutaneous, TID AC, 1 Units at 12/08/19 1718 **AND** Fingerstick Glucose (POCT), , , TID AC, Jonathan Jimenez MD    ipratropium-albuterol (DUO-NEB) 0 5-2 5 mg/3 mL inhalation solution 3 mL, 3 mL, Nebulization, Q6H, Jonathan Jimenez MD, 3 mL at 12/09/19 0132    melatonin tablet 3 mg, 3 mg, Oral, HS, Okwu Ikediobi, MD, 3 mg at 12/08/19 2100    methocarbamol (ROBAXIN) tablet 750 mg, 750 mg, Oral, Q6H PRN, César Sanford MD    ondansetron (ZOFRAN) injection 4 mg, 4 mg, Intravenous, Q6H PRN, César Sanford MD    oxyCODONE (OxyCONTIN) 12 hr tablet 20 mg, 20 mg, Oral, Q12H [address], Jonathan Jimenez MD, 20 mg at 12/09/19 0938    oxyCODONE (ROXICODONE) immediate release tablet 20 mg, 20 mg, Oral, Q6H PRN, Sybil Tenorio DO, 20 mg at 12/08/19 0617    pantoprazole (PROTONIX) EC tablet 40 mg, 40 mg, Oral, Early Morning, Jonathan Jimenez, MD, 40 mg at 12/09/19 6052    polyethylene glycol (MIRALAX) packet 17 g, 17 g, Oral, Daily PRN, Bonnie Borja MD    pregabalin (LYRICA) capsule 25 mg, 25 mg, Oral, Daily, Bonnie Borja MD, 25 mg at 12/09/19 0937    pregabalin (LYRICA) capsule 50 mg, 50 mg, Oral, HS, Jonathan Jimenez MD, 50 mg at 12/08/19 2100    QUEtiapine (SEROquel) tablet 25 mg, 25 mg, Oral, HS, Jonathan Jimenez MD, 25 mg at 12/08/19 2100    senna-docusate sodium (SENOKOT S) 8 6-50 mg per tablet 1 tablet, 1 tablet, Oral, BID PRN, Bonnie Borja MD    tamsulosin (FLOMAX) capsule 0 4 mg, 0 4 mg, Oral, Daily With Dinner, Bonnie Borja MD, 0 4 mg at 12/08/19 1716    Laboratory Results:  Results from last 7 days   Lab Units 12/09/19  0508 12/08/19  0630 12/07/19  0537 12/06/19  0552 12/05/19  0508 12/04/19  0444 12/03/19  1043   WBC Thousand/uL  --   --  11 44*  --   --  13 30*  --    HEMOGLOBIN g/dL  --   --  9 9*  --   --  9 5*  --    HEMATOCRIT %  --   --  31 5*  --   --  29 8*  --    PLATELETS Thousands/uL  --   --  441*  --   --  432*  --    POTASSIUM mmol/L 4 6 4 6 4 7 5 1 4 8 4 9 4 6   CHLORIDE mmol/L 105 105 107 107 109* 101 99*   CO2 mmol/L 35* 33* 32 28 26 23 20*   BUN mg/dL 39* 41* 45* 51* 61* 75* 75*   CREATININE mg/dL 2 32* 2 36* 2 39* 2 52* 2 97* 3 79* 4 07*   CALCIUM mg/dL 8 6 8 8 8 7 8 9 8 8 8 3 8 4

## 2019-12-09 NOTE — PROGRESS NOTES
Progress Note - Pulmonary   Gulshan Lombardi Sr  64 y o  male MRN: [id number]  Unit/Bed#: E4 -01 Encounter: [id number]    Assessment/Plan:    1  Acute on chronic hypoxic respiratory failure likely secondary to COPD exacerbation       *  patient currently on home O2 requirement of 2 L-98%       *  will continue to maintain saturations greater than 88%- continue pulmonary toileting- deep breathing cough, OOB as tolerated, IS Q 1 hr       *  would recommend ambulatory pulse ox prior to discharge    2  Severe COPD with acute exacerbation (mildly improving)      *  B/L wheezing and rhonchorous breath sounds throughout      *  continue prednisone 30 mg day # 1/3, 20 mg q 3 days, 10 mg q 3 days, budesonide b i d , DuoNeb q 6h, Mucinex b i d       *  would recommend DC on:  Budesonide/performist b i d , albuterol nebulizer q 6h- until can be re-evaluated in office-  if patient's cough continues may need to consider prophylactic azithromycin therapy      *  patient required significant and continued encouragement for pulmonary toileting      *  home regimen:  Breo 100/25 mcg 1 puff daily, Spiriva 2 5 2 puffs daily, Albuterol nebulizer Q 4 p r n , Ventolin 2 puffs Q 4 p r n       *  [facility] allergy panel- IgE 237- no significant allergies to dogs    3  Suspected acute bronchitis       *  patient completed 7 days of ceftriaxone       *  chest x-ray shows no acute infectious process       *  WBC-11 89, patient is afebrile-  no further indication for antibiotic therapy       *  patient was able to produce sputum- sent to lab    4  Stage IV adenocarcinoma of the lung       *  patient follows with Dr Eloisa White       *  currently immunotherapy on kaytruda       *  palliative Care is following for pain management    5   Stroke-like symptoms (resolved) with bilateral upper extremity tremors       *  neurology following       *  MRI of brain unremarkable for intracranial abnormalities       *  tremors are suspected to be secondary to Gianna Osgood- initiated on Depakote    6  NURIA in the setting of CKD stage IIB      *  nephrology following- likely secondary to contrast nephropathy, nephrotoxic medication, and ATN      *  baseline creatinine 2 4-2 5       creatinine mildly improving 2 32    7  Atrial fibrillation      *  cardiology following      *  maintained on Eliquis    8  Active tobacco abuse      *  patient does report occasionally smoking 1-2 cigarettes a day, he appears in pre contemplation stage      *  continue to encourage and educate on tobacco cessation- patient refused nicotine patch    -outpatient follow-up per discharge instructions    Chief Complaint:    "I am still having a significant cough"    Subjective:    Tamara Saavedra was comfortably sitting in his bed  He reports that he is feeling very fatigued and weak  He reports he is still having a green sputum producing cough  No significant overnight events reported  Patient currently denies any fever, chills, hemoptysis, headaches, night sweats, pleuritic chest pain, or palpitations  Objective:    Vitals: Blood pressure 134/69, pulse 81, temperature (!) 97 4 °F (36 3 °C), temperature source Tympanic, resp  rate 18, weight 68 kg (149 lb 14 6 oz), SpO2 98 %  2L,Body mass index is 22 79 kg/m²  Intake/Output Summary (Last 24 hours) at 12/9/2019 0846  Last data filed at 12/9/2019 0734  Gross per 24 hour   Intake 840 ml   Output    Net 840 ml       Invasive Devices     Central Venous Catheter Line            Port A Cath Right Chest -- days          Peripheral Intravenous Line            Peripheral IV 12/07/19 Right;Ventral (anterior) Forearm 1 day                Physical Exam:   Physical Exam   Constitutional: He is oriented to person, place, and time  He appears well-developed and well-nourished  Non-toxic appearance  He does not appear ill  HENT:   Head: Normocephalic and atraumatic  Neck: Normal range of motion  Neck supple  No tracheal deviation present  No thyromegaly present  Cardiovascular: Normal rate, regular rhythm and normal heart sounds  Exam reveals no friction rub  No murmur heard  Pulmonary/Chest: Effort normal  No accessory muscle usage or stridor  No tachypnea and no bradypnea  No respiratory distress  He has decreased breath sounds in the right middle field, the right lower field, the left middle field and the left lower field  He has wheezes  He has rhonchi  He has no rales  He exhibits no mass and no tenderness  Bilateral scattered wheezing and rhonchorous breath sounds throughout   Abdominal: Soft  Bowel sounds are normal  He exhibits no distension  There is no tenderness  Musculoskeletal: Normal range of motion  Right lower leg: Normal  He exhibits no edema  Left lower leg: Normal  He exhibits no edema  Neurological: He is alert and oriented to person, place, and time  He is not disoriented  Skin: Skin is warm and dry  No rash noted  No erythema  Psychiatric: He has a normal mood and affect  His behavior is normal  His mood appears not anxious  He is not agitated  Labs:    I have personally reviewed pertinent lab results CMP:   Lab Results   Component Value Date    SODIUM 143 12/09/2019    K 4 6 12/09/2019     12/09/2019    CO2 35 (H) 12/09/2019    BUN 39 (H) 12/09/2019    CREATININE 2 32 (H) 12/09/2019    CALCIUM 8 6 12/09/2019    EGFR 35 12/09/2019       Imaging and other studies: I have personally reviewed pertinent films in PACS     Chest x-ray 12/05/2019- lungs are clear no consolidation

## 2019-12-09 NOTE — ASSESSMENT & PLAN NOTE
Patient presented with two weeks of worsening SOB  Still has considerable dyspnea and was seen sitting at the edge of the bed bed leaning forward this morning  Transitioned to oral steriods  COPD is severe in the setting of Stage IV lung cancer  Pulmonology following  Continue incentive spirometry      Overall prognosis is poor -

## 2019-12-09 NOTE — PROGRESS NOTES
Progress Note - Chandler Yan Sr  1960, 64 y o  male MRN: [id number]    Unit/Bed#: E4 -01 Encounter: [id number]    Primary Care Provider: Ky Wallis MD   Date and time admitted to hospital: 11/29/2019  6:26 PM        Alkalosis  Assessment & Plan  - From renal dysfunction - improved  Elevated d-dimer  Assessment & Plan  Elevated in the setting in cancer  Elevated troponin level not due to acute coronary syndrome  Assessment & Plan  Patient presented with elevated troponins:0 92-->2 92--> 3 58--> 3 03  Type II Myocardial infarction    Stroke-like symptoms  Assessment & Plan  Patient developed acute onset of stroke-like symptoms involving dysphagia and slurred speech  Stroke alert called  CT brain without contrast, CTA head and neck, MRI brain all negative for acute infarction    Stage 3 chronic kidney disease (HCC)  Assessment & Plan  Stable renal function    Adenocarcinoma of lung, right [facility])  Assessment & Plan  Currently follows up with Dr Doak Peabody of Oncology in the outpatient setting  Also may cause Pneumonitis, does not appear to be underlying etiology at this time - Needs to be monitored closely at discharge      Type 2 diabetes mellitus without complication, without long-term current use of insulin [facility])  Assessment & Plan  Lab Results   Component Value Date    HGBA1C 5 6 10/23/2019       Recent Labs     12/08/19  2044 12/09/19  0732 12/09/19  1120 12/09/19  1608   POCGLU 148* 136 151* 159*       Blood Sugar Average: Last 72 hrs:  (P) 943 [id number] hemoglobin A1C stable off medications x 6 months   Monitor glucose in the setting of steroid use-patient with currently mildly hyperglycemic readings due to IV Solu-Medrol  Continue to hold metformin in the setting of IV contrast dye use and chronic kidney disease  Continue sliding scale insulin, diabetic diet, Accu-Cheks q a c  And HS      Essential hypertension  Assessment & Plan  Slightly elevated at time of presentation, but now BP currently stable and optimal   Continue home Norvasc  Continue to monitor     * Chronic obstructive pulmonary disease with acute exacerbation [facility])  Assessment & Plan  Patient presented with two weeks of worsening SOB  Still has considerable dyspnea and was seen sitting at the edge of the bed bed leaning forward this morning  Transitioned to oral steriods  COPD is severe in the setting of Stage IV lung cancer  Pulmonology following  Continue incentive spirometry  Overall prognosis is poor -        [facility] Internal Medicine Progress Note  Patient: Timur Higgins  64 y o  male   MRN: [id number]  PCP: Garrison Ramos MD  Unit/Bed#: E4 -06 Encounter: [id number]  Date Of Visit: 12/09/19    Assessment:    Principal Problem:    Chronic obstructive pulmonary disease with acute exacerbation ([facility] [address] )  Active Problems:    Essential hypertension    Type 2 diabetes mellitus without complication, without long-term current use of insulin (HCC)    Adenocarcinoma of lung, right (HCC)    Stage 3 chronic kidney disease (HCC)    Stroke-like symptoms    Elevated troponin level not due to acute coronary syndrome    Elevated d-dimer    Alkalosis      Plan:    · Discharge planning  · Continue PT/OT evaluation  ·        VTE Pharmacologic Prophylaxis:   Pharmacologic: Eliquis  Mechanical VTE Prophylaxis in Place: Yes    Patient Centered Rounds: I have performed bedside rounds with nursing staff today  Discussions with Specialists or Other Care Team Provider: reviewed Pulmonary and Nephrology correspondence     Education and Discussions with Family / Patient: Patient    Time Spent for Care: 30 minutes  More than 50% of total time spent on counseling and coordination of care as described above      Current Length of Stay: 10 day(s)    Current Patient Status: Inpatient   Certification Statement: The patient will continue to require additional inpatient hospital stay due to Ongoing discharge plan    Discharge Plan / Estimated Discharge Date:  Tomorrow    Code Status: Level 1 - Full Code      Subjective:   Patient seen and examined, still continues with shortness of breath ambulation  This is his typical normal breathing pattern and has been so for quite some time  He does have significant dyspnea on exertion which is improved, no chest pain, tolerating oral diet  A complete and comprehensive 14 point organ system review has been performed and all other systems are negative other than stated above  Objective:     Vitals:   Temp (24hrs), Av 7 °F (36 5 °C), Min:97 4 °F (36 3 °C), Max:97 9 °F (36 6 °C)    Temp:  [97 4 °F (36 3 °C)-97 9 °F (36 6 °C)] 97 9 °F (36 6 °C)  HR:  [81-85] 85  Resp:  [18] 18  BP: (134-136)/(60-69) 136/68  SpO2:  [97 %-100 %] 100 %  Body mass index is 22 79 kg/m²  Input and Output Summary (last 24 hours): Intake/Output Summary (Last 24 hours) at 2019 1806  Last data filed at 2019 0734  Gross per 24 hour   Intake 480 ml   Output    Net 480 ml       Physical Exam:     General: well appearing, no acute distress  HEENT: atraumatic, PERRLA, moist mucosa, normal pharynx, normal tonsils and adenoids, normal tongue, no fluid in sinuses  Neck: Trachea midline, no carotid bruit, no masses  Respiratory:   Moderate expiratory wheezing  Cardiovascular: RRR, no m/r/g, Normal S1 and S2  Abdomen: Soft, non-tender, non-distended, normal bowel sounds in all quadrants, no hepatosplenomegaly, no tympany  Rectal: deferred  Musculoskeletal: normal ROM in upper and lower extremities  Integumentary: warm, dry, and pink, with no rash, purpura, or petechia  Heme/Lymph: no lymphadenopathy, no bruises  Neurological: Cranial Nerves II-XII grossly intact, no tics, normal sensation to pressure and light touch  Psychiatric: cooperative with normal mood, affect, and cognition      Additional Data:     Labs:    Results from last 7 days   Lab Units 19  0537   WBC Thousand/uL 11 44*   HEMOGLOBIN g/dL 9 9* HEMATOCRIT % 31 5*   PLATELETS Thousands/uL 441*     Results from last 7 days   Lab Units 12/09/19  0508  12/04/19  0444   POTASSIUM mmol/L 4 6   < > 4 9   CHLORIDE mmol/L 105   < > 101   CO2 mmol/L 35*   < > 23   BUN mg/dL 39*   < > 75*   CREATININE mg/dL 2 32*   < > 3 79*   CALCIUM mg/dL 8 6   < > 8 3   ALK PHOS U/L  --   --  83   ALT U/L  --   --  45   AST U/L  --   --  33    < > = values in this interval not displayed  * I Have Reviewed All Lab Data Listed Above  * Additional Pertinent Lab Tests Reviewed:  All Labs Within Last 24 Hours Reviewed    Imaging:        Recent Cultures (last 7 days):     Results from last 7 days   Lab Units 12/09/19  0929   GRAM STAIN RESULT  1+ Epithelial cells per low power field*  3+ Polys*  4+ Gram negative rods*  4+ Yeast*       Last 24 Hours Medication List:     Current Facility-Administered Medications:  acetaminophen 650 mg Oral Q6H PRN Perfecto Nice MD   albuterol 2 puff Inhalation Q4H PRN Perfecto Nice MD   aluminum-magnesium hydroxide-simethicone 30 mL Oral Q6H PRN Perfecto Nice MD   apixaban 5 mg Oral BID Sybil Tenorio DO   budesonide 0 5 mg Nebulization Q12H BROOKS Cui   chlorproMAZINE 25 mg Oral Q6H PRN Perfecto Nice MD   diltiazem 120 mg Oral Daily Jermaine Mesa MD   diphenhydrAMINE 25 mg Oral Q6H PRN Perfecto Nice MD   divalproex sodium 250 mg Oral BID Lola Wooten PA-C   famotidine 20 mg Oral BID Perfecto Nice MD   FLUoxetine 10 mg Oral Daily Jonathan Jimenez MD   fluticasone 1 spray Each Nare Daily Jonathan Jimenez MD   folic acid 1 mg Oral Daily Perfecto Nice MD   guaiFENesin 600 mg Oral Q12H [address] BROOKS Cui   hydrALAZINE 5 mg Intravenous Q6H PRN Sybil Tenorio DO   insulin lispro 1-5 Units Subcutaneous TID AC Jonathan Jimenez MD   ipratropium-albuterol 3 mL Nebulization Q6H Okwu Ikediobi, MD   melatonin 3 mg Oral HS Jonathan Jimenez MD   methocarbamol 750 mg Oral Q6H PRN Perfecto Nice MD   ondansetron 4 mg Intravenous Q6H PRN Sarah Singletary MD   oxyCODONE 20 mg Oral Q12H [address] Sarah Singletary MD   oxyCODONE 20 mg Oral Q6H PRN Sybil Tenorio DO   pantoprazole 40 mg Oral Early Morning Jonathan Jimenez MD   polyethylene glycol 17 g Oral Daily PRN Sarah Singletary MD   pregabalin 25 mg Oral Daily Sarah Singletary MD   pregabalin 50 mg Oral HS Jonathan Jimenez MD   QUEtiapine 25 mg Oral HS Sarah Singletary MD   senna-docusate sodium 1 tablet Oral BID PRN Sarah Singletary MD   tamsulosin 0 4 mg Oral Daily With Ilene Hurt MD        Today, Patient Was Seen By: Phuong Chu DO    ** Please Note: This note has been constructed using a voice recognition system   **

## 2019-12-09 NOTE — ASSESSMENT & PLAN NOTE
Patient presented with elevated troponins:0 92-->2 92--> 3 58--> 3 03    Type II Myocardial infarction

## 2019-12-09 NOTE — PLAN OF CARE
Problem: Potential for Falls  Goal: Patient will remain free of falls  Description  INTERVENTIONS:  - Assess patient frequently for physical needs  -  Identify cognitive and physical deficits and behaviors that affect risk of falls    -  [place] fall precautions as indicated by assessment   - Educate patient/family on patient safety including physical limitations  - Instruct patient to call for assistance with activity based on assessment  - Modify environment to reduce risk of injury  - Consider OT/PT consult to assist with strengthening/mobility  Outcome: Progressing     Problem: PAIN - ADULT  Goal: Verbalizes/displays adequate comfort level or baseline comfort level  Description  Interventions:  - Encourage patient to monitor pain and request assistance  - Assess pain using appropriate pain scale  - Administer analgesics based on type and severity of pain and evaluate response  - Implement non-pharmacological measures as appropriate and evaluate response  - Consider cultural and social influences on pain and pain management  - Notify physician/advanced practitioner if interventions unsuccessful or patient reports new pain  Outcome: Progressing     Problem: INFECTION - ADULT  Goal: Absence or prevention of progression during hospitalization  Description  INTERVENTIONS:  - Assess and monitor for signs and symptoms of infection  - Monitor lab/diagnostic results  - Monitor all insertion sites, i e  indwelling lines, tubes, and drains  - Monitor endotracheal if appropriate and nasal secretions for changes in amount and color  - [place] appropriate cooling/warming therapies per order  - Administer medications as ordered  - Instruct and encourage patient and family to use good hand hygiene technique  - Identify and instruct in appropriate isolation precautions for identified infection/condition  Outcome: Progressing  Goal: Absence of fever/infection during neutropenic period  Description  INTERVENTIONS:  - Monitor WBC    Outcome: Progressing     Problem: SAFETY ADULT  Goal: Maintain or return to baseline ADL function  Description  INTERVENTIONS:  -  Assess patient's ability to carry out ADLs; assess patient's baseline for ADL function and identify physical deficits which impact ability to perform ADLs (bathing, care of mouth/teeth, toileting, grooming, dressing, etc )  - Assess/evaluate cause of self-care deficits   - Assess range of motion  - Assess patient's mobility; develop plan if impaired  - Assess patient's need for assistive devices and provide as appropriate  - Encourage maximum independence but intervene and supervise when necessary  - Involve family in performance of ADLs  - Assess for home care needs following discharge   - Consider OT consult to assist with ADL evaluation and planning for discharge  - Provide patient education as appropriate  Outcome: Progressing  Goal: Maintain or return mobility status to optimal level  Description  INTERVENTIONS:  - Assess patient's baseline mobility status (ambulation, transfers, stairs, etc )    - Identify cognitive and physical deficits and behaviors that affect mobility  - Identify mobility aids required to assist with transfers and/or ambulation (gait belt, sit-to-stand, lift, walker, cane, etc )  - [place] fall precautions as indicated by assessment  - Record patient progress and toleration of activity level on Mobility SBAR; progress patient to next Phase/Stage  - Instruct patient to call for assistance with activity based on assessment  - Consider rehabilitation consult to assist with strengthening/weightbearing, etc   Outcome: Progressing     Problem: DISCHARGE PLANNING  Goal: Discharge to home or other facility with appropriate resources  Description  INTERVENTIONS:  - Identify barriers to discharge w/patient and caregiver  - Arrange for needed discharge resources and transportation as appropriate  - Identify discharge learning needs (meds, wound care, etc )  - Arrange for interpretive services to assist at discharge as needed  - Refer to Case Management Department for coordinating discharge planning if the patient needs post-hospital services based on physician/advanced practitioner order or complex needs related to functional status, cognitive ability, or social support system  Outcome: Progressing     Problem: Knowledge Deficit  Goal: Patient/family/caregiver demonstrates understanding of disease process, treatment plan, medications, and discharge instructions  Description  Complete learning assessment and assess knowledge base    Interventions:  - Provide teaching at level of understanding  - Provide teaching via preferred learning methods  Outcome: Progressing     Problem: CARDIOVASCULAR - ADULT  Goal: Maintains optimal cardiac output and hemodynamic stability  Description  INTERVENTIONS:  - Monitor I/O, vital signs and rhythm  - Monitor for S/S and trends of decreased cardiac output  - Administer and titrate ordered vasoactive medications to optimize hemodynamic stability  - Assess quality of pulses, skin color and temperature  - Assess for signs of decreased coronary artery perfusion  - Instruct patient to report change in severity of symptoms  Outcome: Progressing  Goal: Absence of cardiac dysrhythmias or at baseline rhythm  Description  INTERVENTIONS:  - Continuous cardiac monitoring, vital signs, obtain 12 lead EKG if ordered  - Administer antiarrhythmic and heart rate control medications as ordered  - Monitor electrolytes and administer replacement therapy as ordered  Outcome: Progressing     Problem: RESPIRATORY - ADULT  Goal: Achieves optimal ventilation and oxygenation  Description  INTERVENTIONS:  - Assess for changes in respiratory status  - Assess for changes in mentation and behavior  - Position to facilitate oxygenation and minimize respiratory effort  - Oxygen administered by appropriate delivery if ordered  - Encourage broncho-pulmonary hygiene including cough, deep breathe, Incentive Spirometry  - Assess the need for suctioning and aspirate as needed  - Assess and instruct to report SOB or any respiratory difficulty  - Respiratory Therapy support as indicated   Outcome: Progressing     Problem: NEUROSENSORY - ADULT  Goal: Achieves stable or improved neurological status  Description  INTERVENTIONS  - Monitor and report changes in neurological status  - Monitor vital signs such as temperature, blood pressure, glucose, and any other labs ordered   - Initiate measures to prevent increased intracranial pressure  - Monitor for seizure activity and implement precautions if appropriate      Outcome: Progressing  Goal: Remains free of injury related to seizures activity  Description  INTERVENTIONS  - Maintain airway, patient safety  and administer oxygen as ordered  - Monitor patient for seizure activity, document and report duration and description of seizure to physician/advanced practitioner  - If seizure occurs,  ensure patient safety during seizure  - Reorient patient post seizure  - Seizure pads on all 4 side rails  - Instruct patient/family to notify RN of any seizure activity including if an aura is experienced  - Instruct patient/family to call for assistance with activity based on nursing assessment  - Administer anti-seizure medications if ordered    Outcome: Progressing  Goal: Achieves maximal functionality and self care  Description  INTERVENTIONS  - Monitor swallowing and airway patency with patient fatigue and changes in neurological status  - Encourage and assist patient to increase activity and self care     - Encourage visually impaired, hearing impaired and aphasic patients to use assistive/communication devices  Outcome: Progressing     Problem: METABOLIC, FLUID AND ELECTROLYTES - ADULT  Goal: Electrolytes maintained within normal limits  Description  INTERVENTIONS:  - Monitor labs and assess patient for signs and symptoms of electrolyte imbalances  - Administer electrolyte replacement as ordered  - Monitor response to electrolyte replacements, including repeat lab results as appropriate  - Instruct patient on fluid and nutrition as appropriate  Outcome: Progressing  Goal: Fluid balance maintained  Description  INTERVENTIONS:  - Monitor labs   - Monitor I/O and WT  - Instruct patient on fluid and nutrition as appropriate  - Assess for signs & symptoms of volume excess or deficit  Outcome: Progressing  Goal: Glucose maintained within target range  Description  INTERVENTIONS:  - Monitor Blood Glucose as ordered  - Assess for signs and symptoms of hyperglycemia and hypoglycemia  - Administer ordered medications to maintain glucose within target range  - Assess nutritional intake and initiate nutrition service referral as needed  Outcome: Progressing     Problem: SKIN/TISSUE INTEGRITY - ADULT  Goal: Skin integrity remains intact  Description  INTERVENTIONS  - Identify patients at risk for skin breakdown  - Assess and monitor skin integrity  - Assess and monitor nutrition and hydration status  - Monitor labs (i e  albumin)  - Assess for incontinence   - Turn and reposition patient  - Assist with mobility/ambulation  - Relieve pressure over bony prominences  - Avoid friction and shearing  - Provide appropriate hygiene as needed including keeping skin clean and dry  - Evaluate need for skin moisturizer/barrier cream  - Collaborate with interdisciplinary team (i e  Nutrition, Rehabilitation, etc )   - Patient/family teaching  Outcome: Progressing  Goal: Incision(s), wounds(s) or drain site(s) healing without S/S of infection  Description  INTERVENTIONS  - Assess and document risk factors for skin impairment   - Assess and document dressing, incision, wound bed, drain sites and surrounding tissue  - Consider nutrition services referral as needed  - Oral mucous membranes remain intact  - Provide patient/ family education  Outcome: Progressing  Goal: Oral mucous membranes remain intact  Description  INTERVENTIONS  - Assess oral mucosa and hygiene practices  - Implement preventative oral hygiene regimen  - Implement oral medicated treatments as ordered  - Initiate Nutrition services referral as needed  Outcome: Progressing     Problem: MUSCULOSKELETAL - ADULT  Goal: Maintain or return mobility to safest level of function  Description  INTERVENTIONS:  - Assess patient's ability to carry out ADLs; assess patient's baseline for ADL function and identify physical deficits which impact ability to perform ADLs (bathing, care of mouth/teeth, toileting, grooming, dressing, etc )  - Assess/evaluate cause of self-care deficits   - Assess range of motion  - Assess patient's mobility  - Assess patient's need for assistive devices and provide as appropriate  - Encourage maximum independence but intervene and supervise when necessary  - Involve family in performance of ADLs  - Assess for home care needs following discharge   - Consider OT consult to assist with ADL evaluation and planning for discharge  - Provide patient education as appropriate  Outcome: Progressing  Goal: Maintain proper alignment of affected body part  Description  INTERVENTIONS:  - Support, maintain and protect limb and body alignment  - Provide patient/ family with appropriate education  Outcome: Progressing     Problem: Potential for Aspiration  Goal: Non-ventilated patient's risk of aspiration is minimized  Description  Assess and monitor vital signs, respiratory status, and labs (WBC)  Monitor for signs of aspiration (tachypnea, cough, rales, wheezing, cyanosis, fever)  - Assess and monitor patient's ability to swallow  - Place patient up in chair to eat if possible  - HOB up at 90 degrees to eat if unable to get patient up into chair   - Supervise patient during oral intake  - Instruct patient/ family to take small bites  - Instruct patient/ family to take small single sips when taking liquids    - Follow patient-specific strategies generated by speech pathologist   Outcome: Progressing  Goal: Ventilated patient's risk of aspiration is minimized  Description  Assess and monitor vital signs, respiratory status, airway cuff pressure, and labs (WBC)  Monitor for signs of aspiration (tachypnea, cough, rales, wheezing, cyanosis, fever)  - Elevate head of bed 30 degrees if patient has tube feeding   - Monitor tube feeding    Outcome: Progressing     Problem: Prexisting or High Potential for Compromised Skin Integrity  Goal: Skin integrity is maintained or improved  Description  INTERVENTIONS:  - Identify patients at risk for skin breakdown  - Assess and monitor skin integrity  - Assess and monitor nutrition and hydration status  - Monitor labs   - Assess for incontinence   - Turn and reposition patient  - Assist with mobility/ambulation  - Relieve pressure over bony prominences  - Avoid friction and shearing  - Provide appropriate hygiene as needed including keeping skin clean and dry  - Evaluate need for skin moisturizer/barrier cream  - Collaborate with interdisciplinary team   - Patient/family teaching  - Consider wound care consult   Outcome: Progressing

## 2019-12-09 NOTE — SOCIAL WORK
Cm spoke with Maurisio Wagner from [facility], was informed that they have an open bed and they are now working on [facility]  Asked for updated PT/OT notes  Message sent to Shannan Pollock  PT note are in and reviewed  Updated MC with clinicals, PASSR completed and sent to [facility]   Awaiting for Auth approval  Cm will f/u

## 2019-12-09 NOTE — ASSESSMENT & PLAN NOTE
Currently follows up with Dr Rica Whitmore of Oncology in the outpatient setting    Also may cause Pneumonitis, does not appear to be underlying etiology at this time - Needs to be monitored closely at discharge

## 2019-12-09 NOTE — ASSESSMENT & PLAN NOTE
Lab Results   Component Value Date    HGBA1C 5 6 10/23/2019       Recent Labs     12/08/19  2044 12/09/19  0732 12/09/19  1120 12/09/19  1608   POCGLU 148* 136 151* 159*       Blood Sugar Average: Last 72 hrs:  (P) 339 [id number] hemoglobin A1C stable off medications x 6 months   Monitor glucose in the setting of steroid use-patient with currently mildly hyperglycemic readings due to IV Solu-Medrol  Continue to hold metformin in the setting of IV contrast dye use and chronic kidney disease  Continue sliding scale insulin, diabetic diet, Accu-Cheks q a c  And HS

## 2019-12-09 NOTE — PLAN OF CARE
Problem: PHYSICAL THERAPY ADULT  Goal: Performs mobility at highest level of function for planned discharge setting  See evaluation for individualized goals  Description  Treatment/Interventions: Functional transfer training, LE strengthening/ROM, Elevations, Therapeutic exercise, Endurance training, Cognitive reorientation, Patient/family training, Equipment eval/education, Bed mobility, Gait training, Compensatory technique education, Spoke to nursing, ADL retraining  Equipment Recommended: Tank Flores       See flowsheet documentation for full assessment, interventions and recommendations  Outcome: Progressing  Note:   Prognosis: Good  Problem List: Decreased strength, Decreased endurance, Impaired balance  Assessment: Pt  Seen for PT interventions for bed mobility, transfers, and ambulation on levels and stairs and balance activities  Pt ambulates 150' x3 without an assistive device with supervision assist x1 with standing or seated rest breaks between each gait trial    Pt manages o2 tank I'ly during gait training on levels  Pt progressed to stair climbing with use of R rail with min- cg to close supervision assist x1  Assist required for management of O2 tank on stairs  Pt performs standing balance activities as noted in flow sheet without any gross lob noted  Lateral sway noted with increased fatigue and retropulsion noted with ascending stairs requiring min-cg assist for ascending and close supervision for descending  PT showing good progress toward PT goals  Pt is limited by fatigue, CLINE, decreased activity tolerance, generalized weakness and deconditioning  O2 sats 96-97% on 2l O2, -102  PT will benefit from continued inpt PT and rehab inorder to maximize safe functional mobility, outcomes and independence     Barriers to Discharge: Inaccessible home environment, Decreased caregiver support  Barriers to Discharge Comments: MARCIO, alone during the day  Recommendation: Short-term skilled PT     PT - OK to Discharge: Yes    See flowsheet documentation for full assessment

## 2019-12-09 NOTE — PHYSICAL THERAPY NOTE
Physical Therapy Treatment Note     12/09/19 1223   Pain Assessment   Pain Assessment No/denies pain   Pain Score No Pain   Restrictions/Precautions   Other Precautions Fall Risk;Multiple lines;O2   General   Family/Caregiver Present No   Cognition   Overall Cognitive Status WFL   Arousal/Participation Cooperative   Attention Within functional limits   Orientation Level Oriented X4   Memory Within functional limits   Following Commands Follows all commands and directions without difficulty   Subjective   Subjective PT seeling upon arrival   Pt agreeable to PT   pt denies pain  Bed Mobility   Supine to Sit 6  Modified independent   Transfers   Sit to Stand 7  Independent   Stand to Sit 7  Independent   Ambulation/Elevation   Gait pattern Wide CLIFTON  (lateral sway)   Gait Assistance 5  Supervision   Additional items Assist x 1   Assistive Device None  (pt manages o2 tank)   Distance 150'x3, stnding or seated rest breaks due to fatigue, alford  Stair Management Assistance 4  Minimal assist  (cg- close supervision   )   Additional items Assist x 1;Verbal cues   Stair Management Technique One rail R;Nonreciprocal;Foreward   Number of Stairs 4   Balance   Static Sitting Normal   Dynamic Sitting Good   Static Standing Good   Dynamic Standing Fair   Ambulatory Fair   Endurance Deficit   Endurance Deficit Yes   Endurance Deficit Description   (standing or seated rest breaks required)   Activity Tolerance   Activity Tolerance Patient limited by fatigue   Nurse Made Aware rocío noted,  Spo2 97-97% on 2l  Exercises   Balance training  t performed standing balance activities of toe raises, marching in place x 10 reps  baward walking, 15', side stepping l and right 10' x2   object retrieval from standing x1  Equipment Use   Comments pt perfoms toileting I'ly  Assessment   Prognosis Good   Problem List Decreased strength;Decreased endurance; Impaired balance   Assessment Pt  Seen for PT interventions for bed mobility, transfers, and ambulation on levels and stairs and balance activities  Pt ambulates 150' x3 without an assistive device with supervision assist x1 with standing or seated rest breaks between each gait trial    Pt manages o2 tank I'ly during gait training on levels  Pt progressed to stair climbing with use of R rail with min- cg to close supervision assist x1  Assist required for management of O2 tank on stairs  Pt performs standing balance activities as noted in flow sheet without any gross lob noted  Lateral sway noted with increased fatigue and retropulsion noted with ascending stairs requiring min-cg assist for ascending and close supervision for descending  PT showing good progress toward PT goals  Pt is limited by fatigue, CLINE, decreased activity tolerance, generalized weakness and deconditioning  O2 sats 96-97% on 2l O2, -102  PT will benefit from continued inpt PT and rehab inorder to maximize safe functional mobility, outcomes and independence  Goals   Patient Goals " to got o rehab to get stronger "     STG Expiration Date 12/12/19   PT Treatment Day 2   Plan   Treatment/Interventions LE strengthening/ROM; Functional transfer training;Elevations; Therapeutic exercise; Endurance training;Patient/family training;Equipment eval/education; Bed mobility;Gait training;Spoke to nursing   Progress Progressing toward goals   PT Frequency   (3-5x/week)   Recommendation   Recommendation Short-term skilled PT   PT - OK to Discharge Yes        12/09/19 1223   Pain Assessment   Pain Assessment No/denies pain   Pain Score No Pain   Restrictions/Precautions   Other Precautions Fall Risk;Multiple lines;O2   General   Family/Caregiver Present No   Cognition   Overall Cognitive Status WFL   Arousal/Participation Cooperative   Attention Within functional limits   Orientation Level Oriented X4   Memory Within functional limits   Following Commands Follows all commands and directions without difficulty Subjective   Subjective PT seeling upon arrival   Pt agreeable to PT   pt denies pain  Bed Mobility   Supine to Sit 6  Modified independent   Transfers   Sit to Stand 7  Independent   Stand to Sit 7  Independent   Ambulation/Elevation   Gait pattern Wide CL[place]ON  (lateral sway)   Gait Assistance 5  Supervision   Additional items Assist x 1   Assistive Device None  (pt manages o2 tank)   Distance 150'x3, stnding or seated rest breaks due to fatigue, alford  Stair Management Assistance 4  Minimal assist  (cg- close supervision   )   Additional items Assist x 1;Verbal cues   Stair Management Technique One rail R;Nonreciprocal;Foreward   Number of Stairs 4   Balance   Static Sitting Normal   Dynamic Sitting Good   Static Standing Good   Dynamic Standing Fair   Ambulatory Fair   Endurance Deficit   Endurance Deficit Yes   Endurance Deficit Description   (standing or seated rest breaks required)   Activity Tolerance   Activity Tolerance Patient limited by fatigue   Nurse Made Aware alford noted,  Spo2 97-97% on 2l  Exercises   Balance training  t performed standing balance activities of toe raises, marching in place x 10 reps  baward walking, 15', side stepping l and right 10' x2   object retrieval from standing x1  Equipment Use   Comments pt perfoms toileting I'ly  Assessment   Prognosis Good   Problem List Decreased strength;Decreased endurance; Impaired balance   Assessment Pt  Seen for PT interventions for bed mobility, transfers, and ambulation on levels and stairs and balance activities  Pt ambulates 150' x3 without an assistive device with supervision assist x1 with standing or seated rest breaks between each gait trial    Pt manages o2 tank I'ly during gait training on levels  Pt progressed to stair climbing with use of R rail with min- cg to close supervision assist x1  Assist required for management of O2 tank on stairs    Pt performs standing balance activities as noted in flow sheet without any gross lob noted  Lateral sway noted with increased fatigue and retropulsion noted with ascending stairs requiring min-cg assist for ascending and close supervision for descending  PT showing good progress toward PT goals  Pt is limited by fatigue, CLINE, decreased activity tolerance, generalized weakness and deconditioning  O2 sats 96-97% on 2l O2, -102  PT will benefit from continued inpt PT and rehab inorder to maximize safe functional mobility, outcomes and independence  Goals   Patient Goals " to got o rehab to get stronger "     [facility] Expiration Date 12/12/19   PT Treatment Day 2   Plan   Treatment/Interventions LE strengthening/ROM; Functional transfer training;Elevations; Therapeutic exercise; Endurance training;Patient/family training;Equipment eval/education; Bed mobility;Gait training;Spoke to nursing   Progress Progressing toward goals   PT Frequency   (3-5x/week)   Recommendation   Recommendation Short-term skilled PT   PT - OK to Discharge Yes         Berenice Felix, ALOK

## 2019-12-09 NOTE — ASSESSMENT & PLAN NOTE
Patient developed acute onset of stroke-like symptoms involving dysphagia and slurred speech  Stroke alert called    CT brain without contrast, CTA head and neck, MRI brain all negative for acute infarction

## 2019-12-10 VITALS
OXYGEN SATURATION: 97 % | HEART RATE: 75 BPM | WEIGHT: 149.91 LBS | TEMPERATURE: 98.7 F | RESPIRATION RATE: 20 BRPM | SYSTOLIC BLOOD PRESSURE: 139 MMHG | DIASTOLIC BLOOD PRESSURE: 67 MMHG | BODY MASS INDEX: 22.79 KG/M2

## 2019-12-10 LAB
ANION GAP SERPL CALCULATED.3IONS-SCNC: 3 MMOL/L (ref 4–13)
BASOPHILS # BLD AUTO: 0.01 THOUSANDS/ΜL (ref 0–0.1)
BASOPHILS NFR BLD AUTO: 0 % (ref 0–1)
BUN SERPL-MCNC: 37 MG/DL (ref 5–25)
CALCIUM SERPL-MCNC: 8.5 MG/DL (ref 8.3–10.1)
CHLORIDE SERPL-SCNC: 104 MMOL/L (ref 100–108)
CO2 SERPL-SCNC: 35 MMOL/L (ref 21–32)
CREAT SERPL-MCNC: 2.27 MG/DL (ref 0.6–1.3)
EOSINOPHIL # BLD AUTO: 0.01 THOUSAND/ΜL (ref 0–0.61)
EOSINOPHIL NFR BLD AUTO: 0 % (ref 0–6)
ERYTHROCYTE [DISTWIDTH] IN BLOOD BY AUTOMATED COUNT: 15.6 % (ref 11.6–15.1)
GFR SERPL CREATININE-BSD FRML MDRD: 36 ML/MIN/1.73SQ M
GLUCOSE SERPL-MCNC: 161 MG/DL (ref 65–140)
GLUCOSE SERPL-MCNC: 81 MG/DL (ref 65–140)
GLUCOSE SERPL-MCNC: 92 MG/DL (ref 65–140)
HCT VFR BLD AUTO: 31.3 % (ref 36.5–49.3)
HGB BLD-MCNC: 9.6 G/DL (ref 12–17)
IMM GRANULOCYTES # BLD AUTO: 0.18 THOUSAND/UL (ref 0–0.2)
IMM GRANULOCYTES NFR BLD AUTO: 2 % (ref 0–2)
LYMPHOCYTES # BLD AUTO: 1.76 THOUSANDS/ΜL (ref 0.6–4.47)
LYMPHOCYTES NFR BLD AUTO: 16 % (ref 14–44)
MCH RBC QN AUTO: 27 PG (ref 26.8–34.3)
MCHC RBC AUTO-ENTMCNC: 30.7 G/DL (ref 31.4–37.4)
MCV RBC AUTO: 88 FL (ref 82–98)
MONOCYTES # BLD AUTO: 0.97 THOUSAND/ΜL (ref 0.17–1.22)
MONOCYTES NFR BLD AUTO: 9 % (ref 4–12)
NEUTROPHILS # BLD AUTO: 8.22 THOUSANDS/ΜL (ref 1.85–7.62)
NEUTS SEG NFR BLD AUTO: 73 % (ref 43–75)
NRBC BLD AUTO-RTO: 0 /100 WBCS
PLATELET # BLD AUTO: 300 THOUSANDS/UL (ref 149–390)
PMV BLD AUTO: 9.6 FL (ref 8.9–12.7)
POTASSIUM SERPL-SCNC: 4.5 MMOL/L (ref 3.5–5.3)
RBC # BLD AUTO: 3.56 MILLION/UL (ref 3.88–5.62)
SODIUM SERPL-SCNC: 142 MMOL/L (ref 136–145)
WBC # BLD AUTO: 11.15 THOUSAND/UL (ref 4.31–10.16)

## 2019-12-10 PROCEDURE — 82948 REAGENT STRIP/BLOOD GLUCOSE: CPT

## 2019-12-10 PROCEDURE — 99239 HOSP IP/OBS DSCHRG MGMT >30: CPT | Performed by: INTERNAL MEDICINE

## 2019-12-10 PROCEDURE — 80048 BASIC METABOLIC PNL TOTAL CA: CPT | Performed by: INTERNAL MEDICINE

## 2019-12-10 PROCEDURE — 85025 COMPLETE CBC W/AUTO DIFF WBC: CPT | Performed by: INTERNAL MEDICINE

## 2019-12-10 PROCEDURE — 94760 N-INVAS EAR/PLS OXIMETRY 1: CPT

## 2019-12-10 PROCEDURE — 94640 AIRWAY INHALATION TREATMENT: CPT

## 2019-12-10 PROCEDURE — 99232 SBSQ HOSP IP/OBS MODERATE 35: CPT | Performed by: NURSE PRACTITIONER

## 2019-12-10 RX ORDER — DIVALPROEX SODIUM 250 MG/1
250 TABLET, DELAYED RELEASE ORAL 2 TIMES DAILY
Qty: 30 TABLET | Refills: 0 | Status: SHIPPED | OUTPATIENT
Start: 2019-12-10 | End: 2020-01-03 | Stop reason: SDUPTHER

## 2019-12-10 RX ORDER — DILTIAZEM HYDROCHLORIDE 120 MG/1
120 CAPSULE, COATED, EXTENDED RELEASE ORAL DAILY
Qty: 30 CAPSULE | Refills: 0 | Status: SHIPPED | OUTPATIENT
Start: 2019-12-11 | End: 2020-01-03 | Stop reason: SDUPTHER

## 2019-12-10 RX ORDER — IPRATROPIUM BROMIDE AND ALBUTEROL SULFATE 2.5; .5 MG/3ML; MG/3ML
3 SOLUTION RESPIRATORY (INHALATION)
Qty: 1 VIAL | Refills: 0 | Status: SHIPPED | OUTPATIENT
Start: 2019-12-10 | End: 2020-01-09 | Stop reason: SDUPTHER

## 2019-12-10 RX ORDER — GUAIFENESIN 600 MG
600 TABLET, EXTENDED RELEASE 12 HR ORAL EVERY 12 HOURS SCHEDULED
Qty: 30 TABLET | Refills: 0 | Status: SHIPPED | OUTPATIENT
Start: 2019-12-10 | End: 2020-01-02 | Stop reason: ALTCHOICE

## 2019-12-10 RX ORDER — BUDESONIDE 0.5 MG/2ML
0.5 INHALANT ORAL
Qty: 1 VIAL | Refills: 0 | Status: SHIPPED | OUTPATIENT
Start: 2019-12-10

## 2019-12-10 RX ADMIN — PANTOPRAZOLE SODIUM 40 MG: 40 TABLET, DELAYED RELEASE ORAL at 06:24

## 2019-12-10 RX ADMIN — DILTIAZEM HYDROCHLORIDE 120 MG: 120 CAPSULE, COATED, EXTENDED RELEASE ORAL at 08:27

## 2019-12-10 RX ADMIN — FLUOXETINE 10 MG: 10 CAPSULE ORAL at 08:27

## 2019-12-10 RX ADMIN — DIVALPROEX SODIUM 250 MG: 250 TABLET, DELAYED RELEASE ORAL at 08:27

## 2019-12-10 RX ADMIN — FLUTICASONE PROPIONATE 1 SPRAY: 50 SPRAY, METERED NASAL at 08:30

## 2019-12-10 RX ADMIN — BUDESONIDE 0.5 MG: 0.5 INHALANT RESPIRATORY (INHALATION) at 07:28

## 2019-12-10 RX ADMIN — FAMOTIDINE 20 MG: 20 TABLET ORAL at 08:27

## 2019-12-10 RX ADMIN — APIXABAN 5 MG: 5 TABLET, FILM COATED ORAL at 08:27

## 2019-12-10 RX ADMIN — IPRATROPIUM BROMIDE AND ALBUTEROL SULFATE 3 ML: 2.5; .5 SOLUTION RESPIRATORY (INHALATION) at 07:28

## 2019-12-10 RX ADMIN — PREGABALIN 25 MG: 25 CAPSULE ORAL at 08:27

## 2019-12-10 RX ADMIN — OXYCODONE HYDROCHLORIDE 20 MG: 20 TABLET, FILM COATED, EXTENDED RELEASE ORAL at 08:27

## 2019-12-10 RX ADMIN — GUAIFENESIN 600 MG: 600 TABLET ORAL at 08:30

## 2019-12-10 RX ADMIN — FOLIC ACID 1 MG: 1 TABLET ORAL at 08:27

## 2019-12-10 NOTE — TRANSPORTATION MEDICAL NECESSITY
Section I - General Information    Name of Patient: Sergo Marbin: 1960    Medicare #: [id number]  Transport Date: 12/10/19 (PCS is valid for round trips on this date and for all repetitive trips in the 60-day range as noted below )  Origin: [address]                                                         Destination: [facility]  Is the pt's stay covered under Medicare Part A (PPS/DRG)   []     Closest appropriate facility? If no, why is transport to more distant facility required? Yes  If hospice pt, is this transport related to pt's terminal illness? No       Section II - Medical Necessity Questionnaire  Ambulance transportation is medically necessary only if other means of transport are contraindicated or would be potentially harmful to the patient  To meet this requirement, the patient must either be "bed confined" or suffer from a condition such that transport by means other than ambulance is contraindicated by the patient's condition  The following questions must be answered by the medical professional signing below for this form to be valid:    1)  Describe the MEDICAL CONDITION (physical and/or mental) of this patient AT [address] [place] [address] that requires the patient to be transported in an ambulance and why transport by other means is contraindicated by the patient's condition: COPD, Chronic B/L thoracic back pain, Generalized body pain, Cancer associated pain, Pt is on continuous O2    2) Is the patient "bed confined" as defined below? No  To be "be confined" the patient must satisfy all three of the following conditions: (1) unable to get up from bed without Assistance; AND (2) unable to ambulate; AND (3) unable to sit in a chair or wheelchair  3) Can this patient safely be transported by car or wheelchair van (i e , seated during transport without a medical attendant or monitoring)?    No    4) In addition to completing questions 1-3 above, please check any of the following conditions that apply*:   *Note: supporting documentation for any boxes checked must be maintained in the patient's medical records  If hosp-hosp transfer, describe services needed at 2nd facility not available at 1st facility? Moderate/severe pain on movement   Requires oxygen-unable to self administer      Section III - Signature of Physician or Healthcare Professional  I certify that the above information is true and correct based on my evaluation of this patient, and represent that the patient requires transport by ambulance and that other forms of transport are contraindicated  I understand that this information will be used by the Centers for Medicare and Medicaid Services (CMS) to support the determination of medical necessity for ambulance services, and I represent that I have personal knowledge of the patient's condition at time of transport  []  If this box is checked, I also certify that the patient is physically or mentally incapable of signing the ambulance service's claim and that the institution with which I am affiliated has furnished care, services, or assistance to the patient  My signature below is made on behalf of the patient pursuant to 42 CFR §424 36(b)(4)  In accordance with 42 CFR §424 37, the specific reason(s) that the patient is physically or mentally incapable of signing the claim form is as follows:       Signature of Physician* or Healthcare Professional______________________________________________________________  Signature Date 12/10/19 (For scheduled repetitive transports, this form is not valid for transports performed more than 60 days after this date)    Printed Name & Credentials of Physician or Healthcare Professional (MD, DO, RN, etc )________________________________  *Form must be signed by patient's attending physician for scheduled, repetitive transports   For non-repetitive, unscheduled ambulance transports, if unable to obtain the signature of the attending physician, any of the following may sign (choose appropriate option below)  [] Physician Assistant []  Clinical Nurse Specialist []  Registered Nurse  []  Nurse Practitioner  [x] Discharge Planner

## 2019-12-10 NOTE — PROGRESS NOTES
Assumed care of patient at 2300  Patient resting quietly in bed  He denies any needs at this time  VS stable: 98, 76, 18, 126/82, 98% on 2L O2  Lungs diminished with scattered rhonchi and insp/exp wheezes  Patient stated he had urinated in the bathroom around 10 pm  Denies any other needs at this time  Call bell within reach  Fresh cup of water at bedside  Patient states he just wants to sleep now

## 2019-12-10 NOTE — SOCIAL WORK
Received call from [facility] this am stating that Martin Latif has been approved for STR  MD, PT and RN made aware

## 2019-12-10 NOTE — PROGRESS NOTES
NEPHROLOGY PROGRESS NOTE   Chris Diaz Sr  64 y o  male MRN: [id number]  Unit/Bed#: E4 -01 Encounter: [id number]  Reason for Consult:  Acute kidney injury on CKD stage IIIB    ASSESSMENT/PLAN:  Acute kidney injury on CKD stage IIIB:  Felt related to prerenal, contrast nephropathy, with component of ATN  Resolved   -baseline creatinine 1 7-1 9, more recently in the mid 2s   -suspected progression of disease secondary to Carboplatin, Alimta, and Keytruda as well as hypertension     -peak creatinine 4 0, continues to improve and is back at baseline   -status post IV contrast on 11/30/2019   -continue to avoid nephrotoxins, hypotension, and further contrast dye   -I/O  Hypertension:  Blood pressure remains acceptable   -continue to avoid episodes of hypotension   -Amlodipine continues to be on hold  Elevated bicarbonate:    -bicarbonate supplementation discontinued  -will continue to monitor  Hyperkalemia: In the setting of acute kidney injury  Improved  -continue low-potassium diet  -will continue to monitor  Adenocarcinoma of lung:  Currently receiving immunotherapy, last dose of Keytruda was on 11/20/2019   -following outpatient with Oncology  Disposition:  Okay to discharge from Renal   Planning to discharge to outpatient rehab  Outpatient follow-up scheduled for 12/17/2019 at 3:00 p m  In [facility] office  Will need BMP 1 week prior to appointment  SUBJECTIVE:  The patient is resting in bed  He denies chest pain or shortness of breath  He denies nausea, vomiting, diarrhea  He denies issues with urination  He states that he is eating and drinking well      OBJECTIVE:  Current Weight: Weight - Scale: 68 kg (149 lb 14 6 oz)  Vitals:    12/09/19 1910 12/09/19 2300 12/10/19 0717 12/10/19 0728   BP:  126/82 139/67    BP Location:  Left arm Left arm    Pulse:  76 75    Resp:  18 20    Temp:  98 °F (36 7 °C) 98 7 °F (37 1 °C)    TempSrc:  Tympanic Temporal    SpO2: 97% 98% 98% 97% Weight:         No intake or output data in the 24 hours ending 12/10/19 1006  General: No apparent distress  Skin: warm, dry, intact, no rash  HEENT: Moist mucous membranes, sclera anicteric, normocephalic  Neck: No apparent JVD appreciated  Chest: Lung sounds clear B/L, on RA  Heart: Regular rate and rhythm, No murmer  Abdomen: Soft, round, NT, +BS  Extremities: No B/L LE edema present  Neuro: Alert and oriented  Psych: Appropriate mood and affect    Medications:    Current Facility-Administered Medications:     acetaminophen (TYLENOL) tablet 650 mg, 650 mg, Oral, Q6H PRN, Willard Ornelas MD    albuterol (PROVENTIL HFA,VENTOLIN HFA) inhaler 2 puff, 2 puff, Inhalation, Q4H PRN, Willard Ornelas MD    aluminum-magnesium hydroxide-simethicone (MYLANTA) 200-200-20 mg/5 mL oral suspension 30 mL, 30 mL, Oral, Q6H PRN, Willard Ornelas MD    apixaban (ELIQUIS) tablet 5 mg, 5 mg, Oral, BID, Sybil Ambron, DO, 5 mg at 12/10/19 0827    budesonide (PULMICORT) inhalation solution 0 5 mg, 0 5 mg, Nebulization, Q12H, Landon Mean, CRNP, 0 5 mg at 12/10/19 6178    chlorproMAZINE (THORAZINE) tablet 25 mg, 25 mg, Oral, Q6H PRN, Willard Ornelas MD    diltiazem (CARDIZEM CD) 24 hr capsule 120 mg, 120 mg, Oral, Daily, Jermaine Burns MD, 120 mg at 12/10/19 0827    diphenhydrAMINE (BENADRYL) tablet 25 mg, 25 mg, Oral, Q6H PRN, Willard Ornelas MD    divalproex sodium (DEPAKOTE) EC tablet 250 mg, 250 mg, Oral, BID, Lola Wooten PA-C, 250 mg at 12/10/19 0827    famotidine (PEPCID) tablet 20 mg, 20 mg, Oral, BID, Jonathan Jimenez MD, 20 mg at 12/10/19 0827    FLUoxetine (PROzac) capsule 10 mg, 10 mg, Oral, Daily, Jonathan Jimenez MD, 10 mg at 12/10/19 0827    fluticasone (FLONASE) 50 mcg/act nasal spray 1 spray, 1 spray, Each Nare, Daily, Jonathan Jimenez MD, 1 spray at 17/86/92 7121    folic acid (FOLVITE) tablet 1 mg, 1 mg, Oral, Daily, Jonathan Jimenez MD, 1 mg at 12/10/19 0827    guaiFENesin (MUCINEX) 12 hr tablet 600 mg, 600 mg, Oral, Q12H [address], Robbie Del Rio, EVINNP, 600 mg at 12/10/19 0830    hydrALAZINE (APRESOLINE) injection 5 mg, 5 mg, Intravenous, Q6H PRN, Sybil Tenorio DO, 5 mg at 12/06/19 2230    insulin lispro (HumaLOG) 100 units/mL subcutaneous injection 1-5 Units, 1-5 Units, Subcutaneous, TID AC, 1 Units at 12/09/19 1814 **AND** Fingerstick Glucose (POCT), , , TID AC, Perfecto Nice MD    ipratropium-albuterol (DUO-NEB) 0 5-2 5 mg/3 mL inhalation solution 3 mL, 3 mL, Nebulization, Q6H, Jonathan Jimenez MD, 3 mL at 12/10/19 0728    melatonin tablet 3 mg, 3 mg, Oral, HS, Perfecto Nice MD, 3 mg at 12/09/19 2103    methocarbamol (ROBAXIN) tablet 750 mg, 750 mg, Oral, Q6H PRN, Perfecto Nice MD    ondansetron (ZOFRAN) injection 4 mg, 4 mg, Intravenous, Q6H PRN, Perfecto Nice MD    oxyCODONE (OxyCONTIN) 12 hr tablet 20 mg, 20 mg, Oral, Q12H [address], Jonathan Jimenez MD, 20 mg at 12/10/19 0827    oxyCODONE (ROXICODONE) immediate release tablet 20 mg, 20 mg, Oral, Q6H PRN, Sybil Tenorio DO, 20 mg at 12/08/19 0617    pantoprazole (PROTONIX) EC tablet 40 mg, 40 mg, Oral, Early Morning, Jonathan Jimenez MD, 40 mg at 12/10/19 6011    polyethylene glycol (MIRALAX) packet 17 g, 17 g, Oral, Daily PRN, Perfecto Nice MD    pregabalin (LYRICA) capsule 25 mg, 25 mg, Oral, Daily, Jonathan Jimenez MD, 25 mg at 12/10/19 0827    pregabalin (LYRICA) capsule 50 mg, 50 mg, Oral, HS, Jonathan Jimenez MD, 50 mg at 12/09/19 2106    QUEtiapine (SEROquel) tablet 25 mg, 25 mg, Oral, HS, Jonathan Jimenez MD, 25 mg at 12/09/19 2104    senna-docusate sodium (SENOKOT S) 8 6-50 mg per tablet 1 tablet, 1 tablet, Oral, BID PRN, Perfecto Nice MD    tamsulosin (FLOMAX) capsule 0 4 mg, 0 4 mg, Oral, Daily With Dinner, Perfecto Nice MD, 0 4 mg at 12/09/19 1814    Laboratory Results:  Results from last 7 days   Lab Units 12/10/19  0520 12/09/19  0508 12/08/19  0630 12/07/19  0537 12/06/19  0552 12/05/19  0508 12/04/19  0444   WBC Thousand/uL 11 15* --   --  11 44*  --   --  13 30*   HEMOGLOBIN g/dL 9 6*  --   --  9 9*  --   --  9 5*   HEMATOCRIT % 31 3*  --   --  31 5*  --   --  29 8*   PLATELETS Thousands/uL 300  --   --  441*  --   --  432*   POTASSIUM mmol/L 4 5 4 6 4 6 4 7 5 1 4 8 4 9   CHLORIDE mmol/L 104 105 105 107 107 109* 101   CO2 mmol/L 35* 35* 33* 32 28 26 23   BUN mg/dL 37* 39* 41* 45* 51* 61* 75*   CREATININE mg/dL 2 27* 2 32* 2 36* 2 39* 2 52* 2 97* 3 79*   CALCIUM mg/dL 8 5 8 6 8 8 8 7 8 9 8 8 8 3

## 2019-12-10 NOTE — NURSING NOTE
Resumed care of patient from [phone]  Patient only had complaints of pain for which he was given his scheduled oxycodone  Report was given to oncoming nurse  Patient was left with call bell within reach and resting comfortably in bed

## 2019-12-10 NOTE — PLAN OF CARE
Problem: Potential for Falls  Goal: Patient will remain free of falls  Description  INTERVENTIONS:  - Assess patient frequently for physical needs  -  Identify cognitive and physical deficits and behaviors that affect risk of falls    -  [place] fall precautions as indicated by assessment   - Educate patient/family on patient safety including physical limitations  - Instruct patient to call for assistance with activity based on assessment  - Modify environment to reduce risk of injury  - Consider OT/PT consult to assist with strengthening/mobility  Outcome: Progressing     Problem: PAIN - ADULT  Goal: Verbalizes/displays adequate comfort level or baseline comfort level  Description  Interventions:  - Encourage patient to monitor pain and request assistance  - Assess pain using appropriate pain scale  - Administer analgesics based on type and severity of pain and evaluate response  - Implement non-pharmacological measures as appropriate and evaluate response  - Consider cultural and social influences on pain and pain management  - Notify physician/advanced practitioner if interventions unsuccessful or patient reports new pain  Outcome: Progressing     Problem: CARDIOVASCULAR - ADULT  Goal: Maintains optimal cardiac output and hemodynamic stability  Description  INTERVENTIONS:  - Monitor I/O, vital signs and rhythm  - Monitor for S/S and trends of decreased cardiac output  - Administer and titrate ordered vasoactive medications to optimize hemodynamic stability  - Assess quality of pulses, skin color and temperature  - Assess for signs of decreased coronary artery perfusion  - Instruct patient to report change in severity of symptoms  Outcome: Progressing  Goal: Absence of cardiac dysrhythmias or at baseline rhythm  Description  INTERVENTIONS:  - Continuous cardiac monitoring, vital signs, obtain 12 lead EKG if ordered  - Administer antiarrhythmic and heart rate control medications as ordered  - Monitor electrolytes and administer replacement therapy as ordered  Outcome: Progressing     Problem: RESPIRATORY - ADULT  Goal: Achieves optimal ventilation and oxygenation  Description  INTERVENTIONS:  - Assess for changes in respiratory status  - Assess for changes in mentation and behavior  - Position to facilitate oxygenation and minimize respiratory effort  - Oxygen administered by appropriate delivery if ordered  - Encourage broncho-pulmonary hygiene including cough, deep breathe, Incentive Spirometry  - Assess the need for suctioning and aspirate as needed  - Assess and instruct to report SOB or any respiratory difficulty  - Respiratory Therapy support as indicated   Outcome: Progressing     Problem: INFECTION - ADULT  Goal: Absence or prevention of progression during hospitalization  Description  INTERVENTIONS:  - Assess and monitor for signs and symptoms of infection  - Monitor lab/diagnostic results  - Monitor all insertion sites, i e  indwelling lines, tubes, and drains  - Monitor endotracheal if appropriate and nasal secretions for changes in amount and color  - [place] appropriate cooling/warming therapies per order  - Administer medications as ordered  - Instruct and encourage patient and family to use good hand hygiene technique  - Identify and instruct in appropriate isolation precautions for identified infection/condition  Outcome: Progressing  Goal: Absence of fever/infection during neutropenic period  Description  INTERVENTIONS:  - Monitor WBC    Outcome: Progressing     Problem: SAFETY ADULT  Goal: Maintain or return to baseline ADL function  Description  INTERVENTIONS:  -  Assess patient's ability to carry out ADLs; assess patient's baseline for ADL function and identify physical deficits which impact ability to perform ADLs (bathing, care of mouth/teeth, toileting, grooming, dressing, etc )  - Assess/evaluate cause of self-care deficits   - Assess range of motion  - Assess patient's mobility; develop plan if impaired  - Assess patient's need for assistive devices and provide as appropriate  - Encourage maximum independence but intervene and supervise when necessary  - Involve family in performance of ADLs  - Assess for home care needs following discharge   - Consider OT consult to assist with ADL evaluation and planning for discharge  - Provide patient education as appropriate  Outcome: Progressing  Goal: Maintain or return mobility status to optimal level  Description  INTERVENTIONS:  - Assess patient's baseline mobility status (ambulation, transfers, stairs, etc )    - Identify cognitive and physical deficits and behaviors that affect mobility  - Identify mobility aids required to assist with transfers and/or ambulation (gait belt, sit-to-stand, lift, walker, cane, etc )  - [place] fall precautions as indicated by assessment  - Record patient progress and toleration of activity level on Mobility SBAR; progress patient to next Phase/Stage  - Instruct patient to call for assistance with activity based on assessment  - Consider rehabilitation consult to assist with strengthening/weightbearing, etc   Outcome: Progressing     Problem: DISCHARGE PLANNING  Goal: Discharge to home or other facility with appropriate resources  Description  INTERVENTIONS:  - Identify barriers to discharge w/patient and caregiver  - Arrange for needed discharge resources and transportation as appropriate  - Identify discharge learning needs (meds, wound care, etc )  - Arrange for interpretive services to assist at discharge as needed  - Refer to Case Management Department for coordinating discharge planning if the patient needs post-hospital services based on physician/advanced practitioner order or complex needs related to functional status, cognitive ability, or social support system  Outcome: Progressing     Problem: Knowledge Deficit  Goal: Patient/family/caregiver demonstrates understanding of disease process, treatment plan, medications, and discharge instructions  Description  Complete learning assessment and assess knowledge base  Interventions:  - Provide teaching at level of understanding  - Provide teaching via preferred learning methods  Outcome: Progressing     Problem: Potential for Aspiration  Goal: Non-ventilated patient's risk of aspiration is minimized  Description  Assess and monitor vital signs, respiratory status, and labs (WBC)  Monitor for signs of aspiration (tachypnea, cough, rales, wheezing, cyanosis, fever)  - Assess and monitor patient's ability to swallow  - Place patient up in chair to eat if possible  - HOB up at 90 degrees to eat if unable to get patient up into chair   - Supervise patient during oral intake  - Instruct patient/ family to take small bites  - Instruct patient/ family to take small single sips when taking liquids  - Follow patient-specific strategies generated by speech pathologist   Outcome: Progressing  Goal: Ventilated patient's risk of aspiration is minimized  Description  Assess and monitor vital signs, respiratory status, airway cuff pressure, and labs (WBC)  Monitor for signs of aspiration (tachypnea, cough, rales, wheezing, cyanosis, fever)  - Elevate head of bed 30 degrees if patient has tube feeding   - Monitor tube feeding    Outcome: Progressing     Problem: NEUROSENSORY - ADULT  Goal: Achieves stable or improved neurological status  Description  INTERVENTIONS  - Monitor and report changes in neurological status  - Monitor vital signs such as temperature, blood pressure, glucose, and any other labs ordered   - Initiate measures to prevent increased intracranial pressure  - Monitor for seizure activity and implement precautions if appropriate      Outcome: Progressing  Goal: Remains free of injury related to seizures activity  Description  INTERVENTIONS  - Maintain airway, patient safety  and administer oxygen as ordered  - Monitor patient for seizure activity, document and report duration and description of seizure to physician/advanced practitioner  - If seizure occurs,  ensure patient safety during seizure  - Reorient patient post seizure  - Seizure pads on all 4 side rails  - Instruct patient/family to notify RN of any seizure activity including if an aura is experienced  - Instruct patient/family to call for assistance with activity based on nursing assessment  - Administer anti-seizure medications if ordered    Outcome: Progressing  Goal: Achieves maximal functionality and self care  Description  INTERVENTIONS  - Monitor swallowing and airway patency with patient fatigue and changes in neurological status  - Encourage and assist patient to increase activity and self care     - Encourage visually impaired, hearing impaired and aphasic patients to use assistive/communication devices  Outcome: Progressing     Problem: METABOLIC, FLUID AND ELECTROLYTES - ADULT  Goal: Electrolytes maintained within normal limits  Description  INTERVENTIONS:  - Monitor labs and assess patient for signs and symptoms of electrolyte imbalances  - Administer electrolyte replacement as ordered  - Monitor response to electrolyte replacements, including repeat lab results as appropriate  - Instruct patient on fluid and nutrition as appropriate  Outcome: Progressing  Goal: Fluid balance maintained  Description  INTERVENTIONS:  - Monitor labs   - Monitor I/O and WT  - Instruct patient on fluid and nutrition as appropriate  - Assess for signs & symptoms of volume excess or deficit  Outcome: Progressing  Goal: Glucose maintained within target range  Description  INTERVENTIONS:  - Monitor Blood Glucose as ordered  - Assess for signs and symptoms of hyperglycemia and hypoglycemia  - Administer ordered medications to maintain glucose within target range  - Assess nutritional intake and initiate nutrition service referral as needed  Outcome: Progressing     Problem: SKIN/TISSUE INTEGRITY - ADULT  Goal: Skin integrity remains intact  Description  INTERVENTIONS  - Identify patients at risk for skin breakdown  - Assess and monitor skin integrity  - Assess and monitor nutrition and hydration status  - Monitor labs (i e  albumin)  - Assess for incontinence   - Turn and reposition patient  - Assist with mobility/ambulation  - Relieve pressure over bony prominences  - Avoid friction and shearing  - Provide appropriate hygiene as needed including keeping skin clean and dry  - Evaluate need for skin moisturizer/barrier cream  - Collaborate with interdisciplinary team (i e  Nutrition, Rehabilitation, etc )   - Patient/family teaching  Outcome: Progressing  Goal: Incision(s), wounds(s) or drain site(s) healing without S/S of infection  Description  INTERVENTIONS  - Assess and document risk factors for skin impairment   - Assess and document dressing, incision, wound bed, drain sites and surrounding tissue  - Consider nutrition services referral as needed  - Oral mucous membranes remain intact  - Provide patient/ family education  Outcome: Progressing  Goal: Oral mucous membranes remain intact  Description  INTERVENTIONS  - Assess oral mucosa and hygiene practices  - Implement preventative oral hygiene regimen  - Implement oral medicated treatments as ordered  - Initiate Nutrition services referral as needed  Outcome: Progressing     Problem: MUSCULOSKELETAL - ADULT  Goal: Maintain or return mobility to safest level of function  Description  INTERVENTIONS:  - Assess patient's ability to carry out ADLs; assess patient's baseline for ADL function and identify physical deficits which impact ability to perform ADLs (bathing, care of mouth/teeth, toileting, grooming, dressing, etc )  - Assess/evaluate cause of self-care deficits   - Assess range of motion  - Assess patient's mobility  - Assess patient's need for assistive devices and provide as appropriate  - Encourage maximum independence but intervene and supervise when necessary  - Involve family in performance of ADLs  - Assess for home care needs following discharge   - Consider OT consult to assist with ADL evaluation and planning for discharge  - Provide patient education as appropriate  Outcome: Progressing  Goal: Maintain proper alignment of affected body part  Description  INTERVENTIONS:  - Support, maintain and protect limb and body alignment  - Provide patient/ family with appropriate education  Outcome: Progressing     Problem: Prexisting or High Potential for Compromised Skin Integrity  Goal: Skin integrity is maintained or improved  Description  INTERVENTIONS:  - Identify patients at risk for skin breakdown  - Assess and monitor skin integrity  - Assess and monitor nutrition and hydration status  - Monitor labs   - Assess for incontinence   - Turn and reposition patient  - Assist with mobility/ambulation  - Relieve pressure over bony prominences  - Avoid friction and shearing  - Provide appropriate hygiene as needed including keeping skin clean and dry  - Evaluate need for skin moisturizer/barrier cream  - Collaborate with interdisciplinary team   - Patient/family teaching  - Consider wound care consult   Outcome: Progressing

## 2019-12-11 ENCOUNTER — TRANSITIONAL CARE MANAGEMENT (OUTPATIENT)
Dept: FAMILY MEDICINE CLINIC | Facility: CLINIC | Age: 59
End: 2019-12-11

## 2019-12-11 ENCOUNTER — TELEPHONE (OUTPATIENT)
Dept: HEMATOLOGY ONCOLOGY | Facility: CLINIC | Age: 59
End: 2019-12-11

## 2019-12-11 ENCOUNTER — OFFICE VISIT (OUTPATIENT)
Dept: HEMATOLOGY ONCOLOGY | Facility: CLINIC | Age: 59
End: 2019-12-11
Payer: COMMERCIAL

## 2019-12-11 ENCOUNTER — HOSPITAL ENCOUNTER (OUTPATIENT)
Dept: INFUSION CENTER | Facility: HOSPITAL | Age: 59
Discharge: HOME | End: 2019-12-11
Attending: INTERNAL MEDICINE

## 2019-12-11 VITALS
RESPIRATION RATE: 16 BRPM | OXYGEN SATURATION: 95 % | BODY MASS INDEX: 22.79 KG/M2 | HEART RATE: 85 BPM | TEMPERATURE: 95.5 F | HEIGHT: 68 IN | DIASTOLIC BLOOD PRESSURE: 74 MMHG | SYSTOLIC BLOOD PRESSURE: 140 MMHG

## 2019-12-11 DIAGNOSIS — Z71.89 COMPLEX CARE COORDINATION: Primary | ICD-10-CM

## 2019-12-11 DIAGNOSIS — C34.91 ADENOCARCINOMA OF LUNG, RIGHT (HCC): Primary | ICD-10-CM

## 2019-12-11 PROCEDURE — 99214 OFFICE O/P EST MOD 30 MIN: CPT | Performed by: INTERNAL MEDICINE

## 2019-12-11 RX ORDER — PREDNISONE 20 MG/1
60 TABLET ORAL DAILY
Qty: 100 TABLET | Refills: 0 | Status: SHIPPED | OUTPATIENT
Start: 2019-12-11 | End: 2019-12-23 | Stop reason: SDUPTHER

## 2019-12-11 RX ORDER — PANTOPRAZOLE SODIUM 40 MG/1
40 TABLET, DELAYED RELEASE ORAL DAILY
Qty: 30 TABLET | Refills: 5 | Status: SHIPPED | OUTPATIENT
Start: 2019-12-11 | End: 2020-01-03 | Stop reason: SDUPTHER

## 2019-12-11 NOTE — TELEPHONE ENCOUNTER
Call received from Nurse Loya at [facility]  Patient was received at their facility yesterday  Dante Camargo was verifying that patient is to receive Dexamethasone 4 mg 2 times a day for 2 days post chemo  Verified orders with Dante Camargo  Patient is scheduled for chemotherapy today

## 2019-12-11 NOTE — PROGRESS NOTES
Hematology/Oncology Outpatient Follow-up  Chris Diaz Sr  64 y o  male 1960 [id number]    Date:  12/11/2019        Assessment and Plan:  1  Adenocarcinoma of lung, right [facility])  It is not entirely clear what the exact etiology of the patient's current neurological symptoms and weakness  He had extensive workup during the hospital stay which was negative for inflammatory, metastatic etiology or stroke  The patient was told that we will put the immunotherapy, Keytruda on hold for at least a week  I am planning to start him on prednisone 60 mg once a day for a Fast taper down  We will see him again in a week from now to reassess his overall condition on steroids  - predniSONE 20 mg tablet; Take 3 tablets (60 mg total) by mouth daily  Dispense: 100 tablet; Refill: 0  - pantoprazole (PROTONIX) 40 mg tablet; Take 1 tablet (40 mg total) by mouth daily  Dispense: 30 tablet; Refill: 5        HPI:  The patient was apparently admitted to the hospital recently due to neurological symptoms which was evaluated extensively  He had CT a of the brain and MRI of the brain as well which did not reveal any hint of metastatic disease or acute stroke  The patient was then discharged to the [facility] for rehabilitation  He did have his Keytruda treatment 3 weeks ago  His most recent available blood work from 12/10/2019 showed anemia with hemoglobin of 9 6 the white cell count was 11 1 with platelet count of 580  His creatinine continues to be about 2 27  His calcium was 8 5  I was not able to communicate with the patient very well today due to the lethargy  He stated that he was not able to sleep very well lately    Oncology History    63-year-old [facility] [employer] male heavy smoker who used to smoke 2 packs of cigarettes daily for many years, COPD, he presented with dyspnea, CT scan of the chest on October 2018 showed right middle lobe spiculated 1 3 cm mass with multiple solid and ground-glass nodules along the major and minor fissures sub cm pulmonary nodules bilaterally, left adrenal 1 2 cm nodule     PET scan showed 1 3 cm right middle lung nodule with SUV of 6 8, numerous nodular densities along the right major and minor fissures, right hilar activity with SUV of 3 4, subcarinal lymph node measuring 7 mm with SUV of 2 7, periportal enlarged lymph nodes concerning for metastatic disease measuring 2 4 cm with SUV of 5, prominent left retrocrural lymph node measuring 1 cm x 9 mm with SUV of 1 1    Core biopsy of the right spiculated nodule showed invasive adenocarcinoma consistent with lung primary positive for CK7, TTF 1, napsin a        Adenocarcinoma of lung, right ([address] )    11/13/2018 Initial Diagnosis     Adenocarcinoma of lung, right ([address] )  Stage IV      12/13/2018 - 3/28/2019 Chemotherapy      Alimta, Carboplatin (AUC 5) + Keytruda (6 cycles total)      4/17/2019 -  Chemotherapy     Started maintenance Alimta and Keytruda  (Alimta d/c'd 9/16/19 due to worsening renal dysfunction)         Interval history:    ROS: Review of Systems   Reason unable to perform ROS: The patient was very lethargic and was falling asleep during the interview  Constitutional: Positive for appetite change and fatigue  Neurological: Positive for weakness  Psychiatric/Behavioral: Positive for decreased concentration          Very lethargic       Past Medical History:   Diagnosis Date    Bipolar 1 disorder ([address] )     Cancer ([address] )     adeno lung  dx 11/2018-lung bx today 4/9/2019-ongoing chemo    Chronic pain disorder     back and right shoulder    CKD (chronic kidney disease)     COPD (chronic obstructive pulmonary disease) (HCC)     Depression     Diabetes mellitus (HCC)     GERD (gastroesophageal reflux disease)     Herniated lumbar intervertebral disc     Hypertension     Insomnia     Latent syphilis     Treated    Low back pain     Lumbosacral disc disease     Lung [facility] [address] [facility] 04/2019    Pneumothorax after biopsy     R lung  PTSD (post-traumatic stress disorder)     Pulmonary emphysema ([facility] [address]     Tobacco abuse 2018       Past Surgical History:   Procedure Laterality Date    COLONOSCOPY      CT GUIDED CHEST TUBE  2018    FL GUIDED CENTRAL VENOUS ACCESS DEVICE INSERTION  2019    HEMORROIDECTOMY      IR CHEST TUBE  2018    IR IMAGE GUIDED BIOPSY/ASPIRATION LUNG  2018    KNEE SURGERY      NM INSJ TUNNELED CTR VAD W/SUBQ PORT AGE 5 YR/> N/A 2019    Procedure: PLACEMENT OF PORT-A-CATH;  Surgeon: Kell Miller MD;  Location: [address] [place] [address];  Service: Vascular       Social History     Socioeconomic History    Marital status: Legally      Spouse name: None    Number of children: None    Years of education: None    Highest education level: None   Occupational History    Occupation: part time employment   Social Needs    Financial resource strain: None    Food insecurity:     Worry: None     Inability: None    Transportation needs:     Medical: None     Non-medical: None   Tobacco Use    Smoking status: Former Smoker     Packs/day: 0 50     Years: 40 00     Pack years: 20 00     Types: Cigarettes     Start date:      Last attempt to quit: 2019     Years since quittin 3    Smokeless tobacco: Never Used   Substance and Sexual Activity    Alcohol use: Not Currently    Drug use: Not Currently     Types: Marijuana     Comment: stopped , "i smoked weed and stopped 1 month ago"    Sexual activity: Yes   Lifestyle    Physical activity:     Days per week: None     Minutes per session: None    Stress: None   Relationships    Social connections:     Talks on phone: None     Gets together: None     Attends Jain service: None     Active member of club or organization: None     Attends meetings of clubs or organizations: None     Relationship status: None    Intimate partner violence:     Fear of current or ex partner: None     Emotionally abused: None     Physically abused: None     Forced sexual activity: None   Other Topics Concern    None   Social History Narrative    Lives with girlfriend       Family History   Problem Relation Age of Onset    Heart disease Mother     Cancer Mother     Hypertension Father     Diabetes Family     Asthma Family     Heart disease Family     Cancer Family     Cancer Maternal Grandfather     Cancer Paternal Grandfather     Cancer Maternal Aunt        Allergies   Allergen Reactions    Lisinopril Anaphylaxis     Took medication a while ago and had a "swelling reaction"  Does not carry epi-pen         Current Outpatient Medications:     albuterol (2 5 mg/3 mL) 0 083 % nebulizer solution, Take 1 vial (2 5 mg total) by nebulization every 4 (four) hours as needed for wheezing or shortness of breath, Disp: 180 vial, Rfl: 5    albuterol (VENTOLIN HFA) 90 mcg/act inhaler, Inhale 2 puffs every 4 (four) hours as needed for wheezing, Disp: 1 Inhaler, Rfl: 5    amLODIPine (NORVASC) 10 mg tablet, Take 1 tablet (10 mg total) by mouth daily, Disp: 30 tablet, Rfl: 1    apixaban (ELIQUIS) 5 mg, Take 1 tablet (5 mg total) by mouth 2 (two) times a day, Disp: 30 tablet, Rfl: 0    Blood Glucose Monitoring Suppl KIT, by Does not apply route daily, Disp: 1 each, Rfl: 0    budesonide (PULMICORT) 0 5 mg/2 mL nebulizer solution, Take 1 vial (0 5 mg total) by nebulization every 12 (twelve) hours Rinse mouth after use , Disp: 1 vial, Rfl: 0    carbamide peroxide (DEBROX) 6 5 % otic solution, Administer 5 drops into both ears 2 (two) times a day, Disp: 15 mL, Rfl: 0    dexamethasone (DECADRON) 4 mg tablet, Take 1 tablet (4 mg total) by mouth 2 (two) times a day with meals For 2 days after chemo, Disp: 4 tablet, Rfl: 3    diltiazem (CARDIZEM CD) 120 mg 24 hr capsule, Take 1 capsule (120 mg total) by mouth daily, Disp: 30 capsule, Rfl: 0    diphenhydrAMINE (BENADRYL) 25 mg tablet, Take 1 tablet (25 mg total) by mouth every 6 (six) hours as needed (nausea), Disp: 30 tablet, Rfl: 0    divalproex sodium (DEPAKOTE) 250 mg EC tablet, Take 1 tablet (250 mg total) by mouth 2 (two) times a day, Disp: 30 tablet, Rfl: 0    ergocalciferol (ERGOCALCIFEROL) 24010 units capsule, Take 1 capsule (50,000 Units total) by mouth once a week, Disp: 12 capsule, Rfl: 0    FLUoxetine (PROzac) 10 MG tablet, Take 1 tablet (10 mg total) by mouth daily, Disp: 30 tablet, Rfl: 5    fluticasone (FLONASE) 50 mcg/act nasal spray, 1-2 sprays each nostril daily for allergies, Disp: 1 Bottle, Rfl: 3    fluticasone-vilanterol (BREO ELLIPTA) 100-25 mcg/inh inhaler, Inhale 1 puff daily in the early morning Rinse mouth after use , Disp: 1 Inhaler, Rfl: 5    folic acid (FOLVITE) 1 mg tablet, Take 1 tablet (1 mg total) by mouth daily, Disp: 30 tablet, Rfl: 2    FREESTYLE LITE test strip, TEST BLOOD SUGAR DAILY, Disp: 100 each, Rfl: 1    guaiFENesin (MUCINEX) 600 mg 12 hr tablet, Take 1 tablet (600 mg total) by mouth every 12 (twelve) hours, Disp: 30 tablet, Rfl: 0    ipratropium (ATROVENT) 0 02 % nebulizer solution, Take 1 vial (0 5 mg total) by nebulization 3 (three) times a day, Disp: 90 vial, Rfl: 5    ipratropium-albuterol (DUO-NEB) 0 5-2 5 mg/3 mL nebulizer solution, Take 1 vial (3 mL total) by nebulization every 6 (six) hours, Disp: 1 vial, Rfl: 0    Lancets (FREESTYLE) lancets, TEST BLOOD SUGAR DAILY, Disp: 100 each, Rfl: 4    lidocaine (LMX) 4 % cream, Apply topically as needed for mild pain Apply 1/2-1 inch to port 30-60 mins prior to needle insertion, cover with serrain wrap, Disp: 30 g, Rfl: 5    loratadine (CLARITIN) 10 mg tablet, Take 1 tablet (10 mg total) by mouth daily in the early morning, Disp: 30 tablet, Rfl: 2    melatonin 3 mg, Take 1 tablet (3 mg total) by mouth daily at bedtime, Disp: 30 tablet, Rfl: 1    metFORMIN (GLUCOPHAGE-XR) 500 mg 24 hr tablet, Take 1 tablet (500 mg total) by mouth 2 (two) times a day with meals, Disp: 60 tablet, Rfl: 0    methocarbamol (ROBAXIN) 750 mg tablet, Take 1 tablet (750 mg total) by mouth every 6 (six) hours as needed for muscle spasms, Disp: 90 tablet, Rfl: 0    ondansetron (ZOFRAN) 4 mg tablet, Take 1 tablet (4 mg total) by mouth every 6 (six) hours as needed for nausea or vomiting, Disp: 60 tablet, Rfl: 2    oxyCODONE (OxyCONTIN) 20 mg 12 hr tablet, Take 1 tablet (20 mg total) by mouth every 12 (twelve) hoursMax Daily Amount: 40 mg, Disp: 60 tablet, Rfl: 0    oxyCODONE (ROXICODONE) 30 MG immediate release tablet, Take 1 tablet (30 mg total) by mouth every 4 (four) hours as needed for severe painMax Daily Amount: 180 mg, Disp: 150 tablet, Rfl: 0    pantoprazole (PROTONIX) 40 mg tablet, Take 1 tablet (40 mg total) by mouth daily, Disp: 30 tablet, Rfl: 5    pantoprazole (PROTONIX) 40 mg tablet, Take 1 tablet (40 mg total) by mouth daily, Disp: 30 tablet, Rfl: 5    polyethylene glycol (GLYCOLAX) powder, Take 17 g by mouth daily, Disp: 527 g, Rfl: 1    predniSONE 20 mg tablet, Take 3 tablets (60 mg total) by mouth daily, Disp: 100 tablet, Rfl: 0    pregabalin (LYRICA) 25 mg capsule, Take 1 capsule PO in morning and 2 capsules PO at bedtime, Disp: 90 capsule, Rfl: 0    prochlorperazine (COMPAZINE) 10 mg tablet, Take 1 tablet (10 mg total) by mouth every 6 (six) hours as needed for nausea or vomiting, Disp: 90 tablet, Rfl: 0    QUEtiapine (SEROquel) 25 mg tablet, Take 25 mg by mouth daily at bedtime, Disp: , Rfl:     ranitidine (ZANTAC) 150 mg tablet, Take 1 tablet (150 mg total) by mouth 2 (two) times a day, Disp: 60 tablet, Rfl: 4    REGULOID 28 3 % POWD, USE AS DIRECTED, Disp: 369 g, Rfl: 4    Selenium Sulfide 2 25 % SHAM, apply by topical route  every PM to the affected area(s), Disp: , Rfl:     senna-docusate sodium (SENOKOT-S) 8 6-50 mg per tablet, Take 1 tablet by mouth 2 (two) times a day, Disp: 60 tablet, Rfl: 2    sildenafil (VIAGRA) 50 MG tablet, Take 1 tablet (50 mg total) by mouth as needed for erectile dysfunction, Disp: 6 tablet, Rfl: 0    tamsulosin (FLOMAX) 0 4 mg, Take 1 capsule (0 4 mg total) by mouth daily with dinner, Disp: 90 capsule, Rfl: 3    tiotropium (SPIRIVA RESPIMAT) 2 5 MCG/ACT AERS inhaler, Inhale 2 puffs daily, Disp: 1 Inhaler, Rfl: 2  No current facility-administered medications for this visit  Physical Exam:  /74 (BP Location: Left arm, Patient Position: Sitting, Cuff Size: Adult)   Pulse 85   Temp (!) 95 5 °F (35 3 °C) (Tympanic)   Resp 16   Ht 5' 8" (1 727 m)   SpO2 95%   BMI 22 79 kg/m²     Physical Exam   Constitutional: He appears well-developed and well-nourished  HENT:   Head: Normocephalic and atraumatic  Nose: Nose normal    Mouth/Throat: Oropharynx is clear and moist    Eyes: Pupils are equal, round, and reactive to light  Conjunctivae and EOM are normal  Right eye exhibits no discharge  Left eye exhibits no discharge  No scleral icterus  Neck: Normal range of motion  Neck supple  No tracheal deviation present  No thyromegaly present  Cardiovascular: Normal rate, regular rhythm and normal heart sounds  Exam reveals no friction rub  No murmur heard  Pulmonary/Chest: Effort normal and breath sounds normal  No respiratory distress  He has no wheezes  He has no rales  He exhibits no tenderness  Abdominal: Soft  Bowel sounds are normal  He exhibits no distension and no mass  There is no hepatosplenomegaly, splenomegaly or hepatomegaly  There is no tenderness  There is no rebound and no guarding  Musculoskeletal: Normal range of motion  He exhibits no edema, tenderness or deformity  Very weak sitting in a wheelchair   Lymphadenopathy:     He has no cervical adenopathy  Neurological:   He was very weak and lethargic sitting in a wheelchair   Skin: Skin is warm and dry  No rash noted  No erythema  No pallor  Psychiatric: He has a normal mood and affect   His behavior is normal  Judgment and thought content normal          Labs:  Lab Results   Component Value Date    WBC 11 15 (H) 12/10/2019    HGB 9 6 (L) 12/10/2019    HCT 31 3 (L) 12/10/2019    MCV 88 12/10/2019     12/10/2019     Lab Results   Component Value Date    K 4 5 12/10/2019     12/10/2019    CO2 35 (H) 12/10/2019    BUN 37 (H) 12/10/2019    CREATININE 2 27 (H) 12/10/2019    GLUF 122 (H) 11/19/2019    CALCIUM 8 5 12/10/2019    AST 33 12/04/2019    ALT 45 12/04/2019    ALKPHOS 83 12/04/2019    EGFR 36 12/10/2019     No results found for: TSH    Patient voiced understanding and agreement in the above discussion  Aware to contact our office with questions/symptoms in the interim

## 2019-12-11 NOTE — DISCHARGE SUMMARY
Discharge- Daly Paget  1960, 64 y o  male MRN: [id number]    Unit/Bed#: E4 -01 Encounter: [id number]    Primary Care Provider: Alexei Michel MD   Date and time admitted to hospital: 11/29/2019  6:26 PM    Acute on chronic hypoxic respiratory failure was treated during this admission    The patient was felt to have   Type 2 myocardial infarction due to COPD, and acute on chronic hypoxemic respiratory failure  Alkalosis  Assessment & Plan  - From renal dysfunction - improved  Elevated d-dimer  Assessment & Plan  Elevated in the setting in cancer  Elevated troponin level not due to acute coronary syndrome  Assessment & Plan  Patient presented with elevated troponins:0 92-->2 92--> 3 58--> 3 03  Type II Myocardial infarction    Stroke-like symptoms  Assessment & Plan  Patient developed acute onset of stroke-like symptoms involving dysphagia and slurred speech  Stroke alert called  CT brain without contrast, CTA head and neck, MRI brain all negative for acute infarction    Stage 3 chronic kidney disease (HCC)  Assessment & Plan  Stable renal function    Adenocarcinoma of lung, right [facility])  Assessment & Plan  Currently follows up with Dr Gretta Epley of Oncology in the outpatient setting  Also may cause Pneumonitis, does not appear to be underlying etiology at this time - Needs to be monitored closely at discharge         Type 2 diabetes mellitus without complication, without long-term current use of insulin [facility])  Assessment & Plan  Lab Results   Component Value Date    HGBA1C 5 6 10/23/2019       Recent Labs     12/09/19  1608 12/09/19  2040 12/10/19  0716 12/10/19  1050   POCGLU 159* 110 81 161*       Blood Sugar Average: Last 72 hrs:  (P) 560 [id number] hemoglobin A1C stable off medications x 6 months   Monitor glucose in the setting of steroid use-patient with currently mildly hyperglycemic readings due to IV Solu-Medrol  Resume Metformin at discharge    Continue sliding scale insulin, diabetic diet, Accu-Cheks q a c  And HS  Essential hypertension  Assessment & Plan  Slightly elevated at time of presentation, but now BP currently stable and optimal   Continue home Norvasc  Continue to monitor     * Chronic obstructive pulmonary disease with acute exacerbation [facility])  Assessment & Plan  Patient presented with two weeks of worsening SOB  Still has considerable dyspnea and was seen sitting at the edge of the bed bed leaning forward this morning  Transitioned to oral steriods  COPD is severe in the setting of Stage IV lung cancer  Pulmonology, will need outpatient f/u  Continue incentive spirometry  Overall prognosis is poor -    High Risk for readmission        Admitting Provider:  Quinn Mitchell MD  Discharge Provider:  Skye Huggins DO  Admission Date: 11/29/2019       Discharge Date: 12/10/19   LOS: 11  Primary Care Physician at Discharge: Charlotte Meraz, [address] [address] COURSE:  Pawan Valdez Sr  is a 64 y o  male who presented with shortness of breath difficulty breathing  The patient has stage IV adenocarcinoma of the lung currently on treatment with Keytruda  The patient initially required BiPAP as well as high doses of oxygen  He most recently quit smoking, and was evaluated in consultation by the pulmonary service  Patient was also found acute kidney injury on top of chronic kidney disease stage to be  His previous creatinines were between 1 71 9, it was felt this was likely a combination of intravenous contrast as well as pre renal etiology  The patient subsequently did improve  The patient's disease is felt to be likely end-stage COPD, he was started on intravenous steroids and transitioned appropriately to oral steroids  The patient did have sputum with change in coloration and a complete a complex of ceftriaxone for 7 days as well as azithromycin    The patient's atrial fibrillation was rate controlled initially with Cardizem drip and he was or changed to oral Cardizem at discharge  He was continued on Eliquis twice a day at discharge and follow-up in the outpatient setting with Cardiology  The patient did have a type 2 myocardial infarction and this is felt to be likely secondary to COPD exacerbation as well as tachycardia  The patient did have a stroke like symptoms for which a stroke alert was called however workup subsequently was negative  At the time of discharge the patient was back to his baseline breathing status, was subsequently discharged to short-term rehabilitation  He should follow up with his oncologist as well as pulmonary provider at discharge  DISCHARGE DIAGNOSES  Alkalosis  Assessment & Plan  - From renal dysfunction - improved  Elevated d-dimer  Assessment & Plan  Elevated in the setting in cancer  Elevated troponin level not due to acute coronary syndrome  Assessment & Plan  Patient presented with elevated troponins:0 92-->2 92--> 3 58--> 3 03  Type II Myocardial infarction    Stroke-like symptoms  Assessment & Plan  Patient developed acute onset of stroke-like symptoms involving dysphagia and slurred speech  Stroke alert called  CT brain without contrast, CTA head and neck, MRI brain all negative for acute infarction    Stage 3 chronic kidney disease (HCC)  Assessment & Plan  Stable renal function    Adenocarcinoma of lung, right [facility])  Assessment & Plan  Currently follows up with Dr Marla Servin of Oncology in the outpatient setting    Also may cause Pneumonitis, does not appear to be underlying etiology at this time - Needs to be monitored closely at discharge         Type 2 diabetes mellitus without complication, without long-term current use of insulin [facility])  Assessment & Plan  Lab Results   Component Value Date    HGBA1C 5 6 10/23/2019       Recent Labs     12/09/19  1608 12/09/19  2040 12/10/19  0716 12/10/19  1050   POCGLU 159* 110 81 161*       Blood Sugar Average: Last 72 hrs:  (P) 124 [id number] hemoglobin A1C stable off medications x 6 months   Monitor glucose in the setting of steroid use-patient with currently mildly hyperglycemic readings due to IV Solu-Medrol  Resume Metformin at discharge  Continue sliding scale insulin, diabetic diet, Accu-Cheks q a c  And HS  Essential hypertension  Assessment & Plan  Slightly elevated at time of presentation, but now BP currently stable and optimal   Continue home Norvasc  Continue to monitor     * Chronic obstructive pulmonary disease with acute exacerbation [facility])  Assessment & Plan  Patient presented with two weeks of worsening SOB  Still has considerable dyspnea and was seen sitting at the edge of the bed bed leaning forward this morning  Transitioned to oral steriods  COPD is severe in the setting of Stage IV lung cancer  Pulmonology, will need outpatient f/u  Continue incentive spirometry  Overall prognosis is poor -    High Risk for readmission        CONSULTING PROVIDERS   IP CONSULT TO CARDIOLOGY  IP CONSULT TO PULMONOLOGY  IP CONSULT TO CASE MANAGEMENT  IP CONSULT TO NEPHROLOGY  IP CONSULT TO NEUROLOGY  IP CONSULT TO CASE MANAGEMENT  IP CONSULT TO PALLIATIVE CARE  IP CONSULT TO NEUROLOGY    PROCEDURES PERFORMED  * No surgery found *    RADIOLOGY RESULTS  Xr Chest Portable    Result Date: 12/5/2019  Narrative: CHEST INDICATION:   pneumonia  COMPARISON:  11/30/2019 EXAM PERFORMED/VIEWS:  XR CHEST PORTABLE FINDINGS:  Right chest wall port  Stable cardiac mediastinal silhouette  Stable prominent right hilum  The lungs are clear  No pneumothorax or pleural effusion  Osseous structures appear within normal limits for patient age  Impression: Stable exam  No consolidation  Workstation performed: [id number]     Xr Chest 1 View Portable    Result Date: 11/29/2019  Narrative: CHEST INDICATION:   dyspnea  COMPARISON:  PET CT 11/13/2019 EXAM PERFORMED/VIEWS:  XR CHEST PORTABLE FINDINGS:  Right-sided chest port remains in place  Hyperinflation in keeping with COPD  Right infrahilar density correlating to an area of airspace density seen on the recent PET/CT  No pneumothorax or pleural effusion  Osseous structures appear within normal limits for patient age  Impression: Right infrahilar density correlating to an area of airspace density seen on the recent PET/CT  Workstation performed: [id number]     Mri Brain Wo Contrast    Result Date: 11/30/2019  Narrative: MRI BRAIN WITHOUT CONTRAST INDICATION: Ataxia, stroke suspected  COMPARISON:   1/10/2019 and CT from 11/30/2019 TECHNIQUE:  Sagittal T1, axial T2, axial FLAIR, axial T1, axial Chauncey and axial diffusion imaging  IMAGE QUALITY:  The study is motion degraded  FINDINGS: BRAIN PARENCHYMA:  There is no discrete mass, mass effect or midline shift  There is no intracranial hemorrhage  There is no evidence of acute infarction and diffusion imaging is unremarkable  There are no white matter changes in the cerebral hemispheres  There is perimesencephalic FLAIR artifact as this is not clearly seen on the pulse sequences and was not present on prior CT  VENTRICLES:  Normal for the patient's age  SELLA AND PITUITARY GLAND:  Normal  ORBITS:  Normal  PARANASAL SINUSES:  There is subtotal opacification of the left sphenoid sinus  There are frothy secretions within the right sphenoid sinus  There is mucosal thickening of left maxillary sinus and a retention cyst versus polyp in the right maxillary sinus  There is patchy ethmoid air cell and frontal sinus mucosal thickening  Previously seen polypoid soft tissue within the anterior right nasal passage has increased in size since prior examination measuring 3 0 x 1 4 cm and previously measured 2 2 x 1 2 cm     There are right greater than left bilateral mastoid air cell effusions  VASCULATURE:  Evaluation of the major intracranial vasculature demonstrates appropriate flow voids   CALVARIUM AND SKULL BASE:  Normal  EXTRACRANIAL SOFT TISSUES:  Normal      Impression: No mass effect, acute intracranial hemorrhage or evidence of recent infarction  Paranasal sinus disease, as described above  Frothy secretions within the right sphenoid sinus should be correlated clinically for the presence of acute sinusitis  Polypoid soft tissue within the anterior right nasal passage has slightly increased in size since 1/10/2019  Correlate with direct visual inspection  Workstation performed: [id number]     Xr Stroke Alert Portable Chest    Result Date: 11/30/2019  Narrative: CHEST INDICATION:   suspect stroke  COMPARISON:  11/29/2019 EXAM PERFORMED/VIEWS:  XR STROKE ALERT PORTABLE CHEST FINDINGS:  Right-sided Port-A-Cath projects appropriately  Cardiomediastinal silhouette appears unremarkable  No congestive failure  No pneumothorax  No effusions  No pneumonia  Right hilar fullness is again identified with thickening of the minor fissure within the lateral aspect of the midlung fields, unchanged  Osseous structures appear within normal limits for patient age  Impression: Stable examination  Persistent right hilar fullness with suspected scarring along the minor fissure  Workstation performed: [id number]     Nm Pet Ct Skull Base To Mid Thigh    Result Date: 11/13/2019  Narrative: PET/CT SCAN INDICATION:  C34 91: Malignant neoplasm of unspecified part of right bronchus or lung   , restaging for treatment management, recent discovery of left breast lump, restaging for treatment management MODIFIER: PS COMPARISON: CT 8/29/2019 and priors, including PET CT 7/26/2019 CELL TYPE:  Adenocarcinoma of lung TECHNIQUE:   11 5 mCi F-18-FDG administered IV  Multiplanar attenuation corrected and non attenuation corrected PET images are available for interpretation, and contiguous, low dose, axial CT sections were obtained from the skull base through the femurs   Intravenous contrast material was not utilized    This examination, like all CT scans performed in the [facility], was performed utilizing techniques to minimize radiation dose exposure, including the use of iterative reconstruction and  automated exposure control  Fasting serum glucose: 92 mg/dl FINDINGS: VISUALIZED BRAIN:   No acute abnormalities are seen  HEAD/NECK:   There is a physiologic distribution of FDG  No FDG avid cervical adenopathy is seen  CT images: Scattered sinus mucosal thickening  CHEST:   Interval increased patchy infiltrates in the right middle lung along the major and minor fissures, SUV ranging up to 3 2  There is also mildly increased FDG activity in the right upper posterior pleural thickening, SUV 2 4, prior SUV this region 1 3  These findings may be inflammatory, though tumor recurrence is not excluded  Additional new small infiltrate in the superior right lower lung image 69 series 4 measuring approximately 1 8 x 1 4 cm also demonstrates mild FDG activity, SUV 3 3  New smaller mildly hypermetabolic infiltrate in the superior left lower lung, SUV 1 5 is also noted  These may also be inflammatory/infectious, with tumor also not excluded at this time  Increased left gynecomastia, corresponding with history of palpable lump, measuring 2 2 x 1 8 cm, with increased FDG activity, SUV 2 8  Previously this measured 1 1 x 0 8 cm, SUV 2 5  CT images: Stable pleural proximal thickening along the left major fissure  ABDOMEN:   Small hiatal hernia  Small focus of activity in the GE junction region, SUV 4 7 is increased, prior SUV in this region 3 3  This may be inflammatory/physiologic  New FDG activity in the left quadratus lumborum muscle above the left iliac crest, SUV 4 1, is nonspecific but may be inflammatory /physiologic  Metastasis is not entirely excluded at this time  CT images: Stable  PELVIS: Bilateral subcentimeter inguinal nodes with FDG activity, SUV measuring 3 6 on the right and 2 5 on the left  Previously left inguinal node has an SUV of 2 7  Right inguinal node activity appears new    These are likely reactive/inflammatory  This may be reassessed on follow-up exam  CT images: Bladder wall thickening again noted  OSSEOUS STRUCTURES: No FDG avid lesions are seen  CT images: Stable  Impression: 1  Interval increased hypermetabolic bilateral lung infiltrates, right greater than left as above  This may be inflammatory/infectious, however coexistent tumor recurrence is not excluded  Short-term follow-up recommended  2   Increased left gynecomastia  3   New FDG activity in the left quadratus lumborum muscle above the left iliac crest is nonspecific but may be inflammatory /physiologic  This may be reassessed on follow-up exam  Workstation performed: [id number]     Ct Stroke Alert Brain    Result Date: 11/30/2019  Narrative: CT BRAIN - STROKE ALERT PROTOCOL INDICATION:   Neuro deficit, acute, stroke suspected  COMPARISON:  PET/CT scan November 13, 2019 and CT brain October 16, 2015  TECHNIQUE:  CT examination of the brain was performed  In addition to axial images, coronal reformatted images were created and submitted for interpretation  Radiation dose length product (DLP) for this visit:  846 mGy-cm   This examination, like all CT scans performed in the [facility], was performed utilizing techniques to minimize radiation dose exposure, including the use of iterative reconstruction and automated exposure control  IMAGE QUALITY:  Diagnostic  FINDINGS: PARENCHYMA:  No intracranial mass, mass effect or midline shift  No acute intracranial hemorrhage  No CT signs of acute infarction  Normal intracranial vasculature  VENTRICLES AND EXTRA-AXIAL SPACES:  Normal for patient's age  VISUALIZED ORBITS AND PARANASAL SINUSES:  The globes are symmetric and intact  There is mucosal thickening throughout the visualized ethmoid air cells bilaterally  Mild mucosal thickening in the maxillary sinuses bilaterally  Small amount of fluid left maxillary sinus    Inspissated secretions in the right sphenoid sinus  Polypoid mucosal changes left sphenoid sinus  Mild mucosal thickening in the frontal sinuses bilaterally  Right-sided mastoid air cells are hypoplastic  Small right-sided mastoid effusion  Left-sided mastoid air cells clear  Middle ear cavities are clear bilaterally  CALVARIUM AND EXTRACRANIAL SOFT TISSUES:   Normal      Impression: 1  Unremarkable unenhanced CT scan of the brain  2   Pansinusitis  Findings were directly discussed with STACY GIBSON on 11/30/2019 9:40 AM  Workstation performed: [id number]     Vas Lower Limb Venous Duplex Study, Complete Bilateral    Result Date: 12/2/2019  Narrative:  [facility] REPORT CLINICAL: Indications: The patient was admitted with shortness of breath, exacerbation of COPD  He denies leg pain or swelling  Risk Factors The patient has history of HTN, Diabetes (Yes), CKD and CAD  FINDINGS:  Segment  Right            Left              Impression       Impression       CFV      Normal (Patent)  Normal (Patent)     CONCLUSION: Impression: RIGHT LOWER LIMB: No evidence of acute or chronic deep vein thrombosis  No evidence of superficial thrombophlebitis noted  Doppler evaluation shows a normal response to augmentation maneuvers  Popliteal, posterior tibial and anterior tibial arterial Doppler waveforms are triphasic/monophasic  LEFT LOWER LIMB: No evidence of acute or chronic deep vein thrombosis  No evidence of superficial thrombophlebitis noted  Doppler evaluation shows a normal response to augmentation maneuvers  Popliteal, posterior tibial and anterior tibial arterial Doppler waveforms are triphasic  SIGNATURE: Electronically Signed by: Adeola Hernández on 2019-12-02 02:27:44 PM    Cta Stroke Alert (head/neck)    Result Date: 11/30/2019  Narrative: CTA NECK AND BRAIN WITH CONTRAST INDICATION: Neuro deficit, acute, stroke suspected slurred speech  Dysphagia  COMPARISON:   CT brain October 16, 2015 and PET/CT scan November 13, 2019   TECHNIQUE:   Post contrast imaging was performed after administration of iodinated contrast through the neck and brain  Post contrast axial 0 625 mm images timed to opacify the arterial system  3D rendering was performed on an independent workstation  MIP reconstructions performed  Coronal reconstructions were performed of the noncontrast portion of the brain  Radiation dose length product (DLP) for this visit:  483 92 mGy/cm  This examination, like all CT scans performed in the [facility], was performed utilizing techniques to minimize radiation dose exposure, including the use of iterative reconstruction and automated exposure control  IV Contrast:  85 mL Visipaque 320  IMAGE QUALITY:   Diagnostic FINDINGS: CERVICAL VASCULATURE AORTIC ARCH AND GREAT VESSELS:  Mild athersclerotic disease of the arch, proximal great vessels and visualized subclavian vessels  No significant stenosis  RIGHT VERTEBRAL ARTERY CERVICAL SEGMENT:  Normal origin  The vessel is normal in caliber throughout the neck  LEFT VERTEBRAL ARTERY CERVICAL SEGMENT:  Normal origin  The vessel is normal in caliber throughout the neck  RIGHT EXTRACRANIAL CAROTID SEGMENT:  Normal caliber common carotid artery  Normal bifurcation and cervical internal carotid artery  No stenosis or dissection  LEFT EXTRACRANIAL CAROTID SEGMENT:  Normal caliber common carotid artery  Normal bifurcation and cervical internal carotid artery  No stenosis or dissection  NASCET criteria was used to determine the degree of internal carotid artery diameter stenosis  INTRACRANIAL VASCULATURE INTERNAL CAROTID ARTERIES:  Normal enhancement of the intracranial portions of the internal carotid arteries  Normal ophthalmic artery origins  Normal ICA terminus  ANTERIOR CIRCULATION:  Symmetric A1 segments and anterior cerebral arteries with normal enhancement  Normal anterior communicating artery   MIDDLE CEREBRAL ARTERY CIRCULATION:  M1 segment and middle cerebral artery branches demonstrate normal enhancement bilaterally  DISTAL VERTEBRAL ARTERIES:  Normal distal vertebral arteries  Posterior inferior cerebellar artery origins are normal  Normal vertebral basilar junction  BASILAR ARTERY:  Basilar artery is normal in caliber  Normal superior cerebellar arteries  POSTERIOR CEREBRAL ARTERIES: Both posterior cerebral arteries arises from the basilar tip  The P1 segments are hypoplastic bilaterally  Normal-appearing P2 segments bilaterally  Normal posterior communicating arteries  DURAL VENOUS SINUSES:  Normal  NON VASCULAR ANATOMY BONY STRUCTURES:  Straightening and reversal of the cervical lordotic curvature  Disc space narrowing diffusely throughout the cervical spine  Moderate degenerative changes throughout the cervical spine  SOFT TISSUES OF THE NECK:  Unremarkable  THORACIC INLET:  Unremarkable  Impression: 1  Intact [place]  No evidence of vessel cut off or filling defects  2   No evidence of carotid or vertebral artery dissection   Findings were directly discussed with STACY GIBSON on 11/30/2019 9:40 AM  Workstation performed: [id number]       LABS  Results from last 7 days   Lab Units 12/10/19  0520 12/07/19  0537 12/04/19  0444   WBC Thousand/uL 11 15* 11 44* 13 30*   HEMOGLOBIN g/dL 9 6* 9 9* 9 5*   HEMATOCRIT % 31 3* 31 5* 29 8*   MCV fL 88 87 86   PLATELETS Thousands/uL 300 441* 432*     Results from last 7 days   Lab Units 12/10/19  0520 12/09/19  0508 12/08/19  0630 12/07/19  0537 12/06/19  0552 12/05/19  0508 12/04/19  0444   SODIUM mmol/L 142 143 142 144 143 144 136   POTASSIUM mmol/L 4 5 4 6 4 6 4 7 5 1 4 8 4 9   CHLORIDE mmol/L 104 105 105 107 107 109* 101   CO2 mmol/L 35* 35* 33* 32 28 26 23   BUN mg/dL 37* 39* 41* 45* 51* 61* 75*   CREATININE mg/dL 2 27* 2 32* 2 36* 2 39* 2 52* 2 97* 3 79*   CALCIUM mg/dL 8 5 8 6 8 8 8 7 8 9 8 8 8 3   ALBUMIN g/dL  --   --   --   --   --   --  2 9*   TOTAL BILIRUBIN mg/dL  --   --   --   --   --   --  0 19* ALK PHOS U/L  --   --   --   --   --   --  83   ALT U/L  --   --   --   --   --   --  45   AST U/L  --   --   --   --   --   --  33   EGFR ml/min/1 73sq m 36 35 34 34 31 26 19   GLUCOSE RANDOM mg/dL 92 104 104 108 149* 153* 147*     Results from last 7 days   Lab Units 12/04/19  0444   CK TOTAL U/L 236   CK MB INDEX % 1 3              Results from last 7 days   Lab Units 12/10/19  1050 12/10/19  0716 12/09/19  2040 12/09/19  1608 12/09/19  1120 12/09/19  0732 12/08/19  2044 12/08/19  1554 12/08/19  1128 12/07/19  2132   POC GLUCOSE mg/dl 161* 81 110 159* 151* 136 148* 177* 98 168*                       Cultures:         Invalid input(s): URIBILINOGEN        Results from last 7 days   Lab Units 12/09/19  0929   SPUTUM CULTURE  Culture too young- will reincubate   GRAM STAIN RESULT  1+ Epithelial cells per low power field*  3+ Polys*  4+ Gram negative rods*  4+ Yeast*       PHYSICAL EXAM:  Vitals:   Blood Pressure: 139/67 (12/10/19 0717)  Pulse: 75 (12/10/19 0717)  Temperature: 98 7 °F (37 1 °C) (12/10/19 0717)  Temp Source: Temporal (12/10/19 0717)  Respirations: 20 (12/10/19 0717)  Weight - Scale: 68 kg (149 lb 14 6 oz) (11/29/19 2012)  SpO2: 97 % (12/10/19 0728)      General: well appearing, no acute distress  HEENT: atraumatic, PERRLA, moist mucosa, normal pharynx, normal tonsils and adenoids, normal tongue, no fluid in sinuses  Neck: Trachea midline, no carotid bruit, no masses  Respiratory:  Expiratory wheeze  Cardiovascular: RRR, no m/r/g, Normal S1 and S2  Abdomen: Soft, non-tender, non-distended, normal bowel sounds in all quadrants, no hepatosplenomegaly, no tympany  Rectal: deferred  Musculoskeletal: normal ROM in upper and lower extremities  Integumentary: warm, dry, and pink, with no rash, purpura, or petechia  Heme/Lymph: no lymphadenopathy, no bruises  Neurological: Cranial Nerves II-XII grossly intact, no tics, normal sensation to pressure and light touch  Psychiatric: cooperative with normal mood, affect, and cognition      Discharge Disposition: Non [facility] Acute Rehab      Test Results Pending at Discharge:    Order Current Status    Sputum culture and Gram stain Preliminary result              Medications   · Summary of Medication Adjustments made as a result of this hospitalization:   · Medication Dosing Tapers - Please refer to Discharge Medication List for details on any medication dosing tapers (if applicable to patient)  · Discharge Medication List: See after visit summary for reconciled discharge medications  Diet restrictions: Activity restrictions: No strenuous activity  Discharge Condition: good    Outpatient Follow-Up and Discharge Instructions  See after visit summary section titled Discharge Instructions for information provided to patient and family  Code Status: Prior  Discharge Statement   I spent 35 minutes discharging the patient  This time was spent on the day of discharge  Greater than 50% of total time was spent with the patient and / or family counseling and / or coordination of care  ** Please Note: This note has been constructed using a voice recognition system   **

## 2019-12-11 NOTE — ASSESSMENT & PLAN NOTE
Lab Results   Component Value Date    HGBA1C 5 6 10/23/2019       Recent Labs     12/09/19  1608 12/09/19  2040 12/10/19  0716 12/10/19  1050   POCGLU 159* 110 81 161*       Blood Sugar Average: Last 72 hrs:  (P) 215 [id number] hemoglobin A1C stable off medications x 6 months   Monitor glucose in the setting of steroid use-patient with currently mildly hyperglycemic readings due to IV Solu-Medrol  Resume Metformin at discharge  Continue sliding scale insulin, diabetic diet, Accu-Cheks q a c  And HS

## 2019-12-11 NOTE — TELEPHONE ENCOUNTER
Received a phone call from Beto Lester at [facility] [place]  Beto Lester was verifying the F/U appt  Times for pt  Pt  Is to have F/U OV and chemo on 12/20/19  Reviewed the orders regarding the Prednisone and Protonix

## 2019-12-11 NOTE — ASSESSMENT & PLAN NOTE
Currently follows up with Dr Roz Kaur of Oncology in the outpatient setting    Also may cause Pneumonitis, does not appear to be underlying etiology at this time - Needs to be monitored closely at discharge

## 2019-12-11 NOTE — ASSESSMENT & PLAN NOTE
Patient presented with two weeks of worsening SOB  Still has considerable dyspnea and was seen sitting at the edge of the bed bed leaning forward this morning  Transitioned to oral steriods  COPD is severe in the setting of Stage IV lung cancer  Pulmonology, will need outpatient f/u  Continue incentive spirometry      Overall prognosis is poor -    High Risk for readmission

## 2019-12-12 ENCOUNTER — TELEPHONE (OUTPATIENT)
Dept: PULMONOLOGY | Facility: CLINIC | Age: 59
End: 2019-12-12

## 2019-12-12 ENCOUNTER — NURSING HOME VISIT (OUTPATIENT)
Dept: GERIATRICS | Facility: OTHER | Age: 59
End: 2019-12-12
Payer: COMMERCIAL

## 2019-12-12 DIAGNOSIS — E11.9 TYPE 2 DIABETES MELLITUS WITHOUT COMPLICATION, WITHOUT LONG-TERM CURRENT USE OF INSULIN (HCC): ICD-10-CM

## 2019-12-12 DIAGNOSIS — C34.91 ADENOCARCINOMA OF LUNG, RIGHT (HCC): Primary | ICD-10-CM

## 2019-12-12 DIAGNOSIS — G89.4 CHRONIC PAIN DISORDER: ICD-10-CM

## 2019-12-12 DIAGNOSIS — K21.9 GASTROESOPHAGEAL REFLUX DISEASE WITHOUT ESOPHAGITIS: ICD-10-CM

## 2019-12-12 DIAGNOSIS — I10 ESSENTIAL HYPERTENSION: ICD-10-CM

## 2019-12-12 DIAGNOSIS — N28.9 RENAL DYSFUNCTION: ICD-10-CM

## 2019-12-12 DIAGNOSIS — N18.30 STAGE 3 CHRONIC KIDNEY DISEASE (HCC): ICD-10-CM

## 2019-12-12 DIAGNOSIS — F31.9 BIPOLAR 1 DISORDER (HCC): ICD-10-CM

## 2019-12-12 DIAGNOSIS — G62.9 PERIPHERAL POLYNEUROPATHY: ICD-10-CM

## 2019-12-12 DIAGNOSIS — C34.91 ADENOCARCINOMA OF LUNG, RIGHT (HCC): ICD-10-CM

## 2019-12-12 DIAGNOSIS — J44.1 CHRONIC OBSTRUCTIVE PULMONARY DISEASE WITH ACUTE EXACERBATION (HCC): ICD-10-CM

## 2019-12-12 DIAGNOSIS — I48.21 PERMANENT ATRIAL FIBRILLATION (HCC): ICD-10-CM

## 2019-12-12 LAB
BACTERIA SPT RESP CULT: ABNORMAL
GRAM STN SPEC: ABNORMAL

## 2019-12-12 PROCEDURE — 99305 1ST NF CARE MODERATE MDM 35: CPT | Performed by: FAMILY MEDICINE

## 2019-12-12 NOTE — ASSESSMENT & PLAN NOTE
Lab Results   Component Value Date    HGBA1C 5 6 10/23/2019   BS overall stable, no Metformin, will continue with monitoring

## 2019-12-12 NOTE — ASSESSMENT & PLAN NOTE
On Oxycodone, and Oxycontin, will continue with same medication and monitoring  Script for both meds written on 12/10/19

## 2019-12-12 NOTE — ASSESSMENT & PLAN NOTE
Needs O2 24/7, will consult advanced disease management, f/u with hem/onc for chemotherapy  Will continue with neb treatment  On prednisone now by hem/onc

## 2019-12-12 NOTE — PROGRESS NOTES
[facility]  [address] [place] [address]  Facility: [address]/    NAME: Tobi Blackwood Sr  AGE: 64 y o  SEX: male    DATE OF ENCOUNTER: 12/12/2019    Code status:  CPR  D/W pt the code status and he wants to be full code    Assessment and Plan     Adenocarcinoma of lung, right ([facility] [address] )  Needs O2 24/7, will consult advanced disease management, f/u with hem/onc for chemotherapy  Will continue with neb treatment  On prednisone now by hem/onc  Type 2 diabetes mellitus without complication, without long-term current use of insulin (HCC)    Lab Results   Component Value Date    HGBA1C 5 6 10/23/2019   BS overall stable, no Metformin, will continue with monitoring  Essential hypertension  BP is stable with Amlodipine  Peripheral neuropathy  On Lyrica, script for medication was written on 12/10/19  Bipolar 1 disorder (HCC)  On Seroquel, and Valproic acid,     Chronic pain disorder  On Oxycodone, and Oxycontin, will continue with same medication and monitoring  Script for both meds written on 12/10/19  Stage 3 chronic kidney disease ([facility] [address] )  Has BMP for next week  Gastroesophageal reflux disease without esophagitis  On Protonix, will continue with monitoring  Chronic obstructive pulmonary disease with acute exacerbation ([facility] [address] )  Will continue with breathing treatment and steroid  Permanent atrial fibrillation  Rate controlled, currently regular HR, on Eliquis and Diltiazem  All medications and routine orders were reviewed and updated as needed  Plan discussed with: Patient    Chief Complaint     Seen for admission at [facility] [address], pt has no specific complaint at this time except CLINE    History of Present Illness   Pt with [address] and medical problem who was admitted to [facility] on 11/29/19 with COPD exacerbation, and Lung CA and now is at [facility] for [address] [place] since 12/10/19    Pt is on O2, reports some CLINE, no pain currently but per staff he was complaint of pain earlier today of 5/10, BS and BP have been overall stable  Pt had f/u with hme/onc for chemotherapy yesterday but he was very lethargic so chemo was held, and his steroid was switched from Dexamethasone to Prednisone, pt was sleeping prior to examination but then was awake and alert  Per staff pt is refusing his scheduled Miralax  Pt's sputum was positive for pseudomonas, pt was treated with Ceftriaxone for bronchitis  Pt is currently asymptomatic  Pt also had NURIA/CKD, and was seen by nephrologist at the hospital   Had elevated D-dimer but venous doppler of LEs was negative  Had Stroke like alert at the hospital but all imaging were negative       HISTORY:  Past Medical History:   Diagnosis Date    Bipolar 1 disorder ([facility] [address] )     Cancer ([facility] [address] )     adeno lung  dx 11/2018-lung bx today 4/9/2019-ongoing chemo    Chronic pain disorder     back and right shoulder    CKD (chronic kidney disease)     COPD (chronic obstructive pulmonary disease) (HCC)     Depression     Diabetes mellitus (HCC)     GERD (gastroesophageal reflux disease)     Herniated lumbar intervertebral disc     Hypertension     Insomnia     Latent syphilis     Treated    Low back pain     Lumbosacral disc disease     Lung cancer ([facility] ) 04/2019    Pneumothorax after biopsy     R lung    PTSD (post-traumatic stress disorder)     Pulmonary emphysema ([facility] [address] )     Tobacco abuse 11/13/2018     Family History   Problem Relation Age of Onset    Heart disease Mother     Cancer Mother     Hypertension Father     Diabetes Family     Asthma Family     Heart disease Family     Cancer Family     Cancer Maternal Grandfather     Cancer Paternal Grandfather     Cancer Maternal Aunt      Social History     Socioeconomic History    Marital status: Legally      Spouse name: None    Number of children: None    Years of education: None    Highest education level: None   Occupational History    Occupation: part time employment   Social Needs    Financial resource strain: None    Food insecurity:     Worry: None     Inability: None    Transportation needs:     Medical: None     Non-medical: None   Tobacco Use    Smoking status: Former Smoker     Packs/day: 0 50     Years: 40 00     Pack years: 20 00     Types: Cigarettes     Start date:      Last attempt to quit: 2019     Years since quittin 3    Smokeless tobacco: Never Used   Substance and Sexual Activity    Alcohol use: Not Currently    Drug use: Not Currently     Types: Marijuana     Comment: stopped , "i smoked weed and stopped 1 month ago"    Sexual activity: Yes   Lifestyle    Physical activity:     Days per week: None     Minutes per session: None    Stress: None   Relationships    Social connections:     Talks on phone: None     Gets together: None     Attends Temple service: None     Active member of club or organization: None     Attends meetings of clubs or organizations: None     Relationship status: None    Intimate partner violence:     Fear of current or ex partner: None     Emotionally abused: None     Physically abused: None     Forced sexual activity: None   Other Topics Concern    None   Social History Narrative    Lives with girlfriend       Allergies: Allergies   Allergen Reactions    Lisinopril Anaphylaxis     Took medication a while ago and had a "swelling reaction"  Does not carry epi-pen       Review of Systems     Review of Systems   Constitutional: Negative          " I couldn't sleep last night because my roommate was yelling"   HENT: Negative  Eyes: Negative  Respiratory: Negative  Cardiovascular: Negative  Gastrointestinal: Negative  Endocrine: Negative  Genitourinary: Negative  Musculoskeletal: Negative  Skin: Negative  Allergic/Immunologic: Negative  Neurological: Negative  Hematological: Negative  Psychiatric/Behavioral: Negative      All other systems reviewed and are negative  Medications and orders     All medications reviewed and updated in Nursing Home EMR  Objective     Vitals: Wt:151 6Ibs   BP:108/78   SAO2:95% on O2  BS log was reviewed    Physical Exam   Constitutional: He is oriented to person, place, and time  He appears well-developed and well-nourished  No distress  HENT:   Head: Normocephalic and atraumatic  Right Ear: External ear normal    Left Ear: External ear normal    Nose: Nose normal    Mouth/Throat: Oropharynx is clear and moist  No oropharyngeal exudate  Eyes: Pupils are equal, round, and reactive to light  Conjunctivae and EOM are normal  Right eye exhibits no discharge  Left eye exhibits no discharge  No scleral icterus  Neck: Normal range of motion  Neck supple  Cardiovascular: Normal rate, regular rhythm and normal heart sounds  Exam reveals no gallop and no friction rub  No murmur heard  Currently regular  Pulmonary/Chest: Effort normal  No stridor  No respiratory distress  He has no wheezes  He has no rales  He exhibits no tenderness  Diminished BS  Abdominal: Soft  Bowel sounds are normal  He exhibits no distension and no mass  There is no tenderness  There is no rebound and no guarding  No hernia  Genitourinary:   Genitourinary Comments: deferred   Musculoskeletal: Normal range of motion  He exhibits no edema, tenderness or deformity  In bed, lying, limited ROM  Lymphadenopathy:     He has no cervical adenopathy  Neurological: He is alert and oriented to person, place, and time  No cranial nerve deficit  Skin: Skin is warm and dry  No rash noted  He is not diaphoretic  No erythema  No pallor  Didn't examine sacral area  Psychiatric: He has a normal mood and affect  His behavior is normal    Nursing note and vitals reviewed  Pertinent Laboratory/Diagnostic Studies: The following labs/studies were reviewed please see chart or hospital paperwork for details       Ref Range & Units 12/10/19 0520   WBC 4 31 - 10 16 Thousand/uL 11  15High     RBC 3 88 - 5 62 Million/uL 3 56Low     Hemoglobin 12 0 - 17 0 g/dL 9 6Low     Hematocrit 36 5 - 49 3 % 31 3Low     MCV 82 - 98 fL 88    MCH 26 8 - 34 3 pg 27 0    MCHC 31 4 - 37 4 g/dL 30 7Low     RDW 11 6 - 15 1 % 15  6High     MPV 8 9 - 12 7 fL 9 6    Platelets 741 - 980 Thousands/uL 300    nRBC /100 WBCs 0    Neutrophils Relative 43 - 75 % 73    Immat GRANS % 0 - 2 % 2    Lymphocytes Relative 14 - 44 % 16    Monocytes Relative 4 - 12 % 9    Eosinophils Relative 0 - 6 % 0    Basophils Relative 0 - 1 % 0    Neutrophils Absolute 1 85 - 7 62 Thousands/µL 8  22High     Immature Grans Absolute 0 00 - 0 20 Thousand/uL 0 18    Lymphocytes Absolute 0 60 - 4 47 Thousands/µL 1 76    Monocytes Absolute 0 17 - 1 22 Thousand/µL 0 97    Eosinophils Absolute 0 00 - 0 61 Thousand/µL 0 01    Basophils Absolute 0 00 - 0 10 Thousands/µL 0 01          Specimen Collected: 12/10/19 05:20   Last Resulted: 12/10/19 06:09           Ref Range & Units 12/10/19 0520    Sodium 136 - 145 mmol/L 142    Potassium 3 5 - 5 3 mmol/L 4 5    Chloride 100 - 108 mmol/L 104    CO2 21 - 32 mmol/L 35High     ANION GAP 4 - 13 mmol/L 3Low     BUN 5 - 25 mg/dL 37High     Creatinine 0 60 - 1 30 mg/dL 2  27High     Comment: Standardized to IDMS reference method   Glucose 65 - 140 mg/dL 92    Comment:    If the patient is fasting, the ADA then defines impaired fasting glucose as > 100 mg/dL and diabetes as > or equal to 123 mg/dL  Specimen collection should occur prior to Sulfasalazine administration due to the potential for falsely depressed results  Specimen collection should occur prior to Sulfapyridine administration due to the potential for falsely elevated results  Calcium 8 3 - 10 1 mg/dL 8 5    eGFR ml/min/1 73sq m 36      CHEST      INDICATION:   pneumonia      COMPARISON:  11/30/2019     EXAM PERFORMED/VIEWS:  XR CHEST PORTABLE        FINDINGS:  Right chest wall port      Stable cardiac mediastinal silhouette  Stable prominent right hilum      The lungs are clear  No pneumothorax or pleural effusion      Osseous structures appear within normal limits for patient age      IMPRESSION:  Stable exam   No consolidation  [facility] REPORT  CLINICAL:  Indications: The patient was admitted with shortness of breath, exacerbation of  COPD  He denies leg pain or swelling  Risk Factors  The patient has history of HTN, Diabetes (Yes), CKD and CAD  FINDINGS:     Segment  Right            Left                Impression       Impression         CFV      Normal (Patent)  Normal (Patent)             CONCLUSION:  Impression:  RIGHT LOWER LIMB:  No evidence of acute or chronic deep vein thrombosis  No evidence of superficial thrombophlebitis noted  Doppler evaluation shows a normal response to augmentation maneuvers  Popliteal, posterior tibial and anterior tibial arterial Doppler waveforms are  triphasic/monophasic  LEFT LOWER LIMB:  No evidence of acute or chronic deep vein thrombosis  No evidence of superficial thrombophlebitis noted  Doppler evaluation shows a normal response to augmentation maneuvers  Popliteal, posterior tibial and anterior tibial arterial Doppler waveforms are  Triphasic  MRI BRAIN WITHOUT CONTRAST     INDICATION: Ataxia, stroke suspected      COMPARISON:   1/10/2019 and CT from 11/30/2019     TECHNIQUE:  Sagittal T1, axial T2, axial FLAIR, axial T1, axial [place] and axial diffusion imaging      IMAGE QUALITY:  The study is motion degraded      FINDINGS:     BRAIN PARENCHYMA:  There is no discrete mass, mass effect or midline shift  There is no intracranial hemorrhage  There is no evidence of acute infarction and diffusion imaging is unremarkable  There are no white matter changes in the cerebral   hemispheres       There is perimesencephalic FLAIR artifact as this is not clearly seen on the pulse sequences and was not present on prior CT        VENTRICLES:  Normal for the patient's age      SELLA AND PITUITARY GLAND:  Normal      ORBITS:  Normal      PARANASAL SINUSES:  There is subtotal opacification of the left sphenoid sinus  There are frothy secretions within the right sphenoid sinus  There is mucosal thickening of left maxillary sinus and a retention cyst versus polyp in the right maxillary   sinus  There is patchy ethmoid air cell and frontal sinus mucosal thickening  Previously seen polypoid soft tissue within the anterior right nasal passage has increased in size since prior examination measuring 3 0 x 1 4 cm and previously measured 2 2   x 1 2 cm     There are right greater than left bilateral mastoid air cell effusions      VASCULATURE:  Evaluation of the major intracranial vasculature demonstrates appropriate flow voids      CALVARIUM AND SKULL BASE:  Normal      EXTRACRANIAL SOFT TISSUES:  Normal      IMPRESSION:     No mass effect, acute intracranial hemorrhage or evidence of recent infarction      Paranasal sinus disease, as described above  Frothy secretions within the right sphenoid sinus should be correlated clinically for the presence of acute sinusitis      Polypoid soft tissue within the anterior right nasal passage has slightly increased in size since 1/10/2019  Correlate with direct visual inspection      Workstation performed: [id number]    CHEST      INDICATION:   suspect stroke      COMPARISON:  11/29/2019     EXAM PERFORMED/VIEWS:  XR STROKE ALERT PORTABLE CHEST        FINDINGS:  Right-sided Port-A-Cath projects appropriately      Cardiomediastinal silhouette appears unremarkable      No congestive failure  No pneumothorax  No effusions  No pneumonia  Right hilar fullness is again identified with thickening of the minor fissure within the lateral aspect of the midlung fields, unchanged      Osseous structures appear within normal limits for patient age      IMPRESSION:     Stable examination    Persistent right hilar fullness with suspected scarring along the minor fissure   CTA NECK AND BRAIN WITH CONTRAST     INDICATION: Neuro deficit, acute, stroke suspected slurred speech  Dysphagia      COMPARISON:   CT brain October 16, 2015 and PET/CT scan November 13, 2019      TECHNIQUE:   Post contrast imaging was performed after administration of iodinated contrast through the neck and brain  Post contrast axial 0 625 mm images timed to opacify the arterial system        3D rendering was performed on an independent workstation  MIP reconstructions performed  Coronal reconstructions were performed of the noncontrast portion of the brain        Radiation dose length product (DLP) for this visit:  483 92 mGy/cm  This examination, like all CT scans performed in the [facility], was performed utilizing techniques to minimize radiation dose exposure, including the use of iterative   reconstruction and automated exposure control         IV Contrast:  85 mL Visipaque 320     IMAGE QUALITY:   Diagnostic           FINDINGS:  CERVICAL VASCULATURE  AORTIC ARCH AND GREAT VESSELS:  Mild athersclerotic disease of the arch, proximal great vessels and visualized subclavian vessels  No significant stenosis           RIGHT VERTEBRAL ARTERY CERVICAL SEGMENT:  Normal origin  The vessel is normal in caliber throughout the neck         LEFT VERTEBRAL ARTERY CERVICAL SEGMENT:  Normal origin  The vessel is normal in caliber throughout the neck               RIGHT EXTRACRANIAL CAROTID SEGMENT:  Normal caliber common carotid artery  Normal bifurcation and cervical internal carotid artery  No stenosis or dissection         LEFT EXTRACRANIAL CAROTID SEGMENT:  Normal caliber common carotid artery  Normal bifurcation and cervical internal carotid artery  No stenosis or dissection         NASCET criteria was used to determine the degree of internal carotid artery diameter stenosis                INTRACRANIAL VASCULATURE   INTERNAL CAROTID ARTERIES:  Normal enhancement of the intracranial portions of the internal carotid arteries  Normal ophthalmic artery origins  Normal ICA terminus          ANTERIOR CIRCULATION:  Symmetric A1 segments and anterior cerebral arteries with normal enhancement  Normal anterior communicating artery         MIDDLE CEREBRAL ARTERY CIRCULATION:  M1 segment and middle cerebral artery branches demonstrate normal enhancement bilaterally         DISTAL VERTEBRAL ARTERIES:  Normal distal vertebral arteries  Posterior inferior cerebellar artery origins are normal  Normal vertebral basilar junction         BASILAR ARTERY:  Basilar artery is normal in caliber  Normal superior cerebellar arteries         POSTERIOR CEREBRAL ARTERIES: Both posterior cerebral arteries arises from the basilar tip  The P1 segments are hypoplastic bilaterally  Normal-appearing P2 segments bilaterally  Normal posterior communicating arteries            DURAL VENOUS SINUSES:  Normal               NON VASCULAR ANATOMY  BONY STRUCTURES:  Straightening and reversal of the cervical lordotic curvature  Disc space narrowing diffusely throughout the cervical spine  Moderate degenerative changes throughout the cervical spine         SOFT TISSUES OF THE NECK:  Unremarkable         THORACIC INLET:  Unremarkable            IMPRESSION:  1  Intact [place]  No evidence of vessel cut off or filling defects  2   No evidence of carotid or vertebral artery dissection            Findings were directly discussed with STACY GIBSON on 11/30/2019 9:40 AM         Qasim Moon MD  12/12/2019 11:46 AM      Name: Alejandra Valencia: 1960  MRN: [id number]  DOS: 12/12/2019  BILLING CODE: [id number]  Diagnoses:   Diagnosis ICD-10-CM Associated Orders   1  Adenocarcinoma of lung, right (HCC) C34 91    2  Type 2 diabetes mellitus without complication, without long-term current use of insulin (HCC) E11 9    3  Essential hypertension I10    4  Peripheral polyneuropathy G62 9    5   Bipolar 1 disorder ([facility] ) F31 9    6  Chronic pain disorder G89 4    7  Stage 3 chronic kidney disease (HCC) N18 3    8  Gastroesophageal reflux disease without esophagitis K21 9    9  Chronic obstructive pulmonary disease with acute exacerbation (HCC) J44 1    10   Permanent atrial fibrillation I48 21

## 2019-12-13 ENCOUNTER — TELEPHONE (OUTPATIENT)
Dept: PULMONOLOGY | Facility: CLINIC | Age: 59
End: 2019-12-13

## 2019-12-13 NOTE — TELEPHONE ENCOUNTER
[address] [place] [address] 289 and talked to Lani Mcnair RN that patient recent sputum culture was positive for Pseudomonas  Discussed with registered nurse that Particia Jaci should begin Bactrim 800/160 mg b i d  X 7 days  Confirmed no allergy to Bactrim

## 2019-12-17 ENCOUNTER — OFFICE VISIT (OUTPATIENT)
Dept: NEPHROLOGY | Facility: CLINIC | Age: 59
End: 2019-12-17
Payer: COMMERCIAL

## 2019-12-17 VITALS
DIASTOLIC BLOOD PRESSURE: 60 MMHG | HEIGHT: 68 IN | BODY MASS INDEX: 22.13 KG/M2 | WEIGHT: 146 LBS | SYSTOLIC BLOOD PRESSURE: 108 MMHG

## 2019-12-17 DIAGNOSIS — C34.91 ADENOCARCINOMA OF LUNG, RIGHT (HCC): ICD-10-CM

## 2019-12-17 DIAGNOSIS — E87.5 HYPERKALEMIA: ICD-10-CM

## 2019-12-17 DIAGNOSIS — N17.9 ACUTE RENAL FAILURE, UNSPECIFIED ACUTE RENAL FAILURE TYPE (HCC): Primary | ICD-10-CM

## 2019-12-17 DIAGNOSIS — I12.9 HYPERTENSIVE CHRONIC KIDNEY DISEASE WITH STAGE 1 THROUGH STAGE 4 CHRONIC KIDNEY DISEASE, OR UNSPECIFIED CHRONIC KIDNEY DISEASE: ICD-10-CM

## 2019-12-17 DIAGNOSIS — N18.30 STAGE 3 CHRONIC KIDNEY DISEASE (HCC): ICD-10-CM

## 2019-12-17 PROCEDURE — 99214 OFFICE O/P EST MOD 30 MIN: CPT | Performed by: INTERNAL MEDICINE

## 2019-12-17 RX ORDER — SULFAMETHOXAZOLE AND TRIMETHOPRIM 400; 80 MG/1; MG/1
1 TABLET ORAL EVERY 12 HOURS SCHEDULED
COMMUNITY
End: 2020-01-14

## 2019-12-17 RX ORDER — SODIUM CHLORIDE 9 MG/ML
20 INJECTION, SOLUTION INTRAVENOUS ONCE
Status: CANCELLED | OUTPATIENT
Start: 2020-01-08

## 2019-12-17 NOTE — LETTER
December 17, 2019     Yoshi Ahn MD  [address]    Patient: Kevin Burleson  YOB: 1960   Date of Visit: 12/17/2019       Dear Dr Danny Brown: Thank you for referring Norva Schirmer to me for evaluation  Below are the relevant portions of my assessment and plan of care  If you have questions, please do not hesitate to call me  I look forward to following Isabella Morales along with you  Sincerely,        Blu Boyd,         CC: BROOKS Perez                     ASSESSMENT and PLAN:  1  Acute kidney injury on chronic kidney disease, peak creatinine and Hospital approximately 4 0, suspected secondary to prerenal azotemia given improvement in IV fluids  Urinalysis was bland  2  Chronic kidney disease stage 3/stage IV, etiology suspected secondary to previous chemotherapy including carboplatin, Alimita, Keytruda  3  Hyperkalemia secondary to chronic kidney disease plus Bactrim use, status post Kayexalat potassium at 4 8, continue low-potassium  4  History of lung cancer chemotherapy as per Oncology currently on Keytruda  5  History of COPD, currently on oral prednisone as per pulmonology  6  Recent upper respiratory tract infection secondary to Pseudomonas, again currently on Bactrim    · Overall renal function appears to plateaued with a creatinine of 2 6, estimated GFR 25  Although certainly may not be as baseline as he is currently receiving Bactrim  · Avoid further Bactrim given his advanced CKD as well as intermittent hyperkalemia prefer alternate antibiotic regimen  · Continue monitor renal function closely recommend repeating BMP next week with monthly laboratory studies  · Patient has a planned appointment with Dr Juan Manuel fenton in January and would keep that appointment given his function renal function and intermittent hyperkalemia

## 2019-12-17 NOTE — PROGRESS NOTES
NEPHROLOGY OUTPATIENT PROGRESS NOTE   Chandler Yan Sr  64 y o  male MRN: [id number]  Reason for visit:  Acute on chronic kidney disease    ASSESSMENT and PLAN:  1  Acute kidney injury on chronic kidney disease, peak creatinine and Hospital approximately 4 0, suspected secondary to prerenal azotemia given improvement in IV fluids  Urinalysis was bland  2  Chronic kidney disease stage 3/stage IV, etiology suspected secondary to previous chemotherapy including carboplatin, Alimita, Keytruda  3  Hyperkalemia secondary to chronic kidney disease plus Bactrim use, status post Kayexalat potassium at 4 8, continue low-potassium  4  History of lung cancer chemotherapy as per Oncology currently on Keytruda  5  History of COPD, currently on oral prednisone as per pulmonology  6  Recent upper respiratory tract infection secondary to Pseudomonas, again currently on Bactrim    · Overall renal function appears to plateaued with a creatinine of 2 6, estimated GFR 25  Although certainly may not be as baseline as he is currently receiving Bactrim  · Avoid further Bactrim given his advanced CKD as well as intermittent hyperkalemia prefer alternate antibiotic regimen  · Continue monitor renal function closely recommend repeating BMP next week with monthly laboratory studies  · Patient has a planned appointment with Dr Zachary fenton in January and would keep that appointment given his function renal function and intermittent hyperkalemia  SUBJECTIVE / INTERVAL HISTORY:  Jorge Galvan is a 72-year-old male who was recently admitted to [facility] with complaints dyspnea  At that time he was diagnosed with acute exacerbation of COPD as well as possible pneumonia  Also that time had significant renal failure with peak creatinine of 4 0  Creatinine did improve with IV fluids, etiology was attributed to prerenal azotemia  Patient currently is now in rehab  Currently being treated for an upper respiratory tract infection with Bactrim  Creatinine most recently in 2 6 with a potassium of 5 9  Patient currently is weak and tired currently using wheelchair although states he is able to walk at times  Denies any worsening shortness of breath currently is on nasal cannula  No active chest pain or pressure  Denies any significant cough or sputum production  States his appetite has been normal without nausea vomiting or diarrhea  Denies any lower extremity swelling  Denies any dizziness lightheadedness or falls  Review of Systems      OBJECTIVE:  /60 (BP Location: Left arm, Patient Position: Sitting, Cuff Size: Adult)   Ht 5' 8" (1 727 m)   Wt 66 2 kg (146 lb)   BMI 22 20 kg/m²   Vitals:    12/17/19 1508   Weight: 66 2 kg (146 lb)       Physical Exam   Constitutional: He is oriented to person, place, and time  No distress  HENT:   Head: Normocephalic  Eyes: No scleral icterus  Cardiovascular: Normal rate and regular rhythm  Pulmonary/Chest: Effort normal and breath sounds normal    Abdominal: Soft  He exhibits no distension  There is no tenderness  Musculoskeletal: He exhibits no edema or tenderness  Neurological: He is alert and oriented to person, place, and time  Skin: Skin is warm and dry  No rash noted  Psychiatric: He has a normal mood and affect           Medications:    Current Outpatient Medications:     albuterol (2 5 mg/3 mL) 0 083 % nebulizer solution, Take 1 vial (2 5 mg total) by nebulization every 4 (four) hours as needed for wheezing or shortness of breath, Disp: 180 vial, Rfl: 5    albuterol (VENTOLIN HFA) 90 mcg/act inhaler, Inhale 2 puffs every 4 (four) hours as needed for wheezing, Disp: 1 Inhaler, Rfl: 5    amLODIPine (NORVASC) 10 mg tablet, Take 1 tablet (10 mg total) by mouth daily, Disp: 30 tablet, Rfl: 1    apixaban (ELIQUIS) 5 mg, Take 1 tablet (5 mg total) by mouth 2 (two) times a day, Disp: 30 tablet, Rfl: 0    budesonide (PULMICORT) 0 5 mg/2 mL nebulizer solution, Take 1 vial (0 5 mg total) by nebulization every 12 (twelve) hours Rinse mouth after use , Disp: 1 vial, Rfl: 0    diltiazem (CARDIZEM CD) 120 mg 24 hr capsule, Take 1 capsule (120 mg total) by mouth daily, Disp: 30 capsule, Rfl: 0    divalproex sodium (DEPAKOTE) 250 mg EC tablet, Take 1 tablet (250 mg total) by mouth 2 (two) times a day, Disp: 30 tablet, Rfl: 0    ergocalciferol (ERGOCALCIFEROL) 15049 units capsule, Take 1 capsule (50,000 Units total) by mouth once a week, Disp: 12 capsule, Rfl: 0    FLUoxetine (PROzac) 10 MG tablet, Take 1 tablet (10 mg total) by mouth daily, Disp: 30 tablet, Rfl: 5    fluticasone (FLONASE) 50 mcg/act nasal spray, 1-2 sprays each nostril daily for allergies, Disp: 1 Bottle, Rfl: 3    fluticasone-vilanterol (BREO ELLIPTA) 100-25 mcg/inh inhaler, Inhale 1 puff daily in the early morning Rinse mouth after use , Disp: 1 Inhaler, Rfl: 5    folic acid (FOLVITE) 1 mg tablet, Take 1 tablet (1 mg total) by mouth daily, Disp: 30 tablet, Rfl: 2    FREESTYLE LITE test strip, TEST BLOOD SUGAR DAILY, Disp: 100 each, Rfl: 1    guaiFENesin (MUCINEX) 600 mg 12 hr tablet, Take 1 tablet (600 mg total) by mouth every 12 (twelve) hours, Disp: 30 tablet, Rfl: 0    Lancets (FREESTYLE) lancets, TEST BLOOD SUGAR DAILY, Disp: 100 each, Rfl: 4    loratadine (CLARITIN) 10 mg tablet, Take 1 tablet (10 mg total) by mouth daily in the early morning, Disp: 30 tablet, Rfl: 2    melatonin 3 mg, Take 1 tablet (3 mg total) by mouth daily at bedtime, Disp: 30 tablet, Rfl: 1    metFORMIN (GLUCOPHAGE-XR) 500 mg 24 hr tablet, Take 1 tablet (500 mg total) by mouth 2 (two) times a day with meals, Disp: 60 tablet, Rfl: 0    methocarbamol (ROBAXIN) 750 mg tablet, Take 1 tablet (750 mg total) by mouth every 6 (six) hours as needed for muscle spasms, Disp: 90 tablet, Rfl: 0    ondansetron (ZOFRAN) 4 mg tablet, Take 1 tablet (4 mg total) by mouth every 6 (six) hours as needed for nausea or vomiting, Disp: 60 tablet, Rfl: 2   oxyCODONE (OxyCONTIN) 20 mg 12 hr tablet, Take 1 tablet (20 mg total) by mouth every 12 (twelve) hoursMax Daily Amount: 40 mg, Disp: 60 tablet, Rfl: 0    pantoprazole (PROTONIX) 40 mg tablet, Take 1 tablet (40 mg total) by mouth daily, Disp: 30 tablet, Rfl: 5    polyethylene glycol (GLYCOLAX) powder, Take 17 g by mouth daily, Disp: 527 g, Rfl: 1    predniSONE 20 mg tablet, Take 3 tablets (60 mg total) by mouth daily, Disp: 100 tablet, Rfl: 0    pregabalin (LYRICA) 25 mg capsule, Take 1 capsule PO in morning and 2 capsules PO at bedtime, Disp: 90 capsule, Rfl: 0    QUEtiapine (SEROquel) 25 mg tablet, Take 25 mg by mouth daily at bedtime, Disp: , Rfl:     senna-docusate sodium (SENOKOT-S) 8 6-50 mg per tablet, Take 1 tablet by mouth 2 (two) times a day, Disp: 60 tablet, Rfl: 2    sulfamethoxazole-trimethoprim (BACTRIM) 400-80 mg per tablet, Take 1 tablet by mouth every 12 (twelve) hours, Disp: , Rfl:     tamsulosin (FLOMAX) 0 4 mg, Take 1 capsule (0 4 mg total) by mouth daily with dinner, Disp: 90 capsule, Rfl: 3    tiotropium (SPIRIVA RESPIMAT) 2 5 MCG/ACT AERS inhaler, Inhale 2 puffs daily, Disp: 1 Inhaler, Rfl: 2    Blood Glucose Monitoring Suppl KIT, by Does not apply route daily (Patient not taking: Reported on 12/17/2019), Disp: 1 each, Rfl: 0    carbamide peroxide (DEBROX) 6 5 % otic solution, Administer 5 drops into both ears 2 (two) times a day (Patient not taking: Reported on 12/17/2019), Disp: 15 mL, Rfl: 0    dexamethasone (DECADRON) 4 mg tablet, Take 1 tablet (4 mg total) by mouth 2 (two) times a day with meals For 2 days after chemo (Patient not taking: Reported on 12/17/2019), Disp: 4 tablet, Rfl: 3    diphenhydrAMINE (BENADRYL) 25 mg tablet, Take 1 tablet (25 mg total) by mouth every 6 (six) hours as needed (nausea) (Patient not taking: Reported on 12/17/2019), Disp: 30 tablet, Rfl: 0    ipratropium (ATROVENT) 0 02 % nebulizer solution, Take 1 vial (0 5 mg total) by nebulization 3 (three) times a day (Patient not taking: Reported on 12/17/2019), Disp: 90 vial, Rfl: 5    ipratropium-albuterol (DUO-NEB) 0 5-2 5 mg/3 mL nebulizer solution, Take 1 vial (3 mL total) by nebulization every 6 (six) hours (Patient not taking: Reported on 12/17/2019), Disp: 1 vial, Rfl: 0    lidocaine (LMX) 4 % cream, Apply topically as needed for mild pain Apply 1/2-1 inch to port 30-60 mins prior to needle insertion, cover with serrain wrap (Patient not taking: Reported on 12/17/2019), Disp: 30 g, Rfl: 5    oxyCODONE (ROXICODONE) 30 MG immediate release tablet, Take 1 tablet (30 mg total) by mouth every 4 (four) hours as needed for severe painMax Daily Amount: 180 mg (Patient not taking: Reported on 12/17/2019), Disp: 150 tablet, Rfl: 0    pantoprazole (PROTONIX) 40 mg tablet, Take 1 tablet (40 mg total) by mouth daily (Patient not taking: Reported on 12/17/2019), Disp: 30 tablet, Rfl: 5    prochlorperazine (COMPAZINE) 10 mg tablet, Take 1 tablet (10 mg total) by mouth every 6 (six) hours as needed for nausea or vomiting (Patient not taking: Reported on 12/17/2019), Disp: 90 tablet, Rfl: 0    ranitidine (ZANTAC) 150 mg tablet, Take 1 tablet (150 mg total) by mouth 2 (two) times a day (Patient not taking: Reported on 12/17/2019), Disp: 60 tablet, Rfl: 4    REGULOID 28 3 % POWD, USE AS DIRECTED (Patient not taking: Reported on 12/17/2019), Disp: 369 g, Rfl: 4    Selenium Sulfide 2 25 % SHAM, apply by topical route  every PM to the affected area(s), Disp: , Rfl:     sildenafil (VIAGRA) 50 MG tablet, Take 1 tablet (50 mg total) by mouth as needed for erectile dysfunction (Patient not taking: Reported on 12/17/2019), Disp: 6 tablet, Rfl: 0    Laboratory Results:  Results for orders placed or performed during the hospital encounter of 11/29/19   Influenza A/B and RSV PCR   Result Value Ref Range    INFLUENZA A PCR None Detected None Detected    INFLUENZA B PCR None Detected None Detected    RSV PCR None Detected None Detected   Sputum culture and Gram stain   Result Value Ref Range    Sputum Culture 3+ Growth of Pseudomonas aeruginosa (A)     Sputum Culture 3+ Growth of Candida albicans (A)     Sputum Culture 3+ Growth of      Gram Stain Result 1+ Epithelial cells per low power field (A)     Gram Stain Result 3+ Polys (A)     Gram Stain Result 4+ Gram negative rods (A)     Gram Stain Result 4+ Yeast (A)        Susceptibility    Pseudomonas aeruginosa - JONATHON     ZID Performed Yes       Aztreonam ($$$)  <=4 Susceptible ug/ml     Cefepime ($) <=2 00 Susceptible ug/ml     Ceftazidime ($$) <=1 Susceptible ug/ml     Ciprofloxacin ($)  <=1 00 Susceptible ug/ml     Gentamicin ($$) 2 Susceptible ug/ml     Imipenem 2 Susceptible ug/ml     Levofloxacin ($) 2 00 Susceptible ug/ml     Meropenem ($$) <=1 00 Susceptible ug/ml     Piperacillin + Tazobactam ($$$) <=4 Susceptible ug/ml     Tobramycin ($) <=1 Susceptible ug/ml    Candida albicans - JONATHON     ZID Performed Yes     CBC and differential   Result Value Ref Range    WBC 8 81 4 31 - 10 16 Thousand/uL    RBC 4 27 3 88 - 5 62 Million/uL    Hemoglobin 11 5 (L) 12 0 - 17 0 g/dL    Hematocrit 36 2 (L) 36 5 - 49 3 %    MCV 85 82 - 98 fL    MCH 26 9 26 8 - 34 3 pg    MCHC 31 8 31 4 - 37 4 g/dL    RDW 15 1 11 6 - 15 1 %    MPV 9 6 8 9 - 12 7 fL    Platelets 806 092 - 178 Thousands/uL    nRBC 0 /100 WBCs   Basic metabolic panel   Result Value Ref Range    Sodium 136 136 - 145 mmol/L    Potassium 4 1 3 5 - 5 3 mmol/L    Chloride 100 100 - 108 mmol/L    CO2 26 21 - 32 mmol/L    ANION GAP 10 4 - 13 mmol/L    BUN 31 (H) 5 - 25 mg/dL    Creatinine 2 44 (H) 0 60 - 1 30 mg/dL    Glucose 133 65 - 140 mg/dL    Calcium 9 0 8 3 - 10 1 mg/dL    eGFR 33 ml/min/1 73sq m   Troponin I   Result Value Ref Range    Troponin I 0 92 (H) <=0 04 ng/mL   NT-BNP PRO   Result Value Ref Range    NT-proBNP 65 <125 pg/mL   Troponin I   Result Value Ref Range    Troponin I 2 92 (H) <=0 04 ng/mL   Troponin I   Result Value Ref Range    Troponin I 3 58 (H) <=0 04 ng/mL   APTT six (6) hours after Heparin bolus/drip initiation or dosing change   Result Value Ref Range    PTT 37 23 - 37 seconds   Protime-INR   Result Value Ref Range    Protime 14 9 (H) 11 6 - 14 5 seconds    INR 1 15 0 84 - 1 19   Procalcitonin   Result Value Ref Range    Procalcitonin 1 01 (H) <=0 25 ng/ml   D-dimer, quantitative   Result Value Ref Range    D-Dimer, Quant 0 94 (H) <0 50 ug/ml FEU   Basic metabolic panel   Result Value Ref Range    Sodium 137 136 - 145 mmol/L    Potassium 4 3 3 5 - 5 3 mmol/L    Chloride 101 100 - 108 mmol/L    CO2 22 21 - 32 mmol/L    ANION GAP 14 (H) 4 - 13 mmol/L    BUN 30 (H) 5 - 25 mg/dL    Creatinine 2 61 (H) 0 60 - 1 30 mg/dL    Glucose 210 (H) 65 - 140 mg/dL    Calcium 8 8 8 3 - 10 1 mg/dL    eGFR 30 ml/min/1 73sq m   CBC (With Platelets)   Result Value Ref Range    WBC 5 75 4 31 - 10 16 Thousand/uL    RBC 3 93 3 88 - 5 62 Million/uL    Hemoglobin 10 4 (L) 12 0 - 17 0 g/dL    Hematocrit 33 9 (L) 36 5 - 49 3 %    MCV 86 82 - 98 fL    MCH 26 5 (L) 26 8 - 34 3 pg    MCHC 30 7 (L) 31 4 - 37 4 g/dL    RDW 14 9 11 6 - 15 1 %    Platelets 008 626 - 504 Thousands/uL    MPV 10 0 8 9 - 12 7 fL   APTT   Result Value Ref Range    PTT >210 (HH) 23 - 37 seconds   Troponin I   Result Value Ref Range    Troponin I 3 03 (H) <=0 04 ng/mL   Basic metabolic panel   Result Value Ref Range    Sodium 136 136 - 145 mmol/L    Potassium 4 1 3 5 - 5 3 mmol/L    Chloride 103 100 - 108 mmol/L    CO2 24 21 - 32 mmol/L    ANION GAP 9 4 - 13 mmol/L    BUN 32 (H) 5 - 25 mg/dL    Creatinine 2 45 (H) 0 60 - 1 30 mg/dL    Glucose 134 65 - 140 mg/dL    Calcium 8 8 8 3 - 10 1 mg/dL    eGFR 33 ml/min/1 73sq m   APTT   Result Value Ref Range     (H) 23 - 37 seconds   Ammonia   Result Value Ref Range    Ammonia <10 (L) 11 - 35 umol/L   APTT   Result Value Ref Range    PTT 71 (H) 23 - 37 seconds   APTT   Result Value Ref Range    PTT 66 (H) 23 - 37 seconds   Basic metabolic panel   Result Value Ref Range    Sodium 139 136 - 145 mmol/L    Potassium 4 4 3 5 - 5 3 mmol/L    Chloride 104 100 - 108 mmol/L    CO2 24 21 - 32 mmol/L    ANION GAP 11 4 - 13 mmol/L    BUN 34 (H) 5 - 25 mg/dL    Creatinine 2 40 (H) 0 60 - 1 30 mg/dL    Glucose 192 (H) 65 - 140 mg/dL    Calcium 8 5 8 3 - 10 1 mg/dL    eGFR 33 ml/min/1 73sq m   CBC and differential   Result Value Ref Range    WBC 15 95 (H) 4 31 - 10 16 Thousand/uL    RBC 3 50 (L) 3 88 - 5 62 Million/uL    Hemoglobin 9 6 (L) 12 0 - 17 0 g/dL    Hematocrit 30 7 (L) 36 5 - 49 3 %    MCV 88 82 - 98 fL    MCH 27 4 26 8 - 34 3 pg    MCHC 31 3 (L) 31 4 - 37 4 g/dL    RDW 15 3 (H) 11 6 - 15 1 %    MPV 10 0 8 9 - 12 7 fL    Platelets 773 740 - 680 Thousands/uL    nRBC 0 /100 WBCs    Neutrophils Relative 86 (H) 43 - 75 %    Immat GRANS % 2 0 - 2 %    Lymphocytes Relative 6 (L) 14 - 44 %    Monocytes Relative 6 4 - 12 %    Eosinophils Relative 0 0 - 6 %    Basophils Relative 0 0 - 1 %    Neutrophils Absolute 13 77 (H) 1 85 - 7 62 Thousands/µL    Immature Grans Absolute 0 27 (H) 0 00 - 0 20 Thousand/uL    Lymphocytes Absolute 1 02 0 60 - 4 47 Thousands/µL    Monocytes Absolute 0 88 0 17 - 1 22 Thousand/µL    Eosinophils Absolute 0 00 0 00 - 0 61 Thousand/µL    Basophils Absolute 0 01 0 00 - 0 10 Thousands/µL   Magnesium   Result Value Ref Range    Magnesium 2 2 1 6 - 2 6 mg/dL   Phosphorus   Result Value Ref Range    Phosphorus 3 4 2 7 - 4 5 mg/dL   Procalcitonin   Result Value Ref Range    Procalcitonin 0 89 (H) <=0 25 ng/ml   APTT   Result Value Ref Range    PTT 28 23 - 37 seconds   Basic metabolic panel   Result Value Ref Range    Sodium 141 136 - 145 mmol/L    Potassium 4 5 3 5 - 5 3 mmol/L    Chloride 107 100 - 108 mmol/L    CO2 26 21 - 32 mmol/L    ANION GAP 8 4 - 13 mmol/L    BUN 42 (H) 5 - 25 mg/dL    Creatinine 2 50 (H) 0 60 - 1 30 mg/dL    Glucose 137 65 - 140 mg/dL    Calcium 8 7 8 3 - 10 1 mg/dL    eGFR 32 ml/min/1 73sq m   CBC and differential   Result Value Ref Range    WBC 14 34 (H) 4 31 - 10 16 Thousand/uL    RBC 3 64 (L) 3 88 - 5 62 Million/uL    Hemoglobin 9 8 (L) 12 0 - 17 0 g/dL    Hematocrit 31 5 (L) 36 5 - 49 3 %    MCV 87 82 - 98 fL    MCH 26 9 26 8 - 34 3 pg    MCHC 31 1 (L) 31 4 - 37 4 g/dL    RDW 15 4 (H) 11 6 - 15 1 %    MPV 9 5 8 9 - 12 7 fL    Platelets 959 (H) 980 - 390 Thousands/uL    nRBC 0 /100 WBCs    Neutrophils Relative 88 (H) 43 - 75 %    Immat GRANS % 1 0 - 2 %    Lymphocytes Relative 6 (L) 14 - 44 %    Monocytes Relative 5 4 - 12 %    Eosinophils Relative 0 0 - 6 %    Basophils Relative 0 0 - 1 %    Neutrophils Absolute 12 62 (H) 1 85 - 7 62 Thousands/µL    Immature Grans Absolute 0 20 0 00 - 0 20 Thousand/uL    Lymphocytes Absolute 0 86 0 60 - 4 47 Thousands/µL    Monocytes Absolute 0 65 0 17 - 1 22 Thousand/µL    Eosinophils Absolute 0 00 0 00 - 0 61 Thousand/µL    Basophils Absolute 0 01 0 00 - 0 10 Thousands/µL   Magnesium   Result Value Ref Range    Magnesium 2 2 1 6 - 2 6 mg/dL   Phosphorus   Result Value Ref Range    Phosphorus 4 0 2 7 - 4 5 mg/dL   APTT   Result Value Ref Range    PTT 31 23 - 37 seconds   APTT   Result Value Ref Range    PTT 39 (H) 23 - 37 seconds   APTT   Result Value Ref Range    PTT >210 (HH) 23 - 37 seconds   Basic metabolic panel   Result Value Ref Range    Sodium 131 (L) 136 - 145 mmol/L    Potassium 6 1 (H) 3 5 - 5 3 mmol/L    Chloride 99 (L) 100 - 108 mmol/L    CO2 20 (L) 21 - 32 mmol/L    ANION GAP 12 4 - 13 mmol/L    BUN 71 (H) 5 - 25 mg/dL    Creatinine 3 89 (H) 0 60 - 1 30 mg/dL    Glucose 157 (H) 65 - 140 mg/dL    Calcium 8 8 8 3 - 10 1 mg/dL    eGFR 19 ml/min/1 73sq m   APTT   Result Value Ref Range    PTT >210 (HH) 23 - 37 seconds   Basic metabolic panel   Result Value Ref Range    Sodium 133 (L) 136 - 145 mmol/L    Potassium 4 6 3 5 - 5 3 mmol/L    Chloride 99 (L) 100 - 108 mmol/L    CO2 20 (L) 21 - 32 mmol/L    ANION GAP 14 (H) 4 - 13 mmol/L    BUN 75 (H) 5 - 25 mg/dL Creatinine 4 07 (H) 0 60 - 1 30 mg/dL    Glucose 249 (H) 65 - 140 mg/dL    Calcium 8 4 8 3 - 10 1 mg/dL    eGFR 18 ml/min/1 73sq m   Urinalysis with microscopic   Result Value Ref Range    Clarity, UA Clear     Color, UA Yellow     Specific Rawlings, UA 1 015 1 003 - 1 030    pH, UA 5 0 4 5, 5 0, 5 5, 6 0, 6 5, 7 0, 7 5, 8 0    Glucose, UA Negative Negative mg/dl    Ketones, UA Negative Negative mg/dl    Blood, UA Trace-Intact (A) Negative    Protein, UA Negative Negative mg/dl    Nitrite, UA Negative Negative    Bilirubin, UA Negative Negative    Urobilinogen, UA 0 2 0 2, 1 0 E U /dl E U /dl    Leukocytes, UA Negative Negative    WBC, UA 0-1 (A) None Seen, 0-5, 5-55, 5-65 /hpf    RBC, UA None Seen None Seen, 0-5 /hpf    Bacteria, UA Occasional None Seen, Occasional /hpf    Fine granular casts 0-1 /lpf    Epithelial Cells Occasional None Seen, Occasional /hpf   Sodium, urine, random   Result Value Ref Range    Sodium, Ur 7    Chloride, urine, random   Result Value Ref Range    Chloride, Ur 30 mmol/L   Creatinine, urine, random   Result Value Ref Range    Creatinine, Ur 113 1 mg/dL   Basic metabolic panel   Result Value Ref Range    Sodium 136 136 - 145 mmol/L    Potassium 4 9 3 5 - 5 3 mmol/L    Chloride 101 100 - 108 mmol/L    CO2 23 21 - 32 mmol/L    ANION GAP 12 4 - 13 mmol/L    BUN 75 (H) 5 - 25 mg/dL    Creatinine 3 79 (H) 0 60 - 1 30 mg/dL    Glucose 147 (H) 65 - 140 mg/dL    Calcium 8 3 8 3 - 10 1 mg/dL    eGFR 19 ml/min/1 73sq m   CK   Result Value Ref Range    Total  39 - 308 U/L   Hepatic function panel   Result Value Ref Range    Total Bilirubin 0 19 (L) 0 20 - 1 00 mg/dL    Bilirubin, Direct 0 08 0 00 - 0 20 mg/dL    Alkaline Phosphatase 83 46 - 116 U/L    AST 33 5 - 45 U/L    ALT 45 12 - 78 U/L    Total Protein 7 3 6 4 - 8 2 g/dL    Albumin 2 9 (L) 3 5 - 5 0 g/dL   CBC   Result Value Ref Range    WBC 13 30 (H) 4 31 - 10 16 Thousand/uL    RBC 3 48 (L) 3 88 - 5 62 Million/uL    Hemoglobin 9 5 (L) 12 0 - 17 0 g/dL    Hematocrit 29 8 (L) 36 5 - 49 3 %    MCV 86 82 - 98 fL    MCH 27 3 26 8 - 34 3 pg    MCHC 31 9 31 4 - 37 4 g/dL    RDW 15 7 (H) 11 6 - 15 1 %    Platelets 553 (H) 929 - 390 Thousands/uL    MPV 9 5 8 9 - 12 7 fL   CKMB   Result Value Ref Range    CK-MB Index 1 3 0 0 - 2 5 %    CK-MB 3 0 0 0 - 5 0 ng/mL   Basic metabolic panel   Result Value Ref Range    Sodium 144 136 - 145 mmol/L    Potassium 4 8 3 5 - 5 3 mmol/L    Chloride 109 (H) 100 - 108 mmol/L    CO2 26 21 - 32 mmol/L    ANION GAP 9 4 - 13 mmol/L    BUN 61 (H) 5 - 25 mg/dL    Creatinine 2 97 (H) 0 60 - 1 30 mg/dL    Glucose 153 (H) 65 - 140 mg/dL    Calcium 8 8 8 3 - 10 1 mg/dL    eGFR 26 ml/min/1 73sq m   Basic metabolic panel   Result Value Ref Range    Sodium 143 136 - 145 mmol/L    Potassium 5 1 3 5 - 5 3 mmol/L    Chloride 107 100 - 108 mmol/L    CO2 28 21 - 32 mmol/L    ANION GAP 8 4 - 13 mmol/L    BUN 51 (H) 5 - 25 mg/dL    Creatinine 2 52 (H) 0 60 - 1 30 mg/dL    Glucose 149 (H) 65 - 140 mg/dL    Calcium 8 9 8 3 - 10 1 mg/dL    eGFR 31 ml/min/1 73sq m   Basic metabolic panel   Result Value Ref Range    Sodium 144 136 - 145 mmol/L    Potassium 4 7 3 5 - 5 3 mmol/L    Chloride 107 100 - 108 mmol/L    CO2 32 21 - 32 mmol/L    ANION GAP 5 4 - 13 mmol/L    BUN 45 (H) 5 - 25 mg/dL    Creatinine 2 39 (H) 0 60 - 1 30 mg/dL    Glucose 108 65 - 140 mg/dL    Calcium 8 7 8 3 - 10 1 mg/dL    eGFR 34 ml/min/1 73sq m   CBC   Result Value Ref Range    WBC 11 44 (H) 4 31 - 10 16 Thousand/uL    RBC 3 61 (L) 3 88 - 5 62 Million/uL    Hemoglobin 9 9 (L) 12 0 - 17 0 g/dL    Hematocrit 31 5 (L) 36 5 - 49 3 %    MCV 87 82 - 98 fL    MCH 27 4 26 8 - 34 3 pg    MCHC 31 4 31 4 - 37 4 g/dL    RDW 15 7 (H) 11 6 - 15 1 %    Platelets 293 (H) 961 - 390 Thousands/uL    MPV 9 4 8 9 - 12 7 fL   Basic metabolic panel   Result Value Ref Range    Sodium 142 136 - 145 mmol/L    Potassium 4 6 3 5 - 5 3 mmol/L    Chloride 105 100 - 108 mmol/L    CO2 33 (H) 21 - 32 mmol/L ANION GAP 4 4 - 13 mmol/L    BUN 41 (H) 5 - 25 mg/dL    Creatinine 2 36 (H) 0 60 - 1 30 mg/dL    Glucose 104 65 - 140 mg/dL    Calcium 8 8 8 3 - 10 1 mg/dL    eGFR 34 ml/min/1 73sq m   Basic metabolic panel   Result Value Ref Range    Sodium 143 136 - 145 mmol/L    Potassium 4 6 3 5 - 5 3 mmol/L    Chloride 105 100 - 108 mmol/L    CO2 35 (H) 21 - 32 mmol/L    ANION GAP 3 (L) 4 - 13 mmol/L    BUN 39 (H) 5 - 25 mg/dL    Creatinine 2 32 (H) 0 60 - 1 30 mg/dL    Glucose 104 65 - 140 mg/dL    Calcium 8 6 8 3 - 10 1 mg/dL    eGFR 35 ml/min/1 73sq m   Basic metabolic panel   Result Value Ref Range    Sodium 142 136 - 145 mmol/L    Potassium 4 5 3 5 - 5 3 mmol/L    Chloride 104 100 - 108 mmol/L    CO2 35 (H) 21 - 32 mmol/L    ANION GAP 3 (L) 4 - 13 mmol/L    BUN 37 (H) 5 - 25 mg/dL    Creatinine 2 27 (H) 0 60 - 1 30 mg/dL    Glucose 92 65 - 140 mg/dL    Calcium 8 5 8 3 - 10 1 mg/dL    eGFR 36 ml/min/1 73sq m   CBC and differential   Result Value Ref Range    WBC 11 15 (H) 4 31 - 10 16 Thousand/uL    RBC 3 56 (L) 3 88 - 5 62 Million/uL    Hemoglobin 9 6 (L) 12 0 - 17 0 g/dL    Hematocrit 31 3 (L) 36 5 - 49 3 %    MCV 88 82 - 98 fL    MCH 27 0 26 8 - 34 3 pg    MCHC 30 7 (L) 31 4 - 37 4 g/dL    RDW 15 6 (H) 11 6 - 15 1 %    MPV 9 6 8 9 - 12 7 fL    Platelets 294 620 - 205 Thousands/uL    nRBC 0 /100 WBCs    Neutrophils Relative 73 43 - 75 %    Immat GRANS % 2 0 - 2 %    Lymphocytes Relative 16 14 - 44 %    Monocytes Relative 9 4 - 12 %    Eosinophils Relative 0 0 - 6 %    Basophils Relative 0 0 - 1 %    Neutrophils Absolute 8 22 (H) 1 85 - 7 62 Thousands/µL    Immature Grans Absolute 0 18 0 00 - 0 20 Thousand/uL    Lymphocytes Absolute 1 76 0 60 - 4 47 Thousands/µL    Monocytes Absolute 0 97 0 17 - 1 22 Thousand/µL    Eosinophils Absolute 0 01 0 00 - 0 61 Thousand/µL    Basophils Absolute 0 01 0 00 - 0 10 Thousands/µL   ECG 12 lead   Result Value Ref Range    Ventricular Rate 109 BPM    Atrial Rate 109 BPM    MO Interval 142 ms    QRSD Interval 72 ms    QT Interval 348 ms    QTC Interval 468 ms    P Axis 86 degrees    QRS Axis 77 degrees    T Wave Axis 81 degrees   ECG 12 lead   Result Value Ref Range    Ventricular Rate 94 BPM    Atrial Rate 94 BPM    ME Interval 142 ms    QRSD Interval 74 ms    QT Interval 334 ms    QTC Interval 417 ms    P Axis 76 degrees    QRS Axis 73 degrees    T Wave Axis 83 degrees   ECG 12 lead   Result Value Ref Range    Ventricular Rate 93 BPM    Atrial Rate 93 BPM    ME Interval 144 ms    QRSD Interval 84 ms    QT Interval 382 ms    QTC Interval 474 ms    P Axis 74 degrees    QRS Axis 73 degrees    T Wave Axis 79 degrees   ECG 12 lead   Result Value Ref Range    Ventricular Rate 131 BPM    Atrial Rate 113 BPM    ME Interval  ms    QRSD Interval 82 ms    QT Interval 266 ms    QTC Interval 392 ms    P Axis  degrees    QRS Axis 64 degrees    T Wave Axis 93 degrees   Fingerstick Glucose (POCT)   Result Value Ref Range    POC Glucose 204 (H) 65 - 140 mg/dl   Fingerstick Glucose (POCT)   Result Value Ref Range    POC Glucose 150 (H) 65 - 140 mg/dl   Fingerstick Glucose (POCT)   Result Value Ref Range    POC Glucose 157 (H) 65 - 140 mg/dl   Fingerstick Glucose (POCT)   Result Value Ref Range    POC Glucose 137 65 - 140 mg/dl   Fingerstick Glucose (POCT)   Result Value Ref Range    POC Glucose 108 65 - 140 mg/dl   Fingerstick Glucose (POCT)   Result Value Ref Range    POC Glucose 133 65 - 140 mg/dl   Fingerstick Glucose (POCT)   Result Value Ref Range    POC Glucose 124 65 - 140 mg/dl   Fingerstick Glucose (POCT)   Result Value Ref Range    POC Glucose 184 (H) 65 - 140 mg/dl   Fingerstick Glucose (POCT)   Result Value Ref Range    POC Glucose 129 65 - 140 mg/dl   Fingerstick Glucose (POCT)   Result Value Ref Range    POC Glucose 135 65 - 140 mg/dl   Fingerstick Glucose (POCT)   Result Value Ref Range    POC Glucose 180 (H) 65 - 140 mg/dl   Fingerstick Glucose (POCT)   Result Value Ref Range    POC Glucose 143 (H) 65 - 140 mg/dl   Fingerstick Glucose (POCT)   Result Value Ref Range    POC Glucose 233 (H) 65 - 140 mg/dl   Fingerstick Glucose (POCT)   Result Value Ref Range    POC Glucose 153 (H) 65 - 140 mg/dl   Fingerstick Glucose (POCT)   Result Value Ref Range    POC Glucose 141 (H) 65 - 140 mg/dl   Fingerstick Glucose (POCT)   Result Value Ref Range    POC Glucose 131 65 - 140 mg/dl   Fingerstick Glucose (POCT)   Result Value Ref Range    POC Glucose 169 (H) 65 - 140 mg/dl   Fingerstick Glucose (POCT)   Result Value Ref Range    POC Glucose 152 (H) 65 - 140 mg/dl   Fingerstick Glucose (POCT)   Result Value Ref Range    POC Glucose 185 (H) 65 - 140 mg/dl   Fingerstick Glucose (POCT)   Result Value Ref Range    POC Glucose 143 (H) 65 - 140 mg/dl   Fingerstick Glucose (POCT)   Result Value Ref Range    POC Glucose 141 (H) 65 - 140 mg/dl   Fingerstick Glucose (POCT)   Result Value Ref Range    POC Glucose 171 (H) 65 - 140 mg/dl   Fingerstick Glucose (POCT)   Result Value Ref Range    POC Glucose 158 (H) 65 - 140 mg/dl   Fingerstick Glucose (POCT)   Result Value Ref Range    POC Glucose 147 (H) 65 - 140 mg/dl   Fingerstick Glucose (POCT)   Result Value Ref Range    POC Glucose 179 (H) 65 - 140 mg/dl   Fingerstick Glucose (POCT)   Result Value Ref Range    POC Glucose 170 (H) 65 - 140 mg/dl   Fingerstick Glucose (POCT)   Result Value Ref Range    POC Glucose 124 65 - 140 mg/dl   Fingerstick Glucose (POCT)   Result Value Ref Range    POC Glucose 89 65 - 140 mg/dl   Fingerstick Glucose (POCT)   Result Value Ref Range    POC Glucose 140 65 - 140 mg/dl   Fingerstick Glucose (POCT)   Result Value Ref Range    POC Glucose 168 (H) 65 - 140 mg/dl   Fingerstick Glucose (POCT)   Result Value Ref Range    POC Glucose 98 65 - 140 mg/dl   Fingerstick Glucose (POCT)   Result Value Ref Range    POC Glucose 177 (H) 65 - 140 mg/dl   Fingerstick Glucose (POCT)   Result Value Ref Range    POC Glucose 148 (H) 65 - 140 mg/dl   Fingerstick Glucose (POCT)   Result Value Ref Range    POC Glucose 136 65 - 140 mg/dl   Fingerstick Glucose (POCT)   Result Value Ref Range    POC Glucose 151 (H) 65 - 140 mg/dl   Fingerstick Glucose (POCT)   Result Value Ref Range    POC Glucose 159 (H) 65 - 140 mg/dl   Fingerstick Glucose (POCT)   Result Value Ref Range    POC Glucose 110 65 - 140 mg/dl   Fingerstick Glucose (POCT)   Result Value Ref Range    POC Glucose 81 65 - 140 mg/dl   Fingerstick Glucose (POCT)   Result Value Ref Range    POC Glucose 161 (H) 65 - 140 mg/dl   Manual Differential(PHLEBS Do Not Order)   Result Value Ref Range    Segmented % 70 43 - 75 %    Bands % 1 0 - 8 %    Lymphocytes % 17 14 - 44 %    Monocytes % 10 4 - 12 %    Eosinophils, % 0 0 - 6 %    Basophils % 1 0 - 1 %    Atypical Lymphocytes % 1 (H) <=0 %    Absolute Neutrophils 6 26 1 85 - 7 62 Thousand/uL    Lymphocytes Absolute 1 50 0 60 - 4 47 Thousand/uL    Monocytes Absolute 0 88 0 00 - 1 22 Thousand/uL    Eosinophils Absolute 0 00 0 00 - 0 40 Thousand/uL    Basophils Absolute 0 09 0 00 - 0 10 Thousand/uL    Total Counted 100     RBC Morphology Normal     Platelet Estimate Adequate Adequate

## 2019-12-19 ENCOUNTER — NURSING HOME VISIT (OUTPATIENT)
Dept: GERIATRICS | Facility: OTHER | Age: 59
End: 2019-12-19
Payer: COMMERCIAL

## 2019-12-19 ENCOUNTER — TELEPHONE (OUTPATIENT)
Dept: CARDIOLOGY CLINIC | Facility: CLINIC | Age: 59
End: 2019-12-19

## 2019-12-19 ENCOUNTER — TELEPHONE (OUTPATIENT)
Dept: FAMILY MEDICINE CLINIC | Facility: CLINIC | Age: 59
End: 2019-12-19

## 2019-12-19 DIAGNOSIS — J44.1 CHRONIC OBSTRUCTIVE PULMONARY DISEASE WITH ACUTE EXACERBATION (HCC): ICD-10-CM

## 2019-12-19 DIAGNOSIS — R26.2 AMBULATORY DYSFUNCTION: Primary | ICD-10-CM

## 2019-12-19 DIAGNOSIS — G89.4 CHRONIC PAIN DISORDER: ICD-10-CM

## 2019-12-19 DIAGNOSIS — I10 ESSENTIAL HYPERTENSION: ICD-10-CM

## 2019-12-19 DIAGNOSIS — E11.9 TYPE 2 DIABETES MELLITUS WITHOUT COMPLICATION, WITHOUT LONG-TERM CURRENT USE OF INSULIN (HCC): ICD-10-CM

## 2019-12-19 DIAGNOSIS — C34.91 ADENOCARCINOMA OF LUNG, RIGHT (HCC): ICD-10-CM

## 2019-12-19 DIAGNOSIS — N18.30 STAGE 3 CHRONIC KIDNEY DISEASE (HCC): ICD-10-CM

## 2019-12-19 DIAGNOSIS — I48.21 PERMANENT ATRIAL FIBRILLATION (HCC): ICD-10-CM

## 2019-12-19 PROCEDURE — 99316 NF DSCHRG MGMT 30 MIN+: CPT | Performed by: FAMILY MEDICINE

## 2019-12-19 NOTE — TELEPHONE ENCOUNTER
----- Message from Janean Krabbe sent at 12/6/2019  8:10 AM EST -----  Regarding: FW: Please call patient regarding follow up      ----- Message -----  From: Christina Wei MD  Sent: 12/4/2019   6:08 PM EST  To: Card[facility] [place]  Subject: Please call patient regarding follow up          Will be discharged from the hospital in the next few days, and will need to be established with [facility] cardiologist   Aaron Carbajal did the initial consult in hospital, so preferably with him

## 2019-12-19 NOTE — PROGRESS NOTES
[facility]  [address]  [phone]  Facility: [place] [address] [address]  Discharge Note          NAME: Zully Briones Sr  AGE: 64 y o  SEX: male    DATE OF ENCOUNTER: 12/19/2019    Chief Complaint     Pt has no complaint today    History of Present Illness     HPI    The following portions of the patient's history were reviewed and updated as appropriate (from facility chart and hospital records): allergies, current medications, past family history, past medical history, past social history, past surgical history and problem list   Pt was seen and examined for f/u on DM, Lung CA,ambulatory dysfcucntion, CKD, COPD, and afib, and chronic pain, pt's BP, wt  And BS has been stable, pt has been improving with therapy, and will be DC home tomorrow, pt has no pain currently, will have f/u with his PCP on 1/2/20  Had f/u with urology on 12/17, pt was started on Bactrim DS for positive Pseudomonas in his sputum culture and had lab on 12/16 with declining in his renal function, and his Bactrim DS was switched to SS  And repeat lab on 12/17 was slightly better, pt has no complaint at this time, no specific issues or concerns  Has f/u with pulmonologist on  1/30/20, with nephrology on 1/14/19, and with infusion center on 12/20  Review of Systems     Review of Systems   Constitutional: Negative  "I'm ok!"   HENT: Negative  Eyes: Negative  Respiratory: Negative  Cardiovascular: Negative  Gastrointestinal: Negative  Endocrine: Negative  Genitourinary: Negative  Musculoskeletal: Negative  Skin: Negative  Allergic/Immunologic: Negative  Neurological: Negative  Hematological: Negative  Psychiatric/Behavioral: Negative  All other systems reviewed and are negative        Active Problem List     Patient Active Problem List   Diagnosis    Chronic obstructive pulmonary disease with acute exacerbation ([facility] [address] )    Essential hypertension    Chronic bilateral thoracic back pain    Chronic right shoulder pain    Bilateral carpal tunnel syndrome    Bipolar 1 disorder (HCC)    Gastroesophageal reflux disease without esophagitis    Other specified anxiety disorders    Cubital tunnel syndrome, bilateral    Type 2 diabetes mellitus without complication, without long-term current use of insulin (HCC)    Panlobular emphysema (HCC)    Adenocarcinoma of lung, right (HCC)    Tobacco abuse    Encounter for immunization    Generalized body aches    Chronic pain disorder    Cancer associated pain    Chemotherapy-induced nausea    Erectile dysfunction    Renal dysfunction    Weight loss    Stage 3 chronic kidney disease (HCC)    Peripheral neuropathy    Maxillary sinusitis    Impacted cerumen of left ear    Elevated troponin    Stroke-like symptoms    CKD (chronic kidney disease)    Elevated troponin level not due to acute coronary syndrome    Elevated d-dimer    Alkalosis    Permanent atrial fibrillation    Ambulatory dysfunction       Objective     Vitals: Wt:147Ibs       BP:126/60  PA;65    BS log was reviewed     Physical Exam   Constitutional: He is oriented to person, place, and time  He appears well-developed and well-nourished  No distress  HENT:   Head: Normocephalic and atraumatic  Right Ear: External ear normal    Left Ear: External ear normal    Nose: Nose normal    Mouth/Throat: Oropharynx is clear and moist  No oropharyngeal exudate  Eyes: Pupils are equal, round, and reactive to light  Conjunctivae and EOM are normal  Right eye exhibits no discharge  Left eye exhibits no discharge  No scleral icterus  Neck: Normal range of motion  Neck supple  Cardiovascular: Normal rate  Exam reveals no gallop and no friction rub  No murmur heard  Irregular, irregular  Pulmonary/Chest: Effort normal and breath sounds normal  No stridor  No respiratory distress  He has no wheezes  He has no rales  He exhibits no tenderness  Abdominal: Soft  Bowel sounds are normal  He exhibits no distension and no mass  There is no tenderness  There is no rebound and no guarding  No hernia  Genitourinary:   Genitourinary Comments: deferred   Musculoskeletal: Normal range of motion  He exhibits no edema, tenderness or deformity  Lymphadenopathy:     He has no cervical adenopathy  Neurological: He is alert and oriented to person, place, and time  No cranial nerve deficit  Skin: Skin is warm and dry  No rash noted  He is not diaphoretic  No erythema  No pallor  Psychiatric: He has a normal mood and affect  His behavior is normal    Nursing note and vitals reviewed  Pertinent Laboratory/Diagnostic Studies:  BMP  On 12/17/19    Current Medications   Medication list in facility chart was reviewed and necessary changes made  Assessment and Plan     Ambulatory dysfunction  Improving with therapy, will have therapy at home with [facility] to improve gait, transfer, strength, ADLS, and endurance, referral was made, face to face completed, pt will be discharge home with all his meds  Chronic obstructive pulmonary disease with acute exacerbation (HCC)  With positive sputum for pseudomonas, will need close f/u with BMP, last dose tomorrow, contniues with O2 and neb  Treatment  Type 2 diabetes mellitus without complication, without long-term current use of insulin ([facility] )    Lab Results   Component Value Date    HGBA1C 5 6 10/23/2019   stable with current management, meds  Adenocarcinoma of lung, right ([facility] [address] )  F/u with hem/onca and infusion center for chemotherapy  Essential hypertension  On Amlodipine and Diltiazem  Stage 3 chronic kidney disease ([facility] [address] )  Needs close f/u since has been on Bactrim for his +sputum, Bactrim was changed to SS on 12/16, BMP to be done next on 12/24 and to faxed to pt's PCP, and next complete lab in January (results  to be faxed to nephrologist)       Chronic pain disorder  Pt will be sent home tomorrow with remaining of his Oxycontin, Oxycodone and Lyrica, will have next script from his pain management  Permanent atrial fibrillation  Rate controlled with Diltiazem, on Eliquis, stable  Face to face completed, pt will be DC home on 12/20/19 with all his meds (to be taken with him), to continue with therapy at home  Name: Yanira Suárezs: 1960  MRN: [id number]  DOS: 12/19/2019  BILLING CODE: [id number]  Diagnoses:   Diagnosis ICD-10-CM Associated Orders   1  Ambulatory dysfunction R26 2    2  Chronic obstructive pulmonary disease with acute exacerbation (HCC) J44 1    3  Type 2 diabetes mellitus without complication, without long-term current use of insulin (HCC) E11 9    4  Adenocarcinoma of lung, right (HCC) C34 91    5  Essential hypertension I10    6  Stage 3 chronic kidney disease (HCC) N18 3    7  Chronic pain disorder G89 4    8   Permanent atrial fibrillation I48 21          Mayo Hogan MD  12/19/20195:18 PM

## 2019-12-19 NOTE — ASSESSMENT & PLAN NOTE
Pt will be sent home tomorrow with remaining of his Oxycontin, Oxycodone and Lyrica, will have next script from his pain management

## 2019-12-19 NOTE — ASSESSMENT & PLAN NOTE
Improving with therapy, will have therapy at home with [facility] to improve gait, transfer, strength, ADLS, and endurance, referral was made, face to face completed, pt will be discharge home with all his meds

## 2019-12-19 NOTE — ASSESSMENT & PLAN NOTE
Needs close f/u since has been on Bactrim for his +sputum, Bactrim was changed to SS on 12/16, BMP to be done next on 12/24 and to faxed to pt's PCP, and next complete lab in January (results  to be faxed to nephrologist)

## 2019-12-19 NOTE — ASSESSMENT & PLAN NOTE
With positive sputum for pseudomonas, will need close f/u with BMP, last dose tomorrow, contniues with O2 and neb  Treatment

## 2019-12-20 ENCOUNTER — TELEPHONE (OUTPATIENT)
Dept: PALLIATIVE MEDICINE | Facility: CLINIC | Age: 59
End: 2019-12-20

## 2019-12-20 ENCOUNTER — HOSPITAL ENCOUNTER (OUTPATIENT)
Dept: INFUSION CENTER | Facility: HOSPITAL | Age: 59
Discharge: HOME | End: 2019-12-20
Attending: INTERNAL MEDICINE

## 2019-12-20 NOTE — TELEPHONE ENCOUNTER
Patient's nurse at [facility] called and wants to speak with a nurse for questions regarding making a follow up appt for patient  Called Geneva Cabrera back and no answer but left message on answering machine to call the office on Monday 12/23/19 and speak with a nurse   Marco A Delcid

## 2019-12-22 ENCOUNTER — DOCUMENTATION (OUTPATIENT)
Dept: HEMATOLOGY ONCOLOGY | Facility: CLINIC | Age: 59
End: 2019-12-22

## 2019-12-23 ENCOUNTER — OFFICE VISIT (OUTPATIENT)
Dept: HEMATOLOGY ONCOLOGY | Facility: CLINIC | Age: 59
End: 2019-12-23
Payer: COMMERCIAL

## 2019-12-23 ENCOUNTER — HOSPITAL ENCOUNTER (OUTPATIENT)
Dept: INFUSION CENTER | Facility: HOSPITAL | Age: 59
Discharge: HOME | End: 2019-12-23
Attending: INTERNAL MEDICINE

## 2019-12-23 VITALS
SYSTOLIC BLOOD PRESSURE: 130 MMHG | BODY MASS INDEX: 21.49 KG/M2 | OXYGEN SATURATION: 95 % | DIASTOLIC BLOOD PRESSURE: 70 MMHG | HEIGHT: 68 IN | RESPIRATION RATE: 16 BRPM | WEIGHT: 141.8 LBS | TEMPERATURE: 98.3 F | HEART RATE: 86 BPM

## 2019-12-23 DIAGNOSIS — C34.91 ADENOCARCINOMA OF LUNG, RIGHT (HCC): Primary | ICD-10-CM

## 2019-12-23 DIAGNOSIS — N28.9 RENAL DYSFUNCTION: ICD-10-CM

## 2019-12-23 DIAGNOSIS — B02.9 HERPES ZOSTER WITHOUT COMPLICATION: ICD-10-CM

## 2019-12-23 PROCEDURE — 99214 OFFICE O/P EST MOD 30 MIN: CPT | Performed by: NURSE PRACTITIONER

## 2019-12-23 RX ORDER — PREDNISONE 20 MG/1
40 TABLET ORAL DAILY
Qty: 60 TABLET | Refills: 0 | Status: SHIPPED | OUTPATIENT
Start: 2019-12-23 | End: 2020-01-22

## 2019-12-23 RX ORDER — SODIUM POLYSTYRENE SULFONATE 4.1 MEQ/G
POWDER, FOR SUSPENSION ORAL; RECTAL
COMMUNITY
Start: 2019-12-16 | End: 2020-01-09

## 2019-12-23 RX ORDER — VALACYCLOVIR HYDROCHLORIDE 1 G/1
1000 TABLET, FILM COATED ORAL 3 TIMES DAILY
Qty: 21 TABLET | Refills: 0 | Status: SHIPPED | OUTPATIENT
Start: 2019-12-23 | End: 2020-01-09

## 2019-12-23 NOTE — PROGRESS NOTES
Hematology/Oncology Outpatient Follow-up  Kt Hawk Sr  64 y o  male 1960 [id number]    Date:  12/23/2019      Assessment and Plan:  1  Adenocarcinoma of lung, right [facility])  Patient's palliative treatment with single agent Ann Angst continues to be on hold  His last dose was administered on 11/20/2019  The patient continues to take his high-dose steroids prednisone 60 mg a day which seem to be improving his symptoms, energy and performance status  We will start to taper the prednisone dose down to 40 mg a day for at least 2 weeks  Patient was instructed to decrease his prednisone dose two 20 mg tablets per day (40 mg) until further notice  His Ann Angst will continue to be on hold until his to steroids are tapered down to 10-20 mg per day  He will be back for follow-up in about 2 weeks with Dr Cristina Mccoy for re-evaluation with repeat laboratory studies prior  He will continue to follow up with the palliative care team for his pain and symptom management  - CBC and differential; Future  - Comprehensive metabolic panel; Future  - Iron Panel (Includes Ferritin, Iron Sat%, Iron, and TIBC); Future  - Ferritin; Future  - LD,Blood; Future  - Folate; Future  - Vitamin B12; Future  - TSH, 3rd generation with Free T4 reflex; Future  - predniSONE 20 mg tablet; Take 2 tablets (40 mg total) by mouth daily Until further instructed  Dispense: 60 tablet; Refill: 0    2  Herpes zoster without complication  Patient has new the vesicular rash noted to his posterior left shoulder and left upper arm/antecubital area consistent with herpes zoster  He mentions that the rash started approximately 3 days ago on 12/20/2019  Patient will be started on antiviral treatment with Valtrex 1000 mg t i d  For 7 days; he was instructed on  He was also encouraged to continue his current pain regimen as needed for pain        Patient definitely has risk factors for zoster including immunocompromised state especially on high-dose steroids  He was told that he may be contagious to those around him who never had the chickenpox or were vaccinated for chickenpox until the lesions are completely crusted  - valACYclovir (VALTREX) 1,000 mg tablet; Take 1 tablet (1,000 mg total) by mouth 3 (three) times a day for 7 days  Dispense: 21 tablet; Refill: 0    3  Renal dysfunction  Patient continues to have stable chronic renal dysfunction  He will continue follow-up with his nephrologist on a regular basis  HPI:  Patient presents today for follow-up visit  He was discharged to the skilled nursing facility from the hospital recently after he was found to have MI, COPD exacerbation and neurological symptoms of unknown etiology which was worked up extensively  He was seen by Dr Susan Medley approximately 2 weeks ago on 12/11/2019 who started him on high-dose prednisone 60 mg a day with suspicion of possible immunotherapy induced neurological symptoms/weakness  His last dose of Keytruda was administered on 11/20/2019  The patient reports that he was discharged from [address] [place] [address] on Friday 12/20/2019  A reports that he is taking his prednisone 60 mg a day and has had improvement in his fatigue  He denies any fatigue chest pain, dyspnea, headaches, dizziness or any other neurological symptoms  The patient does report a new rash to his left posterior shoulder and left upper arm/antecubital area which started immediately after he was discharged from the nursing home on 12/12/2019  He does report mild pain and burning sensation to the area  Patient did not have any labs done for this visit his most recent labs from 12/10/2019 showed WBC 11 15 mainly neutrophilia without any hint of immature cells, normocytic anemia H&H 9 6/31 3, MCV 88 platelet count 194  His chronic renal dysfunction is stable BUN 37, creatinine 2 27 GFR is 36 remaining metabolic panel seems to be appropriate for patient    He continues to follow with his nephrologist in the palliative care team on a regular basis  Oncology History    53-year-old [facility] [employer] male heavy smoker who used to smoke 2 packs of cigarettes daily for many years, COPD, he presented with dyspnea, CT scan of the chest on October 2018 showed right middle lobe spiculated 1 3 cm mass with multiple solid and ground-glass nodules along the major and minor fissures sub cm pulmonary nodules bilaterally, left adrenal 1 2 cm nodule     PET scan showed 1 3 cm right middle lung nodule with SUV of 6 8, numerous nodular densities along the right major and minor fissures, right hilar activity with SUV of 3 4, subcarinal lymph node measuring 7 mm with SUV of 2 7, periportal enlarged lymph nodes concerning for metastatic disease measuring 2 4 cm with SUV of 5, prominent left retrocrural lymph node measuring 1 cm x 9 mm with SUV of 1 1    Core biopsy of the right spiculated nodule showed invasive adenocarcinoma consistent with lung primary positive for CK7, TTF 1, napsin a        Adenocarcinoma of lung, right ([facility] [address] )    11/13/2018 Initial Diagnosis     Adenocarcinoma of lung, right ([address] )  Stage IV      12/13/2018 - 3/28/2019 Chemotherapy      Alimta, Carboplatin (AUC 5) + Keytruda (6 cycles total)      4/17/2019 -  Chemotherapy     Started maintenance Alimta and Keytruda  (Alimta d/c'd 9/16/19 due to worsening renal dysfunction)         Interval history:    ROS: Review of Systems   Constitutional: Negative for activity change, appetite change, chills, fatigue, fever and unexpected weight change  HENT: Negative for congestion, mouth sores, nosebleeds, sore throat and trouble swallowing  Eyes: Negative  Respiratory: Negative for cough, chest tightness and shortness of breath  Cardiovascular: Negative for chest pain, palpitations and leg swelling  Gastrointestinal: Negative for abdominal distention, abdominal pain, blood in stool, constipation, diarrhea, nausea and vomiting     Genitourinary: Negative for difficulty urinating, dysuria, frequency, hematuria and urgency  Musculoskeletal: Positive for arthralgias and myalgias  Negative for back pain, gait problem and joint swelling  Chronic generalized arthralgias and myalgias rated 8/10, patient reports are slightly worse today-is satisfied with his current pain management   Skin: Positive for rash  Negative for color change and pallor  Neurological: Positive for numbness (And tingling mild)  Negative for dizziness, weakness, light-headedness and headaches  Hematological: Negative for adenopathy  Does not bruise/bleed easily  Psychiatric/Behavioral: Negative for dysphoric mood and sleep disturbance  The patient is not nervous/anxious          Past Medical History:   Diagnosis Date    Bipolar 1 disorder ([facility] [address] )     Cancer ([facility] )     adeno lung  dx 11/2018-lung bx today 4/9/2019-ongoing chemo    Chronic pain disorder     back and right shoulder    CKD (chronic kidney disease)     COPD (chronic obstructive pulmonary disease) (HCC)     Depression     Diabetes mellitus (HCC)     GERD (gastroesophageal reflux disease)     Herniated lumbar intervertebral disc     Hypertension     Insomnia     Latent syphilis     Treated    Low back pain     Lumbosacral disc disease     Lung cancer [facility] ) 04/2019    Pneumothorax after biopsy     R lung    PTSD (post-traumatic stress disorder)     Pulmonary emphysema ([facility] )     Tobacco abuse 11/13/2018       Past Surgical History:   Procedure Laterality Date    COLONOSCOPY  2011    CT GUIDED CHEST TUBE  11/21/2018    FL GUIDED CENTRAL VENOUS ACCESS DEVICE INSERTION  2/8/2019    HEMORROIDECTOMY      IR CHEST TUBE  11/14/2018    IR IMAGE GUIDED BIOPSY/ASPIRATION LUNG  11/13/2018    KNEE SURGERY      CT INSJ TUNNELED CTR VAD W/SUBQ PORT AGE 5 YR/> N/A 2/8/2019    Procedure: PLACEMENT OF PORT-A-CATH;  Surgeon: Александр Eubanks MD;  Location: [facility] MAIN OR;  Service: Vascular       Social History Socioeconomic History    Marital status: Legally      Spouse name: None    Number of children: None    Years of education: None    Highest education level: None   Occupational History    Occupation: part time employment   Social Needs    Financial resource strain: None    Food insecurity:     Worry: None     Inability: None    Transportation needs:     Medical: None     Non-medical: None   Tobacco Use    Smoking status: Former Smoker     Packs/day: 0 50     Years: 40 00     Pack years: 20 00     Types: Cigarettes     Start date:      Last attempt to quit: 2019     Years since quittin 3    Smokeless tobacco: Never Used   Substance and Sexual Activity    Alcohol use: Not Currently    Drug use: Not Currently     Types: Marijuana     Comment: stopped , "i smoked weed and stopped 1 month ago"    Sexual activity: Yes   Lifestyle    Physical activity:     Days per week: None     Minutes per session: None    Stress: None   Relationships    Social connections:     Talks on phone: None     Gets together: None     Attends Yarsanism service: None     Active member of club or organization: None     Attends meetings of clubs or organizations: None     Relationship status: None    Intimate partner violence:     Fear of current or ex partner: None     Emotionally abused: None     Physically abused: None     Forced sexual activity: None   Other Topics Concern    None   Social History Narrative    Lives with girlfriend       Family History   Problem Relation Age of Onset    Heart disease Mother     Cancer Mother     Hypertension Father     Diabetes Family     Asthma Family     Heart disease Family     Cancer Family     Cancer Maternal Grandfather     Cancer Paternal Grandfather     Cancer Maternal Aunt        Allergies   Allergen Reactions    Lisinopril Anaphylaxis     Took medication a while ago and had a "swelling reaction"  Does not carry epi-pen         Current Outpatient Medications:     albuterol (2 5 mg/3 mL) 0 083 % nebulizer solution, Take 1 vial (2 5 mg total) by nebulization every 4 (four) hours as needed for wheezing or shortness of breath, Disp: 180 vial, Rfl: 5    albuterol (VENTOLIN HFA) 90 mcg/act inhaler, Inhale 2 puffs every 4 (four) hours as needed for wheezing, Disp: 1 Inhaler, Rfl: 5    amLODIPine (NORVASC) 10 mg tablet, Take 1 tablet (10 mg total) by mouth daily, Disp: 30 tablet, Rfl: 1    apixaban (ELIQUIS) 5 mg, Take 1 tablet (5 mg total) by mouth 2 (two) times a day, Disp: 30 tablet, Rfl: 0    budesonide (PULMICORT) 0 5 mg/2 mL nebulizer solution, Take 1 vial (0 5 mg total) by nebulization every 12 (twelve) hours Rinse mouth after use , Disp: 1 vial, Rfl: 0    divalproex sodium (DEPAKOTE) 250 mg EC tablet, Take 1 tablet (250 mg total) by mouth 2 (two) times a day, Disp: 30 tablet, Rfl: 0    ergocalciferol (ERGOCALCIFEROL) 23251 units capsule, Take 1 capsule (50,000 Units total) by mouth once a week, Disp: 12 capsule, Rfl: 0    FLUoxetine (PROzac) 10 MG tablet, Take 1 tablet (10 mg total) by mouth daily, Disp: 30 tablet, Rfl: 5    fluticasone (FLONASE) 50 mcg/act nasal spray, 1-2 sprays each nostril daily for allergies, Disp: 1 Bottle, Rfl: 3    fluticasone-vilanterol (BREO ELLIPTA) 100-25 mcg/inh inhaler, Inhale 1 puff daily in the early morning Rinse mouth after use , Disp: 1 Inhaler, Rfl: 5    folic acid (FOLVITE) 1 mg tablet, Take 1 tablet (1 mg total) by mouth daily, Disp: 30 tablet, Rfl: 2    FREESTYLE LITE test strip, TEST BLOOD SUGAR DAILY, Disp: 100 each, Rfl: 1    loratadine (CLARITIN) 10 mg tablet, Take 1 tablet (10 mg total) by mouth daily in the early morning, Disp: 30 tablet, Rfl: 2    melatonin 3 mg, Take 1 tablet (3 mg total) by mouth daily at bedtime, Disp: 30 tablet, Rfl: 1    metFORMIN (GLUCOPHAGE-XR) 500 mg 24 hr tablet, Take 1 tablet (500 mg total) by mouth 2 (two) times a day with meals, Disp: 60 tablet, Rfl: 0   methocarbamol (ROBAXIN) 750 mg tablet, Take 1 tablet (750 mg total) by mouth every 6 (six) hours as needed for muscle spasms, Disp: 90 tablet, Rfl: 0    ondansetron (ZOFRAN) 4 mg tablet, Take 1 tablet (4 mg total) by mouth every 6 (six) hours as needed for nausea or vomiting, Disp: 60 tablet, Rfl: 2    oxyCODONE (OxyCONTIN) 20 mg 12 hr tablet, Take 1 tablet (20 mg total) by mouth every 12 (twelve) hoursMax Daily Amount: 40 mg, Disp: 60 tablet, Rfl: 0    pantoprazole (PROTONIX) 40 mg tablet, Take 1 tablet (40 mg total) by mouth daily, Disp: 30 tablet, Rfl: 5    polyethylene glycol (GLYCOLAX) powder, Take 17 g by mouth daily, Disp: 527 g, Rfl: 1    predniSONE 20 mg tablet, Take 2 tablets (40 mg total) by mouth daily Until further instructed, Disp: 60 tablet, Rfl: 0    QUEtiapine (SEROquel) 25 mg tablet, Take 25 mg by mouth daily at bedtime, Disp: , Rfl:     senna-docusate sodium (SENOKOT-S) 8 6-50 mg per tablet, Take 1 tablet by mouth 2 (two) times a day, Disp: 60 tablet, Rfl: 2    sodium polystyrene (KAYEXALATE) powder, , Disp: , Rfl:     sulfamethoxazole-trimethoprim (BACTRIM) 400-80 mg per tablet, Take 1 tablet by mouth every 12 (twelve) hours, Disp: , Rfl:     tamsulosin (FLOMAX) 0 4 mg, Take 1 capsule (0 4 mg total) by mouth daily with dinner, Disp: 90 capsule, Rfl: 3    tiotropium (SPIRIVA RESPIMAT) 2 5 MCG/ACT AERS inhaler, Inhale 2 puffs daily, Disp: 1 Inhaler, Rfl: 2    Blood Glucose Monitoring Suppl KIT, by Does not apply route daily (Patient not taking: Reported on 12/17/2019), Disp: 1 each, Rfl: 0    carbamide peroxide (DEBROX) 6 5 % otic solution, Administer 5 drops into both ears 2 (two) times a day (Patient not taking: Reported on 12/17/2019), Disp: 15 mL, Rfl: 0    diltiazem (CARDIZEM CD) 120 mg 24 hr capsule, Take 1 capsule (120 mg total) by mouth daily, Disp: 30 capsule, Rfl: 0    diphenhydrAMINE (BENADRYL) 25 mg tablet, Take 1 tablet (25 mg total) by mouth every 6 (six) hours as needed (nausea) (Patient not taking: Reported on 12/17/2019), Disp: 30 tablet, Rfl: 0    guaiFENesin (MUCINEX) 600 mg 12 hr tablet, Take 1 tablet (600 mg total) by mouth every 12 (twelve) hours, Disp: 30 tablet, Rfl: 0    ipratropium (ATROVENT) 0 02 % nebulizer solution, Take 1 vial (0 5 mg total) by nebulization 3 (three) times a day (Patient not taking: Reported on 12/17/2019), Disp: 90 vial, Rfl: 5    ipratropium-albuterol (DUO-NEB) 0 5-2 5 mg/3 mL nebulizer solution, Take 1 vial (3 mL total) by nebulization every 6 (six) hours (Patient not taking: Reported on 12/17/2019), Disp: 1 vial, Rfl: 0    Lancets (FREESTYLE) lancets, TEST BLOOD SUGAR DAILY, Disp: 100 each, Rfl: 4    lidocaine (LMX) 4 % cream, Apply topically as needed for mild pain Apply 1/2-1 inch to port 30-60 mins prior to needle insertion, cover with serrain wrap (Patient not taking: Reported on 12/17/2019), Disp: 30 g, Rfl: 5    oxyCODONE (ROXICODONE) 30 MG immediate release tablet, Take 1 tablet (30 mg total) by mouth every 4 (four) hours as needed for severe painMax Daily Amount: 180 mg (Patient not taking: Reported on 12/17/2019), Disp: 150 tablet, Rfl: 0    pantoprazole (PROTONIX) 40 mg tablet, Take 1 tablet (40 mg total) by mouth daily (Patient not taking: Reported on 12/17/2019), Disp: 30 tablet, Rfl: 5    pregabalin (LYRICA) 25 mg capsule, Take 1 capsule PO in morning and 2 capsules PO at bedtime, Disp: 90 capsule, Rfl: 0    prochlorperazine (COMPAZINE) 10 mg tablet, Take 1 tablet (10 mg total) by mouth every 6 (six) hours as needed for nausea or vomiting (Patient not taking: Reported on 12/17/2019), Disp: 90 tablet, Rfl: 0    ranitidine (ZANTAC) 150 mg tablet, Take 1 tablet (150 mg total) by mouth 2 (two) times a day (Patient not taking: Reported on 12/17/2019), Disp: 60 tablet, Rfl: 4    REGULOID 28 3 % POWD, USE AS DIRECTED (Patient not taking: Reported on 12/17/2019), Disp: 369 g, Rfl: 4    Selenium Sulfide 2 25 % SHAM, apply by topical route  every PM to the affected area(s), Disp: , Rfl:     sildenafil (VIAGRA) 50 MG tablet, Take 1 tablet (50 mg total) by mouth as needed for erectile dysfunction (Patient not taking: Reported on 12/17/2019), Disp: 6 tablet, Rfl: 0    valACYclovir (VALTREX) 1,000 mg tablet, Take 1 tablet (1,000 mg total) by mouth 3 (three) times a day for 7 days, Disp: 21 tablet, Rfl: 0      Physical Exam:  /70 (BP Location: Left arm, Patient Position: Sitting, Cuff Size: Adult)   Pulse 86   Temp 98 3 °F (36 8 °C) (Tympanic)   Resp 16   Ht 5' 8" (1 727 m)   Wt 64 3 kg (141 lb 12 8 oz)   SpO2 95%   BMI 21 56 kg/m²     Physical Exam   Constitutional: He is oriented to person, place, and time  He appears well-developed and well-nourished  No distress  HENT:   Head: Normocephalic and atraumatic  Mouth/Throat: Oropharynx is clear and moist  No oropharyngeal exudate  Eyes: Pupils are equal, round, and reactive to light  Conjunctivae are normal  No scleral icterus  Neck: Normal range of motion  Neck supple  No thyromegaly present  Cardiovascular: Normal rate, regular rhythm, normal heart sounds and intact distal pulses  No murmur heard  Pulmonary/Chest: Effort normal and breath sounds normal  No respiratory distress  Abdominal: Soft  Bowel sounds are normal  He exhibits no distension  There is no hepatosplenomegaly  There is no tenderness  Musculoskeletal: Normal range of motion  He exhibits no edema  Lymphadenopathy:     He has no cervical adenopathy  He has no axillary adenopathy  Neurological: He is alert and oriented to person, place, and time  Skin: Skin is warm and dry  No rash noted  He is not diaphoretic  No erythema  No pallor  Psychiatric: His behavior is normal  Judgment and thought content normal  His affect is blunt  Vitals reviewed          Labs:  Lab Results   Component Value Date    WBC 11 15 (H) 12/10/2019    HGB 9 6 (L) 12/10/2019    HCT 31 3 (L) 12/10/2019 MCV 88 12/10/2019     12/10/2019     Lab Results   Component Value Date    K 4 5 12/10/2019     12/10/2019    CO2 35 (H) 12/10/2019    BUN 37 (H) 12/10/2019    CREATININE 2 27 (H) 12/10/2019    GLUF 122 (H) 11/19/2019    CALCIUM 8 5 12/10/2019    AST 33 12/04/2019    ALT 45 12/04/2019    ALKPHOS 83 12/04/2019    EGFR 36 12/10/2019         Patient voiced understanding and agreement in the above discussion  Aware to contact our office with questions/symptoms in the interim  This note has been generated by voice recognition software system  Therefore, there may be spelling, grammar, and or syntax errors  Please contact if questions arise

## 2019-12-26 DIAGNOSIS — C34.91 ADENOCARCINOMA OF LUNG, RIGHT (HCC): ICD-10-CM

## 2019-12-26 DIAGNOSIS — M54.50 LUMBAR PAIN: ICD-10-CM

## 2019-12-26 DIAGNOSIS — R52 GENERALIZED BODY ACHES: ICD-10-CM

## 2019-12-26 RX ORDER — OXYCODONE HYDROCHLORIDE 30 MG/1
30 TABLET ORAL EVERY 4 HOURS PRN
Qty: 75 TABLET | Refills: 0 | Status: SHIPPED | OUTPATIENT
Start: 2019-12-26 | End: 2020-01-09 | Stop reason: SDUPTHER

## 2019-12-26 NOTE — TELEPHONE ENCOUNTER
Pt is requesting refill of Oxycodone 30mg tablets  He has 2 tablets left  Next scheduled appt 1/9/20  Pharmacy [facility]

## 2019-12-29 NOTE — PROGRESS NOTES
Transition of Care  Follow-up After Hospitalization    Dylon Garcia Sr  64 y o  male   Date:  1/2/2020    TCM Call (since 12/2/2019)     Hospital care reviewed  Records reviewed    Disposition  Rehabilitation center      TCM Call (since 12/2/2019)     Scheduled for follow up? Not Clinically Warranted    Not clinically warranted  pt d/c to rehab        Nursing home discharge date was 12/20/19  Hospital records were reviewed  Medications upon discharge reviewed/updated  Discharge Disposition:  Nursing home  Follow up visits with other specialists: Cardio, Pulm, Nephro, Heme/onc, Palliative care  HPI:  COPD exacerbation: Improved compared prior to getting  Cough has been ongoing since prior to his admission that is productive but not increase in frequency  He has been on continuous 2 L of oxygen for the past 4 months  He denied any fever, but does report congestion but no nasal bleeding  He lives in a house were other people smoke at home  Review of Systems   Constitutional: Negative for appetite change, chills, fever and unexpected weight change  HENT: Negative for trouble swallowing  Eyes: Negative for visual disturbance  Respiratory: Positive for cough and shortness of breath  Negative for chest tightness and wheezing  Cardiovascular: Negative for chest pain, palpitations and leg swelling  Gastrointestinal: Negative for abdominal pain, constipation, diarrhea, nausea and vomiting  Skin: Negative for rash  Neurological: Negative for weakness and headaches  Hematological: Negative for adenopathy         Past Medical History:   Diagnosis Date    [facility]     Cancer ([facility] )     adeno lung  dx 11/2018-lung bx today 4/9/2019-ongoing chemo    Chronic pain disorder     back and right shoulder    CKD (chronic kidney disease)     COPD (chronic obstructive pulmonary disease) (HCC)     Depression     Diabetes mellitus (HCC)     GERD (gastroesophageal reflux disease)  Herniated lumbar intervertebral disc     Hypertension     Insomnia     Latent syphilis     Treated    Low back pain     Lumbosacral disc disease     Lung cancer ([facility] ) 2019    Pneumothorax after biopsy     R lung    PTSD (post-traumatic stress disorder)     Pulmonary emphysema (HCC)     Tobacco abuse 2018       Past Surgical History:   Procedure Laterality Date    COLONOSCOPY      CT GUIDED CHEST TUBE  2018    FL GUIDED CENTRAL VENOUS ACCESS DEVICE INSERTION  2019    HEMORROIDECTOMY      IR CHEST TUBE  2018    IR IMAGE GUIDED BIOPSY/ASPIRATION LUNG  2018    KNEE SURGERY      [facility] TUNNELED CTR VAD W/SUBQ PORT AGE 5 YR/> N/A 2019    Procedure: PLACEMENT OF PORT-A-CATH;  Surgeon: Aldo Pretty MD;  Location:  MAIN OR;  Service: Vascular       Social History     Socioeconomic History    Marital status: Legally      Spouse name: None    Number of children: None    Years of education: None    Highest education level: None   Occupational History    Occupation: part time employment   Social Needs    Financial resource strain: None    Food insecurity:     Worry: None     Inability: None    Transportation needs:     Medical: None     Non-medical: None   Tobacco Use    Smoking status: Former Smoker     Packs/day: 0 50     Years: 40 00     Pack years: 20 00     Types: Cigarettes     Start date:      Last attempt to quit: 2019     Years since quittin 4    Smokeless tobacco: Never Used   Substance and Sexual Activity    Alcohol use: Not Currently    Drug use: Not Currently     Types: Marijuana     Comment: stopped , "i smoked weed and stopped 1 month ago"    Sexual activity: Yes   Lifestyle    Physical activity:     Days per week: None     Minutes per session: None    Stress: None   Relationships    Social connections:     Talks on phone: None     Gets together: None     Attends Religion service: None     Active member of club or organization: None     Attends meetings of clubs or organizations: None     Relationship status: None    Intimate partner violence:     Fear of current or ex partner: None     Emotionally abused: None     Physically abused: None     Forced sexual activity: None   Other Topics Concern    None   Social History Narrative    Lives with girlfriend       Family History   Problem Relation Age of Onset    Heart disease Mother     Cancer Mother     Hypertension Father     Diabetes Family     Asthma Family     Heart disease Family     Cancer Family     Cancer Maternal Grandfather     Cancer Paternal Grandfather     Cancer Maternal Aunt        Allergies   Allergen Reactions    Lisinopril Anaphylaxis     Took medication a while ago and had a "swelling reaction"  Does not carry epi-pen         Current Outpatient Medications:     albuterol (2 5 mg/3 mL) 0 083 % nebulizer solution, Take 1 vial (2 5 mg total) by nebulization every 4 (four) hours as needed for wheezing or shortness of breath, Disp: 180 vial, Rfl: 5    albuterol (VENTOLIN HFA) 90 mcg/act inhaler, Inhale 2 puffs every 4 (four) hours as needed for wheezing, Disp: 1 Inhaler, Rfl: 5    amLODIPine (NORVASC) 10 mg tablet, Take 1 tablet (10 mg total) by mouth daily, Disp: 30 tablet, Rfl: 1    apixaban (ELIQUIS) 5 mg, Take 1 tablet (5 mg total) by mouth 2 (two) times a day, Disp: 30 tablet, Rfl: 0    budesonide (PULMICORT) 0 5 mg/2 mL nebulizer solution, Take 1 vial (0 5 mg total) by nebulization every 12 (twelve) hours Rinse mouth after use , Disp: 1 vial, Rfl: 0    diltiazem (CARDIZEM CD) 120 mg 24 hr capsule, Take 1 capsule (120 mg total) by mouth daily, Disp: 30 capsule, Rfl: 0    divalproex sodium (DEPAKOTE) 250 mg EC tablet, Take 1 tablet (250 mg total) by mouth 2 (two) times a day, Disp: 30 tablet, Rfl: 0    ergocalciferol (ERGOCALCIFEROL) 92097 units capsule, Take 1 capsule (50,000 Units total) by mouth once a week, Disp: 12 capsule, Rfl: 0    FLUoxetine (PROzac) 10 MG tablet, Take 1 tablet (10 mg total) by mouth daily, Disp: 30 tablet, Rfl: 5    fluticasone-vilanterol (BREO ELLIPTA) 100-25 mcg/inh inhaler, Inhale 1 puff daily in the early morning Rinse mouth after use , Disp: 1 Inhaler, Rfl: 5    folic acid (FOLVITE) 1 mg tablet, Take 1 tablet (1 mg total) by mouth daily, Disp: 30 tablet, Rfl: 2    FREESTYLE LITE test strip, TEST BLOOD SUGAR DAILY, Disp: 100 each, Rfl: 1    Lancets (FREESTYLE) lancets, TEST BLOOD SUGAR DAILY, Disp: 100 each, Rfl: 4    loratadine (CLARITIN) 10 mg tablet, Take 1 tablet (10 mg total) by mouth daily in the early morning, Disp: 30 tablet, Rfl: 2    melatonin 3 mg, Take 1 tablet (3 mg total) by mouth daily at bedtime, Disp: 30 tablet, Rfl: 1    metFORMIN (GLUCOPHAGE-XR) 500 mg 24 hr tablet, Take 1 tablet (500 mg total) by mouth 2 (two) times a day with meals, Disp: 60 tablet, Rfl: 0    methocarbamol (ROBAXIN) 750 mg tablet, Take 1 tablet (750 mg total) by mouth every 6 (six) hours as needed for muscle spasms, Disp: 90 tablet, Rfl: 0    ondansetron (ZOFRAN) 4 mg tablet, Take 1 tablet (4 mg total) by mouth every 6 (six) hours as needed for nausea or vomiting, Disp: 60 tablet, Rfl: 2    oxyCODONE (OxyCONTIN) 20 mg 12 hr tablet, Take 1 tablet (20 mg total) by mouth every 12 (twelve) hoursMax Daily Amount: 40 mg, Disp: 60 tablet, Rfl: 0    pantoprazole (PROTONIX) 40 mg tablet, Take 1 tablet (40 mg total) by mouth daily, Disp: 30 tablet, Rfl: 5    polyethylene glycol (GLYCOLAX) powder, Take 17 g by mouth daily, Disp: 527 g, Rfl: 1    predniSONE 20 mg tablet, Take 2 tablets (40 mg total) by mouth daily Until further instructed, Disp: 60 tablet, Rfl: 0    pregabalin (LYRICA) 25 mg capsule, Take 1 capsule PO in morning and 2 capsules PO at bedtime, Disp: 90 capsule, Rfl: 0    senna-docusate sodium (SENOKOT-S) 8 6-50 mg per tablet, Take 1 tablet by mouth 2 (two) times a day, Disp: 60 tablet, Rfl: 2    sodium polystyrene (KAYEXALATE) powder, , Disp: , Rfl:     sulfamethoxazole-trimethoprim (BACTRIM) 400-80 mg per tablet, Take 1 tablet by mouth every 12 (twelve) hours, Disp: , Rfl:     tamsulosin (FLOMAX) 0 4 mg, Take 1 capsule (0 4 mg total) by mouth daily with dinner, Disp: 90 capsule, Rfl: 3    tiotropium (SPIRIVA RESPIMAT) 2 5 MCG/ACT AERS inhaler, Inhale 2 puffs daily, Disp: 1 Inhaler, Rfl: 2    Blood Glucose Monitoring Suppl KIT, by Does not apply route daily (Patient not taking: Reported on 12/17/2019), Disp: 1 each, Rfl: 0    carbamide peroxide (DEBROX) 6 5 % otic solution, Administer 5 drops into both ears 2 (two) times a day (Patient not taking: Reported on 12/17/2019), Disp: 15 mL, Rfl: 0    dextromethorphan-guaiFENesin (ROBITUSSIN DM)  mg/5 mL syrup, Take 5 mL by mouth 3 (three) times a day as needed for cough, Disp: 236 mL, Rfl: 1    diphenhydrAMINE (BENADRYL) 25 mg tablet, Take 1 tablet (25 mg total) by mouth every 6 (six) hours as needed (nausea) (Patient not taking: Reported on 12/17/2019), Disp: 30 tablet, Rfl: 0    fluticasone (FLONASE) 50 mcg/act nasal spray, 1-2 sprays each nostril daily for allergies, Disp: 1 Bottle, Rfl: 3    ipratropium (ATROVENT) 0 02 % nebulizer solution, Take 1 vial (0 5 mg total) by nebulization 3 (three) times a day (Patient not taking: Reported on 12/17/2019), Disp: 90 vial, Rfl: 5    ipratropium-albuterol (DUO-NEB) 0 5-2 5 mg/3 mL nebulizer solution, Take 1 vial (3 mL total) by nebulization every 6 (six) hours (Patient not taking: Reported on 12/17/2019), Disp: 1 vial, Rfl: 0    lidocaine (LMX) 4 % cream, Apply topically as needed for mild pain Apply 1/2-1 inch to port 30-60 mins prior to needle insertion, cover with serrain wrap (Patient not taking: Reported on 12/17/2019), Disp: 30 g, Rfl: 5    oxyCODONE (ROXICODONE) 30 MG immediate release tablet, Take 1 tablet (30 mg total) by mouth every 4 (four) hours as needed for severe painMax Daily Amount: 180 mg, Disp: 75 tablet, Rfl: 0    prochlorperazine (COMPAZINE) 10 mg tablet, Take 1 tablet (10 mg total) by mouth every 6 (six) hours as needed for nausea or vomiting (Patient not taking: Reported on 12/17/2019), Disp: 90 tablet, Rfl: 0    QUEtiapine (SEROquel) 25 mg tablet, Take 25 mg by mouth daily at bedtime, Disp: , Rfl:     ranitidine (ZANTAC) 150 mg tablet, Take 1 tablet (150 mg total) by mouth 2 (two) times a day (Patient not taking: Reported on 12/17/2019), Disp: 60 tablet, Rfl: 4    REGULOID 28 3 % POWD, USE AS DIRECTED (Patient not taking: Reported on 12/17/2019), Disp: 369 g, Rfl: 4    Saline 0 65 % (Soln) SOLN, 5 drops into each nostril as needed (Dry nose), Disp: 50 mL, Rfl: 5    Selenium Sulfide 2 25 % SHAM, apply by topical route  every PM to the affected area(s), Disp: , Rfl:     sildenafil (VIAGRA) 50 MG tablet, Take 1 tablet (50 mg total) by mouth as needed for erectile dysfunction (Patient not taking: Reported on 12/17/2019), Disp: 6 tablet, Rfl: 0    valACYclovir (VALTREX) 1,000 mg tablet, Take 1 tablet (1,000 mg total) by mouth 3 (three) times a day for 7 days, Disp: 21 tablet, Rfl: 0      Physical Exam:  /60   Pulse 92   Temp (!) 97 3 °F (36 3 °C) (Skin)   Resp 20   Ht 5' 8" (1 727 m)   Wt 63 5 kg (140 lb)   SpO2 93% Comment: on 2 L nasal O2  BMI 21 29 kg/m²       Physical Exam   Constitutional: He is oriented to person, place, and time  He appears well-developed and well-nourished  No distress  HENT:   Head: Normocephalic and atraumatic  Nose: Nose normal    Eyes: Pupils are equal, round, and reactive to light  Conjunctivae are normal    Neck: Normal range of motion  Neck supple  Cardiovascular: Normal rate, regular rhythm and normal heart sounds  Exam reveals no gallop and no friction rub  No murmur heard  Pulmonary/Chest: Effort normal  No respiratory distress  He has no wheezes     Significantly diminished breath sounds bilaterally with rhonchi  No noted crackles  Noted 2 L of oxygen via nasal cannula   Abdominal: Soft  Bowel sounds are normal  He exhibits no distension  There is no tenderness  Musculoskeletal: Normal range of motion  He exhibits no edema  Neurological: He is alert and oriented to person, place, and time  Skin: Skin is warm and dry  No rash noted  He is not diaphoretic  No erythema  Vitals reviewed  Labs:  Lab Results   Component Value Date    WBC 11 15 (H) 12/10/2019    HGB 9 6 (L) 12/10/2019    HCT 31 3 (L) 12/10/2019    MCV 88 12/10/2019     12/10/2019     Lab Results   Component Value Date    K 4 5 12/10/2019     12/10/2019    CO2 35 (H) 12/10/2019    BUN 37 (H) 12/10/2019    CREATININE 2 27 (H) 12/10/2019    GLUF 122 (H) 11/19/2019    CALCIUM 8 5 12/10/2019    AST 33 12/04/2019    ALT 45 12/04/2019    ALKPHOS 83 12/04/2019    EGFR 36 12/10/2019       Assessment and Plan:    Dufm Saint was seen today for transition of care management  Diagnoses and all orders for this visit:    Chronic obstructive pulmonary disease with acute exacerbation (HCC)  -     dextromethorphan-guaiFENesin (ROBITUSSIN DM)  mg/5 mL syrup;  Take 5 mL by mouth 3 (three) times a day as needed for cough  -     Saline 0 65 % (Soln) SOLN; 5 drops into each nostril as needed (Dry nose)    Chronic obstructive pulmonary disease with acute exacerbation (HCC)  Has noted some improvement with ambulation since discharge  Continues to be on continuous 2 L of oxygen  Significantly diminished breath sounds bilaterally on physical examination but no crackles  Patient is to contact our office for complete review of medications as well as refills  For now will continue with the current management  Laboratory workup is pending, advised the patient to have labs completed prior to his Heme-Onc visit        Senia Martínez MD  01/02/20  1:26 PM

## 2020-01-02 ENCOUNTER — OFFICE VISIT (OUTPATIENT)
Dept: FAMILY MEDICINE CLINIC | Facility: CLINIC | Age: 60
End: 2020-01-02

## 2020-01-02 VITALS
BODY MASS INDEX: 21.22 KG/M2 | DIASTOLIC BLOOD PRESSURE: 60 MMHG | OXYGEN SATURATION: 93 % | HEART RATE: 92 BPM | WEIGHT: 140 LBS | TEMPERATURE: 97.3 F | HEIGHT: 68 IN | RESPIRATION RATE: 20 BRPM | SYSTOLIC BLOOD PRESSURE: 110 MMHG

## 2020-01-02 DIAGNOSIS — J44.1 CHRONIC OBSTRUCTIVE PULMONARY DISEASE WITH ACUTE EXACERBATION (HCC): Primary | ICD-10-CM

## 2020-01-02 PROCEDURE — 99213 OFFICE O/P EST LOW 20 MIN: CPT | Performed by: FAMILY MEDICINE

## 2020-01-02 RX ORDER — GUAIFENESIN/DEXTROMETHORPHAN 100-10MG/5
5 SYRUP ORAL 3 TIMES DAILY PRN
Qty: 236 ML | Refills: 1 | Status: SHIPPED | OUTPATIENT
Start: 2020-01-02 | End: 2020-10-29

## 2020-01-02 NOTE — ASSESSMENT & PLAN NOTE
Has noted some improvement with ambulation since discharge  Continues to be on continuous 2 L of oxygen  Significantly diminished breath sounds bilaterally on physical examination but no crackles  Patient is to contact our office for complete review of medications as well as refills  For now will continue with the current management  Laboratory workup is pending, advised the patient to have labs completed prior to his Heme-Onc visit

## 2020-01-02 NOTE — LETTER
January 2, 2020     Mary Scott, MD  [address]    Patient: Gillis Paget  YOB: 1960   Date of Visit: 1/2/2020       Dear Dr Adina Tabares: Thank you for referring Harshal Wyman to me for evaluation  Below are my notes for this consultation  If you have questions, please do not hesitate to call me  I look forward to following your patient along with you  Sincerely,        Eryn Rodriguez MD        CC: No Recipients  Eryn Rodriguez MD  1/2/2020  1:27 PM  Incomplete  Transition of Care  Follow-up After Hospitalization    Harshal Wyman Sr  64 y o  male   Date:  1/2/2020    TCM Call (since 12/2/2019)     Hospital care reviewed  Records reviewed    Disposition  Rehabilitation center      TCM Call (since 12/2/2019)     Scheduled for follow up? Not Clinically Warranted    Not clinically warranted  pt d/c to rehab        Nursing home discharge date was 12/20/19  Hospital records were reviewed  Medications upon discharge reviewed/updated  Discharge Disposition:  Nursing home  Follow up visits with other specialists: Cardio, Pulm, Nephro, Heme/onc, Palliative care  HPI:  COPD exacerbation: Improved compared prior to getting  Cough has been ongoing since prior to his admission that is productive but not increase in frequency  He has been on continuous 2 L of oxygen for the past 4 months  He denied any fever, but does report congestion but no nasal bleeding  He lives in a house were other people smoke at home  Review of Systems   Constitutional: Negative for appetite change, chills, fever and unexpected weight change  HENT: Negative for trouble swallowing  Eyes: Negative for visual disturbance  Respiratory: Positive for cough and shortness of breath  Negative for chest tightness and wheezing  Cardiovascular: Negative for chest pain, palpitations and leg swelling     Gastrointestinal: Negative for abdominal pain, constipation, diarrhea, nausea and vomiting  Skin: Negative for rash  Neurological: Negative for weakness and headaches  Hematological: Negative for adenopathy         Past Medical History:   Diagnosis Date    Bipolar 1 disorder ([facility] [address] )     Cancer ([facility] )     adeno lung  dx 11/2018-lung bx today 4/9/2019-ongoing chemo    Chronic pain disorder     back and right shoulder    CKD (chronic kidney disease)     COPD (chronic obstructive pulmonary disease) (HCC)     Depression     Diabetes mellitus (HCC)     GERD (gastroesophageal reflux disease)     Herniated lumbar intervertebral disc     Hypertension     Insomnia     Latent syphilis     Treated    Low back pain     Lumbosacral disc disease     [facility] ) 04/2019    Pneumothorax after biopsy     R lung    PTSD (post-traumatic stress disorder)     Pulmonary emph[facility]sema ([facility] )     Tobacco abuse 11/13/2018       Past Surgical History:   Procedure Laterality Date    COLONOSCOPY  2011    CT GUIDED CHEST TUBE  11/21/2018    FL GUIDED CENTRAL VENOUS ACCESS DEVICE INSERTION  2/8/2019    HEMORROIDECTOMY      IR CHEST TUBE  11/14/2018    IR IMAGE GUIDED BIOPSY/ASPIRATION LUNG  11/13/2018    KNEE SURGERY      [place] INSJ TUNNELED CTR VAD W/SUBQ PORT AGE 5 YR/> N/A 2/8/2019    Procedure: PLACEMENT OF PORT-A-CATH;  Surgeon: Stephen Cardoso MD;  Location:  MAIN OR;  Service: Vascular       Social History     Socioeconomic History    Marital status: Legally      Spouse name: None    Number of children: None    Years of education: None    Highest education level: None   Occupational History    Occupation: part time employment   Social Needs    Financial resource strain: None    Food insecurity:     Worry: None     Inability: None    Transportation needs:     Medical: None     Non-medical: None   Tobacco Use    Smoking status: Former Smoker     Packs/day: 0 50     Years: 40 00     Pack years: 20 00     Types: Cigarettes     Start date: 1971     Last attempt to quit: 2019     Years since quittin 4    Smokeless tobacco: Never Used   Substance and Sexual Activity    Alcohol use: Not Currently    Drug use: Not Currently     Types: Marijuana     Comment: stopped , "i smoked weed and stopped 1 month ago"    Sexual activity: Yes   Lifestyle    Physical activity:     Days per week: None     Minutes per session: None    Stress: None   Relationships    Social connections:     Talks on phone: None     Gets together: None     Attends Hinduism service: None     Active member of club or organization: None     Attends meetings of clubs or organizations: None     Relationship status: None    Intimate partner violence:     Fear of current or ex partner: None     Emotionally abused: None     Physically abused: None     Forced sexual activity: None   Other Topics Concern    None   Social History Narrative    Lives with girlfriend       Family History   Problem Relation Age of Onset    Heart disease Mother     Cancer Mother     Hypertension Father     Diabetes Family     Asthma Family     Heart disease Family     Cancer Family     Cancer Maternal Grandfather     Cancer Paternal Grandfather     Cancer Maternal Aunt        Allergies   Allergen Reactions    Lisinopril Anaphylaxis     Took medication a while ago and had a "swelling reaction"  Does not carry epi-pen         Current Outpatient Medications:     albuterol (2 5 mg/3 mL) 0 083 % nebulizer solution, Take 1 vial (2 5 mg total) by nebulization every 4 (four) hours as needed for wheezing or shortness of breath, Disp: 180 vial, Rfl: 5    albuterol (VENTOLIN HFA) 90 mcg/act inhaler, Inhale 2 puffs every 4 (four) hours as needed for wheezing, Disp: 1 Inhaler, Rfl: 5    amLODIPine (NORVASC) 10 mg tablet, Take 1 tablet (10 mg total) by mouth daily, Disp: 30 tablet, Rfl: 1    apixaban (ELIQUIS) 5 mg, Take 1 tablet (5 mg total) by mouth 2 (two) times a day, Disp: 30 tablet, Rfl: 0    budesonide (PULMICORT) 0 5 mg/2 mL nebulizer solution, Take 1 vial (0 5 mg total) by nebulization every 12 (twelve) hours Rinse mouth after use , Disp: 1 vial, Rfl: 0    diltiazem (CARDIZEM CD) 120 mg 24 hr capsule, Take 1 capsule (120 mg total) by mouth daily, Disp: 30 capsule, Rfl: 0    divalproex sodium (DEPAKOTE) 250 mg EC tablet, Take 1 tablet (250 mg total) by mouth 2 (two) times a day, Disp: 30 tablet, Rfl: 0    ergocalciferol (ERGOCALCIFEROL) 39938 units capsule, Take 1 capsule (50,000 Units total) by mouth once a week, Disp: 12 capsule, Rfl: 0    FLUoxetine (PROzac) 10 MG tablet, Take 1 tablet (10 mg total) by mouth daily, Disp: 30 tablet, Rfl: 5    fluticasone-vilanterol (BREO ELLIPTA) 100-25 mcg/inh inhaler, Inhale 1 puff daily in the early morning Rinse mouth after use , Disp: 1 Inhaler, Rfl: 5    folic acid (FOLVITE) 1 mg tablet, Take 1 tablet (1 mg total) by mouth daily, Disp: 30 tablet, Rfl: 2    FREESTYLE LITE test strip, TEST BLOOD SUGAR DAILY, Disp: 100 each, Rfl: 1    Lancets (FREESTYLE) lancets, TEST BLOOD SUGAR DAILY, Disp: 100 each, Rfl: 4    loratadine (CLARITIN) 10 mg tablet, Take 1 tablet (10 mg total) by mouth daily in the early morning, Disp: 30 tablet, Rfl: 2    melatonin 3 mg, Take 1 tablet (3 mg total) by mouth daily at bedtime, Disp: 30 tablet, Rfl: 1    metFORMIN (GLUCOPHAGE-XR) 500 mg 24 hr tablet, Take 1 tablet (500 mg total) by mouth 2 (two) times a day with meals, Disp: 60 tablet, Rfl: 0    methocarbamol (ROBAXIN) 750 mg tablet, Take 1 tablet (750 mg total) by mouth every 6 (six) hours as needed for muscle spasms, Disp: 90 tablet, Rfl: 0    ondansetron (ZOFRAN) 4 mg tablet, Take 1 tablet (4 mg total) by mouth every 6 (six) hours as needed for nausea or vomiting, Disp: 60 tablet, Rfl: 2    oxyCODONE (OxyCONTIN) 20 mg 12 hr tablet, Take 1 tablet (20 mg total) by mouth every 12 (twelve) hoursMax Daily Amount: 40 mg, Disp: 60 tablet, Rfl: 0    pantoprazole (PROTONIX) 40 mg tablet, Take 1 tablet (40 mg total) by mouth daily, Disp: 30 tablet, Rfl: 5    polyethylene glycol (GLYCOLAX) powder, Take 17 g by mouth daily, Disp: 527 g, Rfl: 1    predniSONE 20 mg tablet, Take 2 tablets (40 mg total) by mouth daily Until further instructed, Disp: 60 tablet, Rfl: 0    pregabalin (LYRICA) 25 mg capsule, Take 1 capsule PO in morning and 2 capsules PO at bedtime, Disp: 90 capsule, Rfl: 0    senna-docusate sodium (SENOKOT-S) 8 6-50 mg per tablet, Take 1 tablet by mouth 2 (two) times a day, Disp: 60 tablet, Rfl: 2    sodium polystyrene (KAYEXALATE) powder, , Disp: , Rfl:     sulfamethoxazole-trimethoprim (BACTRIM) 400-80 mg per tablet, Take 1 tablet by mouth every 12 (twelve) hours, Disp: , Rfl:     tamsulosin (FLOMAX) 0 4 mg, Take 1 capsule (0 4 mg total) by mouth daily with dinner, Disp: 90 capsule, Rfl: 3    tiotropium (SPIRIVA RESPIMAT) 2 5 MCG/ACT AERS inhaler, Inhale 2 puffs daily, Disp: 1 Inhaler, Rfl: 2    Blood Glucose Monitoring Suppl KIT, by Does not apply route daily (Patient not taking: Reported on 12/17/2019), Disp: 1 each, Rfl: 0    carbamide peroxide (DEBROX) 6 5 % otic solution, Administer 5 drops into both ears 2 (two) times a day (Patient not taking: Reported on 12/17/2019), Disp: 15 mL, Rfl: 0    dextromethorphan-guaiFENesin (ROBITUSSIN DM)  mg/5 mL syrup, Take 5 mL by mouth 3 (three) times a day as needed for cough, Disp: 236 mL, Rfl: 1    diphenhydrAMINE (BENADRYL) 25 mg tablet, Take 1 tablet (25 mg total) by mouth every 6 (six) hours as needed (nausea) (Patient not taking: Reported on 12/17/2019), Disp: 30 tablet, Rfl: 0    fluticasone (FLONASE) 50 mcg/act nasal spray, 1-2 sprays each nostril daily for allergies, Disp: 1 Bottle, Rfl: 3    ipratropium (ATROVENT) 0 02 % nebulizer solution, Take 1 vial (0 5 mg total) by nebulization 3 (three) times a day (Patient not taking: Reported on 12/17/2019), Disp: 90 vial, Rfl: 5    ipratropium-albuterol (DUO-NEB) 0 5-2 5 mg/3 mL nebulizer solution, Take 1 vial (3 mL total) by nebulization every 6 (six) hours (Patient not taking: Reported on 12/17/2019), Disp: 1 vial, Rfl: 0    lidocaine (LMX) 4 % cream, Apply topically as needed for mild pain Apply 1/2-1 inch to port 30-60 mins prior to needle insertion, cover with serrain wrap (Patient not taking: Reported on 12/17/2019), Disp: 30 g, Rfl: 5    oxyCODONE (ROXICODONE) 30 MG immediate release tablet, Take 1 tablet (30 mg total) by mouth every 4 (four) hours as needed for severe painMax Daily Amount: 180 mg, Disp: 75 tablet, Rfl: 0    prochlorperazine (COMPAZINE) 10 mg tablet, Take 1 tablet (10 mg total) by mouth every 6 (six) hours as needed for nausea or vomiting (Patient not taking: Reported on 12/17/2019), Disp: 90 tablet, Rfl: 0    QUEtiapine (SEROquel) 25 mg tablet, Take 25 mg by mouth daily at bedtime, Disp: , Rfl:     ranitidine (ZANTAC) 150 mg tablet, Take 1 tablet (150 mg total) by mouth 2 (two) times a day (Patient not taking: Reported on 12/17/2019), Disp: 60 tablet, Rfl: 4    REGULOID 28 3 % POWD, USE AS DIRECTED (Patient not taking: Reported on 12/17/2019), Disp: 369 g, Rfl: 4    Saline 0 65 % (Soln) SOLN, 5 drops into each nostril as needed (Dry nose), Disp: 50 mL, Rfl: 5    Selenium Sulfide 2 25 % SHAM, apply by topical route  every PM to the affected area(s), Disp: , Rfl:     sildenafil (VIAGRA) 50 MG tablet, Take 1 tablet (50 mg total) by mouth as needed for erectile dysfunction (Patient not taking: Reported on 12/17/2019), Disp: 6 tablet, Rfl: 0    valACYclovir (VALTREX) 1,000 mg tablet, Take 1 tablet (1,000 mg total) by mouth 3 (three) times a day for 7 days, Disp: 21 tablet, Rfl: 0      Physical Exam:  /60   Pulse 92   Temp (!) 97 3 °F (36 3 °C) (Skin)   Resp 20   Ht 5' 8" (1 727 m)   Wt 63 5 kg (140 lb)   SpO2 93% Comment: on 2 L nasal O2  BMI 21 29 kg/m²        Physical Exam   Constitutional: He is oriented to person, place, and time   He appears well-developed and well-nourished  No distress  HENT:   Head: Normocephalic and atraumatic  Nose: Nose normal    Eyes: Pupils are equal, round, and reactive to light  Conjunctivae are normal    Neck: Normal range of motion  Neck supple  Cardiovascular: Normal rate, regular rhythm and normal heart sounds  Exam reveals no gallop and no friction rub  No murmur heard  Pulmonary/Chest: Effort normal  No respiratory distress  He has no wheezes  Significantly diminished breath sounds bilaterally with rhonchi  No noted crackles  Noted 2 L of oxygen via nasal cannula   Abdominal: Soft  Bowel sounds are normal  He exhibits no distension  There is no tenderness  Musculoskeletal: Normal range of motion  He exhibits no edema  Neurological: He is alert and oriented to person, place, and time  Skin: Skin is warm and dry  No rash noted  He is not diaphoretic  No erythema  Vitals reviewed  Labs:  Lab Results   Component Value Date    WBC 11 15 (H) 12/10/2019    HGB 9 6 (L) 12/10/2019    HCT 31 3 (L) 12/10/2019    MCV 88 12/10/2019     12/10/2019     Lab Results   Component Value Date    K 4 5 12/10/2019     12/10/2019    CO2 35 (H) 12/10/2019    BUN 37 (H) 12/10/2019    CREATININE 2 27 (H) 12/10/2019    GLUF 122 (H) 11/19/2019    CALCIUM 8 5 12/10/2019    AST 33 12/04/2019    ALT 45 12/04/2019    ALKPHOS 83 12/04/2019    EGFR 36 12/10/2019       Assessment and Plan:    Myles Anne was seen today for transition of care management  Diagnoses and all orders for this visit:    Chronic obstructive pulmonary disease with acute exacerbation (HCC)  -     dextromethorphan-guaiFENesin (ROBITUSSIN DM)  mg/5 mL syrup;  Take 5 mL by mouth 3 (three) times a day as needed for cough  -     Saline 0 65 % (Soln) SOLN; 5 drops into each nostril as needed (Dry nose)    Chronic obstructive pulmonary disease with acute exacerbation ([address] )  Has noted some improvement with ambulation since discharge  Continues to be on continuous 2 L of oxygen  Significantly diminished breath sounds bilaterally on physical examination but no crackles  Patient is to contact our office for complete review of medications as well as refills  For now will continue with the current management  Laboratory workup is pending, advised the patient to have labs completed prior to his Heme-Onc visit        Melida Quick MD  01/02/20  1:26 PM     Melida Quick MD  1/2/2020  1:16 PM  Sign at close encounter  Transition of Care  Follow-up After Hospitalization    Jayesh Rose  64 y o  male   Date:  1/2/2020    TCM Call (since 12/2/2019)     Hospital care reviewed  Records reviewed    Disposition  Rehabilitation center      TCM Call (since 12/2/2019)     Scheduled for follow up? Not Clinically Warranted    Not clinically warranted  pt d/c to rehab        Discharge date was 12/20/19    Hospital records were reviewed  Medications upon discharge reviewed/updated  Discharge Disposition:  Nursing home  Follow up visits with other specialists: Cardio, Pulm, Nephro, Heme/onc, Palliative care  HPI:  HPI  COPD exacerbation: Improved compared prior to getting, cough is somewhat worse, productive, has been ongoing since prior to admission   On 2L continuous O2    Review of Systems    Past Medical History:   Diagnosis Date    Bipolar 1 disorder ([facility] [address] [facility]     Cancer ([facility] [address] )     adeno lung  dx 11/2018-lung bx today 4/9/2019-ongoing chemo    Chronic pain disorder     back and right shoulder    CKD (chronic kidney disease)     COPD (chronic obstructive pulmonary disease) (HCC)     Depression     Diabetes mellitus (HCC)     GERD (gastroesophageal reflux disease)     Herniated lumbar intervertebral disc     Hypertension     Insomnia     Latent syphilis     Treated    Low back pain     Lumbosacral disc disease     Lung cancer ([facility] [address] [facility] 04/2019    Pneumothorax after biopsy     R lung    PTSD (post-traumatic stress disorder)     Pulmonary emphysema (HCC)     Tobacco abuse 2018       Past Surgical History:   Procedure Laterality Date    COLONOSCOPY      CT GUIDED CHEST TUBE  2018    FL GUIDED CENTRAL VENOUS ACCESS DEVICE INSERTION  2019    HEMORROIDECTOMY      IR CHEST TUBE  2018    IR IMAGE GUIDED BIOPSY/ASPIRATION LUNG  2018    KNEE SURGERY      [place] INSJ TUNNELED CTR VAD W/SUBQ PORT AGE 5 YR/> N/A 2019    Procedure: PLACEMENT OF PORT-A-CATH;  Surgeon: Marlon Pearl MD;  Location: [address] [place] [address] OR;  Service: Vascular       Social History     Socioeconomic History    Marital status: Legally      Spouse name: None    Number of children: None    Years of education: None    Highest education level: None   Occupational History    Occupation: part time employment   Social Needs    Financial resource strain: None    Food insecurity:     Worry: None     Inability: None    Transportation needs:     Medical: None     Non-medical: None   Tobacco Use    Smoking status: Former Smoker     Packs/day: 0 50     Years: 40 00     Pack years: 20 00     Types: Cigarettes     Start date:      Last attempt to quit: 2019     Years since quittin 4    Smokeless tobacco: Never Used   Substance and Sexual Activity    Alcohol use: Not Currently    Drug use: Not Currently     Types: Marijuana     Comment: stopped , "i smoked weed and stopped 1 month ago"    Sexual activity: Yes   Lifestyle    Physical activity:     Days per week: None     Minutes per session: None    Stress: None   Relationships    Social connections:     Talks on phone: None     Gets together: None     Attends Confucianism service: None     Active member of club or organization: None     Attends meetings of clubs or organizations: None     Relationship status: None    Intimate partner violence:     Fear of current or ex partner: None     Emotionally abused: None     Physically abused: None     Forced sexual activity: None   Other Topics Concern    None Social History Narrative    Lives with girlfriend       Family History   Problem Relation Age of Onset    Heart disease Mother     Cancer Mother     Hypertension Father     Diabetes Family     Asthma Family     Heart disease Family     Cancer Family     Cancer Maternal Grandfather     Cancer Paternal Grandfather     Cancer Maternal Aunt        Allergies   Allergen Reactions    Lisinopril Anaphylaxis     Took medication a while ago and had a "swelling reaction"  Does not carry epi-pen         Current Outpatient Medications:     albuterol (2 5 mg/3 mL) 0 083 % nebulizer solution, Take 1 vial (2 5 mg total) by nebulization every 4 (four) hours as needed for wheezing or shortness of breath, Disp: 180 vial, Rfl: 5    albuterol (VENTOLIN HFA) 90 mcg/act inhaler, Inhale 2 puffs every 4 (four) hours as needed for wheezing, Disp: 1 Inhaler, Rfl: 5    amLODIPine (NORVASC) 10 mg tablet, Take 1 tablet (10 mg total) by mouth daily, Disp: 30 tablet, Rfl: 1    apixaban (ELIQUIS) 5 mg, Take 1 tablet (5 mg total) by mouth 2 (two) times a day, Disp: 30 tablet, Rfl: 0    budesonide (PULMICORT) 0 5 mg/2 mL nebulizer solution, Take 1 vial (0 5 mg total) by nebulization every 12 (twelve) hours Rinse mouth after use , Disp: 1 vial, Rfl: 0    diltiazem (CARDIZEM CD) 120 mg 24 hr capsule, Take 1 capsule (120 mg total) by mouth daily, Disp: 30 capsule, Rfl: 0    divalproex sodium (DEPAKOTE) 250 mg EC tablet, Take 1 tablet (250 mg total) by mouth 2 (two) times a day, Disp: 30 tablet, Rfl: 0    ergocalciferol (ERGOCALCIFEROL) 81937 units capsule, Take 1 capsule (50,000 Units total) by mouth once a week, Disp: 12 capsule, Rfl: 0    FLUoxetine (PROzac) 10 MG tablet, Take 1 tablet (10 mg total) by mouth daily, Disp: 30 tablet, Rfl: 5    fluticasone-vilanterol (BREO ELLIPTA) 100-25 mcg/inh inhaler, Inhale 1 puff daily in the early morning Rinse mouth after use , Disp: 1 Inhaler, Rfl: 5    folic acid (FOLVITE) 1 mg tablet, Take 1 tablet (1 mg total) by mouth daily, Disp: 30 tablet, Rfl: 2    FREESTYLE LITE test strip, TEST BLOOD SUGAR DAILY, Disp: 100 each, Rfl: 1    guaiFENesin (MUCINEX) 600 mg 12 hr tablet, Take 1 tablet (600 mg total) by mouth every 12 (twelve) hours, Disp: 30 tablet, Rfl: 0    Lancets (FREESTYLE) lancets, TEST BLOOD SUGAR DAILY, Disp: 100 each, Rfl: 4    loratadine (CLARITIN) 10 mg tablet, Take 1 tablet (10 mg total) by mouth daily in the early morning, Disp: 30 tablet, Rfl: 2    melatonin 3 mg, Take 1 tablet (3 mg total) by mouth daily at bedtime, Disp: 30 tablet, Rfl: 1    metFORMIN (GLUCOPHAGE-XR) 500 mg 24 hr tablet, Take 1 tablet (500 mg total) by mouth 2 (two) times a day with meals, Disp: 60 tablet, Rfl: 0    methocarbamol (ROBAXIN) 750 mg tablet, Take 1 tablet (750 mg total) by mouth every 6 (six) hours as needed for muscle spasms, Disp: 90 tablet, Rfl: 0    ondansetron (ZOFRAN) 4 mg tablet, Take 1 tablet (4 mg total) by mouth every 6 (six) hours as needed for nausea or vomiting, Disp: 60 tablet, Rfl: 2    oxyCODONE (OxyCONTIN) 20 mg 12 hr tablet, Take 1 tablet (20 mg total) by mouth every 12 (twelve) hoursMax Daily Amount: 40 mg, Disp: 60 tablet, Rfl: 0    pantoprazole (PROTONIX) 40 mg tablet, Take 1 tablet (40 mg total) by mouth daily, Disp: 30 tablet, Rfl: 5    polyethylene glycol (GLYCOLAX) powder, Take 17 g by mouth daily, Disp: 527 g, Rfl: 1    predniSONE 20 mg tablet, Take 2 tablets (40 mg total) by mouth daily Until further instructed, Disp: 60 tablet, Rfl: 0    pregabalin (LYRICA) 25 mg capsule, Take 1 capsule PO in morning and 2 capsules PO at bedtime, Disp: 90 capsule, Rfl: 0    senna-docusate sodium (SENOKOT-S) 8 6-50 mg per tablet, Take 1 tablet by mouth 2 (two) times a day, Disp: 60 tablet, Rfl: 2    sodium polystyrene (KAYEXALATE) powder, , Disp: , Rfl:     sulfamethoxazole-trimethoprim (BACTRIM) 400-80 mg per tablet, Take 1 tablet by mouth every 12 (twelve) hours, Disp: , Rfl:     tamsulosin (FLOMAX) 0 4 mg, Take 1 capsule (0 4 mg total) by mouth daily with dinner, Disp: 90 capsule, Rfl: 3    tiotropium (SPIRIVA RESPIMAT) 2 5 MCG/ACT AERS inhaler, Inhale 2 puffs daily, Disp: 1 Inhaler, Rfl: 2    Blood Glucose Monitoring Suppl KIT, by Does not apply route daily (Patient not taking: Reported on 12/17/2019), Disp: 1 each, Rfl: 0    carbamide peroxide (DEBROX) 6 5 % otic solution, Administer 5 drops into both ears 2 (two) times a day (Patient not taking: Reported on 12/17/2019), Disp: 15 mL, Rfl: 0    diphenhydrAMINE (BENADRYL) 25 mg tablet, Take 1 tablet (25 mg total) by mouth every 6 (six) hours as needed (nausea) (Patient not taking: Reported on 12/17/2019), Disp: 30 tablet, Rfl: 0    fluticasone (FLONASE) 50 mcg/act nasal spray, 1-2 sprays each nostril daily for allergies, Disp: 1 Bottle, Rfl: 3    ipratropium (ATROVENT) 0 02 % nebulizer solution, Take 1 vial (0 5 mg total) by nebulization 3 (three) times a day (Patient not taking: Reported on 12/17/2019), Disp: 90 vial, Rfl: 5    ipratropium-albuterol (DUO-NEB) 0 5-2 5 mg/3 mL nebulizer solution, Take 1 vial (3 mL total) by nebulization every 6 (six) hours (Patient not taking: Reported on 12/17/2019), Disp: 1 vial, Rfl: 0    lidocaine (LMX) 4 % cream, Apply topically as needed for mild pain Apply 1/2-1 inch to port 30-60 mins prior to needle insertion, cover with serrain wrap (Patient not taking: Reported on 12/17/2019), Disp: 30 g, Rfl: 5    oxyCODONE (ROXICODONE) 30 MG immediate release tablet, Take 1 tablet (30 mg total) by mouth every 4 (four) hours as needed for severe painMax Daily Amount: 180 mg, Disp: 75 tablet, Rfl: 0    pantoprazole (PROTONIX) 40 mg tablet, Take 1 tablet (40 mg total) by mouth daily (Patient not taking: Reported on 12/17/2019), Disp: 30 tablet, Rfl: 5    prochlorperazine (COMPAZINE) 10 mg tablet, Take 1 tablet (10 mg total) by mouth every 6 (six) hours as needed for nausea or vomiting (Patient not taking: Reported on 12/17/2019), Disp: 90 tablet, Rfl: 0    QUEtiapine (SEROquel) 25 mg tablet, Take 25 mg by mouth daily at bedtime, Disp: , Rfl:     ranitidine (ZANTAC) 150 mg tablet, Take 1 tablet (150 mg total) by mouth 2 (two) times a day (Patient not taking: Reported on 12/17/2019), Disp: 60 tablet, Rfl: 4    REGULOID 28 3 % POWD, USE AS DIRECTED (Patient not taking: Reported on 12/17/2019), Disp: 369 g, Rfl: 4    Selenium Sulfide 2 25 % SHAM, apply by topical route  every PM to the affected area(s), Disp: , Rfl:     sildenafil (VIAGRA) 50 MG tablet, Take 1 tablet (50 mg total) by mouth as needed for erectile dysfunction (Patient not taking: Reported on 12/17/2019), Disp: 6 tablet, Rfl: 0    valACYclovir (VALTREX) 1,000 mg tablet, Take 1 tablet (1,000 mg total) by mouth 3 (three) times a day for 7 days, Disp: 21 tablet, Rfl: 0      Physical Exam:  /60   Pulse 92   Temp (!) 97 3 °F (36 3 °C) (Skin)   Resp 20   Ht 5' 8" (1 727 m)   Wt 63 5 kg (140 lb)   SpO2 93% Comment: on 2 L nasal O2  BMI 21 29 kg/m²        Physical Exam   Constitutional: He is oriented to person, place, and time  He appears well-developed and well-nourished  No distress  HENT:   Head: Normocephalic and atraumatic  Nose: Nose normal    Eyes: Pupils are equal, round, and reactive to light  Conjunctivae are normal    Neck: Normal range of motion  Neck supple  Cardiovascular: Normal rate, regular rhythm and normal heart sounds  Exam reveals no gallop and no friction rub  No murmur heard  Pulmonary/Chest: Effort normal and breath sounds normal  No respiratory distress  He has no wheezes  He has no rales  Abdominal: Soft  Bowel sounds are normal  He exhibits no distension  There is no tenderness  Musculoskeletal: Normal range of motion  He exhibits no edema  Neurological: He is alert and oriented to person, place, and time  Skin: Skin is warm and dry  No rash noted  He is not diaphoretic  No erythema  Vitals reviewed  Labs:  Lab Results   Component Value Date    WBC 11 15 (H) 12/10/2019    HGB 9 6 (L) 12/10/2019    HCT 31 3 (L) 12/10/2019    MCV 88 12/10/2019     12/10/2019     Lab Results   Component Value Date    K 4 5 12/10/2019     12/10/2019    CO2 35 (H) 12/10/2019    BUN 37 (H) 12/10/2019    CREATININE 2 27 (H) 12/10/2019    GLUF 122 (H) 11/19/2019    CALCIUM 8 5 12/10/2019    AST 33 12/04/2019    ALT 45 12/04/2019    ALKPHOS 83 12/04/2019    EGFR 36 12/10/2019       Assessment and Plan:    There are no diagnoses linked to this encounter            Irving Hayes MD  01/02/20  1:03 PM

## 2020-01-03 ENCOUNTER — TELEPHONE (OUTPATIENT)
Dept: FAMILY MEDICINE CLINIC | Facility: CLINIC | Age: 60
End: 2020-01-03

## 2020-01-03 DIAGNOSIS — R77.8 ELEVATED TROPONIN LEVEL NOT DUE TO ACUTE CORONARY SYNDROME: ICD-10-CM

## 2020-01-03 DIAGNOSIS — J44.9 CHRONIC OBSTRUCTIVE PULMONARY DISEASE, UNSPECIFIED COPD TYPE (HCC): ICD-10-CM

## 2020-01-03 DIAGNOSIS — R30.0 STRANGURIA: ICD-10-CM

## 2020-01-03 DIAGNOSIS — C34.91 ADENOCARCINOMA OF LUNG, RIGHT (HCC): ICD-10-CM

## 2020-01-03 DIAGNOSIS — I10 HTN (HYPERTENSION): ICD-10-CM

## 2020-01-03 DIAGNOSIS — N41.9 PROSTATITIS, UNSPECIFIED PROSTATITIS TYPE: ICD-10-CM

## 2020-01-03 DIAGNOSIS — E11.9 TYPE 2 DIABETES MELLITUS WITHOUT COMPLICATION, WITHOUT LONG-TERM CURRENT USE OF INSULIN (HCC): ICD-10-CM

## 2020-01-03 DIAGNOSIS — R52 GENERALIZED BODY ACHES: ICD-10-CM

## 2020-01-03 DIAGNOSIS — N40.1 BENIGN PROSTATIC HYPERPLASIA WITH LOWER URINARY TRACT SYMPTOMS, SYMPTOM DETAILS UNSPECIFIED: ICD-10-CM

## 2020-01-03 PROBLEM — I48.0 PAROXYSMAL ATRIAL FIBRILLATION (HCC): Status: ACTIVE | Noted: 2019-12-12

## 2020-01-03 RX ORDER — METFORMIN HYDROCHLORIDE 500 MG/1
500 TABLET, EXTENDED RELEASE ORAL 2 TIMES DAILY WITH MEALS
Qty: 60 TABLET | Refills: 0 | Status: SHIPPED | OUTPATIENT
Start: 2020-01-03 | End: 2021-01-01 | Stop reason: HOSPADM

## 2020-01-03 RX ORDER — AMLODIPINE BESYLATE 10 MG/1
10 TABLET ORAL DAILY
Qty: 30 TABLET | Refills: 1 | Status: SHIPPED | OUTPATIENT
Start: 2020-01-03 | End: 2020-01-06 | Stop reason: SDUPTHER

## 2020-01-03 RX ORDER — DIVALPROEX SODIUM 250 MG/1
250 TABLET, DELAYED RELEASE ORAL 2 TIMES DAILY
Qty: 30 TABLET | Refills: 0 | Status: SHIPPED | OUTPATIENT
Start: 2020-01-03 | End: 2020-01-20 | Stop reason: SDUPTHER

## 2020-01-03 RX ORDER — TAMSULOSIN HYDROCHLORIDE 0.4 MG/1
0.4 CAPSULE ORAL
Qty: 90 CAPSULE | Refills: 3 | Status: SHIPPED | OUTPATIENT
Start: 2020-01-03 | End: 2020-08-18

## 2020-01-03 RX ORDER — PANTOPRAZOLE SODIUM 40 MG/1
40 TABLET, DELAYED RELEASE ORAL DAILY
Qty: 30 TABLET | Refills: 5 | Status: SHIPPED | OUTPATIENT
Start: 2020-01-03 | End: 2020-07-22 | Stop reason: SDUPTHER

## 2020-01-03 RX ORDER — DILTIAZEM HYDROCHLORIDE 120 MG/1
120 CAPSULE, COATED, EXTENDED RELEASE ORAL DAILY
Qty: 30 CAPSULE | Refills: 0 | Status: SHIPPED | OUTPATIENT
Start: 2020-01-03 | End: 2020-01-06 | Stop reason: SDUPTHER

## 2020-01-03 NOTE — TELEPHONE ENCOUNTER
Patient is calling with the list of medications he needs refills on   Patient was seen yesterday and did not have the list with him  Vitamin D2 50,000u   Once a week  metFORMIN (GLUCOPHAGE-XR) 500 mg 24 hr tablet  divalproex sodium (DEPAKOTE) 250 mg EC tablet  fluticasone-vilanterol (BREO ELLIPTA) 100-25 mcg/inh inhaler  tiotropium (SPIRIVA RESPIMAT) 2 5 MCG/ACT AERS inhaler  folic acid (FOLVITE) 1 mg tablet  FLUoxetine (PROzac) 10 MG tablet  diltiazem (CARDIZEM CD) 120 mg 24 hr capsule  pantoprazole (PROTONIX) 40 mg tablet  amLODIPine (NORVASC) 10 mg tablet  tamsulosin (FLOMAX) 0 4 mg    Please send to [facility] [address]

## 2020-01-06 ENCOUNTER — OFFICE VISIT (OUTPATIENT)
Dept: CARDIOLOGY CLINIC | Facility: CLINIC | Age: 60
End: 2020-01-06
Payer: COMMERCIAL

## 2020-01-06 VITALS
DIASTOLIC BLOOD PRESSURE: 60 MMHG | HEIGHT: 68 IN | HEART RATE: 86 BPM | SYSTOLIC BLOOD PRESSURE: 130 MMHG | BODY MASS INDEX: 20.76 KG/M2 | OXYGEN SATURATION: 97 % | WEIGHT: 137 LBS

## 2020-01-06 DIAGNOSIS — R77.8 ELEVATED TROPONIN LEVEL NOT DUE TO ACUTE CORONARY SYNDROME: ICD-10-CM

## 2020-01-06 DIAGNOSIS — I10 ESSENTIAL HYPERTENSION: ICD-10-CM

## 2020-01-06 DIAGNOSIS — R77.8 ELEVATED TROPONIN: ICD-10-CM

## 2020-01-06 DIAGNOSIS — I48.0 PAF (PAROXYSMAL ATRIAL FIBRILLATION) (HCC): Primary | ICD-10-CM

## 2020-01-06 DIAGNOSIS — I10 HTN (HYPERTENSION): ICD-10-CM

## 2020-01-06 PROCEDURE — 99214 OFFICE O/P EST MOD 30 MIN: CPT | Performed by: INTERNAL MEDICINE

## 2020-01-06 RX ORDER — AMLODIPINE BESYLATE 5 MG/1
5 TABLET ORAL DAILY
Qty: 30 TABLET | Refills: 11 | Status: SHIPPED | OUTPATIENT
Start: 2020-01-06 | End: 2020-01-14

## 2020-01-06 RX ORDER — DILTIAZEM HYDROCHLORIDE 120 MG/1
120 CAPSULE, COATED, EXTENDED RELEASE ORAL DAILY
Qty: 30 CAPSULE | Refills: 11 | Status: SHIPPED | OUTPATIENT
Start: 2020-01-06 | End: 2020-01-27 | Stop reason: SDUPTHER

## 2020-01-06 NOTE — TELEPHONE ENCOUNTER
Patient called stating his inhalers need prior auth before it can be dispense   [address] [facility] pharmacy

## 2020-01-06 NOTE — PROGRESS NOTES
Cardiology Follow Up    Cece Thomas    1960  [id number]  [facility] [place]  [address]  [facility] [id number] [phone]    Reason for visit: patient here  for FU of atypical chest pain, elevated troponin (no MI with CC in 2018 showing nonobstructive disease)    Also has HTN and new discovery of PAF  1  PAF (paroxysmal atrial fibrillation) ([facility] )     2  Essential hypertension     3  Elevated troponin         Interval History: The patient was admitted to the hospital in late November into December for atypical chest pain  [place] He once again had a non MI troponin elevation  [place] He was found to have PAF and was placed on Eliquis and diltiazem  He continues to have chest pain which is intermittent and not related to exertion    He has ongoing CLINE which is not worse  He denies edema  He does get dizziness and heart racing        Patient Active Problem List   Diagnosis    Chronic obstructive pulmonary disease with acute exacerbation (HCC)    Essential hypertension    Chronic bilateral thoracic back pain    Chronic right shoulder pain    Bilateral carpal tunnel syndrome    [facility]    Gastroesophageal reflux disease without esophagitis    Other specified anxiety disorders    Cubital tunnel syndrome, bilateral    Type 2 diabetes mellitus without complication, without long-term current use of insulin (HCC)    Panlobular emphysema (HCC)    Adenocarcinoma of lung, right ([facility] )    Tobacco abuse    Encounter for immunization    Generalized body aches    Chronic pain disorder    Cancer associated pain    Chemotherapy-induced nausea    Erectile dysfunction    Renal dysfunction    Weight loss    Stage 3 chronic kidney disease (HCC)    Peripheral neuropathy    Maxillary sinusitis    Impacted cerumen of left ear    Elevated troponin    Stroke-like symptoms    CKD (chronic kidney disease)    Elevated troponin level not due to acute coronary syndrome    Elevated d-dimer    Alkalosis    Paroxysmal atrial fibrillation (HCC)    Ambulatory dysfunction     Past Medical History:   Diagnosis Date    Bipolar 1 disorder (HCC)     Cancer ([facility] )     adeno lung  dx 2018-lung bx today 2019-ongoing chemo    Chronic pain disorder     back and right shoulder    CKD (chronic kidney disease)     COPD (chronic obstructive pulmonary disease) (HCC)     Depression     Diabetes mellitus (HCC)     GERD (gastroesophageal reflux disease)     Herniated lumbar intervertebral disc     Hypertension     Insomnia     Latent syphilis     Treated    Low back pain     Lumbosacral disc disease     Lung cancer ([place] [address] ) 2019    Pneumothorax after biopsy     R lung    PTSD (post-traumatic stress disorder)     Pulmonary emphysema ([place] [address] )     Tobacco abuse 2018     Social History     Socioeconomic History    Marital status: Legally      Spouse name: Not on file    Number of children: Not on file    Years of education: Not on file    Highest education level: Not on file   Occupational History    Occupation: part time employment   Social Needs    Financial resource strain: Not on file    Food insecurity:     Worry: Not on file     Inability: Not on file    Transportation needs:     Medical: Not on file     Non-medical: Not on file   Tobacco Use    Smoking status: Former Smoker     Packs/day: 0 50     Years: 40 00     Pack years: 20 00     Types: Cigarettes     Start date:      Last attempt to quit: 2019     Years since quittin 4    Smokeless tobacco: Never Used   Substance and Sexual Activity    Alcohol use: Not Currently    Drug use: Not Currently     Types: Marijuana     Comment: stopped , "i smoked weed and stopped 1 month ago"    Sexual activity: Yes   Lifestyle    Physical activity:     Days per week: Not on file     Minutes per session: Not on file    Stress: Not on file Relationships    Social connections:     Talks on phone: Not on file     Gets together: Not on file     Attends Cheondoism service: Not on file     Active member of club or organization: Not on file     Attends meetings of clubs or organizations: Not on file     Relationship status: Not on file    Intimate partner violence:     Fear of current or ex partner: Not on file     Emotionally abused: Not on file     Physically abused: Not on file     Forced sexual activity: Not on file   Other Topics Concern    Not on file   Social History Narrative    Lives with girlfriend      Family History   Problem Relation Age of Onset    Heart disease Mother     Cancer Mother     Hypertension Father     Diabetes Family     Asthma Family     Heart disease Family     Cancer Family     Cancer Maternal Grandfather     Cancer Paternal Grandfather     Cancer Maternal Aunt      Past Surgical History:   Procedure Laterality Date    COLONOSCOPY  2011    CT GUIDED CHEST TUBE  11/21/2018    FL GUIDED CENTRAL VENOUS ACCESS DEVICE INSERTION  2/8/2019    HEMORROIDECTOMY      IR CHEST TUBE  11/14/2018    IR IMAGE GUIDED BIOPSY/ASPIRATION LUNG  11/13/2018    KNEE SURGERY      HI INSJ TUNNELED CTR VAD W/SUBQ PORT AGE 5 YR/> N/A 2/8/2019    Procedure: PLACEMENT OF PORT-A-CATH;  Surgeon: Bala Naranjo MD;  Location:  MAIN OR;  Service: Vascular       Current Outpatient Medications:     albuterol (2 5 mg/3 mL) 0 083 % nebulizer solution, Take 1 vial (2 5 mg total) by nebulization every 4 (four) hours as needed for wheezing or shortness of breath, Disp: 180 vial, Rfl: 5    albuterol (VENTOLIN HFA) 90 mcg/act inhaler, Inhale 2 puffs every 4 (four) hours as needed for wheezing, Disp: 1 Inhaler, Rfl: 5    amLODIPine (NORVASC) 10 mg tablet, Take 1 tablet (10 mg total) by mouth daily, Disp: 30 tablet, Rfl: 1    apixaban (ELIQUIS) 5 mg, Take 1 tablet (5 mg total) by mouth 2 (two) times a day, Disp: 30 tablet, Rfl: 0    Blood Glucose Monitoring Suppl KIT, by Does not apply route daily, Disp: 1 each, Rfl: 0    budesonide (PULMICORT) 0 5 mg/2 mL nebulizer solution, Take 1 vial (0 5 mg total) by nebulization every 12 (twelve) hours Rinse mouth after use , Disp: 1 vial, Rfl: 0    carbamide peroxide (DEBROX) 6 5 % otic solution, Administer 5 drops into both ears 2 (two) times a day, Disp: 15 mL, Rfl: 0    diltiazem (CARDIZEM CD) 120 mg 24 hr capsule, Take 1 capsule (120 mg total) by mouth daily, Disp: 30 capsule, Rfl: 0    diphenhydrAMINE (BENADRYL) 25 mg tablet, Take 1 tablet (25 mg total) by mouth every 6 (six) hours as needed (nausea), Disp: 30 tablet, Rfl: 0    divalproex sodium (DEPAKOTE) 250 mg EC tablet, Take 1 tablet (250 mg total) by mouth 2 (two) times a day, Disp: 30 tablet, Rfl: 0    ergocalciferol (ERGOCALCIFEROL) 95092 units capsule, Take 1 capsule (50,000 Units total) by mouth once a week, Disp: 12 capsule, Rfl: 0    FLUoxetine (PROzac) 10 MG tablet, Take 1 tablet (10 mg total) by mouth daily, Disp: 30 tablet, Rfl: 5    fluticasone (FLONASE) 50 mcg/act nasal spray, 1-2 sprays each nostril daily for allergies, Disp: 1 Bottle, Rfl: 3    fluticasone-vilanterol (BREO ELLIPTA) 100-25 mcg/inh inhaler, Inhale 1 puff daily in the early morning Rinse mouth after use , Disp: 1 Inhaler, Rfl: 5    folic acid (FOLVITE) 1 mg tablet, Take 1 tablet (1 mg total) by mouth daily, Disp: 30 tablet, Rfl: 2    FREESTYLE LITE test strip, TEST BLOOD SUGAR DAILY, Disp: 100 each, Rfl: 1    ipratropium (ATROVENT) 0 02 % nebulizer solution, Take 1 vial (0 5 mg total) by nebulization 3 (three) times a day, Disp: 90 vial, Rfl: 5    ipratropium-albuterol (DUO-NEB) 0 5-2 5 mg/3 mL nebulizer solution, Take 1 vial (3 mL total) by nebulization every 6 (six) hours, Disp: 1 vial, Rfl: 0    Lancets (FREESTYLE) lancets, TEST BLOOD SUGAR DAILY, Disp: 100 each, Rfl: 4    lidocaine (LMX) 4 % cream, Apply topically as needed for mild pain Apply 1/2-1 inch to port 30-60 mins prior to needle insertion, cover with serrain wrap, Disp: 30 g, Rfl: 5    loratadine (CLARITIN) 10 mg tablet, Take 1 tablet (10 mg total) by mouth daily in the early morning, Disp: 30 tablet, Rfl: 2    melatonin 3 mg, Take 1 tablet (3 mg total) by mouth daily at bedtime, Disp: 30 tablet, Rfl: 1    metFORMIN (GLUCOPHAGE-XR) 500 mg 24 hr tablet, Take 1 tablet (500 mg total) by mouth 2 (two) times a day with meals, Disp: 60 tablet, Rfl: 0    methocarbamol (ROBAXIN) 750 mg tablet, Take 1 tablet (750 mg total) by mouth every 6 (six) hours as needed for muscle spasms, Disp: 90 tablet, Rfl: 0    ondansetron (ZOFRAN) 4 mg tablet, Take 1 tablet (4 mg total) by mouth every 6 (six) hours as needed for nausea or vomiting, Disp: 60 tablet, Rfl: 2    oxyCODONE (ROXICODONE) 30 MG immediate release tablet, Take 1 tablet (30 mg total) by mouth every 4 (four) hours as needed for severe painMax Daily Amount: 180 mg, Disp: 75 tablet, Rfl: 0    pantoprazole (PROTONIX) 40 mg tablet, Take 1 tablet (40 mg total) by mouth daily, Disp: 30 tablet, Rfl: 5    polyethylene glycol (GLYCOLAX) powder, Take 17 g by mouth daily, Disp: 527 g, Rfl: 1    predniSONE 20 mg tablet, Take 2 tablets (40 mg total) by mouth daily Until further instructed, Disp: 60 tablet, Rfl: 0    pregabalin (LYRICA) 25 mg capsule, Take 1 capsule PO in morning and 2 capsules PO at bedtime, Disp: 90 capsule, Rfl: 0    prochlorperazine (COMPAZINE) 10 mg tablet, Take 1 tablet (10 mg total) by mouth every 6 (six) hours as needed for nausea or vomiting, Disp: 90 tablet, Rfl: 0    QUEtiapine (SEROquel) 25 mg tablet, Take 25 mg by mouth daily at bedtime, Disp: , Rfl:     Saline 0 65 % (Soln) SOLN, 5 drops into each nostril as needed (Dry nose), Disp: 50 mL, Rfl: 5    Selenium Sulfide 2 25 % SHAM, apply by topical route  every PM to the affected area(s), Disp: , Rfl:     senna-docusate sodium (SENOKOT-S) 8 6-50 mg per tablet, Take 1 tablet by mouth 2 (two) times a day, Disp: 60 tablet, Rfl: 2    sodium polystyrene (KAYEXALATE) powder, , Disp: , Rfl:     sulfamethoxazole-trimethoprim (BACTRIM) 400-80 mg per tablet, Take 1 tablet by mouth every 12 (twelve) hours, Disp: , Rfl:     tamsulosin (FLOMAX) 0 4 mg, Take 1 capsule (0 4 mg total) by mouth daily with dinner, Disp: 90 capsule, Rfl: 3    tiotropium (SPIRIVA RESPIMAT) 2 5 MCG/ACT AERS inhaler, Inhale 2 puffs daily, Disp: 1 Inhaler, Rfl: 2    valACYclovir (VALTREX) 1,000 mg tablet, Take 1 tablet (1,000 mg total) by mouth 3 (three) times a day for 7 days, Disp: 21 tablet, Rfl: 0    dextromethorphan-guaiFENesin (ROBITUSSIN DM)  mg/5 mL syrup, Take 5 mL by mouth 3 (three) times a day as needed for cough (Patient not taking: Reported on 1/6/2020), Disp: 236 mL, Rfl: 1    ranitidine (ZANTAC) 150 mg tablet, Take 1 tablet (150 mg total) by mouth 2 (two) times a day (Patient not taking: Reported on 1/6/2020), Disp: 60 tablet, Rfl: 4    REGULOID 28 3 % POWD, USE AS DIRECTED (Patient not taking: Reported on 12/17/2019), Disp: 369 g, Rfl: 4    sildenafil (VIAGRA) 50 MG tablet, Take 1 tablet (50 mg total) by mouth as needed for erectile dysfunction (Patient not taking: Reported on 12/17/2019), Disp: 6 tablet, Rfl: 0  Allergies   Allergen Reactions    Lisinopril Anaphylaxis     Took medication a while ago and had a "swelling reaction"  Does not carry epi-pen       Review of Systems:  Review of Systems   Constitutional: Positive for appetite change, fatigue and unexpected weight change  Negative for activity change  Respiratory: Positive for cough, shortness of breath and wheezing  Negative for chest tightness  Cardiovascular: Positive for chest pain and palpitations  Negative for leg swelling  Gastrointestinal: Positive for constipation  Negative for abdominal pain, blood in stool and diarrhea  Genitourinary: Negative for dysuria, frequency, hematuria and testicular pain     Musculoskeletal: Positive for arthralgias and back pain  Negative for gait problem and joint swelling  Neurological: Positive for dizziness  Negative for syncope, speech difficulty, light-headedness and headaches  Psychiatric/Behavioral: Negative for agitation, behavioral problems, confusion and decreased concentration  Physical Exam:  Vitals:    01/06/20 0922   BP: 130/60   BP Location: Right arm   Patient Position: Sitting   Cuff Size: Adult   Pulse: 86   SpO2: 97%   Weight: 62 1 kg (137 lb)   Height: 5' 8" (1 727 m)     Physical Exam   Constitutional: He is oriented to person, place, and time  Thin middle aged male on oxygen   HENT:   Head: Normocephalic and atraumatic  Mouth/Throat: Oropharynx is clear and moist  No oropharyngeal exudate  Eyes: No scleral icterus  Conjunctiva pale   Neck: Neck supple  Normal carotid pulses and no JVD present  Carotid bruit is not present  No thyromegaly present  Cardiovascular: Normal rate, regular rhythm, normal heart sounds and intact distal pulses  Exam reveals no gallop and no friction rub  No murmur heard  Pulmonary/Chest: He has decreased breath sounds  He has wheezes  He has no rhonchi  He has no rales  Abdominal: Soft  He exhibits no mass  There is no hepatosplenomegaly  There is no tenderness  Musculoskeletal: He exhibits no edema, tenderness or deformity  Neurological: He is alert and oriented to person, place, and time  He has normal strength  No cranial nerve deficit or sensory deficit  Skin: Skin is warm and dry  No rash noted  No erythema  No pallor  Psychiatric: He has a normal mood and affect  His behavior is normal  Judgment and thought content normal        Discussion/Summary:  1  AMANDO Guillen Discovered during recent hospitalization for atypical chest pain and non myocardial infarction elevation of troponin  Patient now on diltiazem for potential rate control  In sinus rhythm today  Now on Eliquis    Trajectory of weight is going down and with clothes on today he weighs 137 lb  In light of renal insufficiency and concurrent diltiazem therapy will reduce Eliquis to 2 5 mg b i d  2  Hypertension  Well controlled on amlodipine and diltiazem  In light of 2 calcium channel blockers will reduce amlodipine to 5 mg daily  3  Elevated troponin  Had nonobstructive disease on cardiac catheterization in 2018  May be in part related to renal failure    No specific medications for CAD since it was not demonstrated    Follow-up 4 months        Viet Narayanan MD

## 2020-01-07 ENCOUNTER — PATIENT OUTREACH (OUTPATIENT)
Dept: FAMILY MEDICINE CLINIC | Facility: CLINIC | Age: 60
End: 2020-01-07

## 2020-01-07 ENCOUNTER — TELEPHONE (OUTPATIENT)
Dept: HEMATOLOGY ONCOLOGY | Facility: CLINIC | Age: 60
End: 2020-01-07

## 2020-01-07 NOTE — PROGRESS NOTES
Received a call from Josefa Dickey, patient's significant other asking if an order can be placed for patient to have outpatient physical therapy  She states patient had this when he was in [facility] and felt it was beneficial   She stated patient recently had shingles which set him back a bit  She states he has trouble going up stairs and gets short of breath if he does not take breaks  She is asking for therapy even if it is short term to help his endurance

## 2020-01-07 NOTE — PROGRESS NOTES
Called patient for the first time  Explained my role but patient declined services  He did ask for my contact information in case he needs my assistance in the future  I will close case but am available if patient changes his mind  Chart reviewed

## 2020-01-07 NOTE — TELEPHONE ENCOUNTER
The patient was called to complete the blood work prior to the appointment on 1/8/2020  the patient stated that he is not feeling well and will call back If he can not complete it

## 2020-01-08 ENCOUNTER — HOSPITAL ENCOUNTER (OUTPATIENT)
Dept: INFUSION CENTER | Facility: HOSPITAL | Age: 60
Discharge: HOME | End: 2020-01-08
Attending: INTERNAL MEDICINE

## 2020-01-08 ENCOUNTER — APPOINTMENT (OUTPATIENT)
Dept: LAB | Facility: HOSPITAL | Age: 60
End: 2020-01-08
Payer: COMMERCIAL

## 2020-01-08 ENCOUNTER — OFFICE VISIT (OUTPATIENT)
Dept: HEMATOLOGY ONCOLOGY | Facility: CLINIC | Age: 60
End: 2020-01-08
Payer: COMMERCIAL

## 2020-01-08 VITALS
HEART RATE: 95 BPM | OXYGEN SATURATION: 92 % | TEMPERATURE: 97.2 F | BODY MASS INDEX: 21.34 KG/M2 | SYSTOLIC BLOOD PRESSURE: 120 MMHG | HEIGHT: 68 IN | WEIGHT: 140.8 LBS | DIASTOLIC BLOOD PRESSURE: 62 MMHG | RESPIRATION RATE: 16 BRPM

## 2020-01-08 DIAGNOSIS — C34.91 ADENOCARCINOMA OF LUNG, RIGHT (HCC): ICD-10-CM

## 2020-01-08 DIAGNOSIS — N18.30 STAGE 3 CHRONIC KIDNEY DISEASE (HCC): ICD-10-CM

## 2020-01-08 DIAGNOSIS — R77.9 ELEVATED BLOOD PROTEIN: ICD-10-CM

## 2020-01-08 DIAGNOSIS — I12.9 HYPERTENSIVE CHRONIC KIDNEY DISEASE WITH STAGE 1 THROUGH STAGE 4 CHRONIC KIDNEY DISEASE, OR UNSPECIFIED CHRONIC KIDNEY DISEASE: ICD-10-CM

## 2020-01-08 DIAGNOSIS — N28.9 RENAL DYSFUNCTION: ICD-10-CM

## 2020-01-08 DIAGNOSIS — E87.5 HYPERKALEMIA: ICD-10-CM

## 2020-01-08 DIAGNOSIS — N17.9 ACUTE RENAL FAILURE, UNSPECIFIED ACUTE RENAL FAILURE TYPE (HCC): ICD-10-CM

## 2020-01-08 DIAGNOSIS — C34.91 ADENOCARCINOMA OF LUNG, RIGHT (HCC): Primary | ICD-10-CM

## 2020-01-08 LAB
ALBUMIN SERPL BCP-MCNC: 3.4 G/DL (ref 3–5.2)
ALP SERPL-CCNC: 77 U/L (ref 43–122)
ALT SERPL W P-5'-P-CCNC: 15 U/L (ref 9–52)
ANION GAP SERPL CALCULATED.3IONS-SCNC: 12 MMOL/L (ref 5–14)
AST SERPL W P-5'-P-CCNC: 18 U/L (ref 17–59)
BILIRUB SERPL-MCNC: 0.3 MG/DL
BUN SERPL-MCNC: 30 MG/DL (ref 5–25)
CALCIUM SERPL-MCNC: 9.3 MG/DL (ref 8.4–10.2)
CHLORIDE SERPL-SCNC: 101 MMOL/L (ref 97–108)
CO2 SERPL-SCNC: 28 MMOL/L (ref 22–30)
CREAT SERPL-MCNC: 2.13 MG/DL (ref 0.7–1.5)
ERYTHROCYTE [DISTWIDTH] IN BLOOD BY AUTOMATED COUNT: 19.1 %
FERRITIN SERPL-MCNC: 565 NG/ML (ref 8–388)
FOLATE SERPL-MCNC: >20 NG/ML (ref 3.1–17.5)
GFR SERPL CREATININE-BSD FRML MDRD: 39 ML/MIN/1.73SQ M
GLUCOSE SERPL-MCNC: 142 MG/DL (ref 70–99)
HCT VFR BLD AUTO: 32.2 % (ref 41–53)
HGB BLD-MCNC: 10.5 G/DL (ref 13.5–17.5)
IRON SATN MFR SERPL: 38 %
IRON SERPL-MCNC: 64 UG/DL (ref 65–175)
LDH SERPL-CCNC: 441 U/L (ref 313–618)
LYMPHOCYTES # BLD AUTO: 0.89 THOUSAND/UL (ref 0.5–4)
LYMPHOCYTES # BLD AUTO: 6 % (ref 25–45)
MCH RBC QN AUTO: 28.3 PG (ref 26–34)
MCHC RBC AUTO-ENTMCNC: 32.7 G/DL (ref 31–36)
MCV RBC AUTO: 87 FL (ref 80–100)
METAMYELOCYTES NFR BLD MANUAL: 3 % (ref 0–1)
MONOCYTES # BLD AUTO: 0.44 THOUSAND/UL (ref 0.2–0.9)
MONOCYTES NFR BLD AUTO: 3 % (ref 1–10)
MYELOCYTES NFR BLD MANUAL: 1 % (ref 0–1)
NEUTS BAND NFR BLD MANUAL: 2 % (ref 0–8)
NEUTS SEG # BLD: 12.88 THOUSAND/UL (ref 1.8–7.8)
NEUTS SEG NFR BLD AUTO: 85 %
PHOSPHATE SERPL-MCNC: 3.1 MG/DL (ref 2.5–4.8)
PLATELET # BLD AUTO: 327 THOUSANDS/UL (ref 150–450)
PLATELET BLD QL SMEAR: ADEQUATE
PMV BLD AUTO: 7.6 FL (ref 8.9–12.7)
POTASSIUM SERPL-SCNC: 4.3 MMOL/L (ref 3.6–5)
PROT SERPL-MCNC: 7.1 G/DL (ref 5.9–8.4)
RBC # BLD AUTO: 3.71 MILLION/UL (ref 4.5–5.9)
RBC MORPH BLD: NORMAL
SODIUM SERPL-SCNC: 141 MMOL/L (ref 137–147)
T3FREE SERPL-MCNC: 1.63 PG/ML (ref 2.3–4.2)
TIBC SERPL-MCNC: 170 UG/DL (ref 250–450)
TOTAL CELLS COUNTED SPEC: 100
TSH SERPL DL<=0.05 MIU/L-ACNC: 1.3 UIU/ML (ref 0.47–4.68)
VIT B12 SERPL-MCNC: 1293 PG/ML (ref 100–900)
WBC # BLD AUTO: 14.8 THOUSAND/UL (ref 4.5–11)

## 2020-01-08 PROCEDURE — 36415 COLL VENOUS BLD VENIPUNCTURE: CPT

## 2020-01-08 PROCEDURE — 83550 IRON BINDING TEST: CPT

## 2020-01-08 PROCEDURE — 3066F NEPHROPATHY DOC TX: CPT | Performed by: FAMILY MEDICINE

## 2020-01-08 PROCEDURE — 83615 LACTATE (LD) (LDH) ENZYME: CPT

## 2020-01-08 PROCEDURE — 82746 ASSAY OF FOLIC ACID SERUM: CPT

## 2020-01-08 PROCEDURE — 84100 ASSAY OF PHOSPHORUS: CPT

## 2020-01-08 PROCEDURE — 80053 COMPREHEN METABOLIC PANEL: CPT

## 2020-01-08 PROCEDURE — 84481 FREE ASSAY (FT-3): CPT

## 2020-01-08 PROCEDURE — 82607 VITAMIN B-12: CPT

## 2020-01-08 PROCEDURE — 85027 COMPLETE CBC AUTOMATED: CPT

## 2020-01-08 PROCEDURE — 3066F NEPHROPATHY DOC TX: CPT | Performed by: NURSE PRACTITIONER

## 2020-01-08 PROCEDURE — 99214 OFFICE O/P EST MOD 30 MIN: CPT | Performed by: INTERNAL MEDICINE

## 2020-01-08 PROCEDURE — 84443 ASSAY THYROID STIM HORMONE: CPT

## 2020-01-08 PROCEDURE — 82728 ASSAY OF FERRITIN: CPT

## 2020-01-08 PROCEDURE — 85007 BL SMEAR W/DIFF WBC COUNT: CPT

## 2020-01-08 PROCEDURE — 83540 ASSAY OF IRON: CPT

## 2020-01-08 RX ORDER — LEVOFLOXACIN 500 MG/1
500 TABLET, FILM COATED ORAL EVERY 24 HOURS
Qty: 10 TABLET | Refills: 0 | Status: SHIPPED | OUTPATIENT
Start: 2020-01-08 | End: 2020-01-18

## 2020-01-08 NOTE — PROGRESS NOTES
Hematology/Oncology Outpatient Follow-up  Norma Brooks  64 y o  male 1960 [id number]    Date:  1/8/2020        Assessment and Plan:  1  Adenocarcinoma of lung, right [facility])  The patient was told that we will continue to hold the [facility] as immunotherapy while he is on the prednisone  We will taper down the prednisone dose to 20 mg once a day starting today and re-evaluate him again in 2 weeks from now  He will be also getting a CT scan of the chest and will get him started on 10 days course of Levaquin since he is having productive cough  By the next visit we will make a decision if the prednisone can be further tapered down before the [facility] a can be restarted  - CBC and differential; Future  - Comprehensive metabolic panel; Future  - Magnesium; Future  - levofloxacin (LEVAQUIN) 500 mg tablet; Take 1 tablet (500 mg total) by mouth every 24 hours for 10 days  Dispense: 10 tablet; Refill: 0  - CT chest w contrast; Future        HPI:  The patient came today for a follow-up visit  He was recently found to have herpes zoster of the left upper extremity which was treated with Valtrex for 7 days  He stated that his skin lesions are well healing without any pain  He continues to be off of the [facility]  He is currently on tapering dose of prednisone of 40 mg once a day which was decreased from 60 mg about 2 weeks ago  The patient seems to be more short of breath and using the oxygen around the clock  He also complained about significant productive cough with greenish sputum  His blood work from today is still pending    Oncology History    37[facility] heavy smoker who used to smoke 2 packs of cigarettes daily for many years, COPD, he presented with dyspnea, CT scan of the chest on October 2018 showed right middle lobe spiculated 1 3 cm mass with multiple solid and ground-glass nodules along the major and minor fissures sub cm pulmonary nodules bilaterally, left adrenal 1 2 cm nodule     PET scan showed 1 3 cm right middle lung nodule with SUV of 6 8, numerous nodular densities along the right major and minor fissures, right hilar activity with SUV of 3 4, subcarinal lymph node measuring 7 mm with SUV of 2 7, periportal enlarged lymph nodes concerning for metastatic disease measuring 2 4 cm with SUV of 5, prominent left retrocrural lymph node measuring 1 cm x 9 mm with SUV of 1 1    Core biopsy of the right spiculated nodule showed invasive adenocarcinoma consistent with lung primary positive for CK7, TTF 1, napsin a        Adenocarcinoma of lung, right ([address] )    11/13/2018 Initial Diagnosis     Adenocarcinoma of lung, right ([facility] )  Stage IV      12/13/2018 - 3/28/2019 Chemotherapy      Alimta, Carboplatin (AUC 5) + Keytruda (6 cycles total)      4/17/2019 -  Chemotherapy     Started maintenance Alimta and Keytruda  (Alimta d/c'd 9/16/19 due to worsening renal dysfunction)         Interval history:    ROS: Review of Systems   Constitutional: Positive for fatigue  Negative for appetite change, diaphoresis and fever  HENT: Negative for congestion, dental problem, facial swelling, hearing loss, tinnitus and trouble swallowing  Eyes: Negative for visual disturbance  Respiratory: Positive for cough and shortness of breath  Negative for chest tightness and wheezing  Cardiovascular: Negative for chest pain and leg swelling  Gastrointestinal: Positive for constipation  Negative for abdominal distention, abdominal pain, blood in stool, diarrhea, nausea and vomiting  Genitourinary: Negative for dysuria, hematuria and urgency  Musculoskeletal: Positive for arthralgias  Negative for myalgias and neck pain  Skin: Negative  Negative for color change, pallor, rash and wound  Neurological: Positive for dizziness, weakness and numbness  Negative for headaches  Hematological: Negative for adenopathy     Psychiatric/Behavioral: Negative for agitation, behavioral problems, confusion, hallucinations, self-injury and sleep disturbance  The patient is not nervous/anxious and is not hyperactive          Past Medical History:   Diagnosis Date    Bipolar 1 disorder ([facility] )     Cancer ([facility] )     adeno lung  dx 11/2018-lung bx today 4/9/2019-ongoing chemo    Chronic pain disorder     back and right shoulder    CKD (chronic kidney disease)     COPD (chronic obstructive pulmonary disease) (HCC)     Depression     Diabetes mellitus (HCC)     GERD (gastroesophageal reflux disease)     Herniated lumbar intervertebral disc     Hypertension     Insomnia     Latent syphilis     Treated    Low back pain     Lumbosacral disc disease     Lung cancer ([facility] ) 04/2019    Pneumothorax after biopsy     R lung    PTSD (post-traumatic stress disorder)     Pulmonary emphysema ([facility] )     Tobacco abuse 11/13/2018       Past Surgical History:   Procedure Laterality Date    COLONOSCOPY  2011    CT GUIDED CHEST TUBE  11/21/2018    FL GUIDED CENTRAL VENOUS ACCESS DEVICE INSERTION  2/8/2019    HEMORROIDECTOMY      IR CHEST TUBE  11/14/2018    IR IMAGE GUIDED BIOPSY/ASPIRATION LUNG  11/13/2018    KNEE SURGERY      NV INSJ TUNNELED CTR VAD W/SUBQ PORT AGE 5 YR/> N/A 2/8/2019    Procedure: PLACEMENT OF PORT-A-CATH;  Surgeon: Marina Faye MD;  Location:  MAIN OR;  Service: Vascular       Social History     Socioeconomic History    Marital status: Legally      Spouse name: None    Number of children: None    Years of education: None    Highest education level: None   Occupational History    Occupation: part time employment   Social Needs    Financial resource strain: None    Food insecurity:     Worry: None     Inability: None    Transportation needs:     Medical: None     Non-medical: None   Tobacco Use    Smoking status: Former Smoker     Packs/day: 0 50     Years: 40 00     Pack years: 20 00     Types: Cigarettes     Start date: 1971     Last attempt to quit: 7/31/2019     Years since quittin 4    Smokeless tobacco: Never Used   Substance and Sexual Activity    Alcohol use: Not Currently    Drug use: Not Currently     Types: Marijuana     Comment: stopped , "i smoked weed and stopped 1 month ago"    Sexual activity: Yes   Lifestyle    Physical activity:     Days per week: None     Minutes per session: None    Stress: None   Relationships    Social connections:     Talks on phone: None     Gets together: None     Attends Yarsani service: None     Active member of club or organization: None     Attends meetings of clubs or organizations: None     Relationship status: None    Intimate partner violence:     Fear of current or ex partner: None     Emotionally abused: None     Physically abused: None     Forced sexual activity: None   Other Topics Concern    None   Social History Narrative    Lives with girlfriend       Family History   Problem Relation Age of Onset    Heart disease Mother     Cancer Mother     Hypertension Father     Diabetes Family     Asthma Family     Heart disease Family     Cancer Family     Cancer Maternal Grandfather     Cancer Paternal Grandfather     Cancer Maternal Aunt        Allergies   Allergen Reactions    Lisinopril Anaphylaxis     Took medication a while ago and had a "swelling reaction"  Does not carry epi-pen         Current Outpatient Medications:     albuterol (2 5 mg/3 mL) 0 083 % nebulizer solution, Take 1 vial (2 5 mg total) by nebulization every 4 (four) hours as needed for wheezing or shortness of breath, Disp: 180 vial, Rfl: 5    albuterol (VENTOLIN HFA) 90 mcg/act inhaler, Inhale 2 puffs every 4 (four) hours as needed for wheezing, Disp: 1 Inhaler, Rfl: 5    amLODIPine (NORVASC) 5 mg tablet, Take 1 tablet (5 mg total) by mouth daily, Disp: 30 tablet, Rfl: 11    apixaban (ELIQUIS) 2 5 mg, Take 1 tablet (2 5 mg total) by mouth 2 (two) times a day, Disp: 60 tablet, Rfl: 11    Blood Glucose Monitoring Suppl KIT, by Does not apply route daily, Disp: 1 each, Rfl: 0    budesonide (PULMICORT) 0 5 mg/2 mL nebulizer solution, Take 1 vial (0 5 mg total) by nebulization every 12 (twelve) hours Rinse mouth after use , Disp: 1 vial, Rfl: 0    carbamide peroxide (DEBROX) 6 5 % otic solution, Administer 5 drops into both ears 2 (two) times a day, Disp: 15 mL, Rfl: 0    dextromethorphan-guaiFENesin (ROBITUSSIN DM)  mg/5 mL syrup, Take 5 mL by mouth 3 (three) times a day as needed for cough, Disp: 236 mL, Rfl: 1    diltiazem (CARDIZEM CD) 120 mg 24 hr capsule, Take 1 capsule (120 mg total) by mouth daily, Disp: 30 capsule, Rfl: 11    diphenhydrAMINE (BENADRYL) 25 mg tablet, Take 1 tablet (25 mg total) by mouth every 6 (six) hours as needed (nausea), Disp: 30 tablet, Rfl: 0    divalproex sodium (DEPAKOTE) 250 mg EC tablet, Take 1 tablet (250 mg total) by mouth 2 (two) times a day, Disp: 30 tablet, Rfl: 0    ergocalciferol (ERGOCALCIFEROL) 10058 units capsule, Take 1 capsule (50,000 Units total) by mouth once a week, Disp: 12 capsule, Rfl: 0    FLUoxetine (PROzac) 10 MG tablet, Take 1 tablet (10 mg total) by mouth daily, Disp: 30 tablet, Rfl: 5    fluticasone (FLONASE) 50 mcg/act nasal spray, 1-2 sprays each nostril daily for allergies, Disp: 1 Bottle, Rfl: 3    fluticasone-vilanterol (BREO ELLIPTA) 100-25 mcg/inh inhaler, Inhale 1 puff daily in the early morning Rinse mouth after use , Disp: 1 Inhaler, Rfl: 5    folic acid (FOLVITE) 1 mg tablet, Take 1 tablet (1 mg total) by mouth daily, Disp: 30 tablet, Rfl: 2    FREESTYLE LITE test strip, TEST BLOOD SUGAR DAILY, Disp: 100 each, Rfl: 1    ipratropium (ATROVENT) 0 02 % nebulizer solution, Take 1 vial (0 5 mg total) by nebulization 3 (three) times a day, Disp: 90 vial, Rfl: 5    ipratropium-albuterol (DUO-NEB) 0 5-2 5 mg/3 mL nebulizer solution, Take 1 vial (3 mL total) by nebulization every 6 (six) hours, Disp: 1 vial, Rfl: 0    Lancets (FREESTYLE) lancets, TEST BLOOD SUGAR DAILY, Disp: 100 each, Rfl: 4    lidocaine (LMX) 4 % cream, Apply topically as needed for mild pain Apply 1/2-1 inch to port 30-60 mins prior to needle insertion, cover with serrain wrap, Disp: 30 g, Rfl: 5    loratadine (CLARITIN) 10 mg tablet, Take 1 tablet (10 mg total) by mouth daily in the early morning, Disp: 30 tablet, Rfl: 2    melatonin 3 mg, Take 1 tablet (3 mg total) by mouth daily at bedtime, Disp: 30 tablet, Rfl: 1    metFORMIN (GLUCOPHAGE-XR) 500 mg 24 hr tablet, Take 1 tablet (500 mg total) by mouth 2 (two) times a day with meals, Disp: 60 tablet, Rfl: 0    methocarbamol (ROBAXIN) 750 mg tablet, Take 1 tablet (750 mg total) by mouth every 6 (six) hours as needed for muscle spasms, Disp: 90 tablet, Rfl: 0    ondansetron (ZOFRAN) 4 mg tablet, Take 1 tablet (4 mg total) by mouth every 6 (six) hours as needed for nausea or vomiting, Disp: 60 tablet, Rfl: 2    oxyCODONE (ROXICODONE) 30 MG immediate release tablet, Take 1 tablet (30 mg total) by mouth every 4 (four) hours as needed for severe painMax Daily Amount: 180 mg, Disp: 75 tablet, Rfl: 0    pantoprazole (PROTONIX) 40 mg tablet, Take 1 tablet (40 mg total) by mouth daily, Disp: 30 tablet, Rfl: 5    polyethylene glycol (GLYCOLAX) powder, Take 17 g by mouth daily, Disp: 527 g, Rfl: 1    predniSONE 20 mg tablet, Take 2 tablets (40 mg total) by mouth daily Until further instructed, Disp: 60 tablet, Rfl: 0    pregabalin (LYRICA) 25 mg capsule, Take 1 capsule PO in morning and 2 capsules PO at bedtime, Disp: 90 capsule, Rfl: 0    prochlorperazine (COMPAZINE) 10 mg tablet, Take 1 tablet (10 mg total) by mouth every 6 (six) hours as needed for nausea or vomiting, Disp: 90 tablet, Rfl: 0    QUEtiapine (SEROquel) 25 mg tablet, Take 25 mg by mouth daily at bedtime, Disp: , Rfl:     ranitidine (ZANTAC) 150 mg tablet, Take 1 tablet (150 mg total) by mouth 2 (two) times a day, Disp: 60 tablet, Rfl: 4    REGULOID 28 3 % POWD, USE AS DIRECTED, Disp: 369 g, Rfl: 4    Saline 0 65 % (Soln) SOLN, 5 drops into each nostril as needed (Dry nose), Disp: 50 mL, Rfl: 5    Selenium Sulfide 2 25 % SHAM, apply by topical route  every PM to the affected area(s), Disp: , Rfl:     senna-docusate sodium (SENOKOT-S) 8 6-50 mg per tablet, Take 1 tablet by mouth 2 (two) times a day, Disp: 60 tablet, Rfl: 2    sildenafil (VIAGRA) 50 MG tablet, Take 1 tablet (50 mg total) by mouth as needed for erectile dysfunction, Disp: 6 tablet, Rfl: 0    sodium polystyrene (KAYEXALATE) powder, , Disp: , Rfl:     sulfamethoxazole-trimethoprim (BACTRIM) 400-80 mg per tablet, Take 1 tablet by mouth every 12 (twelve) hours, Disp: , Rfl:     tamsulosin (FLOMAX) 0 4 mg, Take 1 capsule (0 4 mg total) by mouth daily with dinner, Disp: 90 capsule, Rfl: 3    tiotropium (SPIRIVA RESPIMAT) 2 5 MCG/ACT AERS inhaler, Inhale 2 puffs daily, Disp: 1 Inhaler, Rfl: 2    levofloxacin (LEVAQUIN) 500 mg tablet, Take 1 tablet (500 mg total) by mouth every 24 hours for 10 days, Disp: 10 tablet, Rfl: 0    valACYclovir (VALTREX) 1,000 mg tablet, Take 1 tablet (1,000 mg total) by mouth 3 (three) times a day for 7 days, Disp: 21 tablet, Rfl: 0      Physical Exam:  /62 (BP Location: Left arm, Cuff Size: Adult)   Pulse 95   Temp (!) 97 2 °F (36 2 °C) (Tympanic)   Resp 16   Ht 5' 8" (1 727 m)   Wt 63 9 kg (140 lb 12 8 oz)   SpO2 92%   BMI 21 41 kg/m²     Physical Exam   Constitutional: He is oriented to person, place, and time  He appears well-developed and well-nourished  HENT:   Head: Normocephalic and atraumatic  Nose: Nose normal    Mouth/Throat: Oropharynx is clear and moist    Eyes: Pupils are equal, round, and reactive to light  Conjunctivae and EOM are normal  Right eye exhibits no discharge  Left eye exhibits no discharge  No scleral icterus  Neck: Normal range of motion  Neck supple  No tracheal deviation present  No thyromegaly present     Cardiovascular: Normal rate, regular rhythm and normal heart sounds  Exam reveals no friction rub  No murmur heard  Pulmonary/Chest: He is in respiratory distress  He has no wheezes  He has rales  He exhibits no tenderness  Abdominal: Soft  Bowel sounds are normal  He exhibits no distension and no mass  There is no hepatosplenomegaly, splenomegaly or hepatomegaly  There is no tenderness  There is no rebound and no guarding  Musculoskeletal: Normal range of motion  He exhibits no edema, tenderness or deformity  Lymphadenopathy:     He has no cervical adenopathy  Neurological: He is alert and oriented to person, place, and time  He has normal reflexes  He displays normal reflexes  No cranial nerve deficit  Coordination normal    Skin: Skin is warm and dry  No rash noted  No erythema  No pallor  Psychiatric: He has a normal mood and affect  His behavior is normal  Judgment and thought content normal          Labs:  Lab Results   Component Value Date    WBC 11 15 (H) 12/10/2019    HGB 9 6 (L) 12/10/2019    HCT 31 3 (L) 12/10/2019    MCV 88 12/10/2019     12/10/2019     Lab Results   Component Value Date    K 4 5 12/10/2019     12/10/2019    CO2 35 (H) 12/10/2019    BUN 37 (H) 12/10/2019    CREATININE 2 27 (H) 12/10/2019    GLUF 122 (H) 11/19/2019    CALCIUM 8 5 12/10/2019    AST 33 12/04/2019    ALT 45 12/04/2019    ALKPHOS 83 12/04/2019    EGFR 36 12/10/2019     No results found for: TSH    Patient voiced understanding and agreement in the above discussion  Aware to contact our office with questions/symptoms in the interim

## 2020-01-09 ENCOUNTER — OFFICE VISIT (OUTPATIENT)
Dept: PALLIATIVE MEDICINE | Facility: CLINIC | Age: 60
End: 2020-01-09
Payer: COMMERCIAL

## 2020-01-09 ENCOUNTER — TELEPHONE (OUTPATIENT)
Dept: PALLIATIVE MEDICINE | Facility: CLINIC | Age: 60
End: 2020-01-09

## 2020-01-09 VITALS
RESPIRATION RATE: 20 BRPM | TEMPERATURE: 98 F | DIASTOLIC BLOOD PRESSURE: 60 MMHG | OXYGEN SATURATION: 94 % | SYSTOLIC BLOOD PRESSURE: 119 MMHG | HEART RATE: 80 BPM | BODY MASS INDEX: 21.59 KG/M2 | WEIGHT: 142 LBS

## 2020-01-09 DIAGNOSIS — R52 GENERALIZED BODY ACHES: ICD-10-CM

## 2020-01-09 DIAGNOSIS — M54.50 LUMBAR PAIN: ICD-10-CM

## 2020-01-09 DIAGNOSIS — G89.3 CANCER-RELATED PAIN: Chronic | ICD-10-CM

## 2020-01-09 DIAGNOSIS — J44.1 CHRONIC OBSTRUCTIVE PULMONARY DISEASE WITH ACUTE EXACERBATION (HCC): ICD-10-CM

## 2020-01-09 DIAGNOSIS — Z51.5 PALLIATIVE CARE PATIENT: Primary | ICD-10-CM

## 2020-01-09 DIAGNOSIS — G62.9 PERIPHERAL POLYNEUROPATHY: ICD-10-CM

## 2020-01-09 DIAGNOSIS — J30.1 ALLERGIC RHINITIS DUE TO POLLEN, UNSPECIFIED SEASONALITY: ICD-10-CM

## 2020-01-09 DIAGNOSIS — C34.91 ADENOCARCINOMA OF LUNG, RIGHT (HCC): ICD-10-CM

## 2020-01-09 PROBLEM — R77.8 ELEVATED TROPONIN: Status: RESOLVED | Noted: 2019-11-29 | Resolved: 2020-01-09

## 2020-01-09 PROBLEM — R79.89 ELEVATED D-DIMER: Status: RESOLVED | Noted: 2019-11-30 | Resolved: 2020-01-09

## 2020-01-09 PROBLEM — R77.8 ELEVATED TROPONIN LEVEL NOT DUE TO ACUTE CORONARY SYNDROME: Status: RESOLVED | Noted: 2019-11-30 | Resolved: 2020-01-09

## 2020-01-09 PROBLEM — E87.3 ALKALOSIS: Status: RESOLVED | Noted: 2019-12-09 | Resolved: 2020-01-09

## 2020-01-09 PROCEDURE — 99215 OFFICE O/P EST HI 40 MIN: CPT | Performed by: FAMILY MEDICINE

## 2020-01-09 RX ORDER — PREGABALIN 25 MG/1
CAPSULE ORAL
Qty: 90 CAPSULE | Refills: 0 | Status: SHIPPED | OUTPATIENT
Start: 2020-01-09 | End: 2020-02-17 | Stop reason: SDUPTHER

## 2020-01-09 RX ORDER — OXYCODONE HCL 20 MG/1
20 TABLET, FILM COATED, EXTENDED RELEASE ORAL EVERY 12 HOURS SCHEDULED
Qty: 60 TABLET | Refills: 0 | Status: SHIPPED | OUTPATIENT
Start: 2020-01-09 | End: 2020-02-17 | Stop reason: SDUPTHER

## 2020-01-09 RX ORDER — IPRATROPIUM BROMIDE AND ALBUTEROL SULFATE 2.5; .5 MG/3ML; MG/3ML
3 SOLUTION RESPIRATORY (INHALATION) 4 TIMES DAILY
Qty: 120 VIAL | Refills: 0 | Status: SHIPPED | OUTPATIENT
Start: 2020-01-09 | End: 2020-12-09 | Stop reason: SDUPTHER

## 2020-01-09 RX ORDER — FORMOTEROL FUMARATE 20 UG/2ML
20 SOLUTION RESPIRATORY (INHALATION) 2 TIMES DAILY
Qty: 60 VIAL | Refills: 1 | Status: SHIPPED | OUTPATIENT
Start: 2020-01-09 | End: 2021-01-01

## 2020-01-09 RX ORDER — BUDESONIDE 1 MG/2ML
1 INHALANT ORAL 2 TIMES DAILY
Qty: 120 ML | Refills: 1 | Status: SHIPPED | OUTPATIENT
Start: 2020-01-09 | End: 2021-01-01 | Stop reason: HOSPADM

## 2020-01-09 RX ORDER — OXYCODONE HYDROCHLORIDE 30 MG/1
30 TABLET ORAL EVERY 4 HOURS PRN
Qty: 120 TABLET | Refills: 0 | Status: SHIPPED | OUTPATIENT
Start: 2020-01-09 | End: 2020-02-17 | Stop reason: SDUPTHER

## 2020-01-09 RX ORDER — LORATADINE 10 MG/1
10 TABLET ORAL
Qty: 90 TABLET | Refills: 3 | Status: SHIPPED | OUTPATIENT
Start: 2020-01-09 | End: 2021-01-01 | Stop reason: SDUPTHER

## 2020-01-09 NOTE — Clinical Note
Teammates, please note that pt's inspiratory peak flows may no longer be sufficient to make use of inhalers and aerosols  A nebulized form of each of his COPD meds was provided today  Dr Livingston Show, should he stop amlodpidine completely, given lower SBP (108) today in our clinic?

## 2020-01-09 NOTE — PATIENT INSTRUCTIONS
- Please try to use your nebulizer for the following medicines:   = Duonebs - four times a day   = Budesonide (inhaled steroid Pulmicort) - morning and evening can mix with formoterol   = Formorterol (inhaled long-acting asthma medicine) morning and evening, can mix with budesonide    - Your short acting pain medicine is still oxyCODONE / oxyIR  - Your long acting pain medicine is Bhavesh Mays / Waqas Reyes

## 2020-01-09 NOTE — PROGRESS NOTES
Outpatient Follow-Up - Palliative and Supportive Care   Cece Thomas Sr  64 y o  male [id number]    Assessment & Plan  1  Palliative care patient    2  [facility]    3  Allergic rhinitis due to pollen, unspecified seasonality    4  Peripheral polyneuropathy    5  Adenocarcinoma of lung, right ([facility] [address] )    6  Generalized body aches    7  Lumbar pain    8   Cancer-related pain        Medications adjusted this encounter:  Requested Prescriptions     Signed Prescriptions Disp Refills    ipratropium-albuterol (DUO-NEB) 0 5-2 5 mg/3 mL nebulizer solution 120 vial 0     Sig: Take 1 vial (3 mL total) by nebulization 4 (four) times a day    loratadine (CLARITIN) 10 mg tablet 90 tablet 3     Sig: Take 1 tablet (10 mg total) by mouth daily in the early morning    pregabalin (LYRICA) 25 mg capsule 90 capsule 0     Sig: Take 1 capsule PO in morning and 2 capsules PO at bedtime    formoterol (PERFOROMIST) 20 MCG/2ML nebulizer solution 60 vial 1     Sig: Take 1 vial (20 mcg total) by nebulization 2 (two) times a day    budesonide (PULMICORT) 1 MG/2ML nebulizer solution 120 mL 1     Sig: Take 2 mL (1 mg total) by nebulization 2 (two) times a day    oxyCODONE (ROXICODONE) 30 MG immediate release tablet 120 tablet 0     Sig: Take 1 tablet (30 mg total) by mouth every 4 (four) hours as needed (cancer pain)Max Daily Amount: 180 mg    oxyCODONE (OxyCONTIN) 20 mg 12 hr tablet 60 tablet 0     Sig: Take 1 tablet (20 mg total) by mouth every 12 (twelve) hoursMax Daily Amount: 40 mg     Medications Discontinued During This Encounter   Medication Reason    ipratropium (ATROVENT) 0 02 % nebulizer solution     methocarbamol (ROBAXIN) 750 mg tablet     sodium polystyrene (KAYEXALATE) powder     tiotropium (SPIRIVA RESPIMAT) 2 5 MCG/ACT AERS inhaler     valACYclovir (VALTREX) 1,000 mg tablet     fluticasone-vilanterol (BREO ELLIPTA) 100-25 mcg/inh inhaler     ipratropium-albuterol (DUO-NEB) 0 5-2 5 mg/3 mL nebulizer solution Reorder    loratadine (CLARITIN) 10 mg tablet Reorder    pregabalin (LYRICA) 25 mg capsule Reorder    oxyCODONE (ROXICODONE) 30 MG immediate release tablet Reorder      Ms Cruz was seen today for symptoms and planning cares related to above illnesses  I have reviewed the patient's controlled substance dispensing history in the Prescription Drug Monitoring Program in compliance with the [facility] regulations before prescribing any controlled substances  He is invited to continue to follow with us  If there are questions or concerns, please contact us through our clinic/answering service 24 hours a day, seven days a week  Josefa Olivarez MD  [facility] Palliative and Supportive [facility] [place]  [phone]      Visit Information    Accompanied By: No one    Source of History: Self    History Limitations: None    Contacts: Follow up visit for:  symptom management, depression or anxiety, support    History of Present Illness     This fellow returns in f/up of his adenocarcinoma of R lung  He continues on a steroid taper from Dr Danuta Aquino team, with a goal of restarting Keytruda at some point in the future  Since last visit, he continues to suffer with generalized body aches and deep pains  We reviewed his medicine regimen, and the filling history as recorded in Epic  It seems that he has not refilled the oxyCONTIN given to him by my partner Ms Sury Solis, and he has likely been out of this medication for weeks  We agreed to restart this medicine today at 20mg PO BID  No changes were made to his oxyIR  From a polypharmacy standpoint, I did note that he is on two CCBs, but this was direclty addressed recently in clinic by Dr Vinny Fyre  His pressures today are rather low; we will message Dr Vinny Frye to consider complete cessation of amlodipine  Pt was getting no relief from Robaxin, and was asked to stop this drug    He has found it difficult to pull on his inhalers, so we agreed to rotate all his COPD drugs to nebulized forms, in order to get meds deeper into the lungs to improve efficacy  Past medical, surgical, social, and family histories are reviewed and pertinent updates are made  Review of Systems   Constitution: Positive for malaise/fatigue  Negative for decreased appetite, weight gain and weight loss  HENT: Negative for hoarse voice and nosebleeds  Eyes: Negative for vision loss in left eye and vision loss in right eye  Cardiovascular: Negative for chest pain and dyspnea on exertion  Respiratory: Positive for shortness of breath and wheezing  Negative for cough and sputum production  Endocrine: Positive for cold intolerance  Negative for polydipsia, polyphagia and polyuria  Skin: Negative for flushing and itching  Musculoskeletal: Positive for back pain, joint pain, muscle weakness, neck pain and stiffness  Negative for falls and joint swelling  Gastrointestinal: Negative for anorexia, jaundice, nausea and vomiting  Genitourinary: Negative for frequency  Neurological: Negative for dizziness  Psychiatric/Behavioral: Positive for depression  Negative for memory loss  The patient does not have insomnia and is not nervous/anxious  Vital Signs    /60 (BP Location: Left arm, Patient Position: Sitting, Cuff Size: Standard)   Pulse 80   Temp 98 °F (36 7 °C) (Tympanic)   Resp 20   Wt 64 4 kg (142 lb)   SpO2 94%   BMI 21 59 kg/m²     Physical Exam and Objective Data  Physical Exam   Constitutional: He is oriented to person, place, and time  No distress  Frail, chronically ill   HENT:   Head: Normocephalic and atraumatic  Right Ear: External ear normal    Left Ear: External ear normal    Eyes: Pupils are equal, round, and reactive to light  Conjunctivae and EOM are normal  Right eye exhibits no discharge  Left eye exhibits no discharge  Neck: No tracheal deviation present  Cardiovascular: Regular rhythm     tachy Pulmonary/Chest: No stridor  No respiratory distress  He has wheezes  Shallow, with prolonged exp phase   Abdominal: Soft    scaphoid   Musculoskeletal: He exhibits no edema  Neurological: He is alert and oriented to person, place, and time  No cranial nerve deficit  Skin: Skin is warm and dry  No rash noted  He is not diaphoretic  No erythema  No pallor  Psychiatric: He has a normal mood and affect  His behavior is normal  Judgment and thought content normal          Radiology and Laboratory:  I personally reviewed and interpreted the following results - none new    45+ minutes was spent face to face with Kevin Burleson  with greater than 50% of the time spent in counseling or coordination of care including discussions of diagnostic results, impression, and recommendations, risks and benefits of treatment and instructions for disease self management; extensive medication list review and management of polypharmacy   All of the patient's questions were answered during this discussion

## 2020-01-13 ENCOUNTER — TELEPHONE (OUTPATIENT)
Dept: PALLIATIVE MEDICINE | Facility: CLINIC | Age: 60
End: 2020-01-13

## 2020-01-13 NOTE — TELEPHONE ENCOUNTER
Girlfriend Chula De La Cruz called and stated [employer] is coming out to their house for Oxygen tanks and she will have them take a look at his nebulizer machine  Patient states it is not working properly  Depending on the issues and age of Nebulizer he is currently using well see how the service call goes before another machine is ordered  After following up with Bridget Ervin at [facility] is scd for an Oxygen tank delivery , but they will not be looking at his Nebulizer - per her records this nebulizer was not bought through [employer]  The servicing needs to be through the [employer] agency that is was purchaesed The last phone call I had with girlfriend she was abrupt and explained this was not a good time to go over this  I will be routing this issue about his nebulizer to [address] and her Pulmonary Team to address his nebulizer issues

## 2020-01-14 ENCOUNTER — OFFICE VISIT (OUTPATIENT)
Dept: NEPHROLOGY | Facility: CLINIC | Age: 60
End: 2020-01-14
Payer: COMMERCIAL

## 2020-01-14 VITALS
HEART RATE: 82 BPM | WEIGHT: 139.8 LBS | BODY MASS INDEX: 21.19 KG/M2 | DIASTOLIC BLOOD PRESSURE: 50 MMHG | SYSTOLIC BLOOD PRESSURE: 96 MMHG | HEIGHT: 68 IN

## 2020-01-14 DIAGNOSIS — I10 ESSENTIAL HYPERTENSION: ICD-10-CM

## 2020-01-14 DIAGNOSIS — E55.9 VITAMIN D DEFICIENCY: ICD-10-CM

## 2020-01-14 DIAGNOSIS — C34.91 ADENOCARCINOMA OF LUNG, RIGHT (HCC): ICD-10-CM

## 2020-01-14 DIAGNOSIS — N25.81 SECONDARY HYPERPARATHYROIDISM OF RENAL ORIGIN (HCC): ICD-10-CM

## 2020-01-14 DIAGNOSIS — E11.9 TYPE 2 DIABETES MELLITUS WITHOUT COMPLICATION, WITHOUT LONG-TERM CURRENT USE OF INSULIN (HCC): ICD-10-CM

## 2020-01-14 DIAGNOSIS — N18.30 STAGE 3 CHRONIC KIDNEY DISEASE (HCC): Primary | ICD-10-CM

## 2020-01-14 PROCEDURE — 99214 OFFICE O/P EST MOD 30 MIN: CPT | Performed by: INTERNAL MEDICINE

## 2020-01-14 RX ORDER — ERGOCALCIFEROL (VITAMIN D2) 1250 MCG
50000 CAPSULE ORAL WEEKLY
Qty: 12 CAPSULE | Refills: 0 | Status: SHIPPED | OUTPATIENT
Start: 2020-01-14 | End: 2021-01-01

## 2020-01-14 NOTE — TELEPHONE ENCOUNTER
I spoke with Reyna Wu and she stated Chris Lainez got a script from cardiology for a new nebulizer machine

## 2020-01-14 NOTE — PROGRESS NOTES
Nephrology Follow up Consultation  Svetlana Brooks  64 y o  male MRN: [id number]            BACKGROUND:  Svetlana Brooks is a 64 y  o male who was referred by Shelia Day MD for evaluation of Follow-up and Chronic Kidney Disease    ASSESSMENT / PLAN:   64 y o   male with pmh of multiple co-morbidities including hypertension (x 10yrs) , diabetes (x 3yrs) , GERD , bipolar disorder, emphysema, CHF, right lung adenocarcinoma and CKD stage 3 presented to the office for routine follow-up  1  CKD stage III :  - Patient has a baseline creatinine of 1 7-2 mg/dL  Most recent labs show a Creatinine of 2 13 mg/dL  Renal function remains stable  - status post recent episode of acute kidney injury non dialysis requiring in December 2019  Creatinine stable and improved nearly back to baseline   - likely has underlying CKD secondary to age-related nephron loss plus hypertensive nephrosclerosis plus diabetic glomerular sclerosis plus nephrotoxicity secondary to chemotherapy with Alimta and keytruda    -already following Heme-Onc  Free light chain ratio elevated, SPEP and UPEP within normal limits  - renal ultrasound from August 23, 2019 showed normal kidneys 9 6 and 9 7 cm    - Proteinuria - most recent protein to creatinine ratio of 100 mg in July 2019  Will check UA, spot urine protein and creatinine    - Acid base and lytes stable  - Clinically the patient appears to be euvolemic  - Recommend to avoid use of NSAIDs, nephrotoxins  Caution advised with regards to exposure to IV contrast dye    - Discussed with the patient in depth his renal status, including the possible etiologies for CKD  - Advised the patient that when his GFR is close to 20mL/min then will start discussing about RRT(renal replacement therapy) options such as renal transplant, peritoneal dialysis and hemodialysis  - Informed the patient about the various options for Renal Replacement therapy    - Discussed with the patient how we need to work together to delay the progression of CKD with optimal BP control based on their age and co-morbidities, optimal BS control with HbA1c of <7% and trying to reduce proteinuria by the use of anti-proteinuric agents  2  Hypertension:  - Patient is on norvasc 5 mg po Q24, Cardizem 120 mg p o  Q day    - stop norvasc for now since his blood pressures have been running lower and   - Goal BP of <  140/90 based on age and comorbidities  - Instructed to follow low sodium (2gm)diet  3  Hemoglobin:  - Goal Hb of 10-12 g/dL  - Most recent labs suggestive of 10 5 grams/deciliter  - recheck CBC prior to next visit, no role for JUANJOSE due to malignancy follow up with Heme-Onc  - no role for IV iron at this time    4  CKD-MBD(Mineral Bone Disease): - Based on patients CKD stage following is the goal of therapy  - Maintain calcium phosphorus product of < 55   - Stage 3 CKD - Goal Ca 8 5-10 mg/dL , goal Phos 2 7-4 6 mg/dL  , goal iPTH 30-70 pg/mL  - re order  vitamin-D 91670 units p o  Q weekly, then vitamin-D over-the-counter 2000 units p o  Q day recent vit D of 18 2  - check intact PTH and vitamin-D level, be ordered    5  Lipids:  - goal LDL less than 70  - Management as per PCP    6  DM:  - management as per Primary team  - most recent A1c of 5 7% in May of 2019   - on metformin, may need to stop this if his renal parameters continued to deteriorate     7  CHF:  - Management as per primary team  - most recent echo from December 2019 showed EF of 55% %, grade 1 diastolic dysfunction    8  Right lung adenocarcinoma:  - follow up with Heme-Onc  - status post treatment with alimta and Keutruda 6 cycles from December 2018 to March 2019  - restarted on maintenance therapy with alimta and Keytruda from July 2019,   - discussed with the patient that both drugs are nephrotoxic   All risks benefits of the medication explained to the patient and advised him to follow up with Heme-Onc in terms of dose adjustment of medications and possible other options, if he is to continue these medications that he needs close monitoring of his renal parameters  - records reveal patient is currently undergoing steroid taper with the goal of restarting Bertrum Rist at some point in the future, would await leveling off serum creatinine at this time  9  Nutrition:  - Encouraged patient to follow a renal diet comprising of moderate potassium, low phosphorus and protein restriction to 0 8gm/kg  - Will check serum albumin with next blood work  10  Followup:  - Patient is to follow-up in 4 months, with lab work to be performed in a few days prior to the visit  Advised patient to call me in 10 days to review the results if they do not hear back from me, as I may have not received the results  Message sent over to Heme-Onc  Sola Nichole MD, FASN, 1/14/2020, 11:41 AM             SUBJECTIVE: 64 y o  male presents to the office for routine follow-up  Had hospitalization in dec with MI and NURIA with peak cr of 4 and hyperkalemia  Was seen by my colleague  Advised patient to make sure he is not taking bactrim any more  Completed shingles treatment  Was also on Bactrim  And had issues with hyperkalemia  Presents with nasal cannula  Currently on Levaquin for URI  No renal concerns  No NSAID use  Review of Systems   Constitutional: Negative for appetite change, chills, fatigue and fever  HENT: Negative for congestion and sore throat  Respiratory: Positive for cough and wheezing  Negative for shortness of breath  Cardiovascular: Negative for chest pain and leg swelling  Gastrointestinal: Negative for abdominal pain, constipation, diarrhea, nausea and vomiting  Genitourinary: Negative for difficulty urinating and dysuria  Musculoskeletal: Negative for back pain  Skin: Negative for rash and wound  Neurological: Negative for dizziness and headaches  Psychiatric/Behavioral: Negative for agitation and confusion     All other systems reviewed and are negative        PAST MEDICAL HISTORY:  Past Medical History:   Diagnosis Date    Alkalosis 12/9/2019    Bipolar 1 disorder [facility] [facility]     Cancer ([facility] [address] )     adeno lung  dx 11/2018-lung bx today 4/9/2019-ongoing chemo    Chronic obstructive pulmonary disease with acute exacerbation ([address] ) 6/7/2018    Chronic pain disorder     back and right shoulder    CKD (chronic kidney disease)     COPD (chronic obstructive pulmonary disease) ([facility] [address] [facility]     Depression     Diabetes mellitus ([facility] [address] [facility]     Elevated d-dimer 11/30/2019    Elevated troponin 11/29/2019    Elevated troponin level not due to acute coronary syndrome 11/30/2019    GERD (gastroesophageal reflux disease)     Herniated lumbar intervertebral disc     Hyperkalemia     Hypertension     Insomnia     Latent syphilis     Treated    Low back pain     Lumbosacral disc disease     Lung cancer ([facility] [address] [facility] ) 04/2019    Pneumothorax after biopsy     R lung    PTSD (post-traumatic stress disorder)     Pulmonary emphysema ([facility] [address] )     Tobacco abuse 11/13/2018       PROBLEM LIST    Patient Active Problem List   Diagnosis    Essential hypertension    Chronic bilateral thoracic back pain    Chronic right shoulder pain    Bilateral carpal tunnel syndrome    Bipolar 1 disorder ([address] )    Gastroesophageal reflux disease without esophagitis    Other specified anxiety disorders    Cubital tunnel syndrome, bilateral    Type 2 diabetes mellitus without complication, without long-term current use of insulin (HCC)    Panlobular emphysema ([address] )    Adenocarcinoma of lung, right ([address] )    Tobacco abuse    Encounter for immunization    Generalized body aches    Chronic pain disorder    Cancer-related pain    Chemotherapy-induced nausea    Erectile dysfunction    Renal dysfunction    Weight loss    Stage 3 chronic kidney disease (HCC)    Peripheral neuropathy    Maxillary sinusitis    Impacted cerumen of left ear    Stroke-like symptoms    Paroxysmal atrial fibrillation ([address] )    Ambulatory dysfunction    Palliative care patient       PAST SURGICAL HISTORY:  Past Surgical History:   Procedure Laterality Date    COLONOSCOPY  2011    CT GUIDED CHEST TUBE  11/21/2018    FL GUIDED CENTRAL VENOUS ACCESS DEVICE INSERTION  2/8/2019    HEMORROIDECTOMY      IR CHEST TUBE  11/14/2018    IR IMAGE GUIDED BIOPSY/ASPIRATION LUNG  11/13/2018    KNEE SURGERY      DE INSJ TUNNELED CTR VAD W/SUBQ PORT AGE 5 YR/> N/A 2/8/2019    Procedure: PLACEMENT OF PORT-A-CATH;  Surgeon: Vasiliy Plascencia MD;  Location: [address] [place] [address] OR;  Service: Vascular       SOCIAL HISTORY :   reports that he quit smoking about 5 months ago  His smoking use included cigarettes  He started smoking about 49 years ago  He has a 20 00 pack-year smoking history  He has never used smokeless tobacco  He reports that he drank alcohol  He reports that he has current or past drug history  Drug: Marijuana  FAMILY HISTORY:  Family History   Problem Relation Age of Onset    Heart disease Mother     Cancer Mother     Hypertension Father     Diabetes Family     Asthma Family     Heart disease Family     Cancer Family     Cancer Maternal Grandfather     Cancer Paternal Grandfather     Cancer Maternal Aunt        ALLERGIES:  Allergies   Allergen Reactions    Lisinopril Anaphylaxis     Took medication a while ago and had a "swelling reaction"  Does not carry epi-pen           PHYSICAL EXAM:  Vitals:    01/14/20 1103   BP: 96/50   BP Location: Left arm   Patient Position: Sitting   Cuff Size: Adult   Pulse: 82   Weight: 63 4 kg (139 lb 12 8 oz)   Height: 5' 8" (1 727 m)     Body mass index is 21 26 kg/m²  Physical Exam   Constitutional: He is oriented to person, place, and time  He appears well-developed and well-nourished  No distress  HENT:   Head: Normocephalic and atraumatic  Mouth/Throat: Oropharynx is clear and moist  No oropharyngeal exudate  Eyes: Conjunctivae are normal  No scleral icterus  Neck: Neck supple  No JVD present  No tracheal deviation present  No thyromegaly present  Cardiovascular: Normal heart sounds  Exam reveals no friction rub  Pulmonary/Chest: Effort normal  He has no wheezes  He has no rales  Abdominal: Soft  He exhibits no mass  There is no tenderness  Musculoskeletal: He exhibits no edema  Neurological: He is alert and oriented to person, place, and time  Skin: Skin is warm  He is not diaphoretic  No pallor  Psychiatric: He has a normal mood and affect  His behavior is normal    Vitals reviewed  LABORATORY DATA:     Results from last 6 Months   Lab Units 01/08/20  1041 12/10/19  0520 12/09/19  0508  12/07/19  0537  12/02/19  0453 12/01/19  0339  10/02/19  1230   WBC Thousand/uL 14 80* 11 15*  --   --  11 44*   < > 14 34* 15 95*   < > 7 00   HEMOGLOBIN g/dL 10 5* 9 6*  --   --  9 9*   < > 9 8* 9 6*   < > 11 5*   HEMATOCRIT % 32 2* 31 3*  --   --  31 5*   < > 31 5* 30 7*   < > 35 1*   PLATELETS Thousands/uL 327 300  --   --  441*   < > 413* 311   < > 366   POTASSIUM mmol/L 4 3 4 5 4 6   < > 4 7   < > 4 5 4 4   < > 4 4   CHLORIDE mmol/L 101 104 105   < > 107   < > 107 104   < > 100   CO2 mmol/L 28 35* 35*   < > 32   < > 26 24   < > 29   BUN mg/dL 30* 37* 39*   < > 45*   < > 42* 34*   < > 28*   CREATININE mg/dL 2 13* 2 27* 2 32*   < > 2 39*   < > 2 50* 2 40*   < > 2 55*   CALCIUM mg/dL 9 3 8 5 8 6   < > 8 7   < > 8 7 8 5   < > 9 1   MAGNESIUM mg/dL  --   --   --   --   --   --  2 2 2 2  --  1 8   PHOSPHORUS mg/dL 3 1  --   --   --   --   --  4 0 3 4  --  4 0   IRON ug/dL 64*  --   --   --   --   --   --   --   --   --    FERRITIN ng/mL 565*  --   --   --   --   --   --   --   --   --     < > = values in this interval not displayed          rest all reviewed    RADIOLOGY:  No orders to display     Rest all reviewed        MEDICATIONS:    Current Outpatient Medications:     albuterol (2 5 mg/3 mL) 0 083 % nebulizer solution, Take 1 vial (2 5 mg total) by nebulization every 4 (four) hours as needed for wheezing or shortness of breath, Disp: 180 vial, Rfl: 5    albuterol (VENTOLIN HFA) 90 mcg/act inhaler, Inhale 2 puffs every 4 (four) hours as needed for wheezing, Disp: 1 Inhaler, Rfl: 5    apixaban (ELIQUIS) 2 5 mg, Take 1 tablet (2 5 mg total) by mouth 2 (two) times a day, Disp: 60 tablet, Rfl: 11    Blood Glucose Monitoring Suppl KIT, by Does not apply route daily, Disp: 1 each, Rfl: 0    budesonide (PULMICORT) 0 5 mg/2 mL nebulizer solution, Take 1 vial (0 5 mg total) by nebulization every 12 (twelve) hours Rinse mouth after use , Disp: 1 vial, Rfl: 0    budesonide (PULMICORT) 1 MG/2ML nebulizer solution, Take 2 mL (1 mg total) by nebulization 2 (two) times a day, Disp: 120 mL, Rfl: 1    carbamide peroxide (DEBROX) 6 5 % otic solution, Administer 5 drops into both ears 2 (two) times a day, Disp: 15 mL, Rfl: 0    dextromethorphan-guaiFENesin (ROBITUSSIN DM)  mg/5 mL syrup, Take 5 mL by mouth 3 (three) times a day as needed for cough, Disp: 236 mL, Rfl: 1    diltiazem (CARDIZEM CD) 120 mg 24 hr capsule, Take 1 capsule (120 mg total) by mouth daily, Disp: 30 capsule, Rfl: 11    diphenhydrAMINE (BENADRYL) 25 mg tablet, Take 1 tablet (25 mg total) by mouth every 6 (six) hours as needed (nausea), Disp: 30 tablet, Rfl: 0    divalproex sodium (DEPAKOTE) 250 mg EC tablet, Take 1 tablet (250 mg total) by mouth 2 (two) times a day, Disp: 30 tablet, Rfl: 0    FLUoxetine (PROzac) 10 MG tablet, Take 1 tablet (10 mg total) by mouth daily, Disp: 30 tablet, Rfl: 5    fluticasone (FLONASE) 50 mcg/act nasal spray, 1-2 sprays each nostril daily for allergies, Disp: 1 Bottle, Rfl: 3    folic acid (FOLVITE) 1 mg tablet, Take 1 tablet (1 mg total) by mouth daily, Disp: 30 tablet, Rfl: 2    formoterol (PERFOROMIST) 20 MCG/2ML nebulizer solution, Take 1 vial (20 mcg total) by nebulization 2 (two) times a day, Disp: 60 vial, Rfl: 1    FREESTYLE LITE test strip, TEST BLOOD SUGAR DAILY, Disp: 100 each, Rfl: 1    ipratropium-albuterol (DUO-NEB) 0 5-2 5 mg/3 mL nebulizer solution, Take 1 vial (3 mL total) by nebulization 4 (four) times a day, Disp: 120 vial, Rfl: 0    Lancets (FREESTYLE) lancets, TEST BLOOD SUGAR DAILY, Disp: 100 each, Rfl: 4    levofloxacin (LEVAQUIN) 500 mg tablet, Take 1 tablet (500 mg total) by mouth every 24 hours for 10 days, Disp: 10 tablet, Rfl: 0    lidocaine (LMX) 4 % cream, Apply topically as needed for mild pain Apply 1/2-1 inch to port 30-60 mins prior to needle insertion, cover with serrain wrap, Disp: 30 g, Rfl: 5    loratadine (CLARITIN) 10 mg tablet, Take 1 tablet (10 mg total) by mouth daily in the early morning, Disp: 90 tablet, Rfl: 3    melatonin 3 mg, Take 1 tablet (3 mg total) by mouth daily at bedtime, Disp: 30 tablet, Rfl: 1    metFORMIN (GLUCOPHAGE-XR) 500 mg 24 hr tablet, Take 1 tablet (500 mg total) by mouth 2 (two) times a day with meals, Disp: 60 tablet, Rfl: 0    ondansetron (ZOFRAN) 4 mg tablet, Take 1 tablet (4 mg total) by mouth every 6 (six) hours as needed for nausea or vomiting, Disp: 60 tablet, Rfl: 2    oxyCODONE (OxyCONTIN) 20 mg 12 hr tablet, Take 1 tablet (20 mg total) by mouth every 12 (twelve) hoursMax Daily Amount: 40 mg, Disp: 60 tablet, Rfl: 0    oxyCODONE (ROXICODONE) 30 MG immediate release tablet, Take 1 tablet (30 mg total) by mouth every 4 (four) hours as needed (cancer pain)Max Daily Amount: 180 mg, Disp: 120 tablet, Rfl: 0    pantoprazole (PROTONIX) 40 mg tablet, Take 1 tablet (40 mg total) by mouth daily, Disp: 30 tablet, Rfl: 5    polyethylene glycol (GLYCOLAX) powder, Take 17 g by mouth daily, Disp: 527 g, Rfl: 1    predniSONE 20 mg tablet, Take 2 tablets (40 mg total) by mouth daily Until further instructed, Disp: 60 tablet, Rfl: 0    pregabalin (LYRICA) 25 mg capsule, Take 1 capsule PO in morning and 2 capsules PO at bedtime, Disp: 90 capsule, Rfl: 0    prochlorperazine (COMPAZINE) 10 mg tablet, Take 1 tablet (10 mg total) by mouth every 6 (six) hours as needed for nausea or vomiting, Disp: 90 tablet, Rfl: 0    QUEtiapine (SEROquel) 25 mg tablet, Take 25 mg by mouth daily at bedtime, Disp: , Rfl:     ranitidine (ZANTAC) 150 mg tablet, Take 1 tablet (150 mg total) by mouth 2 (two) times a day, Disp: 60 tablet, Rfl: 4    REGULOID 28 3 % POWD, USE AS DIRECTED, Disp: 369 g, Rfl: 4    Saline 0 65 % (Soln) SOLN, 5 drops into each nostril as needed (Dry nose), Disp: 50 mL, Rfl: 5    Selenium Sulfide 2 25 % SHAM, apply by topical route  every PM to the affected area(s), Disp: , Rfl:     senna-docusate sodium (SENOKOT-S) 8 6-50 mg per tablet, Take 1 tablet by mouth 2 (two) times a day, Disp: 60 tablet, Rfl: 2    sildenafil (VIAGRA) 50 MG tablet, Take 1 tablet (50 mg total) by mouth as needed for erectile dysfunction, Disp: 6 tablet, Rfl: 0    tamsulosin (FLOMAX) 0 4 mg, Take 1 capsule (0 4 mg total) by mouth daily with dinner, Disp: 90 capsule, Rfl: 3    ergocalciferol (ERGOCALCIFEROL) 1 25 MG (84385 UT) capsule, Take 1 capsule (50,000 Units total) by mouth once a week, Disp: 12 capsule, Rfl: 0          Portions of the record may have been created with voice recognition software  Occasional wrong word or "sound a like" substitutions may have occurred due to the inherent limitations of voice recognition software  Read the chart carefully and recognize, using context, where substitutions have occurred  If you have any questions, please contact the dictating provider

## 2020-01-14 NOTE — PATIENT INSTRUCTIONS
- stop norvasc (amlodipine)  - start vit D 63645 units weekly for 12 weeks then over the counter 2000 units a day    - Please call me in 10 days after having your blood work done to review the results if you do not hear back from me or my office, as I may have not received the results  - please remember to perform blood work prior to the next visit  - Please call if the blood pressure top number is greater than 150 or less than 110 consistently  - Please call if you are gaining more than 2lbs in 2 days for adjustment of water pills   ~ Please AVOID the following pain medications  LIST OF NSAIDS (NONSTEROIDAL ANTI-INFLAMMATORY DRUGS) AND FOSS-2 INHIBITORS    DIFLUNISAL (DOLOBID)  IBUPROFEN (MOTRIN, ADVIL)  FLURBIPROFEN (ANSAID)  KETOPROFEN (ORUDIS, ORUVAIL)  FENOPROFEN (NALFON)  NABUMETONE (RELAFEN)  PIROXICAM (FELDENE)  NAPROXEN (ALEVE, NAPROSYN, NAPRELAN, ANAPROX)  DICLOFENAC (VOLTAREN, CATAFLAM)  INDOMETHACIN (INDOCIN)  SULINDAC (CLINORIL)  TOLMETIN (TOLETIN)  ETODOLAC (LODINE)  MELOXICAM (MOBIC)  KETOROLAC (TORADOL)  OXAPROZIN (DAYPRO)  CELECOXIB (CELEBREX)    Phosphorus diet  Follow a low phosphorus diet      Avoid these higher phosphorus foods: Choose these lower phosphorus foods:   Milk, pudding or yogurt (from animals and from many soy varieties) Rice milk (unfortified), nondairy creamer (if it doesn't have terms in the ingredients list that contain the letters "phos")   Hard cheeses, ricotta or cottage cheese, fat-free cream cheese Regular and low-fat cream cheese   Ice cream or frozen yogurt Sherbet or frozen fruit pops   Soups made with higher phosphorus ingredients (milk, dried peas, beans, lentils) Soups made with lower phosphorus ingredients (broth- or water-based with other lower phosphorus ingredients)   Whole grains, including whole-grain breads, crackers, cereal, rice and pasta Refined grains, including white bread, crackers, cereals, rice and pasta   Quick breads, biscuits, cornbread, muffins, pancakes or waffles Homemade refined (white) dinner rolls, bagels or English muffins   Dried peas (split, black-eyed), beans (black, garbanzo, lima, kidney, navy, juan) or lentils Green peas (canned, frozen), green beans or wax beans   Organ meats, walleye, pollock or sardines Lean beef, pork, lamb, poultry or other fish   Nuts and seeds Popcorn   Peanut butter and other nut butters Jam, jelly or honey   Chocolate, including chocolate drinks Carob (chocolate-flavored) candy, hard candy or gumdrops   Mason and pepper-type sodas, flavored mcguire, bottled teas (if a term in the ingredients list contains the letters "phos") Lemon-lime soda, ginger ale or root beer, plain water     Follow a moderate potassium diet

## 2020-01-16 ENCOUNTER — TELEPHONE (OUTPATIENT)
Dept: PALLIATIVE MEDICINE | Facility: CLINIC | Age: 60
End: 2020-01-16

## 2020-01-16 ENCOUNTER — TELEPHONE (OUTPATIENT)
Dept: FAMILY MEDICINE CLINIC | Facility: CLINIC | Age: 60
End: 2020-01-16

## 2020-01-16 NOTE — TELEPHONE ENCOUNTER
SIGNATURES NEEDED FOR Physician Certification FORM RECEIVED VIA FAX  WILL BE PLACED IN YOUR BIN AT ASSIGNED DELIVERY TIMES      [place] Enrollment

## 2020-01-16 NOTE — TELEPHONE ENCOUNTER
Pt's girlfriend, Ricardo Rhodes, called office requesting an order for a Nebulizer to be sent to [facility]      During the phone encounter Ricardo Rhodes was hostile, swearing, and belittling  At this time the call was taken by our office Nurse, Neo Moran

## 2020-01-16 NOTE — TELEPHONE ENCOUNTER
Late entry  This nurse spoke to patient directly after conversation with office MA  Girlfriend was in background but not a part of conversation  Per pt  He no longer has a nebulizer  He cannot remember what DME supplied the old one or when he got it  Pt thought he got an order from his cardiologist for a nebulizer but cannot remember the doctors name  Gave phone number to this nurse of [phone]  This is pts PCP Dr Мария Church     Last seen 01/02/2020  No orders for Nebulizer  Call to pulmonary   LM asking if they are addressing  (Return call later in afternoon that they have not seen pt in office since April  )    This nurse noted scripts from Dr Andrei Stacy in other [address] for [address] and supplies  Call placed to [facility]  Spoke to Heather and Darwin  Instructed this nurse to submit the order to [facility]  Include orde with name of medication to be administered, order for supplies, OV note with reason for order addressed in body of note and face sheet  IF PT HAS NOT RECEIVED A NEBULIZER FORM ANY DME IN OVER 5 YEARS THE ORDER WILL BE PROCESSED  IF THE INSURANCE REPORTS PT RECEIVED NEBULIZER LESS THAN 5 YEARS [facility] WILL NOTIFY OFFICE OF DME SO WE MAY REORDER THROUGH THAT DME  Pt has been notified of status  Await response from [facility]    Recommend follow up call tomorrow to [facility]

## 2020-01-17 ENCOUNTER — HOSPITAL ENCOUNTER (OUTPATIENT)
Dept: CT IMAGING | Facility: HOSPITAL | Age: 60
Discharge: HOME | End: 2020-01-17
Attending: INTERNAL MEDICINE
Payer: COMMERCIAL

## 2020-01-17 DIAGNOSIS — C34.91 ADENOCARCINOMA OF LUNG, RIGHT (HCC): ICD-10-CM

## 2020-01-17 PROCEDURE — 71250 CT THORAX DX C-: CPT

## 2020-01-20 ENCOUNTER — TELEPHONE (OUTPATIENT)
Dept: FAMILY MEDICINE CLINIC | Facility: CLINIC | Age: 60
End: 2020-01-20

## 2020-01-20 DIAGNOSIS — N28.9 RENAL DYSFUNCTION: ICD-10-CM

## 2020-01-20 DIAGNOSIS — T45.1X5A ADVERSE EFFECT OF CHEMOTHERAPY, INITIAL ENCOUNTER: ICD-10-CM

## 2020-01-20 DIAGNOSIS — R52 GENERALIZED BODY ACHES: ICD-10-CM

## 2020-01-20 DIAGNOSIS — C34.91 ADENOCARCINOMA OF LUNG, RIGHT (HCC): ICD-10-CM

## 2020-01-20 RX ORDER — FOLIC ACID 1 MG/1
1 TABLET ORAL DAILY
Qty: 30 TABLET | Refills: 2 | Status: SHIPPED | OUTPATIENT
Start: 2020-01-20 | End: 2020-05-04

## 2020-01-20 RX ORDER — SODIUM CHLORIDE 9 MG/ML
20 INJECTION, SOLUTION INTRAVENOUS ONCE
Status: CANCELLED | OUTPATIENT
Start: 2020-01-29

## 2020-01-20 RX ORDER — DIVALPROEX SODIUM 250 MG/1
250 TABLET, DELAYED RELEASE ORAL 2 TIMES DAILY
Qty: 30 TABLET | Refills: 0 | Status: SHIPPED | OUTPATIENT
Start: 2020-01-20

## 2020-01-20 NOTE — TELEPHONE ENCOUNTER
Patient relative called stating he needs refills on   divalproex sodium (DEPAKOTE) 250 mg EC tablet  folic acid (FOLVITE) 1 mg tablet

## 2020-01-21 NOTE — TELEPHONE ENCOUNTER
Nebulizer order has been received and processed successfully  To be delivered to pt [facility] today via [employer]  Per representative pt is aware of pending delivery

## 2020-01-22 ENCOUNTER — HOSPITAL ENCOUNTER (OUTPATIENT)
Dept: INFUSION CENTER | Facility: HOSPITAL | Age: 60
Discharge: HOME | End: 2020-01-22
Attending: INTERNAL MEDICINE

## 2020-01-22 ENCOUNTER — APPOINTMENT (OUTPATIENT)
Dept: LAB | Facility: HOSPITAL | Age: 60
End: 2020-01-22
Attending: INTERNAL MEDICINE
Payer: COMMERCIAL

## 2020-01-22 ENCOUNTER — OFFICE VISIT (OUTPATIENT)
Dept: HEMATOLOGY ONCOLOGY | Facility: CLINIC | Age: 60
End: 2020-01-22
Payer: COMMERCIAL

## 2020-01-22 VITALS
OXYGEN SATURATION: 98 % | RESPIRATION RATE: 18 BRPM | SYSTOLIC BLOOD PRESSURE: 110 MMHG | HEIGHT: 68 IN | BODY MASS INDEX: 22.22 KG/M2 | DIASTOLIC BLOOD PRESSURE: 60 MMHG | TEMPERATURE: 98.3 F | HEART RATE: 95 BPM | WEIGHT: 146.6 LBS

## 2020-01-22 DIAGNOSIS — C34.91 ADENOCARCINOMA OF LUNG, RIGHT (HCC): ICD-10-CM

## 2020-01-22 DIAGNOSIS — N28.9 RENAL DYSFUNCTION: ICD-10-CM

## 2020-01-22 DIAGNOSIS — J43.1 PANLOBULAR EMPHYSEMA (HCC): Chronic | ICD-10-CM

## 2020-01-22 DIAGNOSIS — G89.3 CANCER-RELATED PAIN: Chronic | ICD-10-CM

## 2020-01-22 DIAGNOSIS — C34.91 ADENOCARCINOMA OF LUNG, RIGHT (HCC): Primary | ICD-10-CM

## 2020-01-22 LAB
ALBUMIN SERPL BCP-MCNC: 3.4 G/DL (ref 3–5.2)
ALP SERPL-CCNC: 86 U/L (ref 43–122)
ALT SERPL W P-5'-P-CCNC: 26 U/L (ref 9–52)
ANION GAP SERPL CALCULATED.3IONS-SCNC: 6 MMOL/L (ref 5–14)
AST SERPL W P-5'-P-CCNC: 19 U/L (ref 17–59)
BILIRUB SERPL-MCNC: 0.2 MG/DL
BUN SERPL-MCNC: 49 MG/DL (ref 5–25)
CALCIUM SERPL-MCNC: 9.2 MG/DL (ref 8.4–10.2)
CHLORIDE SERPL-SCNC: 98 MMOL/L (ref 97–108)
CO2 SERPL-SCNC: 35 MMOL/L (ref 22–30)
CREAT SERPL-MCNC: 2.1 MG/DL (ref 0.7–1.5)
ERYTHROCYTE [DISTWIDTH] IN BLOOD BY AUTOMATED COUNT: 20.7 %
GFR SERPL CREATININE-BSD FRML MDRD: 39 ML/MIN/1.73SQ M
GLUCOSE P FAST SERPL-MCNC: 80 MG/DL (ref 70–99)
HCT VFR BLD AUTO: 32.6 % (ref 41–53)
HGB BLD-MCNC: 10.4 G/DL (ref 13.5–17.5)
LYMPHOCYTES # BLD AUTO: 19 % (ref 25–45)
LYMPHOCYTES # BLD AUTO: 2.64 THOUSAND/UL (ref 0.5–4)
MAGNESIUM SERPL-MCNC: 1.8 MG/DL (ref 1.6–2.3)
MCH RBC QN AUTO: 27.6 PG (ref 26–34)
MCHC RBC AUTO-ENTMCNC: 32 G/DL (ref 31–36)
MCV RBC AUTO: 86 FL (ref 80–100)
METAMYELOCYTES NFR BLD MANUAL: 1 % (ref 0–1)
MONOCYTES # BLD AUTO: 1.11 THOUSAND/UL (ref 0.2–0.9)
MONOCYTES NFR BLD AUTO: 8 % (ref 1–10)
NEUTS BAND NFR BLD MANUAL: 3 % (ref 0–8)
NEUTS SEG # BLD: 10.01 THOUSAND/UL (ref 1.8–7.8)
NEUTS SEG NFR BLD AUTO: 69 %
PLATELET # BLD AUTO: 259 THOUSANDS/UL (ref 150–450)
PLATELET BLD QL SMEAR: ADEQUATE
PMV BLD AUTO: 7.8 FL (ref 8.9–12.7)
POTASSIUM SERPL-SCNC: 4.4 MMOL/L (ref 3.6–5)
PROT SERPL-MCNC: 6.5 G/DL (ref 5.9–8.4)
RBC # BLD AUTO: 3.79 MILLION/UL (ref 4.5–5.9)
RBC MORPH BLD: NORMAL
SODIUM SERPL-SCNC: 139 MMOL/L (ref 137–147)
T3FREE SERPL-MCNC: 1.82 PG/ML (ref 2.3–4.2)
TOTAL CELLS COUNTED SPEC: 100
TSH SERPL DL<=0.05 MIU/L-ACNC: 1.97 UIU/ML (ref 0.47–4.68)
WBC # BLD AUTO: 13.9 THOUSAND/UL (ref 4.5–11)

## 2020-01-22 PROCEDURE — 84481 FREE ASSAY (FT-3): CPT

## 2020-01-22 PROCEDURE — 83735 ASSAY OF MAGNESIUM: CPT

## 2020-01-22 PROCEDURE — 80053 COMPREHEN METABOLIC PANEL: CPT

## 2020-01-22 PROCEDURE — 84443 ASSAY THYROID STIM HORMONE: CPT

## 2020-01-22 PROCEDURE — 85007 BL SMEAR W/DIFF WBC COUNT: CPT

## 2020-01-22 PROCEDURE — 99214 OFFICE O/P EST MOD 30 MIN: CPT | Performed by: NURSE PRACTITIONER

## 2020-01-22 PROCEDURE — 36415 COLL VENOUS BLD VENIPUNCTURE: CPT

## 2020-01-22 PROCEDURE — 85027 COMPLETE CBC AUTOMATED: CPT

## 2020-01-22 RX ORDER — PREDNISONE 10 MG/1
10 TABLET ORAL DAILY
Qty: 30 TABLET | Refills: 1 | Status: SHIPPED | OUTPATIENT
Start: 2020-01-22 | End: 2020-03-23

## 2020-01-22 NOTE — PROGRESS NOTES
Hematology/Oncology Outpatient Follow-up  Kirstie Gillespie  64 y o  male 1960 [id number]    Date:  1/22/2020      Assessment and Plan:  1  Adenocarcinoma of lung, right ([facility] )  Patient's most recent CT scan appears to be stable without any nguyen progression  He  continues to have chronic dyspnea with exertion but denies cough/fever  We will further taper his prednisone dose down from 20 to 10 daily  The plan is to resume patient's single agent immunotherapy with Keytruda every 3 weeks  He is scheduled today but would like to restart next week as he is in significant pain and not feeling well today due to this  We will reschedule the immunotherapy for next week and see him briefly before  No labs will be needed for his visit next week since they were done today  - predniSONE 10 mg tablet; Take 1 tablet (10 mg total) by mouth daily  Dispense: 30 tablet; Refill: 1    2  Cancer-related pain  Patient is reporting significant generalized pain today  He was seen by palliative care recently who resumed his long-acting pain medication  Patient will continue to follow up with palliative care for his pain and symptom management  3  Renal dysfunction  Stable no changes while on high-dose prednisone making immune mediated etiology less likely  Was seen by Nephrology recently  He will continue to follow up with Nephrology on a regular basis  4  Panlobular emphysema ([facility] [address] )  Patient is now on chronic O2 therapy at 2 L nasal cannula  He is satting 98%  Patient has follow-up with pulmonology beginning of February 2020  HPI:  Patient presents today for a follow-up visit  He reports that he is feeling lousy today mostly due to significant generalized pain which he rates 9/10 at present  He was recently restarted on long-acting pain medication by the palliative care team   The patient is currently on 20 mg of prednisone daily which he is tolerating    He completed his 10 day course of Levaquin and denies any diarrhea, skin rash or cough  Reports chronic dyspnea with minimal exertion and continues to be on chronic O2 therapy at 2 L since discharge from the hospital November 2019  His most recent laboratory studies from 01/08/2020 showed WBC 14 8, H&H 10 5/32 2, MCV 87 and platelet count 776  Glucose 142, stable renal dysfunction BUN 30, creatinine 2 13, GFR 39 remaining metabolic panel is normal   TSH is normal 1 3 with low free T3 1 63  He had repeat laboratory studies done this morning which are still pending at this time  The patient had a repeat CT scan of the chest done on 01/17/2020 which was read:    IMPRESSION:     1  New right lower lobe patchy and linear opacity with resolution of previous right upper lobe and superior segment lower lobe groundglass opacity  This is likely inflammatory/infectious and the patient is currently completing a course of antibiotics  A short interval follow-up CT in a few months is recommended to ensure complete resolution      2  Increased middle lobe/lingular consolidation, likely atelectasis given volume loss      3  New tiny irregular nodule at the right apex  Attention on follow-up is recommended to exclude metastasis      Oncology History    82-year-old [facility] American male heavy smoker who used to smoke 2 packs of cigarettes daily for many years, COPD, he presented with dyspnea, CT scan of the chest on October 2018 showed right middle lobe spiculated 1 3 cm mass with multiple solid and ground-glass nodules along the major and minor fissures sub cm pulmonary nodules bilaterally, left adrenal 1 2 cm nodule     PET scan showed 1 3 cm right middle lung nodule with SUV of 6 8, numerous nodular densities along the right major and minor fissures, right hilar activity with SUV of 3 4, subcarinal lymph node measuring 7 mm with SUV of 2 7, periportal enlarged lymph nodes concerning for metastatic disease measuring 2 4 cm with SUV of 5, prominent left retrocrural lymph node measuring 1 cm x 9 mm with SUV of 1 1    Core biopsy of the right spiculated nodule showed invasive adenocarcinoma consistent with lung primary positive for CK7, TTF 1, napsin a        Adenocarcinoma of lung, right ([facility] [address] )    11/13/2018 Initial Diagnosis     Adenocarcinoma of lung, right ([facility] )  Stage IV      12/13/2018 - 3/28/2019 Chemotherapy      Alimta, Carboplatin (AUC 5) + Keytruda (6 cycles total)      4/17/2019 -  Chemotherapy     Started maintenance Alimta and Keytruda  (Alimta d/c'd 9/16/19 due to worsening renal dysfunction)         Interval history:    ROS: Review of Systems   Constitutional: Negative for activity change, appetite change, chills, fatigue, fever and unexpected weight change  HENT: Negative for congestion, mouth sores, nosebleeds, sore throat and trouble swallowing  Eyes: Negative  Respiratory: Positive for shortness of breath  Negative for cough and chest tightness  O2 via NC 2lt   Cardiovascular: Negative for chest pain, palpitations and leg swelling  Gastrointestinal: Negative for abdominal distention, abdominal pain, blood in stool, constipation, diarrhea, nausea and vomiting  Genitourinary: Negative for difficulty urinating, dysuria, frequency, hematuria and urgency  Musculoskeletal: Positive for arthralgias and myalgias  Negative for back pain, gait problem and joint swelling  Skin: Negative for color change, pallor and rash  Neurological: Positive for dizziness (mild)  Negative for weakness, light-headedness, numbness and headaches  Hematological: Negative for adenopathy  Does not bruise/bleed easily  Psychiatric/Behavioral: Positive for sleep disturbance  Negative for dysphoric mood  The patient is not nervous/anxious          Past Medical History:   Diagnosis Date    Alkalosis 12/9/2019    Bipolar 1 disorder ([facility] )     Cancer ([facility] )     adeno lung  dx 11/2018-lung bx today 4/9/2019-ongoing chemo    Chronic obstructive pulmonary disease with acute exacerbation ([facility] ) 2018    Chronic pain disorder     back and right shoulder    CKD (chronic kidney disease)     COPD (chronic obstructive pulmonary disease) (HCC)     Depression     Diabetes mellitus ([place] [address] )     Elevated d-dimer 2019    Elevated troponin 2019    Elevated troponin level not due to acute coronary syndrome 2019    GERD (gastroesophageal reflux disease)     Herniated lumbar intervertebral disc     Hyperkalemia     Hypertension     Insomnia     Latent syphilis     Treated    Low back pain     Lumbosacral disc disease     Lung cancer ([place] [address] ) 2019    Pneumothorax after biopsy     R lung    PTSD (post-traumatic stress disorder)     Pulmonary emphysema ([place] [address] )     Tobacco abuse 2018       Past Surgical History:   Procedure Laterality Date    COLONOSCOPY      CT GUIDED CHEST TUBE  2018    FL GUIDED CENTRAL VENOUS ACCESS DEVICE INSERTION  2019    HEMORROIDECTOMY      IR CHEST TUBE  2018    IR IMAGE GUIDED BIOPSY/ASPIRATION LUNG  2018    KNEE SURGERY      DC INSJ TUNNELED CTR VAD W/SUBQ PORT AGE 5 YR/> N/A 2019    Procedure: PLACEMENT OF PORT-A-CATH;  Surgeon: José Luis Dutton MD;  Location:  MAIN OR;  Service: Vascular       Social History     Socioeconomic History    Marital status: Legally      Spouse name: None    Number of children: None    Years of education: None    Highest education level: None   Occupational History    Occupation: part time employment   Social Needs    Financial resource strain: None    Food insecurity:     Worry: None     Inability: None    Transportation needs:     Medical: None     Non-medical: None   Tobacco Use    Smoking status: Former Smoker     Packs/day: 0 50     Years: 40 00     Pack years: 20 00     Types: Cigarettes     Start date:      Last attempt to quit: 2019     Years since quittin 4    Smokeless tobacco: Never Used   Substance and Sexual Activity    Alcohol use: Not Currently    Drug use: Not Currently     Types: Marijuana     Comment: stopped 8-2018, "i smoked weed and stopped 1 month ago"    Sexual activity: Yes   Lifestyle    Physical activity:     Days per week: None     Minutes per session: None    Stress: None   Relationships    Social connections:     Talks on phone: None     Gets together: None     Attends Restoration service: None     Active member of club or organization: None     Attends meetings of clubs or organizations: None     Relationship status: None    Intimate partner violence:     Fear of current or ex partner: None     Emotionally abused: None     Physically abused: None     Forced sexual activity: None   Other Topics Concern    None   Social History Narrative    Lives with girlfriend       Family History   Problem Relation Age of Onset    Heart disease Mother     Cancer Mother     Hypertension Father     Diabetes Family     Asthma Family     Heart disease Family     Cancer Family     Cancer Maternal Grandfather     Cancer Paternal Grandfather     Cancer Maternal Aunt        Allergies   Allergen Reactions    Lisinopril Anaphylaxis     Took medication a while ago and had a "swelling reaction"  Does not carry epi-pen         Current Outpatient Medications:     albuterol (2 5 mg/3 mL) 0 083 % nebulizer solution, Take 1 vial (2 5 mg total) by nebulization every 4 (four) hours as needed for wheezing or shortness of breath, Disp: 180 vial, Rfl: 5    albuterol (VENTOLIN HFA) 90 mcg/act inhaler, Inhale 2 puffs every 4 (four) hours as needed for wheezing, Disp: 1 Inhaler, Rfl: 5    apixaban (ELIQUIS) 2 5 mg, Take 1 tablet (2 5 mg total) by mouth 2 (two) times a day, Disp: 60 tablet, Rfl: 11    Blood Glucose Monitoring Suppl KIT, by Does not apply route daily, Disp: 1 each, Rfl: 0    budesonide (PULMICORT) 0 5 mg/2 mL nebulizer solution, Take 1 vial (0 5 mg total) by nebulization every 12 (twelve) hours Rinse mouth after use , Disp: 1 vial, Rfl: 0    budesonide (PULMICORT) 1 MG/2ML nebulizer solution, Take 2 mL (1 mg total) by nebulization 2 (two) times a day, Disp: 120 mL, Rfl: 1    carbamide peroxide (DEBROX) 6 5 % otic solution, Administer 5 drops into both ears 2 (two) times a day, Disp: 15 mL, Rfl: 0    dextromethorphan-guaiFENesin (ROBITUSSIN DM)  mg/5 mL syrup, Take 5 mL by mouth 3 (three) times a day as needed for cough, Disp: 236 mL, Rfl: 1    diltiazem (CARDIZEM CD) 120 mg 24 hr capsule, Take 1 capsule (120 mg total) by mouth daily, Disp: 30 capsule, Rfl: 11    diphenhydrAMINE (BENADRYL) 25 mg tablet, Take 1 tablet (25 mg total) by mouth every 6 (six) hours as needed (nausea), Disp: 30 tablet, Rfl: 0    divalproex sodium (DEPAKOTE) 250 mg EC tablet, Take 1 tablet (250 mg total) by mouth 2 (two) times a day, Disp: 30 tablet, Rfl: 0    ergocalciferol (ERGOCALCIFEROL) 1 25 MG (63607 UT) capsule, Take 1 capsule (50,000 Units total) by mouth once a week, Disp: 12 capsule, Rfl: 0    FLUoxetine (PROzac) 10 MG tablet, Take 1 tablet (10 mg total) by mouth daily, Disp: 30 tablet, Rfl: 5    fluticasone (FLONASE) 50 mcg/act nasal spray, 1-2 sprays each nostril daily for allergies, Disp: 1 Bottle, Rfl: 3    folic acid (FOLVITE) 1 mg tablet, Take 1 tablet (1 mg total) by mouth daily, Disp: 30 tablet, Rfl: 2    formoterol (PERFOROMIST) 20 MCG/2ML nebulizer solution, Take 1 vial (20 mcg total) by nebulization 2 (two) times a day, Disp: 60 vial, Rfl: 1    FREESTYLE LITE test strip, TEST BLOOD SUGAR DAILY, Disp: 100 each, Rfl: 1    ipratropium-albuterol (DUO-NEB) 0 5-2 5 mg/3 mL nebulizer solution, Take 1 vial (3 mL total) by nebulization 4 (four) times a day, Disp: 120 vial, Rfl: 0    Lancets (FREESTYLE) lancets, TEST BLOOD SUGAR DAILY, Disp: 100 each, Rfl: 4    lidocaine (LMX) 4 % cream, Apply topically as needed for mild pain Apply 1/2-1 inch to port 30-60 mins prior to needle insertion, cover with serrain wrap, Disp: 30 g, Rfl: 5    loratadine (CLARITIN) 10 mg tablet, Take 1 tablet (10 mg total) by mouth daily in the early morning, Disp: 90 tablet, Rfl: 3    melatonin 3 mg, Take 1 tablet (3 mg total) by mouth daily at bedtime, Disp: 30 tablet, Rfl: 1    metFORMIN (GLUCOPHAGE-XR) 500 mg 24 hr tablet, Take 1 tablet (500 mg total) by mouth 2 (two) times a day with meals, Disp: 60 tablet, Rfl: 0    ondansetron (ZOFRAN) 4 mg tablet, Take 1 tablet (4 mg total) by mouth every 6 (six) hours as needed for nausea or vomiting, Disp: 60 tablet, Rfl: 2    oxyCODONE (OxyCONTIN) 20 mg 12 hr tablet, Take 1 tablet (20 mg total) by mouth every 12 (twelve) hoursMax Daily Amount: 40 mg, Disp: 60 tablet, Rfl: 0    oxyCODONE (ROXICODONE) 30 MG immediate release tablet, Take 1 tablet (30 mg total) by mouth every 4 (four) hours as needed (cancer pain)Max Daily Amount: 180 mg, Disp: 120 tablet, Rfl: 0    pantoprazole (PROTONIX) 40 mg tablet, Take 1 tablet (40 mg total) by mouth daily, Disp: 30 tablet, Rfl: 5    polyethylene glycol (GLYCOLAX) powder, Take 17 g by mouth daily, Disp: 527 g, Rfl: 1    pregabalin (LYRICA) 25 mg capsule, Take 1 capsule PO in morning and 2 capsules PO at bedtime, Disp: 90 capsule, Rfl: 0    prochlorperazine (COMPAZINE) 10 mg tablet, Take 1 tablet (10 mg total) by mouth every 6 (six) hours as needed for nausea or vomiting, Disp: 90 tablet, Rfl: 0    QUEtiapine (SEROquel) 25 mg tablet, Take 25 mg by mouth daily at bedtime, Disp: , Rfl:     ranitidine (ZANTAC) 150 mg tablet, Take 1 tablet (150 mg total) by mouth 2 (two) times a day, Disp: 60 tablet, Rfl: 4    REGULOID 28 3 % POWD, USE AS DIRECTED, Disp: 369 g, Rfl: 4    Saline 0 65 % (Soln) SOLN, 5 drops into each nostril as needed (Dry nose), Disp: 50 mL, Rfl: 5    Selenium Sulfide 2 25 % SHAM, apply by topical route  every PM to the affected area(s), Disp: , Rfl:     senna-docusate sodium (SENOKOT-S) 8 6-50 mg per tablet, Take 1 tablet by mouth 2 (two) times a day, Disp: 60 tablet, Rfl: 2    sildenafil (VIAGRA) 50 MG tablet, Take 1 tablet (50 mg total) by mouth as needed for erectile dysfunction, Disp: 6 tablet, Rfl: 0    tamsulosin (FLOMAX) 0 4 mg, Take 1 capsule (0 4 mg total) by mouth daily with dinner, Disp: 90 capsule, Rfl: 3    predniSONE 10 mg tablet, Take 1 tablet (10 mg total) by mouth daily, Disp: 30 tablet, Rfl: 1      Physical Exam:  /60 (BP Location: Left arm, Patient Position: Sitting, Cuff Size: Adult)   Pulse 95   Temp 98 3 °F (36 8 °C) (Tympanic)   Resp 18   Ht 5' 8" (1 727 m)   Wt 66 5 kg (146 lb 9 6 oz)   SpO2 98%   BMI 22 29 kg/m²     Physical Exam   Constitutional: He is oriented to person, place, and time  He appears well-developed and well-nourished  No distress  HENT:   Head: Normocephalic and atraumatic  Mouth/Throat: Oropharynx is clear and moist  No oropharyngeal exudate  Eyes: Pupils are equal, round, and reactive to light  Conjunctivae are normal  No scleral icterus  Neck: Normal range of motion  Neck supple  No thyromegaly present  Cardiovascular: Normal rate, regular rhythm, normal heart sounds and intact distal pulses  No murmur heard  Pulmonary/Chest: Effort normal and breath sounds normal  No respiratory distress  Abdominal: Soft  Bowel sounds are normal  He exhibits no distension  There is no hepatosplenomegaly  There is no tenderness  Musculoskeletal: Normal range of motion  He exhibits no edema  Lymphadenopathy:     He has no cervical adenopathy  He has no axillary adenopathy  Neurological: He is alert and oriented to person, place, and time  Skin: Skin is warm and dry  No rash noted  He is not diaphoretic  No erythema  No pallor  Dry facial skin   Psychiatric: His behavior is normal  Judgment and thought content normal  His affect is blunt  He exhibits a depressed mood  Vitals reviewed          Labs:  Lab Results   Component Value Date    WBC 14 80 (H) 01/08/2020    HGB 10 5 (L) 01/08/2020 HCT 32 2 (L) 01/08/2020    MCV 87 01/08/2020     01/08/2020     Lab Results   Component Value Date    K 4 3 01/08/2020     01/08/2020    CO2 28 01/08/2020    BUN 30 (H) 01/08/2020    CREATININE 2 13 (H) 01/08/2020    GLUF 122 (H) 11/19/2019    CALCIUM 9 3 01/08/2020    AST 18 01/08/2020    ALT 15 01/08/2020    ALKPHOS 77 01/08/2020    EGFR 39 (L) 01/08/2020         Patient voiced understanding and agreement in the above discussion  Aware to contact our office with questions/symptoms in the interim  This note has been generated by voice recognition software system  Therefore, there may be spelling, grammar, and or syntax errors  Please contact if questions arise

## 2020-01-27 ENCOUNTER — OFFICE VISIT (OUTPATIENT)
Dept: FAMILY MEDICINE CLINIC | Facility: CLINIC | Age: 60
End: 2020-01-27

## 2020-01-27 VITALS
DIASTOLIC BLOOD PRESSURE: 60 MMHG | OXYGEN SATURATION: 92 % | TEMPERATURE: 98 F | SYSTOLIC BLOOD PRESSURE: 94 MMHG | BODY MASS INDEX: 22.81 KG/M2 | WEIGHT: 150 LBS | HEART RATE: 99 BPM | RESPIRATION RATE: 16 BRPM

## 2020-01-27 DIAGNOSIS — I15.1 HYPERTENSION SECONDARY TO OTHER RENAL DISORDERS: Primary | ICD-10-CM

## 2020-01-27 DIAGNOSIS — N28.89 HYPERTENSION SECONDARY TO OTHER RENAL DISORDERS: Primary | ICD-10-CM

## 2020-01-27 DIAGNOSIS — I10 ESSENTIAL HYPERTENSION: ICD-10-CM

## 2020-01-27 DIAGNOSIS — I48.0 PAF (PAROXYSMAL ATRIAL FIBRILLATION) (HCC): ICD-10-CM

## 2020-01-27 DIAGNOSIS — I48.0 PAROXYSMAL ATRIAL FIBRILLATION (HCC): ICD-10-CM

## 2020-01-27 DIAGNOSIS — B02.8 HERPES ZOSTER WITH COMPLICATION: ICD-10-CM

## 2020-01-27 DIAGNOSIS — C34.90 MALIGNANT NEOPLASM OF LUNG, UNSPECIFIED LATERALITY, UNSPECIFIED PART OF LUNG (HCC): ICD-10-CM

## 2020-01-27 PROCEDURE — 99213 OFFICE O/P EST LOW 20 MIN: CPT | Performed by: FAMILY MEDICINE

## 2020-01-27 RX ORDER — LIDOCAINE 50 MG/G
1 PATCH TOPICAL DAILY
Qty: 30 PATCH | Refills: 1 | Status: ON HOLD | OUTPATIENT
Start: 2020-01-27 | End: 2021-01-01 | Stop reason: SDDI

## 2020-01-27 RX ORDER — DILTIAZEM HYDROCHLORIDE 120 MG/1
120 CAPSULE, COATED, EXTENDED RELEASE ORAL DAILY
Qty: 30 CAPSULE | Refills: 11 | Status: SHIPPED | OUTPATIENT
Start: 2020-01-27 | End: 2021-01-01 | Stop reason: SDUPTHER

## 2020-01-27 NOTE — ASSESSMENT & PLAN NOTE
Follows up with Nephrology routinely  Baseline creatinine in 1 7-2  Avoid NSAIDs  In light of recent hypotension  Instructed to hold amlodipine for the time being    Once GFR 20 considering RRT HD

## 2020-01-27 NOTE — PROGRESS NOTES
Assessment/Plan:    Stage 3 chronic kidney disease ([facility] [address] )  Follows up with Nephrology routinely  Baseline creatinine in 1 7-2  Avoid NSAIDs  In light of recent hypotension  Instructed to hold amlodipine for the time being  Once GFR 20 considering RRT HD    Paroxysmal atrial fibrillation (HCC)   Stable   Cardizem refilled    Essential hypertension  Hypotensive  hold amlodipine  I personally called his girlfriend who manages his medications and informed her to hold medication as well  red flag symptoms reviewed with patient, ED precautions given   lipid panel pending      At goal per JNC 8 guidelines:Yes  Current Medication regime includes ACEI/ARB: No  Microalb/Cr ratio:  Lab Results   Component Value Date    MICROALBCRE 10 08/21/2019     ASCVD risk:6  Statin currently used: Yes  Current Alcohol Usage: No, Counseled on usage Yes  Current Cigarette smoker: No, ready to quit N/A, Counseled on usage No  Current diet involves salt restriction: Yes  Currently Physical Active: No    Herpes zoster with complication  [place] [facility]  Completed 7 day course of Valtrex  Risk factors: age greater than 48, chemotherapy, malignancy  Instructed to use Tylenol around the clock prescription for lidocaine patches given   - I feel there may be an arthritic component to his left shoulder pain, corticosteroid injection offered patient declined       Diagnoses and all orders for this visit:    Hypertension secondary to other renal disorders  -     Lipid panel; Future    Herpes zoster with complication  -     lidocaine (LIDODERM) 5 %; Apply 1 patch topically daily Remove & Discard patch within 12 hours or as directed by MD GOEL (paroxysmal atrial fibrillation) (HCC)  -     diltiazem (CARDIZEM CD) 120 mg 24 hr capsule; Take 1 capsule (120 mg total) by mouth daily    Essential hypertension  -     diltiazem (CARDIZEM CD) 120 mg 24 hr capsule;  Take 1 capsule (120 mg total) by mouth daily    Malignant neoplasm of lung, unspecified laterality, unspecified part of lung ([facility] [address] )  -     Ambulatory referral to Physical Therapy; Future    Paroxysmal atrial fibrillation (HCC)          Subjective:      Patient ID: Verenice Hare  is a 64 y o  male  HPI     presents to clinic for routine follow-up, states he recently had an episode of shingles while at [address] [place] [address] for rehabilitation, rash is resolved was complaining of pain of the left shoulder  has been following up with specialist as requested, has a good support system, requesting refill of Cardizem  reviewed blood pressure findings with patient, per chart review has been ongoing for greater than 1 week, was instructed to discontinue amlodipine for the time being by Nephrology however appears he forgot  The following portions of the patient's history were reviewed and updated as appropriate: allergies, current medications, past family history, past medical history, past social history, past surgical history and problem list     Review of Systems   Constitutional: Negative for activity change, appetite change, chills, diaphoresis, fatigue and fever  HENT: Negative for congestion, ear pain, hearing loss, postnasal drip, rhinorrhea, sneezing, sore throat and tinnitus  Eyes: Negative for pain  Respiratory: Positive for cough and shortness of breath  Negative for apnea, choking, chest tightness and wheezing  Cardiovascular: Negative for chest pain, palpitations and leg swelling  Gastrointestinal: Negative for abdominal distention, abdominal pain, constipation, diarrhea, nausea and vomiting  Genitourinary: Negative for decreased urine volume and dysuria  Musculoskeletal: Negative for back pain  Skin: Negative for rash  Neurological: Negative for dizziness, tremors and syncope  Psychiatric/Behavioral: Negative for agitation and suicidal ideas           Objective:      BP 94/60 (BP Location: Right arm, Patient Position: Sitting, Cuff Size: Standard)   Pulse 99 Temp 98 °F (36 7 °C) (Temporal)   Resp 16   Wt 68 kg (150 lb)   SpO2 92%   BMI 22 81 kg/m²          Physical Exam   Constitutional: He is oriented to person, place, and time  No distress  HENT:   Right Ear: External ear normal    Left Ear: External ear normal    Eyes: Right eye exhibits no discharge  Left eye exhibits no discharge  Neck: No JVD present  No tracheal deviation present  No thyromegaly present  Pulmonary/Chest: No respiratory distress  He has no wheezes  He has no rales  Abdominal: Soft  Musculoskeletal: He exhibits no edema  Neurological: He is alert and oriented to person, place, and time  Skin: He is not diaphoretic  Psychiatric: His affect is blunt  He exhibits a depressed mood  He expresses no homicidal and no suicidal ideation  He expresses no suicidal plans and no homicidal plans

## 2020-01-27 NOTE — ASSESSMENT & PLAN NOTE
Hypotensive  hold amlodipine  I personally called his girlfriend who manages his medications and informed her to hold medication as well  red flag symptoms reviewed with patient, ED precautions given   lipid panel pending      At goal per JNC 8 guidelines:Yes  Current Medication regime includes ACEI/ARB: No  Microalb/Cr ratio:  Lab Results   Component Value Date    MICROALBCRE 10 08/21/2019     ASCVD risk:6  Statin currently used: Yes  Current Alcohol Usage: No, Counseled on usage Yes  Current Cigarette smoker: No, ready to quit N/A, Counseled on usage No  Current diet involves salt restriction: Yes  Currently Physical Active: No

## 2020-01-27 NOTE — ASSESSMENT & PLAN NOTE
Had an outbreak recently at [facility]  Completed 7 day course of Valtrex  Risk factors: age greater than 48, chemotherapy, malignancy  Instructed to use Tylenol around the clock prescription for lidocaine patches given   - I feel there may be an arthritic component to his left shoulder pain, corticosteroid injection offered patient declined

## 2020-01-28 ENCOUNTER — APPOINTMENT (OUTPATIENT)
Dept: LAB | Facility: HOSPITAL | Age: 60
End: 2020-01-28
Payer: COMMERCIAL

## 2020-01-28 DIAGNOSIS — N28.89 HYPERTENSION SECONDARY TO OTHER RENAL DISORDERS: ICD-10-CM

## 2020-01-28 DIAGNOSIS — I15.1 HYPERTENSION SECONDARY TO OTHER RENAL DISORDERS: ICD-10-CM

## 2020-01-28 PROBLEM — Z45.2 ENCOUNTER FOR CENTRAL LINE CARE: Status: ACTIVE | Noted: 2020-01-28

## 2020-01-28 LAB
CHOLEST SERPL-MCNC: 193 MG/DL
CREAT UR-MCNC: 67.2 MG/DL
HDLC SERPL-MCNC: 98 MG/DL
LDLC SERPL CALC-MCNC: 69 MG/DL
NONHDLC SERPL-MCNC: 95 MG/DL
PROT UR-MCNC: 20 MG/DL
PROT/CREAT UR: 0.3 MG/G{CREAT} (ref 0–0.1)
TRIGL SERPL-MCNC: 132 MG/DL

## 2020-01-28 PROCEDURE — 3061F NEG MICROALBUMINURIA REV: CPT | Performed by: NURSE PRACTITIONER

## 2020-01-28 PROCEDURE — 3061F NEG MICROALBUMINURIA REV: CPT | Performed by: FAMILY MEDICINE

## 2020-01-28 PROCEDURE — 84156 ASSAY OF PROTEIN URINE: CPT

## 2020-01-28 PROCEDURE — 36415 COLL VENOUS BLD VENIPUNCTURE: CPT

## 2020-01-28 PROCEDURE — 82570 ASSAY OF URINE CREATININE: CPT

## 2020-01-28 PROCEDURE — 80061 LIPID PANEL: CPT

## 2020-01-29 ENCOUNTER — HOSPITAL ENCOUNTER (OUTPATIENT)
Dept: INFUSION CENTER | Facility: HOSPITAL | Age: 60
Discharge: HOME | End: 2020-01-29
Attending: INTERNAL MEDICINE
Payer: COMMERCIAL

## 2020-01-29 ENCOUNTER — OFFICE VISIT (OUTPATIENT)
Dept: HEMATOLOGY ONCOLOGY | Facility: CLINIC | Age: 60
End: 2020-01-29
Payer: COMMERCIAL

## 2020-01-29 VITALS
RESPIRATION RATE: 18 BRPM | SYSTOLIC BLOOD PRESSURE: 98 MMHG | HEIGHT: 68 IN | OXYGEN SATURATION: 97 % | DIASTOLIC BLOOD PRESSURE: 68 MMHG | BODY MASS INDEX: 23.31 KG/M2 | TEMPERATURE: 98.6 F | HEART RATE: 95 BPM | WEIGHT: 153.8 LBS

## 2020-01-29 VITALS
TEMPERATURE: 97.8 F | OXYGEN SATURATION: 96 % | HEART RATE: 91 BPM | DIASTOLIC BLOOD PRESSURE: 73 MMHG | SYSTOLIC BLOOD PRESSURE: 130 MMHG | RESPIRATION RATE: 18 BRPM

## 2020-01-29 DIAGNOSIS — C34.91 ADENOCARCINOMA OF LUNG, RIGHT (HCC): ICD-10-CM

## 2020-01-29 DIAGNOSIS — N28.9 RENAL DYSFUNCTION: ICD-10-CM

## 2020-01-29 DIAGNOSIS — C34.91 ADENOCARCINOMA OF LUNG, RIGHT (HCC): Primary | ICD-10-CM

## 2020-01-29 DIAGNOSIS — J43.1 PANLOBULAR EMPHYSEMA (HCC): ICD-10-CM

## 2020-01-29 DIAGNOSIS — N28.9 RENAL DYSFUNCTION: Primary | ICD-10-CM

## 2020-01-29 PROCEDURE — 99214 OFFICE O/P EST MOD 30 MIN: CPT | Performed by: NURSE PRACTITIONER

## 2020-01-29 PROCEDURE — 96413 CHEMO IV INFUSION 1 HR: CPT

## 2020-01-29 RX ORDER — SODIUM CHLORIDE 9 MG/ML
20 INJECTION, SOLUTION INTRAVENOUS ONCE
Status: COMPLETED | OUTPATIENT
Start: 2020-01-29 | End: 2020-01-29

## 2020-01-29 RX ORDER — LIDOCAINE 40 MG/G
CREAM TOPICAL ONCE
Status: CANCELLED
Start: 2020-01-29

## 2020-01-29 RX ORDER — LIDOCAINE 40 MG/G
CREAM TOPICAL ONCE
Status: COMPLETED | OUTPATIENT
Start: 2020-01-29 | End: 2020-01-29

## 2020-01-29 RX ADMIN — SODIUM CHLORIDE 20 ML/HR: 0.9 INJECTION, SOLUTION INTRAVENOUS at 12:56

## 2020-01-29 RX ADMIN — SODIUM CHLORIDE 200 MG: 9 INJECTION, SOLUTION INTRAVENOUS at 12:56

## 2020-01-29 RX ADMIN — LIDOCAINE: 4 CREAM TOPICAL at 11:57

## 2020-01-29 NOTE — PLAN OF CARE
Problem: Potential for Falls  Goal: Patient will remain free of falls  Description  INTERVENTIONS:  - Assess patient frequently for physical needs  -  Identify cognitive and physical deficits and behaviors that affect risk of falls  -  [place] fall precautions as indicated by assessment   - Educate patient/family on patient safety including physical limitations  - Instruct patient to call for assistance with activity based on assessment  - Modify environment to reduce risk of injury  - Consider OT/PT consult to assist with strengthening/mobility  Outcome: Progressing     Problem: Knowledge Deficit  Goal: Patient/family/caregiver demonstrates understanding of disease process, treatment plan, medications, and discharge instructions  Description  Complete learning assessment and assess knowledge base    Interventions:  - Provide teaching at level of understanding  - Provide teaching via preferred learning methods  Outcome: Progressing

## 2020-01-29 NOTE — PROGRESS NOTES
Hematology/Oncology Outpatient Follow-up  Patricia Waller  64 y o  male 1960 [id number]    Date:  1/29/2020      Assessment and Plan:  1  Adenocarcinoma of lung, right [facility])  Patient will resume his palliative treatment with single agent immunotherapy Keytruda on an every three-week basis today  He is currently on 10 mg of prednisone daily which we will continue for now  Patient will be back for follow-up in  3 weeks with repeat laboratory studies prior  He is aware to contact us in the interim should he develop any new or concerning symptoms after restarting his immunotherapy  - CBC and differential; Future  - Comprehensive metabolic panel; Future  - TSH, 3rd generation with Free T4 reflex; Future  - Magnesium; Future    2  Renal dysfunction  Chronic and stable  He will continue to follow-up with his Nephrology team on a regular basis for close monitoring  3  Panlobular emphysema ([address] )  Patient on chronic O2 therapy 2 L nasal cannula satting 97% on room air  He has follow-up appointment with his pulmonologist beginning of February 2020  HPI:  Patient presents today for a 1 week follow-up appointment  He continues to report significant generalized arthralgias and myalgias which is somewhat improved in comparison to last week  Patient continues to follow-up closely with the palliative care team regarding his pain management and symptom management  He continues to be on chronic O2 therapy and reports dyspnea with significant exertion/stairs  Denies fever, cough or hemoptysis  He is currently taking 10 mg of prednisone on a daily basis  His most recent laboratory studies from last week showed WBC 13 9 mainly neutrophilia with mild monocytosis, stable normocytic anemia H&H 10 4/32 6, MCV 86 normal platelet count 783  His renal dysfunction is stable BUN 49, creatinine 2 1, GFR 39 remaining metabolic panel and magnesium normal   TSH normal 1 97 with decreased free T3 1 82      Oncology History 19-year-old [facility] American male heavy smoker who used to smoke 2 packs of cigarettes daily for many years, COPD, he presented with dyspnea, CT scan of the chest on October 2018 showed right middle lobe spiculated 1 3 cm mass with multiple solid and ground-glass nodules along the major and minor fissures sub cm pulmonary nodules bilaterally, left adrenal 1 2 cm nodule     PET scan showed 1 3 cm right middle lung nodule with SUV of 6 8, numerous nodular densities along the right major and minor fissures, right hilar activity with SUV of 3 4, subcarinal lymph node measuring 7 mm with SUV of 2 7, periportal enlarged lymph nodes concerning for metastatic disease measuring 2 4 cm with SUV of 5, prominent left retrocrural lymph node measuring 1 cm x 9 mm with SUV of 1 1    Core biopsy of the right spiculated nodule showed invasive adenocarcinoma consistent with lung primary positive for CK7, TTF 1, napsin a        Adenocarcinoma of lung, right ([address] )    11/13/2018 Initial Diagnosis     Adenocarcinoma of lung, right ([address] )  Stage IV      12/13/2018 - 3/28/2019 Chemotherapy      Alimta, Carboplatin (AUC 5) + Keytruda (6 cycles total)      4/17/2019 -  Chemotherapy     Started maintenance Alimta and Keytruda  (Alimta d/c'd 9/16/19 due to worsening renal dysfunction)         Interval history:    ROS: Review of Systems   Constitutional: Positive for fatigue  Negative for activity change, appetite change, chills, fever and unexpected weight change  HENT: Negative for congestion, mouth sores, nosebleeds, sore throat and trouble swallowing  Eyes: Negative  Respiratory: Positive for shortness of breath  Negative for cough and chest tightness  Cardiovascular: Negative for chest pain, palpitations and leg swelling  Gastrointestinal: Negative for abdominal distention, abdominal pain, blood in stool, constipation, diarrhea, nausea and vomiting     Genitourinary: Negative for difficulty urinating, dysuria, frequency, hematuria and urgency  Musculoskeletal: Positive for arthralgias (6/10) and myalgias (6/10)  Negative for back pain, gait problem and joint swelling  Skin: Negative for color change, pallor and rash  Neurological: Positive for numbness (And tingling mild)  Negative for dizziness, weakness, light-headedness and headaches  Hematological: Negative for adenopathy  Does not bruise/bleed easily  Psychiatric/Behavioral: Negative for dysphoric mood and sleep disturbance  The patient is not nervous/anxious          Past Medical History:   Diagnosis Date    Alkalosis 12/9/2019    Bipolar 1 disorder ([facility] [address] )     Cancer ([facility] [address] )     adeno lung  dx 11/2018-lung bx today 4/9/2019-ongoing chemo    Chronic obstructive pulmonary disease with acute exacerbation ([facility] [address] ) 6/7/2018    Chronic pain disorder     back and right shoulder    CKD (chronic kidney disease)     COPD (chronic obstructive pulmonary disease) ([facility] [address] )     Depression     Diabetes mellitus ([facility] [address] )     Elevated d-dimer 11/30/2019    Elevated troponin 11/29/2019    Elevated troponin level not due to acute coronary syndrome 11/30/2019    GERD (gastroesophageal reflux disease)     Herniated lumbar intervertebral disc     Hyperkalemia     Hypertension     Insomnia     Latent syphilis     Treated    Low back pain     Lumbosacral disc disease     Lung cancer ([facility] [address] [facility] 04/2019    Pneumothorax after biopsy     R lung    PTSD (post-traumatic stress disorder)     [facility]     Tobacco abuse 11/13/2018       Past Surgical History:   Procedure Laterality Date    COLONOSCOPY  2011    CT GUIDED CHEST TUBE  11/21/2018    FL GUIDED CENTRAL VENOUS ACCESS DEVICE INSERTION  2/8/2019    HEMORROIDECTOMY      IR CHEST TUBE  11/14/2018    IR IMAGE GUIDED BIOPSY/ASPIRATION LUNG  11/13/2018    KNEE SURGERY      MO INSJ TUNNELED CTR VAD W/SUBQ PORT AGE 5 YR/> N/A 2/8/2019    Procedure: PLACEMENT OF PORT-A-CATH;  Surgeon: Aric Pineda MD;  Location: [facility] MAIN OR;  Service: Vascular       Social History     Socioeconomic History    Marital status: Legally      Spouse name: Not on file    Number of children: Not on file    Years of education: Not on file    Highest education level: Not on file   Occupational History    Occupation: part time employment   Social Needs    Financial resource strain: Not on file    Food insecurity:     Worry: Not on file     Inability: Not on file    Transportation needs:     Medical: Not on file     Non-medical: Not on file   Tobacco Use    Smoking status: Former Smoker     Packs/day: 0 50     Years: 40 00     Pack years: 20 00     Types: Cigarettes     Start date:      Last attempt to quit: 2019     Years since quittin 4    Smokeless tobacco: Never Used   Substance and Sexual Activity    Alcohol use: Not Currently    Drug use: Not Currently     Types: Marijuana     Comment: stopped , "i smoked weed and stopped 1 month ago"    Sexual activity: Yes   Lifestyle    Physical activity:     Days per week: Not on file     Minutes per session: Not on file    Stress: Not on file   Relationships    Social connections:     Talks on phone: Not on file     Gets together: Not on file     Attends Orthodoxy service: Not on file     Active member of club or organization: Not on file     Attends meetings of clubs or organizations: Not on file     Relationship status: Not on file    Intimate partner violence:     Fear of current or ex partner: Not on file     Emotionally abused: Not on file     Physically abused: Not on file     Forced sexual activity: Not on file   Other Topics Concern    Not on file   Social History Narrative    Lives with girlfriend       Family History   Problem Relation Age of Onset    Heart disease Mother     Cancer Mother     Hypertension Father     Diabetes Family     Asthma Family     Heart disease Family     Cancer Family     Cancer Maternal Grandfather     Cancer Paternal Grandfather     Cancer Maternal Aunt        Allergies   Allergen Reactions    Lisinopril Anaphylaxis     Took medication a while ago and had a "swelling reaction"  Does not carry epi-pen         Current Outpatient Medications:     albuterol (2 5 mg/3 mL) 0 083 % nebulizer solution, Take 1 vial (2 5 mg total) by nebulization every 4 (four) hours as needed for wheezing or shortness of breath, Disp: 180 vial, Rfl: 5    albuterol (VENTOLIN HFA) 90 mcg/act inhaler, Inhale 2 puffs every 4 (four) hours as needed for wheezing, Disp: 1 Inhaler, Rfl: 5    apixaban (ELIQUIS) 2 5 mg, Take 1 tablet (2 5 mg total) by mouth 2 (two) times a day, Disp: 60 tablet, Rfl: 11    Blood Glucose Monitoring Suppl KIT, by Does not apply route daily, Disp: 1 each, Rfl: 0    budesonide (PULMICORT) 0 5 mg/2 mL nebulizer solution, Take 1 vial (0 5 mg total) by nebulization every 12 (twelve) hours Rinse mouth after use , Disp: 1 vial, Rfl: 0    budesonide (PULMICORT) 1 MG/2ML nebulizer solution, Take 2 mL (1 mg total) by nebulization 2 (two) times a day, Disp: 120 mL, Rfl: 1    carbamide peroxide (DEBROX) 6 5 % otic solution, Administer 5 drops into both ears 2 (two) times a day, Disp: 15 mL, Rfl: 0    dextromethorphan-guaiFENesin (ROBITUSSIN DM)  mg/5 mL syrup, Take 5 mL by mouth 3 (three) times a day as needed for cough, Disp: 236 mL, Rfl: 1    diltiazem (CARDIZEM CD) 120 mg 24 hr capsule, Take 1 capsule (120 mg total) by mouth daily, Disp: 30 capsule, Rfl: 11    diphenhydrAMINE (BENADRYL) 25 mg tablet, Take 1 tablet (25 mg total) by mouth every 6 (six) hours as needed (nausea), Disp: 30 tablet, Rfl: 0    divalproex sodium (DEPAKOTE) 250 mg EC tablet, Take 1 tablet (250 mg total) by mouth 2 (two) times a day, Disp: 30 tablet, Rfl: 0    ergocalciferol (ERGOCALCIFEROL) 1 25 MG (29804 UT) capsule, Take 1 capsule (50,000 Units total) by mouth once a week, Disp: 12 capsule, Rfl: 0    FLUoxetine (PROzac) 10 MG tablet, Take 1 tablet (10 mg total) by mouth daily, Disp: 30 tablet, Rfl: 5    fluticasone (FLONASE) 50 mcg/act nasal spray, 1-2 sprays each nostril daily for allergies, Disp: 1 Bottle, Rfl: 3    folic acid (FOLVITE) 1 mg tablet, Take 1 tablet (1 mg total) by mouth daily, Disp: 30 tablet, Rfl: 2    formoterol (PERFOROMIST) 20 MCG/2ML nebulizer solution, Take 1 vial (20 mcg total) by nebulization 2 (two) times a day, Disp: 60 vial, Rfl: 1    FREESTYLE LITE test strip, TEST BLOOD SUGAR DAILY, Disp: 100 each, Rfl: 1    ipratropium-albuterol (DUO-NEB) 0 5-2 5 mg/3 mL nebulizer solution, Take 1 vial (3 mL total) by nebulization 4 (four) times a day, Disp: 120 vial, Rfl: 0    Lancets (FREESTYLE) lancets, TEST BLOOD SUGAR DAILY, Disp: 100 each, Rfl: 4    lidocaine (LIDODERM) 5 %, Apply 1 patch topically daily Remove & Discard patch within 12 hours or as directed by MD, Disp: 30 patch, Rfl: 1    lidocaine (LMX) 4 % cream, Apply topically as needed for mild pain Apply 1/2-1 inch to port 30-60 mins prior to needle insertion, cover with serrain wrap, Disp: 30 g, Rfl: 5    loratadine (CLARITIN) 10 mg tablet, Take 1 tablet (10 mg total) by mouth daily in the early morning, Disp: 90 tablet, Rfl: 3    melatonin 3 mg, Take 1 tablet (3 mg total) by mouth daily at bedtime, Disp: 30 tablet, Rfl: 1    metFORMIN (GLUCOPHAGE-XR) 500 mg 24 hr tablet, Take 1 tablet (500 mg total) by mouth 2 (two) times a day with meals, Disp: 60 tablet, Rfl: 0    ondansetron (ZOFRAN) 4 mg tablet, Take 1 tablet (4 mg total) by mouth every 6 (six) hours as needed for nausea or vomiting, Disp: 60 tablet, Rfl: 2    oxyCODONE (OxyCONTIN) 20 mg 12 hr tablet, Take 1 tablet (20 mg total) by mouth every 12 (twelve) hoursMax Daily Amount: 40 mg, Disp: 60 tablet, Rfl: 0    oxyCODONE (ROXICODONE) 30 MG immediate release tablet, Take 1 tablet (30 mg total) by mouth every 4 (four) hours as needed (cancer pain)Max Daily Amount: 180 mg, Disp: 120 tablet, Rfl: 0   pantoprazole (PROTONIX) 40 mg tablet, Take 1 tablet (40 mg total) by mouth daily, Disp: 30 tablet, Rfl: 5    polyethylene glycol (GLYCOLAX) powder, Take 17 g by mouth daily, Disp: 527 g, Rfl: 1    predniSONE 10 mg tablet, Take 1 tablet (10 mg total) by mouth daily, Disp: 30 tablet, Rfl: 1    pregabalin (LYRICA) 25 mg capsule, Take 1 capsule PO in morning and 2 capsules PO at bedtime, Disp: 90 capsule, Rfl: 0    prochlorperazine (COMPAZINE) 10 mg tablet, Take 1 tablet (10 mg total) by mouth every 6 (six) hours as needed for nausea or vomiting, Disp: 90 tablet, Rfl: 0    QUEtiapine (SEROquel) 25 mg tablet, Take 25 mg by mouth daily at bedtime, Disp: , Rfl:     ranitidine (ZANTAC) 150 mg tablet, Take 1 tablet (150 mg total) by mouth 2 (two) times a day, Disp: 60 tablet, Rfl: 4    REGULOID 28 3 % POWD, USE AS DIRECTED, Disp: 369 g, Rfl: 4    Saline 0 65 % (Soln) SOLN, 5 drops into each nostril as needed (Dry nose), Disp: 50 mL, Rfl: 5    Selenium Sulfide 2 25 % SHAM, apply by topical route  every PM to the affected area(s), Disp: , Rfl:     senna-docusate sodium (SENOKOT-S) 8 6-50 mg per tablet, Take 1 tablet by mouth 2 (two) times a day, Disp: 60 tablet, Rfl: 2    sildenafil (VIAGRA) 50 MG tablet, Take 1 tablet (50 mg total) by mouth as needed for erectile dysfunction, Disp: 6 tablet, Rfl: 0    tamsulosin (FLOMAX) 0 4 mg, Take 1 capsule (0 4 mg total) by mouth daily with dinner, Disp: 90 capsule, Rfl: 3  No current facility-administered medications for this visit  Physical Exam:  BP 98/68 (BP Location: Left arm, Patient Position: Sitting, Cuff Size: Adult)   Pulse 95   Temp 98 6 °F (37 °C) (Tympanic)   Resp 18   Ht 5' 8" (1 727 m)   Wt 69 8 kg (153 lb 12 8 oz)   SpO2 97%   BMI 23 39 kg/m²     Physical Exam   Constitutional: He is oriented to person, place, and time  He appears well-developed and well-nourished  No distress  HENT:   Head: Normocephalic and atraumatic     Mouth/Throat: Oropharynx is clear and moist  No oropharyngeal exudate  Eyes: Pupils are equal, round, and reactive to light  Conjunctivae are normal  No scleral icterus  Neck: Normal range of motion  Neck supple  No thyromegaly present  Cardiovascular: Regular rhythm, normal heart sounds and intact distal pulses  Tachycardia present  No murmur heard  Pulmonary/Chest: Effort normal  No respiratory distress  He has decreased breath sounds  On 2 L of O2 via nasal cannula   Abdominal: Soft  Bowel sounds are normal  He exhibits no distension  There is no hepatosplenomegaly  There is no tenderness  Musculoskeletal: Normal range of motion  He exhibits no edema  Lymphadenopathy:     He has no cervical adenopathy  He has no axillary adenopathy  Neurological: He is alert and oriented to person, place, and time  Skin: Skin is warm and dry  No rash noted  He is not diaphoretic  No erythema  No pallor  Psychiatric: His behavior is normal  Judgment and thought content normal  His affect is blunt  He exhibits a depressed mood  Vitals reviewed  Labs:  Lab Results   Component Value Date    WBC 13 90 (H) 01/22/2020    HGB 10 4 (L) 01/22/2020    HCT 32 6 (L) 01/22/2020    MCV 86 01/22/2020     01/22/2020     Lab Results   Component Value Date    K 4 4 01/22/2020    CL 98 01/22/2020    CO2 35 (H) 01/22/2020    BUN 49 (H) 01/22/2020    CREATININE 2 10 (H) 01/22/2020    GLUF 80 01/22/2020    CALCIUM 9 2 01/22/2020    AST 19 01/22/2020    ALT 26 01/22/2020    ALKPHOS 86 01/22/2020    EGFR 39 (L) 01/22/2020         Patient voiced understanding and agreement in the above discussion  Aware to contact our office with questions/symptoms in the interim  This note has been generated by voice recognition software system  Therefore, there may be spelling, grammar, and or syntax errors  Please contact if questions arise

## 2020-02-06 ENCOUNTER — OFFICE VISIT (OUTPATIENT)
Dept: PALLIATIVE MEDICINE | Facility: CLINIC | Age: 60
End: 2020-02-06
Payer: COMMERCIAL

## 2020-02-06 VITALS
DIASTOLIC BLOOD PRESSURE: 80 MMHG | HEART RATE: 88 BPM | TEMPERATURE: 98.5 F | BODY MASS INDEX: 22.99 KG/M2 | RESPIRATION RATE: 20 BRPM | WEIGHT: 151.2 LBS | SYSTOLIC BLOOD PRESSURE: 149 MMHG

## 2020-02-06 DIAGNOSIS — G89.3 CANCER-RELATED PAIN: Primary | Chronic | ICD-10-CM

## 2020-02-06 DIAGNOSIS — C34.91 ADENOCARCINOMA OF LUNG, RIGHT (HCC): ICD-10-CM

## 2020-02-06 DIAGNOSIS — Z51.5 PALLIATIVE CARE PATIENT: ICD-10-CM

## 2020-02-06 PROCEDURE — 3077F SYST BP >= 140 MM HG: CPT | Performed by: FAMILY MEDICINE

## 2020-02-06 PROCEDURE — 1111F DSCHRG MED/CURRENT MED MERGE: CPT | Performed by: FAMILY MEDICINE

## 2020-02-06 PROCEDURE — 1036F TOBACCO NON-USER: CPT | Performed by: FAMILY MEDICINE

## 2020-02-06 PROCEDURE — 3078F DIAST BP <80 MM HG: CPT | Performed by: FAMILY MEDICINE

## 2020-02-06 PROCEDURE — 99214 OFFICE O/P EST MOD 30 MIN: CPT | Performed by: FAMILY MEDICINE

## 2020-02-06 PROCEDURE — 3066F NEPHROPATHY DOC TX: CPT | Performed by: FAMILY MEDICINE

## 2020-02-06 NOTE — PROGRESS NOTES
Outpatient Follow-Up - Palliative and Supportive Care   Deborah Jean-Baptiste Sr  64 y o  male [id number]    Assessment & Plan  1  Cancer-related pain    2  Adenocarcinoma of lung, right ([address] )    3  Palliative care patient        Medications adjusted this encounter:  Requested Prescriptions     Signed Prescriptions Disp Refills    oxyCODONE (OxyCONTIN) 20 mg 12 hr tablet 60 tablet 0     Sig: Take 1 tablet (20 mg total) by mouth every 12 (twelve) hours For cancer painMax Daily Amount: 40 mg    oxyCODONE (ROXICODONE) 30 MG immediate release tablet 120 tablet 0     Sig: Take 1 tablet (30 mg total) by mouth every 4 (four) hours as needed (cancer pain)Max Daily Amount: 180 mg     There are no discontinued medications  Ms Sneha Alves was seen today for symptoms and planning cares related to above illnesses  I have reviewed the patient's controlled substance dispensing history in the Prescription Drug Monitoring Program in compliance with the [facility] regulations before prescribing any controlled substances  He is invited to continue to follow with us  If there are questions or concerns, please contact us through our clinic/answering service 24 hours a day, seven days a week  Clayton Faria MD  [facility] Palliative and Supportive Care        Visit Information    Accompanied By: No one    Source of History: Self    History Limitations: None    Contacts: Follow up visit for:  symptom management, depression or anxiety, support    History of Present Illness     This fellow returns in f/up of his adenocarcinoma of R lung  He continues on a steroid taper from Dr Mita Elizabeth team, with a goal of restarting Keytruda at some point in the future  Since last visit, he continues to suffer with generalized body aches and deep pains, but these are slightly improved  We discussed his drug regimen again today, and he feels better since restarting the long-acting oxycodone    He does voice a concern that he had potentially a different form of oxyIR from a previous fill, a blue pill, that he felt worked better for his immediate relief  I called to his pharmacy and attempted to decipher if this was a different  from his usual drug, and if this might account for the change in perceived efficacy  Pharmacist did not say that his drug had changed since our last visit; there was no oxycodone blue pill that was prescribed  Past medical, surgical, social, and family histories are reviewed and pertinent updates are made  Review of Systems   Constitution: Negative for decreased appetite, weight gain and weight loss  HENT: Negative for hoarse voice and nosebleeds  Eyes: Negative for vision loss in left eye and vision loss in right eye  Cardiovascular: Negative for chest pain and dyspnea on exertion  Respiratory: Positive for shortness of breath (not worse that normal)  Negative for cough  Endocrine: Negative for polydipsia, polyphagia and polyuria  Skin: Negative for flushing and itching  Musculoskeletal: Negative for falls  Gastrointestinal: Negative for anorexia, jaundice, nausea and vomiting  Genitourinary: Negative for frequency  Neurological: Negative for dizziness  Psychiatric/Behavioral: Negative for depression and memory loss  The patient is not nervous/anxious  Vital Signs    /80 (BP Location: Left arm, Patient Position: Sitting, Cuff Size: Standard)   Pulse 88   Temp 98 5 °F (36 9 °C) (Oral)   Resp 20   Wt 68 6 kg (151 lb 3 2 oz)   BMI 22 99 kg/m²     Physical Exam and Objective Data  Physical Exam   Constitutional: He is oriented to person, place, and time  No distress  Chronically ill   HENT:   Head: Normocephalic and atraumatic  Right Ear: External ear normal    Left Ear: External ear normal    Eyes: Pupils are equal, round, and reactive to light  Conjunctivae and EOM are normal  Right eye exhibits no discharge  Left eye exhibits no discharge     Neck: No tracheal deviation present  Cardiovascular: Normal rate and regular rhythm  Pulmonary/Chest: Effort normal  No stridor  No respiratory distress  Abdominal: Soft    scaphoid   Neurological: He is alert and oriented to person, place, and time  No cranial nerve deficit  Skin: Skin is warm and dry  No rash noted  He is not diaphoretic  No erythema  No pallor  Psychiatric: He has a normal mood and affect  His behavior is normal  Thought content normal    Insight seems limited; perseverative over his blue pill         Radiology and Laboratory:  I personally reviewed and interpreted the following results - none new    25+ minutes was spent face to face with Marilyn Ramirez  with greater than 50% of the time spent in counseling or coordination of care including discussions of diagnostic results, impression, and recommendations, risks and benefits of treatment and instructions for disease self management; extensive medication list review and management of polypharmacy   All of the patient's questions were answered during this discussion

## 2020-02-07 ENCOUNTER — OFFICE VISIT (OUTPATIENT)
Dept: PULMONOLOGY | Facility: CLINIC | Age: 60
End: 2020-02-07
Payer: COMMERCIAL

## 2020-02-07 VITALS
BODY MASS INDEX: 22.85 KG/M2 | WEIGHT: 150.8 LBS | SYSTOLIC BLOOD PRESSURE: 128 MMHG | HEART RATE: 84 BPM | HEIGHT: 68 IN | DIASTOLIC BLOOD PRESSURE: 60 MMHG | TEMPERATURE: 98.8 F | OXYGEN SATURATION: 99 % | RESPIRATION RATE: 18 BRPM

## 2020-02-07 DIAGNOSIS — J44.9 CHRONIC OBSTRUCTIVE PULMONARY DISEASE, UNSPECIFIED COPD TYPE (HCC): Primary | ICD-10-CM

## 2020-02-07 PROCEDURE — 99215 OFFICE O/P EST HI 40 MIN: CPT | Performed by: INTERNAL MEDICINE

## 2020-02-07 PROCEDURE — 3066F NEPHROPATHY DOC TX: CPT | Performed by: INTERNAL MEDICINE

## 2020-02-07 PROCEDURE — 3008F BODY MASS INDEX DOCD: CPT | Performed by: INTERNAL MEDICINE

## 2020-02-07 PROCEDURE — 3078F DIAST BP <80 MM HG: CPT | Performed by: INTERNAL MEDICINE

## 2020-02-07 PROCEDURE — 3074F SYST BP LT 130 MM HG: CPT | Performed by: INTERNAL MEDICINE

## 2020-02-07 PROCEDURE — 1036F TOBACCO NON-USER: CPT | Performed by: INTERNAL MEDICINE

## 2020-02-07 PROCEDURE — 1111F DSCHRG MED/CURRENT MED MERGE: CPT | Performed by: INTERNAL MEDICINE

## 2020-02-07 NOTE — PROGRESS NOTES
Pulmonary outpatient note   Daniel Broderick  64 y o  male MRN: [id number]  2/7/2020      Assessment and plan:  Daniel Broderick  has the following medical problems:   1  COPD  Gold stage D COPD on nebulizers and oxygen 24/7  The patient is not sure what nebulizers he takes  I told him to take Perforomist twice daily, Pulmicort twice daily and I added Yupleri ( nebulized [place])  2  Lung cancer  Addendum carcinoma of the lung since 2018 status post chemotherapy an ongoing maintenance biologic treatments  Being seen by Oncology here  3  Cardiac   Was hospitalized for acute coronary syndrome probably type 2 I and new atrial fibrillation of which she is on Eliquis  Follow-up in 3 months  Tanya Zayas MD/PhD,  [facility]      Return in about 3 months (around 5/7/2020)  History of Present Illness  This is 61 y  o  year old male with previous medical history of severe COPD with heavy smoking history 100 pack years  Aren Figueroa has lung adenocarcinoma diagnosed in 2018 status post  Chemotherapy since December 2018 and maintenance Alimta and Keytruda  since April 2019  Alimta stopped on September 2019 due to worsening renal function  The patient uses oxygen 24/7  He still complaining of shortness of breath  He was hospitalized on December 2019 for acute coronary syndrome and increase troponin probably type 2 MI      Social history:  Smoked 100 pack years   Drinks alcohol socially   with 8 kids      Occupational history:  Worked as delivery and construction  Currently unemployed  Review of Systems   Constitutional: Positive for fatigue  HENT: Negative  Eyes: Negative  Respiratory: Positive for shortness of breath  Cardiovascular: Negative  Gastrointestinal: Negative  Endocrine: Negative  Genitourinary: Negative  Musculoskeletal: Negative  Skin: Negative  Allergic/Immunologic: Negative  Neurological: Negative      Hematological: Negative  Psychiatric/Behavioral: Negative          Historical Information   Past Medical History:   Diagnosis Date    Alkalosis 12/9/2019    B[facility] 1 disorder ([facility]     Cancer ([facility] )     adeno lung  dx 11/2018-lung bx today 4/9/2019-ongoing chemo    Chronic obstructive pulmonary disease with acute exacerbation ([facility] ) 6/7/2018    Chronic pain disorder     back and right shoulder    CKD (chronic kidney disease)     COPD [facility]chronic obstructive [facility] disease) [facility]     Depression     Diabetes mellitus ([facility]     Elevated d-dimer 11/30/2019    Elevated troponin 11/29/2019    Elevated troponin level not due to acute coronary syndrome 11/30/2019    GERD (gastroesophageal reflux disease)     Herniated lumbar intervertebral disc     Hyperkalemia     Hypertension     Insomnia     Latent syphilis     Treated    Low back pain     Lumbosacral disc disease     [facility] ([facility] ) 04/2019    Pneumothorax after biopsy     R lung    PTSD (post-traumatic stress disorder)     Pulmonary emphysema ([place] [address] )     Tobacco abuse 11/13/2018     Past Surgical History:   Procedure Laterality Date    COLONOSCOPY  2011    CT GUIDED CHEST TUBE  11/21/2018    FL GUIDED CENTRAL VENOUS ACCESS DEVICE INSERTION  2/8/2019    HEMORROIDECTOMY      IR CHEST TUBE  11/14/2018    IR IMAGE GUIDED BIOPSY/ASPIRATION LUNG  11/13/2018    KNEE SURGERY      SD INSJ TUNNELED CTR VAD W/SUBQ PORT AGE 5 YR/> N/A 2/8/2019    Procedure: PLACEMENT OF PORT-A-CATH;  Surgeon: Lili Arteaga MD;  Location: [facility] MAIN OR;  Service: Vascular     Family History   Problem Relation Age of Onset    Heart disease Mother     Cancer Mother     Hypertension Father     Diabetes Family     Asthma Family     Heart disease Family     Cancer Family     Cancer Maternal Grandfather     Cancer Paternal Grandfather     Cancer Maternal Aunt        Meds/Allergies     Current Outpatient Medications:     albuterol (2 5 mg/3 mL) 0 083 % nebulizer solution, Take 1 vial (2 5 mg total) by nebulization every 4 (four) hours as needed for wheezing or shortness of breath, Disp: 180 vial, Rfl: 5    albuterol (VENTOLIN HFA) 90 mcg/act inhaler, Inhale 2 puffs every 4 (four) hours as needed for wheezing, Disp: 1 Inhaler, Rfl: 5    apixaban (ELIQUIS) 2 5 mg, Take 1 tablet (2 5 mg total) by mouth 2 (two) times a day, Disp: 60 tablet, Rfl: 11    Blood Glucose Monitoring Suppl KIT, by Does not apply route daily, Disp: 1 each, Rfl: 0    budesonide (PULMICORT) 0 5 mg/2 mL nebulizer solution, Take 1 vial (0 5 mg total) by nebulization every 12 (twelve) hours Rinse mouth after use , Disp: 1 vial, Rfl: 0    budesonide (PULMICORT) 1 MG/2ML nebulizer solution, Take 2 mL (1 mg total) by nebulization 2 (two) times a day, Disp: 120 mL, Rfl: 1    carbamide peroxide (DEBROX) 6 5 % otic solution, Administer 5 drops into both ears 2 (two) times a day, Disp: 15 mL, Rfl: 0    dextromethorphan-guaiFENesin (ROBITUSSIN DM)  mg/5 mL syrup, Take 5 mL by mouth 3 (three) times a day as needed for cough, Disp: 236 mL, Rfl: 1    diltiazem (CARDIZEM CD) 120 mg 24 hr capsule, Take 1 capsule (120 mg total) by mouth daily, Disp: 30 capsule, Rfl: 11    diphenhydrAMINE (BENADRYL) 25 mg tablet, Take 1 tablet (25 mg total) by mouth every 6 (six) hours as needed (nausea), Disp: 30 tablet, Rfl: 0    divalproex sodium (DEPAKOTE) 250 mg EC tablet, Take 1 tablet (250 mg total) by mouth 2 (two) times a day, Disp: 30 tablet, Rfl: 0    ergocalciferol (ERGOCALCIFEROL) 1 25 MG (75684 UT) capsule, Take 1 capsule (50,000 Units total) by mouth once a week, Disp: 12 capsule, Rfl: 0    FLUoxetine (PROzac) 10 MG tablet, Take 1 tablet (10 mg total) by mouth daily, Disp: 30 tablet, Rfl: 5    fluticasone (FLONASE) 50 mcg/act nasal spray, 1-2 sprays each nostril daily for allergies, Disp: 1 Bottle, Rfl: 3    folic acid (FOLVITE) 1 mg tablet, Take 1 tablet (1 mg total) by mouth daily, Disp: 30 tablet, Rfl: 2   formoterol (PERFOROMIST) 20 MCG/2ML nebulizer solution, Take 1 vial (20 mcg total) by nebulization 2 (two) times a day, Disp: 60 vial, Rfl: 1    FREESTYLE LITE test strip, TEST BLOOD SUGAR DAILY, Disp: 100 each, Rfl: 1    ipratropium-albuterol (DUO-NEB) 0 5-2 5 mg/3 mL nebulizer solution, Take 1 vial (3 mL total) by nebulization 4 (four) times a day, Disp: 120 vial, Rfl: 0    Lancets (FREESTYLE) lancets, TEST BLOOD SUGAR DAILY, Disp: 100 each, Rfl: 4    lidocaine (LIDODERM) 5 %, Apply 1 patch topically daily Remove & Discard patch within 12 hours or as directed by MD, Disp: 30 patch, Rfl: 1    lidocaine (LMX) 4 % cream, Apply topically as needed for mild pain Apply 1/2-1 inch to port 30-60 mins prior to needle insertion, cover with serrain wrap, Disp: 30 g, Rfl: 5    loratadine (CLARITIN) 10 mg tablet, Take 1 tablet (10 mg total) by mouth daily in the early morning, Disp: 90 tablet, Rfl: 3    melatonin 3 mg, Take 1 tablet (3 mg total) by mouth daily at bedtime, Disp: 30 tablet, Rfl: 1    metFORMIN (GLUCOPHAGE-XR) 500 mg 24 hr tablet, Take 1 tablet (500 mg total) by mouth 2 (two) times a day with meals, Disp: 60 tablet, Rfl: 0    ondansetron (ZOFRAN) 4 mg tablet, Take 1 tablet (4 mg total) by mouth every 6 (six) hours as needed for nausea or vomiting, Disp: 60 tablet, Rfl: 2    oxyCODONE (OxyCONTIN) 20 mg 12 hr tablet, Take 1 tablet (20 mg total) by mouth every 12 (twelve) hoursMax Daily Amount: 40 mg, Disp: 60 tablet, Rfl: 0    oxyCODONE (ROXICODONE) 30 MG immediate release tablet, Take 1 tablet (30 mg total) by mouth every 4 (four) hours as needed (cancer pain)Max Daily Amount: 180 mg, Disp: 120 tablet, Rfl: 0    pantoprazole (PROTONIX) 40 mg tablet, Take 1 tablet (40 mg total) by mouth daily, Disp: 30 tablet, Rfl: 5    polyethylene glycol (GLYCOLAX) powder, Take 17 g by mouth daily, Disp: 527 g, Rfl: 1    predniSONE 10 mg tablet, Take 1 tablet (10 mg total) by mouth daily, Disp: 30 tablet, Rfl: 1   pregabalin (LYRICA) 25 mg capsule, Take 1 capsule PO in morning and 2 capsules PO at bedtime, Disp: 90 capsule, Rfl: 0    prochlorperazine (COMPAZINE) 10 mg tablet, Take 1 tablet (10 mg total) by mouth every 6 (six) hours as needed for nausea or vomiting, Disp: 90 tablet, Rfl: 0    QUEtiapine (SEROquel) 25 mg tablet, Take 25 mg by mouth daily at bedtime, Disp: , Rfl:     ranitidine (ZANTAC) 150 mg tablet, Take 1 tablet (150 mg total) by mouth 2 (two) times a day, Disp: 60 tablet, Rfl: 4    REGULOID 28 3 % POWD, USE AS DIRECTED, Disp: 369 g, Rfl: 4    Saline 0 65 % (Soln) SOLN, 5 drops into each nostril as needed (Dry nose), Disp: 50 mL, Rfl: 5    Selenium Sulfide 2 25 % SHAM, apply by topical route  every PM to the affected area(s), Disp: , Rfl:     senna-docusate sodium (SENOKOT-S) 8 6-50 mg per tablet, Take 1 tablet by mouth 2 (two) times a day, Disp: 60 tablet, Rfl: 2    sildenafil (VIAGRA) 50 MG tablet, Take 1 tablet (50 mg total) by mouth as needed for erectile dysfunction, Disp: 6 tablet, Rfl: 0    tamsulosin (FLOMAX) 0 4 mg, Take 1 capsule (0 4 mg total) by mouth daily with dinner, Disp: 90 capsule, Rfl: 3    Revefenacin (YUPELRI) 175 MCG/3ML SOLN, Inhale 1 ampule once for 1 dose, Disp: 30 vial, Rfl: 5  Allergies   Allergen Reactions    Lisinopril Anaphylaxis     Took medication a while ago and had a "swelling reaction"  Does not carry epi-pen       Vitals: Blood pressure 128/60, pulse 84, temperature 98 8 °F (37 1 °C), resp  rate 18, height 5' 8" (1 727 m), weight 68 4 kg (150 lb 12 8 oz), SpO2 99 %  Body mass index is 22 93 kg/m²  Oxygen Therapy  SpO2: 99 %  Oxygen Therapy: Supplemental oxygen  O2 Delivery Method: Nasal cannula  O2 Flow Rate (L/min): 2 L/min    Physical Exam  Physical Exam   Constitutional: He is oriented to person, place, and time  He appears well-developed and well-nourished  No distress  HENT:   Head: Normocephalic and atraumatic     Eyes: Pupils are equal, round, and reactive to light  EOM are normal    Neck: Normal range of motion  Neck supple  No JVD present  Cardiovascular: Normal rate, regular rhythm, normal heart sounds and intact distal pulses  Exam reveals no friction rub  No murmur heard  Pulmonary/Chest: No respiratory distress  He has no wheezes  He has no rales  Decreased breath sounds bilaterally   Abdominal: Soft  Bowel sounds are normal    Musculoskeletal: Normal range of motion  He exhibits no edema or deformity  Neurological: He is alert and oriented to person, place, and time  Skin: Skin is warm  Psychiatric: He has a normal mood and affect  Labs: I have personally reviewed pertinent lab results  Lab Results   Component Value Date    WBC 13 90 (H) 01/22/2020    HGB 10 4 (L) 01/22/2020    HCT 32 6 (L) 01/22/2020    MCV 86 01/22/2020     01/22/2020     Lab Results   Component Value Date    CALCIUM 9 2 01/22/2020    K 4 4 01/22/2020    CO2 35 (H) 01/22/2020    CL 98 01/22/2020    BUN 49 (H) 01/22/2020    CREATININE 2 10 (H) 01/22/2020     Lab Results   Component Value Date     (H) 08/21/2019     Lab Results   Component Value Date    ALT 26 01/22/2020    AST 19 01/22/2020    ALKPHOS 86 01/22/2020       Imaging and other studies:   I have personally reviewed pertinent imaging studies in PACS  the patient had left heart catheterization on 2018 that showed normal left main normal left anterior descending artery normal circumflex artery and 20% stenosis in them mid RCA  The patient had spirometry in 2018 that showed severe airflow obstruction with FEV1 49% of predicted,   Severe air trapping and moderately reduced diffusion capacity  The patient had chest CT on January 2020 that showed linear density in the right lower lobe, right middle lobe and lingular consolidations

## 2020-02-12 RX ORDER — SODIUM CHLORIDE 9 MG/ML
20 INJECTION, SOLUTION INTRAVENOUS ONCE
Status: CANCELLED | OUTPATIENT
Start: 2020-02-19

## 2020-02-17 ENCOUNTER — TELEPHONE (OUTPATIENT)
Dept: PULMONOLOGY | Facility: CLINIC | Age: 60
End: 2020-02-17

## 2020-02-17 DIAGNOSIS — G62.9 PERIPHERAL POLYNEUROPATHY: ICD-10-CM

## 2020-02-17 DIAGNOSIS — R52 GENERALIZED BODY ACHES: ICD-10-CM

## 2020-02-17 DIAGNOSIS — C34.91 ADENOCARCINOMA OF LUNG, RIGHT (HCC): ICD-10-CM

## 2020-02-17 DIAGNOSIS — M54.50 LUMBAR PAIN: ICD-10-CM

## 2020-02-17 DIAGNOSIS — Z51.5 PALLIATIVE CARE PATIENT: ICD-10-CM

## 2020-02-17 NOTE — TELEPHONE ENCOUNTER
Faxed  [place] En[place]ment  to the following fax number [phone] I did receive confirmation  In process to be scan to pt chart

## 2020-02-18 RX ORDER — PREGABALIN 25 MG/1
CAPSULE ORAL
Qty: 90 CAPSULE | Refills: 0 | Status: SHIPPED | OUTPATIENT
Start: 2020-02-18 | End: 2020-04-02 | Stop reason: SDUPTHER

## 2020-02-18 RX ORDER — OXYCODONE HCL 20 MG/1
20 TABLET, FILM COATED, EXTENDED RELEASE ORAL EVERY 12 HOURS SCHEDULED
Qty: 60 TABLET | Refills: 0 | Status: SHIPPED | OUTPATIENT
Start: 2020-02-18 | End: 2020-04-02 | Stop reason: SDUPTHER

## 2020-02-18 RX ORDER — OXYCODONE HYDROCHLORIDE 30 MG/1
30 TABLET ORAL EVERY 4 HOURS PRN
Qty: 120 TABLET | Refills: 0 | Status: SHIPPED | OUTPATIENT
Start: 2020-02-18 | End: 2020-04-02 | Stop reason: SDUPTHER

## 2020-02-19 ENCOUNTER — TELEPHONE (OUTPATIENT)
Dept: PALLIATIVE MEDICINE | Facility: CLINIC | Age: 60
End: 2020-02-19

## 2020-02-19 ENCOUNTER — HOSPITAL ENCOUNTER (OUTPATIENT)
Dept: INFUSION CENTER | Facility: HOSPITAL | Age: 60
Discharge: HOME | End: 2020-02-19
Attending: INTERNAL MEDICINE
Payer: COMMERCIAL

## 2020-02-19 ENCOUNTER — OFFICE VISIT (OUTPATIENT)
Dept: HEMATOLOGY ONCOLOGY | Facility: CLINIC | Age: 60
End: 2020-02-19
Payer: COMMERCIAL

## 2020-02-19 VITALS
TEMPERATURE: 98.8 F | DIASTOLIC BLOOD PRESSURE: 65 MMHG | BODY MASS INDEX: 24.46 KG/M2 | OXYGEN SATURATION: 97 % | RESPIRATION RATE: 16 BRPM | SYSTOLIC BLOOD PRESSURE: 152 MMHG | HEIGHT: 68 IN | WEIGHT: 161.38 LBS | HEART RATE: 95 BPM

## 2020-02-19 VITALS
OXYGEN SATURATION: 93 % | WEIGHT: 161.4 LBS | SYSTOLIC BLOOD PRESSURE: 122 MMHG | BODY MASS INDEX: 24.46 KG/M2 | HEART RATE: 103 BPM | DIASTOLIC BLOOD PRESSURE: 68 MMHG | RESPIRATION RATE: 18 BRPM | TEMPERATURE: 98.5 F | HEIGHT: 68 IN

## 2020-02-19 DIAGNOSIS — C34.91 ADENOCARCINOMA OF LUNG, RIGHT (HCC): Primary | ICD-10-CM

## 2020-02-19 DIAGNOSIS — C34.91 ADENOCARCINOMA OF LUNG, RIGHT (HCC): ICD-10-CM

## 2020-02-19 DIAGNOSIS — N28.9 RENAL DYSFUNCTION: ICD-10-CM

## 2020-02-19 DIAGNOSIS — G89.3 CANCER-RELATED PAIN: Chronic | ICD-10-CM

## 2020-02-19 DIAGNOSIS — K59.00 CONSTIPATION, UNSPECIFIED CONSTIPATION TYPE: ICD-10-CM

## 2020-02-19 DIAGNOSIS — G47.9 DIFFICULTY SLEEPING: ICD-10-CM

## 2020-02-19 DIAGNOSIS — G62.9 PERIPHERAL POLYNEUROPATHY: ICD-10-CM

## 2020-02-19 DIAGNOSIS — N28.9 RENAL DYSFUNCTION: Primary | ICD-10-CM

## 2020-02-19 LAB
ALBUMIN SERPL BCP-MCNC: 3.3 G/DL (ref 3–5.2)
ALP SERPL-CCNC: 67 U/L (ref 43–122)
ALT SERPL W P-5'-P-CCNC: 23 U/L (ref 9–52)
ANION GAP SERPL CALCULATED.3IONS-SCNC: 5 MMOL/L (ref 5–14)
ANISOCYTOSIS BLD QL SMEAR: PRESENT
AST SERPL W P-5'-P-CCNC: 25 U/L (ref 17–59)
BASOPHILS # BLD AUTO: 0.13 THOUSAND/UL (ref 0–0.1)
BASOPHILS NFR MAR MANUAL: 2 % (ref 0–1)
BILIRUB SERPL-MCNC: 0.4 MG/DL
BUN SERPL-MCNC: 21 MG/DL (ref 5–25)
CALCIUM SERPL-MCNC: 8.8 MG/DL (ref 8.4–10.2)
CHLORIDE SERPL-SCNC: 102 MMOL/L (ref 97–108)
CO2 SERPL-SCNC: 29 MMOL/L (ref 22–30)
CREAT SERPL-MCNC: 2.15 MG/DL (ref 0.7–1.5)
EOSINOPHIL # BLD AUTO: 0.13 THOUSAND/UL (ref 0–0.4)
EOSINOPHIL NFR BLD MANUAL: 2 % (ref 0–6)
ERYTHROCYTE [DISTWIDTH] IN BLOOD BY AUTOMATED COUNT: 19.3 %
GFR SERPL CREATININE-BSD FRML MDRD: 38 ML/MIN/1.73SQ M
GLUCOSE SERPL-MCNC: 95 MG/DL (ref 70–99)
HCT VFR BLD AUTO: 26.9 % (ref 41–53)
HGB BLD-MCNC: 8.9 G/DL (ref 13.5–17.5)
HYPERCHROMIA BLD QL SMEAR: PRESENT
LYMPHOCYTES # BLD AUTO: 1.73 THOUSAND/UL (ref 0.5–4)
LYMPHOCYTES # BLD AUTO: 27 % (ref 25–45)
MAGNESIUM SERPL-MCNC: 1.9 MG/DL (ref 1.6–2.3)
MCH RBC QN AUTO: 29.1 PG (ref 26–34)
MCHC RBC AUTO-ENTMCNC: 33.3 G/DL (ref 31–36)
MCV RBC AUTO: 88 FL (ref 80–100)
MICROCYTES BLD QL AUTO: PRESENT
MONOCYTES # BLD AUTO: 1.15 THOUSAND/UL (ref 0.2–0.9)
MONOCYTES NFR BLD AUTO: 18 % (ref 1–10)
NEUTS BAND NFR BLD MANUAL: 1 % (ref 0–8)
NEUTS SEG # BLD: 3.26 THOUSAND/UL (ref 1.8–7.8)
NEUTS SEG NFR BLD AUTO: 50 %
PLATELET # BLD AUTO: 301 THOUSANDS/UL (ref 150–450)
PLATELET BLD QL SMEAR: ADEQUATE
PMV BLD AUTO: 7.3 FL (ref 8.9–12.7)
POTASSIUM SERPL-SCNC: 4.2 MMOL/L (ref 3.6–5)
PROT SERPL-MCNC: 6.7 G/DL (ref 5.9–8.4)
RBC # BLD AUTO: 3.07 MILLION/UL (ref 4.5–5.9)
RBC MORPH BLD: ABNORMAL
SODIUM SERPL-SCNC: 136 MMOL/L (ref 137–147)
TOTAL CELLS COUNTED SPEC: 100
TSH SERPL DL<=0.05 MIU/L-ACNC: 1.65 UIU/ML (ref 0.47–4.68)
WBC # BLD AUTO: 6.4 THOUSAND/UL (ref 4.5–11)

## 2020-02-19 PROCEDURE — 1036F TOBACCO NON-USER: CPT | Performed by: INTERNAL MEDICINE

## 2020-02-19 PROCEDURE — 99214 OFFICE O/P EST MOD 30 MIN: CPT | Performed by: INTERNAL MEDICINE

## 2020-02-19 PROCEDURE — 80053 COMPREHEN METABOLIC PANEL: CPT

## 2020-02-19 PROCEDURE — 85027 COMPLETE CBC AUTOMATED: CPT

## 2020-02-19 PROCEDURE — 84443 ASSAY THYROID STIM HORMONE: CPT

## 2020-02-19 PROCEDURE — 3008F BODY MASS INDEX DOCD: CPT | Performed by: INTERNAL MEDICINE

## 2020-02-19 PROCEDURE — 3077F SYST BP >= 140 MM HG: CPT | Performed by: INTERNAL MEDICINE

## 2020-02-19 PROCEDURE — 3066F NEPHROPATHY DOC TX: CPT | Performed by: INTERNAL MEDICINE

## 2020-02-19 PROCEDURE — 96413 CHEMO IV INFUSION 1 HR: CPT

## 2020-02-19 PROCEDURE — 83735 ASSAY OF MAGNESIUM: CPT

## 2020-02-19 PROCEDURE — 3078F DIAST BP <80 MM HG: CPT | Performed by: INTERNAL MEDICINE

## 2020-02-19 PROCEDURE — 85007 BL SMEAR W/DIFF WBC COUNT: CPT

## 2020-02-19 PROCEDURE — 96375 TX/PRO/DX INJ NEW DRUG ADDON: CPT

## 2020-02-19 RX ORDER — SENNA AND DOCUSATE SODIUM 50; 8.6 MG/1; MG/1
1 TABLET, FILM COATED ORAL 2 TIMES DAILY
Qty: 60 TABLET | Refills: 2 | Status: SHIPPED | OUTPATIENT
Start: 2020-02-19 | End: 2020-05-05

## 2020-02-19 RX ORDER — SODIUM CHLORIDE 9 MG/ML
20 INJECTION, SOLUTION INTRAVENOUS ONCE
Status: CANCELLED | OUTPATIENT
Start: 2020-03-11

## 2020-02-19 RX ORDER — LANOLIN ALCOHOL/MO/W.PET/CERES
3 CREAM (GRAM) TOPICAL
Qty: 30 TABLET | Refills: 1 | Status: SHIPPED | OUTPATIENT
Start: 2020-02-19 | End: 2021-01-01 | Stop reason: SDUPTHER

## 2020-02-19 RX ORDER — SODIUM CHLORIDE 9 MG/ML
20 INJECTION, SOLUTION INTRAVENOUS ONCE
Status: COMPLETED | OUTPATIENT
Start: 2020-02-19 | End: 2020-02-19

## 2020-02-19 RX ORDER — MORPHINE SULFATE 10 MG/ML
2 INJECTION, SOLUTION INTRAMUSCULAR; INTRAVENOUS ONCE
Status: CANCELLED
Start: 2020-02-19

## 2020-02-19 RX ADMIN — SODIUM CHLORIDE 200 MG: 9 INJECTION, SOLUTION INTRAVENOUS at 13:07

## 2020-02-19 RX ADMIN — MORPHINE SULFATE 2 MG: 2 INJECTION, SOLUTION INTRAMUSCULAR; INTRAVENOUS at 12:17

## 2020-02-19 RX ADMIN — SODIUM CHLORIDE 20 ML/HR: 0.9 INJECTION, SOLUTION INTRAVENOUS at 11:40

## 2020-02-19 NOTE — TELEPHONE ENCOUNTER
Received prior authorization   DENIED  for OXYCONTIN ER 20 MG     Per faxed letter pt required to have tried and failed 3 of the following  1  Butrans patch  2  Embeda  3  Fentanyl patch  4  Extended release Morphine    Please advise  Early decision appeal phone  number  [address] [place] [phone]     Or a signed letter of medical necessity may be faxed to   [phone]          Confirmed with pharmacy  Pt has been informed

## 2020-02-19 NOTE — LETTER
March 3, 2020     [phone]    Patient: Zakiya Corral  YOB: 1960   Date of Visit: 2/6/2020       To whom it may concern: This fellow is under my professional care for his lung cancer, and cancer-related pain  He has followed faithfully with our practice since 1/9/2019  Over that period of time, his pain medicines have needed to increase due to worsening pain, and advancing disease  At this time, he can only tolerate immunotherapy, having completed other, more conventional, treatments  He has suffered a set of neuropathic injuries from his chemotherapy, in addition to his cancer pain  His pain is not so severe as to make a fentanyl patch safe nor appropriate at this time  To use a fentanyl patch, or even a Butrans patch, would not be advised, as his pain needs continue to change as his treatments are adjusted  Both morphine products and Embeda are not safe, either, given his poor renal function, and the likelihood of adverse and severe side effects with these drugs are used in cases of significant renal injury  We discussed these clinical items in our office, and despite the predicted prior authorization hassle, we agreed that oxycodone is a safe, effective medicine for him, with a predictable dose/response curve  OxyCONTIN 10mg tabs are the safest place to start with graduated opioid therapy with him, rather than expose him to risks of overdose on a new substance, where we cannot guarantee his tolerance nor absorption; and oxycodone will not lead to neurotoxicity in renal failure, as morphine and Embeda do  Please respect that we have already discussed the appropriate patient centered safety concerns in clinic, and that my prescription for this fellow is REQUIRED to mange his cancer-related pain SAFELY    I have taken the time to discuss the matter directly with the patient after reviewing his chart, spending over a year with him in our practice, being in practice myself for over five years, and having attended medical school, residency, and a specialized fellowship in Palliative medicine and opioid pain management  I look forward to your authorization for coverage of the prescribed substance promptly        Master Jimenez MD

## 2020-02-19 NOTE — PLAN OF CARE
Problem: Potential for Falls  Goal: Patient will remain free of falls  Description  INTERVENTIONS:  - Assess patient frequently for physical needs  -  Identify cognitive and physical deficits and behaviors that affect risk of falls    -  [place] fall precautions as indicated by assessment   - Educate patient/family on patient safety including physical limitations  - Instruct patient to call for assistance with activity based on assessment  - Modify environment to reduce risk of injury  - Consider OT/PT consult to assist with strengthening/mobility  Outcome: Progressing

## 2020-02-19 NOTE — PROGRESS NOTES
Pt here for every 3 week RUST  Central labs drawn per order, ok to proceed without results, pt presented with a lot of pain, 9/10 and stated he can't get his pain meds until an authorization is done  Spoke with Morro Lott RN and Dr Memo Reyes ordered morphine for patient while here  Patient pain decreased to 1/10 on discharge  Patient tolerated treatment well without complications  AVS provided for next appts  Patient discharge and noted hemoglobin 8 9  Patient states he has been feeling increased shortness of breath recently  Luis Enrique March aware and will get a hold of patient on cell phone after discuss with Dr Memo Reyes and next steps  Patient agreeable

## 2020-02-19 NOTE — TELEPHONE ENCOUNTER
Prior authorization for pt's   Oxycontin 20 mg and   Oxycodone 30 mg   has been initiated and sent along with last office note, PDMP  Opioid agreement  Awaiting determination

## 2020-02-19 NOTE — PROGRESS NOTES
Hematology/Oncology Outpatient Follow-up  Sinai Meraz  64 y o  male 1960 [id number]    Date:  2/19/2020        Assessment and Plan:  1  Adenocarcinoma of lung, right ([address] )  He was asked to continue with the prednisone 10 mg once a day for now which could be decreased to 5 mg in the future once his breathing gets better  We will continue with the [facility] as immunotherapy on every 3 week basis and aim for another PET-CT scan after the next immunotherapy treatment  - CBC and differential; Future  - Comprehensive metabolic panel; Future  - Magnesium; Future  - TSH, 3rd generation with Free T4 reflex; Future  - CBC and differential; Future  - Comprehensive metabolic panel; Future  - Magnesium; Future  - Infusion Calculated Appointment Request; Future  - CBC and differential; Future  - Comprehensive metabolic panel; Future  - TSH, 3rd generation with Free T4 reflex; Future  - T3, free; Future    2  Cancer-related pain  He apparently run out of pain medication which show a is managed by his palliative care team   We will get in touch with them to expedite the restart of his pain medication  3  Renal dysfunction  His blood work from today is still pending  He was advised to hydrate himself well  - Infusion Calculated Appointment Request; Future  - CBC and differential; Future  - Comprehensive metabolic panel; Future  - TSH, 3rd generation with Free T4 reflex; Future  - T3, free; Future    4  Difficulty sleeping  Melatonin was refilled  - melatonin 3 mg; Take 1 tablet (3 mg total) by mouth daily at bedtime  Dispense: 30 tablet; Refill: 1    5  Constipation, unspecified constipation type  The stool softener was refilled today to prevent constipation  - senna-docusate sodium (SENOKOT-S) 8 6-50 mg per tablet; Take 1 tablet by mouth 2 (two) times a day  Dispense: 60 tablet; Refill: 2    6   Peripheral polyneuropathy  The patient stated that he ran out of Lyrica which is ordered by the palliative care team         HPI:  Patient came today for a follow-up visit  He continues to complain about shortness of breath on minimal activities  He is on oxygen 2 L per nasal cannula around the clock  He continues to take prednisone 10 mg once a day  He stated that he ran out of pain medication about 3 days ago and got in touch with the palliative care team to refill his pain medication  He seems to be a little bit shaky and agitated  He did not do his blood work prior to this office visit as it was ordered  He is due for [facility] maintenance immunotherapy treatment today    Oncology History    72-year-old [facility], [facility] American male heavy smoker who used to smoke 2 packs of cigarettes daily for many years, COPD, he presented with dyspnea, CT scan of the chest on October 2018 showed right middle lobe spiculated 1 3 cm mass with multiple solid and ground-glass nodules along the major and minor fissures sub cm pulmonary nodules bilaterally, left adrenal 1 2 cm nodule     PET scan showed 1 3 cm right middle lung nodule with SUV of 6 8, numerous nodular densities along the right major and minor fissures, right hilar activity with SUV of 3 4, subcarinal lymph node measuring 7 mm with SUV of 2 7, periportal enlarged lymph nodes concerning for metastatic disease measuring 2 4 cm with SUV of 5, prominent left retrocrural lymph node measuring 1 cm x 9 mm with SUV of 1 1    Core biopsy of the right spiculated nodule showed invasive adenocarcinoma consistent with lung primary positive for CK7, TTF 1, napsin a        Adenocarcinoma of lung, right ([address] )    11/13/2018 Initial Diagnosis     Adenocarcinoma of lung, right ([address] )  Stage IV      12/13/2018 - 3/28/2019 Chemotherapy      Alimta, Carboplatin (AUC 5) + Keytruda (6 cycles total)      4/17/2019 -  Chemotherapy     Started maintenance Alimta and Keytruda  (Alimta d/c'd 9/16/19 due to worsening renal dysfunction)         Interval history:    ROS: Review of Systems   Constitutional: Positive for fatigue  Negative for appetite change, diaphoresis and fever  HENT: Positive for hearing loss  Negative for congestion, dental problem, facial swelling, tinnitus and trouble swallowing  Eyes: Negative for visual disturbance  Respiratory: Positive for shortness of breath  Negative for cough, chest tightness and wheezing  Cardiovascular: Negative for chest pain and leg swelling  Gastrointestinal: Positive for constipation  Negative for abdominal distention, abdominal pain, blood in stool, diarrhea, nausea and vomiting  Genitourinary: Negative for dysuria, hematuria and urgency  Musculoskeletal: Positive for arthralgias, back pain and myalgias  Negative for neck pain  Skin: Negative  Negative for color change, pallor, rash and wound  Neurological: Positive for dizziness  Negative for weakness, numbness and headaches  Hematological: Negative for adenopathy  Psychiatric/Behavioral: Negative for agitation, behavioral problems, confusion, hallucinations, self-injury and sleep disturbance  The patient is not nervous/anxious and is not hyperactive          Past Medical History:   Diagnosis Date    Alkalosis 12/9/2019    Bipolar 1 disorder [address] )     Cancer ([facility] )     adeno lung  dx 11/2018-lung bx today 4/9/2019-ongoing chemo    Chronic obstructive pulmonary disease with acute exacerbation ([address] ) 6/7/2018    Chronic pain disorder     back and right shoulder    CKD (chronic kidney disease)     COPD (chronic obstructive pulmonary disease) ([address] )     Depression     Diabetes mellitus ([address] )     Elevated d-dimer 11/30/2019    Elevated troponin 11/29/2019    Elevated troponin level not due to acute coronary syndrome 11/30/2019    GERD (gastroesophageal reflux disease)     Herniated lumbar intervertebral disc     Hyperkalemia     Hypertension     Insomnia     Latent syphilis     Treated    Low back pain     Lumbosacral disc disease     Lung cancer ([address] ) 04/2019    Pneumothorax after biopsy     R lung    PTSD (post-traumatic stress disorder)     Pulmonary emphysema ([address] )     Tobacco abuse 2018       Past Surgical History:   Procedure Laterality Date    COLONOSCOPY      CT GUIDED CHEST TUBE  2018    FL GUIDED CENTRAL VENOUS ACCESS DEVICE INSERTION  2019    HEMORROIDECTOMY      IR CHEST TUBE  2018    IR IMAGE GUIDED BIOPSY/ASPIRATION LUNG  2018    KNEE SURGERY      AK INSJ TUNNELED CTR VAD W/SUBQ PORT AGE 5 YR/> N/A 2019    Procedure: PLACEMENT OF PORT-A-CATH;  Surgeon: José Luis Dutton MD;  Location: [address] [place] [address];  Service: Vascular       Social History     Socioeconomic History    Marital status: Legally      Spouse name: None    Number of children: None    Years of education: None    Highest education level: None   Occupational History    Occupation: part time employment   Social Needs    Financial resource strain: None    Food insecurity:     Worry: None     Inability: None    Transportation needs:     Medical: None     Non-medical: None   Tobacco Use    Smoking status: Former Smoker     Packs/day: 0 50     Years: 40 00     Pack years: 20 00     Types: Cigarettes     Start date:      Last attempt to quit: 2019     Years since quittin 5    Smokeless tobacco: Never Used   Substance and Sexual Activity    Alcohol use: Not Currently    Drug use: Not Currently     Types: Marijuana     Comment: stopped , "i smoked weed and stopped 1 month ago"    Sexual activity: Yes   Lifestyle    Physical activity:     Days per week: None     Minutes per session: None    Stress: None   Relationships    Social connections:     Talks on phone: None     Gets together: None     Attends Zoroastrian service: None     Active member of club or organization: None     Attends meetings of clubs or organizations: None     Relationship status: None    Intimate partner violence:     Fear of current or ex partner: None     Emotionally abused: None     Physically abused: None     Forced sexual activity: None   Other Topics Concern    None   Social History Narrative    Lives with girlfriend       Family History   Problem Relation Age of Onset    Heart disease Mother     Cancer Mother     Hypertension Father     Diabetes Family     Asthma Family     Heart disease Family     Cancer Family     Cancer Maternal Grandfather     Cancer Paternal Grandfather     Cancer Maternal Aunt        Allergies   Allergen Reactions    Lisinopril Anaphylaxis     Took medication a while ago and had a "swelling reaction"  Does not carry epi-pen         Current Outpatient Medications:     albuterol (2 5 mg/3 mL) 0 083 % nebulizer solution, Take 1 vial (2 5 mg total) by nebulization every 4 (four) hours as needed for wheezing or shortness of breath, Disp: 180 vial, Rfl: 5    albuterol (VENTOLIN HFA) 90 mcg/act inhaler, Inhale 2 puffs every 4 (four) hours as needed for wheezing, Disp: 1 Inhaler, Rfl: 5    apixaban (ELIQUIS) 2 5 mg, Take 1 tablet (2 5 mg total) by mouth 2 (two) times a day, Disp: 60 tablet, Rfl: 11    Blood Glucose Monitoring Suppl KIT, by Does not apply route daily, Disp: 1 each, Rfl: 0    budesonide (PULMICORT) 0 5 mg/2 mL nebulizer solution, Take 1 vial (0 5 mg total) by nebulization every 12 (twelve) hours Rinse mouth after use , Disp: 1 vial, Rfl: 0    budesonide (PULMICORT) 1 MG/2ML nebulizer solution, Take 2 mL (1 mg total) by nebulization 2 (two) times a day, Disp: 120 mL, Rfl: 1    carbamide peroxide (DEBROX) 6 5 % otic solution, Administer 5 drops into both ears 2 (two) times a day, Disp: 15 mL, Rfl: 0    dextromethorphan-guaiFENesin (ROBITUSSIN DM)  mg/5 mL syrup, Take 5 mL by mouth 3 (three) times a day as needed for cough, Disp: 236 mL, Rfl: 1    diltiazem (CARDIZEM CD) 120 mg 24 hr capsule, Take 1 capsule (120 mg total) by mouth daily, Disp: 30 capsule, Rfl: 11    diphenhydrAMINE (BENADRYL) 25 mg tablet, Take 1 tablet (25 mg total) by mouth every 6 (six) hours as needed (nausea), Disp: 30 tablet, Rfl: 0    divalproex sodium (DEPAKOTE) 250 mg EC tablet, Take 1 tablet (250 mg total) by mouth 2 (two) times a day, Disp: 30 tablet, Rfl: 0    ergocalciferol (ERGOCALCIFEROL) 1 25 MG (21493 UT) capsule, Take 1 capsule (50,000 Units total) by mouth once a week, Disp: 12 capsule, Rfl: 0    FLUoxetine (PROzac) 10 MG tablet, Take 1 tablet (10 mg total) by mouth daily, Disp: 30 tablet, Rfl: 5    fluticasone (FLONASE) 50 mcg/act nasal spray, 1-2 sprays each nostril daily for allergies, Disp: 1 Bottle, Rfl: 3    folic acid (FOLVITE) 1 mg tablet, Take 1 tablet (1 mg total) by mouth daily, Disp: 30 tablet, Rfl: 2    formoterol (PERFOROMIST) 20 MCG/2ML nebulizer solution, Take 1 vial (20 mcg total) by nebulization 2 (two) times a day, Disp: 60 vial, Rfl: 1    FREESTYLE LITE test strip, TEST BLOOD SUGAR DAILY, Disp: 100 each, Rfl: 1    ipratropium-albuterol (DUO-NEB) 0 5-2 5 mg/3 mL nebulizer solution, Take 1 vial (3 mL total) by nebulization 4 (four) times a day, Disp: 120 vial, Rfl: 0    Lancets (FREESTYLE) lancets, TEST BLOOD SUGAR DAILY, Disp: 100 each, Rfl: 4    lidocaine (LIDODERM) 5 %, Apply 1 patch topically daily Remove & Discard patch within 12 hours or as directed by MD, Disp: 30 patch, Rfl: 1    lidocaine (LMX) 4 % cream, Apply topically as needed for mild pain Apply 1/2-1 inch to port 30-60 mins prior to needle insertion, cover with serrain wrap, Disp: 30 g, Rfl: 5    loratadine (CLARITIN) 10 mg tablet, Take 1 tablet (10 mg total) by mouth daily in the early morning, Disp: 90 tablet, Rfl: 3    melatonin 3 mg, Take 1 tablet (3 mg total) by mouth daily at bedtime, Disp: 30 tablet, Rfl: 1    metFORMIN (GLUCOPHAGE-XR) 500 mg 24 hr tablet, Take 1 tablet (500 mg total) by mouth 2 (two) times a day with meals, Disp: 60 tablet, Rfl: 0    ondansetron (ZOFRAN) 4 mg tablet, Take 1 tablet (4 mg total) by mouth every 6 (six) hours as needed for nausea or vomiting, Disp: 60 tablet, Rfl: 2    oxyCODONE (OxyCONTIN) 20 mg 12 hr tablet, Take 1 tablet (20 mg total) by mouth every 12 (twelve) hoursMax Daily Amount: 40 mg, Disp: 60 tablet, Rfl: 0    oxyCODONE (ROXICODONE) 30 MG immediate release tablet, Take 1 tablet (30 mg total) by mouth every 4 (four) hours as needed (cancer pain)Max Daily Amount: 180 mg, Disp: 120 tablet, Rfl: 0    pantoprazole (PROTONIX) 40 mg tablet, Take 1 tablet (40 mg total) by mouth daily, Disp: 30 tablet, Rfl: 5    polyethylene glycol (GLYCOLAX) powder, Take 17 g by mouth daily, Disp: 527 g, Rfl: 1    predniSONE 10 mg tablet, Take 1 tablet (10 mg total) by mouth daily, Disp: 30 tablet, Rfl: 1    pregabalin (LYRICA) 25 mg capsule, Take 1 capsule PO in morning and 2 capsules PO at bedtime, Disp: 90 capsule, Rfl: 0    prochlorperazine (COMPAZINE) 10 mg tablet, Take 1 tablet (10 mg total) by mouth every 6 (six) hours as needed for nausea or vomiting, Disp: 90 tablet, Rfl: 0    QUEtiapine (SEROquel) 25 mg tablet, Take 25 mg by mouth daily at bedtime, Disp: , Rfl:     ranitidine (ZANTAC) 150 mg tablet, Take 1 tablet (150 mg total) by mouth 2 (two) times a day, Disp: 60 tablet, Rfl: 4    REGULOID 28 3 % POWD, USE AS DIRECTED, Disp: 369 g, Rfl: 4    Saline 0 65 % (Soln) SOLN, 5 drops into each nostril as needed (Dry nose), Disp: 50 mL, Rfl: 5    Selenium Sulfide 2 25 % SHAM, apply by topical route  every PM to the affected area(s), Disp: , Rfl:     senna-docusate sodium (SENOKOT-S) 8 6-50 mg per tablet, Take 1 tablet by mouth 2 (two) times a day, Disp: 60 tablet, Rfl: 2    sildenafil (VIAGRA) 50 MG tablet, Take 1 tablet (50 mg total) by mouth as needed for erectile dysfunction, Disp: 6 tablet, Rfl: 0    tamsulosin (FLOMAX) 0 4 mg, Take 1 capsule (0 4 mg total) by mouth daily with dinner, Disp: 90 capsule, Rfl: 3      Physical Exam:  /68 (BP Location: Left arm, Cuff Size: Adult)   Pulse 103   Temp 98 5 °F (36 9 °C) (Tympanic)   Resp 18   Ht 5' 8" (1 727 m)   Wt 73 2 kg (161 lb 6 4 oz)   SpO2 93%   BMI 24 54 kg/m²     Physical Exam   Constitutional: He is oriented to person, place, and time  He appears well-developed and well-nourished  HENT:   Head: Normocephalic and atraumatic  Nose: Nose normal    Mouth/Throat: Oropharynx is clear and moist    Eyes: Pupils are equal, round, and reactive to light  Conjunctivae and EOM are normal  Right eye exhibits no discharge  Left eye exhibits no discharge  No scleral icterus  Neck: Normal range of motion  Neck supple  No tracheal deviation present  No thyromegaly present  Cardiovascular: Normal rate, regular rhythm and normal heart sounds  Exam reveals no friction rub  No murmur heard  Pulmonary/Chest: Effort normal and breath sounds normal  No respiratory distress  He has no wheezes  He has no rales  He exhibits no tenderness  Abdominal: Soft  Bowel sounds are normal  He exhibits no distension and no mass  There is no hepatosplenomegaly, splenomegaly or hepatomegaly  There is no tenderness  There is no rebound and no guarding  Musculoskeletal: Normal range of motion  He exhibits no edema, tenderness or deformity  Lymphadenopathy:     He has no cervical adenopathy  Neurological: He is alert and oriented to person, place, and time  He has normal reflexes  He displays normal reflexes  No cranial nerve deficit  Coordination normal    Skin: Skin is warm and dry  No rash noted  No erythema  No pallor  Psychiatric: He has a normal mood and affect   His behavior is normal  Judgment and thought content normal          Labs:  Lab Results   Component Value Date    WBC 13 90 (H) 01/22/2020    HGB 10 4 (L) 01/22/2020    HCT 32 6 (L) 01/22/2020    MCV 86 01/22/2020     01/22/2020     Lab Results   Component Value Date    K 4 4 01/22/2020    CL 98 01/22/2020    CO2 35 (H) 01/22/2020    BUN 49 (H) 01/22/2020    CREATININE 2 10 (H) 01/22/2020    GLUF 80 01/22/2020 CALCIUM 9 2 01/22/2020    AST 19 01/22/2020    ALT 26 01/22/2020    ALKPHOS 86 01/22/2020    EGFR 39 (L) 01/22/2020     No results found for: TSH    Patient voiced understanding and agreement in the above discussion  Aware to contact our office with questions/symptoms in the interim

## 2020-02-19 NOTE — TELEPHONE ENCOUNTER
Received prior authorization approval for Oxycodone 30mg through 02/19/21      Confirmed with pharmacy  Pt has been informed

## 2020-02-23 RX ORDER — OXYCODONE HCL 20 MG/1
20 TABLET, FILM COATED, EXTENDED RELEASE ORAL EVERY 12 HOURS SCHEDULED
Qty: 60 TABLET | Refills: 0 | Status: SHIPPED | OUTPATIENT
Start: 2020-03-09 | End: 2020-04-02 | Stop reason: SDUPTHER

## 2020-02-23 RX ORDER — OXYCODONE HYDROCHLORIDE 30 MG/1
30 TABLET ORAL EVERY 4 HOURS PRN
Qty: 120 TABLET | Refills: 0 | Status: SHIPPED | OUTPATIENT
Start: 2020-03-09 | End: 2020-04-02 | Stop reason: SDUPTHER

## 2020-03-03 ENCOUNTER — TELEPHONE (OUTPATIENT)
Dept: PALLIATIVE MEDICINE | Facility: CLINIC | Age: 60
End: 2020-03-03

## 2020-03-03 NOTE — TELEPHONE ENCOUNTER
Letter of medical Necessity faxed to [facility]    3     Oxycontin 20 mg     Await response

## 2020-03-05 NOTE — TELEPHONE ENCOUNTER
Received verbal approval for Oxycontin 20 mg ER  Per representative Aneudy Vargas from [facility] patient has been notified  She bakari also notify pharmacy  May take up to 48 hours to be entered into the system

## 2020-03-09 NOTE — TELEPHONE ENCOUNTER
Pt has been unable to fill script for Oxycontin 20 mg ER  Patients local pharmacist [place] and was told it was NOT Approved  Call placed to [phone] with representative Bart Smiley  Transferred to [facility] from [address] [place]  Verified APPROVAL  Representative bakari call the patients Pharmacy and will call the Patient

## 2020-03-10 ENCOUNTER — DOCUMENTATION (OUTPATIENT)
Dept: HEMATOLOGY ONCOLOGY | Facility: CLINIC | Age: 60
End: 2020-03-10

## 2020-03-11 ENCOUNTER — APPOINTMENT (OUTPATIENT)
Dept: LAB | Facility: HOSPITAL | Age: 60
End: 2020-03-11
Attending: INTERNAL MEDICINE
Payer: COMMERCIAL

## 2020-03-11 ENCOUNTER — HOSPITAL ENCOUNTER (OUTPATIENT)
Dept: INFUSION CENTER | Facility: HOSPITAL | Age: 60
Discharge: HOME | End: 2020-03-11
Attending: INTERNAL MEDICINE
Payer: COMMERCIAL

## 2020-03-11 ENCOUNTER — OFFICE VISIT (OUTPATIENT)
Dept: HEMATOLOGY ONCOLOGY | Facility: CLINIC | Age: 60
End: 2020-03-11
Payer: COMMERCIAL

## 2020-03-11 VITALS
WEIGHT: 165.2 LBS | RESPIRATION RATE: 18 BRPM | SYSTOLIC BLOOD PRESSURE: 150 MMHG | BODY MASS INDEX: 25.04 KG/M2 | OXYGEN SATURATION: 98 % | HEART RATE: 82 BPM | TEMPERATURE: 97.5 F | DIASTOLIC BLOOD PRESSURE: 68 MMHG | HEIGHT: 68 IN

## 2020-03-11 VITALS — BODY MASS INDEX: 25.03 KG/M2 | WEIGHT: 165.12 LBS | HEIGHT: 68 IN

## 2020-03-11 DIAGNOSIS — C34.91 ADENOCARCINOMA OF LUNG, RIGHT (HCC): ICD-10-CM

## 2020-03-11 DIAGNOSIS — N28.9 RENAL DYSFUNCTION: Primary | ICD-10-CM

## 2020-03-11 DIAGNOSIS — Z51.11 ENCOUNTER FOR ANTINEOPLASTIC CHEMOTHERAPY: ICD-10-CM

## 2020-03-11 DIAGNOSIS — C34.91 ADENOCARCINOMA OF LUNG, RIGHT (HCC): Primary | ICD-10-CM

## 2020-03-11 DIAGNOSIS — G89.3 CANCER-RELATED PAIN: Chronic | ICD-10-CM

## 2020-03-11 DIAGNOSIS — N28.9 RENAL DYSFUNCTION: ICD-10-CM

## 2020-03-11 LAB
ALBUMIN SERPL BCP-MCNC: 3.6 G/DL (ref 3–5.2)
ALP SERPL-CCNC: 96 U/L (ref 43–122)
ALT SERPL W P-5'-P-CCNC: 25 U/L (ref 9–52)
ANION GAP SERPL CALCULATED.3IONS-SCNC: 5 MMOL/L (ref 5–14)
AST SERPL W P-5'-P-CCNC: 20 U/L (ref 17–59)
BASOPHILS # BLD AUTO: 0 THOUSANDS/ΜL (ref 0–0.1)
BASOPHILS NFR BLD AUTO: 0 % (ref 0–1)
BILIRUB SERPL-MCNC: 0.2 MG/DL
BUN SERPL-MCNC: 27 MG/DL (ref 5–25)
CALCIUM SERPL-MCNC: 8.7 MG/DL (ref 8.4–10.2)
CHLORIDE SERPL-SCNC: 104 MMOL/L (ref 97–108)
CO2 SERPL-SCNC: 31 MMOL/L (ref 22–30)
CREAT SERPL-MCNC: 2.05 MG/DL (ref 0.7–1.5)
EOSINOPHIL # BLD AUTO: 0.6 THOUSAND/ΜL (ref 0–0.4)
EOSINOPHIL NFR BLD AUTO: 7 % (ref 0–6)
ERYTHROCYTE [DISTWIDTH] IN BLOOD BY AUTOMATED COUNT: 19.2 %
GFR SERPL CREATININE-BSD FRML MDRD: 40 ML/MIN/1.73SQ M
GLUCOSE P FAST SERPL-MCNC: 104 MG/DL (ref 70–99)
HCT VFR BLD AUTO: 34 % (ref 41–53)
HGB BLD-MCNC: 10.7 G/DL (ref 13.5–17.5)
LYMPHOCYTES # BLD AUTO: 3.8 THOUSANDS/ΜL (ref 0.5–4)
LYMPHOCYTES NFR BLD AUTO: 43 % (ref 25–45)
MAGNESIUM SERPL-MCNC: 1.7 MG/DL (ref 1.6–2.3)
MCH RBC QN AUTO: 27.9 PG (ref 26–34)
MCHC RBC AUTO-ENTMCNC: 31.5 G/DL (ref 31–36)
MCV RBC AUTO: 88 FL (ref 80–100)
MONOCYTES # BLD AUTO: 1 THOUSAND/ΜL (ref 0.2–0.9)
MONOCYTES NFR BLD AUTO: 11 % (ref 1–10)
NEUTROPHILS # BLD AUTO: 3.6 THOUSANDS/ΜL (ref 1.8–7.8)
NEUTS SEG NFR BLD AUTO: 40 % (ref 45–65)
PLATELET # BLD AUTO: 305 THOUSANDS/UL (ref 150–450)
PMV BLD AUTO: 7.6 FL (ref 8.9–12.7)
POTASSIUM SERPL-SCNC: 4.4 MMOL/L (ref 3.6–5)
PROT SERPL-MCNC: 6.9 G/DL (ref 5.9–8.4)
RBC # BLD AUTO: 3.85 MILLION/UL (ref 4.5–5.9)
SODIUM SERPL-SCNC: 140 MMOL/L (ref 137–147)
T3FREE SERPL-MCNC: 2.36 PG/ML (ref 2.3–4.2)
TSH SERPL DL<=0.05 MIU/L-ACNC: 2.01 UIU/ML (ref 0.47–4.68)
WBC # BLD AUTO: 9 THOUSAND/UL (ref 4.5–11)

## 2020-03-11 PROCEDURE — 3008F BODY MASS INDEX DOCD: CPT | Performed by: NURSE PRACTITIONER

## 2020-03-11 PROCEDURE — 80053 COMPREHEN METABOLIC PANEL: CPT

## 2020-03-11 PROCEDURE — 1036F TOBACCO NON-USER: CPT | Performed by: NURSE PRACTITIONER

## 2020-03-11 PROCEDURE — 85025 COMPLETE CBC W/AUTO DIFF WBC: CPT

## 2020-03-11 PROCEDURE — 99214 OFFICE O/P EST MOD 30 MIN: CPT | Performed by: NURSE PRACTITIONER

## 2020-03-11 PROCEDURE — 84443 ASSAY THYROID STIM HORMONE: CPT

## 2020-03-11 PROCEDURE — 36415 COLL VENOUS BLD VENIPUNCTURE: CPT

## 2020-03-11 PROCEDURE — 3066F NEPHROPATHY DOC TX: CPT | Performed by: NURSE PRACTITIONER

## 2020-03-11 PROCEDURE — 96413 CHEMO IV INFUSION 1 HR: CPT

## 2020-03-11 PROCEDURE — 84481 FREE ASSAY (FT-3): CPT

## 2020-03-11 PROCEDURE — 83735 ASSAY OF MAGNESIUM: CPT

## 2020-03-11 PROCEDURE — 3078F DIAST BP <80 MM HG: CPT | Performed by: NURSE PRACTITIONER

## 2020-03-11 PROCEDURE — 3077F SYST BP >= 140 MM HG: CPT | Performed by: NURSE PRACTITIONER

## 2020-03-11 RX ORDER — SODIUM CHLORIDE 9 MG/ML
20 INJECTION, SOLUTION INTRAVENOUS ONCE
Status: CANCELLED | OUTPATIENT
Start: 2020-04-01

## 2020-03-11 RX ORDER — SODIUM CHLORIDE 9 MG/ML
20 INJECTION, SOLUTION INTRAVENOUS ONCE
Status: COMPLETED | OUTPATIENT
Start: 2020-03-11 | End: 2020-03-11

## 2020-03-11 RX ORDER — LIDOCAINE 40 MG/G
CREAM TOPICAL AS NEEDED
Qty: 30 G | Refills: 5 | Status: SHIPPED | OUTPATIENT
Start: 2020-03-11 | End: 2020-10-28 | Stop reason: SDUPTHER

## 2020-03-11 RX ORDER — LIDOCAINE 40 MG/G
CREAM TOPICAL ONCE
Status: CANCELLED
Start: 2020-03-11

## 2020-03-11 RX ORDER — LIDOCAINE 40 MG/G
1 CREAM TOPICAL ONCE
Status: COMPLETED | OUTPATIENT
Start: 2020-03-11 | End: 2020-03-11

## 2020-03-11 RX ADMIN — SODIUM CHLORIDE 20 ML/HR: 0.9 INJECTION, SOLUTION INTRAVENOUS at 11:42

## 2020-03-11 RX ADMIN — LIDOCAINE 1 APPLICATION: 40 CREAM TOPICAL at 11:15

## 2020-03-11 RX ADMIN — SODIUM CHLORIDE 200 MG: 9 INJECTION, SOLUTION INTRAVENOUS at 12:12

## 2020-03-11 NOTE — PROGRESS NOTES
Hematology/Oncology Outpatient Follow-up  Cooper Petit Sr  64 y o  male 1960 [id number]    Date:  3/11/2020      Assessment and Plan:  1  Adenocarcinoma of lung, right [facility])  Patient will be continued on his single agent Keytruda every 3 weeks which he is tolerating well  Patient is due for treatment today after the visit  He is currently taking the low-dose prednisone 10 mg daily; I asked him to further decrease the dose down to 5 mg (half a pill)  We are awaiting results of patient's labs from this morning which we will review on address once they become available  He will follow up again in about 3 weeks with repeat laboratory studies prior     - CBC and differential; Future  - Comprehensive metabolic panel; Future  - TSH, 3rd generation with Free T4 reflex; Future    2  Cancer-related pain  Patient has been having difficulty obtaining his new long-acting pain medication with OxyContin and has been requiring more of his short-acting oxycodone  He did call the pharmacy during the encounter on his cell phone and they state they still do not have the OxyContin available; I spoke to the pharmacy tech personally who states that they again tried to run the claim which was not successful  There is a note in epic from palliative care RN from 03/09/2020 that states she spoke to a representative from [employer] company who confirmed the approval   We will reach out to the palliative care team so they can recur follow-up on this issue and make sure patient gets his pain medication  3  Renal dysfunction  Awaiting repeat CMP from this morning  Patient's creatinine/renal functions has been stable over the past several months  He continues to follow up with Nephrology on a regular basis  - Infusion Calculated Appointment Request; Future  - CBC and differential; Future  - Comprehensive metabolic panel; Future  - TSH, 3rd generation with Free T4 reflex; Future  - T3, free; Future    4   Encounter for antineoplastic chemotherapy  Patient is asking for refill on his lidocaine cream for his Port-A-Cath script sent to his local pharmacy  He is also asking to have a 1 time dose ordered for the infusion center today which was ordered  - lidocaine (LMX) 4 % cream; Apply topically as needed for mild pain Apply 1/2-1 inch to port 30-60 mins prior to needle insertion, cover with serrain wrap  Dispense: 30 g; Refill: 5    HPI:  Patient presents today for a follow-up visit  He is reporting significant generalized pain rated 7/10  His palliative care team is managing pain they ordered long-acting OxyContin for him which she has been having difficulties obtaining  Other than the pain patient has no new or concerning symptoms  He continues to be on chronic O2 therapy at 2 5 L nasal cannula  Patient had repeat laboratory studies done this morning which are still pending at this time      Oncology History    73-year-old [facility] male heavy smoker who used to smoke 2 packs of cigarettes daily for many years, COPD, he presented with dyspnea, CT scan of the chest on October 2018 showed right middle lobe spiculated 1 3 cm mass with multiple solid and ground-glass nodules along the major and minor fissures sub cm pulmonary nodules bilaterally, left adrenal 1 2 cm nodule     PET scan showed 1 3 cm right middle lung nodule with SUV of 6 8, numerous nodular densities along the right major and minor fissures, right hilar activity with SUV of 3 4, subcarinal lymph node measuring 7 mm with SUV of 2 7, periportal enlarged lymph nodes concerning for metastatic disease measuring 2 4 cm with SUV of 5, prominent left retrocrural lymph node measuring 1 cm x 9 mm with SUV of 1 1    Core biopsy of the right spiculated nodule showed invasive adenocarcinoma consistent with lung primary positive for CK7, TTF 1, napsin a        Adenocarcinoma of lung, right ([facility] [address] )    11/13/2018 Initial Diagnosis     Adenocarcinoma of lung, right ([facility] [address] )  Stage IV      12/13/2018 - 3/28/2019 Chemotherapy      Alimta, Carboplatin (AUC 5) + Keytruda (6 cycles total)      4/17/2019 -  Chemotherapy     Started maintenance Alimta and Keytruda  (Alimta d/c'd 9/16/19 due to worsening renal dysfunction)         Interval history:    ROS: Review of Systems   Constitutional: Negative for activity change, appetite change, chills, fatigue, fever and unexpected weight change  HENT: Negative for congestion, mouth sores, nosebleeds, sore throat and trouble swallowing  Eyes: Negative  Respiratory: Positive for shortness of breath  Negative for cough and chest tightness  Cardiovascular: Negative for chest pain, palpitations and leg swelling  Gastrointestinal: Positive for constipation ( chronic due to opioid use)  Negative for abdominal distention, abdominal pain, blood in stool, diarrhea, nausea and vomiting  Genitourinary: Negative for difficulty urinating, dysuria, frequency, hematuria and urgency  Musculoskeletal: Positive for arthralgias and myalgias  Negative for back pain, gait problem and joint swelling  Skin: Negative for color change, pallor and rash  Neurological: Positive for numbness (And tingling moderate)  Negative for dizziness, weakness, light-headedness and headaches  Hematological: Negative for adenopathy  Does not bruise/bleed easily  Psychiatric/Behavioral: Negative for dysphoric mood and sleep disturbance  The patient is not nervous/anxious          Past Medical History:   Diagnosis Date    Alkalosis 12/9/2019    Bipolar 1 disorder ([facility] [address] )     [facility]     adeno lung  dx 11/2018-lung bx today 4/9/2019-ongoing chemo    Chronic obstructive pulmonary disease with acute exacerbation (HCC) 6/7/2018    Chronic pain disorder     back and right shoulder    CKD (chronic kidney disease)     COPD (chronic obstructive pulmonary disease) (HCC)     Depression     Diabetes mellitus ([facility] [address] )     Elevated d-dimer 11/30/2019    Elevated troponin 2019    Elevated troponin level not due to acute coronary syndrome 2019    GERD (gastroesophageal reflux disease)     Herniated lumbar intervertebral disc     Hyperkalemia     Hypertension     Insomnia     Latent syphilis     Treated    Low back pain     Lumbosacral disc disease     Lung cancer ([facility] [address] ) 2019    Pneumothorax after biopsy     R lung    PTSD (post-traumatic stress disorder)     Pulmonary emphysema ([facility] [address] )     Tobacco abuse 2018       Past Surgical History:   Procedure Laterality Date    COLONOSCOPY      CT GUIDED CHEST TUBE  2018    FL GUIDED CENTRAL VENOUS ACCESS DEVICE INSERTION  2019    HEMORROIDECTOMY      IR CHEST TUBE  2018    IR IMAGE GUIDED BIOPSY/ASPIRATION LUNG  2018    KNEE SURGERY      ND INSJ TUNNELED CTR VAD W/SUBQ PORT AGE 5 YR/> N/A 2019    Procedure: PLACEMENT OF PORT-A-CATH;  Surgeon: Odette Essex, MD;  Location:  MAIN OR;  Service: Vascular       Social History     Socioeconomic History    Marital status: Legally      Spouse name: Not on file    Number of children: Not on file    Years of education: Not on file    Highest education level: Not on file   Occupational History    Occupation: part time employment   Social Needs    Financial resource strain: Not on file    Food insecurity:     Worry: Not on file     Inability: Not on file   [employer] [place] needs:     Medical: Not on file     Non-medical: Not on file   Tobacco Use    Smoking status: Former Smoker     Packs/day: 0 50     Years: 40 00     Pack years: 20 00     Types: Cigarettes     Start date:      Last attempt to quit: 2019     Years since quittin 6    Smokeless tobacco: Never Used   Substance and Sexual Activity    Alcohol use: Not Currently    Drug use: Not Currently     Types: Marijuana     Comment: stopped , "i smoked weed and stopped 1 month ago"    Sexual activity: Yes   Lifestyle    Physical activity:     Days per week: Not on file     Minutes per session: Not on file    Stress: Not on file   Relationships    Social connections:     Talks on phone: Not on file     Gets together: Not on file     Attends [employer] service: Not on file     Active member of club or organization: Not on file     Attends meetings of clubs or organizations: Not on file     Relationship status: Not on file    Intimate partner violence:     Fear of current or ex partner: Not on file     Emotionally abused: Not on file     Physically abused: Not on file     Forced sexual activity: Not on file   Other Topics Concern    Not on file   Social History Narrative    Lives with girlfriend       Family History   Problem Relation Age of Onset    Heart disease Mother     Cancer Mother     Hypertension Father     Diabetes Family     Asthma Family     Heart disease Family     Cancer Family     Cancer Maternal Grandfather     Cancer Paternal Grandfather     Cancer Maternal Aunt        Allergies   Allergen Reactions    Lisinopril Anaphylaxis     Took medication a while ago and had a "swelling reaction"  Does not carry epi-pen         Current Outpatient Medications:     albuterol (2 5 mg/3 mL) 0 083 % nebulizer solution, Take 1 vial (2 5 mg total) by nebulization every 4 (four) hours as needed for wheezing or shortness of breath, Disp: 180 vial, Rfl: 5    albuterol (VENTOLIN HFA) 90 mcg/act inhaler, Inhale 2 puffs every 4 (four) hours as needed for wheezing, Disp: 1 Inhaler, Rfl: 5    apixaban (ELIQUIS) 2 5 mg, Take 1 tablet (2 5 mg total) by mouth 2 (two) times a day, Disp: 60 tablet, Rfl: 11    Blood Glucose Monitoring Suppl KIT, by Does not apply route daily, Disp: 1 each, Rfl: 0    budesonide (PULMICORT) 0 5 mg/2 mL nebulizer solution, Take 1 vial (0 5 mg total) by nebulization every 12 (twelve) hours Rinse mouth after use , Disp: 1 vial, Rfl: 0    budesonide (PULMICORT) 1 MG/2ML nebulizer solution, Take 2 mL (1 mg total) by nebulization 2 (two) times a day, Disp: 120 mL, Rfl: 1    carbamide peroxide (DEBROX) 6 5 % otic solution, Administer 5 drops into both ears 2 (two) times a day, Disp: 15 mL, Rfl: 0    dextromethorphan-guaiFENesin (ROBITUSSIN DM)  mg/5 mL syrup, Take 5 mL by mouth 3 (three) times a day as needed for cough, Disp: 236 mL, Rfl: 1    diltiazem (CARDIZEM CD) 120 mg 24 hr capsule, Take 1 capsule (120 mg total) by mouth daily, Disp: 30 capsule, Rfl: 11    diphenhydrAMINE (BENADRYL) 25 mg tablet, Take 1 tablet (25 mg total) by mouth every 6 (six) hours as needed (nausea), Disp: 30 tablet, Rfl: 0    divalproex sodium (DEPAKOTE) 250 mg EC tablet, Take 1 tablet (250 mg total) by mouth 2 (two) times a day, Disp: 30 tablet, Rfl: 0    ergocalciferol (ERGOCALCIFEROL) 1 25 MG (04848 UT) capsule, Take 1 capsule (50,000 Units total) by mouth once a week, Disp: 12 capsule, Rfl: 0    FLUoxetine (PROzac) 10 MG tablet, Take 1 tablet (10 mg total) by mouth daily, Disp: 30 tablet, Rfl: 5    fluticasone (FLONASE) 50 mcg/act nasal spray, 1-2 sprays each nostril daily for allergies, Disp: 1 Bottle, Rfl: 3    folic acid (FOLVITE) 1 mg tablet, Take 1 tablet (1 mg total) by mouth daily, Disp: 30 tablet, Rfl: 2    formoterol (PERFOROMIST) 20 MCG/2ML nebulizer solution, Take 1 vial (20 mcg total) by nebulization 2 (two) times a day, Disp: 60 vial, Rfl: 1    FREESTYLE LITE test strip, TEST BLOOD SUGAR DAILY, Disp: 100 each, Rfl: 1    ipratropium-albuterol (DUO-NEB) 0 5-2 5 mg/3 mL nebulizer solution, Take 1 vial (3 mL total) by nebulization 4 (four) times a day, Disp: 120 vial, Rfl: 0    Lancets (FREESTYLE) lancets, TEST BLOOD SUGAR DAILY, Disp: 100 each, Rfl: 4    lidocaine (LIDODERM) 5 %, Apply 1 patch topically daily Remove & Discard patch within 12 hours or as directed by MD, Disp: 30 patch, Rfl: 1    lidocaine (LMX) 4 % cream, Apply topically as needed for mild pain Apply 1/2-1 inch to port 30-60 mins prior to needle insertion, cover with serrain wrap, Disp: 30 g, Rfl: 5    loratadine (CLARITIN) 10 mg tablet, Take 1 tablet (10 mg total) by mouth daily in the early morning, Disp: 90 tablet, Rfl: 3    melatonin 3 mg, Take 1 tablet (3 mg total) by mouth daily at bedtime, Disp: 30 tablet, Rfl: 1    metFORMIN (GLUCOPHAGE-XR) 500 mg 24 hr tablet, Take 1 tablet (500 mg total) by mouth 2 (two) times a day with meals, Disp: 60 tablet, Rfl: 0    ondansetron (ZOFRAN) 4 mg tablet, Take 1 tablet (4 mg total) by mouth every 6 (six) hours as needed for nausea or vomiting, Disp: 60 tablet, Rfl: 2    oxyCODONE (OxyCONTIN) 20 mg 12 hr tablet, Take 1 tablet (20 mg total) by mouth every 12 (twelve) hoursMax Daily Amount: 40 mg, Disp: 60 tablet, Rfl: 0    oxyCODONE (OxyCONTIN) 20 mg 12 hr tablet, Take 1 tablet (20 mg total) by mouth every 12 (twelve) hours For cancer painMax Daily Amount: 40 mg, Disp: 60 tablet, Rfl: 0    oxyCODONE (ROXICODONE) 30 MG immediate release tablet, Take 1 tablet (30 mg total) by mouth every 4 (four) hours as needed (cancer pain)Max Daily Amount: 180 mg, Disp: 120 tablet, Rfl: 0    oxyCODONE (ROXICODONE) 30 MG immediate release tablet, Take 1 tablet (30 mg total) by mouth every 4 (four) hours as needed (cancer pain)Max Daily Amount: 180 mg, Disp: 120 tablet, Rfl: 0    pantoprazole (PROTONIX) 40 mg tablet, Take 1 tablet (40 mg total) by mouth daily, Disp: 30 tablet, Rfl: 5    polyethylene glycol (GLYCOLAX) powder, Take 17 g by mouth daily, Disp: 527 g, Rfl: 1    predniSONE 10 mg tablet, Take 1 tablet (10 mg total) by mouth daily, Disp: 30 tablet, Rfl: 1    pregabalin (LYRICA) 25 mg capsule, Take 1 capsule PO in morning and 2 capsules PO at bedtime, Disp: 90 capsule, Rfl: 0    prochlorperazine (COMPAZINE) 10 mg tablet, Take 1 tablet (10 mg total) by mouth every 6 (six) hours as needed for nausea or vomiting, Disp: 90 tablet, Rfl: 0    QUEtiapine (SEROquel) 25 mg tablet, Take 25 mg by mouth daily at bedtime, Disp: , Rfl:     ranitidine (ZANTAC) 150 mg tablet, Take 1 tablet (150 mg total) by mouth 2 (two) times a day, Disp: 60 tablet, Rfl: 4    REGULOID 28 3 % POWD, USE AS DIRECTED, Disp: 369 g, Rfl: 4    Saline 0 65 % (Soln) SOLN, 5 drops into each nostril as needed (Dry nose), Disp: 50 mL, Rfl: 5    Selenium Sulfide 2 25 % SHAM, apply by topical route  every PM to the affected area(s), Disp: , Rfl:     senna-docusate sodium (SENOKOT-S) 8 6-50 mg per tablet, Take 1 tablet by mouth 2 (two) times a day, Disp: 60 tablet, Rfl: 2    sildenafil (VIAGRA) 50 MG tablet, Take 1 tablet (50 mg total) by mouth as needed for erectile dysfunction, Disp: 6 tablet, Rfl: 0    tamsulosin (FLOMAX) 0 4 mg, Take 1 capsule (0 4 mg total) by mouth daily with dinner, Disp: 90 capsule, Rfl: 3      Physical Exam:  /68 (BP Location: Left arm, Patient Position: Sitting, Cuff Size: Adult)   Pulse 82   Temp 97 5 °F (36 4 °C) (Tympanic)   Resp 18   Ht 5' 7 99" (1 727 m)   Wt 74 9 kg (165 lb 3 2 oz)   SpO2 98%   BMI 25 13 kg/m²     Physical Exam   Constitutional: He is oriented to person, place, and time  He appears well-developed and well-nourished  No distress  HENT:   Head: Normocephalic and atraumatic  Mouth/Throat: Oropharynx is clear and moist  No oropharyngeal exudate  Eyes: Pupils are equal, round, and reactive to light  Conjunctivae are normal  No scleral icterus  Neck: Normal range of motion  Neck supple  No thyromegaly present  Cardiovascular: Normal rate, regular rhythm, normal heart sounds and intact distal pulses  No murmur heard  Pulmonary/Chest: Effort normal  No respiratory distress  He has decreased breath sounds  Abdominal: Soft  Bowel sounds are normal  He exhibits no distension  There is no hepatosplenomegaly  There is no tenderness  Musculoskeletal: Normal range of motion  He exhibits no edema  Lymphadenopathy:     He has no cervical adenopathy  He has no axillary adenopathy  Neurological: He is alert and oriented to person, place, and time  Skin: Skin is warm and dry  No rash noted  He is not diaphoretic  No erythema  No pallor  Psychiatric: His behavior is normal  Judgment and thought content normal  He exhibits a depressed mood  Flat affect   Vitals reviewed  Labs:  Lab Results   Component Value Date    WBC 6 40 02/19/2020    HGB 8 9 (L) 02/19/2020    HCT 26 9 (L) 02/19/2020    MCV 88 02/19/2020     02/19/2020     Lab Results   Component Value Date    K 4 2 02/19/2020     02/19/2020    CO2 29 02/19/2020    BUN 21 02/19/2020    CREATININE 2 15 (H) 02/19/2020    GLUF 80 01/22/2020    CALCIUM 8 8 02/19/2020    AST 25 02/19/2020    ALT 23 02/19/2020    ALKPHOS 67 02/19/2020    EGFR 38 (L) 02/19/2020         Patient voiced understanding and agreement in the above discussion  Aware to contact our office with questions/symptoms in the interim  This note has been generated by voice recognition software system  Therefore, there may be spelling, grammar, and or syntax errors  Please contact if questions arise

## 2020-03-11 NOTE — PROGRESS NOTES
Pts labs from this am reviewed  No recent changes in medical status  Requests lidocaine cream  Same applied  Will allow to rest a few minutes prior to accessing port  o2 placed by pt at 4 l nc

## 2020-03-11 NOTE — PROGRESS NOTES
Pt toelrated todays keytruda dose well  No adverse reaction noted  Port flushed and deaccessed per routine  Reviewed avs  Placed on own o2 tank  Discharged to lobby to meet

## 2020-03-11 NOTE — PLAN OF CARE
Problem: Potential for Falls  Goal: Patient will remain free of falls  Description  INTERVENTIONS:  - Assess patient frequently for physical needs  -  Identify cognitive and physical deficits and behaviors that affect risk of falls    -  [place] fall precautions as indicated by assessment   - Educate patient/family on patient safety including physical limitations  - Instruct patient to call for assistance with activity based on assessment  - Modify environment to reduce risk of injury  - Consider OT/PT consult to assist with strengthening/mobility  Outcome: Progressing

## 2020-03-11 NOTE — TELEPHONE ENCOUNTER
Received task from pts Oncology office  Reports as of this morning pt still has not received his oxycontin 20 mg ER tablets  Cites pharmacist not able torun  This nurse called Pharmacy of record  Spoke to a pharmacist, updated on Approval several days ago nad twice verified  Pharmacist ran script and was finally able to process  Pt notified and is picking up today

## 2020-03-23 DIAGNOSIS — C34.91 ADENOCARCINOMA OF LUNG, RIGHT (HCC): ICD-10-CM

## 2020-03-23 RX ORDER — PREDNISONE 10 MG/1
10 TABLET ORAL DAILY
Qty: 30 TABLET | Refills: 0 | Status: SHIPPED | OUTPATIENT
Start: 2020-03-23 | End: 2020-06-24

## 2020-03-30 ENCOUNTER — APPOINTMENT (OUTPATIENT)
Dept: LAB | Facility: HOSPITAL | Age: 60
End: 2020-03-30
Payer: COMMERCIAL

## 2020-03-30 ENCOUNTER — TRANSCRIBE ORDERS (OUTPATIENT)
Dept: ADMINISTRATIVE | Facility: HOSPITAL | Age: 60
End: 2020-03-30

## 2020-03-30 DIAGNOSIS — C34.91 ADENOCARCINOMA OF LUNG, RIGHT (HCC): ICD-10-CM

## 2020-03-30 DIAGNOSIS — N28.9 RENAL DYSFUNCTION: ICD-10-CM

## 2020-03-30 LAB
ALBUMIN SERPL BCP-MCNC: 4 G/DL (ref 3–5.2)
ALP SERPL-CCNC: 108 U/L (ref 43–122)
ALT SERPL W P-5'-P-CCNC: 21 U/L (ref 9–52)
ANION GAP SERPL CALCULATED.3IONS-SCNC: 7 MMOL/L (ref 5–14)
AST SERPL W P-5'-P-CCNC: 27 U/L (ref 17–59)
BASOPHILS # BLD AUTO: 0.1 THOUSANDS/ΜL (ref 0–0.1)
BASOPHILS NFR BLD AUTO: 1 % (ref 0–1)
BILIRUB SERPL-MCNC: 0.2 MG/DL
BUN SERPL-MCNC: 27 MG/DL (ref 5–25)
CALCIUM SERPL-MCNC: 9 MG/DL (ref 8.4–10.2)
CHLORIDE SERPL-SCNC: 101 MMOL/L (ref 97–108)
CO2 SERPL-SCNC: 30 MMOL/L (ref 22–30)
CREAT SERPL-MCNC: 2.03 MG/DL (ref 0.7–1.5)
EOSINOPHIL # BLD AUTO: 0.1 THOUSAND/ΜL (ref 0–0.4)
EOSINOPHIL NFR BLD AUTO: 1 % (ref 0–6)
ERYTHROCYTE [DISTWIDTH] IN BLOOD BY AUTOMATED COUNT: 16.2 %
GFR SERPL CREATININE-BSD FRML MDRD: 41 ML/MIN/1.73SQ M
GLUCOSE P FAST SERPL-MCNC: 115 MG/DL (ref 70–99)
HCT VFR BLD AUTO: 36.2 % (ref 41–53)
HGB BLD-MCNC: 11.8 G/DL (ref 13.5–17.5)
LYMPHOCYTES # BLD AUTO: 2.1 THOUSANDS/ΜL (ref 0.5–4)
LYMPHOCYTES NFR BLD AUTO: 24 % (ref 25–45)
MAGNESIUM SERPL-MCNC: 2 MG/DL (ref 1.6–2.3)
MCH RBC QN AUTO: 28.1 PG (ref 26–34)
MCHC RBC AUTO-ENTMCNC: 32.7 G/DL (ref 31–36)
MCV RBC AUTO: 86 FL (ref 80–100)
MONOCYTES # BLD AUTO: 0.6 THOUSAND/ΜL (ref 0.2–0.9)
MONOCYTES NFR BLD AUTO: 6 % (ref 1–10)
NEUTROPHILS # BLD AUTO: 6.1 THOUSANDS/ΜL (ref 1.8–7.8)
NEUTS SEG NFR BLD AUTO: 69 % (ref 45–65)
PLATELET # BLD AUTO: 399 THOUSANDS/UL (ref 150–450)
PMV BLD AUTO: 7.3 FL (ref 8.9–12.7)
POTASSIUM SERPL-SCNC: 5 MMOL/L (ref 3.6–5)
PROT SERPL-MCNC: 7.8 G/DL (ref 5.9–8.4)
RBC # BLD AUTO: 4.21 MILLION/UL (ref 4.5–5.9)
SODIUM SERPL-SCNC: 138 MMOL/L (ref 137–147)
T3FREE SERPL-MCNC: 2.21 PG/ML (ref 2.3–4.2)
TSH SERPL DL<=0.05 MIU/L-ACNC: 0.99 UIU/ML (ref 0.47–4.68)
WBC # BLD AUTO: 8.9 THOUSAND/UL (ref 4.5–11)

## 2020-03-30 PROCEDURE — 85025 COMPLETE CBC W/AUTO DIFF WBC: CPT

## 2020-03-30 PROCEDURE — 80053 COMPREHEN METABOLIC PANEL: CPT

## 2020-03-30 PROCEDURE — 84443 ASSAY THYROID STIM HORMONE: CPT

## 2020-03-30 PROCEDURE — 84481 FREE ASSAY (FT-3): CPT

## 2020-03-30 PROCEDURE — 36415 COLL VENOUS BLD VENIPUNCTURE: CPT

## 2020-03-30 PROCEDURE — 83735 ASSAY OF MAGNESIUM: CPT

## 2020-03-31 ENCOUNTER — TELEPHONE (OUTPATIENT)
Dept: HEMATOLOGY ONCOLOGY | Facility: CLINIC | Age: 60
End: 2020-03-31

## 2020-03-31 NOTE — TELEPHONE ENCOUNTER
The patient was  prescreened for the COVID-19 symptoms, also to inform about the visitor limitation  the patient denied all symptoms

## 2020-04-01 ENCOUNTER — OFFICE VISIT (OUTPATIENT)
Dept: HEMATOLOGY ONCOLOGY | Facility: CLINIC | Age: 60
End: 2020-04-01
Payer: COMMERCIAL

## 2020-04-01 ENCOUNTER — HOSPITAL ENCOUNTER (OUTPATIENT)
Dept: INFUSION CENTER | Facility: HOSPITAL | Age: 60
Discharge: HOME | End: 2020-04-01
Attending: INTERNAL MEDICINE
Payer: COMMERCIAL

## 2020-04-01 VITALS
TEMPERATURE: 97.6 F | SYSTOLIC BLOOD PRESSURE: 138 MMHG | OXYGEN SATURATION: 95 % | WEIGHT: 170.6 LBS | HEART RATE: 95 BPM | HEIGHT: 67 IN | RESPIRATION RATE: 18 BRPM | BODY MASS INDEX: 26.78 KG/M2 | DIASTOLIC BLOOD PRESSURE: 82 MMHG

## 2020-04-01 VITALS — HEIGHT: 67 IN | WEIGHT: 170.64 LBS | BODY MASS INDEX: 26.78 KG/M2

## 2020-04-01 DIAGNOSIS — N28.9 RENAL DYSFUNCTION: Primary | ICD-10-CM

## 2020-04-01 DIAGNOSIS — G89.3 CANCER-RELATED PAIN: Chronic | ICD-10-CM

## 2020-04-01 DIAGNOSIS — N28.9 RENAL DYSFUNCTION: ICD-10-CM

## 2020-04-01 DIAGNOSIS — C34.91 ADENOCARCINOMA OF LUNG, RIGHT (HCC): Primary | ICD-10-CM

## 2020-04-01 DIAGNOSIS — C34.91 ADENOCARCINOMA OF LUNG, RIGHT (HCC): ICD-10-CM

## 2020-04-01 PROCEDURE — 96413 CHEMO IV INFUSION 1 HR: CPT

## 2020-04-01 PROCEDURE — 3079F DIAST BP 80-89 MM HG: CPT | Performed by: INTERNAL MEDICINE

## 2020-04-01 PROCEDURE — 99214 OFFICE O/P EST MOD 30 MIN: CPT | Performed by: INTERNAL MEDICINE

## 2020-04-01 PROCEDURE — 3008F BODY MASS INDEX DOCD: CPT | Performed by: INTERNAL MEDICINE

## 2020-04-01 PROCEDURE — 3075F SYST BP GE 130 - 139MM HG: CPT | Performed by: INTERNAL MEDICINE

## 2020-04-01 PROCEDURE — 3066F NEPHROPATHY DOC TX: CPT | Performed by: INTERNAL MEDICINE

## 2020-04-01 PROCEDURE — 1036F TOBACCO NON-USER: CPT | Performed by: INTERNAL MEDICINE

## 2020-04-01 RX ORDER — SODIUM CHLORIDE 9 MG/ML
20 INJECTION, SOLUTION INTRAVENOUS ONCE
Status: COMPLETED | OUTPATIENT
Start: 2020-04-01 | End: 2020-04-01

## 2020-04-01 RX ADMIN — SODIUM CHLORIDE 20 ML/HR: 0.9 INJECTION, SOLUTION INTRAVENOUS at 10:55

## 2020-04-01 RX ADMIN — SODIUM CHLORIDE 200 MG: 9 INJECTION, SOLUTION INTRAVENOUS at 11:25

## 2020-04-02 ENCOUNTER — TELEMEDICINE (OUTPATIENT)
Dept: PALLIATIVE MEDICINE | Facility: CLINIC | Age: 60
End: 2020-04-02
Payer: COMMERCIAL

## 2020-04-02 DIAGNOSIS — C34.91 ADENOCARCINOMA OF LUNG, RIGHT (HCC): ICD-10-CM

## 2020-04-02 DIAGNOSIS — G62.9 PERIPHERAL POLYNEUROPATHY: ICD-10-CM

## 2020-04-02 DIAGNOSIS — Z51.5 PALLIATIVE CARE PATIENT: ICD-10-CM

## 2020-04-02 DIAGNOSIS — R52 GENERALIZED BODY ACHES: ICD-10-CM

## 2020-04-02 DIAGNOSIS — G89.3 CANCER-RELATED PAIN: Chronic | ICD-10-CM

## 2020-04-02 DIAGNOSIS — M54.50 LUMBAR PAIN: ICD-10-CM

## 2020-04-02 PROCEDURE — G2012 BRIEF CHECK IN BY MD/QHP: HCPCS | Performed by: FAMILY MEDICINE

## 2020-04-02 RX ORDER — OXYCODONE HYDROCHLORIDE 30 MG/1
30 TABLET ORAL EVERY 4 HOURS PRN
Qty: 120 TABLET | Refills: 0 | Status: SHIPPED | OUTPATIENT
Start: 2020-04-27 | End: 2020-06-09 | Stop reason: SDUPTHER

## 2020-04-02 RX ORDER — PREGABALIN 25 MG/1
CAPSULE ORAL
Qty: 90 CAPSULE | Refills: 2 | Status: SHIPPED | OUTPATIENT
Start: 2020-04-02 | End: 2021-01-01

## 2020-04-02 RX ORDER — OXYCODONE HCL 20 MG/1
20 TABLET, FILM COATED, EXTENDED RELEASE ORAL EVERY 12 HOURS SCHEDULED
Qty: 60 TABLET | Refills: 0 | Status: SHIPPED | OUTPATIENT
Start: 2020-04-02 | End: 2020-06-09

## 2020-04-02 RX ORDER — OXYCODONE HYDROCHLORIDE 30 MG/1
30 TABLET ORAL EVERY 4 HOURS PRN
Qty: 120 TABLET | Refills: 0 | Status: SHIPPED | OUTPATIENT
Start: 2020-04-02 | End: 2020-06-09

## 2020-04-02 RX ORDER — OXYCODONE HCL 20 MG/1
20 TABLET, FILM COATED, EXTENDED RELEASE ORAL EVERY 12 HOURS SCHEDULED
Qty: 60 TABLET | Refills: 0 | Status: SHIPPED | OUTPATIENT
Start: 2020-04-27 | End: 2020-06-09

## 2020-04-16 RX ORDER — SODIUM CHLORIDE 9 MG/ML
20 INJECTION, SOLUTION INTRAVENOUS ONCE
Status: CANCELLED | OUTPATIENT
Start: 2020-04-22

## 2020-04-21 ENCOUNTER — TELEPHONE (OUTPATIENT)
Dept: HEMATOLOGY ONCOLOGY | Facility: CLINIC | Age: 60
End: 2020-04-21

## 2020-04-22 ENCOUNTER — APPOINTMENT (OUTPATIENT)
Dept: LAB | Facility: HOSPITAL | Age: 60
End: 2020-04-22
Attending: INTERNAL MEDICINE
Payer: COMMERCIAL

## 2020-04-22 ENCOUNTER — OFFICE VISIT (OUTPATIENT)
Dept: HEMATOLOGY ONCOLOGY | Facility: CLINIC | Age: 60
End: 2020-04-22
Payer: COMMERCIAL

## 2020-04-22 ENCOUNTER — HOSPITAL ENCOUNTER (OUTPATIENT)
Dept: INFUSION CENTER | Facility: HOSPITAL | Age: 60
Discharge: HOME | End: 2020-04-22
Attending: INTERNAL MEDICINE
Payer: COMMERCIAL

## 2020-04-22 VITALS
RESPIRATION RATE: 16 BRPM | HEART RATE: 75 BPM | TEMPERATURE: 98.5 F | DIASTOLIC BLOOD PRESSURE: 88 MMHG | SYSTOLIC BLOOD PRESSURE: 161 MMHG | OXYGEN SATURATION: 100 %

## 2020-04-22 VITALS
HEART RATE: 95 BPM | HEIGHT: 67 IN | WEIGHT: 177.4 LBS | TEMPERATURE: 97.8 F | DIASTOLIC BLOOD PRESSURE: 88 MMHG | OXYGEN SATURATION: 90 % | RESPIRATION RATE: 18 BRPM | BODY MASS INDEX: 27.84 KG/M2 | SYSTOLIC BLOOD PRESSURE: 156 MMHG

## 2020-04-22 DIAGNOSIS — N28.9 RENAL DYSFUNCTION: ICD-10-CM

## 2020-04-22 DIAGNOSIS — C34.91 ADENOCARCINOMA OF LUNG, RIGHT (HCC): ICD-10-CM

## 2020-04-22 DIAGNOSIS — G89.3 CANCER-RELATED PAIN: ICD-10-CM

## 2020-04-22 DIAGNOSIS — C34.91 ADENOCARCINOMA OF LUNG, RIGHT (HCC): Primary | ICD-10-CM

## 2020-04-22 LAB
ALBUMIN SERPL BCP-MCNC: 3.9 G/DL (ref 3–5.2)
ALP SERPL-CCNC: 88 U/L (ref 43–122)
ALT SERPL W P-5'-P-CCNC: 26 U/L (ref 9–52)
ANION GAP SERPL CALCULATED.3IONS-SCNC: 5 MMOL/L (ref 5–14)
AST SERPL W P-5'-P-CCNC: 33 U/L (ref 17–59)
BASOPHILS # BLD AUTO: 0.1 THOUSANDS/ΜL (ref 0–0.1)
BASOPHILS NFR BLD AUTO: 1 % (ref 0–1)
BILIRUB SERPL-MCNC: 0.2 MG/DL
BUN SERPL-MCNC: 30 MG/DL (ref 5–25)
CALCIUM SERPL-MCNC: 9.3 MG/DL (ref 8.4–10.2)
CHLORIDE SERPL-SCNC: 101 MMOL/L (ref 97–108)
CO2 SERPL-SCNC: 33 MMOL/L (ref 22–30)
CREAT SERPL-MCNC: 2.24 MG/DL (ref 0.7–1.5)
EOSINOPHIL # BLD AUTO: 0.5 THOUSAND/ΜL (ref 0–0.4)
EOSINOPHIL NFR BLD AUTO: 6 % (ref 0–6)
ERYTHROCYTE [DISTWIDTH] IN BLOOD BY AUTOMATED COUNT: 16.2 %
GFR SERPL CREATININE-BSD FRML MDRD: 36 ML/MIN/1.73SQ M
GLUCOSE P FAST SERPL-MCNC: 97 MG/DL (ref 70–99)
HCT VFR BLD AUTO: 37.6 % (ref 41–53)
HGB BLD-MCNC: 12.4 G/DL (ref 13.5–17.5)
LYMPHOCYTES # BLD AUTO: 2.3 THOUSANDS/ΜL (ref 0.5–4)
LYMPHOCYTES NFR BLD AUTO: 28 % (ref 25–45)
MAGNESIUM SERPL-MCNC: 2 MG/DL (ref 1.6–2.3)
MCH RBC QN AUTO: 28.4 PG (ref 26–34)
MCHC RBC AUTO-ENTMCNC: 33 G/DL (ref 31–36)
MCV RBC AUTO: 86 FL (ref 80–100)
MONOCYTES # BLD AUTO: 0.9 THOUSAND/ΜL (ref 0.2–0.9)
MONOCYTES NFR BLD AUTO: 11 % (ref 1–10)
NEUTROPHILS # BLD AUTO: 4.7 THOUSANDS/ΜL (ref 1.8–7.8)
NEUTS SEG NFR BLD AUTO: 55 % (ref 45–65)
PLATELET # BLD AUTO: 319 THOUSANDS/UL (ref 150–450)
PMV BLD AUTO: 7.5 FL (ref 8.9–12.7)
POTASSIUM SERPL-SCNC: 4.5 MMOL/L (ref 3.6–5)
PROT SERPL-MCNC: 7.6 G/DL (ref 5.9–8.4)
RBC # BLD AUTO: 4.38 MILLION/UL (ref 4.5–5.9)
SODIUM SERPL-SCNC: 139 MMOL/L (ref 137–147)
T3FREE SERPL-MCNC: 2.8 PG/ML (ref 2.3–4.2)
TSH SERPL DL<=0.05 MIU/L-ACNC: 2.32 UIU/ML (ref 0.47–4.68)
WBC # BLD AUTO: 8.5 THOUSAND/UL (ref 4.5–11)

## 2020-04-22 PROCEDURE — 99214 OFFICE O/P EST MOD 30 MIN: CPT | Performed by: INTERNAL MEDICINE

## 2020-04-22 PROCEDURE — 85025 COMPLETE CBC W/AUTO DIFF WBC: CPT

## 2020-04-22 PROCEDURE — 1036F TOBACCO NON-USER: CPT | Performed by: INTERNAL MEDICINE

## 2020-04-22 PROCEDURE — 96413 CHEMO IV INFUSION 1 HR: CPT

## 2020-04-22 PROCEDURE — 84481 FREE ASSAY (FT-3): CPT

## 2020-04-22 PROCEDURE — 80053 COMPREHEN METABOLIC PANEL: CPT

## 2020-04-22 PROCEDURE — 36415 COLL VENOUS BLD VENIPUNCTURE: CPT

## 2020-04-22 PROCEDURE — 3066F NEPHROPATHY DOC TX: CPT | Performed by: INTERNAL MEDICINE

## 2020-04-22 PROCEDURE — 3077F SYST BP >= 140 MM HG: CPT | Performed by: INTERNAL MEDICINE

## 2020-04-22 PROCEDURE — 3008F BODY MASS INDEX DOCD: CPT | Performed by: INTERNAL MEDICINE

## 2020-04-22 PROCEDURE — 84443 ASSAY THYROID STIM HORMONE: CPT

## 2020-04-22 PROCEDURE — 83735 ASSAY OF MAGNESIUM: CPT

## 2020-04-22 PROCEDURE — 3079F DIAST BP 80-89 MM HG: CPT | Performed by: INTERNAL MEDICINE

## 2020-04-22 RX ORDER — SODIUM CHLORIDE 9 MG/ML
20 INJECTION, SOLUTION INTRAVENOUS ONCE
Status: COMPLETED | OUTPATIENT
Start: 2020-04-22 | End: 2020-04-22

## 2020-04-22 RX ORDER — SODIUM CHLORIDE 9 MG/ML
20 INJECTION, SOLUTION INTRAVENOUS ONCE
Status: CANCELLED | OUTPATIENT
Start: 2020-05-13

## 2020-04-22 RX ADMIN — SODIUM CHLORIDE 200 MG: 9 INJECTION, SOLUTION INTRAVENOUS at 10:44

## 2020-04-22 RX ADMIN — SODIUM CHLORIDE 20 ML/HR: 0.9 INJECTION, SOLUTION INTRAVENOUS at 10:00

## 2020-04-27 ENCOUNTER — TELEPHONE (OUTPATIENT)
Dept: CARDIOLOGY CLINIC | Facility: CLINIC | Age: 60
End: 2020-04-27

## 2020-04-29 ENCOUNTER — HOSPITAL ENCOUNTER (OUTPATIENT)
Dept: NUCLEAR MEDICINE | Facility: HOSPITAL | Age: 60
Discharge: HOME | End: 2020-04-29
Attending: INTERNAL MEDICINE
Payer: COMMERCIAL

## 2020-04-29 DIAGNOSIS — C34.91 ADENOCARCINOMA OF LUNG, RIGHT (HCC): ICD-10-CM

## 2020-04-29 LAB — GLUCOSE SERPL-MCNC: 90 MG/DL (ref 65–140)

## 2020-04-29 PROCEDURE — A9552 F18 FDG: HCPCS

## 2020-04-29 PROCEDURE — 82948 REAGENT STRIP/BLOOD GLUCOSE: CPT

## 2020-04-29 PROCEDURE — 78815 PET IMAGE W/CT SKULL-THIGH: CPT

## 2020-04-30 ENCOUNTER — TELEPHONE (OUTPATIENT)
Dept: HEMATOLOGY ONCOLOGY | Facility: CLINIC | Age: 60
End: 2020-04-30

## 2020-05-04 DIAGNOSIS — K59.00 CONSTIPATION, UNSPECIFIED CONSTIPATION TYPE: ICD-10-CM

## 2020-05-04 DIAGNOSIS — T45.1X5A ADVERSE EFFECT OF CHEMOTHERAPY, INITIAL ENCOUNTER: ICD-10-CM

## 2020-05-04 RX ORDER — FOLIC ACID 1 MG/1
TABLET ORAL
Qty: 30 TABLET | Refills: 0 | Status: SHIPPED | OUTPATIENT
Start: 2020-05-04 | End: 2020-10-13 | Stop reason: SDUPTHER

## 2020-05-05 RX ORDER — SENNA AND DOCUSATE SODIUM 50; 8.6 MG/1; MG/1
1 TABLET, FILM COATED ORAL 2 TIMES DAILY
Qty: 60 TABLET | Refills: 0 | Status: SHIPPED | OUTPATIENT
Start: 2020-05-05 | End: 2020-08-05 | Stop reason: SDUPTHER

## 2020-05-06 ENCOUNTER — TELEMEDICINE (OUTPATIENT)
Dept: CARDIOLOGY CLINIC | Facility: CLINIC | Age: 60
End: 2020-05-06
Payer: COMMERCIAL

## 2020-05-06 VITALS — BODY MASS INDEX: 28.56 KG/M2 | HEIGHT: 67 IN | WEIGHT: 182 LBS

## 2020-05-06 DIAGNOSIS — N18.30 STAGE 3 CHRONIC KIDNEY DISEASE (HCC): ICD-10-CM

## 2020-05-06 DIAGNOSIS — I48.0 PAROXYSMAL ATRIAL FIBRILLATION (HCC): ICD-10-CM

## 2020-05-06 DIAGNOSIS — I10 ESSENTIAL HYPERTENSION: Primary | ICD-10-CM

## 2020-05-06 DIAGNOSIS — Z92.21 PERSONAL HISTORY OF CHEMOTHERAPY: ICD-10-CM

## 2020-05-06 PROCEDURE — G2012 BRIEF CHECK IN BY MD/QHP: HCPCS | Performed by: INTERNAL MEDICINE

## 2020-05-07 PROBLEM — E55.9 VITAMIN D DEFICIENCY: Status: ACTIVE | Noted: 2020-05-07

## 2020-05-13 ENCOUNTER — OFFICE VISIT (OUTPATIENT)
Dept: HEMATOLOGY ONCOLOGY | Facility: CLINIC | Age: 60
End: 2020-05-13
Payer: COMMERCIAL

## 2020-05-13 ENCOUNTER — HOSPITAL ENCOUNTER (OUTPATIENT)
Dept: INFUSION CENTER | Facility: HOSPITAL | Age: 60
Discharge: HOME | End: 2020-05-13
Attending: INTERNAL MEDICINE
Payer: COMMERCIAL

## 2020-05-13 VITALS
RESPIRATION RATE: 18 BRPM | HEIGHT: 67 IN | TEMPERATURE: 97.2 F | WEIGHT: 188.49 LBS | BODY MASS INDEX: 29.58 KG/M2 | DIASTOLIC BLOOD PRESSURE: 76 MMHG | HEART RATE: 91 BPM | SYSTOLIC BLOOD PRESSURE: 140 MMHG

## 2020-05-13 VITALS
OXYGEN SATURATION: 98 % | HEIGHT: 67 IN | BODY MASS INDEX: 29.6 KG/M2 | WEIGHT: 188.6 LBS | DIASTOLIC BLOOD PRESSURE: 76 MMHG | HEART RATE: 91 BPM | RESPIRATION RATE: 16 BRPM | SYSTOLIC BLOOD PRESSURE: 140 MMHG | TEMPERATURE: 97.2 F

## 2020-05-13 DIAGNOSIS — N28.9 RENAL DYSFUNCTION: ICD-10-CM

## 2020-05-13 DIAGNOSIS — C34.91 ADENOCARCINOMA OF LUNG, RIGHT (HCC): Primary | ICD-10-CM

## 2020-05-13 LAB
ALBUMIN SERPL BCP-MCNC: 3.8 G/DL (ref 3–5.2)
ALP SERPL-CCNC: 105 U/L (ref 43–122)
ALT SERPL W P-5'-P-CCNC: 29 U/L (ref 9–52)
ANION GAP SERPL CALCULATED.3IONS-SCNC: 5 MMOL/L (ref 5–14)
ANISOCYTOSIS BLD QL SMEAR: PRESENT
AST SERPL W P-5'-P-CCNC: 26 U/L (ref 17–59)
BILIRUB SERPL-MCNC: 0.3 MG/DL
BUN SERPL-MCNC: 36 MG/DL (ref 5–25)
CALCIUM SERPL-MCNC: 8.7 MG/DL (ref 8.4–10.2)
CHLORIDE SERPL-SCNC: 103 MMOL/L (ref 97–108)
CO2 SERPL-SCNC: 30 MMOL/L (ref 22–30)
CREAT SERPL-MCNC: 2.32 MG/DL (ref 0.7–1.5)
EOSINOPHIL # BLD AUTO: 0.32 THOUSAND/UL (ref 0–0.4)
EOSINOPHIL NFR BLD MANUAL: 3 % (ref 0–6)
ERYTHROCYTE [DISTWIDTH] IN BLOOD BY AUTOMATED COUNT: 16.1 %
GFR SERPL CREATININE-BSD FRML MDRD: 35 ML/MIN/1.73SQ M
GLUCOSE SERPL-MCNC: 111 MG/DL (ref 70–99)
HCT VFR BLD AUTO: 34.2 % (ref 41–53)
HGB BLD-MCNC: 11.3 G/DL (ref 13.5–17.5)
LYMPHOCYTES # BLD AUTO: 1.79 THOUSAND/UL (ref 0.5–4)
LYMPHOCYTES # BLD AUTO: 17 % (ref 25–45)
MAGNESIUM SERPL-MCNC: 1.8 MG/DL (ref 1.6–2.3)
MCH RBC QN AUTO: 27.8 PG (ref 26–34)
MCHC RBC AUTO-ENTMCNC: 33.1 G/DL (ref 31–36)
MCV RBC AUTO: 84 FL (ref 80–100)
MONOCYTES # BLD AUTO: 1.16 THOUSAND/UL (ref 0.2–0.9)
MONOCYTES NFR BLD AUTO: 11 % (ref 1–10)
NEUTS SEG # BLD: 7.25 THOUSAND/UL (ref 1.8–7.8)
NEUTS SEG NFR BLD AUTO: 69 %
PLATELET # BLD AUTO: 339 THOUSANDS/UL (ref 150–450)
PLATELET BLD QL SMEAR: ADEQUATE
PMV BLD AUTO: 7.6 FL (ref 8.9–12.7)
POTASSIUM SERPL-SCNC: 4.1 MMOL/L (ref 3.6–5)
PROT SERPL-MCNC: 7.5 G/DL (ref 5.9–8.4)
RBC # BLD AUTO: 4.07 MILLION/UL (ref 4.5–5.9)
RBC MORPH BLD: ABNORMAL
SODIUM SERPL-SCNC: 138 MMOL/L (ref 137–147)
T3FREE SERPL-MCNC: 2.73 PG/ML (ref 2.3–4.2)
TOTAL CELLS COUNTED SPEC: 100
TSH SERPL DL<=0.05 MIU/L-ACNC: 1.98 UIU/ML (ref 0.47–4.68)
WBC # BLD AUTO: 10.5 THOUSAND/UL (ref 4.5–11)

## 2020-05-13 PROCEDURE — 96413 CHEMO IV INFUSION 1 HR: CPT

## 2020-05-13 PROCEDURE — 84481 FREE ASSAY (FT-3): CPT | Performed by: INTERNAL MEDICINE

## 2020-05-13 PROCEDURE — 85027 COMPLETE CBC AUTOMATED: CPT | Performed by: INTERNAL MEDICINE

## 2020-05-13 PROCEDURE — 1036F TOBACCO NON-USER: CPT | Performed by: INTERNAL MEDICINE

## 2020-05-13 PROCEDURE — 80053 COMPREHEN METABOLIC PANEL: CPT | Performed by: INTERNAL MEDICINE

## 2020-05-13 PROCEDURE — 99214 OFFICE O/P EST MOD 30 MIN: CPT | Performed by: INTERNAL MEDICINE

## 2020-05-13 PROCEDURE — 3077F SYST BP >= 140 MM HG: CPT | Performed by: INTERNAL MEDICINE

## 2020-05-13 PROCEDURE — 85007 BL SMEAR W/DIFF WBC COUNT: CPT | Performed by: INTERNAL MEDICINE

## 2020-05-13 PROCEDURE — 3078F DIAST BP <80 MM HG: CPT | Performed by: INTERNAL MEDICINE

## 2020-05-13 PROCEDURE — 3066F NEPHROPATHY DOC TX: CPT | Performed by: INTERNAL MEDICINE

## 2020-05-13 PROCEDURE — 3008F BODY MASS INDEX DOCD: CPT | Performed by: INTERNAL MEDICINE

## 2020-05-13 PROCEDURE — 84443 ASSAY THYROID STIM HORMONE: CPT | Performed by: INTERNAL MEDICINE

## 2020-05-13 PROCEDURE — 83735 ASSAY OF MAGNESIUM: CPT

## 2020-05-13 RX ORDER — SODIUM CHLORIDE 9 MG/ML
20 INJECTION, SOLUTION INTRAVENOUS ONCE
Status: COMPLETED | OUTPATIENT
Start: 2020-05-13 | End: 2020-05-13

## 2020-05-13 RX ADMIN — SODIUM CHLORIDE 200 MG: 9 INJECTION, SOLUTION INTRAVENOUS at 10:50

## 2020-05-13 RX ADMIN — SODIUM CHLORIDE 20 ML/HR: 9 INJECTION, SOLUTION INTRAVENOUS at 10:50

## 2020-05-28 RX ORDER — SODIUM CHLORIDE 9 MG/ML
20 INJECTION, SOLUTION INTRAVENOUS ONCE
Status: CANCELLED | OUTPATIENT
Start: 2020-06-24

## 2020-05-28 RX ORDER — SODIUM CHLORIDE 9 MG/ML
20 INJECTION, SOLUTION INTRAVENOUS ONCE
Status: CANCELLED | OUTPATIENT
Start: 2020-06-03

## 2020-06-03 ENCOUNTER — OFFICE VISIT (OUTPATIENT)
Dept: HEMATOLOGY ONCOLOGY | Facility: CLINIC | Age: 60
End: 2020-06-03
Payer: COMMERCIAL

## 2020-06-03 ENCOUNTER — HOSPITAL ENCOUNTER (OUTPATIENT)
Dept: INFUSION CENTER | Facility: HOSPITAL | Age: 60
Discharge: HOME | End: 2020-06-03
Attending: INTERNAL MEDICINE
Payer: COMMERCIAL

## 2020-06-03 VITALS
DIASTOLIC BLOOD PRESSURE: 88 MMHG | BODY MASS INDEX: 29.85 KG/M2 | WEIGHT: 190.2 LBS | RESPIRATION RATE: 18 BRPM | HEART RATE: 97 BPM | OXYGEN SATURATION: 98 % | SYSTOLIC BLOOD PRESSURE: 142 MMHG | HEIGHT: 67 IN | TEMPERATURE: 98 F

## 2020-06-03 VITALS — HEIGHT: 67 IN | BODY MASS INDEX: 29.85 KG/M2 | WEIGHT: 190.19 LBS

## 2020-06-03 DIAGNOSIS — N28.9 RENAL DYSFUNCTION: ICD-10-CM

## 2020-06-03 DIAGNOSIS — C34.91 ADENOCARCINOMA OF LUNG, RIGHT (HCC): Primary | ICD-10-CM

## 2020-06-03 DIAGNOSIS — G89.4 CHRONIC PAIN DISORDER: ICD-10-CM

## 2020-06-03 DIAGNOSIS — N18.30 STAGE 3 CHRONIC KIDNEY DISEASE (HCC): ICD-10-CM

## 2020-06-03 LAB
ALBUMIN SERPL BCP-MCNC: 3.9 G/DL (ref 3–5.2)
ALP SERPL-CCNC: 103 U/L (ref 43–122)
ALT SERPL W P-5'-P-CCNC: 25 U/L (ref 9–52)
ANION GAP SERPL CALCULATED.3IONS-SCNC: 8 MMOL/L (ref 5–14)
ANISOCYTOSIS BLD QL SMEAR: PRESENT
AST SERPL W P-5'-P-CCNC: 26 U/L (ref 17–59)
BILIRUB SERPL-MCNC: 0.4 MG/DL
BUN SERPL-MCNC: 31 MG/DL (ref 5–25)
CALCIUM SERPL-MCNC: 8.6 MG/DL (ref 8.4–10.2)
CHLORIDE SERPL-SCNC: 104 MMOL/L (ref 97–108)
CO2 SERPL-SCNC: 26 MMOL/L (ref 22–30)
CREAT SERPL-MCNC: 2.26 MG/DL (ref 0.7–1.5)
EOSINOPHIL # BLD AUTO: 0.33 THOUSAND/UL (ref 0–0.4)
EOSINOPHIL NFR BLD MANUAL: 4 % (ref 0–6)
ERYTHROCYTE [DISTWIDTH] IN BLOOD BY AUTOMATED COUNT: 16.2 %
GFR SERPL CREATININE-BSD FRML MDRD: 36 ML/MIN/1.73SQ M
GLUCOSE SERPL-MCNC: 91 MG/DL (ref 70–99)
HCT VFR BLD AUTO: 35.8 % (ref 41–53)
HGB BLD-MCNC: 11.7 G/DL (ref 13.5–17.5)
LYMPHOCYTES # BLD AUTO: 4.43 THOUSAND/UL (ref 0.5–4)
LYMPHOCYTES # BLD AUTO: 54 % (ref 25–45)
MAGNESIUM SERPL-MCNC: 1.9 MG/DL (ref 1.6–2.3)
MCH RBC QN AUTO: 26.3 PG (ref 26–34)
MCHC RBC AUTO-ENTMCNC: 32.7 G/DL (ref 31–36)
MCV RBC AUTO: 81 FL (ref 80–100)
MONOCYTES # BLD AUTO: 0.33 THOUSAND/UL (ref 0.2–0.9)
MONOCYTES NFR BLD AUTO: 4 % (ref 1–10)
NEUTS SEG # BLD: 3.12 THOUSAND/UL (ref 1.8–7.8)
NEUTS SEG NFR BLD AUTO: 38 %
PLATELET # BLD AUTO: 313 THOUSANDS/UL (ref 150–450)
PLATELET BLD QL SMEAR: ADEQUATE
PMV BLD AUTO: 7.2 FL (ref 8.9–12.7)
POTASSIUM SERPL-SCNC: 4.2 MMOL/L (ref 3.6–5)
PROT SERPL-MCNC: 7.4 G/DL (ref 5.9–8.4)
RBC # BLD AUTO: 4.44 MILLION/UL (ref 4.5–5.9)
RBC MORPH BLD: ABNORMAL
SODIUM SERPL-SCNC: 138 MMOL/L (ref 137–147)
T3FREE SERPL-MCNC: 3.23 PG/ML (ref 2.3–4.2)
TOTAL CELLS COUNTED SPEC: 100
TSH SERPL DL<=0.05 MIU/L-ACNC: 3.12 UIU/ML (ref 0.47–4.68)
WBC # BLD AUTO: 8.2 THOUSAND/UL (ref 4.5–11)

## 2020-06-03 PROCEDURE — 3066F NEPHROPATHY DOC TX: CPT | Performed by: INTERNAL MEDICINE

## 2020-06-03 PROCEDURE — 85027 COMPLETE CBC AUTOMATED: CPT

## 2020-06-03 PROCEDURE — 1036F TOBACCO NON-USER: CPT | Performed by: INTERNAL MEDICINE

## 2020-06-03 PROCEDURE — 80053 COMPREHEN METABOLIC PANEL: CPT

## 2020-06-03 PROCEDURE — 3077F SYST BP >= 140 MM HG: CPT | Performed by: INTERNAL MEDICINE

## 2020-06-03 PROCEDURE — 99214 OFFICE O/P EST MOD 30 MIN: CPT | Performed by: INTERNAL MEDICINE

## 2020-06-03 PROCEDURE — 3079F DIAST BP 80-89 MM HG: CPT | Performed by: INTERNAL MEDICINE

## 2020-06-03 PROCEDURE — 83735 ASSAY OF MAGNESIUM: CPT

## 2020-06-03 PROCEDURE — 84443 ASSAY THYROID STIM HORMONE: CPT | Performed by: INTERNAL MEDICINE

## 2020-06-03 PROCEDURE — 3008F BODY MASS INDEX DOCD: CPT | Performed by: INTERNAL MEDICINE

## 2020-06-03 PROCEDURE — 84481 FREE ASSAY (FT-3): CPT | Performed by: INTERNAL MEDICINE

## 2020-06-03 PROCEDURE — 96413 CHEMO IV INFUSION 1 HR: CPT

## 2020-06-03 PROCEDURE — 85007 BL SMEAR W/DIFF WBC COUNT: CPT

## 2020-06-03 RX ORDER — SODIUM CHLORIDE 9 MG/ML
20 INJECTION, SOLUTION INTRAVENOUS ONCE
Status: COMPLETED | OUTPATIENT
Start: 2020-06-03 | End: 2020-06-03

## 2020-06-03 RX ADMIN — SODIUM CHLORIDE 20 ML/HR: 9 INJECTION, SOLUTION INTRAVENOUS at 11:27

## 2020-06-03 RX ADMIN — SODIUM CHLORIDE 200 MG: 9 INJECTION, SOLUTION INTRAVENOUS at 11:27

## 2020-06-09 DIAGNOSIS — C34.91 ADENOCARCINOMA OF LUNG, RIGHT (HCC): ICD-10-CM

## 2020-06-09 DIAGNOSIS — G89.3 CANCER-RELATED PAIN: Chronic | ICD-10-CM

## 2020-06-09 DIAGNOSIS — Z51.5 PALLIATIVE CARE PATIENT: ICD-10-CM

## 2020-06-11 RX ORDER — OXYCODONE HCL 20 MG/1
20 TABLET, FILM COATED, EXTENDED RELEASE ORAL EVERY 12 HOURS SCHEDULED
Qty: 60 TABLET | Refills: 0 | Status: SHIPPED | OUTPATIENT
Start: 2020-06-11 | End: 2020-07-09 | Stop reason: SDUPTHER

## 2020-06-11 RX ORDER — OXYCODONE HYDROCHLORIDE 30 MG/1
30 TABLET ORAL EVERY 4 HOURS PRN
Qty: 120 TABLET | Refills: 0 | Status: SHIPPED | OUTPATIENT
Start: 2020-06-11 | End: 2020-07-09 | Stop reason: SDUPTHER

## 2020-06-19 DIAGNOSIS — I10 HTN (HYPERTENSION): ICD-10-CM

## 2020-06-24 ENCOUNTER — HOSPITAL ENCOUNTER (OUTPATIENT)
Dept: INFUSION CENTER | Facility: HOSPITAL | Age: 60
Discharge: HOME | End: 2020-06-24
Attending: INTERNAL MEDICINE
Payer: COMMERCIAL

## 2020-06-24 ENCOUNTER — OFFICE VISIT (OUTPATIENT)
Dept: HEMATOLOGY ONCOLOGY | Facility: CLINIC | Age: 60
End: 2020-06-24
Payer: COMMERCIAL

## 2020-06-24 VITALS
DIASTOLIC BLOOD PRESSURE: 88 MMHG | TEMPERATURE: 97.4 F | BODY MASS INDEX: 30.2 KG/M2 | HEIGHT: 67 IN | SYSTOLIC BLOOD PRESSURE: 120 MMHG | OXYGEN SATURATION: 99 % | RESPIRATION RATE: 18 BRPM | HEART RATE: 94 BPM | WEIGHT: 192.4 LBS

## 2020-06-24 VITALS — WEIGHT: 192.46 LBS | HEIGHT: 67 IN | BODY MASS INDEX: 30.21 KG/M2

## 2020-06-24 DIAGNOSIS — N18.30 STAGE 3 CHRONIC KIDNEY DISEASE (HCC): ICD-10-CM

## 2020-06-24 DIAGNOSIS — C34.91 ADENOCARCINOMA OF LUNG, RIGHT (HCC): Primary | ICD-10-CM

## 2020-06-24 DIAGNOSIS — I10 ESSENTIAL HYPERTENSION: ICD-10-CM

## 2020-06-24 DIAGNOSIS — D64.9 NORMOCYTIC ANEMIA: ICD-10-CM

## 2020-06-24 DIAGNOSIS — J44.1 ACUTE EXACERBATION OF CHRONIC OBSTRUCTIVE PULMONARY DISEASE (COPD) (HCC): ICD-10-CM

## 2020-06-24 DIAGNOSIS — N28.9 RENAL DYSFUNCTION: ICD-10-CM

## 2020-06-24 DIAGNOSIS — J44.9 CHRONIC OBSTRUCTIVE PULMONARY DISEASE, UNSPECIFIED COPD TYPE (HCC): ICD-10-CM

## 2020-06-24 LAB
ALBUMIN SERPL BCP-MCNC: 4 G/DL (ref 3–5.2)
ALP SERPL-CCNC: 89 U/L (ref 43–122)
ALT SERPL W P-5'-P-CCNC: 22 U/L (ref 9–52)
ANION GAP SERPL CALCULATED.3IONS-SCNC: 7 MMOL/L (ref 5–14)
AST SERPL W P-5'-P-CCNC: 31 U/L (ref 17–59)
BASOPHILS # BLD AUTO: 0.1 THOUSANDS/ΜL (ref 0–0.1)
BASOPHILS NFR BLD AUTO: 1 % (ref 0–1)
BILIRUB SERPL-MCNC: 0.3 MG/DL
BUN SERPL-MCNC: 36 MG/DL (ref 5–25)
CALCIUM SERPL-MCNC: 8.9 MG/DL (ref 8.4–10.2)
CHLORIDE SERPL-SCNC: 104 MMOL/L (ref 97–108)
CO2 SERPL-SCNC: 27 MMOL/L (ref 22–30)
CREAT SERPL-MCNC: 2.5 MG/DL (ref 0.7–1.5)
EOSINOPHIL # BLD AUTO: 0.2 THOUSAND/ΜL (ref 0–0.4)
EOSINOPHIL NFR BLD AUTO: 2 % (ref 0–6)
ERYTHROCYTE [DISTWIDTH] IN BLOOD BY AUTOMATED COUNT: 17.6 %
GFR SERPL CREATININE-BSD FRML MDRD: 32 ML/MIN/1.73SQ M
GLUCOSE SERPL-MCNC: 123 MG/DL (ref 70–99)
HCT VFR BLD AUTO: 34.6 % (ref 41–53)
HGB BLD-MCNC: 11.4 G/DL (ref 13.5–17.5)
LYMPHOCYTES # BLD AUTO: 2.6 THOUSANDS/ΜL (ref 0.5–4)
LYMPHOCYTES NFR BLD AUTO: 26 % (ref 25–45)
MAGNESIUM SERPL-MCNC: 2 MG/DL (ref 1.6–2.3)
MCH RBC QN AUTO: 27 PG (ref 26–34)
MCHC RBC AUTO-ENTMCNC: 32.9 G/DL (ref 31–36)
MCV RBC AUTO: 82 FL (ref 80–100)
MONOCYTES # BLD AUTO: 0.9 THOUSAND/ΜL (ref 0.2–0.9)
MONOCYTES NFR BLD AUTO: 9 % (ref 1–10)
NEUTROPHILS # BLD AUTO: 6.2 THOUSANDS/ΜL (ref 1.8–7.8)
NEUTS SEG NFR BLD AUTO: 62 % (ref 45–65)
PLATELET # BLD AUTO: 288 THOUSANDS/UL (ref 150–450)
PMV BLD AUTO: 7.7 FL (ref 8.9–12.7)
POTASSIUM SERPL-SCNC: 4.2 MMOL/L (ref 3.6–5)
PROT SERPL-MCNC: 7.6 G/DL (ref 5.9–8.4)
RBC # BLD AUTO: 4.22 MILLION/UL (ref 4.5–5.9)
SODIUM SERPL-SCNC: 138 MMOL/L (ref 137–147)
T3FREE SERPL-MCNC: 2.42 PG/ML (ref 2.3–4.2)
TSH SERPL DL<=0.05 MIU/L-ACNC: 2.29 UIU/ML (ref 0.47–4.68)
WBC # BLD AUTO: 9.9 THOUSAND/UL (ref 4.5–11)

## 2020-06-24 PROCEDURE — 4010F ACE/ARB THERAPY RXD/TAKEN: CPT | Performed by: NURSE PRACTITIONER

## 2020-06-24 PROCEDURE — 85025 COMPLETE CBC W/AUTO DIFF WBC: CPT | Performed by: INTERNAL MEDICINE

## 2020-06-24 PROCEDURE — 3008F BODY MASS INDEX DOCD: CPT | Performed by: NURSE PRACTITIONER

## 2020-06-24 PROCEDURE — 3066F NEPHROPATHY DOC TX: CPT | Performed by: NURSE PRACTITIONER

## 2020-06-24 PROCEDURE — 96413 CHEMO IV INFUSION 1 HR: CPT

## 2020-06-24 PROCEDURE — 1036F TOBACCO NON-USER: CPT | Performed by: NURSE PRACTITIONER

## 2020-06-24 PROCEDURE — 84443 ASSAY THYROID STIM HORMONE: CPT | Performed by: INTERNAL MEDICINE

## 2020-06-24 PROCEDURE — 3074F SYST BP LT 130 MM HG: CPT | Performed by: NURSE PRACTITIONER

## 2020-06-24 PROCEDURE — 80053 COMPREHEN METABOLIC PANEL: CPT | Performed by: INTERNAL MEDICINE

## 2020-06-24 PROCEDURE — 3079F DIAST BP 80-89 MM HG: CPT | Performed by: NURSE PRACTITIONER

## 2020-06-24 PROCEDURE — 83735 ASSAY OF MAGNESIUM: CPT

## 2020-06-24 PROCEDURE — 99215 OFFICE O/P EST HI 40 MIN: CPT | Performed by: NURSE PRACTITIONER

## 2020-06-24 PROCEDURE — 84481 FREE ASSAY (FT-3): CPT | Performed by: INTERNAL MEDICINE

## 2020-06-24 RX ORDER — ALBUTEROL SULFATE 90 UG/1
2 AEROSOL, METERED RESPIRATORY (INHALATION) EVERY 4 HOURS PRN
Qty: 8.5 G | Refills: 3 | Status: SHIPPED | OUTPATIENT
Start: 2020-06-24 | End: 2020-10-13

## 2020-06-24 RX ORDER — ALBUTEROL SULFATE 2.5 MG/3ML
SOLUTION RESPIRATORY (INHALATION)
Qty: 360 ML | Refills: 3 | Status: SHIPPED | OUTPATIENT
Start: 2020-06-24 | End: 2020-10-12

## 2020-06-24 RX ORDER — SODIUM CHLORIDE 9 MG/ML
20 INJECTION, SOLUTION INTRAVENOUS ONCE
Status: CANCELLED | OUTPATIENT
Start: 2020-07-15

## 2020-06-24 RX ORDER — AMLODIPINE BESYLATE 5 MG/1
5 TABLET ORAL DAILY
Qty: 30 TABLET | Refills: 0 | Status: SHIPPED | OUTPATIENT
Start: 2020-06-24 | End: 2020-07-24

## 2020-06-24 RX ORDER — SODIUM CHLORIDE 9 MG/ML
20 INJECTION, SOLUTION INTRAVENOUS ONCE
Status: COMPLETED | OUTPATIENT
Start: 2020-06-24 | End: 2020-06-24

## 2020-06-24 RX ORDER — PREDNISONE 10 MG/1
5 TABLET ORAL DAILY
Qty: 30 TABLET | Refills: 0
Start: 2020-06-24 | End: 2020-10-29

## 2020-06-24 RX ADMIN — SODIUM CHLORIDE 20 ML/HR: 0.9 INJECTION, SOLUTION INTRAVENOUS at 10:35

## 2020-06-24 RX ADMIN — SODIUM CHLORIDE 200 MG: 9 INJECTION, SOLUTION INTRAVENOUS at 11:16

## 2020-07-07 NOTE — PATIENT INSTRUCTIONS
PRESCRIPTION REFILL REMINDER:  All medication refills should be requested prior to [facility] on Friday  Any refill requests after noon on Friday would be addressed the following Monday  Please protect yourself from the novel Coronavirus (COVID-19)! Even though we STILL do not have a vaccine or good antiviral drugs for this infection, the following strategies can help you stay healthy:    = Wash your hands with soap and water, or hand  with at least 60% alcohol, often  = Avoid touching your face!   = Avoid close contact with others, even if they seem healthy  Avoid gatherings of more than 10 people, and don't travel unnecessarily  = Stay home, except to get medical care or other essentials  If you can eat and drink and breathe and sleep, please consider calling your doctor's office instead of visiting in person, even if you are ill   = Cover your cough with a tissue, or your elbow  = Clean frequently touched surfaces and objects daily (e g , tables, countertops, light switches, doorknobs, and cabinet handles)  Regular household detergent and water are sufficient  Numbers of cases of Coronavirus are spiking in many [place] States  This is not a more dangerous virus, but a sign that more people in a community are spreading the virus  Please check the local disease reports near you if you consider travelling this summer  We do NOT advise travel to any community or State with a rising viral caseload  Check out [employer] for [place] data that are updated daily  http://www MynewMD/   Global Epidemics  Org, from [facility]PATIENT [facility]), will give you [id number] information on virus cases  Https://globalepidemics  org/

## 2020-07-09 ENCOUNTER — OFFICE VISIT (OUTPATIENT)
Dept: PALLIATIVE MEDICINE | Facility: CLINIC | Age: 60
End: 2020-07-09
Payer: COMMERCIAL

## 2020-07-09 VITALS
HEART RATE: 101 BPM | OXYGEN SATURATION: 95 % | SYSTOLIC BLOOD PRESSURE: 134 MMHG | TEMPERATURE: 97.8 F | WEIGHT: 190 LBS | DIASTOLIC BLOOD PRESSURE: 80 MMHG | BODY MASS INDEX: 29.75 KG/M2

## 2020-07-09 DIAGNOSIS — G89.3 CANCER-RELATED PAIN: Chronic | ICD-10-CM

## 2020-07-09 DIAGNOSIS — C34.91 ADENOCARCINOMA OF LUNG, RIGHT (HCC): ICD-10-CM

## 2020-07-09 DIAGNOSIS — Z51.5 PALLIATIVE CARE PATIENT: ICD-10-CM

## 2020-07-09 PROCEDURE — 1036F TOBACCO NON-USER: CPT | Performed by: FAMILY MEDICINE

## 2020-07-09 PROCEDURE — 3075F SYST BP GE 130 - 139MM HG: CPT | Performed by: FAMILY MEDICINE

## 2020-07-09 PROCEDURE — 3079F DIAST BP 80-89 MM HG: CPT | Performed by: FAMILY MEDICINE

## 2020-07-09 PROCEDURE — 99213 OFFICE O/P EST LOW 20 MIN: CPT | Performed by: FAMILY MEDICINE

## 2020-07-09 PROCEDURE — 3066F NEPHROPATHY DOC TX: CPT | Performed by: FAMILY MEDICINE

## 2020-07-09 RX ORDER — OXYCODONE HYDROCHLORIDE 30 MG/1
30 TABLET ORAL EVERY 4 HOURS PRN
Qty: 120 TABLET | Refills: 0 | Status: SHIPPED | OUTPATIENT
Start: 2020-07-09 | End: 2020-08-06 | Stop reason: SDUPTHER

## 2020-07-09 RX ORDER — OXYCODONE HCL 20 MG/1
20 TABLET, FILM COATED, EXTENDED RELEASE ORAL EVERY 12 HOURS SCHEDULED
Qty: 60 TABLET | Refills: 0 | Status: SHIPPED | OUTPATIENT
Start: 2020-07-09 | End: 2020-09-30

## 2020-07-09 NOTE — PROGRESS NOTES
Outpatient Follow-Up - Palliative and Supportive Care   Marli Lees Sr  64 y o  male [id number]    Assessment & Plan  1  Adenocarcinoma of lung, right ([address] )    2  Cancer-related pain    3  Palliative care patient      Medications adjusted this encounter:  Requested Prescriptions     Signed Prescriptions Disp Refills    oxyCODONE (OxyCONTIN) 20 mg 12 hr tablet 60 tablet 0     Sig: Take 1 tablet (20 mg total) by mouth every 12 (twelve) hours For cancer painMax Daily Amount: 40 mg    oxyCODONE (ROXICODONE) 30 MG immediate release tablet 120 tablet 0     Sig: Take 1 tablet (30 mg total) by mouth every 4 (four) hours as needed (cancer pain)Max Daily Amount: 180 mg     Medications Discontinued During This Encounter   Medication Reason    ranitidine (ZANTAC) 150 mg tablet     diphenhydrAMINE (BENADRYL) 25 mg tablet     oxyCODONE (OxyCONTIN) 20 mg 12 hr tablet Reorder    oxyCODONE (ROXICODONE) 30 MG immediate release tablet Reorder   - Only one month of medication was provided today for short acting oxyIR; pt was asked to inform us of his drugmaker before additional fills sent  - OxyCONTIN will continue as current levels for 3mos  Ms Klarissa Smith was seen today for symptoms and planning cares related to above illnesses  I have reviewed the patient's controlled substance dispensing history in the Prescription Drug Monitoring Program in compliance with the [facility] regulations before prescribing any controlled substances  He is invited to continue to follow with us  If there are questions or concerns, please contact us through our clinic/answering service 24 hours a day, seven days a week  Jhony Amaro MD  [facility]  [phone]      Visit Information    Accompanied By: No one    Source of History: Self    History Limitations: None    Contacts:     Follow up visit for:  symptom management, depression or anxiety, support    History of Present Illness     This fellow returns in f/up of his adenocarcinoma of R lung  He continues with Dr Kelli Pryor and Ms Maira Sanchez, on monotherapy with [facility]  Since last visit, has generally felt well  He feels that his pain is better controlled, and he confirms that he has the right type or make of pain medicines now to benefit him  His dyspnea is manageable, on most occasions, though the recent heat and humidity have sapped his energy  We agreed that -- since he has rstarted therapy and this is going well -- he could return in two month or so  He is agreeable to this plan  Past medical, surgical, social, and family histories are reviewed and pertinent updates are made  Review of Systems   Constitution: Negative for decreased appetite, weight gain and weight loss  HENT: Negative for hoarse voice and nosebleeds  Eyes: Negative for vision loss in left eye and vision loss in right eye  Cardiovascular: Negative for chest pain and dyspnea on exertion  Respiratory: Positive for cough  Negative for shortness of breath and sputum production  Endocrine: Negative for polydipsia, polyphagia and polyuria  Skin: Negative for flushing and itching  Musculoskeletal: Negative for falls  Gastrointestinal: Negative for anorexia, jaundice, nausea and vomiting  Genitourinary: Negative for frequency  Neurological: Negative for dizziness  Psychiatric/Behavioral: Negative for depression and memory loss  The patient is not nervous/anxious  Vital Signs    /80 (BP Location: Left arm, Cuff Size: Standard)   Pulse 101   Temp 97 8 °F (36 6 °C) (Tympanic)   Wt 86 2 kg (190 lb)   SpO2 95%   BMI 29 75 kg/m²     Physical Exam and Objective Data  Physical Exam   Constitutional: He is oriented to person, place, and time  He appears well-developed and well-nourished  No distress  HENT:   Head: Normocephalic and atraumatic     Right Ear: External ear normal    Left Ear: External ear normal    Eyes: Pupils are equal, round, and reactive to light  Conjunctivae and EOM are normal  Right eye exhibits no discharge  Left eye exhibits no discharge  Neck: No tracheal deviation present  Pulmonary/Chest: Effort normal  No stridor  No respiratory distress  Abdominal: Soft  Musculoskeletal: He exhibits no deformity  Neurological: He is alert and oriented to person, place, and time  No cranial nerve deficit  Skin: No rash noted  He is not diaphoretic  No erythema  Psychiatric: Judgment and thought content normal          Radiology and Laboratory:  I personally reviewed and interpreted the following results - none new    15+ minutes was spent face to face with Radha Riding  with greater than 50% of the time spent in counseling or coordination of care including : chart review; symptom pursuit  All of the patient's questions were answered during this discussion

## 2020-07-12 RX ORDER — OXYCODONE HCL 20 MG/1
20 TABLET, FILM COATED, EXTENDED RELEASE ORAL EVERY 12 HOURS SCHEDULED
Qty: 60 TABLET | Refills: 0 | Status: SHIPPED | OUTPATIENT
Start: 2020-08-31 | End: 2020-09-30

## 2020-07-12 RX ORDER — OXYCODONE HCL 20 MG/1
20 TABLET, FILM COATED, EXTENDED RELEASE ORAL EVERY 12 HOURS SCHEDULED
Qty: 60 TABLET | Refills: 0 | Status: SHIPPED | OUTPATIENT
Start: 2020-08-03 | End: 2020-09-30 | Stop reason: SDUPTHER

## 2020-07-14 ENCOUNTER — TELEPHONE (OUTPATIENT)
Dept: HEMATOLOGY ONCOLOGY | Facility: MEDICAL CENTER | Age: 60
End: 2020-07-14

## 2020-07-14 ENCOUNTER — TELEPHONE (OUTPATIENT)
Dept: HEMATOLOGY ONCOLOGY | Facility: CLINIC | Age: 60
End: 2020-07-14

## 2020-07-14 NOTE — TELEPHONE ENCOUNTER
Called pt to do covid pre screening questionnaire  Left message reviewing questionnaire, visitor, and mask policy  Asked pt to call the office to give covid questionnaire answers, [phone]

## 2020-07-15 ENCOUNTER — OFFICE VISIT (OUTPATIENT)
Dept: HEMATOLOGY ONCOLOGY | Facility: CLINIC | Age: 60
End: 2020-07-15
Payer: COMMERCIAL

## 2020-07-15 ENCOUNTER — HOSPITAL ENCOUNTER (OUTPATIENT)
Dept: INFUSION CENTER | Facility: HOSPITAL | Age: 60
Discharge: HOME | End: 2020-07-15
Attending: INTERNAL MEDICINE
Payer: COMMERCIAL

## 2020-07-15 VITALS
BODY MASS INDEX: 30.07 KG/M2 | HEIGHT: 67 IN | RESPIRATION RATE: 18 BRPM | DIASTOLIC BLOOD PRESSURE: 80 MMHG | WEIGHT: 191.6 LBS | HEART RATE: 68 BPM | SYSTOLIC BLOOD PRESSURE: 132 MMHG | OXYGEN SATURATION: 99 % | TEMPERATURE: 97.3 F

## 2020-07-15 VITALS — BODY MASS INDEX: 30.07 KG/M2 | WEIGHT: 191.58 LBS | HEIGHT: 67 IN

## 2020-07-15 DIAGNOSIS — G89.3 CANCER-RELATED PAIN: ICD-10-CM

## 2020-07-15 DIAGNOSIS — C34.91 ADENOCARCINOMA OF LUNG, RIGHT (HCC): Primary | ICD-10-CM

## 2020-07-15 DIAGNOSIS — D64.9 NORMOCYTIC ANEMIA: ICD-10-CM

## 2020-07-15 DIAGNOSIS — N28.9 RENAL DYSFUNCTION: ICD-10-CM

## 2020-07-15 DIAGNOSIS — N18.30 STAGE 3 CHRONIC KIDNEY DISEASE (HCC): ICD-10-CM

## 2020-07-15 DIAGNOSIS — C34.91 ADENOCARCINOMA OF LUNG, RIGHT (HCC): ICD-10-CM

## 2020-07-15 LAB
ALBUMIN SERPL BCP-MCNC: 4 G/DL (ref 3–5.2)
ALP SERPL-CCNC: 88 U/L (ref 43–122)
ALT SERPL W P-5'-P-CCNC: 21 U/L (ref 9–52)
ANION GAP SERPL CALCULATED.3IONS-SCNC: 6 MMOL/L (ref 5–14)
AST SERPL W P-5'-P-CCNC: 24 U/L (ref 17–59)
BASOPHILS # BLD AUTO: 0.1 THOUSANDS/ΜL (ref 0–0.1)
BASOPHILS NFR BLD AUTO: 1 % (ref 0–1)
BILIRUB SERPL-MCNC: 0.3 MG/DL
BUN SERPL-MCNC: 25 MG/DL (ref 5–25)
CALCIUM SERPL-MCNC: 9.2 MG/DL (ref 8.4–10.2)
CHLORIDE SERPL-SCNC: 106 MMOL/L (ref 97–108)
CO2 SERPL-SCNC: 28 MMOL/L (ref 22–30)
CREAT SERPL-MCNC: 2.08 MG/DL (ref 0.7–1.5)
CRP SERPL QL: 19 MG/L
EOSINOPHIL # BLD AUTO: 0.5 THOUSAND/ΜL (ref 0–0.4)
EOSINOPHIL NFR BLD AUTO: 7 % (ref 0–6)
ERYTHROCYTE [DISTWIDTH] IN BLOOD BY AUTOMATED COUNT: 17.3 %
ERYTHROCYTE [SEDIMENTATION RATE] IN BLOOD: 59 MM/HOUR (ref 1–20)
FERRITIN SERPL-MCNC: 68 NG/ML (ref 8–388)
GFR SERPL CREATININE-BSD FRML MDRD: 40 ML/MIN/1.73SQ M
GLUCOSE SERPL-MCNC: 108 MG/DL (ref 70–99)
HCT VFR BLD AUTO: 34.7 % (ref 41–53)
HGB BLD-MCNC: 11.6 G/DL (ref 13.5–17.5)
IRON SATN MFR SERPL: 28 %
IRON SERPL-MCNC: 69 UG/DL (ref 65–175)
LYMPHOCYTES # BLD AUTO: 1.7 THOUSANDS/ΜL (ref 0.5–4)
LYMPHOCYTES NFR BLD AUTO: 22 % (ref 25–45)
MCH RBC QN AUTO: 27.5 PG (ref 26–34)
MCHC RBC AUTO-ENTMCNC: 33.4 G/DL (ref 31–36)
MCV RBC AUTO: 82 FL (ref 80–100)
MONOCYTES # BLD AUTO: 0.6 THOUSAND/ΜL (ref 0.2–0.9)
MONOCYTES NFR BLD AUTO: 8 % (ref 1–10)
NEUTROPHILS # BLD AUTO: 4.9 THOUSANDS/ΜL (ref 1.8–7.8)
NEUTS SEG NFR BLD AUTO: 63 % (ref 45–65)
PLATELET # BLD AUTO: 353 THOUSANDS/UL (ref 150–450)
PMV BLD AUTO: 7.3 FL (ref 8.9–12.7)
POTASSIUM SERPL-SCNC: 4.2 MMOL/L (ref 3.6–5)
PROT SERPL-MCNC: 7.6 G/DL (ref 5.9–8.4)
RBC # BLD AUTO: 4.22 MILLION/UL (ref 4.5–5.9)
SODIUM SERPL-SCNC: 140 MMOL/L (ref 137–147)
T3FREE SERPL-MCNC: 2.67 PG/ML (ref 2.3–4.2)
TIBC SERPL-MCNC: 248 UG/DL (ref 250–450)
TSH SERPL DL<=0.05 MIU/L-ACNC: 1.25 UIU/ML (ref 0.47–4.68)
VIT B12 SERPL-MCNC: 523 PG/ML (ref 100–900)
WBC # BLD AUTO: 7.8 THOUSAND/UL (ref 4.5–11)

## 2020-07-15 PROCEDURE — 80053 COMPREHEN METABOLIC PANEL: CPT

## 2020-07-15 PROCEDURE — 3008F BODY MASS INDEX DOCD: CPT | Performed by: INTERNAL MEDICINE

## 2020-07-15 PROCEDURE — 3079F DIAST BP 80-89 MM HG: CPT | Performed by: INTERNAL MEDICINE

## 2020-07-15 PROCEDURE — 86140 C-REACTIVE PROTEIN: CPT

## 2020-07-15 PROCEDURE — 84481 FREE ASSAY (FT-3): CPT | Performed by: INTERNAL MEDICINE

## 2020-07-15 PROCEDURE — 82607 VITAMIN B-12: CPT

## 2020-07-15 PROCEDURE — 1036F TOBACCO NON-USER: CPT | Performed by: INTERNAL MEDICINE

## 2020-07-15 PROCEDURE — 85652 RBC SED RATE AUTOMATED: CPT

## 2020-07-15 PROCEDURE — 84443 ASSAY THYROID STIM HORMONE: CPT

## 2020-07-15 PROCEDURE — 85025 COMPLETE CBC W/AUTO DIFF WBC: CPT

## 2020-07-15 PROCEDURE — 3075F SYST BP GE 130 - 139MM HG: CPT | Performed by: INTERNAL MEDICINE

## 2020-07-15 PROCEDURE — 83540 ASSAY OF IRON: CPT

## 2020-07-15 PROCEDURE — 99214 OFFICE O/P EST MOD 30 MIN: CPT | Performed by: INTERNAL MEDICINE

## 2020-07-15 PROCEDURE — 82728 ASSAY OF FERRITIN: CPT

## 2020-07-15 PROCEDURE — 3066F NEPHROPATHY DOC TX: CPT | Performed by: INTERNAL MEDICINE

## 2020-07-15 PROCEDURE — 96413 CHEMO IV INFUSION 1 HR: CPT

## 2020-07-15 PROCEDURE — 83550 IRON BINDING TEST: CPT

## 2020-07-15 RX ORDER — SODIUM CHLORIDE 9 MG/ML
20 INJECTION, SOLUTION INTRAVENOUS ONCE
Status: COMPLETED | OUTPATIENT
Start: 2020-07-15 | End: 2020-07-15

## 2020-07-15 RX ORDER — SODIUM CHLORIDE 9 MG/ML
20 INJECTION, SOLUTION INTRAVENOUS ONCE
Status: CANCELLED | OUTPATIENT
Start: 2020-08-05

## 2020-07-15 RX ADMIN — SODIUM CHLORIDE 200 MG: 9 INJECTION, SOLUTION INTRAVENOUS at 11:50

## 2020-07-15 RX ADMIN — SODIUM CHLORIDE 20 ML/HR: 0.9 INJECTION, SOLUTION INTRAVENOUS at 11:00

## 2020-07-15 NOTE — PROGRESS NOTES
Pt tolerated central labs and Keytruda infusion without difficulty  Next appt confirmed, AVS declined    Left ambulatory on 3L O2 via NC

## 2020-07-15 NOTE — PLAN OF CARE
Problem: Potential for Falls  Goal: Patient will remain free of falls  Description  INTERVENTIONS:  - Assess patient frequently for physical needs  -  Identify cognitive and physical deficits and behaviors that affect risk of falls  -  [place] fall precautions as indicated by assessment   - Educate patient/family on patient safety including physical limitations  - Instruct patient to call for assistance with activity based on assessment  - Modify environment to reduce risk of injury  - Consider OT/PT consult to assist with strengthening/mobility  Outcome: Progressing     Problem: Knowledge Deficit  Goal: Patient/family/caregiver demonstrates understanding of disease process, treatment plan, medications, and discharge instructions  Description  Complete learning assessment and assess knowledge base    Interventions:  - Provide teaching at level of understanding  - Provide teaching via preferred learning methods  Outcome: Progressing

## 2020-07-15 NOTE — PROGRESS NOTES
Hematology/Oncology Outpatient Follow-up  Sriram Cantu Sr  64 y o  male 1960 [id number]    Date:  7/15/2020        Assessment and Plan:  1  Adenocarcinoma of lung, right ([facility] [address] )  I discussed with the patient continuing the Norlene Gables at this point in time on every 3 week basis without any changes  However, we will plan to get another PET-CT scan somewhere in the middle of August for close monitoring of his metastatic non-small cell lung cancer  He was told to continue with the prednisone 5 mg once a day without any changes  - Infusion Calculated Appointment Request; Future  - CBC and differential; Future  - Comprehensive metabolic panel; Future  - TSH, 3rd generation with Free T4 reflex; Future  - T3, free; Future  - CBC and differential; Future  - Comprehensive metabolic panel; Future  - Magnesium; Future  - TSH, 3rd generation with Free T4 reflex; Future    2  Stage 3 chronic kidney disease (HCC)  Seems to be stable  3  Normocytic anemia  Most likely related to the chronically renal dysfunction  Workup was ordered but was not done for unknown reason  He should get his vitamin B12 and iron panel with his blood work today  4  Cancer-related pain  Seems to be under control  The pain is managed by our colleagues from the palliative care team         HPI:  The patient came today for a follow-up visit before his scheduled immunotherapy with Norlene Gables which he has been getting on every 3 week basis  He is currently on oxygen per nasal cannula around the clock with the 3 L  He continues to complain about shortness of breath on minimal activities  He also continues to take prednisone 5 mg once a day  Most available blood work on 06/24 showed hemoglobin of 11 4 with normal white cells and platelets  His TSH was normal   He continues to have abnormal creatinine of 2 5 with normal liver enzymes    Oncology History    70-year-old [place] American male heavy smoker who used to smoke 2 packs of cigarettes daily for many years, COPD, he presented with dyspnea, CT scan of the chest on October 2018 showed right middle lobe spiculated 1 3 cm mass with multiple solid and ground-glass nodules along the major and minor fissures sub cm pulmonary nodules bilaterally, left adrenal 1 2 cm nodule     PET scan showed 1 3 cm right middle lung nodule with SUV of 6 8, numerous nodular densities along the right major and minor fissures, right hilar activity with SUV of 3 4, subcarinal lymph node measuring 7 mm with SUV of 2 7, periportal enlarged lymph nodes concerning for metastatic disease measuring 2 4 cm with SUV of 5, prominent left retrocrural lymph node measuring 1 cm x 9 mm with SUV of 1 1    Core biopsy of the right spiculated nodule showed invasive adenocarcinoma consistent with lung primary positive for CK7, TTF 1, napsin a        Adenocarcinoma of lung, right ([address] )    11/13/2018 Initial Diagnosis     Adenocarcinoma of lung, right ([address] )  Stage IV      12/13/2018 - 3/28/2019 Chemotherapy      Alimta, Carboplatin (AUC 5) + Keytruda (6 cycles total)      4/17/2019 -  Chemotherapy     Started maintenance Alimta and Keytruda  (Alimta d/c'd 9/16/19 due to worsening renal dysfunction)         Interval history:    ROS: Review of Systems   Constitutional: Positive for fatigue  Negative for appetite change, diaphoresis and fever  HENT: Negative for congestion, dental problem, facial swelling, hearing loss, tinnitus and trouble swallowing  Eyes: Negative for visual disturbance  Respiratory: Positive for shortness of breath (With minimal activities  )  Negative for cough, chest tightness and wheezing  Cardiovascular: Negative for chest pain and leg swelling  Gastrointestinal: Negative for abdominal distention, abdominal pain, blood in stool, constipation, diarrhea, nausea and vomiting  Genitourinary: Negative for dysuria, hematuria and urgency  Musculoskeletal: Negative for arthralgias, myalgias and neck pain     Skin: Negative  Negative for color change, pallor, rash and wound  Neurological: Positive for numbness  Negative for dizziness, weakness and headaches  Hematological: Negative for adenopathy  Psychiatric/Behavioral: Negative for agitation, behavioral problems, confusion, hallucinations, self-injury and sleep disturbance  The patient is not nervous/anxious and is not hyperactive          Past Medical History:   Diagnosis Date    Alkalosis 12/9/2019    Bipolar 1 disorder ([address] )     Cancer ([facility] [address] )     adeno lung  dx 11/2018-lung bx today 4/9/2019-ongoing chemo    Chronic obstructive pulmonary disease with acute exacerbation ([address] ) 6/7/2018    Chronic pain disorder     back and right shoulder    CKD (chronic kidney disease)     COPD (chronic obstructive pulmonary disease) ([facility] [address] )     Depression     Diabetes mellitus ([facility] [address] )     Elevated d-dimer 11/30/2019    Elevated troponin 11/29/2019    Elevated troponin level not due to acute coronary syndrome 11/30/2019    GERD (gastroesophageal reflux disease)     Herniated lumbar intervertebral disc     Hyperkalemia     Hypertension     Insomnia     Latent syphilis     Treated    Low back pain     Lumbosacral disc disease     Lung cancer ([facility] [address] ) 04/2019    Pneumothorax after biopsy     R lung    PTSD (post-traumatic stress disorder)     Pulmonary emphysema ([facility] [address] )     Tobacco abuse 11/13/2018       Past Surgical History:   Procedure Laterality Date    COLONOSCOPY  2011    CT GUIDED CHEST TUBE  11/21/2018    FL GUIDED CENTRAL VENOUS ACCESS DEVICE INSERTION  2/8/2019    HEMORROIDECTOMY      IR CHEST TUBE  11/14/2018    IR IMAGE GUIDED BIOPSY/ASPIRATION LUNG  11/13/2018    KNEE SURGERY      [place] INSJ TUNNELED CTR VAD W/SUBQ PORT AGE 5 YR/> N/A 2/8/2019    Procedure: PLACEMENT OF PORT-A-CATH;  Surgeon: Joanie Chun MD;  Location:  MAIN OR;  Service: Vascular       Social History     Socioeconomic History    Marital status: Legally      Spouse name: None    Number of children: None    Years of education: None    Highest education level: None   Occupational History    Occupation: part time employment   Social Needs    Financial resource strain: None    Food insecurity:     Worry: None     Inability: None    Transportation needs:     Medical: None     Non-medical: None   Tobacco Use    Smoking status: Former Smoker     Packs/day: 0 50     Years: 40 00     Pack years: 20 00     Types: Cigarettes     Start date:      Last attempt to quit: 2019     Years since quittin 9    Smokeless tobacco: Never Used   Substance and Sexual Activity    Alcohol use: Not Currently    Drug use: Not Currently     Types: Marijuana     Comment: stopped , "i smoked weed and stopped 1 month ago"    Sexual activity: Yes   Lifestyle    Physical activity:     Days per week: None     Minutes per session: None    Stress: None   Relationships    Social connections:     Talks on phone: None     Gets together: None     Attends Jain service: None     Active member of club or organization: None     Attends meetings of clubs or organizations: None     Relationship status: None    Intimate partner violence:     Fear of current or ex partner: None     Emotionally abused: None     Physically abused: None     Forced sexual activity: None   Other Topics Concern    None   Social History Narrative    Lives with girlfriend       Family History   Problem Relation Age of Onset    Heart disease Mother     Cancer Mother     Hypertension Father     Diabetes Family     Asthma Family     Heart disease Family     Cancer Family     Cancer Maternal Grandfather     Cancer Paternal Grandfather     Cancer Maternal Aunt        Allergies   Allergen Reactions    Lisinopril Anaphylaxis     Took medication a while ago and had a "swelling reaction"  Does not carry epi-pen         Current Outpatient Medications:     albuterol (2 5 mg/3 mL) 0 083 % nebulizer solution, USE 1 VIAL VIA NEB EVERY 4 HOURS AS NEEDED FOR WHEEZING/SHORTNESS OF BREATH, Disp: 360 mL, Rfl: 3    albuterol (PROVENTIL HFA,VENTOLIN HFA) 90 mcg/act inhaler, Inhale 2 puffs every 4 (four) hours as needed for wheezing, Disp: 8 5 g, Rfl: 3    amLODIPine (NORVASC) 5 mg tablet, Take 1 tablet (5 mg total) by mouth daily, Disp: 30 tablet, Rfl: 0    apixaban (ELIQUIS) 2 5 mg, Take 1 tablet (2 5 mg total) by mouth 2 (two) times a day (Patient taking differently: Take 2 5 mg by mouth 3 (three) times a day ), Disp: 60 tablet, Rfl: 11    Blood Glucose Monitoring Suppl KIT, by Does not apply route daily, Disp: 1 each, Rfl: 0    budesonide (PULMICORT) 0 5 mg/2 mL nebulizer solution, Take 1 vial (0 5 mg total) by nebulization every 12 (twelve) hours Rinse mouth after use , Disp: 1 vial, Rfl: 0    budesonide (PULMICORT) 1 MG/2ML nebulizer solution, Take 2 mL (1 mg total) by nebulization 2 (two) times a day, Disp: 120 mL, Rfl: 1    carbamide peroxide (DEBROX) 6 5 % otic solution, Administer 5 drops into both ears 2 (two) times a day, Disp: 15 mL, Rfl: 0    dextromethorphan-guaiFENesin (ROBITUSSIN DM)  mg/5 mL syrup, Take 5 mL by mouth 3 (three) times a day as needed for cough, Disp: 236 mL, Rfl: 1    diltiazem (CARDIZEM CD) 120 mg 24 hr capsule, Take 1 capsule (120 mg total) by mouth daily, Disp: 30 capsule, Rfl: 11    divalproex sodium (DEPAKOTE) 250 mg EC tablet, Take 1 tablet (250 mg total) by mouth 2 (two) times a day, Disp: 30 tablet, Rfl: 0    ergocalciferol (ERGOCALCIFEROL) 1 25 MG (26636 UT) capsule, Take 1 capsule (50,000 Units total) by mouth once a week, Disp: 12 capsule, Rfl: 0    FLUoxetine (PROzac) 10 MG tablet, Take 1 tablet (10 mg total) by mouth daily, Disp: 30 tablet, Rfl: 5    fluticasone (FLONASE) 50 mcg/act nasal spray, 1-2 sprays each nostril daily for allergies, Disp: 1 Bottle, Rfl: 3    folic acid (FOLVITE) 1 mg tablet, TAKE 1 TABLET BY MOUTH ONCE DAILY  , Disp: 30 tablet, Rfl: 0   formoterol (PERFOROMIST) 20 MCG/2ML nebulizer solution, Take 1 vial (20 mcg total) by nebulization 2 (two) times a day, Disp: 60 vial, Rfl: 1    FREESTYLE LITE test strip, TEST BLOOD SUGAR DAILY, Disp: 100 each, Rfl: 1    ipratropium (ATROVENT) 0 02 % nebulizer solution, USE 1 VIAL VIA NEB 3 TIMES DAILY, Disp: 225 mL, Rfl: 3    ipratropium-albuterol (DUO-NEB) 0 5-2 5 mg/3 mL nebulizer solution, Take 1 vial (3 mL total) by nebulization 4 (four) times a day, Disp: 120 vial, Rfl: 0    Lancets (FREESTYLE) lancets, TEST BLOOD SUGAR DAILY, Disp: 100 each, Rfl: 4    lidocaine (LIDODERM) 5 %, Apply 1 patch topically daily Remove & Discard patch within 12 hours or as directed by MD, Disp: 30 patch, Rfl: 1    lidocaine (LMX) 4 % cream, Apply topically as needed for mild pain Apply 1/2-1 inch to port 30-60 mins prior to needle insertion, cover with serrain wrap, Disp: 30 g, Rfl: 5    loratadine (CLARITIN) 10 mg tablet, Take 1 tablet (10 mg total) by mouth daily in the early morning, Disp: 90 tablet, Rfl: 3    melatonin 3 mg, Take 1 tablet (3 mg total) by mouth daily at bedtime, Disp: 30 tablet, Rfl: 1    metFORMIN (GLUCOPHAGE-XR) 500 mg 24 hr tablet, Take 1 tablet (500 mg total) by mouth 2 (two) times a day with meals, Disp: 60 tablet, Rfl: 0    ondansetron (ZOFRAN) 4 mg tablet, Take 1 tablet (4 mg total) by mouth every 6 (six) hours as needed for nausea or vomiting, Disp: 60 tablet, Rfl: 2    oxyCODONE (OxyCONTIN) 20 mg 12 hr tablet, Take 1 tablet (20 mg total) by mouth every 12 (twelve) hours For cancer painMax Daily Amount: 40 mg, Disp: 60 tablet, Rfl: 0    [START ON 8/3/2020] oxyCODONE (OxyCONTIN) 20 mg 12 hr tablet, Take 1 tablet (20 mg total) by mouth every 12 (twelve) hours For severe cancer painMax Daily Amount: 40 mg, Disp: 60 tablet, Rfl: 0    [START ON 8/31/2020] oxyCODONE (OxyCONTIN) 20 mg 12 hr tablet, Take 1 tablet (20 mg total) by mouth every 12 (twelve) hours For severe cancer painMax Daily Amount: 40 mg, Disp: 60 tablet, Rfl: 0    oxyCODONE (ROXICODONE) 30 MG immediate release tablet, Take 1 tablet (30 mg total) by mouth every 4 (four) hours as needed (cancer pain)Max Daily Amount: 180 mg, Disp: 120 tablet, Rfl: 0    pantoprazole (PROTONIX) 40 mg tablet, Take 1 tablet (40 mg total) by mouth daily, Disp: 30 tablet, Rfl: 5    polyethylene glycol (GLYCOLAX) powder, Take 17 g by mouth daily, Disp: 527 g, Rfl: 1    predniSONE 10 mg tablet, Take 0 5 tablets (5 mg total) by mouth daily, Disp: 30 tablet, Rfl: 0    pregabalin (LYRICA) 25 mg capsule, Take 1 capsule PO in morning and 2 capsules PO at bedtime, Disp: 90 capsule, Rfl: 2    prochlorperazine (COMPAZINE) 10 mg tablet, Take 1 tablet (10 mg total) by mouth every 6 (six) hours as needed for nausea or vomiting, Disp: 90 tablet, Rfl: 0    QUEtiapine (SEROquel) 25 mg tablet, Take 25 mg by mouth daily at bedtime, Disp: , Rfl:     REGULOID 28 3 % POWD, USE AS DIRECTED, Disp: 369 g, Rfl: 4    Saline 0 65 % (Soln) SOLN, 5 drops into each nostril as needed (Dry nose), Disp: 50 mL, Rfl: 5    Selenium Sulfide 2 25 % SHAM, apply by topical route  every PM to the affected area(s), Disp: , Rfl:     senna-docusate sodium (SENOKOT-S) 8 6-50 mg per tablet, Take 1 tablet by mouth 2 (two) times a day, Disp: 60 tablet, Rfl: 0    sildenafil (VIAGRA) 50 MG tablet, Take 1 tablet (50 mg total) by mouth as needed for erectile dysfunction, Disp: 6 tablet, Rfl: 0    tamsulosin (FLOMAX) 0 4 mg, Take 1 capsule (0 4 mg total) by mouth daily with dinner, Disp: 90 capsule, Rfl: 3      Physical Exam:  /80 (BP Location: Left arm, Patient Position: Sitting, Cuff Size: Adult)   Pulse 68   Temp (!) 97 3 °F (36 3 °C) (Tympanic)   Resp 18   Ht 5' 7" (1 702 m)   Wt 86 9 kg (191 lb 9 6 oz)   SpO2 99%   BMI 30 01 kg/m²     Physical Exam   Constitutional: He is oriented to person, place, and time  He appears well-developed and well-nourished     HENT:   Head: Normocephalic and atraumatic  Nose: Nose normal    Eyes: Pupils are equal, round, and reactive to light  Conjunctivae and EOM are normal  Right eye exhibits no discharge  Left eye exhibits no discharge  No scleral icterus  Neck: Normal range of motion  Neck supple  No tracheal deviation present  No thyromegaly present  Cardiovascular: Normal rate, regular rhythm and normal heart sounds  Exam reveals no friction rub  No murmur heard  Pulmonary/Chest: Effort normal  No respiratory distress  He has wheezes (Expiratory wheezes bilaterally all over)  He has no rales  He exhibits no tenderness  Abdominal: Soft  Bowel sounds are normal  He exhibits no distension and no mass  There is no hepatosplenomegaly, splenomegaly or hepatomegaly  There is no tenderness  There is no rebound and no guarding  Musculoskeletal: Normal range of motion  He exhibits no edema, tenderness or deformity  Lymphadenopathy:     He has no cervical adenopathy  Neurological: He is alert and oriented to person, place, and time  He has normal reflexes  He displays normal reflexes  No cranial nerve deficit  Coordination normal    Skin: Skin is warm and dry  No rash noted  No erythema  No pallor  Psychiatric: He has a normal mood and affect  His behavior is normal  Judgment and thought content normal          Labs:  Lab Results   Component Value Date    WBC 9 90 06/24/2020    HGB 11 4 (L) 06/24/2020    HCT 34 6 (L) 06/24/2020    MCV 82 06/24/2020     06/24/2020     Lab Results   Component Value Date    K 4 2 06/24/2020     06/24/2020    CO2 27 06/24/2020    BUN 36 (H) 06/24/2020    CREATININE 2 50 (H) 06/24/2020    GLUF 97 04/22/2020    CALCIUM 8 9 06/24/2020    AST 31 06/24/2020    ALT 22 06/24/2020    ALKPHOS 89 06/24/2020    EGFR 32 (L) 06/24/2020     No results found for: TSH    Patient voiced understanding and agreement in the above discussion  Aware to contact our office with questions/symptoms in the interim

## 2020-07-22 DIAGNOSIS — I10 ESSENTIAL HYPERTENSION: ICD-10-CM

## 2020-07-22 DIAGNOSIS — C34.91 ADENOCARCINOMA OF LUNG, RIGHT (HCC): ICD-10-CM

## 2020-07-22 RX ORDER — PANTOPRAZOLE SODIUM 40 MG/1
TABLET, DELAYED RELEASE ORAL
Qty: 30 TABLET | Refills: 0 | Status: SHIPPED | OUTPATIENT
Start: 2020-07-22 | End: 2020-08-18

## 2020-07-24 RX ORDER — AMLODIPINE BESYLATE 5 MG/1
TABLET ORAL
Qty: 30 TABLET | Refills: 0 | Status: SHIPPED | OUTPATIENT
Start: 2020-07-24 | End: 2020-09-14

## 2020-07-24 RX ORDER — AMLODIPINE BESYLATE 10 MG/1
TABLET ORAL
Qty: 30 TABLET | Refills: 0 | OUTPATIENT
Start: 2020-07-24

## 2020-08-03 ENCOUNTER — TELEPHONE (OUTPATIENT)
Dept: HEMATOLOGY ONCOLOGY | Facility: CLINIC | Age: 60
End: 2020-08-03

## 2020-08-05 ENCOUNTER — HOSPITAL ENCOUNTER (OUTPATIENT)
Dept: INFUSION CENTER | Facility: HOSPITAL | Age: 60
Discharge: HOME | End: 2020-08-05
Attending: INTERNAL MEDICINE
Payer: COMMERCIAL

## 2020-08-05 ENCOUNTER — OFFICE VISIT (OUTPATIENT)
Dept: HEMATOLOGY ONCOLOGY | Facility: CLINIC | Age: 60
End: 2020-08-05
Payer: COMMERCIAL

## 2020-08-05 VITALS
DIASTOLIC BLOOD PRESSURE: 78 MMHG | HEART RATE: 85 BPM | WEIGHT: 192.6 LBS | SYSTOLIC BLOOD PRESSURE: 120 MMHG | BODY MASS INDEX: 30.23 KG/M2 | HEIGHT: 67 IN | OXYGEN SATURATION: 98 % | TEMPERATURE: 97 F | RESPIRATION RATE: 18 BRPM

## 2020-08-05 VITALS — WEIGHT: 192.6 LBS | BODY MASS INDEX: 30.23 KG/M2 | HEIGHT: 67 IN

## 2020-08-05 DIAGNOSIS — C34.91 ADENOCARCINOMA OF LUNG, RIGHT (HCC): Primary | ICD-10-CM

## 2020-08-05 DIAGNOSIS — N28.9 RENAL DYSFUNCTION: ICD-10-CM

## 2020-08-05 DIAGNOSIS — B36.0 TINEA VERSICOLOR: ICD-10-CM

## 2020-08-05 DIAGNOSIS — K59.00 CONSTIPATION, UNSPECIFIED CONSTIPATION TYPE: ICD-10-CM

## 2020-08-05 DIAGNOSIS — D64.9 NORMOCYTIC ANEMIA: ICD-10-CM

## 2020-08-05 LAB
ALBUMIN SERPL BCP-MCNC: 4.2 G/DL (ref 3–5.2)
ALP SERPL-CCNC: 96 U/L (ref 43–122)
ALT SERPL W P-5'-P-CCNC: 20 U/L (ref 9–52)
ANION GAP SERPL CALCULATED.3IONS-SCNC: 6 MMOL/L (ref 5–14)
AST SERPL W P-5'-P-CCNC: 28 U/L (ref 17–59)
BASOPHILS # BLD AUTO: 0.1 THOUSANDS/ΜL (ref 0–0.1)
BASOPHILS NFR BLD AUTO: 1 % (ref 0–1)
BILIRUB SERPL-MCNC: 0.4 MG/DL
BUN SERPL-MCNC: 36 MG/DL (ref 5–25)
CALCIUM SERPL-MCNC: 9.4 MG/DL (ref 8.4–10.2)
CHLORIDE SERPL-SCNC: 101 MMOL/L (ref 97–108)
CO2 SERPL-SCNC: 31 MMOL/L (ref 22–30)
CREAT SERPL-MCNC: 2.63 MG/DL (ref 0.7–1.5)
EOSINOPHIL # BLD AUTO: 0.4 THOUSAND/ΜL (ref 0–0.4)
EOSINOPHIL NFR BLD AUTO: 5 % (ref 0–6)
ERYTHROCYTE [DISTWIDTH] IN BLOOD BY AUTOMATED COUNT: 17.9 %
GFR SERPL CREATININE-BSD FRML MDRD: 30 ML/MIN/1.73SQ M
GLUCOSE SERPL-MCNC: 118 MG/DL (ref 70–99)
HCT VFR BLD AUTO: 35.4 % (ref 41–53)
HGB BLD-MCNC: 11.6 G/DL (ref 13.5–17.5)
LYMPHOCYTES # BLD AUTO: 1.7 THOUSANDS/ΜL (ref 0.5–4)
LYMPHOCYTES NFR BLD AUTO: 21 % (ref 25–45)
MAGNESIUM SERPL-MCNC: 2.1 MG/DL (ref 1.6–2.3)
MCH RBC QN AUTO: 26.9 PG (ref 26–34)
MCHC RBC AUTO-ENTMCNC: 32.9 G/DL (ref 31–36)
MCV RBC AUTO: 82 FL (ref 80–100)
MONOCYTES # BLD AUTO: 0.6 THOUSAND/ΜL (ref 0.2–0.9)
MONOCYTES NFR BLD AUTO: 8 % (ref 1–10)
NEUTROPHILS # BLD AUTO: 5.5 THOUSANDS/ΜL (ref 1.8–7.8)
NEUTS SEG NFR BLD AUTO: 65 % (ref 45–65)
PLATELET # BLD AUTO: 300 THOUSANDS/UL (ref 150–450)
PMV BLD AUTO: 7.3 FL (ref 8.9–12.7)
POTASSIUM SERPL-SCNC: 4.6 MMOL/L (ref 3.6–5)
PROT SERPL-MCNC: 7.9 G/DL (ref 5.9–8.4)
RBC # BLD AUTO: 4.33 MILLION/UL (ref 4.5–5.9)
SODIUM SERPL-SCNC: 138 MMOL/L (ref 137–147)
T3FREE SERPL-MCNC: 3.2 PG/ML (ref 2.3–4.2)
TSH SERPL DL<=0.05 MIU/L-ACNC: 2.67 UIU/ML (ref 0.47–4.68)
WBC # BLD AUTO: 8.4 THOUSAND/UL (ref 4.5–11)

## 2020-08-05 PROCEDURE — 83735 ASSAY OF MAGNESIUM: CPT

## 2020-08-05 PROCEDURE — 3008F BODY MASS INDEX DOCD: CPT | Performed by: NURSE PRACTITIONER

## 2020-08-05 PROCEDURE — 84481 FREE ASSAY (FT-3): CPT | Performed by: INTERNAL MEDICINE

## 2020-08-05 PROCEDURE — 84443 ASSAY THYROID STIM HORMONE: CPT | Performed by: INTERNAL MEDICINE

## 2020-08-05 PROCEDURE — 3078F DIAST BP <80 MM HG: CPT | Performed by: NURSE PRACTITIONER

## 2020-08-05 PROCEDURE — 3066F NEPHROPATHY DOC TX: CPT | Performed by: NURSE PRACTITIONER

## 2020-08-05 PROCEDURE — 96413 CHEMO IV INFUSION 1 HR: CPT

## 2020-08-05 PROCEDURE — 99214 OFFICE O/P EST MOD 30 MIN: CPT | Performed by: NURSE PRACTITIONER

## 2020-08-05 PROCEDURE — 3074F SYST BP LT 130 MM HG: CPT | Performed by: NURSE PRACTITIONER

## 2020-08-05 PROCEDURE — 85025 COMPLETE CBC W/AUTO DIFF WBC: CPT | Performed by: INTERNAL MEDICINE

## 2020-08-05 PROCEDURE — 1036F TOBACCO NON-USER: CPT | Performed by: NURSE PRACTITIONER

## 2020-08-05 PROCEDURE — 80053 COMPREHEN METABOLIC PANEL: CPT | Performed by: INTERNAL MEDICINE

## 2020-08-05 RX ORDER — SODIUM CHLORIDE 9 MG/ML
20 INJECTION, SOLUTION INTRAVENOUS ONCE
Status: CANCELLED | OUTPATIENT
Start: 2020-08-24

## 2020-08-05 RX ORDER — SELENIUM SULFIDE 2.5 MG/100ML
LOTION TOPICAL
Qty: 118 ML | Refills: 1 | Status: SHIPPED | OUTPATIENT
Start: 2020-08-05 | End: 2020-10-12

## 2020-08-05 RX ORDER — SENNA AND DOCUSATE SODIUM 50; 8.6 MG/1; MG/1
1 TABLET, FILM COATED ORAL 2 TIMES DAILY
Qty: 60 TABLET | Refills: 0 | Status: SHIPPED | OUTPATIENT
Start: 2020-08-05 | End: 2020-09-14

## 2020-08-05 RX ORDER — SODIUM CHLORIDE 9 MG/ML
20 INJECTION, SOLUTION INTRAVENOUS ONCE
Status: COMPLETED | OUTPATIENT
Start: 2020-08-05 | End: 2020-08-05

## 2020-08-05 RX ADMIN — SODIUM CHLORIDE 20 ML/HR: 0.9 INJECTION, SOLUTION INTRAVENOUS at 11:38

## 2020-08-05 RX ADMIN — SODIUM CHLORIDE 200 MG: 9 INJECTION, SOLUTION INTRAVENOUS at 12:13

## 2020-08-05 NOTE — PROGRESS NOTES
Pt tolerated treatment today with no adverse reactions  AVS provided  Left unit ambulatory with a steady gait

## 2020-08-05 NOTE — PROGRESS NOTES
Hematology/Oncology Outpatient Follow-up  Bea Roblero  64 y o  male 1960 [id number]    Date:  8/5/2020      Assessment and Plan:  1  Adenocarcinoma of lung, right [facility])  Patient will be continued on his current palliative treatment with single agent immunotherapy Keytruda on every three-week basis which he is tolerating well  We will await his repeat laboratory studies from today which we will review on address once they become available  He will be back for follow-up again in 3 weeks with repeat labs that day  The patient will also be due for repeat imaging with a PET-CT scan prior to his follow-up which we will order and schedule today  - CBC and differential; Future  - Comprehensive metabolic panel; Future  - TSH, 3rd generation with Free T4 reflex; Future  - T3, free; Future  - CBC and differential; Future  - Comprehensive metabolic panel; Future  - Magnesium; Future  - TSH, 3rd generation with Free T4 reflex; Future  - NM PET CT skull base to mid thigh; Future    2  Tinea versicolor  Patient noted to have numerous small circular lesions to his entire neck area  Likely tinea versicolor  We will treat him with selenium sulfide lotion/shampoo 2 5% to affected areas daily for 7 days which will hopefully improve the rash  He was instructed to apply the medication to the affected area and leave on skin for 10 minutes then rinse off; should do this daily for at least 7 days  We will re-evaluate the area at his next follow-up appointment in 3 weeks from now  - selenium sulfide (SELSUN) 2 5 % shampoo; Daily to affected area on neck  Leave on skin for 10 minutes and then rinse repeat daily for 7 days total  Dispense: 118 mL; Refill: 1    3  Normocytic anemia  Patient continues to have stable normocytic anemia hemoglobin 11 6  He does not appear to have any nutritional deficiencies  Likely secondary to anemia of chronic renal dysfunction and anemia of chronic disease    His inflammatory markers continue to be elevated  4  Constipation, unspecified constipation type  Patient is asking for refill on his Senokot S since he ran out  New script sent to his local pharmacy  He will continue to follow up with the palliative care team for his pain and symptom management  - senna-docusate sodium (SENOKOT-S) 8 6-50 mg per tablet; Take 1 tablet by mouth 2 (two) times a day  Dispense: 60 tablet; Refill: 0    HPI:  Patient presents today for a follow-up visit  He has no new complaints  Continues to report chronic dyspnea no change from his baseline; he is on ongoing continuous O2 therapy with 3 L nasal cannula  Continues to take the low-dose prednisone 5 mg per day  Patient will be going for repeat laboratory studies today after the visit  His most recent labs from 3 weeks ago 07/15/2020 showed normal white cells and platelets, he has stable normocytic anemia H&H 11 6/34 7, MCV 82  He has stable renal dysfunction BUN 25, creatinine 2 08, GFR 40, glucose 108, remaining metabolic panel normal   TSH is normal 1 25  Inflammatory markers continue to be elevated sed rate 59 and C-reactive protein 19  B12 is appropriate 523  Iron panel showed iron saturation 28% with ferritin 68      Oncology History   56-year-old [facility] [employer] male heavy smoker who used to smoke 2 packs of cigarettes daily for many years, COPD, he presented with dyspnea, CT scan of the chest on October 2018 showed right middle lobe spiculated 1 3 cm mass with multiple solid and ground-glass nodules along the major and minor fissures sub cm pulmonary nodules bilaterally, left adrenal 1 2 cm nodule     PET scan showed 1 3 cm right middle lung nodule with SUV of 6 8, numerous nodular densities along the right major and minor fissures, right hilar activity with SUV of 3 4, subcarinal lymph node measuring 7 mm with SUV of 2 7, periportal enlarged lymph nodes concerning for metastatic disease measuring 2 4 cm with SUV of 5, prominent left retrocrural lymph node measuring 1 cm x 9 mm with SUV of 1 1    Core biopsy of the right spiculated nodule showed invasive adenocarcinoma consistent with lung primary positive for CK7, TTF 1, napsin a     Adenocarcinoma of lung, right ([facility] )   11/13/2018 Initial Diagnosis    Adenocarcinoma of lung, right ([facility] )  Stage IV     12/13/2018 - 3/28/2019 Chemotherapy     Alimta, Carboplatin (AUC 5) + Keytruda (6 cycles total)     4/17/2019 -  Chemotherapy    Started maintenance Alimta and Keytruda  (Alimta d/c'd 9/16/19 due to worsening renal dysfunction)         Interval history:    ROS: Review of Systems   Constitutional: Positive for fatigue  Negative for activity change, appetite change, chills, fever and unexpected weight change  HENT: Negative for congestion, mouth sores, nosebleeds, sore throat and trouble swallowing  Eyes: Negative  Respiratory: Positive for cough, shortness of breath and wheezing  Negative for chest tightness  Cardiovascular: Negative for chest pain, palpitations and leg swelling  Gastrointestinal: Positive for constipation (mild chronic) and nausea (mild)  Negative for abdominal distention, abdominal pain, blood in stool, diarrhea and vomiting  Genitourinary: Negative for difficulty urinating, dysuria, frequency, hematuria and urgency  Musculoskeletal: Positive for arthralgias and myalgias  Negative for back pain, gait problem and joint swelling  Skin: Negative for color change, pallor and rash  Reports dry skin arms and neck   Neurological: Positive for dizziness (mild)  Negative for weakness, light-headedness, numbness and headaches  Hematological: Negative for adenopathy  Does not bruise/bleed easily  Psychiatric/Behavioral: Negative for dysphoric mood and sleep disturbance  The patient is not nervous/anxious          Past Medical History:   Diagnosis Date    Alkalosis 12/9/2019    Bipolar 1 disorder ([facility] )     Cancer ([facility] )     adeno lung  dx 11/2018-lung bx today 4/9/2019-ongoing chemo    [facility] with [facility] 6/7/2018    Chronic pain disorder     back and right shoulder    CKD (chronic kidney disease)     COPD (chronic obstructive pulmonary disease) (HCC)     Depression     Diabetes mellitus ([facility] )     Elevated d-dimer 11/30/2019    Elevated troponin 11/29/2019    Elevated troponin level not due to acute coronary syndrome 11/30/2019    GERD (gastroesophageal reflux disease)     Herniated lumbar intervertebral disc     Hyperkalemia     Hypertension     Insomnia     Latent syphilis     Treated    Low back pain     Lumbosacral disc disease     Lung cancer ([facility] ) 04/2019    Pneumothorax after biopsy     R lung    PTSD (post-traumatic stress disorder)     Pulmonary emphysema ([place] [address] )     Tobacco abuse 11/13/2018       Past Surgical History:   Procedure Laterality Date    COLONOSCOPY  2011    CT GUIDED CHEST TUBE  11/21/2018    FL GUIDED CENTRAL VENOUS ACCESS DEVICE INSERTION  2/8/2019    HEMORROIDECTOMY      IR CHEST TUBE  11/14/2018    IR IMAGE GUIDED BIOPSY/ASPIRATION LUNG  11/13/2018    KNEE SURGERY      AR INSJ TUNNELED CTR VAD W/SUBQ PORT AGE 5 YR/> N/A 2/8/2019    Procedure: PLACEMENT OF PORT-A-CATH;  Surgeon: Ramona Ignacio MD;  Location:  MAIN OR;  Service: Vascular       Social History     Socioeconomic History    Marital status: Legally      Spouse name: Not on file    Number of children: Not on file    Years of education: Not on file    Highest education level: Not on file   Occupational History    Occupation: part time employment   Social Needs    Financial resource strain: Not on file    Food insecurity     Worry: Not on file     Inability: Not on file   [employer] needs     Medical: Not on file     Non-medical: Not on file   Tobacco Use    Smoking status: Former Smoker     Packs/day: 0 50     Years: 40 00     Pack years: 20 00     Types: Cigarettes     Start date: 1971 Last attempt to quit: 2019     Years since quittin 0    Smokeless tobacco: Never Used   Substance and Sexual Activity    Alcohol use: Not Currently    Drug use: Not Currently     Types: Marijuana     Comment: stopped , "i smoked weed and stopped 1 month ago"    Sexual activity: Yes   Lifestyle    Physical activity     Days per week: Not on file     Minutes per session: Not on file    Stress: Not on file   Relationships    Social connections     Talks on phone: Not on file     Gets together: Not on file     Attends [employer] service: Not on file     Active member of club or organization: Not on file     Attends meetings of clubs or organizations: Not on file     Relationship status: Not on file    Intimate partner violence     Fear of current or ex partner: Not on file     Emotionally abused: Not on file     Physically abused: Not on file     Forced sexual activity: Not on file   Other Topics Concern    Not on file   Social History Narrative    Lives with girlfriend       Family History   Problem Relation Age of Onset    Heart disease Mother     Cancer Mother     Hypertension Father     Diabetes Family     Asthma Family     Heart disease Family     Cancer Family     Cancer Maternal Grandfather     Cancer Paternal Grandfather     Cancer Maternal Aunt        Allergies   Allergen Reactions    Lisinopril Anaphylaxis     Took medication a while ago and had a "swelling reaction"  Does not carry epi-pen         Current Outpatient Medications:     albuterol (2 5 mg/3 mL) 0 083 % nebulizer solution, USE 1 VIAL VIA NEB EVERY 4 HOURS AS NEEDED FOR WHEEZING/SHORTNESS OF BREATH, Disp: 360 mL, Rfl: 3    albuterol (PROVENTIL HFA,VENTOLIN HFA) 90 mcg/act inhaler, Inhale 2 puffs every 4 (four) hours as needed for wheezing, Disp: 8 5 g, Rfl: 3    amLODIPine (NORVASC) 5 mg tablet, TAKE 1 TABLET BY MOUTH ONCE DAILY  , Disp: 30 tablet, Rfl: 0    apixaban (ELIQUIS) 2 5 mg, Take 1 tablet (2 5 mg total) by mouth 2 (two) times a day (Patient taking differently: Take 2 5 mg by mouth 3 (three) times a day ), Disp: 60 tablet, Rfl: 11    Blood Glucose Monitoring Suppl KIT, by Does not apply route daily, Disp: 1 each, Rfl: 0    budesonide (PULMICORT) 0 5 mg/2 mL nebulizer solution, Take 1 vial (0 5 mg total) by nebulization every 12 (twelve) hours Rinse mouth after use , Disp: 1 vial, Rfl: 0    budesonide (PULMICORT) 1 MG/2ML nebulizer solution, Take 2 mL (1 mg total) by nebulization 2 (two) times a day, Disp: 120 mL, Rfl: 1    carbamide peroxide (DEBROX) 6 5 % otic solution, Administer 5 drops into both ears 2 (two) times a day, Disp: 15 mL, Rfl: 0    dextromethorphan-guaiFENesin (ROBITUSSIN DM)  mg/5 mL syrup, Take 5 mL by mouth 3 (three) times a day as needed for cough, Disp: 236 mL, Rfl: 1    diltiazem (CARDIZEM CD) 120 mg 24 hr capsule, Take 1 capsule (120 mg total) by mouth daily, Disp: 30 capsule, Rfl: 11    divalproex sodium (DEPAKOTE) 250 mg EC tablet, Take 1 tablet (250 mg total) by mouth 2 (two) times a day, Disp: 30 tablet, Rfl: 0    ergocalciferol (ERGOCALCIFEROL) 1 25 MG (37611 UT) capsule, Take 1 capsule (50,000 Units total) by mouth once a week, Disp: 12 capsule, Rfl: 0    FLUoxetine (PROzac) 10 MG tablet, Take 1 tablet (10 mg total) by mouth daily, Disp: 30 tablet, Rfl: 5    fluticasone (FLONASE) 50 mcg/act nasal spray, 1-2 sprays each nostril daily for allergies, Disp: 1 Bottle, Rfl: 3    folic acid (FOLVITE) 1 mg tablet, TAKE 1 TABLET BY MOUTH ONCE DAILY  , Disp: 30 tablet, Rfl: 0    formoterol (PERFOROMIST) 20 MCG/2ML nebulizer solution, Take 1 vial (20 mcg total) by nebulization 2 (two) times a day, Disp: 60 vial, Rfl: 1    FREESTYLE LITE test strip, TEST BLOOD SUGAR DAILY, Disp: 100 each, Rfl: 1    ipratropium (ATROVENT) 0 02 % nebulizer solution, USE 1 VIAL VIA NEB 3 TIMES DAILY, Disp: 225 mL, Rfl: 3    ipratropium-albuterol (DUO-NEB) 0 5-2 5 mg/3 mL nebulizer solution, Take 1 vial (3 mL total) by nebulization 4 (four) times a day, Disp: 120 vial, Rfl: 0    Lancets (FREESTYLE) lancets, TEST BLOOD SUGAR DAILY, Disp: 100 each, Rfl: 4    lidocaine (LIDODERM) 5 %, Apply 1 patch topically daily Remove & Discard patch within 12 hours or as directed by MD, Disp: 30 patch, Rfl: 1    lidocaine (LMX) 4 % cream, Apply topically as needed for mild pain Apply 1/2-1 inch to port 30-60 mins prior to needle insertion, cover with serrain wrap, Disp: 30 g, Rfl: 5    loratadine (CLARITIN) 10 mg tablet, Take 1 tablet (10 mg total) by mouth daily in the early morning, Disp: 90 tablet, Rfl: 3    melatonin 3 mg, Take 1 tablet (3 mg total) by mouth daily at bedtime, Disp: 30 tablet, Rfl: 1    metFORMIN (GLUCOPHAGE-XR) 500 mg 24 hr tablet, Take 1 tablet (500 mg total) by mouth 2 (two) times a day with meals, Disp: 60 tablet, Rfl: 0    ondansetron (ZOFRAN) 4 mg tablet, Take 1 tablet (4 mg total) by mouth every 6 (six) hours as needed for nausea or vomiting, Disp: 60 tablet, Rfl: 2    oxyCODONE (OxyCONTIN) 20 mg 12 hr tablet, Take 1 tablet (20 mg total) by mouth every 12 (twelve) hours For cancer painMax Daily Amount: 40 mg, Disp: 60 tablet, Rfl: 0    oxyCODONE (OxyCONTIN) 20 mg 12 hr tablet, Take 1 tablet (20 mg total) by mouth every 12 (twelve) hours For severe cancer painMax Daily Amount: 40 mg, Disp: 60 tablet, Rfl: 0    [START ON 8/31/2020] oxyCODONE (OxyCONTIN) 20 mg 12 hr tablet, Take 1 tablet (20 mg total) by mouth every 12 (twelve) hours For severe cancer painMax Daily Amount: 40 mg, Disp: 60 tablet, Rfl: 0    oxyCODONE (ROXICODONE) 30 MG immediate release tablet, Take 1 tablet (30 mg total) by mouth every 4 (four) hours as needed (cancer pain)Max Daily Amount: 180 mg, Disp: 120 tablet, Rfl: 0    pantoprazole (PROTONIX) 40 mg tablet, TAKE ONE TABLET BY MOUTH EVERY DAY, Disp: 30 tablet, Rfl: 0    polyethylene glycol (GLYCOLAX) powder, Take 17 g by mouth daily, Disp: 527 g, Rfl: 1   predniSONE 10 mg tablet, Take 0 5 tablets (5 mg total) by mouth daily, Disp: 30 tablet, Rfl: 0    pregabalin (LYRICA) 25 mg capsule, Take 1 capsule PO in morning and 2 capsules PO at bedtime, Disp: 90 capsule, Rfl: 2    prochlorperazine (COMPAZINE) 10 mg tablet, Take 1 tablet (10 mg total) by mouth every 6 (six) hours as needed for nausea or vomiting, Disp: 90 tablet, Rfl: 0    QUEtiapine (SEROquel) 25 mg tablet, Take 25 mg by mouth daily at bedtime, Disp: , Rfl:     REGULOID 28 3 % POWD, USE AS DIRECTED, Disp: 369 g, Rfl: 4    Saline 0 65 % (Soln) SOLN, 5 drops into each nostril as needed (Dry nose), Disp: 50 mL, Rfl: 5    senna-docusate sodium (SENOKOT-S) 8 6-50 mg per tablet, Take 1 tablet by mouth 2 (two) times a day, Disp: 60 tablet, Rfl: 0    sildenafil (VIAGRA) 50 MG tablet, Take 1 tablet (50 mg total) by mouth as needed for erectile dysfunction, Disp: 6 tablet, Rfl: 0    tamsulosin (FLOMAX) 0 4 mg, Take 1 capsule (0 4 mg total) by mouth daily with dinner, Disp: 90 capsule, Rfl: 3    selenium sulfide (SELSUN) 2 5 % shampoo, Daily to affected area on neck  Leave on skin for 10 minutes and then rinse repeat daily for 7 days total, Disp: 118 mL, Rfl: 1  No current facility-administered medications for this visit  Facility-Administered Medications Ordered in Other Visits:     pembrolizumab (KEYTRUDA) 200 mg in sodium chloride 0 9 % 50 mL IVPB, 200 mg, Intravenous, Once, Justyna Tovar MD      Physical Exam:  /78 (BP Location: Left arm, Patient Position: Sitting, Cuff Size: Adult)   Pulse 85   Temp (!) 97 °F (36 1 °C) (Tympanic)   Resp 18   Ht 5' 7" (1 702 m)   Wt 87 4 kg (192 lb 9 6 oz)   SpO2 98%   BMI 30 17 kg/m²     Physical Exam   Constitutional: He is oriented to person, place, and time  He appears well-developed  He is cooperative  No distress  HENT:   Head: Normocephalic and atraumatic  Eyes: Pupils are equal, round, and reactive to light   Conjunctivae and lids are normal  No scleral icterus  Neck: Normal range of motion  Neck supple  No thyromegaly present  Cardiovascular: Normal rate, regular rhythm and normal heart sounds  No murmur heard  Pulmonary/Chest: Effort normal  No respiratory distress  Decreased air movement is present  He has wheezes (expiratory) in the right upper field, the right middle field, the right lower field, the left upper field, the left middle field and the left lower field  Abdominal: Soft  He exhibits no distension  There is no splenomegaly or hepatomegaly  There is no abdominal tenderness  Musculoskeletal: Normal range of motion  General: Swelling (+1 ankle bilateral) present  Lymphadenopathy:     He has no cervical adenopathy  Neurological: He is alert and oriented to person, place, and time  Skin: Skin is warm and dry  Rash ( noted numerous small circular white/grey scaling papules to the entire neck area) noted  He is not diaphoretic  No erythema  Psychiatric: His behavior is normal  Mood, judgment and thought content normal  His affect is blunt  Vitals reviewed  Labs:  Lab Results   Component Value Date    WBC 7 80 07/15/2020    HGB 11 6 (L) 07/15/2020    HCT 34 7 (L) 07/15/2020    MCV 82 07/15/2020     07/15/2020     Lab Results   Component Value Date    K 4 2 07/15/2020     07/15/2020    CO2 28 07/15/2020    BUN 25 07/15/2020    CREATININE 2 08 (H) 07/15/2020    GLUF 97 04/22/2020    CALCIUM 9 2 07/15/2020    AST 24 07/15/2020    ALT 21 07/15/2020    ALKPHOS 88 07/15/2020    EGFR 40 (L) 07/15/2020       Patient voiced understanding and agreement in the above discussion  Aware to contact our office with questions/symptoms in the interim  This note has been generated by voice recognition software system  Therefore, there may be spelling, grammar, and or syntax errors  Please contact if questions arise

## 2020-08-05 NOTE — PLAN OF CARE
Problem: Potential for Falls  Goal: Patient will remain free of falls  Description: INTERVENTIONS:  - Assess patient frequently for physical needs  -  Identify cognitive and physical deficits and behaviors that affect risk of falls    -  [place] fall precautions as indicated by assessment   - Educate patient/family on patient safety including physical limitations  - Instruct patient to call for assistance with activity based on assessment  - Modify environment to reduce risk of injury  - Consider OT/PT consult to assist with strengthening/mobility  Outcome: Progressing

## 2020-08-05 NOTE — PROGRESS NOTES
Pt here for [facility] today, offers no new complaints  Central labs drawn per order and ok to proceed without results, pt tolerated treatment well without complicaitons, confirmed next appt and AVS provided

## 2020-08-06 DIAGNOSIS — G89.3 CANCER-RELATED PAIN: Chronic | ICD-10-CM

## 2020-08-06 DIAGNOSIS — Z51.5 PALLIATIVE CARE PATIENT: ICD-10-CM

## 2020-08-06 DIAGNOSIS — C34.91 ADENOCARCINOMA OF LUNG, RIGHT (HCC): ICD-10-CM

## 2020-08-07 RX ORDER — OXYCODONE HYDROCHLORIDE 30 MG/1
30 TABLET ORAL EVERY 4 HOURS PRN
Qty: 120 TABLET | Refills: 0 | Status: SHIPPED | OUTPATIENT
Start: 2020-08-07 | End: 2020-08-10 | Stop reason: SDUPTHER

## 2020-08-10 DIAGNOSIS — Z51.5 PALLIATIVE CARE PATIENT: ICD-10-CM

## 2020-08-10 DIAGNOSIS — G89.3 CANCER-RELATED PAIN: Chronic | ICD-10-CM

## 2020-08-10 DIAGNOSIS — C34.91 ADENOCARCINOMA OF LUNG, RIGHT (HCC): ICD-10-CM

## 2020-08-10 RX ORDER — OXYCODONE HYDROCHLORIDE 30 MG/1
30 TABLET ORAL EVERY 4 HOURS PRN
Qty: 120 TABLET | Refills: 0 | Status: SHIPPED | OUTPATIENT
Start: 2020-08-10 | End: 2020-08-21 | Stop reason: SDUPTHER

## 2020-08-10 NOTE — TELEPHONE ENCOUNTER
Please resend script for Oxycodone 30 mg   Pt no longer using [facility]  Send to [address] [facility] as listed  Pt down to 2 tablets

## 2020-08-17 ENCOUNTER — HOSPITAL ENCOUNTER (OUTPATIENT)
Dept: NUCLEAR MEDICINE | Facility: HOSPITAL | Age: 60
Discharge: HOME | End: 2020-08-17
Payer: COMMERCIAL

## 2020-08-17 DIAGNOSIS — C34.91 ADENOCARCINOMA OF LUNG, RIGHT (HCC): ICD-10-CM

## 2020-08-17 LAB — GLUCOSE SERPL-MCNC: 90 MG/DL (ref 65–140)

## 2020-08-17 PROCEDURE — 82948 REAGENT STRIP/BLOOD GLUCOSE: CPT

## 2020-08-17 PROCEDURE — 78815 PET IMAGE W/CT SKULL-THIGH: CPT

## 2020-08-17 PROCEDURE — A9552 F18 FDG: HCPCS

## 2020-08-17 PROCEDURE — G1004 CDSM NDSC: HCPCS

## 2020-08-18 DIAGNOSIS — N41.9 PROSTATITIS, UNSPECIFIED PROSTATITIS TYPE: ICD-10-CM

## 2020-08-18 DIAGNOSIS — R30.0 STRANGURIA: ICD-10-CM

## 2020-08-18 DIAGNOSIS — N40.1 BENIGN PROSTATIC HYPERPLASIA WITH LOWER URINARY TRACT SYMPTOMS, SYMPTOM DETAILS UNSPECIFIED: ICD-10-CM

## 2020-08-18 DIAGNOSIS — C34.91 ADENOCARCINOMA OF LUNG, RIGHT (HCC): ICD-10-CM

## 2020-08-18 RX ORDER — TAMSULOSIN HYDROCHLORIDE 0.4 MG/1
CAPSULE ORAL
Qty: 90 CAPSULE | Refills: 0 | Status: SHIPPED | OUTPATIENT
Start: 2020-08-18 | End: 2021-01-01 | Stop reason: SDUPTHER

## 2020-08-18 RX ORDER — PANTOPRAZOLE SODIUM 40 MG/1
TABLET, DELAYED RELEASE ORAL
Qty: 30 TABLET | Refills: 0 | Status: SHIPPED | OUTPATIENT
Start: 2020-08-18 | End: 2020-09-14

## 2020-08-21 DIAGNOSIS — C34.91 ADENOCARCINOMA OF LUNG, RIGHT (HCC): ICD-10-CM

## 2020-08-21 DIAGNOSIS — Z51.5 PALLIATIVE CARE PATIENT: ICD-10-CM

## 2020-08-21 DIAGNOSIS — G89.3 CANCER-RELATED PAIN: Chronic | ICD-10-CM

## 2020-08-21 RX ORDER — OXYCODONE HYDROCHLORIDE 30 MG/1
30 TABLET ORAL EVERY 4 HOURS PRN
Qty: 120 TABLET | Refills: 0 | Status: SHIPPED | OUTPATIENT
Start: 2020-08-21 | End: 2020-09-30 | Stop reason: SDUPTHER

## 2020-08-21 RX ORDER — OXYCODONE HCL 20 MG/1
20 TABLET, FILM COATED, EXTENDED RELEASE ORAL EVERY 12 HOURS SCHEDULED
Qty: 60 TABLET | Refills: 0 | OUTPATIENT
Start: 2020-08-21

## 2020-08-21 NOTE — TELEPHONE ENCOUNTER
8/21/2020 1:21 PM -  Called to pharmacy and confirmed that pt has a long-acting oxyCONTIN rx on file already, so the short-acting oxyIR was sent just now  He should call the pharmacy to activate both Rxs

## 2020-08-24 ENCOUNTER — OFFICE VISIT (OUTPATIENT)
Dept: HEMATOLOGY ONCOLOGY | Facility: CLINIC | Age: 60
End: 2020-08-24
Payer: COMMERCIAL

## 2020-08-24 ENCOUNTER — HOSPITAL ENCOUNTER (OUTPATIENT)
Dept: INFUSION CENTER | Facility: HOSPITAL | Age: 60
Discharge: HOME | End: 2020-08-24
Attending: INTERNAL MEDICINE
Payer: COMMERCIAL

## 2020-08-24 ENCOUNTER — TELEPHONE (OUTPATIENT)
Dept: HEMATOLOGY ONCOLOGY | Facility: CLINIC | Age: 60
End: 2020-08-24

## 2020-08-24 VITALS
OXYGEN SATURATION: 99 % | DIASTOLIC BLOOD PRESSURE: 78 MMHG | BODY MASS INDEX: 30.17 KG/M2 | SYSTOLIC BLOOD PRESSURE: 142 MMHG | RESPIRATION RATE: 18 BRPM | HEART RATE: 90 BPM | TEMPERATURE: 97.4 F | WEIGHT: 192.2 LBS | HEIGHT: 67 IN

## 2020-08-24 VITALS — BODY MASS INDEX: 30.17 KG/M2 | HEIGHT: 67 IN | WEIGHT: 192.2 LBS

## 2020-08-24 DIAGNOSIS — D64.9 NORMOCYTIC ANEMIA: ICD-10-CM

## 2020-08-24 DIAGNOSIS — C34.91 ADENOCARCINOMA OF LUNG, RIGHT (HCC): Primary | ICD-10-CM

## 2020-08-24 DIAGNOSIS — N28.9 RENAL DYSFUNCTION: ICD-10-CM

## 2020-08-24 LAB
ALBUMIN SERPL BCP-MCNC: 3.7 G/DL (ref 3–5.2)
ALP SERPL-CCNC: 92 U/L (ref 43–122)
ALT SERPL W P-5'-P-CCNC: 28 U/L (ref 9–52)
ANION GAP SERPL CALCULATED.3IONS-SCNC: 4 MMOL/L (ref 5–14)
AST SERPL W P-5'-P-CCNC: 24 U/L (ref 17–59)
BASOPHILS # BLD AUTO: 0.1 THOUSANDS/ΜL (ref 0–0.1)
BASOPHILS NFR BLD AUTO: 1 % (ref 0–1)
BILIRUB SERPL-MCNC: 0.2 MG/DL
BUN SERPL-MCNC: 33 MG/DL (ref 5–25)
CALCIUM SERPL-MCNC: 8.5 MG/DL (ref 8.4–10.2)
CHLORIDE SERPL-SCNC: 105 MMOL/L (ref 97–108)
CO2 SERPL-SCNC: 29 MMOL/L (ref 22–30)
CREAT SERPL-MCNC: 2.45 MG/DL (ref 0.7–1.5)
EOSINOPHIL # BLD AUTO: 0.4 THOUSAND/ΜL (ref 0–0.4)
EOSINOPHIL NFR BLD AUTO: 6 % (ref 0–6)
ERYTHROCYTE [DISTWIDTH] IN BLOOD BY AUTOMATED COUNT: 17.9 %
GFR SERPL CREATININE-BSD FRML MDRD: 32 ML/MIN/1.73SQ M
GLUCOSE P FAST SERPL-MCNC: 102 MG/DL (ref 70–99)
GLUCOSE SERPL-MCNC: 102 MG/DL (ref 70–99)
HCT VFR BLD AUTO: 33.4 % (ref 41–53)
HGB BLD-MCNC: 10.9 G/DL (ref 13.5–17.5)
LYMPHOCYTES # BLD AUTO: 1.8 THOUSANDS/ΜL (ref 0.5–4)
LYMPHOCYTES NFR BLD AUTO: 24 % (ref 25–45)
MAGNESIUM SERPL-MCNC: 2 MG/DL (ref 1.6–2.3)
MCH RBC QN AUTO: 26.9 PG (ref 26–34)
MCHC RBC AUTO-ENTMCNC: 32.5 G/DL (ref 31–36)
MCV RBC AUTO: 83 FL (ref 80–100)
MONOCYTES # BLD AUTO: 0.8 THOUSAND/ΜL (ref 0.2–0.9)
MONOCYTES NFR BLD AUTO: 11 % (ref 1–10)
NEUTROPHILS # BLD AUTO: 4.3 THOUSANDS/ΜL (ref 1.8–7.8)
NEUTS SEG NFR BLD AUTO: 59 % (ref 45–65)
PLATELET # BLD AUTO: 280 THOUSANDS/UL (ref 150–450)
PMV BLD AUTO: 7.5 FL (ref 8.9–12.7)
POTASSIUM SERPL-SCNC: 4 MMOL/L (ref 3.6–5)
PROT SERPL-MCNC: 7 G/DL (ref 5.9–8.4)
RBC # BLD AUTO: 4.03 MILLION/UL (ref 4.5–5.9)
SODIUM SERPL-SCNC: 138 MMOL/L (ref 137–147)
T3FREE SERPL-MCNC: 2.92 PG/ML (ref 2.3–4.2)
TSH SERPL DL<=0.05 MIU/L-ACNC: 1.75 UIU/ML (ref 0.47–4.68)
WBC # BLD AUTO: 7.3 THOUSAND/UL (ref 4.5–11)

## 2020-08-24 PROCEDURE — 99214 OFFICE O/P EST MOD 30 MIN: CPT | Performed by: INTERNAL MEDICINE

## 2020-08-24 PROCEDURE — 3008F BODY MASS INDEX DOCD: CPT | Performed by: INTERNAL MEDICINE

## 2020-08-24 PROCEDURE — 96413 CHEMO IV INFUSION 1 HR: CPT

## 2020-08-24 PROCEDURE — 3078F DIAST BP <80 MM HG: CPT | Performed by: INTERNAL MEDICINE

## 2020-08-24 PROCEDURE — 3008F BODY MASS INDEX DOCD: CPT | Performed by: NURSE PRACTITIONER

## 2020-08-24 PROCEDURE — 84443 ASSAY THYROID STIM HORMONE: CPT | Performed by: INTERNAL MEDICINE

## 2020-08-24 PROCEDURE — 1036F TOBACCO NON-USER: CPT | Performed by: INTERNAL MEDICINE

## 2020-08-24 PROCEDURE — 84481 FREE ASSAY (FT-3): CPT | Performed by: INTERNAL MEDICINE

## 2020-08-24 PROCEDURE — 80053 COMPREHEN METABOLIC PANEL: CPT | Performed by: INTERNAL MEDICINE

## 2020-08-24 PROCEDURE — 83735 ASSAY OF MAGNESIUM: CPT

## 2020-08-24 PROCEDURE — 85025 COMPLETE CBC W/AUTO DIFF WBC: CPT | Performed by: INTERNAL MEDICINE

## 2020-08-24 PROCEDURE — 3077F SYST BP >= 140 MM HG: CPT | Performed by: INTERNAL MEDICINE

## 2020-08-24 PROCEDURE — 3008F BODY MASS INDEX DOCD: CPT | Performed by: FAMILY MEDICINE

## 2020-08-24 PROCEDURE — 3066F NEPHROPATHY DOC TX: CPT | Performed by: INTERNAL MEDICINE

## 2020-08-24 RX ORDER — SODIUM CHLORIDE 9 MG/ML
20 INJECTION, SOLUTION INTRAVENOUS ONCE
Status: COMPLETED | OUTPATIENT
Start: 2020-08-24 | End: 2020-08-24

## 2020-08-24 RX ORDER — PREDNISONE 1 MG/1
5 TABLET ORAL DAILY
Qty: 30 TABLET | Refills: 3 | Status: SHIPPED | OUTPATIENT
Start: 2020-08-24 | End: 2020-12-21

## 2020-08-24 RX ADMIN — SODIUM CHLORIDE 20 ML/HR: 0.9 INJECTION, SOLUTION INTRAVENOUS at 11:58

## 2020-08-24 RX ADMIN — SODIUM CHLORIDE 200 MG: 9 INJECTION, SOLUTION INTRAVENOUS at 12:48

## 2020-08-24 NOTE — PLAN OF CARE
Problem: Potential for Falls  Goal: Patient will remain free of falls  Description: INTERVENTIONS:  - Assess patient frequently for physical needs  -  Identify cognitive and physical deficits and behaviors that affect risk of falls    -  [place] fall precautions as indicated by assessment   - Educate patient/family on patient safety including physical limitations  - Instruct patient to call for assistance with activity based on assessment  - Modify environment to reduce risk of injury  - Consider OT/PT consult to assist with strengthening/mobility  Outcome: Progressing

## 2020-08-24 NOTE — PLAN OF CARE
Problem: Potential for Falls  Goal: Patient will remain free of falls  Description: INTERVENTIONS:  - Assess patient frequently for physical needs  -  Identify cognitive and physical deficits and behaviors that affect risk of falls    -  [place] fall precautions as indicated by assessment   - Educate patient/family on patient safety including physical limitations  - Instruct patient to call for assistance with activity based on assessment  - Modify environment to reduce risk of injury  - Consider OT/PT consult to assist with strengthening/mobility  8/24/2020 1228 by Cathie Guadalupe RN  Outcome: Progressing  8/24/2020 1223 by Cathie Guadalupe RN  Outcome: Progressing

## 2020-08-24 NOTE — PROGRESS NOTES
Pt here for every 3 weeks keytruda, offers no new complaints, tolerated treatment well without complications, confirmed appts back and has AVS from his Dr Rosalba Jenkins visit today

## 2020-08-24 NOTE — PROGRESS NOTES
Hematology/Oncology Outpatient Follow-up  Albaro Navarrete Sr  64 y o  male 1960 [id number]    Date:  8/24/2020        Assessment and Plan:  1  Adenocarcinoma of lung, right [facility])  The patient was educated extensively about the most recent PET-CT scan  We reviewed the imaging studies together  The patient seems to have increased focal uptake in the enlarging nodular density along the right major fissure which is more obvious on the most recent PET scan in comparison to the prior 1 from April  The patient was told that we will continue with the immunotherapy Keytruda on every 3 week basis  He will be also sent to the Radiation Oncology team to see if the hypermetabolic activity in the right major fissure area can be radiated with stereotactic radiation since this seems to be the only hyper metabolically area on the recent PET scan  He stated that he is interested in continue the prednisone on the current dose 5 mg once a day  - predniSONE 5 mg tablet; Take 1 tablet (5 mg total) by mouth daily  Dispense: 30 tablet; Refill: 3  - CBC and differential; Future  - Comprehensive metabolic panel; Future  - TSH, 3rd generation with Free T4 reflex; Future  - Magnesium; Future  - Ambulatory referral to Radiation Oncology; Future    2  Normocytic anemia  He seems to have stable normocytic anemia with hemoglobin around 11 5 g  He also has chronic renal dysfunction which is contributing for his chronic anemia  Most recent vitamin B12 level from July was normal   His iron panel was also normal   - CBC and differential; Future  - Comprehensive metabolic panel; Future  - TSH, 3rd generation with Free T4 reflex; Future  - Magnesium; Future        HPI:  The patient came today for a follow-up visit  He continues to be on the [facility] as immunotherapy on every 3 week basis which he is tolerating relatively well  He continues to be also on the low-dose prednisone 5 mg once a day    The patient just had a PET-CT scan on 08/17/2020 which showed:     IMPRESSION:     1  Increased focal FDG uptake in an enlarging nodular density along the right major fissure laterally suspicious for recurrent disease  2   Otherwise no new findings suspicious for hypermetabolic metastasis in the neck, abdomen or pelvis  He will be doing blood work today  Oncology History   53-year-old [facility] American male heavy smoker who used to smoke 2 packs of cigarettes daily for many years, COPD, he presented with dyspnea, CT scan of the chest on October 2018 showed right middle lobe spiculated 1 3 cm mass with multiple solid and ground-glass nodules along the major and minor fissures sub cm pulmonary nodules bilaterally, left adrenal 1 2 cm nodule     PET scan showed 1 3 cm right middle lung nodule with SUV of 6 8, numerous nodular densities along the right major and minor fissures, right hilar activity with SUV of 3 4, subcarinal lymph node measuring 7 mm with SUV of 2 7, periportal enlarged lymph nodes concerning for metastatic disease measuring 2 4 cm with SUV of 5, prominent left retrocrural lymph node measuring 1 cm x 9 mm with SUV of 1 1    Core biopsy of the right spiculated nodule showed invasive adenocarcinoma consistent with lung primary positive for CK7, TTF 1, napsin a     Adenocarcinoma of lung, right ([address] )   11/13/2018 Initial Diagnosis    Adenocarcinoma of lung, right ([address] )  Stage IV     12/13/2018 - 3/28/2019 Chemotherapy     Alimta, Carboplatin (AUC 5) + Keytruda (6 cycles total)     4/17/2019 -  Chemotherapy    Started maintenance Alimta and Keytruda  (Alimta d/c'd 9/16/19 due to worsening renal dysfunction)         Interval history:    ROS: Review of Systems   Constitutional: Positive for fatigue  Negative for appetite change, diaphoresis and fever  HENT: Positive for hearing loss  Negative for congestion, dental problem, facial swelling, tinnitus and trouble swallowing  Eyes: Negative for visual disturbance     Respiratory: Positive for cough and shortness of breath  Negative for chest tightness and wheezing  Cardiovascular: Negative for chest pain and leg swelling  Gastrointestinal: Negative for abdominal distention, abdominal pain, blood in stool, constipation, diarrhea, nausea and vomiting  Genitourinary: Negative for dysuria, hematuria and urgency  Musculoskeletal: Negative for arthralgias, myalgias and neck pain  Skin: Negative  Negative for color change, pallor, rash and wound  Neurological: Positive for numbness  Negative for dizziness, weakness and headaches  Hematological: Negative for adenopathy  Psychiatric/Behavioral: Negative for agitation, behavioral problems, confusion, hallucinations, self-injury and sleep disturbance  The patient is not nervous/anxious and is not hyperactive          Past Medical History:   Diagnosis Date    Alkalosis 12/9/2019    B[facility] 1 disorder [facility]     Cancer ([facility] [facility] )     adeno lung  dx 11/2018-lung bx today 4/9/2019-ongoing chemo    Chronic obstructive pulmonary disease with acute exacerbation ([facility] [facility] ) 6/7/2018    Chronic pain disorder     back and right shoulder    CKD (chronic kidney disease)     COPD (chronic obstructive pulmonary disease) ([address] )     Depression     Diabetes mellitus ([address] )     Elevated d-dimer 11/30/2019    Elevated troponin 11/29/2019    Elevated troponin level not due to acute coronary syndrome 11/30/2019    GERD (gastroesophageal reflux disease)     Herniated lumbar intervertebral disc     Hyperkalemia     Hypertension     Insomnia     Latent syphilis     Treated    Low back pain     Lumbosacral disc disease     Lung cancer ([address] ) 04/2019    Pneumothorax after biopsy     R lung    PTSD (post-traumatic stress disorder)     Pulmonary emphysema ([address] )     Tobacco abuse 11/13/2018       Past Surgical History:   Procedure Laterality Date    COLONOSCOPY  2011    CT GUIDED CHEST TUBE  11/21/2018    FL GUIDED CENTRAL VENOUS ACCESS DEVICE INSERTION  2/8/2019    HEMORROIDECTOMY      IR BIOPSY LUNG  2018    IR CHEST TUBE PLACEMENT  2018    KNEE SURGERY      TX INSJ TUNNELED CTR VAD W/SUBQ PORT AGE 5 YR/> N/A 2019    Procedure: PLACEMENT OF PORT-A-CATH;  Surgeon: Cindy Sánchez MD;  Location: [facility] MAIN OR;  Service: Vascular       Social History     Socioeconomic History    Marital status: Legally      Spouse name: None    Number of children: None    Years of education: None    Highest education level: None   Occupational History    Occupation: part time employment   Social Needs    Financial resource strain: None    Food insecurity     Worry: None     Inability: None    Transportation needs     Medical: None     Non-medical: None   Tobacco Use    Smoking status: Former Smoker     Packs/day: 0 50     Years: 40 00     Pack years: 20 00     Types: Cigarettes     Start date:      Last attempt to quit: 2019     Years since quittin 0    Smokeless tobacco: Never Used   Substance and Sexual Activity    Alcohol use: Not Currently    Drug use: Not Currently     Types: Marijuana     Comment: stopped , "i smoked weed and stopped 1 month ago"    Sexual activity: Yes   Lifestyle    Physical activity     Days per week: None     Minutes per session: None    Stress: None   Relationships    Social connections     Talks on phone: None     Gets together: None     Attends [employer] service: None     Active member of club or organization: None     Attends meetings of clubs or organizations: None     Relationship status: None    Intimate partner violence     Fear of current or ex partner: None     Emotionally abused: None     Physically abused: None     Forced sexual activity: None   Other Topics Concern    None   Social History Narrative    Lives with girlfriend       Family History   Problem Relation Age of Onset    Heart disease Mother     Cancer Mother     Hypertension Father     Diabetes Family     Asthma Family     Heart disease Family     Cancer Family     Cancer Maternal Grandfather     Cancer Paternal Grandfather     Cancer Maternal Aunt        Allergies   Allergen Reactions    Lisinopril Anaphylaxis     Took medication a while ago and had a "swelling reaction"  Does not carry epi-pen         Current Outpatient Medications:     albuterol (2 5 mg/3 mL) 0 083 % nebulizer solution, USE 1 VIAL VIA NEB EVERY 4 HOURS AS NEEDED FOR WHEEZING/SHORTNESS OF BREATH, Disp: 360 mL, Rfl: 3    albuterol (PROVENTIL HFA,VENTOLIN HFA) 90 mcg/act inhaler, Inhale 2 puffs every 4 (four) hours as needed for wheezing, Disp: 8 5 g, Rfl: 3    amLODIPine (NORVASC) 5 mg tablet, TAKE 1 TABLET BY MOUTH ONCE DAILY  , Disp: 30 tablet, Rfl: 0    apixaban (ELIQUIS) 2 5 mg, Take 1 tablet (2 5 mg total) by mouth 2 (two) times a day (Patient taking differently: Take 2 5 mg by mouth 3 (three) times a day ), Disp: 60 tablet, Rfl: 11    Blood Glucose Monitoring Suppl KIT, by Does not apply route daily, Disp: 1 each, Rfl: 0    budesonide (PULMICORT) 0 5 mg/2 mL nebulizer solution, Take 1 vial (0 5 mg total) by nebulization every 12 (twelve) hours Rinse mouth after use , Disp: 1 vial, Rfl: 0    budesonide (PULMICORT) 1 MG/2ML nebulizer solution, Take 2 mL (1 mg total) by nebulization 2 (two) times a day, Disp: 120 mL, Rfl: 1    carbamide peroxide (DEBROX) 6 5 % otic solution, Administer 5 drops into both ears 2 (two) times a day, Disp: 15 mL, Rfl: 0    dextromethorphan-guaiFENesin (ROBITUSSIN DM)  mg/5 mL syrup, Take 5 mL by mouth 3 (three) times a day as needed for cough, Disp: 236 mL, Rfl: 1    diltiazem (CARDIZEM CD) 120 mg 24 hr capsule, Take 1 capsule (120 mg total) by mouth daily, Disp: 30 capsule, Rfl: 11    divalproex sodium (DEPAKOTE) 250 mg EC tablet, Take 1 tablet (250 mg total) by mouth 2 (two) times a day, Disp: 30 tablet, Rfl: 0    ergocalciferol (ERGOCALCIFEROL) 1 25 MG (00284 UT) capsule, Take 1 capsule (50,000 Units total) by mouth once a week, Disp: 12 capsule, Rfl: 0    FLUoxetine (PROzac) 10 MG tablet, Take 1 tablet (10 mg total) by mouth daily, Disp: 30 tablet, Rfl: 5    fluticasone (FLONASE) 50 mcg/act nasal spray, 1-2 sprays each nostril daily for allergies, Disp: 1 Bottle, Rfl: 3    folic acid (FOLVITE) 1 mg tablet, TAKE 1 TABLET BY MOUTH ONCE DAILY  , Disp: 30 tablet, Rfl: 0    formoterol (PERFOROMIST) 20 MCG/2ML nebulizer solution, Take 1 vial (20 mcg total) by nebulization 2 (two) times a day, Disp: 60 vial, Rfl: 1    FREESTYLE LITE test strip, TEST BLOOD SUGAR DAILY, Disp: 100 each, Rfl: 1    ipratropium (ATROVENT) 0 02 % nebulizer solution, USE 1 VIAL VIA NEB 3 TIMES DAILY, Disp: 225 mL, Rfl: 3    ipratropium-albuterol (DUO-NEB) 0 5-2 5 mg/3 mL nebulizer solution, Take 1 vial (3 mL total) by nebulization 4 (four) times a day, Disp: 120 vial, Rfl: 0    Lancets (FREESTYLE) lancets, TEST BLOOD SUGAR DAILY, Disp: 100 each, Rfl: 4    lidocaine (LIDODERM) 5 %, Apply 1 patch topically daily Remove & Discard patch within 12 hours or as directed by MD, Disp: 30 patch, Rfl: 1    lidocaine (LMX) 4 % cream, Apply topically as needed for mild pain Apply 1/2-1 inch to port 30-60 mins prior to needle insertion, cover with serrain wrap, Disp: 30 g, Rfl: 5    loratadine (CLARITIN) 10 mg tablet, Take 1 tablet (10 mg total) by mouth daily in the early morning, Disp: 90 tablet, Rfl: 3    melatonin 3 mg, Take 1 tablet (3 mg total) by mouth daily at bedtime, Disp: 30 tablet, Rfl: 1    metFORMIN (GLUCOPHAGE-XR) 500 mg 24 hr tablet, Take 1 tablet (500 mg total) by mouth 2 (two) times a day with meals, Disp: 60 tablet, Rfl: 0    ondansetron (ZOFRAN) 4 mg tablet, Take 1 tablet (4 mg total) by mouth every 6 (six) hours as needed for nausea or vomiting, Disp: 60 tablet, Rfl: 2    oxyCODONE (OxyCONTIN) 20 mg 12 hr tablet, Take 1 tablet (20 mg total) by mouth every 12 (twelve) hours For cancer painMax Daily Amount: 40 mg, Disp: 60 tablet, Rfl: 0    oxyCODONE (OxyCONTIN) 20 mg 12 hr tablet, Take 1 tablet (20 mg total) by mouth every 12 (twelve) hours For severe cancer painMax Daily Amount: 40 mg, Disp: 60 tablet, Rfl: 0    [START ON 8/31/2020] oxyCODONE (OxyCONTIN) 20 mg 12 hr tablet, Take 1 tablet (20 mg total) by mouth every 12 (twelve) hours For severe cancer painMax Daily Amount: 40 mg, Disp: 60 tablet, Rfl: 0    oxyCODONE (ROXICODONE) 30 MG immediate release tablet, Take 1 tablet (30 mg total) by mouth every 4 (four) hours as needed (cancer pain)Max Daily Amount: 180 mg, Disp: 120 tablet, Rfl: 0    pantoprazole (PROTONIX) 40 mg tablet, TAKE ONE TABLET BY MOUTH EVERY DAY, Disp: 30 tablet, Rfl: 0    polyethylene glycol (GLYCOLAX) powder, Take 17 g by mouth daily, Disp: 527 g, Rfl: 1    predniSONE 10 mg tablet, Take 0 5 tablets (5 mg total) by mouth daily, Disp: 30 tablet, Rfl: 0    pregabalin (LYRICA) 25 mg capsule, Take 1 capsule PO in morning and 2 capsules PO at bedtime, Disp: 90 capsule, Rfl: 2    prochlorperazine (COMPAZINE) 10 mg tablet, Take 1 tablet (10 mg total) by mouth every 6 (six) hours as needed for nausea or vomiting, Disp: 90 tablet, Rfl: 0    QUEtiapine (SEROquel) 25 mg tablet, Take 25 mg by mouth daily at bedtime, Disp: , Rfl:     REGULOID 28 3 % POWD, USE AS DIRECTED, Disp: 369 g, Rfl: 4    Saline 0 65 % (Soln) SOLN, 5 drops into each nostril as needed (Dry nose), Disp: 50 mL, Rfl: 5    selenium sulfide (SELSUN) 2 5 % shampoo, Daily to affected area on neck   Leave on skin for 10 minutes and then rinse repeat daily for 7 days total, Disp: 118 mL, Rfl: 1    senna-docusate sodium (SENOKOT-S) 8 6-50 mg per tablet, Take 1 tablet by mouth 2 (two) times a day, Disp: 60 tablet, Rfl: 0    sildenafil (VIAGRA) 50 MG tablet, Take 1 tablet (50 mg total) by mouth as needed for erectile dysfunction, Disp: 6 tablet, Rfl: 0    tamsulosin (FLOMAX) 0 4 mg, TAKE ONE CAPSULE BY MOUTH EVERY DAY WITH DINNER, Disp: 90 capsule, Rfl: 0   predniSONE 5 mg tablet, Take 1 tablet (5 mg total) by mouth daily, Disp: 30 tablet, Rfl: 3      Physical Exam:  /78 (BP Location: Left arm, Patient Position: Sitting, Cuff Size: Adult)   Pulse 90   Temp (!) 97 4 °F (36 3 °C) (Tympanic)   Resp 18   Ht 5' 7" (1 702 m)   Wt 87 2 kg (192 lb 3 2 oz)   SpO2 99%   BMI 30 10 kg/m²     Physical Exam  Constitutional:       Appearance: He is well-developed  HENT:      Head: Normocephalic and atraumatic  Eyes:      General: No scleral icterus  Right eye: No discharge  Left eye: No discharge  Conjunctiva/sclera: Conjunctivae normal       Pupils: Pupils are equal, round, and reactive to light  Neck:      Musculoskeletal: Normal range of motion and neck supple  Thyroid: No thyromegaly  Trachea: No tracheal deviation  Cardiovascular:      Rate and Rhythm: Normal rate and regular rhythm  Heart sounds: Normal heart sounds  No murmur  No friction rub  Pulmonary:      Effort: Pulmonary effort is normal  No respiratory distress  Breath sounds: Wheezing (Bilaterally) present  No rales  Chest:      Chest wall: No tenderness  Abdominal:      General: Bowel sounds are normal  There is no distension  Palpations: Abdomen is soft  There is no hepatomegaly, splenomegaly or mass  Tenderness: There is no abdominal tenderness  There is no guarding or rebound  Musculoskeletal: Normal range of motion  General: No tenderness or deformity  Lymphadenopathy:      Cervical: No cervical adenopathy  Skin:     General: Skin is warm and dry  Coloration: Skin is not pale  Findings: No erythema or rash  Neurological:      Mental Status: He is alert and oriented to person, place, and time  Cranial Nerves: No cranial nerve deficit  Coordination: Coordination normal       Deep Tendon Reflexes: Reflexes are normal and symmetric   Reflexes normal    Psychiatric:         Behavior: Behavior normal  Thought Content: Thought content normal          Judgment: Judgment normal            Labs:  Lab Results   Component Value Date    WBC 8 40 08/05/2020    HGB 11 6 (L) 08/05/2020    HCT 35 4 (L) 08/05/2020    MCV 82 08/05/2020     08/05/2020     Lab Results   Component Value Date    K 4 6 08/05/2020     08/05/2020    CO2 31 (H) 08/05/2020    BUN 36 (H) 08/05/2020    CREATININE 2 63 (H) 08/05/2020    GLUF 97 04/22/2020    CALCIUM 9 4 08/05/2020    AST 28 08/05/2020    ALT 20 08/05/2020    ALKPHOS 96 08/05/2020    EGFR 30 (L) 08/05/2020     No results found for: TSH    Patient voiced understanding and agreement in the above discussion  Aware to contact our office with questions/symptoms in the interim

## 2020-08-26 ENCOUNTER — HOSPITAL ENCOUNTER (OUTPATIENT)
Dept: INFUSION CENTER | Facility: HOSPITAL | Age: 60
Discharge: HOME | End: 2020-08-26
Attending: INTERNAL MEDICINE

## 2020-09-08 DIAGNOSIS — C34.91 ADENOCARCINOMA OF LUNG, RIGHT (HCC): ICD-10-CM

## 2020-09-08 DIAGNOSIS — Z51.5 PALLIATIVE CARE PATIENT: ICD-10-CM

## 2020-09-08 DIAGNOSIS — G89.3 CANCER-RELATED PAIN: Chronic | ICD-10-CM

## 2020-09-08 RX ORDER — OXYCODONE HYDROCHLORIDE 30 MG/1
30 TABLET ORAL EVERY 4 HOURS PRN
Qty: 120 TABLET | Refills: 0 | OUTPATIENT
Start: 2020-09-08

## 2020-09-11 DIAGNOSIS — C34.91 ADENOCARCINOMA OF LUNG, RIGHT (HCC): Primary | ICD-10-CM

## 2020-09-11 RX ORDER — SODIUM CHLORIDE 9 MG/ML
20 INJECTION, SOLUTION INTRAVENOUS ONCE
Status: CANCELLED | OUTPATIENT
Start: 2020-09-16

## 2020-09-14 DIAGNOSIS — I10 ESSENTIAL HYPERTENSION: ICD-10-CM

## 2020-09-14 DIAGNOSIS — C34.91 ADENOCARCINOMA OF LUNG, RIGHT (HCC): ICD-10-CM

## 2020-09-14 DIAGNOSIS — K59.00 CONSTIPATION, UNSPECIFIED CONSTIPATION TYPE: ICD-10-CM

## 2020-09-14 RX ORDER — PANTOPRAZOLE SODIUM 40 MG/1
TABLET, DELAYED RELEASE ORAL
Qty: 30 TABLET | Refills: 0 | Status: SHIPPED | OUTPATIENT
Start: 2020-09-14 | End: 2020-10-13 | Stop reason: SDUPTHER

## 2020-09-14 RX ORDER — AMLODIPINE BESYLATE 5 MG/1
TABLET ORAL
Qty: 30 TABLET | Refills: 0 | Status: SHIPPED | OUTPATIENT
Start: 2020-09-14 | End: 2020-10-13 | Stop reason: SDUPTHER

## 2020-09-14 RX ORDER — SENNA AND DOCUSATE SODIUM 50; 8.6 MG/1; MG/1
TABLET, FILM COATED ORAL
Qty: 60 TABLET | Refills: 11 | Status: SHIPPED | OUTPATIENT
Start: 2020-09-14 | End: 2020-10-15 | Stop reason: SDUPTHER

## 2020-09-16 ENCOUNTER — OFFICE VISIT (OUTPATIENT)
Dept: HEMATOLOGY ONCOLOGY | Facility: CLINIC | Age: 60
End: 2020-09-16
Payer: COMMERCIAL

## 2020-09-16 ENCOUNTER — HOSPITAL ENCOUNTER (OUTPATIENT)
Dept: INFUSION CENTER | Facility: HOSPITAL | Age: 60
Discharge: HOME | End: 2020-09-16
Attending: INTERNAL MEDICINE
Payer: COMMERCIAL

## 2020-09-16 VITALS
TEMPERATURE: 97.9 F | SYSTOLIC BLOOD PRESSURE: 146 MMHG | DIASTOLIC BLOOD PRESSURE: 75 MMHG | HEART RATE: 86 BPM | OXYGEN SATURATION: 99 % | RESPIRATION RATE: 16 BRPM

## 2020-09-16 VITALS
HEART RATE: 92 BPM | SYSTOLIC BLOOD PRESSURE: 130 MMHG | TEMPERATURE: 98.1 F | WEIGHT: 192 LBS | DIASTOLIC BLOOD PRESSURE: 82 MMHG | HEIGHT: 67 IN | RESPIRATION RATE: 18 BRPM | BODY MASS INDEX: 30.13 KG/M2 | OXYGEN SATURATION: 97 %

## 2020-09-16 DIAGNOSIS — C34.91 ADENOCARCINOMA OF LUNG, RIGHT (HCC): Primary | ICD-10-CM

## 2020-09-16 DIAGNOSIS — D64.9 NORMOCYTIC ANEMIA: ICD-10-CM

## 2020-09-16 DIAGNOSIS — N28.9 RENAL DYSFUNCTION: ICD-10-CM

## 2020-09-16 DIAGNOSIS — B36.0 TINEA VERSICOLOR: ICD-10-CM

## 2020-09-16 DIAGNOSIS — G89.3 CANCER-RELATED PAIN: ICD-10-CM

## 2020-09-16 LAB
ALBUMIN SERPL BCP-MCNC: 4 G/DL (ref 3–5.2)
ALP SERPL-CCNC: 91 U/L (ref 43–122)
ALT SERPL W P-5'-P-CCNC: 23 U/L (ref 9–52)
ANION GAP SERPL CALCULATED.3IONS-SCNC: 5 MMOL/L (ref 5–14)
AST SERPL W P-5'-P-CCNC: 31 U/L (ref 17–59)
BASOPHILS # BLD AUTO: 0.1 THOUSANDS/ΜL (ref 0–0.1)
BASOPHILS NFR BLD AUTO: 1 % (ref 0–1)
BILIRUB SERPL-MCNC: 0.3 MG/DL
BUN SERPL-MCNC: 23 MG/DL (ref 5–25)
CALCIUM SERPL-MCNC: 9.2 MG/DL (ref 8.4–10.2)
CHLORIDE SERPL-SCNC: 102 MMOL/L (ref 97–108)
CO2 SERPL-SCNC: 31 MMOL/L (ref 22–30)
CREAT SERPL-MCNC: 2.06 MG/DL (ref 0.7–1.5)
EOSINOPHIL # BLD AUTO: 0.6 THOUSAND/ΜL (ref 0–0.4)
EOSINOPHIL NFR BLD AUTO: 9 % (ref 0–6)
ERYTHROCYTE [DISTWIDTH] IN BLOOD BY AUTOMATED COUNT: 16.7 %
GFR SERPL CREATININE-BSD FRML MDRD: 40 ML/MIN/1.73SQ M
GLUCOSE SERPL-MCNC: 105 MG/DL (ref 70–99)
HCT VFR BLD AUTO: 35.5 % (ref 41–53)
HGB BLD-MCNC: 11.6 G/DL (ref 13.5–17.5)
LYMPHOCYTES # BLD AUTO: 1.1 THOUSANDS/ΜL (ref 0.5–4)
LYMPHOCYTES NFR BLD AUTO: 17 % (ref 25–45)
MAGNESIUM SERPL-MCNC: 2.1 MG/DL (ref 1.6–2.3)
MCH RBC QN AUTO: 27 PG (ref 26–34)
MCHC RBC AUTO-ENTMCNC: 32.6 G/DL (ref 31–36)
MCV RBC AUTO: 83 FL (ref 80–100)
MONOCYTES # BLD AUTO: 0.5 THOUSAND/ΜL (ref 0.2–0.9)
MONOCYTES NFR BLD AUTO: 8 % (ref 1–10)
NEUTROPHILS # BLD AUTO: 4.1 THOUSANDS/ΜL (ref 1.8–7.8)
NEUTS SEG NFR BLD AUTO: 65 % (ref 45–65)
PLATELET # BLD AUTO: 324 THOUSANDS/UL (ref 150–450)
PMV BLD AUTO: 7.5 FL (ref 8.9–12.7)
POTASSIUM SERPL-SCNC: 4.4 MMOL/L (ref 3.6–5)
PROT SERPL-MCNC: 7.5 G/DL (ref 5.9–8.4)
RBC # BLD AUTO: 4.29 MILLION/UL (ref 4.5–5.9)
SODIUM SERPL-SCNC: 138 MMOL/L (ref 137–147)
T3FREE SERPL-MCNC: 3.04 PG/ML (ref 2.3–4.2)
WBC # BLD AUTO: 6.2 THOUSAND/UL (ref 4.5–11)

## 2020-09-16 PROCEDURE — 96375 TX/PRO/DX INJ NEW DRUG ADDON: CPT

## 2020-09-16 PROCEDURE — 99214 OFFICE O/P EST MOD 30 MIN: CPT | Performed by: NURSE PRACTITIONER

## 2020-09-16 PROCEDURE — 96413 CHEMO IV INFUSION 1 HR: CPT

## 2020-09-16 PROCEDURE — 80053 COMPREHEN METABOLIC PANEL: CPT | Performed by: INTERNAL MEDICINE

## 2020-09-16 PROCEDURE — 85025 COMPLETE CBC W/AUTO DIFF WBC: CPT | Performed by: INTERNAL MEDICINE

## 2020-09-16 PROCEDURE — 84481 FREE ASSAY (FT-3): CPT | Performed by: INTERNAL MEDICINE

## 2020-09-16 PROCEDURE — 83735 ASSAY OF MAGNESIUM: CPT

## 2020-09-16 RX ORDER — MORPHINE SULFATE 10 MG/ML
4 INJECTION, SOLUTION INTRAMUSCULAR; INTRAVENOUS ONCE
Status: CANCELLED
Start: 2020-09-16

## 2020-09-16 RX ORDER — SODIUM CHLORIDE 9 MG/ML
20 INJECTION, SOLUTION INTRAVENOUS ONCE
Status: COMPLETED | OUTPATIENT
Start: 2020-09-16 | End: 2020-09-16

## 2020-09-16 RX ORDER — MORPHINE SULFATE 4 MG/ML
4 INJECTION, SOLUTION INTRAMUSCULAR; INTRAVENOUS ONCE
Status: COMPLETED | OUTPATIENT
Start: 2020-09-16 | End: 2020-09-16

## 2020-09-16 RX ORDER — SODIUM CHLORIDE 9 MG/ML
20 INJECTION, SOLUTION INTRAVENOUS ONCE
Status: CANCELLED | OUTPATIENT
Start: 2020-10-06

## 2020-09-16 RX ADMIN — SODIUM CHLORIDE 200 MG: 9 INJECTION, SOLUTION INTRAVENOUS at 12:18

## 2020-09-16 RX ADMIN — MORPHINE SULFATE 4 MG: 4 INJECTION INTRAVENOUS at 12:10

## 2020-09-16 RX ADMIN — SODIUM CHLORIDE 20 ML/HR: 0.9 INJECTION, SOLUTION INTRAVENOUS at 11:51

## 2020-09-16 NOTE — PLAN OF CARE
Problem: Potential for Falls  Goal: Patient will remain free of falls  Description: INTERVENTIONS:  - Assess patient frequently for physical needs  -  Identify cognitive and physical deficits and behaviors that affect risk of falls    -  [place] fall precautions as indicated by assessment   - Educate patient/family on patient safety including physical limitations  - Instruct patient to call for assistance with activity based on assessment  - Modify environment to reduce risk of injury  - Consider OT/PT consult to assist with strengthening/mobility  Outcome: Progressing

## 2020-09-16 NOTE — PROGRESS NOTES
Hematology/Oncology Outpatient Follow-up  Cecily Estevez Sr  64 y o  male 1960 [id number]    Date:  9/16/2020      Assessment and Plan:  1  Adenocarcinoma of lung, right [facility])  Patient will be continued on his palliative single agent immunotherapy with Keytruda on every three-week basis which she is tolerating well  He is scheduled to meet with the radiation oncology team this Friday 09/18/2020 to consider radiation to the increasing hypermetabolic area of the right lung found on PET-CT imaging  He will be back for follow-up again in 3 weeks with repeat labs that day  The patient will continue to follow up closely with the palliative care team for his pain and symptom management  He seems to be very uncomfortable and reports that he is in severe pain at this time, we will give him a 1 time dose of IV morphine 4 mg upon arrival to the infusion center       - CBC and differential; Future  - Comprehensive metabolic panel; Future  - TSH, 3rd generation with Free T4 reflex; Future  - T3, free; Future  - CBC and differential; Future  - Comprehensive metabolic panel; Future  - TSH, 3rd generation with Free T4 reflex; Future    2  Normocytic anemia  Patient continues to have stable normocytic anemia which is likely due to anemia of chronic renal dysfunction/anemia of chronic disease  3  Tinea versicolor  Patient continues to have rash to his neck area suspicious for tinea versicolor  He admits that he never picked up his selenium lotion and never started  States that he did pick it up today and will get it started which will hopefully resolve his rash  Continue to monitor  HPI:  Patient presents today for a follow-up visit  He states that he is having severe pain this morning despite taking his usual pain medication  Rates the pain 7/10 which is generalized and also to his right chest wall his usual pain  Otherwise he has no new complaints  Continues to be on chronic O2 therapy    Patient states that he has of consultation appointment scheduled on Friday 09/18/2020 with radiation oncology  His most recent laboratory studies from this morning showed normal white cells and platelets, he continues to have stable normocytic anemia H&H 11 6/35 5, MCV 83  Stable chronic renal dysfunction BUN 23, creatinine 2 06, GFR 40, glucose 105 remaining metabolic panel normal     Oncology History   61-year-old presents today for consideration of stereotactic radiation for his lung cancer  He has been referred by Dr Delores Langley  Patient has history of being a heavy smoker who used to smoke 2 packs of cigarettes daily for many years, COPD  In 2018 he presented with dyspnea, a CT scan of the chest on October 2018 showed right middle lobe spiculated 1 3 cm mass with multiple solid and ground-glass nodules along the major and minor fissures sub cm pulmonary nodules bilaterally, left adrenal 1 2 cm nodule     10/24/18 PET scan showed 1 3 cm right middle lung nodule with SUV of 6 8, numerous nodular densities along the right major and minor fissures, right hilar activity with SUV of 3 4, subcarinal lymph node measuring 7 mm with SUV of 2 7, periportal enlarged lymph nodes concerning for metastatic disease measuring 2 4 cm with SUV of 5, prominent left retrocrural lymph node measuring 1 cm x 9 mm with SUV of 1 1    11/13/18 Core biopsy of the right spiculated nodule showed invasive adenocarcinoma consistent with lung primary positive for CK7, TTF 1, Napsin-A    12/2018 started Alimta, Carboplatin (AUC 5) + Keytruda (6 cycles total)  Alimta discontinued 9/16/19 due to worsening renal dysfunction  8/17/20 PET CT: IMPRESSION:   1  Increased focal FDG uptake in an enlarging nodular density along the right major fissure laterally suspicious for recurrent disease  2   Otherwise no new findings suspicious for hypermetabolic metastasis in the neck, abdomen or pelvis         8/24/20 Dr Delores Langley -  continue with the immunotherapy Keytruda on every 3 week   "He will be also sent to the Radiation Oncology team to see if the hypermetabolic activity in the right major fissure area can be radiated with stereotactic radiation since this seems to be the only hyper metabolically area on the recent PET scan "      [facility]  9/16/20 Infusion        Upcoming  9/30/3030-f/u DANE GUY palliative care  10/5/2020-f/u med onc Dr Dimitri Prasad  10/28/2020-f/u med onc JOSEPH GUY     Adenocarcinoma of lung, right ([facility] [address] )   11/13/2018 Initial Diagnosis    Adenocarcinoma of lung, right ([facility] [address] )  Stage IV     11/13/2018 Biopsy    A  Lung, right, core needle biopsies:             - Invasive adenocarcinoma, consistent with lung primary  12/13/2018 - 3/28/2019 Chemotherapy     Alimta, Carboplatin (AUC 5) + Keytruda (6 cycles total)     4/17/2019 -  Chemotherapy    Started maintenance Alimta and Keytruda  (Alimta d/c'd 9/16/19 due to worsening renal dysfunction)         Interval history:    ROS: Review of Systems   Constitutional: Positive for activity change (At times when dyspneic)  Negative for appetite change, chills, fatigue, fever and unexpected weight change  HENT: Positive for hearing loss  Negative for congestion, mouth sores, nosebleeds, sore throat and trouble swallowing  Eyes: Negative  Respiratory: Positive for cough and shortness of breath  Negative for chest tightness  Cardiovascular: Positive for leg swelling ( at times mild)  Negative for palpitations  Gastrointestinal: Positive for constipation  Negative for abdominal distention, abdominal pain, blood in stool, diarrhea, nausea and vomiting  Genitourinary: Negative for difficulty urinating, dysuria, frequency, hematuria and urgency  Musculoskeletal: Positive for arthralgias and myalgias  Negative for back pain, gait problem and joint swelling  Skin: Negative for color change, pallor and rash  Neurological: Positive for numbness ( and tingling mild)   Negative for dizziness, weakness, light-headedness and headaches  Hematological: Negative for adenopathy  Does not bruise/bleed easily  Psychiatric/Behavioral: Negative for dysphoric mood and sleep disturbance  The patient is not nervous/anxious          Past Medical History:   Diagnosis Date    Alkalosis 12/9/2019    Bipolar 1 disorder ([facility] [address] [facility]     Cancer ([facility] [address] [facility]     adeno lung  dx 11/2018-lung bx today 4/9/2019-ongoing chemo    Chronic obstructive pulmonary disease with acute exacerbation ([facility] [address] ) 6/7/2018    Chronic pain disorder     back and right shoulder    CKD (chronic kidney disease)     COPD (chronic obstructive pulmonary disease) ([facility] [address] [facility]     Depression     Diabetes mellitus ([facility] [address] [facility]     Elevated d-dimer 11/30/2019    Elevated troponin 11/29/2019    Elevated troponin level not due to acute coronary syndrome 11/30/2019    GERD (gastroesophageal reflux disease)     Herniated lumbar intervertebral disc     Hyperkalemia     Hypertension     Insomnia     Latent syphilis     Treated    Low back pain     Lumbosacral disc disease     [facility]    Pneumothorax after biopsy     R lung    PTSD (post-traumatic stress disorder)     [facility]     Tobacco abuse 11/13/2018       Past Surgical History:   Procedure Laterality Date    COLONOSCOPY  2011    CT GUIDED CHEST TUBE  11/21/2018    FL GUIDED CENTRAL VENOUS ACCESS DEVICE INSERTION  2/8/2019    HEMORROIDECTOMY      IR BIOPSY LUNG  11/13/2018    IR CHEST TUBE PLACEMENT  11/14/2018    KNEE SURGERY      AK INSJ TUNNELED CTR VAD W/SUBQ PORT AGE 5 YR/> N/A 2/8/2019    Procedure: PLACEMENT OF PORT-A-CATH;  Surgeon: Cindy Sánchez MD;  Location:  MAIN OR;  Service: Vascular       Social History     Socioeconomic History    Marital status: Legally      Spouse name: Not on file    Number of children: Not on file    Years of education: Not on file    Highest education level: Not on file   Occupational History    Occupation: part time employment   Social Needs    Financial resource strain: Not on file    Food insecurity     Worry: Not on file     Inability: Not on file   [employer] needs     Medical: Not on file     Non-medical: Not on file   Tobacco Use    Smoking status: Former Smoker     Packs/day: 0 50     Years: 40 00     Pack years: 20 00     Types: Cigarettes     Start date:      Last attempt to quit: 2019     Years since quittin 1    Smokeless tobacco: Never Used   Substance and Sexual Activity    Alcohol use: Not Currently    Drug use: Not Currently     Types: Marijuana     Comment: stopped , "i smoked weed and stopped 1 month ago"    Sexual activity: Yes   Lifestyle    Physical activity     Days per week: Not on file     Minutes per session: Not on file    Stress: Not on file   Relationships    Social connections     Talks on phone: Not on file     Gets together: Not on file     Attends Ten[employer] service: Not on file     Active member of club or organization: Not on file     Attends meetings of clubs or organizations: Not on file     Relationship status: Not on file    Intimate partner violence     Fear of current or ex partner: Not on file     Emotionally abused: Not on file     Physically abused: Not on file     Forced sexual activity: Not on file   Other Topics Concern    Not on file   Social History Narrative    Lives with girlfriend       Family History   Problem Relation Age of Onset    Heart disease Mother     Cancer Mother     Hypertension Father     Diabetes Family     Asthma Family     Heart disease Family     Cancer Family     Cancer Maternal Grandfather     Cancer Paternal Grandfather     Cancer Maternal Aunt        Allergies   Allergen Reactions    Lisinopril Anaphylaxis     Took medication a while ago and had a "swelling reaction"  Does not carry epi-pen         Current Outpatient Medications:     albuterol (2 5 mg/3 mL) 0 083 % nebulizer solution, USE 1 VIAL VIA NEB EVERY 4 HOURS AS NEEDED FOR WHEEZING/SHORTNESS OF BREATH, Disp: 360 mL, Rfl: 3    albuterol (PROVENTIL HFA,VENTOLIN HFA) 90 mcg/act inhaler, Inhale 2 puffs every 4 (four) hours as needed for wheezing, Disp: 8 5 g, Rfl: 3    amLODIPine (NORVASC) 5 mg tablet, TAKE 1 TABLET BY MOUTH ONCE DAILY  , Disp: 30 tablet, Rfl: 0    apixaban (ELIQUIS) 2 5 mg, Take 1 tablet (2 5 mg total) by mouth 2 (two) times a day (Patient taking differently: Take 2 5 mg by mouth 3 (three) times a day ), Disp: 60 tablet, Rfl: 11    Blood Glucose Monitoring Suppl KIT, by Does not apply route daily, Disp: 1 each, Rfl: 0    budesonide (PULMICORT) 0 5 mg/2 mL nebulizer solution, Take 1 vial (0 5 mg total) by nebulization every 12 (twelve) hours Rinse mouth after use , Disp: 1 vial, Rfl: 0    budesonide (PULMICORT) 1 MG/2ML nebulizer solution, Take 2 mL (1 mg total) by nebulization 2 (two) times a day, Disp: 120 mL, Rfl: 1    carbamide peroxide (DEBROX) 6 5 % otic solution, Administer 5 drops into both ears 2 (two) times a day, Disp: 15 mL, Rfl: 0    diltiazem (CARDIZEM CD) 120 mg 24 hr capsule, Take 1 capsule (120 mg total) by mouth daily, Disp: 30 capsule, Rfl: 11    divalproex sodium (DEPAKOTE) 250 mg EC tablet, Take 1 tablet (250 mg total) by mouth 2 (two) times a day, Disp: 30 tablet, Rfl: 0    FLUoxetine (PROzac) 10 MG tablet, Take 1 tablet (10 mg total) by mouth daily, Disp: 30 tablet, Rfl: 5    fluticasone (FLONASE) 50 mcg/act nasal spray, 1-2 sprays each nostril daily for allergies, Disp: 1 Bottle, Rfl: 3    folic acid (FOLVITE) 1 mg tablet, TAKE 1 TABLET BY MOUTH ONCE DAILY  , Disp: 30 tablet, Rfl: 0    formoterol (PERFOROMIST) 20 MCG/2ML nebulizer solution, Take 1 vial (20 mcg total) by nebulization 2 (two) times a day, Disp: 60 vial, Rfl: 1    FREESTYLE LITE test strip, TEST BLOOD SUGAR DAILY, Disp: 100 each, Rfl: 1    ipratropium (ATROVENT) 0 02 % nebulizer solution, USE 1 VIAL VIA NEB 3 TIMES DAILY, Disp: 225 mL, Rfl: 3    ipratropium-albuterol (DUO-NEB) 0 5-2 5 mg/3 mL nebulizer solution, Take 1 vial (3 mL total) by nebulization 4 (four) times a day, Disp: 120 vial, Rfl: 0    Lancets (FREESTYLE) lancets, TEST BLOOD SUGAR DAILY, Disp: 100 each, Rfl: 4    lidocaine (LIDODERM) 5 %, Apply 1 patch topically daily Remove & Discard patch within 12 hours or as directed by MD, Disp: 30 patch, Rfl: 1    lidocaine (LMX) 4 % cream, Apply topically as needed for mild pain Apply 1/2-1 inch to port 30-60 mins prior to needle insertion, cover with serrain wrap, Disp: 30 g, Rfl: 5    loratadine (CLARITIN) 10 mg tablet, Take 1 tablet (10 mg total) by mouth daily in the early morning, Disp: 90 tablet, Rfl: 3    melatonin 3 mg, Take 1 tablet (3 mg total) by mouth daily at bedtime, Disp: 30 tablet, Rfl: 1    metFORMIN (GLUCOPHAGE-XR) 500 mg 24 hr tablet, Take 1 tablet (500 mg total) by mouth 2 (two) times a day with meals, Disp: 60 tablet, Rfl: 0    ondansetron (ZOFRAN) 4 mg tablet, Take 1 tablet (4 mg total) by mouth every 6 (six) hours as needed for nausea or vomiting, Disp: 60 tablet, Rfl: 2    oxyCODONE (OxyCONTIN) 20 mg 12 hr tablet, Take 1 tablet (20 mg total) by mouth every 12 (twelve) hours For cancer painMax Daily Amount: 40 mg, Disp: 60 tablet, Rfl: 0    oxyCODONE (OxyCONTIN) 20 mg 12 hr tablet, Take 1 tablet (20 mg total) by mouth every 12 (twelve) hours For severe cancer painMax Daily Amount: 40 mg, Disp: 60 tablet, Rfl: 0    oxyCODONE (OxyCONTIN) 20 mg 12 hr tablet, Take 1 tablet (20 mg total) by mouth every 12 (twelve) hours For severe cancer painMax Daily Amount: 40 mg, Disp: 60 tablet, Rfl: 0    oxyCODONE (ROXICODONE) 30 MG immediate release tablet, Take 1 tablet (30 mg total) by mouth every 4 (four) hours as needed (cancer pain)Max Daily Amount: 180 mg, Disp: 120 tablet, Rfl: 0    pantoprazole (PROTONIX) 40 mg tablet, TAKE ONE TABLET BY MOUTH EVERY DAY, Disp: 30 tablet, Rfl: 0    predniSONE 10 mg tablet, Take 0 5 tablets (5 mg total) by mouth daily, Disp: 30 tablet, Rfl: 0    predniSONE 5 mg tablet, Take 1 tablet (5 mg total) by mouth daily, Disp: 30 tablet, Rfl: 3    pregabalin (LYRICA) 25 mg capsule, Take 1 capsule PO in morning and 2 capsules PO at bedtime, Disp: 90 capsule, Rfl: 2    prochlorperazine (COMPAZINE) 10 mg tablet, Take 1 tablet (10 mg total) by mouth every 6 (six) hours as needed for nausea or vomiting, Disp: 90 tablet, Rfl: 0    QUEtiapine (SEROquel) 25 mg tablet, Take 25 mg by mouth daily at bedtime, Disp: , Rfl:     REGULOID 28 3 % POWD, USE AS DIRECTED, Disp: 369 g, Rfl: 4    Saline 0 65 % (Soln) SOLN, 5 drops into each nostril as needed (Dry nose), Disp: 50 mL, Rfl: 5    selenium sulfide (SELSUN) 2 5 % shampoo, Daily to affected area on neck  Leave on skin for 10 minutes and then rinse repeat daily for 7 days total, Disp: 118 mL, Rfl: 1    senna-docusate sodium (SENOKOT-S) 8 6-50 mg per tablet, TAKE 1 TABLET BY MOUTH TWICE DAILY  , Disp: 60 tablet, Rfl: 11    sildenafil (VIAGRA) 50 MG tablet, Take 1 tablet (50 mg total) by mouth as needed for erectile dysfunction, Disp: 6 tablet, Rfl: 0    tamsulosin (FLOMAX) 0 4 mg, TAKE ONE CAPSULE BY MOUTH EVERY DAY WITH DINNER, Disp: 90 capsule, Rfl: 0    dextromethorphan-guaiFENesin (ROBITUSSIN DM)  mg/5 mL syrup, Take 5 mL by mouth 3 (three) times a day as needed for cough (Patient not taking: Reported on 9/16/2020), Disp: 236 mL, Rfl: 1    ergocalciferol (ERGOCALCIFEROL) 1 25 MG (50560 UT) capsule, Take 1 capsule (50,000 Units total) by mouth once a week (Patient not taking: Reported on 9/16/2020), Disp: 12 capsule, Rfl: 0    polyethylene glycol (GLYCOLAX) powder, Take 17 g by mouth daily (Patient not taking: Reported on 9/16/2020), Disp: 527 g, Rfl: 1  No current facility-administered medications for this visit         Physical Exam:  /82 (BP Location: Left arm, Patient Position: Sitting, Cuff Size: Adult)   Pulse 92   Temp 98 1 °F (36 7 °C) (Tympanic)   Resp 18   Ht 5' 7" (1 702 m)   Wt 87 1 kg (192 lb)   SpO2 97%   BMI 30 07 kg/m²     Physical Exam  Vitals signs reviewed  Constitutional:       General: He is in acute distress ( due to pain)  Appearance: He is well-developed  He is not diaphoretic  HENT:      Head: Normocephalic and atraumatic  Eyes:      General: Lids are normal  No scleral icterus  Conjunctiva/sclera: Conjunctivae normal       Pupils: Pupils are equal, round, and reactive to light  Neck:      Musculoskeletal: Normal range of motion and neck supple  Thyroid: No thyromegaly  Cardiovascular:      Rate and Rhythm: Normal rate and regular rhythm  Heart sounds: Normal heart sounds  No murmur  Pulmonary:      Effort: Accessory muscle usage present  No respiratory distress  Breath sounds: Decreased breath sounds and rhonchi present  Abdominal:      General: There is no distension  Palpations: Abdomen is soft  There is no hepatomegaly or splenomegaly  Tenderness: There is no abdominal tenderness  Musculoskeletal: Normal range of motion  General: Swelling (Trace ankle/foot edema noted) present  Lymphadenopathy:      Cervical: No cervical adenopathy  Upper Body:      Right upper body: No axillary adenopathy  Left upper body: No axillary adenopathy  Skin:     General: Skin is warm and dry  Findings: Rash (noted numerous small circular white/grey scaling papules to the entire neck area) present  No erythema  Neurological:      General: No focal deficit present  Mental Status: He is alert and oriented to person, place, and time  Psychiatric:         Mood and Affect: Mood normal  Affect is blunt  Behavior: Behavior normal  Behavior is cooperative  Thought Content:  Thought content normal          Judgment: Judgment normal            Labs:  Lab Results   Component Value Date    WBC 6 20 09/16/2020    HGB 11 6 (L) 09/16/2020    HCT 35 5 (L) 09/16/2020    MCV 83 09/16/2020     09/16/2020     Lab Results   Component Value Date    K 4 4 09/16/2020     09/16/2020    CO2 31 (H) 09/16/2020    BUN 23 09/16/2020    CREATININE 2 06 (H) 09/16/2020    GLUF 102 (H) 08/24/2020    CALCIUM 9 2 09/16/2020    AST 31 09/16/2020    ALT 23 09/16/2020    ALKPHOS 91 09/16/2020    EGFR 40 (L) 09/16/2020       Patient voiced understanding and agreement in the above discussion  Aware to contact our office with questions/symptoms in the interim  This note has been generated by voice recognition software system  Therefore, there may be spelling, grammar, and or syntax errors  Please contact if questions arise

## 2020-09-16 NOTE — PROGRESS NOTES
Pt here for [facility] today  Pt states he didn't take his pain meds today and has a 7/10 generalized pain  Patient saw Penelope Amado today and prescribed morphine while in infusion center  Patient tolerated treatment today well without complications, confirmed next appt and declined AVS from office

## 2020-09-18 ENCOUNTER — RADIATION ONCOLOGY CONSULT (OUTPATIENT)
Dept: RADIATION ONCOLOGY | Facility: CLINIC | Age: 60
End: 2020-09-18
Attending: RADIOLOGY
Payer: COMMERCIAL

## 2020-09-18 VITALS
BODY MASS INDEX: 29.39 KG/M2 | HEIGHT: 69 IN | HEART RATE: 89 BPM | OXYGEN SATURATION: 95 % | RESPIRATION RATE: 24 BRPM | WEIGHT: 198.41 LBS | TEMPERATURE: 98.8 F | DIASTOLIC BLOOD PRESSURE: 85 MMHG | SYSTOLIC BLOOD PRESSURE: 125 MMHG

## 2020-09-18 DIAGNOSIS — C34.11 MALIGNANT NEOPLASM OF UPPER LOBE OF RIGHT LUNG (HCC): Primary | ICD-10-CM

## 2020-09-18 DIAGNOSIS — C34.91 ADENOCARCINOMA OF LUNG, RIGHT (HCC): Primary | ICD-10-CM

## 2020-09-18 PROCEDURE — 99211 OFF/OP EST MAY X REQ PHY/QHP: CPT | Performed by: RADIOLOGY

## 2020-09-18 RX ORDER — GUAIFENESIN 600 MG
600 TABLET, EXTENDED RELEASE 12 HR ORAL EVERY 12 HOURS SCHEDULED
COMMUNITY

## 2020-09-18 NOTE — PROGRESS NOTES
Consultation - Radiation Oncology      TWA:[id number] : 1960  Encounter: [id number]  Patient Information: Reese Brenton COMPLAINT  Chief Complaint   Patient presents with    Consult     radiation oncology     Cancer Staging  Adenocarcinoma of lung, right [facility])  Staging form: Lung, AJCC 8th Edition  - Clinical: No stage assigned - Unsigned  - Pathologic: No stage assigned - Unsigned    Probable stage IV adenocarcinoma right lung       History of Present Illness   Flori Ott Sr  is a 64y o  year old male who presents with a history of abnormal CT chest and PET-CT scan underwent core biopsy of right middle lobe lung lesion 2018  Pathologic findings was adenocarcinoma primary lung  He was treated with with Alimta, carboplatin plus Keytruda received 6 cycles  Alimta was discontinued later due to worsening renal function  He was initially stage IV due to probable lymph node metastases in the abdomen and mediastinum as well as adrenal gland  Presently patient is only immunotherapy Leellen Pu ) now every 3 weeks and sees palliative care for pain management secondary to pain of chronic in nature  The PET-CT scan  reported no hypermetabolic metastases except for increasing FDG uptake and enlarging nodular density right middle lobe fissure suspicious for recurrent disease  There was some hilar activity but considered nonspecific  Patient is here today to discuss SBRT of the right middle lobe lung lesion  Historical Information   Oncology History   5     Adenocarcinoma of lung, right ([address] )   2018 Initial Diagnosis    Adenocarcinoma of lung, right ([address] )  Stage IV     2018 Biopsy    A  Lung, right, core needle biopsies:             - Invasive adenocarcinoma, consistent with lung primary          2018 - 3/28/2019 Chemotherapy     Alimta, Carboplatin (AUC 5) + Keytruda (6 cycles total)     2019 -  Chemotherapy    Started maintenance Alimta and Keytruda  (Alimta d/c'd 9/16/19 due to worsening renal dysfunction)           Past Medical History:   Diagnosis Date    Alkalosis 12/9/2019    B[facility] 1 disorder [facility]     Cancer ([facility] )     adeno lung  dx 11/2018-lung bx today 4/9/2019-ongoing chemo    Chronic obstructive pulmonary disease with acute exacerbation ([facility] ) 6/7/2018    Chronic pain disorder     back and right shoulder    CKD (chronic kidney disease)     COPD (chronic obstructive pulmonary disease) ([facility] )     Depression     Diabetes mellitus ([facility] )     Elevated d-dimer 11/30/2019    Elevated troponin 11/29/2019    Elevated troponin level not due to acute coronary syndrome 11/30/2019    GERD (gastroesophageal reflux disease)     Herniated lumbar intervertebral disc     Hyperkalemia     Hypertension     Insomnia     Latent syphilis     Treated    Low back pain     Lumbosacral disc disease     Lung cancer ([facility] ) 04/2019    Pneumothorax after biopsy     R lung    PTSD (post-traumatic stress disorder)     Pulmonary emphysema ([place] [address] )     Tobacco abuse 11/13/2018     Past Surgical History:   Procedure Laterality Date    COLONOSCOPY  2011    CT GUIDED CHEST TUBE  11/21/2018    FL GUIDED CENTRAL VENOUS ACCESS DEVICE INSERTION  2/8/2019    HEMORROIDECTOMY      IR BIOPSY LUNG  11/13/2018    IR CHEST TUBE PLACEMENT  11/14/2018    KNEE SURGERY      CA INSJ TUNNELED CTR VAD W/SUBQ PORT AGE 5 YR/> N/A 2/8/2019    Procedure: PLACEMENT OF PORT-A-CATH;  Surgeon: Radha Parrish MD;  Location: [facility] MAIN OR;  Service: Vascular       Family History   Problem Relation Age of Onset    Heart disease Mother     Cancer Mother     Hypertension Father     Diabetes Family     Asthma Family     Heart disease Family     Cancer Family     Cancer Maternal Grandfather     Cancer Paternal Grandfather     Cancer Maternal Aunt        Social History   Social History     Substance and Sexual Activity   Alcohol Use Not Currently     Social History     Substance and Sexual Activity   Drug Use Not Currently    Types: Marijuana    Comment: "I will smoke a joint if I am stressed out but not every day"     Social History     Tobacco Use   Smoking Status Former Smoker    Packs/day: 0 50    Years: 40 00    Pack years: 20 00    Types: Cigarettes    Start date:     Last attempt to quit: 2019    Years since quittin 1   Smokeless Tobacco Never Used   Tobacco Comment    will smoke a cigarette on occasion with stress         Meds/Allergies     Current Outpatient Medications:     albuterol (2 5 mg/3 mL) 0 083 % nebulizer solution, USE 1 VIAL VIA NEB EVERY 4 HOURS AS NEEDED FOR WHEEZING/SHORTNESS OF BREATH, Disp: 360 mL, Rfl: 3    albuterol (PROVENTIL HFA,VENTOLIN HFA) 90 mcg/act inhaler, Inhale 2 puffs every 4 (four) hours as needed for wheezing, Disp: 8 5 g, Rfl: 3    amLODIPine (NORVASC) 5 mg tablet, TAKE 1 TABLET BY MOUTH ONCE DAILY  , Disp: 30 tablet, Rfl: 0    apixaban (ELIQUIS) 2 5 mg, Take 1 tablet (2 5 mg total) by mouth 2 (two) times a day (Patient taking differently: Take 2 5 mg by mouth 3 (three) times a day ), Disp: 60 tablet, Rfl: 11    Blood Glucose Monitoring Suppl KIT, by Does not apply route daily, Disp: 1 each, Rfl: 0    budesonide (PULMICORT) 0 5 mg/2 mL nebulizer solution, Take 1 vial (0 5 mg total) by nebulization every 12 (twelve) hours Rinse mouth after use , Disp: 1 vial, Rfl: 0    carbamide peroxide (DEBROX) 6 5 % otic solution, Administer 5 drops into both ears 2 (two) times a day, Disp: 15 mL, Rfl: 0    diltiazem (CARDIZEM CD) 120 mg 24 hr capsule, Take 1 capsule (120 mg total) by mouth daily, Disp: 30 capsule, Rfl: 11    divalproex sodium (DEPAKOTE) 250 mg EC tablet, Take 1 tablet (250 mg total) by mouth 2 (two) times a day, Disp: 30 tablet, Rfl: 0    FLUoxetine (PROzac) 10 MG tablet, Take 1 tablet (10 mg total) by mouth daily, Disp: 30 tablet, Rfl: 5    fluticasone (FLONASE) 50 mcg/act nasal spray, 1-2 sprays each nostril daily for allergies, Disp: 1 Bottle, Rfl: 3    folic acid (FOLVITE) 1 mg tablet, TAKE 1 TABLET BY MOUTH ONCE DAILY  , Disp: 30 tablet, Rfl: 0    formoterol (PERFOROMIST) 20 MCG/2ML nebulizer solution, Take 1 vial (20 mcg total) by nebulization 2 (two) times a day, Disp: 60 vial, Rfl: 1    FREESTYLE LITE test strip, TEST BLOOD SUGAR DAILY, Disp: 100 each, Rfl: 1    guaiFENesin (MUCINEX) 600 mg 12 hr tablet, Take 600 mg by mouth every 12 (twelve) hours, Disp: , Rfl:     ipratropium (ATROVENT) 0 02 % nebulizer solution, USE 1 VIAL VIA NEB 3 TIMES DAILY, Disp: 225 mL, Rfl: 3    ipratropium-albuterol (DUO-NEB) 0 5-2 5 mg/3 mL nebulizer solution, Take 1 vial (3 mL total) by nebulization 4 (four) times a day, Disp: 120 vial, Rfl: 0    Lancets (FREESTYLE) lancets, TEST BLOOD SUGAR DAILY, Disp: 100 each, Rfl: 4    lidocaine (LIDODERM) 5 %, Apply 1 patch topically daily Remove & Discard patch within 12 hours or as directed by MD, Disp: 30 patch, Rfl: 1    lidocaine (LMX) 4 % cream, Apply topically as needed for mild pain Apply 1/2-1 inch to port 30-60 mins prior to needle insertion, cover with serrain wrap, Disp: 30 g, Rfl: 5    loratadine (CLARITIN) 10 mg tablet, Take 1 tablet (10 mg total) by mouth daily in the early morning, Disp: 90 tablet, Rfl: 3    melatonin 3 mg, Take 1 tablet (3 mg total) by mouth daily at bedtime, Disp: 30 tablet, Rfl: 1    metFORMIN (GLUCOPHAGE-XR) 500 mg 24 hr tablet, Take 1 tablet (500 mg total) by mouth 2 (two) times a day with meals, Disp: 60 tablet, Rfl: 0    ondansetron (ZOFRAN) 4 mg tablet, Take 1 tablet (4 mg total) by mouth every 6 (six) hours as needed for nausea or vomiting, Disp: 60 tablet, Rfl: 2    oxyCODONE (OxyCONTIN) 20 mg 12 hr tablet, Take 1 tablet (20 mg total) by mouth every 12 (twelve) hours For cancer painMax Daily Amount: 40 mg, Disp: 60 tablet, Rfl: 0    oxyCODONE (OxyCONTIN) 20 mg 12 hr tablet, Take 1 tablet (20 mg total) by mouth every 12 (twelve) hours For severe cancer painMax Daily Amount: 40 mg, Disp: 60 tablet, Rfl: 0    oxyCODONE (ROXICODONE) 30 MG immediate release tablet, Take 1 tablet (30 mg total) by mouth every 4 (four) hours as needed (cancer pain)Max Daily Amount: 180 mg, Disp: 120 tablet, Rfl: 0    pantoprazole (PROTONIX) 40 mg tablet, TAKE ONE TABLET BY MOUTH EVERY DAY, Disp: 30 tablet, Rfl: 0    predniSONE 10 mg tablet, Take 0 5 tablets (5 mg total) by mouth daily, Disp: 30 tablet, Rfl: 0    predniSONE 5 mg tablet, Take 1 tablet (5 mg total) by mouth daily, Disp: 30 tablet, Rfl: 3    pregabalin (LYRICA) 25 mg capsule, Take 1 capsule PO in morning and 2 capsules PO at bedtime, Disp: 90 capsule, Rfl: 2    prochlorperazine (COMPAZINE) 10 mg tablet, Take 1 tablet (10 mg total) by mouth every 6 (six) hours as needed for nausea or vomiting, Disp: 90 tablet, Rfl: 0    QUEtiapine (SEROquel) 25 mg tablet, Take 25 mg by mouth daily at bedtime, Disp: , Rfl:     REGULOID 28 3 % POWD, USE AS DIRECTED, Disp: 369 g, Rfl: 4    Saline 0 65 % (Soln) SOLN, 5 drops into each nostril as needed (Dry nose), Disp: 50 mL, Rfl: 5    selenium sulfide (SELSUN) 2 5 % shampoo, Daily to affected area on neck  Leave on skin for 10 minutes and then rinse repeat daily for 7 days total, Disp: 118 mL, Rfl: 1    senna-docusate sodium (SENOKOT-S) 8 6-50 mg per tablet, TAKE 1 TABLET BY MOUTH TWICE DAILY  , Disp: 60 tablet, Rfl: 11    tamsulosin (FLOMAX) 0 4 mg, TAKE ONE CAPSULE BY MOUTH EVERY DAY WITH DINNER, Disp: 90 capsule, Rfl: 0    budesonide (PULMICORT) 1 MG/2ML nebulizer solution, Take 2 mL (1 mg total) by nebulization 2 (two) times a day (Patient not taking: Reported on 9/18/2020), Disp: 120 mL, Rfl: 1    dextromethorphan-guaiFENesin (ROBITUSSIN DM)  mg/5 mL syrup, Take 5 mL by mouth 3 (three) times a day as needed for cough (Patient not taking: Reported on 9/16/2020), Disp: 236 mL, Rfl: 1    ergocalciferol (ERGOCALCIFEROL) 1 25 MG (93388 UT) capsule, Take 1 capsule (50,000 Units total) by mouth once a week (Patient not taking: Reported on 9/16/2020), Disp: 12 capsule, Rfl: 0    oxyCODONE (OxyCONTIN) 20 mg 12 hr tablet, Take 1 tablet (20 mg total) by mouth every 12 (twelve) hours For severe cancer painMax Daily Amount: 40 mg (Patient not taking: Reported on 9/18/2020), Disp: 60 tablet, Rfl: 0    polyethylene glycol (GLYCOLAX) powder, Take 17 g by mouth daily (Patient not taking: Reported on 9/16/2020), Disp: 527 g, Rfl: 1    sildenafil (VIAGRA) 50 MG tablet, Take 1 tablet (50 mg total) by mouth as needed for erectile dysfunction (Patient not taking: Reported on 9/18/2020), Disp: 6 tablet, Rfl: 0  Allergies   Allergen Reactions    Lisinopril Anaphylaxis     Took medication a while ago and had a "swelling reaction"  Does not carry epi-pen         Review of Systems   Constitutional: Positive for activity change and fatigue  Negative for appetite change, fever and unexpected weight change  HENT: Negative  Eyes: Negative  Respiratory: Negative for cough, chest tightness, shortness of breath and wheezing  Cardiovascular: Negative for chest pain, palpitations and leg swelling  Gastrointestinal: Negative  Endocrine: Negative  Genitourinary: Negative  Musculoskeletal: Negative  Skin: Negative  Allergic/Immunologic: Negative  Neurological: Negative for dizziness, tremors, speech difficulty, weakness, numbness and headaches  Hematological: Negative for adenopathy  Does not bruise/bleed easily  Psychiatric/Behavioral: Negative  OBJECTIVE:   /85 (BP Location: Right arm)   Pulse 89   Temp 98 8 °F (37 1 °C) (Temporal)   Resp (!) 24   Ht 5' 8 5" (1 74 m)   Wt 90 kg (198 lb 6 6 oz)   SpO2 95%   BMI 29 73 kg/m²   Pain Assessment:  7  Performance Status: Karnofsky: 80 - Normal activity with effort; some signs or symptoms of disease    Physical Exam  Constitutional:       General: He is not in acute distress       Appearance: Normal appearance  He is normal weight  HENT:      Head: Normocephalic and atraumatic  Mouth/Throat:      Mouth: Mucous membranes are moist       Pharynx: Oropharynx is clear  Eyes:      Conjunctiva/sclera: Conjunctivae normal       Pupils: Pupils are equal, round, and reactive to light  Neck:      Musculoskeletal: Normal range of motion and neck supple  No neck rigidity or muscular tenderness  Cardiovascular:      Rate and Rhythm: Normal rate and regular rhythm  Heart sounds: Normal heart sounds  Pulmonary:      Breath sounds: Wheezing present  Comments: Patient on oxygen has shortness of breath on exertion  Abdominal:      Palpations: Abdomen is soft  There is no mass  Tenderness: There is no abdominal tenderness  Musculoskeletal:         General: No swelling  Right lower leg: No edema  Left lower leg: No edema  Lymphadenopathy:      Cervical: No cervical adenopathy  Skin:     General: Skin is warm  Findings: No lesion or rash  Neurological:      General: No focal deficit present  Mental Status: He is alert and oriented to person, place, and time  Mental status is at baseline  Psychiatric:         Mood and Affect: Mood normal          Thought Content: Thought content normal             RESULTS  Lab Results    Chemistry        Component Value Date/Time    K 4 4 09/16/2020 1146     09/16/2020 1146    CO2 31 (H) 09/16/2020 1146    BUN 23 09/16/2020 1146    CREATININE 2 06 (H) 09/16/2020 1146        Component Value Date/Time    CALCIUM 9 2 09/16/2020 1146    ALKPHOS 91 09/16/2020 1146    AST 31 09/16/2020 1146    ALT 23 09/16/2020 1146            Lab Results   Component Value Date    WBC 6 20 09/16/2020    HGB 11 6 (L) 09/16/2020    HCT 35 5 (L) 09/16/2020    MCV 83 09/16/2020     09/16/2020         Imaging Studies  No results found  Pathology:  Adenocarcinoma lung  ASSESSMENT  No diagnosis found    Cancer Staging  Adenocarcinoma of lung, right [facility])  Staging form: Lung, AJCC 8th Edition  - Clinical: No stage assigned - Unsigned  - Pathologic: No stage assigned - Unsigned        PLAN/DISCUSSION  No orders of the defined types were placed in this encounter  Orquidea Morris  is a 64y o  year old male with probable stage IV adenocarcinoma right lung was diagnosed about 2 years ago treated only with chemotherapy and immunotherapy with good response outside of a persistent right middle lobe lesion which appears to be enlarging on recent PET-CT scan August of this year  We discussed with patient SBRT which will require specialized immobilization and some breath hold during actual treatment  Explained to him only 5 treatments in view of the large dose per fraction 10 Gy for total of 50 Gy  Side effects are minimal may be some fatigue but possibility late complication of some limited pneumonitis  He will be scheduled for 4 D CT simulation at [address] [place] [address] and treatments will be at [facility] as this is much closer to home and he will be transported by [employer]  Yves Gonzales MD  9/18/2020,2:06 PM      Portions of the record may have been created with voice recognition software  Occasional wrong word or "sound a like" substitutions may have occurred due to the inherent limitations of voice recognition software  Read the chart carefully and recognize, using context, where substitutions have occurred

## 2020-09-18 NOTE — PROGRESS NOTES
Christy Ybarra   1960 is a 64 y o  male       43-year-old presents today for consideration of stereotactic radiation for his lung cancer  He has been referred by Dr Rosalba Jenkins  Patient has history of being a heavy smoker who used to smoke 2 packs of cigarettes daily for many years, COPD  In 2018 he presented with dyspnea, a CT scan of the chest on October 2018 showed right middle lobe spiculated 1 3 cm mass with multiple solid and ground-glass nodules along the major and minor fissures sub cm pulmonary nodules bilaterally, left adrenal 1 2 cm nodule     10/24/18 PET scan showed 1 3 cm right middle lung nodule with SUV of 6 8, numerous nodular densities along the right major and minor fissures, right hilar activity with SUV of 3 4, subcarinal lymph node measuring 7 mm with SUV of 2 7, periportal enlarged lymph nodes concerning for metastatic disease measuring 2 4 cm with SUV of 5, prominent left retrocrural lymph node measuring 1 cm x 9 mm with SUV of 1 1    11/13/18 Core biopsy of the right spiculated nodule showed invasive adenocarcinoma consistent with lung primary positive for CK7, TTF 1, Napsin-A    12/2018 started Alimta, Carboplatin (AUC 5) + Keytruda (6 cycles total)  Alimta discontinued 9/16/19 due to worsening renal dysfunction  8/17/20 PET CT: IMPRESSION:   1  Increased focal FDG uptake in an enlarging nodular density along the right major fissure laterally suspicious for recurrent disease  2   Otherwise no new findings suspicious for hypermetabolic metastasis in the neck, abdomen or pelvis         8/24/20 Dr Rosalba Jenkins -  continue with the immunotherapy Keytruda on every 3 week   "He will be also sent to the Radiation Oncology team to see if the hypermetabolic activity in the right major fissure area can be radiated with stereotactic radiation since this seems to be the only hyper metabolically area on the recent PET scan "      9/16/20 [facility] FU with BROOKS Villalta  Pt continues on Keytruda every three weeks  Scheduled to meet with radiation oncology on Friday  Will return in three weeks  Pt is followed by palliative care for pain management  9/16/20 Infusion-Keytruda        Upcoming  9/30/3030-f/u [facility] palliative care  10/5/2020-f/u med onc Dr Loyda Reyes  10/28/2020-f/u med onc [facility]          Oncology History   5     Adenocarcinoma of lung, right ([facility] )   11/13/2018 Initial Diagnosis    Adenocarcinoma of lung, right ([facility] )  Stage IV     11/13/2018 Biopsy    A  Lung, right, core needle biopsies:             - Invasive adenocarcinoma, consistent with lung primary          12/13/2018 - 3/28/2019 Chemotherapy     Alimta, Carboplatin (AUC 5) + Keytruda (6 cycles total)     4/17/2019 -  Chemotherapy    Started maintenance Alimta and Keytruda  (Alimta d/c'd 9/16/19 due to worsening renal dysfunction)         Clinical Trial: no      Health Maintenance   Topic Date Due    Diabetic Foot Exam  08/19/1972    DM Eye Exam  08/19/1972    BMI: Followup Plan  08/19/1980    Annual Physical  08/19/1980    DTaP,Tdap,and Td Vaccines (1 - Tdap) 08/19/1983    HEMOGLOBIN A1C  04/23/2020    Influenza Vaccine  07/01/2020    Colorectal Cancer Screening  01/27/2021    URINE MICROALBUMIN  01/28/2021    BMI: Adult  09/18/2021    HIV Screening  Completed    Hepatitis C Screening  Completed    Pneumococcal Vaccine: Pediatrics (0 to 5 Years) and At-Risk Patients (6 to 59 Years)  Completed    HIB Vaccine  Aged Out    Hepatitis B Vaccine  Aged Out    IPV Vaccine  Aged Out    Hepatitis A Vaccine  Aged Out    Meningococcal ACWY Vaccine  Aged Out    HPV Vaccine  Aged Out       Past Medical History:   Diagnosis Date    Alkalosis 12/9/2019    Bipolar 1 disorder ([facility] )     Cancer ([facility] )     adeno lung  dx 11/2018-lung bx today 4/9/2019-ongoing chemo    Chronic obstructive pulmonary disease with acute exacerbation ([facility] ) 6/7/2018    Chronic pain disorder     back and right shoulder    CKD (chronic kidney disease)  COPD (chronic obstructive pulmonary disease) (HCC)     Depression     Diabetes mellitus ([facility] )     Elevated d-dimer 2019    Elevated troponin 2019    Elevated troponin level not due to acute coronary syndrome 2019    GERD (gastroesophageal reflux disease)     Herniated lumbar intervertebral disc     Hyperkalemia     Hypertension     Insomnia     Latent syphilis     Treated    Low back pain     Lumbosacral disc disease     Lung cancer [facility] ) 2019    Pneumothorax after biopsy     R lung    PTSD (post-traumatic stress disorder)     Pulmonary emphysema ([facility] )     Tobacco abuse 2018       Past Surgical History:   Procedure Laterality Date    COLONOSCOPY      CT GUIDED CHEST TUBE  2018    FL GUIDED CENTRAL VENOUS ACCESS DEVICE INSERTION  2019    HEMORROIDECTOMY      IR BIOPSY LUNG  2018    IR CHEST TUBE PLACEMENT  2018    KNEE SURGERY      AK INSJ TUNNELED CTR VAD W/SUBQ PORT AGE 5 YR/> N/A 2019    Procedure: PLACEMENT OF PORT-A-CATH;  Surgeon: Richa Green MD;  Location: [address] [place] [address] MAIN OR;  Service: Vascular       Family History   Problem Relation Age of Onset    Heart disease Mother     Cancer Mother     Hypertension Father     Diabetes Family     Asthma Family     Heart disease Family     Cancer Family     Cancer Maternal Grandfather     Cancer Paternal Grandfather     Cancer Maternal Aunt        Social History     Tobacco Use    Smoking status: Former Smoker     Packs/day: 0 50     Years: 40 00     Pack years: 20 00     Types: Cigarettes     Start date:      Last attempt to quit: 2019     Years since quittin 1    Smokeless tobacco: Never Used    Tobacco comment: will smoke a cigarette on occasion with stress   Substance Use Topics    Alcohol use: Not Currently    Drug use: Not Currently     Types: Marijuana     Comment: "I will smoke a joint if I am stressed out but not every day"          Current Outpatient Medications:     albuterol (2 5 mg/3 mL) 0 083 % nebulizer solution, USE 1 VIAL VIA NEB EVERY 4 HOURS AS NEEDED FOR WHEEZING/SHORTNESS OF BREATH, Disp: 360 mL, Rfl: 3    albuterol (PROVENTIL HFA,VENTOLIN HFA) 90 mcg/act inhaler, Inhale 2 puffs every 4 (four) hours as needed for wheezing, Disp: 8 5 g, Rfl: 3    amLODIPine (NORVASC) 5 mg tablet, TAKE 1 TABLET BY MOUTH ONCE DAILY  , Disp: 30 tablet, Rfl: 0    apixaban (ELIQUIS) 2 5 mg, Take 1 tablet (2 5 mg total) by mouth 2 (two) times a day (Patient taking differently: Take 2 5 mg by mouth 3 (three) times a day ), Disp: 60 tablet, Rfl: 11    Blood Glucose Monitoring Suppl KIT, by Does not apply route daily, Disp: 1 each, Rfl: 0    budesonide (PULMICORT) 0 5 mg/2 mL nebulizer solution, Take 1 vial (0 5 mg total) by nebulization every 12 (twelve) hours Rinse mouth after use , Disp: 1 vial, Rfl: 0    carbamide peroxide (DEBROX) 6 5 % otic solution, Administer 5 drops into both ears 2 (two) times a day, Disp: 15 mL, Rfl: 0    diltiazem (CARDIZEM CD) 120 mg 24 hr capsule, Take 1 capsule (120 mg total) by mouth daily, Disp: 30 capsule, Rfl: 11    divalproex sodium (DEPAKOTE) 250 mg EC tablet, Take 1 tablet (250 mg total) by mouth 2 (two) times a day, Disp: 30 tablet, Rfl: 0    FLUoxetine (PROzac) 10 MG tablet, Take 1 tablet (10 mg total) by mouth daily, Disp: 30 tablet, Rfl: 5    fluticasone (FLONASE) 50 mcg/act nasal spray, 1-2 sprays each nostril daily for allergies, Disp: 1 Bottle, Rfl: 3    folic acid (FOLVITE) 1 mg tablet, TAKE 1 TABLET BY MOUTH ONCE DAILY  , Disp: 30 tablet, Rfl: 0    formoterol (PERFOROMIST) 20 MCG/2ML nebulizer solution, Take 1 vial (20 mcg total) by nebulization 2 (two) times a day, Disp: 60 vial, Rfl: 1    FREESTYLE LITE test strip, TEST BLOOD SUGAR DAILY, Disp: 100 each, Rfl: 1    guaiFENesin (MUCINEX) 600 mg 12 hr tablet, Take 600 mg by mouth every 12 (twelve) hours, Disp: , Rfl:     ipratropium (ATROVENT) 0 02 % nebulizer solution, USE 1 VIAL VIA NEB 3 TIMES DAILY, Disp: 225 mL, Rfl: 3    ipratropium-albuterol (DUO-NEB) 0 5-2 5 mg/3 mL nebulizer solution, Take 1 vial (3 mL total) by nebulization 4 (four) times a day, Disp: 120 vial, Rfl: 0    Lancets (FREESTYLE) lancets, TEST BLOOD SUGAR DAILY, Disp: 100 each, Rfl: 4    lidocaine (LIDODERM) 5 %, Apply 1 patch topically daily Remove & Discard patch within 12 hours or as directed by MD, Disp: 30 patch, Rfl: 1    lidocaine (LMX) 4 % cream, Apply topically as needed for mild pain Apply 1/2-1 inch to port 30-60 mins prior to needle insertion, cover with serrain wrap, Disp: 30 g, Rfl: 5    loratadine (CLARITIN) 10 mg tablet, Take 1 tablet (10 mg total) by mouth daily in the early morning, Disp: 90 tablet, Rfl: 3    melatonin 3 mg, Take 1 tablet (3 mg total) by mouth daily at bedtime, Disp: 30 tablet, Rfl: 1    metFORMIN (GLUCOPHAGE-XR) 500 mg 24 hr tablet, Take 1 tablet (500 mg total) by mouth 2 (two) times a day with meals, Disp: 60 tablet, Rfl: 0    ondansetron (ZOFRAN) 4 mg tablet, Take 1 tablet (4 mg total) by mouth every 6 (six) hours as needed for nausea or vomiting, Disp: 60 tablet, Rfl: 2    oxyCODONE (OxyCONTIN) 20 mg 12 hr tablet, Take 1 tablet (20 mg total) by mouth every 12 (twelve) hours For cancer painMax Daily Amount: 40 mg, Disp: 60 tablet, Rfl: 0    oxyCODONE (OxyCONTIN) 20 mg 12 hr tablet, Take 1 tablet (20 mg total) by mouth every 12 (twelve) hours For severe cancer painMax Daily Amount: 40 mg, Disp: 60 tablet, Rfl: 0    oxyCODONE (ROXICODONE) 30 MG immediate release tablet, Take 1 tablet (30 mg total) by mouth every 4 (four) hours as needed (cancer pain)Max Daily Amount: 180 mg, Disp: 120 tablet, Rfl: 0    pantoprazole (PROTONIX) 40 mg tablet, TAKE ONE TABLET BY MOUTH EVERY DAY, Disp: 30 tablet, Rfl: 0    predniSONE 10 mg tablet, Take 0 5 tablets (5 mg total) by mouth daily, Disp: 30 tablet, Rfl: 0    predniSONE 5 mg tablet, Take 1 tablet (5 mg total) by mouth daily, Disp: 30 tablet, Rfl: 3    pregabalin (LYRICA) 25 mg capsule, Take 1 capsule PO in morning and 2 capsules PO at bedtime, Disp: 90 capsule, Rfl: 2    prochlorperazine (COMPAZINE) 10 mg tablet, Take 1 tablet (10 mg total) by mouth every 6 (six) hours as needed for nausea or vomiting, Disp: 90 tablet, Rfl: 0    QUEtiapine (SEROquel) 25 mg tablet, Take 25 mg by mouth daily at bedtime, Disp: , Rfl:     REGULOID 28 3 % POWD, USE AS DIRECTED, Disp: 369 g, Rfl: 4    Saline 0 65 % (Soln) SOLN, 5 drops into each nostril as needed (Dry nose), Disp: 50 mL, Rfl: 5    selenium sulfide (SELSUN) 2 5 % shampoo, Daily to affected area on neck  Leave on skin for 10 minutes and then rinse repeat daily for 7 days total, Disp: 118 mL, Rfl: 1    senna-docusate sodium (SENOKOT-S) 8 6-50 mg per tablet, TAKE 1 TABLET BY MOUTH TWICE DAILY  , Disp: 60 tablet, Rfl: 11    tamsulosin (FLOMAX) 0 4 mg, TAKE ONE CAPSULE BY MOUTH EVERY DAY WITH DINNER, Disp: 90 capsule, Rfl: 0    budesonide (PULMICORT) 1 MG/2ML nebulizer solution, Take 2 mL (1 mg total) by nebulization 2 (two) times a day (Patient not taking: Reported on 9/18/2020), Disp: 120 mL, Rfl: 1    dextromethorphan-guaiFENesin (ROBITUSSIN DM)  mg/5 mL syrup, Take 5 mL by mouth 3 (three) times a day as needed for cough (Patient not taking: Reported on 9/16/2020), Disp: 236 mL, Rfl: 1    ergocalciferol (ERGOCALCIFEROL) 1 25 MG (85236 UT) capsule, Take 1 capsule (50,000 Units total) by mouth once a week (Patient not taking: Reported on 9/16/2020), Disp: 12 capsule, Rfl: 0    oxyCODONE (OxyCONTIN) 20 mg 12 hr tablet, Take 1 tablet (20 mg total) by mouth every 12 (twelve) hours For severe cancer painMax Daily Amount: 40 mg (Patient not taking: Reported on 9/18/2020), Disp: 60 tablet, Rfl: 0    polyethylene glycol (GLYCOLAX) powder, Take 17 g by mouth daily (Patient not taking: Reported on 9/16/2020), Disp: 527 g, Rfl: 1    sildenafil (VIAGRA) 50 MG tablet, Take 1 tablet (50 mg total) by mouth as needed for erectile dysfunction (Patient not taking: Reported on 9/18/2020), Disp: 6 tablet, Rfl: 0    Allergies   Allergen Reactions    Lisinopril Anaphylaxis     Took medication a while ago and had a "swelling reaction"  Does not carry epi-pen        Review of Systems:  Review of Systems   Constitutional: Positive for activity change  Negative for appetite change, diaphoresis, fatigue, fever and unexpected weight change  HENT: Positive for hearing loss (R ear) and postnasal drip  Eyes: Negative  Respiratory: Positive for cough, shortness of breath and wheezing  Pt short of breath and wheezing from walking a short distance  Placed on our O2 tank at 3L  Pt has portable O2  Cardiovascular: Positive for chest pain and leg swelling  Gastrointestinal: Positive for constipation and nausea  Negative for abdominal pain, blood in stool, diarrhea and vomiting  Genitourinary: Negative  Pt on Flomax   Musculoskeletal: Positive for arthralgias  Pain from waist down, R chest wall and arms  Skin: Negative  Allergic/Immunologic: Positive for immunocompromised state  Neurological: Negative for dizziness, light-headedness and headaches  Hematological: Negative  Vitals:    09/18/20 1207   BP: 125/85   BP Location: Right arm   Pulse: 89   Resp: (!) 24   Temp: 98 8 °F (37 1 °C)   TempSrc: Temporal   SpO2: 95%   Weight: 90 kg (198 lb 6 6 oz)   Height: 5' 8 5" (1 74 m)            Pain assessment: 6    Imaging:No images are attached to the encounter       Teaching NCI teaching performed and booklet given    MST pt declined at this time

## 2020-09-28 ENCOUNTER — APPOINTMENT (OUTPATIENT)
Dept: RADIATION ONCOLOGY | Facility: HOSPITAL | Age: 60
End: 2020-09-28
Attending: RADIOLOGY
Payer: COMMERCIAL

## 2020-09-28 ENCOUNTER — SOCIAL WORK (OUTPATIENT)
Dept: INFUSION CENTER | Facility: CLINIC | Age: 60
End: 2020-09-28

## 2020-09-28 PROCEDURE — 77470 SPECIAL RADIATION TREATMENT: CPT | Performed by: RADIOLOGY

## 2020-09-28 PROCEDURE — 77370 RADIATION PHYSICS CONSULT: CPT | Performed by: RADIOLOGY

## 2020-09-28 PROCEDURE — 77334 RADIATION TREATMENT AID(S): CPT | Performed by: RADIOLOGY

## 2020-09-28 NOTE — PROGRESS NOTES
Oncology SW Care Manager received completed Distress Thermometer  Mr Gee Miner self scored a 3/10 indicating there are no significant stressors at this time  Based on pt's score, a SW follow up would not be indicated  If pt expresses psychosocial needs, an oncology social work care manager is available to provide support

## 2020-09-29 ENCOUNTER — TELEPHONE (OUTPATIENT)
Dept: FAMILY MEDICINE CLINIC | Facility: CLINIC | Age: 60
End: 2020-09-29

## 2020-09-29 NOTE — TELEPHONE ENCOUNTER
Via refill line:    Pt called requesting medication refills  wanted a return phone call  I called pt PAPI PALENCIA to call office back with medications he need us to refill

## 2020-09-30 ENCOUNTER — OFFICE VISIT (OUTPATIENT)
Dept: PALLIATIVE MEDICINE | Facility: CLINIC | Age: 60
End: 2020-09-30
Payer: COMMERCIAL

## 2020-09-30 VITALS
DIASTOLIC BLOOD PRESSURE: 80 MMHG | HEART RATE: 92 BPM | WEIGHT: 191.8 LBS | OXYGEN SATURATION: 99 % | BODY MASS INDEX: 30.1 KG/M2 | TEMPERATURE: 97.7 F | HEIGHT: 67 IN | RESPIRATION RATE: 18 BRPM | SYSTOLIC BLOOD PRESSURE: 144 MMHG

## 2020-09-30 DIAGNOSIS — I48.0 PAF (PAROXYSMAL ATRIAL FIBRILLATION) (HCC): ICD-10-CM

## 2020-09-30 DIAGNOSIS — C34.91 ADENOCARCINOMA OF LUNG, RIGHT (HCC): ICD-10-CM

## 2020-09-30 DIAGNOSIS — Z51.5 PALLIATIVE CARE PATIENT: ICD-10-CM

## 2020-09-30 DIAGNOSIS — N18.30 STAGE 3 CHRONIC KIDNEY DISEASE (HCC): ICD-10-CM

## 2020-09-30 DIAGNOSIS — G89.3 CANCER-RELATED PAIN: Primary | Chronic | ICD-10-CM

## 2020-09-30 PROCEDURE — 99213 OFFICE O/P EST LOW 20 MIN: CPT | Performed by: NURSE PRACTITIONER

## 2020-09-30 RX ORDER — OXYCODONE HCL 20 MG/1
20 TABLET, FILM COATED, EXTENDED RELEASE ORAL EVERY 12 HOURS SCHEDULED
Qty: 60 TABLET | Refills: 0 | Status: SHIPPED | OUTPATIENT
Start: 2020-10-07 | End: 2020-10-29 | Stop reason: SDUPTHER

## 2020-09-30 RX ORDER — OXYCODONE HYDROCHLORIDE 30 MG/1
30 TABLET ORAL EVERY 4 HOURS PRN
Qty: 120 TABLET | Refills: 0 | Status: SHIPPED | OUTPATIENT
Start: 2020-09-30 | End: 2020-10-29 | Stop reason: SDUPTHER

## 2020-09-30 NOTE — PROGRESS NOTES
Outpatient Follow-Up - Palliative and Supportive Care   Sriram Cantu Sr  64 y o  male [id number]    Assessment & Plan  1  Cancer-related pain    2  Palliative care patient    3  Adenocarcinoma of lung, right ([facility] )    4  Stage 3 chronic kidney disease ([facility] )    5  PAF (paroxysmal atrial fibrillation) (HCC)        Medications adjusted this encounter:  Requested Prescriptions     Signed Prescriptions Disp Refills    oxyCODONE (ROXICODONE) 30 MG immediate release tablet 120 tablet 0     Sig: Take 1 tablet (30 mg total) by mouth every 4 (four) hours as needed (cancer pain)Max Daily Amount: 180 mg    oxyCODONE (OxyCONTIN) 20 mg 12 hr tablet 60 tablet 0     Sig: Take 1 tablet (20 mg total) by mouth every 12 (twelve) hours For severe cancer painMax Daily Amount: 40 mg     No orders of the defined types were placed in this encounter  Medications Discontinued During This Encounter   Medication Reason    oxyCODONE (OxyCONTIN) 20 mg 12 hr tablet     oxyCODONE (OxyCONTIN) 20 mg 12 hr tablet     oxyCODONE (ROXICODONE) 30 MG immediate release tablet Reorder    oxyCODONE (OxyCONTIN) 20 mg 12 hr tablet Reorder   DUPLICATE ORDERS      Sriram Cantu  was seen today for symptoms and planning cares related to above illnesses  I have reviewed the patient's controlled substance dispensing history in the Prescription Drug Monitoring Program in compliance with the [facility] regulations before prescribing any controlled substances  He is invited to continue to follow with us  If there are questions or concerns, please contact us through our clinic/answering service 24 hours a day, seven days a week      April BROOKS Stovall  [facility] Palliative and Supportive Care  [phone]      Visit Information    Accompanied By: No one    Source of History: Self    History Limitations: None      Follow up visit for:  symptom management    History of Present Illness     Mr Kenna Martini is currently receiving palliative single agent immunotherapy with Michelle Sood for probable stage IV lung adenocarcinoma diagnosed two years ago  During his last oncology visit, there was also discussion regarding radiation to the increasing hypermetabolic area of the right lung found on PET-CT imaging  Per radiation oncology consult, the patient will receive 4 radiation treatments  He reports his pain is controlled with his current pain regimen  He states that his pain is in his right chest and bilateral hips  He reports taking oxycodone 30 mg 3-4 times daily for pain rating 7/10 and this provides him with improvement to about 3/10  He is taking sennakot to prevent opioid induced constipation  He states he is having regular bowel movements  He reports he is sleeping well  Goals: The patient states he is going to continue doing everything he has to do to be able to be there for his 16year old twins who are seniors in high school  Past medical, surgical, social, and family histories are reviewed and pertinent updates are made  Review of Systems   Constitution: Positive for malaise/fatigue  Respiratory: Positive for shortness of breath  Musculoskeletal: Positive for joint pain  Chest wall pain   Gastrointestinal: Negative for constipation and diarrhea  Vital Signs    /80 (BP Location: Left arm, Patient Position: Sitting, Cuff Size: Standard)   Pulse 92   Temp 97 7 °F (36 5 °C) (Temporal)   Resp 18   Ht 5' 7" (1 702 m)   Wt 87 kg (191 lb 12 8 oz)   SpO2 99%   BMI 30 04 kg/m²     Physical Exam and Objective Data  Physical Exam  Constitutional:       General: He is awake  Appearance: Normal appearance  Interventions: Nasal cannula in place  HENT:      Head: Normocephalic and atraumatic  Pulmonary:      Effort: Pulmonary effort is normal       Breath sounds: Wheezing present  Neurological:      General: No focal deficit present  Mental Status: He is alert and oriented to person, place, and time  Psychiatric:         Attention and Perception: Attention normal          Mood and Affect: Mood normal  Affect is flat  Speech: Speech normal          Behavior: Behavior is cooperative  Physical Exam  Constitutional:       General: He is awake  Appearance: Normal appearance  Interventions: Nasal cannula in place  HENT:      Head: Normocephalic and atraumatic  Pulmonary:      Effort: Pulmonary effort is normal       Breath sounds: Wheezing present  Neurological:      General: No focal deficit present  Mental Status: He is alert and oriented to person, place, and time  Psychiatric:         Attention and Perception: Attention normal          Mood and Affect: Mood normal  Affect is flat  Speech: Speech normal          Behavior: Behavior is cooperative

## 2020-10-02 ENCOUNTER — APPOINTMENT (OUTPATIENT)
Dept: RADIATION ONCOLOGY | Facility: HOSPITAL | Age: 60
End: 2020-10-02
Attending: RADIOLOGY
Payer: COMMERCIAL

## 2020-10-02 PROCEDURE — 77334 RADIATION TREATMENT AID(S): CPT | Performed by: RADIOLOGY

## 2020-10-02 PROCEDURE — 77295 3-D RADIOTHERAPY PLAN: CPT | Performed by: RADIOLOGY

## 2020-10-02 PROCEDURE — 77293 RESPIRATOR MOTION MGMT SIMUL: CPT | Performed by: RADIOLOGY

## 2020-10-02 PROCEDURE — 77300 RADIATION THERAPY DOSE PLAN: CPT | Performed by: RADIOLOGY

## 2020-10-05 ENCOUNTER — HOSPITAL ENCOUNTER (OUTPATIENT)
Dept: INFUSION CENTER | Facility: HOSPITAL | Age: 60
Discharge: HOME | End: 2020-10-05
Attending: INTERNAL MEDICINE

## 2020-10-05 ENCOUNTER — TELEPHONE (OUTPATIENT)
Dept: HEMATOLOGY ONCOLOGY | Facility: CLINIC | Age: 60
End: 2020-10-05

## 2020-10-06 ENCOUNTER — HOSPITAL ENCOUNTER (OUTPATIENT)
Dept: INFUSION CENTER | Facility: HOSPITAL | Age: 60
Discharge: HOME | End: 2020-10-06
Attending: INTERNAL MEDICINE
Payer: COMMERCIAL

## 2020-10-06 VITALS
RESPIRATION RATE: 16 BRPM | DIASTOLIC BLOOD PRESSURE: 63 MMHG | HEART RATE: 88 BPM | SYSTOLIC BLOOD PRESSURE: 138 MMHG | TEMPERATURE: 97.8 F | OXYGEN SATURATION: 100 %

## 2020-10-06 DIAGNOSIS — N28.9 RENAL DYSFUNCTION: ICD-10-CM

## 2020-10-06 DIAGNOSIS — C34.91 ADENOCARCINOMA OF LUNG, RIGHT (HCC): Primary | ICD-10-CM

## 2020-10-06 LAB
ALBUMIN SERPL BCP-MCNC: 3.8 G/DL (ref 3–5.2)
ALP SERPL-CCNC: 90 U/L (ref 43–122)
ALT SERPL W P-5'-P-CCNC: 31 U/L (ref 9–52)
ANION GAP SERPL CALCULATED.3IONS-SCNC: 7 MMOL/L (ref 5–14)
AST SERPL W P-5'-P-CCNC: 26 U/L (ref 17–59)
BASOPHILS # BLD AUTO: 0.1 THOUSANDS/ΜL (ref 0–0.1)
BASOPHILS NFR BLD AUTO: 1 % (ref 0–1)
BILIRUB SERPL-MCNC: 0.3 MG/DL
BUN SERPL-MCNC: 16 MG/DL (ref 5–25)
CALCIUM SERPL-MCNC: 9.1 MG/DL (ref 8.4–10.2)
CHLORIDE SERPL-SCNC: 101 MMOL/L (ref 97–108)
CO2 SERPL-SCNC: 29 MMOL/L (ref 22–30)
CREAT SERPL-MCNC: 2.04 MG/DL (ref 0.7–1.5)
EOSINOPHIL # BLD AUTO: 0.3 THOUSAND/ΜL (ref 0–0.4)
EOSINOPHIL NFR BLD AUTO: 5 % (ref 0–6)
ERYTHROCYTE [DISTWIDTH] IN BLOOD BY AUTOMATED COUNT: 16.8 %
GFR SERPL CREATININE-BSD FRML MDRD: 40 ML/MIN/1.73SQ M
GLUCOSE SERPL-MCNC: 110 MG/DL (ref 70–99)
HCT VFR BLD AUTO: 34.4 % (ref 41–53)
HGB BLD-MCNC: 11.3 G/DL (ref 13.5–17.5)
LYMPHOCYTES # BLD AUTO: 0.9 THOUSANDS/ΜL (ref 0.5–4)
LYMPHOCYTES NFR BLD AUTO: 15 % (ref 25–45)
MCH RBC QN AUTO: 27 PG (ref 26–34)
MCHC RBC AUTO-ENTMCNC: 32.8 G/DL (ref 31–36)
MCV RBC AUTO: 82 FL (ref 80–100)
MONOCYTES # BLD AUTO: 0.5 THOUSAND/ΜL (ref 0.2–0.9)
MONOCYTES NFR BLD AUTO: 9 % (ref 1–10)
NEUTROPHILS # BLD AUTO: 4.3 THOUSANDS/ΜL (ref 1.8–7.8)
NEUTS SEG NFR BLD AUTO: 70 % (ref 45–65)
PLATELET # BLD AUTO: 395 THOUSANDS/UL (ref 150–450)
PMV BLD AUTO: 7.3 FL (ref 8.9–12.7)
POTASSIUM SERPL-SCNC: 4.7 MMOL/L (ref 3.6–5)
PROT SERPL-MCNC: 6.8 G/DL (ref 5.9–8.4)
RBC # BLD AUTO: 4.19 MILLION/UL (ref 4.5–5.9)
SODIUM SERPL-SCNC: 137 MMOL/L (ref 137–147)
T3FREE SERPL-MCNC: 3.29 PG/ML (ref 2.3–4.2)
TSH SERPL DL<=0.05 MIU/L-ACNC: 0.9 UIU/ML (ref 0.47–4.68)
WBC # BLD AUTO: 6.1 THOUSAND/UL (ref 4.5–11)

## 2020-10-06 PROCEDURE — 80053 COMPREHEN METABOLIC PANEL: CPT | Performed by: INTERNAL MEDICINE

## 2020-10-06 PROCEDURE — 85025 COMPLETE CBC W/AUTO DIFF WBC: CPT | Performed by: INTERNAL MEDICINE

## 2020-10-06 PROCEDURE — 84443 ASSAY THYROID STIM HORMONE: CPT | Performed by: INTERNAL MEDICINE

## 2020-10-06 PROCEDURE — 96413 CHEMO IV INFUSION 1 HR: CPT

## 2020-10-06 PROCEDURE — 84481 FREE ASSAY (FT-3): CPT | Performed by: INTERNAL MEDICINE

## 2020-10-06 RX ORDER — SODIUM CHLORIDE 9 MG/ML
20 INJECTION, SOLUTION INTRAVENOUS ONCE
Status: COMPLETED | OUTPATIENT
Start: 2020-10-06 | End: 2020-10-06

## 2020-10-06 RX ADMIN — SODIUM CHLORIDE 200 MG: 9 INJECTION, SOLUTION INTRAVENOUS at 11:33

## 2020-10-06 RX ADMIN — SODIUM CHLORIDE 20 ML/HR: 0.9 INJECTION, SOLUTION INTRAVENOUS at 11:08

## 2020-10-07 ENCOUNTER — TELEPHONE (OUTPATIENT)
Dept: RADIATION ONCOLOGY | Facility: HOSPITAL | Age: 60
End: 2020-10-07

## 2020-10-07 ENCOUNTER — APPOINTMENT (OUTPATIENT)
Dept: RADIATION ONCOLOGY | Facility: HOSPITAL | Age: 60
End: 2020-10-07
Payer: COMMERCIAL

## 2020-10-09 ENCOUNTER — APPOINTMENT (OUTPATIENT)
Dept: RADIATION ONCOLOGY | Facility: HOSPITAL | Age: 60
End: 2020-10-09
Attending: RADIOLOGY
Payer: COMMERCIAL

## 2020-10-09 PROCEDURE — 77373 STRTCTC BDY RAD THER TX DLVR: CPT | Performed by: RADIOLOGY

## 2020-10-12 ENCOUNTER — APPOINTMENT (OUTPATIENT)
Dept: RADIATION ONCOLOGY | Facility: HOSPITAL | Age: 60
End: 2020-10-12
Payer: COMMERCIAL

## 2020-10-12 DIAGNOSIS — B36.0 TINEA VERSICOLOR: ICD-10-CM

## 2020-10-12 DIAGNOSIS — J44.1 ACUTE EXACERBATION OF CHRONIC OBSTRUCTIVE PULMONARY DISEASE (COPD) (HCC): ICD-10-CM

## 2020-10-12 DIAGNOSIS — J44.9 CHRONIC OBSTRUCTIVE PULMONARY DISEASE, UNSPECIFIED COPD TYPE (HCC): ICD-10-CM

## 2020-10-12 RX ORDER — SELENIUM SULFIDE 2.5 MG/100ML
LOTION TOPICAL
Qty: 120 ML | Refills: 0 | Status: ON HOLD | OUTPATIENT
Start: 2020-10-12 | End: 2021-01-01 | Stop reason: ALTCHOICE

## 2020-10-12 RX ORDER — ALBUTEROL SULFATE 2.5 MG/3ML
SOLUTION RESPIRATORY (INHALATION)
Qty: 360 ML | Refills: 0 | Status: SHIPPED | OUTPATIENT
Start: 2020-10-12 | End: 2021-01-01 | Stop reason: SDUPTHER

## 2020-10-13 DIAGNOSIS — C34.91 ADENOCARCINOMA OF LUNG, RIGHT (HCC): ICD-10-CM

## 2020-10-13 DIAGNOSIS — J44.9 CHRONIC OBSTRUCTIVE PULMONARY DISEASE, UNSPECIFIED COPD TYPE (HCC): ICD-10-CM

## 2020-10-13 DIAGNOSIS — I10 ESSENTIAL HYPERTENSION: ICD-10-CM

## 2020-10-13 DIAGNOSIS — T45.1X5A ADVERSE EFFECT OF CHEMOTHERAPY, INITIAL ENCOUNTER: ICD-10-CM

## 2020-10-13 RX ORDER — AMLODIPINE BESYLATE 5 MG/1
5 TABLET ORAL DAILY
Qty: 30 TABLET | Refills: 0 | Status: SHIPPED | OUTPATIENT
Start: 2020-10-13 | End: 2020-10-15

## 2020-10-13 RX ORDER — PANTOPRAZOLE SODIUM 40 MG/1
40 TABLET, DELAYED RELEASE ORAL DAILY
Qty: 90 TABLET | Refills: 0 | Status: SHIPPED | OUTPATIENT
Start: 2020-10-13 | End: 2021-01-01 | Stop reason: SDUPTHER

## 2020-10-13 RX ORDER — FOLIC ACID 1 MG/1
1000 TABLET ORAL DAILY
Qty: 90 TABLET | Refills: 0 | Status: SHIPPED | OUTPATIENT
Start: 2020-10-13 | End: 2021-01-01 | Stop reason: SDUPTHER

## 2020-10-13 RX ORDER — ALBUTEROL SULFATE 90 UG/1
2 AEROSOL, METERED RESPIRATORY (INHALATION) EVERY 4 HOURS PRN
Qty: 18 G | Refills: 0 | Status: SHIPPED | OUTPATIENT
Start: 2020-10-13 | End: 2020-11-16

## 2020-10-14 ENCOUNTER — TELEPHONE (OUTPATIENT)
Dept: RADIATION ONCOLOGY | Facility: HOSPITAL | Age: 60
End: 2020-10-14

## 2020-10-14 ENCOUNTER — APPOINTMENT (OUTPATIENT)
Dept: RADIATION ONCOLOGY | Facility: HOSPITAL | Age: 60
End: 2020-10-14
Payer: COMMERCIAL

## 2020-10-15 ENCOUNTER — OFFICE VISIT (OUTPATIENT)
Dept: FAMILY MEDICINE CLINIC | Facility: CLINIC | Age: 60
End: 2020-10-15

## 2020-10-15 VITALS
BODY MASS INDEX: 29.27 KG/M2 | OXYGEN SATURATION: 98 % | SYSTOLIC BLOOD PRESSURE: 134 MMHG | WEIGHT: 186.9 LBS | RESPIRATION RATE: 18 BRPM | HEART RATE: 98 BPM | DIASTOLIC BLOOD PRESSURE: 70 MMHG | TEMPERATURE: 98.6 F

## 2020-10-15 DIAGNOSIS — E11.9 TYPE 2 DIABETES MELLITUS WITHOUT COMPLICATION, WITHOUT LONG-TERM CURRENT USE OF INSULIN (HCC): ICD-10-CM

## 2020-10-15 DIAGNOSIS — K59.00 CONSTIPATION, UNSPECIFIED CONSTIPATION TYPE: ICD-10-CM

## 2020-10-15 DIAGNOSIS — I10 ESSENTIAL HYPERTENSION: Primary | ICD-10-CM

## 2020-10-15 DIAGNOSIS — I48.0 PAROXYSMAL ATRIAL FIBRILLATION (HCC): ICD-10-CM

## 2020-10-15 DIAGNOSIS — K21.9 GASTROESOPHAGEAL REFLUX DISEASE WITHOUT ESOPHAGITIS: ICD-10-CM

## 2020-10-15 DIAGNOSIS — B36.0 TINEA VERSICOLOR: ICD-10-CM

## 2020-10-15 LAB — SL AMB POCT HEMOGLOBIN AIC: 5.9 (ref ?–6.5)

## 2020-10-15 PROCEDURE — 83036 HEMOGLOBIN GLYCOSYLATED A1C: CPT | Performed by: FAMILY MEDICINE

## 2020-10-15 PROCEDURE — 99213 OFFICE O/P EST LOW 20 MIN: CPT | Performed by: FAMILY MEDICINE

## 2020-10-15 RX ORDER — CLOTRIMAZOLE AND BETAMETHASONE DIPROPIONATE 10; .64 MG/G; MG/G
CREAM TOPICAL 2 TIMES DAILY
Qty: 45 G | Refills: 2 | Status: SHIPPED | OUTPATIENT
Start: 2020-10-15 | End: 2020-10-15 | Stop reason: SDUPTHER

## 2020-10-15 RX ORDER — SENNA AND DOCUSATE SODIUM 50; 8.6 MG/1; MG/1
1 TABLET, FILM COATED ORAL 2 TIMES DAILY
Qty: 60 TABLET | Refills: 11 | Status: SHIPPED | OUTPATIENT
Start: 2020-10-15 | End: 2020-12-09 | Stop reason: SDUPTHER

## 2020-10-15 RX ORDER — CLOTRIMAZOLE AND BETAMETHASONE DIPROPIONATE 10; .64 MG/G; MG/G
CREAM TOPICAL 2 TIMES DAILY
Qty: 45 G | Refills: 2 | Status: SHIPPED | OUTPATIENT
Start: 2020-10-15

## 2020-10-16 ENCOUNTER — TELEPHONE (OUTPATIENT)
Dept: RADIATION ONCOLOGY | Facility: HOSPITAL | Age: 60
End: 2020-10-16

## 2020-10-16 ENCOUNTER — APPOINTMENT (OUTPATIENT)
Dept: RADIATION ONCOLOGY | Facility: HOSPITAL | Age: 60
End: 2020-10-16
Attending: RADIOLOGY
Payer: COMMERCIAL

## 2020-10-16 PROCEDURE — 77373 STRTCTC BDY RAD THER TX DLVR: CPT | Performed by: RADIOLOGY

## 2020-10-19 ENCOUNTER — TELEPHONE (OUTPATIENT)
Dept: RADIATION ONCOLOGY | Facility: HOSPITAL | Age: 60
End: 2020-10-19

## 2020-10-20 ENCOUNTER — TELEPHONE (OUTPATIENT)
Dept: RADIATION ONCOLOGY | Facility: HOSPITAL | Age: 60
End: 2020-10-20

## 2020-10-20 ENCOUNTER — APPOINTMENT (OUTPATIENT)
Dept: RADIATION ONCOLOGY | Facility: HOSPITAL | Age: 60
End: 2020-10-20
Payer: COMMERCIAL

## 2020-10-22 ENCOUNTER — TELEPHONE (OUTPATIENT)
Dept: RADIATION ONCOLOGY | Facility: HOSPITAL | Age: 60
End: 2020-10-22

## 2020-10-22 ENCOUNTER — PATIENT OUTREACH (OUTPATIENT)
Dept: RADIATION ONCOLOGY | Facility: HOSPITAL | Age: 60
End: 2020-10-22

## 2020-10-22 DIAGNOSIS — Z78.9 NEED FOR FOLLOW-UP BY SOCIAL WORKER: Primary | ICD-10-CM

## 2020-10-23 DIAGNOSIS — C34.91 ADENOCARCINOMA OF LUNG, RIGHT (HCC): Primary | ICD-10-CM

## 2020-10-23 RX ORDER — SODIUM CHLORIDE 9 MG/ML
20 INJECTION, SOLUTION INTRAVENOUS ONCE
Status: CANCELLED | OUTPATIENT
Start: 2020-10-28

## 2020-10-26 ENCOUNTER — TELEPHONE (OUTPATIENT)
Dept: HEMATOLOGY ONCOLOGY | Facility: CLINIC | Age: 60
End: 2020-10-26

## 2020-10-27 ENCOUNTER — APPOINTMENT (OUTPATIENT)
Dept: RADIATION ONCOLOGY | Facility: HOSPITAL | Age: 60
End: 2020-10-27
Attending: RADIOLOGY
Payer: COMMERCIAL

## 2020-10-28 ENCOUNTER — HOSPITAL ENCOUNTER (OUTPATIENT)
Dept: INFUSION CENTER | Facility: HOSPITAL | Age: 60
Discharge: HOME | End: 2020-10-28
Attending: INTERNAL MEDICINE
Payer: COMMERCIAL

## 2020-10-28 ENCOUNTER — OFFICE VISIT (OUTPATIENT)
Dept: HEMATOLOGY ONCOLOGY | Facility: CLINIC | Age: 60
End: 2020-10-28
Payer: COMMERCIAL

## 2020-10-28 VITALS
RESPIRATION RATE: 17 BRPM | HEART RATE: 82 BPM | BODY MASS INDEX: 29.41 KG/M2 | HEIGHT: 67 IN | SYSTOLIC BLOOD PRESSURE: 145 MMHG | WEIGHT: 187.4 LBS | OXYGEN SATURATION: 99 % | DIASTOLIC BLOOD PRESSURE: 81 MMHG | TEMPERATURE: 98.9 F

## 2020-10-28 VITALS
TEMPERATURE: 98.9 F | HEART RATE: 88 BPM | WEIGHT: 187.39 LBS | BODY MASS INDEX: 29.41 KG/M2 | SYSTOLIC BLOOD PRESSURE: 145 MMHG | DIASTOLIC BLOOD PRESSURE: 81 MMHG | HEIGHT: 67 IN

## 2020-10-28 DIAGNOSIS — C34.91 ADENOCARCINOMA OF LUNG, RIGHT (HCC): Primary | ICD-10-CM

## 2020-10-28 DIAGNOSIS — D64.9 NORMOCYTIC ANEMIA: ICD-10-CM

## 2020-10-28 DIAGNOSIS — N28.9 RENAL DYSFUNCTION: ICD-10-CM

## 2020-10-28 DIAGNOSIS — B36.0 TINEA VERSICOLOR: ICD-10-CM

## 2020-10-28 DIAGNOSIS — Z51.11 ENCOUNTER FOR ANTINEOPLASTIC CHEMOTHERAPY: ICD-10-CM

## 2020-10-28 LAB
ALBUMIN SERPL BCP-MCNC: 3.9 G/DL (ref 3–5.2)
ALP SERPL-CCNC: 90 U/L (ref 43–122)
ALT SERPL W P-5'-P-CCNC: 21 U/L (ref 9–52)
ANION GAP SERPL CALCULATED.3IONS-SCNC: 5 MMOL/L (ref 5–14)
AST SERPL W P-5'-P-CCNC: 20 U/L (ref 17–59)
BASOPHILS # BLD AUTO: 0.1 THOUSANDS/ΜL (ref 0–0.1)
BASOPHILS NFR BLD AUTO: 1 % (ref 0–1)
BILIRUB SERPL-MCNC: 0.5 MG/DL
BUN SERPL-MCNC: 20 MG/DL (ref 5–25)
CALCIUM SERPL-MCNC: 9.1 MG/DL (ref 8.4–10.2)
CHLORIDE SERPL-SCNC: 100 MMOL/L (ref 97–108)
CO2 SERPL-SCNC: 31 MMOL/L (ref 22–30)
CREAT SERPL-MCNC: 1.91 MG/DL (ref 0.7–1.5)
EOSINOPHIL # BLD AUTO: 0.4 THOUSAND/ΜL (ref 0–0.4)
EOSINOPHIL NFR BLD AUTO: 6 % (ref 0–6)
ERYTHROCYTE [DISTWIDTH] IN BLOOD BY AUTOMATED COUNT: 16.1 %
GFR SERPL CREATININE-BSD FRML MDRD: 44 ML/MIN/1.73SQ M
GLUCOSE SERPL-MCNC: 107 MG/DL (ref 70–99)
HCT VFR BLD AUTO: 35.4 % (ref 41–53)
HGB BLD-MCNC: 11.6 G/DL (ref 13.5–17.5)
LYMPHOCYTES # BLD AUTO: 1 THOUSANDS/ΜL (ref 0.5–4)
LYMPHOCYTES NFR BLD AUTO: 14 % (ref 25–45)
MCH RBC QN AUTO: 26.9 PG (ref 26–34)
MCHC RBC AUTO-ENTMCNC: 32.9 G/DL (ref 31–36)
MCV RBC AUTO: 82 FL (ref 80–100)
MONOCYTES # BLD AUTO: 0.4 THOUSAND/ΜL (ref 0.2–0.9)
MONOCYTES NFR BLD AUTO: 6 % (ref 1–10)
NEUTROPHILS # BLD AUTO: 5 THOUSANDS/ΜL (ref 1.8–7.8)
NEUTS SEG NFR BLD AUTO: 73 % (ref 45–65)
PLATELET # BLD AUTO: 304 THOUSANDS/UL (ref 150–450)
PMV BLD AUTO: 7.4 FL (ref 8.9–12.7)
POTASSIUM SERPL-SCNC: 5.1 MMOL/L (ref 3.6–5)
PROT SERPL-MCNC: 7.1 G/DL (ref 5.9–8.4)
RBC # BLD AUTO: 4.33 MILLION/UL (ref 4.5–5.9)
SODIUM SERPL-SCNC: 136 MMOL/L (ref 137–147)
T3FREE SERPL-MCNC: 2.73 PG/ML (ref 2.3–4.2)
TSH SERPL DL<=0.05 MIU/L-ACNC: 0.9 UIU/ML (ref 0.47–4.68)
WBC # BLD AUTO: 6.9 THOUSAND/UL (ref 4.5–11)

## 2020-10-28 PROCEDURE — 85025 COMPLETE CBC W/AUTO DIFF WBC: CPT | Performed by: INTERNAL MEDICINE

## 2020-10-28 PROCEDURE — 99214 OFFICE O/P EST MOD 30 MIN: CPT | Performed by: NURSE PRACTITIONER

## 2020-10-28 PROCEDURE — 84443 ASSAY THYROID STIM HORMONE: CPT | Performed by: INTERNAL MEDICINE

## 2020-10-28 PROCEDURE — 80053 COMPREHEN METABOLIC PANEL: CPT | Performed by: INTERNAL MEDICINE

## 2020-10-28 PROCEDURE — 96413 CHEMO IV INFUSION 1 HR: CPT

## 2020-10-28 PROCEDURE — 84481 FREE ASSAY (FT-3): CPT | Performed by: INTERNAL MEDICINE

## 2020-10-28 RX ORDER — LIDOCAINE 40 MG/G
CREAM TOPICAL AS NEEDED
Qty: 30 G | Refills: 5 | Status: SHIPPED | OUTPATIENT
Start: 2020-10-28 | End: 2021-01-01

## 2020-10-28 RX ORDER — SODIUM CHLORIDE 9 MG/ML
20 INJECTION, SOLUTION INTRAVENOUS ONCE
Status: COMPLETED | OUTPATIENT
Start: 2020-10-28 | End: 2020-10-28

## 2020-10-28 RX ORDER — SODIUM CHLORIDE 9 MG/ML
20 INJECTION, SOLUTION INTRAVENOUS ONCE
Status: CANCELLED | OUTPATIENT
Start: 2020-11-18

## 2020-10-28 RX ADMIN — SODIUM CHLORIDE 20 ML/HR: 0.9 INJECTION, SOLUTION INTRAVENOUS at 11:53

## 2020-10-28 RX ADMIN — SODIUM CHLORIDE 200 MG: 9 INJECTION, SOLUTION INTRAVENOUS at 11:54

## 2020-10-29 ENCOUNTER — APPOINTMENT (OUTPATIENT)
Dept: RADIATION ONCOLOGY | Facility: HOSPITAL | Age: 60
End: 2020-10-29
Payer: COMMERCIAL

## 2020-10-29 ENCOUNTER — OFFICE VISIT (OUTPATIENT)
Dept: PALLIATIVE MEDICINE | Facility: CLINIC | Age: 60
End: 2020-10-29
Payer: COMMERCIAL

## 2020-10-29 VITALS
WEIGHT: 188.49 LBS | HEIGHT: 67 IN | RESPIRATION RATE: 18 BRPM | SYSTOLIC BLOOD PRESSURE: 140 MMHG | BODY MASS INDEX: 29.58 KG/M2 | OXYGEN SATURATION: 97 % | HEART RATE: 82 BPM | DIASTOLIC BLOOD PRESSURE: 78 MMHG | TEMPERATURE: 98.2 F

## 2020-10-29 DIAGNOSIS — F31.9 BIPOLAR 1 DISORDER (HCC): Primary | ICD-10-CM

## 2020-10-29 DIAGNOSIS — Z51.5 PALLIATIVE CARE PATIENT: ICD-10-CM

## 2020-10-29 DIAGNOSIS — C34.91 ADENOCARCINOMA OF LUNG, RIGHT (HCC): ICD-10-CM

## 2020-10-29 DIAGNOSIS — G89.3 CANCER-RELATED PAIN: Chronic | ICD-10-CM

## 2020-10-29 PROCEDURE — 99213 OFFICE O/P EST LOW 20 MIN: CPT | Performed by: FAMILY MEDICINE

## 2020-10-29 RX ORDER — OXYCODONE HCL 20 MG/1
20 TABLET, FILM COATED, EXTENDED RELEASE ORAL EVERY 12 HOURS SCHEDULED
Qty: 60 TABLET | Refills: 0 | Status: SHIPPED | OUTPATIENT
Start: 2020-10-29 | End: 2021-01-01 | Stop reason: SDUPTHER

## 2020-10-29 RX ORDER — OXYCODONE HYDROCHLORIDE 30 MG/1
30 TABLET ORAL EVERY 4 HOURS PRN
Qty: 120 TABLET | Refills: 0 | Status: SHIPPED | OUTPATIENT
Start: 2020-10-29 | End: 2021-01-01 | Stop reason: SDUPTHER

## 2020-10-29 RX ORDER — OXYCODONE HYDROCHLORIDE 30 MG/1
30 TABLET ORAL EVERY 4 HOURS PRN
Qty: 120 TABLET | Refills: 0 | Status: SHIPPED | OUTPATIENT
Start: 2020-11-23 | End: 2021-01-01 | Stop reason: SDUPTHER

## 2020-10-29 RX ORDER — QUETIAPINE FUMARATE 25 MG/1
12.5 TABLET, FILM COATED ORAL
Qty: 15 TABLET | Refills: 0 | Status: SHIPPED | OUTPATIENT
Start: 2020-10-29 | End: 2020-12-22

## 2020-10-29 RX ORDER — OXYCODONE HYDROCHLORIDE 30 MG/1
30 TABLET ORAL EVERY 4 HOURS PRN
Qty: 120 TABLET | Refills: 0 | Status: SHIPPED | OUTPATIENT
Start: 2020-12-21 | End: 2021-01-01 | Stop reason: SDUPTHER

## 2020-11-13 ENCOUNTER — PATIENT OUTREACH (OUTPATIENT)
Dept: CASE MANAGEMENT | Facility: HOSPITAL | Age: 60
End: 2020-11-13

## 2020-11-16 DIAGNOSIS — J44.9 CHRONIC OBSTRUCTIVE PULMONARY DISEASE, UNSPECIFIED COPD TYPE (HCC): ICD-10-CM

## 2020-11-16 RX ORDER — ALBUTEROL SULFATE 90 UG/1
AEROSOL, METERED RESPIRATORY (INHALATION)
Qty: 18 G | Refills: 0 | Status: SHIPPED | OUTPATIENT
Start: 2020-11-16 | End: 2020-12-09 | Stop reason: SDUPTHER

## 2020-11-18 ENCOUNTER — OFFICE VISIT (OUTPATIENT)
Dept: HEMATOLOGY ONCOLOGY | Facility: CLINIC | Age: 60
End: 2020-11-18
Payer: COMMERCIAL

## 2020-11-18 ENCOUNTER — HOSPITAL ENCOUNTER (OUTPATIENT)
Dept: INFUSION CENTER | Facility: HOSPITAL | Age: 60
Discharge: HOME | End: 2020-11-18
Attending: INTERNAL MEDICINE
Payer: COMMERCIAL

## 2020-11-18 VITALS
WEIGHT: 185 LBS | DIASTOLIC BLOOD PRESSURE: 78 MMHG | HEIGHT: 67 IN | TEMPERATURE: 98 F | HEART RATE: 100 BPM | SYSTOLIC BLOOD PRESSURE: 142 MMHG | BODY MASS INDEX: 29.03 KG/M2 | OXYGEN SATURATION: 98 % | RESPIRATION RATE: 18 BRPM

## 2020-11-18 VITALS
RESPIRATION RATE: 18 BRPM | SYSTOLIC BLOOD PRESSURE: 142 MMHG | HEIGHT: 67 IN | DIASTOLIC BLOOD PRESSURE: 72 MMHG | OXYGEN SATURATION: 100 % | HEART RATE: 84 BPM | BODY MASS INDEX: 29.03 KG/M2 | TEMPERATURE: 98.4 F | WEIGHT: 184.97 LBS

## 2020-11-18 DIAGNOSIS — Z78.9 NEED FOR FOLLOW-UP BY SOCIAL WORKER: ICD-10-CM

## 2020-11-18 DIAGNOSIS — C34.91 ADENOCARCINOMA OF LUNG, RIGHT (HCC): Primary | ICD-10-CM

## 2020-11-18 DIAGNOSIS — N28.9 RENAL DYSFUNCTION: ICD-10-CM

## 2020-11-18 DIAGNOSIS — D64.9 NORMOCYTIC ANEMIA: ICD-10-CM

## 2020-11-18 LAB
ALBUMIN SERPL BCP-MCNC: 3.9 G/DL (ref 3–5.2)
ALP SERPL-CCNC: 97 U/L (ref 43–122)
ALT SERPL W P-5'-P-CCNC: 14 U/L (ref 9–52)
ANION GAP SERPL CALCULATED.3IONS-SCNC: 3 MMOL/L (ref 5–14)
AST SERPL W P-5'-P-CCNC: 23 U/L (ref 17–59)
BASOPHILS # BLD AUTO: 0.1 THOUSANDS/ΜL (ref 0–0.1)
BASOPHILS NFR BLD AUTO: 1 % (ref 0–1)
BILIRUB SERPL-MCNC: 0.5 MG/DL
BUN SERPL-MCNC: 16 MG/DL (ref 5–25)
CALCIUM SERPL-MCNC: 9.3 MG/DL (ref 8.4–10.2)
CHLORIDE SERPL-SCNC: 101 MMOL/L (ref 97–108)
CO2 SERPL-SCNC: 32 MMOL/L (ref 22–30)
CREAT SERPL-MCNC: 1.88 MG/DL (ref 0.7–1.5)
EOSINOPHIL # BLD AUTO: 0.7 THOUSAND/ΜL (ref 0–0.4)
EOSINOPHIL NFR BLD AUTO: 9 % (ref 0–6)
ERYTHROCYTE [DISTWIDTH] IN BLOOD BY AUTOMATED COUNT: 16.1 %
GFR SERPL CREATININE-BSD FRML MDRD: 45 ML/MIN/1.73SQ M
GLUCOSE SERPL-MCNC: 95 MG/DL (ref 70–99)
HCT VFR BLD AUTO: 34.4 % (ref 41–53)
HGB BLD-MCNC: 11.3 G/DL (ref 13.5–17.5)
LYMPHOCYTES # BLD AUTO: 1.1 THOUSANDS/ΜL (ref 0.5–4)
LYMPHOCYTES NFR BLD AUTO: 15 % (ref 25–45)
MCH RBC QN AUTO: 27 PG (ref 26–34)
MCHC RBC AUTO-ENTMCNC: 32.7 G/DL (ref 31–36)
MCV RBC AUTO: 82 FL (ref 80–100)
MONOCYTES # BLD AUTO: 0.5 THOUSAND/ΜL (ref 0.2–0.9)
MONOCYTES NFR BLD AUTO: 6 % (ref 1–10)
NEUTROPHILS # BLD AUTO: 5.2 THOUSANDS/ΜL (ref 1.8–7.8)
NEUTS SEG NFR BLD AUTO: 69 % (ref 45–65)
PLATELET # BLD AUTO: 340 THOUSANDS/UL (ref 150–450)
PMV BLD AUTO: 7.5 FL (ref 8.9–12.7)
POTASSIUM SERPL-SCNC: 5.2 MMOL/L (ref 3.6–5)
PROT SERPL-MCNC: 7.2 G/DL (ref 5.9–8.4)
RBC # BLD AUTO: 4.18 MILLION/UL (ref 4.5–5.9)
SODIUM SERPL-SCNC: 136 MMOL/L (ref 137–147)
T3FREE SERPL-MCNC: 2.94 PG/ML (ref 2.3–4.2)
TSH SERPL DL<=0.05 MIU/L-ACNC: 0.86 UIU/ML (ref 0.47–4.68)
WBC # BLD AUTO: 7.5 THOUSAND/UL (ref 4.5–11)

## 2020-11-18 PROCEDURE — 99214 OFFICE O/P EST MOD 30 MIN: CPT | Performed by: NURSE PRACTITIONER

## 2020-11-18 PROCEDURE — 80053 COMPREHEN METABOLIC PANEL: CPT | Performed by: INTERNAL MEDICINE

## 2020-11-18 PROCEDURE — 96413 CHEMO IV INFUSION 1 HR: CPT

## 2020-11-18 PROCEDURE — 84481 FREE ASSAY (FT-3): CPT | Performed by: INTERNAL MEDICINE

## 2020-11-18 PROCEDURE — 85025 COMPLETE CBC W/AUTO DIFF WBC: CPT | Performed by: INTERNAL MEDICINE

## 2020-11-18 PROCEDURE — 84443 ASSAY THYROID STIM HORMONE: CPT | Performed by: INTERNAL MEDICINE

## 2020-11-18 RX ORDER — SODIUM CHLORIDE 9 MG/ML
20 INJECTION, SOLUTION INTRAVENOUS ONCE
Status: COMPLETED | OUTPATIENT
Start: 2020-11-18 | End: 2020-11-18

## 2020-11-18 RX ORDER — LIDOCAINE 40 MG/G
CREAM TOPICAL ONCE
Status: CANCELLED
Start: 2020-11-18

## 2020-11-18 RX ORDER — SODIUM CHLORIDE 9 MG/ML
20 INJECTION, SOLUTION INTRAVENOUS ONCE
Status: CANCELLED | OUTPATIENT
Start: 2020-12-09

## 2020-11-18 RX ORDER — LIDOCAINE 40 MG/G
1 CREAM TOPICAL ONCE
Status: DISCONTINUED | OUTPATIENT
Start: 2020-11-18 | End: 2020-11-22 | Stop reason: HOSPADM

## 2020-11-18 RX ADMIN — SODIUM CHLORIDE 200 MG: 9 INJECTION, SOLUTION INTRAVENOUS at 13:40

## 2020-11-18 RX ADMIN — SODIUM CHLORIDE 20 ML/HR: 9 INJECTION, SOLUTION INTRAVENOUS at 12:55

## 2020-12-09 ENCOUNTER — OFFICE VISIT (OUTPATIENT)
Dept: HEMATOLOGY ONCOLOGY | Facility: CLINIC | Age: 60
End: 2020-12-09
Payer: COMMERCIAL

## 2020-12-09 ENCOUNTER — HOSPITAL ENCOUNTER (OUTPATIENT)
Dept: INFUSION CENTER | Facility: HOSPITAL | Age: 60
Discharge: HOME | End: 2020-12-09
Attending: INTERNAL MEDICINE
Payer: COMMERCIAL

## 2020-12-09 VITALS
HEIGHT: 67 IN | HEART RATE: 90 BPM | DIASTOLIC BLOOD PRESSURE: 80 MMHG | OXYGEN SATURATION: 97 % | TEMPERATURE: 98.7 F | BODY MASS INDEX: 28.96 KG/M2 | SYSTOLIC BLOOD PRESSURE: 146 MMHG | WEIGHT: 184.5 LBS | RESPIRATION RATE: 20 BRPM

## 2020-12-09 VITALS — BODY MASS INDEX: 28.96 KG/M2 | WEIGHT: 184.53 LBS | HEIGHT: 67 IN

## 2020-12-09 DIAGNOSIS — K59.00 CONSTIPATION, UNSPECIFIED CONSTIPATION TYPE: ICD-10-CM

## 2020-12-09 DIAGNOSIS — D64.9 NORMOCYTIC ANEMIA: ICD-10-CM

## 2020-12-09 DIAGNOSIS — J06.9 UPPER RESPIRATORY TRACT INFECTION, UNSPECIFIED TYPE: ICD-10-CM

## 2020-12-09 DIAGNOSIS — C34.91 ADENOCARCINOMA OF LUNG, RIGHT (HCC): Primary | ICD-10-CM

## 2020-12-09 DIAGNOSIS — J44.1 ACUTE EXACERBATION OF CHRONIC OBSTRUCTIVE PULMONARY DISEASE (COPD) (HCC): ICD-10-CM

## 2020-12-09 DIAGNOSIS — J44.9 CHRONIC OBSTRUCTIVE PULMONARY DISEASE, UNSPECIFIED COPD TYPE (HCC): ICD-10-CM

## 2020-12-09 DIAGNOSIS — J44.1 CHRONIC OBSTRUCTIVE PULMONARY DISEASE WITH ACUTE EXACERBATION (HCC): ICD-10-CM

## 2020-12-09 DIAGNOSIS — N28.9 RENAL DYSFUNCTION: ICD-10-CM

## 2020-12-09 LAB
ALBUMIN SERPL BCP-MCNC: 3.9 G/DL (ref 3–5.2)
ALP SERPL-CCNC: 106 U/L (ref 43–122)
ALT SERPL W P-5'-P-CCNC: 18 U/L (ref 9–52)
ANION GAP SERPL CALCULATED.3IONS-SCNC: 7 MMOL/L (ref 5–14)
AST SERPL W P-5'-P-CCNC: 22 U/L (ref 17–59)
BASOPHILS # BLD AUTO: 0.1 THOUSANDS/ΜL (ref 0–0.1)
BASOPHILS NFR BLD AUTO: 1 % (ref 0–1)
BILIRUB SERPL-MCNC: 0.5 MG/DL
BUN SERPL-MCNC: 16 MG/DL (ref 5–25)
CALCIUM SERPL-MCNC: 8.8 MG/DL (ref 8.4–10.2)
CHLORIDE SERPL-SCNC: 104 MMOL/L (ref 97–108)
CO2 SERPL-SCNC: 28 MMOL/L (ref 22–30)
CREAT SERPL-MCNC: 2.11 MG/DL (ref 0.7–1.5)
EOSINOPHIL # BLD AUTO: 0.6 THOUSAND/ΜL (ref 0–0.4)
EOSINOPHIL NFR BLD AUTO: 8 % (ref 0–6)
ERYTHROCYTE [DISTWIDTH] IN BLOOD BY AUTOMATED COUNT: 16.9 %
GFR SERPL CREATININE-BSD FRML MDRD: 39 ML/MIN/1.73SQ M
GLUCOSE SERPL-MCNC: 104 MG/DL (ref 70–99)
HCT VFR BLD AUTO: 36.1 % (ref 41–53)
HGB BLD-MCNC: 11.8 G/DL (ref 13.5–17.5)
LYMPHOCYTES # BLD AUTO: 1 THOUSANDS/ΜL (ref 0.5–4)
LYMPHOCYTES NFR BLD AUTO: 13 % (ref 25–45)
MCH RBC QN AUTO: 27.1 PG (ref 26–34)
MCHC RBC AUTO-ENTMCNC: 32.7 G/DL (ref 31–36)
MCV RBC AUTO: 83 FL (ref 80–100)
MONOCYTES # BLD AUTO: 0.5 THOUSAND/ΜL (ref 0.2–0.9)
MONOCYTES NFR BLD AUTO: 7 % (ref 1–10)
NEUTROPHILS # BLD AUTO: 5.5 THOUSANDS/ΜL (ref 1.8–7.8)
NEUTS SEG NFR BLD AUTO: 71 % (ref 45–65)
PLATELET # BLD AUTO: 360 THOUSANDS/UL (ref 150–450)
PMV BLD AUTO: 7.2 FL (ref 8.9–12.7)
POTASSIUM SERPL-SCNC: 4.8 MMOL/L (ref 3.6–5)
PROT SERPL-MCNC: 7.2 G/DL (ref 5.9–8.4)
RBC # BLD AUTO: 4.37 MILLION/UL (ref 4.5–5.9)
SODIUM SERPL-SCNC: 139 MMOL/L (ref 137–147)
T3FREE SERPL-MCNC: 2.58 PG/ML (ref 2.3–4.2)
TSH SERPL DL<=0.05 MIU/L-ACNC: 1.14 UIU/ML (ref 0.47–4.68)
WBC # BLD AUTO: 7.7 THOUSAND/UL (ref 4.5–11)

## 2020-12-09 PROCEDURE — 85025 COMPLETE CBC W/AUTO DIFF WBC: CPT | Performed by: INTERNAL MEDICINE

## 2020-12-09 PROCEDURE — 84443 ASSAY THYROID STIM HORMONE: CPT | Performed by: INTERNAL MEDICINE

## 2020-12-09 PROCEDURE — 80053 COMPREHEN METABOLIC PANEL: CPT | Performed by: INTERNAL MEDICINE

## 2020-12-09 PROCEDURE — 99214 OFFICE O/P EST MOD 30 MIN: CPT | Performed by: NURSE PRACTITIONER

## 2020-12-09 PROCEDURE — 84481 FREE ASSAY (FT-3): CPT | Performed by: INTERNAL MEDICINE

## 2020-12-09 PROCEDURE — 96413 CHEMO IV INFUSION 1 HR: CPT

## 2020-12-09 RX ORDER — SODIUM CHLORIDE 9 MG/ML
20 INJECTION, SOLUTION INTRAVENOUS ONCE
Status: CANCELLED | OUTPATIENT
Start: 2021-01-01

## 2020-12-09 RX ORDER — AZITHROMYCIN 250 MG/1
TABLET, FILM COATED ORAL
Qty: 6 TABLET | Refills: 0 | Status: SHIPPED | OUTPATIENT
Start: 2020-12-09 | End: 2020-12-10

## 2020-12-09 RX ORDER — IPRATROPIUM BROMIDE AND ALBUTEROL SULFATE 2.5; .5 MG/3ML; MG/3ML
3 SOLUTION RESPIRATORY (INHALATION) 4 TIMES DAILY
Qty: 120 VIAL | Refills: 5 | Status: SHIPPED | OUTPATIENT
Start: 2020-12-09 | End: 2021-01-01 | Stop reason: SDUPTHER

## 2020-12-09 RX ORDER — ALBUTEROL SULFATE 90 UG/1
2 AEROSOL, METERED RESPIRATORY (INHALATION) EVERY 4 HOURS PRN
Qty: 18 G | Refills: 5 | Status: SHIPPED | OUTPATIENT
Start: 2020-12-09 | End: 2021-01-01 | Stop reason: SDUPTHER

## 2020-12-09 RX ORDER — SODIUM CHLORIDE 9 MG/ML
20 INJECTION, SOLUTION INTRAVENOUS ONCE
Status: COMPLETED | OUTPATIENT
Start: 2020-12-09 | End: 2020-12-09

## 2020-12-09 RX ORDER — SENNA AND DOCUSATE SODIUM 50; 8.6 MG/1; MG/1
1 TABLET, FILM COATED ORAL 2 TIMES DAILY
Qty: 60 TABLET | Refills: 11 | Status: SHIPPED | OUTPATIENT
Start: 2020-12-09 | End: 2021-01-01 | Stop reason: SDUPTHER

## 2020-12-09 RX ORDER — IPRATROPIUM BROMIDE AND ALBUTEROL SULFATE 2.5; .5 MG/3ML; MG/3ML
3 SOLUTION RESPIRATORY (INHALATION) 4 TIMES DAILY
Qty: 120 VIAL | Refills: 5 | Status: SHIPPED | OUTPATIENT
Start: 2020-12-09 | End: 2020-12-09 | Stop reason: SDUPTHER

## 2020-12-09 RX ADMIN — SODIUM CHLORIDE 400 MG: 9 INJECTION, SOLUTION INTRAVENOUS at 11:43

## 2020-12-09 RX ADMIN — SODIUM CHLORIDE 20 ML/HR: 0.9 INJECTION, SOLUTION INTRAVENOUS at 10:43

## 2020-12-11 ENCOUNTER — TELEPHONE (OUTPATIENT)
Dept: PALLIATIVE MEDICINE | Facility: CLINIC | Age: 60
End: 2020-12-11

## 2020-12-15 DIAGNOSIS — G89.3 CANCER-RELATED PAIN: Chronic | ICD-10-CM

## 2020-12-15 DIAGNOSIS — C34.91 ADENOCARCINOMA OF LUNG, RIGHT (HCC): ICD-10-CM

## 2020-12-15 DIAGNOSIS — Z51.5 PALLIATIVE CARE PATIENT: ICD-10-CM

## 2020-12-18 RX ORDER — OXYCODONE HCL 20 MG/1
20 TABLET, FILM COATED, EXTENDED RELEASE ORAL EVERY 12 HOURS SCHEDULED
Qty: 60 TABLET | Refills: 0 | OUTPATIENT
Start: 2020-12-18

## 2020-12-18 RX ORDER — OXYCODONE HYDROCHLORIDE 30 MG/1
30 TABLET ORAL EVERY 4 HOURS PRN
Qty: 120 TABLET | Refills: 0 | OUTPATIENT
Start: 2020-12-18

## 2020-12-19 DIAGNOSIS — C34.91 ADENOCARCINOMA OF LUNG, RIGHT (HCC): ICD-10-CM

## 2020-12-21 RX ORDER — PREDNISONE 1 MG/1
TABLET ORAL
Qty: 30 TABLET | Refills: 3 | Status: ON HOLD | OUTPATIENT
Start: 2020-12-21 | End: 2021-01-01 | Stop reason: SDUPTHER

## 2020-12-22 DIAGNOSIS — F31.9 BIPOLAR 1 DISORDER (HCC): ICD-10-CM

## 2020-12-22 RX ORDER — QUETIAPINE FUMARATE 25 MG/1
12.5 TABLET, FILM COATED ORAL
Qty: 15 TABLET | Refills: 0 | Status: SHIPPED | OUTPATIENT
Start: 2020-12-22

## 2021-01-01 ENCOUNTER — OFFICE VISIT (OUTPATIENT)
Dept: FAMILY MEDICINE CLINIC | Facility: CLINIC | Age: 61
End: 2021-01-01

## 2021-01-01 ENCOUNTER — TELEPHONE (OUTPATIENT)
Dept: FAMILY MEDICINE CLINIC | Facility: CLINIC | Age: 61
End: 2021-01-01

## 2021-01-01 ENCOUNTER — HOSPITAL ENCOUNTER (OUTPATIENT)
Dept: INFUSION CENTER | Facility: HOSPITAL | Age: 61
End: 2021-01-01

## 2021-01-01 ENCOUNTER — TELEPHONE (OUTPATIENT)
Dept: PULMONOLOGY | Facility: CLINIC | Age: 61
End: 2021-01-01

## 2021-01-01 ENCOUNTER — OFFICE VISIT (OUTPATIENT)
Dept: HEMATOLOGY ONCOLOGY | Facility: CLINIC | Age: 61
End: 2021-01-01
Payer: COMMERCIAL

## 2021-01-01 ENCOUNTER — PATIENT OUTREACH (OUTPATIENT)
Dept: FAMILY MEDICINE CLINIC | Facility: CLINIC | Age: 61
End: 2021-01-01

## 2021-01-01 ENCOUNTER — HOSPITAL ENCOUNTER (OUTPATIENT)
Dept: NUCLEAR MEDICINE | Facility: HOSPITAL | Age: 61
Discharge: HOME | End: 2021-10-27
Payer: COMMERCIAL

## 2021-01-01 ENCOUNTER — PATIENT OUTREACH (OUTPATIENT)
Dept: CASE MANAGEMENT | Facility: OTHER | Age: 61
End: 2021-01-01

## 2021-01-01 ENCOUNTER — HOSPITAL ENCOUNTER (INPATIENT)
Facility: HOSPITAL | Age: 61
LOS: 6 days | Discharge: HOME HEALTH | DRG: 139 | End: 2021-01-21
Attending: EMERGENCY MEDICINE | Admitting: INTERNAL MEDICINE
Payer: COMMERCIAL

## 2021-01-01 ENCOUNTER — APPOINTMENT (INPATIENT)
Dept: MRI IMAGING | Facility: HOSPITAL | Age: 61
DRG: 139 | End: 2021-01-01
Payer: COMMERCIAL

## 2021-01-01 ENCOUNTER — OFFICE VISIT (OUTPATIENT)
Dept: PALLIATIVE MEDICINE | Facility: CLINIC | Age: 61
End: 2021-01-01
Payer: COMMERCIAL

## 2021-01-01 ENCOUNTER — TELEPHONE (OUTPATIENT)
Dept: OBGYN CLINIC | Facility: HOSPITAL | Age: 61
End: 2021-01-01

## 2021-01-01 ENCOUNTER — TELEMEDICINE (OUTPATIENT)
Dept: NEPHROLOGY | Facility: CLINIC | Age: 61
End: 2021-01-01
Payer: COMMERCIAL

## 2021-01-01 ENCOUNTER — TELEPHONE (OUTPATIENT)
Dept: HEMATOLOGY ONCOLOGY | Facility: CLINIC | Age: 61
End: 2021-01-01

## 2021-01-01 ENCOUNTER — TELEPHONE (OUTPATIENT)
Dept: PAIN MEDICINE | Facility: MEDICAL CENTER | Age: 61
End: 2021-01-01

## 2021-01-01 ENCOUNTER — HOSPITAL ENCOUNTER (OUTPATIENT)
Dept: INFUSION CENTER | Facility: HOSPITAL | Age: 61
Discharge: HOME | End: 2021-11-05
Payer: COMMERCIAL

## 2021-01-01 ENCOUNTER — HOSPITAL ENCOUNTER (OUTPATIENT)
Dept: INFUSION CENTER | Facility: HOSPITAL | Age: 61
Discharge: HOME | End: 2021-03-08
Attending: INTERNAL MEDICINE
Payer: COMMERCIAL

## 2021-01-01 ENCOUNTER — TELEPHONE (OUTPATIENT)
Dept: PALLIATIVE MEDICINE | Facility: CLINIC | Age: 61
End: 2021-01-01

## 2021-01-01 ENCOUNTER — HOSPITAL ENCOUNTER (OUTPATIENT)
Dept: NUCLEAR MEDICINE | Facility: HOSPITAL | Age: 61
Discharge: HOME | End: 2021-04-14
Payer: COMMERCIAL

## 2021-01-01 ENCOUNTER — HOSPITAL ENCOUNTER (OUTPATIENT)
Dept: INFUSION CENTER | Facility: HOSPITAL | Age: 61
Discharge: HOME | End: 2021-11-19
Attending: INTERNAL MEDICINE

## 2021-01-01 ENCOUNTER — HOSPITAL ENCOUNTER (OUTPATIENT)
Dept: INFUSION CENTER | Facility: HOSPITAL | Age: 61
Discharge: HOME | End: 2021-01-25
Attending: INTERNAL MEDICINE
Payer: COMMERCIAL

## 2021-01-01 ENCOUNTER — TELEPHONE (OUTPATIENT)
Dept: GYNECOLOGIC ONCOLOGY | Facility: CLINIC | Age: 61
End: 2021-01-01

## 2021-01-01 ENCOUNTER — HOSPITAL ENCOUNTER (EMERGENCY)
Facility: HOSPITAL | Age: 61
Discharge: HOME | End: 2021-07-12
Attending: EMERGENCY MEDICINE | Admitting: EMERGENCY MEDICINE
Payer: COMMERCIAL

## 2021-01-01 ENCOUNTER — HOSPITAL ENCOUNTER (OUTPATIENT)
Dept: INFUSION CENTER | Facility: HOSPITAL | Age: 61
Discharge: HOME | End: 2021-09-17
Attending: INTERNAL MEDICINE
Payer: COMMERCIAL

## 2021-01-01 ENCOUNTER — HOSPITAL ENCOUNTER (OUTPATIENT)
Dept: INFUSION CENTER | Facility: HOSPITAL | Age: 61
Discharge: HOME | End: 2021-12-02
Payer: COMMERCIAL

## 2021-01-01 ENCOUNTER — HOSPITAL ENCOUNTER (OUTPATIENT)
Dept: INFUSION CENTER | Facility: HOSPITAL | Age: 61
Discharge: HOME | End: 2021-12-20
Payer: COMMERCIAL

## 2021-01-01 ENCOUNTER — HOSPITAL ENCOUNTER (OUTPATIENT)
Dept: INFUSION CENTER | Facility: HOSPITAL | Age: 61
Discharge: HOME | End: 2021-04-19
Attending: INTERNAL MEDICINE

## 2021-01-01 ENCOUNTER — HOSPITAL ENCOUNTER (OUTPATIENT)
Dept: INFUSION CENTER | Facility: HOSPITAL | Age: 61
Discharge: HOME | End: 2021-10-29
Attending: INTERNAL MEDICINE
Payer: COMMERCIAL

## 2021-01-01 ENCOUNTER — HOSPITAL ENCOUNTER (OUTPATIENT)
Dept: INFUSION CENTER | Facility: HOSPITAL | Age: 61
Discharge: HOME | End: 2021-04-21
Attending: INTERNAL MEDICINE
Payer: COMMERCIAL

## 2021-01-01 ENCOUNTER — HOSPITAL ENCOUNTER (OUTPATIENT)
Dept: INFUSION CENTER | Facility: HOSPITAL | Age: 61
Discharge: HOME | End: 2021-11-23
Payer: COMMERCIAL

## 2021-01-01 ENCOUNTER — HOSPITAL ENCOUNTER (OUTPATIENT)
Dept: INFUSION CENTER | Facility: HOSPITAL | Age: 61
Discharge: HOME | End: 2021-12-07
Payer: COMMERCIAL

## 2021-01-01 ENCOUNTER — APPOINTMENT (OUTPATIENT)
Dept: LAB | Facility: HOSPITAL | Age: 61
End: 2021-01-01
Payer: COMMERCIAL

## 2021-01-01 ENCOUNTER — HOSPITAL ENCOUNTER (OUTPATIENT)
Dept: NUCLEAR MEDICINE | Facility: HOSPITAL | Age: 61
Discharge: HOME | End: 2021-10-22
Attending: INTERNAL MEDICINE
Payer: COMMERCIAL

## 2021-01-01 ENCOUNTER — HOSPITAL ENCOUNTER (OUTPATIENT)
Dept: INFUSION CENTER | Facility: HOSPITAL | Age: 61
Discharge: HOME | End: 2021-06-04
Attending: INTERNAL MEDICINE
Payer: COMMERCIAL

## 2021-01-01 ENCOUNTER — HOSPITAL ENCOUNTER (OUTPATIENT)
Dept: INFUSION CENTER | Facility: HOSPITAL | Age: 61
End: 2021-01-01
Attending: INTERNAL MEDICINE

## 2021-01-01 ENCOUNTER — HOSPITAL ENCOUNTER (OUTPATIENT)
Dept: INFUSION CENTER | Facility: HOSPITAL | Age: 61
Discharge: HOME | End: 2021-08-27
Attending: INTERNAL MEDICINE
Payer: COMMERCIAL

## 2021-01-01 ENCOUNTER — HOSPITAL ENCOUNTER (OUTPATIENT)
Dept: INFUSION CENTER | Facility: HOSPITAL | Age: 61
Discharge: HOME | End: 2021-11-09
Payer: COMMERCIAL

## 2021-01-01 ENCOUNTER — APPOINTMENT (EMERGENCY)
Dept: CT IMAGING | Facility: HOSPITAL | Age: 61
End: 2021-01-01
Payer: COMMERCIAL

## 2021-01-01 ENCOUNTER — OFFICE VISIT (OUTPATIENT)
Dept: CARDIOLOGY CLINIC | Facility: CLINIC | Age: 61
End: 2021-01-01
Payer: COMMERCIAL

## 2021-01-01 ENCOUNTER — HOSPITAL ENCOUNTER (OUTPATIENT)
Dept: INFUSION CENTER | Facility: HOSPITAL | Age: 61
Discharge: HOME | End: 2021-12-27

## 2021-01-01 ENCOUNTER — TELEMEDICINE (OUTPATIENT)
Dept: PALLIATIVE MEDICINE | Facility: CLINIC | Age: 61
End: 2021-01-01
Payer: COMMERCIAL

## 2021-01-01 ENCOUNTER — HOSPITAL ENCOUNTER (OUTPATIENT)
Dept: INFUSION CENTER | Facility: HOSPITAL | Age: 61
Discharge: HOME | End: 2021-10-08
Attending: INTERNAL MEDICINE
Payer: COMMERCIAL

## 2021-01-01 ENCOUNTER — TELEPHONE (OUTPATIENT)
Dept: CARDIOLOGY CLINIC | Facility: CLINIC | Age: 61
End: 2021-01-01

## 2021-01-01 ENCOUNTER — OFFICE VISIT (OUTPATIENT)
Dept: PULMONOLOGY | Facility: CLINIC | Age: 61
End: 2021-01-01
Payer: COMMERCIAL

## 2021-01-01 ENCOUNTER — HOSPITAL ENCOUNTER (OUTPATIENT)
Dept: INFUSION CENTER | Facility: HOSPITAL | Age: 61
Discharge: HOME | End: 2021-07-16
Attending: INTERNAL MEDICINE
Payer: COMMERCIAL

## 2021-01-01 ENCOUNTER — TELEPHONE (OUTPATIENT)
Dept: HEMATOLOGY ONCOLOGY | Facility: MEDICAL CENTER | Age: 61
End: 2021-01-01

## 2021-01-01 ENCOUNTER — HOSPITAL ENCOUNTER (OUTPATIENT)
Dept: INFUSION CENTER | Facility: HOSPITAL | Age: 61
Discharge: HOME | End: 2021-12-14
Payer: COMMERCIAL

## 2021-01-01 ENCOUNTER — HOSPITAL ENCOUNTER (OUTPATIENT)
Dept: INFUSION CENTER | Facility: HOSPITAL | Age: 61
Discharge: HOME | End: 2021-11-15
Payer: COMMERCIAL

## 2021-01-01 ENCOUNTER — APPOINTMENT (EMERGENCY)
Dept: CT IMAGING | Facility: HOSPITAL | Age: 61
DRG: 139 | End: 2021-01-01
Payer: COMMERCIAL

## 2021-01-01 VITALS
HEIGHT: 68 IN | WEIGHT: 195.11 LBS | TEMPERATURE: 98 F | RESPIRATION RATE: 20 BRPM | SYSTOLIC BLOOD PRESSURE: 122 MMHG | OXYGEN SATURATION: 94 % | BODY MASS INDEX: 29.57 KG/M2 | DIASTOLIC BLOOD PRESSURE: 82 MMHG | HEART RATE: 90 BPM

## 2021-01-01 VITALS
OXYGEN SATURATION: 96 % | SYSTOLIC BLOOD PRESSURE: 123 MMHG | HEART RATE: 96 BPM | TEMPERATURE: 97.5 F | BODY MASS INDEX: 29.53 KG/M2 | RESPIRATION RATE: 20 BRPM | DIASTOLIC BLOOD PRESSURE: 83 MMHG | WEIGHT: 194.22 LBS

## 2021-01-01 VITALS
BODY MASS INDEX: 29.61 KG/M2 | OXYGEN SATURATION: 98 % | TEMPERATURE: 96.9 F | HEART RATE: 94 BPM | SYSTOLIC BLOOD PRESSURE: 148 MMHG | HEIGHT: 68 IN | RESPIRATION RATE: 20 BRPM | WEIGHT: 195.4 LBS | DIASTOLIC BLOOD PRESSURE: 72 MMHG

## 2021-01-01 VITALS
HEIGHT: 69 IN | BODY MASS INDEX: 23.4 KG/M2 | RESPIRATION RATE: 18 BRPM | TEMPERATURE: 98.3 F | DIASTOLIC BLOOD PRESSURE: 72 MMHG | OXYGEN SATURATION: 94 % | SYSTOLIC BLOOD PRESSURE: 136 MMHG | HEART RATE: 73 BPM | WEIGHT: 158 LBS

## 2021-01-01 VITALS
HEIGHT: 68 IN | TEMPERATURE: 98.2 F | BODY MASS INDEX: 29.4 KG/M2 | DIASTOLIC BLOOD PRESSURE: 74 MMHG | RESPIRATION RATE: 18 BRPM | WEIGHT: 194 LBS | SYSTOLIC BLOOD PRESSURE: 130 MMHG | HEART RATE: 88 BPM | OXYGEN SATURATION: 94 %

## 2021-01-01 VITALS
WEIGHT: 195 LBS | HEART RATE: 88 BPM | TEMPERATURE: 98 F | DIASTOLIC BLOOD PRESSURE: 85 MMHG | BODY MASS INDEX: 29.65 KG/M2 | OXYGEN SATURATION: 96 % | RESPIRATION RATE: 19 BRPM | SYSTOLIC BLOOD PRESSURE: 143 MMHG

## 2021-01-01 VITALS
HEIGHT: 68 IN | BODY MASS INDEX: 33.19 KG/M2 | OXYGEN SATURATION: 97 % | HEART RATE: 92 BPM | WEIGHT: 219 LBS | DIASTOLIC BLOOD PRESSURE: 68 MMHG | SYSTOLIC BLOOD PRESSURE: 134 MMHG

## 2021-01-01 VITALS
BODY MASS INDEX: 31.83 KG/M2 | TEMPERATURE: 97.8 F | WEIGHT: 210 LBS | RESPIRATION RATE: 18 BRPM | DIASTOLIC BLOOD PRESSURE: 102 MMHG | HEART RATE: 85 BPM | SYSTOLIC BLOOD PRESSURE: 160 MMHG | HEIGHT: 68 IN | OXYGEN SATURATION: 95 %

## 2021-01-01 VITALS
HEART RATE: 90 BPM | OXYGEN SATURATION: 97 % | HEIGHT: 69 IN | TEMPERATURE: 96.7 F | RESPIRATION RATE: 18 BRPM | SYSTOLIC BLOOD PRESSURE: 134 MMHG | WEIGHT: 187.6 LBS | BODY MASS INDEX: 27.78 KG/M2 | DIASTOLIC BLOOD PRESSURE: 58 MMHG

## 2021-01-01 VITALS
HEART RATE: 106 BPM | RESPIRATION RATE: 16 BRPM | BODY MASS INDEX: 33.95 KG/M2 | DIASTOLIC BLOOD PRESSURE: 62 MMHG | HEIGHT: 68 IN | WEIGHT: 224 LBS | OXYGEN SATURATION: 96 % | SYSTOLIC BLOOD PRESSURE: 150 MMHG

## 2021-01-01 VITALS
RESPIRATION RATE: 20 BRPM | DIASTOLIC BLOOD PRESSURE: 78 MMHG | WEIGHT: 223.6 LBS | HEART RATE: 105 BPM | TEMPERATURE: 97.4 F | SYSTOLIC BLOOD PRESSURE: 150 MMHG | OXYGEN SATURATION: 99 % | BODY MASS INDEX: 33.89 KG/M2 | HEIGHT: 68 IN

## 2021-01-01 VITALS
HEART RATE: 108 BPM | HEIGHT: 68 IN | OXYGEN SATURATION: 98 % | RESPIRATION RATE: 16 BRPM | BODY MASS INDEX: 35.05 KG/M2 | DIASTOLIC BLOOD PRESSURE: 70 MMHG | WEIGHT: 231.26 LBS | SYSTOLIC BLOOD PRESSURE: 160 MMHG | TEMPERATURE: 98.4 F

## 2021-01-01 VITALS
BODY MASS INDEX: 31.4 KG/M2 | HEART RATE: 91 BPM | RESPIRATION RATE: 22 BRPM | HEIGHT: 68 IN | DIASTOLIC BLOOD PRESSURE: 80 MMHG | SYSTOLIC BLOOD PRESSURE: 146 MMHG | TEMPERATURE: 98.1 F | OXYGEN SATURATION: 99 % | WEIGHT: 207.2 LBS

## 2021-01-01 VITALS
HEIGHT: 68 IN | DIASTOLIC BLOOD PRESSURE: 70 MMHG | RESPIRATION RATE: 22 BRPM | SYSTOLIC BLOOD PRESSURE: 142 MMHG | HEART RATE: 94 BPM | TEMPERATURE: 98.3 F | WEIGHT: 209 LBS | OXYGEN SATURATION: 97 % | BODY MASS INDEX: 31.67 KG/M2

## 2021-01-01 VITALS
BODY MASS INDEX: 29.16 KG/M2 | HEIGHT: 68 IN | SYSTOLIC BLOOD PRESSURE: 152 MMHG | RESPIRATION RATE: 18 BRPM | OXYGEN SATURATION: 98 % | WEIGHT: 192.4 LBS | TEMPERATURE: 97.6 F | DIASTOLIC BLOOD PRESSURE: 74 MMHG | HEART RATE: 76 BPM

## 2021-01-01 VITALS
BODY MASS INDEX: 28.79 KG/M2 | OXYGEN SATURATION: 98 % | DIASTOLIC BLOOD PRESSURE: 70 MMHG | WEIGHT: 190 LBS | HEIGHT: 68 IN | HEART RATE: 85 BPM | SYSTOLIC BLOOD PRESSURE: 134 MMHG

## 2021-01-01 VITALS
HEART RATE: 100 BPM | SYSTOLIC BLOOD PRESSURE: 154 MMHG | BODY MASS INDEX: 30.77 KG/M2 | RESPIRATION RATE: 18 BRPM | WEIGHT: 203 LBS | OXYGEN SATURATION: 99 % | DIASTOLIC BLOOD PRESSURE: 74 MMHG | HEIGHT: 68 IN | TEMPERATURE: 98.9 F

## 2021-01-01 VITALS
HEIGHT: 68 IN | RESPIRATION RATE: 18 BRPM | DIASTOLIC BLOOD PRESSURE: 78 MMHG | BODY MASS INDEX: 31.9 KG/M2 | SYSTOLIC BLOOD PRESSURE: 146 MMHG | WEIGHT: 210.5 LBS | OXYGEN SATURATION: 99 % | TEMPERATURE: 98.1 F | HEART RATE: 103 BPM

## 2021-01-01 VITALS
HEART RATE: 87 BPM | OXYGEN SATURATION: 95 % | SYSTOLIC BLOOD PRESSURE: 148 MMHG | HEIGHT: 69 IN | DIASTOLIC BLOOD PRESSURE: 72 MMHG | TEMPERATURE: 97.8 F | BODY MASS INDEX: 27.55 KG/M2 | WEIGHT: 186 LBS | RESPIRATION RATE: 18 BRPM

## 2021-01-01 VITALS
DIASTOLIC BLOOD PRESSURE: 80 MMHG | RESPIRATION RATE: 18 BRPM | OXYGEN SATURATION: 96 % | HEIGHT: 68 IN | SYSTOLIC BLOOD PRESSURE: 130 MMHG | BODY MASS INDEX: 29.87 KG/M2 | WEIGHT: 197.09 LBS | TEMPERATURE: 98.9 F | HEART RATE: 93 BPM

## 2021-01-01 VITALS
TEMPERATURE: 97.2 F | DIASTOLIC BLOOD PRESSURE: 70 MMHG | HEART RATE: 105 BPM | HEIGHT: 68 IN | WEIGHT: 224 LBS | BODY MASS INDEX: 33.95 KG/M2 | RESPIRATION RATE: 20 BRPM | OXYGEN SATURATION: 98 % | SYSTOLIC BLOOD PRESSURE: 146 MMHG

## 2021-01-01 VITALS
SYSTOLIC BLOOD PRESSURE: 130 MMHG | OXYGEN SATURATION: 96 % | HEART RATE: 94 BPM | RESPIRATION RATE: 18 BRPM | BODY MASS INDEX: 30.88 KG/M2 | WEIGHT: 203.04 LBS | DIASTOLIC BLOOD PRESSURE: 70 MMHG | TEMPERATURE: 97.5 F

## 2021-01-01 VITALS
DIASTOLIC BLOOD PRESSURE: 78 MMHG | TEMPERATURE: 96.8 F | HEIGHT: 69 IN | HEART RATE: 75 BPM | WEIGHT: 186.6 LBS | OXYGEN SATURATION: 99 % | BODY MASS INDEX: 27.64 KG/M2 | SYSTOLIC BLOOD PRESSURE: 128 MMHG | RESPIRATION RATE: 18 BRPM

## 2021-01-01 VITALS — HEIGHT: 69 IN | BODY MASS INDEX: 27.62 KG/M2 | WEIGHT: 186.51 LBS

## 2021-01-01 VITALS
HEIGHT: 68 IN | TEMPERATURE: 98 F | OXYGEN SATURATION: 98 % | BODY MASS INDEX: 29.77 KG/M2 | SYSTOLIC BLOOD PRESSURE: 146 MMHG | WEIGHT: 196.4 LBS | RESPIRATION RATE: 18 BRPM | HEART RATE: 86 BPM | DIASTOLIC BLOOD PRESSURE: 88 MMHG

## 2021-01-01 VITALS — WEIGHT: 192.46 LBS | BODY MASS INDEX: 29.17 KG/M2 | HEIGHT: 68 IN

## 2021-01-01 VITALS — BODY MASS INDEX: 30.77 KG/M2 | WEIGHT: 203 LBS | HEIGHT: 68 IN

## 2021-01-01 VITALS
SYSTOLIC BLOOD PRESSURE: 140 MMHG | DIASTOLIC BLOOD PRESSURE: 86 MMHG | HEART RATE: 93 BPM | TEMPERATURE: 97.4 F | RESPIRATION RATE: 18 BRPM

## 2021-01-01 VITALS — BODY MASS INDEX: 29.6 KG/M2 | HEIGHT: 68 IN | WEIGHT: 195.33 LBS

## 2021-01-01 VITALS — DIASTOLIC BLOOD PRESSURE: 80 MMHG | SYSTOLIC BLOOD PRESSURE: 140 MMHG

## 2021-01-01 VITALS — TEMPERATURE: 98.1 F

## 2021-01-01 VITALS — TEMPERATURE: 97.8 F

## 2021-01-01 DIAGNOSIS — Z45.2 ENCOUNTER FOR CENTRAL LINE CARE: ICD-10-CM

## 2021-01-01 DIAGNOSIS — G89.3 CANCER-RELATED PAIN: Chronic | ICD-10-CM

## 2021-01-01 DIAGNOSIS — I48.0 PAROXYSMAL ATRIAL FIBRILLATION (HCC): Primary | ICD-10-CM

## 2021-01-01 DIAGNOSIS — C34.91 ADENOCARCINOMA OF LUNG, RIGHT (HCC): Primary | ICD-10-CM

## 2021-01-01 DIAGNOSIS — E11.9 TYPE 2 DIABETES MELLITUS WITHOUT COMPLICATION, WITHOUT LONG-TERM CURRENT USE OF INSULIN (HCC): ICD-10-CM

## 2021-01-01 DIAGNOSIS — Z78.9 UNDER CARE OF SOCIAL WORKER: Primary | ICD-10-CM

## 2021-01-01 DIAGNOSIS — C34.90 LUNG CANCER (HCC): ICD-10-CM

## 2021-01-01 DIAGNOSIS — C34.91 ADENOCARCINOMA OF RIGHT LUNG (HCC): ICD-10-CM

## 2021-01-01 DIAGNOSIS — M51.26 L4-L5 DISC BULGE: ICD-10-CM

## 2021-01-01 DIAGNOSIS — J44.9 CHRONIC OBSTRUCTIVE PULMONARY DISEASE, UNSPECIFIED COPD TYPE (HCC): ICD-10-CM

## 2021-01-01 DIAGNOSIS — Z65.9 SOCIAL PROBLEM: ICD-10-CM

## 2021-01-01 DIAGNOSIS — Z51.5 PALLIATIVE CARE PATIENT: ICD-10-CM

## 2021-01-01 DIAGNOSIS — M60.89 OTHER MYOSITIS OF MULTIPLE SITES: ICD-10-CM

## 2021-01-01 DIAGNOSIS — T45.1X5A ADVERSE EFFECT OF CHEMOTHERAPY, INITIAL ENCOUNTER: ICD-10-CM

## 2021-01-01 DIAGNOSIS — N28.9 RENAL DYSFUNCTION: ICD-10-CM

## 2021-01-01 DIAGNOSIS — E55.9 VITAMIN D DEFICIENCY: ICD-10-CM

## 2021-01-01 DIAGNOSIS — I20.0 UNSTABLE ANGINA (HCC): ICD-10-CM

## 2021-01-01 DIAGNOSIS — K59.00 CONSTIPATION, UNSPECIFIED CONSTIPATION TYPE: ICD-10-CM

## 2021-01-01 DIAGNOSIS — J30.89 ENVIRONMENTAL AND SEASONAL ALLERGIES: ICD-10-CM

## 2021-01-01 DIAGNOSIS — C34.91 ADENOCARCINOMA OF LUNG, RIGHT (HCC): ICD-10-CM

## 2021-01-01 DIAGNOSIS — F31.9 BIPOLAR 1 DISORDER (HCC): ICD-10-CM

## 2021-01-01 DIAGNOSIS — E11.9 TYPE 2 DIABETES MELLITUS WITHOUT COMPLICATION, WITHOUT LONG-TERM CURRENT USE OF INSULIN (HCC): Primary | ICD-10-CM

## 2021-01-01 DIAGNOSIS — Z55.0 LIMITED LITERACY: Primary | Chronic | ICD-10-CM

## 2021-01-01 DIAGNOSIS — J44.1 CHRONIC OBSTRUCTIVE PULMONARY DISEASE WITH ACUTE EXACERBATION (HCC): ICD-10-CM

## 2021-01-01 DIAGNOSIS — J06.9 UPPER RESPIRATORY TRACT INFECTION, UNSPECIFIED TYPE: ICD-10-CM

## 2021-01-01 DIAGNOSIS — M60.89 OTHER MYOSITIS OF MULTIPLE SITES: Primary | ICD-10-CM

## 2021-01-01 DIAGNOSIS — M79.10 MUSCULAR ACHES: ICD-10-CM

## 2021-01-01 DIAGNOSIS — C34.90 MALIGNANT NEOPLASM OF LUNG, UNSPECIFIED LATERALITY, UNSPECIFIED PART OF LUNG (HCC): ICD-10-CM

## 2021-01-01 DIAGNOSIS — D64.9 NORMOCYTIC ANEMIA: ICD-10-CM

## 2021-01-01 DIAGNOSIS — N41.9 PROSTATITIS, UNSPECIFIED PROSTATITIS TYPE: ICD-10-CM

## 2021-01-01 DIAGNOSIS — J30.2 SEASONAL ALLERGIES: ICD-10-CM

## 2021-01-01 DIAGNOSIS — N40.1 BENIGN PROSTATIC HYPERPLASIA WITH LOWER URINARY TRACT SYMPTOMS, SYMPTOM DETAILS UNSPECIFIED: ICD-10-CM

## 2021-01-01 DIAGNOSIS — I48.0 PAROXYSMAL ATRIAL FIBRILLATION (HCC): ICD-10-CM

## 2021-01-01 DIAGNOSIS — I10 ESSENTIAL HYPERTENSION: ICD-10-CM

## 2021-01-01 DIAGNOSIS — N18.32 STAGE 3B CHRONIC KIDNEY DISEASE (HCC): ICD-10-CM

## 2021-01-01 DIAGNOSIS — N25.81 SECONDARY HYPERPARATHYROIDISM OF RENAL ORIGIN (HCC): ICD-10-CM

## 2021-01-01 DIAGNOSIS — G89.3 CANCER RELATED PAIN: Primary | ICD-10-CM

## 2021-01-01 DIAGNOSIS — M25.552 LEFT HIP PAIN: ICD-10-CM

## 2021-01-01 DIAGNOSIS — J18.9 MULTIFOCAL PNEUMONIA: Primary | ICD-10-CM

## 2021-01-01 DIAGNOSIS — E55.9 VITAMIN D DEFICIENCY: Primary | ICD-10-CM

## 2021-01-01 DIAGNOSIS — R74.8 ELEVATED CK: ICD-10-CM

## 2021-01-01 DIAGNOSIS — K21.9 GASTROESOPHAGEAL REFLUX DISEASE WITHOUT ESOPHAGITIS: ICD-10-CM

## 2021-01-01 DIAGNOSIS — I48.0 PAF (PAROXYSMAL ATRIAL FIBRILLATION) (HCC): ICD-10-CM

## 2021-01-01 DIAGNOSIS — R60.0 EDEMA OF BOTH LOWER LEGS: ICD-10-CM

## 2021-01-01 DIAGNOSIS — N18.32 STAGE 3B CHRONIC KIDNEY DISEASE (HCC): Primary | ICD-10-CM

## 2021-01-01 DIAGNOSIS — J44.1 COPD EXACERBATION (HCC): ICD-10-CM

## 2021-01-01 DIAGNOSIS — Z71.89 COUNSELING AND COORDINATION OF CARE: Primary | ICD-10-CM

## 2021-01-01 DIAGNOSIS — Z92.21 PERSONAL HISTORY OF CHEMOTHERAPY: ICD-10-CM

## 2021-01-01 DIAGNOSIS — G47.9 DIFFICULTY SLEEPING: ICD-10-CM

## 2021-01-01 DIAGNOSIS — Z51.11 ENCOUNTER FOR ANTINEOPLASTIC CHEMOTHERAPY: Primary | ICD-10-CM

## 2021-01-01 DIAGNOSIS — J43.1 PANLOBULAR EMPHYSEMA (HCC): Primary | Chronic | ICD-10-CM

## 2021-01-01 DIAGNOSIS — R06.02 SOB (SHORTNESS OF BREATH): ICD-10-CM

## 2021-01-01 DIAGNOSIS — M79.671 PAIN OF RIGHT FOOT: ICD-10-CM

## 2021-01-01 DIAGNOSIS — F17.211 CIGARETTE NICOTINE DEPENDENCE IN REMISSION: ICD-10-CM

## 2021-01-01 DIAGNOSIS — R74.8 ELEVATED CK: Primary | ICD-10-CM

## 2021-01-01 DIAGNOSIS — E27.9 LESION OF ADRENAL GLAND (HCC): ICD-10-CM

## 2021-01-01 DIAGNOSIS — N18.30 STAGE 3 CHRONIC KIDNEY DISEASE, UNSPECIFIED WHETHER STAGE 3A OR 3B CKD (HCC): ICD-10-CM

## 2021-01-01 DIAGNOSIS — M94.0 COSTOCHONDRITIS: ICD-10-CM

## 2021-01-01 DIAGNOSIS — G89.29 CHRONIC BILATERAL THORACIC BACK PAIN: ICD-10-CM

## 2021-01-01 DIAGNOSIS — M54.32 SCIATICA OF LEFT SIDE: ICD-10-CM

## 2021-01-01 DIAGNOSIS — J30.89 ENVIRONMENTAL AND SEASONAL ALLERGIES: Primary | ICD-10-CM

## 2021-01-01 DIAGNOSIS — G89.3 CANCER RELATED PAIN: ICD-10-CM

## 2021-01-01 DIAGNOSIS — J44.9 COPD (CHRONIC OBSTRUCTIVE PULMONARY DISEASE) (HCC): ICD-10-CM

## 2021-01-01 DIAGNOSIS — G89.29 CHRONIC BILATERAL THORACIC BACK PAIN: Primary | ICD-10-CM

## 2021-01-01 DIAGNOSIS — Z45.2 ENCOUNTER FOR CENTRAL LINE CARE: Primary | ICD-10-CM

## 2021-01-01 DIAGNOSIS — Z74.8 ASSISTANCE NEEDED WITH TRANSPORTATION: Primary | ICD-10-CM

## 2021-01-01 DIAGNOSIS — H91.93 BILATERAL HEARING LOSS, UNSPECIFIED HEARING LOSS TYPE: ICD-10-CM

## 2021-01-01 DIAGNOSIS — R30.0 STRANGURIA: ICD-10-CM

## 2021-01-01 DIAGNOSIS — R77.8 ELEVATED TROPONIN: ICD-10-CM

## 2021-01-01 DIAGNOSIS — J96.00 ACUTE RESPIRATORY FAILURE (HCC): ICD-10-CM

## 2021-01-01 DIAGNOSIS — I10 ESSENTIAL HYPERTENSION: Primary | ICD-10-CM

## 2021-01-01 DIAGNOSIS — J30.1 ALLERGIC RHINITIS DUE TO POLLEN, UNSPECIFIED SEASONALITY: Primary | ICD-10-CM

## 2021-01-01 DIAGNOSIS — J44.9 COPD, SEVERE (HCC): ICD-10-CM

## 2021-01-01 DIAGNOSIS — I27.20 PULMONARY HYPERTENSION (HCC): Primary | ICD-10-CM

## 2021-01-01 DIAGNOSIS — R23.2 HOT FLASHES: ICD-10-CM

## 2021-01-01 DIAGNOSIS — G62.9 PERIPHERAL POLYNEUROPATHY: ICD-10-CM

## 2021-01-01 DIAGNOSIS — J44.9 COPD, SEVERE (HCC): Primary | ICD-10-CM

## 2021-01-01 DIAGNOSIS — M54.6 CHRONIC BILATERAL THORACIC BACK PAIN: Primary | ICD-10-CM

## 2021-01-01 DIAGNOSIS — J01.90 ACUTE SINUSITIS, RECURRENCE NOT SPECIFIED, UNSPECIFIED LOCATION: ICD-10-CM

## 2021-01-01 DIAGNOSIS — M54.6 CHRONIC BILATERAL THORACIC BACK PAIN: ICD-10-CM

## 2021-01-01 LAB
25(OH)D3 SERPL-MCNC: 15.3 NG/ML (ref 30–100)
25(OH)D3 SERPL-MCNC: 56.1 NG/ML (ref 30–100)
ALBUMIN SERPL BCP-MCNC: 3.1 G/DL (ref 3.5–5)
ALBUMIN SERPL BCP-MCNC: 3.3 G/DL (ref 3.5–5)
ALBUMIN SERPL BCP-MCNC: 3.3 G/DL (ref 3–5.2)
ALBUMIN SERPL BCP-MCNC: 3.4 G/DL (ref 3.5–5)
ALBUMIN SERPL BCP-MCNC: 3.7 G/DL (ref 3–5.2)
ALBUMIN SERPL BCP-MCNC: 3.8 G/DL (ref 3–5.2)
ALBUMIN SERPL BCP-MCNC: 3.8 G/DL (ref 3–5.2)
ALBUMIN SERPL BCP-MCNC: 3.9 G/DL (ref 3–5.2)
ALBUMIN SERPL BCP-MCNC: 3.9 G/DL (ref 3–5.2)
ALBUMIN SERPL BCP-MCNC: 4 G/DL (ref 3–5.2)
ALBUMIN SERPL BCP-MCNC: 4.1 G/DL (ref 3–5.2)
ALBUMIN SERPL BCP-MCNC: 4.2 G/DL (ref 3–5.2)
ALDOLASE SERPL-CCNC: 6.4 U/L (ref 3.3–10.3)
ALP SERPL-CCNC: 100 U/L (ref 43–122)
ALP SERPL-CCNC: 100 U/L (ref 46–116)
ALP SERPL-CCNC: 103 U/L (ref 43–122)
ALP SERPL-CCNC: 103 U/L (ref 43–122)
ALP SERPL-CCNC: 104 U/L (ref 46–116)
ALP SERPL-CCNC: 105 U/L (ref 46–116)
ALP SERPL-CCNC: 109 U/L (ref 43–122)
ALP SERPL-CCNC: 109 U/L (ref 43–122)
ALP SERPL-CCNC: 121 U/L (ref 43–122)
ALP SERPL-CCNC: 74 U/L (ref 43–122)
ALP SERPL-CCNC: 75 U/L (ref 43–122)
ALP SERPL-CCNC: 78 U/L (ref 43–122)
ALP SERPL-CCNC: 89 U/L (ref 43–122)
ALP SERPL-CCNC: 90 U/L (ref 43–122)
ALP SERPL-CCNC: 91 U/L (ref 43–122)
ALP SERPL-CCNC: 92 U/L (ref 43–122)
ALP SERPL-CCNC: 93 U/L (ref 43–122)
ALP SERPL-CCNC: 94 U/L (ref 43–122)
ALP SERPL-CCNC: 95 U/L (ref 43–122)
ALT SERPL W P-5'-P-CCNC: 17 U/L (ref 12–78)
ALT SERPL W P-5'-P-CCNC: 17 U/L (ref 9–52)
ALT SERPL W P-5'-P-CCNC: 19 U/L (ref 12–78)
ALT SERPL W P-5'-P-CCNC: 20 U/L
ALT SERPL W P-5'-P-CCNC: 22 U/L
ALT SERPL W P-5'-P-CCNC: 23 U/L
ALT SERPL W P-5'-P-CCNC: 26 U/L (ref 9–52)
ALT SERPL W P-5'-P-CCNC: 27 U/L
ALT SERPL W P-5'-P-CCNC: 29 U/L
ALT SERPL W P-5'-P-CCNC: 30 U/L
ALT SERPL W P-5'-P-CCNC: 32 U/L (ref 12–78)
ALT SERPL W P-5'-P-CCNC: 33 U/L
ALT SERPL W P-5'-P-CCNC: 33 U/L
ALT SERPL W P-5'-P-CCNC: 34 U/L
ANION GAP SERPL CALCULATED.3IONS-SCNC: 0 MMOL/L (ref 5–14)
ANION GAP SERPL CALCULATED.3IONS-SCNC: 0 MMOL/L (ref 5–14)
ANION GAP SERPL CALCULATED.3IONS-SCNC: 1 MMOL/L (ref 4–13)
ANION GAP SERPL CALCULATED.3IONS-SCNC: 3 MMOL/L (ref 5–14)
ANION GAP SERPL CALCULATED.3IONS-SCNC: 5 MMOL/L (ref 4–13)
ANION GAP SERPL CALCULATED.3IONS-SCNC: 5 MMOL/L (ref 5–14)
ANION GAP SERPL CALCULATED.3IONS-SCNC: 6 MMOL/L (ref 5–14)
ANION GAP SERPL CALCULATED.3IONS-SCNC: 7 MMOL/L (ref 5–14)
ANION GAP SERPL CALCULATED.3IONS-SCNC: 7 MMOL/L (ref 5–14)
ANION GAP SERPL CALCULATED.3IONS-SCNC: 8 MMOL/L (ref 4–13)
ANION GAP SERPL CALCULATED.3IONS-SCNC: 8 MMOL/L (ref 5–14)
ANION GAP SERPL CALCULATED.3IONS-SCNC: 8 MMOL/L (ref 5–14)
ANION GAP SERPL CALCULATED.3IONS-SCNC: 9 MMOL/L (ref 5–14)
ANISOCYTOSIS BLD QL SMEAR: PRESENT
APTT PPP: 27 SECONDS (ref 23–37)
APTT PPP: 33 SECONDS (ref 23–37)
AST SERPL W P-5'-P-CCNC: 15 U/L (ref 5–45)
AST SERPL W P-5'-P-CCNC: 17 U/L (ref 5–45)
AST SERPL W P-5'-P-CCNC: 20 U/L (ref 17–59)
AST SERPL W P-5'-P-CCNC: 22 U/L (ref 17–59)
AST SERPL W P-5'-P-CCNC: 23 U/L (ref 17–59)
AST SERPL W P-5'-P-CCNC: 24 U/L (ref 17–59)
AST SERPL W P-5'-P-CCNC: 25 U/L (ref 17–59)
AST SERPL W P-5'-P-CCNC: 26 U/L (ref 17–59)
AST SERPL W P-5'-P-CCNC: 26 U/L (ref 17–59)
AST SERPL W P-5'-P-CCNC: 27 U/L (ref 17–59)
AST SERPL W P-5'-P-CCNC: 29 U/L (ref 17–59)
AST SERPL W P-5'-P-CCNC: 30 U/L (ref 17–59)
AST SERPL W P-5'-P-CCNC: 31 U/L (ref 17–59)
AST SERPL W P-5'-P-CCNC: 31 U/L (ref 5–45)
ATRIAL RATE: 102 BPM
ATRIAL RATE: 277 BPM
BACTERIA BLD CULT: NORMAL
BACTERIA BLD CULT: NORMAL
BACTERIA SPT RESP CULT: ABNORMAL
BASOPHILS # BLD AUTO: 0 THOUSANDS/ΜL (ref 0–0.1)
BASOPHILS # BLD AUTO: 0.05 THOUSANDS/ΜL (ref 0–0.1)
BASOPHILS # BLD AUTO: 0.06 THOUSANDS/ΜL (ref 0–0.1)
BASOPHILS # BLD AUTO: 0.07 THOUSANDS/ΜL (ref 0–0.1)
BASOPHILS # BLD AUTO: 0.1 THOUSAND/UL (ref 0–0.1)
BASOPHILS # BLD AUTO: 0.1 THOUSANDS/ΜL (ref 0–0.1)
BASOPHILS # BLD AUTO: 0.11 THOUSAND/UL (ref 0–0.1)
BASOPHILS NFR BLD AUTO: 0 % (ref 0–1)
BASOPHILS NFR BLD AUTO: 0 % (ref 0–1)
BASOPHILS NFR BLD AUTO: 1 % (ref 0–1)
BASOPHILS NFR MAR MANUAL: 1 % (ref 0–1)
BASOPHILS NFR MAR MANUAL: 1 % (ref 0–1)
BILIRUB DIRECT SERPL-MCNC: <0.05 MG/DL (ref 0–0.2)
BILIRUB SERPL-MCNC: 0.16 MG/DL (ref 0.2–1)
BILIRUB SERPL-MCNC: 0.2 MG/DL
BILIRUB SERPL-MCNC: 0.22 MG/DL (ref 0.2–1)
BILIRUB SERPL-MCNC: 0.22 MG/DL (ref 0.2–1)
BILIRUB SERPL-MCNC: 0.25 MG/DL
BILIRUB SERPL-MCNC: 0.27 MG/DL
BILIRUB SERPL-MCNC: 0.28 MG/DL
BILIRUB SERPL-MCNC: 0.32 MG/DL
BILIRUB SERPL-MCNC: 0.32 MG/DL
BILIRUB SERPL-MCNC: 0.33 MG/DL
BILIRUB SERPL-MCNC: 0.34 MG/DL
BILIRUB SERPL-MCNC: 0.36 MG/DL
BILIRUB SERPL-MCNC: 0.37 MG/DL
BILIRUB SERPL-MCNC: 0.37 MG/DL
BILIRUB SERPL-MCNC: 0.4 MG/DL
BILIRUB SERPL-MCNC: 0.4 MG/DL
BILIRUB SERPL-MCNC: 0.42 MG/DL
BILIRUB SERPL-MCNC: 0.52 MG/DL
BILIRUB SERPL-MCNC: 0.69 MG/DL
BILIRUB UR QL STRIP: NEGATIVE
BUN SERPL-MCNC: 19 MG/DL (ref 5–25)
BUN SERPL-MCNC: 20 MG/DL (ref 5–25)
BUN SERPL-MCNC: 21 MG/DL (ref 5–25)
BUN SERPL-MCNC: 21 MG/DL (ref 5–25)
BUN SERPL-MCNC: 22 MG/DL (ref 5–25)
BUN SERPL-MCNC: 23 MG/DL (ref 5–25)
BUN SERPL-MCNC: 23 MG/DL (ref 5–25)
BUN SERPL-MCNC: 27 MG/DL (ref 5–25)
BUN SERPL-MCNC: 29 MG/DL (ref 5–25)
BUN SERPL-MCNC: 30 MG/DL (ref 5–25)
BUN SERPL-MCNC: 30 MG/DL (ref 5–25)
BUN SERPL-MCNC: 31 MG/DL (ref 5–25)
BUN SERPL-MCNC: 32 MG/DL (ref 5–25)
BUN SERPL-MCNC: 33 MG/DL (ref 5–25)
BUN SERPL-MCNC: 37 MG/DL (ref 5–25)
BUN SERPL-MCNC: 40 MG/DL (ref 5–25)
BUN SERPL-MCNC: 42 MG/DL (ref 5–25)
BUN SERPL-MCNC: 45 MG/DL (ref 5–25)
CALCIUM ALBUM COR SERPL-MCNC: 8.7 MG/DL (ref 8.3–10.1)
CALCIUM ALBUM COR SERPL-MCNC: 8.9 MG/DL (ref 8.3–10.1)
CALCIUM ALBUM COR SERPL-MCNC: 8.9 MG/DL (ref 8.3–10.1)
CALCIUM SERPL-MCNC: 8.1 MG/DL (ref 8.3–10.1)
CALCIUM SERPL-MCNC: 8.2 MG/DL (ref 8.3–10.1)
CALCIUM SERPL-MCNC: 8.2 MG/DL (ref 8.3–10.1)
CALCIUM SERPL-MCNC: 8.3 MG/DL (ref 8.4–10.2)
CALCIUM SERPL-MCNC: 8.5 MG/DL (ref 8.4–10.2)
CALCIUM SERPL-MCNC: 8.5 MG/DL (ref 8.4–10.2)
CALCIUM SERPL-MCNC: 8.6 MG/DL (ref 8.3–10.1)
CALCIUM SERPL-MCNC: 8.6 MG/DL (ref 8.4–10.2)
CALCIUM SERPL-MCNC: 8.6 MG/DL (ref 8.4–10.2)
CALCIUM SERPL-MCNC: 8.7 MG/DL (ref 8.3–10.1)
CALCIUM SERPL-MCNC: 8.7 MG/DL (ref 8.4–10.2)
CALCIUM SERPL-MCNC: 8.8 MG/DL (ref 8.4–10.2)
CALCIUM SERPL-MCNC: 9 MG/DL (ref 8.4–10.2)
CALCIUM SERPL-MCNC: 9.1 MG/DL (ref 8.4–10.2)
CALCIUM SERPL-MCNC: 9.2 MG/DL (ref 8.4–10.2)
CALCIUM SERPL-MCNC: 9.2 MG/DL (ref 8.4–10.2)
CARDIAC TROPONIN I PNL SERPL HS: 14 NG/L (ref 8–18)
CCP AB SER IA-ACNC: 6.5
CGA SERPL-MCNC: 117.3 NG/ML (ref 0–101.8)
CHLORIDE SERPL-SCNC: 101 MMOL/L (ref 97–108)
CHLORIDE SERPL-SCNC: 101 MMOL/L (ref 97–108)
CHLORIDE SERPL-SCNC: 102 MMOL/L (ref 97–108)
CHLORIDE SERPL-SCNC: 102 MMOL/L (ref 97–108)
CHLORIDE SERPL-SCNC: 103 MMOL/L (ref 97–108)
CHLORIDE SERPL-SCNC: 103 MMOL/L (ref 97–108)
CHLORIDE SERPL-SCNC: 104 MMOL/L (ref 100–108)
CHLORIDE SERPL-SCNC: 104 MMOL/L (ref 100–108)
CHLORIDE SERPL-SCNC: 104 MMOL/L (ref 97–108)
CHLORIDE SERPL-SCNC: 104 MMOL/L (ref 97–108)
CHLORIDE SERPL-SCNC: 105 MMOL/L (ref 100–108)
CHLORIDE SERPL-SCNC: 105 MMOL/L (ref 97–108)
CHLORIDE SERPL-SCNC: 105 MMOL/L (ref 97–108)
CHLORIDE SERPL-SCNC: 106 MMOL/L (ref 100–108)
CHLORIDE SERPL-SCNC: 106 MMOL/L (ref 97–108)
CHLORIDE SERPL-SCNC: 107 MMOL/L (ref 100–108)
CHLORIDE SERPL-SCNC: 107 MMOL/L (ref 97–108)
CHLORIDE SERPL-SCNC: 107 MMOL/L (ref 97–108)
CHLORIDE SERPL-SCNC: 108 MMOL/L (ref 97–108)
CHLORIDE SERPL-SCNC: 99 MMOL/L (ref 97–108)
CK MB SERPL-MCNC: 1.3 % (ref 0–2.5)
CK MB SERPL-MCNC: 1.5 % (ref 0–2.5)
CK MB SERPL-MCNC: 1.6 % (ref 0–2.5)
CK MB SERPL-MCNC: 1.6 % (ref 0–2.5)
CK MB SERPL-MCNC: 1.8 NG/ML (ref 0–2.4)
CK MB SERPL-MCNC: 1.9 % (ref 0–2.5)
CK MB SERPL-MCNC: 2.1 % (ref 0–2.5)
CK MB SERPL-MCNC: 2.3 % (ref 0–2.5)
CK MB SERPL-MCNC: 3.6 NG/ML (ref 0–2.4)
CK MB SERPL-MCNC: 3.7 NG/ML (ref 0–2.4)
CK MB SERPL-MCNC: 3.8 NG/ML (ref 0–2.4)
CK MB SERPL-MCNC: 4.3 NG/ML (ref 0–2.4)
CK MB SERPL-MCNC: 5 NG/ML (ref 0–2.4)
CK MB SERPL-MCNC: 5.3 NG/ML (ref 0–2.4)
CK MB SERPL-MCNC: 5.4 NG/ML (ref 0–2.4)
CK MB SERPL-MCNC: 6.2 NG/ML (ref 0–2.4)
CK MB SERPL-MCNC: <1 % (ref 0–2.5)
CK MB SERPL-MCNC: <1 % (ref 0–2.5)
CK SERPL-CCNC: 172 U/L (ref 55–170)
CK SERPL-CCNC: 201 U/L (ref 55–170)
CK SERPL-CCNC: 217 U/L (ref 55–170)
CK SERPL-CCNC: 274 U/L (ref 55–170)
CK SERPL-CCNC: 296 U/L (ref 55–170)
CK SERPL-CCNC: 317 U/L (ref 55–170)
CK SERPL-CCNC: 330 U/L (ref 55–170)
CK SERPL-CCNC: 422 U/L (ref 55–170)
CK SERPL-CCNC: 451 U/L (ref 55–170)
CLARITY UR: CLEAR
CO2 SERPL-SCNC: 26 MMOL/L (ref 21–32)
CO2 SERPL-SCNC: 26 MMOL/L (ref 21–32)
CO2 SERPL-SCNC: 26 MMOL/L (ref 22–30)
CO2 SERPL-SCNC: 27 MMOL/L (ref 22–30)
CO2 SERPL-SCNC: 27 MMOL/L (ref 22–30)
CO2 SERPL-SCNC: 28 MMOL/L (ref 21–32)
CO2 SERPL-SCNC: 28 MMOL/L (ref 22–30)
CO2 SERPL-SCNC: 29 MMOL/L (ref 21–32)
CO2 SERPL-SCNC: 29 MMOL/L (ref 22–30)
CO2 SERPL-SCNC: 30 MMOL/L (ref 22–30)
CO2 SERPL-SCNC: 30 MMOL/L (ref 22–30)
CO2 SERPL-SCNC: 31 MMOL/L (ref 22–30)
CO2 SERPL-SCNC: 32 MMOL/L (ref 22–30)
CO2 SERPL-SCNC: 33 MMOL/L (ref 21–32)
CO2 SERPL-SCNC: 33 MMOL/L (ref 22–30)
COLOR UR: YELLOW
CREAT SERPL-MCNC: 1.85 MG/DL (ref 0.6–1.3)
CREAT SERPL-MCNC: 1.87 MG/DL (ref 0.6–1.3)
CREAT SERPL-MCNC: 1.93 MG/DL (ref 0.7–1.5)
CREAT SERPL-MCNC: 2.03 MG/DL (ref 0.6–1.3)
CREAT SERPL-MCNC: 2.05 MG/DL (ref 0.7–1.5)
CREAT SERPL-MCNC: 2.07 MG/DL (ref 0.7–1.5)
CREAT SERPL-MCNC: 2.09 MG/DL (ref 0.6–1.3)
CREAT SERPL-MCNC: 2.19 MG/DL (ref 0.7–1.5)
CREAT SERPL-MCNC: 2.21 MG/DL (ref 0.7–1.5)
CREAT SERPL-MCNC: 2.22 MG/DL (ref 0.7–1.5)
CREAT SERPL-MCNC: 2.23 MG/DL (ref 0.7–1.5)
CREAT SERPL-MCNC: 2.27 MG/DL (ref 0.6–1.3)
CREAT SERPL-MCNC: 2.36 MG/DL (ref 0.7–1.5)
CREAT SERPL-MCNC: 2.36 MG/DL (ref 0.7–1.5)
CREAT SERPL-MCNC: 2.43 MG/DL (ref 0.7–1.5)
CREAT SERPL-MCNC: 2.46 MG/DL (ref 0.7–1.5)
CREAT SERPL-MCNC: 2.49 MG/DL (ref 0.7–1.5)
CREAT SERPL-MCNC: 2.49 MG/DL (ref 0.7–1.5)
CREAT SERPL-MCNC: 2.5 MG/DL (ref 0.7–1.5)
CREAT SERPL-MCNC: 2.59 MG/DL (ref 0.7–1.5)
CREAT SERPL-MCNC: 2.61 MG/DL (ref 0.7–1.5)
CREAT SERPL-MCNC: 2.61 MG/DL (ref 0.7–1.5)
CREAT UR-MCNC: 337 MG/DL
CRP SERPL QL: 11.8 MG/L
CRP SERPL QL: 23.2 MG/L
CRP SERPL QL: 26.8 MG/L
CRP SERPL QL: 303.1 MG/L
CRP SERPL QL: 35.4 MG/L
CRP SERPL QL: 7.4 MG/L
CRP SERPL QL: <5 MG/L
EOSINOPHIL # BLD AUTO: 0 THOUSAND/ΜL (ref 0–0.4)
EOSINOPHIL # BLD AUTO: 0 THOUSAND/ΜL (ref 0–0.4)
EOSINOPHIL # BLD AUTO: 0.09 THOUSAND/UL (ref 0–0.4)
EOSINOPHIL # BLD AUTO: 0.1 THOUSAND/UL (ref 0–0.4)
EOSINOPHIL # BLD AUTO: 0.1 THOUSAND/ΜL (ref 0–0.4)
EOSINOPHIL # BLD AUTO: 0.3 THOUSAND/ΜL (ref 0–0.4)
EOSINOPHIL # BLD AUTO: 0.32 THOUSAND/UL (ref 0–0.4)
EOSINOPHIL # BLD AUTO: 0.5 THOUSAND/ΜL (ref 0–0.4)
EOSINOPHIL # BLD AUTO: 0.5 THOUSAND/ΜL (ref 0–0.4)
EOSINOPHIL # BLD AUTO: 0.57 THOUSAND/ΜL (ref 0–0.61)
EOSINOPHIL # BLD AUTO: 1.02 THOUSAND/UL (ref 0–0.4)
EOSINOPHIL # BLD AUTO: 1.07 THOUSAND/UL (ref 0–0.4)
EOSINOPHIL # BLD AUTO: 1.53 THOUSAND/ΜL (ref 0–0.61)
EOSINOPHIL # BLD AUTO: 1.53 THOUSAND/ΜL (ref 0–0.61)
EOSINOPHIL # BLD AUTO: 1.82 THOUSAND/UL (ref 0–0.4)
EOSINOPHIL NFR BLD AUTO: 0 % (ref 0–6)
EOSINOPHIL NFR BLD AUTO: 0 % (ref 0–6)
EOSINOPHIL NFR BLD AUTO: 14 % (ref 0–6)
EOSINOPHIL NFR BLD AUTO: 16 % (ref 0–6)
EOSINOPHIL NFR BLD AUTO: 2 % (ref 0–6)
EOSINOPHIL NFR BLD AUTO: 3 % (ref 0–6)
EOSINOPHIL NFR BLD AUTO: 6 % (ref 0–6)
EOSINOPHIL NFR BLD AUTO: 7 % (ref 0–6)
EOSINOPHIL NFR BLD AUTO: 7 % (ref 0–6)
EOSINOPHIL NFR BLD MANUAL: 1 % (ref 0–6)
EOSINOPHIL NFR BLD MANUAL: 1 % (ref 0–6)
EOSINOPHIL NFR BLD MANUAL: 11 % (ref 0–6)
EOSINOPHIL NFR BLD MANUAL: 12 % (ref 0–6)
EOSINOPHIL NFR BLD MANUAL: 17 % (ref 0–6)
EOSINOPHIL NFR BLD MANUAL: 2 % (ref 0–6)
ERYTHROCYTE [DISTWIDTH] IN BLOOD BY AUTOMATED COUNT: 16.2 % (ref 11.6–15.1)
ERYTHROCYTE [DISTWIDTH] IN BLOOD BY AUTOMATED COUNT: 16.4 %
ERYTHROCYTE [DISTWIDTH] IN BLOOD BY AUTOMATED COUNT: 16.7 %
ERYTHROCYTE [DISTWIDTH] IN BLOOD BY AUTOMATED COUNT: 16.7 %
ERYTHROCYTE [DISTWIDTH] IN BLOOD BY AUTOMATED COUNT: 16.8 %
ERYTHROCYTE [DISTWIDTH] IN BLOOD BY AUTOMATED COUNT: 16.9 % (ref 11.6–15.1)
ERYTHROCYTE [DISTWIDTH] IN BLOOD BY AUTOMATED COUNT: 17 %
ERYTHROCYTE [DISTWIDTH] IN BLOOD BY AUTOMATED COUNT: 17 % (ref 11.6–15.1)
ERYTHROCYTE [DISTWIDTH] IN BLOOD BY AUTOMATED COUNT: 17 % (ref 11.6–15.1)
ERYTHROCYTE [DISTWIDTH] IN BLOOD BY AUTOMATED COUNT: 17.5 %
ERYTHROCYTE [DISTWIDTH] IN BLOOD BY AUTOMATED COUNT: 17.6 %
ERYTHROCYTE [DISTWIDTH] IN BLOOD BY AUTOMATED COUNT: 17.8 %
ERYTHROCYTE [DISTWIDTH] IN BLOOD BY AUTOMATED COUNT: 18.3 %
ERYTHROCYTE [DISTWIDTH] IN BLOOD BY AUTOMATED COUNT: 18.5 %
ERYTHROCYTE [DISTWIDTH] IN BLOOD BY AUTOMATED COUNT: 18.9 %
ERYTHROCYTE [DISTWIDTH] IN BLOOD BY AUTOMATED COUNT: 19.2 %
ERYTHROCYTE [DISTWIDTH] IN BLOOD BY AUTOMATED COUNT: 19.4 %
ERYTHROCYTE [SEDIMENTATION RATE] IN BLOOD: 44 MM/HOUR (ref 0–19)
ERYTHROCYTE [SEDIMENTATION RATE] IN BLOOD: 51 MM/HOUR (ref 0–19)
ERYTHROCYTE [SEDIMENTATION RATE] IN BLOOD: 63 MM/HOUR (ref 0–19)
ERYTHROCYTE [SEDIMENTATION RATE] IN BLOOD: 80 MM/HOUR (ref 0–19)
ERYTHROCYTE [SEDIMENTATION RATE] IN BLOOD: 84 MM/HOUR (ref 0–19)
ERYTHROCYTE [SEDIMENTATION RATE] IN BLOOD: 92 MM/HOUR (ref 0–19)
ERYTHROCYTE [SEDIMENTATION RATE] IN BLOOD: >130 MM/HOUR (ref 0–19)
FERRITIN SERPL-MCNC: 51 NG/ML (ref 8–388)
FLUAV RNA RESP QL NAA+PROBE: NEGATIVE
FLUBV RNA RESP QL NAA+PROBE: NEGATIVE
FOLATE SERPL-MCNC: >20 NG/ML (ref 3.1–17.5)
GFR SERPL CREATININE-BSD FRML MDRD: 27 ML/MIN/1.73SQ M
GFR SERPL CREATININE-BSD FRML MDRD: 27 ML/MIN/1.73SQ M
GFR SERPL CREATININE-BSD FRML MDRD: 30 ML/MIN/1.73SQ M
GFR SERPL CREATININE-BSD FRML MDRD: 32 ML/MIN/1.73SQ M
GFR SERPL CREATININE-BSD FRML MDRD: 34 ML/MIN/1.73SQ M
GFR SERPL CREATININE-BSD FRML MDRD: 34 ML/MIN/1.73SQ M
GFR SERPL CREATININE-BSD FRML MDRD: 35 ML/MIN/1.73SQ M
GFR SERPL CREATININE-BSD FRML MDRD: 36 ML/MIN/1.73SQ M
GFR SERPL CREATININE-BSD FRML MDRD: 37 ML/MIN/1.73SQ M
GFR SERPL CREATININE-BSD FRML MDRD: 39 ML/MIN/1.73SQ M
GFR SERPL CREATININE-BSD FRML MDRD: 40 ML/MIN/1.73SQ M
GFR SERPL CREATININE-BSD FRML MDRD: 40 ML/MIN/1.73SQ M
GFR SERPL CREATININE-BSD FRML MDRD: 41 ML/MIN/1.73SQ M
GFR SERPL CREATININE-BSD FRML MDRD: 43 ML/MIN/1.73SQ M
GFR SERPL CREATININE-BSD FRML MDRD: 45 ML/MIN/1.73SQ M
GFR SERPL CREATININE-BSD FRML MDRD: 45 ML/MIN/1.73SQ M
GIANT PLATELETS BLD QL SMEAR: PRESENT
GLUCOSE SERPL-MCNC: 100 MG/DL (ref 65–140)
GLUCOSE SERPL-MCNC: 101 MG/DL (ref 65–140)
GLUCOSE SERPL-MCNC: 101 MG/DL (ref 70–99)
GLUCOSE SERPL-MCNC: 104 MG/DL (ref 65–140)
GLUCOSE SERPL-MCNC: 106 MG/DL (ref 70–99)
GLUCOSE SERPL-MCNC: 109 MG/DL (ref 65–140)
GLUCOSE SERPL-MCNC: 109 MG/DL (ref 70–99)
GLUCOSE SERPL-MCNC: 110 MG/DL (ref 70–99)
GLUCOSE SERPL-MCNC: 111 MG/DL (ref 70–99)
GLUCOSE SERPL-MCNC: 115 MG/DL (ref 65–140)
GLUCOSE SERPL-MCNC: 116 MG/DL (ref 65–140)
GLUCOSE SERPL-MCNC: 117 MG/DL (ref 65–140)
GLUCOSE SERPL-MCNC: 117 MG/DL (ref 70–99)
GLUCOSE SERPL-MCNC: 119 MG/DL (ref 65–140)
GLUCOSE SERPL-MCNC: 119 MG/DL (ref 65–140)
GLUCOSE SERPL-MCNC: 121 MG/DL (ref 70–99)
GLUCOSE SERPL-MCNC: 124 MG/DL (ref 65–140)
GLUCOSE SERPL-MCNC: 125 MG/DL (ref 65–140)
GLUCOSE SERPL-MCNC: 126 MG/DL (ref 70–99)
GLUCOSE SERPL-MCNC: 127 MG/DL (ref 70–99)
GLUCOSE SERPL-MCNC: 128 MG/DL (ref 65–140)
GLUCOSE SERPL-MCNC: 131 MG/DL (ref 65–140)
GLUCOSE SERPL-MCNC: 134 MG/DL (ref 65–140)
GLUCOSE SERPL-MCNC: 135 MG/DL (ref 65–140)
GLUCOSE SERPL-MCNC: 141 MG/DL (ref 65–140)
GLUCOSE SERPL-MCNC: 145 MG/DL (ref 65–140)
GLUCOSE SERPL-MCNC: 146 MG/DL (ref 65–140)
GLUCOSE SERPL-MCNC: 147 MG/DL (ref 65–140)
GLUCOSE SERPL-MCNC: 148 MG/DL (ref 65–140)
GLUCOSE SERPL-MCNC: 149 MG/DL (ref 70–99)
GLUCOSE SERPL-MCNC: 153 MG/DL (ref 65–140)
GLUCOSE SERPL-MCNC: 153 MG/DL (ref 70–99)
GLUCOSE SERPL-MCNC: 155 MG/DL (ref 65–140)
GLUCOSE SERPL-MCNC: 157 MG/DL (ref 65–140)
GLUCOSE SERPL-MCNC: 160 MG/DL (ref 70–99)
GLUCOSE SERPL-MCNC: 167 MG/DL (ref 65–140)
GLUCOSE SERPL-MCNC: 168 MG/DL (ref 65–140)
GLUCOSE SERPL-MCNC: 174 MG/DL (ref 70–99)
GLUCOSE SERPL-MCNC: 176 MG/DL (ref 70–99)
GLUCOSE SERPL-MCNC: 189 MG/DL (ref 70–99)
GLUCOSE SERPL-MCNC: 82 MG/DL (ref 65–140)
GLUCOSE SERPL-MCNC: 82 MG/DL (ref 70–99)
GLUCOSE SERPL-MCNC: 88 MG/DL (ref 65–140)
GLUCOSE SERPL-MCNC: 94 MG/DL (ref 65–140)
GLUCOSE SERPL-MCNC: 95 MG/DL (ref 65–140)
GLUCOSE SERPL-MCNC: 95 MG/DL (ref 70–99)
GLUCOSE SERPL-MCNC: 96 MG/DL (ref 65–140)
GLUCOSE SERPL-MCNC: 99 MG/DL (ref 65–140)
GLUCOSE UR STRIP-MCNC: NEGATIVE MG/DL
GRAM STN SPEC: ABNORMAL
HCT VFR BLD AUTO: 30.6 % (ref 41–53)
HCT VFR BLD AUTO: 32.2 % (ref 41–53)
HCT VFR BLD AUTO: 32.9 % (ref 41–53)
HCT VFR BLD AUTO: 33.1 % (ref 41–53)
HCT VFR BLD AUTO: 33.1 % (ref 41–53)
HCT VFR BLD AUTO: 33.2 % (ref 41–53)
HCT VFR BLD AUTO: 33.5 % (ref 41–53)
HCT VFR BLD AUTO: 33.5 % (ref 41–53)
HCT VFR BLD AUTO: 34.1 % (ref 41–53)
HCT VFR BLD AUTO: 34.3 % (ref 41–53)
HCT VFR BLD AUTO: 34.7 % (ref 41–53)
HCT VFR BLD AUTO: 35.3 % (ref 36.5–49.3)
HCT VFR BLD AUTO: 35.4 % (ref 41–53)
HCT VFR BLD AUTO: 35.6 % (ref 41–53)
HCT VFR BLD AUTO: 36.5 % (ref 41–53)
HCT VFR BLD AUTO: 36.7 % (ref 36.5–49.3)
HCT VFR BLD AUTO: 36.8 % (ref 36.5–49.3)
HCT VFR BLD AUTO: 37 % (ref 36.5–49.3)
HCT VFR BLD AUTO: 37.1 % (ref 41–53)
HGB BLD-MCNC: 10.2 G/DL (ref 13.5–17.5)
HGB BLD-MCNC: 10.4 G/DL (ref 13.5–17.5)
HGB BLD-MCNC: 10.4 G/DL (ref 13.5–17.5)
HGB BLD-MCNC: 10.5 G/DL (ref 13.5–17.5)
HGB BLD-MCNC: 10.6 G/DL (ref 13.5–17.5)
HGB BLD-MCNC: 10.6 G/DL (ref 13.5–17.5)
HGB BLD-MCNC: 10.8 G/DL (ref 13.5–17.5)
HGB BLD-MCNC: 10.9 G/DL (ref 13.5–17.5)
HGB BLD-MCNC: 10.9 G/DL (ref 13.5–17.5)
HGB BLD-MCNC: 11 G/DL (ref 12–17)
HGB BLD-MCNC: 11 G/DL (ref 13.5–17.5)
HGB BLD-MCNC: 11.1 G/DL (ref 12–17)
HGB BLD-MCNC: 11.3 G/DL (ref 12–17)
HGB BLD-MCNC: 11.4 G/DL (ref 12–17)
HGB BLD-MCNC: 11.4 G/DL (ref 13.5–17.5)
HGB BLD-MCNC: 11.5 G/DL (ref 13.5–17.5)
HGB BLD-MCNC: 11.6 G/DL (ref 13.5–17.5)
HGB BLD-MCNC: 12.3 G/DL (ref 13.5–17.5)
HGB BLD-MCNC: 9.7 G/DL (ref 13.5–17.5)
HGB UR QL STRIP.AUTO: NEGATIVE
HYPERCHROMIA BLD QL SMEAR: PRESENT
IMM GRANULOCYTES # BLD AUTO: 0.05 THOUSAND/UL (ref 0–0.2)
IMM GRANULOCYTES # BLD AUTO: 0.06 THOUSAND/UL (ref 0–0.2)
IMM GRANULOCYTES # BLD AUTO: 0.07 THOUSAND/UL (ref 0–0.2)
IMM GRANULOCYTES NFR BLD AUTO: 1 % (ref 0–2)
INR PPP: 1.19 (ref 0.84–1.19)
INR PPP: 1.23 (ref 0.84–1.19)
IRON SATN MFR SERPL: 18 %
IRON SERPL-MCNC: 45 UG/DL (ref 65–175)
KETONES UR STRIP-MCNC: NEGATIVE MG/DL
L PNEUMO1 AG UR QL IA.RAPID: NEGATIVE
LACTATE SERPL-SCNC: 1.5 MMOL/L (ref 0.5–2)
LDH SERPL-CCNC: 1047 U/L (ref 313–618)
LDH SERPL-CCNC: 1186 U/L (ref 313–618)
LDH SERPL-CCNC: 1224 U/L (ref 313–618)
LDH SERPL-CCNC: 600 U/L (ref 313–618)
LDH SERPL-CCNC: 703 U/L (ref 313–618)
LDH SERPL-CCNC: 792 U/L (ref 313–618)
LDH SERPL-CCNC: 945 U/L (ref 313–618)
LEUKOCYTE ESTERASE UR QL STRIP: NEGATIVE
LG PLATELETS BLD QL SMEAR: PRESENT
LIPASE SERPL-CCNC: 187 U/L (ref 73–393)
LYMPHOCYTES # BLD AUTO: 0.6 THOUSANDS/ΜL (ref 0.5–4)
LYMPHOCYTES # BLD AUTO: 0.76 THOUSAND/UL (ref 0.5–4)
LYMPHOCYTES # BLD AUTO: 0.8 THOUSANDS/ΜL (ref 0.5–4)
LYMPHOCYTES # BLD AUTO: 1 THOUSANDS/ΜL (ref 0.5–4)
LYMPHOCYTES # BLD AUTO: 1.27 THOUSAND/UL (ref 0.5–4)
LYMPHOCYTES # BLD AUTO: 1.36 THOUSAND/UL (ref 0.5–4)
LYMPHOCYTES # BLD AUTO: 1.4 THOUSANDS/ΜL (ref 0.5–4)
LYMPHOCYTES # BLD AUTO: 1.5 THOUSANDS/ΜL (ref 0.5–4)
LYMPHOCYTES # BLD AUTO: 1.53 THOUSANDS/ΜL (ref 0.6–4.47)
LYMPHOCYTES # BLD AUTO: 1.62 THOUSAND/UL (ref 0.5–4)
LYMPHOCYTES # BLD AUTO: 1.62 THOUSAND/UL (ref 0.5–4)
LYMPHOCYTES # BLD AUTO: 1.71 THOUSAND/UL (ref 0.5–4)
LYMPHOCYTES # BLD AUTO: 1.9 THOUSANDS/ΜL (ref 0.5–4)
LYMPHOCYTES # BLD AUTO: 16 % (ref 25–45)
LYMPHOCYTES # BLD AUTO: 16 % (ref 25–45)
LYMPHOCYTES # BLD AUTO: 17 % (ref 25–45)
LYMPHOCYTES # BLD AUTO: 19 % (ref 25–45)
LYMPHOCYTES # BLD AUTO: 2.07 THOUSANDS/ΜL (ref 0.6–4.47)
LYMPHOCYTES # BLD AUTO: 2.13 THOUSAND/UL (ref 0.5–4)
LYMPHOCYTES # BLD AUTO: 2.16 THOUSAND/UL (ref 0.5–4)
LYMPHOCYTES # BLD AUTO: 2.3 THOUSAND/UL (ref 0.5–4)
LYMPHOCYTES # BLD AUTO: 2.86 THOUSANDS/ΜL (ref 0.6–4.47)
LYMPHOCYTES # BLD AUTO: 21 % (ref 25–45)
LYMPHOCYTES # BLD AUTO: 22 % (ref 25–45)
LYMPHOCYTES # BLD AUTO: 23 % (ref 25–45)
LYMPHOCYTES # BLD AUTO: 7 % (ref 25–45)
LYMPHOCYTES # BLD AUTO: 8 % (ref 25–45)
LYMPHOCYTES NFR BLD AUTO: 14 % (ref 25–45)
LYMPHOCYTES NFR BLD AUTO: 15 % (ref 25–45)
LYMPHOCYTES NFR BLD AUTO: 18 % (ref 14–44)
LYMPHOCYTES NFR BLD AUTO: 18 % (ref 25–45)
LYMPHOCYTES NFR BLD AUTO: 22 % (ref 14–44)
LYMPHOCYTES NFR BLD AUTO: 23 % (ref 25–45)
LYMPHOCYTES NFR BLD AUTO: 26 % (ref 14–44)
LYMPHOCYTES NFR BLD AUTO: 6 % (ref 25–45)
LYMPHOCYTES NFR BLD AUTO: 9 % (ref 25–45)
MAGNESIUM SERPL-MCNC: 1.9 MG/DL (ref 1.6–2.3)
MAGNESIUM SERPL-MCNC: 1.9 MG/DL (ref 1.6–2.3)
MAGNESIUM SERPL-MCNC: 2 MG/DL (ref 1.6–2.3)
MAGNESIUM SERPL-MCNC: 2.2 MG/DL (ref 1.6–2.3)
MAGNESIUM SERPL-MCNC: 2.2 MG/DL (ref 1.6–2.3)
MCH RBC QN AUTO: 25.9 PG (ref 26–34)
MCH RBC QN AUTO: 26.2 PG (ref 26–34)
MCH RBC QN AUTO: 26.3 PG (ref 26.8–34.3)
MCH RBC QN AUTO: 26.3 PG (ref 26–34)
MCH RBC QN AUTO: 26.4 PG (ref 26.8–34.3)
MCH RBC QN AUTO: 26.4 PG (ref 26–34)
MCH RBC QN AUTO: 26.4 PG (ref 26–34)
MCH RBC QN AUTO: 26.5 PG (ref 26–34)
MCH RBC QN AUTO: 26.7 PG (ref 26.8–34.3)
MCH RBC QN AUTO: 26.7 PG (ref 26–34)
MCH RBC QN AUTO: 26.7 PG (ref 26–34)
MCH RBC QN AUTO: 26.8 PG (ref 26.8–34.3)
MCH RBC QN AUTO: 26.9 PG (ref 26–34)
MCH RBC QN AUTO: 26.9 PG (ref 26–34)
MCH RBC QN AUTO: 27.1 PG (ref 26–34)
MCH RBC QN AUTO: 27.3 PG (ref 26–34)
MCH RBC QN AUTO: 27.4 PG (ref 26–34)
MCHC RBC AUTO-ENTMCNC: 30 G/DL (ref 31.4–37.4)
MCHC RBC AUTO-ENTMCNC: 30.7 G/DL (ref 31.4–37.4)
MCHC RBC AUTO-ENTMCNC: 31.1 G/DL (ref 31.4–37.4)
MCHC RBC AUTO-ENTMCNC: 31.2 G/DL (ref 31.4–37.4)
MCHC RBC AUTO-ENTMCNC: 31.4 G/DL (ref 31–36)
MCHC RBC AUTO-ENTMCNC: 31.4 G/DL (ref 31–36)
MCHC RBC AUTO-ENTMCNC: 31.6 G/DL (ref 31–36)
MCHC RBC AUTO-ENTMCNC: 31.7 G/DL (ref 31–36)
MCHC RBC AUTO-ENTMCNC: 31.8 G/DL (ref 31–36)
MCHC RBC AUTO-ENTMCNC: 31.9 G/DL (ref 31–36)
MCHC RBC AUTO-ENTMCNC: 31.9 G/DL (ref 31–36)
MCHC RBC AUTO-ENTMCNC: 32.1 G/DL (ref 31–36)
MCHC RBC AUTO-ENTMCNC: 32.1 G/DL (ref 31–36)
MCHC RBC AUTO-ENTMCNC: 32.4 G/DL (ref 31–36)
MCHC RBC AUTO-ENTMCNC: 32.8 G/DL (ref 31–36)
MCHC RBC AUTO-ENTMCNC: 33.2 G/DL (ref 31–36)
MCV RBC AUTO: 82 FL (ref 80–100)
MCV RBC AUTO: 82 FL (ref 80–100)
MCV RBC AUTO: 83 FL (ref 80–100)
MCV RBC AUTO: 84 FL (ref 80–100)
MCV RBC AUTO: 84 FL (ref 82–98)
MCV RBC AUTO: 85 FL (ref 80–100)
MCV RBC AUTO: 85 FL (ref 80–100)
MCV RBC AUTO: 86 FL (ref 82–98)
MCV RBC AUTO: 87 FL (ref 82–98)
MCV RBC AUTO: 88 FL (ref 82–98)
METAMYELOCYTES NFR BLD MANUAL: 1 % (ref 0–1)
METAMYELOCYTES NFR BLD MANUAL: 2 % (ref 0–1)
METANEPH 24H UR-MRATE: 156 UG/24 HR (ref 58–276)
METANEPHS 24H UR-MCNC: 96 UG/L
MONOCYTES # BLD AUTO: 0.11 THOUSAND/UL (ref 0.2–0.9)
MONOCYTES # BLD AUTO: 0.4 THOUSAND/ΜL (ref 0.2–0.9)
MONOCYTES # BLD AUTO: 0.5 THOUSAND/ΜL (ref 0.2–0.9)
MONOCYTES # BLD AUTO: 0.5 THOUSAND/ΜL (ref 0.2–0.9)
MONOCYTES # BLD AUTO: 0.51 THOUSAND/UL (ref 0.2–0.9)
MONOCYTES # BLD AUTO: 0.6 THOUSAND/UL (ref 0.2–0.9)
MONOCYTES # BLD AUTO: 0.6 THOUSAND/UL (ref 0.2–0.9)
MONOCYTES # BLD AUTO: 0.6 THOUSAND/ΜL (ref 0.17–1.22)
MONOCYTES # BLD AUTO: 0.6 THOUSAND/ΜL (ref 0.2–0.9)
MONOCYTES # BLD AUTO: 0.67 THOUSAND/UL (ref 0.2–0.9)
MONOCYTES # BLD AUTO: 0.78 THOUSAND/UL (ref 0.2–0.9)
MONOCYTES # BLD AUTO: 0.8 THOUSAND/ΜL (ref 0.2–0.9)
MONOCYTES # BLD AUTO: 0.8 THOUSAND/ΜL (ref 0.2–0.9)
MONOCYTES # BLD AUTO: 0.96 THOUSAND/ΜL (ref 0.17–1.22)
MONOCYTES # BLD AUTO: 1.06 THOUSAND/ΜL (ref 0.17–1.22)
MONOCYTES # BLD AUTO: 1.11 THOUSAND/UL (ref 0.2–0.9)
MONOCYTES # BLD AUTO: 1.18 THOUSAND/UL (ref 0.2–0.9)
MONOCYTES # BLD AUTO: 1.65 THOUSAND/UL (ref 0.2–0.9)
MONOCYTES NFR BLD AUTO: 1 % (ref 1–10)
MONOCYTES NFR BLD AUTO: 10 % (ref 4–12)
MONOCYTES NFR BLD AUTO: 10 % (ref 4–12)
MONOCYTES NFR BLD AUTO: 11 % (ref 1–10)
MONOCYTES NFR BLD AUTO: 16 % (ref 1–10)
MONOCYTES NFR BLD AUTO: 5 % (ref 1–10)
MONOCYTES NFR BLD AUTO: 6 % (ref 1–10)
MONOCYTES NFR BLD AUTO: 7 % (ref 1–10)
MONOCYTES NFR BLD AUTO: 7 % (ref 4–12)
MONOCYTES NFR BLD AUTO: 8 % (ref 1–10)
MONOCYTES NFR BLD AUTO: 8 % (ref 1–10)
MONOCYTES NFR BLD AUTO: 9 % (ref 1–10)
MYELOCYTES NFR BLD MANUAL: 1 % (ref 0–1)
NEUTROPHILS # BLD AUTO: 4.88 THOUSANDS/ΜL (ref 1.85–7.62)
NEUTROPHILS # BLD AUTO: 5.2 THOUSANDS/ΜL (ref 1.8–7.8)
NEUTROPHILS # BLD AUTO: 5.2 THOUSANDS/ΜL (ref 1.8–7.8)
NEUTROPHILS # BLD AUTO: 5.51 THOUSANDS/ΜL (ref 1.85–7.62)
NEUTROPHILS # BLD AUTO: 5.7 THOUSANDS/ΜL (ref 1.8–7.8)
NEUTROPHILS # BLD AUTO: 5.72 THOUSANDS/ΜL (ref 1.85–7.62)
NEUTROPHILS # BLD AUTO: 7.2 THOUSANDS/ΜL (ref 1.8–7.8)
NEUTROPHILS # BLD AUTO: 7.3 THOUSANDS/ΜL (ref 1.8–7.8)
NEUTROPHILS # BLD AUTO: 9.4 THOUSANDS/ΜL (ref 1.8–7.8)
NEUTS BAND NFR BLD MANUAL: 5 % (ref 0–8)
NEUTS SEG # BLD: 13.2 THOUSAND/UL (ref 1.8–7.8)
NEUTS SEG # BLD: 5.19 THOUSAND/UL (ref 1.8–7.8)
NEUTS SEG # BLD: 5.63 THOUSAND/UL (ref 1.8–7.8)
NEUTS SEG # BLD: 5.89 THOUSAND/UL (ref 1.8–7.8)
NEUTS SEG # BLD: 6.28 THOUSAND/UL (ref 1.8–7.8)
NEUTS SEG # BLD: 6.46 THOUSAND/UL (ref 1.8–7.8)
NEUTS SEG # BLD: 7.1 THOUSAND/UL (ref 1.8–7.8)
NEUTS SEG # BLD: 7.22 THOUSAND/UL (ref 1.8–7.8)
NEUTS SEG # BLD: 9.92 THOUSAND/UL (ref 1.8–7.8)
NEUTS SEG NFR BLD AUTO: 48 % (ref 43–75)
NEUTS SEG NFR BLD AUTO: 50 % (ref 43–75)
NEUTS SEG NFR BLD AUTO: 55 %
NEUTS SEG NFR BLD AUTO: 58 %
NEUTS SEG NFR BLD AUTO: 61 %
NEUTS SEG NFR BLD AUTO: 61 %
NEUTS SEG NFR BLD AUTO: 61 % (ref 45–65)
NEUTS SEG NFR BLD AUTO: 66 %
NEUTS SEG NFR BLD AUTO: 66 % (ref 43–75)
NEUTS SEG NFR BLD AUTO: 67 % (ref 45–65)
NEUTS SEG NFR BLD AUTO: 74 % (ref 45–65)
NEUTS SEG NFR BLD AUTO: 76 %
NEUTS SEG NFR BLD AUTO: 76 %
NEUTS SEG NFR BLD AUTO: 78 % (ref 45–65)
NEUTS SEG NFR BLD AUTO: 83 %
NEUTS SEG NFR BLD AUTO: 85 % (ref 45–65)
NEUTS SEG NFR BLD AUTO: 89 % (ref 45–65)
NEUTS SEG NFR BLD AUTO: 91 %
NITRITE UR QL STRIP: NEGATIVE
NORMETANEPHRINE 24H UR-MCNC: 639 UG/L
NORMETANEPHRINE 24H UR-MRATE: 1038 UG/24 HR (ref 156–729)
NRBC BLD AUTO-RTO: 0 /100 WBCS
NRBC BLD AUTO-RTO: 1 /100 WBC (ref 0–2)
NT-PROBNP SERPL-MCNC: 105 PG/ML
P AXIS: 40 DEGREES
PH UR STRIP.AUTO: 5.5 [PH]
PHOSPHATE SERPL-MCNC: 3.6 MG/DL (ref 2.5–4.8)
PHOSPHATE SERPL-MCNC: 3.7 MG/DL (ref 2.5–4.8)
PHOSPHATE SERPL-MCNC: 3.7 MG/DL (ref 2.5–4.8)
PLATELET # BLD AUTO: 227 THOUSANDS/UL (ref 150–450)
PLATELET # BLD AUTO: 240 THOUSANDS/UL (ref 150–450)
PLATELET # BLD AUTO: 256 THOUSANDS/UL (ref 150–450)
PLATELET # BLD AUTO: 278 THOUSANDS/UL (ref 150–450)
PLATELET # BLD AUTO: 286 THOUSANDS/UL (ref 150–450)
PLATELET # BLD AUTO: 302 THOUSANDS/UL (ref 150–450)
PLATELET # BLD AUTO: 309 THOUSANDS/UL (ref 150–450)
PLATELET # BLD AUTO: 321 THOUSANDS/UL (ref 150–450)
PLATELET # BLD AUTO: 322 THOUSANDS/UL (ref 149–390)
PLATELET # BLD AUTO: 324 THOUSANDS/UL (ref 150–450)
PLATELET # BLD AUTO: 327 THOUSANDS/UL (ref 150–450)
PLATELET # BLD AUTO: 330 THOUSANDS/UL (ref 150–450)
PLATELET # BLD AUTO: 332 THOUSANDS/UL (ref 149–390)
PLATELET # BLD AUTO: 334 THOUSANDS/UL (ref 149–390)
PLATELET # BLD AUTO: 335 THOUSANDS/UL (ref 150–450)
PLATELET # BLD AUTO: 343 THOUSANDS/UL (ref 150–450)
PLATELET # BLD AUTO: 348 THOUSANDS/UL (ref 150–450)
PLATELET # BLD AUTO: 352 THOUSANDS/UL (ref 149–390)
PLATELET # BLD AUTO: 398 THOUSANDS/UL (ref 150–450)
PLATELET BLD QL SMEAR: ADEQUATE
PMV BLD AUTO: 7.3 FL (ref 8.9–12.7)
PMV BLD AUTO: 7.4 FL (ref 8.9–12.7)
PMV BLD AUTO: 7.6 FL (ref 8.9–12.7)
PMV BLD AUTO: 7.6 FL (ref 8.9–12.7)
PMV BLD AUTO: 7.7 FL (ref 8.9–12.7)
PMV BLD AUTO: 7.9 FL (ref 8.9–12.7)
PMV BLD AUTO: 7.9 FL (ref 8.9–12.7)
PMV BLD AUTO: 8 FL (ref 8.9–12.7)
PMV BLD AUTO: 8.3 FL (ref 8.9–12.7)
PMV BLD AUTO: 8.4 FL (ref 8.9–12.7)
PMV BLD AUTO: 8.6 FL (ref 8.9–12.7)
PMV BLD AUTO: 8.6 FL (ref 8.9–12.7)
PMV BLD AUTO: 9.3 FL (ref 8.9–12.7)
PMV BLD AUTO: 9.5 FL (ref 8.9–12.7)
PMV BLD AUTO: 9.6 FL (ref 8.9–12.7)
PMV BLD AUTO: 9.9 FL (ref 8.9–12.7)
POIKILOCYTOSIS BLD QL SMEAR: PRESENT
POIKILOCYTOSIS BLD QL SMEAR: PRESENT
POTASSIUM SERPL-SCNC: 4.1 MMOL/L (ref 3.5–5.3)
POTASSIUM SERPL-SCNC: 4.2 MMOL/L (ref 3.5–5.3)
POTASSIUM SERPL-SCNC: 4.4 MMOL/L (ref 3.6–5)
POTASSIUM SERPL-SCNC: 4.6 MMOL/L (ref 3.5–5.3)
POTASSIUM SERPL-SCNC: 4.7 MMOL/L (ref 3.6–5)
POTASSIUM SERPL-SCNC: 4.7 MMOL/L (ref 3.6–5)
POTASSIUM SERPL-SCNC: 4.8 MMOL/L (ref 3.6–5)
POTASSIUM SERPL-SCNC: 4.9 MMOL/L (ref 3.5–5.3)
POTASSIUM SERPL-SCNC: 4.9 MMOL/L (ref 3.6–5)
POTASSIUM SERPL-SCNC: 4.9 MMOL/L (ref 3.6–5)
POTASSIUM SERPL-SCNC: 5 MMOL/L (ref 3.5–5.3)
POTASSIUM SERPL-SCNC: 5 MMOL/L (ref 3.6–5)
POTASSIUM SERPL-SCNC: 5 MMOL/L (ref 3.6–5)
POTASSIUM SERPL-SCNC: 5.1 MMOL/L (ref 3.6–5)
POTASSIUM SERPL-SCNC: 5.1 MMOL/L (ref 3.6–5)
POTASSIUM SERPL-SCNC: 5.2 MMOL/L (ref 3.6–5)
POTASSIUM SERPL-SCNC: 5.3 MMOL/L (ref 3.6–5)
POTASSIUM SERPL-SCNC: 5.9 MMOL/L (ref 3.6–5)
PR INTERVAL: 144 MS
PROCALCITONIN SERPL-MCNC: 0.05 NG/ML
PROCALCITONIN SERPL-MCNC: 0.06 NG/ML
PROCALCITONIN SERPL-MCNC: 0.1 NG/ML
PROCALCITONIN SERPL-MCNC: 0.11 NG/ML
PROT SERPL-MCNC: 6.1 G/DL (ref 5.9–8.4)
PROT SERPL-MCNC: 6.8 G/DL (ref 5.9–8.4)
PROT SERPL-MCNC: 6.8 G/DL (ref 5.9–8.4)
PROT SERPL-MCNC: 6.9 G/DL (ref 5.9–8.4)
PROT SERPL-MCNC: 6.9 G/DL (ref 5.9–8.4)
PROT SERPL-MCNC: 7.1 G/DL (ref 5.9–8.4)
PROT SERPL-MCNC: 7.1 G/DL (ref 5.9–8.4)
PROT SERPL-MCNC: 7.2 G/DL (ref 5.9–8.4)
PROT SERPL-MCNC: 7.3 G/DL (ref 5.9–8.4)
PROT SERPL-MCNC: 7.3 G/DL (ref 6.4–8.2)
PROT SERPL-MCNC: 7.5 G/DL (ref 5.9–8.4)
PROT SERPL-MCNC: 7.6 G/DL (ref 6.4–8.2)
PROT SERPL-MCNC: 7.7 G/DL (ref 5.9–8.4)
PROT SERPL-MCNC: 7.7 G/DL (ref 6.4–8.2)
PROT SERPL-MCNC: 7.8 G/DL (ref 5.9–8.4)
PROT SERPL-MCNC: 8.1 G/DL (ref 5.9–8.4)
PROT UR STRIP-MCNC: NEGATIVE MG/DL
PROT UR-MCNC: 29 MG/DL
PROT/CREAT UR: 0.09 MG/G{CREAT} (ref 0–0.1)
PROTHROMBIN TIME: 14.9 SECONDS (ref 11.6–14.5)
PROTHROMBIN TIME: 15.3 SECONDS (ref 11.6–14.5)
PTH-INTACT SERPL-MCNC: 124.4 PG/ML (ref 16.7–78.9)
PTH-INTACT SERPL-MCNC: 245.4 PG/ML (ref 16.7–78.9)
QRS AXIS: 69 DEGREES
QRS AXIS: 72 DEGREES
QRSD INTERVAL: 70 MS
QRSD INTERVAL: 72 MS
QT INTERVAL: 318 MS
QT INTERVAL: 336 MS
QTC INTERVAL: 402 MS
QTC INTERVAL: 414 MS
RBC # BLD AUTO: 3.66 MILLION/UL (ref 4.5–5.9)
RBC # BLD AUTO: 3.78 MILLION/UL (ref 4.5–5.9)
RBC # BLD AUTO: 3.9 MILLION/UL (ref 4.5–5.9)
RBC # BLD AUTO: 3.93 MILLION/UL (ref 4.5–5.9)
RBC # BLD AUTO: 3.98 MILLION/UL (ref 4.5–5.9)
RBC # BLD AUTO: 3.98 MILLION/UL (ref 4.5–5.9)
RBC # BLD AUTO: 4.02 MILLION/UL (ref 4.5–5.9)
RBC # BLD AUTO: 4.03 MILLION/UL (ref 4.5–5.9)
RBC # BLD AUTO: 4.1 MILLION/UL (ref 4.5–5.9)
RBC # BLD AUTO: 4.12 MILLION/UL (ref 4.5–5.9)
RBC # BLD AUTO: 4.18 MILLION/UL (ref 3.88–5.62)
RBC # BLD AUTO: 4.2 MILLION/UL (ref 3.88–5.62)
RBC # BLD AUTO: 4.21 MILLION/UL (ref 3.88–5.62)
RBC # BLD AUTO: 4.22 MILLION/UL (ref 4.5–5.9)
RBC # BLD AUTO: 4.22 MILLION/UL (ref 4.5–5.9)
RBC # BLD AUTO: 4.27 MILLION/UL (ref 3.88–5.62)
RBC # BLD AUTO: 4.31 MILLION/UL (ref 4.5–5.9)
RBC # BLD AUTO: 4.39 MILLION/UL (ref 4.5–5.9)
RBC # BLD AUTO: 4.49 MILLION/UL (ref 4.5–5.9)
RBC MORPH BLD: ABNORMAL
RBC MORPH BLD: NORMAL
RBC MORPH BLD: PRESENT
RSV RNA RESP QL NAA+PROBE: NEGATIVE
S PNEUM AG UR QL: NEGATIVE
SARS-COV-2 RNA RESP QL NAA+PROBE: NEGATIVE
SARS-COV-2 RNA RESP QL NAA+PROBE: NEGATIVE
SEROTONIN PLAS-MCNC: 161 NG/ML (ref 21–321)
SL AMB POCT HEMOGLOBIN AIC: 6.2 (ref ?–6.5)
SL AMB POCT HEMOGLOBIN AIC: 6.4 (ref ?–6.5)
SODIUM SERPL-SCNC: 134 MMOL/L (ref 137–147)
SODIUM SERPL-SCNC: 136 MMOL/L (ref 137–147)
SODIUM SERPL-SCNC: 138 MMOL/L (ref 136–145)
SODIUM SERPL-SCNC: 138 MMOL/L (ref 136–145)
SODIUM SERPL-SCNC: 138 MMOL/L (ref 137–147)
SODIUM SERPL-SCNC: 139 MMOL/L (ref 137–147)
SODIUM SERPL-SCNC: 139 MMOL/L (ref 137–147)
SODIUM SERPL-SCNC: 140 MMOL/L (ref 136–145)
SODIUM SERPL-SCNC: 140 MMOL/L (ref 137–147)
SODIUM SERPL-SCNC: 141 MMOL/L (ref 136–145)
SODIUM SERPL-SCNC: 141 MMOL/L (ref 136–145)
SODIUM SERPL-SCNC: 141 MMOL/L (ref 137–147)
SODIUM SERPL-SCNC: 142 MMOL/L (ref 137–147)
SP GR UR STRIP.AUTO: <=1.005 (ref 1–1.03)
T WAVE AXIS: 33 DEGREES
T WAVE AXIS: 64 DEGREES
T3FREE SERPL-MCNC: 2.14 PG/ML (ref 2.3–4.2)
T3FREE SERPL-MCNC: 2.18 PG/ML (ref 2.3–4.2)
T3FREE SERPL-MCNC: 2.42 PG/ML (ref 2.3–4.2)
T3FREE SERPL-MCNC: 2.5 PG/ML (ref 2.3–4.2)
T3FREE SERPL-MCNC: 2.57 PG/ML (ref 2.3–4.2)
T3FREE SERPL-MCNC: 2.59 PG/ML (ref 2.3–4.2)
T3FREE SERPL-MCNC: 2.62 PG/ML (ref 2.3–4.2)
T3FREE SERPL-MCNC: 2.67 PG/ML (ref 2.3–4.2)
T3FREE SERPL-MCNC: 3.04 PG/ML (ref 2.3–4.2)
T3FREE SERPL-MCNC: 3.07 PG/ML (ref 2.3–4.2)
TESTOST SERPL-MCNC: 98 NG/DL (ref 95–948)
TIBC SERPL-MCNC: 247 UG/DL (ref 250–450)
TOTAL CELLS COUNTED SPEC: 100
TROPONIN I SERPL-MCNC: 0.05 NG/ML
TROPONIN I SERPL-MCNC: <0.01 NG/ML (ref 0–0.03)
TSH SERPL DL<=0.05 MIU/L-ACNC: 0.79 UIU/ML (ref 0.47–4.68)
TSH SERPL DL<=0.05 MIU/L-ACNC: 0.85 UIU/ML (ref 0.47–4.68)
TSH SERPL DL<=0.05 MIU/L-ACNC: 0.97 UIU/ML (ref 0.47–4.68)
TSH SERPL DL<=0.05 MIU/L-ACNC: 1.05 UIU/ML (ref 0.47–4.68)
TSH SERPL DL<=0.05 MIU/L-ACNC: 1.11 UIU/ML (ref 0.47–4.68)
TSH SERPL DL<=0.05 MIU/L-ACNC: 1.29 UIU/ML (ref 0.47–4.68)
TSH SERPL DL<=0.05 MIU/L-ACNC: 1.33 UIU/ML (ref 0.47–4.68)
TSH SERPL DL<=0.05 MIU/L-ACNC: 1.35 UIU/ML (ref 0.47–4.68)
TSH SERPL DL<=0.05 MIU/L-ACNC: 1.64 UIU/ML (ref 0.47–4.68)
TSH SERPL DL<=0.05 MIU/L-ACNC: 2.89 UIU/ML (ref 0.47–4.68)
TSH SERPL DL<=0.05 MIU/L-ACNC: 3.17 UIU/ML (ref 0.47–4.68)
UROBILINOGEN UR QL STRIP.AUTO: 0.2 E.U./DL
VENTRICULAR RATE: 102 BPM
VENTRICULAR RATE: 86 BPM
VIT B12 SERPL-MCNC: 520 PG/ML (ref 100–900)
WBC # BLD AUTO: 10 THOUSAND/UL (ref 4.5–11)
WBC # BLD AUTO: 10.3 THOUSAND/UL (ref 4.5–11)
WBC # BLD AUTO: 10.5 THOUSAND/UL (ref 4.5–11)
WBC # BLD AUTO: 10.7 THOUSAND/UL (ref 4.5–11)
WBC # BLD AUTO: 10.9 THOUSAND/UL (ref 4.5–11)
WBC # BLD AUTO: 11.08 THOUSAND/UL (ref 4.31–10.16)
WBC # BLD AUTO: 12.42 THOUSAND/UL (ref 4.31–10.16)
WBC # BLD AUTO: 15.9 THOUSAND/UL (ref 4.5–11)
WBC # BLD AUTO: 7.3 THOUSAND/UL (ref 4.5–11)
WBC # BLD AUTO: 7.8 THOUSAND/UL (ref 4.5–11)
WBC # BLD AUTO: 8.4 THOUSAND/UL (ref 4.5–11)
WBC # BLD AUTO: 8.5 THOUSAND/UL (ref 4.5–11)
WBC # BLD AUTO: 8.5 THOUSAND/UL (ref 4.5–11)
WBC # BLD AUTO: 8.53 THOUSAND/UL (ref 4.31–10.16)
WBC # BLD AUTO: 8.6 THOUSAND/UL (ref 4.5–11)
WBC # BLD AUTO: 9.5 THOUSAND/UL (ref 4.5–11)
WBC # BLD AUTO: 9.57 THOUSAND/UL (ref 4.31–10.16)
WBC # BLD AUTO: 9.7 THOUSAND/UL (ref 4.5–11)
WBC # BLD AUTO: 9.8 THOUSAND/UL (ref 4.5–11)

## 2021-01-01 PROCEDURE — 3077F SYST BP >= 140 MM HG: CPT | Performed by: FAMILY MEDICINE

## 2021-01-01 PROCEDURE — 96361 HYDRATE IV INFUSION ADD-ON: CPT

## 2021-01-01 PROCEDURE — 80053 COMPREHEN METABOLIC PANEL: CPT | Performed by: INTERNAL MEDICINE

## 2021-01-01 PROCEDURE — 99232 SBSQ HOSP IP/OBS MODERATE 35: CPT | Performed by: INTERNAL MEDICINE

## 2021-01-01 PROCEDURE — 83690 ASSAY OF LIPASE: CPT | Performed by: EMERGENCY MEDICINE

## 2021-01-01 PROCEDURE — 99239 HOSP IP/OBS DSCHRG MGMT >30: CPT | Performed by: INTERNAL MEDICINE

## 2021-01-01 PROCEDURE — 99214 OFFICE O/P EST MOD 30 MIN: CPT | Performed by: FAMILY MEDICINE

## 2021-01-01 PROCEDURE — 85027 COMPLETE CBC AUTOMATED: CPT | Performed by: INTERNAL MEDICINE

## 2021-01-01 PROCEDURE — 99214 OFFICE O/P EST MOD 30 MIN: CPT | Performed by: INTERNAL MEDICINE

## 2021-01-01 PROCEDURE — 93005 ELECTROCARDIOGRAM TRACING: CPT

## 2021-01-01 PROCEDURE — 84145 PROCALCITONIN (PCT): CPT | Performed by: EMERGENCY MEDICINE

## 2021-01-01 PROCEDURE — 93010 ELECTROCARDIOGRAM REPORT: CPT | Performed by: INTERNAL MEDICINE

## 2021-01-01 PROCEDURE — 85027 COMPLETE CBC AUTOMATED: CPT

## 2021-01-01 PROCEDURE — 94760 N-INVAS EAR/PLS OXIMETRY 1: CPT

## 2021-01-01 PROCEDURE — 85007 BL SMEAR W/DIFF WBC COUNT: CPT | Performed by: INTERNAL MEDICINE

## 2021-01-01 PROCEDURE — 87205 SMEAR GRAM STAIN: CPT | Performed by: INTERNAL MEDICINE

## 2021-01-01 PROCEDURE — 94640 AIRWAY INHALATION TREATMENT: CPT

## 2021-01-01 PROCEDURE — 85025 COMPLETE CBC W/AUTO DIFF WBC: CPT

## 2021-01-01 PROCEDURE — 84443 ASSAY THYROID STIM HORMONE: CPT | Performed by: INTERNAL MEDICINE

## 2021-01-01 PROCEDURE — 99232 SBSQ HOSP IP/OBS MODERATE 35: CPT | Performed by: NURSE PRACTITIONER

## 2021-01-01 PROCEDURE — 85025 COMPLETE CBC W/AUTO DIFF WBC: CPT | Performed by: EMERGENCY MEDICINE

## 2021-01-01 PROCEDURE — 82948 REAGENT STRIP/BLOOD GLUCOSE: CPT

## 2021-01-01 PROCEDURE — 80053 COMPREHEN METABOLIC PANEL: CPT

## 2021-01-01 PROCEDURE — NC001 PR NO CHARGE

## 2021-01-01 PROCEDURE — 85652 RBC SED RATE AUTOMATED: CPT

## 2021-01-01 PROCEDURE — 83835 ASSAY OF METANEPHRINES: CPT

## 2021-01-01 PROCEDURE — 84484 ASSAY OF TROPONIN QUANT: CPT | Performed by: PHYSICIAN ASSISTANT

## 2021-01-01 PROCEDURE — 78815 PET IMAGE W/CT SKULL-THIGH: CPT

## 2021-01-01 PROCEDURE — 3078F DIAST BP <80 MM HG: CPT | Performed by: FAMILY MEDICINE

## 2021-01-01 PROCEDURE — 82553 CREATINE MB FRACTION: CPT

## 2021-01-01 PROCEDURE — 83615 LACTATE (LD) (LDH) ENZYME: CPT

## 2021-01-01 PROCEDURE — 99285 EMERGENCY DEPT VISIT HI MDM: CPT

## 2021-01-01 PROCEDURE — 84100 ASSAY OF PHOSPHORUS: CPT

## 2021-01-01 PROCEDURE — 82550 ASSAY OF CK (CPK): CPT

## 2021-01-01 PROCEDURE — 84484 ASSAY OF TROPONIN QUANT: CPT | Performed by: EMERGENCY MEDICINE

## 2021-01-01 PROCEDURE — 36415 COLL VENOUS BLD VENIPUNCTURE: CPT | Performed by: PHYSICIAN ASSISTANT

## 2021-01-01 PROCEDURE — 87070 CULTURE OTHR SPECIMN AEROBIC: CPT | Performed by: INTERNAL MEDICINE

## 2021-01-01 PROCEDURE — 80069 RENAL FUNCTION PANEL: CPT

## 2021-01-01 PROCEDURE — 83036 HEMOGLOBIN GLYCOSYLATED A1C: CPT | Performed by: FAMILY MEDICINE

## 2021-01-01 PROCEDURE — 36593 DECLOT VASCULAR DEVICE: CPT

## 2021-01-01 PROCEDURE — 80076 HEPATIC FUNCTION PANEL: CPT | Performed by: EMERGENCY MEDICINE

## 2021-01-01 PROCEDURE — 96366 THER/PROPH/DIAG IV INF ADDON: CPT

## 2021-01-01 PROCEDURE — 96413 CHEMO IV INFUSION 1 HR: CPT

## 2021-01-01 PROCEDURE — 86140 C-REACTIVE PROTEIN: CPT

## 2021-01-01 PROCEDURE — 83735 ASSAY OF MAGNESIUM: CPT

## 2021-01-01 PROCEDURE — 86316 IMMUNOASSAY TUMOR OTHER: CPT

## 2021-01-01 PROCEDURE — 85025 COMPLETE CBC W/AUTO DIFF WBC: CPT | Performed by: INTERNAL MEDICINE

## 2021-01-01 PROCEDURE — 96376 TX/PRO/DX INJ SAME DRUG ADON: CPT

## 2021-01-01 PROCEDURE — 36415 COLL VENOUS BLD VENIPUNCTURE: CPT | Performed by: EMERGENCY MEDICINE

## 2021-01-01 PROCEDURE — 99213 OFFICE O/P EST LOW 20 MIN: CPT | Performed by: FAMILY MEDICINE

## 2021-01-01 PROCEDURE — 86200 CCP ANTIBODY: CPT

## 2021-01-01 PROCEDURE — 84484 ASSAY OF TROPONIN QUANT: CPT | Performed by: NURSE PRACTITIONER

## 2021-01-01 PROCEDURE — 83970 ASSAY OF PARATHORMONE: CPT

## 2021-01-01 PROCEDURE — 84403 ASSAY OF TOTAL TESTOSTERONE: CPT | Performed by: NURSE PRACTITIONER

## 2021-01-01 PROCEDURE — 84481 FREE ASSAY (FT-3): CPT | Performed by: INTERNAL MEDICINE

## 2021-01-01 PROCEDURE — A9552 F18 FDG: HCPCS

## 2021-01-01 PROCEDURE — 82553 CREATINE MB FRACTION: CPT | Performed by: INTERNAL MEDICINE

## 2021-01-01 PROCEDURE — 82085 ASSAY OF ALDOLASE: CPT

## 2021-01-01 PROCEDURE — 84100 ASSAY OF PHOSPHORUS: CPT | Performed by: INTERNAL MEDICINE

## 2021-01-01 PROCEDURE — 99215 OFFICE O/P EST HI 40 MIN: CPT | Performed by: NURSE PRACTITIONER

## 2021-01-01 PROCEDURE — 99214 OFFICE O/P EST MOD 30 MIN: CPT | Performed by: NURSE PRACTITIONER

## 2021-01-01 PROCEDURE — 85610 PROTHROMBIN TIME: CPT | Performed by: PHYSICIAN ASSISTANT

## 2021-01-01 PROCEDURE — 87040 BLOOD CULTURE FOR BACTERIA: CPT | Performed by: EMERGENCY MEDICINE

## 2021-01-01 PROCEDURE — 85025 COMPLETE CBC W/AUTO DIFF WBC: CPT | Performed by: PHYSICIAN ASSISTANT

## 2021-01-01 PROCEDURE — 80048 BASIC METABOLIC PNL TOTAL CA: CPT | Performed by: INTERNAL MEDICINE

## 2021-01-01 PROCEDURE — 84443 ASSAY THYROID STIM HORMONE: CPT

## 2021-01-01 PROCEDURE — 84260 ASSAY OF SEROTONIN: CPT

## 2021-01-01 PROCEDURE — 82550 ASSAY OF CK (CPK): CPT | Performed by: INTERNAL MEDICINE

## 2021-01-01 PROCEDURE — 82607 VITAMIN B-12: CPT

## 2021-01-01 PROCEDURE — 81003 URINALYSIS AUTO W/O SCOPE: CPT | Performed by: INTERNAL MEDICINE

## 2021-01-01 PROCEDURE — G1004 CDSM NDSC: HCPCS

## 2021-01-01 PROCEDURE — 82570 ASSAY OF URINE CREATININE: CPT

## 2021-01-01 PROCEDURE — 82746 ASSAY OF FOLIC ACID SERUM: CPT

## 2021-01-01 PROCEDURE — 84484 ASSAY OF TROPONIN QUANT: CPT | Performed by: INTERNAL MEDICINE

## 2021-01-01 PROCEDURE — 83540 ASSAY OF IRON: CPT

## 2021-01-01 PROCEDURE — 96360 HYDRATION IV INFUSION INIT: CPT

## 2021-01-01 PROCEDURE — 71275 CT ANGIOGRAPHY CHEST: CPT

## 2021-01-01 PROCEDURE — 85007 BL SMEAR W/DIFF WBC COUNT: CPT

## 2021-01-01 PROCEDURE — 3080F DIAST BP >= 90 MM HG: CPT | Performed by: FAMILY MEDICINE

## 2021-01-01 PROCEDURE — 85610 PROTHROMBIN TIME: CPT | Performed by: EMERGENCY MEDICINE

## 2021-01-01 PROCEDURE — 74177 CT ABD & PELVIS W/CONTRAST: CPT

## 2021-01-01 PROCEDURE — U0005 INFEC AGEN DETEC AMPLI PROBE: HCPCS | Performed by: PHYSICIAN ASSISTANT

## 2021-01-01 PROCEDURE — 71260 CT THORAX DX C+: CPT

## 2021-01-01 PROCEDURE — 87449 NOS EACH ORGANISM AG IA: CPT | Performed by: INTERNAL MEDICINE

## 2021-01-01 PROCEDURE — 85730 THROMBOPLASTIN TIME PARTIAL: CPT | Performed by: PHYSICIAN ASSISTANT

## 2021-01-01 PROCEDURE — 82306 VITAMIN D 25 HYDROXY: CPT

## 2021-01-01 PROCEDURE — 99284 EMERGENCY DEPT VISIT MOD MDM: CPT | Performed by: PHYSICIAN ASSISTANT

## 2021-01-01 PROCEDURE — 72148 MRI LUMBAR SPINE W/O DYE: CPT

## 2021-01-01 PROCEDURE — 96375 TX/PRO/DX INJ NEW DRUG ADDON: CPT

## 2021-01-01 PROCEDURE — 99254 IP/OBS CNSLTJ NEW/EST MOD 60: CPT | Performed by: PHYSICIAN ASSISTANT

## 2021-01-01 PROCEDURE — 82728 ASSAY OF FERRITIN: CPT

## 2021-01-01 PROCEDURE — 80048 BASIC METABOLIC PNL TOTAL CA: CPT | Performed by: EMERGENCY MEDICINE

## 2021-01-01 PROCEDURE — 99232 SBSQ HOSP IP/OBS MODERATE 35: CPT | Performed by: PHYSICIAN ASSISTANT

## 2021-01-01 PROCEDURE — 83970 ASSAY OF PARATHORMONE: CPT | Performed by: INTERNAL MEDICINE

## 2021-01-01 PROCEDURE — 83550 IRON BINDING TEST: CPT

## 2021-01-01 PROCEDURE — 84156 ASSAY OF PROTEIN URINE: CPT

## 2021-01-01 PROCEDURE — 96365 THER/PROPH/DIAG IV INF INIT: CPT

## 2021-01-01 PROCEDURE — 84145 PROCALCITONIN (PCT): CPT | Performed by: INTERNAL MEDICINE

## 2021-01-01 PROCEDURE — U0003 INFECTIOUS AGENT DETECTION BY NUCLEIC ACID (DNA OR RNA); SEVERE ACUTE RESPIRATORY SYNDROME CORONAVIRUS 2 (SARS-COV-2) (CORONAVIRUS DISEASE [COVID-19]), AMPLIFIED PROBE TECHNIQUE, MAKING USE OF HIGH THROUGHPUT TECHNOLOGIES AS DESCRIBED BY CMS-2020-01-R: HCPCS | Performed by: PHYSICIAN ASSISTANT

## 2021-01-01 PROCEDURE — 99285 EMERGENCY DEPT VISIT HI MDM: CPT | Performed by: EMERGENCY MEDICINE

## 2021-01-01 PROCEDURE — 83605 ASSAY OF LACTIC ACID: CPT | Performed by: EMERGENCY MEDICINE

## 2021-01-01 PROCEDURE — 83880 ASSAY OF NATRIURETIC PEPTIDE: CPT | Performed by: PHYSICIAN ASSISTANT

## 2021-01-01 PROCEDURE — 82306 VITAMIN D 25 HYDROXY: CPT | Performed by: INTERNAL MEDICINE

## 2021-01-01 PROCEDURE — 0241U HB NFCT DS VIR RESP RNA 4 TRGT: CPT | Performed by: EMERGENCY MEDICINE

## 2021-01-01 PROCEDURE — 84484 ASSAY OF TROPONIN QUANT: CPT

## 2021-01-01 PROCEDURE — 99223 1ST HOSP IP/OBS HIGH 75: CPT | Performed by: INTERNAL MEDICINE

## 2021-01-01 PROCEDURE — 85730 THROMBOPLASTIN TIME PARTIAL: CPT | Performed by: EMERGENCY MEDICINE

## 2021-01-01 PROCEDURE — 80053 COMPREHEN METABOLIC PANEL: CPT | Performed by: PHYSICIAN ASSISTANT

## 2021-01-01 PROCEDURE — 97163 PT EVAL HIGH COMPLEX 45 MIN: CPT | Performed by: PHYSICAL THERAPIST

## 2021-01-01 PROCEDURE — 99231 SBSQ HOSP IP/OBS SF/LOW 25: CPT | Performed by: PHYSICIAN ASSISTANT

## 2021-01-01 PROCEDURE — 99255 IP/OBS CONSLTJ NEW/EST HI 80: CPT | Performed by: INTERNAL MEDICINE

## 2021-01-01 RX ORDER — PREGABALIN 25 MG/1
25 CAPSULE ORAL EVERY MORNING
Status: DISCONTINUED | OUTPATIENT
Start: 2021-01-01 | End: 2021-01-01 | Stop reason: HOSPADM

## 2021-01-01 RX ORDER — LIDOCAINE 40 MG/G
CREAM TOPICAL DAILY PRN
Status: CANCELLED
Start: 2021-01-01

## 2021-01-01 RX ORDER — OXYCODONE HCL 20 MG/1
20 TABLET, FILM COATED, EXTENDED RELEASE ORAL EVERY 12 HOURS SCHEDULED
Qty: 60 TABLET | Refills: 0 | Status: SHIPPED | OUTPATIENT
Start: 2021-01-01 | End: 2021-01-01

## 2021-01-01 RX ORDER — LIDOCAINE 40 MG/G
CREAM TOPICAL DAILY PRN
Status: DISCONTINUED | OUTPATIENT
Start: 2021-01-01 | End: 2021-01-01 | Stop reason: HOSPADM

## 2021-01-01 RX ORDER — OXYCODONE HYDROCHLORIDE 30 MG/1
30 TABLET ORAL EVERY 4 HOURS PRN
Qty: 150 TABLET | Refills: 0 | Status: SHIPPED | OUTPATIENT
Start: 2021-01-01 | End: 2021-01-01 | Stop reason: SDUPTHER

## 2021-01-01 RX ORDER — LEVOFLOXACIN 500 MG/1
500 TABLET, FILM COATED ORAL EVERY 24 HOURS
Qty: 7 TABLET | Refills: 0 | Status: SHIPPED | OUTPATIENT
Start: 2021-01-01 | End: 2021-01-01

## 2021-01-01 RX ORDER — PREGABALIN 50 MG/1
50 CAPSULE ORAL
Status: DISCONTINUED | OUTPATIENT
Start: 2021-01-01 | End: 2021-01-01 | Stop reason: HOSPADM

## 2021-01-01 RX ORDER — DIVALPROEX SODIUM 250 MG/1
250 TABLET, DELAYED RELEASE ORAL 2 TIMES DAILY
Status: DISCONTINUED | OUTPATIENT
Start: 2021-01-01 | End: 2021-01-01 | Stop reason: HOSPADM

## 2021-01-01 RX ORDER — OLOPATADINE HYDROCHLORIDE 1 MG/ML
1 SOLUTION/ DROPS OPHTHALMIC 2 TIMES DAILY
Qty: 5 ML | Refills: 3 | Status: SHIPPED | OUTPATIENT
Start: 2021-01-01

## 2021-01-01 RX ORDER — LIDOCAINE 40 MG/G
1 CREAM TOPICAL DAILY PRN
Status: DISCONTINUED | OUTPATIENT
Start: 2021-01-01 | End: 2021-01-01 | Stop reason: HOSPADM

## 2021-01-01 RX ORDER — INSULIN GLARGINE 100 [IU]/ML
8 INJECTION, SOLUTION SUBCUTANEOUS
Status: DISCONTINUED | OUTPATIENT
Start: 2021-01-01 | End: 2021-01-01

## 2021-01-01 RX ORDER — OMEPRAZOLE 20 MG/1
TABLET, DELAYED RELEASE ORAL
COMMUNITY

## 2021-01-01 RX ORDER — OXYCODONE HYDROCHLORIDE 30 MG/1
30 TABLET ORAL EVERY 4 HOURS PRN
Qty: 120 TABLET | Refills: 0 | Status: SHIPPED | OUTPATIENT
Start: 2021-01-01 | End: 2021-01-01 | Stop reason: HOSPADM

## 2021-01-01 RX ORDER — OXYCODONE HYDROCHLORIDE 30 MG/1
30 TABLET ORAL EVERY 4 HOURS PRN
Qty: 150 TABLET | Refills: 0 | Status: SHIPPED | OUTPATIENT
Start: 2021-01-01 | End: 2021-01-01

## 2021-01-01 RX ORDER — SODIUM CHLORIDE 9 MG/ML
20 INJECTION, SOLUTION INTRAVENOUS ONCE
Status: COMPLETED | OUTPATIENT
Start: 2021-01-01 | End: 2021-01-01

## 2021-01-01 RX ORDER — DILTIAZEM HYDROCHLORIDE 120 MG/1
120 CAPSULE, COATED, EXTENDED RELEASE ORAL DAILY
Qty: 90 CAPSULE | Refills: 3 | Status: SHIPPED | OUTPATIENT
Start: 2021-01-01

## 2021-01-01 RX ORDER — DILTIAZEM HYDROCHLORIDE 120 MG/1
120 CAPSULE, COATED, EXTENDED RELEASE ORAL DAILY
Qty: 90 CAPSULE | Refills: 3 | Status: SHIPPED | OUTPATIENT
Start: 2021-01-01 | End: 2021-01-01 | Stop reason: SDUPTHER

## 2021-01-01 RX ORDER — FLUOXETINE 10 MG/1
10 CAPSULE ORAL DAILY
Status: DISCONTINUED | OUTPATIENT
Start: 2021-01-01 | End: 2021-01-01 | Stop reason: HOSPADM

## 2021-01-01 RX ORDER — PREDNISONE 10 MG/1
10 TABLET ORAL DAILY
Qty: 30 TABLET | Refills: 4 | Status: SHIPPED | OUTPATIENT
Start: 2021-01-01 | End: 2021-01-01

## 2021-01-01 RX ORDER — SODIUM CHLORIDE FOR INHALATION 0.9 %
3 VIAL, NEBULIZER (ML) INHALATION ONCE
Status: COMPLETED | OUTPATIENT
Start: 2021-01-01 | End: 2021-01-01

## 2021-01-01 RX ORDER — ALBUTEROL SULFATE 2.5 MG/3ML
2.5 SOLUTION RESPIRATORY (INHALATION) EVERY 6 HOURS PRN
Qty: 360 ML | Refills: 5 | Status: SHIPPED | OUTPATIENT
Start: 2021-01-01

## 2021-01-01 RX ORDER — FOLIC ACID 1 MG/1
TABLET ORAL
Qty: 90 TABLET | Refills: 2 | Status: SHIPPED | OUTPATIENT
Start: 2021-01-01 | End: 2021-01-01

## 2021-01-01 RX ORDER — FLUTICASONE PROPIONATE 50 MCG
SPRAY, SUSPENSION (ML) NASAL
Qty: 16 G | Refills: 3 | Status: SHIPPED | OUTPATIENT
Start: 2021-01-01 | End: 2021-01-01 | Stop reason: SDUPTHER

## 2021-01-01 RX ORDER — SODIUM CHLORIDE 9 MG/ML
20 INJECTION, SOLUTION INTRAVENOUS ONCE
Status: CANCELLED | OUTPATIENT
Start: 2021-01-01

## 2021-01-01 RX ORDER — PREDNISONE 20 MG/1
20 TABLET ORAL DAILY
Status: DISCONTINUED | OUTPATIENT
Start: 2021-01-01 | End: 2021-01-01 | Stop reason: HOSPADM

## 2021-01-01 RX ORDER — PREDNISONE 1 MG/1
5 TABLET ORAL DAILY
Qty: 30 TABLET | Refills: 0 | Status: SHIPPED | OUTPATIENT
Start: 2021-01-01 | End: 2021-01-01

## 2021-01-01 RX ORDER — LANOLIN ALCOHOL/MO/W.PET/CERES
3 CREAM (GRAM) TOPICAL
Qty: 30 TABLET | Refills: 1 | Status: SHIPPED | OUTPATIENT
Start: 2021-01-01 | End: 2021-01-01 | Stop reason: SDUPTHER

## 2021-01-01 RX ORDER — ALBUTEROL SULFATE 90 UG/1
2 AEROSOL, METERED RESPIRATORY (INHALATION) EVERY 4 HOURS PRN
Qty: 18 G | Refills: 5 | Status: SHIPPED | OUTPATIENT
Start: 2021-01-01

## 2021-01-01 RX ORDER — OXYCODONE HYDROCHLORIDE 30 MG/1
30 TABLET ORAL EVERY 4 HOURS PRN
Qty: 120 TABLET | Refills: 0 | Status: SHIPPED | OUTPATIENT
Start: 2021-01-01 | End: 2021-01-01 | Stop reason: SDUPTHER

## 2021-01-01 RX ORDER — LIDOCAINE 50 MG/G
1 PATCH TOPICAL DAILY
Status: DISCONTINUED | OUTPATIENT
Start: 2021-01-01 | End: 2021-01-01 | Stop reason: HOSPADM

## 2021-01-01 RX ORDER — OXYCODONE HCL 20 MG/1
20 TABLET, FILM COATED, EXTENDED RELEASE ORAL EVERY 12 HOURS SCHEDULED
Qty: 60 TABLET | Refills: 0 | Status: SHIPPED | OUTPATIENT
Start: 2021-01-01 | End: 2021-01-01 | Stop reason: SDUPTHER

## 2021-01-01 RX ORDER — PREDNISONE 10 MG/1
10 TABLET ORAL DAILY
Status: DISCONTINUED | OUTPATIENT
Start: 2021-01-01 | End: 2021-01-01 | Stop reason: HOSPADM

## 2021-01-01 RX ORDER — HYDROCHLOROTHIAZIDE 50 MG/1
TABLET ORAL
COMMUNITY

## 2021-01-01 RX ORDER — GUAIFENESIN 600 MG
600 TABLET, EXTENDED RELEASE 12 HR ORAL EVERY 12 HOURS SCHEDULED
Status: DISCONTINUED | OUTPATIENT
Start: 2021-01-01 | End: 2021-01-01 | Stop reason: HOSPADM

## 2021-01-01 RX ORDER — LORATADINE 10 MG/1
10 TABLET ORAL DAILY
Qty: 90 TABLET | Refills: 3 | Status: SHIPPED | OUTPATIENT
Start: 2021-01-01

## 2021-01-01 RX ORDER — DILTIAZEM HYDROCHLORIDE 120 MG/1
120 CAPSULE, COATED, EXTENDED RELEASE ORAL DAILY
Status: DISCONTINUED | OUTPATIENT
Start: 2021-01-01 | End: 2021-01-01 | Stop reason: HOSPADM

## 2021-01-01 RX ORDER — TIZANIDINE 4 MG/1
4 TABLET ORAL 3 TIMES DAILY
Qty: 42 TABLET | Refills: 0 | Status: SHIPPED | OUTPATIENT
Start: 2021-01-01 | End: 2021-01-01

## 2021-01-01 RX ORDER — GABAPENTIN 100 MG/1
100 CAPSULE ORAL 2 TIMES DAILY
Qty: 60 CAPSULE | Refills: 1 | Status: SHIPPED | OUTPATIENT
Start: 2021-01-01

## 2021-01-01 RX ORDER — PREDNISONE 20 MG/1
20 TABLET ORAL DAILY
Qty: 5 TABLET | Refills: 0 | Status: SHIPPED | OUTPATIENT
Start: 2021-01-01 | End: 2021-01-01

## 2021-01-01 RX ORDER — OXYCODONE HYDROCHLORIDE 30 MG/1
30 TABLET ORAL EVERY 4 HOURS PRN
Qty: 150 TABLET | Refills: 0 | OUTPATIENT
Start: 2021-01-01

## 2021-01-01 RX ORDER — SODIUM CHLORIDE 0.65 %
AEROSOL, SPRAY (ML) NASAL
Qty: 44 ML | Refills: 0 | Status: SHIPPED | OUTPATIENT
Start: 2021-01-01 | End: 2021-01-01 | Stop reason: SDUPTHER

## 2021-01-01 RX ORDER — SENNA AND DOCUSATE SODIUM 50; 8.6 MG/1; MG/1
1 TABLET, FILM COATED ORAL 2 TIMES DAILY
Qty: 60 TABLET | Refills: 11 | Status: SHIPPED | OUTPATIENT
Start: 2021-01-01

## 2021-01-01 RX ORDER — FOLIC ACID 1 MG/1
1000 TABLET ORAL DAILY
Qty: 90 TABLET | Refills: 0 | Status: SHIPPED | OUTPATIENT
Start: 2021-01-01 | End: 2021-01-01

## 2021-01-01 RX ORDER — TIZANIDINE 2 MG/1
4 TABLET ORAL 3 TIMES DAILY
Status: DISCONTINUED | OUTPATIENT
Start: 2021-01-01 | End: 2021-01-01 | Stop reason: HOSPADM

## 2021-01-01 RX ORDER — PREDNISONE 1 MG/1
5 TABLET ORAL DAILY
Qty: 30 TABLET | Refills: 5 | Status: SHIPPED | OUTPATIENT
Start: 2021-01-01 | End: 2021-01-01

## 2021-01-01 RX ORDER — PANTOPRAZOLE SODIUM 40 MG/1
40 TABLET, DELAYED RELEASE ORAL
Status: DISCONTINUED | OUTPATIENT
Start: 2021-01-01 | End: 2021-01-01 | Stop reason: HOSPADM

## 2021-01-01 RX ORDER — AZITHROMYCIN 250 MG/1
TABLET, FILM COATED ORAL
Qty: 6 TABLET | Refills: 0 | Status: SHIPPED | OUTPATIENT
Start: 2021-01-01 | End: 2021-01-01

## 2021-01-01 RX ORDER — PSEUDOEPHEDRINE HCL 30 MG
TABLET ORAL
COMMUNITY
End: 2021-01-01

## 2021-01-01 RX ORDER — ALBUTEROL SULFATE 90 UG/1
2 AEROSOL, METERED RESPIRATORY (INHALATION) EVERY 4 HOURS PRN
Status: DISCONTINUED | OUTPATIENT
Start: 2021-01-01 | End: 2021-01-01 | Stop reason: HOSPADM

## 2021-01-01 RX ORDER — PREDNISONE 1 MG/1
TABLET ORAL
Qty: 30 TABLET | Refills: 0 | Status: SHIPPED | OUTPATIENT
Start: 2021-01-01 | End: 2021-01-01

## 2021-01-01 RX ORDER — OXYCODONE HCL 20 MG/1
20 TABLET, FILM COATED, EXTENDED RELEASE ORAL EVERY 12 HOURS SCHEDULED
Qty: 60 TABLET | Refills: 0 | Status: SHIPPED | OUTPATIENT
Start: 2021-01-01

## 2021-01-01 RX ORDER — OXYCODONE HCL 20 MG/1
20 TABLET, FILM COATED, EXTENDED RELEASE ORAL EVERY 12 HOURS SCHEDULED
Qty: 60 TABLET | Refills: 0 | Status: SHIPPED | OUTPATIENT
Start: 2021-01-01 | End: 2021-01-01 | Stop reason: HOSPADM

## 2021-01-01 RX ORDER — IPRATROPIUM BROMIDE AND ALBUTEROL SULFATE 2.5; .5 MG/3ML; MG/3ML
3 SOLUTION RESPIRATORY (INHALATION) 4 TIMES DAILY
Status: DISCONTINUED | OUTPATIENT
Start: 2021-01-01 | End: 2021-01-01

## 2021-01-01 RX ORDER — OXYCODONE 18 MG/1
1 CAPSULE, EXTENDED RELEASE ORAL 2 TIMES DAILY
Qty: 60 CAPSULE | Refills: 0 | Status: SHIPPED | OUTPATIENT
Start: 2021-01-01 | End: 2021-01-01

## 2021-01-01 RX ORDER — LANOLIN ALCOHOL/MO/W.PET/CERES
3 CREAM (GRAM) TOPICAL
Status: DISCONTINUED | OUTPATIENT
Start: 2021-01-01 | End: 2021-01-01 | Stop reason: HOSPADM

## 2021-01-01 RX ORDER — PREDNISONE 1 MG/1
TABLET ORAL
Qty: 30 TABLET | Refills: 0 | Status: SHIPPED | OUTPATIENT
Start: 2021-01-01 | End: 2021-01-01 | Stop reason: SDUPTHER

## 2021-01-01 RX ORDER — IPRATROPIUM BROMIDE AND ALBUTEROL SULFATE 2.5; .5 MG/3ML; MG/3ML
3 SOLUTION RESPIRATORY (INHALATION) 4 TIMES DAILY
Qty: 15 ML | Refills: 3 | Status: SHIPPED | OUTPATIENT
Start: 2021-01-01 | End: 2021-01-01

## 2021-01-01 RX ORDER — PREDNISONE 1 MG/1
5 TABLET ORAL DAILY
Status: DISCONTINUED | OUTPATIENT
Start: 2021-01-01 | End: 2021-01-01

## 2021-01-01 RX ORDER — PREDNISONE 10 MG/1
20 TABLET ORAL DAILY
Qty: 15 TABLET | Refills: 0 | Status: SHIPPED | OUTPATIENT
Start: 2021-01-01 | End: 2021-01-01

## 2021-01-01 RX ORDER — PREDNISONE 20 MG/1
40 TABLET ORAL
Qty: 60 TABLET | Refills: 0 | Status: SHIPPED | OUTPATIENT
Start: 2021-01-01

## 2021-01-01 RX ORDER — OXYCODONE HCL 20 MG/1
20 TABLET, FILM COATED, EXTENDED RELEASE ORAL EVERY 12 HOURS SCHEDULED
Status: DISCONTINUED | OUTPATIENT
Start: 2021-01-01 | End: 2021-01-01 | Stop reason: HOSPADM

## 2021-01-01 RX ORDER — SENNA AND DOCUSATE SODIUM 50; 8.6 MG/1; MG/1
1 TABLET, FILM COATED ORAL 2 TIMES DAILY
Qty: 60 TABLET | Refills: 11 | Status: SHIPPED | OUTPATIENT
Start: 2021-01-01 | End: 2021-01-01 | Stop reason: SDUPTHER

## 2021-01-01 RX ORDER — ALBUTEROL SULFATE 2.5 MG/3ML
5 SOLUTION RESPIRATORY (INHALATION) ONCE
Status: COMPLETED | OUTPATIENT
Start: 2021-01-01 | End: 2021-01-01

## 2021-01-01 RX ORDER — AMOXICILLIN 250 MG
1 CAPSULE ORAL 2 TIMES DAILY
Status: DISCONTINUED | OUTPATIENT
Start: 2021-01-01 | End: 2021-01-01 | Stop reason: HOSPADM

## 2021-01-01 RX ORDER — PREDNISONE 20 MG/1
60 TABLET ORAL DAILY
Status: DISCONTINUED | OUTPATIENT
Start: 2021-01-01 | End: 2021-01-01

## 2021-01-01 RX ORDER — MONTELUKAST SODIUM 10 MG/1
TABLET ORAL
Qty: 90 TABLET | Refills: 0 | Status: SHIPPED | OUTPATIENT
Start: 2021-01-01

## 2021-01-01 RX ORDER — MORPHINE SULFATE 4 MG/ML
8 INJECTION, SOLUTION INTRAMUSCULAR; INTRAVENOUS ONCE
Status: COMPLETED | OUTPATIENT
Start: 2021-01-01 | End: 2021-01-01

## 2021-01-01 RX ORDER — LORATADINE 10 MG/1
10 TABLET ORAL
Qty: 90 TABLET | Refills: 3 | Status: SHIPPED | OUTPATIENT
Start: 2021-01-01 | End: 2021-01-01

## 2021-01-01 RX ORDER — TAMSULOSIN HYDROCHLORIDE 0.4 MG/1
0.4 CAPSULE ORAL
Status: DISCONTINUED | OUTPATIENT
Start: 2021-01-01 | End: 2021-01-01 | Stop reason: HOSPADM

## 2021-01-01 RX ORDER — PREDNISONE 1 MG/1
5 TABLET ORAL DAILY
Qty: 30 TABLET | Refills: 0 | Status: SHIPPED | OUTPATIENT
Start: 2021-01-01 | End: 2021-01-01 | Stop reason: SDUPTHER

## 2021-01-01 RX ORDER — FLUTICASONE PROPIONATE 50 MCG
SPRAY, SUSPENSION (ML) NASAL
Qty: 16 G | Refills: 3 | Status: SHIPPED | OUTPATIENT
Start: 2021-01-01

## 2021-01-01 RX ORDER — LIDOCAINE 50 MG/G
1 PATCH TOPICAL DAILY
Qty: 10 PATCH | Refills: 0 | Status: SHIPPED | OUTPATIENT
Start: 2021-01-01 | End: 2021-01-01

## 2021-01-01 RX ORDER — PANTOPRAZOLE SODIUM 40 MG/1
40 TABLET, DELAYED RELEASE ORAL DAILY
Qty: 90 TABLET | Refills: 3 | Status: SHIPPED | OUTPATIENT
Start: 2021-01-01 | End: 2021-01-01 | Stop reason: SDUPTHER

## 2021-01-01 RX ORDER — ERGOCALCIFEROL 1.25 MG/1
50000 CAPSULE ORAL WEEKLY
Qty: 12 CAPSULE | Refills: 1 | Status: SHIPPED | OUTPATIENT
Start: 2021-01-01

## 2021-01-01 RX ORDER — FORMOTEROL FUMARATE 20 UG/2ML
20 SOLUTION RESPIRATORY (INHALATION)
Status: DISCONTINUED | OUTPATIENT
Start: 2021-01-01 | End: 2021-01-01 | Stop reason: HOSPADM

## 2021-01-01 RX ORDER — TAMSULOSIN HYDROCHLORIDE 0.4 MG/1
0.4 CAPSULE ORAL
Qty: 90 CAPSULE | Refills: 0 | Status: SHIPPED | OUTPATIENT
Start: 2021-01-01 | End: 2021-01-01 | Stop reason: SDUPTHER

## 2021-01-01 RX ORDER — TAMSULOSIN HYDROCHLORIDE 0.4 MG/1
0.4 CAPSULE ORAL
Qty: 90 CAPSULE | Refills: 0 | Status: SHIPPED | OUTPATIENT
Start: 2021-01-01

## 2021-01-01 RX ORDER — OXYCODONE HCL 20 MG/1
20 TABLET, FILM COATED, EXTENDED RELEASE ORAL EVERY 12 HOURS SCHEDULED
Qty: 60 TABLET | Refills: 0 | OUTPATIENT
Start: 2021-01-01

## 2021-01-01 RX ORDER — FOLIC ACID 1 MG/1
1000 TABLET ORAL DAILY
Qty: 90 TABLET | Refills: 0 | Status: SHIPPED | OUTPATIENT
Start: 2021-01-01 | End: 2021-01-01 | Stop reason: SDUPTHER

## 2021-01-01 RX ORDER — PANTOPRAZOLE SODIUM 40 MG/1
40 TABLET, DELAYED RELEASE ORAL DAILY
Qty: 90 TABLET | Refills: 3 | Status: SHIPPED | OUTPATIENT
Start: 2021-01-01

## 2021-01-01 RX ORDER — METHOCARBAMOL 500 MG/1
500 TABLET, FILM COATED ORAL EVERY 8 HOURS SCHEDULED
Status: DISCONTINUED | OUTPATIENT
Start: 2021-01-01 | End: 2021-01-01

## 2021-01-01 RX ORDER — METHOCARBAMOL 500 MG/1
500 TABLET, FILM COATED ORAL 3 TIMES DAILY
Qty: 30 TABLET | Refills: 0 | Status: SHIPPED | OUTPATIENT
Start: 2021-01-01 | End: 2021-01-01

## 2021-01-01 RX ORDER — LANOLIN ALCOHOL/MO/W.PET/CERES
3 CREAM (GRAM) TOPICAL
Qty: 90 TABLET | Refills: 1 | Status: SHIPPED | OUTPATIENT
Start: 2021-01-01

## 2021-01-01 RX ORDER — AZITHROMYCIN 250 MG/1
500 TABLET, FILM COATED ORAL EVERY 24 HOURS
Status: DISCONTINUED | OUTPATIENT
Start: 2021-01-01 | End: 2021-01-01

## 2021-01-01 RX ORDER — BUDESONIDE 0.5 MG/2ML
0.5 INHALANT ORAL
Status: DISCONTINUED | OUTPATIENT
Start: 2021-01-01 | End: 2021-01-01 | Stop reason: HOSPADM

## 2021-01-01 RX ORDER — PREDNISONE 20 MG/1
40 TABLET ORAL DAILY
Status: DISCONTINUED | OUTPATIENT
Start: 2021-01-01 | End: 2021-01-01 | Stop reason: HOSPADM

## 2021-01-01 RX ORDER — FOLIC ACID 1 MG/1
TABLET ORAL
Qty: 30 TABLET | Refills: 0 | Status: SHIPPED | OUTPATIENT
Start: 2021-01-01 | End: 2021-01-01 | Stop reason: SDUPTHER

## 2021-01-01 RX ORDER — FOLIC ACID 1 MG/1
TABLET ORAL
Qty: 30 TABLET | Refills: 0 | Status: SHIPPED | OUTPATIENT
Start: 2021-01-01

## 2021-01-01 RX ORDER — FOLIC ACID 1 MG/1
TABLET ORAL
Qty: 30 TABLET | Refills: 0 | Status: SHIPPED | OUTPATIENT
Start: 2021-01-01 | End: 2021-01-01

## 2021-01-01 RX ORDER — ACETAMINOPHEN 325 MG/1
975 TABLET ORAL ONCE
Status: COMPLETED | OUTPATIENT
Start: 2021-01-01 | End: 2021-01-01

## 2021-01-01 RX ORDER — LIDOCAINE 40 MG/G
1 CREAM TOPICAL DAILY PRN
Status: CANCELLED
Start: 2021-01-01

## 2021-01-01 RX ORDER — AMOXICILLIN AND CLAVULANATE POTASSIUM 875; 125 MG/1; MG/1
1 TABLET, FILM COATED ORAL EVERY 12 HOURS SCHEDULED
Qty: 14 TABLET | Refills: 0 | Status: SHIPPED | OUTPATIENT
Start: 2021-01-01 | End: 2021-01-01

## 2021-01-01 RX ORDER — MONTELUKAST SODIUM 10 MG/1
10 TABLET ORAL
Qty: 90 TABLET | Refills: 0 | Status: SHIPPED | OUTPATIENT
Start: 2021-01-01 | End: 2021-01-01

## 2021-01-01 RX ORDER — DEXAMETHASONE SODIUM PHOSPHATE 4 MG/ML
4 INJECTION, SOLUTION INTRA-ARTICULAR; INTRALESIONAL; INTRAMUSCULAR; INTRAVENOUS; SOFT TISSUE EVERY 12 HOURS SCHEDULED
Status: DISCONTINUED | OUTPATIENT
Start: 2021-01-01 | End: 2021-01-01

## 2021-01-01 RX ORDER — ALBUTEROL SULFATE 90 UG/1
2 AEROSOL, METERED RESPIRATORY (INHALATION) EVERY 4 HOURS PRN
Qty: 18 G | Refills: 5 | Status: SHIPPED | OUTPATIENT
Start: 2021-01-01 | End: 2021-01-01 | Stop reason: SDUPTHER

## 2021-01-01 RX ORDER — OXYCODONE HYDROCHLORIDE 30 MG/1
30 TABLET ORAL EVERY 4 HOURS PRN
Qty: 150 TABLET | Refills: 0 | Status: SHIPPED | OUTPATIENT
Start: 2021-01-01

## 2021-01-01 RX ORDER — LEVALBUTEROL INHALATION SOLUTION 1.25 MG/3ML
1.25 SOLUTION RESPIRATORY (INHALATION) 3 TIMES DAILY
Qty: 270 ML | Refills: 0 | Status: SHIPPED | OUTPATIENT
Start: 2021-01-01 | End: 2021-01-01

## 2021-01-01 RX ORDER — ALBUTEROL SULFATE 2.5 MG/3ML
2.5 SOLUTION RESPIRATORY (INHALATION) EVERY 6 HOURS PRN
Qty: 360 ML | Refills: 5 | Status: SHIPPED | OUTPATIENT
Start: 2021-01-01 | End: 2021-01-01 | Stop reason: SDUPTHER

## 2021-01-01 RX ORDER — QUETIAPINE FUMARATE 25 MG/1
12.5 TABLET, FILM COATED ORAL
Status: DISCONTINUED | OUTPATIENT
Start: 2021-01-01 | End: 2021-01-01 | Stop reason: HOSPADM

## 2021-01-01 RX ORDER — INSULIN GLARGINE 100 [IU]/ML
5 INJECTION, SOLUTION SUBCUTANEOUS
Status: DISCONTINUED | OUTPATIENT
Start: 2021-01-01 | End: 2021-01-01 | Stop reason: HOSPADM

## 2021-01-01 RX ORDER — ONDANSETRON HYDROCHLORIDE 8 MG/1
8 TABLET, FILM COATED ORAL EVERY 8 HOURS PRN
Qty: 20 TABLET | Refills: 3 | Status: SHIPPED | OUTPATIENT
Start: 2021-01-01

## 2021-01-01 RX ORDER — BLOOD PRESSURE TEST KIT-WRIST
KIT MISCELLANEOUS 2 TIMES DAILY
Qty: 1 EACH | Refills: 0 | Status: SHIPPED | OUTPATIENT
Start: 2021-01-01 | End: 2021-01-01

## 2021-01-01 RX ORDER — IPRATROPIUM BROMIDE AND ALBUTEROL SULFATE 2.5; .5 MG/3ML; MG/3ML
3 SOLUTION RESPIRATORY (INHALATION)
Status: DISCONTINUED | OUTPATIENT
Start: 2021-01-01 | End: 2021-01-01 | Stop reason: HOSPADM

## 2021-01-01 RX ADMIN — TAMSULOSIN HYDROCHLORIDE 0.4 MG: 0.4 CAPSULE ORAL at 15:54

## 2021-01-01 RX ADMIN — BUDESONIDE 0.5 MG: 0.5 INHALANT ORAL at 20:20

## 2021-01-01 RX ADMIN — OXYCODONE HYDROCHLORIDE 20 MG: 20 TABLET, FILM COATED, EXTENDED RELEASE ORAL at 20:21

## 2021-01-01 RX ADMIN — TIZANIDINE 4 MG: 2 TABLET ORAL at 10:16

## 2021-01-01 RX ADMIN — TIZANIDINE 4 MG: 2 TABLET ORAL at 17:21

## 2021-01-01 RX ADMIN — SODIUM CHLORIDE 200 MG: 9 INJECTION, SOLUTION INTRAVENOUS at 14:53

## 2021-01-01 RX ADMIN — DOCUSATE SODIUM 50 MG AND SENNOSIDES 8.6 MG 1 TABLET: 8.6; 5 TABLET, FILM COATED ORAL at 17:21

## 2021-01-01 RX ADMIN — BUDESONIDE 0.5 MG: 0.5 INHALANT ORAL at 07:10

## 2021-01-01 RX ADMIN — TIZANIDINE 4 MG: 2 TABLET ORAL at 21:16

## 2021-01-01 RX ADMIN — GUAIFENESIN 600 MG: 600 TABLET ORAL at 20:30

## 2021-01-01 RX ADMIN — ISODIUM CHLORIDE 3 ML: 0.03 SOLUTION RESPIRATORY (INHALATION) at 16:10

## 2021-01-01 RX ADMIN — OXYCODONE HYDROCHLORIDE 15 MG: 10 TABLET ORAL at 21:15

## 2021-01-01 RX ADMIN — OXYCODONE HYDROCHLORIDE 20 MG: 20 TABLET, FILM COATED, EXTENDED RELEASE ORAL at 21:15

## 2021-01-01 RX ADMIN — INSULIN GLARGINE 8 UNITS: 100 INJECTION, SOLUTION SUBCUTANEOUS at 21:42

## 2021-01-01 RX ADMIN — IPRATROPIUM BROMIDE AND ALBUTEROL SULFATE 3 ML: 2.5; .5 SOLUTION RESPIRATORY (INHALATION) at 13:44

## 2021-01-01 RX ADMIN — SODIUM CHLORIDE 200 MG: 9 INJECTION, SOLUTION INTRAVENOUS at 12:40

## 2021-01-01 RX ADMIN — DIVALPROEX SODIUM 250 MG: 250 TABLET, DELAYED RELEASE ORAL at 08:42

## 2021-01-01 RX ADMIN — LIDOCAINE: 40 CREAM TOPICAL at 14:20

## 2021-01-01 RX ADMIN — FORMOTEROL FUMARATE DIHYDRATE 20 MCG: 20 SOLUTION RESPIRATORY (INHALATION) at 19:44

## 2021-01-01 RX ADMIN — CEFEPIME HYDROCHLORIDE 2000 MG: 2 INJECTION, POWDER, FOR SOLUTION INTRAVENOUS at 13:45

## 2021-01-01 RX ADMIN — SODIUM CHLORIDE 1000 ML: 0.9 INJECTION, SOLUTION INTRAVENOUS at 16:08

## 2021-01-01 RX ADMIN — PANTOPRAZOLE SODIUM 40 MG: 40 TABLET, DELAYED RELEASE ORAL at 10:31

## 2021-01-01 RX ADMIN — GUAIFENESIN 600 MG: 600 TABLET ORAL at 09:04

## 2021-01-01 RX ADMIN — SODIUM CHLORIDE 20 ML/HR: 9 INJECTION, SOLUTION INTRAVENOUS at 14:49

## 2021-01-01 RX ADMIN — IPRATROPIUM BROMIDE AND ALBUTEROL SULFATE 3 ML: 2.5; .5 SOLUTION RESPIRATORY (INHALATION) at 14:20

## 2021-01-01 RX ADMIN — QUETIAPINE 12.5 MG: 25 TABLET, FILM COATED ORAL at 22:53

## 2021-01-01 RX ADMIN — SODIUM CHLORIDE 20 ML/HR: 0.9 INJECTION, SOLUTION INTRAVENOUS at 12:41

## 2021-01-01 RX ADMIN — MORPHINE SULFATE 2 MG: 2 INJECTION, SOLUTION INTRAMUSCULAR; INTRAVENOUS at 12:43

## 2021-01-01 RX ADMIN — FORMOTEROL FUMARATE DIHYDRATE 20 MCG: 20 SOLUTION RESPIRATORY (INHALATION) at 08:13

## 2021-01-01 RX ADMIN — DIVALPROEX SODIUM 250 MG: 250 TABLET, DELAYED RELEASE ORAL at 09:34

## 2021-01-01 RX ADMIN — SODIUM CHLORIDE 20 ML/HR: 0.9 INJECTION, SOLUTION INTRAVENOUS at 11:35

## 2021-01-01 RX ADMIN — DOCUSATE SODIUM 50 MG AND SENNOSIDES 8.6 MG 1 TABLET: 8.6; 5 TABLET, FILM COATED ORAL at 17:29

## 2021-01-01 RX ADMIN — DOCUSATE SODIUM 50 MG AND SENNOSIDES 8.6 MG 1 TABLET: 8.6; 5 TABLET, FILM COATED ORAL at 09:34

## 2021-01-01 RX ADMIN — APIXABAN 2.5 MG: 2.5 TABLET, FILM COATED ORAL at 17:29

## 2021-01-01 RX ADMIN — TIZANIDINE 4 MG: 2 TABLET ORAL at 20:29

## 2021-01-01 RX ADMIN — DILTIAZEM HYDROCHLORIDE 120 MG: 120 CAPSULE, COATED, EXTENDED RELEASE ORAL at 08:45

## 2021-01-01 RX ADMIN — FORMOTEROL FUMARATE DIHYDRATE 20 MCG: 20 SOLUTION RESPIRATORY (INHALATION) at 20:20

## 2021-01-01 RX ADMIN — IPRATROPIUM BROMIDE AND ALBUTEROL SULFATE 3 ML: 2.5; .5 SOLUTION RESPIRATORY (INHALATION) at 20:36

## 2021-01-01 RX ADMIN — FORMOTEROL FUMARATE DIHYDRATE 20 MCG: 20 SOLUTION RESPIRATORY (INHALATION) at 07:49

## 2021-01-01 RX ADMIN — OXYCODONE HYDROCHLORIDE 20 MG: 20 TABLET, FILM COATED, EXTENDED RELEASE ORAL at 21:10

## 2021-01-01 RX ADMIN — QUETIAPINE 12.5 MG: 25 TABLET, FILM COATED ORAL at 21:16

## 2021-01-01 RX ADMIN — DIVALPROEX SODIUM 250 MG: 250 TABLET, DELAYED RELEASE ORAL at 17:29

## 2021-01-01 RX ADMIN — TAMSULOSIN HYDROCHLORIDE 0.4 MG: 0.4 CAPSULE ORAL at 17:22

## 2021-01-01 RX ADMIN — IODIXANOL 100 ML: 320 INJECTION, SOLUTION INTRAVASCULAR at 15:06

## 2021-01-01 RX ADMIN — DIVALPROEX SODIUM 250 MG: 250 TABLET, DELAYED RELEASE ORAL at 17:55

## 2021-01-01 RX ADMIN — MORPHINE SULFATE 8 MG: 4 INJECTION INTRAVENOUS at 13:35

## 2021-01-01 RX ADMIN — FLUOXETINE 10 MG: 10 CAPSULE ORAL at 09:04

## 2021-01-01 RX ADMIN — IPRATROPIUM BROMIDE AND ALBUTEROL SULFATE 3 ML: 2.5; .5 SOLUTION RESPIRATORY (INHALATION) at 13:03

## 2021-01-01 RX ADMIN — CEFTRIAXONE SODIUM 1000 MG: 10 INJECTION, POWDER, FOR SOLUTION INTRAVENOUS at 21:46

## 2021-01-01 RX ADMIN — TAMSULOSIN HYDROCHLORIDE 0.4 MG: 0.4 CAPSULE ORAL at 17:20

## 2021-01-01 RX ADMIN — SODIUM CHLORIDE 400 MG: 9 INJECTION, SOLUTION INTRAVENOUS at 14:56

## 2021-01-01 RX ADMIN — MORPHINE SULFATE 2 MG: 2 INJECTION, SOLUTION INTRAMUSCULAR; INTRAVENOUS at 03:44

## 2021-01-01 RX ADMIN — SODIUM CHLORIDE 400 MG: 9 INJECTION, SOLUTION INTRAVENOUS at 11:44

## 2021-01-01 RX ADMIN — PREDNISONE 50 MG: 20 TABLET ORAL at 08:41

## 2021-01-01 RX ADMIN — SODIUM CHLORIDE 200 MG: 9 INJECTION, SOLUTION INTRAVENOUS at 12:34

## 2021-01-01 RX ADMIN — PREDNISONE 60 MG: 20 TABLET ORAL at 09:02

## 2021-01-01 RX ADMIN — TIZANIDINE 4 MG: 2 TABLET ORAL at 17:22

## 2021-01-01 RX ADMIN — DOCUSATE SODIUM 50 MG AND SENNOSIDES 8.6 MG 1 TABLET: 8.6; 5 TABLET, FILM COATED ORAL at 08:41

## 2021-01-01 RX ADMIN — FORMOTEROL FUMARATE DIHYDRATE 20 MCG: 20 SOLUTION RESPIRATORY (INHALATION) at 08:45

## 2021-01-01 RX ADMIN — IPRATROPIUM BROMIDE AND ALBUTEROL SULFATE 3 ML: 2.5; .5 SOLUTION RESPIRATORY (INHALATION) at 19:44

## 2021-01-01 RX ADMIN — TIZANIDINE 4 MG: 2 TABLET ORAL at 21:15

## 2021-01-01 RX ADMIN — IPRATROPIUM BROMIDE AND ALBUTEROL SULFATE 3 ML: .5; 3 SOLUTION RESPIRATORY (INHALATION) at 07:46

## 2021-01-01 RX ADMIN — PANTOPRAZOLE SODIUM 40 MG: 40 TABLET, DELAYED RELEASE ORAL at 06:01

## 2021-01-01 RX ADMIN — SODIUM CHLORIDE 400 MG: 9 INJECTION, SOLUTION INTRAVENOUS at 13:15

## 2021-01-01 RX ADMIN — BUDESONIDE 0.5 MG: 0.5 INHALANT ORAL at 08:45

## 2021-01-01 RX ADMIN — DIVALPROEX SODIUM 250 MG: 250 TABLET, DELAYED RELEASE ORAL at 17:34

## 2021-01-01 RX ADMIN — DIVALPROEX SODIUM 250 MG: 250 TABLET, DELAYED RELEASE ORAL at 17:22

## 2021-01-01 RX ADMIN — FLUOXETINE 10 MG: 10 CAPSULE ORAL at 09:33

## 2021-01-01 RX ADMIN — DOCUSATE SODIUM 50 MG AND SENNOSIDES 8.6 MG 1 TABLET: 8.6; 5 TABLET, FILM COATED ORAL at 17:34

## 2021-01-01 RX ADMIN — MORPHINE SULFATE 2 MG: 2 INJECTION, SOLUTION INTRAMUSCULAR; INTRAVENOUS at 19:11

## 2021-01-01 RX ADMIN — FORMOTEROL FUMARATE DIHYDRATE 20 MCG: 20 SOLUTION RESPIRATORY (INHALATION) at 20:31

## 2021-01-01 RX ADMIN — FORMOTEROL FUMARATE DIHYDRATE 20 MCG: 20 SOLUTION RESPIRATORY (INHALATION) at 07:10

## 2021-01-01 RX ADMIN — ALBUTEROL SULFATE 2 PUFF: 90 AEROSOL, METERED RESPIRATORY (INHALATION) at 05:32

## 2021-01-01 RX ADMIN — OXYCODONE HYDROCHLORIDE 15 MG: 10 TABLET ORAL at 10:35

## 2021-01-01 RX ADMIN — MORPHINE SULFATE 2 MG: 2 INJECTION, SOLUTION INTRAMUSCULAR; INTRAVENOUS at 20:19

## 2021-01-01 RX ADMIN — MORPHINE SULFATE 2 MG: 2 INJECTION, SOLUTION INTRAMUSCULAR; INTRAVENOUS at 01:37

## 2021-01-01 RX ADMIN — BUDESONIDE 0.5 MG: 0.5 INHALANT ORAL at 20:51

## 2021-01-01 RX ADMIN — BUDESONIDE 0.5 MG: 0.5 INHALANT ORAL at 07:22

## 2021-01-01 RX ADMIN — ACETAMINOPHEN 975 MG: 325 TABLET ORAL at 13:27

## 2021-01-01 RX ADMIN — APIXABAN 2.5 MG: 2.5 TABLET, FILM COATED ORAL at 08:41

## 2021-01-01 RX ADMIN — TAMSULOSIN HYDROCHLORIDE 0.4 MG: 0.4 CAPSULE ORAL at 15:43

## 2021-01-01 RX ADMIN — FORMOTEROL FUMARATE DIHYDRATE 20 MCG: 20 SOLUTION RESPIRATORY (INHALATION) at 20:36

## 2021-01-01 RX ADMIN — DOCUSATE SODIUM 50 MG AND SENNOSIDES 8.6 MG 1 TABLET: 8.6; 5 TABLET, FILM COATED ORAL at 17:54

## 2021-01-01 RX ADMIN — FLUOXETINE 10 MG: 10 CAPSULE ORAL at 10:13

## 2021-01-01 RX ADMIN — MORPHINE SULFATE 2 MG: 2 INJECTION, SOLUTION INTRAMUSCULAR; INTRAVENOUS at 07:23

## 2021-01-01 RX ADMIN — AZITHROMYCIN MONOHYDRATE 500 MG: 500 INJECTION, POWDER, LYOPHILIZED, FOR SOLUTION INTRAVENOUS at 20:06

## 2021-01-01 RX ADMIN — SODIUM CHLORIDE 20 ML/HR: 0.9 INJECTION, SOLUTION INTRAVENOUS at 12:20

## 2021-01-01 RX ADMIN — ALBUTEROL SULFATE 2 PUFF: 90 AEROSOL, METERED RESPIRATORY (INHALATION) at 16:38

## 2021-01-01 RX ADMIN — PREGABALIN 50 MG: 50 CAPSULE ORAL at 21:41

## 2021-01-01 RX ADMIN — PREDNISONE 5 MG: 5 TABLET ORAL at 08:45

## 2021-01-01 RX ADMIN — OXYCODONE HYDROCHLORIDE 20 MG: 20 TABLET, FILM COATED, EXTENDED RELEASE ORAL at 09:02

## 2021-01-01 RX ADMIN — QUETIAPINE 12.5 MG: 25 TABLET, FILM COATED ORAL at 21:20

## 2021-01-01 RX ADMIN — APIXABAN 2.5 MG: 2.5 TABLET, FILM COATED ORAL at 08:45

## 2021-01-01 RX ADMIN — TIZANIDINE 4 MG: 2 TABLET ORAL at 15:54

## 2021-01-01 RX ADMIN — MORPHINE SULFATE 2 MG: 2 INJECTION, SOLUTION INTRAMUSCULAR; INTRAVENOUS at 13:05

## 2021-01-01 RX ADMIN — IPRATROPIUM BROMIDE AND ALBUTEROL SULFATE 3 ML: 2.5; .5 SOLUTION RESPIRATORY (INHALATION) at 20:31

## 2021-01-01 RX ADMIN — MORPHINE SULFATE 2 MG: 2 INJECTION, SOLUTION INTRAMUSCULAR; INTRAVENOUS at 21:17

## 2021-01-01 RX ADMIN — FORMOTEROL FUMARATE DIHYDRATE 20 MCG: 20 SOLUTION RESPIRATORY (INHALATION) at 20:51

## 2021-01-01 RX ADMIN — MELATONIN 3 MG: at 22:46

## 2021-01-01 RX ADMIN — APIXABAN 2.5 MG: 2.5 TABLET, FILM COATED ORAL at 09:34

## 2021-01-01 RX ADMIN — LIDOCAINE 1 PATCH: 50 PATCH CUTANEOUS at 01:36

## 2021-01-01 RX ADMIN — PREGABALIN 25 MG: 25 CAPSULE ORAL at 09:08

## 2021-01-01 RX ADMIN — INSULIN LISPRO 1 UNITS: 100 INJECTION, SOLUTION INTRAVENOUS; SUBCUTANEOUS at 12:02

## 2021-01-01 RX ADMIN — PREGABALIN 50 MG: 50 CAPSULE ORAL at 21:15

## 2021-01-01 RX ADMIN — FORMOTEROL FUMARATE DIHYDRATE 20 MCG: 20 SOLUTION RESPIRATORY (INHALATION) at 07:22

## 2021-01-01 RX ADMIN — TAMSULOSIN HYDROCHLORIDE 0.4 MG: 0.4 CAPSULE ORAL at 17:55

## 2021-01-01 RX ADMIN — ALBUTEROL SULFATE 2 PUFF: 90 AEROSOL, METERED RESPIRATORY (INHALATION) at 13:41

## 2021-01-01 RX ADMIN — TIZANIDINE 4 MG: 2 TABLET ORAL at 21:40

## 2021-01-01 RX ADMIN — INSULIN GLARGINE 8 UNITS: 100 INJECTION, SOLUTION SUBCUTANEOUS at 22:53

## 2021-01-01 RX ADMIN — PANTOPRAZOLE SODIUM 40 MG: 40 TABLET, DELAYED RELEASE ORAL at 08:45

## 2021-01-01 RX ADMIN — IPRATROPIUM BROMIDE AND ALBUTEROL SULFATE 3 ML: 2.5; .5 SOLUTION RESPIRATORY (INHALATION) at 08:45

## 2021-01-01 RX ADMIN — AZITHROMYCIN MONOHYDRATE 500 MG: 500 INJECTION, POWDER, LYOPHILIZED, FOR SOLUTION INTRAVENOUS at 21:38

## 2021-01-01 RX ADMIN — MELATONIN 3 MG: at 22:53

## 2021-01-01 RX ADMIN — IPRATROPIUM BROMIDE AND ALBUTEROL SULFATE 3 ML: 2.5; .5 SOLUTION RESPIRATORY (INHALATION) at 20:20

## 2021-01-01 RX ADMIN — OXYCODONE HYDROCHLORIDE 15 MG: 10 TABLET ORAL at 03:10

## 2021-01-01 RX ADMIN — OXYCODONE HYDROCHLORIDE 20 MG: 20 TABLET, FILM COATED, EXTENDED RELEASE ORAL at 10:12

## 2021-01-01 RX ADMIN — DEXAMETHASONE SODIUM PHOSPHATE 4 MG: 4 INJECTION, SOLUTION INTRAMUSCULAR; INTRAVENOUS at 21:33

## 2021-01-01 RX ADMIN — FLUOXETINE 10 MG: 10 CAPSULE ORAL at 08:41

## 2021-01-01 RX ADMIN — PREGABALIN 25 MG: 25 CAPSULE ORAL at 10:55

## 2021-01-01 RX ADMIN — AZITHROMYCIN MONOHYDRATE 500 MG: 500 INJECTION, POWDER, LYOPHILIZED, FOR SOLUTION INTRAVENOUS at 20:43

## 2021-01-01 RX ADMIN — GUAIFENESIN 600 MG: 600 TABLET ORAL at 21:15

## 2021-01-01 RX ADMIN — INSULIN GLARGINE 8 UNITS: 100 INJECTION, SOLUTION SUBCUTANEOUS at 21:17

## 2021-01-01 RX ADMIN — MORPHINE SULFATE 2 MG: 2 INJECTION, SOLUTION INTRAMUSCULAR; INTRAVENOUS at 06:00

## 2021-01-01 RX ADMIN — PANTOPRAZOLE SODIUM 40 MG: 40 TABLET, DELAYED RELEASE ORAL at 05:32

## 2021-01-01 RX ADMIN — IPRATROPIUM BROMIDE AND ALBUTEROL SULFATE 3 ML: 2.5; .5 SOLUTION RESPIRATORY (INHALATION) at 08:13

## 2021-01-01 RX ADMIN — PREGABALIN 50 MG: 50 CAPSULE ORAL at 21:16

## 2021-01-01 RX ADMIN — MELATONIN 3 MG: at 21:41

## 2021-01-01 RX ADMIN — MORPHINE SULFATE 2 MG: 2 INJECTION, SOLUTION INTRAMUSCULAR; INTRAVENOUS at 21:31

## 2021-01-01 RX ADMIN — SODIUM CHLORIDE 400 MG: 9 INJECTION, SOLUTION INTRAVENOUS at 12:13

## 2021-01-01 RX ADMIN — DEXAMETHASONE SODIUM PHOSPHATE 4 MG: 4 INJECTION, SOLUTION INTRAMUSCULAR; INTRAVENOUS at 10:12

## 2021-01-01 RX ADMIN — PREGABALIN 25 MG: 25 CAPSULE ORAL at 11:51

## 2021-01-01 RX ADMIN — FORMOTEROL FUMARATE DIHYDRATE 20 MCG: 20 SOLUTION RESPIRATORY (INHALATION) at 19:05

## 2021-01-01 RX ADMIN — GUAIFENESIN 600 MG: 600 TABLET ORAL at 10:13

## 2021-01-01 RX ADMIN — TAMSULOSIN HYDROCHLORIDE 0.4 MG: 0.4 CAPSULE ORAL at 20:22

## 2021-01-01 RX ADMIN — SODIUM CHLORIDE 20 ML/HR: 0.9 INJECTION, SOLUTION INTRAVENOUS at 11:10

## 2021-01-01 RX ADMIN — AZITHROMYCIN MONOHYDRATE 500 MG: 250 TABLET ORAL at 17:20

## 2021-01-01 RX ADMIN — TIZANIDINE 4 MG: 2 TABLET ORAL at 09:33

## 2021-01-01 RX ADMIN — GUAIFENESIN 600 MG: 600 TABLET ORAL at 21:16

## 2021-01-01 RX ADMIN — OXYCODONE HYDROCHLORIDE 15 MG: 10 TABLET ORAL at 13:38

## 2021-01-01 RX ADMIN — MELATONIN 3 MG: at 21:37

## 2021-01-01 RX ADMIN — QUETIAPINE 12.5 MG: 25 TABLET, FILM COATED ORAL at 22:46

## 2021-01-01 RX ADMIN — GUAIFENESIN 600 MG: 600 TABLET ORAL at 21:10

## 2021-01-01 RX ADMIN — MORPHINE SULFATE 2 MG: 2 INJECTION, SOLUTION INTRAMUSCULAR; INTRAVENOUS at 06:27

## 2021-01-01 RX ADMIN — BUDESONIDE 0.5 MG: 0.5 INHALANT ORAL at 07:52

## 2021-01-01 RX ADMIN — FORMOTEROL FUMARATE DIHYDRATE 20 MCG: 20 SOLUTION RESPIRATORY (INHALATION) at 07:53

## 2021-01-01 RX ADMIN — IPRATROPIUM BROMIDE 0.5 MG: 0.5 SOLUTION RESPIRATORY (INHALATION) at 15:25

## 2021-01-01 RX ADMIN — ALBUTEROL SULFATE 2 PUFF: 90 AEROSOL, METERED RESPIRATORY (INHALATION) at 03:09

## 2021-01-01 RX ADMIN — MORPHINE SULFATE 2 MG: 2 INJECTION, SOLUTION INTRAMUSCULAR; INTRAVENOUS at 00:03

## 2021-01-01 RX ADMIN — PREGABALIN 25 MG: 25 CAPSULE ORAL at 10:14

## 2021-01-01 RX ADMIN — MORPHINE SULFATE 2 MG: 2 INJECTION, SOLUTION INTRAMUSCULAR; INTRAVENOUS at 17:26

## 2021-01-01 RX ADMIN — BUDESONIDE 0.5 MG: 0.5 INHALANT ORAL at 20:36

## 2021-01-01 RX ADMIN — SODIUM CHLORIDE 1000 ML: 0.9 INJECTION, SOLUTION INTRAVENOUS at 13:34

## 2021-01-01 RX ADMIN — DIVALPROEX SODIUM 250 MG: 250 TABLET, DELAYED RELEASE ORAL at 09:04

## 2021-01-01 RX ADMIN — OXYCODONE HYDROCHLORIDE 20 MG: 20 TABLET, FILM COATED, EXTENDED RELEASE ORAL at 08:42

## 2021-01-01 RX ADMIN — DILTIAZEM HYDROCHLORIDE 120 MG: 120 CAPSULE, COATED, EXTENDED RELEASE ORAL at 09:34

## 2021-01-01 RX ADMIN — DILTIAZEM HYDROCHLORIDE 120 MG: 120 CAPSULE, COATED, EXTENDED RELEASE ORAL at 10:14

## 2021-01-01 RX ADMIN — BUDESONIDE 0.5 MG: 0.5 INHALANT ORAL at 08:13

## 2021-01-01 RX ADMIN — TIZANIDINE 4 MG: 2 TABLET ORAL at 17:54

## 2021-01-01 RX ADMIN — INSULIN GLARGINE 5 UNITS: 100 INJECTION, SOLUTION SUBCUTANEOUS at 21:16

## 2021-01-01 RX ADMIN — ALTEPLASE 2 MG: 2.2 INJECTION, POWDER, LYOPHILIZED, FOR SOLUTION INTRAVENOUS at 11:21

## 2021-01-01 RX ADMIN — DEXAMETHASONE SODIUM PHOSPHATE 4 MG: 4 INJECTION, SOLUTION INTRAMUSCULAR; INTRAVENOUS at 09:35

## 2021-01-01 RX ADMIN — GUAIFENESIN 600 MG: 600 TABLET ORAL at 08:41

## 2021-01-01 RX ADMIN — CEFTRIAXONE SODIUM 1000 MG: 10 INJECTION, POWDER, FOR SOLUTION INTRAVENOUS at 22:50

## 2021-01-01 RX ADMIN — TIZANIDINE 4 MG: 2 TABLET ORAL at 21:10

## 2021-01-01 RX ADMIN — APIXABAN 2.5 MG: 2.5 TABLET, FILM COATED ORAL at 17:21

## 2021-01-01 RX ADMIN — SODIUM CHLORIDE 20 ML/HR: 9 INJECTION, SOLUTION INTRAVENOUS at 11:43

## 2021-01-01 RX ADMIN — SODIUM CHLORIDE 200 MG: 9 INJECTION, SOLUTION INTRAVENOUS at 12:48

## 2021-01-01 RX ADMIN — BUDESONIDE 0.5 MG: 0.5 INHALANT ORAL at 20:31

## 2021-01-01 RX ADMIN — SODIUM CHLORIDE 400 MG: 9 INJECTION, SOLUTION INTRAVENOUS at 14:51

## 2021-01-01 RX ADMIN — OXYCODONE HYDROCHLORIDE 15 MG: 10 TABLET ORAL at 16:13

## 2021-01-01 RX ADMIN — GUAIFENESIN 600 MG: 600 TABLET ORAL at 09:33

## 2021-01-01 RX ADMIN — IPRATROPIUM BROMIDE AND ALBUTEROL SULFATE 3 ML: 2.5; .5 SOLUTION RESPIRATORY (INHALATION) at 07:52

## 2021-01-01 RX ADMIN — DOCUSATE SODIUM 50 MG AND SENNOSIDES 8.6 MG 1 TABLET: 8.6; 5 TABLET, FILM COATED ORAL at 08:45

## 2021-01-01 RX ADMIN — SODIUM CHLORIDE 20 ML/HR: 9 INJECTION, SOLUTION INTRAVENOUS at 14:35

## 2021-01-01 RX ADMIN — GUAIFENESIN 600 MG: 600 TABLET ORAL at 20:31

## 2021-01-01 RX ADMIN — CEFTRIAXONE SODIUM 1000 MG: 10 INJECTION, POWDER, FOR SOLUTION INTRAVENOUS at 22:05

## 2021-01-01 RX ADMIN — IPRATROPIUM BROMIDE 1 MG: 0.5 SOLUTION RESPIRATORY (INHALATION) at 16:10

## 2021-01-01 RX ADMIN — WATER 10 ML: 1 INJECTION INTRAMUSCULAR; INTRAVENOUS; SUBCUTANEOUS at 11:21

## 2021-01-01 RX ADMIN — DILTIAZEM HYDROCHLORIDE 120 MG: 120 CAPSULE, COATED, EXTENDED RELEASE ORAL at 09:04

## 2021-01-01 RX ADMIN — DEXAMETHASONE SODIUM PHOSPHATE 4 MG: 4 INJECTION, SOLUTION INTRAMUSCULAR; INTRAVENOUS at 10:56

## 2021-01-01 RX ADMIN — TIZANIDINE 4 MG: 2 TABLET ORAL at 08:40

## 2021-01-01 RX ADMIN — INSULIN LISPRO 1 UNITS: 100 INJECTION, SOLUTION INTRAVENOUS; SUBCUTANEOUS at 17:33

## 2021-01-01 RX ADMIN — AZITHROMYCIN MONOHYDRATE 500 MG: 250 TABLET ORAL at 17:54

## 2021-01-01 RX ADMIN — MORPHINE SULFATE 2 MG: 2 INJECTION, SOLUTION INTRAMUSCULAR; INTRAVENOUS at 05:31

## 2021-01-01 RX ADMIN — GUAIFENESIN 600 MG: 600 TABLET ORAL at 09:34

## 2021-01-01 RX ADMIN — MORPHINE SULFATE 2 MG: 2 INJECTION, SOLUTION INTRAMUSCULAR; INTRAVENOUS at 08:45

## 2021-01-01 RX ADMIN — CEFTRIAXONE SODIUM 1000 MG: 10 INJECTION, POWDER, FOR SOLUTION INTRAVENOUS at 22:46

## 2021-01-01 RX ADMIN — IPRATROPIUM BROMIDE AND ALBUTEROL SULFATE 3 ML: 2.5; .5 SOLUTION RESPIRATORY (INHALATION) at 07:10

## 2021-01-01 RX ADMIN — DIVALPROEX SODIUM 250 MG: 250 TABLET, DELAYED RELEASE ORAL at 20:21

## 2021-01-01 RX ADMIN — APIXABAN 2.5 MG: 2.5 TABLET, FILM COATED ORAL at 17:34

## 2021-01-01 RX ADMIN — PANTOPRAZOLE SODIUM 40 MG: 40 TABLET, DELAYED RELEASE ORAL at 09:33

## 2021-01-01 RX ADMIN — APIXABAN 2.5 MG: 2.5 TABLET, FILM COATED ORAL at 10:13

## 2021-01-01 RX ADMIN — SODIUM CHLORIDE 20 ML/HR: 0.9 INJECTION, SOLUTION INTRAVENOUS at 14:19

## 2021-01-01 RX ADMIN — BUDESONIDE 0.5 MG: 0.5 INHALANT ORAL at 19:44

## 2021-01-01 RX ADMIN — MORPHINE SULFATE 2 MG: 2 INJECTION, SOLUTION INTRAMUSCULAR; INTRAVENOUS at 17:29

## 2021-01-01 RX ADMIN — IPRATROPIUM BROMIDE AND ALBUTEROL SULFATE 3 ML: 2.5; .5 SOLUTION RESPIRATORY (INHALATION) at 19:05

## 2021-01-01 RX ADMIN — TIZANIDINE 4 MG: 2 TABLET ORAL at 15:43

## 2021-01-01 RX ADMIN — PREDNISONE 5 MG: 5 TABLET ORAL at 09:34

## 2021-01-01 RX ADMIN — DILTIAZEM HYDROCHLORIDE 120 MG: 120 CAPSULE, COATED, EXTENDED RELEASE ORAL at 08:41

## 2021-01-01 RX ADMIN — DEXAMETHASONE SODIUM PHOSPHATE 4 MG: 4 INJECTION, SOLUTION INTRAMUSCULAR; INTRAVENOUS at 20:31

## 2021-01-01 RX ADMIN — OXYCODONE HYDROCHLORIDE 20 MG: 20 TABLET, FILM COATED, EXTENDED RELEASE ORAL at 09:34

## 2021-01-01 RX ADMIN — ALBUTEROL SULFATE 5 MG: 2.5 SOLUTION RESPIRATORY (INHALATION) at 15:26

## 2021-01-01 RX ADMIN — DOCUSATE SODIUM 50 MG AND SENNOSIDES 8.6 MG 1 TABLET: 8.6; 5 TABLET, FILM COATED ORAL at 20:22

## 2021-01-01 RX ADMIN — ALBUTEROL SULFATE 2 PUFF: 90 AEROSOL, METERED RESPIRATORY (INHALATION) at 07:01

## 2021-01-01 RX ADMIN — MORPHINE SULFATE 2 MG: 2 INJECTION, SOLUTION INTRAMUSCULAR; INTRAVENOUS at 20:36

## 2021-01-01 RX ADMIN — GUAIFENESIN 600 MG: 600 TABLET ORAL at 20:21

## 2021-01-01 RX ADMIN — MORPHINE SULFATE 2 MG: 2 INJECTION, SOLUTION INTRAMUSCULAR; INTRAVENOUS at 19:30

## 2021-01-01 RX ADMIN — TIZANIDINE 4 MG: 2 TABLET ORAL at 09:02

## 2021-01-01 RX ADMIN — DOCUSATE SODIUM 50 MG AND SENNOSIDES 8.6 MG 1 TABLET: 8.6; 5 TABLET, FILM COATED ORAL at 10:13

## 2021-01-01 RX ADMIN — MORPHINE SULFATE 2 MG: 2 INJECTION, SOLUTION INTRAMUSCULAR; INTRAVENOUS at 15:35

## 2021-01-01 RX ADMIN — MORPHINE SULFATE 2 MG: 2 INJECTION, SOLUTION INTRAMUSCULAR; INTRAVENOUS at 11:57

## 2021-01-01 RX ADMIN — QUETIAPINE 12.5 MG: 25 TABLET, FILM COATED ORAL at 21:41

## 2021-01-01 RX ADMIN — APIXABAN 2.5 MG: 2.5 TABLET, FILM COATED ORAL at 17:54

## 2021-01-01 RX ADMIN — APIXABAN 2.5 MG: 2.5 TABLET, FILM COATED ORAL at 09:04

## 2021-01-01 RX ADMIN — OXYCODONE HYDROCHLORIDE 20 MG: 20 TABLET, FILM COATED, EXTENDED RELEASE ORAL at 21:16

## 2021-01-01 RX ADMIN — SODIUM CHLORIDE 20 ML/HR: 0.9 INJECTION, SOLUTION INTRAVENOUS at 12:13

## 2021-01-01 RX ADMIN — BUDESONIDE 0.5 MG: 0.5 INHALANT ORAL at 19:05

## 2021-01-01 RX ADMIN — PREGABALIN 50 MG: 50 CAPSULE ORAL at 22:53

## 2021-01-01 RX ADMIN — OXYCODONE HYDROCHLORIDE 20 MG: 20 TABLET, FILM COATED, EXTENDED RELEASE ORAL at 20:31

## 2021-01-01 RX ADMIN — BUDESONIDE 0.5 MG: 0.5 INHALANT ORAL at 07:46

## 2021-01-01 RX ADMIN — APIXABAN 2.5 MG: 2.5 TABLET, FILM COATED ORAL at 20:21

## 2021-01-01 RX ADMIN — DIVALPROEX SODIUM 250 MG: 250 TABLET, DELAYED RELEASE ORAL at 08:45

## 2021-01-01 RX ADMIN — IPRATROPIUM BROMIDE AND ALBUTEROL SULFATE 3 ML: 2.5; .5 SOLUTION RESPIRATORY (INHALATION) at 07:22

## 2021-01-01 RX ADMIN — OXYCODONE HYDROCHLORIDE 15 MG: 10 TABLET ORAL at 03:09

## 2021-01-01 RX ADMIN — IOHEXOL 85 ML: 350 INJECTION, SOLUTION INTRAVENOUS at 18:04

## 2021-01-01 RX ADMIN — DIVALPROEX SODIUM 250 MG: 250 TABLET, DELAYED RELEASE ORAL at 10:13

## 2021-01-01 RX ADMIN — INSULIN LISPRO 1 UNITS: 100 INJECTION, SOLUTION INTRAVENOUS; SUBCUTANEOUS at 09:36

## 2021-01-01 RX ADMIN — PANTOPRAZOLE SODIUM 40 MG: 40 TABLET, DELAYED RELEASE ORAL at 06:29

## 2021-01-01 RX ADMIN — ALBUTEROL SULFATE 2 PUFF: 90 AEROSOL, METERED RESPIRATORY (INHALATION) at 03:43

## 2021-01-01 RX ADMIN — GUAIFENESIN 600 MG: 600 TABLET ORAL at 08:45

## 2021-01-01 RX ADMIN — DOCUSATE SODIUM 50 MG AND SENNOSIDES 8.6 MG 1 TABLET: 8.6; 5 TABLET, FILM COATED ORAL at 09:02

## 2021-01-01 RX ADMIN — OXYCODONE HYDROCHLORIDE 15 MG: 10 TABLET ORAL at 17:26

## 2021-01-01 RX ADMIN — ALBUTEROL SULFATE 10 MG: 2.5 SOLUTION RESPIRATORY (INHALATION) at 16:09

## 2021-01-01 RX ADMIN — DOCUSATE SODIUM 50 MG AND SENNOSIDES 8.6 MG 1 TABLET: 8.6; 5 TABLET, FILM COATED ORAL at 09:33

## 2021-01-01 RX ADMIN — IPRATROPIUM BROMIDE AND ALBUTEROL SULFATE 3 ML: .5; 3 SOLUTION RESPIRATORY (INHALATION) at 20:52

## 2021-01-01 RX ADMIN — MELATONIN 3 MG: at 21:15

## 2021-01-01 RX ADMIN — IPRATROPIUM BROMIDE AND ALBUTEROL SULFATE 3 ML: 2.5; .5 SOLUTION RESPIRATORY (INHALATION) at 13:14

## 2021-01-01 RX ADMIN — OXYCODONE HYDROCHLORIDE 20 MG: 20 TABLET, FILM COATED, EXTENDED RELEASE ORAL at 20:29

## 2021-01-01 RX ADMIN — IPRATROPIUM BROMIDE AND ALBUTEROL SULFATE 3 ML: 2.5; .5 SOLUTION RESPIRATORY (INHALATION) at 14:15

## 2021-01-01 RX ADMIN — PREGABALIN 25 MG: 25 CAPSULE ORAL at 08:49

## 2021-01-01 RX ADMIN — CEFTRIAXONE SODIUM 1000 MG: 10 INJECTION, POWDER, FOR SOLUTION INTRAVENOUS at 00:12

## 2021-01-01 RX ADMIN — OXYCODONE HYDROCHLORIDE 20 MG: 20 TABLET, FILM COATED, EXTENDED RELEASE ORAL at 08:45

## 2021-01-01 RX ADMIN — FLUOXETINE 10 MG: 10 CAPSULE ORAL at 08:45

## 2021-01-01 RX ADMIN — MORPHINE SULFATE 2 MG: 2 INJECTION, SOLUTION INTRAMUSCULAR; INTRAVENOUS at 11:51

## 2021-01-01 RX ADMIN — ALBUTEROL SULFATE 2 PUFF: 90 AEROSOL, METERED RESPIRATORY (INHALATION) at 03:05

## 2021-01-01 RX ADMIN — MORPHINE SULFATE 8 MG: 4 INJECTION INTRAVENOUS at 15:41

## 2021-01-01 SDOH — EDUCATIONAL SECURITY - EDUCATION ATTAINMENT: ILITERACY AND LOW LEVEL LITERACY: Z55.0

## 2021-01-12 NOTE — PROGRESS NOTES
Outpatient Virtual Regular Visit - Follow-up with Palliative and José Alia Ellis  64 y o  male [id number]    Intake:    Reason for virtual visit is f/up cancer pain  The patient is unable to visit the clinic safely due to the coronavirus pandemic  After connecting through [employer], the patient was identified by name and date of birth  Huey Sr  was informed that this was a telemedicine visit and that the visit is being conducted through [employer] [facility] and patient was informed that this is a secure, HIPAA-compliant platform  He agrees to proceed     My office door was closed  No one else was in the room  He acknowledged consent and understanding of privacy and security of the video platform  The patient has agreed to participate and understands they can discontinue the visit at any time  Assessment and Plan  Problem List Items Addressed This Visit     Adenocarcinoma of lung, right [facility])    Palliative care patient      Other Visit Diagnoses     Cancer-related pain  (Chronic)           No orders of the defined types were placed in this encounter  Medications Discontinued During This Encounter   Medication Reason    oxyCODONE (OxyCONTIN) 20 mg 12 hr tablet Reorder    oxyCODONE (ROXICODONE) 30 MG immediate release tablet Reorder    oxyCODONE (OxyCONTIN) 20 mg 12 hr tablet Reorder    oxyCODONE (ROXICODONE) 30 MG immediate release tablet Reorder    oxyCODONE (ROXICODONE) 30 MG immediate release tablet Reorder    oxyCODONE (OxyCONTIN) 20 mg 12 hr tablet Reorder        Riley Humphries  was assessed today for symptoms and care planning related to above illnesses  I have reviewed the patient's controlled substance dispensing history in the Prescription Drug Monitoring Program in compliance with the [facility] regulations before prescribing any controlled substances  He is invited to continue to follow with us    If there are questions or concerns, please contact us through our clinic/answering service 24 hours a day, seven days a week  Robbie Ruiz MD  [facility]            Subjective    This 64 y o   fellow returns in f/up of his adenocarcinoma of R lung  He continues with Dr Oskar Nath and Ms Cameron Reinoso, on monotherapy with [facility]  He is dependent on supplemental O2 at baseline  Since last visit, he has been about the same  He will be due for a PET/CT for status check prior to next visit with Med/Onc providers  Otherwise, he continues with the same therapy without changes, and has had no side effects in particular from this  His COPD is tougher in the cold, and with the cold snap today in particular, his back and knees feel very achy today  Otherwise, he feels his pain control is acceptable, and his weights and breathing are more or less stable  He does not wish for changes in med regimen today, and we agreed that this would be reasonable, given ongoing active treatment of his illness  He identifies that he is safe from the coronavirus, and he is provided a brief education that we would ensure that he could get a shot thru [facility] when vaccines are more widely available  Pain control is acceptable, and while he would have wished to complete XRT as rec'd, he did not feel that the XRT suite was appropriate for his health  He developed a cough and rhinorrhea during his second treatment, and he would not like to catch cold/pna while doing therapy  We respect his decision, given his fragile lung health  Otherwise, we reviewed that his A1c is in good shape, and we decreased his quetiapine, as the Med/Onc team has reduced his steroids    He has felt groggy, and is agreeable to this adjustment            Past Medical History:   Diagnosis Date    Alkalosis 12/9/2019    B[facility] 1 disorder [facility]     Cancer ([facility] )     adeno lung  dx 11/2018-lung bx today 4/9/2019-ongoing chemo    Chronic obstructive pulmonary disease with acute exacerbation ([facility] ) 6/7/2018    Chronic pain disorder     back and right shoulder    CKD (chronic kidney disease)     [facility]ch[facility]     Depression     Diabetes mellitus ([facility] )     Elevated d-dimer 11/30/2019    Elevated troponin 11/29/2019    Elevated troponin level not due to acute coronary syndrome 11/30/2019    GERD (gastroesophageal reflux disease)     Herniated lumbar intervertebral disc     Hyperkalemia     Hypertension     Insomnia     Latent syphilis     Treated    Low back pain     Lumbosacral disc disease     Lung cancer ([facility] ) 04/2019    Pneumothorax after biopsy     R lung    PTSD (post-traumatic stress disorder)     Pulmonary emphysema ([facility] )     Tobacco abuse 11/13/2018     Past Surgical History:   Procedure Laterality Date    COLONOSCOPY  2011    CT GUIDED CHEST TUBE  11/21/2018    FL GUIDED CENTRAL VENOUS ACCESS DEVICE INSERTION  2/8/2019    HEMORROIDECTOMY      IR BIOPSY LUNG  11/13/2018    IR CHEST TUBE PLACEMENT  11/14/2018    KNEE SURGERY      NM INSJ TUNNELED CTR VAD W/SUBQ PORT AGE 5 YR/> N/A 2/8/2019    Procedure: PLACEMENT OF PORT-A-CATH;  Surgeon: Pauly Colindres MD;  Location: [address] [place] [address];  Service: Vascular     Current Outpatient Medications   Medication Sig Dispense Refill    albuterol (2 5 mg/3 mL) 0 083 % nebulizer solution Take 1 vial (2 5 mg total) by nebulization every 6 (six) hours as needed for wheezing or shortness of breath 360 mL 5    albuterol (PROVENTIL HFA,VENTOLIN HFA) 90 mcg/act inhaler Inhale 2 puffs every 4 (four) hours as needed for wheezing 18 g 5    apixaban (ELIQUIS) 2 5 mg Take 1 tablet (2 5 mg total) by mouth 2 (two) times a day 60 tablet 11    Blood Glucose Monitoring Suppl KIT by Does not apply route daily 1 each 0    budesonide (PULMICORT) 0 5 mg/2 mL nebulizer solution Take 1 vial (0 5 mg total) by nebulization every 12 (twelve) hours Rinse mouth after use   1 vial 0    carbamide peroxide (DEBROX) 6 5 % otic solution Administer 5 drops into both ears 2 (two) times a day 15 mL 0    clotrimazole-betamethasone (LOTRISONE) 1-0 05 % cream Apply topically 2 (two) times a day 45 g 2    diltiazem (CARDIZEM CD) 120 mg 24 hr capsule Take 1 capsule (120 mg total) by mouth daily 90 capsule 3    divalproex sodium (DEPAKOTE) 250 mg EC tablet Take 1 tablet (250 mg total) by mouth 2 (two) times a day 30 tablet 0    ergocalciferol (ERGOCALCIFEROL) 1 25 MG (77758 UT) capsule Take 1 capsule (50,000 Units total) by mouth once a week (Patient not taking: Reported on 3/24/2021) 12 capsule 0    FLUoxetine (PROzac) 10 MG tablet Take 1 tablet (10 mg total) by mouth daily 30 tablet 5    fluticasone (FLONASE) 50 mcg/act nasal spray 1-2 sprays each nostril daily for allergies 1 Bottle 3    fluticasone-salmeterol (ADVAIR, WIXELA) 250-50 mcg/dose inhaler Inhale 1 puff 2 (two) times a day Rinse mouth after use   1 Inhaler 3    folic acid (FOLVITE) 1 mg tablet Take 1 tablet (1,000 mcg total) by mouth daily 90 tablet 0    formoterol (PERFOROMIST) 20 MCG/2ML nebulizer solution Take 1 vial (20 mcg total) by nebulization 2 (two) times a day 60 vial 1    FREESTYLE LITE test strip TEST BLOOD SUGAR DAILY 100 each 1    guaiFENesin (MUCINEX) 600 mg 12 hr tablet Take 600 mg by mouth every 12 (twelve) hours      ipratropium-albuterol (DUO-NEB) 0 5-2 5 mg/3 mL nebulizer solution Take 1 vial (3 mL total) by nebulization 4 (four) times a day 120 vial 5    Lancets (FREESTYLE) lancets TEST BLOOD SUGAR DAILY 100 each 4    lidocaine (LIDODERM) 5 % Apply 1 patch topically daily Remove & Discard patch within 12 hours or as directed by MD 10 patch 0    lidocaine (LMX) 4 % cream Apply topically as needed for mild pain Apply 1/2-1 inch to port 30-60 mins prior to needle insertion, cover with serrain wrap 30 g 5    melatonin 3 mg Take 1 tablet (3 mg total) by mouth daily at bedtime 30 tablet 1    methocarbamol (ROBAXIN) 500 mg tablet Take 1 tablet (500 mg total) by mouth 3 (three) times a day 30 tablet 0    oxyCODONE (OxyCONTIN) 20 mg 12 hr tablet Take 1 tablet (20 mg total) by mouth every 12 (twelve) hours To prevent cancer painMax Daily Amount: 40 mg 60 tablet 0    oxyCODONE (ROXICODONE) 30 MG immediate release tablet Take 1 tablet (30 mg total) by mouth every 4 (four) hours as needed (cancer pain)Max Daily Amount: 180 mg 120 tablet 0    pantoprazole (PROTONIX) 40 mg tablet Take 1 tablet (40 mg total) by mouth daily 90 tablet 3    polyethylene glycol (GLYCOLAX) powder Take 17 g by mouth daily 527 g 1    predniSONE 5 mg tablet TAKE 1 TABLET BY MOUTH EVERY DAY 30 tablet 0    pregabalin (LYRICA) 25 mg capsule Take 1 capsule PO in morning and 2 capsules PO at bedtime 90 capsule 2    prochlorperazine (COMPAZINE) 10 mg tablet Take 1 tablet (10 mg total) by mouth every 6 (six) hours as needed for nausea or vomiting 90 tablet 0    QUEtiapine (SEROquel) 25 mg tablet TAKE 0 5 TABLETS (12 5 MG TOTAL) BY MOUTH DAILY AT BEDTIME 15 tablet 0    REGULOID 28 3 % POWD USE AS DIRECTED 369 g 4    Saline 0 65 % (Soln) SOLN 5 drops into each nostril as needed (Dry nose) 50 mL 5    senna-docusate sodium (SENOKOT-S) 8 6-50 mg per tablet Take 1 tablet by mouth 2 (two) times a day 60 tablet 11    sitaGLIPtin (JANUVIA) 50 mg tablet Take 1 tablet (50 mg total) by mouth daily 90 tablet 0    tamsulosin (FLOMAX) 0 4 mg Take 1 capsule (0 4 mg total) by mouth daily with dinner 90 capsule 0    tiZANidine (ZANAFLEX) 4 mg tablet Take 1 tablet (4 mg total) by mouth 3 (three) times a day for 14 days 42 tablet 0     No current facility-administered medications for this visit  Allergies   Allergen Reactions    Lisinopril Anaphylaxis     Took medication a while ago and had a "swelling reaction"  Does not carry epi-pen       Review of Systems   Constitutional: Negative for activity change, appetite change, chills and diaphoresis  HENT: Negative for mouth sores and nosebleeds  Eyes: Negative for redness and visual disturbance  Respiratory: Positive for shortness of breath (chronic)  Negative for cough and wheezing  Cardiovascular: Negative for chest pain and palpitations  Gastrointestinal: Positive for constipation  Negative for abdominal pain, diarrhea and nausea  Endocrine: Negative for polydipsia, polyphagia and polyuria  Genitourinary: Negative for hematuria and urgency  Musculoskeletal: Positive for arthralgias and back pain  Negative for gait problem and joint swelling  Skin: Positive for pallor  Negative for wound  Neurological: Positive for weakness  Negative for seizures and syncope  Psychiatric/Behavioral: Negative for agitation and confusion  The patient is nervous/anxious  Video Exam  There were no vitals filed for this visit  Physical Exam  Constitutional:       General: He is not in acute distress  Appearance: He is ill-appearing  He is not toxic-appearing or diaphoretic  Comments: Frail   HENT:      Head: Normocephalic and atraumatic  Right Ear: External ear normal       Left Ear: External ear normal    Eyes:      General:         Right eye: No discharge  Left eye: No discharge  Conjunctiva/sclera: Conjunctivae normal       Pupils: Pupils are equal, round, and reactive to light  Neck:      Trachea: No tracheal deviation  Pulmonary:      Effort: No respiratory distress  Breath sounds: No stridor  Comments: Inc rate, prolonged exp phase  Abdominal:      General: There is no distension  Palpations: Abdomen is soft  Comments: obese   Skin:     General: Skin is warm and dry  Coloration: Skin is not pale  Findings: No erythema or rash  Neurological:      General: No focal deficit present  Mental Status: He is alert and oriented to person, place, and time  Mental status is at baseline  Cranial Nerves: No cranial nerve deficit     Psychiatric:         Mood and Affect: Mood normal  Behavior: Behavior normal          Thought Content: Thought content normal          Judgment: Judgment normal            I spent 25+ minutes was spent during this virtual visit by Yudi, face to face with Regina Boucher  with greater than 50% of the time spent in counseling or coordination of care including discussions of etiology of diagnosis, diagnostic results, impression, and recommendations, follow up requirements and compliance with treatment regimen   All of the patient's questions were answered during this discussion  Encounter provider Rubina Pa MD, located at:  [address] [place] [id number] [phone]    Recent Visits  No visits were found meeting these conditions  Showing recent visits within past 7 days and meeting all other requirements     Future Appointments  No visits were found meeting these conditions  Showing future appointments within next 150 days and meeting all other requirements        VIRTUAL VISIT DISCLAIMER    Kt Hawk Sr  acknowledges that He has consented to an online visit or consultation  He understands that the online visit is based solely on information provided by He, and that, in the absence of a face-to-face physical evaluation by the physician, the diagnosis He receives is both limited and provisional in terms of accuracy and completeness  This is not intended to replace a full medical face-to-face evaluation by the physician  Regina Boucher understands and accepts these terms  Patient was informed this is a billable service

## 2021-01-12 NOTE — PATIENT INSTRUCTIONS
Please protect yourself from the COVID-19! Even though we do not good antiviral drugs for this infection, the following strategies can help you stay healthy:    = Wash your hands! Soap and water, or hand  with at least 60% alcohol, are both effective at killing the virus  = Wear a mask! This will help protect others from any virus particles you might spread  Your mouth and nose BOTH need to be covered  = Keep the distance! Keep 6 feet of distance from others people, even if they seem healthy  Keeping distance protects you from the other person's virus spread     = Get the vaccine! The [employer] are approved for emergency use in the [place]  These vaccines have been shown to be 90+% effective at preventing severe infections when combined with masking, hand-washing, and distancing  As of 1/4/2021, only nursing home residents and front-line health workers have access to the first round of vaccines, but more are coming  We are recommending that all our Palliative and Supportive Care patients get two shots of either vaccine, as early as it is available to them  Numbers of Coronavirus cases are spiking in many [place] States  This is not a more dangerous virus, but a sign that more people in a community are spreading the virus  Please check the local disease reports near you if you consider travelling  As of 1/4/21, we do NOT advise travel outside the [facility] or [place])  Check out [employer] for [place] data that are updated daily:    http://www [employer]     Global Epidemics  Org, from [facility] (OUTPATIENT CAMPUS), will give you [employer] information on virus cases:    Https://globalepidemics  org/

## 2021-01-15 PROBLEM — R07.9 CHEST PAIN: Status: ACTIVE | Noted: 2021-01-01

## 2021-01-15 PROBLEM — J18.9 MULTIFOCAL PNEUMONIA: Status: ACTIVE | Noted: 2021-01-01

## 2021-01-15 PROBLEM — C34.90 LUNG CANCER (HCC): Status: ACTIVE | Noted: 2021-01-01

## 2021-01-15 NOTE — ED NOTES
Patient transported to [facility], [address] [place] [address]  01/15/21 [id number]

## 2021-01-15 NOTE — ED PROVIDER NOTES
History  Chief Complaint   Patient presents with    Chest Pain     Began 2 weeks ago, spoek with [place] doc, stated "skipped beat" ot with Cardiology appt in Feb 2021   Leg Pain     Began yesterday  Pt denies injury  65 yo M h/o lung cancer, COPD, chronic hypoxia on 3L NC presenting for evaluation of 2 separate complaints  Pt reports 2 weeks of CP  Reports achy bilateral chest pain, occurs randomly  Not associated with exertion/activity  Does not radiate  No a/e factors  Last episode was this morning  Pt reports acute onset L back/flank/lower abdominal/groin pain last night  He states this was after reaching for Bambecode bottle so the is unsure if he "pulled something"  He reports severe pain to L low back/side/groin and radiation down most of his leg, not quite to his toes  Pain constant and unchanged with his normal pain regimen  Denies ever having similar in the past  Denies numbness or weakness  Pt noted to be febrile to 101 2 on arrival, he did not know of fever or any recent infectious sx  Denies HA, URI/cough sx, abdominal pain, N/V/D/C, urinary complaints  States his SOB is at baseline and he is on his baseline 3L NC  History of adenocarcinoma of R lung, on Keytruda 400 mg every 6 weeks, last on 12/9/20  Has R chest port that has not had any issues/skin changes  On Eliquis  States he has taken all his medications including this morning  MDM: 65 yo M with CP- cardiac workup  - Fever: septic workup, abx  - L back/abdominal pain- abdominal labs, CT, urine             Prior to Admission Medications   Prescriptions Last Dose Informant Patient Reported? Taking?    Blood Glucose Monitoring Suppl KIT  Self No No   Sig: by Does not apply route daily   FLUoxetine (PROzac) 10 MG tablet  Self No No   Sig: Take 1 tablet (10 mg total) by mouth daily   FREESTYLE LITE test strip  Self No No   Sig: TEST BLOOD SUGAR DAILY   Lancets (FREESTYLE) lancets  Self No No   Sig: TEST BLOOD SUGAR DAILY   QUEtiapine (SEROquel) 25 mg tablet   No No   Sig: TAKE 0 5 TABLETS (12 5 MG TOTAL) BY MOUTH DAILY AT BEDTIME   REGULOID 28 3 % POWD  Self No No   Sig: USE AS DIRECTED   Saline 0 65 % (Soln) SOLN  Self No No   Si drops into each nostril as needed (Dry nose)   albuterol (2 5 mg/3 mL) 0 083 % nebulizer solution  Self No No   Sig: USE 1 VIAL VIA NEB EVERY 4 HOURS AS NEEDED FOR WHEEZING/SHORTNESS OF BREATH   albuterol (PROVENTIL HFA,VENTOLIN HFA) 90 mcg/act inhaler   No No   Sig: Inhale 2 puffs every 4 (four) hours as needed for wheezing   apixaban (ELIQUIS) 2 5 mg  Self No No   Sig: Take 1 tablet (2 5 mg total) by mouth 2 (two) times a day   budesonide (PULMICORT) 0 5 mg/2 mL nebulizer solution  Self No No   Sig: Take 1 vial (0 5 mg total) by nebulization every 12 (twelve) hours Rinse mouth after use     budesonide (PULMICORT) 1 MG/2ML nebulizer solution  Self No No   Sig: Take 2 mL (1 mg total) by nebulization 2 (two) times a day   carbamide peroxide (DEBROX) 6 5 % otic solution  Self No No   Sig: Administer 5 drops into both ears 2 (two) times a day   clotrimazole-betamethasone (LOTRISONE) 1-0 05 % cream  Self No No   Sig: Apply topically 2 (two) times a day   diltiazem (CARDIZEM CD) 120 mg 24 hr capsule  Self No No   Sig: Take 1 capsule (120 mg total) by mouth daily   divalproex sodium (DEPAKOTE) 250 mg EC tablet  Self No No   Sig: Take 1 tablet (250 mg total) by mouth 2 (two) times a day   ergocalciferol (ERGOCALCIFEROL) 1 25 MG (31665 UT) capsule  Self No No   Sig: Take 1 capsule (50,000 Units total) by mouth once a week   fluticasone (FLONASE) 50 mcg/act nasal spray  Self No No   Si-2 sprays each nostril daily for allergies   folic acid (FOLVITE) 1 mg tablet   No No   Sig: Take 1 tablet (1,000 mcg total) by mouth daily   formoterol (PERFOROMIST) 20 MCG/2ML nebulizer solution  Self No No   Sig: Take 1 vial (20 mcg total) by nebulization 2 (two) times a day   guaiFENesin (MUCINEX) 600 mg 12 hr tablet  Self Yes No   Sig: Take 600 mg by mouth every 12 (twelve) hours   ipratropium (ATROVENT) 0 02 % nebulizer solution  Self No No   Sig: USE 1 VIAL VIA NEB 3 TIMES DAILY   ipratropium-albuterol (DUO-NEB) 0 5-2 5 mg/3 mL nebulizer solution   No No   Sig: Take 1 vial (3 mL total) by nebulization 4 (four) times a day   lidocaine (LMX) 4 % cream  Self No No   Sig: Apply topically as needed for mild pain Apply 1/2-1 inch to port 30-60 mins prior to needle insertion, cover with serrain wrap   loratadine (CLARITIN) 10 mg tablet  Self No No   Sig: Take 1 tablet (10 mg total) by mouth daily in the early morning   melatonin 3 mg  Self No No   Sig: Take 1 tablet (3 mg total) by mouth daily at bedtime   metFORMIN (GLUCOPHAGE-XR) 500 mg 24 hr tablet  Self No No   Sig: Take 1 tablet (500 mg total) by mouth 2 (two) times a day with meals   oxyCODONE (OxyCONTIN) 20 mg 12 hr tablet   No No   Sig: Take 1 tablet (20 mg total) by mouth every 12 (twelve) hours To prevent cancer painMax Daily Amount: 40 mg   oxyCODONE (OxyCONTIN) 20 mg 12 hr tablet   No No   Sig: Take 1 tablet (20 mg total) by mouth every 12 (twelve) hours To prevent cancer painMax Daily Amount: 40 mg   oxyCODONE (OxyCONTIN) 20 mg 12 hr tablet   No No   Sig: Take 1 tablet (20 mg total) by mouth every 12 (twelve) hours To prevent cancer painMax Daily Amount: 40 mg   oxyCODONE (ROXICODONE) 30 MG immediate release tablet   No No   Sig: Take 1 tablet (30 mg total) by mouth every 4 (four) hours as needed (cancer pain)Max Daily Amount: 180 mg   oxyCODONE (ROXICODONE) 30 MG immediate release tablet   No No   Sig: Take 1 tablet (30 mg total) by mouth every 4 (four) hours as needed (cancer pain)Max Daily Amount: 180 mg   oxyCODONE (ROXICODONE) 30 MG immediate release tablet   No No   Sig: Take 1 tablet (30 mg total) by mouth every 4 (four) hours as needed (cancer pain)Max Daily Amount: 180 mg   pantoprazole (PROTONIX) 40 mg tablet  Self No No   Sig: Take 1 tablet (40 mg total) by mouth daily polyethylene glycol (GLYCOLAX) powder  Self No No   Sig: Take 17 g by mouth daily   predniSONE 5 mg tablet   No No   Sig: TAKE 1 TABLET BY MOUTH EVERY DAY   pregabalin (LYRICA) 25 mg capsule  Self No No   Sig: Take 1 capsule PO in morning and 2 capsules PO at bedtime   prochlorperazine (COMPAZINE) 10 mg tablet  Self No No   Sig: Take 1 tablet (10 mg total) by mouth every 6 (six) hours as needed for nausea or vomiting   senna-docusate sodium (SENOKOT-S) 8 6-50 mg per tablet   No No   Sig: Take 1 tablet by mouth 2 (two) times a day   tamsulosin (FLOMAX) 0 4 mg  Self No No   Sig: TAKE ONE CAPSULE BY MOUTH EVERY DAY WITH DINNER      Facility-Administered Medications: None       Past Medical History:   Diagnosis Date    Alkalosis 12/9/2019    Bipolar 1 disorder ([facility] )     Cancer ([facility] )     adeno lung  dx 11/2018-lung bx today 4/9/2019-ongoing chemo    Chronic obstructive pulmonary disease with acute exacerbation (HCC) 6/7/2018    Chronic pain disorder     back and right shoulder    CKD (chronic kidney disease)     COPD (chronic obstructive pulmonary disease) (HCC)     Depression     Diabetes mellitus ([address] )     Elevated d-dimer 11/30/2019    Elevated troponin 11/29/2019    Elevated troponin level not due to acute coronary syndrome 11/30/2019    GERD (gastroesophageal reflux disease)     Herniated lumbar intervertebral disc     Hyperkalemia     Hypertension     Insomnia     Latent syphilis     Treated    Low back pain     Lumbosacral disc disease     Lung cancer ([facility] [facility] ) 04/2019    Pneumothorax after biopsy     R lung    PTSD (post-traumatic stress disorder)     Pulmonary emphysema ([facility] [address] )     Tobacco abuse 11/13/2018       Past Surgical History:   Procedure Laterality Date    COLONOSCOPY  2011    CT GUIDED CHEST TUBE  11/21/2018    FL GUIDED CENTRAL VENOUS ACCESS DEVICE INSERTION  2/8/2019    HEMORROIDECTOMY      IR BIOPSY LUNG  11/13/2018    IR CHEST TUBE PLACEMENT  11/14/2018    KNEE SURGERY  WA INSJ TUNNELED CTR VAD W/SUBQ PORT AGE 5 YR/> N/A 2019    Procedure: PLACEMENT OF PORT-A-CATH;  Surgeon: Ganga Fay MD;  Location: [address] [place] [address] OR;  Service: Vascular       Family History   Problem Relation Age of Onset    Heart disease Mother     Cancer Mother     Hypertension Father     Diabetes Family     Asthma Family     Heart disease Family     Cancer Family     Cancer Maternal Grandfather     Cancer Paternal Grandfather     Cancer Maternal Aunt      I have reviewed and agree with the history as documented  E-Cigarette/Vaping    E-Cigarette Use Never User      E-Cigarette/Vaping Substances    Nicotine No     THC No     CBD No     Flavoring No     Other No     Unknown No      Social History     Tobacco Use    Smoking status: Former Smoker     Packs/day: 0 50     Years: 40 00     Pack years: 20 00     Types: Cigarettes     Start date:      Quit date: 2019     Years since quittin 4    Smokeless tobacco: Never Used    Tobacco comment: will smoke a cigarette on occasion with stress   Substance Use Topics    Alcohol use: Not Currently    Drug use: Not Currently     Types: Marijuana     Comment: "I will smoke a joint if I am stressed out but not every day"       Review of Systems   Constitutional: Negative for chills and fever  HENT: Negative for ear pain, rhinorrhea and sore throat  Eyes: Negative for pain and visual disturbance  Respiratory: Negative for cough and shortness of breath  Cardiovascular: Positive for chest pain  Negative for leg swelling  Gastrointestinal: Positive for abdominal pain  Negative for constipation, diarrhea, nausea and vomiting  Endocrine: Negative for polydipsia, polyphagia and polyuria  Genitourinary: Negative for dysuria, frequency, hematuria and urgency  Musculoskeletal: Positive for back pain  Negative for myalgias and neck pain  Skin: Negative for color change and rash     Allergic/Immunologic: Negative for environmental allergies and immunocompromised state  Neurological: Negative for dizziness, weakness, light-headedness, numbness and headaches  Hematological: Negative for adenopathy  Does not bruise/bleed easily  Psychiatric/Behavioral: Negative for agitation and confusion  All other systems reviewed and are negative  Physical Exam  Physical Exam  Vitals signs and nursing note reviewed  Constitutional:       Appearance: Normal appearance  He is well-developed  HENT:      Head: Normocephalic and atraumatic  Nose: Nose normal    Eyes:      Conjunctiva/sclera: Conjunctivae normal    Neck:      Musculoskeletal: Normal range of motion and neck supple  Cardiovascular:      Rate and Rhythm: Normal rate and regular rhythm  Heart sounds: Normal heart sounds  Comments: No chest wall skin changes, no ttp   Pulmonary:      Effort: Pulmonary effort is normal  No respiratory distress  Breath sounds: Normal breath sounds  No stridor  No wheezing or rales  Chest:      Chest wall: No tenderness  Abdominal:      General: There is no distension  Palpations: Abdomen is soft  Tenderness: There is no abdominal tenderness  There is no guarding or rebound  Comments: Back: no midline spinal ttp, L low back ttp, L groin/LLQ ttp, pain with hip flexion of b/l legs   Musculoskeletal:         General: No swelling, tenderness or deformity  Comments: No calf swelling/ttp, no peripheral edema  Palpable DP/PT pulses b/l   Skin:     General: Skin is warm and dry  Findings: No rash  Neurological:      Mental Status: He is alert and oriented to person, place, and time  Sensory: No sensory deficit  Motor: No weakness or abnormal muscle tone  Coordination: Coordination normal       Comments: Sensation grossly intact throughout   Psychiatric:         Thought Content:  Thought content normal          Judgment: Judgment normal          Vital Signs  ED Triage Vitals Temperature Pulse Respirations Blood Pressure SpO2   01/15/21 1228 01/15/21 1228 01/15/21 1228 01/15/21 1228 01/15/21 1228   (!) 101 2 °F (38 4 °C) 102 20 169/84 96 %      Temp Source Heart Rate Source Patient Position - Orthostatic VS BP Location FiO2 (%)   01/15/21 1228 01/15/21 1421 01/15/21 1228 01/15/21 1228 --   Temporal Monitor Sitting Right arm       Pain Score       01/15/21 1228       8           Vitals:    01/15/21 1421 01/15/21 1530 01/15/21 1721 01/15/21 1939   BP: 140/64 144/67 150/81 142/82   Pulse: 84  95 74   Patient Position - Orthostatic VS: Lying Lying Sitting Lying         Visual Acuity      ED Medications  Medications   apixaban (ELIQUIS) tablet 2 5 mg (2 5 mg Oral Given 1/15/21 2021)   budesonide (PULMICORT) inhalation solution 0 5 mg (0 5 mg Nebulization Given 1/15/21 2051)   diltiazem (CARDIZEM CD) 24 hr capsule 120 mg (has no administration in time range)   divalproex sodium (DEPAKOTE) EC tablet 250 mg (250 mg Oral Given 1/15/21 2021)   FLUoxetine (PROzac) capsule 10 mg (has no administration in time range)   formoterol (PERFOROMIST) nebulizer solution 20 mcg (20 mcg Nebulization Given 1/15/21 2051)   guaiFENesin (MUCINEX) 12 hr tablet 600 mg (600 mg Oral Given 1/15/21 2021)   ipratropium-albuterol (DUO-NEB) 0 5-2 5 mg/3 mL inhalation solution 3 mL ( Nebulization Canceled Entry 1/15/21 2200)   lidocaine (LIDODERM) 5 % patch 1 patch (has no administration in time range)   melatonin tablet 3 mg (has no administration in time range)   oxyCODONE (OxyCONTIN) 12 hr tablet 20 mg (20 mg Oral Given 1/15/21 2021)   pantoprazole (PROTONIX) EC tablet 40 mg (has no administration in time range)   QUEtiapine (SEROquel) tablet 12 5 mg (has no administration in time range)   senna-docusate sodium (SENOKOT S) 8 6-50 mg per tablet 1 tablet (1 tablet Oral Given 1/15/21 2022)   tamsulosin (FLOMAX) capsule 0 4 mg (0 4 mg Oral Given 1/15/21 2022)   insulin lispro (HumaLOG) 100 units/mL subcutaneous injection 1-5 Units (has no administration in time range)   oxyCODONE (ROXICODONE) IR tablet 15 mg (has no administration in time range)   morphine injection 2 mg (2 mg Intravenous Given 1/15/21 2019)   azithromycin (ZITHROMAX) 500 mg in sodium chloride 0 9% 250mL IVPB 500 mg (500 mg Intravenous New Bag 1/15/21 2043)   predniSONE tablet 5 mg (has no administration in time range)   cefTRIAXone (ROCEPHIN) 1,000 mg in dextrose 5 % 50 mL IVPB (has no administration in time range)   cefepime (MAXIPIME) 2 g/50 mL dextrose IVPB (0 mg Intravenous Stopped 1/15/21 1546)   acetaminophen (TYLENOL) tablet 975 mg (975 mg Oral Given 1/15/21 1327)   sodium chloride 0 9 % bolus 1,000 mL (0 mL Intravenous Stopped 1/15/21 1546)   morphine (PF) 4 mg/mL injection 8 mg (8 mg Intravenous Given 1/15/21 1335)   iodixanol (VISIPAQUE) 320 MG/ML injection 100 mL (100 mL Intravenous Given 1/15/21 1506)   albuterol inhalation solution 5 mg (5 mg Nebulization Given 1/15/21 1526)   ipratropium (ATROVENT) 0 02 % inhalation solution 0 5 mg (0 5 mg Nebulization Given 1/15/21 1525)   morphine (PF) 4 mg/mL injection 8 mg (8 mg Intravenous Given 1/15/21 1541)       Diagnostic Studies  Results Reviewed     Procedure Component Value Units Date/Time    Blood culture #1 [[id number]] Collected: 01/15/21 1344    Lab Status: Preliminary result Specimen: Blood from Arm, Left Updated: 01/15/21 1801     Blood Culture Received in Microbiology Lab  Culture in Progress  Blood culture #2 [[id number]] Collected: 01/15/21 1328    Lab Status: Preliminary result Specimen: Blood from Arm, Right Updated: 01/15/21 1801     Blood Culture Received in Microbiology Lab  Culture in Progress      Procalcitonin with AM Reflex [[id number]]  (Normal) Collected: 01/15/21 1328    Lab Status: Final result Specimen: Blood from Arm, Right Updated: 01/15/21 1727     Procalcitonin 0 05 ng/ml     Procalcitonin Reflex [[id number]]     Lab Status: No result Specimen: Blood     COVID19, Influenza A/B, RSV PCR, [facility] [[id number]]  (Normal) Collected: 01/15/21 1528    Lab Status: Final result Specimen: Nasopharyngeal Swab Updated: 01/15/21 1614     SARS-CoV-2 Negative     INFLUENZA A PCR Negative     INFLUENZA B PCR Negative     RSV PCR Negative    Narrative: This test has been authorized by FDA under an EUA (Emergency Use Assay) for use by authorized laboratories  Clinical caution and judgement should be used with the interpretation of these results with consideration of the clinical impression and other laboratory testing  Testing reported as "Positive" or "Negative" has been proven to be accurate according to standard laboratory validation requirements  All testing is performed with control materials showing appropriate reactivity at standard intervals      Hepatic function panel [[id number]]  (Abnormal) Collected: 01/15/21 1328    Lab Status: Final result Specimen: Blood from Arm, Right Updated: 01/15/21 1543     Total Bilirubin 0 22 mg/dL      Bilirubin, Direct <0 05 mg/dL      Alkaline Phosphatase 104 U/L      AST 17 U/L      ALT 17 U/L      Total Protein 7 3 g/dL      Albumin 3 3 g/dL     Basic metabolic panel [[id number]]  (Abnormal) Collected: 01/15/21 1328    Lab Status: Final result Specimen: Blood from Arm, Right Updated: 01/15/21 1428     Sodium 138 mmol/L      Potassium 4 9 mmol/L      Chloride 104 mmol/L      CO2 26 mmol/L      ANION GAP 8 mmol/L      BUN 21 mg/dL      Creatinine 2 09 mg/dL      Glucose 135 mg/dL      Calcium 8 6 mg/dL      eGFR 39 ml/min/1 73sq m     Narrative:      [facility] guidelines for Chronic Kidney Disease (CKD):     Stage 1 with normal or high GFR (GFR > 90 mL/min/1 73 square meters)    Stage 2 Mild CKD (GFR = 60-89 mL/min/1 73 square meters)    Stage 3A Moderate CKD (GFR = 45-59 mL/min/1 73 square meters)    Stage 3B Moderate CKD (GFR = 30-44 mL/min/1 73 square meters)    Stage 4 Severe CKD (GFR = 15-29 mL/min/1 73 square meters)    Stage 5 End Stage CKD (GFR <15 mL/min/1 73 square meters)  Note: GFR calculation is accurate only with a steady state creatinine    Lipase [[id number]]  (Normal) Collected: 01/15/21 1328    Lab Status: Final result Specimen: Blood from Arm, Right Updated: 01/15/21 1428     Lipase 187 u/L     Lactic Acid [[id number]]  (Normal) Collected: 01/15/21 1328    Lab Status: Final result Specimen: Blood from Arm, Right Updated: 01/15/21 1420     LACTIC ACID 1 5 mmol/L     Narrative:      Result may be elevated if tourniquet was used during collection      Troponin I [[id number]]  (Abnormal) Collected: 01/15/21 1328    Lab Status: Final result Specimen: Blood from Arm, Right Updated: 01/15/21 1419     Troponin I 0 05 ng/mL     APTT [[id number]]  (Normal) Collected: 01/15/21 1328    Lab Status: Final result Specimen: Blood from Arm, Right Updated: 01/15/21 1404     PTT 33 seconds     Protime-INR [[id number]]  (Abnormal) Collected: 01/15/21 1328    Lab Status: Final result Specimen: Blood from Arm, Right Updated: 01/15/21 1404     Protime 14 9 seconds      INR 1 19    CBC and differential [[id number]]  (Abnormal) Collected: 01/15/21 1328    Lab Status: Final result Specimen: Blood from Arm, Right Updated: 01/15/21 1336     WBC 8 53 Thousand/uL      RBC 4 27 Million/uL      Hemoglobin 11 4 g/dL      Hematocrit 36 7 %      MCV 86 fL      MCH 26 7 pg      MCHC 31 1 g/dL      RDW 17 0 %      MPV 9 6 fL      Platelets 250 Thousands/uL      nRBC 0 /100 WBCs      Neutrophils Relative 66 %      Immat GRANS % 1 %      Lymphocytes Relative 18 %      Monocytes Relative 7 %      Eosinophils Relative 7 %      Basophils Relative 1 %      Neutrophils Absolute 5 72 Thousands/µL      Immature Grans Absolute 0 06 Thousand/uL      Lymphocytes Absolute 1 53 Thousands/µL      Monocytes Absolute 0 60 Thousand/µL      Eosinophils Absolute 0 57 Thousand/µL      Basophils Absolute 0 05 Thousands/µL     UA w Reflex to Microscopic w Reflex to Culture [[id number]]     Lab Status: No result Specimen: Urine                  CT chest abdomen pelvis w contrast   ED Interpretation by Jazz Mendez DO (01/15 1559)   IMPRESSION:     No acute intra-abdominal pelvic abnormality identified      Bilateral multifocal areas of irregular pulmonary consolidation and air bronchograms  Findings seen in association with peribronchial thickening  Distribution is somewhat random but also concentrated along the fissures  Not typical for pattern for   COVID infection but not excluded  Multifocal pneumonia remains suspected  Given some of the distribution along the fissures and prior PET scan report, component of this could be related to known neoplastic etiology  Final Result by Melissa Morris MD (01/15 1557)      No acute intra-abdominal pelvic abnormality identified  Bilateral multifocal areas of irregular pulmonary consolidation and air bronchograms  Findings seen in association with peribronchial thickening  Distribution is somewhat random but also concentrated along the fissures  Not typical for pattern for    COVID infection but not excluded  Multifocal pneumonia remains suspected  Given some of the distribution along the fissures and prior PET scan report, component of this could be related to known neoplastic etiology  The study was marked in [facility] for immediate notification  Workstation performed: [id number]                    Procedures  Procedures         ED Course  ED Course as of Maikol 15 2130   Fri Maikol 15, 2021   1304 Temperature(!): 101 2 °F (38 4 °C)   1304 Pulse: 102   1322 EKG: sinus tachycardia @ 102 bpm, normal axis, normal intervals, no sT changes      1423 3 0 1 year ago   Troponin I(!): 0 05   1430 2 11 1 month ago   Creatinine(!): 2 09   1521 Pt reassessed, now c/o SOB and states he is due for his breathing treatment  He is wheezing on exam  Pain initially improved with morphine, however now is returning   Discussed plan for neb, COVID test and redosing morphine, pending CT scan read and urine      1559 Bilateral multifocal areas of irregular pulmonary consolidation and air bronchograms  Findings seen in association with peribronchial thickening  Distribution is somewhat random but also concentrated along the fissures  Not typical for pattern for   COVID infection but not excluded  Multifocal pneumonia remains suspected  Given some of the distribution along the fissures and prior PET scan report, component of this could be related to known neoplastic etiology      1616 SARS-COV-2: Negative             HEART Risk Score      Most Recent Value   Heart Score Risk Calculator   History  0 Filed at: 01/15/2021 1522   ECG  0 Filed at: 01/15/2021 1522   Age  1 Filed at: 01/15/2021 1522   Risk Factors  2 Filed at: 01/15/2021 1522   Troponin  1 Filed at: 01/15/2021 1522   HEART Score  4 Filed at: 01/15/2021 1522                  Initial Sepsis Screening     Row Name 01/15/21 1331                Is the patient's history suggestive of a new or worsening infection? (!) Yes (Proceed)  -[address]        Suspected source of infection  suspect infection, source unknown;acute abdominal infection;pneumonia;urinary tract infection;bloodstream catheter infection  -KH        Are two or more of the following signs & symptoms of infection both present and new to the patient? (!) Yes (Proceed)  -KH        Indicate SIRS criteria  Hyperthemia > 38 3C (100 9F); Tachycardia > 90 bpm  -KH        If the answer is yes to both questions, suspicion of sepsis is present          If severe sepsis is present AND tissue hypoperfusion perists in the hour after fluid resuscitation or lactate > 4, the patient meets criteria for SEPTIC SHOCK          Are any of the following organ dysfunction criteria present within 6 hours of suspected infection and SIRS criteria that are NOT considered to be chronic conditions?   No  -KH        Organ dysfunction          Date of presentation of severe sepsis   Time of presentation of severe sepsis          Tissue hypoperfusion persists in the hour after crystalloid fluid administration, evidenced, by either:          Was hypotension present within one hour of the conclusion of crystalloid fluid administration?         Date of presentation of septic shock          Time of presentation of septic shock            User Key  (r) = Recorded By, (t) = Taken By, (c) = Cosigned By    [id number] Name Provider Type    [address] [place] [address],  Physician                        MDM  Number of Diagnoses or Management Options  COPD (chronic obstructive pulmonary disease) ([facility] ):   Elevated troponin:   Lung cancer [facility]):   Multifocal pneumonia:   Diagnosis management comments: 65 yo M presenting with CP, L low back/flank/abd pain and found to have fever   On workup, found to have multifocal PNA, treated with broad spec abx and admitted to [facility] for further workup/management       Amount and/or Complexity of Data Reviewed  Clinical lab tests: ordered and reviewed  Tests in the radiology section of CPT®: ordered and reviewed  Tests in the medicine section of CPT®: ordered and reviewed  Review and summarize past medical records: yes  Independent visualization of images, tracings, or specimens: yes        Disposition  Final diagnoses:   Multifocal pneumonia   Elevated troponin   Lung cancer ([facility] )   COPD (chronic obstructive pulmonary disease) ([facility] )     Time reflects when diagnosis was documented in both MDM as applicable and the Disposition within this note     Time User Action Codes Description Comment    1/15/2021  4:32 PM Boneta Surya A Add [J18 9] Multifocal pneumonia     1/15/2021  4:32 PM Boneta Surya A Add [R77 8] Elevated troponin     1/15/2021  4:33 PM Boneta Surya A Add [C34 90] Lung cancer ([facility] )     1/15/2021  4:33 PM Boneta Surya A Add [J44 9] COPD (chronic obstructive pulmonary disease) [facility])       ED Disposition     ED Disposition Condition Date/Time Comment    Admit Stable Fri Maikol 15, 2021  4:24 PM Case was discussed with ALISSA and the patient's admission status was agreed to be Admission Status: inpatient status to the service of Dr Dayana Faye    None         Current Discharge Medication List      CONTINUE these medications which have NOT CHANGED    Details   albuterol (2 5 mg/3 mL) 0 083 % nebulizer solution USE 1 VIAL VIA NEB EVERY 4 HOURS AS NEEDED FOR WHEEZING/SHORTNESS OF BREATH  Qty: 360 mL, Refills: 0    Associated Diagnoses: Chronic obstructive pulmonary disease, unspecified COPD type ([facility] )      albuterol (PROVENTIL HFA,VENTOLIN HFA) 90 mcg/act inhaler Inhale 2 puffs every 4 (four) hours as needed for wheezing  Qty: 18 g, Refills: 5    Comments: Substitution to a formulary equivalent within the same pharmaceutical class is authorized  Associated Diagnoses: Chronic obstructive pulmonary disease, unspecified COPD type ([facility])      apixaban (ELIQUIS) 2 5 mg Take 1 tablet (2 5 mg total) by mouth 2 (two) times a day  Qty: 60 tablet, Refills: 11    Comments: Decreased dosage due to creat and wt  Associated Diagnoses: PAF (paroxysmal atrial fibrillation) ([facility])      Blood Glucose Monitoring Suppl KIT by Does not apply route daily  Qty: 1 each, Refills: 0    Associated Diagnoses: Type 2 diabetes mellitus without complication, without long-term current use of insulin ([facility])      budesonide (PULMICORT) 0 5 mg/2 mL nebulizer solution Take 1 vial (0 5 mg total) by nebulization every 12 (twelve) hours Rinse mouth after use  Qty: 1 vial, Refills: 0    Associated Diagnoses: Adenocarcinoma of lung, right ([facility])      budesonide (PULMICORT) 1 MG/2ML nebulizer solution Take 2 mL (1 mg total) by nebulization 2 (two) times a day  Qty: 120 mL, Refills: 1    Associated Diagnoses: Chronic obstructive pulmonary disease with acute exacerbation ([facility] );  Adenocarcinoma of lung, right ([facility])      carbamide peroxide (DEBROX) 6 5 % otic solution Administer 5 drops into both ears 2 (two) times a day  Qty: 15 mL, Refills: 0    Associated Diagnoses: Impacted cerumen of left ear      clotrimazole-betamethasone (LOTRISONE) 1-0 05 % cream Apply topically 2 (two) times a day  Qty: 45 g, Refills: 2    Associated Diagnoses: Tinea versicolor      diltiazem (CARDIZEM CD) 120 mg 24 hr capsule Take 1 capsule (120 mg total) by mouth daily  Qty: 30 capsule, Refills: 11    Associated Diagnoses: PAF (paroxysmal atrial fibrillation) ([facility] ); Essential hypertension      divalproex sodium (DEPAKOTE) 250 mg EC tablet Take 1 tablet (250 mg total) by mouth 2 (two) times a day  Qty: 30 tablet, Refills: 0    Associated Diagnoses: Generalized body aches      ergocalciferol (ERGOCALCIFEROL) 1 25 MG (87063 UT) capsule Take 1 capsule (50,000 Units total) by mouth once a week  Qty: 12 capsule, Refills: 0    Associated Diagnoses: Stage 3 chronic kidney disease; Secondary hyperparathyroidism of renal origin ([facility] ); Vitamin D deficiency      FLUoxetine (PROzac) 10 MG tablet Take 1 tablet (10 mg total) by mouth daily  Qty: 30 tablet, Refills: 5    Associated Diagnoses: Depression, unspecified depression type      fluticasone (FLONASE) 50 mcg/act nasal spray 1-2 sprays each nostril daily for allergies  Qty: 1 Bottle, Refills: 3    Associated Diagnoses: Environmental and seasonal allergies      folic acid (FOLVITE) 1 mg tablet Take 1 tablet (1,000 mcg total) by mouth daily  Qty: 90 tablet, Refills: 0    Associated Diagnoses: Adverse effect of chemotherapy, initial encounter      formoterol (PERFOROMIST) 20 MCG/2ML nebulizer solution Take 1 vial (20 mcg total) by nebulization 2 (two) times a day  Qty: 60 vial, Refills: 1    Associated Diagnoses: Chronic obstructive pulmonary disease with acute exacerbation ([facility] );  Adenocarcinoma of lung, right (HCC)      FREESTYLE LITE test strip TEST BLOOD SUGAR DAILY  Qty: 100 each, Refills: 1    Associated Diagnoses: Type 2 diabetes mellitus with hyperglycemia, without long-term current use of insulin ([facility] ) guaiFENesin (MUCINEX) 600 mg 12 hr tablet Take 600 mg by mouth every 12 (twelve) hours      ipratropium (ATROVENT) 0 02 % nebulizer solution USE 1 VIAL VIA NEB 3 TIMES DAILY  Qty: 225 mL, Refills: 0    Associated Diagnoses: Acute exacerbation of chronic obstructive pulmonary disease (COPD) ([facility])      ipratropium-albuterol (DUO-NEB) 0 5-2 5 mg/3 mL nebulizer solution Take 1 vial (3 mL total) by nebulization 4 (four) times a day  Qty: 120 vial, Refills: 5    Associated Diagnoses: Chronic obstructive pulmonary disease with acute exacerbation ([facility])      Lancets (FREESTYLE) lancets TEST BLOOD SUGAR DAILY  Qty: 100 each, Refills: 4    Associated Diagnoses: Type 2 diabetes mellitus with hyperglycemia, without long-term current use of insulin ([facility])      lidocaine (LMX) 4 % cream Apply topically as needed for mild pain Apply 1/2-1 inch to port 30-60 mins prior to needle insertion, cover with serrain wrap  Qty: 30 g, Refills: 5    Associated Diagnoses: Encounter for antineoplastic chemotherapy      loratadine (CLARITIN) 10 mg tablet Take 1 tablet (10 mg total) by mouth daily in the early morning  Qty: 90 tablet, Refills: 3    Associated Diagnoses: Allergic rhinitis due to pollen, unspecified seasonality      melatonin 3 mg Take 1 tablet (3 mg total) by mouth daily at bedtime  Qty: 30 tablet, Refills: 1    Associated Diagnoses: Difficulty sleeping      metFORMIN (GLUCOPHAGE-XR) 500 mg 24 hr tablet Take 1 tablet (500 mg total) by mouth 2 (two) times a day with meals  Qty: 60 tablet, Refills: 0    Associated Diagnoses: Type 2 diabetes mellitus without complication, without long-term current use of insulin ([facility] )      ! ! oxyCODONE (OxyCONTIN) 20 mg 12 hr tablet Take 1 tablet (20 mg total) by mouth every 12 (twelve) hours To prevent cancer painMax Daily Amount: 40 mg  Qty: 60 tablet, Refills: 0    Associated Diagnoses: Adenocarcinoma of lung, right ([address] );  Cancer-related pain; Palliative care patient      !! oxyCODONE (OxyCONTIN) 20 mg 12 hr tablet Take 1 tablet (20 mg total) by mouth every 12 (twelve) hours To prevent cancer painMax Daily Amount: 40 mg  Qty: 60 tablet, Refills: 0    Comments: Fill on or near 3/8/21  Associated Diagnoses: Adenocarcinoma of lung, right ([facility] ); Cancer-related pain; Palliative care patient      !! oxyCODONE (OxyCONTIN) 20 mg 12 hr tablet Take 1 tablet (20 mg total) by mouth every 12 (twelve) hours To prevent cancer painMax Daily Amount: 40 mg  Qty: 60 tablet, Refills: 0    Comments: Fill on or near 2/8/21  Associated Diagnoses: [facility] ([facility] ); Cancer-related pain; Palliative care patient      !! oxyCODONE (ROXICODONE) 30 MG immediate release tablet Take 1 tablet (30 mg total) by mouth every 4 (four) hours as needed (cancer pain)Max Daily Amount: 180 mg  Qty: 120 tablet, Refills: 0    Associated Diagnoses: Aden[facility] of lung, right ([facility] ); Cancer-related pain; Palliative care patient      !! oxyCODONE (ROXICODONE) 30 MG immediate release tablet Take 1 tablet (30 mg total) by mouth every 4 (four) hours as needed (cancer pain)Max Daily Amount: 180 mg  Qty: 120 tablet, Refills: 0    Comments: Fill on or near 3/8/21  Associated Diagnoses: Adenocarcinoma of lung, right ([facility] ); Cancer-related pain; Palliative care patient      !! oxyCODONE (ROXICODONE) 30 MG immediate release tablet Take 1 tablet (30 mg total) by mouth every 4 (four) hours as needed (cancer pain)Max Daily Amount: 180 mg  Qty: 120 tablet, Refills: 0    Comments: Fill on or near 2/8/21  Associated Diagnoses: Adenocarcinoma of lung, right ([facility] );  Cancer-related pain; Palliative care patient      pantoprazole (PROTONIX) 40 mg tablet Take 1 tablet (40 mg total) by mouth daily  Qty: 90 tablet, Refills: 0    Associated Diagnoses: Adenocarcinoma of lung, right (HCC)      polyethylene glycol (GLYCOLAX) powder Take 17 g by mouth daily  Qty: 527 g, Refills: 1    Associated Diagnoses: Constipation, unspecified constipation type predniSONE 5 mg tablet TAKE 1 TABLET BY MOUTH EVERY DAY  Qty: 30 tablet, Refills: 3    Associated Diagnoses: Adenocarcinoma of lung, right ([facility])      pregabalin (LYRICA) 25 mg capsule Take 1 capsule PO in morning and 2 capsules PO at bedtime  Qty: 90 capsule, Refills: 2    Associated Diagnoses: Peripheral polyneuropathy      prochlorperazine (COMPAZINE) 10 mg tablet Take 1 tablet (10 mg total) by mouth every 6 (six) hours as needed for nausea or vomiting  Qty: 90 tablet, Refills: 0    Associated Diagnoses: Chemotherapy-induced nausea      QUEtiapine (SEROquel) 25 mg tablet TAKE 0 5 TABLETS (12 5 MG TOTAL) BY MOUTH DAILY AT BEDTIME  Qty: 15 tablet, Refills: 0    Associated Diagnoses: Bipolar 1 disorder ([facility])      REGULOID 28 3 % POWD USE AS DIRECTED  Qty: 369 g, Refills: 4    Associated Diagnoses: Type 2 diabetes mellitus with hyperglycemia, without long-term current use of insulin ([facility])      Saline 0 65 % (Soln) SOLN 5 drops into each nostril as needed (Dry nose)  Qty: 50 mL, Refills: 5    Associated Diagnoses: Chronic obstructive pulmonary disease with acute exacerbation ([facility])      senna-docusate sodium (SENOKOT-S) 8 6-50 mg per tablet Take 1 tablet by mouth 2 (two) times a day  Qty: 60 tablet, Refills: 11    Associated Diagnoses: Constipation, unspecified constipation type      tamsulosin (FLOMAX) 0 4 mg TAKE ONE CAPSULE BY MOUTH EVERY DAY WITH DINNER  Qty: 90 capsule, Refills: 0    Associated Diagnoses: Prostatitis, unspecified prostatitis type; Stranguria; Benign prostatic hyperplasia with lower urinary tract symptoms, symptom details unspecified       !! - Potential duplicate medications found  Please discuss with provider  STOP taking these medications       lidocaine (LIDODERM) 5 % Comments:   Reason for Stopping:             No discharge procedures on file      PDMP Review       Value Time User    PDMP Reviewed  Yes 1/15/2021  7:03 PM Nj Santizo MD          ED Provider  Electronically Signed by           Karen Merlos, DO  01/15/21 9228

## 2021-01-15 NOTE — ED NOTES
Pt given portable ED oxygen tank for use while waiting for ED bed       Deborah Noland RN  01/15/21 5649

## 2021-01-16 NOTE — PROGRESS NOTES
Progress Note - Tobi Blackwood Sr  1960, 64 y o  male MRN: [id number]    Unit/Bed#: E2 -01 Encounter: [id number]    Primary Care Provider: Ej Johnson MD   Date and time admitted to hospital: 1/15/2021 12:58 PM        * Multifocal pneumonia  Assessment & Plan  · Multifocal pneumonia afebrile currently on ceftriaxone/azithromycin  · Clinically has pneumonia however procalcitonin negative    Lab Results   Component Value Date    SARSCOV2 Negative 01/15/2021     Results from last 7 days   Lab Units 01/16/21  0437 01/15/21  2152 01/15/21  1328   PROCALCITONIN ng/ml 0 10 0 06 0 05       Paroxysmal atrial fibrillation (HCC)  Assessment & Plan  · Paroxysmal atrial fibrillation continue diltiazem and eliquis    Stage 3 chronic kidney disease  Assessment & Plan  · CKD 3b stable    Results from last 7 days   Lab Units 01/16/21  0437 01/15/21  1328   BUN mg/dL 19 21   CREATININE mg/dL 2 27* 2 09*   EGFR ml/min/1 73sq m 35 39       Adenocarcinoma of lung, right ([facility] )  Assessment & Plan  · Stage IV adenocarcinoma of lung follows with oncology and palliative care  · Continue on keytruda and prednisone    Bipolar 1 disorder ([facility] )  Assessment & Plan  · Bipolar depression  Mood stable quetiapine and fluoxetine    Chronic bilateral thoracic back pain  Assessment & Plan  · Chronic low back pain on opiates  Still having left-sided sciatica pain  Add tizanidine  Essential hypertension  Assessment & Plan  · Essential hypertension continue diltiazem    COPD (chronic obstructive pulmonary disease) ([facility] )  Assessment & Plan  · COPD without exacerbation  Continue nebulized bronchodilators      VTE Pharmacologic Prophylaxis: VTE Score: 7 High Risk (Score >/= 5) - Pharmacological DVT Prophylaxis Ordered: Apixaban (Eliquis)  Sequential Compression Devices Ordered  Patient Centered Rounds: I have performed bedside rounds with nursing staff today    Discussions with Specialists or Other Care Team Provider:     Education and Discussions with Family / Patient:     Time Spent for Care: 25 mins  More than 50% of total time spent on counseling and coordination of care as described above  Current Length of Stay: 1 day(s)  Current Patient Status: Inpatient   Certification Statement: The patient will continue to require additional inpatient hospital stay due to pneumonia  Discharge Plan / Estimated Discharge Date: Anticipate discharge in 48-72 hrs to home  Code Status: Level 1 - Full Code      Subjective:   Patient seen and examined  Still having left-sided sciatica pain    Objective:   Vitals: Blood pressure (!) 184/81, pulse 83, temperature 98 5 °F (36 9 °C), temperature source Temporal, resp  rate 18, height 5' 8 5" (1 74 m), weight 71 7 kg (158 lb), SpO2 95 %  Physical Exam  Vitals signs reviewed  Constitutional:       General: He is not in acute distress  HENT:      Head: Atraumatic  Cardiovascular:      Rate and Rhythm: Regular rhythm  Heart sounds: Normal heart sounds  Pulmonary:      Effort: Pulmonary effort is normal       Breath sounds: Decreased breath sounds, wheezing and rhonchi present  Abdominal:      General: Bowel sounds are normal       Palpations: Abdomen is soft  Tenderness: There is no abdominal tenderness  There is no rebound  Musculoskeletal:         General: Tenderness (Left lumbar paraspinal) present  No swelling  Skin:     General: Skin is warm and dry  Neurological:      General: No focal deficit present  Mental Status: He is alert and oriented to person, place, and time  Cranial Nerves: No cranial nerve deficit     Psychiatric:         Mood and Affect: Mood normal        Additional Data:   Labs:  Results from last 7 days   Lab Units 01/16/21  0436 01/15/21  1328   WBC Thousand/uL 11 08* 8 53   HEMOGLOBIN g/dL 11 1* 11 4*   HEMATOCRIT % 37 0 36 7   MCV fL 88 86   PLATELETS Thousands/uL 334 332   INR   --  1 19     Results from last 7 days   Lab Units 01/16/21  0437 01/15/21  1328   SODIUM mmol/L 138 138   POTASSIUM mmol/L 4 6 4 9   CHLORIDE mmol/L 104 104   CO2 mmol/L 26 26   ANION GAP mmol/L 8 8   BUN mg/dL 19 21   CREATININE mg/dL 2 27* 2 09*   CALCIUM mg/dL 8 2* 8 6   ALBUMIN g/dL 3 1* 3 3*   TOTAL BILIRUBIN mg/dL 0 16* 0 22   ALK PHOS U/L 100 104   ALT U/L 19 17   AST U/L 15 17   EGFR ml/min/1 73sq m 35 39   GLUCOSE RANDOM mg/dL 100 135         Results from last 7 days   Lab Units 01/16/21  0003 01/15/21  2152 01/15/21  1328   TROPONIN I ng/mL 0 05* 0 05* 0 05*          Results from last 7 days   Lab Units 01/16/21  0437  01/15/21  1328   LACTIC ACID mmol/L  --   --  1 5   PROCALCITONIN ng/ml 0 10   < > 0 05    < > = values in this interval not displayed  Results from last 7 days   Lab Units 01/16/21  1618 01/16/21  1148 01/16/21  0747 01/15/21  2045   POC GLUCOSE mg/dl 128 99 95 109             * I Have Reviewed All Lab Data Listed Above  Cultures:   Results from last 7 days   Lab Units 01/15/21  2316 01/15/21  1528 01/15/21  1344 01/15/21  1328   BLOOD CULTURE   --   --  No Growth at 24 hrs  No Growth at 24 hrs     INFLUENZA A PCR   --  Negative  --   --    LEGIONELLA URINARY ANTIGEN  Negative  --   --   --    STREP PNEUMONIAE ANTIGEN, URINE  Negative  --   --   --        Results from last 7 days   Lab Units 01/15/21  1528   SARS-COV-2  Negative   INFLUENZA A PCR  Negative   INFLUENZA B PCR  Negative   RSV PCR  Negative           Lines/Drains:  Invasive Devices     Central Venous Catheter Line            Port A Cath Right Chest -- days          Peripheral Intravenous Line            Peripheral IV 01/15/21 Right Antecubital 1 day              Telemetry:   Telemetry Orders (From admission, onward)             48 Hour Telemetry Monitoring  Continuous x 48 hours     Question:  Reason for 48 Hour Telemetry  Answer:  Acute MI, chest pain - R/O MI, or unstable angina                  Imaging:  Imaging Reports Reviewed Today Include:   Ct Chest Abdomen Pelvis W Contrast    Result Date: 1/15/2021  Impression: No acute intra-abdominal pelvic abnormality identified  Bilateral multifocal areas of irregular pulmonary consolidation and air bronchograms  Findings seen in association with peribronchial thickening  Distribution is somewhat random but also concentrated along the fissures  Not typical for pattern for COVID infection but not excluded  Multifocal pneumonia remains suspected  Given some of the distribution along the fissures and prior PET scan report, component of this could be related to known neoplastic etiology  The study was marked in [facility] for immediate notification   Workstation performed: [id number]     Scheduled Meds:  Current Facility-Administered Medications   Medication Dose Route Frequency Provider Last Rate    albuterol  2 puff Inhalation Q4H PRN Sandra Castillo MD      apixaban  2 5 mg Oral BID Sandra Castillo MD      azithromycin  500 mg Intravenous Q24H Sandra Castillo MD Stopped (01/15/21 2200)    budesonide  0 5 mg Nebulization Q12H Sandra Castillo MD      cefTRIAXone  1,000 mg Intravenous Q24H Sandra Castillo MD 1,000 mg (01/16/21 0012)    diltiazem  120 mg Oral Daily Sandra Castillo MD      divalproex sodium  250 mg Oral BID Sandra Castillo MD      FLUoxetine  10 mg Oral Daily Sandra Castillo MD      formoterol  20 mcg Nebulization BID Sandra Castillo MD      guaiFENesin  600 mg Oral Q12H Isabella Parada MD      insulin lispro  1-5 Units Subcutaneous TID [facility] Sandra Castillo MD      ipratropium-albuterol  3 mL Nebulization TID Nael Castorena DO      lidocaine  1 patch Topical Daily Sandra Castillo MD      melatonin  3 mg Oral HS Sandra Castillo MD      morphine injection  2 mg Intravenous Q4H PRN Sandra Castillo MD      oxyCODONE  20 mg Oral Q12H [address] Sandra Castillo MD      oxyCODONE  15 mg Oral Q4H PRN Sandra Castillo MD      pantoprazole  40 mg Oral Daily Before Breakfast Sandra Castillo MD      predniSONE  5 mg Oral Daily Sandra Castillo MD      QUEtiapine 12 5 mg Oral HS [facility] MD      senna-docusate sodium  1 tablet Oral BID [facility] MD      tamsulosin  0 4 mg Oral Daily With Teetee Cordoba MD      tiZANidine  4 mg Oral TID DO Edvin Pardo, DO [address] [facility]  Hospitalist    ** Please Note: This note has been constructed using a voice recognition system   **

## 2021-01-16 NOTE — PLAN OF CARE
Problem: Potential for Falls  Goal: Patient will remain free of falls  Description: INTERVENTIONS:  - Assess patient frequently for physical needs  -  Identify cognitive and physical deficits and behaviors that affect risk of falls    -  [place] fall precautions as indicated by assessment   - Educate patient/family on patient safety including physical limitations  - Instruct patient to call for assistance with activity based on assessment  - Modify environment to reduce risk of injury  - Consider OT/PT consult to assist with strengthening/mobility  Outcome: Progressing     Problem: PAIN - ADULT  Goal: Verbalizes/displays adequate comfort level or baseline comfort level  Description: Interventions:  - Encourage patient to monitor pain and request assistance  - Assess pain using appropriate pain scale  - Administer analgesics based on type and severity of pain and evaluate response  - Implement non-pharmacological measures as appropriate and evaluate response  - Consider cultural and social influences on pain and pain management  - Notify physician/advanced practitioner if interventions unsuccessful or patient reports new pain  Outcome: Progressing     Problem: INFECTION - ADULT  Goal: Absence or prevention of progression during hospitalization  Description: INTERVENTIONS:  - Assess and monitor for signs and symptoms of infection  - Monitor lab/diagnostic results  - Monitor all insertion sites, i e  indwelling lines, tubes, and drains  - Monitor endotracheal if appropriate and nasal secretions for changes in amount and color  - [place] appropriate cooling/warming therapies per order  - Administer medications as ordered  - Instruct and encourage patient and family to use good hand hygiene technique  - Identify and instruct in appropriate isolation precautions for identified infection/condition  Outcome: Progressing  Goal: Absence of fever/infection during neutropenic period  Description: INTERVENTIONS:  - Monitor WBC    Outcome: Progressing     Problem: SAFETY ADULT  Goal: Patient will remain free of falls  Description: INTERVENTIONS:  - Assess patient frequently for physical needs  -  Identify cognitive and physical deficits and behaviors that affect risk of falls    -  [place] fall precautions as indicated by assessment   - Educate patient/family on patient safety including physical limitations  - Instruct patient to call for assistance with activity based on assessment  - Modify environment to reduce risk of injury  - Consider OT/PT consult to assist with strengthening/mobility  Outcome: Progressing  Goal: Maintain or return to baseline ADL function  Description: INTERVENTIONS:  -  Assess patient's ability to carry out ADLs; assess patient's baseline for ADL function and identify physical deficits which impact ability to perform ADLs (bathing, care of mouth/teeth, toileting, grooming, dressing, etc )  - Assess/evaluate cause of self-care deficits   - Assess range of motion  - Assess patient's mobility; develop plan if impaired  - Assess patient's need for assistive devices and provide as appropriate  - Encourage maximum independence but intervene and supervise when necessary  - Involve family in performance of ADLs  - Assess for home care needs following discharge   - Consider OT consult to assist with ADL evaluation and planning for discharge  - Provide patient education as appropriate  Outcome: Progressing  Goal: Maintain or return mobility status to optimal level  Description: INTERVENTIONS:  - Assess patient's baseline mobility status (ambulation, transfers, stairs, etc )    - Identify cognitive and physical deficits and behaviors that affect mobility  - Identify mobility aids required to assist with transfers and/or ambulation (gait belt, sit-to-stand, lift, walker, cane, etc )  - [place] fall precautions as indicated by assessment  - Record patient progress and toleration of activity level on Mobility SBAR; progress patient to next Phase/Stage  - Instruct patient to call for assistance with activity based on assessment  - Consider rehabilitation consult to assist with strengthening/weightbearing, etc   Outcome: Progressing     Problem: DISCHARGE PLANNING  Goal: Discharge to home or other facility with appropriate resources  Description: INTERVENTIONS:  - Identify barriers to discharge w/patient and caregiver  - Arrange for needed discharge resources and transportation as appropriate  - Identify discharge learning needs (meds, wound care, etc )  - Arrange for interpretive services to assist at discharge as needed  - Refer to Case Management Department for coordinating discharge planning if the patient needs post-hospital services based on physician/advanced practitioner order or complex needs related to functional status, cognitive ability, or social support system  Outcome: Progressing     Problem: Knowledge Deficit  Goal: Patient/family/caregiver demonstrates understanding of disease process, treatment plan, medications, and discharge instructions  Description: Complete learning assessment and assess knowledge base    Interventions:  - Provide teaching at level of understanding  - Provide teaching via preferred learning methods  Outcome: Progressing     Problem: RESPIRATORY - ADULT  Goal: Achieves optimal ventilation and oxygenation  Description: INTERVENTIONS:  - Assess for changes in respiratory status  - Assess for changes in mentation and behavior  - Position to facilitate oxygenation and minimize respiratory effort  - Oxygen administered by appropriate delivery if ordered  - Initiate smoking cessation education as indicated  - Encourage broncho-pulmonary hygiene including cough, deep breathe, Incentive Spirometry  - Assess the need for suctioning and aspirate as needed  - Assess and instruct to report SOB or any respiratory difficulty  - Respiratory Therapy support as indicated  Outcome: Progressing     Problem: METABOLIC, FLUID AND ELECTROLYTES - ADULT  Goal: Glucose maintained within target range  Description: INTERVENTIONS:  - Monitor Blood Glucose as ordered  - Assess for signs and symptoms of hyperglycemia and hypoglycemia  - Administer ordered medications to maintain glucose within target range  - Assess nutritional intake and initiate nutrition service referral as needed  Outcome: Progressing

## 2021-01-16 NOTE — ASSESSMENT & PLAN NOTE
· Patient on chronic opioids, follows palliative care outpatient  · PDMP reviewed  · 01/03/2021 OxyContin 20 mg; quantity 60; 30 supply  · 12/23/2020 oxycodone 30 mg; quantity 120; 30 day supply  · Will continue with OxyContin 20 mg b i d   · Oxycodone 15 mg q 4 hours as needed and additional morphine 2 mg IV q 4 hours for severe pain

## 2021-01-16 NOTE — ASSESSMENT & PLAN NOTE
· Patient has chronic lower back pain and on chronic opioids at home  · Complaining of having left-sided paraspinal lower back pain radiating down left leg, states he did bend over to reach something yesterday after which his pain started  · Possibly sciatica?   · No neurologic deficits, no numbness or weakness  · Will continue with lidocaine patch and pain control

## 2021-01-16 NOTE — PLAN OF CARE
Problem: Potential for Falls  Goal: Patient will remain free of falls  Description: INTERVENTIONS:  - Assess patient frequently for physical needs  -  Identify cognitive and physical deficits and behaviors that affect risk of falls    -  [place] fall precautions as indicated by assessment   - Educate patient/family on patient safety including physical limitations  - Instruct patient to call for assistance with activity based on assessment  - Modify environment to reduce risk of injury  - Consider OT/PT consult to assist with strengthening/mobility  Outcome: Progressing     Problem: PAIN - ADULT  Goal: Verbalizes/displays adequate comfort level or baseline comfort level  Description: Interventions:  - Encourage patient to monitor pain and request assistance  - Assess pain using appropriate pain scale  - Administer analgesics based on type and severity of pain and evaluate response  - Implement non-pharmacological measures as appropriate and evaluate response  - Consider cultural and social influences on pain and pain management  - Notify physician/advanced practitioner if interventions unsuccessful or patient reports new pain  Outcome: Progressing     Problem: INFECTION - ADULT  Goal: Absence or prevention of progression during hospitalization  Description: INTERVENTIONS:  - Assess and monitor for signs and symptoms of infection  - Monitor lab/diagnostic results  - Monitor all insertion sites, i e  indwelling lines, tubes, and drains  - Monitor endotracheal if appropriate and nasal secretions for changes in amount and color  - [place] appropriate cooling/warming therapies per order  - Administer medications as ordered  - Instruct and encourage patient and family to use good hand hygiene technique  - Identify and instruct in appropriate isolation precautions for identified infection/condition  Outcome: Progressing  Goal: Absence of fever/infection during neutropenic period  Description: INTERVENTIONS:  - Monitor WBC    Outcome: Progressing     Problem: SAFETY ADULT  Goal: Patient will remain free of falls  Description: INTERVENTIONS:  - Assess patient frequently for physical needs  -  Identify cognitive and physical deficits and behaviors that affect risk of falls    -  [place] fall precautions as indicated by assessment   - Educate patient/family on patient safety including physical limitations  - Instruct patient to call for assistance with activity based on assessment  - Modify environment to reduce risk of injury  - Consider OT/PT consult to assist with strengthening/mobility  Outcome: Progressing  Goal: Maintain or return to baseline ADL function  Description: INTERVENTIONS:  -  Assess patient's ability to carry out ADLs; assess patient's baseline for ADL function and identify physical deficits which impact ability to perform ADLs (bathing, care of mouth/teeth, toileting, grooming, dressing, etc )  - Assess/evaluate cause of self-care deficits   - Assess range of motion  - Assess patient's mobility; develop plan if impaired  - Assess patient's need for assistive devices and provide as appropriate  - Encourage maximum independence but intervene and supervise when necessary  - Involve family in performance of ADLs  - Assess for home care needs following discharge   - Consider OT consult to assist with ADL evaluation and planning for discharge  - Provide patient education as appropriate  Outcome: Progressing  Goal: Maintain or return mobility status to optimal level  Description: INTERVENTIONS:  - Assess patient's baseline mobility status (ambulation, transfers, stairs, etc )    - Identify cognitive and physical deficits and behaviors that affect mobility  - Identify mobility aids required to assist with transfers and/or ambulation (gait belt, sit-to-stand, lift, walker, cane, etc )  - [place] fall precautions as indicated by assessment  - Record patient progress and toleration of activity level on Mobility SBAR; progress patient to next Phase/Stage  - Instruct patient to call for assistance with activity based on assessment  - Consider rehabilitation consult to assist with strengthening/weightbearing, etc   Outcome: Progressing     Problem: DISCHARGE PLANNING  Goal: Discharge to home or other facility with appropriate resources  Description: INTERVENTIONS:  - Identify barriers to discharge w/patient and caregiver  - Arrange for needed discharge resources and transportation as appropriate  - Identify discharge learning needs (meds, wound care, etc )  - Arrange for interpretive services to assist at discharge as needed  - Refer to Case Management Department for coordinating discharge planning if the patient needs post-hospital services based on physician/advanced practitioner order or complex needs related to functional status, cognitive ability, or social support system  Outcome: Progressing     Problem: Knowledge Deficit  Goal: Patient/family/caregiver demonstrates understanding of disease process, treatment plan, medications, and discharge instructions  Description: Complete learning assessment and assess knowledge base    Interventions:  - Provide teaching at level of understanding  - Provide teaching via preferred learning methods  Outcome: Progressing     Problem: RESPIRATORY - ADULT  Goal: Achieves optimal ventilation and oxygenation  Description: INTERVENTIONS:  - Assess for changes in respiratory status  - Assess for changes in mentation and behavior  - Position to facilitate oxygenation and minimize respiratory effort  - Oxygen administered by appropriate delivery if ordered  - Initiate smoking cessation education as indicated  - Encourage broncho-pulmonary hygiene including cough, deep breathe, Incentive Spirometry  - Assess the need for suctioning and aspirate as needed  - Assess and instruct to report SOB or any respiratory difficulty  - Respiratory Therapy support as indicated  Outcome: Progressing     Problem: METABOLIC, FLUID AND ELECTROLYTES - ADULT  Goal: Glucose maintained within target range  Description: INTERVENTIONS:  - Monitor Blood Glucose as ordered  - Assess for signs and symptoms of hyperglycemia and hypoglycemia  - Administer ordered medications to maintain glucose within target range  - Assess nutritional intake and initiate nutrition service referral as needed  Outcome: Progressing

## 2021-01-16 NOTE — ASSESSMENT & PLAN NOTE
Lab Results   Component Value Date    EGFR 39 01/15/2021    EGFR 39 (L) 12/09/2020    EGFR 45 (L) 11/18/2020    CREATININE 2 09 (H) 01/15/2021    CREATININE 2 11 (H) 12/09/2020    CREATININE 1 88 (H) 11/18/2020   · Appears to be at baseline  · Monitor renal function

## 2021-01-16 NOTE — ASSESSMENT & PLAN NOTE
· Stage IV Right lung adenocarcinoma diagnosed in 2018 follows with Oncology outpatient currently on Keytruda  · Also on chronic prednisone 5 mg daily

## 2021-01-16 NOTE — ASSESSMENT & PLAN NOTE
This is a 82-year-old male with history of hypertension, hyperlipidemia, diabetes mellitus, stage IV adenocarcinoma of right lung on [place] presenting with 2 weeks of intermittent chest pain dyspnea and cough  Cough is productive of green phlegm  Also has subjective fever and chills at home      · No evidence of leukocytosis, patient did have fever 1 1 point  · CT reveals bilateral multifocal areas of irregular pulmonary consolidation and air bronchograms, could be related to known neoplastic etiology  · COVID negative  · Will continue with empiric antibiotics, ceftriaxone and azithromycin  · Will check urine Legionella, strep pneumo, follow-up cultures

## 2021-01-16 NOTE — ASSESSMENT & PLAN NOTE
· Likely pleuritic secondary to pneumonia versus musculoskeletal  · Troponin mildly elevated at 0 05 however this can be type 2 non MI elevation  · EKG reveals sinus tachycardia  · Will trend troponin, monitor on telemetry

## 2021-01-16 NOTE — ASSESSMENT & PLAN NOTE
Lab Results   Component Value Date    HGBA1C 5 9 10/15/2020       Recent Labs     01/15/21  2045   POCGLU 109     · Hold oral hypoglycemics  · Monitor Accu-Cheks, sliding scale for coverage

## 2021-01-17 PROBLEM — M54.32 SCIATICA OF LEFT SIDE: Status: ACTIVE | Noted: 2021-01-01

## 2021-01-17 NOTE — ASSESSMENT & PLAN NOTE
· CKD 3b stable    Results from last 7 days   Lab Units 01/16/21  0437 01/15/21  1328   BUN mg/dL 19 21   CREATININE mg/dL 2 27* 2 09*   EGFR ml/min/1 73sq m 35 39

## 2021-01-17 NOTE — ASSESSMENT & PLAN NOTE
Lab Results   Component Value Date    HGBA1C 5 9 10/15/2020       Recent Labs     01/16/21  1148 01/16/21  1618 01/16/21  2131 01/17/21  0638   POCGLU 99 128 119 101     · Hold oral hypoglycemics  · Monitor Accu-Cheks, sliding scale for coverage  · Add lantus with addition of steroids

## 2021-01-17 NOTE — ASSESSMENT & PLAN NOTE
· Likely pleuritic secondary to pneumonia versus musculoskeletal  · Troponin mildly elevated at 0 05 however this can be type 2 non MI elevation

## 2021-01-17 NOTE — ASSESSMENT & PLAN NOTE
· Stage IV adenocarcinoma of lung follows with oncology and palliative care    · Continue on keytruda and prednisone

## 2021-01-17 NOTE — ASSESSMENT & PLAN NOTE
· Multifocal pneumonia afebrile currently on ceftriaxone/azithromycin  · Clinically has pneumonia however procalcitonin negative    Lab Results   Component Value Date    SARSCOV2 Negative 01/15/2021     Results from last 7 days   Lab Units 01/16/21  0437 01/15/21  2152 01/15/21  1328   PROCALCITONIN ng/ml 0 10 0 06 0 05

## 2021-01-17 NOTE — ASSESSMENT & PLAN NOTE
· Pain not well managed  · Will restart Lyrica  · Start IV Dexamethason for 24-48hours, then transition to prednisone taper   Patient chronically on 5mg daily  · continue tizanidine  · PT/OT

## 2021-01-17 NOTE — ASSESSMENT & PLAN NOTE
· COPD without exacerbation    Continue nebulized bronchodilators  · On Prednisone 5 mg daily - on hold while on dexamethasone

## 2021-01-17 NOTE — PROGRESS NOTES
Progress Note - Nick Payton Sr  1960, 64 y o  male MRN: [id number]    Unit/Bed#: E2 -01 Encounter: [id number]    Primary Care Provider: Declan López MD   Date and time admitted to hospital: 1/15/2021 12:58 PM        * Multifocal pneumonia  Assessment & Plan  · Multifocal pneumonia afebrile currently on ceftriaxone/azithromycin day 3/5  · Clinically has pneumonia however procalcitonin negative    Lab Results   Component Value Date    SARSCOV2 Negative 01/15/2021     Results from last 7 days   Lab Units 01/16/21  0437 01/15/21  2152 01/15/21  1328   PROCALCITONIN ng/ml 0 10 0 06 0 05       Sciatica of left side  Assessment & Plan  · Pain not well managed  · Will restart Lyrica  · Start IV Dexamethason for 24-48hours, then transition to prednisone taper  Patient chronically on 5mg daily  · continue tizanidine  · PT/OT    Chest pain  Assessment & Plan  · Likely pleuritic secondary to pneumonia versus musculoskeletal  · Troponin mildly elevated at 0 05 however this can be type 2 non MI elevation    COPD (chronic obstructive pulmonary disease) ([facility])  Assessment & Plan  · COPD without exacerbation  Continue nebulized bronchodilators  · On Prednisone 5 mg daily - on hold while on dexamethasone    Essential hypertension  Assessment & Plan  · Essential hypertension continue diltiazem    Chronic bilateral thoracic back pain  Assessment & Plan  · Chronic low back pain on opiates  Still having left-sided sciatica pain  Add tizanidine  Bipolar 1 disorder ([facility] [address] )  Assessment & Plan  · Bipolar depression    Mood stable quetiapine and fluoxetine    Gastroesophageal reflux disease without esophagitis  Assessment & Plan  · Continue PPI    Type 2 diabetes mellitus without complication, without long-term current use of insulin [facility])  Assessment & Plan  Lab Results   Component Value Date    HGBA1C 5 9 10/15/2020       Recent Labs     01/16/21  1148 01/16/21  1618 01/16/21  2131 01/17/21  0638   POCGLU 99 128 119 101 · Hold oral hypoglycemics  · Monitor Accu-Cheks, sliding scale for coverage  · Add lantus with addition of steroids    Adenocarcinoma of lung, right (HCC)  Assessment & Plan  · Stage IV adenocarcinoma of lung follows with oncology and palliative care  · Continue on keytruda and prednisone    Chronic pain disorder  Assessment & Plan  · Patient on chronic opioids, follows palliative care outpatient  · PDMP reviewed  · 2021 OxyContin 20 mg; quantity 60; 30 supply  · 2020 oxycodone 30 mg; quantity 120; 30 day supply  · Will continue with OxyContin 20 mg b i d   · Oxycodone 15 mg q 4 hours as needed and additional morphine 2 mg IV q 4 hours for severe pain    Stage 3 chronic kidney disease  Assessment & Plan  · CKD 3b stable    Results from last 7 days   Lab Units 21  0437 01/15/21  1328   BUN mg/dL 19 21   CREATININE mg/dL 2 27* 2 09*   EGFR ml/min/1 73sq m 35 39       Paroxysmal atrial fibrillation (HCC)  Assessment & Plan  · Paroxysmal atrial fibrillation continue diltiazem and eliquis      VTE Pharmacologic Prophylaxis:   Pharmacologic: Apixaban (Eliquis)  Mechanical VTE Prophylaxis in Place: Yes    Patient Centered Rounds: I have performed bedside rounds with nursing staff today  Discussions with Specialists or Other Care Team Provider:     Education and Discussions with Family / Patient: patient    Time Spent for Care: 30 minutes  More than 50% of total time spent on counseling and coordination of care as described above  Current Length of Stay: 2 day(s)    Current Patient Status: Inpatient   Certification Statement: The patient will continue to require additional inpatient hospital stay due to IV antibiotics    Discharge Plan: await clinical course    Code Status: Level 1 - Full Code      Subjective:   Had a coughing spell this am requiring increase of O2 to 10L, now back down to 5L  C/O left sided sciatica pain      Objective:     Vitals:   Temp (24hrs), Av 1 °F (36 7 °C), Min:97 4 °F (36 3 °C), Max:98 7 °F (37 1 °C)    Temp:  [97 4 °F (36 3 °C)-98 7 °F (37 1 °C)] 97 4 °F (36 3 °C)  HR:  [] 100  Resp:  [18] 18  BP: (127-184)/(62-83) 153/83  SpO2:  [90 %-97 %] 90 %  Body mass index is 23 67 kg/m²  Input and Output Summary (last 24 hours): Intake/Output Summary (Last 24 hours) at 1/17/2021 1027  Last data filed at 1/16/2021 2316  Gross per 24 hour   Intake 500 ml   Output 1200 ml   Net -700 ml       Physical Exam:     Physical Exam  Constitutional:       Appearance: Normal appearance  HENT:      Head: Normocephalic and atraumatic  Cardiovascular:      Rate and Rhythm: Normal rate and regular rhythm  Heart sounds: No murmur  Pulmonary:      Effort: Pulmonary effort is normal       Comments: Course breath sounds bilaterally  Abdominal:      General: Bowel sounds are normal       Palpations: Abdomen is soft  Tenderness: There is no abdominal tenderness  Musculoskeletal:         General: Tenderness (tender to palpation over left SI joint) present  Neurological:      Mental Status: He is alert and oriented to person, place, and time  Mental status is at baseline     Psychiatric:         Mood and Affect: Mood normal          Additional Data:     Labs:    Results from last 7 days   Lab Units 01/16/21  0436   WBC Thousand/uL 11 08*   HEMOGLOBIN g/dL 11 1*   HEMATOCRIT % 37 0   PLATELETS Thousands/uL 334   NEUTROS PCT % 48   LYMPHS PCT % 26   MONOS PCT % 10   EOS PCT % 14*     Results from last 7 days   Lab Units 01/16/21  0437   SODIUM mmol/L 138   POTASSIUM mmol/L 4 6   CHLORIDE mmol/L 104   CO2 mmol/L 26   BUN mg/dL 19   CREATININE mg/dL 2 27*   ANION GAP mmol/L 8   CALCIUM mg/dL 8 2*   ALBUMIN g/dL 3 1*   TOTAL BILIRUBIN mg/dL 0 16*   ALK PHOS U/L 100   ALT U/L 19   AST U/L 15   GLUCOSE RANDOM mg/dL 100     Results from last 7 days   Lab Units 01/15/21  1328   INR  1 19     Results from last 7 days   Lab Units 01/17/21  0638 01/16/21  2131 01/16/21  1618 01/16/21  1148 01/16/21  0747 01/15/21  2045   POC GLUCOSE mg/dl 101 119 128 99 95 109         Results from last 7 days   Lab Units 01/16/21  0437 01/15/21  2152 01/15/21  1328   LACTIC ACID mmol/L  --   --  1 5   PROCALCITONIN ng/ml 0 10 0 06 0 05           * I Have Reviewed All Lab Data Listed Above  * Additional Pertinent Lab Tests Reviewed: All Labs Within Last 24 Hours Reviewed    Imaging:    Imaging Reports Reviewed Today Include: CT chest  Imaging Personally Reviewed by Myself Includes:  none    Recent Cultures (last 7 days):     Results from last 7 days   Lab Units 01/15/21  2316 01/15/21  1344 01/15/21  1328   BLOOD CULTURE   --  No Growth at 24 hrs  No Growth at 24 hrs     LEGIONELLA URINARY ANTIGEN  Negative  --   --        Last 24 Hours Medication List:   Current Facility-Administered Medications   Medication Dose Route Frequency Provider Last Rate    albuterol  2 puff Inhalation Q4H PRN Jetta Cogan, MD      apixaban  2 5 mg Oral BID Jetta Cogan, MD      azithromycin  500 mg Intravenous Q24H Jetta Cogan, MD Stopped (01/16/21 2106)    budesonide  0 5 mg Nebulization Q12H Jetta Cogan, MD      cefTRIAXone  1,000 mg Intravenous Q24H Jetta Cogan, MD Stopped (01/16/21 2316)    dexamethasone  4 mg Intravenous Q12H [address] Iris Gutierrez PA-C      diltiazem  120 mg Oral Daily Jetta Cogan, MD      divalproex sodium  250 mg Oral BID Jetta Cogan, MD      FLUoxetine  10 mg Oral Daily Jetta Cogan, MD      formoterol  20 mcg Nebulization BID Jetta Cogan, MD      guaiFENesin  600 mg Oral Q12H Ria Alvarez MD      insulin glargine  8 Units Subcutaneous HS Ary Dumont PA-C      insulin lispro  1-5 Units Subcutaneous TID [facility] Jetta Cogan, MD      ipratropium-albuterol  3 mL Nebulization TID Dinah Choudhury DO      lidocaine  1 patch Topical Daily Jetta Cogan, MD      melatonin  3 mg Oral HS Jetta Cogan, MD      morphine injection  2 mg Intravenous Q4H PRN Jetta Cogan, MD      oxyCODONE  20 mg Oral Q12H Abdoul Field MD      oxyCODONE  15 mg Oral Q4H PRN Ashley Costello MD      pantoprazole  40 mg Oral Daily Before Breakfast Ashley Costello MD      pregabalin  25 mg Oral See Admin Instructions Gloria Fox PA-C      QUEtiapine  12 5 mg Oral HS Ashley Costello MD      senna-docusate sodium  1 tablet Oral BID Ashley Costello MD      tamsulosin  0 4 mg Oral Daily With Quita Calero MD      tiZANidine  4 mg Oral TID [place] DO Yamileth          Today, Patient Was Seen By: Gloria Fox PA-C    ** Please Note: Dictation voice to text software may have been used in the creation of this document   **

## 2021-01-17 NOTE — PLAN OF CARE
Problem: Potential for Falls  Goal: Patient will remain free of falls  Description: INTERVENTIONS:  - Assess patient frequently for physical needs  -  Identify cognitive and physical deficits and behaviors that affect risk of falls    -  [place] fall precautions as indicated by assessment   - Educate patient/family on patient safety including physical limitations  - Instruct patient to call for assistance with activity based on assessment  - Modify environment to reduce risk of injury  - Consider OT/PT consult to assist with strengthening/mobility  Outcome: Progressing     Problem: PAIN - ADULT  Goal: Verbalizes/displays adequate comfort level or baseline comfort level  Description: Interventions:  - Encourage patient to monitor pain and request assistance  - Assess pain using appropriate pain scale  - Administer analgesics based on type and severity of pain and evaluate response  - Implement non-pharmacological measures as appropriate and evaluate response  - Consider cultural and social influences on pain and pain management  - Notify physician/advanced practitioner if interventions unsuccessful or patient reports new pain  Outcome: Progressing     Problem: INFECTION - ADULT  Goal: Absence or prevention of progression during hospitalization  Description: INTERVENTIONS:  - Assess and monitor for signs and symptoms of infection  - Monitor lab/diagnostic results  - Monitor all insertion sites, i e  indwelling lines, tubes, and drains  - Monitor endotracheal if appropriate and nasal secretions for changes in amount and color  - [place] appropriate cooling/warming therapies per order  - Administer medications as ordered  - Instruct and encourage patient and family to use good hand hygiene technique  - Identify and instruct in appropriate isolation precautions for identified infection/condition  Outcome: Progressing  Goal: Absence of fever/infection during neutropenic period  Description: INTERVENTIONS:  - Monitor WBC    Outcome: Progressing     Problem: SAFETY ADULT  Goal: Patient will remain free of falls  Description: INTERVENTIONS:  - Assess patient frequently for physical needs  -  Identify cognitive and physical deficits and behaviors that affect risk of falls    -  [place] fall precautions as indicated by assessment   - Educate patient/family on patient safety including physical limitations  - Instruct patient to call for assistance with activity based on assessment  - Modify environment to reduce risk of injury  - Consider OT/PT consult to assist with strengthening/mobility  Outcome: Progressing  Goal: Maintain or return to baseline ADL function  Description: INTERVENTIONS:  -  Assess patient's ability to carry out ADLs; assess patient's baseline for ADL function and identify physical deficits which impact ability to perform ADLs (bathing, care of mouth/teeth, toileting, grooming, dressing, etc )  - Assess/evaluate cause of self-care deficits   - Assess range of motion  - Assess patient's mobility; develop plan if impaired  - Assess patient's need for assistive devices and provide as appropriate  - Encourage maximum independence but intervene and supervise when necessary  - Involve family in performance of ADLs  - Assess for home care needs following discharge   - Consider OT consult to assist with ADL evaluation and planning for discharge  - Provide patient education as appropriate  Outcome: Progressing  Goal: Maintain or return mobility status to optimal level  Description: INTERVENTIONS:  - Assess patient's baseline mobility status (ambulation, transfers, stairs, etc )    - Identify cognitive and physical deficits and behaviors that affect mobility  - Identify mobility aids required to assist with transfers and/or ambulation (gait belt, sit-to-stand, lift, walker, cane, etc )  - [place] fall precautions as indicated by assessment  - Record patient progress and toleration of activity level on Mobility SBAR; progress patient to next Phase/Stage  - Instruct patient to call for assistance with activity based on assessment  - Consider rehabilitation consult to assist with strengthening/weightbearing, etc   Outcome: Progressing     Problem: DISCHARGE PLANNING  Goal: Discharge to home or other facility with appropriate resources  Description: INTERVENTIONS:  - Identify barriers to discharge w/patient and caregiver  - Arrange for needed discharge resources and transportation as appropriate  - Identify discharge learning needs (meds, wound care, etc )  - Arrange for interpretive services to assist at discharge as needed  - Refer to Case Management Department for coordinating discharge planning if the patient needs post-hospital services based on physician/advanced practitioner order or complex needs related to functional status, cognitive ability, or social support system  Outcome: Progressing     Problem: Knowledge Deficit  Goal: Patient/family/caregiver demonstrates understanding of disease process, treatment plan, medications, and discharge instructions  Description: Complete learning assessment and assess knowledge base    Interventions:  - Provide teaching at level of understanding  - Provide teaching via preferred learning methods  Outcome: Progressing     Problem: RESPIRATORY - ADULT  Goal: Achieves optimal ventilation and oxygenation  Description: INTERVENTIONS:  - Assess for changes in respiratory status  - Assess for changes in mentation and behavior  - Position to facilitate oxygenation and minimize respiratory effort  - Oxygen administered by appropriate delivery if ordered  - Initiate smoking cessation education as indicated  - Encourage broncho-pulmonary hygiene including cough, deep breathe, Incentive Spirometry  - Assess the need for suctioning and aspirate as needed  - Assess and instruct to report SOB or any respiratory difficulty  - Respiratory Therapy support as indicated  Outcome: Progressing     Problem: METABOLIC, FLUID AND ELECTROLYTES - ADULT  Goal: Glucose maintained within target range  Description: INTERVENTIONS:  - Monitor Blood Glucose as ordered  - Assess for signs and symptoms of hyperglycemia and hypoglycemia  - Administer ordered medications to maintain glucose within target range  - Assess nutritional intake and initiate nutrition service referral as needed  Outcome: Progressing

## 2021-01-17 NOTE — ASSESSMENT & PLAN NOTE
· Multifocal pneumonia afebrile currently on ceftriaxone/azithromycin day 3/5  · Clinically has pneumonia however procalcitonin negative    Lab Results   Component Value Date    SARSCOV2 Negative 01/15/2021     Results from last 7 days   Lab Units 01/16/21  0437 01/15/21  2152 01/15/21  1328   PROCALCITONIN ng/ml 0 10 0 06 0 05

## 2021-01-18 NOTE — PHYSICAL THERAPY NOTE
Physical Therapy Evaluation      Patient Active Problem List   Diagnosis    COPD (chronic obstructive pulmonary disease) ([facility] [address] [facility]    Essential hypertension    Chronic bilateral thoracic back pain    Chronic right shoulder pain    Bilateral carpal tunnel syndrome    Bipolar 1 disorder ([facility] [address] )    Gastroesophageal reflux disease without esophagitis    Other specified anxiety disorders    Cubital tunnel syndrome, bilateral    Type 2 diabetes mellitus without complication, without long-term current use of insulin (HCC)    Panlobular emphysema ([facility] [address] )    Adenocarcinoma of lung, right ([facility] [address] )    Tobacco abuse    Encounter for immunization    Generalized body aches    Chronic pain disorder    Cancer related pain    Chemotherapy-induced nausea    Erectile dysfunction    Renal dysfunction    Weight loss    Stage 3 chronic kidney disease    Peripheral neuropathy    Maxillary sinusitis    Impacted cerumen of left ear    [facility]    Ambulatory dysfunction    Palliative care patient    Herpes zoster with complication    Encounter for central line care    Personal history of chemotherapy    Vitamin D deficiency    Tinea versicolor    Lung cancer ([facility]    Multifocal pneumonia    Chest pain    Sciatica of left side       Past Medical History:   Diagnosis Date    Alkalosis 12/9/2019    Bipolar 1 disorder ([facility]     Cancer ([facility]     adeno lung  dx 11/2018-lung bx today 4/9/2019-ongoing chemo    Chronic obstructive pulmonary disease with acute exacerbation ([facility] ) 6/7/2018    Chronic pain disorder     back and right shoulder    CKD (chronic kidney disease)     COPD (chronic obstructive pulmonary disease) ([facility]     Depression     Diabetes mellitus ([facility]     Elevated d-dimer 11/30/2019    Elevated troponin 11/29/2019    Elevated troponin level not due to acute coronary syndrome 11/30/2019    GERD (gastroesophageal reflux disease)     Herniated lumbar intervertebral disc     Hyperkalemia     Hypertension     Insomnia     Latent syphilis     Treated    Low back pain     Lumbosacral disc disease     Lung cancer ([facility] [address] ) 04/2019    Pneumothorax after biopsy     R lung    PTSD (post-traumatic stress disorder)     Pulmonary emphysema ([facility] [address] )     Tobacco abuse 11/13/2018       Past Surgical History:   Procedure Laterality Date    COLONOSCOPY  2011    CT GUIDED CHEST TUBE  11/21/2018    FL GUIDED CENTRAL VENOUS ACCESS DEVICE INSERTION  2/8/2019    HEMORROIDECTOMY      IR BIOPSY LUNG  11/13/2018    IR CHEST TUBE PLACEMENT  11/14/2018    KNEE SURGERY      IN INSJ TUNNELED CTR VAD W/SUBQ PORT AGE 5 YR/> N/A 2/8/2019    Procedure: PLACEMENT OF PORT-A-CATH;  Surgeon: Bala Naranjo MD;  Location: [address] [place] [address] OR;  Service: Vascular        01/18/21 1027   PT Last Visit   PT Visit Date 01/18/21   Note Type   Note type Evaluation   Pain Assessment   Pain Assessment Tool 0-10   Pain Score 8   Pain Location/Orientation Location: Hip;Orientation: Left   Home Living   Type of [address] One level;Stairs to enter with rails  (1+1 stairs to enter)   Home Equipment   (home O2)   Prior Function   Level of [place] Independent with ADLs and functional mobility   Lives With Family  (2 sons and brother)   Receives Help From Family   ADL Assistance Independent   IADLs Independent   Falls in the last 6 months 0   Comments pt amb in dep without device  reporting severe l hip pain, had cane in past but lost it  able to amb from apt to truck normally   home O2 at 3L   Restrictions/Precautions   Weight Bearing Precautions Per Order No   Other Precautions Fall Risk;O2;Pain;Hard of hearing   General   Family/Caregiver Present No   Cognition   Overall Cognitive Status WFL   Orientation Level Oriented X4   RUE Assessment   RUE Assessment WNL   LUE Assessment   LUE Assessment WNL   RLE Assessment   RLE Assessment WNL   LLE Assessment   LLE Assessment X  (HF 3-/5, quads 4/5, distal 5/5)   Coordination   Movements are Fluid and Coordinated 1   Sensation WFL   Bed Mobility   Rolling R 7  Independent   Rolling L 7  Independent   Supine to Sit 7  Independent   Sit to Supine 7  Independent   Transfers   Sit to Stand 7  Independent   Stand to Sit 7  Independent   Ambulation/Elevation   Gait pattern Antalgic   Gait Assistance 6  Modified independent   Assistive Device Straight cane   Distance 20' x2   Balance   Static Sitting Normal   Dynamic Sitting Normal   Static Standing Good   Dynamic Standing Fair   Ambulatory Fair   Endurance Deficit   Endurance Deficit Yes   Endurance Deficit Description O2 at 4L   Activity Tolerance   Activity Tolerance Patient tolerated treatment well;Treatment limited secondary to medical complications (Comment)   Nurse Made Aware yes   Assessment   Assessment pt admitted with shortness of breath, alford, chest pain, dx with copd exacerbation, pneumonia  pt referred to PT  pt was living with family in apt  2 stairs to enter, was using home O2 at 3L but no assistive device  pt requested cane due to L hip pain  pt had limited distance amb of about 200' PTA, but was unable to tolerated distances in his home just before admission  pt demonstrated indep mobility but antalgic gait due to L hip pain using cane  tolerated in room distances without alford and with O2  at 4L  pt does hav chronic deficits in strength, balance, respiratory function, pain control due to chronic l hip pain, activity tolerance  pt does not need skilled PT at this time  pt can return home with family  recommend OPPT for l hip pain if pt is ammenable  cane provided to pt for use at home  d/c PT   Barriers to Discharge None   Goals   Patient Goals get washed up and bed changed     Recommendation   PT Discharge Recommendation Return to previous environment with social support;Home with skilled therapy  (OPPT for hip pain if pt desires  )   Equipment Recommended Miquel De La Cruz  (has)   PT - OK to Discharge Yes   AM-PAC Basic Mobility Inpatient   Turning in Bed Without Bedrails 4   Lying on Back to Sitting on Edge of Flat Bed 4   Moving Bed to Chair 4   Standing Up From Chair 4   Walk in Room 4   Climb 3-5 Stairs 3   Basic Mobility Inpatient Raw Score 23   Basic Mobility Standardized Score 50 88   History: co - morbidities, fall risk, use of assistive device, , multiple lines  Exam: impairments in locomotion, musculoskeletal, balance, pulmonary,   Clinical: unstable/unpredictable  Complexity:high      Luanne Arellano, PT

## 2021-01-18 NOTE — OCCUPATIONAL THERAPY NOTE
Occupational Therapy Screen        Patient Name: Gerardo Perkins  Today's Date: 1/18/2021    OT consult received  Per PT pt is independent w/ ADLS, functional transfers and mobility w/ SPC and no inpt OT needs warranted at this time  D/C OT        Abram Braden MS, OTR/L

## 2021-01-18 NOTE — UTILIZATION REVIEW
Initial Clinical Review     Admission: Date/Time/Statement:       Admission Orders (From admission, onward)              Ordered          01/15/21 1649   Inpatient Admission  Once                             Orders Placed This Encounter   Procedures    Inpatient Admission       Standing Status:   Standing       Number of Occurrences:   1       Order Specific Question:   Level of Care       Answer:   Med Surg [16]       Order Specific Question:   Estimated length of stay       Answer:   More than 2 Midnights       Order Specific Question:   Certification       Answer:   I certify that inpatient services are medically necessary for this patient for a duration of greater than two midnights  See H&P and MD Progress Notes for additional information about the patient's course of treatment                 ED Arrival Information      Expected Arrival Acuity Means of Arrival Escorted By Service Admission Type     - 1/15/2021 12:15 Emergent Walk-In Self Hospitalist Emergency     Arrival Complaint     Chest Pain               Chief Complaint   Patient presents with    Chest Pain       Began 2 weeks ago, spoek with [facility] doc, stated "skipped beat" ot with Cardiology appt in Feb 2021   Leg Pain       Began yesterday  Pt denies injury  PMH of   Assessment/Plan: 64 y  o  male presents to ED with Chest Pain, Left low Back/flank/abd pain  PMH of HTN, HLD, DM,  stage IV adenocarcinoma of right lung on keytruda  Pt notes 2 weeks of intermittent chest pain localized to the mid sternal and right side worse with deep inspiration, and coughing   Cough productive of a green phlegm  Back pain started last night  Upon arrival to the ED patient noted to have fever 101 2; on O2 @ 3 L nasal cannula which is chronic for him  CT reveals bilateral multifocal areas of irregular pulmonary consolidation and air bronchograms, could be related to known neoplastic etiology  COVID negative  EKG Sinus Tach   Cr 2 09, Trop 0 05     Admitted to Inpatient M/S/Tele With Multifocal Pneumonia, Chest Pain and Plan is for IV Abxs w/ Rocephin and Zithromax,  check urine Legionella, strep pneumo, follow-up cultures,  ACS R/O, Monitor on Telemetry  Continue diltiazem, Eliquis for anticoagulation for Parox A Fib, monitor renal fx, accu checks with ssi     1/16 C/O 8/10 Left back pain  Patient on chronic opioids  Continue with OxyContin 20 mg b i d    Oxycodone 15 mg q 4 hours prn & additional morphine 2 mg IV q 4 hours for severe pain  + Exp wheezes  TX as below               ED Triage Vitals   Temperature Pulse Respirations Blood Pressure SpO2   01/15/21 1228 01/15/21 1228 01/15/21 1228 01/15/21 1228 01/15/21 1228   (!) 101 2 °F (38 4 °C) 102 20 169/84 96 %       Temp Source Heart Rate Source Patient Position - Orthostatic VS BP Location FiO2 (%)   01/15/21 1228 01/15/21 1421 01/15/21 1228 01/15/21 1228 --   Temporal Monitor Sitting Right arm         Pain Score           01/15/21 1228           8                  Wt Readings from Last 1 Encounters:   01/16/21 71 7 kg (158 lb)      Additional Vital Signs:   01/16/21 0747   98 7 °F (37 1 °C)   83   18   144/87      97 %             Lying   SpO2: 3liter at 01/16/21 0747   01/16/21 0300   97 8 °F (36 6 °C)   89   22   183/84Abnormal       93 %         None (Room air)   Lying   01/15/21 2326   97 5 °F (36 4 °C)   84   18   153/83      99 %         None (Room air)   Lying   01/15/21 2052                  96 %               01/15/21 2046                     32   3 L/min         01/15/21 1939   98 7 °F (37 1 °C)   74   18   142/82   74   94 %         None (Room air)   Lying   01/15/21 1721      95   16   150/81   108   100 %         Nasal cannula   Sitting   01/15/21 1530            144/67   95               Lying   01/15/21 1421      84   18   140/64   92   98 %         Nasal cannula   Lying         Pertinent Labs/Diagnostic Test Results:        Results from last 7 days   Lab Units 01/15/21  1528   SARS-COV-2   Negative            Results from last 7 days   Lab Units 01/16/21  0436 01/15/21  1328   WBC Thousand/uL 11 08* 8 53   HEMOGLOBIN g/dL 11 1* 11 4*   HEMATOCRIT % 37 0 36 7   PLATELETS Thousands/uL 334 332   NEUTROS ABS Thousands/µL 5 51 5 72            Results from last 7 days   Lab Units 01/16/21  0437 01/15/21  1328   SODIUM mmol/L 138 138   POTASSIUM mmol/L 4 6 4 9   CHLORIDE mmol/L 104 104   CO2 mmol/L 26 26   ANION GAP mmol/L 8 8   BUN mg/dL 19 21   CREATININE mg/dL 2 27* 2 09*   EGFR ml/min/1 73sq m 35 39   CALCIUM mg/dL 8 2* 8 6            Results from last 7 days   Lab Units 01/16/21  0437 01/15/21  1328   AST U/L 15 17   ALT U/L 19 17   ALK PHOS U/L 100 104   TOTAL PROTEIN g/dL 7 6 7 3   ALBUMIN g/dL 3 1* 3 3*   TOTAL BILIRUBIN mg/dL 0 16* 0 22   BILIRUBIN DIRECT mg/dL  --  <0 05             Results from last 7 days   Lab Units 01/16/21  1148 01/16/21  0747 01/15/21  2045   POC GLUCOSE mg/dl 99 95 109            Results from last 7 days   Lab Units 01/16/21  0437 01/15/21  1328   GLUCOSE RANDOM mg/dL 100 135             Results from last 7 days   Lab Units 01/16/21  0003 01/15/21  2152 01/15/21  1328   TROPONIN I ng/mL 0 05* 0 05* 0 05*           Results from last 7 days   Lab Units 01/15/21  1328   PROTIME seconds 14 9*   INR   1 19   PTT seconds 33      Results from last 7 days   Lab Units 01/16/21  0437 01/15/21  2152 01/15/21  1328   PROCALCITONIN ng/ml 0 10 0 06 0 05           Results from last 7 days   Lab Units 01/15/21  1328   LACTIC ACID mmol/L 1 5           Results from last 7 days   Lab Units 01/15/21  1328   LIPASE u/L 187           Results from last 7 days   Lab Units 01/15/21  2353   CLARITY UA   Clear   COLOR UA   Yellow   SPEC GRAV UA   <=1 005   PH UA   5 5   GLUCOSE UA mg/dl Negative   KETONES UA mg/dl Negative   BLOOD UA   Negative   PROTEIN UA mg/dl Negative   NITRITE UA   Negative   BILIRUBIN UA   Negative   UROBILINOGEN UA E U /dl 0 2   LEUKOCYTES UA   Negative            Results from last 7 days   Lab Units 01/15/21  2316 01/15/21  1528   STREP PNEUMONIAE ANTIGEN, URINE   Negative  --    LEGIONELLA URINARY ANTIGEN   Negative  --    INFLUENZA A PCR    --  Negative   INFLUENZA B PCR    --  Negative   RSV PCR    --  Negative            Results from last 7 days   Lab Units 01/15/21  1344 01/15/21  1328   BLOOD CULTURE   Received in Microbiology Lab  Culture in Progress  Received in Microbiology Lab  Culture in Progress       1/15 EKG: sinus tachycardia @ 102 bpm, normal axis, normal intervals, no sT changes      1/15 CT C/A/AP: No acute intra-abdominal pelvic abnormality identified     Bilateral multifocal areas of irregular pulmonary consolidation and air bronchograms   Findings seen in association with peribronchial thickening   Distribution is somewhat random but also concentrated along the fissures   Not typical for pattern for  COVID infection but not excluded   Multifocal pneumonia remains suspected  Tanya Sheppard some of the distribution along the fissures and prior PET scan report, component of this could be related to known neoplastic etiology       ED Treatment:             Medication Administration from 01/15/2021 1215 to 01/15/2021 1755         Date/Time Order Dose Route Action       01/15/2021 1345 cefepime (MAXIPIME) 2 g/50 mL dextrose IVPB 2,000 mg Intravenous New Bag       01/15/2021 1327 acetaminophen (TYLENOL) tablet 975 mg 975 mg Oral Given       01/15/2021 1334 sodium chloride 0 9 % bolus 1,000 mL 1,000 mL Intravenous New Bag       01/15/2021 1335 morphine (PF) 4 mg/mL injection 8 mg 8 mg Intravenous Given       01/15/2021 1526 albuterol inhalation solution 5 mg 5 mg Nebulization Given       01/15/2021 1525 ipratropium (ATROVENT) 0 02 % inhalation solution 0 5 mg 0 5 mg Nebulization Given       01/15/2021 1541 morphine (PF) 4 mg/mL injection 8 mg 8 mg Intravenous Given          Medical History        Past Medical History:   Diagnosis Date    Alkalosis 12/9/2019    Bipolar 1 disorder ([facility] [address] [facility] )      Cancer ([facility] )       adeno lung  dx 11/2018-lung bx today 4/9/2019-ongoing chemo    Chronic obstructive pulmonary disease with acute exacerbation (HCC) 6/7/2018    Chronic pain disorder       back and right shoulder    CKD (chronic kidney disease)      COPD [facility] obstructive [facility] disease) [facility]      Depression      Diabetes mellitus [facility]      Elevated d-dimer 11/30/2019    Elevated troponin 11/29/2019    Elevated troponin level not due to acute coronary syndrome 11/30/2019    GERD (gastroesophageal reflux disease)      Herniated lumbar intervertebral disc      Hyperkalemia      Hypertension      Insomnia      Latent syphilis       Treated    Low back pain      Lumbosacral disc disease      Lung cancer ([facility] ) 04/2019    Pneumothorax after biopsy       R lung    PTSD (post-traumatic stress disorder)      Pulmonary emphysema ([facility])      Tobacco abuse 11/13/2018        Present on Admission:   Essential hypertension   Chronic bilateral thoracic back pain   Bipolar 1 disorder (HCC)   Gastroesophageal reflux disease without esophagitis   Type 2 diabetes mellitus without complication, without long-term current use of insulin (HCC)   Adenocarcinoma of lung, right (HCC)   Stage 3 chronic kidney disease   Chronic pain disorder   Paroxysmal atrial fibrillation ([facility])        Admitting Diagnosis: COPD (chronic obstructive pulmonary disease) (HCC) [J44 9]  Lung cancer ([facility]) [C34 90]  Chest pain [R07 9]  Elevated troponin [R77 8]  Multifocal pneumonia [J18 9]  Age/Sex: 64 y o  male  Admission Orders:  Scheduled Medications:  apixaban, 2 5 mg, Oral, BID  azithromycin, 500 mg, Intravenous, Q24H  budesonide, 0 5 mg, Nebulization, Q12H  cefTRIAXone, 1,000 mg, Intravenous, Q24H  diltiazem, 120 mg, Oral, Daily  divalproex sodium, 250 mg, Oral, BID  FLUoxetine, 10 mg, Oral, Daily  formoterol, 20 mcg, Nebulization, BID  guaiFENesin, 600 mg, Oral, Q12H [address]  insulin lispro, 1-5 Units, Subcutaneous, TID AC  ipratropium-albuterol, 3 mL, Nebulization, TID  lidocaine, 1 patch, Topical, Daily  melatonin, 3 mg, Oral, HS  oxyCODONE, 20 mg, Oral, Q12H [address]  pantoprazole, 40 mg, Oral, Daily Before Breakfast  predniSONE, 5 mg, Oral, Daily  QUEtiapine, 12 5 mg, Oral, HS  senna-docusate sodium, 1 tablet, Oral, BID  tamsulosin, 0 4 mg, Oral, Daily With Dinner     Continuous IV Infusions: na  PRN Meds:  albuterol, 2 puff, Inhalation, Q4H PRN x 1 1/16  morphine injection, 2 mg, Intravenous, Q4H PRN x 1 1/15  & x 2 1/16  oxyCODONE, 15 mg, Oral, Q4H PRN     TELEMETRY  SCDS     Network Utilization Review Department  ATTENTION: Please call with any questions or concerns to [phone] and carefully listen to the prompts so that you are directed to the right person  All voicemails are confidential   Traci Petersen all requests for admission clinical reviews, approved or denied determinations and any other requests to dedicated fax number below belonging to the campus where the patient is receiving treatment   List of dedicated fax numbers for the Facilities:  [address] DENIALS (Administrative/Medical Necessity) [phone]   [address] (Maternity/NICU/Pediatrics) [phone]   [address] [place] [address] [facility] Oliver Hernandez [address] (  Jamal Ying "[address]) [address] [place] [address] [address] [place] [address] [phone]

## 2021-01-18 NOTE — PROGRESS NOTES
[facility] Internal Medicine Progress Note  Patient: Kevin Burleson  64 y o  male   MRN: [id number]  PCP: Gianna Delaney MD  Unit/Bed#: E2 -59 Encounter: [id number]  Date Of Visit: 01/18/21      Assessment/plan  1  Acute/chronic hypoxic respiratory failure due to adenocarcinoma of right lung, likely multifocal pna, and copd- reviewed ct chest  covid was negative  Will continue antibiotics and check sputum culture  Will consult pulmonary  Will wean oxygen as tolerated  Continue bronchodilators  Steroids were started for sciatica left side    2  Sciatica left side- continue current pain medications  Continue lyrica  Pt was started on dexamethasone and will consider prednisone taper tomorrow  Continue tizanidine  Will consider ortho consult    3  Atypical chest pain due to number 1    4  Chronic bilateral thoracic back pain due to adenocarcinoma of right lung- pt is opiate dependent and follows with pulmonary  5  Bipolar d/o- continue current treatment    6  Type 2 diabetes- continue lantus and insulin sliding scale    7  ckd3- stable    8  paf- continue diltiazem and eliquis    Subjective:   Pt seen and examined  Pt states he is having more sputum production  He states he thinks he is breathing better  He is however upset that he still has left hip pain  He states it is difficult to ambulate with this  No f/c no cp no worsening sob  No n/v/d no abd pain  Objective:     Vitals: Blood pressure 131/63, pulse 72, temperature 98 3 °F (36 8 °C), temperature source Temporal, resp  rate 18, height 5' 8 5" (1 74 m), weight 71 7 kg (158 lb), SpO2 94 %  ,Body mass index is 23 67 kg/m²      Lab, Imaging and other studies:  Results from last 7 days   Lab Units 01/16/21  0436 01/15/21  1328   WBC Thousand/uL 11 08* 8 53   HEMOGLOBIN g/dL 11 1* 11 4*   HEMATOCRIT % 37 0 36 7   PLATELETS Thousands/uL 334 332   INR   --  1 19     Results from last 7 days   Lab Units 01/16/21  0437   POTASSIUM mmol/L 4 6   CHLORIDE mmol/L 104   CO2 mmol/L 26   BUN mg/dL 19   CREATININE mg/dL 2 27*   CALCIUM mg/dL 8 2*   ALK PHOS U/L 100   ALT U/L 19   AST U/L 15     Results from last 7 days   Lab Units 01/16/21  0003 01/15/21  2152 01/15/21  1328   TROPONIN I ng/mL 0 05* 0 05* 0 05*     Lab Results   Component Value Date    BLOODCX No Growth at 72 hrs  01/15/2021    BLOODCX No Growth at 72 hrs  01/15/2021    SPUTUMCULTUR 3+ Growth of Pseudomonas aeruginosa (A) 12/09/2019    SPUTUMCULTUR 3+ Growth of Candida albicans (A) 12/09/2019    SPUTUMCULTUR 3+ Growth of  12/09/2019         Ct Chest Abdomen Pelvis W Contrast    Result Date: 1/15/2021  Narrative: CT CHEST, ABDOMEN AND PELVIS WITH IV CONTRAST INDICATION:   Chest pain, fever  Left low back pain/flank pain  Stage IV adenocarcinoma of the lung  Patient has suspected COVID-19  COMPARISON:  January 17, 2020, August 17, 2020 TECHNIQUE: CT examination of the chest, abdomen and pelvis was performed  Axial, sagittal, and coronal 2D reformatted images were created from the source data and submitted for interpretation  Radiation dose length product (DLP) for this visit:  634 mGy-cm   This examination, like all CT scans performed in the [facility], was performed utilizing techniques to minimize radiation dose exposure, including the use of iterative reconstruction and automated exposure control  IV Contrast:  100 mL of iodixanol (VISIPAQUE) Enteric Contrast: Enteric contrast was administered  FINDINGS: CHEST LUNGS:  There are multiple bilateral areas of confluent mostly wedge-shaped consolidation and air bronchograms  On the coronal and sagittal reformat images, many of these areas of airspace infiltrate and consolidation are centered along the fissures  These findings are new compared to the priors, while not specific and potentially related to patient's prior history of cancer, given the history raises suspicion for multifocal pneumonia    Distribution and appearance not typical for COVID infection but not excluded  Bronchi are remarkable for diffuse moderate peribronchial thickening  Caliber remains within normal limits  Background centrilobular emphysema noted  PLEURA:  Unremarkable  HEART/GREAT VESSELS:  Unremarkable for patient's age  MEDIASTINUM AND GARIMA:  Unremarkable  CHEST WALL AND LOWER NECK:   Unremarkable  ABDOMEN LIVER/BILIARY TREE:  Unremarkable  GALLBLADDER:  No calcified gallstones  No pericholecystic inflammatory change  SPLEEN:  Unremarkable  PANCREAS:  Unremarkable  ADRENAL GLANDS:  Unremarkable  KIDNEYS/URETERS:  Unremarkable  No hydronephrosis  STOMACH AND BOWEL:  Unremarkable  APPENDIX:  No findings to suggest appendicitis  ABDOMINOPELVIC CAVITY:  No ascites  No pneumoperitoneum  No lymphadenopathy  VESSELS:  Unremarkable for patient's age  PELVIS REPRODUCTIVE ORGANS:  Unremarkable for patient's age  URINARY BLADDER:  Unremarkable  ABDOMINAL WALL/INGUINAL REGIONS:  Unremarkable  OSSEOUS STRUCTURES:  No acute fracture or destructive osseous lesion  Impression: No acute intra-abdominal pelvic abnormality identified  Bilateral multifocal areas of irregular pulmonary consolidation and air bronchograms  Findings seen in association with peribronchial thickening  Distribution is somewhat random but also concentrated along the fissures  Not typical for pattern for COVID infection but not excluded  Multifocal pneumonia remains suspected  Given some of the distribution along the fissures and prior PET scan report, component of this could be related to known neoplastic etiology  The study was marked in [facility] for immediate notification   Workstation performed: [id number]       Scheduled Meds:   Current Facility-Administered Medications   Medication Dose Route Frequency Provider Last Rate    albuterol  2 puff Inhalation Q4H PRN Tyrone Suarez MD      apixaban  2 5 mg Oral BID Tyrone Suarez MD      azithromycin  500 mg Oral Q24H Sybil Tenorio DO      budesonide  0 5 mg Nebulization Q12H Audria Nyhan, MD      cefTRIAXone  1,000 mg Intravenous Q24H Audria Nyhan, MD Stopped (01/17/21 2320)    dexamethasone  4 mg Intravenous Q12H [address] Iris CAROLINA PA-C      diltiazem  120 mg Oral Daily Audria Nyhan, MD      divalproex sodium  250 mg Oral BID Audria Nyhan, MD      FLUoxetine  10 mg Oral Daily Audria Nyhan, MD      formoterol  20 mcg Nebulization BID Audria Nyhan, MD      guaiFENesin  600 mg Oral Q12H Adrián Lancaster MD      insulin glargine  8 Units Subcutaneous HS Jad Teixeira PA-C      insulin lispro  1-5 Units Subcutaneous TID [facility] Audria Nyhan, MD      ipratropium-albuterol  3 mL Nebulization TID Lesley Goyal, DO      lidocaine  1 patch Topical Daily Audria Nyhan, MD      melatonin  3 mg Oral HS Audria Nyhan, MD      methocarbamol  500 mg Oral [facility] Shakira Inman DO      morphine injection  2 mg Intravenous Q4H PRN Audria Nyhan, MD      oxyCODONE  20 mg Oral Q12H [address] Audria Nyhan, MD      oxyCODONE  15 mg Oral Q4H PRN Audria Nyhan, MD      pantoprazole  40 mg Oral Daily Before Breakfast Audria Nyhan, MD      pregabalin  25 mg Oral QAM Jad Teixeira PA-C      pregabalin  50 mg Oral HS Iris Gutierrez PA-C      QUEtiapine  12 5 mg Oral HS Audria Nyhan, MD      senna-docusate sodium  1 tablet Oral BID Audria Nyhan, MD      tamsulosin  0 4 mg Oral Daily With Levi Vargas MD      tiZANidine  4 mg Oral TID Lesley Goyal, DO       Continuous Infusions:    PRN Meds:   albuterol    morphine injection    oxyCODONE      Physical exam:  Physical Exam  Constitutional:       Appearance: Normal appearance  HENT:      Head: Normocephalic and atraumatic  Eyes:      Extraocular Movements: Extraocular movements intact  Pupils: Pupils are equal, round, and reactive to light  Cardiovascular:      Rate and Rhythm: Normal rate and regular rhythm  Heart sounds: No murmur  No friction rub  No gallop      Pulmonary:      Effort: Pulmonary effort is normal  No respiratory distress  Comments: Coarse breath sounds throughout  Abdominal:      Palpations: Abdomen is soft  Musculoskeletal:      Right lower leg: No edema  Left lower leg: No edema  Comments: Decrease rom left hip   Skin:     General: Skin is warm and dry  Neurological:      Mental Status: He is alert and oriented to person, place, and time         VTE Pharmacologic Prophylaxis: eliquis  VTE Mechanical Prophylaxis: sequential compression device    Counseling / Coordination of Care  Total floor / unit time spent today 20 minutes    Current Length of Stay: 3 day(s)    Current Patient Status: Inpatient     Code Status: Level 1 - Full Code

## 2021-01-18 NOTE — PROGRESS NOTES
The azithromycin has / have been converted to Oral per [facility] IV-to-PO Auto-Conversion Protocol for Adults as approved by the Pharmacy and Therapeutics Committee  The patient met all eligible criteria:  3 Age = 27years old   2) Received at least one dose of the IV form   3) Receiving at least one other scheduled oral/enteral medication   4) Tolerating an oral/enteral diet   and did not have any exclusions:   1) Critical care patient   2) Active GI bleed (IF assessing H2RAs or PPIs)   3) Continuous tube feeding (IF assessing cipro, doxycycline, levofloxacin, minocycline, rifampin, or voriconazole)   4) Receiving PO vancomycin (IF assessing metronidazole)   5) Persistent nausea and/or vomiting   6) Ileus or gastrointestinal obstruction   7) Adina/nasogastric tube set for continuous suction   8) Specific order not to automatically convert to PO (in the order's comments or if discussed in the most recent Infectious Disease or primary team's progress notes)        Amanda Espinal, SoledadD

## 2021-01-19 NOTE — SOCIAL WORK
CM met with the patient to do a general SW assessment:     The patient is LOS day 3  He is not a bundle  He is not a re-admission  His HRR is medium at 29  The patient reports he lives with his two sons, his daughter, and his brother, he lives in a single story apartment, 2 erin  He sleeps on the couch  He is independent with adls, he uses a cane for ambulatory needs  He does have a HHA to help around the house, they come 3x a week from 9-1  No current VNA - would benefit from VNA at discharge  No hx of STR  He does not drive  He uses [facility] for all of his medical appts  He has Home O2, 3L NC, and a home neb machine, provided through [facility]  His PCP is the [facility] at [place]  No D&A use  Hx of psych tx outpatient with [facility], he reports he hasn't gone since the pandemic, he is interested in re-starting care  He has no formal AD/LW, he was provided a 5 wishes at an opt palliative visit by LCSW, however, did not complete  He has 8 children total, two whom he lives with are under the age of 25  Encouraged him to complete 5 wishes, will give him a new one prior to discharge  The patient requests a RW at discharge - requested rx, will send to [facility] and dispense when approved  Would benefit from VNA, will discuss with the pt prior to discharge

## 2021-01-19 NOTE — TELEPHONE ENCOUNTER
Appointment Cancellation Or Reschedule     Person calling in Patient     Provider Agata Morgan    Office Visit Date and Time 1/20/21   Office Visit Location [facility]   Did patient reschedule their appointment? no   Is this patient a treatment patient? yes   When is their next infusion visit? 1/20   Reason for Cancellation Hospitalized      If the patient is a treatment patient, please route this to the office nurse

## 2021-01-19 NOTE — PLAN OF CARE
Problem: Potential for Falls  Goal: Patient will remain free of falls  Description: INTERVENTIONS:  - Assess patient frequently for physical needs  -  Identify cognitive and physical deficits and behaviors that affect risk of falls    -  [place] fall precautions as indicated by assessment   - Educate patient/family on patient safety including physical limitations  - Instruct patient to call for assistance with activity based on assessment  - Modify environment to reduce risk of injury  - Consider OT/PT consult to assist with strengthening/mobility  Outcome: Progressing     Problem: PAIN - ADULT  Goal: Verbalizes/displays adequate comfort level or baseline comfort level  Description: Interventions:  - Encourage patient to monitor pain and request assistance  - Assess pain using appropriate pain scale  - Administer analgesics based on type and severity of pain and evaluate response  - Implement non-pharmacological measures as appropriate and evaluate response  - Consider cultural and social influences on pain and pain management  - Notify physician/advanced practitioner if interventions unsuccessful or patient reports new pain  Outcome: Progressing     Problem: INFECTION - ADULT  Goal: Absence or prevention of progression during hospitalization  Description: INTERVENTIONS:  - Assess and monitor for signs and symptoms of infection  - Monitor lab/diagnostic results  - Monitor all insertion sites, i e  indwelling lines, tubes, and drains  - Monitor endotracheal if appropriate and nasal secretions for changes in amount and color  - [place] appropriate cooling/warming therapies per order  - Administer medications as ordered  - Instruct and encourage patient and family to use good hand hygiene technique  - Identify and instruct in appropriate isolation precautions for identified infection/condition  Outcome: Progressing  Goal: Absence of fever/infection during neutropenic period  Description: INTERVENTIONS:  - Monitor WBC    Outcome: Progressing     Problem: SAFETY ADULT  Goal: Patient will remain free of falls  Description: INTERVENTIONS:  - Assess patient frequently for physical needs  -  Identify cognitive and physical deficits and behaviors that affect risk of falls    -  [place] fall precautions as indicated by assessment   - Educate patient/family on patient safety including physical limitations  - Instruct patient to call for assistance with activity based on assessment  - Modify environment to reduce risk of injury  - Consider OT/PT consult to assist with strengthening/mobility  Outcome: Progressing  Goal: Maintain or return to baseline ADL function  Description: INTERVENTIONS:  -  Assess patient's ability to carry out ADLs; assess patient's baseline for ADL function and identify physical deficits which impact ability to perform ADLs (bathing, care of mouth/teeth, toileting, grooming, dressing, etc )  - Assess/evaluate cause of self-care deficits   - Assess range of motion  - Assess patient's mobility; develop plan if impaired  - Assess patient's need for assistive devices and provide as appropriate  - Encourage maximum independence but intervene and supervise when necessary  - Involve family in performance of ADLs  - Assess for home care needs following discharge   - Consider OT consult to assist with ADL evaluation and planning for discharge  - Provide patient education as appropriate  Outcome: Progressing  Goal: Maintain or return mobility status to optimal level  Description: INTERVENTIONS:  - Assess patient's baseline mobility status (ambulation, transfers, stairs, etc )    - Identify cognitive and physical deficits and behaviors that affect mobility  - Identify mobility aids required to assist with transfers and/or ambulation (gait belt, sit-to-stand, lift, walker, cane, etc )  - [place]eric fall precautions as indicated by assessment  - Record patient progress and toleration of activity level on Mobility SBAR; progress patient to next Phase/Stage  - Instruct patient to call for assistance with activity based on assessment  - Consider rehabilitation consult to assist with strengthening/weightbearing, etc   Outcome: Progressing     Problem: DISCHARGE PLANNING  Goal: Discharge to home or other facility with appropriate resources  Description: INTERVENTIONS:  - Identify barriers to discharge w/patient and caregiver  - Arrange for needed discharge resources and transportation as appropriate  - Identify discharge learning needs (meds, wound care, etc )  - Arrange for interpretive services to assist at discharge as needed  - Refer to Case Management Department for coordinating discharge planning if the patient needs post-hospital services based on physician/advanced practitioner order or complex needs related to functional status, cognitive ability, or social support system  Outcome: Progressing     Problem: Knowledge Deficit  Goal: Patient/family/caregiver demonstrates understanding of disease process, treatment plan, medications, and discharge instructions  Description: Complete learning assessment and assess knowledge base    Interventions:  - Provide teaching at level of understanding  - Provide teaching via preferred learning methods  Outcome: Progressing     Problem: RESPIRATORY - ADULT  Goal: Achieves optimal ventilation and oxygenation  Description: INTERVENTIONS:  - Assess for changes in respiratory status  - Assess for changes in mentation and behavior  - Position to facilitate oxygenation and minimize respiratory effort  - Oxygen administered by appropriate delivery if ordered  - Initiate smoking cessation education as indicated  - Encourage broncho-pulmonary hygiene including cough, deep breathe, Incentive Spirometry  - Assess the need for suctioning and aspirate as needed  - Assess and instruct to report SOB or any respiratory difficulty  - Respiratory Therapy support as indicated  Outcome: Progressing     Problem: METABOLIC, FLUID AND ELECTROLYTES - ADULT  Goal: Glucose maintained within target range  Description: INTERVENTIONS:  - Monitor Blood Glucose as ordered  - Assess for signs and symptoms of hyperglycemia and hypoglycemia  - Administer ordered medications to maintain glucose within target range  - Assess nutritional intake and initiate nutrition service referral as needed  Outcome: Progressing

## 2021-01-19 NOTE — PROGRESS NOTES
[facility] Progress Note  Patient: Ankit Cooper  64 y o  male   MRN: [id number]  PCP: Reinier Blackburn MD  Unit/Bed#: E2 -15 Encounter: [id number]  Date Of Visit: 01/19/21      Assessment/plan  1  Acute/chronic hypoxic respiratory failure due to adenocarcinoma of right lung, possible multifocal pna, and copd- reviewed ct chest  covid was negative  Appreciate pulmonary consult  Likely tracheobronchitis and not pna  Will continue antibiotics for 5 days  Will follow up on sputum culture  Continue bronchodilators and steroids  Pt uses 2-3 liters at home  Will continue to wean oxygen as tolerated  2  Sciatica left side- improved  Continue pain medications  Continue lyrica  Pt is on dexamethasone and will start prednisone taper  Will continue tizanidine  Pt will follow up with ortho outpt       3  Atypical chest pain due to number 1     4  Chronic bilateral thoracic back pain due to adenocarcinoma of right lung- pt is opiate dependent and follows with pulmonary       5  Bipolar d/o- continue current treatment     6  Type 2 diabetes-  continue lantus and insulin sliding scale     7  ckd3- stable     8  paf- continue diltiazem and eliquis    dispo- possible d/c tomorrow if pt is stable  Subjective:   Pt seen and examined  Pt states his leg is a little better  He stats as long as he doesn't try to walk it feels okay  He does follow with ortho outpt   He states he is breathing better  No f/c no cp  He is still coughing up mucous  No n/v/d no abd pain  Objective:     Vitals: Blood pressure 136/75, pulse 75, temperature 97 8 °F (36 6 °C), temperature source Temporal, resp  rate 18, height 5' 8 5" (1 74 m), weight 71 7 kg (158 lb), SpO2 94 %  ,Body mass index is 23 67 kg/m²      Lab, Imaging and other studies:  Results from last 7 days   Lab Units 01/19/21  0521  01/15/21  1328   WBC Thousand/uL 12 42*   < > 8 53   HEMOGLOBIN g/dL 11 0*   < > 11 4*   HEMATOCRIT % 35 3*   < > 36 7   PLATELETS Thousands/uL 352   < > 332   INR   --   --  1 19    < > = values in this interval not displayed  Results from last 7 days   Lab Units 01/19/21  0521 01/16/21  0437   POTASSIUM mmol/L 5 0 4 6   CHLORIDE mmol/L 106 104   CO2 mmol/L 29 26   BUN mg/dL 29* 19   CREATININE mg/dL 1 85* 2 27*   CALCIUM mg/dL 8 7 8 2*   ALK PHOS U/L  --  100   ALT U/L  --  19   AST U/L  --  15     Results from last 7 days   Lab Units 01/16/21  0003 01/15/21  2152 01/15/21  1328   TROPONIN I ng/mL 0 05* 0 05* 0 05*     Lab Results   Component Value Date    BLOODCX No Growth After 4 Days  01/15/2021    BLOODCX No Growth After 4 Days  01/15/2021    SPUTUMCULTUR 3+ Growth of Pseudomonas aeruginosa (A) 12/09/2019    SPUTUMCULTUR 3+ Growth of Candida albicans (A) 12/09/2019    SPUTUMCULTUR 3+ Growth of  12/09/2019         Ct Chest Abdomen Pelvis W Contrast    Result Date: 1/15/2021  Narrative: CT CHEST, ABDOMEN AND PELVIS WITH IV CONTRAST INDICATION:   Chest pain, fever  Left low back pain/flank pain  Stage IV adenocarcinoma of the lung  Patient has suspected COVID-19  COMPARISON:  January 17, 2020, August 17, 2020 TECHNIQUE: CT examination of the chest, abdomen and pelvis was performed  Axial, sagittal, and coronal 2D reformatted images were created from the source data and submitted for interpretation  Radiation dose length product (DLP) for this visit:  634 mGy-cm   This examination, like all CT scans performed in the [facility], was performed utilizing techniques to minimize radiation dose exposure, including the use of iterative reconstruction and automated exposure control  IV Contrast:  100 mL of iodixanol (VISIPAQUE) Enteric Contrast: Enteric contrast was administered  FINDINGS: CHEST LUNGS:  There are multiple bilateral areas of confluent mostly wedge-shaped consolidation and air bronchograms    On the coronal and sagittal reformat images, many of these areas of airspace infiltrate and consolidation are centered along the fissures  These findings are new compared to the priors, while not specific and potentially related to patient's prior history of cancer, given the history raises suspicion for multifocal pneumonia  Distribution and appearance not typical for COVID infection but not excluded  Bronchi are remarkable for diffuse moderate peribronchial thickening  Caliber remains within normal limits  Background centrilobular emphysema noted  PLEURA:  Unremarkable  HEART/GREAT VESSELS:  Unremarkable for patient's age  MEDIASTINUM AND GARIMA:  Unremarkable  CHEST WALL AND LOWER NECK:   Unremarkable  ABDOMEN LIVER/BILIARY TREE:  Unremarkable  GALLBLADDER:  No calcified gallstones  No pericholecystic inflammatory change  SPLEEN:  Unremarkable  PANCREAS:  Unremarkable  ADRENAL GLANDS:  Unremarkable  KIDNEYS/URETERS:  Unremarkable  No hydronephrosis  STOMACH AND BOWEL:  Unremarkable  APPENDIX:  No findings to suggest appendicitis  ABDOMINOPELVIC CAVITY:  No ascites  No pneumoperitoneum  No lymphadenopathy  VESSELS:  Unremarkable for patient's age  PELVIS REPRODUCTIVE ORGANS:  Unremarkable for patient's age  URINARY BLADDER:  Unremarkable  ABDOMINAL WALL/INGUINAL REGIONS:  Unremarkable  OSSEOUS STRUCTURES:  No acute fracture or destructive osseous lesion  Impression: No acute intra-abdominal pelvic abnormality identified  Bilateral multifocal areas of irregular pulmonary consolidation and air bronchograms  Findings seen in association with peribronchial thickening  Distribution is somewhat random but also concentrated along the fissures  Not typical for pattern for COVID infection but not excluded  Multifocal pneumonia remains suspected  Given some of the distribution along the fissures and prior PET scan report, component of this could be related to known neoplastic etiology  The study was marked in [facility] for immediate notification   Workstation performed: [id number]       Scheduled Meds:   Current Facility-Administered Medications   Medication Dose Route Frequency Provider Last Rate    albuterol  2 puff Inhalation Q4H PRN Jetta Cogan, MD      apixaban  2 5 mg Oral BID Jetta Cogan, MD      azithromycin  500 mg Oral Q24H Sybil Tenorio DO      budesonide  0 5 mg Nebulization Q12H Jetta Cogan, MD      cefTRIAXone  1,000 mg Intravenous Q24H Jetta Cogan, MD Stopped (01/18/21 2301)    diltiazem  120 mg Oral Daily Jetta Cogan, MD      divalproex sodium  250 mg Oral BID Jetta Cogan, MD      FLUoxetine  10 mg Oral Daily Jetta Cogan, MD      formoterol  20 mcg Nebulization BID Jetta Cogan, MD      guaiFENesin  600 mg Oral Q12H [address] Jetta Cogan, MD      insulin glargine  8 Units Subcutaneous HS Ary Dumont PA-C      insulin lispro  1-5 Units Subcutaneous TID [facility] Jetta Cogan, MD      ipratropium-albuterol  3 mL Nebulization TID Dinah Choudhury DO      lidocaine  1 patch Topical Daily Jetta Cogan, MD      melatonin  3 mg Oral HS Jetta Cogan, MD      morphine injection  2 mg Intravenous Q4H PRN Jetta Cogan, MD      oxyCODONE  20 mg Oral Q12H [address] Jetta Cogan, MD      oxyCODONE  15 mg Oral Q4H PRN Jetta Cogan, MD      pantoprazole  40 mg Oral Daily Before Breakfast Jetta Cogan, MD      [START ON 1/20/2021] predniSONE  60 mg Oral Daily Sybil Tenorio DO      pregabalin  25 mg Oral QAM Iris Gutierrez PA-C      pregabalin  50 mg Oral HS Iris Gutierrez PA-C      QUEtiapine  12 5 mg Oral HS Jetta Cogan, MD      senna-docusate sodium  1 tablet Oral BID Jetta Cogan, MD      tamsulosin  0 4 mg Oral Daily With Oneida Azevedo MD      tiZANidine  4 mg Oral TID Dinah Choudhury DO       Continuous Infusions:    PRN Meds:   albuterol    morphine injection    oxyCODONE      Physical exam:  Physical Exam  General appearance: alert and oriented, in no acute distress  Head: Normocephalic, without obvious abnormality, atraumatic  Eyes: conjunctivae/corneas clear  PERRL, EOM's intact   Fundi benign    Neck: no adenopathy, no carotid bruit, no JVD, supple, symmetrical, trachea midline and thyroid not enlarged, symmetric, no tenderness/mass/nodules  Lungs: mild wheezing bilateral   Heart: regular rate and rhythm, S1, S2 normal, no murmur, click, rub or gallop  Abdomen: soft, non-tender; bowel sounds normal; no masses,  no organomegaly  Extremities: extremities normal, warm and well-perfused; no cyanosis, clubbing, or edema  Neurologic: Grossly normal      VTE Pharmacologic Prophylaxis: eliquis  VTE Mechanical Prophylaxis: sequential compression device    Counseling / Coordination of Care  Total floor / unit time spent today 20 minutes     Current Length of Stay: 4 day(s)    Current Patient Status: Inpatient     Code Status: Level 1 - Full Code

## 2021-01-19 NOTE — CONSULTS
Pulmonary Consultation   Penny Larkin Sr  64 y o  male MRN: [id number]   E2 -01 Encounter: [id number]      Reason for consultation:   Pneumonia      Requesting physician:   Brooke Dunn DO      Impressions:    Chronic obstructive pulmonary disease with acute exacerbation   Abnormal CT scan of the chest with bilateral focal airspace disease  Although the imaging mimicking multifocal pneumonia  Clinically the patient has no fever, a negative procalcitonin on 3 different occasions since admission and only mildly elevated white count  This is not bacterial pneumonia  This could be pneumonitis or a side effect from the lung cancer treatment   Stage IV adenocarcinoma of the lung   Acute on chronic hypoxemic respiratory failure  Recommendations:   Complete ceftriaxone and azithromycin for 5 days   Taper steroids   Bronchodilators   Titrate oxygen to maintain O2 saturation above 88%  Discussed with Dr Marcus Zafar  History of Present Illness   HPI:  Wero Islas  is a 64 y o  male who was admitted on January 15th due to increasing shortness of breath, cough and purulent sputum  The patient had subjective fever and chills  He was treated as pneumonia with ceftriaxone and azithromycin  His symptoms have improved and he is close to his baseline  Today he denies any chest pain  The patient has stage IV lung cancer treated initially with chemotherapy and now on Keytruda  He also has extensive smoking history and continues to smoke  He has COPD and he uses albuterol via and inhaler or nebulizer at home  Review of systems:  12 point review of systems was completed and was otherwise negative except as listed in HPI        Historical Information   Past Medical History:   Diagnosis Date    Alkalosis 12/9/2019    Bipolar 1 disorder ([facility]     Cancer ([facility]     adeno lung  dx 11/2018-lung bx today 4/9/2019-ongoing chemo    Chronic obstructive pulmonary disease with acute exacerbation (HCC) 6/7/2018    Chronic pain disorder     back and right shoulder    CKD (chronic kidney disease)     [facility]     Depression     Diabetes mellitus ([facility] )     Elevated d-dimer 11/30/2019    Elevated troponin 11/29/2019    Elevated troponin level not due to acute coronary syndrome 11/30/2019    GERD (gastroesophageal reflux disease)     Herniated lumbar intervertebral disc     Hyperkalemia     Hypertension     Insomnia     Latent syphilis     Treated    Low back pain     Lumbosacral disc disease     [facility] 04/2019    Pneumothorax after biopsy     R lung    PTSD (post-traumatic stress disorder)     Pulmonary emphysema ([facility] )     Tobacco abuse 11/13/2018     Past Surgical History:   Procedure Laterality Date    COLONOSCOPY  2011    CT GUIDED CHEST TUBE  11/21/2018    FL GUIDED CENTRAL VENOUS ACCESS DEVICE INSERTION  2/8/2019    HEMORROIDECTOMY      IR BIOPSY LUNG  11/13/2018    IR CHEST TUBE PLACEMENT  11/14/2018    KNEE SURGERY      ME INSJ TUNNELED CTR VAD W/SUBQ PORT AGE 5 YR/> N/A 2/8/2019    Procedure: PLACEMENT OF PORT-A-CATH;  Surgeon: Luis Manuel Villanueva MD;  Location: [facility] MAIN OR;  Service: Vascular     Family History   Problem Relation Age of Onset    Heart disease Mother     Cancer Mother     Hypertension Father     Diabetes Family     Asthma Family     Heart disease Family     Cancer Family     Cancer Maternal Grandfather     Cancer Paternal Grandfather     Cancer Maternal Aunt        Family History:  Multiple family members had different cancers  Social History:  The patient lives with his son and brother  He has more than 50 pack year smoking history  He continues to smoke few cigarettes a day        Meds/Allergies   Current Facility-Administered Medications   Medication Dose Route Frequency    albuterol (PROVENTIL HFA,VENTOLIN HFA) inhaler 2 puff  2 puff Inhalation Q4H PRN    apixaban (ELIQUIS) tablet 2 5 mg  2 5 mg Oral BID    azithromycin (ZITHROMAX) tablet 500 mg  500 mg Oral Q24H    budesonide (PULMICORT) inhalation solution 0 5 mg  0 5 mg Nebulization Q12H    cefTRIAXone (ROCEPHIN) 1,000 mg in dextrose 5 % 50 mL IVPB  1,000 mg Intravenous Q24H    dexamethasone (DECADRON) injection 4 mg  4 mg Intravenous Q12H [address]    diltiazem (CARDIZEM CD) 24 hr capsule 120 mg  120 mg Oral Daily    divalproex sodium (DEPAKOTE) EC tablet 250 mg  250 mg Oral BID    FLUoxetine (PROzac) capsule 10 mg  10 mg Oral Daily    formoterol (PERFOROMIST) nebulizer solution 20 mcg  20 mcg Nebulization BID    guaiFENesin (MUCINEX) 12 hr tablet 600 mg  600 mg Oral Q12H GREG    insulin glargine (LANTUS) subcutaneous injection 8 Units 0 08 mL  8 Units Subcutaneous HS    insulin lispro (HumaLOG) 100 units/mL subcutaneous injection 1-5 Units  1-5 Units Subcutaneous TID AC    ipratropium-albuterol (DUO-NEB) 0 5-2 5 mg/3 mL inhalation solution 3 mL  3 mL Nebulization TID    lidocaine (LIDODERM) 5 % patch 1 patch  1 patch Topical Daily    melatonin tablet 3 mg  3 mg Oral HS    morphine injection 2 mg  2 mg Intravenous Q4H PRN    oxyCODONE (OxyCONTIN) 12 hr tablet 20 mg  20 mg Oral Q12H GREG    oxyCODONE (ROXICODONE) IR tablet 15 mg  15 mg Oral Q4H PRN    pantoprazole (PROTONIX) EC tablet 40 mg  40 mg Oral Daily Before Breakfast    pregabalin (LYRICA) capsule 25 mg  25 mg Oral QAM    pregabalin (LYRICA) capsule 50 mg  50 mg Oral HS    QUEtiapine (SEROquel) tablet 12 5 mg  12 5 mg Oral HS    senna-docusate sodium (SENOKOT S) 8 6-50 mg per tablet 1 tablet  1 tablet Oral BID    tamsulosin (FLOMAX) capsule 0 4 mg  0 4 mg Oral Daily With Dinner    tiZANidine (ZANAFLEX) tablet 4 mg  4 mg Oral TID     Medications Prior to Admission   Medication    albuterol (2 5 mg/3 mL) 0 083 % nebulizer solution    albuterol (PROVENTIL HFA,VENTOLIN HFA) 90 mcg/act inhaler    apixaban (ELIQUIS) 2 5 mg    Blood Glucose Monitoring Suppl KIT    budesonide (PULMICORT) 0 5 mg/2 mL nebulizer solution    budesonide (PULMICORT) 1 MG/2ML nebulizer solution    carbamide peroxide (DEBROX) 6 5 % otic solution    clotrimazole-betamethasone (LOTRISONE) 1-0 05 % cream    diltiazem (CARDIZEM CD) 120 mg 24 hr capsule    divalproex sodium (DEPAKOTE) 250 mg EC tablet    ergocalciferol (ERGOCALCIFEROL) 1 25 MG (73399 UT) capsule    FLUoxetine (PROzac) 10 MG tablet    fluticasone (FLONASE) 50 mcg/act nasal spray    folic acid (FOLVITE) 1 mg tablet    formoterol (PERFOROMIST) 20 MCG/2ML nebulizer solution    FREESTYLE LITE test strip    guaiFENesin (MUCINEX) 600 mg 12 hr tablet    ipratropium (ATROVENT) 0 02 % nebulizer solution    ipratropium-albuterol (DUO-NEB) 0 5-2 5 mg/3 mL nebulizer solution    Lancets (FREESTYLE) lancets    lidocaine (LMX) 4 % cream    loratadine (CLARITIN) 10 mg tablet    melatonin 3 mg    metFORMIN (GLUCOPHAGE-XR) 500 mg 24 hr tablet    oxyCODONE (OxyCONTIN) 20 mg 12 hr tablet    oxyCODONE (OxyCONTIN) 20 mg 12 hr tablet    oxyCODONE (OxyCONTIN) 20 mg 12 hr tablet    oxyCODONE (ROXICODONE) 30 MG immediate release tablet    [START ON 3/8/2021] oxyCODONE (ROXICODONE) 30 MG immediate release tablet    [START ON 2/8/2021] oxyCODONE (ROXICODONE) 30 MG immediate release tablet    pantoprazole (PROTONIX) 40 mg tablet    polyethylene glycol (GLYCOLAX) powder    predniSONE 5 mg tablet    pregabalin (LYRICA) 25 mg capsule    prochlorperazine (COMPAZINE) 10 mg tablet    QUEtiapine (SEROquel) 25 mg tablet    REGULOID 28 3 % POWD    Saline 0 65 % (Soln) SOLN    senna-docusate sodium (SENOKOT-S) 8 6-50 mg per tablet    tamsulosin (FLOMAX) 0 4 mg     Allergies   Allergen Reactions    Lisinopril Anaphylaxis     Took medication a while ago and had a "swelling reaction"  Does not carry epi-pen       Vitals: Blood pressure 148/88, pulse 98, temperature 98 6 °F (37 °C), temperature source Temporal, resp   rate 18, height 5' 8 5" (1 74 m), weight 71 7 kg (158 lb), SpO2 98 % ,      Intake/Output Summary (Last 24 hours) at 1/19/2021 1111  Last data filed at 1/18/2021 2001  Gross per 24 hour   Intake    Output 500 ml   Net -500 ml       Physical exam:        Head/eyes:    Normocephalic, without obvious abnormality, atraumatic,         PERRL, extraocular muscles intact, no scleral icterus    Nose:   Nares normal, septum midline, mucosa normal, no drainage    or sinus tenderness   Throat:   Moist mucous membranes, no thrush   Neck:   Supple, trachea midline, no adenopathy; no carotid    bruit or JVD   Lungs:     Coarse breath sounds  Bilateral rhonchi  Heart:    Regular rate and rhythm, S1 and S2 normal, no murmur, rub   or gallop   Abdomen:     Soft, non-tender, bowel sounds active all four quadrants,     no masses, no organomegaly   Extremities:   Extremities normal, atraumatic, no cyanosis or edema   Skin:   Warm, dry, turgor normal, no rashes or lesions   Neurologic:   CNII-XII intact, normal strength, non-focal             Labs:   CBC:   Lab Results   Component Value Date    WBC 12 42 (H) 01/19/2021    HGB 11 0 (L) 01/19/2021    HCT 35 3 (L) 01/19/2021    MCV 84 01/19/2021     01/19/2021    MCH 26 3 (L) 01/19/2021    MCHC 31 2 (L) 01/19/2021    RDW 16 9 (H) 01/19/2021    MPV 9 3 01/19/2021   , CMP:   Lab Results   Component Value Date    SODIUM 140 01/19/2021    K 5 0 01/19/2021     01/19/2021    CO2 29 01/19/2021    BUN 29 (H) 01/19/2021    CREATININE 1 85 (H) 01/19/2021    CALCIUM 8 7 01/19/2021    EGFR 45 01/19/2021       Imaging and other studies: I have personally reviewed pertinent films in PACS CT of the chest is reviewed on the PACS system and compared to the old CT  The patient has multiple focal dense consolidations bilaterally          Yaniv Lerner MD

## 2021-01-19 NOTE — TELEPHONE ENCOUNTER
Pt called he is in the hospital and will need a tcm visit and all his meds filled pt would like a call from a nurse to go over meds

## 2021-01-20 NOTE — PROGRESS NOTES
Progress Note - Pulmonary   Kt Hawk Sr  64 y o  male MRN: [id number]  Unit/Bed#: E2 -01 Encounter: [id number]      Assessment/Plan:      1  Tracheobronchitis  1  Completed 5 day course of ABX with ceftriaxone and zithromax  2  Would continue prednisone for 5 day course  2  COPD  With mild acute exacerbation secondary to #1  1  Prednisone taper will reduce to 50 mg tomorrow with 10 mg reduction every 2 days  2  Continue budesonide/perforomist BID  3  Continue DuoNEB TID  4  Continue mucinex  3  Acute on chronic hypoxic respiratory failure  1  Current on Baseline 3 L NC  2  Titrate to maintain SpO2>/=88%  3  Pulmonary toilet: IS, cough deep breathe  4  Stage IV adenocarcinoma of the lung  1  Diagnosed 2018  2  S/p chemotherapy and keytruda      Subjective:     Catracho Loera was seen in bed upon entering the room  Denies acute overnight events  Primary complaint is left sided Sciatica pain  Breathing has improved and is close to baseline    Objective:         Vitals: Blood pressure 135/88, pulse 74, temperature 99 1 °F (37 3 °C), temperature source Temporal, resp  rate 18, height 5' 8 5" (1 74 m), weight 71 7 kg (158 lb), SpO2 96 %  , 3LNC, Body mass index is 23 67 kg/m²  Intake/Output Summary (Last 24 hours) at 1/20/2021 1520  Last data filed at 1/20/2021 0939  Gross per 24 hour   Intake 1170 ml   Output 1975 ml   Net -805 ml         Physical Exam  Gen: Awake, alert, oriented x 3, no acute distress  HEENT: Mucous membranes moist, no oral lesions, no thrush, oxygen via NC  NECK: no accessory muscle use, JVP not elevated  Cardiac: Regular, single S1, single S2, no murmurs, no rubs, no gallops  Lungs: bilateral end expiratory wheezes noted  Abdomen: normoactive bowel sounds, soft nontender, nondistended, no rebound or rigidity, no guarding  Extremities: no cyanosis, no clubbing, no edema    Labs: I have personally reviewed pertinent lab results  , CBC: No results found for: WBC, HGB, HCT, MCV, PLT, ADJUSTEDWBC, MCH, MCHC, RDW, MPV, NRBC, CMP:   Lab Results   Component Value Date    SODIUM 141 01/20/2021    K 4 1 01/20/2021     01/20/2021    CO2 28 01/20/2021    BUN 33 (H) 01/20/2021    CREATININE 1 87 (H) 01/20/2021    CALCIUM 8 1 (L) 01/20/2021    EGFR 45 01/20/2021     Imaging and other studies: none      Bailey Huggins April

## 2021-01-20 NOTE — UTILIZATION REVIEW
Continued Stay Review    Date:  1/20/2021                    Current Patient Class: INPT Current Level of Care: MED-SURG    HPI:60 y o  male initially admitted INPT on [employer] D/T Acute/chronic hypoxic respiratory failure due to adenocarcinoma of right lung, possible multifocal pna, and copd  Assessment/Plan:Likely tracheobronchitis  Sciatica left side  Chronic bilateral thoracic back pain due to adenocarcinoma of right lung  breathing is better but he is still having increased pain in left lower ext  unable to place weight on left side  starts in his lower back radiates to his left hip and then down his entire leg to his hip  sharp pain and sometimes burning  opiate dependent  completed a 5 day course of antibiotics  sputum culture  bronchodilators and steroids  [email or web address] at [email or web address]  prednisone, oxycodone, lyrica, and muscle relaxor  insulin sliding scale hold metformin lantus  diltiazem and eliquis  pulmonary consult  Ortho consult      Pertinent Labs/Diagnostic Results:   Results from last 7 days   Lab Units 01/15/21  1528   SARS-COV-2  Negative     Results from last 7 days   Lab Units 01/19/21  0521 01/16/21  0436 01/15/21  1328   WBC Thousand/uL 12 42* 11 08* 8 53   HEMOGLOBIN g/dL 11 0* 11 1* 11 4*   HEMATOCRIT % 35 3* 37 0 36 7   PLATELETS Thousands/uL 352 334 332   NEUTROS ABS Thousands/µL  --  5 51 5 72         Results from last 7 days   Lab Units 01/20/21  0455 01/19/21  0521 01/16/21  0437 01/15/21  1328   SODIUM mmol/L 141 140 138 138   POTASSIUM mmol/L 4 1 5 0 4 6 4 9   CHLORIDE mmol/L 105 106 104 104   CO2 mmol/L 28 29 26 26   ANION GAP mmol/L 8 5 8 8   BUN mg/dL 33* 29* 19 21   CREATININE mg/dL 1 87* 1 85* 2 27* 2 09*   EGFR ml/min/1 73sq m 45 45 35 39   CALCIUM mg/dL 8 1* 8 7 8 2* 8 6     Results from last 7 days   Lab Units 01/16/21  0437 01/15/21  1328   AST U/L 15 17   ALT U/L 19 17   ALK PHOS U/L 100 104   TOTAL PROTEIN g/dL 7 6 7 3   ALBUMIN g/dL 3 1* 3 3*   TOTAL BILIRUBIN mg/dL 0 16* 0 22 BILIRUBIN DIRECT mg/dL  --  <0 05     Results from last 7 days   Lab Units 01/20/21  1124 01/20/21  0630 01/19/21  2111 01/19/21  1614 01/19/21  1117 01/19/21  0726 01/18/21  2129 01/18/21  1619 01/18/21  1122 01/18/21  0626 01/17/21  2051 01/17/21  1609   POC GLUCOSE mg/dl 147* 82 146* 141* 155* 117 157* 134 94 153* 124 131     Results from last 7 days   Lab Units 01/20/21  0455 01/19/21  0521 01/16/21  0437 01/15/21  1328   GLUCOSE RANDOM mg/dL 148* 125 100 135       Results from last 7 days   Lab Units 01/16/21  0003 01/15/21  2152 01/15/21  1328   TROPONIN I ng/mL 0 05* 0 05* 0 05*         Results from last 7 days   Lab Units 01/15/21  1328   PROTIME seconds 14 9*   INR  1 19   PTT seconds 33         Results from last 7 days   Lab Units 01/17/21  0613 01/16/21  0437 01/15/21  2152 01/15/21  1328   PROCALCITONIN ng/ml 0 11 0 10 0 06 0 05     Results from last 7 days   Lab Units 01/15/21  1328   LACTIC ACID mmol/L 1 5       Results from last 7 days   Lab Units 01/15/21  1328   LIPASE u/L 187       Results from last 7 days   Lab Units 01/15/21  2353   CLARITY UA  Clear   COLOR UA  Yellow   SPEC GRAV UA  <=1 005   PH UA  5 5   GLUCOSE UA mg/dl Negative   KETONES UA mg/dl Negative   BLOOD UA  Negative   PROTEIN UA mg/dl Negative   NITRITE UA  Negative   BILIRUBIN UA  Negative   UROBILINOGEN UA E U /dl 0 2   LEUKOCYTES UA  Negative     Results from last 7 days   Lab Units 01/15/21  2316 01/15/21  1528   STREP PNEUMONIAE ANTIGEN, URINE  Negative  --    LEGIONELLA URINARY ANTIGEN  Negative  --    INFLUENZA A PCR   --  Negative   INFLUENZA B PCR   --  Negative   RSV PCR   --  Negative       Results from last 7 days   Lab Units 01/19/21  0534 01/15/21  1344 01/15/21  1328   BLOOD CULTURE   --  No Growth After 4 Days  No Growth After 4 Days     SPUTUM CULTURE  3+ Growth of   --   --    GRAM STAIN RESULT  1+ Polys*  1+ Epithelial Cells*  2+ Gram positive cocci in pairs*  1+ Gram positive rods*  1+ Gram negative rods* Rare Budding Yeast with Pseudomycelia*  --   --        Vital Signs:   01/20/21 0813          96 %  36  4 L/min  Nasal cannula     01/20/21 0700  99 1 °F (37 3 °C)  74  18  135/88  99 %         Lying   SpO2: 4liter at 01/20/21 0700   01/19/21 2300  98 3 °F (36 8 °C)  75  18  135/62  92 %      Nasal cannula  Lying   01/19/21 2256  98 5 °F (36 9 °C)  78  18  132/65  94 %  36  4 L/min  Nasal cannula  Lying                        01/19/21 2039          98 %  36  4 L/min  Nasal cannula     01/19/21 1500  97 8 °F (36 6 °C)  75  18  136/75  94 %      Nasal cannula  Lying                        01/19/21 0753  98 6 °F (37 °C)  98  18  148/88  98 %       None (Room air)  Lying   SpO2: 4L at 01/19/21 0753   01/18/21 2300  98 1 °F (36 7 °C)  75  18  142/64  97 %  36  4 L/min  Nasal cannula  Lying                                                                                       01/18/21 0932    90  18  145/80  97 %  36  4 L/min  Nasal cannula  Lying   01/18/21 0850            40  5 L/min  Nasal cannula     01/18/21 0849          94 %           01/18/21 0720  98 3 °F (36 8 °C)  78  18  119/82  97 %      Nasal cannula  Lying         Medications:   Scheduled Medications:  apixaban, 2 5 mg, Oral, BID  budesonide, 0 5 mg, Nebulization, Q12H  diltiazem, 120 mg, Oral, Daily  divalproex sodium, 250 mg, Oral, BID  FLUoxetine, 10 mg, Oral, Daily  formoterol, 20 mcg, Nebulization, BID  guaiFENesin, 600 mg, Oral, Q12H GREG  insulin glargine, 5 Units, Subcutaneous, HS  insulin lispro, 1-5 Units, Subcutaneous, TID AC  ipratropium-albuterol, 3 mL, Nebulization, TID  lidocaine, 1 patch, Topical, Daily  melatonin, 3 mg, Oral, HS  oxyCODONE, 20 mg, Oral, Q12H GREG  pantoprazole, 40 mg, Oral, Daily Before Breakfast  predniSONE, 60 mg, Oral, Daily  pregabalin, 25 mg, Oral, QAM  pregabalin, 50 mg, Oral, HS  QUEtiapine, 12 5 mg, Oral, HS  senna-docusate sodium, 1 tablet, Oral, BID  tamsulosin, 0 4 mg, Oral, Daily With Dinner  tiZANidine, 4 mg, Oral, TID      PRN Meds:  albuterol, 2 puff, Inhalation, Q4H PRN 1/18 X 1, 1/19 X 1, 1/20 X 1  morphine injection, 2 mg, Intravenous, Q4H PRN  1/18 X 3, 1/19 X 4, 1/20 X 2  oxyCODONE, 15 mg, Oral, Q4H PRN 1/19 X 1, 1/20 X 1    1/19:[facility] ROLLING    1/17:PT/OT  IS        Discharge Plan: D    Network Utilization Review Department  ATTENTION: Please call with any questions or concerns to [phone] and carefully listen to the prompts so that you are directed to the right person  All voicemails are confidential   [employer] all requests for admission clinical reviews, approved or denied determinations and any other requests to dedicated fax number below belonging to the campus where the patient is receiving treatment   List of dedicated fax numbers for the Facilities:  [address] DENIALS (Administrative/Medical Necessity) [phone]   [address] (Maternity/NICU/Pediatrics) [phone]   [address] [place] [address] [address] [place] [address] Dr Demario Hernandez [phone] (Chris Ying "Cate" 103) [address] [place] [address] [address] [place] [address] [phone]

## 2021-01-20 NOTE — CONSULTS
Consultation - Orthopedics   Eliseo Bean  64 y o  male MRN: [id number]  Unit/Bed#: E2 -01 Encounter: [id number]      Assessment/Plan     Assessment:  65 yo male with left sided low back pain and radiculopathy    Plan:  · MRI lumbar spine ordered due to weakness on exam  Spoke with radiology and plans to have that completed this evening  · Continue PO prednisone taper and pain regimen  Patient unable to have NSAIDs due to CKD  · Ortho will follow for MRI results  History of Present Illness   Physician Requesting Consult: Huyen Segovia DO  Reason for Consult / Principal Problem: left hip pain, sciatica of left side  HPI: Eliseo Bean  is a 64y o  year old male who presents with left sided low back and left hip pain x 1 months with sudden worsening over the last two days  Patient reports that he fell onto his buttock roughly one month ago when his chair tipped forward  He did not seek evaluation after that fall  Patient noted pain on the left side of the low back and left hip after the fall  He notes pain was tolerable and he was able to ambulate independently  However patient notes over the last few days that his pain has significantly worsened  He describes pain in the left side of the low back that radiates down the leg to the toes  Pain is severe and causes him to be unable to stand  He notes weakness in the leg as well  Patient denies previous issues with his hip or back including injuries, surgeries, or injections  Patient denies bowel or bladder incontinence, urinary retention, saddle anesthesia, or distal numbness  Inpatient consult to Orthopedic Surgery  Consult performed by: Obi Zavala PA-C  Consult ordered by: Huyen Segovia DO          ROS: see HPI, all other systems negative      Historical Information   Past Medical History:   Diagnosis Date    Alkalosis 12/9/2019    Bipolar 1 disorder ([facility] )     Cancer ([facility] )     adeno lung  dx 11/2018-lung bx today 4/9/2019-ongoing chemo  Chronic obstructive pulmonary disease with acute exacerbation ([facility] ) 2018    Chronic pain disorder     back and right shoulder    CKD (chronic kidney disease)     COPD (chronic obstructive pulmonary disease) (HCC)     Depression     Diabetes mellitus ([facility] )     Elevated d-dimer 2019    Elevated troponin 2019    Elevated troponin level not due to acute coronary syndrome 2019    GERD (gastroesophageal reflux disease)     Herniated lumbar intervertebral disc     Hyperkalemia     Hypertension     Insomnia     Latent syphilis     Treated    Low back pain     Lumbosacral disc disease     Lung cancer ([place] [id number] ) 2019    Pneumothorax after biopsy     R lung    PTSD (post-traumatic stress disorder)     Pulmonary emphysema ([place] [address] )     Tobacco abuse 2018     Past Surgical History:   Procedure Laterality Date    COLONOSCOPY      CT GUIDED CHEST TUBE  2018    FL GUIDED CENTRAL VENOUS ACCESS DEVICE INSERTION  2019    HEMORROIDECTOMY      IR BIOPSY LUNG  2018    IR CHEST TUBE PLACEMENT  2018    KNEE SURGERY      IN [facility]J TUNNELED CTR VAD W/SUBQ PORT AGE 5 YR/> N/A 2019    Procedure: PLACEMENT OF PORT-A-CATH;  Surgeon: Chao Martinez MD;  Location:  MAIN OR;  Service: Vascular     Social History   Social History     Substance and Sexual Activity   Alcohol Use Not Currently     Social History     Substance and Sexual Activity   Drug Use Not Currently    Types: Marijuana    Comment: "I will smoke a joint if I am stressed out but not every day"     Social History     Tobacco Use   Smoking Status Former Smoker    Packs/day: 0 50    Years: 40 00    Pack years: 20 00    Types: Cigarettes    Start date:     Quit date: 2019    Years since quittin 4   Smokeless Tobacco Never Used   Tobacco Comment    will smoke a cigarette on occasion with stress     Family History:   Family History   Problem Relation Age of Onset    Heart disease Mother     Cancer Mother     Hypertension Father     Diabetes Family     Asthma Family     Heart disease Family     Cancer Family     Cancer Maternal Grandfather     Cancer Paternal Grandfather     Cancer Maternal Aunt        Meds/Allergies   Medications Prior to Admission   Medication    albuterol (2 5 mg/3 mL) 0 083 % nebulizer solution    albuterol (PROVENTIL HFA,VENTOLIN HFA) 90 mcg/act inhaler    apixaban (ELIQUIS) 2 5 mg    Blood Glucose Monitoring Suppl KIT    budesonide (PULMICORT) 0 5 mg/2 mL nebulizer solution    budesonide (PULMICORT) 1 MG/2ML nebulizer solution    carbamide peroxide (DEBROX) 6 5 % otic solution    clotrimazole-betamethasone (LOTRISONE) 1-0 05 % cream    diltiazem (CARDIZEM CD) 120 mg 24 hr capsule    divalproex sodium (DEPAKOTE) 250 mg EC tablet    ergocalciferol (ERGOCALCIFEROL) 1 25 MG (47944 UT) capsule    FLUoxetine (PROzac) 10 MG tablet    fluticasone (FLONASE) 50 mcg/act nasal spray    folic acid (FOLVITE) 1 mg tablet    formoterol (PERFOROMIST) 20 MCG/2ML nebulizer solution    FREESTYLE LITE test strip    guaiFENesin (MUCINEX) 600 mg 12 hr tablet    ipratropium (ATROVENT) 0 02 % nebulizer solution    ipratropium-albuterol (DUO-NEB) 0 5-2 5 mg/3 mL nebulizer solution    Lancets (FREESTYLE) lancets    lidocaine (LMX) 4 % cream    loratadine (CLARITIN) 10 mg tablet    melatonin 3 mg    metFORMIN (GLUCOPHAGE-XR) 500 mg 24 hr tablet    oxyCODONE (OxyCONTIN) 20 mg 12 hr tablet    oxyCODONE (OxyCONTIN) 20 mg 12 hr tablet    oxyCODONE (OxyCONTIN) 20 mg 12 hr tablet    oxyCODONE (ROXICODONE) 30 MG immediate release tablet    [START ON 3/8/2021] oxyCODONE (ROXICODONE) 30 MG immediate release tablet    [START ON 2/8/2021] oxyCODONE (ROXICODONE) 30 MG immediate release tablet    pantoprazole (PROTONIX) 40 mg tablet    polyethylene glycol (GLYCOLAX) powder    predniSONE 5 mg tablet    pregabalin (LYRICA) 25 mg capsule    prochlorperazine (COMPAZINE) 10 mg tablet    QUEtiapine (SEROquel) 25 mg tablet    REGULOID 28 3 % POWD    Saline 0 65 % (Soln) SOLN    senna-docusate sodium (SENOKOT-S) 8 6-50 mg per tablet    tamsulosin (FLOMAX) 0 4 mg     Allergies   Allergen Reactions    Lisinopril Anaphylaxis     Took medication a while ago and had a "swelling reaction"  Does not carry epi-pen       Objective   Vitals: Blood pressure 135/88, pulse 74, temperature 99 1 °F (37 3 °C), temperature source Temporal, resp  rate 18, height 5' 8 5" (1 74 m), weight 71 7 kg (158 lb), SpO2 96 %  ,Body mass index is 23 67 kg/m²  Intake/Output Summary (Last 24 hours) at 1/20/2021 1206  Last data filed at 1/20/2021 0939  Gross per 24 hour   Intake 1410 ml   Output 1975 ml   Net -565 ml     I/O last 24 hours: In: 7774 [P O :1320;  I V :40; IV Piggyback:50]  Out: 2175 [Urine:2175]    Invasive Devices     Central Venous Catheter Line            Port A Cath Right Chest -- days          Peripheral Intravenous Line            Peripheral IV 01/17/21 Distal;Left;Upper;Ventral (anterior) Arm 2 days                PE:  Gen:  Awake and alert  HEENT:  Hearing intact  Heart:  Regular rate  Lungs: no audible wheezing  GI: no abdominal distension    Ortho Exam   Back:  Inspection- no ecchymosis or erythema   Palpation- +TTP diffusely over lumbar spinous processes and paraspinal muscles  +SLR  No babinski bilaterally   No clonus bilaterally  No patellar hyperreflexia bilaterally    Left Lower Extremity:  Inspection- no obvious bony deformity, ecchymosis, or erythema   Palpation- +TTP over left SIJ and left greater trochanter  ROM- hip PROM 0- 45 limited due to pain, able to flex and extend knee and ankle  Strength- 5/5 EHL, 5/5 AT, 4/5 GS, 4/5 quad, 5/5 hamstring, 2/5 hip flexors, 3/5 hip adduction, 3/5 hip abduction   Tests- +logroll  Neurovascular- Sensation intact L4- S1, palpable posterior tibial pulse, AT/ GS/ EHL intact     Lab Results:   CBC: No results found for: WBC, HGB, HCT, MCV, PLT, ADJUSTEDWBC, MCH, MCHC, RDW, MPV, NRBC  CMP:   Lab Results   Component Value Date    SODIUM 141 01/20/2021     01/20/2021    CO2 28 01/20/2021    BUN 33 (H) 01/20/2021    CREATININE 1 87 (H) 01/20/2021    CALCIUM 8 1 (L) 01/20/2021    EGFR 45 01/20/2021     Imaging Studies: I have personally reviewed pertinent films in PACS   CT scan chest abdomen and pelvis- no acute osseous deformity, no significant degenerative changes in the back or hips    Code Status: Level 1 - Full Code  Advance Directive and Living Will:      Power of :    POLST:

## 2021-01-20 NOTE — PROGRESS NOTES
[facility] Progress Note  Patient: Maria Victoria Sewell  64 y o  male   MRN: [id number]  PCP: Elvia David MD  Unit/Bed#: E2 -98 Encounter: [id number]  Date Of Visit: 01/20/21      Assessment/plan  1  Acute/chronic hypoxic respiratory failure due to adenocarcinoma of right lung, possible multifocal pna, and copd- reviewed ct chest  covid was negative  Appreciate pulmonary consult  Likely tracheobronchitis and not pna  He completed a 5 day course of antibiotics  Will follow up on sputum culture  Continue bronchodilators and steroids  Pt uses 2-3 liters at home  Will continue to wean oxygen as tolerated       2  Sciatica left side-Pt states he is back to being unable to place weight on left side  Will consult ortho for eval  Continue current treatment of prednisone, oxycodone, lyrica, and muscle relaxor     3  Atypical chest pain due to number 1     4  Chronic bilateral thoracic back pain due to adenocarcinoma of right lung- pt is opiate dependent  Will continue current treatment     5  Bipolar d/o- continue current treatment     6  Type 2 diabetes-  decrease lantus to 5 units  Continue insulin sliding scale  Continue to hold metformin       7  ckd3- stable     8  paf- continue diltiazem and eliquis     dispo- awaiting ortho eval       Subjective:   Pt seen and examined  Pt reports his breathing is better but he is still having increased pain in left lower ext  He reports that pain is minimal while he is laying in bed  He states the pain increases as he tries to place weight on his leg  He states it starts in his lower back radiates to his left hip and then down his entire leg to his hip  He reports its a sharp pain and sometimes burning  No numbness or tingling  Objective:     Vitals: Blood pressure 135/88, pulse 74, temperature 99 1 °F (37 3 °C), temperature source Temporal, resp  rate 18, height 5' 8 5" (1 74 m), weight 71 7 kg (158 lb), SpO2 96 %  ,Body mass index is 23 67 kg/m²      Lab, Imaging and other studies:  Results from last 7 days   Lab Units 01/19/21  0521  01/15/21  1328   WBC Thousand/uL 12 42*   < > 8 53   HEMOGLOBIN g/dL 11 0*   < > 11 4*   HEMATOCRIT % 35 3*   < > 36 7   PLATELETS Thousands/uL 352   < > 332   INR   --   --  1 19    < > = values in this interval not displayed  Results from last 7 days   Lab Units 01/20/21  0455  01/16/21  0437   POTASSIUM mmol/L 4 1   < > 4 6   CHLORIDE mmol/L 105   < > 104   CO2 mmol/L 28   < > 26   BUN mg/dL 33*   < > 19   CREATININE mg/dL 1 87*   < > 2 27*   CALCIUM mg/dL 8 1*   < > 8 2*   ALK PHOS U/L  --   --  100   ALT U/L  --   --  19   AST U/L  --   --  15    < > = values in this interval not displayed  Results from last 7 days   Lab Units 01/16/21  0003 01/15/21  2152 01/15/21  1328   TROPONIN I ng/mL 0 05* 0 05* 0 05*     Lab Results   Component Value Date    BLOODCX No Growth After 4 Days  01/15/2021    BLOODCX No Growth After 4 Days   01/15/2021    SPUTUMCULTUR 3+ Growth of Pseudomonas aeruginosa (A) 12/09/2019    SPUTUMCULTUR 3+ Growth of Candida albicans (A) 12/09/2019    SPUTUMCULTUR 3+ Growth of  12/09/2019     Scheduled Meds:   Current Facility-Administered Medications   Medication Dose Route Frequency Provider Last Rate    albuterol  2 puff Inhalation Q4H PRN Becky Acosta MD      apixaban  2 5 mg Oral BID Becky Acosta MD      azithromycin  500 mg Oral Q24H Sybil Tenorio DO      budesonide  0 5 mg Nebulization Q12H Becky Acosta MD      cefTRIAXone  1,000 mg Intravenous Q24H Becky Acosta MD Stopped (01/19/21 2235)    diltiazem  120 mg Oral Daily Becky Acosta MD      divalproex sodium  250 mg Oral BID Becky Acosta MD      FLUoxetine  10 mg Oral Daily Becky Acosta MD      formoterol  20 mcg Nebulization BID Becky Acosta MD      guaiFENesin  600 mg Oral Q12H Laurence Haynes MD      insulin glargine  8 Units Subcutaneous HS Asher Moran PA-C      insulin lispro  1-5 Units Subcutaneous TID AC Becky Acosta MD  ipratropium-albuterol  3 mL Nebulization TID Alberto Brambila DO      lidocaine  1 patch Topical Daily Mina Beal MD      melatonin  3 mg Oral HS Mina Beal MD      morphine injection  2 mg Intravenous Q4H PRN Mina Beal MD      oxyCODONE  20 mg Oral Q12H [facility] Mina Beal MD      oxyCODONE  15 mg Oral Q4H PRN Mina Beal MD      pantoprazole  40 mg Oral Daily Before Breakfast Mina Beal MD      predniSONE  60 mg Oral Daily Sybil Tenorio DO      pregabalin  25 mg Oral QAM Iris Gutierrez PA-C      pregabalin  50 mg Oral HS Iris Gutierrez PA-C      QUEtiapine  12 5 mg Oral HS Mina Beal MD      senna-docusate sodium  1 tablet Oral BID Mina Beal MD      tamsulosin  0 4 mg Oral Daily With Lo Millan MD      tiZANidine  4 mg Oral TID Alberto Brambila DO       Continuous Infusions:    PRN Meds:   albuterol    morphine injection    oxyCODONE      Physical exam:  Physical Exam  General appearance: alert and oriented, in no acute distress  Head: Normocephalic, without obvious abnormality, atraumatic  Eyes: conjunctivae/corneas clear  PERRL, EOM's intact  Fundi benign  Neck: no adenopathy, no carotid bruit, no JVD, supple, symmetrical, trachea midline and thyroid not enlarged, symmetric, no tenderness/mass/nodules  Lungs: minimal course breath sounds   no wheezing  Heart: regular rate and rhythm, S1, S2 normal, no murmur, click, rub or gallop  Abdomen: soft, non-tender; bowel sounds normal; no masses,  no organomegaly  Extremities: ttp left hip  Neurologic: Mental status: Alert, oriented, thought content appropriate      VTE Pharmacologic Prophylaxis: eliquis  VTE Mechanical Prophylaxis: sequential compression device    Counseling / Coordination of Care  Total floor / unit time spent today 20 minutes    Current Length of Stay: 5 day(s)    Current Patient Status: Inpatient       Code Status: Level 1 - Full Code

## 2021-01-21 PROBLEM — R07.9 CHEST PAIN: Status: RESOLVED | Noted: 2021-01-01 | Resolved: 2021-01-01

## 2021-01-21 PROBLEM — J18.9 MULTIFOCAL PNEUMONIA: Status: RESOLVED | Noted: 2021-01-01 | Resolved: 2021-01-01

## 2021-01-21 PROBLEM — M51.26 L4-L5 DISC BULGE: Status: ACTIVE | Noted: 2021-01-01

## 2021-01-21 NOTE — PROGRESS NOTES
[facility] Progress Note  Patient: Miko Castillo  64 y o  male   MRN: [id number]  PCP: Blanca Kocher, MD  Unit/Bed#: E2 -19 Encounter: [id number]  Date Of Visit: 01/21/21      Assessment/plan  1  Acute/chronic hypoxic respiratory failure due to adenocarcinoma of right lung, possible multifocal pna, and copd- reviewed ct chest  covid was negative  Appreciate pulmonary consult  Likely tracheobronchitis and not pna  He completed a 5 day course of antibiotics  Sputum culture showed mixed danielle  Continue bronchodilators and steroids  Pt uses 2-3 liters at home  Will continue to wean oxygen as tolerated       2  Left leg pain- appreciate ortho recommendations  S/p mri that showed L4/l5 disc bulge with left foraminal narrowing  Pt to continue current treatment  He will continue with physical therapy  Pt declined spinal injection       3  Atypical chest pain due to number 1     4  Chronic bilateral thoracic back pain due to adenocarcinoma of right lung- pt is opiate dependent  Will continue current treatment     5  Bipolar d/o- continue current treatment     6  Type 2 diabetes-  d/c metformin  Will start [place] [facility] [place]  a1c was 5 9  d/c lantus     7  ckd3- stable     8  paf- continue diltiazem and eliquis     dispo- d/c to home with hhpt and pain management follow up    Subjective:   Pt seen and examined  Pt was able to ambulate today  He states his leg is better  He states he is breathing better  He does follow with palliative care  Objective:     Vitals: Blood pressure 136/72, pulse 73, temperature 98 3 °F (36 8 °C), temperature source Temporal, resp  rate 18, height 5' 8 5" (1 74 m), weight 71 7 kg (158 lb), SpO2 94 %  ,Body mass index is 23 67 kg/m²      Lab, Imaging and other studies:  Results from last 7 days   Lab Units 01/19/21  0521  01/15/21  1328   WBC Thousand/uL 12 42*   < > 8 53   HEMOGLOBIN g/dL 11 0*   < > 11 4*   HEMATOCRIT % 35 3*   < > 36 7   PLATELETS Thousands/uL 352   < > 332   INR --   --  1 19    < > = values in this interval not displayed  Results from last 7 days   Lab Units 01/20/21  0455  01/16/21  0437   POTASSIUM mmol/L 4 1   < > 4 6   CHLORIDE mmol/L 105   < > 104   CO2 mmol/L 28   < > 26   BUN mg/dL 33*   < > 19   CREATININE mg/dL 1 87*   < > 2 27*   CALCIUM mg/dL 8 1*   < > 8 2*   ALK PHOS U/L  --   --  100   ALT U/L  --   --  19   AST U/L  --   --  15    < > = values in this interval not displayed  Results from last 7 days   Lab Units 01/16/21  0003 01/15/21  2152 01/15/21  1328   TROPONIN I ng/mL 0 05* 0 05* 0 05*     Lab Results   Component Value Date    BLOODCX No Growth After 5 Days  01/15/2021    BLOODCX No Growth After 5 Days  01/15/2021    SPUTUMCULTUR 3+ Growth of  01/19/2021    SPUTUMCULTUR 3+ Growth of Pseudomonas aeruginosa (A) 12/09/2019    SPUTUMCULTUR 3+ Growth of Candida albicans (A) 12/09/2019    SPUTUMCULTUR 3+ Growth of  12/09/2019         Mri Lumbar Spine Wo Contrast    Result Date: 1/20/2021  Narrative: MRI LUMBAR SPINE WITHOUT CONTRAST INDICATION: left sided low back pain, left sided radiculopathy, weakness on exam  COMPARISON:  September 7, 2018 TECHNIQUE:  Sagittal T1, axial T1   IMAGE QUALITY:  Diagnostic FINDINGS: ALIGNMENT:  There is a mild rightward curvature to the lumbar spine  The vertebral bodies are normal in alignment  A lordotic curvature is preserved  There is no evidence of spondylolisthesis  MARROW SIGNAL:  Mildly heterogeneous but no suspicious foci of marrow edema or focal signal abnormality  DISTAL CORD AND CONUS:  Normal appearance to the distal thoracic cord  The conus tapers at the L1 level with an unremarkable configuration  The cauda equina is normal in signal intensity and morphology  PARASPINAL SOFT TISSUES:  Paraspinal soft tissues are unremarkable  SACRUM:  Normal signal within the sacrum  No evidence of insufficiency or stress fracture   LOWER THORACIC DISC SPACES:  Normal disc height and signal   No disc herniation, canal stenosis or foraminal narrowing  LUMBAR DISC SPACES:  There is disc desiccation and mild disc height loss at L4-L5  L1-L2:  Normal  L2-L3:  Normal  L3-L4:  Normal  L4-L5:  Minimal bulging of the disc annulus  Left foraminal annular fissure  Left foraminal disc protrusion with facet arthrosis causing moderate left neural foraminal narrowing  There is minimal right inferior foraminal stenosis as well  The spinal canal remains patent  There is no significant facet arthropathy  L5-S1:  Mild circumferential disc bulge without spinal canal or foraminal stenosis  No facet arthropathy  Impression: Very limited exam without numerous sequences  Moderate left L4-L5 neural foraminal narrowing has progressed from the prior exam  Conus medullaris terminates in appropriate position  Workstation performed: [id number]     Ct Chest Abdomen Pelvis W Contrast    Result Date: 1/15/2021  Narrative: CT CHEST, ABDOMEN AND PELVIS WITH IV CONTRAST INDICATION:   Chest pain, fever  Left low back pain/flank pain  Stage IV adenocarcinoma of the lung  Patient has suspected COVID-19  COMPARISON:  January 17, 2020, August 17, 2020 TECHNIQUE: CT examination of the chest, abdomen and pelvis was performed  Axial, sagittal, and coronal 2D reformatted images were created from the source data and submitted for interpretation  Radiation dose length product (DLP) for this visit:  634 mGy-cm   This examination, like all CT scans performed in the [facility], was performed utilizing techniques to minimize radiation dose exposure, including the use of iterative reconstruction and automated exposure control  IV Contrast:  100 mL of iodixanol (VISIPAQUE) Enteric Contrast: Enteric contrast was administered  FINDINGS: CHEST LUNGS:  There are multiple bilateral areas of confluent mostly wedge-shaped consolidation and air bronchograms    On the coronal and sagittal reformat images, many of these areas of airspace infiltrate and consolidation are centered along the fissures  These findings are new compared to the priors, while not specific and potentially related to patient's prior history of cancer, given the history raises suspicion for multifocal pneumonia  Distribution and appearance not typical for COVID infection but not excluded  Bronchi are remarkable for diffuse moderate peribronchial thickening  Caliber remains within normal limits  Background centrilobular emphysema noted  PLEURA:  Unremarkable  HEART/GREAT VESSELS:  Unremarkable for patient's age  MEDIASTINUM AND GARIMA:  Unremarkable  CHEST WALL AND LOWER NECK:   Unremarkable  ABDOMEN LIVER/BILIARY TREE:  Unremarkable  GALLBLADDER:  No calcified gallstones  No pericholecystic inflammatory change  SPLEEN:  Unremarkable  PANCREAS:  Unremarkable  ADRENAL GLANDS:  Unremarkable  KIDNEYS/URETERS:  Unremarkable  No hydronephrosis  STOMACH AND BOWEL:  Unremarkable  APPENDIX:  No findings to suggest appendicitis  ABDOMINOPELVIC CAVITY:  No ascites  No pneumoperitoneum  No lymphadenopathy  VESSELS:  Unremarkable for patient's age  PELVIS REPRODUCTIVE ORGANS:  Unremarkable for patient's age  URINARY BLADDER:  Unremarkable  ABDOMINAL WALL/INGUINAL REGIONS:  Unremarkable  OSSEOUS STRUCTURES:  No acute fracture or destructive osseous lesion  Impression: No acute intra-abdominal pelvic abnormality identified  Bilateral multifocal areas of irregular pulmonary consolidation and air bronchograms  Findings seen in association with peribronchial thickening  Distribution is somewhat random but also concentrated along the fissures  Not typical for pattern for COVID infection but not excluded  Multifocal pneumonia remains suspected  Given some of the distribution along the fissures and prior PET scan report, component of this could be related to known neoplastic etiology  The study was marked in [facility] for immediate notification   Workstation performed: [id number]       Scheduled Meds: Current Facility-Administered Medications   Medication Dose Route Frequency Provider Last Rate    albuterol  2 puff Inhalation Q4H PRN Tyrone Suarez MD      apixaban  2 5 mg Oral BID Tyrone Suarez MD      budesonide  0 5 mg Nebulization Q12H Tyrone Suarez MD      diltiazem  120 mg Oral Daily Tyrone Suarez MD      divalproex sodium  250 mg Oral BID Tyrone Suarez MD      FLUoxetine  10 mg Oral Daily Tyrone Suarez MD      formoterol  20 mcg Nebulization BID Tyrone Suarez MD      guaiFENesin  600 mg Oral Q12H Silva Agustin MD      insulin glargine  5 Units Subcutaneous HS Sybil Tenorio DO      insulin lispro  1-5 Units Subcutaneous TID AC Tyrone Suarez MD      ipratropium-albuterol  3 mL Nebulization TID René Avina DO      lidocaine  1 patch Topical Daily Tyrone Suarez MD      melatonin  3 mg Oral HS Tyrone Suarez MD      morphine injection  2 mg Intravenous Q4H PRN Tyrone Suarez MD      oxyCODONE  20 mg Oral Q12H [address] Tyrone Suarez MD      oxyCODONE  15 mg Oral Q4H PRN Tyrone Suarez MD      pantoprazole  40 mg Oral Daily Before Breakfast Tyrone Suarez MD      predniSONE  50 mg Oral Daily Olla Fidel, CRNP      Followed by   Kassie Joseph ON 1/23/2021] predniSONE  40 mg Oral Daily Olla Fidel, CRNP      Followed by   Kassie Joseph ON 1/25/2021] predniSONE  30 mg Oral Daily Olla Fidel, CRNP      Followed by   Kassie Joseph ON 1/27/2021] predniSONE  20 mg Oral Daily Olla [place], CRNP      Followed by   Kassie Joseph ON 1/29/2021] predniSONE  10 mg Oral Daily Olla Fidel, CRNP      pregabalin  25 mg Oral QAM Sanjiv Casillas PA-C      pregabalin  50 mg Oral HS Iris Gutierrez PA-C      QUEtiapine  12 5 mg Oral HS Tyrone Suarez MD      senna-docusate sodium  1 tablet Oral BID Tyrone Suarez MD      tamsulosin  0 4 mg Oral Daily With Patrizia Mason MD      tiZANidine  4 mg Oral TID René Bilis, DO       Continuous Infusions:    PRN Meds:   albuterol    morphine injection   oxyCODONE      Physical exam:  Physical Exam  Constitutional:       Appearance: Normal appearance  HENT:      Head: Normocephalic and atraumatic  Eyes:      Extraocular Movements: Extraocular movements intact  Pupils: Pupils are equal, round, and reactive to light  Cardiovascular:      Rate and Rhythm: Normal rate and regular rhythm  Heart sounds: No murmur  No friction rub  No gallop  Pulmonary:      Effort: Pulmonary effort is normal  No respiratory distress  Breath sounds: Normal breath sounds  No wheezing or rales  Abdominal:      General: Bowel sounds are normal  There is no distension  Palpations: Abdomen is soft  Tenderness: There is no abdominal tenderness  There is no guarding  Musculoskeletal:      Right lower leg: No edema  Left lower leg: No edema  Comments: ttp over left hip and lower back   Neurological:      Mental Status: He is alert and oriented to person, place, and time

## 2021-01-21 NOTE — PROGRESS NOTES
Progress Note - Pulmonary   Neda Cisneros Sr  64 y o  male MRN: [id number]  Unit/Bed#: E2 -01 Encounter: [id number]      Assessment/Plan:    1  Tracheobronchitis  1  S/P 5 day course of ABX therapy with ceftriaxone and zithromax  2  No further ABX indicated  2  COPD  With mild acute exacerbation secondary to #1  1  Prednisone taper started today at 50 mg with 10 mg reduction every 2 days  2  Continue budesonide/perforomist BID  3  Continue DuoNEB TID  4  Continue mucinex  5  Pulmonary follow up at discharge   3  Acute on chronic hypoxic respiratory failure  1  Current on Baseline 3 L NC  2  Titrate to maintain SpO2>/=88%  3  Pulmonary toilet: IS, cough deep breathe  4  Stage IV adenocarcinoma of the lung  1  Diagnosed 2018  2  S/p chemotherapy and keytruda  3  Possible pneumonitis on chest imaging secondary to Keytruda  4  Recommend pulmonary follow up at discharge    *appropriate for discharge from a pulmonary standpoitn    Subjective:     Ange Romero was seen in bed upon entering the room  Denies acute overnight events  Reports breathing continues to improve and he is close to his baseline  Denies: chest pain, fevers, chills, bronchospasm or hemoptysis    Objective:         Vitals: Blood pressure 136/72, pulse 73, temperature 98 3 °F (36 8 °C), temperature source Temporal, resp  rate 18, height 5' 8 5" (1 74 m), weight 71 7 kg (158 lb), SpO2 94 %  , 3LNC, Body mass index is 23 67 kg/m²        Intake/Output Summary (Last 24 hours) at 1/21/2021 1230  Last data filed at 1/20/2021 2200  Gross per 24 hour   Intake 240 ml   Output 400 ml   Net -160 ml         Physical Exam  Gen: Awake, alert, oriented x 3, no acute distress  HEENT: Mucous membranes moist, no oral lesions, no thrush, oxygen via NC  NECK: no accessory muscle use, JVP not elevated  Cardiac: Regular, single S1, single S2, no murmurs, no rubs, no gallops  Lungs: faint end expiratory wheezes noted bilaterally  Abdomen: normoactive bowel sounds, soft nontender, nondistended, no rebound or rigidity, no guarding  Extremities: no cyanosis, no clubbing, no edema    Labs: I have personally reviewed pertinent lab results  , CBC: No results found for: WBC, HGB, HCT, MCV, PLT, ADJUSTEDWBC, MCH, MCHC, RDW, MPV, NRBC, CMP: No results found for: SODIUM, K, CL, CO2, ANIONGAP, BUN, CREATININE, GLUCOSE, CALCIUM, AST, ALT, ALKPHOS, PROT, BILITOT, EGFR  Imaging and other studies: none      Bailey Chang

## 2021-01-21 NOTE — DISCHARGE SUMMARY
Discharge Summary - [facility] Internal Medicine    Patient Information: Neetu Wilkins  64 y o  male MRN: [id number]  Unit/Bed#: E2 -01 Encounter: [id number]    Discharging Physician / Practitioner: Any Mckee DO  PCP: Carlos Navarro MD  Admission Date: 1/15/2021  Discharge Date: 01/21/21    Disposition:     Home with VNA Services (Reminder: Complete face to face encounter)     Reason for Admission: abnormal chest ct  Discharge Diagnoses:     Principal Problem (Resolved):    Multifocal pneumonia  Active Problems:    COPD (chronic obstructive pulmonary disease) ([facility] [address] )    Essential hypertension    Chronic bilateral thoracic back pain    Bipolar 1 disorder (HCC)    Gastroesophageal reflux disease without esophagitis    Type 2 diabetes mellitus without complication, without long-term current use of insulin (HCC)    Adenocarcinoma of lung, right (HCC)    Chronic pain disorder    Stage 3 chronic kidney disease    Paroxysmal atrial fibrillation (HCC)    L4-L5 disc bulge  Resolved Problems:    Chest pain      Consultations During Hospital Stay:  · Ortho  · Pulmonary     Procedures Performed:     · none    Significant Findings / Test Results:   Mri Lumbar Spine Wo Contrast  Result Date: 1/20/2021  Impression: Very limited exam without numerous sequences  Moderate left L4-L5 neural foraminal narrowing has progressed from the prior exam  Conus medullaris terminates in appropriate position  Ct Chest Abdomen Pelvis W Contrast  Result Date: 1/15/2021  Impression: No acute intra-abdominal pelvic abnormality identified  Bilateral multifocal areas of irregular pulmonary consolidation and air bronchograms  Findings seen in association with peribronchial thickening  Distribution is somewhat random but also concentrated along the fissures  Not typical for pattern for COVID infection but not excluded  Multifocal pneumonia remains suspected    Given some of the distribution along the fissures and prior PET scan report, component of this could be related to known neoplastic etiology  Incidental Findings:   · none    Test Results Pending at Discharge (will require follow up):   · none     Outpatient Tests Requested:  none    Hospital Course:     Nik Awad Sr  is a 64 y o  male patient who originally presented to the hospital on 1/15/2021 due to acute/chronic hypoxic respiratory failure due to adenocarcinoma of right lung, possible multifocal pna, and copd  He was negative for covid  He was assessed by pulmonary and stated not pneumonia and likely tracheobronchitis  He completed a 5 day course of antibiotics  Sputum culture showed mixed danielle  He will continue bronchodilators and steroids  Pt uses 2-3 liters at home and was using 4 liters at time of discharge  He had left leg pain in which he was not improving with steroids, pain medications, and muscle relaxer  He was evaluated by ortho and is S/p mri that showed L4/l5 disc bulge with left foraminal narrowing  Pt to continue current treatment  He will continue with physical therapy  Pt declined spinal injection      Pt has chronic bilateral thoracic back pain due to adenocarcinoma of right lung  Pt is opiate dependent  For his type 2 diabetes  His last a1c is 5  9  he was on metformin outpt but his creatinine was 1 8  His metformin was d/albert and he was started on januvia  Pt has ckd3 in which his creatinine was stable at discharge   He was discharged home with hhpt       Discharge Day Visit / Exam:     * Please refer to separate progress note for these details *    Discharge instructions/Information to patient and family:   See after visit summary for information provided to patient and family  Provisions for Follow-Up Care:  See after visit summary for information related to follow-up care and any pertinent home health orders  Discharge Statement:  I spent 33 minutes discharging the patient  This time was spent on the day of discharge   I had direct contact with the patient on the day of discharge  Greater than 50% of the total time was spent examining patient, answering all patient questions, arranging and discussing plan of care with patient as well as directly providing post-discharge instructions  Additional time then spent on discharge activities  Discharge Medications:  See after visit summary for reconciled discharge medications provided to patient and family

## 2021-01-21 NOTE — PROGRESS NOTES
Progress Note - Orthopedics   Unique Gibbs Sr  64 y o  male MRN: [id number]  Unit/Bed#: E2 -01 Encounter: [id number]    Assessment:  63 yo male with left sided low back pain, L4L5 disc bulging with foraminal narrowing    Plan:  · MRI reviewed demonstrating L4L5 disc bulging with left foraminal narrowing  · Pain control prn  · Patient may complete outpatient PT    · He should follow up with his established Pain Management provider upon discharge  May consider injections as an outpatient  Subjective: Patient seen and examined  Sitting in bed eating breakfast  Patient notes moderate improvement in his pain this morning  States he is able to move his hip today  He notes the pain is radiating to the knee at this point  Patient is asking to go home today  Denies distal numbness  Vitals: Blood pressure 155/73, pulse 83, temperature 98 8 °F (37 1 °C), temperature source Temporal, resp  rate 18, height 5' 8 5" (1 74 m), weight 71 7 kg (158 lb), SpO2 96 %  ,Body mass index is 23 67 kg/m²  Intake/Output Summary (Last 24 hours) at 1/21/2021 0849  Last data filed at 1/20/2021 2200  Gross per 24 hour   Intake 360 ml   Output 575 ml   Net -215 ml       Invasive Devices     Central Venous Catheter Line            Port A Cath Right Chest -- days          Peripheral Intravenous Line            Peripheral IV 01/17/21 Distal;Left;Upper;Ventral (anterior) Arm 3 days                Ortho Exam:  Back and Left Lower Extremity:  Inspection- no obvious bony deformity, ecchymosis, or erythema  Palpation- +TTP over lumbar spinous processes, left greater trochanter   ROM- hip active ROM 0- 60  Knee 0- 60 without pain  Strength- 5/5 EHL, AT, GS, quad, hamstring, hip flexors, hip adductors, hip abductors  Neurovascular- Sensation intact L4- S1  Palpable posterior tibial pulse  AT/ GS/ EHL intact       Lab, Imaging and other studies: CBC: No results found for: WBC, HGB, HCT, MCV, PLT, ADJUSTEDWBC, MCH, MCHC, RDW, MPV, NRBC  CMP: No results found for: SODIUM, CL, CO2, ANIONGAP, BUN, CREATININE, GLUCOSE, CALCIUM, AST, ALT, ALKPHOS, PROT, BILITOT, EGFR       MRI lumbar spine- L4L5 disc bulging with left neural foraminal narrowing

## 2021-01-22 NOTE — UTILIZATION REVIEW
Notification of Inpatient Admission/Inpatient Authorization Request   This is a Notification of Inpatient Admission for [facility]  Be advised that this patient was admitted to our facility under Inpatient Status  Contact Annie Ferguson at [phone] for additional admission information  Claudio Melvin  DEPT  DEDICATED -[phone]  Patient Name:   Wero Islas  YOB: 1960       [address]   [address]:   [address] [facility]  Tax ID: [id number]  NPI: [id number] Attending Provider/NPI:  Phone:  Address: Rudi Spencer [[id number]]  [phone]  Same as the facility   Place of Service Code: 24 Place of Service Name:  [address] [place] [address]   Start Date: 1/15/21 1649 Discharge Date & Time: 1/21/2021  4:07 PM    Type of Admission: Inpatient Status Discharge Disposition   (if discharged): Home with [address]   Patient Diagnoses: COPD (chronic obstructive pulmonary disease) ([facility] [address] ) [J44 9]  Lung cancer ([facility] [address] ) [C34 90]  Chest pain [R07 9]  Elevated troponin [R77 8]  Multifocal pneumonia [J18 9]     Orders: Admission Orders (From admission, onward)     Ordered        01/15/21 1649  Inpatient Admission  Once                    Assigned Utilization Review Contact: [address] [place] [address] Utilization Review Department  Phone: [phone]; Fax [phone]  Email: [email or web address]   ATTENTION PAYERS: Please call the assigned Utilization  directly with any questions or concerns ALL voicemails in the department are confidential  Send all requests for admission clinical reviews, approved or denied determinations and any other requests to dedicated fax number belonging to the campus where the patient is receiving treatment

## 2021-01-22 NOTE — UTILIZATION REVIEW
Continued Stay Review    Date:  1/20/2021                    Current Patient Class: INPT Current Level of Care: MED-SURG    HPI:60 y o  male initially admitted INPT on [email or web address] D/T Acute/chronic hypoxic respiratory failure due to adenocarcinoma of right lung, possible multifocal pna, and copd  Assessment/Plan:Likely tracheobronchitis  Sciatica left side  Chronic bilateral thoracic back pain due to adenocarcinoma of right lung  breathing is better but he is still having increased pain in left lower ext  unable to place weight on left side  starts in his lower back radiates to his left hip and then down his entire leg to his hip  sharp pain and sometimes burning  opiate dependent  completed a 5 day course of antibiotics  sputum culture  bronchodilators and steroids  [email or web address] at home  prednisone, oxycodone, lyrica, and muscle relaxor  insulin sliding scale hold metformin lantus  diltiazem and eliquis  pulmonary consult  Ortho consult      Pertinent Labs/Diagnostic Results:       Results from last 7 days   Lab Units 01/19/21  0521 01/16/21  0436   WBC Thousand/uL 12 42* 11 08*   HEMOGLOBIN g/dL 11 0* 11 1*   HEMATOCRIT % 35 3* 37 0   PLATELETS Thousands/uL 352 334   NEUTROS ABS Thousands/µL  --  5 51         Results from last 7 days   Lab Units 01/20/21  0455 01/19/21  0521 01/16/21  0437   SODIUM mmol/L 141 140 138   POTASSIUM mmol/L 4 1 5 0 4 6   CHLORIDE mmol/L 105 106 104   CO2 mmol/L 28 29 26   ANION GAP mmol/L 8 5 8   BUN mg/dL 33* 29* 19   CREATININE mg/dL 1 87* 1 85* 2 27*   EGFR ml/min/1 73sq m 45 45 35   CALCIUM mg/dL 8 1* 8 7 8 2*     Results from last 7 days   Lab Units 01/16/21  0437   AST U/L 15   ALT U/L 19   ALK PHOS U/L 100   TOTAL PROTEIN g/dL 7 6   ALBUMIN g/dL 3 1*   TOTAL BILIRUBIN mg/dL 0 16*     Results from last 7 days   Lab Units 01/21/21  1113 01/21/21  0737 01/20/21 2024 01/20/21  1613 01/20/21  1124 01/20/21  0630 01/19/21  2111 01/19/21  1614 01/19/21  1117 01/19/21  0726 01/18/21  2127 01/18/21  1619   POC GLUCOSE mg/dl 145* 88 167* 168* 147* 82 146* 141* 155* 117 157* 134     Results from last 7 days   Lab Units 01/20/21  0455 01/19/21  0521 01/16/21  0437   GLUCOSE RANDOM mg/dL 148* 125 100       Results from last 7 days   Lab Units 01/16/21  0003 01/15/21  2152   TROPONIN I ng/mL 0 05* 0 05*                 Results from last 7 days   Lab Units 01/17/21  0613 01/16/21  0437 01/15/21  2152   PROCALCITONIN ng/ml 0 11 0 10 0 06                 Results from last 7 days   Lab Units 01/15/21  2353   CLARITY UA  Clear   COLOR UA  Yellow   SPEC GRAV UA  <=1 005   PH UA  5 5   GLUCOSE UA mg/dl Negative   KETONES UA mg/dl Negative   BLOOD UA  Negative   PROTEIN UA mg/dl Negative   NITRITE UA  Negative   BILIRUBIN UA  Negative   UROBILINOGEN UA E U /dl 0 2   LEUKOCYTES UA  Negative     Results from last 7 days   Lab Units 01/15/21  2316   STREP PNEUMONIAE ANTIGEN, URINE  Negative   LEGIONELLA URINARY ANTIGEN  Negative       Results from last 7 days   Lab Units 01/19/21  0534   SPUTUM CULTURE  3+ Growth of    GRAM STAIN RESULT  1+ Polys*  1+ Epithelial Cells*  2+ Gram positive cocci in pairs*  1+ Gram positive rods*  1+ Gram negative rods*  Rare Budding Yeast with Pseudomycelia*       Vital Signs:   01/20/21 0813          96 %  36  4 L/min  Nasal cannula     01/20/21 0700  99 1 °F (37 3 °C)  74  18  135/88  99 %         Lying   SpO2: 4liter at 01/20/21 0700   01/19/21 2300  98 3 °F (36 8 °C)  75  18  135/62  92 %      Nasal cannula  Lying   01/19/21 2256  98 5 °F (36 9 °C)  78  18  132/65  94 %  36  4 L/min  Nasal cannula  Lying                        01/19/21 2039          98 %  36  4 L/min  Nasal cannula     01/19/21 1500  97 8 °F (36 6 °C)  75  18  136/75  94 %      Nasal cannula  Lying                        01/19/21 0753  98 6 °F (37 °C)  98  18  148/88  98 %       None (Room air)  Lying   SpO2: 4L at 01/19/21 0753   01/18/21 2300  98 1 °F (36 7 °C)  75  18  142/64  97 %  36  4 L/min  Nasal cannula  Lying                                                                                       01/18/21 0932    90  18  145/80  97 %  36  4 L/min  Nasal cannula  Lying   01/18/21 0850            40  5 L/min  Nasal cannula     01/18/21 0849          94 %           01/18/21 0720  98 3 °F (36 8 °C)  78  18  119/82  97 %      Nasal cannula  Lying         Medications:   Scheduled Medications:  apixaban, 2 5 mg, Oral, BID  budesonide, 0 5 mg, Nebulization, Q12H  diltiazem, 120 mg, Oral, Daily  divalproex sodium, 250 mg, Oral, BID  FLUoxetine, 10 mg, Oral, Daily  formoterol, 20 mcg, Nebulization, BID  guaiFENesin, 600 mg, Oral, Q12H GREG  insulin glargine, 5 Units, Subcutaneous, HS  insulin lispro, 1-5 Units, Subcutaneous, TID AC  ipratropium-albuterol, 3 mL, Nebulization, TID  lidocaine, 1 patch, Topical, Daily  melatonin, 3 mg, Oral, HS  oxyCODONE, 20 mg, Oral, Q12H GREG  pantoprazole, 40 mg, Oral, Daily Before Breakfast  predniSONE, 60 mg, Oral, Daily  pregabalin, 25 mg, Oral, QAM  pregabalin, 50 mg, Oral, HS  QUEtiapine, 12 5 mg, Oral, HS  senna-docusate sodium, 1 tablet, Oral, BID  tamsulosin, 0 4 mg, Oral, Daily With Dinner  tiZANidine, 4 mg, Oral, TID      PRN Meds:  albuterol, 2 puff, Inhalation, Q4H PRN 1/18 X 1, 1/19 X 1, 1/20 X 1  morphine injection, 2 mg, Intravenous, Q4H PRN  1/18 X 3, 1/19 X 4, 1/20 X 2  oxyCODONE, 15 mg, Oral, Q4H PRN 1/19 X 1, 1/20 X 1    1/19:WALKER ROLLING    1/17:PT/OT  IS        Discharge Plan: [facility]    Network Utilization Review Department  ATTENTION: Please call with any questions or concerns to [phone] and carefully listen to the prompts so that you are directed to the right person  All voicemails are confidential   Polly Estrada all requests for admission clinical reviews, approved or denied determinations and any other requests to dedicated fax number below belonging to the campus where the patient is receiving treatment   List of dedicated fax numbers for the Facilities:  FACILITY NAME UR FAX NUMBER   ADMISSION DENIALS (Administrative/Medical Necessity) [phone]   [address] (Maternity/NICU/Pediatrics) 261 [facility],7Th Floor [facility] [address] [place] [address] Dr Demario Hernandez 2638 (  Jamal Ying "Cate" 103) [address] [address] Padmini Alex 1481 [address] [place] [address] [phone]

## 2021-01-22 NOTE — UTILIZATION REVIEW
Initial Clinical Review    Admission: Date/Time/Statement:   Admission Orders (From admission, onward)     Ordered        01/15/21 1649  Inpatient Admission  Once                   Orders Placed This Encounter   Procedures    Inpatient Admission     Standing Status:   Standing     Number of Occurrences:   1     Order Specific Question:   Level of Care     Answer:   Med Surg [16]     Order Specific Question:   Estimated length of stay     Answer:   More than 2 Midnights     Order Specific Question:   Certification     Answer:   I certify that inpatient services are medically necessary for this patient for a duration of greater than two midnights  See H&P and MD Progress Notes for additional information about the patient's course of treatment  ED Arrival Information     Expected Arrival Acuity Means of Arrival Escorted By Service Admission Type    - 1/15/2021 12:15 Emergent Walk-In Self Hospitalist Emergency    Arrival Complaint    Chest Pain        Chief Complaint   Patient presents with    Chest Pain     Began 2 weeks ago, spoek with [place] doc, stated "skipped beat" ot with Cardiology appt in Feb 2021   Leg Pain     Began yesterday  Pt denies injury  PMH of   Assessment/Plan: 64 y o  male presents to ED with Chest Pain, Left low Back/flank/abd pain  PMH of HTN, HLD, DM,  stage IV adenocarcinoma of right lung on keytruda  Pt notes 2 weeks of intermittent chest pain localized to the mid sternal and right side worse with deep inspiration, and coughing  Cough productive of a green phlegm  Back pain started last night  Upon arrival to the ED patient noted to have fever 101 2; on O2 @ 3 L nasal cannula which is chronic for him  CT reveals bilateral multifocal areas of irregular pulmonary consolidation and air bronchograms, could be related to known neoplastic etiology  COVID negative  EKG Sinus Tach   Cr 2 09, Trop 0 05    Admitted to Inpatient M/S/Tele With Multifocal Pneumonia, Chest Pain and Plan is for IV Abxs w/ Rocephin and Zithromax,  check urine Legionella, strep pneumo, follow-up cultures,  ACS R/O, Monitor on Telemetry  Continue diltiazem, Eliquis for anticoagulation for Parox A Fib, monitor renal fx, accu checks with ssi    1/16 C/O 8/10 Left back pain  Patient on chronic opioids  Continue with OxyContin 20 mg b i d    Oxycodone 15 mg q 4 hours prn & additional morphine 2 mg IV q 4 hours for severe pain  + Exp wheezes  TX as below      ED Triage Vitals   Temperature Pulse Respirations Blood Pressure SpO2   01/15/21 1228 01/15/21 1228 01/15/21 1228 01/15/21 1228 01/15/21 1228   (!) 101 2 °F (38 4 °C) 102 20 169/84 96 %      Temp Source Heart Rate Source Patient Position - Orthostatic VS BP Location FiO2 (%)   01/15/21 1228 01/15/21 1421 01/15/21 1228 01/15/21 1228 --   Temporal Monitor Sitting Right arm       Pain Score       01/15/21 1228       8          Wt Readings from Last 1 Encounters:   01/16/21 71 7 kg (158 lb)     Additional Vital Signs:   01/16/21 0747  98 7 °F (37 1 °C)  83  18  144/87    97 %         Lying   SpO2: 3liter at 01/16/21 0747   01/16/21 0300  97 8 °F (36 6 °C)  89  22  183/84Abnormal     93 %      None (Room air)  Lying   01/15/21 2326  97 5 °F (36 4 °C)  84  18  153/83    99 %      None (Room air)  Lying   01/15/21 2052            96 %           01/15/21 2046              32  3 L/min       01/15/21 1939  98 7 °F (37 1 °C)  74  18  142/82  74  94 %      None (Room air)  Lying   01/15/21 1721    95  16  150/81  108  100 %      Nasal cannula  Sitting   01/15/21 1530        144/67  95          Lying   01/15/21 1421    84  18  140/64  92  98 %      Nasal cannula  Lying       Pertinent Labs/Diagnostic Test Results:       Results from last 7 days   Lab Units 01/19/21  0521 01/16/21  0436   WBC Thousand/uL 12 42* 11 08*   HEMOGLOBIN g/dL 11 0* 11 1*   HEMATOCRIT % 35 3* 37 0   PLATELETS Thousands/uL 352 334   NEUTROS ABS Thousands/µL  --  5 51 Results from last 7 days   Lab Units 01/20/21  0455 01/19/21  0521 01/16/21  0437   SODIUM mmol/L 141 140 138   POTASSIUM mmol/L 4 1 5 0 4 6   CHLORIDE mmol/L 105 106 104   CO2 mmol/L 28 29 26   ANION GAP mmol/L 8 5 8   BUN mg/dL 33* 29* 19   CREATININE mg/dL 1 87* 1 85* 2 27*   EGFR ml/min/1 73sq m 45 45 35   CALCIUM mg/dL 8 1* 8 7 8 2*     Results from last 7 days   Lab Units 01/16/21  0437   AST U/L 15   ALT U/L 19   ALK PHOS U/L 100   TOTAL PROTEIN g/dL 7 6   ALBUMIN g/dL 3 1*   TOTAL BILIRUBIN mg/dL 0 16*     Results from last 7 days   Lab Units 01/21/21  1113 01/21/21  0737 01/20/21  2024 01/20/21  1613 01/20/21  1124 01/20/21  0630 01/19/21  2111 01/19/21  1614 01/19/21  1117 01/19/21  0726 01/18/21  2129 01/18/21  1619   POC GLUCOSE mg/dl 145* 88 167* 168* 147* 82 146* 141* 155* 117 157* 134     Results from last 7 days   Lab Units 01/20/21  0455 01/19/21  0521 01/16/21  0437   GLUCOSE RANDOM mg/dL 148* 125 100     Results from last 7 days   Lab Units 01/16/21  0003 01/15/21  2152   TROPONIN I ng/mL 0 05* 0 05*         Results from last 7 days   Lab Units 01/17/21  0613 01/16/21  0437 01/15/21  2152   PROCALCITONIN ng/ml 0 11 0 10 0 06             Results from last 7 days   Lab Units 01/15/21  2353   CLARITY UA  Clear   COLOR UA  Yellow   SPEC GRAV UA  <=1 005   PH UA  5 5   GLUCOSE UA mg/dl Negative   KETONES UA mg/dl Negative   BLOOD UA  Negative   PROTEIN UA mg/dl Negative   NITRITE UA  Negative   BILIRUBIN UA  Negative   UROBILINOGEN UA E U /dl 0 2   LEUKOCYTES UA  Negative     Results from last 7 days   Lab Units 01/15/21  2316   STREP PNEUMONIAE ANTIGEN, URINE  Negative   LEGIONELLA URINARY ANTIGEN  Negative     Results from last 7 days   Lab Units 01/19/21  0534   SPUTUM CULTURE  3+ Growth of    GRAM STAIN RESULT  1+ Polys*  1+ Epithelial Cells*  2+ Gram positive cocci in pairs*  1+ Gram positive rods*  1+ Gram negative rods*  Rare Budding Yeast with Pseudomycelia*     1/15 EKG: sinus tachycardia @ 102 bpm, normal axis, normal intervals, no sT changes     1/15 CT C/A/AP: No acute intra-abdominal pelvic abnormality identified  Bilateral multifocal areas of irregular pulmonary consolidation and air bronchograms   Findings seen in association with peribronchial thickening   Distribution is somewhat random but also concentrated along the fissures   Not typical for pattern for  COVID infection but not excluded   Multifocal pneumonia remains suspected   Given some of the distribution along the fissures and prior PET scan report, component of this could be related to known neoplastic etiology       ED Treatment:   Medication Administration from 01/15/2021 1215 to 01/15/2021 1755       Date/Time Order Dose Route Action     01/15/2021 1345 cefepime (MAXIPIME) 2 g/50 mL dextrose IVPB 2,000 mg Intravenous New Bag     01/15/2021 1327 acetaminophen (TYLENOL) tablet 975 mg 975 mg Oral Given     01/15/2021 1334 sodium chloride 0 9 % bolus 1,000 mL 1,000 mL Intravenous New Bag     01/15/2021 1335 morphine (PF) 4 mg/mL injection 8 mg 8 mg Intravenous Given     01/15/2021 1526 albuterol inhalation solution 5 mg 5 mg Nebulization Given     01/15/2021 1525 ipratropium (ATROVENT) 0 02 % inhalation solution 0 5 mg 0 5 mg Nebulization Given     01/15/2021 1541 morphine (PF) 4 mg/mL injection 8 mg 8 mg Intravenous Given        Past Medical History:   Diagnosis Date    Alkalosis 12/9/2019    Bipolar 1 disorder ([address] )     Cancer ([address] )     adeno lung  dx 11/2018-lung bx today 4/9/2019-ongoing chemo    Chronic obstructive pulmonary disease with acute exacerbation ([address] ) 6/7/2018    Chronic pain disorder     back and right shoulder    CKD (chronic kidney disease)     COPD (chronic obstructive pulmonary disease) ([address] )     Depression     Diabetes mellitus ([address] )     Elevated d-dimer 11/30/2019    Elevated troponin 11/29/2019    Elevated troponin level not due to acute coronary syndrome 11/30/2019    GERD (gastroesophageal reflux disease)     Herniated lumbar intervertebral disc     Hyperkalemia     Hypertension     Insomnia     Latent syphilis     Treated    Low back pain     Lumbosacral disc disease     Lung [facility] ) 04/2019    Pneumothorax after biopsy     R lung    PTSD (post-traumatic stress disorder)     Pulmonary emphysema (HCC)     Tobacco abuse 11/13/2018     Present on Admission:   Essential hypertension   Chronic bilateral thoracic back pain   Bipolar 1 disorder (HCC)   Gastroesophageal reflux disease without esophagitis   Type 2 diabetes mellitus without complication, without long-term current use of insulin (HCC)   Adenocarcinoma of lung, right ([facility])   Stage 3 chronic kidney disease   Chronic pain disorder   Paroxysmal atrial fibrillation ([facility])      Admitting Diagnosis: COPD (chronic obstructive pulmonary disease) ([facility]) [J44 9]  Lung cancer ([facility]) [C34 90]  Chest pain [R07 9]  Elevated troponin [R77 8]  Multifocal pneumonia [J18 9]  Age/Sex: 64 y o  male  Admission Orders:  Scheduled Medications:  apixaban, 2 5 mg, Oral, BID  azithromycin, 500 mg, Intravenous, Q24H  budesonide, 0 5 mg, Nebulization, Q12H  cefTRIAXone, 1,000 mg, Intravenous, Q24H  diltiazem, 120 mg, Oral, Daily  divalproex sodium, 250 mg, Oral, BID  FLUoxetine, 10 mg, Oral, Daily  formoterol, 20 mcg, Nebulization, BID  guaiFENesin, 600 mg, Oral, Q12H GREG  insulin lispro, 1-5 Units, Subcutaneous, TID AC  ipratropium-albuterol, 3 mL, Nebulization, TID  lidocaine, 1 patch, Topical, Daily  melatonin, 3 mg, Oral, HS  oxyCODONE, 20 mg, Oral, Q12H GREG  pantoprazole, 40 mg, Oral, Daily Before Breakfast  predniSONE, 5 mg, Oral, Daily  QUEtiapine, 12 5 mg, Oral, HS  senna-docusate sodium, 1 tablet, Oral, BID  tamsulosin, 0 4 mg, Oral, Daily With Dinner    Continuous IV Infusions: na  PRN Meds:  albuterol, 2 puff, Inhalation, Q4H PRN x 1 1/16  morphine injection, 2 mg, Intravenous, Q4H PRN x 1 1/15  & x 2 1/16  oxyCODONE, 15 mg, Oral, Q4H PRN    TELEMETRY  [facility]    Network Utilization Review Department  ATTENTION: Please call with any questions or concerns to [phone] and carefully listen to the prompts so that you are directed to the right person  All voicemails are confidential   Power Ordonez all requests for admission clinical reviews, approved or denied determinations and any other requests to dedicated fax number below belonging to the campus where the patient is receiving treatment   List of dedicated fax numbers for the Facilities:  [address] DENIALS (Administrative/Medical Necessity) [phone]   [address] (Maternity/NICU/Pediatrics) [phone] [address] Dr Demario Hernandez 4263 (  Jamal Ying "Cate" 103) [address] [address] [facility] 14[facility] [address] [place] [address] [phone]

## 2021-01-22 NOTE — UTILIZATION REVIEW
PER PORTAL NO AUTH ON FILE - CLINICALS FAXED ON 01/18 @ 1154 AM - 'S WERE FAXED ON 01/20 @ 349 PM - RESENDING AGAIN - YOU ARE PRIMARY INSURANCE FOR THIS PATIENT

## 2021-01-25 NOTE — PROGRESS NOTES
Hematology/Oncology Outpatient Follow-up  Gaston Marquez Sr  64 y o  male 1960 [id number]    Date:  1/25/2021        Assessment and Plan:  1  Allergic rhinitis due to pollen, unspecified seasonality    - loratadine (CLARITIN) 10 mg tablet; Take 1 tablet (10 mg total) by mouth daily in the early morning  Dispense: 90 tablet; Refill: 3    2  Adenocarcinoma of lung, right [facility])  The patient was educated about the CT scan of the chest abdomen pelvis which was done on the 15th of January  He seems to have bilateral multifocal areas of irregular pulmonary consolidation which may be due to inflammatory process and less likely due to infectious etiology since he is relatively asymptomatic  The patient will be continue on the [facility] on every 6 week basis  We will aim for a PET-CT scan in about 3 months from now for close monitoring     - predniSONE 5 mg tablet; Take 1 tablet (5 mg total) by mouth daily  Dispense: 30 tablet; Refill: 0  - Infusion Calculated Appointment Request; Future  - CBC and differential; Future  - Comprehensive metabolic panel; Future  - TSH, 3rd generation with Free T4 reflex; Future  - T3, free; Future    3  Chronic obstructive pulmonary disease, unspecified COPD type (HCC)    - albuterol (PROVENTIL HFA,VENTOLIN HFA) 90 mcg/act inhaler; Inhale 2 puffs every 4 (four) hours as needed for wheezing  Dispense: 18 g; Refill: 5    4  PAF (paroxysmal atrial fibrillation) ([facility] [address] )  He requested refill   - apixaban (ELIQUIS) 2 5 mg; Take 1 tablet (2 5 mg total) by mouth 2 (two) times a day  Dispense: 60 tablet; Refill: 11        HPI:  The patient came today for a follow-up visit  He had a CT scan of the chest abdomen pelvis with contrast on 01/15/2021 instead of a PET scan which showed:  IMPRESSION:     No acute intra-abdominal pelvic abnormality identified      Bilateral multifocal areas of irregular pulmonary consolidation and air bronchograms  Findings seen in association with peribronchial thickening  Distribution is somewhat random but also concentrated along the fissures  Not typical for pattern for   COVID infection but not excluded  Multifocal pneumonia remains suspected  Given some of the distribution along the fissures and prior PET scan report, component of this could be related to known neoplastic etiology  He also had also had an MRI of the lumbar spine without contrast on 01/20/2021 which showed:  IMPRESSION:  Very limited exam without numerous sequences  Moderate left L4-L5 neural foraminal narrowing has progressed from the prior exam   Conus medullaris terminates in appropriate position  The patient continues to use the oxygen 3 L around the clock  His most recent available blood work from 01/19/2021 showed hemoglobin of 11 0 otherwise normal platelets  The white cell count was slightly above normal 12 4  Creatinine 1 85    Oncology History Overview Note   51-year-old [facility] American male heavy smoker who used to smoke 2 packs of cigarettes daily for many years, COPD, he presented with dyspnea, CT scan of the chest on October 2018 showed right middle lobe spiculated 1 3 cm mass with multiple solid and ground-glass nodules along the major and minor fissures sub cm pulmonary nodules bilaterally, left adrenal 1 2 cm nodule      PET scan showed 1 3 cm right middle lung nodule with SUV of 6 8, numerous nodular densities along the right major and minor fissures, right hilar activity with SUV of 3 4, subcarinal lymph node measuring 7 mm with SUV of 2 7, periportal enlarged lymph nodes concerning for metastatic disease measuring 2 4 cm with SUV of 5, prominent left retrocrural lymph node measuring 1 cm x 9 mm with SUV of 1 1     Core biopsy of the right spiculated nodule showed invasive adenocarcinoma consistent with lung primary positive for CK7, TTF 1, napsin a     Adenocarcinoma of lung, right ([facility] [address] )   11/13/2018 Initial Diagnosis    Adenocarcinoma of lung, right ([facility] [address] )  Stage IV     11/13/2018 Biopsy    A  Lung, right, core needle biopsies:             - Invasive adenocarcinoma, consistent with lung primary  12/13/2018 - 3/28/2019 Chemotherapy     Alimta, Carboplatin (AUC 5) + Keytruda (6 cycles total)     4/17/2019 -  Chemotherapy    Started maintenance Alimta and Keytruda  (Alimta d/c'd 9/16/19 due to worsening renal dysfunction)         Interval history:    ROS: Review of Systems   Constitutional: Positive for fatigue  Negative for chills and fever  HENT: Negative for ear pain and sore throat  Eyes: Negative for pain and visual disturbance  Respiratory: Positive for cough and shortness of breath  Cardiovascular: Negative for chest pain and palpitations  Gastrointestinal: Positive for constipation  Negative for abdominal pain and vomiting  Genitourinary: Negative for dysuria and hematuria  Musculoskeletal: Positive for back pain  Negative for arthralgias  Skin: Negative for color change and rash  Neurological: Positive for numbness  Negative for seizures and syncope  All other systems reviewed and are negative        Past Medical History:   Diagnosis Date    Alkalosis 12/9/2019    Bipolar 1 disorder ([address] )     Cancer ([address] )     adeno lung  dx 11/2018-lung bx today 4/9/2019-ongoing chemo    Chronic obstructive pulmonary disease with acute exacerbation ([address] ) 6/7/2018    Chronic pain disorder     back and right shoulder    CKD (chronic kidney disease)     COPD (chronic obstructive pulmonary disease) ([address] )     Depression     Diabetes mellitus ([address] )     Elevated d-dimer 11/30/2019    Elevated troponin 11/29/2019    Elevated troponin level not due to acute coronary syndrome 11/30/2019    GERD (gastroesophageal reflux disease)     Herniated lumbar intervertebral disc     Hyperkalemia     Hypertension     Insomnia     Latent syphilis     Treated    Low back pain     Lumbosacral disc disease     Lung cancer ([address] ) 04/2019    Pneumothorax after biopsy     R lung    PTSD (post-traumatic stress disorder)     Pulmonary emphysema ([facility] [address] )     Tobacco abuse 2018       Past Surgical History:   Procedure Laterality Date    COLONOSCOPY      CT GUIDED CHEST TUBE  2018    FL GUIDED CENTRAL VENOUS ACCESS DEVICE INSERTION  2019    HEMORROIDECTOMY      IR BIOPSY LUNG  2018    IR CHEST TUBE PLACEMENT  2018    KNEE SURGERY      MT INSJ TUNNELED CTR VAD W/SUBQ PORT AGE 5 YR/> N/A 2019    Procedure: PLACEMENT OF PORT-A-CATH;  Surgeon: Dharmesh Boland MD;  Location: [address] [place] [address] OR;  Service: Vascular       Social History     Socioeconomic History    Marital status: Legally      Spouse name: None    Number of children: None    Years of education: None    Highest education level: None   Occupational History    Occupation: part time employment   Social Needs    Financial resource strain: None    Food insecurity     Worry: None     Inability: None    Transportation needs     Medical: None     Non-medical: None   Tobacco Use    Smoking status: Former Smoker     Packs/day: 0 50     Years: 40 00     Pack years: 20 00     Types: Cigarettes     Start date:      Quit date: 2019     Years since quittin 4    Smokeless tobacco: Never Used    Tobacco comment: will smoke a cigarette on occasion with stress   Substance and Sexual Activity    Alcohol use: Not Currently    Drug use: Not Currently     Types: Marijuana     Comment: "I will smoke a joint if I am stressed out but not every day"    Sexual activity: Yes   Lifestyle    Physical activity     Days per week: None     Minutes per session: None    Stress: None   Relationships    Social connections     Talks on phone: None     Gets together: None     Attends Mandaeism service: None     Active member of club or organization: None     Attends meetings of clubs or organizations: None     Relationship status: None    Intimate partner violence     Fear of current or ex partner: None Emotionally abused: None     Physically abused: None     Forced sexual activity: None   Other Topics Concern    None   Social History Narrative    Lives with girlfriend       Family History   Problem Relation Age of Onset    Heart disease Mother     Cancer Mother     Hypertension Father     Diabetes Family     Asthma Family     Heart disease Family     Cancer Family     Cancer Maternal Grandfather     Cancer Paternal Grandfather     Cancer Maternal Aunt        Allergies   Allergen Reactions    Lisinopril Anaphylaxis     Took medication a while ago and had a "swelling reaction"  Does not carry epi-pen         Current Outpatient Medications:     albuterol (2 5 mg/3 mL) 0 083 % nebulizer solution, USE 1 VIAL VIA NEB EVERY 4 HOURS AS NEEDED FOR WHEEZING/SHORTNESS OF BREATH, Disp: 360 mL, Rfl: 0    albuterol (PROVENTIL HFA,VENTOLIN HFA) 90 mcg/act inhaler, Inhale 2 puffs every 4 (four) hours as needed for wheezing, Disp: 18 g, Rfl: 5    apixaban (ELIQUIS) 2 5 mg, Take 1 tablet (2 5 mg total) by mouth 2 (two) times a day, Disp: 60 tablet, Rfl: 11    Blood Glucose Monitoring Suppl KIT, by Does not apply route daily, Disp: 1 each, Rfl: 0    budesonide (PULMICORT) 0 5 mg/2 mL nebulizer solution, Take 1 vial (0 5 mg total) by nebulization every 12 (twelve) hours Rinse mouth after use , Disp: 1 vial, Rfl: 0    carbamide peroxide (DEBROX) 6 5 % otic solution, Administer 5 drops into both ears 2 (two) times a day, Disp: 15 mL, Rfl: 0    clotrimazole-betamethasone (LOTRISONE) 1-0 05 % cream, Apply topically 2 (two) times a day, Disp: 45 g, Rfl: 2    diltiazem (CARDIZEM CD) 120 mg 24 hr capsule, Take 1 capsule (120 mg total) by mouth daily, Disp: 30 capsule, Rfl: 11    divalproex sodium (DEPAKOTE) 250 mg EC tablet, Take 1 tablet (250 mg total) by mouth 2 (two) times a day, Disp: 30 tablet, Rfl: 0    ergocalciferol (ERGOCALCIFEROL) 1 25 MG (46733 UT) capsule, Take 1 capsule (50,000 Units total) by mouth once a week, Disp: 12 capsule, Rfl: 0    FLUoxetine (PROzac) 10 MG tablet, Take 1 tablet (10 mg total) by mouth daily, Disp: 30 tablet, Rfl: 5    fluticasone (FLONASE) 50 mcg/act nasal spray, 1-2 sprays each nostril daily for allergies, Disp: 1 Bottle, Rfl: 3    folic acid (FOLVITE) 1 mg tablet, Take 1 tablet (1,000 mcg total) by mouth daily, Disp: 90 tablet, Rfl: 0    formoterol (PERFOROMIST) 20 MCG/2ML nebulizer solution, Take 1 vial (20 mcg total) by nebulization 2 (two) times a day, Disp: 60 vial, Rfl: 1    FREESTYLE LITE test strip, TEST BLOOD SUGAR DAILY, Disp: 100 each, Rfl: 1    guaiFENesin (MUCINEX) 600 mg 12 hr tablet, Take 600 mg by mouth every 12 (twelve) hours, Disp: , Rfl:     ipratropium-albuterol (DUO-NEB) 0 5-2 5 mg/3 mL nebulizer solution, Take 1 vial (3 mL total) by nebulization 4 (four) times a day, Disp: 120 vial, Rfl: 5    Lancets (FREESTYLE) lancets, TEST BLOOD SUGAR DAILY, Disp: 100 each, Rfl: 4    lidocaine (LIDODERM) 5 %, Apply 1 patch topically daily Remove & Discard patch within 12 hours or as directed by MD, Disp: 10 patch, Rfl: 0    lidocaine (LMX) 4 % cream, Apply topically as needed for mild pain Apply 1/2-1 inch to port 30-60 mins prior to needle insertion, cover with serrain wrap, Disp: 30 g, Rfl: 5    loratadine (CLARITIN) 10 mg tablet, Take 1 tablet (10 mg total) by mouth daily in the early morning, Disp: 90 tablet, Rfl: 3    melatonin 3 mg, Take 1 tablet (3 mg total) by mouth daily at bedtime, Disp: 30 tablet, Rfl: 1    oxyCODONE (OxyCONTIN) 20 mg 12 hr tablet, Take 1 tablet (20 mg total) by mouth every 12 (twelve) hours To prevent cancer painMax Daily Amount: 40 mg, Disp: 60 tablet, Rfl: 0    [START ON 2/8/2021] oxyCODONE (ROXICODONE) 30 MG immediate release tablet, Take 1 tablet (30 mg total) by mouth every 4 (four) hours as needed (cancer pain)Max Daily Amount: 180 mg, Disp: 120 tablet, Rfl: 0    pantoprazole (PROTONIX) 40 mg tablet, Take 1 tablet (40 mg total) by mouth daily, Disp: 90 tablet, Rfl: 0    polyethylene glycol (GLYCOLAX) powder, Take 17 g by mouth daily, Disp: 527 g, Rfl: 1    [START ON 2/1/2021] predniSONE 5 mg tablet, Take 1 tablet (5 mg total) by mouth daily, Disp: 30 tablet, Rfl: 0    pregabalin (LYRICA) 25 mg capsule, Take 1 capsule PO in morning and 2 capsules PO at bedtime, Disp: 90 capsule, Rfl: 2    prochlorperazine (COMPAZINE) 10 mg tablet, Take 1 tablet (10 mg total) by mouth every 6 (six) hours as needed for nausea or vomiting, Disp: 90 tablet, Rfl: 0    QUEtiapine (SEROquel) 25 mg tablet, TAKE 0 5 TABLETS (12 5 MG TOTAL) BY MOUTH DAILY AT BEDTIME, Disp: 15 tablet, Rfl: 0    REGULOID 28 3 % POWD, USE AS DIRECTED, Disp: 369 g, Rfl: 4    Saline 0 65 % (Soln) SOLN, 5 drops into each nostril as needed (Dry nose), Disp: 50 mL, Rfl: 5    senna-docusate sodium (SENOKOT-S) 8 6-50 mg per tablet, Take 1 tablet by mouth 2 (two) times a day, Disp: 60 tablet, Rfl: 11    sitaGLIPtin (JANUVIA) 50 mg tablet, Take 1 tablet (50 mg total) by mouth daily, Disp: 30 tablet, Rfl: 0    tamsulosin (FLOMAX) 0 4 mg, TAKE ONE CAPSULE BY MOUTH EVERY DAY WITH DINNER, Disp: 90 capsule, Rfl: 0    tiZANidine (ZANAFLEX) 4 mg tablet, Take 1 tablet (4 mg total) by mouth 3 (three) times a day for 14 days, Disp: 42 tablet, Rfl: 0  No current facility-administered medications for this visit  Facility-Administered Medications Ordered in Other Visits:     pembrolizumab (KEYTRUDA) 400 mg in sodium chloride 0 9 % 50 mL IVPB, 400 mg, Intravenous, Once, Elgin Mcburney, MD, Last Rate: 132 mL/hr at 01/25/21 1213, 400 mg at 01/25/21 1213      Physical Exam:  /78 (BP Location: Left arm, Patient Position: Sitting, Cuff Size: Large)   Pulse 75   Temp (!) 96 8 °F (36 °C) (Tympanic)   Resp 18   Ht 5' 8 5" (1 74 m)   Wt 84 6 kg (186 lb 9 6 oz)   SpO2 99%   BMI 27 96 kg/m²     Physical Exam  Constitutional:       Appearance: He is well-developed  He is ill-appearing     HENT: Head: Normocephalic and atraumatic  Eyes:      General: No scleral icterus  Right eye: No discharge  Left eye: No discharge  Conjunctiva/sclera: Conjunctivae normal       Pupils: Pupils are equal, round, and reactive to light  Neck:      Musculoskeletal: Normal range of motion and neck supple  Thyroid: No thyromegaly  Trachea: No tracheal deviation  Cardiovascular:      Rate and Rhythm: Normal rate and regular rhythm  Heart sounds: Normal heart sounds  No murmur  No friction rub  Pulmonary:      Effort: Respiratory distress (With minimal activities) present  Breath sounds: Wheezing present  No rales  Chest:      Chest wall: No tenderness  Abdominal:      General: There is no distension  Palpations: Abdomen is soft  There is no hepatomegaly or splenomegaly  Tenderness: There is no abdominal tenderness  There is no guarding or rebound  Musculoskeletal: Normal range of motion  General: No tenderness or deformity  Lymphadenopathy:      Cervical: No cervical adenopathy  Skin:     General: Skin is warm and dry  Coloration: Skin is not pale  Findings: No erythema or rash  Neurological:      Mental Status: He is alert and oriented to person, place, and time  Cranial Nerves: No cranial nerve deficit  Coordination: Coordination normal       Deep Tendon Reflexes: Reflexes are normal and symmetric  Reflexes normal    Psychiatric:         Behavior: Behavior normal          Thought Content:  Thought content normal          Judgment: Judgment normal            Labs:  Lab Results   Component Value Date    WBC 12 42 (H) 01/19/2021    HGB 11 0 (L) 01/19/2021    HCT 35 3 (L) 01/19/2021    MCV 84 01/19/2021     01/19/2021     Lab Results   Component Value Date    K 4 1 01/20/2021     01/20/2021    CO2 28 01/20/2021    BUN 33 (H) 01/20/2021    CREATININE 1 87 (H) 01/20/2021    GLUF 102 (H) 08/24/2020    CALCIUM 8 1 (L) 01/20/2021    CORRECTEDCA 8 9 01/16/2021    AST 15 01/16/2021    ALT 19 01/16/2021    ALKPHOS 100 01/16/2021    EGFR 45 01/20/2021     No results found for: TSH    Patient voiced understanding and agreement in the above discussion  Aware to contact our office with questions/symptoms in the interim

## 2021-01-25 NOTE — TELEPHONE ENCOUNTER
Patient is calling in to confirm what pharmacy was his prescriptions sent to, I have confirmed that they have been sent to     [facility] [address]   [address]    Patient voiced understanding

## 2021-01-25 NOTE — PROGRESS NOTES
Central labs drawn and ok to proceed with chemo today without results  Pt tolerated every 6 week [facility] ([place] well without complications, confirmed next appt and AVS provided at Habib's office

## 2021-01-26 NOTE — UTILIZATION REVIEW
Initial Clinical Review    Admission: Date/Time/Statement:   Admission Orders (From admission, onward)     Ordered        01/15/21 1649  Inpatient Admission  Once                   Orders Placed This Encounter   Procedures    Inpatient Admission     Standing Status:   Standing     Number of Occurrences:   1     Order Specific Question:   Level of Care     Answer:   Med Surg [16]     Order Specific Question:   Estimated length of stay     Answer:   More than 2 Midnights     Order Specific Question:   Certification     Answer:   I certify that inpatient services are medically necessary for this patient for a duration of greater than two midnights  See H&P and MD Progress Notes for additional information about the patient's course of treatment  ED Arrival Information     Expected Arrival Acuity Means of Arrival Escorted By Service Admission Type    - 1/15/2021 12:15 Emergent Walk-In Self Hospitalist Emergency    Arrival Complaint    Chest Pain        Chief Complaint   Patient presents with    Chest Pain     Began 2 weeks ago, spoek with [facility] doc, stated "skipped beat" ot with Cardiology appt in Feb 2021   Leg Pain     Began yesterday  Pt denies injury  PMH of   Assessment/Plan: 64 y o  male presents to ED with Chest Pain, Left low Back/flank/abd pain  PMH of HTN, HLD, DM,  stage IV adenocarcinoma of right lung on keytruda  Pt notes 2 weeks of intermittent chest pain localized to the mid sternal and right side worse with deep inspiration, and coughing  Cough productive of a green phlegm  Back pain started last night  Upon arrival to the ED patient noted to have fever 101 2; on O2 @ 3 L nasal cannula which is chronic for him  CT reveals bilateral multifocal areas of irregular pulmonary consolidation and air bronchograms, could be related to known neoplastic etiology  COVID negative  EKG Sinus Tach   Cr 2 09, Trop 0 05    Admitted to Inpatient M/S/Tele With Multifocal Pneumonia, Chest Pain and Plan is for IV Abxs w/ Rocephin and Zithromax,  check urine Legionella, strep pneumo, follow-up cultures,  ACS R/O, Monitor on Telemetry  Continue diltiazem, Eliquis for anticoagulation for Parox A Fib, monitor renal fx, accu checks with ssi    1/16 C/O 8/10 Left back pain  Patient on chronic opioids  Continue with OxyContin 20 mg b i d    Oxycodone 15 mg q 4 hours prn & additional morphine 2 mg IV q 4 hours for severe pain  + Exp wheezes  TX as below      ED Triage Vitals   Temperature Pulse Respirations Blood Pressure SpO2   01/15/21 1228 01/15/21 1228 01/15/21 1228 01/15/21 1228 01/15/21 1228   (!) 101 2 °F (38 4 °C) 102 20 169/84 96 %      Temp Source Heart Rate Source Patient Position - Orthostatic VS BP Location FiO2 (%)   01/15/21 1228 01/15/21 1421 01/15/21 1228 01/15/21 1228 --   Temporal Monitor Sitting Right arm       Pain Score       01/15/21 1228       8          Wt Readings from Last 1 Encounters:   01/25/21 84 6 kg (186 lb 9 6 oz)     Additional Vital Signs:   01/16/21 0747  98 7 °F (37 1 °C)  83  18  144/87    97 %         Lying   SpO2: 3liter at 01/16/21 0747   01/16/21 0300  97 8 °F (36 6 °C)  89  22  183/84Abnormal     93 %      None (Room air)  Lying   01/15/21 2326  97 5 °F (36 4 °C)  84  18  153/83    99 %      None (Room air)  Lying   01/15/21 2052            96 %           01/15/21 2046              32  3 L/min       01/15/21 1939  98 7 °F (37 1 °C)  74  18  142/82  74  94 %      None (Room air)  Lying   01/15/21 1721    95  16  150/81  108  100 %      Nasal cannula  Sitting   01/15/21 1530        144/67  95          Lying   01/15/21 1421    84  18  140/64  92  98 %      Nasal cannula  Lying       Pertinent Labs/Diagnostic Test Results:       Results from last 7 days   Lab Units 01/25/21  1146   WBC Thousand/uL 10 30   HEMOGLOBIN g/dL 10 9*   HEMATOCRIT % 34 7*   PLATELETS Thousands/uL 335   TOTAL NEUT ABS Thousand/uL 6 28     Results from last 7 days   Lab Units 01/25/21  1146 01/20/21  0455   SODIUM mmol/L 139 141   POTASSIUM mmol/L 4 7 4 1   CHLORIDE mmol/L 103 105   CO2 mmol/L 33* 28   ANION GAP mmol/L 3* 8   BUN mg/dL 30* 33*   CREATININE mg/dL 1 93* 1 87*   EGFR ml/min/1 73sq m 43* 45   CALCIUM mg/dL 8 3* 8 1*     Results from last 7 days   Lab Units 01/25/21  1146   AST U/L 23   ALT U/L 26   ALK PHOS U/L 92   TOTAL PROTEIN g/dL 6 1   ALBUMIN g/dL 3 3   TOTAL BILIRUBIN mg/dL 0 40     Results from last 7 days   Lab Units 01/21/21  1113 01/21/21  0737 01/20/21  2024 01/20/21  1613 01/20/21  1124 01/20/21  0630 01/19/21  2111 01/19/21  1614   POC GLUCOSE mg/dl 145* 88 167* 168* 147* 82 146* 141*     Results from last 7 days   Lab Units 01/25/21  1146 01/20/21  0455   GLUCOSE RANDOM mg/dL 82 148*                                     1/15 EKG: sinus tachycardia @ 102 bpm, normal axis, normal intervals, no sT changes     1/15 CT C/A/AP: No acute intra-abdominal pelvic abnormality identified  Bilateral multifocal areas of irregular pulmonary consolidation and air bronchograms   Findings seen in association with peribronchial thickening   Distribution is somewhat random but also concentrated along the fissures   Not typical for pattern for  COVID infection but not excluded   Multifocal pneumonia remains suspected   Given some of the distribution along the fissures and prior PET scan report, component of this could be related to known neoplastic etiology       ED Treatment:   Medication Administration from 01/15/2021 1215 to 01/15/2021 1755       Date/Time Order Dose Route Action     01/15/2021 1345 cefepime (MAXIPIME) 2 g/50 mL dextrose IVPB 2,000 mg Intravenous New Bag     01/15/2021 1327 acetaminophen (TYLENOL) tablet 975 mg 975 mg Oral Given     01/15/2021 1334 sodium chloride 0 9 % bolus 1,000 mL 1,000 mL Intravenous New Bag     01/15/2021 1335 morphine (PF) 4 mg/mL injection 8 mg 8 mg Intravenous Given     01/15/2021 1526 albuterol inhalation solution 5 mg 5 mg Nebulization Given     01/15/2021 1525 ipratropium (ATROVENT) 0 02 % inhalation solution 0 5 mg 0 5 mg Nebulization Given     01/15/2021 1541 morphine (PF) 4 mg/mL injection 8 mg 8 mg Intravenous Given        Past Medical History:   Diagnosis Date    Alkalosis 12/9/2019    Bipolar 1 disorder ([facility] [place] )     Cancer ([place] [address] )     adeno lung  dx 11/2018-lung bx today 4/9/2019-ongoing chemo    Chronic obstructive pulmonary disease with acute exacerbation ([facility] ) 6/7/2018    Chronic pain disorder     back and right shoulder    CKD (chronic kidney disease)     COPD (chronic obstructive pulmonary disease) ([facility] )     Depression     Diabetes mellitus ([facility] )     Elevated d-dimer 11/30/2019    Elevated troponin 11/29/2019    Elevated troponin level not due to acute coronary syndrome 11/30/2019    GERD (gastroesophageal reflux disease)     Herniated lumbar intervertebral disc     Hyperkalemia     Hypertension     Insomnia     Latent syphilis     Treated    Low back pain     Lumbosacral disc disease     Lung cancer ([facility] ) 04/2019    Pneumothorax after biopsy     R lung    PTSD (post-traumatic stress disorder)     Pulmonary emphysema ([facility] [place] [facility] )     Tobacco abuse 11/13/2018     Present on Admission:   Essential hypertension   Chronic bilateral thoracic back pain   Bipolar 1 disorder ([facility] [place] [facility] )   Gastroesophageal reflux disease without esophagitis   Type 2 diabetes mellitus without complication, without long-term current use of insulin (HCC)   Adenocarcinoma of lung, right (HCC)   Stage 3 chronic kidney disease   Chronic pain disorder   Paroxysmal atrial fibrillation ([facility] )      Admitting Diagnosis: COPD (chronic obstructive pulmonary disease) ([facility] ) [J44 9]  Lung cancer (HCC) [C34 90]  Chest pain [R07 9]  Elevated troponin [R77 8]  Multifocal pneumonia [J18 9]  Age/Sex: 64 y o  male  Admission Orders:  Scheduled Medications:  apixaban, 2 5 mg, Oral, BID  azithromycin, 500 mg, Intravenous, Q24H  budesonide, 0 5 mg, Nebulization, Q12H  cefTRIAXone, 1,000 mg, Intravenous, Q24H  diltiazem, 120 mg, Oral, Daily  divalproex sodium, 250 mg, Oral, BID  FLUoxetine, 10 mg, Oral, Daily  formoterol, 20 mcg, Nebulization, BID  guaiFENesin, 600 mg, Oral, Q12H GREG  insulin lispro, 1-5 Units, Subcutaneous, TID AC  ipratropium-albuterol, 3 mL, Nebulization, TID  lidocaine, 1 patch, Topical, Daily  melatonin, 3 mg, Oral, HS  oxyCODONE, 20 mg, Oral, Q12H GREG  pantoprazole, 40 mg, Oral, Daily Before Breakfast  predniSONE, 5 mg, Oral, Daily  QUEtiapine, 12 5 mg, Oral, HS  senna-docusate sodium, 1 tablet, Oral, BID  tamsulosin, 0 4 mg, Oral, Daily With Dinner    Continuous IV Infusions: na  PRN Meds:  albuterol, 2 puff, Inhalation, Q4H PRN x 1 1/16  morphine injection, 2 mg, Intravenous, Q4H PRN x 1 1/15  & x 2 1/16  oxyCODONE, 15 mg, Oral, Q4H PRN    TELEMETRY  SCDS    Network Utilization Review Department  ATTENTION: Please call with any questions or concerns to [phone] and carefully listen to the prompts so that you are directed to the right person  All voicemails are confidential   Paradise No all requests for admission clinical reviews, approved or denied determinations and any other requests to dedicated fax number below belonging to the campus where the patient is receiving treatment   List of dedicated fax numbers for the Facilities:  [address] DENIALS (Administrative/Medical Necessity) [phone]   [address] (Maternity/NICU/Pediatrics) [phone]   [address] [facility] [address] Demario Hernandez 5[address] (  Jamal Ying "Cate" 103) [address] [place] [address] [phone]     [address] [phone]   [place] [address] [phone]

## 2021-01-26 NOTE — UTILIZATION REVIEW
Continued Stay Review    Date:  1/20/2021                    Current Patient Class: INPT Current Level of Care: MED-SURG    HPI:60 y o  male initially admitted INPT on [email or web address] D/T Acute/chronic hypoxic respiratory failure due to adenocarcinoma of right lung, possible multifocal pna, and copd  Assessment/Plan:Likely tracheobronchitis  Sciatica left side  Chronic bilateral thoracic back pain due to adenocarcinoma of right lung  breathing is better but he is still having increased pain in left lower ext  unable to place weight on left side  starts in his lower back radiates to his left hip and then down his entire leg to his hip  sharp pain and sometimes burning  opiate dependent  completed a 5 day course of antibiotics  sputum culture  bronchodilators and steroids  [email or web address] at home  prednisone, oxycodone, lyrica, and muscle relaxor  insulin sliding scale hold metformin lantus  diltiazem and eliquis  pulmonary consult  Ortho consult      Pertinent Labs/Diagnostic Results:       Results from last 7 days   Lab Units 01/25/21  1146   WBC Thousand/uL 10 30   HEMOGLOBIN g/dL 10 9*   HEMATOCRIT % 34 7*   PLATELETS Thousands/uL 335   TOTAL NEUT ABS Thousand/uL 6 28         Results from last 7 days   Lab Units 01/25/21  1146 01/20/21  0455   SODIUM mmol/L 139 141   POTASSIUM mmol/L 4 7 4 1   CHLORIDE mmol/L 103 105   CO2 mmol/L 33* 28   ANION GAP mmol/L 3* 8   BUN mg/dL 30* 33*   CREATININE mg/dL 1 93* 1 87*   EGFR ml/min/1 73sq m 43* 45   CALCIUM mg/dL 8 3* 8 1*     Results from last 7 days   Lab Units 01/25/21  1146   AST U/L 23   ALT U/L 26   ALK PHOS U/L 92   TOTAL PROTEIN g/dL 6 1   ALBUMIN g/dL 3 3   TOTAL BILIRUBIN mg/dL 0 40     Results from last 7 days   Lab Units 01/21/21  1113 01/21/21  0737 01/20/21 2024 01/20/21  1613 01/20/21  1124 01/20/21  0630 01/19/21  2111 01/19/21  1614   POC GLUCOSE mg/dl 145* 88 167* 168* 147* 82 146* 141*     Results from last 7 days   Lab Units 01/25/21  1146 01/20/21  0455   GLUCOSE RANDOM mg/dL 82 148*                   Results from last 7 days   Lab Units 01/25/21  1146   TSH 3RD GENERATON uIU/mL 3 170                                     Vital Signs:   01/20/21 0813          96 %  36  4 L/min  Nasal cannula     01/20/21 0700  99 1 °F (37 3 °C)  74  18  135/88  99 %         Lying   SpO2: 4liter at 01/20/21 0700   01/19/21 2300  98 3 °F (36 8 °C)  75  18  135/62  92 %      Nasal cannula  Lying   01/19/21 2256  98 5 °F (36 9 °C)  78  18  132/65  94 %  36  4 L/min  Nasal cannula  Lying                        01/19/21 2039          98 %  36  4 L/min  Nasal cannula     01/19/21 1500  97 8 °F (36 6 °C)  75  18  136/75  94 %      Nasal cannula  Lying                        01/19/21 0753  98 6 °F (37 °C)  98  18  148/88  98 %       None (Room air)  Lying   SpO2: 4L at 01/19/21 0753   01/18/21 2300  98 1 °F (36 7 °C)  75  18  142/64  97 %  36  4 L/min  Nasal cannula  Lying                                                                                       01/18/21 0932    90  18  145/80  97 %  36  4 L/min  Nasal cannula  Lying   01/18/21 0850            40  5 L/min  Nasal cannula     01/18/21 0849          94 %           01/18/21 0720  98 3 °F (36 8 °C)  78  18  119/82  97 %      Nasal cannula  Lying         Medications:   Scheduled Medications:  apixaban, 2 5 mg, Oral, BID  budesonide, 0 5 mg, Nebulization, Q12H  diltiazem, 120 mg, Oral, Daily  divalproex sodium, 250 mg, Oral, BID  FLUoxetine, 10 mg, Oral, Daily  formoterol, 20 mcg, Nebulization, BID  guaiFENesin, 600 mg, Oral, Q12H GREG  insulin glargine, 5 Units, Subcutaneous, HS  insulin lispro, 1-5 Units, Subcutaneous, TID AC  ipratropium-albuterol, 3 mL, Nebulization, TID  lidocaine, 1 patch, Topical, Daily  melatonin, 3 mg, Oral, HS  oxyCODONE, 20 mg, Oral, Q12H GREG  pantoprazole, 40 mg, Oral, Daily Before Breakfast  predniSONE, 60 mg, Oral, Daily  pregabalin, 25 mg, Oral, QAM  pregabalin, 50 mg, Oral, HS  QUEtiapine, 12 5 mg, Oral, HS  senna-docusate sodium, 1 tablet, Oral, BID  tamsulosin, 0 4 mg, Oral, Daily With Dinner  tiZANidine, 4 mg, Oral, TID      PRN Meds:  albuterol, 2 puff, Inhalation, Q4H PRN 1/18 X 1, 1/19 X 1, 1/20 X 1  morphine injection, 2 mg, Intravenous, Q4H PRN  1/18 X 3, 1/19 X 4, 1/20 X 2  oxyCODONE, 15 mg, Oral, Q4H PRN 1/19 X 1, 1/20 X 1    1/19:WALK[facility] ROLLING    1/17:PT/OT  IS        Discharge Plan: D    Network Utilization Review Department  ATTENTION: Please call with any questions or concerns to [phone] and carefully listen to the prompts so that you are directed to the right person  All voicemails are confidential   James Los all requests for admission clinical reviews, approved or denied determinations and any other requests to dedicated fax number below belonging to the campus where the patient is receiving treatment   List of dedicated fax numbers for the Facilities:  [address] DENIALS (Administrative/Medical Necessity) [phone]   [address] (Maternity/NICU/Pediatrics) [phone]   [address] Demario Hernandez [address] (Chris Ying "Cate" 103) [address] [place] [address] [address] [place] [address] [phone]

## 2021-01-26 NOTE — TELEPHONE ENCOUNTER
Please advise if you would like this patient that was consulted to follow up for L Sciatic pain post hospital discharge  If so, Sports med, Spine and Pain or Dr Alfreda Li?

## 2021-01-27 NOTE — TELEPHONE ENCOUNTER
Patient given [facility] contact number and he will call for appt  Do you want to put referral into system?

## 2021-02-04 NOTE — TELEPHONE ENCOUNTER
Pt has appointment on 2/18/21 @ 1:00 and needs [employer] Called to have [facility] pick him up that day  Address and phone number was confirmed      Pt # [phone]

## 2021-02-08 NOTE — TELEPHONE ENCOUNTER
Patient last seen 12/14/2018, in your notes from that consult you advised patient to seek Rheumatology as recommended by Dr Marissa Robles  Had previously stated he didn't require any interventional approaches  Patient had also expressed no interest in interventional options      Patient scheduled to see you  2/18/2021 for back/leg pain, please advise if :appropriate and if should be scheduled as a consult or OVS

## 2021-02-11 NOTE — TELEPHONE ENCOUNTER
Pt called requesting refills   albuterol (2 5 mg/3 mL) 0 083 % nebulizer solution    tamsulosin (FLOMAX) 0 4 mg    pantoprazole (PROTONIX) 40 mg tablet

## 2021-02-15 NOTE — ASSESSMENT & PLAN NOTE
Currently blood pressure is controlled at this time   Reviewed BP target goal with patient   Continue to maintain healthy balanced diet with focus on low salt intake   Limit alcohol intake        - Controlled off medications

## 2021-02-15 NOTE — ASSESSMENT & PLAN NOTE
Rate and Rhythm controlled  Continue Cardizem and Eliquis  Has follow up with cardiology, input appreciated

## 2021-02-15 NOTE — ASSESSMENT & PLAN NOTE
Avoid gastric irritants, like tomato products, caffeine, alcohol, and spicy foods  Raise the head of bed, 6 inches, using books or blocks  Use the medication to decrease acid secretion, as prescribed  Can use antacid tablets, every so often, for episodic heartburn flares

## 2021-02-15 NOTE — ASSESSMENT & PLAN NOTE
Follows up with Hematology-Oncology, pulmonology, Nephrology and palliative care with appropriate follow up scheduled

## 2021-02-15 NOTE — ASSESSMENT & PLAN NOTE
Lab Results   Component Value Date    HGBA1C 5 9 10/15/2020   Well controlled  Metformin discontinued in setting of CKD  Continue Januvia, reassess need on follow up visit

## 2021-02-15 NOTE — PROGRESS NOTES
Assessment/Plan:    Gastroesophageal reflux disease without esophagitis  Avoid gastric irritants, like tomato products, caffeine, alcohol, and spicy foods  Raise the head of bed, 6 inches, using books or blocks  Use the medication to decrease acid secretion, as prescribed  Can use antacid tablets, every so often, for episodic heartburn flares  Type 2 diabetes mellitus without complication, without long-term current use of insulin ([place] [address] )    Lab Results   Component Value Date    HGBA1C 5 9 10/15/2020   Well controlled  Metformin discontinued in setting of CKD  Continue Januvia, reassess need on follow up visit    Essential hypertension  Currently blood pressure is controlled at this time   Reviewed BP target goal with patient   Continue to maintain healthy balanced diet with focus on low salt intake   Limit alcohol intake        - Controlled off medications    Paroxysmal atrial fibrillation (HCC)  Rate and Rhythm controlled  Continue Cardizem and Eliquis  Has follow up with cardiology, input appreciated    Adenocarcinoma of lung, right ([place] [address] )  Follows up with Hematology-Oncology, pulmonology, Nephrology and palliative care with appropriate follow up scheduled       Diagnoses and all orders for this visit:    Chronic bilateral thoracic back pain  -     methocarbamol (ROBAXIN) 500 mg tablet; Take 1 tablet (500 mg total) by mouth 3 (three) times a day    PAF (paroxysmal atrial fibrillation) ([facility])  -     apixaban (ELIQUIS) 2 5 mg; Take 1 tablet (2 5 mg total) by mouth 2 (two) times a day  -     diltiazem (CARDIZEM CD) 120 mg 24 hr capsule; Take 1 capsule (120 mg total) by mouth daily    Essential hypertension  -     diltiazem (CARDIZEM CD) 120 mg 24 hr capsule; Take 1 capsule (120 mg total) by mouth daily    Chronic obstructive pulmonary disease, unspecified COPD type ([facility])  -     albuterol (2 5 mg/3 mL) 0 083 % nebulizer solution;  Take 1 vial (2 5 mg total) by nebulization every 6 (six) hours as needed for wheezing or shortness of breath  -     fluticasone-salmeterol (ADVAIR, WIXELA) 250-50 mcg/dose inhaler; Inhale 1 puff 2 (two) times a day Rinse mouth after use  Type 2 diabetes mellitus without complication, without long-term current use of insulin (HCC)  -     sitaGLIPtin (JANUVIA) 50 mg tablet; Take 1 tablet (50 mg total) by mouth daily    Difficulty sleeping  -     melatonin 3 mg; Take 1 tablet (3 mg total) by mouth daily at bedtime    Adenocarcinoma of lung, right (HCC)  -     pantoprazole (PROTONIX) 40 mg tablet; Take 1 tablet (40 mg total) by mouth daily    Prostatitis, unspecified prostatitis type  -     tamsulosin (FLOMAX) 0 4 mg; Take 1 capsule (0 4 mg total) by mouth daily with dinner    Stranguria  -     tamsulosin (FLOMAX) 0 4 mg; Take 1 capsule (0 4 mg total) by mouth daily with dinner    Benign prostatic hyperplasia with lower urinary tract symptoms, symptom details unspecified  -     tamsulosin (FLOMAX) 0 4 mg; Take 1 capsule (0 4 mg total) by mouth daily with dinner    Gastroesophageal reflux disease without esophagitis    Palliative care patient    Paroxysmal atrial fibrillation (HCC)          Subjective:      Patient ID: Matt Saucedo  is a 64 y o  male  HPI  This is a very pleasant 64 y o  male who presents to the clinic for management of their chronic medical conditions  Patient's medical conditions are stable unless noted otherwise above  Patient has not had any recent hospitalizations, or medical emergencies since last visit  Patient has no further complaints other than what is mentioned in the ROS  The following portions of the patient's history were reviewed and updated as appropriate: allergies, current medications, past family history, past medical history, past social history, past surgical history and problem list     Review of Systems   Constitutional: Negative for activity change, appetite change, chills, diaphoresis, fatigue and fever     HENT: Negative for congestion, ear pain, hearing loss, postnasal drip, rhinorrhea, sneezing, sore throat and tinnitus  Eyes: Negative for pain  Respiratory: Negative for apnea, cough, choking, chest tightness, shortness of breath and wheezing  Cardiovascular: Negative for chest pain, palpitations and leg swelling  Gastrointestinal: Negative for abdominal distention, abdominal pain, constipation, diarrhea, nausea and vomiting  Genitourinary: Negative for decreased urine volume and dysuria  Musculoskeletal: Negative for back pain  Skin: Negative for rash  Neurological: Negative for dizziness, tremors and syncope  Psychiatric/Behavioral: Negative for agitation and suicidal ideas  Objective:      /72 (BP Location: Left arm, Patient Position: Sitting, Cuff Size: Standard)   Pulse 87   Temp 97 8 °F (36 6 °C) (Temporal)   Resp 18   Ht 5' 8 5" (1 74 m)   Wt 84 4 kg (186 lb)   SpO2 95%   BMI 27 87 kg/m²          Physical Exam  Constitutional:       General: He is not in acute distress  Appearance: He is well-developed  He is not diaphoretic  HENT:      Head: Normocephalic and atraumatic  Right Ear: External ear normal       Left Ear: External ear normal       Mouth/Throat:      Pharynx: No oropharyngeal exudate  Eyes:      General: No scleral icterus  Conjunctiva/sclera: Conjunctivae normal       Pupils: Pupils are equal, round, and reactive to light  Neck:      Musculoskeletal: Normal range of motion and neck supple  Thyroid: No thyromegaly  Trachea: No tracheal deviation  Cardiovascular:      Rate and Rhythm: Normal rate and regular rhythm  Heart sounds: Normal heart sounds  Pulmonary:      Effort: Pulmonary effort is normal       Breath sounds: No rhonchi  Abdominal:      General: Bowel sounds are normal       Palpations: Abdomen is soft  Musculoskeletal: Normal range of motion  Skin:     General: Skin is warm     Neurological:      Mental Status: He is alert and oriented to person, place, and time

## 2021-02-23 NOTE — TELEPHONE ENCOUNTER
Patient stated he only got his 30 mg and not his 20 mg  Please advise  PDMP OxyContin er 20 mg last filled was 1/30 60 tabs for 30 days and oxycodone 30 mg IR last filled 2/12   120 /20 due next week per Nathan Zhou

## 2021-02-23 NOTE — TELEPHONE ENCOUNTER
Clarification   Pt has received multiple refill orders in past per Dr Whit Degroot  Pt is due for refills early next week  Pt aware  He thought there were scripts in system  Has enough meds until Monday

## 2021-02-25 NOTE — TELEPHONE ENCOUNTER
Called patient, he is still not interested in inverventional options via injections  Patient agreed to cancelling appointment

## 2021-03-08 NOTE — PROGRESS NOTES
Hematology/Oncology Outpatient Follow-up  Kt Hawk Sr  64 y o  male 1960 [id number]    Date:  3/8/2021      Assessment and Plan:  1  Adenocarcinoma of lung, right [facility])  Patient will be continued on his current palliative treatment with single agent immunotherapy Keytruda 400 mg on an every 6 week basis; is due for treatment again today after the visit  Patient will follow-up again in 6 weeks with repeat labs that day  He will also be due for 3 month follow-up repeat imaging prior to his next visit hopefully with a PET-CT scan; will order and schedule today  His prior CT imaging from January did show multifocal areas of irregular consolidation and peribronchial thickening concentrated in the fissures for which neoplastic etiology could not be entirely ruled out  PET-CT scan follow-up exam will better evaluate FDG uptake/activity to the areas of concern      He will continue to follow up with the palliative care team on a regular basis for pain and symptom management  - Infusion Calculated Appointment Request; Future  - CBC and differential; Future  - Comprehensive metabolic panel; Future  - TSH, 3rd generation with Free T4 reflex; Future  - T3, free; Future  - CBC and differential; Future  - Comprehensive metabolic panel; Future  - C-reactive protein; Future  - Sedimentation rate, automated; Future  - LD,Blood; Future  - TSH, 3rd generation with Free T4 reflex; Future  - NM PET CT skull base to mid thigh; Future    2  Normocytic anemia  Chronic/stable due to anemia of chronic renal dysfunction/chronic disease  Will evaluate for nutritional deficiencies before his next follow-up  - CBC and differential; Future  - Comprehensive metabolic panel; Future  - C-reactive protein; Future  - Sedimentation rate, automated; Future  - LD,Blood; Future  - Ferritin; Future  - Iron Panel (Includes Ferritin, Iron Sat%, Iron, and TIBC); Future  - Folate; Future  - Vitamin B12; Future    3   Constipation, unspecified constipation type  - senna-docusate sodium (SENOKOT-S) 8 6-50 mg per tablet; Take 1 tablet by mouth 2 (two) times a day  Dispense: 60 tablet; Refill: 11    HPI:  The patient presents today for a follow-up visit  He his having a lot of generalized arthralgias and myalgias today more than his usual especially in the hip area  He does see palliative care on a regular basis for his pain management  Continues to have chronic dyspnea with minimal exertion  Is on 3 L of oxygen via nasal cannula sat 97%  He states that he also is using his nebulizer treatments 3 times per day  He will be getting repeat laboratory studies today after the visit  His most recent labs from 01/25/2021 showed normal white cells and platelets,  Stable chronic normocytic anemia H&H 10 9/34 7, MCV 82  Stable chronic renal dysfunction BUN 30, creatinine 1 93, GFR 43 remaining metabolic panel is appropriate for patient  TSH is normal 3 17      Oncology History Overview Note   61-year-old [facility] American male heavy smoker who used to smoke 2 packs of cigarettes daily for many years, COPD, he presented with dyspnea, CT scan of the chest on October 2018 showed right middle lobe spiculated 1 3 cm mass with multiple solid and ground-glass nodules along the major and minor fissures sub cm pulmonary nodules bilaterally, left adrenal 1 2 cm nodule      PET scan showed 1 3 cm right middle lung nodule with SUV of 6 8, numerous nodular densities along the right major and minor fissures, right hilar activity with SUV of 3 4, subcarinal lymph node measuring 7 mm with SUV of 2 7, periportal enlarged lymph nodes concerning for metastatic disease measuring 2 4 cm with SUV of 5, prominent left retrocrural lymph node measuring 1 cm x 9 mm with SUV of 1 1     Core biopsy of the right spiculated nodule showed invasive adenocarcinoma consistent with lung primary positive for CK7, TTF 1, napsin a     Adenocarcinoma of lung, right ([facility] [address] )   11/13/2018 Initial Diagnosis    Adenocarcinoma of lung, right ([facility] [address] )  Stage IV     11/13/2018 Biopsy    A  Lung, right, core needle biopsies:             - Invasive adenocarcinoma, consistent with lung primary  12/13/2018 - 3/28/2019 Chemotherapy     Alimta, Carboplatin (AUC 5) + Keytruda (6 cycles total)     4/17/2019 -  Chemotherapy    Started maintenance Alimta and Keytruda  (Alimta d/c'd 9/16/19 due to worsening renal dysfunction)  Keytruda changed to the every 6 week dosing (400 mg) 12/2020         Interval history:    ROS: Review of Systems   Constitutional: Negative for activity change, appetite change, chills, fatigue, fever and unexpected weight change  HENT: Negative for congestion, mouth sores, nosebleeds, sore throat and trouble swallowing  Eyes: Negative  Respiratory: Positive for cough and shortness of breath  Negative for chest tightness  Cardiovascular: Positive for leg swelling  Negative for chest pain and palpitations  Gastrointestinal: Positive for constipation  Negative for abdominal distention, abdominal pain, blood in stool, diarrhea, nausea and vomiting  Genitourinary: Negative for difficulty urinating, dysuria, frequency, hematuria and urgency  Musculoskeletal: Positive for arthralgias and myalgias  Negative for back pain, gait problem and joint swelling  Skin: Negative for color change, pallor and rash  Neurological: Positive for numbness (  And tingling moderate)  Negative for dizziness, weakness, light-headedness and headaches  Hematological: Negative for adenopathy  Does not bruise/bleed easily  Psychiatric/Behavioral: Negative for dysphoric mood and sleep disturbance  The patient is not nervous/anxious          Past Medical History:   Diagnosis Date    Alkalosis 12/9/2019    Bipolar 1 disorder ([facility] [address] )     Cancer ([facility] [address] )     adeno lung  dx 11/2018-lung bx today 4/9/2019-ongoing chemo    Chronic obstructive pulmonary disease with acute exacerbation ([address] ) 6/7/2018    Chronic pain disorder     back and right shoulder    CKD (chronic kidney disease)     COPD (chronic obstructive pulmonary disease) (HCC)     Depression     Diabetes mellitus [facility]     Elevated d-dimer 2019    Elevated troponin 2019    Elevated troponin level not due to acute coronary syndrome 2019    GERD (gastroesophageal reflux disease)     Herniated lumbar intervertebral disc     Hyperkalemia     Hypertension     Insomnia     Latent syphilis     Treated    Low back pain     Lumbosacral disc disease     Lung cancer [facility] ) 2019    Pneumothorax after biopsy     R lung    PTSD (post-traumatic stress disorder)     Pulmonary emphysema ([facility] )     Tobacco abuse 2018       Past Surgical History:   Procedure Laterality Date    COLONOSCOPY      CT GUIDED CHEST TUBE  2018    FL GUIDED CENTRAL VENOUS ACCESS DEVICE INSERTION  2019    HEMORROIDECTOMY      IR BIOPSY LUNG  2018    IR CHEST TUBE PLACEMENT  2018    KNEE SURGERY      CA INSJ TUNNELED CTR VAD W/SUBQ PORT AGE 5 YR/> N/A 2019    Procedure: PLACEMENT OF PORT-A-CATH;  Surgeon: Ruth Carey MD;  Location:  MAIN OR;  Service: Vascular       Social History     Socioeconomic History    Marital status: Legally      Spouse name: None    Number of children: None    Years of education: None    Highest education level: None   Occupational History    Occupation: part time employment   Social Needs    Financial resource strain: None    Food insecurity     Worry: None     Inability: None    Transportation needs     Medical: None     Non-medical: None   Tobacco Use    Smoking status: Former Smoker     Packs/day: 0 50     Years: 40 00     Pack years: 20 00     Types: Cigarettes     Start date:      Quit date: 2019     Years since quittin 6    Smokeless tobacco: Never Used    Tobacco comment: will smoke a cigarette on occasion with stress   Substance and Sexual Activity    Alcohol use: Not Currently    Drug use: Not Currently     Types: Marijuana     Comment: "I will smoke a joint if I am stressed out but not every day"    Sexual activity: Yes   Lifestyle    Physical activity     Days per week: None     Minutes per session: None    Stress: None   Relationships    Social connections     Talks on phone: None     Gets together: None     Attends Mandaeism service: None     Active member of club or organization: None     Attends meetings of clubs or organizations: None     Relationship status: None    Intimate partner violence     Fear of current or ex partner: None     Emotionally abused: None     Physically abused: None     Forced sexual activity: None   Other Topics Concern    None   Social History Narrative    Lives with girlfriend       Family History   Problem Relation Age of Onset    Heart disease Mother     Cancer Mother     Hypertension Father     Diabetes Family     Asthma Family     Heart disease Family     Cancer Family     Cancer Maternal Grandfather     Cancer Paternal Grandfather     Cancer Maternal Aunt        Allergies   Allergen Reactions    Lisinopril Anaphylaxis     Took medication a while ago and had a "swelling reaction"  Does not carry epi-pen         Current Outpatient Medications:     albuterol (2 5 mg/3 mL) 0 083 % nebulizer solution, Take 1 vial (2 5 mg total) by nebulization every 6 (six) hours as needed for wheezing or shortness of breath, Disp: 360 mL, Rfl: 5    albuterol (PROVENTIL HFA,VENTOLIN HFA) 90 mcg/act inhaler, Inhale 2 puffs every 4 (four) hours as needed for wheezing, Disp: 18 g, Rfl: 5    apixaban (ELIQUIS) 2 5 mg, Take 1 tablet (2 5 mg total) by mouth 2 (two) times a day, Disp: 60 tablet, Rfl: 11    Blood Glucose Monitoring Suppl KIT, by Does not apply route daily, Disp: 1 each, Rfl: 0    budesonide (PULMICORT) 0 5 mg/2 mL nebulizer solution, Take 1 vial (0 5 mg total) by nebulization every 12 (twelve) hours Rinse mouth after use , Disp: 1 vial, Rfl: 0    carbamide peroxide (DEBROX) 6 5 % otic solution, Administer 5 drops into both ears 2 (two) times a day, Disp: 15 mL, Rfl: 0    clotrimazole-betamethasone (LOTRISONE) 1-0 05 % cream, Apply topically 2 (two) times a day, Disp: 45 g, Rfl: 2    diltiazem (CARDIZEM CD) 120 mg 24 hr capsule, Take 1 capsule (120 mg total) by mouth daily, Disp: 90 capsule, Rfl: 3    divalproex sodium (DEPAKOTE) 250 mg EC tablet, Take 1 tablet (250 mg total) by mouth 2 (two) times a day, Disp: 30 tablet, Rfl: 0    ergocalciferol (ERGOCALCIFEROL) 1 25 MG (16081 UT) capsule, Take 1 capsule (50,000 Units total) by mouth once a week, Disp: 12 capsule, Rfl: 0    FLUoxetine (PROzac) 10 MG tablet, Take 1 tablet (10 mg total) by mouth daily, Disp: 30 tablet, Rfl: 5    fluticasone (FLONASE) 50 mcg/act nasal spray, 1-2 sprays each nostril daily for allergies, Disp: 1 Bottle, Rfl: 3    fluticasone-salmeterol (ADVAIR, WIXELA) 250-50 mcg/dose inhaler, Inhale 1 puff 2 (two) times a day Rinse mouth after use , Disp: 1 Inhaler, Rfl: 3    folic acid (FOLVITE) 1 mg tablet, Take 1 tablet (1,000 mcg total) by mouth daily, Disp: 90 tablet, Rfl: 0    formoterol (PERFOROMIST) 20 MCG/2ML nebulizer solution, Take 1 vial (20 mcg total) by nebulization 2 (two) times a day, Disp: 60 vial, Rfl: 1    FREESTYLE LITE test strip, TEST BLOOD SUGAR DAILY, Disp: 100 each, Rfl: 1    guaiFENesin (MUCINEX) 600 mg 12 hr tablet, Take 600 mg by mouth every 12 (twelve) hours, Disp: , Rfl:     ipratropium-albuterol (DUO-NEB) 0 5-2 5 mg/3 mL nebulizer solution, Take 1 vial (3 mL total) by nebulization 4 (four) times a day, Disp: 120 vial, Rfl: 5    Lancets (FREESTYLE) lancets, TEST BLOOD SUGAR DAILY, Disp: 100 each, Rfl: 4    lidocaine (LIDODERM) 5 %, Apply 1 patch topically daily Remove & Discard patch within 12 hours or as directed by MD, Disp: 10 patch, Rfl: 0    lidocaine (LMX) 4 % cream, Apply topically as needed for mild pain Apply 1/2-1 inch to port 30-60 mins prior to needle insertion, cover with serrain wrap, Disp: 30 g, Rfl: 5    melatonin 3 mg, Take 1 tablet (3 mg total) by mouth daily at bedtime, Disp: 30 tablet, Rfl: 1    methocarbamol (ROBAXIN) 500 mg tablet, Take 1 tablet (500 mg total) by mouth 3 (three) times a day, Disp: 30 tablet, Rfl: 0    oxyCODONE (OxyCONTIN) 20 mg 12 hr tablet, Take 1 tablet (20 mg total) by mouth every 12 (twelve) hours To prevent cancer painMax Daily Amount: 40 mg, Disp: 60 tablet, Rfl: 0    oxyCODONE (ROXICODONE) 30 MG immediate release tablet, Take 1 tablet (30 mg total) by mouth every 4 (four) hours as needed (cancer pain)Max Daily Amount: 180 mg, Disp: 120 tablet, Rfl: 0    pantoprazole (PROTONIX) 40 mg tablet, Take 1 tablet (40 mg total) by mouth daily, Disp: 90 tablet, Rfl: 3    polyethylene glycol (GLYCOLAX) powder, Take 17 g by mouth daily, Disp: 527 g, Rfl: 1    predniSONE 5 mg tablet, TAKE 1 TABLET BY MOUTH EVERY DAY, Disp: 30 tablet, Rfl: 0    pregabalin (LYRICA) 25 mg capsule, Take 1 capsule PO in morning and 2 capsules PO at bedtime, Disp: 90 capsule, Rfl: 2    prochlorperazine (COMPAZINE) 10 mg tablet, Take 1 tablet (10 mg total) by mouth every 6 (six) hours as needed for nausea or vomiting, Disp: 90 tablet, Rfl: 0    QUEtiapine (SEROquel) 25 mg tablet, TAKE 0 5 TABLETS (12 5 MG TOTAL) BY MOUTH DAILY AT BEDTIME, Disp: 15 tablet, Rfl: 0    REGULOID 28 3 % POWD, USE AS DIRECTED, Disp: 369 g, Rfl: 4    Saline 0 65 % (Soln) SOLN, 5 drops into each nostril as needed (Dry nose), Disp: 50 mL, Rfl: 5    senna-docusate sodium (SENOKOT-S) 8 6-50 mg per tablet, Take 1 tablet by mouth 2 (two) times a day, Disp: 60 tablet, Rfl: 11    sitaGLIPtin (JANUVIA) 50 mg tablet, Take 1 tablet (50 mg total) by mouth daily, Disp: 90 tablet, Rfl: 0    tamsulosin (FLOMAX) 0 4 mg, Take 1 capsule (0 4 mg total) by mouth daily with dinner, Disp: 90 capsule, Rfl: 0    tiZANidine (ZANAFLEX) 4 mg tablet, Take 1 tablet (4 mg total) by mouth 3 (three) times a day for 14 days, Disp: 42 tablet, Rfl: 0      Physical Exam:  /58 (BP Location: Left arm, Patient Position: Sitting, Cuff Size: Large)   Pulse 90   Temp (!) 96 7 °F (35 9 °C) (Tympanic)   Resp 18   Ht 5' 8 5" (1 74 m)   Wt 85 1 kg (187 lb 9 6 oz)   SpO2 97%   BMI 28 11 kg/m²     Physical Exam  Vitals signs reviewed  Constitutional:       General: He is not in acute distress  Appearance: He is well-developed  He is not diaphoretic  HENT:      Head: Normocephalic and atraumatic  Eyes:      General: Lids are normal  No scleral icterus  Conjunctiva/sclera: Conjunctivae normal       Pupils: Pupils are equal, round, and reactive to light  Neck:      Musculoskeletal: Normal range of motion and neck supple  Thyroid: No thyromegaly  Cardiovascular:      Rate and Rhythm: Normal rate and regular rhythm  Heart sounds: Normal heart sounds  No murmur  Pulmonary:      Effort: Pulmonary effort is normal       Breath sounds: Decreased breath sounds and wheezing ( few scattered expiratory wheezes) present  Comments:  O2 via nasal cannula  Abdominal:      General: There is no distension  Palpations: Abdomen is soft  There is no hepatomegaly or splenomegaly  Tenderness: There is no abdominal tenderness  Musculoskeletal: Normal range of motion  General: No swelling  Right lower leg: Edema (trace) present  Left lower leg: Edema (trace) present  Lymphadenopathy:      Cervical: No cervical adenopathy  Upper Body:      Right upper body: No axillary adenopathy  Left upper body: No axillary adenopathy  Skin:     General: Skin is warm and dry  Findings: No erythema or rash  Neurological:      General: No focal deficit present  Mental Status: He is alert and oriented to person, place, and time     Psychiatric:         Mood and Affect: Mood and affect normal          Behavior: Behavior normal  Behavior is cooperative  Thought Content: Thought content normal          Judgment: Judgment normal            Labs:  Lab Results   Component Value Date    WBC 10 30 01/25/2021    HGB 10 9 (L) 01/25/2021    HCT 34 7 (L) 01/25/2021    MCV 82 01/25/2021     01/25/2021     Lab Results   Component Value Date    K 4 7 01/25/2021     01/25/2021    CO2 33 (H) 01/25/2021    BUN 30 (H) 01/25/2021    CREATININE 1 93 (H) 01/25/2021    GLUF 102 (H) 08/24/2020    CALCIUM 8 3 (L) 01/25/2021    CORRECTEDCA 8 9 01/25/2021    AST 23 01/25/2021    ALT 26 01/25/2021    ALKPHOS 92 01/25/2021    EGFR 43 (L) 01/25/2021       Patient voiced understanding and agreement in the above discussion  Aware to contact our office with questions/symptoms in the interim  This note has been generated by voice recognition software system  Therefore, there may be spelling, grammar, and or syntax errors  Please contact if questions arise

## 2021-03-08 NOTE — PROGRESS NOTES
Keytruda infused without incident, pt discharged via w/c to [facility], good blood return post infusion

## 2021-03-10 NOTE — TELEPHONE ENCOUNTER
Prior authorization for pt's Oxycodone IR 30 mg  has been initiated via cover my meds  and sent along with last office note  Awaiting determination

## 2021-03-10 NOTE — TELEPHONE ENCOUNTER
Authorization needed for Oxycodone 30mg  Telephone #[phone]  ID #[id number]    [facility] [phone]

## 2021-03-23 NOTE — TELEPHONE ENCOUNTER
Received prior authorization approval for  Oxycodone 30 mg  03/11/21  through 03/11/22     Confirmed with pharmacy  Pt has been informed

## 2021-03-24 NOTE — PATIENT INSTRUCTIONS
CARDIOLOGY ASSESSMENT & PLAN:  1  Paroxysmal atrial fibrillation (HCC)     2  Essential hypertension     3  Personal history of chemotherapy       Paroxysmal atrial fibrillation [facility])  Mr Jose Angel Arce Sr  appears to be overall stable from cardiac perspective with no recent symptomatic atrial fibrillation  He does have some atypical chest pain  His blood pressure is reasonably well controlled  Exam does not reveal signs of significant valvular heart disease or heart failure  -- at this time I am advising him to continue his current medication  His current pain medication regimen should help with atypical chest pain in the chest as well  -- He is advised to inform us if he notices any blood in his cough or has any bleeding or excessive bruising episodes  -- He will continue to follow closely with his PCP, his oncologists and his palliative care team  Cardiology followup in 6 months would be reasonable  Earlier if necessary

## 2021-03-24 NOTE — ASSESSMENT & PLAN NOTE
Mr Enio Alberto Sr  appears to be overall stable from cardiac perspective with no recent symptomatic atrial fibrillation  He does have some atypical chest pain  His blood pressure is reasonably well controlled  Exam does not reveal signs of significant valvular heart disease or heart failure  -- at this time I am advising him to continue his current medication  His current pain medication regimen should help with atypical chest pain in the chest as well  -- He is advised to inform us if he notices any blood in his cough or has any bleeding or excessive bruising episodes  -- He will continue to follow closely with his PCP, his oncologists and his palliative care team  Cardiology followup in 6 months would be reasonable  Earlier if necessary

## 2021-03-24 NOTE — PROGRESS NOTES
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 diagnosed initially in November 2018, on immunotherapy with prednisone and Keytruda (previously on Alimta, carboplatin+ Keytruda)  3  Essential hypertension  4  Paroxysmal atrial fibrillation, diagnosed in in December 2019  5  Monitoring for chemotherapy related side effects  6  Cancer-related pain   7  Chronic kidney disease   8  GERD  9  Lumbosacral disc disease  10  PTSD  11  Bipolar disorder  12  Depression  13  Diabetes mellitus  14  Non MI troponin elevation     ECHOCARDIOGRAM December 1, 2019:  Normal left ventricular size and systolic function, EF around 73%, normal diastolic function, normal right ventricular size and systolic function, normal left and right atrial size, normal mitral and aortic valve with no significant stenosis or regurgitation  Trace tricuspid valve regurgitation, no obvious pulmonary hypertension, no pericardial effusion  CURRENT ECG:  No results found for this visit on 03/24/21  CARDIOLOGY ASSESSMENT & PLAN:  1  Paroxysmal atrial fibrillation (HCC)     2  Essential hypertension     3  Personal history of chemotherapy       Paroxysmal atrial fibrillation [facility])  Mr Indu Reese Sr  appears to be overall stable from cardiac perspective with no recent symptomatic atrial fibrillation  He does have some atypical chest pain  His blood pressure is reasonably well controlled    Exam does not reveal signs of significant valvular heart disease or heart failure  -- at this time I am advising him to continue his current medication  His current pain medication regimen should help with atypical chest pain in the chest as well  -- He is advised to inform us if he notices any blood in his cough or has any bleeding or excessive bruising episodes  -- He will continue to follow closely with his PCP, his oncologists and his palliative care team  Cardiology followup in 6 months would be reasonable  Earlier if necessary  INTERVAL HISTORY & HISTORY OF PRESENT ILLNESS:  Norva Schirmer Sr  is here for follow-up regarding his cardiac comorbidities which include:  Paroxysmal atrial fibrillation in the setting of metastatic lung cancer, non MI troponin elevation, essential hypertension and comorbidities  I had last seen him throat tele visit in May 2020  Today he reports that overall he has been in a stable condition  He continues to be on palliative chemotherapy with Keytruda  He has recently followed with oncologist and is scheduled to undergo a follow-up PET scan soon  His blood work is being followed  He is on opiate therapy for his chronic cancer-related pain  He continues to follow with palliative care team as well  From a symptom perspective he reports that he has been all right  His pain is controlled  Pain is mostly in the hips radiating to his legs  Occasional dizziness  Also reports intermittent sharp pain in the chest on the left side  Symptom is mild  Denies orthopnea, PND  Is on continuous oxygen therapy at home  His last hospitalization was in January 2021 when he was admitted with acute hypoxemic respiratory failure  He was diagnosed with tracking bronchitis and completed antibiotic course  During the hospitalization he was complaining of severe leg pain and his MRI showed L4-L5 disc bulk with left foraminal stenosis and narrowing  Denies any bleeding or excessive bruising      Functional capacity status:  Poor due to his comorbidities (Excellent- >10 METs; Good: (7-10 METs); Moderate (4-7 METs); Poor (<= 4 METs)    Any chronic stressors:  None   (feeling of poor health, financial problems, and social isolation etc)  Tobacco or alcohol dependence:  None      REVIEW OF SYMPTOMS:    Positive for:  As described in HPI  Negative for: All remaining as reviewed below and in HPI  SYSTEM SYMPTOMS REVIEWED:  General--weight change, fever, night sweats  Respiratory--cough, wheezing, shortness of breath, sputum production  Cardiovascular--chest pain, syncope, dyspnea on exertion, edema, decline in exercise tolerance, claudication   Gastrointestinal--persistent vomiting, diarrhea, abdominal distention, blood in stool   Muscular or skeletal--joint pain or swelling   Neurologic--headaches, syncope, abnormal movement  Hematologic--history of easy bruising and bleeding   Endocrine--thyroid enlargement, heat or cold intolerance, polyuria   Psychiatric--anxiety, depression     *-*-*-*-*-*-*-*-*-*-*-*-*-*-*-*-*-*-*-*-*-*-*-*-*-*-*-*-*-*-*-*-*-*-*-*-*-*-*-*-*-*-*-*-*-*-*-*-*-*-*-*-*-*-  VITAL SIGNS:  Vitals:    03/24/21 1045   BP: 134/70   Pulse: 85   SpO2: 98%   Weight: 86 2 kg (190 lb)   Height: 5' 8" (1 727 m)     Weight (last 2 days)     Date/Time   Weight    03/24/21 1045   86 2 (190)           ,   Wt Readings from Last 3 Encounters:   03/24/21 86 2 kg (190 lb)   03/08/21 85 1 kg (187 lb 9 6 oz)   02/15/21 84 4 kg (186 lb)    , Body mass index is 28 89 kg/m²  *-*-*-*-*-*-*-*-*-*-*-*-*-*-*-*-*-*-*-*-*-*-*-*-*-*-*-*-*-*-*-*-*-*-*-*-*-*-*-*-*-*-*-*-*-*-*-*-*-*-*-*-*-*-  PHYSICAL EXAM:  General Appearance:    Alert, cooperative, no distress, appears stated age, slightly overweight, on continuous oxygen therapy  Head, Eyes, ENT:    No obvious abnormality, moist mucous mebranes  Neck:   Supple, no carotid bruit or JVD   Back:     Symmetric, no curvature  Lungs:     Respirations unlabored    bronchial breath sounds but clear,    Chest wall:    No tenderness or deformity   Heart:    Regular rate and rhythm, S1 and S2 normal, no murmur, rub  or gallop     Abdomen:      slightly distended, Soft, non-tender, No obvious masses, or organomegaly   Extremities:   Extremities warm, no cyanosis or edema    Skin:   Skin color, texture, turgor normal, no rashes or lesions     *-*-*-*-*-*-*-*-*-*-*-*-*-*-*-*-*-*-*-*-*-*-*-*-*-*-*-*-*-*-*-*-*-*-*-*-*-*-*-*-*-*-*-*-*-*-*-*-*-*-*-*-*-*-  CURRENT MEDICATION LIST:    Current Outpatient Medications:     albuterol (2 5 mg/3 mL) 0 083 % nebulizer solution, Take 1 vial (2 5 mg total) by nebulization every 6 (six) hours as needed for wheezing or shortness of breath, Disp: 360 mL, Rfl: 5    albuterol (PROVENTIL HFA,VENTOLIN HFA) 90 mcg/act inhaler, Inhale 2 puffs every 4 (four) hours as needed for wheezing, Disp: 18 g, Rfl: 5    apixaban (ELIQUIS) 2 5 mg, Take 1 tablet (2 5 mg total) by mouth 2 (two) times a day, Disp: 60 tablet, Rfl: 11    Blood Glucose Monitoring Suppl KIT, by Does not apply route daily, Disp: 1 each, Rfl: 0    budesonide (PULMICORT) 0 5 mg/2 mL nebulizer solution, Take 1 vial (0 5 mg total) by nebulization every 12 (twelve) hours Rinse mouth after use , Disp: 1 vial, Rfl: 0    carbamide peroxide (DEBROX) 6 5 % otic solution, Administer 5 drops into both ears 2 (two) times a day, Disp: 15 mL, Rfl: 0    clotrimazole-betamethasone (LOTRISONE) 1-0 05 % cream, Apply topically 2 (two) times a day, Disp: 45 g, Rfl: 2    diltiazem (CARDIZEM CD) 120 mg 24 hr capsule, Take 1 capsule (120 mg total) by mouth daily, Disp: 90 capsule, Rfl: 3    divalproex sodium (DEPAKOTE) 250 mg EC tablet, Take 1 tablet (250 mg total) by mouth 2 (two) times a day, Disp: 30 tablet, Rfl: 0    FLUoxetine (PROzac) 10 MG tablet, Take 1 tablet (10 mg total) by mouth daily, Disp: 30 tablet, Rfl: 5    fluticasone (FLONASE) 50 mcg/act nasal spray, 1-2 sprays each nostril daily for allergies, Disp: 1 Bottle, Rfl: 3    fluticasone-salmeterol (Tivis Rode) 250-50 mcg/dose inhaler, Inhale 1 puff 2 (two) times a day Rinse mouth after use , Disp: 1 Inhaler, Rfl: 3    folic acid (FOLVITE) 1 mg tablet, Take 1 tablet (1,000 mcg total) by mouth daily, Disp: 90 tablet, Rfl: 0    formoterol (PERFOROMIST) 20 MCG/2ML nebulizer solution, Take 1 vial (20 mcg total) by nebulization 2 (two) times a day, Disp: 60 vial, Rfl: 1    FREESTYLE LITE test strip, TEST BLOOD SUGAR DAILY, Disp: 100 each, Rfl: 1    guaiFENesin (MUCINEX) 600 mg 12 hr tablet, Take 600 mg by mouth every 12 (twelve) hours, Disp: , Rfl:     ipratropium-albuterol (DUO-NEB) 0 5-2 5 mg/3 mL nebulizer solution, Take 1 vial (3 mL total) by nebulization 4 (four) times a day, Disp: 120 vial, Rfl: 5    Lancets (FREESTYLE) lancets, TEST BLOOD SUGAR DAILY, Disp: 100 each, Rfl: 4    lidocaine (LIDODERM) 5 %, Apply 1 patch topically daily Remove & Discard patch within 12 hours or as directed by MD, Disp: 10 patch, Rfl: 0    lidocaine (LMX) 4 % cream, Apply topically as needed for mild pain Apply 1/2-1 inch to port 30-60 mins prior to needle insertion, cover with serrain wrap, Disp: 30 g, Rfl: 5    melatonin 3 mg, Take 1 tablet (3 mg total) by mouth daily at bedtime, Disp: 30 tablet, Rfl: 1    methocarbamol (ROBAXIN) 500 mg tablet, Take 1 tablet (500 mg total) by mouth 3 (three) times a day, Disp: 30 tablet, Rfl: 0    oxyCODONE (OxyCONTIN) 20 mg 12 hr tablet, Take 1 tablet (20 mg total) by mouth every 12 (twelve) hours To prevent cancer painMax Daily Amount: 40 mg, Disp: 60 tablet, Rfl: 0    oxyCODONE (ROXICODONE) 30 MG immediate release tablet, Take 1 tablet (30 mg total) by mouth every 4 (four) hours as needed (cancer pain)Max Daily Amount: 180 mg, Disp: 120 tablet, Rfl: 0    pantoprazole (PROTONIX) 40 mg tablet, Take 1 tablet (40 mg total) by mouth daily, Disp: 90 tablet, Rfl: 3    polyethylene glycol (GLYCOLAX) powder, Take 17 g by mouth daily, Disp: 527 g, Rfl: 1    predniSONE 5 mg tablet, TAKE 1 TABLET BY MOUTH EVERY DAY, Disp: 30 tablet, Rfl: 0    pregabalin (LYRICA) 25 mg capsule, Take 1 capsule PO in morning and 2 capsules PO at bedtime, Disp: 90 capsule, Rfl: 2    prochlorperazine (COMPAZINE) 10 mg tablet, Take 1 tablet (10 mg total) by mouth every 6 (six) hours as needed for nausea or vomiting, Disp: 90 tablet, Rfl: 0    QUEtiapine (SEROquel) 25 mg tablet, TAKE 0 5 TABLETS (12 5 MG TOTAL) BY MOUTH DAILY AT BEDTIME, Disp: 15 tablet, Rfl: 0    REGULOID 28 3 % POWD, USE AS DIRECTED, Disp: 369 g, Rfl: 4    Saline 0 65 % (Soln) SOLN, 5 drops into each nostril as needed (Dry nose), Disp: 50 mL, Rfl: 5    senna-docusate sodium (SENOKOT-S) 8 6-50 mg per tablet, Take 1 tablet by mouth 2 (two) times a day, Disp: 60 tablet, Rfl: 11    sitaGLIPtin (JANUVIA) 50 mg tablet, Take 1 tablet (50 mg total) by mouth daily, Disp: 90 tablet, Rfl: 0    tamsulosin (FLOMAX) 0 4 mg, Take 1 capsule (0 4 mg total) by mouth daily with dinner, Disp: 90 capsule, Rfl: 0    ergocalciferol (ERGOCALCIFEROL) 1 25 MG (39363 UT) capsule, Take 1 capsule (50,000 Units total) by mouth once a week (Patient not taking: Reported on 3/24/2021), Disp: 12 capsule, Rfl: 0    tiZANidine (ZANAFLEX) 4 mg tablet, Take 1 tablet (4 mg total) by mouth 3 (three) times a day for 14 days, Disp: 42 tablet, Rfl: 0    ALLERGIES:  Allergies   Allergen Reactions    Lisinopril Anaphylaxis     Took medication a while ago and had a "swelling reaction"  Does not carry epi-pen       *-*-*-*-*-*-*-*-*-*-*-*-*-*-*-*-*-*-*-*-*-*-*-*-*-*-*-*-*-*-*-*-*-*-*-*-*-*-*-*-*-*-*-*-*-*-*-*-*-*-*-*-*-*-  The LABORATORY DATA:  I have personally reviewed pertinent labs      No results found for: NA  Potassium   Date Value Ref Range Status   03/08/2021 4 8 3 6 - 5 0 mmol/L Final   01/25/2021 4 7 3 6 - 5 0 mmol/L Final   01/20/2021 4 1 3 5 - 5 3 mmol/L Final     Chloride   Date Value Ref Range Status   03/08/2021 107 97 - 108 mmol/L Final   01/25/2021 103 97 - 108 mmol/L Final   01/20/2021 105 100 - 108 mmol/L Final     CO2   Date Value Ref Range Status   03/08/2021 28 22 - 30 mmol/L Final   01/25/2021 33 (H) 22 - 30 mmol/L Final   01/20/2021 28 21 - 32 mmol/L Final     BUN   Date Value Ref Range Status   03/08/2021 19 5 - 25 mg/dL Final   01/25/2021 30 (H) 5 - 25 mg/dL Final   01/20/2021 33 (H) 5 - 25 mg/dL Final     Creatinine   Date Value Ref Range Status   03/08/2021 2 19 (H) 0 70 - 1 50 mg/dL Final     Comment:     Standardized to IDMS reference method   01/25/2021 1 93 (H) 0 70 - 1 50 mg/dL Final     Comment:     Standardized to IDMS reference method   01/20/2021 1 87 (H) 0 60 - 1 30 mg/dL Final     Comment:     Standardized to IDMS reference method     eGFR   Date Value Ref Range Status   03/08/2021 37 (L) >60 ml/min/1 73sq m Final   01/25/2021 43 (L) >60 ml/min/1 73sq m Final   01/20/2021 45 ml/min/1 73sq m Final     Calcium   Date Value Ref Range Status   03/08/2021 8 8 8 4 - 10 2 mg/dL Final   01/25/2021 8 3 (L) 8 4 - 10 2 mg/dL Final   01/20/2021 8 1 (L) 8 3 - 10 1 mg/dL Final     AST   Date Value Ref Range Status   03/08/2021 20 17 - 59 U/L Final     Comment:     Specimen collection should occur prior to Sulfasalazine administration due to the potential for falsely depressed results  01/25/2021 23 17 - 59 U/L Final     Comment:     Specimen collection should occur prior to Sulfasalazine administration due to the potential for falsely depressed results  01/16/2021 15 5 - 45 U/L Final     Comment:     Specimen collection should occur prior to Sulfasalazine administration due to the potential for falsely depressed results  ALT   Date Value Ref Range Status   03/08/2021 17 9 - 52 U/L Final     Comment:     Specimen collection should occur prior to Sulfasalazine administration due to the potential for falsely depressed results      01/25/2021 26 9 - 52 U/L Final     Comment:     Specimen collection should occur prior to Sulfasalazine administration due to the potential for falsely depressed results  01/16/2021 19 12 - 78 U/L Final     Comment:     Specimen collection should occur prior to Sulfasalazine administration due to the potential for falsely depressed results  Alkaline Phosphatase   Date Value Ref Range Status   03/08/2021 95 43 - 122 U/L Final   01/25/2021 92 43 - 122 U/L Final   01/16/2021 100 46 - 116 U/L Final     Magnesium   Date Value Ref Range Status   09/16/2020 2 1 1 6 - 2 3 mg/dL Final   08/24/2020 2 0 1 6 - 2 3 mg/dL Final   08/05/2020 2 1 1 6 - 2 3 mg/dL Final     WBC   Date Value Ref Range Status   03/08/2021 8 40 4 50 - 11 00 Thousand/uL Final   01/25/2021 10 30 4 50 - 11 00 Thousand/uL Final   01/19/2021 12 42 (H) 4 31 - 10 16 Thousand/uL Final     Hemoglobin   Date Value Ref Range Status   03/08/2021 11 6 (L) 13 5 - 17 5 g/dL Final   01/25/2021 10 9 (L) 13 5 - 17 5 g/dL Final   01/19/2021 11 0 (L) 12 0 - 17 0 g/dL Final     Platelets   Date Value Ref Range Status   03/08/2021 327 150 - 450 Thousands/uL Final   01/25/2021 335 150 - 450 Thousands/uL Final   01/19/2021 352 149 - 390 Thousands/uL Final     PTT   Date Value Ref Range Status   01/15/2021 33 23 - 37 seconds Final   12/03/2019 >210 (HH) 23 - 37 seconds Final     INR   Date Value Ref Range Status   01/15/2021 1 19 0 84 - 1 19 Final   11/29/2019 1 15 0 84 - 1 19 Final     CK-MB   Date Value Ref Range Status   12/04/2019 3 0 0 0 - 5 0 ng/mL Final   09/04/2018 1 4 0 0 - 2 4 ng/mL Final     No results found for: TSH  No results found for: CHOL   Hemoglobin A1C   Date Value Ref Range Status   10/15/2020 5 9 6 5 Final   05/06/2019 5 7 4 2 - 6 3 % Final     Blood Culture   Date Value Ref Range Status   01/15/2021 No Growth After 5 Days  Final   01/15/2021 No Growth After 5 Days  Final     Sputum Culture   Date Value Ref Range Status   01/19/2021 3+ Growth of   Final     Comment:     Mixed Respiratory danielle  Commensal respiratory danielle only;  No significant growth of Staph aureus/MRSA or Pseudomonas aeruginosa  2019 3+ Growth of Pseudomonas aeruginosa (A)  Final   2019 3+ Growth of Candida albicans (A)  Final   2019 3+ Growth of   Final     Comment:     Mixed Respiratory danielle     Gram Stain Result   Date Value Ref Range Status   2021 1+ Polys (A)  Final   2021 1+ Epithelial Cells (A)  Final   2021 2+ Gram positive cocci in pairs (A)  Final   2021 1+ Gram positive rods (A)  Final   2021 1+ Gram negative rods (A)  Final   2021 Rare Budding Yeast with Pseudomycelia (A)  Final   2019 1+ Epithelial cells per low power field (A)  Final   2019 3+ Polys (A)  Final   2019 4+ Gram negative rods (A)  Final   2019 4+ Yeast (A)  Final     Legionella Urinary Antigen   Date Value Ref Range Status   01/15/2021 Negative Negative Final       *-*-*-*-*-*-*-*-*-*-*-*-*-*-*-*-*-*-*-*-*-*-*-*-*-*-*-*-*-*-*-*-*-*-*-*-*-*-*-*-*-*-*-*-*-*-*-*-*-*-*-*-*-*-  PREVIOUS CARDIOLOGY & RADIOLOGY RESULTS:  Results for orders placed during the hospital encounter of 19   Echo complete with contrast if indicated    Narrative [address] [facility]  [facility]  [place], [address]  [phone]    Transthoracic Echocardiogram  2D, M-mode, Doppler, and Color Doppler    Study date:  01-Dec-2019    Patient: Donnie Colvin  MR number: [id number]  Account number: [de-identified]  : 1960  Age: 64 years  Gender: Male  Status: Inpatient  Location: Bedside  Height: 68 in  Weight: 149 lb  BP: 129/ 78 mmHg    Indications: Chest pain  Diagnoses: R07 9 - Chest pain, unspecified    Sonographer:  SOLE Chavez  Primary Physician:  Eric Gautam  Referring Physician:  Mikaela Kelly MD  Group:  [facility]  Interpreting Physician:  DEVON Sears     SUMMARY    LEFT VENTRICLE:  Systolic function was normal by visual assessment  Ejection fraction was estimated to be 55 %    Although no diagnostic regional wall motion abnormality was identified, this possibility cannot be completely excluded on the basis of this study  TRICUSPID VALVE:  There was trace regurgitation  HISTORY: PRIOR HISTORY: lung Ca with chemotherapy, GERD, depression, COPD Risk factors: hypertension, diabetes, renal failure, and a history of current cigarette use (within the last month)  PROCEDURE: The procedure was performed at the bedside  This was a routine study  The transthoracic approach was used  The study included complete 2D imaging, M-mode, complete spectral Doppler, and color Doppler  The heart rate was 94 bpm,  at the start of the study  Image quality was adequate  LEFT VENTRICLE: Size was normal  Systolic function was normal by visual assessment  Ejection fraction was estimated to be 55 %  Although no diagnostic regional wall motion abnormality was identified, this possibility cannot be completely  excluded on the basis of this study  Wall thickness was normal  DOPPLER: Left ventricular diastolic function parameters were normal     RIGHT VENTRICLE: The size was normal  Systolic function was normal  Wall thickness was normal     LEFT ATRIUM: Size was normal     RIGHT ATRIUM: Size was normal     MITRAL VALVE: Valve structure was normal  There was normal leaflet separation  DOPPLER: The transmitral velocity was within the normal range  There was no evidence for stenosis  There was no regurgitation  AORTIC VALVE: The valve was trileaflet  Leaflets exhibited normal thickness, normal cuspal separation, and sclerosis  DOPPLER: Transaortic velocity was within the normal range  There was no evidence for stenosis  There was no  regurgitation  TRICUSPID VALVE: The valve structure was normal  There was normal leaflet separation  DOPPLER: The transtricuspid velocity was within the normal range  There was no evidence for stenosis  There was trace regurgitation  PULMONIC VALVE: Not well visualized   DOPPLER: There was no evidence for stenosis  There was no regurgitation  PERICARDIUM: There was no pericardial effusion  The pericardium was normal in appearance  AORTA: The root exhibited normal size  SYSTEMIC VEINS: IVC: The inferior vena cava was normal in size and course  Respirophasic changes were normal     SYSTEM MEASUREMENT TABLES    2D  %FS: 34 6 %  Ao Diam: 3 1 cm  EDV(Teich): 74 2 ml  EF(Teich): 64 3 %  ESV(Teich): 26 5 ml  IVSd: 0 9 cm  LA Area: 12 7 cm2  LA Diam: 2 8 cm  LVIDd: 4 1 cm  LVIDs: 2 7 cm  LVPWd: 0 9 cm  RA Area: 11 8 cm2  RVIDd: 3 cm  SV(Teich): 47 7 ml    MM  TAPSE: 2 5 cm    PW  E' Av 1 m/s  E' Lat: 0 1 m/s  E' Sept: 0 1 m/s  E/E' Av 6  E/E' Lat: 11 2  E/E' Sept: 12  MV A Jonathon: 1 3 m/s  MV Dec [place]: 6 8 m/s2  MV DecT: 185 3 ms  MV E Jonathon: 1 3 m/s  MV E/A Ratio: 1  MV PHT: 53 7 ms  MVA By PHT: 4 1 cm2    [facility]    Prepared and electronically signed by    DEVON Martino  Signed 02-Dec-2019 08:28:12       No results found for this or any previous visit  No results found for this or any previous visit  No results found for this or any previous visit  MRI lumbar spine wo contrast  Narrative: MRI LUMBAR SPINE WITHOUT CONTRAST    INDICATION: left sided low back pain, left sided radiculopathy, weakness on exam     COMPARISON:  2018    TECHNIQUE:  Sagittal T1, axial T1     IMAGE QUALITY:  Diagnostic    FINDINGS:  ALIGNMENT:  There is a mild rightward curvature to the lumbar spine  The vertebral bodies are normal in alignment  A lordotic curvature is preserved  There is no evidence of spondylolisthesis  MARROW SIGNAL:  Mildly heterogeneous but no suspicious foci of marrow edema or focal signal abnormality  DISTAL CORD AND CONUS:  Normal appearance to the distal thoracic cord  The conus tapers at the L1 level with an unremarkable configuration  The cauda equina is normal in signal intensity and morphology      PARASPINAL SOFT TISSUES:  Paraspinal soft tissues are unremarkable  SACRUM:  Normal signal within the sacrum  No evidence of insufficiency or stress fracture  LOWER THORACIC DISC SPACES:  Normal disc height and signal   No disc herniation, canal stenosis or foraminal narrowing  LUMBAR DISC SPACES:  There is disc desiccation and mild disc height loss at L4-L5  L1-L2:  Normal     L2-L3:  Normal     L3-L4:  Normal     L4-L5:  Minimal bulging of the disc annulus  Left foraminal annular fissure  Left foraminal disc protrusion with facet arthrosis causing moderate left neural foraminal narrowing  There is minimal right inferior foraminal stenosis as well  The   spinal canal remains patent  There is no significant facet arthropathy  L5-S1:  Mild circumferential disc bulge without spinal canal or foraminal stenosis  No facet arthropathy  Impression: Very limited exam without numerous sequences  Moderate left L4-L5 neural foraminal narrowing has progressed from the prior exam   Conus medullaris terminates in appropriate position      Workstation performed: [id number]        *-*-*-*-*-*-*-*-*-*-*-*-*-*-*-*-*-*-*-*-*-*-*-*-*-*-*-*-*-*-*-*-*-*-*-*-*-*-*-*-*-*-*-*-*-*-*-*-*-*-*-*-*-*-  SIGNATURES:   @JVL@   Yolis Treviño MD     *-*-*-*-*-*-*-*-*-*-*-*-*-*-*-*-*-*-*-*-*-*-*-*-*-*-*-*-*-*-*-*-*-*-*-*-*-*-*-*-*-*-*-*-*-*-*-*-*-*-*-*-*-*-    PAST MEDICAL HISTORY:  Past Medical History:   Diagnosis Date    Alkalosis 12/9/2019    [facility]     Cancer ([facility] )     adeno lung  dx 11/2018-lung bx today 4/9/2019-ongoing chemo    Chronic obstructive pulmonary disease with acute exacerbation [facility] 6/7/2018    Chronic pain disorder     back and right shoulder    CKD (chronic kidney disease)     [facility]     Depression     Diabetes mellitus [facility]     Elevated d-dimer 11/30/2019    Elevated troponin 11/29/2019    Elevated troponin level not due to acute coronary syndrome 2019    GERD (gastroesophageal reflux disease)     Herniated lumbar intervertebral disc     Hyperkalemia     Hypertension     Insomnia     Latent syphilis     Treated    Low back pain     Lumbosacral disc disease     [facility] ) 2019    Pneumothorax after biopsy     R lung    PTSD (post-traumatic stress disorder)     [facility]     Tobacco abuse 2018    PAST SURGICAL HISTORY:   Past Surgical History:   Procedure Laterality Date    COLONOSCOPY      CT GUIDED CHEST TUBE  2018    FL GUIDED CENTRAL VENOUS ACCESS DEVICE INSERTION  2019    HEMORROIDECTOMY      IR BIOPSY LUNG  2018    IR CHEST TUBE PLACEMENT  2018    KNEE SURGERY      [place] INSJ TUNNELED CTR VAD W/SUBQ PORT AGE 5 YR/> N/A 2019    Procedure: PLACEMENT OF PORT-A-CATH;  Surgeon: Marina Faye MD;  Location: [address] [place] [address] MAIN OR;  Service: Vascular         FAMILY HISTORY:  Family History   Problem Relation Age of Onset    Heart disease Mother     Cancer Mother     Hypertension Father     Diabetes Family     Asthma Family     Heart disease Family     Cancer Family     Cancer Maternal Grandfather     Cancer Paternal Grandfather     Cancer Maternal Aunt     SOCIAL HISTORY:  Social History     Tobacco Use   Smoking Status Former Smoker    Packs/day: 0 50    Years: 40 00    Pack years: 20 00    Types: Cigarettes    Start date:     Quit date: 2019    Years since quittin 6   Smokeless Tobacco Never Used   Tobacco Comment    will smoke a cigarette on occasion with stress      Social History     Substance and Sexual Activity   Alcohol Use Not Currently     Social History     Substance and Sexual Activity   Drug Use Not Currently    Types: Marijuana    Comment: "I will smoke a joint if I am stressed out but not every day"    @[facility]@     *-*-*-*-*-*-*-*-*-*-*-*-*-*-*-*-*-*-*-*-*-*-*-*-*-*-*-*-*-*-*-*-*-*-*-*-*-*-*-*-*-*-*-*-*-*-*-*-*-*-*-*-*-*  ALLERGIES:  Allergies Allergen Reactions    Lisinopril Anaphylaxis     Took medication a while ago and had a "swelling reaction"  Does not carry epi-pen    CURRENT SCHEDULED MEDICATIONS:    Current Outpatient Medications:     albuterol (2 5 mg/3 mL) 0 083 % nebulizer solution, Take 1 vial (2 5 mg total) by nebulization every 6 (six) hours as needed for wheezing or shortness of breath, Disp: 360 mL, Rfl: 5    albuterol (PROVENTIL HFA,VENTOLIN HFA) 90 mcg/act inhaler, Inhale 2 puffs every 4 (four) hours as needed for wheezing, Disp: 18 g, Rfl: 5    apixaban (ELIQUIS) 2 5 mg, Take 1 tablet (2 5 mg total) by mouth 2 (two) times a day, Disp: 60 tablet, Rfl: 11    Blood Glucose Monitoring Suppl KIT, by Does not apply route daily, Disp: 1 each, Rfl: 0    budesonide (PULMICORT) 0 5 mg/2 mL nebulizer solution, Take 1 vial (0 5 mg total) by nebulization every 12 (twelve) hours Rinse mouth after use , Disp: 1 vial, Rfl: 0    carbamide peroxide (DEBROX) 6 5 % otic solution, Administer 5 drops into both ears 2 (two) times a day, Disp: 15 mL, Rfl: 0    clotrimazole-betamethasone (LOTRISONE) 1-0 05 % cream, Apply topically 2 (two) times a day, Disp: 45 g, Rfl: 2    diltiazem (CARDIZEM CD) 120 mg 24 hr capsule, Take 1 capsule (120 mg total) by mouth daily, Disp: 90 capsule, Rfl: 3    divalproex sodium (DEPAKOTE) 250 mg EC tablet, Take 1 tablet (250 mg total) by mouth 2 (two) times a day, Disp: 30 tablet, Rfl: 0    FLUoxetine (PROzac) 10 MG tablet, Take 1 tablet (10 mg total) by mouth daily, Disp: 30 tablet, Rfl: 5    fluticasone (FLONASE) 50 mcg/act nasal spray, 1-2 sprays each nostril daily for allergies, Disp: 1 Bottle, Rfl: 3    fluticasone-salmeterol (ADVAIR, WIXELA) 250-50 mcg/dose inhaler, Inhale 1 puff 2 (two) times a day Rinse mouth after use , Disp: 1 Inhaler, Rfl: 3    folic acid (FOLVITE) 1 mg tablet, Take 1 tablet (1,000 mcg total) by mouth daily, Disp: 90 tablet, Rfl: 0    formoterol (PERFOROMIST) 20 MCG/2ML nebulizer solution, Take 1 vial (20 mcg total) by nebulization 2 (two) times a day, Disp: 60 vial, Rfl: 1    FREESTYLE LITE test strip, TEST BLOOD SUGAR DAILY, Disp: 100 each, Rfl: 1    guaiFENesin (MUCINEX) 600 mg 12 hr tablet, Take 600 mg by mouth every 12 (twelve) hours, Disp: , Rfl:     ipratropium-albuterol (DUO-NEB) 0 5-2 5 mg/3 mL nebulizer solution, Take 1 vial (3 mL total) by nebulization 4 (four) times a day, Disp: 120 vial, Rfl: 5    Lancets (FREESTYLE) lancets, TEST BLOOD SUGAR DAILY, Disp: 100 each, Rfl: 4    lidocaine (LIDODERM) 5 %, Apply 1 patch topically daily Remove & Discard patch within 12 hours or as directed by MD, Disp: 10 patch, Rfl: 0    lidocaine (LMX) 4 % cream, Apply topically as needed for mild pain Apply 1/2-1 inch to port 30-60 mins prior to needle insertion, cover with serrain wrap, Disp: 30 g, Rfl: 5    melatonin 3 mg, Take 1 tablet (3 mg total) by mouth daily at bedtime, Disp: 30 tablet, Rfl: 1    methocarbamol (ROBAXIN) 500 mg tablet, Take 1 tablet (500 mg total) by mouth 3 (three) times a day, Disp: 30 tablet, Rfl: 0    oxyCODONE (OxyCONTIN) 20 mg 12 hr tablet, Take 1 tablet (20 mg total) by mouth every 12 (twelve) hours To prevent cancer painMax Daily Amount: 40 mg, Disp: 60 tablet, Rfl: 0    oxyCODONE (ROXICODONE) 30 MG immediate release tablet, Take 1 tablet (30 mg total) by mouth every 4 (four) hours as needed (cancer pain)Max Daily Amount: 180 mg, Disp: 120 tablet, Rfl: 0    pantoprazole (PROTONIX) 40 mg tablet, Take 1 tablet (40 mg total) by mouth daily, Disp: 90 tablet, Rfl: 3    polyethylene glycol (GLYCOLAX) powder, Take 17 g by mouth daily, Disp: 527 g, Rfl: 1    predniSONE 5 mg tablet, TAKE 1 TABLET BY MOUTH EVERY DAY, Disp: 30 tablet, Rfl: 0    pregabalin (LYRICA) 25 mg capsule, Take 1 capsule PO in morning and 2 capsules PO at bedtime, Disp: 90 capsule, Rfl: 2    prochlorperazine (COMPAZINE) 10 mg tablet, Take 1 tablet (10 mg total) by mouth every 6 (six) hours as needed for nausea or vomiting, Disp: 90 tablet, Rfl: 0    QUEtiapine (SEROquel) 25 mg tablet, TAKE 0 5 TABLETS (12 5 MG TOTAL) BY MOUTH DAILY AT BEDTIME, Disp: 15 tablet, Rfl: 0    REGULOID 28 3 % POWD, USE AS DIRECTED, Disp: 369 g, Rfl: 4    Saline 0 65 % (Soln) SOLN, 5 drops into each nostril as needed (Dry nose), Disp: 50 mL, Rfl: 5    senna-docusate sodium (SENOKOT-S) 8 6-50 mg per tablet, Take 1 tablet by mouth 2 (two) times a day, Disp: 60 tablet, Rfl: 11    sitaGLIPtin (JANUVIA) 50 mg tablet, Take 1 tablet (50 mg total) by mouth daily, Disp: 90 tablet, Rfl: 0    tamsulosin (FLOMAX) 0 4 mg, Take 1 capsule (0 4 mg total) by mouth daily with dinner, Disp: 90 capsule, Rfl: 0    ergocalciferol (ERGOCALCIFEROL) 1 25 MG (42626 UT) capsule, Take 1 capsule (50,000 Units total) by mouth once a week (Patient not taking: Reported on 3/24/2021), Disp: 12 capsule, Rfl: 0    tiZANidine (ZANAFLEX) 4 mg tablet, Take 1 tablet (4 mg total) by mouth 3 (three) times a day for 14 days, Disp: 42 tablet, Rfl: 0     *-*-*-*-*-*-*-*-*-*-*-*-*-*-*-*-*-*-*-*-*-*-*-*-*-*-*-*-*-*-*-*-*-*-*-*-*-*-*-*-*-*-*-*-*-*-*-*-*-*-*-*-*-*

## 2021-03-30 NOTE — TELEPHONE ENCOUNTER
Prior Authorization needed for   Oxycontin 20 mg ER    [address] [facility]: [facility]     Per insurance Oxycontin is not on formulary  Following meds on formulary   1  Xtampza ER 18 mg    PA required  2  Morphine 15 mg ER   PA required       Proceed with PA of Oxycontin or submit new order for formulary option

## 2021-03-30 NOTE — TELEPHONE ENCOUNTER
3/30/2021 3:55 PM -  Clarified with pharmacist that pt has no fills on file for this med  It appears -- upon further investigation -- that pt's 3mos of fills were voided shortly after he was seen in virtual f/up  This was d/t hospital provider reconciling meds after hospital discharge, and removing what appeared to be duplicate Rxs  Sent oxyCONTIN rxs as [facility] to [facility] on [facility]  Advised pharmacist that pt may fill immediately

## 2021-04-01 NOTE — TELEPHONE ENCOUNTER
Received prior authorization approval for Xtampza 18mg caps through 03/31/22  Confirmed with pharmacy  Pt has been informed

## 2021-04-06 NOTE — TELEPHONE ENCOUNTER
SIGNATURES NEEDED FOR [employer] MANJARREZ FORM RECEIVED VIA FAX  WILL BE PLACED IN YOUR BIN AT ASSIGNED DELIVERY TIMES        Status of medical necessity

## 2021-04-09 NOTE — PATIENT INSTRUCTIONS
PRESCRIPTION REFILL REMINDER:  All medication refills should be requested prior to [facility] on Friday  Any refill requests after noon on Friday would be addressed the following Monday  Please protect yourself from COVID-19! Even though we do not good antiviral drugs for this infection, the following strategies can help you stay healthy:    = Wash your hands! Soap and water, or hand  with at least 60% alcohol, are both effective at killing the virus  = Wear a mask! This will help protect others from any virus particles you might spread  Your mouth and nose BOTH need to be covered  = Keep the distance! Keep 6 feet of distance from others people, even if they seem healthy  Keeping distance protects you from the other person's virus spread     = Get a vaccine! Three vaccines are approved for emergency use in the [place], made by Millicent [employer], and [employer]  These vaccines have been shown to be 90+% effective at preventing severe infections when combined with masking, hand-washing, and distancing  As of 3/1/2021, the following priority groups may get one of the vaccines in [place]:  + nursing home residents and staff  + front-line health workers  + ALL persons over age 72 (ages 76 and up can be seen at [address])  + ANY person over age 12 that has a qualifying risk factor, such as diabetes, lung disease, or obesity  ==> Please use  the following website  to check your eligibility in [place]:  [employer] [address]   ==> If you are under age 72, but still qualify, you may get a shot from many other locations outside [address]: [facility], [place] [employer], [facility], [place]      We are recommending that ALL our patients get a complete series of one of the three vaccines, as early as it is available to them    Please keep an eye on the [employer], Lynn Fabian, or call [phone] to see when you will become eligible  If you are eligible by the criteria above, please ask our team to help get you a shot! Informacion en espanol sobre vacunas, de nos companeros de [facility] --  Chelo eagle      Numbers of Coronavirus cases (and COVID deaths) are improving in many  States, but rates of transmission are still high  This means that many people in a community are still spreading the virus  Please check the local disease reports near you if you consider travelling  As of 3/1/21, we do NOT advise travel outside the [facility] ([place] or [place]) "        Check out [employer] Mobile App for [place] data that are updated daily:    http://www Cimetrix/     Global Epidemics  Org, from [facility] will give you Htjfmg-nz-Lvadfn information on virus cases:    Https://globalepidemics  org/  [address]    Your healthcare provider and/or public health staff have evaluated you and have determined that you do not need to remain in the hospital at this time  At this time you can be isolated at home where you will be monitored by staff from your local or state health department  You should carefully follow the prevention and isolation steps below until a healthcare provider or local or state health department says that you can return to your normal activities  Stay home except to get medical care    People who are mildly ill with COVID-19 are able to isolate at home during their illness  You should restrict activities outside your home, except for getting medical care  Do not go to work, school, or public areas  Avoid using public transportation, ride-sharing, or taxis  Separate yourself from other people and animals in your home    People: As much as possible, you should stay in a specific room and away from other people in your home  Also, you should use a separate bathroom, if available  Animals: You should restrict contact with pets and other animals while you are sick with COVID-19, just like you would around other people  Although there have not been reports of pets or other animals becoming sick with COVID-19, it is still recommended that people sick with COVID-19 limit contact with animals until more information is known about the virus  When possible, have another member of your household care for your animals while you are sick  If you are sick with COVID-19, avoid contact with your pet, including petting, snuggling, being kissed or licked, and sharing food  If you must care for your pet or be around animals while you are sick, wash your hands before and after you interact with pets and wear a facemask  See COVID-19 and Animals for more information  Call ahead before visiting your doctor    If you have a medical appointment, call the healthcare provider and tell them that you have or may have COVID-19  This will help the healthcare providers office take steps to keep other people from getting infected or exposed  Wear a facemask    You should wear a facemask when you are around other people (e g , sharing a room or vehicle) or pets and before you enter a healthcare providers office  If you are not able to wear a facemask (for example, because it causes trouble breathing), then people who live with you should not stay in the same room with you, or they should wear a facemask if they enter your room  Cover your coughs and sneezes    Cover your mouth and nose with a tissue when you cough or sneeze  Throw used tissues in a lined trash can  Immediately wash your hands with soap and water for at least 20 seconds or, if soap and water are not available, clean your hands with an alcohol-based hand  that contains at least 60% alcohol      Clean your hands often    Wash your hands often with soap and water for at least 20 seconds, especially after blowing your nose, coughing, or sneezing; going to the bathroom; and before eating or preparing food  If soap and water are not readily available, use an alcohol-based hand  with at least 60% alcohol, covering all surfaces of your hands and rubbing them together until they feel dry  Soap and water are the best option if hands are visibly dirty  Avoid touching your eyes, nose, and mouth with unwashed hands  Avoid sharing personal household items    You should not share dishes, drinking glasses, cups, eating utensils, towels, or bedding with other people or pets in your home  After using these items, they should be washed thoroughly with soap and water  Clean all high-touch surfaces everyday    High touch surfaces include counters, tabletops, doorknobs, bathroom fixtures, toilets, phones, keyboards, tablets, and bedside tables  Also, clean any surfaces that may have blood, stool, or body fluids on them  Use a household cleaning spray or wipe, according to the label instructions  Labels contain instructions for safe and effective use of the cleaning product including precautions you should take when applying the product, such as wearing gloves and making sure you have good ventilation during use of the product  Monitor your symptoms    Seek prompt medical attention if your illness is worsening (e g , difficulty breathing)  Before seeking care, call your healthcare provider and tell them that you have, or are being evaluated for, COVID-19  Put on a facemask before you enter the facility  These steps will help the healthcare providers office to keep other people in the office or waiting room from getting infected or exposed  Ask your healthcare provider to call the local or [facility] health department   Persons who are placed under active monitoring or facilitated self-monitoring should follow instructions provided by their local health department or occupational health professionals, as appropriate  If you have a medical emergency and need to call 911, notify the dispatch personnel that you have, or are being evaluated for COVID-19  If possible, put on a facemask before emergency medical services arrive  Discontinuing home isolation    Patients with confirmed COVID-19 should remain under home isolation precautions until the following conditions are met:   - They have had no fever for at least 24 hours (that is one full day of no fever without the use medicine that reduces fevers)  AND  - other symptoms have improved (for example, when their cough or shortness of breath have improved)  AND  - If had mild or moderate illness, at least 10 days have passed since their symptoms first appeared or if severe illness (needed oxygen) or immunosuppressed, at least 20 days have passed since symptoms first appeared  Patients with confirmed COVID-19 should also notify close contacts (including their workplace) and ask that they self-quarantine  Currently, close contact is defined as being within 6 feet for 15 minutes or more from the period 24 hours starting 48 hours before symptom onset to the time at which the patient went into isolation  Close contacts of patients diagnosed with COVID-19 should be instructed by the patient to self-quarantine for 14 days from the last time of their last contact with the patient  Source: [employer]  COVID-19 Home Care Guidelines    Your healthcare provider and/or public health staff have evaluated you and have determined that you do not need to remain in the hospital at this time  At this time you can be isolated at home where you will be monitored by staff from your local or state health department   You should carefully follow the prevention and isolation steps below until a healthcare provider or local or state health department says that you can return to your normal activities  Stay home except to get medical care    People who are mildly ill with COVID-19 are able to isolate at home during their illness  You should restrict activities outside your home, except for getting medical care  Do not go to work, school, or public areas  Avoid using public transportation, ride-sharing, or taxis  Separate yourself from other people and animals in your home    People: As much as possible, you should stay in a specific room and away from other people in your home  Also, you should use a separate bathroom, if available  Animals: You should restrict contact with pets and other animals while you are sick with COVID-19, just like you would around other people  Although there have not been reports of pets or other animals becoming sick with COVID-19, it is still recommended that people sick with COVID-19 limit contact with animals until more information is known about the virus  When possible, have another member of your household care for your animals while you are sick  If you are sick with COVID-19, avoid contact with your pet, including petting, snuggling, being kissed or licked, and sharing food  If you must care for your pet or be around animals while you are sick, wash your hands before and after you interact with pets and wear a facemask  See COVID-19 and Animals for more information  Call ahead before visiting your doctor    If you have a medical appointment, call the healthcare provider and tell them that you have or may have COVID-19  This will help the healthcare providers office take steps to keep other people from getting infected or exposed  Wear a facemask    You should wear a facemask when you are around other people (e g , sharing a room or vehicle) or pets and before you enter a healthcare providers office   If you are not able to wear a facemask (for example, because it causes trouble breathing), then people who live with you should not stay in the same room with you, or they should wear a facemask if they enter your room  Cover your coughs and sneezes    Cover your mouth and nose with a tissue when you cough or sneeze  Throw used tissues in a lined trash can  Immediately wash your hands with soap and water for at least 20 seconds or, if soap and water are not available, clean your hands with an alcohol-based hand  that contains at least 60% alcohol  Clean your hands often    Wash your hands often with soap and water for at least 20 seconds, especially after blowing your nose, coughing, or sneezing; going to the bathroom; and before eating or preparing food  If soap and water are not readily available, use an alcohol-based hand  with at least 60% alcohol, covering all surfaces of your hands and rubbing them together until they feel dry  Soap and water are the best option if hands are visibly dirty  Avoid touching your eyes, nose, and mouth with unwashed hands  Avoid sharing personal household items    You should not share dishes, drinking glasses, cups, eating utensils, towels, or bedding with other people or pets in your home  After using these items, they should be washed thoroughly with soap and water  Clean all high-touch surfaces everyday    High touch surfaces include counters, tabletops, doorknobs, bathroom fixtures, toilets, phones, keyboards, tablets, and bedside tables  Also, clean any surfaces that may have blood, stool, or body fluids on them  Use a household cleaning spray or wipe, according to the label instructions  Labels contain instructions for safe and effective use of the cleaning product including precautions you should take when applying the product, such as wearing gloves and making sure you have good ventilation during use of the product  Monitor your symptoms    Seek prompt medical attention if your illness is worsening (e g , difficulty breathing)   Before seeking care, call your healthcare provider and tell them that you have, or are being evaluated for, COVID-19  Put on a facemask before you enter the facility  These steps will help the healthcare providers office to keep other people in the office or waiting room from getting infected or exposed  Ask your healthcare provider to call the local or [facility] health department  Persons who are placed under active monitoring or facilitated self-monitoring should follow instructions provided by their local health department or occupational health professionals, as appropriate  If you have a medical emergency and need to call 911, notify the dispatch personnel that you have, or are being evaluated for COVID-19  If possible, put on a facemask before emergency medical services arrive  Discontinuing home isolation    Patients with confirmed COVID-19 should remain under home isolation precautions until the following conditions are met:   - They have had no fever for at least 24 hours (that is one full day of no fever without the use medicine that reduces fevers)  AND  - other symptoms have improved (for example, when their cough or shortness of breath have improved)  AND  - If had mild or moderate illness, at least 10 days have passed since their symptoms first appeared or if severe illness (needed oxygen) or immunosuppressed, at least 20 days have passed since symptoms first appeared  Patients with confirmed COVID-19 should also notify close contacts (including their workplace) and ask that they self-quarantine  Currently, close contact is defined as being within 6 feet for 15 minutes or more from the period 24 hours starting 48 hours before symptom onset to the time at which the patient went into isolation  Close contacts of patients diagnosed with COVID-19 should be instructed by the patient to self-quarantine for 14 days from the last time of their last contact with the patient       Source: [employer]

## 2021-04-12 NOTE — PROGRESS NOTES
Outpatient Follow-Up - Palliative and Supportive Care   Sriram Cantu Sr  64 y o  male [id number]    Assessment & Plan  1  Adenocarcinoma of lung, right ([address] )    2  Cancer-related pain    3  Palliative care patient      - Counseling on health screening and disease prevention, COVID-19 specific (CPT V65 49)    Medications adjusted this encounter:  Requested Prescriptions     Signed Prescriptions Disp Refills    oxyCODONE (ROXICODONE) 30 MG immediate release tablet 150 tablet 0     Sig: Take 1 tablet (30 mg total) by mouth every 4 (four) hours as needed (to relieve cancer pain immediately)Max Daily Amount: 180 mg    oxyCODONE (OxyCONTIN) 20 mg 12 hr tablet 60 tablet 0     Sig: Take 1 tablet (20 mg total) by mouth every 12 (twelve) hoursMax Daily Amount: 40 mg     No orders of the defined types were placed in this encounter  Medications Discontinued During This Encounter   Medication Reason    oxyCODONE (ROXICODONE) 30 MG immediate release tablet Reorder   - Trial rotation back to oxyCONTIN per pt request   - Inc freq of oxyIR/day to help reduce severe pains  Sriram Cantu Sr  was seen today for symptoms and planning cares related to above illnesses  I have reviewed the patient's controlled substance dispensing history in the Prescription Drug Monitoring Program in compliance with the [facility] regulations before prescribing any controlled substances  They are invited to continue to follow with us  If there are questions or concerns, please contact us through our clinic/answering service 24 hours a day, seven days a week  Dusty Mcekon MD  [facility]        Visit Information    Accompanied By: No one    Source of History: Self    History Limitations: None    Contacts: June Sci -     History of Present Illness      Sriram Cantu Sr  is a 64 y o  male who presents in follow up of symptoms related to adenocarcinoma of R lung    He also has severe COPD and PTSD  He continues with Dr Toño Tran and Ms Warren Spence, on monotherapy with [facility]  He is dependent on supplemental O2 at baseline  Pertinent issues include: symptom management, pain, neoplasm related, breathlessness, depression or anxiety      Since last visit, he has not yet completed interval check / f/up scan to assess disease status  Of note, a CT in January did question disease progression vs infection  He is due to f/up with PET/CT soon, but in meantime, has found recent rotation to [facility] entirely unhelpful  He volunteers that he has been using two tabs of this qAM as one does not help  He has not been using oxyIR for immediate pain relief in AM   He requests rotation back to oxyCONTIN, which we discussed may be fraught with insurance barriers  Still, we will attempt to rotate back to non-formulary agent  Overall, he continues with the same therapy without changes, and has had no side effects in particular from this  His COPD is tougher in the cold, and with the cold snap today in particular, his back and knees feel very achy today  Previously, he would have wished to complete XRT as rec'd, he did not feel that the XRT suite was appropriate for his health  He developed a cough and rhinorrhea during his second treatment, and he would not like to catch cold/pna while doing therapy  Previously, pt needed less quetiapine for sleep and anxiety when steroids from Med/Onc were tapered  Past medical, surgical, social, and family histories are reviewed and pertinent updates are made  Review of Systems   Constitution: Positive for decreased appetite and weight loss  Negative for weight gain  HENT: Negative for hoarse voice and nosebleeds  Eyes: Negative for vision loss in left eye and vision loss in right eye  Cardiovascular: Negative for chest pain and dyspnea on exertion  Respiratory: Negative for cough and shortness of breath      Endocrine: Negative for polydipsia, polyphagia and polyuria  Skin: Negative for flushing and itching  Musculoskeletal: Negative for falls  Gastrointestinal: Positive for nausea and vomiting  Negative for anorexia and jaundice  Genitourinary: Negative for frequency  Neurological: Negative for dizziness  Psychiatric/Behavioral: Negative for depression and memory loss  The patient is not nervous/anxious  Vital Signs    /83 (BP Location: Right arm, Patient Position: Sitting, Cuff Size: Standard)   Pulse 96   Temp 97 5 °F (36 4 °C) (Temporal)   Resp 20   Wt 88 1 kg (194 lb 3 6 oz)   SpO2 96%   BMI 29 53 kg/m²     Physical Exam and Objective Data  Physical Exam  Constitutional:       General: He is not in acute distress  Appearance: He is ill-appearing  He is not toxic-appearing or diaphoretic  Comments: Frail   HENT:      Head: Normocephalic and atraumatic  Right Ear: External ear normal       Left Ear: External ear normal    Eyes:      General:         Right eye: No discharge  Left eye: No discharge  Conjunctiva/sclera: Conjunctivae normal       Pupils: Pupils are equal, round, and reactive to light  Neck:      Trachea: No tracheal deviation  Cardiovascular:      Rate and Rhythm: Regular rhythm  Tachycardia present  Pulmonary:      Effort: No respiratory distress  Breath sounds: No stridor  Wheezing, rhonchi and rales present  Abdominal:      Palpations: Abdomen is soft  Comments: scaphoid   Skin:     General: Skin is warm and dry  Findings: No erythema or rash  Neurological:      General: No focal deficit present  Mental Status: He is alert and oriented to person, place, and time  Mental status is at baseline  Cranial Nerves: No cranial nerve deficit  Psychiatric:         Mood and Affect: Mood normal          Behavior: Behavior normal          Thought Content:  Thought content normal       Comments: jdugment and insight fair           Radiology and Laboratory:  I personally reviewed and interpreted the following results: reviewed historical reports    35+ minutes was spent face to face with Orquidea Morris  with greater than 50% of the time spent in counseling or coordination of care including discussions of etiology of diagnosis, risks and benefits of treatment and risk factors and risk reduction of disease   Additional time was also spent in discussing coronavirus vaccine indications, availability, and logistical challenges  All of the patient's or agent's questions were answered during this discussion

## 2021-04-12 NOTE — TELEPHONE ENCOUNTER
Prior Authorization needed for Oxycodone 30 mg      KEY# [place] [address] [address] [facility]   [phone]

## 2021-04-12 NOTE — TELEPHONE ENCOUNTER
Prior Authorization needed for Oxycontin 20 MG ER tablets    [address]:   [facility] hospital       PA submited through [employer]  Awaiting for authorization

## 2021-04-13 NOTE — TELEPHONE ENCOUNTER
Received PA denial for the oxycontin 20mg ER as they wanted to know if the xtampza will be D/C  Informed them that the Yvonnie Browner is being replaced by the Oxycontin 20mg  They will re-open the PA request for determination  Will await the determination

## 2021-04-13 NOTE — TELEPHONE ENCOUNTER
Received prior authorization approval for oxycodone [facility] ER Oral tablet ER 12 hour quantity of 60 from 4/13/2021 to 4/13/2022    AND     Oxycodone [facility]CI oral tablet 30 MG quantity 150 duration 25  From 4/13/2021 to 4/13/2022     Confirmed with pharmacy  Pt has been informed

## 2021-04-13 NOTE — TELEPHONE ENCOUNTER
This nurse spoke with pharmacy who stated they could not run the oxycodone 30mg depite PA approval  I asked them to try to run as a different NDC and they were able to do this  They will need to order this tablet in and should have by Friday  Patient has been informed

## 2021-04-13 NOTE — TELEPHONE ENCOUNTER
Patient called me late today and states he does not want any of his pain meds sent to [address] [place] [address] any longer  He is asking for Dr Jaclyn Joy to resend his Oxycodone 30 mg to [address] instead  I told him Dr Jaclyn Joy most likely will not be able to address this change till tomorrow  I do see in my nurses note that [address] [place] [address] has already ready run this medication and is ordering it in  I will route to my nurse to see if this change can still be made with this process already started at [address] [place] [address]

## 2021-04-14 NOTE — TELEPHONE ENCOUNTER
Pt called office requesting rx for Oxy 30mg tabs be sent to [facility] on [address] as  does not have in stock  Ran PDMP and noticed fill date of 4/13 for 150 tabs  Called pharmacy and they confirmed pt DID NOT actually fill rx as they will not have available until next week  Please resend rx

## 2021-04-14 NOTE — TELEPHONE ENCOUNTER
Harrie Dandy called office checking on status of Oxycodone 30mg script  He would like script resent to the [facility]  He is out of this medication

## 2021-04-15 NOTE — TELEPHONE ENCOUNTER
Patient has been informed his oxycodone script was sent to [facility] pharmacy last evening  He will call them to ensure it is ready for

## 2021-04-15 NOTE — TELEPHONE ENCOUNTER
Significant Findings     Call Received From     Radiology Department Location  [facility]   Study Pet CT Skull to mid thigh   Date of Study  04/14/2021   Relevant Information Order in epic

## 2021-04-19 NOTE — TELEPHONE ENCOUNTER
Patient would like to cancel todays office visit and infusion appt, states he do not feel well  Best call back [phone]

## 2021-04-21 NOTE — PROGRESS NOTES
Hematology/Oncology Outpatient Follow-up  Maximilian Montano Sr  64 y o  male 1960 [id number]    Date:  4/21/2021      Assessment and Plan:  1  Adenocarcinoma of lung, right ([facility] )  Patient's recent repeat PET-CT scan did not reveal any obvious hint of progression  He continues to have some patchy FDG uptake in both lungs which are stable and favor infectious/inflammatory etiology; will be monitored closely  He will be continued on his current treatment with single agent immunotherapy Keytruda 400 mg on an every 6 week basis which he is tolerating well  He is due for treatment today after the visit along with repeat laboratory studies which we will review on address once they become available  The patient will follow-up again in 6 weeks with his next scheduled treatment  He will continue to follow up with the palliative care team on a regular basis for pain and symptom management  - CBC and differential; Future  - Comprehensive metabolic panel; Future  - TSH, 3rd generation with Free T4 reflex; Future  - Infusion Calculated Appointment Request; Future  - CBC and differential; Future  - Comprehensive metabolic panel; Future  - TSH, 3rd generation with Free T4 reflex; Future  - T3, free; Future  - lidocaine (LMX) 4 % cream  - sodium chloride 0 9 % infusion  - pembrolizumab (KEYTRUDA) 400 mg in sodium chloride 0 9 % 50 mL IVPB    2  Lesion of adrenal gland ([facility] [address] )  Patient's repeat PET-CT scan reports a left suprarenal soft tissue density of unknown etiology has been stable since 2019 with variable activities on prior imaging studies  Radiology questions low-grade neuroendocrine tumor  Will continue to monitor the area closely and order additional biochemical tests to be done before his next visit  - Chromogranin A; Future  - Serotonin serum; Future  - Metanephrines Fractionated, urine, 24 hour; Future  - Catecholamines, fractionated, urine, 24 hour; Future    3   Seasonal allergies  Patient is asking for something for his seasonal allergies which are very bothersome at this time  His be started on Claritin 10 mg daily along with pad a day eye drops twice a day since he seems to have allergy eyes as well  - loratadine (CLARITIN) 10 mg tablet; Take 1 tablet (10 mg total) by mouth daily  Dispense: 90 tablet; Refill: 3  - olopatadine (PATANOL) 0 1 % ophthalmic solution; Administer 1 drop to both eyes 2 (two) times a day  Dispense: 5 mL; Refill: 3    HPI:  Patient presents today for a follow-up visit  He is reporting rhinorrhea and dry irritated eyes due to his seasonal allergies lung with chronic back pain  Otherwise no new complaints  States that his pain is somewhat better now that palliative care is transitioning him back to his OxyContin  He admits that he did unfortunately start smoking again at times  Patient will be getting repeat laboratory studies today after the visit  His most recent labs from 6 weeks ago 03/08/2021 showed normal white cells and platelets, he has chronic stable normocytic anemia H&H 11 6/36 5, MCV 83  He had slightly worsening renal dysfunction BUN 19, creatinine 2 19 with GFR 37 otherwise normal CMP  His TSH was normal 0 855  He had repeat imaging with a PET-CT scan last week 04/14/2021 which showed:  IMPRESSION:  1  Patchy FDG uptake in the bilateral lung fields corresponding to patchy consolidation on CT  The appearance is not significantly changed from the prior CT 1/15/2021  This is probably post infectious or inflammatory rather than malignant  No focal uptake suspicious for hypermetabolic malignancy  2   New asymmetric focal radiotracer uptake at the left arytenoid region  Question inflammatory rather than an underlying lesion  This could be reassessed on follow-up or correlated with direct visualization  3   There is now focal radiotracer uptake in a lobular soft tissue density in the left suprarenal region    This appears separate from the adrenal gland and the CT findings are stable from 2019  There has been variable mild radiotracer uptake here on   prior PET exams of uncertain significance not favored to represent metastasis but question related to a low grade neuroendocrine lesion given the location  Correlate with biochemical evaluation  This can be reassessed on follow-up PET/CT  Oncology History Overview Note    male history of heavy smoker who used to smoke 2 packs of cigarettes daily for many years, COPD, he presented with dyspnea, CT scan of the chest on October 2018 showed right middle lobe spiculated 1 3 cm mass with multiple solid and ground-glass nodules along the major and minor fissures sub cm pulmonary nodules bilaterally, left adrenal 1 2 cm nodule      PET scan showed 1 3 cm right middle lung nodule with SUV of 6 8, numerous nodular densities along the right major and minor fissures, right hilar activity with SUV of 3 4, subcarinal lymph node measuring 7 mm with SUV of 2 7, periportal enlarged lymph nodes concerning for metastatic disease measuring 2 4 cm with SUV of 5, prominent left retrocrural lymph node measuring 1 cm x 9 mm with SUV of 1 1     Core biopsy of the right spiculated nodule showed invasive adenocarcinoma consistent with lung primary positive for CK7, TTF 1, napsin a     Adenocarcinoma of lung, right ([address] )   11/13/2018 Initial Diagnosis    Adenocarcinoma of lung, right ([address] )  Stage IV     11/13/2018 Biopsy    A  Lung, right, core needle biopsies:             - Invasive adenocarcinoma, consistent with lung primary          12/13/2018 - 3/28/2019 Chemotherapy     Alimta, Carboplatin (AUC 5) + Keytruda (6 cycles total)     4/17/2019 -  Chemotherapy    Started maintenance Alimta and Keytruda  (Alimta d/c'd 9/16/19 due to worsening renal dysfunction)  Keytruda changed to the every 6 week dosing (400 mg) 12/2020         Interval history:    ROS: Review of Systems   Constitutional: Negative for activity change, appetite change, chills, fatigue, fever and unexpected weight change  HENT: Positive for congestion and rhinorrhea  Negative for mouth sores, nosebleeds, sore throat and trouble swallowing  Eyes: Positive for redness and itching  Respiratory: Positive for shortness of breath and wheezing  Negative for cough and chest tightness  On chronic O2 therapy   Cardiovascular: Positive for leg swelling  Negative for chest pain and palpitations  Gastrointestinal: Negative for abdominal distention, abdominal pain, blood in stool, constipation, diarrhea, nausea and vomiting  Genitourinary: Negative for difficulty urinating, dysuria, frequency, hematuria and urgency  Musculoskeletal: Positive for arthralgias, back pain and myalgias  Negative for gait problem and joint swelling  Skin: Negative for color change, pallor and rash  Neurological: Negative for dizziness, weakness, light-headedness, numbness and headaches  Hematological: Negative for adenopathy  Does not bruise/bleed easily  Psychiatric/Behavioral: Negative for dysphoric mood and sleep disturbance  The patient is not nervous/anxious          Past Medical History:   Diagnosis Date    Alkalosis 12/9/2019    [facility]     Cancer ([facility] )     adeno lung  dx 11/2018-lung bx today 4/9/2019-ongoing chemo    Chronic obstructive pulmonary disease with acute exacerbation ([facility] [address] ) 6/7/2018    Chronic pain disorder     back and right shoulder    CKD (chronic kidney disease)     COPD (chronic obstructive pulmonary disease) ([facility] )     Depression     Diabetes mellitus ([facility] [address] )     Elevated d-dimer 11/30/2019    Elevated troponin 11/29/2019    Elevated troponin level not due to acute coronary syndrome 11/30/2019    GERD (gastroesophageal reflux disease)     Herniated lumbar intervertebral disc     Hyperkalemia     Hypertension     Insomnia     Latent syphilis     Treated    Low back pain     Lumbosacral disc disease     Lung cancer ([facility] ) 2019    Pneumothorax after biopsy     R lung    PTSD (post-traumatic stress disorder)     Pulmonary emphysema (HCC)     Tobacco abuse 2018       Past Surgical History:   Procedure Laterality Date    COLONOSCOPY      CT GUIDED CHEST TUBE  2018    FL GUIDED CENTRAL VENOUS ACCESS DEVICE INSERTION  2019    HEMORROIDECTOMY      IR BIOPSY LUNG  2018    IR CHEST TUBE PLACEMENT  2018    KNEE SURGERY      ID INSJ TUNNELED CTR VAD W/SUBQ PORT AGE 5 YR/> N/A 2019    Procedure: PLACEMENT OF PORT-A-CATH;  Surgeon: Cortney Gilmore MD;  Location: [address] [place] [address];  Service: Vascular       Social History     Socioeconomic History    Marital status: Legally      Spouse name: None    Number of children: None    Years of education: None    Highest education level: None   Occupational History    Occupation: part time employment   Social Needs    Financial resource strain: None    Food insecurity     Worry: None     Inability: None    Transportation needs     Medical: None     Non-medical: None   Tobacco Use    Smoking status: Former Smoker     Packs/day: 0 50     Years: 40 00     Pack years: 20 00     Types: Cigarettes     Start date:      Quit date: 2019     Years since quittin 7    Smokeless tobacco: Never Used    Tobacco comment: will smoke a cigarette on occasion with stress   Substance and Sexual Activity    Alcohol use: Not Currently    Drug use: Not Currently     Types: Marijuana     Comment: "I will smoke a joint if I am stressed out but not every day"    Sexual activity: Yes   Lifestyle    Physical activity     Days per week: None     Minutes per session: None    Stress: None   Relationships    Social connections     Talks on phone: None     Gets together: None     Attends Spiritism service: None     Active member of club or organization: None     Attends meetings of clubs or organizations: None     Relationship status: None    Intimate partner violence     Fear of current or ex partner: None     Emotionally abused: None     Physically abused: None     Forced sexual activity: None   Other Topics Concern    None   Social History Narrative    Lives with girlfriend       Family History   Problem Relation Age of Onset    Heart disease Mother     Cancer Mother     Hypertension Father     Diabetes Family     Asthma Family     Heart disease Family     Cancer Family     Cancer Maternal Grandfather     Cancer Paternal Grandfather     Cancer Maternal Aunt        Allergies   Allergen Reactions    Lisinopril Anaphylaxis     Took medication a while ago and had a "swelling reaction"  Does not carry epi-pen         Current Outpatient Medications:     albuterol (2 5 mg/3 mL) 0 083 % nebulizer solution, Take 1 vial (2 5 mg total) by nebulization every 6 (six) hours as needed for wheezing or shortness of breath, Disp: 360 mL, Rfl: 5    albuterol (PROVENTIL HFA,VENTOLIN HFA) 90 mcg/act inhaler, Inhale 2 puffs every 4 (four) hours as needed for wheezing, Disp: 18 g, Rfl: 5    apixaban (ELIQUIS) 2 5 mg, Take 1 tablet (2 5 mg total) by mouth 2 (two) times a day, Disp: 60 tablet, Rfl: 11    Blood Glucose Monitoring Suppl KIT, by Does not apply route daily, Disp: 1 each, Rfl: 0    budesonide (PULMICORT) 0 5 mg/2 mL nebulizer solution, Take 1 vial (0 5 mg total) by nebulization every 12 (twelve) hours Rinse mouth after use , Disp: 1 vial, Rfl: 0    carbamide peroxide (DEBROX) 6 5 % otic solution, Administer 5 drops into both ears 2 (two) times a day, Disp: 15 mL, Rfl: 0    clotrimazole-betamethasone (LOTRISONE) 1-0 05 % cream, Apply topically 2 (two) times a day, Disp: 45 g, Rfl: 2    diltiazem (CARDIZEM CD) 120 mg 24 hr capsule, Take 1 capsule (120 mg total) by mouth daily, Disp: 90 capsule, Rfl: 3    divalproex sodium (DEPAKOTE) 250 mg EC tablet, Take 1 tablet (250 mg total) by mouth 2 (two) times a day, Disp: 30 tablet, Rfl: 0    ergocalciferol (ERGOCALCIFEROL) 1 25 MG (72876 UT) capsule, Take 1 capsule (50,000 Units total) by mouth once a week, Disp: 12 capsule, Rfl: 0    FLUoxetine (PROzac) 10 MG tablet, Take 1 tablet (10 mg total) by mouth daily, Disp: 30 tablet, Rfl: 5    fluticasone (FLONASE) 50 mcg/act nasal spray, 1-2 sprays each nostril daily for allergies, Disp: 1 Bottle, Rfl: 3    fluticasone-salmeterol (ADVAIR, WIXELA) 250-50 mcg/dose inhaler, Inhale 1 puff 2 (two) times a day Rinse mouth after use , Disp: 1 Inhaler, Rfl: 3    folic acid (FOLVITE) 1 mg tablet, Take 1 tablet (1,000 mcg total) by mouth daily, Disp: 90 tablet, Rfl: 0    formoterol (PERFOROMIST) 20 MCG/2ML nebulizer solution, Take 1 vial (20 mcg total) by nebulization 2 (two) times a day, Disp: 60 vial, Rfl: 1    FREESTYLE LITE test strip, TEST BLOOD SUGAR DAILY, Disp: 100 each, Rfl: 1    guaiFENesin (MUCINEX) 600 mg 12 hr tablet, Take 600 mg by mouth every 12 (twelve) hours, Disp: , Rfl:     ipratropium-albuterol (DUO-NEB) 0 5-2 5 mg/3 mL nebulizer solution, Take 1 vial (3 mL total) by nebulization 4 (four) times a day, Disp: 120 vial, Rfl: 5    Lancets (FREESTYLE) lancets, TEST BLOOD SUGAR DAILY, Disp: 100 each, Rfl: 4    lidocaine (LIDODERM) 5 %, Apply 1 patch topically daily Remove & Discard patch within 12 hours or as directed by MD, Disp: 10 patch, Rfl: 0    lidocaine (LMX) 4 % cream, Apply topically as needed for mild pain Apply 1/2-1 inch to port 30-60 mins prior to needle insertion, cover with serrain wrap, Disp: 30 g, Rfl: 5    melatonin 3 mg, Take 1 tablet (3 mg total) by mouth daily at bedtime, Disp: 30 tablet, Rfl: 1    methocarbamol (ROBAXIN) 500 mg tablet, Take 1 tablet (500 mg total) by mouth 3 (three) times a day, Disp: 30 tablet, Rfl: 0    oxyCODONE (OxyCONTIN) 20 mg 12 hr tablet, Take 1 tablet (20 mg total) by mouth every 12 (twelve) hoursMax Daily Amount: 40 mg, Disp: 60 tablet, Rfl: 0    oxyCODONE (ROXICODONE) 30 MG immediate release tablet, Take 1 tablet (30 mg total) by mouth every 4 (four) hours as needed (to relieve cancer pain immediately)Max Daily Amount: 180 mg, Disp: 150 tablet, Rfl: 0    oxyCODONE ER (Xtampza ER) 18 MG C12A, Take 1 each by mouth 2 (two) times a day To prevent and reduce cancer painMax Daily Amount: 2 each, Disp: 60 capsule, Rfl: 0    pantoprazole (PROTONIX) 40 mg tablet, Take 1 tablet (40 mg total) by mouth daily, Disp: 90 tablet, Rfl: 3    polyethylene glycol (GLYCOLAX) powder, Take 17 g by mouth daily, Disp: 527 g, Rfl: 1    predniSONE 5 mg tablet, TAKE 1 TABLET BY MOUTH EVERY DAY, Disp: 30 tablet, Rfl: 0    pregabalin (LYRICA) 25 mg capsule, Take 1 capsule PO in morning and 2 capsules PO at bedtime, Disp: 90 capsule, Rfl: 2    prochlorperazine (COMPAZINE) 10 mg tablet, Take 1 tablet (10 mg total) by mouth every 6 (six) hours as needed for nausea or vomiting, Disp: 90 tablet, Rfl: 0    QUEtiapine (SEROquel) 25 mg tablet, TAKE 0 5 TABLETS (12 5 MG TOTAL) BY MOUTH DAILY AT BEDTIME, Disp: 15 tablet, Rfl: 0    REGULOID 28 3 % POWD, USE AS DIRECTED, Disp: 369 g, Rfl: 4    Saline 0 65 % (Soln) SOLN, 5 drops into each nostril as needed (Dry nose), Disp: 50 mL, Rfl: 5    senna-docusate sodium (SENOKOT-S) 8 6-50 mg per tablet, Take 1 tablet by mouth 2 (two) times a day, Disp: 60 tablet, Rfl: 11    sitaGLIPtin (JANUVIA) 50 mg tablet, Take 1 tablet (50 mg total) by mouth daily, Disp: 90 tablet, Rfl: 0    tamsulosin (FLOMAX) 0 4 mg, Take 1 capsule (0 4 mg total) by mouth daily with dinner, Disp: 90 capsule, Rfl: 0    loratadine (CLARITIN) 10 mg tablet, Take 1 tablet (10 mg total) by mouth daily, Disp: 90 tablet, Rfl: 3    olopatadine (PATANOL) 0 1 % ophthalmic solution, Administer 1 drop to both eyes 2 (two) times a day, Disp: 5 mL, Rfl: 3    tiZANidine (ZANAFLEX) 4 mg tablet, Take 1 tablet (4 mg total) by mouth 3 (three) times a day for 14 days, Disp: 42 tablet, Rfl: 0      Physical Exam:  /74 (BP Location: Left arm, Patient Position: Sitting, Cuff Size: Large)   Pulse 76   Temp 97 6 °F (36 4 °C) (Tympanic)   Resp 18   Ht 5' 8" (1 727 m)   Wt 87 3 kg (192 lb 6 4 oz)   SpO2 98%   BMI 29 25 kg/m²     Physical Exam  Vitals signs reviewed  Constitutional:       General: He is not in acute distress  Appearance: He is well-developed  He is not diaphoretic  HENT:      Head: Normocephalic and atraumatic  Eyes:      General: Lids are normal  No scleral icterus  Conjunctiva/sclera:      Right eye: Right conjunctiva is injected  Left eye: Left conjunctiva is injected  Pupils: Pupils are equal, round, and reactive to light  Neck:      Musculoskeletal: Normal range of motion and neck supple  Thyroid: No thyromegaly  Cardiovascular:      Rate and Rhythm: Normal rate and regular rhythm  Heart sounds: Normal heart sounds  No murmur  Pulmonary:      Effort: Pulmonary effort is normal  No respiratory distress  Breath sounds: Decreased air movement present  Wheezing (expiratory throughout) present  Abdominal:      General: There is no distension  Palpations: Abdomen is soft  There is no hepatomegaly or splenomegaly  Tenderness: There is no abdominal tenderness  Musculoskeletal: Normal range of motion  Right lower leg: Edema (+1) present  Left lower leg: Edema (+1) present  Lymphadenopathy:      Cervical: No cervical adenopathy  Upper Body:      Right upper body: No axillary adenopathy  Left upper body: No axillary adenopathy  Skin:     General: Skin is warm and dry  Findings: No erythema or rash  Neurological:      General: No focal deficit present  Mental Status: He is alert and oriented to person, place, and time  Psychiatric:         Mood and Affect: Mood normal  Affect is flat  Behavior: Behavior normal  Behavior is cooperative  Thought Content:  Thought content normal          Judgment: Judgment normal            Labs:  Lab Results   Component Value Date    WBC 8 40 03/08/2021    HGB 11 6 (L) 03/08/2021    HCT 36 5 (L) 03/08/2021    MCV 83 03/08/2021     03/08/2021     Lab Results   Component Value Date    K 4 8 03/08/2021     03/08/2021    CO2 28 03/08/2021    BUN 19 03/08/2021    CREATININE 2 19 (H) 03/08/2021    GLUF 102 (H) 08/24/2020    CALCIUM 8 8 03/08/2021    CORRECTEDCA 8 9 01/25/2021    AST 20 03/08/2021    ALT 17 03/08/2021    ALKPHOS 95 03/08/2021    EGFR 37 (L) 03/08/2021     Patient voiced understanding and agreement in the above discussion  Aware to contact our office with questions/symptoms in the interim  This note has been generated by voice recognition software system  Therefore, there may be spelling, grammar, and or syntax errors  Please contact if questions arise

## 2021-04-21 NOTE — PROGRESS NOTES
Pt tolerated treatment today with no adverse reactions  Left unit via w/c escorted to the lobby by staff

## 2021-04-22 NOTE — TELEPHONE ENCOUNTER
Patient calling to see if Cory Haddad will call in an antibiotic for him  He did receive eye drops and Claritin prescribed yesterday at office visit  Patient states he is congested and coughing up yellow/greenish sputum  He believes he needs an antibiotic to clear his sinuses  Patient denies fever    Will send to [place] to further discuss with Candida  Patient requesting a call back regarding Rx or once it is sent to the pharmacy    [phone]

## 2021-04-22 NOTE — TELEPHONE ENCOUNTER
I called the patient back  Lacey Goldman will send him in a script for an antibiotic to take for 7 days    He stated that the script should be sent to [facility]

## 2021-05-10 NOTE — PROGRESS NOTES
Outpatient Follow-Up - Palliative and Supportive Care   Patricia Constantino Sr  64 y o  male [id number]    Assessment & Plan  1  Environmental and seasonal allergies    2  Adenocarcinoma of lung, right (HCC)    3  Cancer-related pain    4  Palliative care patient    5  Chronic obstructive pulmonary disease, unspecified COPD type ([facility] [address] )      - Counseling on health screening and disease prevention, COVID-19 specific (CPT V65 49)    Medications adjusted this encounter:  Requested Prescriptions     Signed Prescriptions Disp Refills    oxyCODONE (OxyCONTIN) 20 mg 12 hr tablet 60 tablet 0     Sig: Take 1 tablet (20 mg total) by mouth every 12 (twelve) hoursMax Daily Amount: 40 mg    oxyCODONE (ROXICODONE) 30 MG immediate release tablet 150 tablet 0     Sig: Take 1 tablet (30 mg total) by mouth every 4 (four) hours as needed (to relieve cancer pain immediately)Max Daily Amount: 180 mg    montelukast (SINGULAIR) 10 mg tablet 90 tablet 0     Sig: Take 1 tablet (10 mg total) by mouth daily at bedtime    oxyCODONE (OxyCONTIN) 20 mg 12 hr tablet 60 tablet 0     Sig: Take 1 tablet (20 mg total) by mouth every 12 (twelve) hoursMax Daily Amount: 40 mg    oxyCODONE (ROXICODONE) 30 MG immediate release tablet 150 tablet 0     Sig: Take 1 tablet (30 mg total) by mouth every 4 (four) hours as needed (cancer pain)Max Daily Amount: 180 mg     No orders of the defined types were placed in this encounter  Medications Discontinued During This Encounter   Medication Reason    oxyCODONE ER (Xtampza ER) 18 MG C12A     tiZANidine (ZANAFLEX) 4 mg tablet     oxyCODONE (OxyCONTIN) 20 mg 12 hr tablet Reorder    oxyCODONE (ROXICODONE) 30 MG immediate release tablet Reorder   - Continue oxyCONTIN indefinitely; pt has failed Xtampza  - Maintain oxyIR at current dosing   - Trial of montelukast to reduce allergy related breathing issues  Patricia Constantino Sr  was seen today for symptoms and planning cares related to above illnesses    I have reviewed the patient's controlled substance dispensing history in the Prescription Drug Monitoring Program in compliance with the [facility] regulations before prescribing any controlled substances  They are invited to continue to follow with us  If there are questions or concerns, please contact us through our clinic/answering service 24 hours a day, seven days a week  Pavan Galvin MD  [facility] Palliative and Supportive [facility]  [phone]      Visit Information    Accompanied By: No one    Source of History: Self    History Limitations: None    Contacts: June Sci -     History of Present Illness      Facundo Chicas Sr  is a 64 y o  male who presents in follow up of symptoms related to adenocarcinoma of R lung  He also has severe COPD and PTSD  He continues with Dr Tracey Webb and Ms Elizabeth Rodriguez, on monotherapy with [address]  He is dependent on supplemental O2 at baseline  Pertinent issues include: symptom management, pain, neoplasm related, breathlessness, depression or anxiety      Since last visit, he did rotate back to OxyCONTIN with good effect  There are still some bad days, but he notes that pain control is decent and acceptable, and his bad days have more to do with ventilation and allergic factors on his breathing  He was offered loratidine by his Med/Onc team, and he is agreeabl to trial montelukast with us today  Otherwise, he has no plans to stop treatment, and his disease process -- according to recent visit in Med/Onc clinic -- is stable vs responding to treatment  We agreed to revisit his case in two months  Overall, he continues with the same therapy without changes, and has had no side effects in particular from this  His COPD is tougher in the cold, and with the cold snap today in particular, his back and knees feel very achy today  Previously, he would have wished to complete XRT as rec'd, he did not feel that the XRT suite was appropriate for his health    He developed a cough and rhinorrhea during his second treatment, and he would not like to catch cold/pna while doing therapy  Previously, pt needed less quetiapine for sleep and anxiety when steroids from Med/Onc were tapered  Past medical, surgical, social, and family histories are reviewed and pertinent updates are made  Review of Systems   Constitution: Positive for decreased appetite and weight loss  Negative for weight gain  HENT: Negative for hoarse voice and nosebleeds  Eyes: Negative for vision loss in left eye and vision loss in right eye  Cardiovascular: Positive for dyspnea on exertion  Negative for chest pain and irregular heartbeat  Respiratory: Positive for shortness of breath and sleep disturbances due to breathing  Negative for cough  Endocrine: Negative for polydipsia, polyphagia and polyuria  Skin: Negative for flushing and itching  Musculoskeletal: Negative for falls  Gastrointestinal: Positive for nausea and vomiting  Negative for anorexia and jaundice  Genitourinary: Negative for frequency  Neurological: Negative for dizziness  Psychiatric/Behavioral: Negative for depression and memory loss  The patient is not nervous/anxious  Vital Signs    /80 (BP Location: Left arm, Patient Position: Sitting, Cuff Size: Standard)   Pulse 93   Temp 98 9 °F (37 2 °C) (Temporal)   Resp 18   Ht 5' 7 99" (1 727 m)   Wt 89 4 kg (197 lb 1 5 oz)   SpO2 96%   BMI 29 98 kg/m²     Physical Exam and Objective Data  Physical Exam  Constitutional:       General: He is not in acute distress  Appearance: He is ill-appearing  He is not toxic-appearing or diaphoretic  Comments: Frail   HENT:      Head: Normocephalic and atraumatic  Right Ear: External ear normal       Left Ear: External ear normal    Eyes:      General:         Right eye: No discharge  Left eye: No discharge        Conjunctiva/sclera: Conjunctivae normal       Pupils: Pupils are equal, round, and reactive to light  Neck:      Trachea: No tracheal deviation  Cardiovascular:      Rate and Rhythm: Regular rhythm  Tachycardia present  Pulmonary:      Effort: No respiratory distress  Breath sounds: No stridor  Wheezing, rhonchi and rales present  Abdominal:      Palpations: Abdomen is soft  Comments: scaphoid   Skin:     General: Skin is warm and dry  Findings: No erythema or rash  Neurological:      General: No focal deficit present  Mental Status: He is alert and oriented to person, place, and time  Mental status is at baseline  Cranial Nerves: No cranial nerve deficit  Psychiatric:         Mood and Affect: Mood normal          Behavior: Behavior normal          Thought Content: Thought content normal       Comments: jdugment and insight [facility]           Radiology and Laboratory:  I personally reviewed and interpreted the following results: reviewed historical reports    35+ minutes was spent face to face with Alexandro Sommers  with greater than 50% of the time spent in counseling or coordination of care including discussions of etiology of diagnosis, risks and benefits of treatment and risk factors and risk reduction of disease   Additional time was also spent in discussing coronavirus vaccine indications, availability, and logistical challenges  All of the patient's or agent's questions were answered during this discussion

## 2021-05-17 NOTE — TELEPHONE ENCOUNTER
Patient has an appointment on 05/20/2021 at 2:00 pm  Pt is requesting transportation ([employer])      Please advise

## 2021-05-18 NOTE — PROGRESS NOTES
MAGDA OTERO received notification from  staff that patient was in need of transportation to upcoming appointment  MAGDA OTERO reached out to patient to discuss transportation barrier  Patient states he is a [facility] and therefore gets transportation to his appointments, including transportation to our office  Patient states with his cancer and COPD diagnosis, he is unable to walk to the office and has no other way of getting to us  --    MAGDA OTERO contacted [employer] to schedule a ride for patient's upcoming 5/20 appointment  Ride successfully scheduled for 1:00 PM on 5/20    --  MAGDA OTERO called patient to inform him of scheduled ride  Patient expressed gratitude for the assistance

## 2021-05-20 PROBLEM — J30.2 SEASONAL ALLERGIES: Status: ACTIVE | Noted: 2021-01-01

## 2021-05-20 NOTE — PROGRESS NOTES
Assessment/Plan:    Type 2 diabetes mellitus without complication, without long-term current use of insulin ([place] [address] )    Lab Results   Component Value Date    HGBA1C 6 2 05/20/2021   Well controlled  Consider dc Januvia on follow up    Seasonal allergies  Symptoms are currently controlled at this time   Avoid exposure to polluted air, tobacco smoke, and other known allergy triggers  Discussed the PRN use of OTC nasal sinus rinse to help with symptoms   Discussed importance of machine drying as opposed to hanging them outside on a clothesline to decrease allergens   Symptoms may improve if windows are kept close in the summer   Be sure air conditioner filter is clean   Consider home air purifier to remove allergens  - continue claritin   - Flonase added to regimen       Diagnoses and all orders for this visit:    Type 2 diabetes mellitus without complication, without long-term current use of insulin ([facility])  -     POCT hemoglobin A1c  -     sitaGLIPtin (JANUVIA) 50 mg tablet; Take 1 tablet (50 mg total) by mouth daily    Seasonal allergies    Prostatitis, unspecified prostatitis type  -     tamsulosin (FLOMAX) 0 4 mg; Take 1 capsule (0 4 mg total) by mouth daily with dinner    Stranguria  -     tamsulosin (FLOMAX) 0 4 mg; Take 1 capsule (0 4 mg total) by mouth daily with dinner    Benign prostatic hyperplasia with lower urinary tract symptoms, symptom details unspecified  -     tamsulosin (FLOMAX) 0 4 mg; Take 1 capsule (0 4 mg total) by mouth daily with dinner    Constipation, unspecified constipation type  -     senna-docusate sodium (SENOKOT-S) 8 6-50 mg per tablet; Take 1 tablet by mouth 2 (two) times a day    Chronic obstructive pulmonary disease, unspecified COPD type ([place] [address] )  -     albuterol (2 5 mg/3 mL) 0 083 % nebulizer solution;  Take 1 vial (2 5 mg total) by nebulization every 6 (six) hours as needed for wheezing or shortness of breath    Chronic obstructive pulmonary disease with acute exacerbation ([place] [address] )  - ipratropium-albuterol (DUO-NEB) 0 5-2 5 mg/3 mL nebulizer solution; Take 1 vial (3 mL total) by nebulization 4 (four) times a day    Environmental and seasonal allergies  -     fluticasone (FLONASE) 50 mcg/act nasal spray; 1-2 sprays each nostril daily for allergies    Difficulty sleeping  -     melatonin 3 mg; Take 1 tablet (3 mg total) by mouth daily at bedtime          Subjective:      Patient ID: Betzy Arce  is a 64 y o  male  HPI  This is a very pleasant 64 y o  male who presents to the clinic for management of their chronic medical conditions  Patient's medical conditions are stable unless noted otherwise above  Patient has not had any recent hospitalizations, or medical emergencies since last visit  Patient has no further complaints other than what is mentioned in the ROS  The following portions of the patient's history were reviewed and updated as appropriate: allergies, current medications, past family history, past medical history, past social history, past surgical history and problem list     Review of Systems   Constitutional: Negative for activity change, appetite change, chills, diaphoresis, fatigue and fever  HENT: Positive for postnasal drip, rhinorrhea and sneezing  Negative for congestion, ear pain, hearing loss, sore throat and tinnitus  Eyes: Negative for pain  Respiratory: Negative for apnea, cough, choking, chest tightness, shortness of breath and wheezing  Cardiovascular: Negative for chest pain, palpitations and leg swelling  Gastrointestinal: Negative for abdominal distention, abdominal pain, constipation, diarrhea, nausea and vomiting  Genitourinary: Negative for decreased urine volume and dysuria  Musculoskeletal: Negative for back pain  Skin: Negative for rash  Neurological: Negative for dizziness, tremors and syncope  Psychiatric/Behavioral: Negative for agitation and suicidal ideas           Objective:      /74 (BP Location: Left arm, Patient Position: Sitting, Cuff Size: Large)   Pulse 88   Temp 98 2 °F (36 8 °C) (Temporal)   Resp 18   Ht 5' 8" (1 727 m)   Wt 88 kg (194 lb)   SpO2 94%   BMI 29 50 kg/m²          Physical Exam  Constitutional:       General: He is not in acute distress  Appearance: He is well-developed  He is not diaphoretic  HENT:      Head: Normocephalic and atraumatic  Right Ear: External ear normal       Left Ear: External ear normal       Nose:      Comments: Boggy turbinates  Questionable polyp R nare     Mouth/Throat:      Pharynx: No oropharyngeal exudate  Eyes:      General: No scleral icterus  Conjunctiva/sclera: Conjunctivae normal       Pupils: Pupils are equal, round, and reactive to light  Neck:      Musculoskeletal: Normal range of motion and neck supple  Thyroid: No thyromegaly  Trachea: No tracheal deviation  Cardiovascular:      Rate and Rhythm: Normal rate and regular rhythm  Heart sounds: Normal heart sounds  No murmur  Pulmonary:      Effort: Pulmonary effort is normal  No respiratory distress  Abdominal:      General: Bowel sounds are normal  There is no distension  Palpations: Abdomen is soft  Tenderness: There is no rebound  Musculoskeletal: Normal range of motion  Skin:     General: Skin is warm  Neurological:      Mental Status: He is alert and oriented to person, place, and time

## 2021-05-20 NOTE — ASSESSMENT & PLAN NOTE
Lab Results   Component Value Date    HGBA1C 6 2 05/20/2021   Well controlled  Consider dc Januvia on follow up

## 2021-05-20 NOTE — TELEPHONE ENCOUNTER
Appointment Confirmation     Appointment with  Dr Obdulio Rich    Appointment date & time 6/4 11:20 am    Location [facility]    Patient verbilized Understanding Yes

## 2021-05-20 NOTE — ASSESSMENT & PLAN NOTE
Symptoms are currently controlled at this time   Avoid exposure to polluted air, tobacco smoke, and other known allergy triggers  Discussed the PRN use of OTC nasal sinus rinse to help with symptoms   Discussed importance of machine drying as opposed to hanging them outside on a clothesline to decrease allergens   Symptoms may improve if windows are kept close in the summer   Be sure air conditioner filter is clean   Consider home air purifier to remove allergens      - continue claritin   - Flonase added to regimen

## 2021-05-25 NOTE — TELEPHONE ENCOUNTER
Patient is calling in inquiring whether another application for handicap parking?  He can be reached back at [phone]

## 2021-06-04 NOTE — PROGRESS NOTES
Hematology/Oncology Outpatient Follow-up  Eric Keys Sr  64 y o  male 1960 [id number]    Date:  6/4/2021        Assessment and Plan:  1  Adenocarcinoma of lung, right [facility])    The patient had good PET-CT scan report in April on the current treatment with [facility] on every 6 week basis  We will continue to treat the patient with the current immunotherapy without any changes as long as he is tolerating it  He continues to be on  Low-dose prednisone 5 mg once a day  We will check her his thyroid function prior H treatment  - CBC and differential; Future  - Comprehensive metabolic panel; Future  - Magnesium; Future  - TSH, 3rd generation with Free T4 reflex; Future  - Sedimentation rate, automated; Future  - C-reactive protein; Future  - predniSONE 5 mg tablet; Take 1 tablet (5 mg total) by mouth daily  Dispense: 30 tablet; Refill: 5    2  Stage 3 chronic kidney disease, unspecified whether stage 3a or 3b CKD ([place] [id number] )    His kidney function seems to be stable with creatinine around 2 5   - CBC and differential; Future  - Comprehensive metabolic panel; Future    3  Chronic obstructive pulmonary disease, unspecified COPD type ([place] [address] )    He asked for refill for his inhalers  - albuterol (PROVENTIL HFA,VENTOLIN HFA) 90 mcg/act inhaler; Inhale 2 puffs every 4 (four) hours as needed for wheezing  Dispense: 18 g; Refill: 5  - fluticasone-salmeterol (ADVAIR, WIXELA) 250-50 mcg/dose inhaler; Inhale 1 puff 2 (two) times a day Rinse mouth after use  Dispense: 60 each; Refill: 3        HPI:   the patient came today for a follow-up visit  He complained today about some mild worsening of his breathing which is a typical pattern shortly before he is due for his immunotherapy  He also continues to complain about his pain which is being managed by the palliative care team     Most recent available blood work from 04/21/2021 showed hemoglobin of 12 3 with normal white cells and platelets    Creatinine was 2 5 with normal calcium liver enzymes  Blood work from today will be drawn once he gets to the [facility] center  Oncology History Overview Note    male history of heavy smoker who used to smoke 2 packs of cigarettes daily for many years, COPD, he presented with dyspnea, CT scan of the chest on October 2018 showed right middle lobe spiculated 1 3 cm mass with multiple solid and ground-glass nodules along the major and minor fissures sub cm pulmonary nodules bilaterally, left adrenal 1 2 cm nodule      PET scan showed 1 3 cm right middle lung nodule with SUV of 6 8, numerous nodular densities along the right major and minor fissures, right hilar activity with SUV of 3 4, subcarinal lymph node measuring 7 mm with SUV of 2 7, periportal enlarged lymph nodes concerning for metastatic disease measuring 2 4 cm with SUV of 5, prominent left retrocrural lymph node measuring 1 cm x 9 mm with SUV of 1 1     Core biopsy of the right spiculated nodule showed invasive adenocarcinoma consistent with lung primary positive for CK7, TTF 1, napsin a     Adenocarcinoma of lung, right ([facility] [address] )   11/13/2018 Initial Diagnosis    Adenocarcinoma of lung, right ([facility] [address] )  Stage IV     11/13/2018 Biopsy    A  Lung, right, core needle biopsies:             - Invasive adenocarcinoma, consistent with lung primary  12/13/2018 - 3/28/2019 Chemotherapy     Alimta, Carboplatin (AUC 5) + Keytruda (6 cycles total)     4/17/2019 -  Chemotherapy    Started maintenance Alimta and Keytruda  (Alimta d/c'd 9/16/19 due to worsening renal dysfunction)  Keytruda changed to the every 6 week dosing (400 mg) 12/2020         Interval history:    ROS: Review of Systems   Constitutional: Negative for chills and fever  HENT: Negative for ear pain and sore throat  Eyes: Negative for pain and visual disturbance  Respiratory: Positive for shortness of breath  Negative for cough  Cardiovascular: Negative for chest pain and palpitations     Gastrointestinal: Negative for abdominal pain and vomiting  Genitourinary: Negative for dysuria and hematuria  Musculoskeletal: Positive for arthralgias  Negative for back pain  Skin: Negative for color change and rash  Neurological: Positive for numbness  Negative for seizures and syncope  All other systems reviewed and are negative        Past Medical History:   Diagnosis Date    Alkalosis 12/9/2019    Bipolar 1 disorder ([facility] [address] )     Cancer ([facility] [address] )     adeno lung  dx 11/2018-lung bx today 4/9/2019-ongoing chemo    Chronic obstructive pulmonary disease with acute exacerbation ([address] ) 6/7/2018    Chronic pain disorder     back and right shoulder    CKD (chronic kidney disease)     COPD (chronic obstructive pulmonary disease) ([facility] [address] )     Depression     Diabetes mellitus ([facility] [address] )     Elevated d-dimer 11/30/2019    Elevated troponin 11/29/2019    Elevated troponin level not due to acute coronary syndrome 11/30/2019    GERD (gastroesophageal reflux disease)     Herniated lumbar intervertebral disc     Hyperkalemia     Hypertension     Insomnia     Latent syphilis     Treated    Low back pain     Lumbosacral disc disease     Lung cancer ([facility] [address] ) 04/2019    Pneumothorax after biopsy     R lung    PTSD (post-traumatic stress disorder)     Pulmonary emphysema ([facility] [address] )     Tobacco abuse 11/13/2018       Past Surgical History:   Procedure Laterality Date    COLONOSCOPY  2011    CT GUIDED CHEST TUBE  11/21/2018    FL GUIDED CENTRAL VENOUS ACCESS DEVICE INSERTION  2/8/2019    HEMORROIDECTOMY      IR BIOPSY LUNG  11/13/2018    IR CHEST TUBE PLACEMENT  11/14/2018    KNEE SURGERY      IL INSJ TUNNELED CTR VAD W/SUBQ PORT AGE 5 YR/> N/A 2/8/2019    Procedure: PLACEMENT OF PORT-A-CATH;  Surgeon: Samantha Day MD;  Location:  MAIN OR;  Service: Vascular       Social History     Socioeconomic History    Marital status: Legally      Spouse name: None    Number of children: None    Years of education: None    Highest education level: None   Occupational History    Occupation: part time employment   Social Needs    Financial resource strain: None    Food insecurity     Worry: None     Inability: None    Transportation needs     Medical: None     Non-medical: None   Tobacco Use    Smoking status: Former Smoker     Packs/day: 0 50     Years: 40 00     Pack years: 20 00     Types: Cigarettes     Start date:      Quit date: 2019     Years since quittin 8    Smokeless tobacco: Never Used    Tobacco comment: will smoke a cigarette on occasion with stress   Substance and Sexual Activity    Alcohol use: Not Currently    Drug use: Not Currently     Types: Marijuana     Comment: "I will smoke a joint if I am stressed out but not every day"    Sexual activity: Yes   Lifestyle    Physical activity     Days per week: None     Minutes per session: None    Stress: None   Relationships    Social connections     Talks on phone: None     Gets together: None     Attends Zoroastrianism service: None     Active member of club or organization: None     Attends meetings of clubs or organizations: None     Relationship status: None    Intimate partner violence     Fear of current or ex partner: None     Emotionally abused: None     Physically abused: None     Forced sexual activity: None   Other Topics Concern    None   Social History Narrative    Lives with girlfriend       Family History   Problem Relation Age of Onset    Heart disease Mother     Cancer Mother     Hypertension Father     Diabetes Family     Asthma Family     Heart disease Family     Cancer Family     Cancer Maternal Grandfather     Cancer Paternal Grandfather     Cancer Maternal Aunt        Allergies   Allergen Reactions    Lisinopril Anaphylaxis     Took medication a while ago and had a "swelling reaction"  Does not carry epi-pen         Current Outpatient Medications:     albuterol (2 5 mg/3 mL) 0 083 % nebulizer solution, Take 1 vial (2 5 mg total) by nebulization every 6 (six) hours as needed for wheezing or shortness of breath, Disp: 360 mL, Rfl: 5    albuterol (PROVENTIL HFA,VENTOLIN HFA) 90 mcg/act inhaler, Inhale 2 puffs every 4 (four) hours as needed for wheezing, Disp: 18 g, Rfl: 5    apixaban (ELIQUIS) 2 5 mg, Take 1 tablet (2 5 mg total) by mouth 2 (two) times a day, Disp: 60 tablet, Rfl: 11    Blood Glucose Monitoring Suppl KIT, by Does not apply route daily, Disp: 1 each, Rfl: 0    budesonide (PULMICORT) 0 5 mg/2 mL nebulizer solution, Take 1 vial (0 5 mg total) by nebulization every 12 (twelve) hours Rinse mouth after use , Disp: 1 vial, Rfl: 0    carbamide peroxide (DEBROX) 6 5 % otic solution, Administer 5 drops into both ears 2 (two) times a day, Disp: 15 mL, Rfl: 0    clotrimazole-betamethasone (LOTRISONE) 1-0 05 % cream, Apply topically 2 (two) times a day, Disp: 45 g, Rfl: 2    diltiazem (CARDIZEM CD) 120 mg 24 hr capsule, Take 1 capsule (120 mg total) by mouth daily, Disp: 90 capsule, Rfl: 3    divalproex sodium (DEPAKOTE) 250 mg EC tablet, Take 1 tablet (250 mg total) by mouth 2 (two) times a day, Disp: 30 tablet, Rfl: 0    ergocalciferol (ERGOCALCIFEROL) 1 25 MG (94479 UT) capsule, Take 1 capsule (50,000 Units total) by mouth once a week, Disp: 12 capsule, Rfl: 0    FLUoxetine (PROzac) 10 MG tablet, Take 1 tablet (10 mg total) by mouth daily, Disp: 30 tablet, Rfl: 5    fluticasone (FLONASE) 50 mcg/act nasal spray, 1-2 sprays each nostril daily for allergies, Disp: 16 g, Rfl: 3    fluticasone-salmeterol (ADVAIR, WIXELA) 250-50 mcg/dose inhaler, Inhale 1 puff 2 (two) times a day Rinse mouth after use , Disp: 60 each, Rfl: 3    folic acid (FOLVITE) 1 mg tablet, Take 1 tablet (1,000 mcg total) by mouth daily, Disp: 90 tablet, Rfl: 0    formoterol (PERFOROMIST) 20 MCG/2ML nebulizer solution, Take 1 vial (20 mcg total) by nebulization 2 (two) times a day, Disp: 60 vial, Rfl: 1    FREESTYLE LITE test strip, TEST BLOOD SUGAR DAILY, Disp: 100 each, Rfl: 1    guaiFENesin (MUCINEX) 600 mg 12 hr tablet, Take 600 mg by mouth every 12 (twelve) hours, Disp: , Rfl:     ipratropium-albuterol (DUO-NEB) 0 5-2 5 mg/3 mL nebulizer solution, Take 1 vial (3 mL total) by nebulization 4 (four) times a day, Disp: 15 mL, Rfl: 3    Lancets (FREESTYLE) lancets, TEST BLOOD SUGAR DAILY, Disp: 100 each, Rfl: 4    lidocaine (LMX) 4 % cream, Apply topically as needed for mild pain Apply 1/2-1 inch to port 30-60 mins prior to needle insertion, cover with serrain wrap, Disp: 30 g, Rfl: 5    loratadine (CLARITIN) 10 mg tablet, Take 1 tablet (10 mg total) by mouth daily, Disp: 90 tablet, Rfl: 3    melatonin 3 mg, Take 1 tablet (3 mg total) by mouth daily at bedtime, Disp: 90 tablet, Rfl: 1    methocarbamol (ROBAXIN) 500 mg tablet, Take 1 tablet (500 mg total) by mouth 3 (three) times a day, Disp: 30 tablet, Rfl: 0    montelukast (SINGULAIR) 10 mg tablet, Take 1 tablet (10 mg total) by mouth daily at bedtime, Disp: 90 tablet, Rfl: 0    olopatadine (PATANOL) 0 1 % ophthalmic solution, Administer 1 drop to both eyes 2 (two) times a day, Disp: 5 mL, Rfl: 3    oxyCODONE (OxyCONTIN) 20 mg 12 hr tablet, Take 1 tablet (20 mg total) by mouth every 12 (twelve) hoursMax Daily Amount: 40 mg, Disp: 60 tablet, Rfl: 0    [START ON 6/7/2021] oxyCODONE (OxyCONTIN) 20 mg 12 hr tablet, Take 1 tablet (20 mg total) by mouth every 12 (twelve) hoursMax Daily Amount: 40 mg, Disp: 60 tablet, Rfl: 0    oxyCODONE (ROXICODONE) 30 MG immediate release tablet, Take 1 tablet (30 mg total) by mouth every 4 (four) hours as needed (to relieve cancer pain immediately)Max Daily Amount: 180 mg, Disp: 150 tablet, Rfl: 0    [START ON 6/7/2021] oxyCODONE (ROXICODONE) 30 MG immediate release tablet, Take 1 tablet (30 mg total) by mouth every 4 (four) hours as needed (cancer pain)Max Daily Amount: 180 mg, Disp: 150 tablet, Rfl: 0    pantoprazole (PROTONIX) 40 mg tablet, Take 1 tablet (40 mg total) by mouth daily, Disp: 90 tablet, Rfl: 3    polyethylene glycol (GLYCOLAX) powder, Take 17 g by mouth daily, Disp: 527 g, Rfl: 1    predniSONE 5 mg tablet, Take 1 tablet (5 mg total) by mouth daily, Disp: 30 tablet, Rfl: 5    pregabalin (LYRICA) 25 mg capsule, Take 1 capsule PO in morning and 2 capsules PO at bedtime, Disp: 90 capsule, Rfl: 2    prochlorperazine (COMPAZINE) 10 mg tablet, Take 1 tablet (10 mg total) by mouth every 6 (six) hours as needed for nausea or vomiting, Disp: 90 tablet, Rfl: 0    QUEtiapine (SEROquel) 25 mg tablet, TAKE 0 5 TABLETS (12 5 MG TOTAL) BY MOUTH DAILY AT BEDTIME, Disp: 15 tablet, Rfl: 0    REGULOID 28 3 % POWD, USE AS DIRECTED, Disp: 369 g, Rfl: 4    Saline 0 65 % (Soln) SOLN, 5 drops into each nostril as needed (Dry nose), Disp: 50 mL, Rfl: 5    senna-docusate sodium (SENOKOT-S) 8 6-50 mg per tablet, Take 1 tablet by mouth 2 (two) times a day, Disp: 60 tablet, Rfl: 11    sitaGLIPtin (JANUVIA) 50 mg tablet, Take 1 tablet (50 mg total) by mouth daily, Disp: 90 tablet, Rfl: 0    tamsulosin (FLOMAX) 0 4 mg, Take 1 capsule (0 4 mg total) by mouth daily with dinner, Disp: 90 capsule, Rfl: 0    lidocaine (LIDODERM) 5 %, Apply 1 patch topically daily Remove & Discard patch within 12 hours or as directed by MD (Patient not taking: Reported on 5/10/2021), Disp: 10 patch, Rfl: 0      Physical Exam:  /72 (BP Location: Left arm, Patient Position: Sitting, Cuff Size: Adult)   Pulse 94   Temp (!) 96 9 °F (36 1 °C) (Tympanic)   Resp 20   Ht 5' 8" (1 727 m)   Wt 88 6 kg (195 lb 6 4 oz)   SpO2 98%   BMI 29 71 kg/m²     Physical Exam  Constitutional:       Appearance: He is well-developed  He is ill-appearing  HENT:      Head: Normocephalic and atraumatic  Nose: Nose normal    Eyes:      General: No scleral icterus  Right eye: No discharge  Left eye: No discharge        Conjunctiva/sclera: Conjunctivae normal       Pupils: Pupils are equal, round, and reactive to light  Neck:      Musculoskeletal: Normal range of motion and neck supple  Thyroid: No thyromegaly  Trachea: No tracheal deviation  Cardiovascular:      Rate and Rhythm: Normal rate and regular rhythm  Heart sounds: Normal heart sounds  No murmur  No friction rub  Pulmonary:      Effort: Respiratory distress present  Breath sounds: Wheezing (Bilaterally) present  No rales  Chest:      Chest wall: No tenderness  Abdominal:      General: Bowel sounds are normal  There is no distension  Palpations: Abdomen is soft  There is no hepatomegaly or splenomegaly  Tenderness: There is no abdominal tenderness  There is no guarding or rebound  Musculoskeletal: Normal range of motion  General: No tenderness or deformity  Lymphadenopathy:      Cervical: No cervical adenopathy  Skin:     General: Skin is warm and dry  Coloration: Skin is not pale  Findings: No erythema or rash  Neurological:      Mental Status: He is alert and oriented to person, place, and time  Cranial Nerves: No cranial nerve deficit  Coordination: Coordination normal       Deep Tendon Reflexes: Reflexes are normal and symmetric  Psychiatric:         Behavior: Behavior normal          Thought Content: Thought content normal          Judgment: Judgment normal            Labs:  Lab Results   Component Value Date    WBC 9 70 04/21/2021    HGB 12 3 (L) 04/21/2021    HCT 37 1 (L) 04/21/2021    MCV 82 04/21/2021     04/21/2021     Lab Results   Component Value Date    K 5 0 04/21/2021     04/21/2021    CO2 27 04/21/2021    BUN 22 04/21/2021    CREATININE 2 50 (H) 04/21/2021    GLUF 102 (H) 08/24/2020    CALCIUM 9 1 04/21/2021    CORRECTEDCA 8 9 01/25/2021    AST 30 04/21/2021    ALT 22 04/21/2021    ALKPHOS 121 04/21/2021    EGFR 32 (L) 04/21/2021     No results found for: TSH    Patient voiced understanding and agreement in the above discussion   Aware to contact our office with questions/symptoms in the interim

## 2021-06-04 NOTE — PROGRESS NOTES
Pt  Seen & assessed by Dr Imani Xavier prior to infusion appt  Today  Port accessed & labs drawn & sent

## 2021-06-04 NOTE — PLAN OF CARE
Problem: Potential for Falls  Goal: Patient will remain free of falls  Description: INTERVENTIONS:  - Assess patient frequently for physical needs  -  Identify cognitive and physical deficits and behaviors that affect risk of falls    -  Firth fall precautions as indicated by assessment   - Educate patient/family on patient safety including physical limitations  - Instruct patient to call for assistance with activity based on assessment  - Modify environment to reduce risk of injury  - Consider OT/PT consult to assist with strengthening/mobility  Outcome: Progressing

## 2021-07-06 NOTE — PROGRESS NOTES
Palliative Outpatient Assessment of Need    MSW completed an assessment of need which was completed with patient in the office  Family dynamics: Very Supportive  Relationship status:   Duration of relationship:   Name of significant other:  Children and Ages: 8 children, 5 sons and 3 daughters  Pets:  Other important family information:   Living situation: Pt resides with his brother and 2 of his kids    Patient's primary caregiver: Self  Any limitations of caregiver: COPD  Environmental concerns or barriers: None   history: None  Employment history/source of income: Pt states he did a little of everything, however when asked which job he enjoyed the most he states a   Disability:    Spirituality/ Yazdanism:  Druze man  Patient's strengths, social supports, and resources: Pt has strong support from his family  He also has 3 brothers and 2 sisters  His one sister lives close  They are there if he needs them  Cultural information:   Mental Health current or previous:None  Substance use or history: History of heavy smoking  Sleep: Sleeping Well  Exercise: N/A  Diet/nutrition: Eats Well  Durable Medical Equipment needs: None at this time-Uses a cane and has a walker if needed  Transportation: Currently using [employer]  Financial concerns: None at this time  Advanced Directive: None- Educated on the importance of a living will and provided him with a copy of the 5 Wishes  Other medical or social work providers involved: Clement Wu current level of coping: Pt is understanding of his dx and is coping appropriately  Patient/family concerns and areas of need: None at this time  Patient's Interests: Patient enjoys spending time with his family     *All questions may not be answered due to constraints  Follow-up discussions may need to occur    [facility] SW met with patient/family to review Palliative and Supportive Care Psychosocial Contract   Opportunity was provided for questions and/or concerns  Document was signed by patient,  and Attending Physician  Document will be scanned into patient's chart

## 2021-07-06 NOTE — PROGRESS NOTES
Outpatient Follow-Up - Palliative and Supportive Care   Becky Tidwell   64 y o  male [id number]    Assessment & Plan  1  Adenocarcinoma of lung, right ([address] )    2  Cancer-related pain    3  Palliative care patient      - Counseling on health screening and disease prevention, COVID-19 specific (CPT V65 49)    Medications adjusted this encounter:  Requested Prescriptions     Signed Prescriptions Disp Refills    oxyCODONE (OxyCONTIN) 20 mg 12 hr tablet 60 tablet 0     Sig: Take 1 tablet (20 mg total) by mouth every 12 (twelve) hoursMax Daily Amount: 40 mg    oxyCODONE (ROXICODONE) 30 MG immediate release tablet 150 tablet 0     Sig: Take 1 tablet (30 mg total) by mouth every 4 (four) hours as needed (to relieve cancer pain immediately)Max Daily Amount: 180 mg    oxyCODONE (OxyCONTIN) 20 mg 12 hr tablet 60 tablet 0     Sig: Take 1 tablet (20 mg total) by mouth every 12 (twelve) hoursMax Daily Amount: 40 mg    oxyCODONE (OxyCONTIN) 20 mg 12 hr tablet 60 tablet 0     Sig: Take 1 tablet (20 mg total) by mouth every 12 (twelve) hoursMax Daily Amount: 40 mg    oxyCODONE (ROXICODONE) 30 MG immediate release tablet 150 tablet 0     Sig: Take 1 tablet (30 mg total) by mouth every 4 (four) hours as needed (cancer pain)Max Daily Amount: 180 mg    oxyCODONE (ROXICODONE) 30 MG immediate release tablet 150 tablet 0     Sig: Take 1 tablet (30 mg total) by mouth every 4 (four) hours as needed (breakthrough pain)Max Daily Amount: 180 mg     No orders of the defined types were placed in this encounter      Medications Discontinued During This Encounter   Medication Reason    oxyCODONE (OxyCONTIN) 20 mg 12 hr tablet Reorder    oxyCODONE (ROXICODONE) 30 MG immediate release tablet Reorder    oxyCODONE (OxyCONTIN) 20 mg 12 hr tablet Reorder    oxyCODONE (ROXICODONE) 30 MG immediate release tablet Reorder    oxyCODONE (OxyCONTIN) 20 mg 12 hr tablet Reorder    oxyCODONE (ROXICODONE) 30 MG immediate release tablet Reorder    oxyCODONE (OxyCONTIN) 20 mg 12 hr tablet Reorder    oxyCODONE (ROXICODONE) 30 MG immediate release tablet Reorder    oxyCODONE (OxyCONTIN) 20 mg 12 hr tablet Reorder    oxyCODONE (ROXICODONE) 30 MG immediate release tablet Reorder   - Continue oxyCONTIN indefinitely; pt has failed Xtampza  - Maintain oxyIR at current dosing   - RTC 3mos      Huey Pressley  was seen today for symptoms and planning cares related to above illnesses  I have reviewed the patient's controlled substance dispensing history in the Prescription Drug Monitoring Program in compliance with the [facility] regulations before prescribing any controlled substances  They are invited to continue to follow with us  If there are questions or concerns, please contact us through our clinic/answering service 24 hours a day, seven days a week  Arjun Cota MD  [facility]  [phone]      Visit Information    Accompanied By: No one    Source of History: Self    History Limitations: None    Contacts: June Sci -     History of Present Illness      Huey Pressley  is a 64 y o  male who presents in follow up of symptoms related to adenocarcinoma of R lung  He also has severe COPD and PTSD  He continues with Dr Garland Dent and Ms Rachel Moss, on monotherapy with [facility]  He is dependent on supplemental O2 at baseline  Pertinent issues include: symptom management, pain, neoplasm related, breathlessness, depression or anxiety      Since last visit, he has continued on OxyCONTIN with good effect  He continues to push forward as he can, to put up with tough symptoms as he must   Hot humid air has been very tough on him this week thus far  He plans to go home and rest today after visit  Otherwise, he has no plans to stop treatment, and his disease process -- according to recent visit in Med/Onc clinic -- is stable vs responding to treatment  We agreed to revisit his case in three months        Overall, he continues with the same therapy without changes, and has had no side effects in particular from this  His COPD is tougher in the cold, and with the cold snap today in particular, his back and knees feel very achy today  Previously, he would have wished to complete XRT as rec'd, he did not feel that the XRT suite was appropriate for his health  He developed a cough and rhinorrhea during his second treatment, and he would not like to catch cold/pna while doing therapy  Previously, pt needed less quetiapine for sleep and anxiety when steroids from Med/Onc were tapered  Past medical, surgical, social, and family histories are reviewed and pertinent updates are made  Review of Systems   Constitutional: Positive for decreased appetite and weight loss  Negative for weight gain  HENT: Negative for hoarse voice and nosebleeds  Eyes: Negative for vision loss in left eye and vision loss in right eye  Cardiovascular: Positive for dyspnea on exertion  Negative for chest pain  Respiratory: Positive for shortness of breath, sleep disturbances due to breathing and wheezing  Negative for cough  Endocrine: Negative for polydipsia, polyphagia and polyuria  Skin: Negative for flushing and itching  Musculoskeletal: Negative for falls  Gastrointestinal: Positive for nausea  Negative for anorexia, jaundice and vomiting  Genitourinary: Negative for frequency  Neurological: Negative for dizziness  Psychiatric/Behavioral: Negative for depression and memory loss  The patient is not nervous/anxious  Vital Signs    /82 (BP Location: Left arm, Patient Position: Sitting, Cuff Size: Standard)   Pulse 90   Temp 98 °F (36 7 °C) (Temporal)   Resp 20   Ht 5' 8" (1 727 m)   Wt 88 5 kg (195 lb 1 7 oz)   SpO2 94%   BMI 29 67 kg/m²     Physical Exam and Objective Data  Physical Exam  Constitutional:       General: He is not in acute distress  Appearance: He is ill-appearing   He is not toxic-appearing or diaphoretic  Comments: Frail   HENT:      Head: Normocephalic and atraumatic  Right Ear: External ear normal       Left Ear: External ear normal    Eyes:      General:         Right eye: No discharge  Left eye: No discharge  Conjunctiva/sclera: Conjunctivae normal       Pupils: Pupils are equal, round, and reactive to light  Neck:      Trachea: No tracheal deviation  Cardiovascular:      Rate and Rhythm: Regular rhythm  Tachycardia present  Pulmonary:      Effort: Pulmonary effort is normal  No respiratory distress  Breath sounds: No stridor  Abdominal:      General: There is no distension  Palpations: Abdomen is soft  Comments: obese   Skin:     General: Skin is warm and dry  Coloration: Skin is not pale  Findings: No erythema or rash  Neurological:      Mental Status: He is alert and oriented to person, place, and time  Mental status is at baseline  Cranial Nerves: No cranial nerve deficit  Comments: presbycusis   Psychiatric:         Mood and Affect: Mood normal          Behavior: Behavior normal          Thought Content: Thought content normal          Judgment: Judgment normal            Radiology and Laboratory:  I personally reviewed and interpreted the following results: reviewed historical reports    30+ minutes was spent face to face with Yazmin Sauer  with greater than 50% of the time spent in counseling or coordination of care including: chart review; symptom pursuit; supportive listening; anticipatory guidance     Additional time was also spent in discussing coronavirus vaccine indications, availability, and logistical challenges  All of the patient's or agent's questions were answered during this discussion

## 2021-07-12 NOTE — Clinical Note
geoffrey fierro accompanied Amanda Bernardo to the emergency department on 7/12/2021  Return date if applicable:     Trey Boo accompanied Amanda Bernardo to the ED on 07/12/2021    If you have any questions or concerns, please don't hesitate to call        Trisha Powell RN

## 2021-07-12 NOTE — ED PROVIDER NOTES
History  Chief Complaint   Patient presents with    Shortness of Breath     Pt reports sob and pain with breathing that started a couple of days ago  73-year-old male past medical history of adenocarcinoma, COPD, diabetes mellitus, CKD 3, presents to the ED for evaluation of chest pain and shortness of breath  Pain described as chest tightness, 9/ 10, but is improved with rest and worse with walking  Patient currently on 3 L nasal cannula at home  SpO2 96% on room air on arrival   Baseline SpO2 ranged from 94-96%  Patient currently under management and care of palliative care for cancer and chest pain  However, patient states that over the past few days he noted chest pain have worsened  Patient reports he has used at home nebulized treatments without improvement  Patient states that pain is better at rest and worse with movement  Patient denies any associated palpitations, nausea, vomiting, diaphoresis, and radiating back pain  Denies any cough and hemoptysis  Patient afebrile  Patient currently on oxycodone through palliative care  Patient reports he has been taking without improvement symptoms  Reports compliance with all home medication  Currently on Eliquis  History provided by:  Patient   used: No        Prior to Admission Medications   Prescriptions Last Dose Informant Patient Reported? Taking?    Blood Glucose Monitoring Suppl KIT  Self No No   Sig: by Does not apply route daily   FLUoxetine (PROzac) 10 MG tablet  Self No Yes   Sig: Take 1 tablet (10 mg total) by mouth daily   FREESTYLE LITE test strip  Self No No   Sig: TEST BLOOD SUGAR DAILY   Lancets (FREESTYLE) lancets  Self No No   Sig: TEST BLOOD SUGAR DAILY   QUEtiapine (SEROquel) 25 mg tablet  Self No Yes   Sig: TAKE 0 5 TABLETS (12 5 MG TOTAL) BY MOUTH DAILY AT BEDTIME   Saline 0 65 % (Soln) SOLN  Self No Yes   Si drops into each nostril as needed (Dry nose)   albuterol (2 5 mg/3 mL) 0 083 % nebulizer solution  Self No Yes   Sig: Take 1 vial (2 5 mg total) by nebulization every 6 (six) hours as needed for wheezing or shortness of breath   albuterol (PROVENTIL HFA,VENTOLIN HFA) 90 mcg/act inhaler   No Yes   Sig: Inhale 2 puffs every 4 (four) hours as needed for wheezing   apixaban (ELIQUIS) 2 5 mg  Self No Yes   Sig: Take 1 tablet (2 5 mg total) by mouth 2 (two) times a day   budesonide (PULMICORT) 0 5 mg/2 mL nebulizer solution  Self No Yes   Sig: Take 1 vial (0 5 mg total) by nebulization every 12 (twelve) hours Rinse mouth after use  carbamide peroxide (DEBROX) 6 5 % otic solution  Self No Yes   Sig: Administer 5 drops into both ears 2 (two) times a day   clotrimazole-betamethasone (LOTRISONE) 1-0 05 % cream  Self No Yes   Sig: Apply topically 2 (two) times a day   diltiazem (CARDIZEM CD) 120 mg 24 hr capsule  Self No Yes   Sig: Take 1 capsule (120 mg total) by mouth daily   divalproex sodium (DEPAKOTE) 250 mg EC tablet  Self No Yes   Sig: Take 1 tablet (250 mg total) by mouth 2 (two) times a day   ergocalciferol (ERGOCALCIFEROL) 1 25 MG (23241 UT) capsule  Self No Yes   Sig: Take 1 capsule (50,000 Units total) by mouth once a week   fluticasone (FLONASE) 50 mcg/act nasal spray  Self No Yes   Si-2 sprays each nostril daily for allergies   fluticasone-salmeterol (ADVAIR, WIXELA) 250-50 mcg/dose inhaler   No Yes   Sig: Inhale 1 puff 2 (two) times a day Rinse mouth after use  folic acid (FOLVITE) 1 mg tablet   No Yes   Sig: TAKE 1 TABLET BY MOUTH DAILY     formoterol (PERFOROMIST) 20 MCG/2ML nebulizer solution  Self No Yes   Sig: Take 1 vial (20 mcg total) by nebulization 2 (two) times a day   guaiFENesin (MUCINEX) 600 mg 12 hr tablet  Self Yes Yes   Sig: Take 600 mg by mouth every 12 (twelve) hours   ipratropium-albuterol (DUO-NEB) 0 5-2 5 mg/3 mL nebulizer solution  Self No Yes   Sig: Take 1 vial (3 mL total) by nebulization 4 (four) times a day   loratadine (CLARITIN) 10 mg tablet  Self No Yes   Sig: Take 1 tablet (10 mg total) by mouth daily   melatonin 3 mg  Self No Yes   Sig: Take 1 tablet (3 mg total) by mouth daily at bedtime   methocarbamol (ROBAXIN) 500 mg tablet  Self No Yes   Sig: Take 1 tablet (500 mg total) by mouth 3 (three) times a day   montelukast (SINGULAIR) 10 mg tablet  Self No Yes   Sig: Take 1 tablet (10 mg total) by mouth daily at bedtime   olopatadine (PATANOL) 0 1 % ophthalmic solution  Self No Yes   Sig: Administer 1 drop to both eyes 2 (two) times a day   oxyCODONE (OxyCONTIN) 20 mg 12 hr tablet   No Yes   Sig: Take 1 tablet (20 mg total) by mouth every 12 (twelve) hoursMax Daily Amount: 40 mg   oxyCODONE (ROXICODONE) 30 MG immediate release tablet   No Yes   Sig: Take 1 tablet (30 mg total) by mouth every 4 (four) hours as needed (to relieve cancer pain immediately)Max Daily Amount: 180 mg   pantoprazole (PROTONIX) 40 mg tablet  Self No Yes   Sig: Take 1 tablet (40 mg total) by mouth daily   polyethylene glycol (GLYCOLAX) powder  Self No Yes   Sig: Take 17 g by mouth daily   predniSONE 5 mg tablet   No Yes   Sig: Take 1 tablet (5 mg total) by mouth daily   pregabalin (LYRICA) 25 mg capsule  Self No Yes   Sig: Take 1 capsule PO in morning and 2 capsules PO at bedtime   prochlorperazine (COMPAZINE) 10 mg tablet  Self No Yes   Sig: Take 1 tablet (10 mg total) by mouth every 6 (six) hours as needed for nausea or vomiting   senna-docusate sodium (SENOKOT-S) 8 6-50 mg per tablet  Self No Yes   Sig: Take 1 tablet by mouth 2 (two) times a day   sitaGLIPtin (JANUVIA) 50 mg tablet  Self No Yes   Sig: Take 1 tablet (50 mg total) by mouth daily   tamsulosin (FLOMAX) 0 4 mg  Self No Yes   Sig: Take 1 capsule (0 4 mg total) by mouth daily with dinner      Facility-Administered Medications: None       Past Medical History:   Diagnosis Date    Alkalosis 12/9/2019    Bipolar 1 disorder [facility] )     Cancer ([facility] )     adeno lung  dx 11/2018-lung bx today 4/9/2019-ongoing chemo    Chronic obstructive pulmonary disease with acute exacerbation ([facility] ) 6/7/2018    Chronic pain disorder     back and right shoulder    CKD (chronic kidney disease)     COPD (chronic obstructive pulmonary disease) ([facility]     Depression     Diabetes mellitus ([facility]     Elevated d-dimer 11/30/2019    Elevated troponin 11/29/2019    Elevated troponin level not due to acute coronary syndrome 11/30/2019    GERD (gastroesophageal reflux disease)     Herniated lumbar intervertebral disc     Hyperkalemia     Hypertension     Insomnia     Latent syphilis     Treated    Low back pain     Lumbosacral disc disease     Lung cancer ([facility] ) 04/2019    Pneumothorax after biopsy     R lung    PTSD (post-traumatic stress disorder)     [facility] emph[facility]sema ([facility] )     Tobacco abuse 11/13/2018       Past Surgical History:   Procedure Laterality Date    COLONOSCOPY  2011    CT GUIDED CHEST TUBE  11/21/2018    FL GUIDED CENTRAL VENOUS ACCESS DEVICE INSERTION  2/8/2019    HEMORROIDECTOMY      IR BIOPSY LUNG  11/13/2018    IR CHEST TUBE PLACEMENT  11/14/2018    KNEE SURGERY      [facility] INSJ TUNNELED CTR VAD W/SUBQ PORT AGE 5 YR/> N/A 2/8/2019    Procedure: PLACEMENT OF PORT-A-CATH;  Surgeon: Lisa Carl MD;  Location: [address] [place] [address] OR;  Service: Vascular       Family History   Problem Relation Age of Onset    Heart disease Mother     Cancer Mother     Hypertension Father     Diabetes Family     Asthma Family     Heart disease Family     Cancer Family     Cancer Maternal Grandfather     Cancer Paternal Grandfather     Cancer Maternal Aunt      I have reviewed and agree with the history as documented      E-Cigarette/Vaping    E-Cigarette Use Never User      E-Cigarette/Vaping Substances    Nicotine No     THC No     CBD No     Flavoring No     Other No     Unknown No      Social History     Tobacco Use    Smoking status: Former Smoker     Packs/day: 0 50     Years: 40 00     Pack years: 20 00     Types: Cigarettes     Start date: 1971     Quit date: 2019     Years since quittin 9    Smokeless tobacco: Never Used    Tobacco comment: will smoke a cigarette on occasion with stress   Vaping Use    Vaping Use: Never used   Substance Use Topics    Alcohol use: Not Currently    Drug use: Not Currently     Types: Marijuana     Comment: "I will smoke a joint if I am stressed out but not every day"       Review of Systems   Constitutional: Negative for chills, diaphoresis and fever  HENT: Negative for congestion, rhinorrhea and sore throat  Eyes: Negative for photophobia and visual disturbance  Respiratory: Positive for chest tightness  Negative for cough and shortness of breath  Cardiovascular: Positive for chest pain  Negative for palpitations and leg swelling  Gastrointestinal: Negative for abdominal pain, blood in stool, constipation, diarrhea, nausea and vomiting  Genitourinary: Negative for difficulty urinating, dysuria and hematuria  Musculoskeletal: Negative for back pain, myalgias and neck pain  Skin: Negative for color change, pallor and rash  Neurological: Negative for dizziness, syncope, weakness, light-headedness, numbness and headaches  Psychiatric/Behavioral: Negative for behavioral problems  Physical Exam  Physical Exam  Vitals and nursing note reviewed  Constitutional:       General: He is not in acute distress  Appearance: He is well-developed  He is obese  He is not ill-appearing, toxic-appearing or diaphoretic  Comments: On at home 3 L nasal cannula  No acute respiratory distress  Minimal auditory wheezing with conversation  No evidence of central cyanosis  HENT:      Head: Normocephalic and atraumatic  Right Ear: External ear normal       Left Ear: External ear normal       Nose: Nose normal       Mouth/Throat:      Mouth: Mucous membranes are moist       Pharynx: Oropharynx is clear  No pharyngeal swelling or oropharyngeal exudate     Eyes:      General: No scleral icterus  Right eye: No discharge  Left eye: No discharge  Conjunctiva/sclera: Conjunctivae normal       Pupils: Pupils are equal, round, and reactive to light  Neck:      Vascular: No JVD  Trachea: No tracheal deviation  Cardiovascular:      Rate and Rhythm: Normal rate and regular rhythm  Pulses: Normal pulses  Heart sounds: Normal heart sounds  No murmur heard  Pulmonary:      Effort: Pulmonary effort is normal  No tachypnea or respiratory distress  Breath sounds: Examination of the right-upper field reveals wheezing  Examination of the left-upper field reveals wheezing  Examination of the right-middle field reveals wheezing  Examination of the left-middle field reveals wheezing  Examination of the right-lower field reveals wheezing  Examination of the left-lower field reveals wheezing  Wheezing present  Chest:      Chest wall: No deformity or crepitus  Abdominal:      General: Bowel sounds are normal  There is no distension  Palpations: Abdomen is soft  Tenderness: There is no guarding  Musculoskeletal:         General: No deformity  Normal range of motion  Cervical back: Normal range of motion and neck supple  Right lower leg: No edema  Left lower leg: No edema  Lymphadenopathy:      Cervical: No cervical adenopathy  Skin:     General: Skin is warm and dry  Capillary Refill: Capillary refill takes less than 2 seconds  Coloration: Skin is not cyanotic or pale  Neurological:      Mental Status: He is alert and oriented to person, place, and time  Sensory: No sensory deficit  Motor: No weakness     Psychiatric:         Behavior: Behavior normal          Vital Signs  ED Triage Vitals   Temperature Pulse Respirations Blood Pressure SpO2   07/12/21 1518 07/12/21 1518 07/12/21 1518 07/12/21 1518 07/12/21 1518   98 °F (36 7 °C) 94 21 (!) 173/70 93 %      Temp Source Heart Rate Source Patient Position - Orthostatic VS BP Location FiO2 (%)   07/12/21 1518 07/12/21 1518 07/12/21 1518 07/12/21 1518 --   Oral Monitor Sitting Left arm       Pain Score       07/12/21 1516       8           Vitals:    07/12/21 1518 07/12/21 1548 07/12/21 1914   BP: (!) 173/70  143/85   Pulse: 94 89 88   Patient Position - Orthostatic VS: Sitting  Lying         Visual Acuity      ED Medications  Medications   sodium chloride 0 9 % bolus 1,000 mL (0 mL Intravenous Stopped 7/12/21 1959)   albuterol inhalation solution 10 mg (10 mg Nebulization Given 7/12/21 1609)     And   ipratropium (ATROVENT) 0 02 % inhalation solution 1 mg (1 mg Nebulization Given 7/12/21 1610)     And   sodium chloride 0 9 % inhalation solution 3 mL (3 mL Nebulization Given 7/12/21 1610)   iohexol (OMNIPAQUE) 350 MG/ML injection (SINGLE-DOSE) 85 mL (85 mL Intravenous Given 7/12/21 1804)       Diagnostic Studies  Results Reviewed     Procedure Component Value Units Date/Time    Novel Coronavirus (Covid-19),PCR [facility] - 2 Hour Stat [[id number]]  (Normal) Collected: 07/12/21 1608    Lab Status: Final result Specimen: Nares from Nose Updated: 07/12/21 1739     SARS-CoV-2 Negative    Narrative: The specimen collection materials, transport medium, and/or testing methodology utilized in the production of these test results have been proven to be reliable in a limited validation with an abbreviated program under the Emergency Utilization Authorization provided by the FDA  Testing reported as "Presumptive positive" will be confirmed with secondary testing to ensure result accuracy  Clinical caution and judgement should be used with the interpretation of these results with consideration of the clinical impression and other laboratory testing  Testing reported as "Positive" or "Negative" has been proven to be accurate according to standard laboratory validation requirements  All testing is performed with control materials showing appropriate reactivity at standard intervals        NT-BNP PRO [[id number]]  (Normal) Collected: 07/12/21 1608    Lab Status: Final result Specimen: Blood from Arm, Right Updated: 07/12/21 1723     NT-proBNP 105 pg/mL     Comprehensive metabolic panel [[id number]]  (Abnormal) Collected: 07/12/21 1608    Lab Status: Final result Specimen: Blood from Arm, Right Updated: 07/12/21 1720     Sodium 141 mmol/L      Potassium 4 2 mmol/L      Chloride 107 mmol/L      CO2 33 mmol/L      ANION GAP 1 mmol/L      BUN 23 mg/dL      Creatinine 2 03 mg/dL      Glucose 116 mg/dL      Calcium 8 2 mg/dL      Corrected Calcium 8 7 mg/dL      AST 31 U/L      ALT 32 U/L      Alkaline Phosphatase 105 U/L      Total Protein 7 7 g/dL      Albumin 3 4 g/dL      Total Bilirubin 0 22 mg/dL      eGFR 41 ml/min/1 73sq m     Narrative:      [place] guidelines for Chronic Kidney Disease (CKD):     Stage 1 with normal or high GFR (GFR > 90 mL/min/1 73 square meters)    Stage 2 Mild CKD (GFR = 60-89 mL/min/1 73 square meters)    Stage 3A Moderate CKD (GFR = 45-59 mL/min/1 73 square meters)    Stage 3B Moderate CKD (GFR = 30-44 mL/min/1 73 square meters)    Stage 4 Severe CKD (GFR = 15-29 mL/min/1 73 square meters)    Stage 5 End Stage CKD (GFR <15 mL/min/1 73 square meters)  Note: GFR calculation is accurate only with a steady state creatinine    Troponin I [[id number]]  (Abnormal) Collected: 07/12/21 1608    Lab Status: Final result Specimen: Blood from Arm, Right Updated: 07/12/21 1708     Troponin I 0 05 ng/mL     APTT [[id number]]  (Normal) Collected: 07/12/21 1608    Lab Status: Final result Specimen: Blood from Arm, Right Updated: 07/12/21 1703     PTT 27 seconds     Protime-INR [[id number]]  (Abnormal) Collected: 07/12/21 1608    Lab Status: Final result Specimen: Blood from Arm, Right Updated: 07/12/21 1703     Protime 15 3 seconds      INR 1 23    CBC and differential [[id number]]  (Abnormal) Collected: 07/12/21 1608    Lab Status: Final result Specimen: Blood from Arm, Right Updated: 07/12/21 1636     WBC 9 57 Thousand/uL      RBC 4 21 Million/uL      Hemoglobin 11 3 g/dL      Hematocrit 36 8 %      MCV 87 fL      MCH 26 8 pg      MCHC 30 7 g/dL      RDW 16 2 %      MPV 9 9 fL      Platelets 500 Thousands/uL      nRBC 0 /100 WBCs      Neutrophils Relative 50 %      Immat GRANS % 1 %      Lymphocytes Relative 22 %      Monocytes Relative 10 %      Eosinophils Relative 16 %      Basophils Relative 1 %      Neutrophils Absolute 4 88 Thousands/µL      Immature Grans Absolute 0 07 Thousand/uL      Lymphocytes Absolute 2 07 Thousands/µL      Monocytes Absolute 0 96 Thousand/µL      Eosinophils Absolute 1 53 Thousand/µL      Basophils Absolute 0 06 Thousands/µL                  CTA ED chest PE Study   Final Result by Francisco Armenta MD (07/12 1849)      No acute findings; specifically, no pulmonary arterial embolism or pulmonary infiltrate/consolidation  Chronic areas of bronchial wall thickening and scattered reidentified areas of mucus plugging predominantly in the lower lobes  Predominantly mid lung/perihilar discoid densities in keeping with chronic scarring  The associated areas of consolidation are improved                    Workstation performed: [id number]                    Procedures  Procedures         ED Course  ED Course as of Jul 14 1055 Mon Jul 12, 2021 1953 Troponin I(!): 0 05             HEART Risk Score      Most Recent Value   Heart Score Risk Calculator   History  0 Filed at: 07/12/2021 1700   ECG  0 Filed at: 07/12/2021 1700   Age  1 Filed at: 07/12/2021 1700   Risk Factors  2 Filed at: 07/12/2021 1700   Troponin  0 Filed at: 07/12/2021 1700   HEART Score  3 Filed at: 07/12/2021 1700                          Wells' Criteria for PE      Most Recent Value   Wells' Criteria for PE   Clinical signs and symptoms of DVT  0 Filed at: 07/12/2021 1941   PE is primary diagnosis or equally likely  0 Filed at: 07/12/2021 1941   HR >100  0 Filed at: 07/12/2021 1941   Immobilization at least 3 days or Surgery in the previous 4 weeks  0 Filed at: 07/12/2021 1941   Previous, objectively diagnosed PE or DVT  0 Filed at: 07/12/2021 1941   Hemoptysis  0 Filed at: 07/12/2021 1941   Malignancy with treatment within 6 months or palliative  1 Filed at: 07/12/2021 1941   Wells' Criteria Total  1 Filed at: 07/12/2021 1941                MDM  Number of Diagnoses or Management Options  Cancer related pain: new and requires workup  SOB (shortness of breath)  Diagnosis management comments: 46-year-old male past medical history of adenocarcinoma, COPD, diabetes mellitus, CKD 3, presents to the ED for evaluation of chest pain and shortness of breath  Pain described as chest tightness, 9/ 10, but is improved with rest and worse with walking  Patient currently on 3 L nasal cannula at home  SpO2 96% on room air on arrival   Baseline SpO2 ranged from 94-96%  Wells score 1  Stable elevated troponin 0 05 for several months  Patient provided SHANE nebulized treatment in the ED with some improvement in symptoms  Patient states that he at baseline he has chronic shortness of breath and chest pain  Patient currently under management and care of palliative care for cancer and chest pain  Other vital signs stable  EKG normal sinus rhythm  Labs today noncontributory and at baseline  CT chest with contrast did not reveal evidence of acute pathology or PE  Advised patient that likely cause of symptoms is not a progression of cancer related pain  Advised to continue follow-up with oncology and palliative care as directed  Patient in no acute distress at discharge  SpO2 96% on patient's usual 3 L nasal cannula  COVID 19 negative         Amount and/or Complexity of Data Reviewed  Clinical lab tests: ordered and reviewed  Tests in the radiology section of CPT®: ordered and reviewed  Review and summarize past medical records: yes  Discuss the patient with other providers: yes  Independent visualization of images, tracings, or specimens: yes    Risk of Complications, Morbidity, and/or Mortality  Presenting problems: moderate  Diagnostic procedures: moderate  Management options: moderate    Patient Progress  Patient progress: stable      Disposition  Final diagnoses:   Cancer related pain   SOB (shortness of breath)     Time reflects when diagnosis was documented in both MDM as applicable and the Disposition within this note     Time User Action Codes Description Comment    7/12/2021  7:40 PM Jenn Villa [G89 3] Cancer related pain     7/12/2021  7:42 PM Jenn Villa [R06 02] SOB (shortness of breath)       ED Disposition     ED Disposition Condition Date/Time Comment    Discharge Stable Mon Jul 12, 2021  7:39 PM Kye Barley Sr  discharge to home/self care              Follow-up Information    None         Discharge Medication List as of 7/12/2021  7:40 PM      CONTINUE these medications which have NOT CHANGED    Details   albuterol (2 5 mg/3 mL) 0 083 % nebulizer solution Take 1 vial (2 5 mg total) by nebulization every 6 (six) hours as needed for wheezing or shortness of breath, Starting Thu 5/20/2021, Normal      albuterol (PROVENTIL HFA,VENTOLIN HFA) 90 mcg/act inhaler Inhale 2 puffs every 4 (four) hours as needed for wheezing, Starting Fri 6/4/2021, Normal      apixaban (ELIQUIS) 2 5 mg Take 1 tablet (2 5 mg total) by mouth 2 (two) times a day, Starting Wed 3/24/2021, Normal      Blood Glucose Monitoring Suppl KIT by Does not apply route daily, Starting Fri 12/14/2018, Normal      budesonide (PULMICORT) 0 5 mg/2 mL nebulizer solution Take 1 vial (0 5 mg total) by nebulization every 12 (twelve) hours Rinse mouth after use , Starting Tue 12/10/2019, Normal      carbamide peroxide (DEBROX) 6 5 % otic solution Administer 5 drops into both ears 2 (two) times a day, Starting Wed 10/23/2019, Normal      clotrimazole-betamethasone (LOTRISONE) 1-0 05 % cream Apply topically 2 (two) times a day, Starting Thu 10/15/2020, Normal      diltiazem (CARDIZEM CD) 120 mg 24 hr capsule Take 1 capsule (120 mg total) by mouth daily, Starting Wed 3/24/2021, Normal      divalproex sodium (DEPAKOTE) 250 mg EC tablet Take 1 tablet (250 mg total) by mouth 2 (two) times a day, Starting Mon 1/20/2020, Normal      ergocalciferol (ERGOCALCIFEROL) 1 25 MG (34195 UT) capsule Take 1 capsule (50,000 Units total) by mouth once a week, Starting Tue 1/14/2020, Normal      FLUoxetine (PROzac) 10 MG tablet Take 1 tablet (10 mg total) by mouth daily, Starting Thu 5/2/2019, Normal      fluticasone (FLONASE) 50 mcg/act nasal spray 1-2 sprays each nostril daily for allergies, Normal      fluticasone-salmeterol (ADVAIR, WIXELA) 250-50 mcg/dose inhaler Inhale 1 puff 2 (two) times a day Rinse mouth after use , Starting Fri 0/3/4057, Normal      folic acid (FOLVITE) 1 mg tablet TAKE 1 TABLET BY MOUTH DAILY , Normal      formoterol (PERFOROMIST) 20 MCG/2ML nebulizer solution Take 1 vial (20 mcg total) by nebulization 2 (two) times a day, Starting Thu 1/9/2020, Print      FREESTYLE LITE test strip TEST BLOOD SUGAR DAILY, Normal      guaiFENesin (MUCINEX) 600 mg 12 hr tablet Take 600 mg by mouth every 12 (twelve) hours, Historical Med      ipratropium-albuterol (DUO-NEB) 0 5-2 5 mg/3 mL nebulizer solution Take 1 vial (3 mL total) by nebulization 4 (four) times a day, Starting Thu 5/20/2021, Normal      Lancets (FREESTYLE) lancets TEST BLOOD SUGAR DAILY, Normal      loratadine (CLARITIN) 10 mg tablet Take 1 tablet (10 mg total) by mouth daily, Starting Wed 4/21/2021, Normal      melatonin 3 mg Take 1 tablet (3 mg total) by mouth daily at bedtime, Starting Thu 5/20/2021, Normal      methocarbamol (ROBAXIN) 500 mg tablet Take 1 tablet (500 mg total) by mouth 3 (three) times a day, Starting Mon 2/15/2021, Normal      montelukast (SINGULAIR) 10 mg tablet Take 1 tablet (10 mg total) by mouth daily at bedtime, Starting Mon 5/10/2021, Normal olopatadine (PATANOL) 0 1 % ophthalmic solution Administer 1 drop to both eyes 2 (two) times a day, Starting Wed 4/21/2021, Normal      oxyCODONE (OxyCONTIN) 20 mg 12 hr tablet Take 1 tablet (20 mg total) by mouth every 12 (twelve) hoursMax Daily Amount: 40 mg, Starting Mon 8/2/2021, Normal      oxyCODONE (ROXICODONE) 30 MG immediate release tablet Take 1 tablet (30 mg total) by mouth every 4 (four) hours as needed (to relieve cancer pain immediately)Max Daily Amount: 180 mg, Starting Tue 7/6/2021, Normal      pantoprazole (PROTONIX) 40 mg tablet Take 1 tablet (40 mg total) by mouth daily, Starting Mon 2/15/2021, Normal      polyethylene glycol (GLYCOLAX) powder Take 17 g by mouth daily, Starting Mon 4/22/2019, Normal      predniSONE 5 mg tablet Take 1 tablet (5 mg total) by mouth daily, Starting Fri 6/4/2021, Normal      pregabalin (LYRICA) 25 mg capsule Take 1 capsule PO in morning and 2 capsules PO at bedtime, Normal      prochlorperazine (COMPAZINE) 10 mg tablet Take 1 tablet (10 mg total) by mouth every 6 (six) hours as needed for nausea or vomiting, Starting Mon 11/4/2019, Normal      QUEtiapine (SEROquel) 25 mg tablet TAKE 0 5 TABLETS (12 5 MG TOTAL) BY MOUTH DAILY AT BEDTIME, Starting Tue 12/22/2020, Normal      Saline 0 65 % (Soln) SOLN 5 drops into each nostril as needed (Dry nose), Starting Thu 1/2/2020, Normal      senna-docusate sodium (SENOKOT-S) 8 6-50 mg per tablet Take 1 tablet by mouth 2 (two) times a day, Starting Thu 5/20/2021, Normal      sitaGLIPtin (JANUVIA) 50 mg tablet Take 1 tablet (50 mg total) by mouth daily, Starting Thu 5/20/2021, Normal      tamsulosin (FLOMAX) 0 4 mg Take 1 capsule (0 4 mg total) by mouth daily with dinner, Starting Thu 5/20/2021, Normal           No discharge procedures on file      PDMP Review       Value Time User    PDMP Reviewed  Yes 7/6/2021 11:08 AM Wili Velazquez MD          ED Provider  Electronically Signed by           Dannie Pablo PA-C  07/12/21 [address] Trinh Morrison PA-C  07/12/21 [id number], [place]  07/14/21 [id number]

## 2021-07-12 NOTE — DISCHARGE INSTRUCTIONS
Continue follow-up with palliative care and Oncology  Return to emergency room if symptoms worsen, develop new symptoms, or have concern about progress of your condition  Take all medication as directed  Contact your primary care provider in 48 hours for evaluation of the established treatment plan and discussion on the progress of your condition

## 2021-07-12 NOTE — Clinical Note
geoffrey fierro accompanied Heather Talavera to the emergency department on 7/12/2021  Return date if applicable:     Michelle Bird accompanied Heather Talavera to the ED on 07/12/2021    If you have any questions or concerns, please don't hesitate to call        Lucita Cool RN

## 2021-07-12 NOTE — Clinical Note
accompanied Monie Kelly to the emergency department on 7/12/2021  Return date if applicable: 10/66/1763        If you have any questions or concerns, please don't hesitate to call        Shayy Harris PA-C

## 2021-07-12 NOTE — Clinical Note
geoffrey fierro accompanied Maira Luis to the emergency department on 7/12/2021  Return date if applicable:     Wesley Witt accompanied Maira Kingalejandro to the ED on 07/12/2021    If you have any questions or concerns, please don't hesitate to call        Braden Castellanos RN

## 2021-07-16 NOTE — PROGRESS NOTES
Hematology/Oncology Outpatient Follow-up  Monie Kelly Sr  64 y o  male 1960 [id number]    Date:  7/16/2021      Assessment and Plan:  1  Adenocarcinoma of lung, right [facility])  Patient's recent CT imaging of the chest from his ER evaluation Monday did not show any obvious hint of malignancy or pneumonitis  Suspect that his worsening dyspnea is likely due to his COPD  He will continue his current treatment with immunotherapy Keytruda 400 mg on an every 6 week basis; is due for treatment today after the visit  He will follow-up again in 6 weeks with repeat labs that day  He will continue to follow up with palliative care on a regular basis for pain and symptom management  - CBC and differential; Future  - Comprehensive metabolic panel; Future  - TSH, 3rd generation with Free T4 reflex; Future  - T3, free; Future  - lidocaine (LMX) 4 % cream  - sodium chloride 0 9 % infusion  - pembrolizumab (KEYTRUDA) 400 mg in sodium chloride 0 9 % 50 mL IVPB  - CBC and differential; Future  - Comprehensive metabolic panel; Future  - TSH, 3rd generation with Free T4 reflex; Future    2  Normocytic anemia  Chronic/stable due to anemia of chronic renal dysfunction/chronic disease     - CBC and differential; Future    3  Chronic obstructive pulmonary disease with acute exacerbation (HCC)  Suspect patient's worsening dyspnea and productive cough of are due to COPD exacerbation  He admits that he was at a family barbecue recently and exposed to a lot of smoke which may have exacerbated his symptoms  We will treat him with a Z-Sandeep since he states he is having cough productive for greenish mucus and also increase his prednisone to 20 mg (try to avoid >20 mg as it could decrease the effectiveness of his immunotherapy) for 5 days to see if this will improve his symptoms  He will go back to his usual prednisone 5 mg daily afterward    We will also have staff call his pulmonology team and schedule follow-up with them since he has not been seen outpatient since 2/2020     - azithromycin (ZITHROMAX) 250 mg tablet; Take 2 tablets today then 1 tablet daily x 4 days  Dispense: 6 tablet; Refill: 0  - predniSONE 20 mg tablet; Take 1 tablet (20 mg total) by mouth daily For 5 days then go back to 5 mg daily  Dispense: 5 tablet; Refill: 0    4  Seasonal allergies  - sodium chloride (OCEAN) 0 65 % nasal spray; 2 sprays into each nostril as needed for congestion  Dispense: 60 mL; Refill: 3      HPI:  Patient presents today for a follow-up visit  He states that he has been having worsening dyspnea and chest discomfort over the past week or so  Admits that he was at a family picnic recently in the hot humid weather and was exposed to a lot of smoke from the [employer] which may have exacerbated his symptoms  Is afebrile  Also admits to coughing up greenish mucus  Has been using his respiratory medications/ nebulizers which helped to a degree  Is also using Mucinex which he does not feel helps much  He actually did present to the emergency department on Monday 07/12/2021 with his dyspnea for evaluation  He did have a CTA of the chest done during his ER evaluation which showed:  IMPRESSION:  No acute findings; specifically, no pulmonary arterial embolism or pulmonary infiltrate/consolidation      Chronic areas of bronchial wall thickening and scattered reidentified areas of mucus plugging predominantly in the lower lobes      Predominantly mid lung/perihilar discoid densities in keeping with chronic scarring  The associated areas of consolidation are improved  He will be getting repeat laboratory studies in the infusion center today  He did have some labs done during his ER evaluation 07/12/2021 which showed normal white cells and platelets, stable chronic normocytic anemia H&H 11 3/36 8, MCV 87  Stable chronic renal dysfunction BUN 23, creatinine 2 03, GFR 41  PTT normal with slightly prolonged PT 15 3      Oncology History Overview Note    American male history of heavy smoker who used to smoke 2 packs of cigarettes daily for many years, COPD, he presented with dyspnea, CT scan of the chest on October 2018 showed right middle lobe spiculated 1 3 cm mass with multiple solid and ground-glass nodules along the major and minor fissures sub cm pulmonary nodules bilaterally, left adrenal 1 2 cm nodule      PET scan showed 1 3 cm right middle lung nodule with SUV of 6 8, numerous nodular densities along the right major and minor fissures, right hilar activity with SUV of 3 4, subcarinal lymph node measuring 7 mm with SUV of 2 7, periportal enlarged lymph nodes concerning for metastatic disease measuring 2 4 cm with SUV of 5, prominent left retrocrural lymph node measuring 1 cm x 9 mm with SUV of 1 1     Core biopsy of the right spiculated nodule showed invasive adenocarcinoma consistent with lung primary positive for CK7, TTF 1, napsin a     Adenocarcinoma of lung, right ([address] )   11/13/2018 Initial Diagnosis    Adenocarcinoma of lung, right ([facility] [address] )  Stage IV     11/13/2018 Biopsy    A  Lung, right, core needle biopsies:             - Invasive adenocarcinoma, consistent with lung primary  12/13/2018 - 3/28/2019 Chemotherapy     Alimta, Carboplatin (AUC 5) + Keytruda (6 cycles total)     4/17/2019 -  Chemotherapy    Started maintenance Alimta and Keytruda  (Alimta d/c'd 9/16/19 due to worsening renal dysfunction)  Keytruda changed to the every 6 week dosing (400 mg) 12/2020         Interval history:    ROS: Review of Systems   Constitutional: Positive for activity change and fatigue  Negative for appetite change, chills, fever and unexpected weight change  HENT: Positive for congestion and rhinorrhea  Negative for mouth sores, nosebleeds, sore throat and trouble swallowing  Eyes: Positive for redness and itching  Respiratory: Positive for cough, chest tightness, shortness of breath and wheezing            On chronic O2 therapy 3lt   Cardiovascular: Negative for chest pain, palpitations and leg swelling  Gastrointestinal: Positive for constipation  Negative for abdominal distention, abdominal pain, blood in stool, diarrhea, nausea and vomiting  Genitourinary: Negative for difficulty urinating, dysuria, frequency, hematuria and urgency  Musculoskeletal: Positive for arthralgias, back pain and myalgias  Negative for gait problem and joint swelling  Chronic generalized arthralgias and myalgias rated 8/10 at present- is satisfied with his current pain management   Skin: Negative for color change, pallor and rash  Allergic/Immunologic: Positive for environmental allergies  Neurological: Positive for numbness  Negative for dizziness, weakness, light-headedness and headaches  Hematological: Negative for adenopathy  Does not bruise/bleed easily  Psychiatric/Behavioral: Negative for dysphoric mood and sleep disturbance  The patient is not nervous/anxious          Past Medical History:   Diagnosis Date    Alkalosis 12/9/2019    Bipolar 1 disorder ([facility] [address] )     Cancer ([facility] [address] )     adeno lung  dx 11/2018-lung bx today 4/9/2019-ongoing chemo    Chronic obstructive pulmonary disease with acute exacerbation ([address] ) 6/7/2018    Chronic pain disorder     back and right shoulder    CKD (chronic kidney disease)     COPD (chronic obstructive pulmonary disease) ([address] )     Depression     Diabetes mellitus ([address] )     Elevated d-dimer 11/30/2019    Elevated troponin 11/29/2019    Elevated troponin level not due to acute coronary syndrome 11/30/2019    GERD (gastroesophageal reflux disease)     Herniated lumbar intervertebral disc     Hyperkalemia     Hypertension     Insomnia     Latent syphilis     Treated    Low back pain     Lumbosacral disc disease     Lung cancer ([address] ) 04/2019    Pneumothorax after biopsy     R lung    PTSD (post-traumatic stress disorder)     Pulmonary emphysema ([address] )     Tobacco abuse 11/13/2018       Past Surgical History: Procedure Laterality Date    COLONOSCOPY      CT GUIDED CHEST TUBE  2018    FL GUIDED CENTRAL VENOUS ACCESS DEVICE INSERTION  2019    HEMORROIDECTOMY      IR BIOPSY LUNG  2018    IR CHEST TUBE PLACEMENT  2018    KNEE SURGERY      [place] INSJ TUNNELED CTR VAD W/SUBQ PORT AGE 5 YR/> N/A 2019    Procedure: PLACEMENT OF PORT-A-CATH;  Surgeon: Jacqueline Garcia MD;  [facility];  Service: Vascular       Social History     Socioeconomic History    Marital status: Legally      Spouse name: None    Number of children: None    Years of education: None    Highest education level: None   Occupational History    Occupation: part time employment   Tobacco Use    Smoking status: Former Smoker     Packs/day: 0 50     Years: 40 00     Pack years: 20 00     Types: Cigarettes     Start date:      Quit date: 2019     Years since quittin 9    Smokeless tobacco: Never Used    Tobacco comment: will smoke a cigarette on occasion with stress   Vaping Use    Vaping Use: Never used   Substance and Sexual Activity    Alcohol use: Not Currently    Drug use: Not Currently     Types: Marijuana     Comment: "I will smoke a joint if I am stressed out but not every day"    Sexual activity: Yes   Other Topics Concern    None   Social History Narrative    Lives with girlfriend     Social Determinants of Health     Financial Resource Strain:     Difficulty of Paying Living Expenses:    Food Insecurity:     Worried About Running Out of Food in the Last Year:     Ran Out of Food in the Last Year:    Transportation Needs:     Lack of Transportation (Medical):      Lack of Transportation (Non-Medical):    Physical Activity:     Days of Exercise per Week:     Minutes of Exercise per Session:    Stress:     Feeling of Stress :    Social Connections:     Frequency of Communication with Friends and Family:     Frequency of Social Gatherings with Friends and Family:     Attends [employer] Services:     Active Member of Clubs or Organizations:     Attends Club or Organization Meetings:     Marital Status:    Intimate Partner Violence:     Fear of Current or Ex-Partner:     Emotionally Abused:     Physically Abused:     Sexually Abused:        Family History   Problem Relation Age of Onset    Heart disease Mother     Cancer Mother     Hypertension Father     Diabetes Family     Asthma Family     Heart disease Family     Cancer Family     Cancer Maternal Grandfather     Cancer Paternal Grandfather     Cancer Maternal Aunt        Allergies   Allergen Reactions    Lisinopril Anaphylaxis     Took medication a while ago and had a "swelling reaction"  Does not carry epi-pen         Current Outpatient Medications:     albuterol (2 5 mg/3 mL) 0 083 % nebulizer solution, Take 1 vial (2 5 mg total) by nebulization every 6 (six) hours as needed for wheezing or shortness of breath, Disp: 360 mL, Rfl: 5    albuterol (PROVENTIL HFA,VENTOLIN HFA) 90 mcg/act inhaler, Inhale 2 puffs every 4 (four) hours as needed for wheezing, Disp: 18 g, Rfl: 5    apixaban (ELIQUIS) 2 5 mg, Take 1 tablet (2 5 mg total) by mouth 2 (two) times a day, Disp: 60 tablet, Rfl: 11    Blood Glucose Monitoring Suppl KIT, by Does not apply route daily, Disp: 1 each, Rfl: 0    budesonide (PULMICORT) 0 5 mg/2 mL nebulizer solution, Take 1 vial (0 5 mg total) by nebulization every 12 (twelve) hours Rinse mouth after use , Disp: 1 vial, Rfl: 0    carbamide peroxide (DEBROX) 6 5 % otic solution, Administer 5 drops into both ears 2 (two) times a day, Disp: 15 mL, Rfl: 0    clotrimazole-betamethasone (LOTRISONE) 1-0 05 % cream, Apply topically 2 (two) times a day, Disp: 45 g, Rfl: 2    Deep Sea Nasal Spray 0 65 % nasal spray, INSTILL 5 DROPS INTO EACHNOSTRIL AS NEEDED FOR DRYNOSE, Disp: 44 mL, Rfl: 0    diltiazem (CARDIZEM CD) 120 mg 24 hr capsule, Take 1 capsule (120 mg total) by mouth daily, Disp: 90 capsule, Rfl: 3    divalproex sodium (DEPAKOTE) 250 mg EC tablet, Take 1 tablet (250 mg total) by mouth 2 (two) times a day, Disp: 30 tablet, Rfl: 0    ergocalciferol (ERGOCALCIFEROL) 1 25 MG (05737 UT) capsule, Take 1 capsule (50,000 Units total) by mouth once a week, Disp: 12 capsule, Rfl: 0    FLUoxetine (PROzac) 10 MG tablet, Take 1 tablet (10 mg total) by mouth daily, Disp: 30 tablet, Rfl: 5    fluticasone (FLONASE) 50 mcg/act nasal spray, 1-2 sprays each nostril daily for allergies, Disp: 16 g, Rfl: 3    fluticasone-salmeterol (ADVAIR, WIXELA) 250-50 mcg/dose inhaler, Inhale 1 puff 2 (two) times a day Rinse mouth after use , Disp: 60 each, Rfl: 3    folic acid (FOLVITE) 1 mg tablet, TAKE 1 TABLET BY MOUTH DAILY  , Disp: 90 tablet, Rfl: 2    formoterol (PERFOROMIST) 20 MCG/2ML nebulizer solution, Take 1 vial (20 mcg total) by nebulization 2 (two) times a day, Disp: 60 vial, Rfl: 1    FREESTYLE LITE test strip, TEST BLOOD SUGAR DAILY, Disp: 100 each, Rfl: 1    guaiFENesin (MUCINEX) 600 mg 12 hr tablet, Take 600 mg by mouth every 12 (twelve) hours, Disp: , Rfl:     ipratropium-albuterol (DUO-NEB) 0 5-2 5 mg/3 mL nebulizer solution, Take 1 vial (3 mL total) by nebulization 4 (four) times a day, Disp: 15 mL, Rfl: 3    Lancets (FREESTYLE) lancets, TEST BLOOD SUGAR DAILY, Disp: 100 each, Rfl: 4    loratadine (CLARITIN) 10 mg tablet, Take 1 tablet (10 mg total) by mouth daily, Disp: 90 tablet, Rfl: 3    melatonin 3 mg, Take 1 tablet (3 mg total) by mouth daily at bedtime, Disp: 90 tablet, Rfl: 1    methocarbamol (ROBAXIN) 500 mg tablet, Take 1 tablet (500 mg total) by mouth 3 (three) times a day, Disp: 30 tablet, Rfl: 0    montelukast (SINGULAIR) 10 mg tablet, Take 1 tablet (10 mg total) by mouth daily at bedtime, Disp: 90 tablet, Rfl: 0    olopatadine (PATANOL) 0 1 % ophthalmic solution, Administer 1 drop to both eyes 2 (two) times a day, Disp: 5 mL, Rfl: 3    [START ON 8/2/2021] oxyCODONE (OxyCONTIN) 20 mg 12 hr tablet, Take 1 tablet (20 mg total) by mouth every 12 (twelve) hoursMax Daily Amount: 40 mg, Disp: 60 tablet, Rfl: 0    oxyCODONE (ROXICODONE) 30 MG immediate release tablet, Take 1 tablet (30 mg total) by mouth every 4 (four) hours as needed (to relieve cancer pain immediately)Max Daily Amount: 180 mg, Disp: 150 tablet, Rfl: 0    pantoprazole (PROTONIX) 40 mg tablet, Take 1 tablet (40 mg total) by mouth daily, Disp: 90 tablet, Rfl: 3    polyethylene glycol (GLYCOLAX) powder, Take 17 g by mouth daily, Disp: 527 g, Rfl: 1    predniSONE 5 mg tablet, Take 1 tablet (5 mg total) by mouth daily, Disp: 30 tablet, Rfl: 5    pregabalin (LYRICA) 25 mg capsule, Take 1 capsule PO in morning and 2 capsules PO at bedtime, Disp: 90 capsule, Rfl: 2    prochlorperazine (COMPAZINE) 10 mg tablet, Take 1 tablet (10 mg total) by mouth every 6 (six) hours as needed for nausea or vomiting, Disp: 90 tablet, Rfl: 0    QUEtiapine (SEROquel) 25 mg tablet, TAKE 0 5 TABLETS (12 5 MG TOTAL) BY MOUTH DAILY AT BEDTIME, Disp: 15 tablet, Rfl: 0    senna-docusate sodium (SENOKOT-S) 8 6-50 mg per tablet, Take 1 tablet by mouth 2 (two) times a day, Disp: 60 tablet, Rfl: 11    sitaGLIPtin (JANUVIA) 50 mg tablet, Take 1 tablet (50 mg total) by mouth daily, Disp: 90 tablet, Rfl: 0    tamsulosin (FLOMAX) 0 4 mg, Take 1 capsule (0 4 mg total) by mouth daily with dinner, Disp: 90 capsule, Rfl: 0    azithromycin (ZITHROMAX) 250 mg tablet, Take 2 tablets today then 1 tablet daily x 4 days, Disp: 6 tablet, Rfl: 0    predniSONE 20 mg tablet, Take 1 tablet (20 mg total) by mouth daily For 5 days then go back to 5 mg daily, Disp: 5 tablet, Rfl: 0    sodium chloride (OCEAN) 0 65 % nasal spray, 2 sprays into each nostril as needed for congestion, Disp: 60 mL, Rfl: 3      Physical Exam:  /88 (BP Location: Left arm, Patient Position: Sitting, Cuff Size: Large)   Pulse 86   Temp 98 °F (36 7 °C) (Tympanic)   Resp 18   Ht 5' 8" (1 727 m)   Wt 89 1 kg (196 lb 6 4 oz)   SpO2 98%   BMI 29 86 kg/m²     Physical Exam  Vitals reviewed  Constitutional:       General: He is not in acute distress  Appearance: He is well-developed  He is not diaphoretic  HENT:      Head: Normocephalic and atraumatic  Eyes:      General: Lids are normal  No scleral icterus  Pupils: Pupils are equal, round, and reactive to light  Neck:      Thyroid: No thyromegaly  Cardiovascular:      Rate and Rhythm: Normal rate and regular rhythm  Heart sounds: Normal heart sounds  No murmur heard  Pulmonary:      Effort: Pulmonary effort is normal       Breath sounds: Decreased air movement present  Wheezing (expiratory throughout) and rhonchi present  Abdominal:      General: There is no distension  Palpations: Abdomen is soft  There is no hepatomegaly or splenomegaly  Tenderness: There is no abdominal tenderness  Musculoskeletal:         General: No swelling  Normal range of motion  Cervical back: Normal range of motion and neck supple  Lymphadenopathy:      Cervical: No cervical adenopathy  Upper Body:      Right upper body: No axillary adenopathy  Left upper body: No axillary adenopathy  Skin:     General: Skin is warm and dry  Findings: No erythema or rash  Neurological:      General: No focal deficit present  Mental Status: He is alert and oriented to person, place, and time  Psychiatric:         Mood and Affect: Mood normal  Affect is flat  Behavior: Behavior normal  Behavior is cooperative  Thought Content:  Thought content normal          Judgment: Judgment normal            Labs:  Lab Results   Component Value Date    WBC 9 57 07/12/2021    HGB 11 3 (L) 07/12/2021    HCT 36 8 07/12/2021    MCV 87 07/12/2021     07/12/2021     Lab Results   Component Value Date    K 4 2 07/12/2021     07/12/2021    CO2 33 (H) 07/12/2021    BUN 23 07/12/2021    CREATININE 2 03 (H) 07/12/2021 GLUF 102 (H) 08/24/2020    CALCIUM 8 2 (L) 07/12/2021    CORRECTEDCA 8 7 07/12/2021    AST 31 07/12/2021    ALT 32 07/12/2021    ALKPHOS 105 07/12/2021    EGFR 41 07/12/2021       Patient voiced understanding and agreement in the above discussion  Aware to contact our office with questions/symptoms in the interim  This note has been generated by voice recognition software system  Therefore, there may be spelling, grammar, and or syntax errors  Please contact if questions arise

## 2021-07-21 PROBLEM — N25.81 SECONDARY HYPERPARATHYROIDISM OF RENAL ORIGIN (HCC): Status: ACTIVE | Noted: 2021-01-01

## 2021-07-21 NOTE — PATIENT INSTRUCTIONS
- attend CKD education / kidney smart class  - start ergocalciferol 59392 units a week for 24 weeks then over the counter vit D 2000 units daily    - Please call me in 10 days after having your blood work done to review the results if you do not hear back from me or my office, as I may have not received the results  - please remember to perform blood work prior to the next visit  - Please call if the blood pressure top number is greater than 150 or less than 110 consistently  - Please call if you are gaining more than 2lbs in 2 days for adjustment of water pills   ~ Please AVOID the following pain medications  LIST OF NSAIDS (NONSTEROIDAL ANTI-INFLAMMATORY DRUGS) AND FOSS-2 INHIBITORS    DIFLUNISAL (DOLOBID)  IBUPROFEN (MOTRIN, ADVIL)  FLURBIPROFEN (ANSAID)  KETOPROFEN (ORUDIS, ORUVAIL)  FENOPROFEN (NALFON)  NABUMETONE (RELAFEN)  PIROXICAM (FELDENE)  NAPROXEN (ALEVE, NAPROSYN, NAPRELAN, ANAPROX)  DICLOFENAC (VOLTAREN, CATAFLAM)  INDOMETHACIN (INDOCIN)  SULINDAC (CLINORIL)  TOLMETIN (TOLETIN)  ETODOLAC (LODINE)  MELOXICAM (MOBIC)  KETOROLAC (TORADOL)  OXAPROZIN (DAYPRO)  CELECOXIB (CELEBREX)    Phosphorus diet  Follow a low phosphorus diet      Avoid these higher phosphorus foods: Choose these lower phosphorus foods:   Milk, pudding or yogurt (from animals and from many soy varieties) Rice milk (unfortified), nondairy creamer (if it doesn't have terms in the ingredients list that contain the letters "phos")   Hard cheeses, ricotta or cottage cheese, fat-free cream cheese Regular and low-fat cream cheese   Ice cream or frozen yogurt Sherbet or frozen fruit pops   Soups made with higher phosphorus ingredients (milk, dried peas, beans, lentils) Soups made with lower phosphorus ingredients (broth- or water-based with other lower phosphorus ingredients)   Whole grains, including whole-grain breads, crackers, cereal, rice and pasta Refined grains, including white bread, crackers, cereals, rice and pasta   Quick breads, biscuits, cornbread, muffins, pancakes or waffles Homemade refined (white) dinner rolls, bagels or English muffins   Dried peas (split, black-eyed), beans (black, garbanzo, lima, kidney, navy, juan) or lentils Green peas (canned, frozen), green beans or wax beans   Organ meats, walleye, pollock or sardines Lean beef, pork, lamb, poultry or other fish   Nuts and seeds Popcorn   Peanut butter and other nut butters Jam, jelly or honey   Chocolate, including chocolate drinks Carob (chocolate-flavored) candy, hard candy or gumdrops   Mason and pepper-type sodas, flavored mcguire, bottled teas (if a term in the ingredients list contains the letters "phos") Lemon-lime soda, ginger ale or root beer, plain water     Follow a moderate potassium diet

## 2021-07-21 NOTE — PROGRESS NOTES
Complaint   Patient presents with    Chronic Kidney Disease    Follow-up    Virtual Regular Visit   patient with concern for COVID exposure, hence did not want to come in for actual appointment  Encounter provider Denise Ashby MD    Provider located at [address] [place] [id number]      Recent Visits  No visits were found meeting these conditions  Showing recent visits within past 7 days and meeting all other requirements  Today's Visits  Date Type Provider Dept   07/21/21 Telemedicine Denise Ashby, [address] today's visits and meeting all other requirements  Future Appointments  No visits were found meeting these conditions  Showing future appointments within next 150 days and meeting all other requirements       The patient was identified by name and date of birth  Huey Pressley  was informed that this is a telemedicine visit and that the visit is being conducted through [address] [employer] and patient was informed that this is a secure, HIPAA-compliant platform  He agrees to proceed     My office door was closed  No one else was in the room  He acknowledged consent and understanding of privacy and security of the video platform  The patient has agreed to participate and understands they can discontinue the visit at any time  I informed the patient about the limitations of using virtual visit platform and that my medical advice and physical exam would be limited as a consequence of this  Patient was informed that this was a billable visit and they are aware of it and wished to proceed  Patient is aware this is a billable service  Subjective  Huey Pressley  is a 64 y o  male participated in virtual care with me today   Records reveal patient was last seen by Hematology-Oncology on 07/16/2021 for adenocarcinoma of the right lung  Is s/p CT with IV contrast on 7/12/21 as well as in January    Feels well has no complaints breathing is much improved  Recently was treated with antibiotics due to concern for pneumonia  No NSAID use thankful for all the care information that he has gotten today  Willing to attend CKD education/kidney smart        HPI     Past Medical History:   Diagnosis Date    Alkalosis 12/9/2019    Bipolar 1 disorder ([facility] )     Cancer ([facility] )     adeno lung  dx 11/2018-lung bx today 4/9/2019-ongoing chemo    Chronic obstructive pulmonary disease with acute exacerbation ([facility] ) 6/7/2018    Chronic pain disorder     back and right shoulder    CKD (chronic kidney disease)     COPD (chronic obstructive pulmonary disease) ([facility] )     Depression     Diabetes mellitus ([facility]     Elevated d-dimer 11/30/2019    Elevated troponin 11/29/2019    Elevated troponin level not due to acute coronary syndrome 11/30/2019    GERD (gastroesophageal reflux disease)     Herniated lumbar intervertebral disc     Hyperkalemia     Hypertension     Insomnia     Latent syphilis     Treated    Low back pain     Lumbosacral disc disease     Lung cancer ([facility] ) 04/2019    Pneumothorax after biopsy     R lung    PTSD (post-traumatic stress disorder)     Pulmonary emphysema ([facility]     Tobacco abuse 11/13/2018       Past Surgical History:   Procedure Laterality Date    COLONOSCOPY  2011    CT GUIDED CHEST TUBE  11/21/2018    FL GUIDED CENTRAL VENOUS ACCESS DEVICE INSERTION  2/8/2019    HEMORROIDECTOMY      IR BIOPSY LUNG  11/13/2018    IR CHEST TUBE PLACEMENT  11/14/2018    KNEE SURGERY      GA INSJ TUNNELED CTR VAD W/SUBQ PORT AGE 5 YR/> N/A 2/8/2019    Procedure: PLACEMENT OF PORT-A-CATH;  Surgeon: Dakotah Gunn MD;  Location: [address] [place] [address];  Service: Vascular       Current Outpatient Medications   Medication Sig Dispense Refill    albuterol (2 5 mg/3 mL) 0 083 % nebulizer solution Take 1 vial (2 5 mg total) by nebulization every 6 (six) hours as needed for wheezing or shortness of breath 360 mL 5    albuterol (PROVENTIL HFA,VENTOLIN HFA) 90 mcg/act inhaler Inhale 2 puffs every 4 (four) hours as needed for wheezing 18 g 5    apixaban (ELIQUIS) 2 5 mg Take 1 tablet (2 5 mg total) by mouth 2 (two) times a day 60 tablet 11    budesonide (PULMICORT) 0 5 mg/2 mL nebulizer solution Take 1 vial (0 5 mg total) by nebulization every 12 (twelve) hours Rinse mouth after use  1 vial 0    diltiazem (CARDIZEM CD) 120 mg 24 hr capsule Take 1 capsule (120 mg total) by mouth daily 90 capsule 3    divalproex sodium (DEPAKOTE) 250 mg EC tablet Take 1 tablet (250 mg total) by mouth 2 (two) times a day 30 tablet 0    FLUoxetine (PROzac) 10 MG tablet Take 1 tablet (10 mg total) by mouth daily 30 tablet 5    fluticasone (FLONASE) 50 mcg/act nasal spray 1-2 sprays each nostril daily for allergies 16 g 3    fluticasone-salmeterol (ADVAIR, WIXELA) 250-50 mcg/dose inhaler Inhale 1 puff 2 (two) times a day Rinse mouth after use  60 each 3    folic acid (FOLVITE) 1 mg tablet TAKE 1 TABLET BY MOUTH DAILY   90 tablet 2    formoterol (PERFOROMIST) 20 MCG/2ML nebulizer solution Take 1 vial (20 mcg total) by nebulization 2 (two) times a day 60 vial 1    guaiFENesin (MUCINEX) 600 mg 12 hr tablet Take 600 mg by mouth every 12 (twelve) hours      ipratropium-albuterol (DUO-NEB) 0 5-2 5 mg/3 mL nebulizer solution Take 1 vial (3 mL total) by nebulization 4 (four) times a day 15 mL 3    loratadine (CLARITIN) 10 mg tablet Take 1 tablet (10 mg total) by mouth daily 90 tablet 3    melatonin 3 mg Take 1 tablet (3 mg total) by mouth daily at bedtime 90 tablet 1    montelukast (SINGULAIR) 10 mg tablet Take 1 tablet (10 mg total) by mouth daily at bedtime 90 tablet 0    olopatadine (PATANOL) 0 1 % ophthalmic solution Administer 1 drop to both eyes 2 (two) times a day 5 mL 3    [START ON 8/2/2021] oxyCODONE (OxyCONTIN) 20 mg 12 hr tablet Take 1 tablet (20 mg total) by mouth every 12 (twelve) hoursMax Daily Amount: 40 mg 60 tablet 0    oxyCODONE (ROXICODONE) 30 MG immediate release tablet Take 1 tablet (30 mg total) by mouth every 4 (four) hours as needed (to relieve cancer pain immediately)Max Daily Amount: 180 mg 150 tablet 0    pantoprazole (PROTONIX) 40 mg tablet Take 1 tablet (40 mg total) by mouth daily 90 tablet 3    predniSONE 20 mg tablet Take 1 tablet (20 mg total) by mouth daily For 5 days then go back to 5 mg daily 5 tablet 0    pregabalin (LYRICA) 25 mg capsule Take 1 capsule PO in morning and 2 capsules PO at bedtime 90 capsule 2    prochlorperazine (COMPAZINE) 10 mg tablet Take 1 tablet (10 mg total) by mouth every 6 (six) hours as needed for nausea or vomiting 90 tablet 0    QUEtiapine (SEROquel) 25 mg tablet TAKE 0 5 TABLETS (12 5 MG TOTAL) BY MOUTH DAILY AT BEDTIME 15 tablet 0    senna-docusate sodium (SENOKOT-S) 8 6-50 mg per tablet Take 1 tablet by mouth 2 (two) times a day 60 tablet 11    sitaGLIPtin (JANUVIA) 50 mg tablet Take 1 tablet (50 mg total) by mouth daily 90 tablet 0    sodium chloride (OCEAN) 0 65 % nasal spray 2 sprays into each nostril as needed for congestion 60 mL 3    tamsulosin (FLOMAX) 0 4 mg Take 1 capsule (0 4 mg total) by mouth daily with dinner 90 capsule 0    Blood Glucose Monitoring Suppl KIT by Does not apply route daily 1 each 0    carbamide peroxide (DEBROX) 6 5 % otic solution Administer 5 drops into both ears 2 (two) times a day 15 mL 0    clotrimazole-betamethasone (LOTRISONE) 1-0 05 % cream Apply topically 2 (two) times a day 45 g 2    ergocalciferol (VITAMIN D2) 50,000 units Take 1 capsule (50,000 Units total) by mouth once a week 12 capsule 1    FREESTYLE LITE test strip TEST BLOOD SUGAR DAILY 100 each 1    Lancets (FREESTYLE) lancets TEST BLOOD SUGAR DAILY 100 each 4    methocarbamol (ROBAXIN) 500 mg tablet Take 1 tablet (500 mg total) by mouth 3 (three) times a day (Patient not taking: Reported on 7/21/2021) 30 tablet 0    polyethylene glycol (GLYCOLAX) powder Take 17 g by mouth daily 527 g 1    predniSONE 5 mg tablet Take 1 tablet (5 mg total) by mouth daily (Patient not taking: Reported on 7/21/2021) 30 tablet 5     No current facility-administered medications for this visit  Allergies   Allergen Reactions    Lisinopril Anaphylaxis     Took medication a while ago and had a "swelling reaction"  Does not carry epi-pen       Review of Systems   Constitutional: Negative for chills, fatigue and fever  HENT: Negative for congestion, postnasal drip, rhinorrhea and sore throat  Respiratory: Positive for wheezing  Negative for cough and shortness of breath  Cardiovascular: Negative for leg swelling  Gastrointestinal: Negative for abdominal pain, constipation, diarrhea, nausea and vomiting  Genitourinary: Negative for difficulty urinating, dysuria and hematuria  Musculoskeletal: Negative for back pain  Skin: Negative for rash and wound  Neurological: Negative for dizziness, light-headedness and headaches  Psychiatric/Behavioral: Negative for agitation and confusion  All other systems reviewed and are negative  Video Exam    Vitals:    07/21/21 1109   BP: 140/80       Physical Exam  Vitals reviewed  Constitutional:       General: He is not in acute distress  Appearance: He is normal weight  He is not ill-appearing or toxic-appearing  Comments: Physical exam performed by the patient on my behalf with my guidance as I did virtual visualization since this was a virtual visit through [employer]  HENT:      Head: Normocephalic and atraumatic  Mouth/Throat:      Pharynx: Oropharynx is clear  Eyes:      General: No scleral icterus  Cardiovascular:      Pulses: Normal pulses  Pulmonary:      Effort: Pulmonary effort is normal       Breath sounds: No stridor  No wheezing  Abdominal:      Palpations: Abdomen is soft  There is no mass  Tenderness: There is no abdominal tenderness  Musculoskeletal:         General: No swelling  Cervical back: Normal range of motion and neck supple  No rigidity  Skin:     Coloration: Skin is not jaundiced  Neurological:      General: No focal deficit present  Mental Status: He is alert and oriented to person, place, and time  Psychiatric:         Mood and Affect: Mood normal          Behavior: Behavior normal         Assessment/plan  21-year-old male with multiple comorbidities including hypertension (x 10yrs) , diabetes (x 3yrs) , GERD , bipolar disorder, emphysema, CHF, right lung adenocarcinoma and CKD stage 3  for routine follow-up  1  CKD stage III B :  - Patient has a new baseline creatinine of 2-2 5 mg/dL  Most recent labs show a Creatinine of 2 23 mg/dL on 07/16/2021  Renal function remains stable  - status post recent episode of acute kidney injury non dialysis requiring in December 2019    - likely has underlying CKD secondary to age-related nephron loss plus hypertensive nephrosclerosis plus diabetic glomerular sclerosis plus nephrotoxicity secondary to chemotherapy with Alimta and keytruda    -already following Heme-Onc  Free light chain ratio elevated, SPEP and UPEP within normal limits  - renal ultrasound from August 23, 2019 showed normal kidneys 9 6 and 9 7 cm    - Proteinuria - most recent protein to creatinine ratio of 300 mg as of January 2020  Will check UA, spot urine protein and creatinine    - Acid base and lytes stable  - Recommend to avoid use of NSAIDs, nephrotoxins  Caution advised with regards to exposure to IV contrast dye    - Discussed with the patient in depth his renal status, including the possible etiologies for CKD  - Advised the patient that when his GFR is close to 20mL/min then will start discussing about RRT(renal replacement therapy) options such as renal transplant, peritoneal dialysis and hemodialysis  - Informed the patient about the various options for Renal Replacement therapy    - Discussed with the patient how we need to work together to delay the progression of CKD with optimal BP control based on their age and co-morbidities, optimal BS control with HbA1c of <7% and trying to reduce proteinuria by the use of anti-proteinuric agents  - referral to CKD education/kidney smart placed on 07/21/2021     2  Hypertension:  - Patient is on Cardizem 120 mg p o  Q day    - Goal BP of <  140/90 based on age and comorbidities  - Instructed to follow low sodium (2gm)diet  3  Hemoglobin:  - Goal Hb of 10-12 g/dL  - Most recent labs suggestive of 11 5 grams/deciliter  - no role for JUANJOSE due to malignancy follow up with Heme-Onc  - no role for IV iron at this time    4  CKD-MBD(Mineral Bone Disease)/vitamin-D deficiency/secondary hyperparathyroidism of renal origin:  - Based on patients CKD stage following is the goal of therapy  - Maintain calcium phosphorus product of < 55   - patient is currently not at goal  - Stage 3 CKD - Goal Ca 8 5-10 mg/dL , goal Phos 2 7-4 6 mg/dL  , goal iPTH 30-70 pg/mL  - most recent vitamin-D level of 15 3 and intact PTH of 245 as of 07/16/2021  - start ergocalciferol 89638 units p o  Q weekly for 24 weeks then over-the-counter vitamin-D 2000 units p o  q day  - check intact PTH and vitamin-D level, prior to next visit    5  Lipids:  - goal LDL less than 70  - Management as per PCP    6  DM:  - management as per Primary team  - most recent A1c of 5 7% in May of 2019   - on januvia     7  CHF:  - Management as per primary team  - most recent echo from December 2019 showed EF of 55% %, grade 1 diastolic dysfunction    8  Right lung adenocarcinoma:  - follow up with Heme-Onc  - status post treatment with alimta and Keutruda 6 cycles from December 2018 to March 2019  - restarted on maintenance therapy with alimta and Keytruda from July 2019,   - discussed with the patient that both drugs are nephrotoxic   All risks benefits of the medication explained to the patient and advised him to follow up with Heme-Onc in terms of dose adjustment of medications and possible other options, if he is to continue these medications that he needs close monitoring of his renal parameters  - currently on Keytruda  - last seen by Heme-Onc on 07/16/2021 notes reviewed  9  Nutrition:  - Encouraged patient to follow a renal diet comprising of moderate potassium, low phosphorus and protein restriction to 0 8gm/kg  - Will check serum albumin with next blood work  10  Followup:  - Patient is to follow-up in 4 months, with lab work to be performed in a few days prior to the visit  Advised patient to call me in 10 days to review the results if they do not hear back from me, as I may have not received the results  Message sent over to Heme-Onc  I spent 25 minutes with patient today in which greater than 50% of the time was spent in counseling/coordination of care regarding  Patient's blood pressure, patient's diet and patient's follow-up as well as CKD progression and need for renal replacement therapy with CKD progression  VIRTUAL VISIT DISCLAIMER      Domingo Varghese Sr  verbally agrees to participate in [employer]  Pt is aware that [employer] could be limited without vital signs or the ability to perform a full hands-on physical exam  Reggie Cruz Sr  understands he or the provider may request at any time to terminate the video visit and request the patient to seek care or treatment in person

## 2021-08-02 NOTE — TELEPHONE ENCOUNTER
Primary palliative medicine provider:   [place]    Medication requested: oxycodone 30 mg     If for pain, how has the patient been taking their pain medicine?      Last appointment: 7 6    Next scheduled appointment: 9 28      PDMP review: filled 7 6 #150/25

## 2021-08-20 NOTE — ASSESSMENT & PLAN NOTE
Noticed elevated Blood pressures  States he has been eating out often  He does not check his BP at home because he does not have a machine at home anymore  Was on cardizem 120 mg daily, enough refills sent by Dr Lucía Molina back in March  Patient advised to  medications    Come with logs during next visit

## 2021-08-20 NOTE — PROGRESS NOTES
Assessment/Plan:    Essential hypertension  Noticed elevated Blood pressures  States he has been eating out often  He does not check his BP at home because he does not have a machine at home anymore  Was on cardizem 120 mg daily, enough refills sent by Dr Wesley Sabillon back in March  Patient advised to  medications  Come with logs during next visit    Chronic bilateral thoracic back pain  States he has been doing well  Denies flank, Hematuria, chest pain, nausea, vomiting  Last MRI (1/20/2021): Moderate left L4-L5 neural foraminal narrowing has progressed from the prior exam   Currently on Oxycodone 30 mg q4h p r n  and Oxycontin 20 mg BID, pain well managed at this time  Following with palliative care, next visit in 2 months? He reports sleeping well  Good BMs with regimen, goes 2x daily - on senokot 1 tab BID, miralax daily  PDMP reviewed  Follow up in 4 weeks for Diabetes       Diagnoses and all orders for this visit:    Essential hypertension    Chronic bilateral thoracic back pain    Prostatitis, unspecified prostatitis type    Stranguria    Benign prostatic hyperplasia with lower urinary tract symptoms, symptom details unspecified    Type 2 diabetes mellitus without complication, without long-term current use of insulin (HCC)    Adenocarcinoma of lung, right (HCC)          Subjective:      Patient ID: Ana Sinclair  is a 63 y o  male  HPI    This is a 63year old male with history of Lung adenocarcinoma, COPD, AFiB, secondary hyperparathyroidism, GERD, currently on palliative care, presenting for follow up for low back pain  Please refer above for further assessments  The following portions of the patient's history were reviewed and updated as appropriate: allergies, current medications, past family history, past medical history, past social history, past surgical history and problem list     Review of Systems   Constitutional: Negative for chills, fatigue and fever     HENT: Negative for congestion, ear pain, postnasal drip, rhinorrhea, sinus pain and sore throat  Respiratory: Negative for cough and shortness of breath  Cardiovascular: Negative for chest pain  Gastrointestinal: Negative for abdominal pain, diarrhea, nausea and vomiting  Endocrine: Negative for polyuria  Genitourinary: Negative for decreased urine volume, dysuria and frequency  Musculoskeletal: Negative for joint swelling  Skin: Negative for rash  Neurological: Negative for dizziness and headaches  Objective:    BP (!) 160/102 (BP Location: Left arm, Patient Position: Sitting, Cuff Size: Standard)   Pulse 85   Temp 97 8 °F (36 6 °C) (Temporal)   Resp 18   Ht 5' 8" (1 727 m)   Wt 95 3 kg (210 lb)   SpO2 95%   BMI 31 93 kg/m²      Physical Exam  Vitals reviewed  Constitutional:       General: He is not in acute distress  Comments: On oxygen 3L NC    Eyes:      General: No scleral icterus  Conjunctiva/sclera: Conjunctivae normal    Neck:      Thyroid: No thyromegaly  Trachea: No tracheal deviation  Cardiovascular:      Rate and Rhythm: Normal rate and regular rhythm  Heart sounds: Normal heart sounds  No murmur heard  Pulmonary:      Effort: Pulmonary effort is normal  No respiratory distress  Breath sounds: No wheezing  Comments: Course breath sounds  Chest:      Chest wall: No tenderness  Abdominal:      General: Bowel sounds are normal  There is no distension  Palpations: Abdomen is soft  There is no mass  Tenderness: There is no abdominal tenderness  There is no guarding  Musculoskeletal:      Cervical back: Normal range of motion and neck supple  Right lower leg: Edema present  Left lower leg: Edema present  Comments: 1+ edema bilaterally   Lymphadenopathy:      Cervical: No cervical adenopathy  Skin:     General: Skin is warm  Capillary Refill: Capillary refill takes less than 2 seconds     Neurological:      Mental Status: He is alert and oriented to person, place, and time

## 2021-08-20 NOTE — ASSESSMENT & PLAN NOTE
States he has been doing well  Denies flank, Hematuria, chest pain, nausea, vomiting  Last MRI (1/20/2021): Moderate left L4-L5 neural foraminal narrowing has progressed from the prior exam   Currently on Oxycodone 30 mg q4h p r n  and Oxycontin 20 mg BID, pain well managed at this time  Following with palliative care, next visit in 2 months? He reports sleeping well     Good BMs with regimen, goes 2x daily - on senokot 1 tab BID, miralax daily  PDMP reviewed  Follow up in 4 weeks for Diabetes

## 2021-08-27 NOTE — PLAN OF CARE
Problem: Potential for Falls  Goal: Patient will remain free of falls  Description: INTERVENTIONS:  - Educate patient/family on patient safety including physical limitations  - Instruct patient to call for assistance with activity   - Consult OT/PT to assist with strengthening/mobility   - Keep Call bell within reach  - Keep bed low and locked with side rails adjusted as appropriate  - Keep care items and personal belongings within reach  - Initiate and maintain comfort rounds  - Make Fall Risk Sign visible to staff  - Apply yellow socks and bracelet for high fall risk patients  - Consider moving patient to room near nurses station  Outcome: Progressing
good balance

## 2021-08-27 NOTE — PROGRESS NOTES
Hematology/Oncology Outpatient Follow-up  Morgan Mendiola Sr  63 y o  male 1960 [id number]    Date:  8/27/2021        Assessment and Plan:  1  Adenocarcinoma of lung, right [facility])    The patient stated that his breathing gets very heavy after each Keytruda for couple of weeks  We decided to go back to the original dose of Keytruda 200 mg IV on every 3 week basis instead on every 6 week basis  I did ask him to increase the dose of the prednisone up to 10 mg once a day hoping that this will improve his breathing  He is also to follow up with his palliative care team and Pulmonary team   - CBC and differential; Future  - Comprehensive metabolic panel; Future  - Magnesium  - predniSONE 10 mg tablet; Take 1 tablet (10 mg total) by mouth daily  Dispense: 30 tablet; Refill: 4  - CBC and differential; Future  - Comprehensive metabolic panel; Future  - Magnesium; Future    2  Chronic obstructive pulmonary disease with acute exacerbation (HCC)    He did not complain about productive cough to put him on another course of antibiotics but we increase the dose of the prednisone to 10 mg once a day hoping that this will improve the extensive wheezing bilaterally  - predniSONE 10 mg tablet; Take 1 tablet (10 mg total) by mouth daily  Dispense: 30 tablet; Refill: 4      Addendum:    The patient has metastatic adenocarcinoma of the lung and is oxygen dependent 24 hours a day  He is very symptomatic and unable to walk small distances due to the severe shortness of breath  He usually ambulates with wheelchair and rarely with the walker  He gets extensively short of breath after walking couple of steps  The patient needs a scooter since he is not able to push himself around with wheelchair  HPI:   the patient came today for a follow-up visit  He continues to complain about worsening of his breathing on minimal activities or even at rest   He uses 3 L per nasal cannula of oxygen around the clock    He did benefit slightly from the recent course of Zithromax and steroids  He continues to take prednisone 5 mg once a day  He did not have blood work prior to this office visit  Oncology History Overview Note    male history of heavy smoker who used to smoke 2 packs of cigarettes daily for many years, COPD, he presented with dyspnea, CT scan of the chest on October 2018 showed right middle lobe spiculated 1 3 cm mass with multiple solid and ground-glass nodules along the major and minor fissures sub cm pulmonary nodules bilaterally, left adrenal 1 2 cm nodule      PET scan showed 1 3 cm right middle lung nodule with SUV of 6 8, numerous nodular densities along the right major and minor fissures, right hilar activity with SUV of 3 4, subcarinal lymph node measuring 7 mm with SUV of 2 7, periportal enlarged lymph nodes concerning for metastatic disease measuring 2 4 cm with SUV of 5, prominent left retrocrural lymph node measuring 1 cm x 9 mm with SUV of 1 1     Core biopsy of the right spiculated nodule showed invasive adenocarcinoma consistent with lung primary positive for CK7, TTF 1, napsin a     Adenocarcinoma of lung, right ([address] )   11/13/2018 Initial Diagnosis    Adenocarcinoma of lung, right ([address] )  Stage IV     11/13/2018 Biopsy    A  Lung, right, core needle biopsies:             - Invasive adenocarcinoma, consistent with lung primary  12/13/2018 - 3/28/2019 Chemotherapy     Alimta, Carboplatin (AUC 5) + Keytruda (6 cycles total)     4/17/2019 -  Chemotherapy    Started maintenance Alimta and Keytruda  (Alimta d/c'd 9/16/19 due to worsening renal dysfunction)  Keytruda changed to the every 6 week dosing (400 mg) 12/2020         Interval history:    ROS: Review of Systems   Constitutional: Negative for chills and fever  HENT: Negative for ear pain and sore throat  Eyes: Negative for pain and visual disturbance  Respiratory: Positive for cough and shortness of breath      Cardiovascular: Negative for chest pain and palpitations  Gastrointestinal: Negative for abdominal pain and vomiting  Genitourinary: Negative for dysuria and hematuria  Musculoskeletal: Positive for arthralgias  Negative for back pain  Skin: Negative for color change and rash  Neurological: Positive for numbness  Negative for seizures and syncope  All other systems reviewed and are negative        Past Medical History:   Diagnosis Date    Alkalosis 12/9/2019    Bipolar 1 disorder ([address] )     Cancer ([facility] )     adeno lung  dx 11/2018-lung bx today 4/9/2019-ongoing chemo    Chronic obstructive pulmonary disease with acute exacerbation ([address] ) 6/7/2018    Chronic pain disorder     back and right shoulder    CKD (chronic kidney disease)     COPD (chronic obstructive pulmonary disease) ([facility] )     Depression     Diabetes mellitus ([facility] )     Elevated d-dimer 11/30/2019    Elevated troponin 11/29/2019    Elevated troponin level not due to acute coronary syndrome 11/30/2019    GERD (gastroesophageal reflux disease)     Herniated lumbar intervertebral disc     Hyperkalemia     Hypertension     Insomnia     Latent syphilis     Treated    Low back pain     Lumbosacral disc disease     Lung cancer ([facility] ) 04/2019    Pneumothorax after biopsy     R lung    PTSD (post-traumatic stress disorder)     Pulmonary emphysema ([facility] )     Tobacco abuse 11/13/2018       Past Surgical History:   Procedure Laterality Date    COLONOSCOPY  2011    CT GUIDED CHEST TUBE  11/21/2018    FL GUIDED CENTRAL VENOUS ACCESS DEVICE INSERTION  2/8/2019    HEMORROIDECTOMY      IR BIOPSY LUNG  11/13/2018    IR CHEST TUBE PLACEMENT  11/14/2018    KNEE SURGERY      TX INSJ TUNNELED CTR VAD W/SUBQ PORT AGE 5 YR/> N/A 2/8/2019    Procedure: PLACEMENT OF PORT-A-CATH;  Surgeon: Lionel Mayorga MD;  Location:  MAIN OR;  Service: Vascular       Social History     Socioeconomic History    Marital status: Legally      Spouse name: None    Number of children: None    Years of education: None    Highest education level: None   Occupational History    Occupation: part time employment   Tobacco Use    Smoking status: Former Smoker     Packs/day: 0 50     Years: 40 00     Pack years: 20 00     Types: Cigarettes     Start date:      Quit date: 2019     Years since quittin 0    Smokeless tobacco: Never Used    Tobacco comment: will smoke a cigarette on occasion with stress   Vaping Use    Vaping Use: Never used   Substance and Sexual Activity    Alcohol use: Not Currently    Drug use: Not Currently     Types: Marijuana     Comment: "I will smoke a joint if I am stressed out but not every day"    Sexual activity: Yes   Other Topics Concern    None   Social History Narrative    Lives with girlfriend     Social Determinants of Health     Financial Resource Strain:     Difficulty of Paying Living Expenses:    Food Insecurity:     Worried About Running Out of Food in the Last Year:     Ran Out of Food in the Last Year:    Transportation Needs:     Lack of Transportation (Medical):      Lack of Transportation (Non-Medical):    Physical Activity:     Days of Exercise per Week:     Minutes of Exercise per Session:    Stress:     Feeling of Stress :    Social Connections:     Frequency of Communication with Friends and Family:     Frequency of Social Gatherings with Friends and Family:     Attends Gnosticist Services:     Active Member of Clubs or Organizations:     Attends Club or Organization Meetings:     Marital Status:    Intimate Partner Violence:     Fear of Current or Ex-Partner:     Emotionally Abused:     Physically Abused:     Sexually Abused:        Family History   Problem Relation Age of Onset    Heart disease Mother     Cancer Mother     Hypertension Father     Diabetes Family     Asthma Family     Heart disease Family     Cancer Family     Cancer Maternal Grandfather     Cancer Paternal Florecita Nipple Cancer Maternal Aunt        Allergies   Allergen Reactions    Lisinopril Anaphylaxis     Took medication a while ago and had a "swelling reaction"  Does not carry epi-pen         Current Outpatient Medications:     albuterol (2 5 mg/3 mL) 0 083 % nebulizer solution, Take 1 vial (2 5 mg total) by nebulization every 6 (six) hours as needed for wheezing or shortness of breath, Disp: 360 mL, Rfl: 5    albuterol (PROVENTIL HFA,VENTOLIN HFA) 90 mcg/act inhaler, Inhale 2 puffs every 4 (four) hours as needed for wheezing, Disp: 18 g, Rfl: 5    apixaban (ELIQUIS) 2 5 mg, Take 1 tablet (2 5 mg total) by mouth 2 (two) times a day, Disp: 60 tablet, Rfl: 11    Blood Glucose Monitoring Suppl KIT, by Does not apply route daily, Disp: 1 each, Rfl: 0    budesonide (PULMICORT) 0 5 mg/2 mL nebulizer solution, Take 1 vial (0 5 mg total) by nebulization every 12 (twelve) hours Rinse mouth after use , Disp: 1 vial, Rfl: 0    carbamide peroxide (DEBROX) 6 5 % otic solution, Administer 5 drops into both ears 2 (two) times a day, Disp: 15 mL, Rfl: 0    clotrimazole-betamethasone (LOTRISONE) 1-0 05 % cream, Apply topically 2 (two) times a day, Disp: 45 g, Rfl: 2    diltiazem (CARDIZEM CD) 120 mg 24 hr capsule, Take 1 capsule (120 mg total) by mouth daily, Disp: 90 capsule, Rfl: 3    divalproex sodium (DEPAKOTE) 250 mg EC tablet, Take 1 tablet (250 mg total) by mouth 2 (two) times a day, Disp: 30 tablet, Rfl: 0    ergocalciferol (VITAMIN D2) 50,000 units, Take 1 capsule (50,000 Units total) by mouth once a week, Disp: 12 capsule, Rfl: 1    FLUoxetine (PROzac) 10 MG tablet, Take 1 tablet (10 mg total) by mouth daily, Disp: 30 tablet, Rfl: 5    fluticasone (FLONASE) 50 mcg/act nasal spray, 1-2 sprays each nostril daily for allergies, Disp: 16 g, Rfl: 3    fluticasone-salmeterol (ADVAIR, WIXELA) 250-50 mcg/dose inhaler, Inhale 1 puff 2 (two) times a day Rinse mouth after use , Disp: 60 each, Rfl: 3    folic acid (FOLVITE) 1 mg tablet, TAKE 1 TABLET BY MOUTH DAILY  , Disp: 90 tablet, Rfl: 2    formoterol (PERFOROMIST) 20 MCG/2ML nebulizer solution, Take 1 vial (20 mcg total) by nebulization 2 (two) times a day, Disp: 60 vial, Rfl: 1    FREESTYLE LITE test strip, TEST BLOOD SUGAR DAILY, Disp: 100 each, Rfl: 1    guaiFENesin (MUCINEX) 600 mg 12 hr tablet, Take 600 mg by mouth every 12 (twelve) hours, Disp: , Rfl:     ipratropium-albuterol (DUO-NEB) 0 5-2 5 mg/3 mL nebulizer solution, Take 1 vial (3 mL total) by nebulization 4 (four) times a day, Disp: 15 mL, Rfl: 3    Lancets (FREESTYLE) lancets, TEST BLOOD SUGAR DAILY, Disp: 100 each, Rfl: 4    loratadine (CLARITIN) 10 mg tablet, Take 1 tablet (10 mg total) by mouth daily, Disp: 90 tablet, Rfl: 3    melatonin 3 mg, Take 1 tablet (3 mg total) by mouth daily at bedtime, Disp: 90 tablet, Rfl: 1    montelukast (SINGULAIR) 10 mg tablet, TAKE 1 TABLET BY MOUTH DAILY AT BEDTIME, Disp: 90 tablet, Rfl: 0    olopatadine (PATANOL) 0 1 % ophthalmic solution, Administer 1 drop to both eyes 2 (two) times a day, Disp: 5 mL, Rfl: 3    oxyCODONE (OxyCONTIN) 20 mg 12 hr tablet, Take 1 tablet (20 mg total) by mouth every 12 (twelve) hoursMax Daily Amount: 40 mg, Disp: 60 tablet, Rfl: 0    oxyCODONE (ROXICODONE) 30 MG immediate release tablet, Take 1 tablet (30 mg total) by mouth every 4 (four) hours as needed (to relieve cancer pain immediately)Max Daily Amount: 180 mg, Disp: 150 tablet, Rfl: 0    pantoprazole (PROTONIX) 40 mg tablet, Take 1 tablet (40 mg total) by mouth daily, Disp: 90 tablet, Rfl: 3    polyethylene glycol (GLYCOLAX) powder, Take 17 g by mouth daily, Disp: 527 g, Rfl: 1    predniSONE 20 mg tablet, Take 1 tablet (20 mg total) by mouth daily For 5 days then go back to 5 mg daily, Disp: 5 tablet, Rfl: 0    pregabalin (LYRICA) 25 mg capsule, Take 1 capsule PO in morning and 2 capsules PO at bedtime, Disp: 90 capsule, Rfl: 2    QUEtiapine (SEROquel) 25 mg tablet, TAKE 0 5 TABLETS (12 5 MG TOTAL) BY MOUTH DAILY AT BEDTIME, Disp: 15 tablet, Rfl: 0    senna-docusate sodium (SENOKOT-S) 8 6-50 mg per tablet, Take 1 tablet by mouth 2 (two) times a day, Disp: 60 tablet, Rfl: 11    sitaGLIPtin (JANUVIA) 50 mg tablet, Take 1 tablet (50 mg total) by mouth daily, Disp: 90 tablet, Rfl: 0    sodium chloride (OCEAN) 0 65 % nasal spray, 2 sprays into each nostril as needed for congestion, Disp: 60 mL, Rfl: 3    tamsulosin (FLOMAX) 0 4 mg, Take 1 capsule (0 4 mg total) by mouth daily with dinner, Disp: 90 capsule, Rfl: 0    ondansetron (ZOFRAN) 8 mg tablet, Take 1 tablet (8 mg total) by mouth every 8 (eight) hours as needed for nausea or vomiting, Disp: 20 tablet, Rfl: 3    predniSONE 10 mg tablet, Take 1 tablet (10 mg total) by mouth daily, Disp: 30 tablet, Rfl: 4  No current facility-administered medications for this visit  Facility-Administered Medications Ordered in Other Visits:     lidocaine (LMX) 4 % cream, , Topical, Daily PRN, Lani Hernandez MD    pembrolizumab [facility]) 200 mg in sodium chloride 0 9 % 50 mL IVPB, 200 mg, Intravenous, Once, Lani Hernandez MD    sodium chloride 0 9 % infusion, 20 mL/hr, Intravenous, Once, Lani Hernandez MD      Physical Exam:  /74 (BP Location: Left arm, Patient Position: Sitting, Cuff Size: Large)   Pulse 100   Temp 98 9 °F (37 2 °C) (Tympanic)   Resp 18   Ht 5' 8" (1 727 m)   Wt 92 1 kg (203 lb)   SpO2 99%   BMI 30 87 kg/m²     Physical Exam  Constitutional:       Appearance: He is well-developed  He is ill-appearing  HENT:      Head: Normocephalic and atraumatic  Eyes:      General: No scleral icterus  Right eye: No discharge  Left eye: No discharge  Conjunctiva/sclera: Conjunctivae normal       Pupils: Pupils are equal, round, and reactive to light  Neck:      Thyroid: No thyromegaly  Trachea: No tracheal deviation  Cardiovascular:      Rate and Rhythm: Regular rhythm  Tachycardia present        Heart sounds: Normal heart sounds  No murmur heard  No friction rub  Pulmonary:      Effort: Pulmonary effort is normal  No respiratory distress  Breath sounds: Wheezing (  Extensive wheezing bilaterally) present  No rales  Chest:      Chest wall: No tenderness  Abdominal:      General: There is no distension  Palpations: Abdomen is soft  There is no hepatomegaly or splenomegaly  Tenderness: There is no abdominal tenderness  There is no guarding or rebound  Musculoskeletal:         General: No tenderness or deformity  Normal range of motion  Cervical back: Normal range of motion and neck supple  Lymphadenopathy:      Cervical: No cervical adenopathy  Skin:     General: Skin is warm and dry  Coloration: Skin is not pale  Findings: No erythema or rash  Neurological:      Mental Status: He is alert and oriented to person, place, and time  Cranial Nerves: No cranial nerve deficit  Coordination: Coordination normal       Deep Tendon Reflexes: Reflexes are normal and symmetric  Psychiatric:         Behavior: Behavior normal          Thought Content: Thought content normal          Judgment: Judgment normal            Labs:  Lab Results   Component Value Date    WBC 8 50 07/16/2021    HGB 11 5 (L) 07/16/2021    HCT 35 6 (L) 07/16/2021    MCV 83 07/16/2021     07/16/2021     Lab Results   Component Value Date    K 5 0 07/16/2021     07/16/2021    CO2 31 (H) 07/16/2021    BUN 20 07/16/2021    CREATININE 2 23 (H) 07/16/2021    GLUF 102 (H) 08/24/2020    CALCIUM 9 1 07/16/2021    CORRECTEDCA 8 7 07/12/2021    AST 31 07/16/2021    ALT 22 07/16/2021    ALKPHOS 103 07/16/2021    EGFR 36 (L) 07/16/2021     No results found for: TSH    Patient voiced understanding and agreement in the above discussion  Aware to contact our office with questions/symptoms in the interim

## 2021-08-27 NOTE — PROGRESS NOTES
Central labs taken today and ok to proceed without results  Pt tolerated [facility] ([place] well today without complications  Adjusted new schedule to every 3 weeks and AVS provided

## 2021-08-27 NOTE — PROGRESS NOTES
TIME OUT:  Spoke with Crystal Khan, AKI  Patient Vijay Valdes dose is going back to 200mg every 3 weeks starting today  Ok to treat with pending labs  Spoke with Leydi Sampson in pharmacy and he is aware

## 2021-09-01 NOTE — TELEPHONE ENCOUNTER
Primary palliative medicine provider: Dr Guerrero Jain    Medication requested:Oxycontin 20mg and oxycodone 30mg  If for pain, how has the patient been taking their pain medicine?      Last appointment: 7/6/21    Next scheduled appointment:9/28/21    PDMP review:    08/04/2021 2 08/04/2021   OXYCODONE HCL 30 MG TABLET  150 0 25 MI PIP   [id number]  PENNS [phone] [place]  08/03/2021 2 07/06/2021   OXYCONTIN ER 20 MG TABLET  60 0 30 MI PIP   [id number]  PENNS [phone] [place]

## 2021-09-17 NOTE — PROGRESS NOTES
Patient had no blood return on port access, cathflo instilled and after one hour, brisk blood return  Labs drawn peripherally today  Tolerated keytruda well without complications    Confirmed next cycle and declined avs

## 2021-09-24 NOTE — PROGRESS NOTES
Assessment/Plan:    Type 2 diabetes mellitus without complication, without long-term current use of insulin ([facility])    Lab Results   Component Value Date    HGBA1C 6 4 09/24/2021     Continue on Januvia 50 mg daily    Essential hypertension  /78, better controlled   Currently on cardizem 120 mg daily  Reports compliance with medications  Has an appointment with cardiology in 3 days    Peripheral neuropathy  Possibly diabetic neuropathy; although rare, current treatment with Pembrolizumab may be contributory to his neuropathy  Denies any alcohol use  Thyroid function tests normal 9/17/2021  Will check Vitamin B12 and folate  Brief foot exam is unremarkable right now  Will put in a podiatry referral  Previously on pregabalin, but does not recall this  Ordered gabapentin 100 mg BID for now, if drowsiness, use only at bedtime  Follow up for annual physical in 3 months    Gastroesophageal reflux disease without esophagitis  Unsure if he has protonix, although this is already part of his prescription  Will reorder at this time; explained about taking this half-1h prior to eating first meal   Physical exam with wheezing throughout lung fields        Diagnoses and all orders for this visit:    Type 2 diabetes mellitus without complication, without long-term current use of insulin ([facility] [address] [address] )  -     POCT hemoglobin A1c  -     Ambulatory referral to Podiatry; Future    Seasonal allergies  -     sodium chloride (OCEAN) 0 65 % nasal spray; 2 sprays into each nostril as needed for congestion    Essential hypertension    Adenocarcinoma of lung, right (HCC)  -     pantoprazole (PROTONIX) 40 mg tablet; Take 1 tablet (40 mg total) by mouth daily    Peripheral polyneuropathy  -     gabapentin (NEURONTIN) 100 mg capsule; Take 1 capsule (100 mg total) by mouth 2 (two) times a day  -     Vitamin B12; Future  -     Folate;  Future    Gastroesophageal reflux disease without esophagitis          Subjective:      Patient ID: Melony Michelet Sr  is a 63 y o  male  HPI    Patient is a 63year old male with history of Lung adenocarcinoma, COPD, AFiB, secondary hyperparathyroidism, GERD,  here for hypertension and diabetes follow up  He reports doing alright, currently undergoing treatment for adenocarcinoma of the lungs  Patient states his pain is well managed on his current regimen  Reports burning sensation in bilateral feel  Occurs about every other day, sometimes daily  Denies any alcohol use, says every once in a while, drinks a shot when his family comes around, but states this is not often  Reports gnawing sensation in the epigastric region, sometimes bilateral chest region, which makes him burp a lot and is relieved when moving his left arm around  Although he has less endurance given his lung cancer, he reports his SOB is stable, denies substernal chest pain, syncopal or anginal CP  The following portions of the patient's history were reviewed and updated as appropriate: allergies, current medications, past family history, past medical history, past social history, past surgical history and problem list     Review of Systems   Constitutional: Negative for chills, fatigue and fever  HENT: Negative for congestion and ear pain  Respiratory: Positive for shortness of breath  Negative for cough  Cardiovascular: Negative for chest pain and palpitations  Gastrointestinal: Positive for abdominal pain (epigastric)  Negative for diarrhea, nausea and vomiting  Genitourinary: Negative for decreased urine volume, dysuria and frequency  Musculoskeletal: Negative for joint swelling  Neurological: Negative for dizziness and headaches  Psychiatric/Behavioral: Negative for behavioral problems           Objective:    /78 (BP Location: Left arm, Patient Position: Sitting, Cuff Size: Standard)   Pulse 103   Temp 98 1 °F (36 7 °C) (Temporal)   Resp 18   Ht 5' 7 99" (1 727 m)   Wt 95 5 kg (210 lb 8 oz)   SpO2 99%   BMI 32 02 kg/m² Physical Exam  Vitals reviewed  Constitutional:       General: He is not in acute distress  Comments: On oxygen 3L NC    Eyes:      Conjunctiva/sclera: Conjunctivae normal    Neck:      Thyroid: No thyromegaly  Trachea: No tracheal deviation  Cardiovascular:      Rate and Rhythm: Normal rate and regular rhythm  Pulses: no weak pulses          Dorsalis pedis pulses are 2+ on the right side and 2+ on the left side  Posterior tibial pulses are 2+ on the right side and 2+ on the left side  Heart sounds: Normal heart sounds  No murmur heard  Pulmonary:      Effort: Pulmonary effort is normal  No respiratory distress  Breath sounds: No wheezing  Comments: Course breath sounds through with minimal wheezes  Chest:      Chest wall: No tenderness  Abdominal:      General: Bowel sounds are normal  There is no distension  Palpations: Abdomen is soft  There is no mass  Tenderness: There is no abdominal tenderness  Musculoskeletal:      Cervical back: Normal range of motion and neck supple  Right lower leg: Edema present  Left lower leg: Edema present  Comments: 1+ edema bilaterally   Feet:      Right foot:      Skin integrity: No ulcer, skin breakdown, erythema, warmth, callus or dry skin  Left foot:      Skin integrity: No ulcer, skin breakdown, erythema, warmth, callus or dry skin  Lymphadenopathy:      Cervical: No cervical adenopathy  Skin:     General: Skin is warm  Capillary Refill: Capillary refill takes less than 2 seconds  Neurological:      Mental Status: He is alert and oriented to person, place, and time  Patient's shoes and socks removed  Right Foot/Ankle   Right Foot Inspection  Skin Exam: skin normal and skin intact no dry skin, no warmth, no callus, no erythema, no maceration, no abnormal color, no pre-ulcer, no ulcer and no callus                          Toe Exam: ROM and strength within normal limitsno swelling, no tenderness, erythema and  no right toe deformity  Sensory       Monofilament testing: intact  Vascular  Capillary refills: < 3 seconds  The right DP pulse is 2+  The right PT pulse is 2+  Left Foot/Ankle  Left Foot Inspection  Skin Exam: skin normal and skin intactno dry skin, no warmth, no erythema, no maceration, normal color, no pre-ulcer, no ulcer and no callus                         Toe Exam: ROM and strength within normal limitsno swelling, no tenderness, no erythema and no left toe deformity                   Sensory       Monofilament: intact  Vascular  Capillary refills: < 3 seconds  The left DP pulse is 2+  The left PT pulse is 2+  Assign Risk Category:  No deformity present; No loss of protective sensation;  No weak pulses       Risk: 0

## 2021-09-24 NOTE — ASSESSMENT & PLAN NOTE
Possibly diabetic neuropathy; although rare, current treatment with Pembrolizumab may be contributory to his neuropathy    Denies any alcohol use  Thyroid function tests normal 9/17/2021  Will check Vitamin B12 and folate  Brief foot exam is unremarkable right now  Will put in a podiatry referral  Previously on pregabalin, but does not recall this  Ordered gabapentin 100 mg BID for now, if drowsiness, use only at bedtime  Follow up for annual physical in 3 months

## 2021-09-24 NOTE — ASSESSMENT & PLAN NOTE
/78, better controlled   Currently on cardizem 120 mg daily  Reports compliance with medications  Has an appointment with cardiology in 3 days

## 2021-09-24 NOTE — ASSESSMENT & PLAN NOTE
Unsure if he has protonix, although this is already part of his prescription  Will reorder at this time; explained about taking this half-1h prior to eating first meal   Physical exam with wheezing throughout lung fields

## 2021-09-28 PROBLEM — M94.0 COSTOCHONDRITIS: Status: ACTIVE | Noted: 2021-01-01

## 2021-09-28 PROBLEM — Z55.0 LIMITED LITERACY: Chronic | Status: ACTIVE | Noted: 2021-01-01

## 2021-09-28 PROBLEM — I20.0 UNSTABLE ANGINA (HCC): Status: ACTIVE | Noted: 2021-01-01

## 2021-09-28 PROBLEM — R07.9 CHEST PAIN: Status: ACTIVE | Noted: 2021-01-01

## 2021-09-28 NOTE — Clinical Note
Teammates, I am concerned that pt may have some cardiac issues that may benefit from some adjustments: his weight has climbed, and he looks like he even has ascites  He describes unstable L sided chest pressure and palpitations asso with dyspnea that may be worsened by short-acting beta agonists  Will trial levalbuterol to attempt to alleviate this  Appreciate your assist in optimizing his cares  Pls make sure to schedule transport for him for all in-person visits

## 2021-09-28 NOTE — PROGRESS NOTES
Outpatient Follow-Up - Palliative and Supportive Care   Annye Pod Sr  63 y o  male [id number]    Assessment & Plan  Problem List Items Addressed This Visit     Adenocarcinoma of lung, right (HCC)    Relevant Medications    oxyCODONE (OxyCONTIN) 20 mg 12 hr tablet    oxyCODONE (ROXICODONE) 30 MG immediate release tablet    levalbuterol (Xopenex) 1 25 mg/3 mL nebulizer solution    COPD (chronic obstructive pulmonary disease) (HCC)    Relevant Medications    levalbuterol (Xopenex) 1 25 mg/3 mL nebulizer solution    Costochondritis    Limited literacy - Primary (Chronic)    Palliative care patient    Relevant Medications    oxyCODONE (OxyCONTIN) 20 mg 12 hr tablet    oxyCODONE (ROXICODONE) 30 MG immediate release tablet    Unstable angina (HCC)      Other Visit Diagnoses     Cancer-related pain  (Chronic)       Relevant Medications    oxyCODONE (OxyCONTIN) 20 mg 12 hr tablet    oxyCODONE (ROXICODONE) 30 MG immediate release tablet        - Counseling on health screening and disease prevention, COVID-19 specific (CPT V65 49)    Medications adjusted this encounter:  Requested Prescriptions     Signed Prescriptions Disp Refills    oxyCODONE (OxyCONTIN) 20 mg 12 hr tablet 60 tablet 0     Sig: Take 1 tablet (20 mg total) by mouth every 12 (twelve) hoursMax Daily Amount: 40 mg    oxyCODONE (ROXICODONE) 30 MG immediate release tablet 150 tablet 0     Sig: Take 1 tablet (30 mg total) by mouth every 4 (four) hours as needed (to relieve cancer pain immediately)Max Daily Amount: 180 mg    levalbuterol (Xopenex) 1 25 mg/3 mL nebulizer solution 270 mL 0     Sig: Take 3 mL (1 25 mg total) by nebulization 3 (three) times a day     No orders of the defined types were placed in this encounter      Medications Discontinued During This Encounter   Medication Reason    oxyCODONE (OxyCONTIN) 20 mg 12 hr tablet Reorder    oxyCODONE (ROXICODONE) 30 MG immediate release tablet Reorder   - No medicine changes today with opioids   - Trial of levalbuterol to hopefully reduce chest symptoms that may be tachyarrhythmias related to traditional Sal/LABAs  - Pt already on max dose steroids for his immunotherapy; additional NSAIDS not likely helpful for rec'd for costochondritis  Huey Pressley  was seen today for symptoms and planning cares related to above illnesses  I have reviewed the patient's controlled substance dispensing history in the Prescription Drug Monitoring Program in compliance with the [facility] regulations before prescribing any controlled substances  They are invited to continue to follow with us  If there are questions or concerns, please contact us through our clinic/answering service 24 hours a day, seven days a week  Ayaka Milligan MD  [address] Palliative and Supportive Care        Visit Information    Accompanied By: No one    Source of History: Self    History Limitations: None    Contacts: June Sci -     History of Present Illness      Huey Pressley  is a 63 y o  male who presents in follow up of symptoms related to adenocarcinoma of R lung   He also has severe COPD and PTSD  He continues with Dr Cassy Townsend and Ms Ramón Chu, on monotherapy with [facility]  He is dependent on supplemental O2 at baseline   Pertinent issues include: symptom management, pain, neoplasm related, breathlessness, depression or anxiety      Since last visit, he notes worsening breathing, slightly less terrible with prednisone 10mg daily from Dr Cassy Townsend, but worsening tightness and pain in chest, and also pain in bilat hips  Chest pain is a pressure feeling, left-sided, and sometimes coming across to the R side  Curiously, he feels like it is pressure coming out, rather than pushing down  It is idiopathic and paroxysmal   There are no palliative factors, except that sitting and relaxing seems to reduce the intensity somewhat  It is reproducible with palpation    He had an appt with [facility] yesterday, but missed the appt when [facility] did not coordinate a  for him  He confirms a plan to pursue all treatments that are rec'd / offered  Upset that [facility] was not arranged to help [facility]port him to an appropriate appointment  Also expressed displeasure that he was disserved in the ED this summer by a RODNEY  Today, we agreed to two month f/up, during which time he will try to get back in to visit [facility] to plan for his chest pain, and during which time we will trial levalbuterol  Since last visit, he has continued on OxyCONTIN with good effect  He continues to push forward as he can, to put up with tough symptoms as he must   Otherwise, he has no plans to stop treatment, and his disease process -- according to recent visit in Med/Onc clinic -- is stable vs responding to treatment       Previously, he would have wished to complete XRT as rec'd, he did not feel that the XRT suite was appropriate for his health  [facility] developed a cough and rhinorrhea during his second treatment, and he would not like to catch cold/pna while doing therapy     Previously, pt needed less quetiapine for sleep and anxiety when steroids from Med/Onc were tapered      Past medical, surgical, social, and family histories are reviewed and pertinent updates are made  Review of Systems   Constitutional: Positive for decreased appetite and malaise/fatigue  Negative for weight gain and weight loss  HENT: Negative for hoarse voice and nosebleeds  Eyes: Negative for vision loss in left eye and vision loss in right eye  Cardiovascular: Positive for chest pain, dyspnea on exertion, irregular heartbeat and palpitations  Respiratory: Negative for cough and shortness of breath  Endocrine: Negative for polydipsia, polyphagia and polyuria  Skin: Negative for flushing and itching  Musculoskeletal: Positive for arthritis, back pain, muscle cramps, muscle weakness, myalgias, neck pain and stiffness  Negative for falls     Gastrointestinal: Positive for nausea and vomiting  Negative for anorexia and jaundice  Genitourinary: Negative for frequency  Neurological: Negative for dizziness  Psychiatric/Behavioral: Negative for depression and memory loss  The patient has insomnia and is nervous/anxious  Vital Signs    /70 (BP Location: Left arm, Cuff Size: Standard)   Pulse 94   Temp 97 5 °F (36 4 °C) (Temporal)   Resp 18   Wt 92 1 kg (203 lb 0 7 oz)   SpO2 96%   BMI 30 88 kg/m²     Physical Exam and Objective Data  Physical Exam  Constitutional:       General: He is not in acute distress  Appearance: He is obese  He is ill-appearing  He is not toxic-appearing or diaphoretic  Comments: Frail   HENT:      Head: Normocephalic and atraumatic  Right Ear: External ear normal       Left Ear: External ear normal    Eyes:      General:         Right eye: No discharge  Left eye: No discharge  Conjunctiva/sclera: Conjunctivae normal       Pupils: Pupils are equal, round, and reactive to light  Neck:      Trachea: No tracheal deviation  Cardiovascular:      Rate and Rhythm: Regular rhythm  Tachycardia present  Pulmonary:      Effort: No respiratory distress  Breath sounds: No stridor  Wheezing present  Comments: Very prolonged exp phase and pursed lip breathing  Abdominal:      General: There is distension  Palpations: Abdomen is soft  Comments: obese   Musculoskeletal:         General: Tenderness (chest pain on palpation, worse L side than R) present  Skin:     General: Skin is warm and dry  Findings: No erythema or rash  Neurological:      General: No focal deficit present  Mental Status: He is alert and oriented to person, place, and time  Mental status is at baseline  Cranial Nerves: No cranial nerve deficit  Psychiatric:         Mood and Affect: Mood normal          Behavior: Behavior normal          Thought Content:  Thought content normal          Judgment: Judgment normal            Radiology and Laboratory:  I personally reviewed and interpreted the following results: none new    35+ minutes was spent face to face with Shaquille Beatty  with greater than 50% of the time spent in counseling or coordination of care including discussions of etiology of diagnosis, risks and benefits of treatment, follow up requirements, patient and family counseling/involvement in care and compliance with treatment regimen   Additional time was also spent in discussing coronavirus vaccine indications, availability, and logistical challenges  All of the patient's or agent's questions were answered during this discussion

## 2021-09-28 NOTE — PATIENT INSTRUCTIONS
Please protect yourself from COVID-19 and the delta variant! Even though we do not good antiviral drugs for this infection, the following tools can help you stay healthy:    = Wash your hands! Soap and water, or hand  with at least 60% alcohol, are both effective at killing the virus  = Wear a mask! This will help protect others from any virus particles you might spread  Your mouth and nose BOTH need to be covered  = Keep the distance! Keep 6 feet of distance from other people, even if they seem healthy  Keeping distance protects you from the other person's virus spread     = Get a vaccine! Three vaccines are approved for emergency use in the [place]; they are made by Cristiane Dumont, and [employer]  These vaccines have been shown to be 90+% effective at preventing severe infections when combined with masking, hand-washing, and distancing  These vaccines do provide protection against the dangerous delta variant!  + Pfizer may be given to all persons age 15 and older   + Ita Maclachlan may be given to all adults age 25 and older   + [employer] may be given to all adults age 25 and older  (Serious blood clot side effects have only been reported in reproductive-age women, and these are about 1-in-a-million  We do NOT recommend J&J for people who have had Guillan-[place] syndrome )  = You may get a shot from many other locations outside [address] [place] [address]: [facility], [employer], [address] [place] [address], and certain [facility] locations  + As of 8/13/21, the CDC recommends booster shots on Pfizer and Moderna vaccines for immune-suppressed populations  Ask your doctor if you qualify  https://www [place] com/      We are recommending that ALL our patients get a complete series of one of the three vaccines, as early as it is available to them    Please keep an eye on the [employer], [address], or call [phone] (Choose Option 7 for faster service!) to see when you will become eligible  If you are eligible by the criteria above, please ask our team to help get you a shot! Informacion en espanol sobre vacunas, de nos companeros [address] --  Chelo eagle      Numbers of Coronavirus cases (and COVID deaths) are worsening in many [place] States, among unvaccinated people, we think this is because vaccines are working, but only if you get one! As of 7/1/21, we do NOT advise travel OUTSIDE the [address], and we encourage you to be very careful when planning travel INSIDE the [address]  Check out [employer] for Clark data that are updated daily:    http://www Streak/     Global Epidemics  Org, from [facility] ([facility]PAT[facility] will give you [id number] information on virus cases and vaccination rates:    Https://globalepidemics  org/    Frequently Asked Questions about COVID, answered by [facility]    Security es

## 2021-09-29 NOTE — TELEPHONE ENCOUNTER
Pt called to request a refill on his flonase, he was not sure if Reyna Kevin was the provider that prescribed this  I let him know that Flavia Akers MD was the provider who ordered this last   He will call [facility] to request this refill

## 2021-09-30 NOTE — TELEPHONE ENCOUNTER
PT called regarding status of medications Folic acid, and Oxycodone both of which were recently refilled by [address] [facility] called to inform pt unable to leave  at this time

## 2021-11-24 PROBLEM — F17.211 CIGARETTE NICOTINE DEPENDENCE IN REMISSION: Status: ACTIVE | Noted: 2018-11-13

## 2021-12-09 PROBLEM — M60.19 INTERSTITIAL MYOSITIS OF MULTIPLE SITES: Chronic | Status: ACTIVE | Noted: 2021-01-01

## 2022-01-01 ENCOUNTER — APPOINTMENT (EMERGENCY)
Dept: CT IMAGING | Facility: HOSPITAL | Age: 62
DRG: 720 | End: 2022-01-01
Payer: COMMERCIAL

## 2022-01-01 ENCOUNTER — APPOINTMENT (EMERGENCY)
Dept: RADIOLOGY | Facility: HOSPITAL | Age: 62
DRG: 720 | End: 2022-01-01
Payer: COMMERCIAL

## 2022-01-01 ENCOUNTER — HOSPITAL ENCOUNTER (OUTPATIENT)
Dept: INFUSION CENTER | Facility: HOSPITAL | Age: 62
End: 2022-01-01

## 2022-01-01 ENCOUNTER — APPOINTMENT (INPATIENT)
Dept: RADIOLOGY | Facility: HOSPITAL | Age: 62
DRG: 720 | End: 2022-01-01
Payer: COMMERCIAL

## 2022-01-01 ENCOUNTER — ANESTHESIA EVENT (OUTPATIENT)
Dept: ANESTHESIOLOGY | Facility: HOSPITAL | Age: 62
End: 2022-01-01

## 2022-01-01 ENCOUNTER — ANESTHESIA (OUTPATIENT)
Dept: ANESTHESIOLOGY | Facility: HOSPITAL | Age: 62
End: 2022-01-01

## 2022-01-01 ENCOUNTER — HOSPITAL ENCOUNTER (INPATIENT)
Facility: HOSPITAL | Age: 62
LOS: 3 days | DRG: 720 | End: 2022-01-05
Attending: EMERGENCY MEDICINE | Admitting: HOSPITALIST
Payer: COMMERCIAL

## 2022-01-01 VITALS
DIASTOLIC BLOOD PRESSURE: 70 MMHG | WEIGHT: 215.61 LBS | TEMPERATURE: 97.3 F | HEIGHT: 68 IN | SYSTOLIC BLOOD PRESSURE: 110 MMHG | BODY MASS INDEX: 32.68 KG/M2

## 2022-01-01 DIAGNOSIS — U07.1 PNEUMONIA DUE TO COVID-19 VIRUS: Primary | ICD-10-CM

## 2022-01-01 DIAGNOSIS — N18.9 ACUTE KIDNEY INJURY SUPERIMPOSED ON CKD (HCC): ICD-10-CM

## 2022-01-01 DIAGNOSIS — R65.20 SEVERE SEPSIS (HCC): ICD-10-CM

## 2022-01-01 DIAGNOSIS — U07.1 ACUTE RESPIRATORY FAILURE DUE TO COVID-19 (HCC): ICD-10-CM

## 2022-01-01 DIAGNOSIS — J96.01 ACUTE RESPIRATORY FAILURE WITH HYPOXIA (HCC): ICD-10-CM

## 2022-01-01 DIAGNOSIS — J12.82 PNEUMONIA DUE TO COVID-19 VIRUS: Primary | ICD-10-CM

## 2022-01-01 DIAGNOSIS — E87.2 LACTIC ACIDOSIS: ICD-10-CM

## 2022-01-01 DIAGNOSIS — J96.00 ACUTE RESPIRATORY FAILURE DUE TO COVID-19 (HCC): ICD-10-CM

## 2022-01-01 DIAGNOSIS — E87.5 HYPERKALEMIA: ICD-10-CM

## 2022-01-01 DIAGNOSIS — N17.9 ACUTE KIDNEY INJURY SUPERIMPOSED ON CKD (HCC): ICD-10-CM

## 2022-01-01 DIAGNOSIS — I21.4 NSTEMI (NON-ST ELEVATED MYOCARDIAL INFARCTION) (HCC): ICD-10-CM

## 2022-01-01 DIAGNOSIS — N17.9 ARF (ACUTE RENAL FAILURE) (HCC): ICD-10-CM

## 2022-01-01 DIAGNOSIS — A41.9 SEVERE SEPSIS (HCC): ICD-10-CM

## 2022-01-01 LAB
25(OH)D3 SERPL-MCNC: 64.1 NG/ML (ref 30–100)
2HR DELTA HS TROPONIN: 6 NG/L
4HR DELTA HS TROPONIN: 6 NG/L
ABO GROUP BLD: NORMAL
ABO GROUP BLD: NORMAL
ALBUMIN SERPL BCP-MCNC: 1.9 G/DL (ref 3.5–5)
ALBUMIN SERPL BCP-MCNC: 2 G/DL (ref 3.5–5)
ALBUMIN SERPL BCP-MCNC: 2.5 G/DL (ref 3.5–5)
ALP SERPL-CCNC: 65 U/L (ref 46–116)
ALP SERPL-CCNC: 72 U/L (ref 46–116)
ALP SERPL-CCNC: 74 U/L (ref 46–116)
ALT SERPL W P-5'-P-CCNC: 30 U/L (ref 12–78)
ALT SERPL W P-5'-P-CCNC: 34 U/L (ref 12–78)
ALT SERPL W P-5'-P-CCNC: 37 U/L (ref 12–78)
ANION GAP SERPL CALCULATED.3IONS-SCNC: 11 MMOL/L (ref 4–13)
ANION GAP SERPL CALCULATED.3IONS-SCNC: 12 MMOL/L (ref 4–13)
ANION GAP SERPL CALCULATED.3IONS-SCNC: 13 MMOL/L (ref 4–13)
ANION GAP SERPL CALCULATED.3IONS-SCNC: 13 MMOL/L (ref 4–13)
ANION GAP SERPL CALCULATED.3IONS-SCNC: 15 MMOL/L (ref 4–13)
ANION GAP SERPL CALCULATED.3IONS-SCNC: 15 MMOL/L (ref 4–13)
ANION GAP SERPL CALCULATED.3IONS-SCNC: 17 MMOL/L (ref 4–13)
ANISOCYTOSIS BLD QL SMEAR: PRESENT
ANISOCYTOSIS BLD QL SMEAR: PRESENT
APTT PPP: 30 SECONDS (ref 23–37)
ARTERIAL PATENCY WRIST A: YES
AST SERPL W P-5'-P-CCNC: 31 U/L (ref 5–45)
AST SERPL W P-5'-P-CCNC: 45 U/L (ref 5–45)
AST SERPL W P-5'-P-CCNC: 51 U/L (ref 5–45)
ATRIAL RATE: 118 BPM
ATRIAL RATE: 120 BPM
ATRIAL RATE: 132 BPM
BACTERIA UR QL AUTO: ABNORMAL /HPF
BASE EXCESS BLDA CALC-SCNC: -7.8 MMOL/L
BASOPHILS # BLD MANUAL: 0 THOUSAND/UL (ref 0–0.1)
BASOPHILS # BLD MANUAL: 0 THOUSAND/UL (ref 0–0.1)
BASOPHILS NFR MAR MANUAL: 0 % (ref 0–1)
BASOPHILS NFR MAR MANUAL: 0 % (ref 0–1)
BILIRUB SERPL-MCNC: 0.35 MG/DL (ref 0.2–1)
BILIRUB SERPL-MCNC: 0.49 MG/DL (ref 0.2–1)
BILIRUB SERPL-MCNC: 0.79 MG/DL (ref 0.2–1)
BILIRUB UR QL STRIP: NEGATIVE
BLD GP AB SCN SERPL QL: NEGATIVE
BUN SERPL-MCNC: 119 MG/DL (ref 5–25)
BUN SERPL-MCNC: 134 MG/DL (ref 5–25)
BUN SERPL-MCNC: 156 MG/DL (ref 5–25)
BUN SERPL-MCNC: 78 MG/DL (ref 5–25)
BUN SERPL-MCNC: 83 MG/DL (ref 5–25)
BUN SERPL-MCNC: 94 MG/DL (ref 5–25)
BUN SERPL-MCNC: 94 MG/DL (ref 5–25)
CA-I BLD-SCNC: 1.08 MMOL/L (ref 1.12–1.32)
CALCIUM ALBUM COR SERPL-MCNC: 10.4 MG/DL (ref 8.3–10.1)
CALCIUM ALBUM COR SERPL-MCNC: 9.7 MG/DL (ref 8.3–10.1)
CALCIUM ALBUM COR SERPL-MCNC: 9.9 MG/DL (ref 8.3–10.1)
CALCIUM SERPL-MCNC: 8.1 MG/DL (ref 8.3–10.1)
CALCIUM SERPL-MCNC: 8.5 MG/DL (ref 8.3–10.1)
CALCIUM SERPL-MCNC: 8.6 MG/DL (ref 8.3–10.1)
CALCIUM SERPL-MCNC: 8.7 MG/DL (ref 8.3–10.1)
CALCIUM SERPL-MCNC: 8.8 MG/DL (ref 8.3–10.1)
CARDIAC TROPONIN I PNL SERPL HS: 52 NG/L
CARDIAC TROPONIN I PNL SERPL HS: 58 NG/L
CARDIAC TROPONIN I PNL SERPL HS: 58 NG/L
CHLORIDE SERPL-SCNC: 101 MMOL/L (ref 100–108)
CHLORIDE SERPL-SCNC: 101 MMOL/L (ref 100–108)
CHLORIDE SERPL-SCNC: 102 MMOL/L (ref 100–108)
CHLORIDE SERPL-SCNC: 103 MMOL/L (ref 100–108)
CHLORIDE SERPL-SCNC: 103 MMOL/L (ref 100–108)
CHLORIDE SERPL-SCNC: 104 MMOL/L (ref 100–108)
CHLORIDE SERPL-SCNC: 104 MMOL/L (ref 100–108)
CK MB SERPL-MCNC: 1.1 NG/ML (ref 0–5)
CK MB SERPL-MCNC: 7.1 NG/ML (ref 0–5)
CK MB SERPL-MCNC: <1 % (ref 0–2.5)
CK MB SERPL-MCNC: <1 % (ref 0–2.5)
CK SERPL-CCNC: 2013 U/L (ref 39–308)
CK SERPL-CCNC: 281 U/L (ref 39–308)
CLARITY UR: CLEAR
CO2 SERPL-SCNC: 22 MMOL/L (ref 21–32)
CO2 SERPL-SCNC: 22 MMOL/L (ref 21–32)
CO2 SERPL-SCNC: 24 MMOL/L (ref 21–32)
CO2 SERPL-SCNC: 26 MMOL/L (ref 21–32)
CO2 SERPL-SCNC: 26 MMOL/L (ref 21–32)
COLOR UR: YELLOW
CREAT SERPL-MCNC: 4.67 MG/DL (ref 0.6–1.3)
CREAT SERPL-MCNC: 4.72 MG/DL (ref 0.6–1.3)
CREAT SERPL-MCNC: 4.76 MG/DL (ref 0.6–1.3)
CREAT SERPL-MCNC: 5.25 MG/DL (ref 0.6–1.3)
CREAT SERPL-MCNC: 5.33 MG/DL (ref 0.6–1.3)
CRP SERPL QL: 509.2 MG/L
D DIMER PPP FEU-MCNC: 3.3 UG/ML FEU
EOSINOPHIL # BLD MANUAL: 0 THOUSAND/UL (ref 0–0.4)
EOSINOPHIL # BLD MANUAL: 0 THOUSAND/UL (ref 0–0.4)
EOSINOPHIL NFR BLD MANUAL: 0 % (ref 0–6)
EOSINOPHIL NFR BLD MANUAL: 0 % (ref 0–6)
ERYTHROCYTE [DISTWIDTH] IN BLOOD BY AUTOMATED COUNT: 17.2 % (ref 11.6–15.1)
ERYTHROCYTE [DISTWIDTH] IN BLOOD BY AUTOMATED COUNT: 17.4 % (ref 11.6–15.1)
ERYTHROCYTE [DISTWIDTH] IN BLOOD BY AUTOMATED COUNT: 17.9 % (ref 11.6–15.1)
FLUAV RNA RESP QL NAA+PROBE: NEGATIVE
FLUBV RNA RESP QL NAA+PROBE: NEGATIVE
GFR SERPL CREATININE-BSD FRML MDRD: 10 ML/MIN/1.73SQ M
GFR SERPL CREATININE-BSD FRML MDRD: 11 ML/MIN/1.73SQ M
GFR SERPL CREATININE-BSD FRML MDRD: 12 ML/MIN/1.73SQ M
GLUCOSE SERPL-MCNC: 120 MG/DL (ref 65–140)
GLUCOSE SERPL-MCNC: 127 MG/DL (ref 65–140)
GLUCOSE SERPL-MCNC: 129 MG/DL (ref 65–140)
GLUCOSE SERPL-MCNC: 134 MG/DL (ref 65–140)
GLUCOSE SERPL-MCNC: 135 MG/DL (ref 65–140)
GLUCOSE SERPL-MCNC: 135 MG/DL (ref 65–140)
GLUCOSE SERPL-MCNC: 137 MG/DL (ref 65–140)
GLUCOSE SERPL-MCNC: 140 MG/DL (ref 65–140)
GLUCOSE SERPL-MCNC: 140 MG/DL (ref 65–140)
GLUCOSE SERPL-MCNC: 145 MG/DL (ref 65–140)
GLUCOSE SERPL-MCNC: 148 MG/DL (ref 65–140)
GLUCOSE SERPL-MCNC: 149 MG/DL (ref 65–140)
GLUCOSE SERPL-MCNC: 150 MG/DL (ref 65–140)
GLUCOSE SERPL-MCNC: 151 MG/DL (ref 65–140)
GLUCOSE SERPL-MCNC: 154 MG/DL (ref 65–140)
GLUCOSE SERPL-MCNC: 155 MG/DL (ref 65–140)
GLUCOSE SERPL-MCNC: 160 MG/DL (ref 65–140)
GLUCOSE SERPL-MCNC: 164 MG/DL (ref 65–140)
GLUCOSE SERPL-MCNC: 164 MG/DL (ref 65–140)
GLUCOSE SERPL-MCNC: 165 MG/DL (ref 65–140)
GLUCOSE SERPL-MCNC: 169 MG/DL (ref 65–140)
GLUCOSE SERPL-MCNC: 174 MG/DL (ref 65–140)
GLUCOSE SERPL-MCNC: 187 MG/DL (ref 65–140)
GLUCOSE UR STRIP-MCNC: NEGATIVE MG/DL
HBV SURFACE AG SER QL: NORMAL
HCO3 BLDA-SCNC: 18.4 MMOL/L (ref 22–28)
HCT VFR BLD AUTO: 28.7 % (ref 36.5–49.3)
HCT VFR BLD AUTO: 30.4 % (ref 36.5–49.3)
HCT VFR BLD AUTO: 31.6 % (ref 36.5–49.3)
HCT VFR BLD AUTO: 33.6 % (ref 36.5–49.3)
HCT VFR BLD AUTO: 39 % (ref 36.5–49.3)
HCV AB SER QL: NORMAL
HGB BLD-MCNC: 10.1 G/DL (ref 12–17)
HGB BLD-MCNC: 11.8 G/DL (ref 12–17)
HGB BLD-MCNC: 9.2 G/DL (ref 12–17)
HGB BLD-MCNC: 9.4 G/DL (ref 12–17)
HGB BLD-MCNC: 9.7 G/DL (ref 12–17)
HGB UR QL STRIP.AUTO: ABNORMAL
HIV 1+2 AB+HIV1 P24 AG SERPL QL IA: NORMAL
HIV1 P24 AG SER QL: NORMAL
INR PPP: 1.36 (ref 0.84–1.19)
IPAP: 20
KETONES UR STRIP-MCNC: NEGATIVE MG/DL
LACTATE SERPL-SCNC: 1.7 MMOL/L (ref 0.5–2)
LACTATE SERPL-SCNC: 2.9 MMOL/L (ref 0.5–2)
LACTATE SERPL-SCNC: 3.4 MMOL/L (ref 0.5–2)
LACTATE SERPL-SCNC: 3.9 MMOL/L (ref 0.5–2)
LEUKOCYTE ESTERASE UR QL STRIP: NEGATIVE
LIPASE SERPL-CCNC: 1750 U/L (ref 73–393)
LIPASE SERPL-CCNC: 188 U/L (ref 73–393)
LIPASE SERPL-CCNC: 536 U/L (ref 73–393)
LYMPHOCYTES # BLD AUTO: 0.34 THOUSAND/UL (ref 0.6–4.47)
LYMPHOCYTES # BLD AUTO: 1.27 THOUSAND/UL (ref 0.6–4.47)
LYMPHOCYTES # BLD AUTO: 2 % (ref 14–44)
LYMPHOCYTES # BLD AUTO: 8 % (ref 14–44)
MAGNESIUM SERPL-MCNC: 2.9 MG/DL (ref 1.6–2.6)
MCH RBC QN AUTO: 25.8 PG (ref 26.8–34.3)
MCH RBC QN AUTO: 26 PG (ref 26.8–34.3)
MCH RBC QN AUTO: 26.7 PG (ref 26.8–34.3)
MCH RBC QN AUTO: 26.8 PG (ref 26.8–34.3)
MCH RBC QN AUTO: 27.1 PG (ref 26.8–34.3)
MCHC RBC AUTO-ENTMCNC: 30.1 G/DL (ref 31.4–37.4)
MCHC RBC AUTO-ENTMCNC: 30.3 G/DL (ref 31.4–37.4)
MCHC RBC AUTO-ENTMCNC: 30.7 G/DL (ref 31.4–37.4)
MCHC RBC AUTO-ENTMCNC: 30.9 G/DL (ref 31.4–37.4)
MCHC RBC AUTO-ENTMCNC: 32.1 G/DL (ref 31.4–37.4)
MCV RBC AUTO: 84 FL (ref 82–98)
MCV RBC AUTO: 84 FL (ref 82–98)
MCV RBC AUTO: 87 FL (ref 82–98)
MCV RBC AUTO: 87 FL (ref 82–98)
MCV RBC AUTO: 88 FL (ref 82–98)
METAMYELOCYTES NFR BLD MANUAL: 3 % (ref 0–1)
MONOCYTES # BLD AUTO: 0.64 THOUSAND/UL (ref 0–1.22)
MONOCYTES # BLD AUTO: 0.85 THOUSAND/UL (ref 0–1.22)
MONOCYTES NFR BLD: 4 % (ref 4–12)
MONOCYTES NFR BLD: 5 % (ref 4–12)
MYELOCYTES NFR BLD MANUAL: 2 % (ref 0–1)
NEUTROPHILS # BLD MANUAL: 14.01 THOUSAND/UL (ref 1.85–7.62)
NEUTROPHILS # BLD MANUAL: 14.89 THOUSAND/UL (ref 1.85–7.62)
NEUTS BAND NFR BLD MANUAL: 1 % (ref 0–8)
NEUTS SEG NFR BLD AUTO: 87 % (ref 43–75)
NEUTS SEG NFR BLD AUTO: 88 % (ref 43–75)
NITRITE UR QL STRIP: NEGATIVE
NON VENT- BIPAP: ABNORMAL
NON-SQ EPI CELLS URNS QL MICRO: ABNORMAL /HPF
NRBC BLD AUTO-RTO: 0 /100 WBCS
NT-PROBNP SERPL-MCNC: 147 PG/ML
O2 CT BLDA-SCNC: 16 ML/DL (ref 16–23)
OXYHGB MFR BLDA: 82.7 % (ref 94–97)
P AXIS: 36 DEGREES
P AXIS: 40 DEGREES
P AXIS: 63 DEGREES
PCO2 BLDA: 40 MM HG (ref 36–44)
PEEP MAX SETTING VENT: 16 CM[H2O]
PH BLDA: 7.28 [PH] (ref 7.35–7.45)
PH UR STRIP.AUTO: 5.5 [PH]
PHOSPHATE SERPL-MCNC: 6.4 MG/DL (ref 2.7–4.5)
PLATELET # BLD AUTO: 242 THOUSANDS/UL (ref 149–390)
PLATELET # BLD AUTO: 258 THOUSANDS/UL (ref 149–390)
PLATELET # BLD AUTO: 260 THOUSANDS/UL (ref 149–390)
PLATELET # BLD AUTO: 284 THOUSANDS/UL (ref 149–390)
PLATELET # BLD AUTO: 305 THOUSANDS/UL (ref 149–390)
PLATELET BLD QL SMEAR: ADEQUATE
PLATELET BLD QL SMEAR: ADEQUATE
PMV BLD AUTO: 10.1 FL (ref 8.9–12.7)
PMV BLD AUTO: 10.3 FL (ref 8.9–12.7)
PMV BLD AUTO: 10.4 FL (ref 8.9–12.7)
PMV BLD AUTO: 10.8 FL (ref 8.9–12.7)
PMV BLD AUTO: 9.9 FL (ref 8.9–12.7)
PO2 BLDA: 56 MM HG (ref 75–129)
POTASSIUM SERPL-SCNC: 5.4 MMOL/L (ref 3.5–5.3)
POTASSIUM SERPL-SCNC: 5.5 MMOL/L (ref 3.5–5.3)
POTASSIUM SERPL-SCNC: 5.5 MMOL/L (ref 3.5–5.3)
POTASSIUM SERPL-SCNC: 5.6 MMOL/L (ref 3.5–5.3)
POTASSIUM SERPL-SCNC: 6 MMOL/L (ref 3.5–5.3)
POTASSIUM SERPL-SCNC: 6.2 MMOL/L (ref 3.5–5.3)
POTASSIUM SERPL-SCNC: 6.5 MMOL/L (ref 3.5–5.3)
POTASSIUM SERPL-SCNC: 6.5 MMOL/L (ref 3.5–5.3)
PR INTERVAL: 114 MS
PR INTERVAL: 128 MS
PR INTERVAL: 130 MS
PROCALCITONIN SERPL-MCNC: 134.58 NG/ML
PROCALCITONIN SERPL-MCNC: 145.01 NG/ML
PROCALCITONIN SERPL-MCNC: 185.35 NG/ML
PROT SERPL-MCNC: 6.5 G/DL (ref 6.4–8.2)
PROT SERPL-MCNC: 6.9 G/DL (ref 6.4–8.2)
PROT SERPL-MCNC: 7.7 G/DL (ref 6.4–8.2)
PROT UR STRIP-MCNC: NEGATIVE MG/DL
PROTHROMBIN TIME: 16.3 SECONDS (ref 11.6–14.5)
PTH-INTACT SERPL-MCNC: 134.2 PG/ML (ref 18.4–80.1)
QRS AXIS: 61 DEGREES
QRS AXIS: 62 DEGREES
QRS AXIS: 68 DEGREES
QRSD INTERVAL: 68 MS
QRSD INTERVAL: 68 MS
QRSD INTERVAL: 74 MS
QT INTERVAL: 280 MS
QT INTERVAL: 284 MS
QT INTERVAL: 288 MS
QTC INTERVAL: 398 MS
QTC INTERVAL: 407 MS
QTC INTERVAL: 414 MS
RBC # BLD AUTO: 3.4 MILLION/UL (ref 3.88–5.62)
RBC # BLD AUTO: 3.51 MILLION/UL (ref 3.88–5.62)
RBC # BLD AUTO: 3.76 MILLION/UL (ref 3.88–5.62)
RBC # BLD AUTO: 3.88 MILLION/UL (ref 3.88–5.62)
RBC # BLD AUTO: 4.42 MILLION/UL (ref 3.88–5.62)
RBC #/AREA URNS AUTO: ABNORMAL /HPF
RBC MORPH BLD: PRESENT
RH BLD: POSITIVE
RH BLD: POSITIVE
RSV RNA RESP QL NAA+PROBE: NEGATIVE
SARS-COV-2 RNA RESP QL NAA+PROBE: POSITIVE
SODIUM SERPL-SCNC: 137 MMOL/L (ref 136–145)
SODIUM SERPL-SCNC: 139 MMOL/L (ref 136–145)
SODIUM SERPL-SCNC: 140 MMOL/L (ref 136–145)
SODIUM SERPL-SCNC: 140 MMOL/L (ref 136–145)
SODIUM SERPL-SCNC: 141 MMOL/L (ref 136–145)
SODIUM SERPL-SCNC: 142 MMOL/L (ref 136–145)
SODIUM SERPL-SCNC: 143 MMOL/L (ref 136–145)
SP GR UR STRIP.AUTO: 1.02 (ref 1–1.03)
SPECIMEN EXPIRATION DATE: NORMAL
SPECIMEN SOURCE: ABNORMAL
T WAVE AXIS: 52 DEGREES
T WAVE AXIS: 53 DEGREES
T WAVE AXIS: 58 DEGREES
UROBILINOGEN UR QL STRIP.AUTO: 0.2 E.U./DL
VENT BIPAP FIO2: 100 %
VENTRICULAR RATE: 118 BPM
VENTRICULAR RATE: 120 BPM
VENTRICULAR RATE: 132 BPM
WBC # BLD AUTO: 12.99 THOUSAND/UL (ref 4.31–10.16)
WBC # BLD AUTO: 13.23 THOUSAND/UL (ref 4.31–10.16)
WBC # BLD AUTO: 13.3 THOUSAND/UL (ref 4.31–10.16)
WBC # BLD AUTO: 15.92 THOUSAND/UL (ref 4.31–10.16)
WBC # BLD AUTO: 16.92 THOUSAND/UL (ref 4.31–10.16)
WBC #/AREA URNS AUTO: ABNORMAL /HPF

## 2022-01-01 PROCEDURE — 87806 HIV AG W/HIV1&2 ANTB W/OPTIC: CPT | Performed by: NURSE PRACTITIONER

## 2022-01-01 PROCEDURE — 36415 COLL VENOUS BLD VENIPUNCTURE: CPT | Performed by: EMERGENCY MEDICINE

## 2022-01-01 PROCEDURE — 82805 BLOOD GASES W/O2 SATURATION: CPT | Performed by: NURSE PRACTITIONER

## 2022-01-01 PROCEDURE — 85027 COMPLETE CBC AUTOMATED: CPT | Performed by: INTERNAL MEDICINE

## 2022-01-01 PROCEDURE — 94003 VENT MGMT INPAT SUBQ DAY: CPT

## 2022-01-01 PROCEDURE — 85610 PROTHROMBIN TIME: CPT | Performed by: EMERGENCY MEDICINE

## 2022-01-01 PROCEDURE — 94640 AIRWAY INHALATION TREATMENT: CPT

## 2022-01-01 PROCEDURE — 84484 ASSAY OF TROPONIN QUANT: CPT | Performed by: EMERGENCY MEDICINE

## 2022-01-01 PROCEDURE — 96375 TX/PRO/DX INJ NEW DRUG ADDON: CPT

## 2022-01-01 PROCEDURE — 71045 X-RAY EXAM CHEST 1 VIEW: CPT

## 2022-01-01 PROCEDURE — 82550 ASSAY OF CK (CPK): CPT | Performed by: EMERGENCY MEDICINE

## 2022-01-01 PROCEDURE — 84145 PROCALCITONIN (PCT): CPT | Performed by: EMERGENCY MEDICINE

## 2022-01-01 PROCEDURE — NC001 PR NO CHARGE: Performed by: INTERNAL MEDICINE

## 2022-01-01 PROCEDURE — 93010 ELECTROCARDIOGRAM REPORT: CPT | Performed by: INTERNAL MEDICINE

## 2022-01-01 PROCEDURE — 0241U HB NFCT DS VIR RESP RNA 4 TRGT: CPT | Performed by: EMERGENCY MEDICINE

## 2022-01-01 PROCEDURE — 85027 COMPLETE CBC AUTOMATED: CPT | Performed by: NURSE PRACTITIONER

## 2022-01-01 PROCEDURE — 80053 COMPREHEN METABOLIC PANEL: CPT | Performed by: NURSE PRACTITIONER

## 2022-01-01 PROCEDURE — G1004 CDSM NDSC: HCPCS

## 2022-01-01 PROCEDURE — 83605 ASSAY OF LACTIC ACID: CPT | Performed by: NURSE PRACTITIONER

## 2022-01-01 PROCEDURE — 83735 ASSAY OF MAGNESIUM: CPT | Performed by: NURSE PRACTITIONER

## 2022-01-01 PROCEDURE — 85025 COMPLETE CBC W/AUTO DIFF WBC: CPT | Performed by: EMERGENCY MEDICINE

## 2022-01-01 PROCEDURE — 86140 C-REACTIVE PROTEIN: CPT | Performed by: NURSE PRACTITIONER

## 2022-01-01 PROCEDURE — 99292 CRITICAL CARE ADDL 30 MIN: CPT | Performed by: EMERGENCY MEDICINE

## 2022-01-01 PROCEDURE — 82306 VITAMIN D 25 HYDROXY: CPT | Performed by: NURSE PRACTITIONER

## 2022-01-01 PROCEDURE — 86803 HEPATITIS C AB TEST: CPT | Performed by: NURSE PRACTITIONER

## 2022-01-01 PROCEDURE — 83690 ASSAY OF LIPASE: CPT | Performed by: NURSE PRACTITIONER

## 2022-01-01 PROCEDURE — 96365 THER/PROPH/DIAG IV INF INIT: CPT

## 2022-01-01 PROCEDURE — 86900 BLOOD TYPING SEROLOGIC ABO: CPT | Performed by: EMERGENCY MEDICINE

## 2022-01-01 PROCEDURE — 96361 HYDRATE IV INFUSION ADD-ON: CPT

## 2022-01-01 PROCEDURE — 99232 SBSQ HOSP IP/OBS MODERATE 35: CPT | Performed by: INTERNAL MEDICINE

## 2022-01-01 PROCEDURE — 80048 BASIC METABOLIC PNL TOTAL CA: CPT | Performed by: PHYSICIAN ASSISTANT

## 2022-01-01 PROCEDURE — 84100 ASSAY OF PHOSPHORUS: CPT | Performed by: NURSE PRACTITIONER

## 2022-01-01 PROCEDURE — 99233 SBSQ HOSP IP/OBS HIGH 50: CPT | Performed by: INTERNAL MEDICINE

## 2022-01-01 PROCEDURE — 82948 REAGENT STRIP/BLOOD GLUCOSE: CPT

## 2022-01-01 PROCEDURE — 85007 BL SMEAR W/DIFF WBC COUNT: CPT | Performed by: EMERGENCY MEDICINE

## 2022-01-01 PROCEDURE — 94760 N-INVAS EAR/PLS OXIMETRY 1: CPT

## 2022-01-01 PROCEDURE — 81001 URINALYSIS AUTO W/SCOPE: CPT | Performed by: NURSE PRACTITIONER

## 2022-01-01 PROCEDURE — 85007 BL SMEAR W/DIFF WBC COUNT: CPT | Performed by: INTERNAL MEDICINE

## 2022-01-01 PROCEDURE — 71250 CT THORAX DX C-: CPT

## 2022-01-01 PROCEDURE — 94644 CONT INHLJ TX 1ST HOUR: CPT

## 2022-01-01 PROCEDURE — 85027 COMPLETE CBC AUTOMATED: CPT | Performed by: EMERGENCY MEDICINE

## 2022-01-01 PROCEDURE — 99255 IP/OBS CONSLTJ NEW/EST HI 80: CPT | Performed by: INTERNAL MEDICINE

## 2022-01-01 PROCEDURE — 99285 EMERGENCY DEPT VISIT HI MDM: CPT

## 2022-01-01 PROCEDURE — XW0DXM6 INTRODUCTION OF BARICITINIB INTO MOUTH AND PHARYNX, EXTERNAL APPROACH, NEW TECHNOLOGY GROUP 6: ICD-10-PCS | Performed by: INTERNAL MEDICINE

## 2022-01-01 PROCEDURE — 99238 HOSP IP/OBS DSCHRG MGMT 30/<: CPT

## 2022-01-01 PROCEDURE — 83690 ASSAY OF LIPASE: CPT | Performed by: EMERGENCY MEDICINE

## 2022-01-01 PROCEDURE — 93005 ELECTROCARDIOGRAM TRACING: CPT

## 2022-01-01 PROCEDURE — 86850 RBC ANTIBODY SCREEN: CPT | Performed by: EMERGENCY MEDICINE

## 2022-01-01 PROCEDURE — 82330 ASSAY OF CALCIUM: CPT | Performed by: NURSE PRACTITIONER

## 2022-01-01 PROCEDURE — 84145 PROCALCITONIN (PCT): CPT | Performed by: PHYSICIAN ASSISTANT

## 2022-01-01 PROCEDURE — 84132 ASSAY OF SERUM POTASSIUM: CPT | Performed by: NURSE PRACTITIONER

## 2022-01-01 PROCEDURE — 87340 HEPATITIS B SURFACE AG IA: CPT | Performed by: NURSE PRACTITIONER

## 2022-01-01 PROCEDURE — 83605 ASSAY OF LACTIC ACID: CPT | Performed by: EMERGENCY MEDICINE

## 2022-01-01 PROCEDURE — 85027 COMPLETE CBC AUTOMATED: CPT | Performed by: HOSPITALIST

## 2022-01-01 PROCEDURE — 85379 FIBRIN DEGRADATION QUANT: CPT | Performed by: EMERGENCY MEDICINE

## 2022-01-01 PROCEDURE — 99223 1ST HOSP IP/OBS HIGH 75: CPT | Performed by: HOSPITALIST

## 2022-01-01 PROCEDURE — 83970 ASSAY OF PARATHORMONE: CPT | Performed by: NURSE PRACTITIONER

## 2022-01-01 PROCEDURE — 82550 ASSAY OF CK (CPK): CPT | Performed by: NURSE PRACTITIONER

## 2022-01-01 PROCEDURE — 82310 ASSAY OF CALCIUM: CPT | Performed by: NURSE PRACTITIONER

## 2022-01-01 PROCEDURE — 85730 THROMBOPLASTIN TIME PARTIAL: CPT | Performed by: EMERGENCY MEDICINE

## 2022-01-01 PROCEDURE — 94660 CPAP INITIATION&MGMT: CPT

## 2022-01-01 PROCEDURE — 74176 CT ABD & PELVIS W/O CONTRAST: CPT

## 2022-01-01 PROCEDURE — 87040 BLOOD CULTURE FOR BACTERIA: CPT | Performed by: EMERGENCY MEDICINE

## 2022-01-01 PROCEDURE — 83880 ASSAY OF NATRIURETIC PEPTIDE: CPT | Performed by: EMERGENCY MEDICINE

## 2022-01-01 PROCEDURE — 80048 BASIC METABOLIC PNL TOTAL CA: CPT | Performed by: INTERNAL MEDICINE

## 2022-01-01 PROCEDURE — 80053 COMPREHEN METABOLIC PANEL: CPT | Performed by: EMERGENCY MEDICINE

## 2022-01-01 PROCEDURE — 99291 CRITICAL CARE FIRST HOUR: CPT | Performed by: EMERGENCY MEDICINE

## 2022-01-01 PROCEDURE — 86901 BLOOD TYPING SEROLOGIC RH(D): CPT | Performed by: EMERGENCY MEDICINE

## 2022-01-01 PROCEDURE — 80053 COMPREHEN METABOLIC PANEL: CPT | Performed by: HOSPITALIST

## 2022-01-01 PROCEDURE — 70450 CT HEAD/BRAIN W/O DYE: CPT

## 2022-01-01 PROCEDURE — 80048 BASIC METABOLIC PNL TOTAL CA: CPT | Performed by: NURSE PRACTITIONER

## 2022-01-01 PROCEDURE — 82553 CREATINE MB FRACTION: CPT | Performed by: NURSE PRACTITIONER

## 2022-01-01 PROCEDURE — 94002 VENT MGMT INPAT INIT DAY: CPT

## 2022-01-01 PROCEDURE — 99253 IP/OBS CNSLTJ NEW/EST LOW 45: CPT | Performed by: STUDENT IN AN ORGANIZED HEALTH CARE EDUCATION/TRAINING PROGRAM

## 2022-01-01 PROCEDURE — 82553 CREATINE MB FRACTION: CPT | Performed by: EMERGENCY MEDICINE

## 2022-01-01 PROCEDURE — 36600 WITHDRAWAL OF ARTERIAL BLOOD: CPT

## 2022-01-01 RX ORDER — LEVALBUTEROL 1.25 MG/.5ML
1.25 SOLUTION, CONCENTRATE RESPIRATORY (INHALATION) 4 TIMES DAILY
Status: DISCONTINUED | OUTPATIENT
Start: 2022-01-01 | End: 2022-01-01

## 2022-01-01 RX ORDER — LIDOCAINE HYDROCHLORIDE ANHYDROUS AND DEXTROSE MONOHYDRATE .8; 5 G/100ML; G/100ML
1 INJECTION, SOLUTION INTRAVENOUS ONCE
Status: DISCONTINUED | OUTPATIENT
Start: 2022-01-01 | End: 2022-01-01

## 2022-01-01 RX ORDER — ERGOCALCIFEROL 1.25 MG/1
CAPSULE ORAL
Qty: 12 CAPSULE | Refills: 1 | OUTPATIENT
Start: 2022-01-01

## 2022-01-01 RX ORDER — DIVALPROEX SODIUM 250 MG/1
250 TABLET, DELAYED RELEASE ORAL 2 TIMES DAILY
Status: DISCONTINUED | OUTPATIENT
Start: 2022-01-01 | End: 2022-01-01

## 2022-01-01 RX ORDER — DEXAMETHASONE SODIUM PHOSPHATE 4 MG/ML
10 INJECTION, SOLUTION INTRA-ARTICULAR; INTRALESIONAL; INTRAMUSCULAR; INTRAVENOUS; SOFT TISSUE ONCE
Status: COMPLETED | OUTPATIENT
Start: 2022-01-01 | End: 2022-01-01

## 2022-01-01 RX ORDER — CALCIUM GLUCONATE 20 MG/ML
1 INJECTION, SOLUTION INTRAVENOUS ONCE
Status: COMPLETED | OUTPATIENT
Start: 2022-01-01 | End: 2022-01-01

## 2022-01-01 RX ORDER — ZINC SULFATE 50(220)MG
220 CAPSULE ORAL DAILY
Status: DISCONTINUED | OUTPATIENT
Start: 2022-01-01 | End: 2022-01-01

## 2022-01-01 RX ORDER — FOLIC ACID 1 MG/1
1000 TABLET ORAL DAILY
Status: DISCONTINUED | OUTPATIENT
Start: 2022-01-01 | End: 2022-01-01

## 2022-01-01 RX ORDER — ACETAMINOPHEN 325 MG/1
650 TABLET ORAL ONCE AS NEEDED
Status: CANCELLED | OUTPATIENT
Start: 2022-01-01

## 2022-01-01 RX ORDER — SIMETHICONE 80 MG
80 TABLET,CHEWABLE ORAL 4 TIMES DAILY PRN
Status: DISCONTINUED | OUTPATIENT
Start: 2022-01-01 | End: 2022-01-01

## 2022-01-01 RX ORDER — FLUTICASONE PROPIONATE 50 MCG
1 SPRAY, SUSPENSION (ML) NASAL 2 TIMES DAILY
Status: DISCONTINUED | OUTPATIENT
Start: 2022-01-01 | End: 2022-01-01

## 2022-01-01 RX ORDER — DILTIAZEM HYDROCHLORIDE 120 MG/1
120 CAPSULE, COATED, EXTENDED RELEASE ORAL DAILY
Status: DISCONTINUED | OUTPATIENT
Start: 2022-01-01 | End: 2022-01-01

## 2022-01-01 RX ORDER — MIDAZOLAM HYDROCHLORIDE 2 MG/2ML
2 INJECTION, SOLUTION INTRAMUSCULAR; INTRAVENOUS EVERY 2 HOUR PRN
Status: DISCONTINUED | OUTPATIENT
Start: 2022-01-01 | End: 2022-01-01

## 2022-01-01 RX ORDER — FENTANYL CITRATE 50 UG/ML
INJECTION, SOLUTION INTRAMUSCULAR; INTRAVENOUS
Status: COMPLETED
Start: 2022-01-01 | End: 2022-01-01

## 2022-01-01 RX ORDER — OXYCODONE HYDROCHLORIDE 10 MG/1
30 TABLET ORAL EVERY 4 HOURS PRN
Status: DISCONTINUED | OUTPATIENT
Start: 2022-01-01 | End: 2022-01-01

## 2022-01-01 RX ORDER — GABAPENTIN 100 MG/1
100 CAPSULE ORAL 2 TIMES DAILY
Status: DISCONTINUED | OUTPATIENT
Start: 2022-01-01 | End: 2022-01-01

## 2022-01-01 RX ORDER — ALBUTEROL SULFATE 2.5 MG/3ML
5 SOLUTION RESPIRATORY (INHALATION) ONCE
Status: COMPLETED | OUTPATIENT
Start: 2022-01-01 | End: 2022-01-01

## 2022-01-01 RX ORDER — GUAIFENESIN 600 MG
600 TABLET, EXTENDED RELEASE 12 HR ORAL EVERY 12 HOURS SCHEDULED
Status: DISCONTINUED | OUTPATIENT
Start: 2022-01-01 | End: 2022-01-01

## 2022-01-01 RX ORDER — IPRATROPIUM BROMIDE AND ALBUTEROL SULFATE .5; 3 MG/3ML; MG/3ML
1 SOLUTION RESPIRATORY (INHALATION) ONCE
Status: COMPLETED | OUTPATIENT
Start: 2022-01-01 | End: 2022-01-01

## 2022-01-01 RX ORDER — SODIUM CHLORIDE FOR INHALATION 0.9 %
3 VIAL, NEBULIZER (ML) INHALATION ONCE
Status: COMPLETED | OUTPATIENT
Start: 2022-01-01 | End: 2022-01-01

## 2022-01-01 RX ORDER — GUAIFENESIN/DEXTROMETHORPHAN 100-10MG/5
10 SYRUP ORAL EVERY 4 HOURS PRN
Status: DISCONTINUED | OUTPATIENT
Start: 2022-01-01 | End: 2022-01-01

## 2022-01-01 RX ORDER — PANTOPRAZOLE SODIUM 20 MG/1
20 TABLET, DELAYED RELEASE ORAL
Status: DISCONTINUED | OUTPATIENT
Start: 2022-01-01 | End: 2022-01-01

## 2022-01-01 RX ORDER — OXYCODONE HCL 10 MG/1
20 TABLET, FILM COATED, EXTENDED RELEASE ORAL EVERY 12 HOURS SCHEDULED
Status: DISCONTINUED | OUTPATIENT
Start: 2022-01-01 | End: 2022-01-01

## 2022-01-01 RX ORDER — ALBUTEROL SULFATE 2.5 MG/3ML
2.5 SOLUTION RESPIRATORY (INHALATION) EVERY 6 HOURS PRN
Status: DISCONTINUED | OUTPATIENT
Start: 2022-01-01 | End: 2022-01-01

## 2022-01-01 RX ORDER — DOXYCYCLINE HYCLATE 100 MG/1
100 CAPSULE ORAL ONCE
Status: COMPLETED | OUTPATIENT
Start: 2022-01-01 | End: 2022-01-01

## 2022-01-01 RX ORDER — SODIUM CHLORIDE 9 MG/ML
75 INJECTION, SOLUTION INTRAVENOUS CONTINUOUS
Status: CANCELLED | OUTPATIENT
Start: 2022-01-01

## 2022-01-01 RX ORDER — MONTELUKAST SODIUM 10 MG/1
10 TABLET ORAL
Status: DISCONTINUED | OUTPATIENT
Start: 2022-01-01 | End: 2022-01-01

## 2022-01-01 RX ORDER — SODIUM POLYSTYRENE SULFONATE 4.1 MEQ/G
30 POWDER, FOR SUSPENSION ORAL; RECTAL ONCE
Status: DISCONTINUED | OUTPATIENT
Start: 2022-01-01 | End: 2022-01-01

## 2022-01-01 RX ORDER — MELATONIN
2000 DAILY
Status: DISCONTINUED | OUTPATIENT
Start: 2022-01-01 | End: 2022-01-01

## 2022-01-01 RX ORDER — QUETIAPINE FUMARATE 25 MG/1
12.5 TABLET, FILM COATED ORAL
Status: DISCONTINUED | OUTPATIENT
Start: 2022-01-01 | End: 2022-01-01

## 2022-01-01 RX ORDER — FLUOXETINE HYDROCHLORIDE 20 MG/1
20 CAPSULE ORAL DAILY
Status: DISCONTINUED | OUTPATIENT
Start: 2022-01-01 | End: 2022-01-01

## 2022-01-01 RX ORDER — ATORVASTATIN CALCIUM 40 MG/1
40 TABLET, FILM COATED ORAL
Status: DISCONTINUED | OUTPATIENT
Start: 2022-01-01 | End: 2022-01-01

## 2022-01-01 RX ORDER — HYDROMORPHONE HCL/PF 1 MG/ML
0.5 SYRINGE (ML) INJECTION ONCE
Status: COMPLETED | OUTPATIENT
Start: 2022-01-01 | End: 2022-01-01

## 2022-01-01 RX ORDER — NITROGLYCERIN 0.4 MG/1
0.4 TABLET SUBLINGUAL ONCE
Status: DISCONTINUED | OUTPATIENT
Start: 2022-01-01 | End: 2022-01-01

## 2022-01-01 RX ORDER — ONDANSETRON 2 MG/ML
4 INJECTION INTRAMUSCULAR; INTRAVENOUS EVERY 6 HOURS PRN
Status: DISCONTINUED | OUTPATIENT
Start: 2022-01-01 | End: 2022-01-01

## 2022-01-01 RX ORDER — MIDAZOLAM HYDROCHLORIDE 2 MG/2ML
INJECTION, SOLUTION INTRAMUSCULAR; INTRAVENOUS
Status: COMPLETED
Start: 2022-01-01 | End: 2022-01-01

## 2022-01-01 RX ORDER — DEXTROSE MONOHYDRATE 25 G/50ML
25 INJECTION, SOLUTION INTRAVENOUS ONCE
Status: COMPLETED | OUTPATIENT
Start: 2022-01-01 | End: 2022-01-01

## 2022-01-01 RX ORDER — BUDESONIDE 0.5 MG/2ML
0.5 INHALANT ORAL
Status: DISCONTINUED | OUTPATIENT
Start: 2022-01-01 | End: 2022-01-01

## 2022-01-01 RX ORDER — LORATADINE 10 MG/1
5 TABLET ORAL DAILY
Status: DISCONTINUED | OUTPATIENT
Start: 2022-01-01 | End: 2022-01-01

## 2022-01-01 RX ORDER — HYDRALAZINE HYDROCHLORIDE 25 MG/1
25 TABLET, FILM COATED ORAL EVERY 8 HOURS SCHEDULED
Status: DISCONTINUED | OUTPATIENT
Start: 2022-01-01 | End: 2022-01-01

## 2022-01-01 RX ORDER — ASCORBIC ACID 500 MG
500 TABLET ORAL DAILY
Status: DISCONTINUED | OUTPATIENT
Start: 2022-01-01 | End: 2022-01-01

## 2022-01-01 RX ORDER — FENTANYL CITRATE 50 UG/ML
25 INJECTION, SOLUTION INTRAMUSCULAR; INTRAVENOUS ONCE
Status: COMPLETED | OUTPATIENT
Start: 2022-01-01 | End: 2022-01-01

## 2022-01-01 RX ORDER — HYDRALAZINE HYDROCHLORIDE 20 MG/ML
10 INJECTION INTRAMUSCULAR; INTRAVENOUS EVERY 6 HOURS PRN
Status: DISCONTINUED | OUTPATIENT
Start: 2022-01-01 | End: 2022-01-01

## 2022-01-01 RX ORDER — ECHINACEA PURPUREA EXTRACT 125 MG
2 TABLET ORAL EVERY 2 HOUR PRN
Status: DISCONTINUED | OUTPATIENT
Start: 2022-01-01 | End: 2022-01-01

## 2022-01-01 RX ORDER — DOXYCYCLINE HYCLATE 100 MG/1
100 CAPSULE ORAL EVERY 12 HOURS
Status: DISCONTINUED | OUTPATIENT
Start: 2022-01-01 | End: 2022-01-01

## 2022-01-01 RX ORDER — ALBUTEROL SULFATE 2.5 MG/3ML
SOLUTION RESPIRATORY (INHALATION)
Status: COMPLETED
Start: 2022-01-01 | End: 2022-01-01

## 2022-01-01 RX ORDER — ALBUTEROL SULFATE 2.5 MG/3ML
2.5 SOLUTION RESPIRATORY (INHALATION)
Status: DISCONTINUED | OUTPATIENT
Start: 2022-01-01 | End: 2022-01-01

## 2022-01-01 RX ORDER — ACETAMINOPHEN 325 MG/1
650 TABLET ORAL EVERY 6 HOURS PRN
Status: DISCONTINUED | OUTPATIENT
Start: 2022-01-01 | End: 2022-01-01

## 2022-01-01 RX ORDER — HYDROMORPHONE HCL/PF 1 MG/ML
0.5 SYRINGE (ML) INJECTION
Status: DISCONTINUED | OUTPATIENT
Start: 2022-01-01 | End: 2022-01-01 | Stop reason: HOSPADM

## 2022-01-01 RX ORDER — AMOXICILLIN 250 MG
1 CAPSULE ORAL 2 TIMES DAILY
Status: DISCONTINUED | OUTPATIENT
Start: 2022-01-01 | End: 2022-01-01

## 2022-01-01 RX ORDER — ALBUTEROL SULFATE 2.5 MG/3ML
10 SOLUTION RESPIRATORY (INHALATION) ONCE
Status: COMPLETED | OUTPATIENT
Start: 2022-01-01 | End: 2022-01-01

## 2022-01-01 RX ORDER — SODIUM POLYSTYRENE SULFONATE 4.1 MEQ/G
15 POWDER, FOR SUSPENSION ORAL; RECTAL ONCE
Status: DISCONTINUED | OUTPATIENT
Start: 2022-01-01 | End: 2022-01-01

## 2022-01-01 RX ORDER — LANOLIN ALCOHOL/MO/W.PET/CERES
3 CREAM (GRAM) TOPICAL
Status: DISCONTINUED | OUTPATIENT
Start: 2022-01-01 | End: 2022-01-01

## 2022-01-01 RX ORDER — LORAZEPAM 2 MG/ML
1 INJECTION INTRAMUSCULAR EVERY 2 HOUR PRN
Status: DISCONTINUED | OUTPATIENT
Start: 2022-01-01 | End: 2022-01-01 | Stop reason: HOSPADM

## 2022-01-01 RX ORDER — DEXTROSE MONOHYDRATE 25 G/50ML
50 INJECTION, SOLUTION INTRAVENOUS ONCE
Status: COMPLETED | OUTPATIENT
Start: 2022-01-01 | End: 2022-01-01

## 2022-01-01 RX ORDER — CALCIUM CARBONATE 200(500)MG
1000 TABLET,CHEWABLE ORAL DAILY PRN
Status: DISCONTINUED | OUTPATIENT
Start: 2022-01-01 | End: 2022-01-01

## 2022-01-01 RX ORDER — ONDANSETRON 2 MG/ML
4 INJECTION INTRAMUSCULAR; INTRAVENOUS ONCE AS NEEDED
Status: DISCONTINUED | OUTPATIENT
Start: 2022-01-01 | End: 2022-01-01 | Stop reason: SDUPTHER

## 2022-01-01 RX ORDER — TAMSULOSIN HYDROCHLORIDE 0.4 MG/1
0.4 CAPSULE ORAL
Status: DISCONTINUED | OUTPATIENT
Start: 2022-01-01 | End: 2022-01-01

## 2022-01-01 RX ORDER — FUROSEMIDE 10 MG/ML
80 INJECTION INTRAMUSCULAR; INTRAVENOUS ONCE
Status: COMPLETED | OUTPATIENT
Start: 2022-01-01 | End: 2022-01-01

## 2022-01-01 RX ORDER — OXYCODONE HCL 20 MG/1
20 TABLET, FILM COATED, EXTENDED RELEASE ORAL EVERY 8 HOURS SCHEDULED
Status: DISCONTINUED | OUTPATIENT
Start: 2022-01-01 | End: 2022-01-01

## 2022-01-01 RX ORDER — MIDAZOLAM HYDROCHLORIDE 2 MG/2ML
2 INJECTION, SOLUTION INTRAMUSCULAR; INTRAVENOUS ONCE
Status: COMPLETED | OUTPATIENT
Start: 2022-01-01 | End: 2022-01-01

## 2022-01-01 RX ADMIN — IPRATROPIUM BROMIDE 0.5 MG: 0.5 SOLUTION RESPIRATORY (INHALATION) at 22:50

## 2022-01-01 RX ADMIN — FLUTICASONE PROPIONATE 1 SPRAY: 50 SPRAY, METERED NASAL at 09:32

## 2022-01-01 RX ADMIN — GABAPENTIN 100 MG: 100 CAPSULE ORAL at 10:13

## 2022-01-01 RX ADMIN — DIVALPROEX SODIUM 250 MG: 250 TABLET, DELAYED RELEASE ORAL at 21:42

## 2022-01-01 RX ADMIN — PANTOPRAZOLE SODIUM 20 MG: 20 TABLET, DELAYED RELEASE ORAL at 06:05

## 2022-01-01 RX ADMIN — LORATADINE 5 MG: 10 TABLET ORAL at 10:03

## 2022-01-01 RX ADMIN — GABAPENTIN 100 MG: 100 CAPSULE ORAL at 17:08

## 2022-01-01 RX ADMIN — SODIUM CHLORIDE 0.2 MCG/KG/HR: 9 INJECTION, SOLUTION INTRAVENOUS at 12:39

## 2022-01-01 RX ADMIN — FLUTICASONE PROPIONATE 1 SPRAY: 50 SPRAY, METERED NASAL at 18:28

## 2022-01-01 RX ADMIN — SODIUM CHLORIDE 10 UNITS: 9 INJECTION, SOLUTION INTRAMUSCULAR; INTRAVENOUS; SUBCUTANEOUS at 09:41

## 2022-01-01 RX ADMIN — MONTELUKAST 10 MG: 10 TABLET, FILM COATED ORAL at 22:50

## 2022-01-01 RX ADMIN — ALBUTEROL SULFATE 2.5 MG: 2.5 SOLUTION RESPIRATORY (INHALATION) at 01:02

## 2022-01-01 RX ADMIN — ALBUTEROL SULFATE 2.5 MG: 2.5 SOLUTION RESPIRATORY (INHALATION) at 11:13

## 2022-01-01 RX ADMIN — DEXAMETHASONE SODIUM PHOSPHATE 10.1 MG: 10 INJECTION, SOLUTION INTRAMUSCULAR; INTRAVENOUS at 10:28

## 2022-01-01 RX ADMIN — DOXYCYCLINE 100 MG: 100 CAPSULE ORAL at 09:16

## 2022-01-01 RX ADMIN — APIXABAN 2.5 MG: 2.5 TABLET, FILM COATED ORAL at 10:05

## 2022-01-01 RX ADMIN — CALCIUM GLUCONATE 1 G: 20 INJECTION, SOLUTION INTRAVENOUS at 22:39

## 2022-01-01 RX ADMIN — DIVALPROEX SODIUM 250 MG: 250 TABLET, DELAYED RELEASE ORAL at 10:05

## 2022-01-01 RX ADMIN — BUDESONIDE 0.5 MG: 0.5 INHALANT ORAL at 08:24

## 2022-01-01 RX ADMIN — SODIUM BICARBONATE 50 MEQ: 84 INJECTION INTRAVENOUS at 09:26

## 2022-01-01 RX ADMIN — IPRATROPIUM BROMIDE 0.5 MG: 0.5 SOLUTION RESPIRATORY (INHALATION) at 20:33

## 2022-01-01 RX ADMIN — INSULIN HUMAN 10 UNITS: 100 INJECTION, SOLUTION PARENTERAL at 22:34

## 2022-01-01 RX ADMIN — DILTIAZEM HYDROCHLORIDE 120 MG: 120 CAPSULE, COATED, EXTENDED RELEASE ORAL at 10:09

## 2022-01-01 RX ADMIN — QUETIAPINE FUMARATE 12.5 MG: 25 TABLET ORAL at 21:43

## 2022-01-01 RX ADMIN — FOLIC ACID 1000 MCG: 1 TABLET ORAL at 09:18

## 2022-01-01 RX ADMIN — DOXYCYCLINE 100 MG: 100 CAPSULE ORAL at 11:12

## 2022-01-01 RX ADMIN — APIXABAN 2.5 MG: 2.5 TABLET, FILM COATED ORAL at 09:16

## 2022-01-01 RX ADMIN — GUAIFENESIN 600 MG: 600 TABLET ORAL at 10:06

## 2022-01-01 RX ADMIN — MIDAZOLAM 2 MG: 1 INJECTION INTRAMUSCULAR; INTRAVENOUS at 08:18

## 2022-01-01 RX ADMIN — TAMSULOSIN HYDROCHLORIDE 0.4 MG: 0.4 CAPSULE ORAL at 15:45

## 2022-01-01 RX ADMIN — DEXTROSE MONOHYDRATE 25 ML: 500 INJECTION PARENTERAL at 22:31

## 2022-01-01 RX ADMIN — BUDESONIDE 0.5 MG: 0.5 INHALANT ORAL at 20:33

## 2022-01-01 RX ADMIN — DEXAMETHASONE SODIUM PHOSPHATE 10 MG: 4 INJECTION, SOLUTION INTRA-ARTICULAR; INTRALESIONAL; INTRAMUSCULAR; INTRAVENOUS; SOFT TISSUE at 22:29

## 2022-01-01 RX ADMIN — OXYCODONE HYDROCHLORIDE 20 MG: 20 TABLET, FILM COATED, EXTENDED RELEASE ORAL at 20:50

## 2022-01-01 RX ADMIN — OXYCODONE HYDROCHLORIDE 20 MG: 20 TABLET, FILM COATED, EXTENDED RELEASE ORAL at 09:18

## 2022-01-01 RX ADMIN — IPRATROPIUM BROMIDE 0.5 MG: 0.5 SOLUTION RESPIRATORY (INHALATION) at 15:57

## 2022-01-01 RX ADMIN — OXYCODONE HYDROCHLORIDE 30 MG: 10 TABLET ORAL at 07:14

## 2022-01-01 RX ADMIN — HYDROMORPHONE HYDROCHLORIDE 0.5 MG: 1 INJECTION, SOLUTION INTRAMUSCULAR; INTRAVENOUS; SUBCUTANEOUS at 00:44

## 2022-01-01 RX ADMIN — IPRATROPIUM BROMIDE 0.5 MG: 0.5 SOLUTION RESPIRATORY (INHALATION) at 11:54

## 2022-01-01 RX ADMIN — FLUTICASONE PROPIONATE 1 SPRAY: 50 SPRAY, METERED NASAL at 10:03

## 2022-01-01 RX ADMIN — SODIUM CHLORIDE 0.7 MCG/KG/HR: 9 INJECTION, SOLUTION INTRAVENOUS at 05:20

## 2022-01-01 RX ADMIN — DOCUSATE SODIUM 50MG AND SENNOSIDES 8.6MG 1 TABLET: 8.6; 5 TABLET, FILM COATED ORAL at 09:16

## 2022-01-01 RX ADMIN — CEFEPIME HYDROCHLORIDE 1000 MG: 1 INJECTION, POWDER, FOR SOLUTION INTRAMUSCULAR; INTRAVENOUS at 00:59

## 2022-01-01 RX ADMIN — CEFEPIME HYDROCHLORIDE 1000 MG: 1 INJECTION, POWDER, FOR SOLUTION INTRAMUSCULAR; INTRAVENOUS at 14:15

## 2022-01-01 RX ADMIN — MELATONIN 3 MG: at 21:45

## 2022-01-01 RX ADMIN — IPRATROPIUM BROMIDE 0.5 MG: 0.5 SOLUTION RESPIRATORY (INHALATION) at 13:25

## 2022-01-01 RX ADMIN — FOLIC ACID 1000 MCG: 1 TABLET ORAL at 08:27

## 2022-01-01 RX ADMIN — GABAPENTIN 100 MG: 100 CAPSULE ORAL at 18:28

## 2022-01-01 RX ADMIN — ALBUTEROL SULFATE 2.5 MG: 2.5 SOLUTION RESPIRATORY (INHALATION) at 13:25

## 2022-01-01 RX ADMIN — APIXABAN 2.5 MG: 2.5 TABLET, FILM COATED ORAL at 08:26

## 2022-01-01 RX ADMIN — DOXYCYCLINE 100 MG: 100 CAPSULE ORAL at 22:50

## 2022-01-01 RX ADMIN — TAMSULOSIN HYDROCHLORIDE 0.4 MG: 0.4 CAPSULE ORAL at 17:19

## 2022-01-01 RX ADMIN — INSULIN LISPRO 2 UNITS: 100 INJECTION, SOLUTION INTRAVENOUS; SUBCUTANEOUS at 12:46

## 2022-01-01 RX ADMIN — PANTOPRAZOLE SODIUM 20 MG: 20 TABLET, DELAYED RELEASE ORAL at 07:14

## 2022-01-01 RX ADMIN — ALBUTEROL SULFATE 2.5 MG: 2.5 SOLUTION RESPIRATORY (INHALATION) at 15:57

## 2022-01-01 RX ADMIN — MIDAZOLAM 2 MG: 1 INJECTION INTRAMUSCULAR; INTRAVENOUS at 11:57

## 2022-01-01 RX ADMIN — ZINC SULFATE 220 MG (50 MG) CAPSULE 220 MG: CAPSULE at 09:18

## 2022-01-01 RX ADMIN — INSULIN LISPRO 2 UNITS: 100 INJECTION, SOLUTION INTRAVENOUS; SUBCUTANEOUS at 09:30

## 2022-01-01 RX ADMIN — FENTANYL CITRATE 25 MCG: 50 INJECTION INTRAMUSCULAR; INTRAVENOUS at 03:03

## 2022-01-01 RX ADMIN — IPRATROPIUM BROMIDE 0.5 MG: 0.5 SOLUTION RESPIRATORY (INHALATION) at 10:03

## 2022-01-01 RX ADMIN — DILTIAZEM HYDROCHLORIDE 120 MG: 120 CAPSULE, COATED, EXTENDED RELEASE ORAL at 09:17

## 2022-01-01 RX ADMIN — OXYCODONE HYDROCHLORIDE 20 MG: 20 TABLET, FILM COATED, EXTENDED RELEASE ORAL at 21:43

## 2022-01-01 RX ADMIN — BUDESONIDE 0.5 MG: 0.5 INHALANT ORAL at 20:09

## 2022-01-01 RX ADMIN — QUETIAPINE FUMARATE 12.5 MG: 25 TABLET ORAL at 23:51

## 2022-01-01 RX ADMIN — ZINC SULFATE 220 MG (50 MG) CAPSULE 220 MG: CAPSULE at 10:07

## 2022-01-01 RX ADMIN — MIDAZOLAM HYDROCHLORIDE 2 MG: 2 INJECTION, SOLUTION INTRAMUSCULAR; INTRAVENOUS at 08:18

## 2022-01-01 RX ADMIN — FENTANYL CITRATE 25 MCG: 50 INJECTION, SOLUTION INTRAMUSCULAR; INTRAVENOUS at 03:03

## 2022-01-01 RX ADMIN — IPRATROPIUM BROMIDE 0.5 MG: 0.5 SOLUTION RESPIRATORY (INHALATION) at 19:45

## 2022-01-01 RX ADMIN — TAMSULOSIN HYDROCHLORIDE 0.4 MG: 0.4 CAPSULE ORAL at 18:28

## 2022-01-01 RX ADMIN — DOCUSATE SODIUM 50MG AND SENNOSIDES 8.6MG 1 TABLET: 8.6; 5 TABLET, FILM COATED ORAL at 10:05

## 2022-01-01 RX ADMIN — Medication 2000 UNITS: at 10:07

## 2022-01-01 RX ADMIN — DEXAMETHASONE SODIUM PHOSPHATE 10.1 MG: 10 INJECTION, SOLUTION INTRAMUSCULAR; INTRAVENOUS at 22:50

## 2022-01-01 RX ADMIN — DEXAMETHASONE SODIUM PHOSPHATE 10.1 MG: 10 INJECTION, SOLUTION INTRAMUSCULAR; INTRAVENOUS at 23:27

## 2022-01-01 RX ADMIN — INSULIN LISPRO 2 UNITS: 100 INJECTION, SOLUTION INTRAVENOUS; SUBCUTANEOUS at 15:55

## 2022-01-01 RX ADMIN — CEFEPIME HYDROCHLORIDE 1000 MG: 1 INJECTION, POWDER, FOR SOLUTION INTRAMUSCULAR; INTRAVENOUS at 01:39

## 2022-01-01 RX ADMIN — DOXYCYCLINE 100 MG: 100 CAPSULE ORAL at 10:04

## 2022-01-01 RX ADMIN — DEXAMETHASONE SODIUM PHOSPHATE 10.1 MG: 10 INJECTION, SOLUTION INTRAMUSCULAR; INTRAVENOUS at 09:31

## 2022-01-01 RX ADMIN — OXYCODONE HYDROCHLORIDE AND ACETAMINOPHEN 500 MG: 500 TABLET ORAL at 10:07

## 2022-01-01 RX ADMIN — IPRATROPIUM BROMIDE 0.5 MG: 0.5 SOLUTION RESPIRATORY (INHALATION) at 20:27

## 2022-01-01 RX ADMIN — FLUTICASONE PROPIONATE 1 SPRAY: 50 SPRAY, METERED NASAL at 17:09

## 2022-01-01 RX ADMIN — MORPHINE SULFATE 1 MG: 2 INJECTION, SOLUTION INTRAMUSCULAR; INTRAVENOUS at 07:30

## 2022-01-01 RX ADMIN — DEXTROSE MONOHYDRATE 50 ML: 25 INJECTION, SOLUTION INTRAVENOUS at 08:21

## 2022-01-01 RX ADMIN — BUDESONIDE 0.5 MG: 0.5 INHALANT ORAL at 20:27

## 2022-01-01 RX ADMIN — FLUOXETINE 20 MG: 20 CAPSULE ORAL at 10:04

## 2022-01-01 RX ADMIN — DEXAMETHASONE SODIUM PHOSPHATE 10.1 MG: 10 INJECTION, SOLUTION INTRAMUSCULAR; INTRAVENOUS at 21:46

## 2022-01-01 RX ADMIN — FLUOXETINE 20 MG: 20 CAPSULE ORAL at 08:26

## 2022-01-01 RX ADMIN — ALBUTEROL SULFATE 2.5 MG: 2.5 SOLUTION RESPIRATORY (INHALATION) at 10:10

## 2022-01-01 RX ADMIN — DOXYCYCLINE 100 MG: 100 CAPSULE ORAL at 22:54

## 2022-01-01 RX ADMIN — LORATADINE 5 MG: 10 TABLET ORAL at 09:17

## 2022-01-01 RX ADMIN — DEXAMETHASONE SODIUM PHOSPHATE 10.1 MG: 10 INJECTION, SOLUTION INTRAMUSCULAR; INTRAVENOUS at 11:53

## 2022-01-01 RX ADMIN — HYDRALAZINE HYDROCHLORIDE 25 MG: 25 TABLET, FILM COATED ORAL at 21:45

## 2022-01-01 RX ADMIN — IPRATROPIUM BROMIDE 0.5 MG: 0.5 SOLUTION RESPIRATORY (INHALATION) at 11:13

## 2022-01-01 RX ADMIN — ALBUTEROL SULFATE 5 MG: 2.5 SOLUTION RESPIRATORY (INHALATION) at 09:59

## 2022-01-01 RX ADMIN — ALBUTEROL SULFATE 2.5 MG: 2.5 SOLUTION RESPIRATORY (INHALATION) at 07:15

## 2022-01-01 RX ADMIN — BUDESONIDE 0.5 MG: 0.5 INHALANT ORAL at 10:28

## 2022-01-01 RX ADMIN — FOLIC ACID 1000 MCG: 1 TABLET ORAL at 10:06

## 2022-01-01 RX ADMIN — SODIUM CHLORIDE 1000 ML: 0.9 INJECTION, SOLUTION INTRAVENOUS at 22:29

## 2022-01-01 RX ADMIN — ALBUTEROL SULFATE 2.5 MG: 2.5 SOLUTION RESPIRATORY (INHALATION) at 11:54

## 2022-01-01 RX ADMIN — APIXABAN 2.5 MG: 2.5 TABLET, FILM COATED ORAL at 18:27

## 2022-01-01 RX ADMIN — ALBUTEROL SULFATE 2.5 MG: 2.5 SOLUTION RESPIRATORY (INHALATION) at 20:27

## 2022-01-01 RX ADMIN — APIXABAN 2.5 MG: 2.5 TABLET, FILM COATED ORAL at 17:08

## 2022-01-01 RX ADMIN — DIVALPROEX SODIUM 250 MG: 250 TABLET, DELAYED RELEASE ORAL at 17:19

## 2022-01-01 RX ADMIN — DEXAMETHASONE SODIUM PHOSPHATE 10.1 MG: 10 INJECTION, SOLUTION INTRAMUSCULAR; INTRAVENOUS at 11:12

## 2022-01-01 RX ADMIN — GUAIFENESIN 600 MG: 600 TABLET ORAL at 20:35

## 2022-01-01 RX ADMIN — GUAIFENESIN 600 MG: 600 TABLET ORAL at 21:45

## 2022-01-01 RX ADMIN — INSULIN HUMAN 10 UNITS: 100 INJECTION, SOLUTION PARENTERAL at 08:21

## 2022-01-01 RX ADMIN — BUDESONIDE 0.5 MG: 0.5 INHALANT ORAL at 08:44

## 2022-01-01 RX ADMIN — CEFTRIAXONE SODIUM 1000 MG: 10 INJECTION, POWDER, FOR SOLUTION INTRAVENOUS at 13:27

## 2022-01-01 RX ADMIN — LORAZEPAM 1 MG: 2 INJECTION INTRAMUSCULAR; INTRAVENOUS at 18:14

## 2022-01-01 RX ADMIN — DIVALPROEX SODIUM 250 MG: 250 TABLET, DELAYED RELEASE ORAL at 08:29

## 2022-01-01 RX ADMIN — CEFTRIAXONE SODIUM 1000 MG: 10 INJECTION, POWDER, FOR SOLUTION INTRAVENOUS at 22:39

## 2022-01-01 RX ADMIN — Medication 1 MG: at 22:54

## 2022-01-01 RX ADMIN — SODIUM CHLORIDE 1.2 MCG/KG/HR: 9 INJECTION, SOLUTION INTRAVENOUS at 13:18

## 2022-01-01 RX ADMIN — DOCUSATE SODIUM 50MG AND SENNOSIDES 8.6MG 1 TABLET: 8.6; 5 TABLET, FILM COATED ORAL at 18:28

## 2022-01-01 RX ADMIN — OXYCODONE HYDROCHLORIDE AND ACETAMINOPHEN 500 MG: 500 TABLET ORAL at 09:17

## 2022-01-01 RX ADMIN — IPRATROPIUM BROMIDE 0.5 MG: 0.5 SOLUTION RESPIRATORY (INHALATION) at 07:15

## 2022-01-01 RX ADMIN — MONTELUKAST 10 MG: 10 TABLET, FILM COATED ORAL at 21:45

## 2022-01-01 RX ADMIN — HYDROMORPHONE HYDROCHLORIDE 0.5 MG: 1 INJECTION, SOLUTION INTRAMUSCULAR; INTRAVENOUS; SUBCUTANEOUS at 19:14

## 2022-01-01 RX ADMIN — ALBUTEROL SULFATE 2.5 MG: 2.5 SOLUTION RESPIRATORY (INHALATION) at 08:24

## 2022-01-01 RX ADMIN — FLUOXETINE 20 MG: 20 CAPSULE ORAL at 09:17

## 2022-01-01 RX ADMIN — DOXYCYCLINE 100 MG: 100 CAPSULE ORAL at 21:42

## 2022-01-01 RX ADMIN — OXYCODONE HYDROCHLORIDE 20 MG: 20 TABLET, FILM COATED, EXTENDED RELEASE ORAL at 10:09

## 2022-01-01 RX ADMIN — ALBUTEROL SULFATE 2.5 MG: 2.5 SOLUTION RESPIRATORY (INHALATION) at 20:33

## 2022-01-01 RX ADMIN — GABAPENTIN 100 MG: 100 CAPSULE ORAL at 09:17

## 2022-01-01 RX ADMIN — CALCIUM GLUCONATE 1 G: 20 INJECTION, SOLUTION INTRAVENOUS at 09:31

## 2022-01-01 RX ADMIN — APIXABAN 2.5 MG: 2.5 TABLET, FILM COATED ORAL at 17:31

## 2022-01-01 RX ADMIN — SODIUM CHLORIDE 1.2 MCG/KG/HR: 9 INJECTION, SOLUTION INTRAVENOUS at 12:48

## 2022-01-01 RX ADMIN — ALBUTEROL SULFATE 2.5 MG: 2.5 SOLUTION RESPIRATORY (INHALATION) at 19:43

## 2022-01-01 RX ADMIN — CEFEPIME HYDROCHLORIDE 1000 MG: 1 INJECTION, POWDER, FOR SOLUTION INTRAMUSCULAR; INTRAVENOUS at 13:43

## 2022-01-01 RX ADMIN — PANTOPRAZOLE SODIUM 20 MG: 20 TABLET, DELAYED RELEASE ORAL at 05:30

## 2022-01-01 RX ADMIN — DOCUSATE SODIUM 50MG AND SENNOSIDES 8.6MG 1 TABLET: 8.6; 5 TABLET, FILM COATED ORAL at 17:08

## 2022-01-01 RX ADMIN — BUDESONIDE 0.5 MG: 0.5 INHALANT ORAL at 07:15

## 2022-01-01 RX ADMIN — ALBUTEROL SULFATE 2.5 MG: 2.5 SOLUTION RESPIRATORY (INHALATION) at 08:44

## 2022-01-01 RX ADMIN — GUAIFENESIN 600 MG: 600 TABLET ORAL at 09:17

## 2022-01-01 RX ADMIN — HYDRALAZINE HYDROCHLORIDE 10 MG: 20 INJECTION INTRAMUSCULAR; INTRAVENOUS at 07:39

## 2022-01-01 RX ADMIN — ATORVASTATIN CALCIUM 40 MG: 40 TABLET, FILM COATED ORAL at 15:45

## 2022-01-01 RX ADMIN — IPRATROPIUM BROMIDE 0.5 MG: 0.5 SOLUTION RESPIRATORY (INHALATION) at 13:53

## 2022-01-01 RX ADMIN — FUROSEMIDE 80 MG: 10 INJECTION, SOLUTION INTRAVENOUS at 09:15

## 2022-01-01 RX ADMIN — DILTIAZEM HYDROCHLORIDE 120 MG: 120 CAPSULE, COATED, EXTENDED RELEASE ORAL at 09:16

## 2022-01-01 RX ADMIN — DIVALPROEX SODIUM 250 MG: 250 TABLET, DELAYED RELEASE ORAL at 10:30

## 2022-01-01 RX ADMIN — IPRATROPIUM BROMIDE 0.5 MG: 0.5 SOLUTION RESPIRATORY (INHALATION) at 08:24

## 2022-01-01 RX ADMIN — DIVALPROEX SODIUM 250 MG: 250 TABLET, DELAYED RELEASE ORAL at 17:08

## 2022-01-01 RX ADMIN — SODIUM CHLORIDE 1000 ML: 0.9 INJECTION, SOLUTION INTRAVENOUS at 00:44

## 2022-01-01 RX ADMIN — ISODIUM CHLORIDE 3 ML: 0.03 SOLUTION RESPIRATORY (INHALATION) at 09:59

## 2022-01-01 RX ADMIN — DEXTROSE MONOHYDRATE 25 ML: 25 INJECTION, SOLUTION INTRAVENOUS at 09:32

## 2022-01-01 RX ADMIN — ALBUTEROL SULFATE 2.5 MG: 2.5 SOLUTION RESPIRATORY (INHALATION) at 13:54

## 2022-01-01 RX ADMIN — HYDRALAZINE HYDROCHLORIDE 25 MG: 25 TABLET, FILM COATED ORAL at 13:43

## 2022-01-01 RX ADMIN — ATORVASTATIN CALCIUM 40 MG: 40 TABLET, FILM COATED ORAL at 18:27

## 2022-01-01 RX ADMIN — FENTANYL CITRATE 25 MCG: 50 INJECTION INTRAMUSCULAR; INTRAVENOUS at 00:11

## 2022-01-01 RX ADMIN — IPRATROPIUM BROMIDE 0.5 MG: 0.5 SOLUTION RESPIRATORY (INHALATION) at 01:02

## 2022-01-01 RX ADMIN — ALBUTEROL SULFATE 10 MG: 2.5 SOLUTION RESPIRATORY (INHALATION) at 00:18

## 2022-01-01 RX ADMIN — MELATONIN 3 MG: at 22:50

## 2022-01-02 PROBLEM — J96.11 CHRONIC RESPIRATORY FAILURE WITH HYPOXIA (HCC): Chronic | Status: ACTIVE | Noted: 2022-01-01

## 2022-01-02 PROBLEM — J96.00 ACUTE RESPIRATORY FAILURE DUE TO COVID-19 (HCC): Status: ACTIVE | Noted: 2022-01-01

## 2022-01-02 PROBLEM — U07.1 ACUTE RESPIRATORY FAILURE DUE TO COVID-19 (HCC): Status: ACTIVE | Noted: 2022-01-01

## 2022-01-02 PROBLEM — R74.8 ELEVATED LIPASE: Status: ACTIVE | Noted: 2022-01-01

## 2022-01-02 PROBLEM — N17.9 ACUTE KIDNEY INJURY SUPERIMPOSED ON CKD (HCC): Status: ACTIVE | Noted: 2022-01-01

## 2022-01-02 PROBLEM — N18.9 ACUTE KIDNEY INJURY SUPERIMPOSED ON CKD (HCC): Status: ACTIVE | Noted: 2022-01-01

## 2022-01-02 PROBLEM — E87.5 HYPERKALEMIA: Status: ACTIVE | Noted: 2022-01-01

## 2022-01-02 PROBLEM — R65.20 SEVERE SEPSIS (HCC): Status: ACTIVE | Noted: 2022-01-01

## 2022-01-02 PROBLEM — R60.0 BILATERAL LOWER EXTREMITY EDEMA: Status: ACTIVE | Noted: 2022-01-01

## 2022-01-02 PROBLEM — A41.9 SEVERE SEPSIS (HCC): Status: ACTIVE | Noted: 2022-01-01

## 2022-01-02 NOTE — ASSESSMENT & PLAN NOTE
· O2 sat on arrival 73%, requiring BiPAP to sustain oxygen above 90%  · COVID-19 PCR positive  Unvaccinated  · Chest CT: Bibasilar consolidations with new diffuse bilateral parenchymal reticulation more pronounced in peripheral/subpleural distribution, nonspecific findings  Consider superimposed CHF or infectious etiology although appearance atypical for viral pneumonia in setting of COVID  Given patient history of malignancy, lymphangitic spread of disease should be considered  Clinical correlation and short-term interval follow-up scan recommended as well  as continued serial PET/CT given prior report/findings  · Will admit and treat under severe pathway  · Will obtain CRP, trend as needed  TREATMENT:  · Atorvastatin 40mg qd  · Dexamethasone 0 1mg/kg bid [id number]  · Baricitinib 4mg qd x 14 days unless clinically improving on room air for 24 hours or stable for discharge  · IV ceftriaxone and p o doxycycline  · Anticoagulated on Eliquis  · Continue supplemental oxygen to maintain O2 sat around 90%   Encourage ambulation and proning  · Pulmonary consult

## 2022-01-02 NOTE — H&P
[address] [facility]  H&P- Tasha Talbert   1960, 63 y o  male MRN: [id number]  Unit/Bed#: ED 31 Encounter: [id number]  Primary Care Provider: Albaro Varela MD   Date and time admitted to hospital: 1/1/2022  9:21 PM    * Acute respiratory failure due to COVID-19 [facility])  Assessment & Plan  · O2 sat on arrival 73%, requiring BiPAP to sustain oxygen above 90%  · COVID-19 PCR positive  Unvaccinated  · Chest CT: Bibasilar consolidations with new diffuse bilateral parenchymal reticulation more pronounced in peripheral/subpleural distribution, nonspecific findings  Consider superimposed CHF or infectious etiology although appearance atypical for viral pneumonia in setting of COVID  Given patient history of malignancy, lymphangitic spread of disease should be considered  Clinical correlation and short-term interval follow-up scan recommended as well  as continued serial PET/CT given prior report/findings  · Will admit and treat under severe pathway  · Will obtain CRP, trend as needed  TREATMENT:  · Atorvastatin 40mg qd  · Dexamethasone 0 1mg/kg bid [id number]  · Baricitinib 4mg qd x 14 days unless clinically improving on room air for 24 hours or stable for discharge  · IV ceftriaxone and p o doxycycline  · Anticoagulated on Eliquis  · Continue supplemental oxygen to maintain O2 sat around 90%  Encourage ambulation and proning  · Pulmonary consult    Acute kidney injury superimposed on CKD [facility])  Assessment & Plan  Lab Results   Component Value Date    EGFR 10 01/01/2022    EGFR 27 12/20/2021    EGFR 27 12/14/2021    CREATININE 5 33 (H) 01/01/2022    CREATININE 2 49 (H) 12/20/2021    CREATININE 2 49 (H) 12/14/2021     · Creatinine 5 3, baseline in the 2  range  · Hold HCTZ  · Will obtain UA and serum phosphorus  · Monitor intake and output  · Urinary retention protocol  · Avoid nephrotoxins  · Nephrology consult    Hyperkalemia  Assessment & Plan  · K 6 2 in setting of NURIA    Received hyperkalemia cocktail, repeat BMP  · Monitor on telemetry  · Nephrology consult    Severe sepsis [facility])  Assessment & Plan  · Severe sepsis in setting of COVID  Meets severe sepsis criteria with leukocytosis, tachycardia, tachypnea, NURIA and lactic acidosis  · On IV ceftriaxone and doxycycline for COVID pneumonia  · Follow-up PCT and blood culture    Elevated troponin level not due to acute coronary syndrome  Assessment & Plan  · Chronically elevated troponin and at baseline  Asymptomatic for chest pain although as per outpatient cardiology- has chronic atypical chest pain mostly related to MSK aches and metastatic disease  · Monitor on telemetry    Chronic respiratory failure with hypoxia (HCC)  Assessment & Plan  · Chronic respiratory failure in setting severe COPD and adenocarcinoma of right lung  Maintained chronically on 3  5LNC    Bilateral lower extremity edema  Assessment & Plan  · Patient reports chronic lower extremity edema  BNP slightly elevated, 147, in setting of COVID  · As per outpatient cardiologist, suspect weight gain and subcutaneous edema is related to steroid use  Outpatient echo ordered; consideration for p r n  Diuretic therapy if edema persists or becomes worse  · Echo in 2019: LVEF 42%, normal systolic function, no RWMA  · Elevate lower extremities  · Monitor daily weight    Elevated lipase  Assessment & Plan  · Lipase >1700  · CT abdomen negative for acute findings; pancreas unremarkable  · Repeat lipase    Adenocarcinoma of lung, right (HCC)  Assessment & Plan  · Stage IV metastatic adenocarcinoma of right lung; was on immunotherapy with prednisone and Keytruda although 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  Hold prednisone 40 mg daily while on IV dexamethasone      Type 2 diabetes mellitus without complication, without long-term current use of insulin [facility])  Assessment & Plan  Lab Results   Component Value Date    HGBA1C 6 4 09/24/2021     · Hold Januvia, add sliding scale insulin and ADA diet    No results for input(s): POCGLU in the last 72 hours  Blood Sugar Average: Last 72 hrs:      Bipolar 1 disorder (HCC)  Assessment & Plan  · Continue home medications fluoxetine and Seroquel    COPD, severe (HCC)  Assessment & Plan  · History of severe COPD with chronic hypoxemic respiratory failure maintained chronically on 3  5LPM    · Outpatient regimen:  · Albuterol nebulizer  · Budesonide nebulizer  · Ipratropium nebulizer  · Singulair and Claritin  · Continue nebulizer tx  · Respiratory protocol  · Recommended pulmonary rehab by outpatient pulmonologist although limited by transportation    Paroxysmal atrial fibrillation ([facility] )  Assessment & Plan  · AFib rate controlled with Cardizem, anticoagulated with Eliquis    Cancer related pain  Assessment & Plan  · Followed outpatient by Palliative Medicine, maintained on OxyContin 20 mg b i d  & p r n  Oxycodone 30 mg q  4h   Verified on PDMP    VTE Prophylaxis: Apixaban (Eliquis)  / sequential compression device   Code Status:   POLST: POLST is not applicable to this patient  Discussion with family:     Anticipated Length of Stay:  Patient will be admitted on an Inpatient basis with an anticipated length of stay of  > 2 midnights  Justification for Hospital Stay:  Severe sepsis and respiratory failure in setting of severe COVID    Total Time for Visit, including Counseling / Coordination of Care: 60 minutes  Greater than 50% of this total time spent on direct patient counseling and coordination of care  Chief Complaint:       History of Present Illness:    Sylvetser Benoit  is a 63 y o  male who presents with c/o shortness of breath  H/o severe COPD, right lung adenocarcinoma and chronic hypoxic respiratory failure maintained outpatient on 3 5LNC  Tested positive for COVID, exposure- brother who was recently hospitalized here  Unvaccinated  Patient denies fever, cough or diarrhea  HPI limited due to BiPAP    Unable to wean off BiPAP to high-flow, O2 dropped to 77%  Will consult Pulmonary  Review of Systems:    Review of Systems   Unable to perform ROS: Acuity of condition       Past Medical and Surgical History:     Past Medical History:   Diagnosis Date    Alkalosis 12/9/2019    B[facility] 1 disorder ([facility] )     [facility]     adeno lung  dx 11/2018-lung bx today 4/9/2019-ongoing chemo    Chronic obstructive pulmonary disease with acute exacerbation [facility] ) 6/7/2018    Chronic pain disorder     back and right shoulder    CKD (chronic kidney disease)     COPD (chronic obstructive pulmonary disease) [facility]     Depression     Diabetes mellitus [facility]     Elevated d-dimer 11/30/2019    Elevated troponin 11/29/2019    Elevated troponin level not due to acute coronary syndrome 11/30/2019    GERD (gastroesophageal reflux disease)     Herniated lumbar intervertebral disc     Hyperkalemia     Hypertension     Insomnia     Latent syphilis     Treated    Low back pain     Lumbosacral disc disease     [facility] 04/2019    Pneumothorax after biopsy     R lung    PTSD (post-traumatic stress disorder)     Pulmonary emphysema ([facility] )     Tobacco abuse 11/13/2018       Past Surgical History:   Procedure Laterality Date    COLONOSCOPY  2011    CT GUIDED CHEST TUBE  11/21/2018    FL GUIDED CENTRAL VENOUS ACCESS DEVICE INSERTION  2/8/2019    HEMORROIDECTOMY      IR BIOPSY LUNG  11/13/2018    IR CHEST TUBE PLACEMENT  11/14/2018    KNEE SURGERY      SD INSJ TUNNELED CTR VAD W/SUBQ PORT AGE 5 YR/> N/A 2/8/2019    Procedure: PLACEMENT OF PORT-A-CATH;  Surgeon: Damir Romano MD;  Location: [address] [place] [address];  Service: Vascular       Meds/Allergies:    Prior to Admission medications    Medication Sig Start Date End Date Taking?  Authorizing Provider   albuterol (2 5 mg/3 mL) 0 083 % nebulizer solution Take 1 vial (2 5 mg total) by nebulization every 6 (six) hours as needed for wheezing or shortness of breath 5/20/21   Radha Brunson MD   albuterol (PROVENTIL HFA,VENTOLIN HFA) 90 mcg/act inhaler Inhale 2 puffs every 4 (four) hours as needed for wheezing 6/4/21   Justyna Tovar MD   apixaban (ELIQUIS) 2 5 mg Take 1 tablet (2 5 mg total) by mouth 2 (two) times a day 3/24/21   Yolis Treviño MD   Blood Glucose Monitoring Suppl KIT by Does not apply route daily 12/14/18   Sahara Horton MD   budesonide (PULMICORT) 0 5 mg/2 mL nebulizer solution Take 1 vial (0 5 mg total) by nebulization every 12 (twelve) hours Rinse mouth after use  12/10/19   Zia Parker DO   carbamide peroxide (DEBROX) 6 5 % otic solution Administer 5 drops into both ears 2 (two) times a day 10/23/19   Varsha Feldman MD   clotrimazole-betamethasone (LOTRISONE) 1-0 05 % cream Apply topically 2 (two) times a day 10/15/20   Varsha Feldman MD   diltiazem (CARDIZEM CD) 120 mg 24 hr capsule Take 1 capsule (120 mg total) by mouth daily 3/24/21   Yolis Treviño MD   divalproex sodium (DEPAKOTE) 250 mg EC tablet Take 1 tablet (250 mg total) by mouth 2 (two) times a day 1/20/20   Varsha Feldman MD   ergocalciferol (VITAMIN D2) 50,000 units Take 1 capsule (50,000 Units total) by mouth once a week 7/21/21   Manju Tejada MD   FLUoxetine (PROzac) 10 MG tablet Take 1 tablet (10 mg total) by mouth daily 5/2/19   Sahara Horton MD   fluticasone [facility]) 50 mcg/act nasal spray 1-2 sprays each nostril daily for allergies 10/29/21   BROOKS Tiwari   folic acid (FOLVITE) 1 mg tablet TAKE 1 TABLET BY MOUTH DAILY   12/2/21   MD ARPITA HernándezSTYLE LITE test strip TEST BLOOD SUGAR DAILY 1/4/19   Rehab TabDO feliciano   gabapentin (NEURONTIN) 100 mg capsule Take 1 capsule (100 mg total) by mouth 2 (two) times a day 9/24/21   Merissa Bains MD   guaiFENesin (MUCINEX) 600 mg 12 hr tablet Take 600 mg by mouth every 12 (twelve) hours    Historical Provider, MD   hydrochlorothiazide (HYDRODIURIL) 50 mg tablet     Historical Provider, MD   ipratropium (ATROVENT) 0 02 % nebulizer solution Take 2 5 mL (0 5 mg total) by nebulization 4 (four) times a day 11/24/21 12/24/21  Charity Garland DO   Lancets (FREESTYLE) lancets TEST BLOOD SUGAR DAILY 1/4/19   Rehab DO Jose   loratadine (CLARITIN) 10 mg tablet Take 1 tablet (10 mg total) by mouth daily 4/21/21   BROOKS Gongora   melatonin 3 mg Take 1 tablet (3 mg total) by mouth daily at bedtime 5/20/21   Dina Rawls MD   montelukast (SINGULAIR) 10 mg tablet TAKE 1 TABLET BY MOUTH DAILY AT BEDTIME 8/1/21   Vijay Aguirre MD   olopatadine (PATANOL) 0 1 % ophthalmic solution Administer 1 drop to both eyes 2 (two) times a day 4/21/21   BROOKS Gongora   omeprazole (PriLOSEC OTC) 20 MG tablet     Historical Provider, MD   ondansetron (ZOFRAN) 8 mg tablet Take 1 tablet (8 mg total) by mouth every 8 (eight) hours as needed for nausea or vomiting 8/27/21   Jd Lin MD   oxyCODONE (OxyCONTIN) 20 mg 12 hr tablet Take 1 tablet (20 mg total) by mouth every 12 (twelve) hours Max Daily Amount: 40 mg 12/31/21   Yoli Cm MD   oxyCODONE (ROXICODONE) 30 MG immediate release tablet Take 1 tablet (30 mg total) by mouth every 4 (four) hours as needed (to relieve cancer pain immediately) Max Daily Amount: 180 mg 12/31/21   Yoli Cm MD   pantoprazole (PROTONIX) 40 mg tablet Take 1 tablet (40 mg total) by mouth daily 9/24/21   Emiliano Johnson MD   predniSONE 20 mg tablet Take 2 tablets (40 mg total) by mouth daily with breakfast Do not taper or adjust with Dr Curtis Vick and Ms Hernandez Jenkins 12/9/21   Vijay Aguirre MD   QUEtiapine (SEROquel) 25 mg tablet TAKE 0 5 TABLETS (12 5 MG TOTAL) BY MOUTH DAILY AT BEDTIME 12/22/20   Vijay Aguirre MD   senna-docusate sodium (SENOKOT-S) 8 6-50 mg per tablet Take 1 tablet by mouth 2 (two) times a day 5/20/21   Dina Rawls MD   sitaGLIPtin (JANUVIA) 50 mg tablet Take 1 tablet (50 mg total) by mouth daily 8/20/21   Emiliano Johnson MD   sodium chloride (OCEAN) 0 65 % nasal spray 2 sprays into each nostril as needed for congestion 10/29/21   Dewayne Hogan BROOKS Romero   tamsulosin (FLOMAX) 0 4 mg Take 1 capsule (0 4 mg total) by mouth daily with dinner 21   Yamini Dee MD   folic acid (FOLVITE) 1 mg tablet TAKE 1 TABLET BY MOUTH DAILY  21   Yolis Treviño MD     I have reviewed home medications using allscripts  Allergies: Allergies   Allergen Reactions    Lisinopril Anaphylaxis     Took medication a while ago and had a "swelling reaction"  Does not carry epi-pen       Social History:     Marital Status: Legally    Occupation: disabled  Patient Pre-hospital Living Situation: resides with brother and his 2 children  Patient Pre-hospital Level of Mobility: walker  Patient Pre-hospital Diet Restrictions:   Substance Use History:   Social History     Substance and Sexual Activity   Alcohol Use Not Currently     Social History     Tobacco Use   Smoking Status Former Smoker    Packs/day: 0 50    Years: 40 00    Pack years: 20 00    Types: Cigarettes    Start date:     Quit date: 2019    Years since quittin 4   Smokeless Tobacco Never Used   Tobacco Comment    will smoke a cigarette on occasion with stress     Social History     Substance and Sexual Activity   Drug Use Not Currently    Types: Marijuana    Comment: "I will smoke a joint if I am stressed out but not every day"       Family History:    Family History   Problem Relation Age of Onset    Heart disease Mother     Cancer Mother     Hypertension Father     Diabetes Family     Asthma Family     Heart disease Family     Cancer Family     Cancer Maternal Grandfather     Cancer Paternal Grandfather     Cancer Maternal Aunt        Physical Exam:     Vitals:   Blood Pressure: 137/83 (22)  Pulse: (!) 114 (22)  Temperature: 97 9 °F (36 6 °C) (22)  Temp Source: Oral (22)  Respirations: 22 (22)  SpO2: 95 % (22)    Physical Exam  Constitutional:       General: He is not in acute distress       Appearance: Normal appearance  He is ill-appearing and toxic-appearing  He is not diaphoretic  Comments: Appears chronically ill and older than stated age   HENT:      Head: Normocephalic and atraumatic  Nose: No congestion or rhinorrhea  Mouth/Throat:      Mouth: Mucous membranes are dry  Eyes:      Conjunctiva/sclera: Conjunctivae normal    Cardiovascular:      Rate and Rhythm: Normal rate and regular rhythm  Pulmonary:      Breath sounds: Rhonchi present  Abdominal:      General: Bowel sounds are normal  There is distension  Palpations: Abdomen is soft  Tenderness: There is no abdominal tenderness  Musculoskeletal:      Right lower leg: Edema present  Left lower leg: Edema present  Comments: Chronic 3+B /LLE edema   Skin:     General: Skin is dry  Neurological:      Mental Status: He is alert  Comments: Appears oriented; assessment limited due to BiPAP   Psychiatric:         Mood and Affect: Mood normal          Behavior: Behavior normal          Thought Content: Thought content normal          Judgment: Judgment normal        Additional Data:     Lab Results: I have personally reviewed pertinent reports  Results from last 7 days   Lab Units 01/02/22  0609 01/01/22 2134 01/01/22 2134   WBC Thousand/uL 13 30*   < > 15 92*   HEMOGLOBIN g/dL 10 1*   < > 11 8*   HEMATOCRIT % 33 6*   < > 39 0   PLATELETS Thousands/uL 260   < > 284   BANDS PCT %  --   --  1   LYMPHO PCT %  --   --  8*   MONO PCT %  --   --  4   EOS PCT %  --   --  0    < > = values in this interval not displayed       Results from last 7 days   Lab Units 01/01/22 2134   SODIUM mmol/L 142   POTASSIUM mmol/L 6 2*   CHLORIDE mmol/L 101   CO2 mmol/L 26   BUN mg/dL 83*   CREATININE mg/dL 5 33*   ANION GAP mmol/L 15*   CALCIUM mg/dL 8 7   ALBUMIN g/dL 2 5*   TOTAL BILIRUBIN mg/dL 0 79   ALK PHOS U/L 74   ALT U/L 30   AST U/L 31   GLUCOSE RANDOM mg/dL 137     Results from last 7 days   Lab Units 01/01/22 2134 INR  1 36*             Results from last 7 days   Lab Units 01/02/22  0342 01/01/22  2335 01/01/22  2134   LACTIC ACID mmol/L 3 9* 2 9* 3 4*       Imaging: I have personally reviewed pertinent reports  CT chest abdomen pelvis wo contrast   Final Result by Kiara Jean-Baptiste MD (01/02 0150)      Bibasilar consolidations with new diffuse bilateral parenchymal reticulation more pronounced in peripheral/subpleural distribution, nonspecific findings  Given patient history of malignancy, lymphangitic spread of disease should be considered  Superimposed CHF/edema and or infectious etiology are considered likely given clinical history although appearance is atypical for viral pneumonia in the setting of Covid 19  Clinical correlation and short-term interval follow-up scan recommended as well    as continued serial PET/CT given prior report/findings  Stable nonspecific prominent/mildly enlarged mediastinal lymph nodes compared to most recent PET/CT exam dated 10/27/2021  No definite acute findings in the abdomen or pelvis within limitations of noncontrast exam       Stable nonspecific left retroperitoneal soft tissue lesion  Study was marked in Epic for significant notification  Workstation performed: [id number]         CT head without contrast   Final Result by Kiara Jean-Baptiste MD (01/02 0111)      No acute intracranial abnormality  If there is persistent clinical suspicion for intracranial metastatic disease, gadolinium enhanced MRI would be a more sensitive examination  Workstation performed: [id number]         XR chest 1 view portable   ED Interpretation by Clifton Espinoza PA-C (01/01 2152)   Abnormal   Atelectasis, with PNA concerning for covid            EKG, Pathology, and Other Studies Reviewed on Admission:   · EKG: echo ct cxr    Allscripts / Epic Records Reviewed: Yes     ** Please Note: This note has been constructed using a voice recognition system   **

## 2022-01-02 NOTE — ED NOTES
POA are daughter and brother  Daughter Debbie Ren can be reached at [phone]  Brother, Lisset Sanz, can be reached at [phone]       Selwyn Menon RN  01/02/22 2285

## 2022-01-02 NOTE — CONSULTS
Consultation - Pulmonary Medicine / ICU except note  Mary Mena  63 y o  male MRN: [id number]  Unit/Bed#: ED 32 Encounter: [id number]      Physician Requesting Consult:  Noel Sow  Reason for Consult:  Hypoxia, COVID-19 pneumonia      Assessment:  1  Acute hypoxic respiratory failure, multifactorial as below  2  COVID 19 pneumonia POA, will consider severe disease although his hypoxic respiratory distress is multifactorial   3  Severe Sepsis secondary to Community-acquired pneumonia with significantly elevated procalcitonin but patient has history of Pseudomonas infection in the past  4  Improved lactic acidosis  5  Severe COPD without acute exacerbation  6  Adenocarcinoma of the right lung, currently off Keytruda  7  Paroxysmal atrial fibrillation on Eliquis 2 5 mg b i d  At home  8  NURIA on CKD stage 3 with volume overload  9  Hyperglycemia    Plan:   · Given high oxygen requirement and dependence on BiPAP I will transfer patient to step-down unit level 1 under critical care service  · Continue oxygen and BiPAP for now, try to wean later to high-flow nasal cannula  · Continue dexamethasone  · Will hold on giving baricitinib given high suspicious of bacterial infection  · Continue bronchodilators nebulizer therapy  · Switch antibiotics to cefepime to cover Pseudomonas and continue doxycycline, discussed with pharmacy for dosing given his renal function  · Diurese as per Nephrology, to consider Lasix infusion  · Continue further management as per COVID-19 protocol  · Switch to heparin infusion and stop Eliquis in case patient will need procedure to place dialysis catheter  · Continue sliding scale insulin  · Start budesonide b i d  And DuoNebs q i d  · Discussed with Internal Medicine attending and nursing staff in the ED    ______________________________________________________________________    HPI:    Mary Mena  is a 63 y o  male who presents with worsening shortness of breath    Patient has baseline severe COPD on bronchodilators and inhalers, with chronic dyspnea on exertion, he is on 3 5 L of oxygen for chronic hypoxic respiratory failure  Patient has history of right lung adenocarcinoma and recently was on Keytruda that was stopped due to possible myositis  Patient is unvaccinated, he reports exposure to COVID-19 recently from his brother who lives with him  Patient has advanced kidney disease but not on dialysis and recently noticed worsening legs edema  He denies fever or chills or night sweats or diarrhea, denies chest pain, denies cough or sputum production but complains mainly of worsening shortness of breath  He lives at home with his brother, he quit smoking in the past, no pets at home, he is on disability with no occupational exposure  PFT results:  1  Severe obstructive ventilatory flow limitation without significant improvement after bronchodilator  2  Normal total lung capacity but with evidence of hyperinflation and air trapping  3  Severe diffusing capacity impairment  4  Elevated airway resistance      Review of Systems:  12 points Full review of systems was performed  Aside from what was mentioned in the HPI, it is otherwise negative      Historical Information   Past Medical History:   Diagnosis Date    Alkalosis 12/9/2019    Bipolar 1 disorder [facility] [address] )     Cancer ([facility] [address] )     adeno lung  dx 11/2018-lung bx today 4/9/2019-ongoing chemo    Chronic obstructive pulmonary disease with acute exacerbation ([address] ) 6/7/2018    Chronic pain disorder     back and right shoulder    CKD (chronic kidney disease)     COPD (chronic obstructive pulmonary disease) ([address] )     Depression     Diabetes mellitus ([address] )     Elevated d-dimer 11/30/2019    Elevated troponin 11/29/2019    Elevated troponin level not due to acute coronary syndrome 11/30/2019    GERD (gastroesophageal reflux disease)     Herniated lumbar intervertebral disc     Hyperkalemia     Hypertension     Insomnia     Latent syphilis     Treated    Low back pain     Lumbosacral disc disease     Lung cancer ([facility] [address] ) 2019    Pneumothorax after biopsy     R lung    PTSD (post-traumatic stress disorder)     Pulmonary emphysema ([facility] [address] )     Tobacco abuse 2018     Past Surgical History:   Procedure Laterality Date    COLONOSCOPY      CT GUIDED CHEST TUBE  2018    FL GUIDED CENTRAL VENOUS ACCESS DEVICE INSERTION  2019    HEMORROIDECTOMY      IR BIOPSY LUNG  2018    IR CHEST TUBE PLACEMENT  2018    KNEE SURGERY      LA INSJ TUNNELED CTR VAD W/SUBQ PORT AGE 5 YR/> N/A 2019    Procedure: PLACEMENT OF PORT-A-CATH;  Surgeon: Alba Ferguson MD;  Location: [address] [place] [address] OR;  Service: Vascular     Social History   Social History     Substance and Sexual Activity   Alcohol Use Not Currently     Social History     Tobacco Use   Smoking Status Former Smoker    Packs/day: 0 50    Years: 40 00    Pack years: 20 00    Types: Cigarettes    Start date:     Quit date: 2019    Years since quittin 4   Smokeless Tobacco Never Used   Tobacco Comment    will smoke a cigarette on occasion with stress       Occupational history:  No occupational exposure    Family History:   Family History   Problem Relation Age of Onset    Heart disease Mother     Cancer Mother     Hypertension Father     Diabetes Family     Asthma Family     Heart disease Family     Cancer Family     Cancer Maternal Grandfather     Cancer Paternal Grandfather     Cancer Maternal Aunt        Medications: The patient's active and prehospital medications were reviewed    Current Facility-Administered Medications   Medication Dose Route Frequency Provider Last Rate    acetaminophen  650 mg Oral Q6H PRN BROOKS Poole      albuterol  2 5 mg Nebulization 4x Daily BROOKS Poole      apixaban  2 5 mg Oral BID BROOKS Poole      ascorbic acid  500 mg Oral Daily BROOKS Poole atorvastatin  40 mg Oral Daily With [employer], CRNP      budesonide  0 5 mg Nebulization Q12H [facility], CRNP      calcium carbonate  1,000 mg Oral Daily PRN [facility], CRNP      cefTRIAXone  1,000 mg Intravenous Q24H [facility], CRNP      cholecalciferol  2,000 Units Oral Daily [facility], [address]      dexamethasone  0 1 mg/kg Intravenous Q12H [facility], CRNP 10 1 mg (01/02/22 1112)    dextromethorphan-guaiFENesin  10 mL Oral Q4H PRN [facility], CRNP      diltiazem  120 mg Oral Daily [facility], CRNP      divalproex sodium  250 mg Oral BID [facility], CRNP      doxycycline hyclate  100 mg Oral Q12H [facility], CRNP      FLUoxetine  20 mg Oral Daily [facility], CRNP      fluticasone  1 spray Each Nare BID [facility], CRNP      folic acid  1,938 mcg Oral Daily [facility], CRNP      gabapentin  100 mg Oral BID [facility], CRNP      guaiFENesin  600 mg Oral Q12H [facility], CRNP      insulin lispro  2-12 Units Subcutaneous 4x Daily (AC & HS) [facility], CRNP      ipratropium  0 5 mg Nebulization 4x Daily [facility], CRNP      loratadine  5 mg Oral Daily [facility], CRNP      melatonin  3 mg Oral HS [facility], CRNP      montelukast  10 mg Oral HS [facility], CRNP      nitroglycerin  0 4 mg Sublingual Once Clifton Espinoza PA-C      ondansetron  4 mg Intravenous Once PRN Clifton Espinoza PA-C      ondansetron  4 mg Intravenous Q6H PRN [place], CRNP      oxyCODONE  20 mg Oral Q12H [facility], [address]      oxyCODONE  30 mg Oral Q4H PRN [facility], CRNP      pantoprazole  20 mg Oral Early Morning [facility], CRNP      QUEtiapine  12 5 mg Oral HS [facility], CRNP      senna-docusate sodium  1 tablet Oral BID [facility], CRNP      simethicone  80 mg Oral 4x Daily PRN Dayana Nichole BROOKS Rosenthal      sodium chloride  2 spray Each Nare Q2H PRN BROOKS Bernal      sodium polystyrene  30 g Oral Once [place], [place]      tamsulosin  0 4 mg Oral Daily With [address] [facility], BROOKS      zinc sulfate  220 mg Oral Daily BROOKS Bernal           PhysicalExamination:  Vitals:   Vitals:    01/02/22 0200 01/02/22 0445 01/02/22 0915 01/02/22 1115   BP: 122/78 137/83 136/67 147/84   BP Location: Right arm Left arm Left arm Left arm   Pulse: (!) 120 (!) 114 (!) 114 (!) 106   Resp: 20 22 (!) 24 21   Temp:       TempSrc:       SpO2: 98% 95% 94% 94%     Temp  Min: 97 9 °F (36 6 °C)  Max: 97 9 °F (36 6 °C)       SpO2: 94 %,   SpO2 Activity: At Rest,   O2 Device: BiPAP    General: alert, in moderate acute distress currently on BiPAP  HEENT: PERRL, no icteric sclera or cyanosis, no thrush  Neck:  Supple, no lymphadenopathy or thyromegaly, no JVD  Lungs:  Equal breath sounds , diminished in general, no wheezing or crackles  Heart: S1S2 regular, no murmures or gallops  Abdomen: soft, non-tender, bowel sounds  present  Extrimities: +3 pitting edema, no clubbing or cyanosis  Neuro: Alert and oriented x 3, no focal neurodeficits   Skin: intact, no rashes    Diagnostic Data:  CBC:   Results from last 7 days   Lab Units 01/02/22  0609 01/01/22  2134   WBC Thousand/uL 13 30* 15 92*   HEMOGLOBIN g/dL 10 1* 11 8*   HEMATOCRIT % 33 6* 39 0   PLATELETS Thousands/uL 260 284       CMP:   Results from last 7 days   Lab Units 01/02/22  0609 01/01/22  2134   POTASSIUM mmol/L 6 0* 6 2*   CHLORIDE mmol/L 104 101   CO2 mmol/L 24 26   BUN mg/dL 78* 83*   CREATININE mg/dL 4 72* 5 33*   CALCIUM mg/dL 8 1* 8 7   ALK PHOS U/L 65 74   ALT U/L 34 30   AST U/L 45 31     PT/INR:   Lab Results   Component Value Date    INR 1 36 (H) 01/01/2022   ,     Microbiology:  Results from last 7 days   Lab Units 01/01/22 2139 01/01/22 2134   BLOOD CULTURE  Received in Microbiology Lab  Culture in Progress  Received in Microbiology Lab  Culture in Progress  ABG: No results found for: PHART, CTP9GGU, PO2ART, FGB9TPK, A8NNIJDK, BEART, SOURCE    Imaging:  Chest CT scan reviewed on PACs:  Emphysematous changes, scattered areas of atelectasis, bibasilar consolidation, diffuse subpleural reticulation peripherally, no significant ground-glass opacities    Cardiac lab/EKG/telemetry/ECHO:             Echocardiogram 2019:  LVEF 55%, normal RV     Code Status: Level 1 - Full Code    Lopez Chi MD    Portions of the record may have been created with voice recognition software  Occasional wrong word or "sound a like" substitutions may have occurred due to the inherent limitations of voice recognition software  Read the chart carefully and recognize, using context, where substitutions have occurred

## 2022-01-02 NOTE — ASSESSMENT & PLAN NOTE
· History of severe COPD with chronic hypoxemic respiratory failure maintained chronically on 3  5LPM    · Outpatient regimen:  · Albuterol nebulizer  · Budesonide nebulizer  · Ipratropium nebulizer  · Singulair and Claritin  · Continue nebulizer tx  · Respiratory protocol  · Recommended pulmonary rehab by outpatient pulmonologist although limited by transportation

## 2022-01-02 NOTE — ED PROVIDER NOTES
History  Chief Complaint   Patient presents with    Shortness of Breath     patient hx of cancer  patient found with oxygen in 50s room air given breathing treatment prehospital  sats in low 80s  family +COVID  Pt is a 63year old male with a PMH of adenocarcinoma, COPD, HTN, chronic lower extremity lymphedema, type II DM, Bipolar I disorder presenting with respiratory distress  Per EMS the family called once the pt had worsening SOB  EMS arrived and pt had saturations in the 50s with retractions and accessory muscle usage  He was placed on oxygen and his saturations improved to the 70s  Per EMS family could not offer much of a history besides his brother is currently positive for COVID  Unsure of pt vaccination status  Pt has active adenocarcinoma but has not taken his Keytruda since 10/29/21 per EMR  He is on low dose Prednisone  No history of CHF, recent ECHO had EF at 55% with normal systolic function  Pt denies chest pain at this time  History provided by:  Patient  History limited by:  Severe respiratory distress   used: No    Shortness of Breath  Severity:  Severe  Onset quality:  Gradual  Duration:  1 day  Timing:  Constant  Progression:  Worsening  Chronicity:  New  Context: URI    Relieved by:  Nothing  Worsened by:  Nothing  Ineffective treatments: duo neb  Risk factors: hx of cancer and obesity    Risk factors: no prolonged immobilization        Prior to Admission Medications   Prescriptions Last Dose Informant Patient Reported? Taking?    Blood Glucose Monitoring Suppl KIT  Self No No   Sig: by Does not apply route daily   FLUoxetine (PROzac) 10 MG tablet  Self No No   Sig: Take 1 tablet (10 mg total) by mouth daily   FREESTYLE LITE test strip  Self No No   Sig: TEST BLOOD SUGAR DAILY   Lancets (FREESTYLE) lancets  Self No No   Sig: TEST BLOOD SUGAR DAILY   QUEtiapine (SEROquel) 25 mg tablet  Self No No   Sig: TAKE 0 5 TABLETS (12 5 MG TOTAL) BY MOUTH DAILY AT BEDTIME albuterol (2 5 mg/3 mL) 0 083 % nebulizer solution  Self No No   Sig: Take 1 vial (2 5 mg total) by nebulization every 6 (six) hours as needed for wheezing or shortness of breath   albuterol (PROVENTIL HFA,VENTOLIN HFA) 90 mcg/act inhaler  Self No No   Sig: Inhale 2 puffs every 4 (four) hours as needed for wheezing   apixaban (ELIQUIS) 2 5 mg  Self No No   Sig: Take 1 tablet (2 5 mg total) by mouth 2 (two) times a day   budesonide (PULMICORT) 0 5 mg/2 mL nebulizer solution  Self No No   Sig: Take 1 vial (0 5 mg total) by nebulization every 12 (twelve) hours Rinse mouth after use  carbamide peroxide (DEBROX) 6 5 % otic solution  Self No No   Sig: Administer 5 drops into both ears 2 (two) times a day   clotrimazole-betamethasone (LOTRISONE) 1-0 05 % cream  Self No No   Sig: Apply topically 2 (two) times a day   diltiazem (CARDIZEM CD) 120 mg 24 hr capsule  Self No No   Sig: Take 1 capsule (120 mg total) by mouth daily   divalproex sodium (DEPAKOTE) 250 mg EC tablet  Self No No   Sig: Take 1 tablet (250 mg total) by mouth 2 (two) times a day   ergocalciferol (VITAMIN D2) 50,000 units  Self No No   Sig: Take 1 capsule (50,000 Units total) by mouth once a week   fluticasone (FLONASE) 50 mcg/act nasal spray  Self No No   Si-2 sprays each nostril daily for allergies   folic acid (FOLVITE) 1 mg tablet  Self No No   Sig: TAKE 1 TABLET BY MOUTH DAILY     gabapentin (NEURONTIN) 100 mg capsule  Self No No   Sig: Take 1 capsule (100 mg total) by mouth 2 (two) times a day   guaiFENesin (MUCINEX) 600 mg 12 hr tablet  Self Yes No   Sig: Take 600 mg by mouth every 12 (twelve) hours   hydrochlorothiazide (HYDRODIURIL) 50 mg tablet  Self Yes No   ipratropium (ATROVENT) 0 02 % nebulizer solution  Self No No   Sig: Take 2 5 mL (0 5 mg total) by nebulization 4 (four) times a day   loratadine (CLARITIN) 10 mg tablet  Self No No   Sig: Take 1 tablet (10 mg total) by mouth daily   melatonin 3 mg  Self No No   Sig: Take 1 tablet (3 mg total) by mouth daily at bedtime   montelukast (SINGULAIR) 10 mg tablet  Self No No   Sig: TAKE 1 TABLET BY MOUTH DAILY AT BEDTIME   olopatadine (PATANOL) 0 1 % ophthalmic solution  Self No No   Sig: Administer 1 drop to both eyes 2 (two) times a day   omeprazole (PriLOSEC OTC) 20 MG tablet  Self Yes No   ondansetron (ZOFRAN) 8 mg tablet  Self No No   Sig: Take 1 tablet (8 mg total) by mouth every 8 (eight) hours as needed for nausea or vomiting   oxyCODONE (OxyCONTIN) 20 mg 12 hr tablet   No No   Sig: Take 1 tablet (20 mg total) by mouth every 12 (twelve) hours Max Daily Amount: 40 mg   oxyCODONE (ROXICODONE) 30 MG immediate release tablet   No No   Sig: Take 1 tablet (30 mg total) by mouth every 4 (four) hours as needed (to relieve cancer pain immediately) Max Daily Amount: 180 mg   pantoprazole (PROTONIX) 40 mg tablet  Self No No   Sig: Take 1 tablet (40 mg total) by mouth daily   predniSONE 20 mg tablet  Self No No   Sig: Take 2 tablets (40 mg total) by mouth daily with breakfast Do not taper or adjust with Dr Carla Agustin and Ms Mamie Gramajo   senna-docusate sodium (SENOKOT-S) 8 6-50 mg per tablet  Self No No   Sig: Take 1 tablet by mouth 2 (two) times a day   sitaGLIPtin (JANUVIA) 50 mg tablet  Self No No   Sig: Take 1 tablet (50 mg total) by mouth daily   sodium chloride (OCEAN) 0 65 % nasal spray  Self No No   Si sprays into each nostril as needed for congestion   tamsulosin (FLOMAX) 0 4 mg  Self No No   Sig: Take 1 capsule (0 4 mg total) by mouth daily with dinner      Facility-Administered Medications: None       Past Medical History:   Diagnosis Date    Alkalosis 2019    [facility]     Cancer ([facility] )     adeno lung  dx 2018-lung bx today 2019-ongoing chemo    Chronic obstructive pulmonary disease with acute exacerbation (HCC) 2018    Chronic pain disorder     back and right shoulder    CKD (chronic kidney disease)     COPD [facility]chronic obstructive [facility] disease) [facility]     Depression     Diabetes mellitus ([facility] )     Elevated d-dimer 2019    Elevated troponin 2019    Elevated troponin level not due to acute coronary syndrome 2019    GERD (gastroesophageal reflux disease)     Herniated lumbar intervertebral disc     Hyperkalemia     Hypertension     Insomnia     Latent syphilis     Treated    Low back pain     Lumbosacral disc disease     Lung cancer ([facility] ) 2019    Pneumothorax after biopsy     R lung    PTSD (post-traumatic stress disorder)     Pulmonary emphysema ([place] [id number] )     Tobacco abuse 2018       Past Surgical History:   Procedure Laterality Date    COLONOSCOPY      CT GUIDED CHEST TUBE  2018    FL GUIDED CENTRAL VENOUS ACCESS DEVICE INSERTION  2019    HEMORROIDECTOMY      IR BIOPSY LUNG  2018    IR CHEST TUBE PLACEMENT  2018    KNEE SURGERY      [place] INS[facility] TUNNELED CTR VAD W/SUBQ PORT AGE 5 YR/> N/A 2019    Procedure: PLACEMENT OF PORT-A-CATH;  Surgeon: Julissa Robles MD;  Location: [address] [place] [address];  Service: Vascular       Family History   Problem Relation Age of Onset    Heart disease Mother     Cancer Mother     Hypertension Father     Diabetes Family     Asthma Family     Heart disease Family     Cancer Family     Cancer Maternal Grandfather     Cancer Paternal Grandfather     Cancer Maternal Aunt      I have reviewed and agree with the history as documented      E-Cigarette/Vaping    E-Cigarette Use Never User      E-Cigarette/Vaping Substances    Nicotine No     THC No     CBD No     Flavoring No     Other No     Unknown No      Social History     Tobacco Use    Smoking status: Former Smoker     Packs/day: 0 50     Years: 40 00     Pack years: 20 00     Types: Cigarettes     Start date:      Quit date: 2019     Years since quittin 4    Smokeless tobacco: Never Used    Tobacco comment: will smoke a cigarette on occasion with stress   Vaping Use    Vaping Use: Never used Substance Use Topics    Alcohol use: Not Currently    Drug use: Not Currently     Types: Marijuana     Comment: "I will smoke a joint if I am stressed out but not every day"       Review of Systems   Unable to perform ROS: Severe respiratory distress   Respiratory: Positive for shortness of breath  Physical Exam  Physical Exam  Constitutional:       General: He is in acute distress  Appearance: He is well-developed  He is obese  He is ill-appearing, toxic-appearing and diaphoretic  HENT:      Head: Normocephalic and atraumatic  Right Ear: External ear normal       Left Ear: External ear normal       Nose: Nose normal    Eyes:      General: No scleral icterus  Right eye: No discharge  Left eye: No discharge  Extraocular Movements: Extraocular movements intact  Conjunctiva/sclera: Conjunctivae normal    Cardiovascular:      Rate and Rhythm: Regular rhythm  Tachycardia present  Heart sounds: Normal heart sounds  Pulmonary:      Effort: Tachypnea, accessory muscle usage, respiratory distress and retractions present  Breath sounds: Examination of the right-upper field reveals rhonchi  Examination of the left-upper field reveals rhonchi  Examination of the right-middle field reveals rhonchi  Examination of the left-middle field reveals rhonchi  Examination of the right-lower field reveals rhonchi  Examination of the left-lower field reveals rhonchi  Rhonchi present  Abdominal:      General: Abdomen is flat and protuberant  There is distension  Tenderness: There is no abdominal tenderness  There is no guarding or rebound  Musculoskeletal:         General: Normal range of motion  Cervical back: Normal range of motion and neck supple  Right lower leg: No tenderness  Edema present  Left lower leg: No tenderness  Edema present  Comments: 3+ b/l pitting edema of the pretibial area  Skin:     General: Skin is warm     Neurological: General: No focal deficit present  Mental Status: He is alert  GCS: GCS eye subscore is 4  GCS verbal subscore is 5  GCS motor subscore is 6     Psychiatric:         Mood and Affect: Mood normal          Behavior: Behavior normal          Vital Signs  ED Triage Vitals   Temperature Pulse Respirations Blood Pressure SpO2   01/01/22 2311 01/01/22 2127 01/01/22 2125 01/01/22 2136 01/01/22 2125   97 9 °F (36 6 °C) (!) 132 (!) 40 121/62 (!) 73 %      Temp Source Heart Rate Source Patient Position - Orthostatic VS BP Location FiO2 (%)   01/01/22 2311 01/01/22 2127 01/01/22 2136 01/01/22 2136 --   Oral Monitor Lying Right arm       Pain Score       01/02/22 0044       10 - Worst Possible Pain           Vitals:    01/01/22 2245 01/01/22 2334 01/02/22 0030 01/02/22 0200   BP:  119/72 146/73 122/78   Pulse: (!) 122 (!) 117 (!) 114 (!) 120   Patient Position - Orthostatic VS:  Lying Lying Lying         Visual Acuity      ED Medications  Medications   nitroglycerin (NITROSTAT) SL tablet 0 4 mg (0 mg Sublingual Hold 1/1/22 2142)   Baricitinib (OLUMIANT) (COVID EUA) partial tablet 1 mg (1 mg Oral Given 1/1/22 2254)   ondansetron (ZOFRAN) injection 4 mg (has no administration in time range)   ipratropium-albuterol (FOR EMS ONLY) (DUO-NEB) 0 5-2 5 mg/3 mL inhalation solution 3 mL (0 mL Does not apply Given to EMS 1/1/22 2138)   dexamethasone (DECADRON) injection 10 mg (10 mg Intravenous Given 1/1/22 2229)   ceftriaxone (ROCEPHIN) 1 g/50 mL in dextrose IVPB (0 mg Intravenous Stopped 1/1/22 2334)   doxycycline hyclate (VIBRAMYCIN) capsule 100 mg (100 mg Oral Given 1/1/22 2254)   insulin regular (HumuLIN R,NovoLIN R) injection 10 Units (10 Units Intravenous Given 1/1/22 2234)   dextrose 50 % IV solution 25 mL (25 mL Intravenous Given 1/1/22 2231)   albuterol inhalation solution 10 mg (10 mg Nebulization Given 1/2/22 0018)   calcium gluconate 1 g in sodium chloride 0 9% 50 mL (premix) (0 g Intravenous Stopped 1/1/22 5115) sodium chloride 0 9 % bolus 1,000 mL (0 mL Intravenous Stopped 1/2/22 0044)   sodium chloride 0 9 % bolus 1,000 mL (0 mL Intravenous Stopped 1/2/22 0225)   HYDROmorphone (DILAUDID) injection 0 5 mg (0 5 mg Intravenous Given 1/2/22 0044)       Diagnostic Studies  Results Reviewed     Procedure Component Value Units Date/Time    HS Troponin I 4hr [[id number]]  (Abnormal) Collected: 01/02/22 0140    Lab Status: Final result Specimen: Blood from Arm, Right Updated: 01/02/22 0221     hs TnI 4hr 58 ng/L      Delta 4hr hsTnI 6 ng/L     HS Troponin I 2hr [[id number]]  (Abnormal) Collected: 01/01/22 2335    Lab Status: Final result Specimen: Blood from Arm, Right Updated: 01/02/22 0025     hs TnI 2hr 58 ng/L      Delta 2hr hsTnI 6 ng/L     Lactic acid 2 Hours [[id number]]  (Abnormal) Collected: 01/01/22 2335    Lab Status: Final result Specimen: Blood from Arm, Right Updated: 01/02/22 0003     LACTIC ACID 2 9 mmol/L     Narrative:      Result may be elevated if tourniquet was used during collection      Manual Differential(PHLEBS Do Not Order) [[id number]]  (Abnormal) Collected: 01/01/22 2134    Lab Status: Final result Specimen: Blood from Arm, Left Updated: 01/01/22 2247     Segmented % 87 %      Bands % 1 %      Lymphocytes % 8 %      Monocytes % 4 %      Eosinophils, % 0 %      Basophils % 0 %      Absolute Neutrophils 14 01 Thousand/uL      Lymphocytes Absolute 1 27 Thousand/uL      Monocytes Absolute 0 64 Thousand/uL      Eosinophils Absolute 0 00 Thousand/uL      Basophils Absolute 0 00 Thousand/uL      Total Counted --     RBC Morphology Present     Anisocytosis Present     Platelet Estimate Adequate    COVID/FLU/RSV - 2 hour TAT [[id number]]  (Abnormal) Collected: 01/01/22 2135    Lab Status: Final result Specimen: Nasopharyngeal Swab Updated: 01/01/22 2237     SARS-CoV-2 Positive     INFLUENZA A PCR Negative     INFLUENZA B PCR Negative     RSV PCR Negative    Narrative:      FOR PEDIATRIC PATIENTS - copy/paste COVID Guidelines URL to browser: [email or web address]     This test has been authorized by FDA under an EUA (Emergency Use Assay) for use by authorized laboratories  Clinical caution and judgement should be used with the interpretation of these results with consideration of the clinical impression and other laboratory testing  Testing reported as "Positive" or "Negative" has been proven to be accurate according to standard laboratory validation requirements  All testing is performed with control materials showing appropriate reactivity at standard intervals  HS Troponin 0hr (reflex protocol) [[id number]]  (Abnormal) Collected: 01/01/22 2134    Lab Status: Final result Specimen: Blood from Arm, Left Updated: 01/01/22 2228     hs TnI 0hr 52 ng/L     NT-BNP PRO [[id number]]  (Abnormal) Collected: 01/01/22 2134    Lab Status: Final result Specimen: Blood from Arm, Left Updated: 01/01/22 2221     NT-proBNP 147 pg/mL     Lipase [[id number]]  (Abnormal) Collected: 01/01/22 2134    Lab Status: Final result Specimen: Blood from Arm, Left Updated: 01/01/22 2221     Lipase 1,750 u/L     D-Dimer [[id number]]  (Abnormal) Collected: 01/01/22 2134    Lab Status: Final result Specimen: Blood from Arm, Left Updated: 01/01/22 2219     D-Dimer, Quant 3 30 ug/ml FEU     Lactic acid [[id number]]  (Abnormal) Collected: 01/01/22 2134    Lab Status: Final result Specimen: Blood from Arm, Left Updated: 01/01/22 2202     LACTIC ACID 3 4 mmol/L     Narrative:      Result may be elevated if tourniquet was used during collection      APTT [[id number]]  (Normal) Collected: 01/01/22 2134    Lab Status: Final result Specimen: Blood from Arm, Left Updated: 01/01/22 2201     PTT 30 seconds     Protime-INR [[id number]]  (Abnormal) Collected: 01/01/22 2134    Lab Status: Final result Specimen: Blood from Arm, Left Updated: 01/01/22 2201     Protime 16 3 seconds      INR 1 36    Comprehensive metabolic panel [[id number]]  (Abnormal) Collected: 01/01/22 2134    Lab Status: Final result Specimen: Blood from Arm, Left Updated: 01/01/22 2158     Sodium 142 mmol/L      Potassium 6 2 mmol/L      Chloride 101 mmol/L      CO2 26 mmol/L      ANION GAP 15 mmol/L      BUN 83 mg/dL      Creatinine 5 33 mg/dL      Glucose 137 mg/dL      Calcium 8 7 mg/dL      Corrected Calcium 9 9 mg/dL      AST 31 U/L      ALT 30 U/L      Alkaline Phosphatase 74 U/L      Total Protein 7 7 g/dL      Albumin 2 5 g/dL      Total Bilirubin 0 79 mg/dL      eGFR 10 ml/min/1 73sq m     Narrative:      [place] guidelines for Chronic Kidney Disease (CKD):     Stage 1 with normal or high GFR (GFR > 90 mL/min/1 73 square meters)    Stage 2 Mild CKD (GFR = 60-89 mL/min/1 73 square meters)    Stage 3A Moderate CKD (GFR = 45-59 mL/min/1 73 square meters)    Stage 3B Moderate CKD (GFR = 30-44 mL/min/1 73 square meters)    Stage 4 Severe CKD (GFR = 15-29 mL/min/1 73 square meters)    Stage 5 End Stage CKD (GFR <15 mL/min/1 73 square meters)  Note: GFR calculation is accurate only with a steady state creatinine    CK (with reflex to MB) [[id number]]     Lab Status: No result Specimen: Blood     CBC and differential [[id number]]  (Abnormal) Collected: 01/01/22 2134    Lab Status: Final result Specimen: Blood from Arm, Left Updated: 01/01/22 2145     WBC 15 92 Thousand/uL      RBC 4 42 Million/uL      Hemoglobin 11 8 g/dL      Hematocrit 39 0 %      MCV 88 fL      MCH 26 7 pg      MCHC 30 3 g/dL      RDW 17 2 %      MPV 9 9 fL      Platelets 703 Thousands/uL     Narrative: This is an appended report  These results have been appended to a previously verified report  Blood culture #1 [[id number]] Collected: 01/01/22 2139    Lab Status: In process Specimen: Blood from Hand, Right Updated: 01/01/22 2141    Blood culture #2 [[id number]] Collected: 01/01/22 2134    Lab Status:  In process Specimen: Blood from Arm, Left Updated: 01/01/22 2141    Procalcitonin with AM Reflex [[id number]] Collected: 01/01/22 2134    Lab Status: In process Specimen: Blood from Arm, Left Updated: 01/01/22 2138                 CT chest abdomen pelvis wo contrast   Final Result by Jessika Skaggs MD (01/02 0150)      Bibasilar consolidations with new diffuse bilateral parenchymal reticulation more pronounced in peripheral/subpleural distribution, nonspecific findings  Given patient history of malignancy, lymphangitic spread of disease should be considered  Superimposed CHF/edema and or infectious etiology are considered likely given clinical history although appearance is atypical for viral pneumonia in the setting of Covid 19  Clinical correlation and short-term interval follow-up scan recommended as well    as continued serial PET/CT given prior report/findings  Stable nonspecific prominent/mildly enlarged mediastinal lymph nodes compared to most recent PET/CT exam dated 10/27/2021  No definite acute findings in the abdomen or pelvis within limitations of noncontrast exam       Stable nonspecific left retroperitoneal soft tissue lesion  Study was marked in Epic for significant notification  Workstation performed: [id number]         CT head without contrast   Final Result by Jessika Skaggs MD (01/02 0111)      No acute intracranial abnormality  If there is persistent clinical suspicion for intracranial metastatic disease, gadolinium enhanced MRI would be a more sensitive examination                 Workstation performed: [id number]         XR chest 1 view portable   ED Interpretation by Nicho Cowan PA-C (01/01 2152)   Abnormal   Atelectasis, with PNA concerning for covid                   Procedures  ECG 12 Lead Documentation Only    Date/Time: 1/1/2022 9:52 PM  Performed by: Nicho Cowan PA-C  Authorized by: Nicho Cowan PA-C     ECG reviewed by me, the ED Provider: yes    Patient location:  ED  Previous ECG:     Previous ECG: Compared to current    Similarity:  Changes noted    Comparison to cardiac monitor: No    Interpretation:     Interpretation: abnormal    Rate:     ECG rate:  132    ECG rate assessment: tachycardic    Rhythm:     Rhythm: sinus tachycardia    Ectopy:     Ectopy: none    QRS:     QRS axis:  Normal    QRS intervals:  Normal  Conduction:     Conduction: normal    ST segments:     ST segments:  Normal  T waves:     T waves: normal    ECG 12 Lead Documentation Only    Date/Time: 1/2/2022 12:06 AM  Performed by: Melony Gleason PA-C  Authorized by: Melony Gleason PA-C     ECG reviewed by me, the ED Provider: yes    Patient location:  ED  Previous ECG:     Previous ECG:  Compared to current    Similarity:  No change    Comparison to cardiac monitor: No    Interpretation:     Interpretation: abnormal    Rate:     ECG rate:  118    ECG rate assessment: tachycardic    Rhythm:     Rhythm: sinus tachycardia    Ectopy:     Ectopy: none    QRS:     QRS axis:  Normal    QRS intervals:  Normal  Conduction:     Conduction: normal    ST segments:     ST segments:  Normal  T waves:     T waves: normal    CriticalCare Time  Performed by: Melony Gleason PA-C  Authorized by: Melony Gleason PA-C     Critical care provider statement:     Critical care time (minutes):  90    Critical care start time:  1/1/2022 10:00 PM    Critical care end time:  1/1/2022 11:30 PM    Critical care time was exclusive of:  Separately billable procedures and treating other patients    Critical care was necessary to treat or prevent imminent or life-threatening deterioration of the following conditions:  Cardiac failure, circulatory failure, dehydration, metabolic crisis, renal failure, respiratory failure and sepsis    Critical care was time spent personally by me on the following activities:  Blood draw for specimens, obtaining history from patient or surrogate, development of treatment plan with patient or surrogate, discussions with consultants, discussions with primary provider, evaluation of patient's response to treatment, examination of patient, review of old charts, re-evaluation of patient's condition, ordering and review of radiographic studies, ordering and review of laboratory studies, ordering and performing treatments and interventions and interpretation of cardiac output measurements    I assumed direction of critical care for this patient from another provider in my specialty: yes (Dr Jean Moreno)    Comments:      Pt on BiPAP for acute respiratory failure with significant renal failure requiring multiple fluid boluses  Treatment required for hyperkalemia as well  ED Course  ED Course as of 01/02/22 0315   Sat Jan 01, 2022   2133 Pt seen and examined  Presenting with severe respiratory distress  He has history of lung cancer and COPD  Was given Duo Neb by EMS without improvement  He has rhonchi on exam with b/l LE pitting edema  No known history of CHF  Brother is positive for covid as well  Unknown vaccination status  Will place pt on BiPap for hypoxia and respiratory distress  Obtaining labs, covid test and CXR  Will monitor on BiPAP and intubate if indicated  2202 Pt noted to be in ARF and hyperkalemic as well  He has lactic acidosis of 3 4  will give fluids, insulin, albuterol and calcium  Will not treat as true sepsis until COVID results  2340 Pt continues to improve on BiPAP, both vitals and clinically  Will take to CT to evaluate for elevated lipase, abdominal distension  Samantha Bustos Jan 02, 2022   0157 Bibasilar consolidations with new diffuse bilateral parenchymal reticulation more pronounced in peripheral/subpleural distribution, nonspecific findings  Given patient history of malignancy, lymphangitic spread of disease should be considered  Superimposed CHF/edema and or infectious etiology are considered likely given clinical history although appearance is atypical for viral pneumonia in the setting of Covid 19   Clinical correlation and short-term interval follow-up scan recommended as well   as continued serial PET/CT given prior report/findings  0157 Pt stable enough to be admitted to [facility] step down 2  HEART Risk Score      Most Recent Value   Heart Score Risk Calculator    History 2 Filed at: 01/02/2022 0307   ECG 0 Filed at: 01/02/2022 9783   Age 1 Filed at: 01/02/2022 4527   Risk Factors 2 Filed at: 01/02/2022 0307   Troponin 2 Filed at: 01/02/2022 9499   HEART Score 7 Filed at: 01/02/2022 0329                     Initial Sepsis Screening     Row Name 01/02/22 0307                Is the patient's history suggestive of a new or worsening infection? No  covid positive does not qualify  -BY        Suspected source of infection --        Are two or more of the following signs & symptoms of infection both present and new to the patient? --        Indicate SIRS criteria --        If the answer is yes to both questions, suspicion of sepsis is present --        If severe sepsis is present AND tissue hypoperfusion perists in the hour after fluid resuscitation or lactate > 4, the patient meets criteria for SEPTIC SHOCK --        Are any of the following organ dysfunction criteria present within 6 hours of suspected infection and SIRS criteria that are NOT considered to be chronic conditions?  --        Organ dysfunction --        Date of presentation of severe sepsis --        Time of presentation of severe sepsis --        Tissue hypoperfusion persists in the hour after crystalloid fluid administration, evidenced, by either: --        Was hypotension present within one hour of the conclusion of crystalloid fluid administration? --        Date of presentation of septic shock --        Time of presentation of septic shock --              User Key  (r) = Recorded By, (t) = Taken By, (c) = Cosigned By    [address] Name Provider Type    BY Ingris Kunz PA-C Physician Assistant                SBIRT 22yo+      Most Recent Value SBIRT (25 yo +)    In order to provide better care to our patients, we are screening all of our patients for alcohol and drug use  Would it be okay to ask you these screening questions?  No Filed at: 01/01/2022 2126                    MDM  Number of Diagnoses or Management Options  Acute respiratory failure with hypoxia [facility]): new and requires workup  ARF (acute renal failure) ([facility] [address] ): new and requires workup  Hyperkalemia: new and requires workup  Lactic acidosis: new and requires workup  NSTEMI (non-ST elevated myocardial infarction) [facility]): new and requires workup  Pneumonia due to COVID-19 virus: new and requires workup     Amount and/or Complexity of Data Reviewed  Clinical lab tests: ordered and reviewed  Tests in the radiology section of CPT®: ordered and reviewed  Tests in the medicine section of CPT®: ordered and reviewed  Discussion of test results with the performing providers: yes  Review and summarize past medical records: yes  Discuss the patient with other providers: yes  Independent visualization of images, tracings, or specimens: yes    Risk of Complications, Morbidity, and/or Mortality  Presenting problems: high  Management options: high    Patient Progress  Patient progress: stable      Disposition  Final diagnoses:   Pneumonia due to COVID-19 virus   Acute respiratory failure with hypoxia (HCC)   Hyperkalemia   ARF (acute renal failure) ([facility] )   NSTEMI (non-ST elevated myocardial infarction) ([facility] )   Lactic acidosis     Time reflects when diagnosis was documented in both MDM as applicable and the Disposition within this note     Time User Action Codes Description Comment    1/2/2022 12:04 AM Jayy Lewis B Add [U07 1,  J12 82] Pneumonia due to COVID-19 virus     1/2/2022 12:04 AM Belenalamarcelo Joshua B Add [J96 01] Acute respiratory failure with hypoxia ([facility] )     1/2/2022 12:05 AM Belenalamarcelo Joshua B Add [E87 5] Hyperkalemia     1/2/2022 12:05 AM [place]o [place] B Add [N17 9] ARF (acute renal failure) ([facility] ) 1/2/2022 12:05 AM Travis MELGAR Add [I21 4] NSTEMI (non-ST elevated myocardial infarction) ([facility] [address] )     1/2/2022 12:05 AM Travis MELGAR Add [E87 2] Lactic acidosis       ED Disposition     None      Follow-up Information    None         Patient's Medications   Discharge Prescriptions    No medications on file       No discharge procedures on file      PDMP Review       Value Time User    PDMP Reviewed  Yes 12/31/2021 12:01 PM Delfina Biggs MD          ED Provider  Electronically Signed by           Clifton Espinoza PA-C  01/02/22 4252

## 2022-01-02 NOTE — ASSESSMENT & PLAN NOTE
· K 6 2 in setting of NURIA    Received hyperkalemia cocktail, repeat BMP k = 6  · Received insulin, d50 today; unable to get armando exylate per nursing and nephro aware  · Monitor on telemetry  · Nephrology consult appreciated

## 2022-01-02 NOTE — ED NOTES
pts valuables that were secured with security were given to family  Bag number [id number] given to Watson and exchange witnessed by Pt        Reed Hernandez RN  01/02/22 8005

## 2022-01-02 NOTE — ASSESSMENT & PLAN NOTE
Lab Results   Component Value Date    HGBA1C 6 4 09/24/2021     · Hold Januvia, add sliding scale insulin and ADA diet    Recent Labs     01/02/22  0804   POCGLU 187*       Blood Sugar Average: Last 72 hrs:  (P) 187

## 2022-01-02 NOTE — ASSESSMENT & PLAN NOTE
· Chronically elevated troponin and at baseline   Asymptomatic for chest pain although as per outpatient cardiology- has chronic atypical chest pain mostly related to MSK aches and metastatic disease  · Monitor on telemetry

## 2022-01-02 NOTE — ED NOTES
Attempting to trial pt off bipap at this time  Pt placed on mid-flow oxygen at 15L        Ophelia Boo RN  01/02/22 1981

## 2022-01-02 NOTE — ASSESSMENT & PLAN NOTE
· Severe sepsis in setting of COVID    Meets severe sepsis criteria with leukocytosis, tachycardia, tachypnea, NURIA and lactic acidosis  · On IV ceftriaxone and doxycycline for COVID pneumonia  · Follow-up PCT and blood culture

## 2022-01-02 NOTE — ASSESSMENT & PLAN NOTE
· Followed outpatient by Palliative Medicine, maintained on OxyContin 20 mg b i d  & p r n  Oxycodone 30 mg q  4h   Verified on PDMP

## 2022-01-02 NOTE — ASSESSMENT & PLAN NOTE
· O2 sat on arrival 73%, requiring BiPAP to sustain oxygen above 90%  · COVID-19 PCR positive  Unvaccinated  · Chest CT: Bibasilar consolidations with new diffuse bilateral parenchymal reticulation more pronounced in peripheral/subpleural distribution, nonspecific findings  Consider superimposed CHF or infectious etiology although appearance atypical for viral pneumonia in setting of COVID  Given patient history of malignancy, lymphangitic spread of disease should be considered  Clinical correlation and short-term interval follow-up scan recommended as well  as continued serial PET/CT given prior report/findings  · Will admit and treat under severe pathway  · Will obtain CRP, trend as needed  TREATMENT:  · Atorvastatin 40mg qd  · Dexamethasone 0 1mg/kg bid [id number]  · Baricitinib  - given but discontinued as contra-indicated per pharmacy due to renal function  · IV ceftriaxone and p o doxycycline  · Anticoagulated on Eliquis  · remdesivir- not indicated [facility] bipap [facility]  · Continue supplemental oxygen to maintain O2 sat around 90%   Encourage ambulation and proning  · Pulmonary consult

## 2022-01-02 NOTE — ASSESSMENT & PLAN NOTE
· Patient reports chronic lower extremity edema  BNP slightly elevated, 147, in setting of COVID  · As per outpatient cardiologist, suspect weight gain and subcutaneous edema is related to steroid use  Outpatient echo ordered; consideration for p r n   Diuretic therapy if edema persists or becomes worse  · Echo in 2019: LVEF 63%, normal systolic function, no RWMA  · Elevate lower extremities  · Monitor daily weight

## 2022-01-02 NOTE — ASSESSMENT & PLAN NOTE
· K 6 2 in setting of NURIA    Received hyperkalemia cocktail, repeat BMP  · Monitor on telemetry  · Nephrology consult

## 2022-01-02 NOTE — ASSESSMENT & PLAN NOTE
· Severe sepsis in setting of COVID  Meets severe sepsis criteria with leukocytosis, tachycardia, tachypnea, NURIA and lactic acidosis   Repeat lactate improved  · On IV ceftriaxone and doxycycline for COVID pneumonia  · procalcitonin positive, complete course of ABX  · Follow cultures

## 2022-01-02 NOTE — ASSESSMENT & PLAN NOTE
· Chronic respiratory failure in setting severe COPD and adenocarcinoma of right lung  Maintained chronically on 3  [facility]

## 2022-01-02 NOTE — ASSESSMENT & PLAN NOTE
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

## 2022-01-02 NOTE — ASSESSMENT & PLAN NOTE
Lab Results   Component Value Date    EGFR 12 01/02/2022    EGFR 10 01/01/2022    EGFR 27 12/20/2021    CREATININE 4 72 (H) 01/02/2022    CREATININE 5 33 (H) 01/01/2022    CREATININE 2 49 (H) 12/20/2021     · improving  · Hold HCTZ  · Will obtain UA and serum phosphorus  · Monitor intake and output  · Urinary retention protocol  · Avoid nephrotoxins  · Nephrology consult appreciated

## 2022-01-02 NOTE — UTILIZATION REVIEW
Initial Clinical Review    Admission: Date/Time/Statement:   Admission Orders (From admission, onward)     Ordered        01/02/22 0257  INPATIENT ADMISSION  Once                      Orders Placed This Encounter   Procedures    INPATIENT ADMISSION     Standing Status:   Standing     Number of Occurrences:   1     Order Specific Question:   Level of Care     Answer:   Level 2 Stepdown / HOT [14]     Order Specific Question:   Estimated length of stay     Answer:   More than 2 Midnights     Order Specific Question:   Certification     Answer:   I certify that inpatient services are medically necessary for this patient for a duration of greater than two midnights  See H&P and MD Progress Notes for additional information about the patient's course of treatment  ED Arrival Information     Expected Arrival Acuity    - 1/1/2022 21:21 Emergent         Means of arrival Escorted by Service Admission type    Ambulance [facility] ([address]) Hospitalist Emergency         Arrival complaint    Shortness of Breath        Chief Complaint   Patient presents with    Shortness of Breath     patient hx of cancer  patient found with oxygen in 50s room air given breathing treatment prehospital  sats in low 80s  family +COVID  Initial Presentation: 64 yo male presented to ED rfom home as inpatient admission for acute respiratory failure due to [place] -19  PMH of Stage IV metastatic adenocarcinoma of right lung; was on immunotherapy COPD, HTN, chronic lower extremity lymphedema, type II DM, Bipolar patient c/o worsening SOB retractions and accessory muscle use  EMS found patient to be in 52's on R/a  Pt has active adenocarcinoma but has not taken his Keytruda since 10/29/21  Due RDS patient placed on BiPAP on exam acute distress ill appearing toxic appearing diaphoretic, tachycardia, tachypnea, accessory muscle usage, respiratory distress and retractions 3+ b/l pitting edema of the pretibial area   abdomen distension noted  Plan BiPAP IV steroids and antibiotics self proning and supportive care  Date: 01-02-21   Patient remains on BiPAP When removing the bipap he desaturates significantly      IV ceftriaxone and doxycycline IV dexamethasone,on exam tachypneic, wearing bipap, acutely ill (+) 2 lower led edema  Consult Pulmonology:  Assessment:  1  Acute hypoxic respiratory failure, multifactorial as below  2  COVID 19 pneumonia POA, will consider severe disease although his hypoxic respiratory distress is multifactorial   3  Severe Sepsis secondary to Community-acquired pneumonia with significantly elevated procalcitonin but patient has history of Pseudomonas infection in the past  4  Improved lactic acidosis  5  Severe COPD without acute exacerbation  6  Adenocarcinoma of the right lung, currently off Keytruda  7  Paroxysmal atrial fibrillation on Eliquis 2 5 mg b i d  At home  8  NURIA on CKD stage 3 with volume overload  9  Hyperglycemia  Plan:   · Given high oxygen requirement and dependence on BiPAP I will transfer patient to step-down unit level 1 under critical care service  · Continue oxygen and BiPAP for now, try to wean later to high-flow nasal cannula  · Continue dexamethasone  · Will hold on giving baricitinib given high suspicious of bacterial infection  · Continue bronchodilators nebulizer therapy  · Switch antibiotics to cefepime to cover Pseudomonas and continue doxycycline, discussed with pharmacy for dosing given his renal function  · Diurese as per Nephrology, to consider Lasix infusion  · Continue further management as per COVID-19 protocol  · Switch to heparin infusion and stop Eliquis in case patient will need procedure to place dialysis catheter  · Continue sliding scale insulin  · Start budesonide b i d  And DuoNebs q i d    · Discussed with Internal Medicine attending and nursing staff in the ED     ED Triage Vitals   Temperature Pulse Respirations Blood Pressure SpO2   01/01/22 2311 01/01/22 2127 01/01/22 2125 01/01/22 2136 01/01/22 2125   97 9 °F (36 6 °C) (!) 132 (!) 40 121/62 (!) 73 %      Temp Source Heart Rate Source Patient Position - Orthostatic VS BP Location FiO2 (%)   01/01/22 2311 01/01/22 2127 01/01/22 2136 01/01/22 2136 --   Oral Monitor Lying Right arm       Pain Score       01/02/22 0044       10 - Worst Possible Pain          Wt Readings from Last 1 Encounters:   12/20/21 101 kg (223 lb 9 6 oz)     Additional Vital Signs:     Date/Time Temp Pulse Resp BP MAP (mmHg) SpO2 O2 Device O2 Interface Device Patient Position - Orthostatic VS   01/02/22 1300 -- -- -- -- -- 94 % -- Full face mask --   01/02/22 1115 -- 106 Abnormal  21 147/84 108 94 % BiPAP -- Lying   01/02/22 0915 -- 114 Abnormal  24 Abnormal  136/67 -- 94 % None (Room air) -- Lying   01/02/22 0452 -- -- -- -- -- -- -- Full face mask --   01/02/22 0445 -- 114 Abnormal  22 137/83 103 95 % BiPAP -- Lying   01/02/22 0200 -- 120 Abnormal  20 122/78 95 98 % BiPAP -- Lying   01/02/22 0030 -- 114 Abnormal  27 Abnormal  146/73 101 97 % BiPAP -- Lying   01/02/22 0018 -- -- -- -- -- 96 % -- Full face mask --   01/01/22 2334 -- 117 Abnormal  22 119/72 -- 94 % BiPAP -- Lying   01/01/22 2311 97 9 °F (36 6 °C) -- -- -- -- -- -- -- --   01/01/22 2245 -- 122 Abnormal  30 Abnormal  -- -- 95 % BiPAP -- --   01/01/22 2136 -- 131 Abnormal  32 Abnormal  121/62 -- 88 % Abnormal  BiPAP -- Lying   01/01/22 2131 -- -- -- -- -- 85 % Abnormal  -- Full face mask --       Pertinent Labs/Diagnostic Test Results:   Results from last 7 days   Lab Units 01/01/22  2135   SARS-COV-2  Positive*     Results from last 7 days   Lab Units 01/02/22  0609 01/01/22  2134   WBC Thousand/uL 13 30* 15 92*   HEMOGLOBIN g/dL 10 1* 11 8*   HEMATOCRIT % 33 6* 39 0   PLATELETS Thousands/uL 260 284   BANDS PCT %  --  1         Results from last 7 days   Lab Units 01/02/22  0609 01/01/22  2134   SODIUM mmol/L 139 142   POTASSIUM mmol/L 6 0* 6 2*   CHLORIDE mmol/L 104 101   CO2 mmol/L 24 26   ANION GAP mmol/L 11 15*   BUN mg/dL 78* 83*   CREATININE mg/dL 4 72* 5 33*   EGFR ml/min/1 73sq m 12 10   CALCIUM mg/dL 8 1* 8 7   PHOSPHORUS mg/dL  --  6 4*     Results from last 7 days   Lab Units 01/02/22  0609 01/01/22  2134   AST U/L 45 31   ALT U/L 34 30   ALK PHOS U/L 65 74   TOTAL PROTEIN g/dL 6 5 7 7   ALBUMIN g/dL 2 0* 2 5*   TOTAL BILIRUBIN mg/dL 0 49 0 79     Results from last 7 days   Lab Units 01/02/22  1139 01/02/22  0804   POC GLUCOSE mg/dl 160* 187*     Results from last 7 days   Lab Units 01/02/22  0609 01/01/22  2134   GLUCOSE RANDOM mg/dL 150* 137             No results found for: BETA-HYDROXYBUTYRATE               Results from last 7 days   Lab Units 01/01/22  2134   CK TOTAL U/L 281   CK MB INDEX % <1 0   CK MB ng/mL 1 1     Results from last 7 days   Lab Units 01/02/22  0140 01/01/22  2335 01/01/22  2134   HS TNI 0HR ng/L  --   --  52*   HS TNI 2HR ng/L  --  58*  --    HSTNI D2 ng/L  --  6  --    HS TNI 4HR ng/L 58*  --   --    HSTNI D4 ng/L 6  --   --      Results from last 7 days   Lab Units 01/01/22  2134   D-DIMER QUANTITATIVE ug/ml FEU 3 30*     Results from last 7 days   Lab Units 01/01/22  2134   PROTIME seconds 16 3*   INR  1 36*   PTT seconds 30         Results from last 7 days   Lab Units 01/01/22  2134   PROCALCITONIN ng/ml 145 01*     Results from last 7 days   Lab Units 01/02/22  0608 01/02/22  0342 01/01/22  2335 01/01/22  2134   LACTIC ACID mmol/L 1 7 3 9* 2 9* 3 4*             Results from last 7 days   Lab Units 01/01/22  2134   NT-PRO BNP pg/mL 147*             Results from last 7 days   Lab Units 01/02/22  0609 01/01/22  2134   LIPASE u/L 536* 1,750*     Results from last 7 days   Lab Units 01/01/22  2134   CRP mg/L 509 2*     Results from last 7 days   Lab Units 01/01/22 2135   INFLUENZA A PCR  Negative   INFLUENZA B PCR  Negative   RSV PCR  Negative     Results from last 7 days   Lab Units 01/01/22 2139 01/01/22 2134   BLOOD CULTURE  Received in Microbiology Lab  Culture in Progress  Received in Microbiology Lab  Culture in Progress  CT A/P 01-02-22  Bibasilar consolidations with new diffuse bilateral parenchymal reticulation more pronounced in peripheral/subpleural distribution, nonspecific findings   Given patient history of malignancy, lymphangitic spread of disease should be considered      Superimposed CHF/edema and or infectious etiology are considered likely given clinical history although appearance is atypical for viral pneumonia in the setting of Covid 19  Clinical correlation and short-term interval follow-up scan recommended as well    as continued serial PET/CT given prior report/findings  Stable nonspecific prominent/mildly enlarged mediastinal lymph nodes compared to most recent PET/CT exam dated 10/27/2021  No definite acute findings in the abdomen or pelvis within limitations of noncontrast exam    Stable nonspecific left retroperitoneal soft tissue lesion  CT head 01-02-21  No acute intracranial abnormality  If there is persistent clinical suspicion for intracranial metastatic disease, gadolinium enhanced MRI would be a more sensitive examination  CXR 01-02-22  Moderate bibasilar pneumonia         ED Treatment:   Medication Administration from 01/01/2022 2121 to 01/02/2022 1429       Date/Time Order Dose Route Action     01/01/2022 2229 dexamethasone (DECADRON) injection 10 mg 10 mg Intravenous Given     01/01/2022 2239 ceftriaxone (ROCEPHIN) 1 g/50 mL in dextrose IVPB 1,000 mg Intravenous New Bag     01/01/2022 2254 doxycycline hyclate (VIBRAMYCIN) capsule 100 mg 100 mg Oral Given     01/01/2022 2254 Baricitinib (OLUMIANT) (COVID EUA) partial tablet 1 mg 1 mg Oral Given     01/01/2022 2234 insulin regular (HumuLIN R,NovoLIN R) injection 10 Units 10 Units Intravenous Given     01/01/2022 2231 dextrose 50 % IV solution 25 mL 25 mL Intravenous Given     01/02/2022 0018 albuterol inhalation solution 10 mg 10 mg Nebulization Given 01/01/2022 2239 calcium gluconate 1 g in sodium chloride 0 9% 50 mL (premix) 1 g Intravenous New Bag     01/02/2022 0044 sodium chloride 0 9 % bolus 1,000 mL 0 mL Intravenous Stopped     01/01/2022 2229 sodium chloride 0 9 % bolus 1,000 mL 1,000 mL Intravenous New Bag     01/02/2022 0044 sodium chloride 0 9 % bolus 1,000 mL 1,000 mL Intravenous New Bag     01/02/2022 0044 HYDROmorphone (DILAUDID) injection 0 5 mg 0 5 mg Intravenous Given     01/02/2022 1112 doxycycline hyclate (VIBRAMYCIN) capsule 100 mg 100 mg Oral Given     01/02/2022 1112 dexamethasone (DECADRON) 10 1 mg in sodium chloride 0 9 % 50 mL IVPB 10 1 mg Intravenous New Bag     01/02/2022 1248 insulin lispro (HumaLOG) 100 units/mL subcutaneous injection 2-12 Units 2 Units Subcutaneous Not Given     01/02/2022 0832 insulin lispro (HumaLOG) 100 units/mL subcutaneous injection 2-12 Units 2 Units Subcutaneous Not Given     01/02/2022 0826 apixaban (ELIQUIS) tablet 2 5 mg 2 5 mg Oral Given     01/02/2022 0844 budesonide (PULMICORT) inhalation solution 0 5 mg 0 5 mg Nebulization Given     01/02/2022 0917 diltiazem (CARDIZEM CD) 24 hr capsule 120 mg 120 mg Oral Given     01/02/2022 0829 divalproex sodium (DEPAKOTE) EC tablet 250 mg 250 mg Oral Given     01/02/2022 1100 cholecalciferol (VITAMIN D3) tablet 2,000 Units 2,000 Units Oral Not Given     01/02/2022 1101 zinc sulfate (ZINCATE) capsule 220 mg 220 mg Oral Not Given     01/02/2022 1101 ascorbic acid (VITAMIN C) tablet 500 mg 500 mg Oral Not Given     01/02/2022 0826 FLUoxetine (PROzac) capsule 20 mg 20 mg Oral Given     01/02/2022 1101 fluticasone (FLONASE) 50 mcg/act nasal spray 1 spray 1 spray Each Nare Not Given     18/00/2366 8159 folic acid (FOLVITE) tablet 1,000 mcg 1,000 mcg Oral Given     01/02/2022 1102 gabapentin (NEURONTIN) capsule 100 mg 100 mg Oral Not Given     01/02/2022 1126 guaiFENesin (MUCINEX) 12 hr tablet 600 mg 600 mg Oral Not Given     01/02/2022 1126 loratadine (CLARITIN) tablet 5 mg 5 mg Oral Not Given     01/02/2022 1126 ipratropium (ATROVENT) 0 02 % inhalation solution 0 5 mg 0 5 mg Nebulization Not Given     01/02/2022 1113 ipratropium (ATROVENT) 0 02 % inhalation solution 0 5 mg 0 5 mg Nebulization Given     01/02/2022 0714 pantoprazole (PROTONIX) EC tablet 20 mg 20 mg Oral Given     01/02/2022 4983 oxyCODONE (OxyCONTIN) 12 hr tablet 20 mg 20 mg Oral Not Given     01/02/2022 2143 oxyCODONE (ROXICODONE) immediate release tablet 30 mg 30 mg Oral Given     01/02/2022 1127 senna-docusate sodium (SENOKOT S) 8 6-50 mg per tablet 1 tablet 1 tablet Oral Not Given     01/02/2022 1113 albuterol inhalation solution 2 5 mg 2 5 mg Nebulization Given     01/02/2022 0844 albuterol inhalation solution 2 5 mg 2 5 mg Nebulization Given     01/02/2022 0821 dextrose 50 % IV solution 50 mL 50 mL Intravenous Given     01/02/2022 0821 insulin regular (HumuLIN R,NovoLIN R) injection 10 Units 10 Units Intravenous Given     01/02/2022 0924 sodium polystyrene (KAYEXALATE) powder 30 g 30 g Oral Not Given     01/02/2022 0915 furosemide (LASIX) injection 80 mg 80 mg Intravenous Given     01/02/2022 1415 cefepime (MAXIPIME) 1 g/50 mL dextrose IVPB 1,000 mg Intravenous New Bag        Past Medical History:   Diagnosis Date    Alkalosis 12/9/2019    Bipolar 1 disorder ([facility] )     Cancer ([facility] )     adeno lung  dx 11/2018-lung bx today 4/9/2019-ongoing chemo    Chronic obstructive pulmonary disease with acute exacerbation ([facility] ) 6/7/2018    Chronic pain disorder     back and right shoulder    CKD (chronic kidney disease)     COPD (chronic obstructive pulmonary disease) ([facility] )     Depression     Diabetes mellitus ([facility] )     Elevated d-dimer 11/30/2019    Elevated troponin 11/29/2019    Elevated troponin level not due to acute coronary syndrome 11/30/2019    GERD (gastroesophageal reflux disease)     Herniated lumbar intervertebral disc     Hyperkalemia     Hypertension     Insomnia     Latent syphilis Treated    Low back pain     Lumbosacral disc disease     Lung cancer ([facility] ) 04/2019    Pneumothorax after biopsy     R lung    PTSD (post-traumatic stress disorder)     Pulmonary emphysema (HCC)     Tobacco abuse 11/13/2018     Present on Admission:   Severe sepsis ([facility] )   Acute respiratory failure due to COVID-19 ([place] [address] )   Elevated lipase   Bipolar 1 disorder (HCC)   Type 2 diabetes mellitus without complication, without long-term current use of insulin (HCC)   COPD, severe (HCC)   Hyperkalemia   Acute kidney injury superimposed on CKD ([facility] )   Paroxysmal atrial fibrillation (HCC)   Adenocarcinoma of lung, right (HCC)   Bilateral lower extremity edema   Chronic respiratory failure with hypoxia (HCC)   Cancer related pain      Admitting Diagnosis: Shortness of breath [R06 02]  Age/Sex: 63 y o  male  Admission Orders:  Scheduled Medications:  albuterol, 2 5 mg, Nebulization, 4x Daily  apixaban, 2 5 mg, Oral, BID  ascorbic acid, 500 mg, Oral, Daily  atorvastatin, 40 mg, Oral, Daily With Dinner  budesonide, 0 5 mg, Nebulization, Q12H  cefepime, 1,000 mg, Intravenous, Q12H  cholecalciferol, 2,000 Units, Oral, Daily  dexamethasone, 0 1 mg/kg, Intravenous, Q12H  diltiazem, 120 mg, Oral, Daily  divalproex sodium, 250 mg, Oral, BID  doxycycline hyclate, 100 mg, Oral, Q12H  FLUoxetine, 20 mg, Oral, Daily  fluticasone, 1 spray, Each Nare, BID  folic acid, 6,941 mcg, Oral, Daily  gabapentin, 100 mg, Oral, BID  guaiFENesin, 600 mg, Oral, Q12H [address]  insulin lispro, 2-12 Units, Subcutaneous, 4x Daily (AC & HS)  ipratropium, 0 5 mg, Nebulization, 4x Daily  loratadine, 5 mg, Oral, Daily  melatonin, 3 mg, Oral, HS  montelukast, 10 mg, Oral, HS  nitroglycerin, 0 4 mg, Sublingual, Once  oxyCODONE, 20 mg, Oral, Q12H GREG  pantoprazole, 20 mg, Oral, Early Morning  QUEtiapine, 12 5 mg, Oral, HS  senna-docusate sodium, 1 tablet, Oral, BID  sodium polystyrene, 30 g, Oral, Once  tamsulosin, 0 4 mg, Oral, Daily With Dinner  zinc sulfate, 220 mg, Oral, Daily      Continuous IV Infusions:     PRN Meds:  acetaminophen, 650 mg, Oral, Q6H PRN  calcium carbonate, 1,000 mg, Oral, Daily PRN  dextromethorphan-guaiFENesin, 10 mL, Oral, Q4H PRN  ondansetron, 4 mg, Intravenous, Once PRN  ondansetron, 4 mg, Intravenous, Q6H PRN  oxyCODONE, 30 mg, Oral, Q4H PRN  simethicone, 80 mg, Oral, 4x Daily PRN  sodium chloride, 2 spray, Each Nare, Q2H PRN        IP CONSULT TO PULMONOLOGY  IP CONSULT TO NEPHROLOGY   Blood sugars ac and hs   Continuous cardiopulmonary and pulse oximetry  BiPAP  10/10  Self proning  SCD        Network Utilization Review Department  ATTENTION: Please call with any questions or concerns to [phone] and carefully listen to the prompts so that you are directed to the right person  All voicemails are confidential   Clemente Tabares all requests for admission clinical reviews, approved or denied determinations and any other requests to dedicated fax number below belonging to the campus where the patient is receiving treatment   List of dedicated fax numbers for the Facilities:  [address] DENIALS (Administrative/Medical Necessity) [phone]   [address] (Maternity/NICU/Pediatrics) [phone]   [address]  [address] [place] [address] [id number] [place] [address] [address] [place] [address] [phone]

## 2022-01-02 NOTE — ED NOTES
Security called to bedside to secure cash in safe  Pt has $1,548 secured with security   ID# [id number]     Renuka Wallace RN  01/02/22 8937

## 2022-01-02 NOTE — ASSESSMENT & PLAN NOTE
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

## 2022-01-02 NOTE — ED NOTES
Per daughter, all medications are up to date in my chart/[facility] chart        Lynn Sanabria RN  01/02/22 [id number]

## 2022-01-02 NOTE — CONSULTS
Consultation - Nephrology   Bea Roblero  63 y o  male MRN: [id number]  Unit/Bed#: ED 31 Encounter: [id number]    ASSESSMENT:  1  Acute Kidney Injury, POA- secondary to ATN from COVID 19 infection  - admission creatinine: 5 33  - current creatinine: 4 72  - received 2L IVF  - give lasix 80mg IV x1 now  - continue to monitor and trend creatinine  - monitor urine output response  - UA: pending  - PVR: pending  - imaging: no hydronephrosis  2  Chronic Kidney Disease stage IV- Baseline creatinine 2-2 5  Presumed etiology due to diabetes, hypertension, and nephrotoxic chemotherapy agents  Follows with Dr Christopher Jasso  3  Hyperkalemia- secondary to NURIA + COVID  - admission: 6 2  - current: 6 0  - give another round of D50 + insulin + IV lasix  - received albuterol high dose already  - unable to tolerate kayexalate due to hypoxia on bipap (attempted by nurse and patient failed)  - repeat BMP at 2pm  4  Hypertension- BP acceptable  - patient noted to be tachycardic   5  Anemia- hgb at goal, continue to monitor  - transfuse for hgb <7  6  COVID 19 Infection- per primary team  - currently unable to wean from bipap  - pulmonology consulted  7  Stage IV metastatic adenocarcinoma of right lung    PLAN:  Kayexalate 30g (unable to receive due to hypoxia)  Insulin / D50  Lasix 80mg IV x1  BMP at 2pm  Discussed in detail with ER nurse    HISTORY OF PRESENT ILLNESS:  Requesting Physician: Elba Rose MD  Reason for Consult: NURIA/CKD    Rolo Engle Sr  is a 63y o  year old male who was admitted to [address] [place] [address] after presenting with shortness of breath  He was exposed to [place] from his brother who was recently hospitalized  He is unvaccinated and tested positive for COVID on 1/1/22  A renal consultation is requested today for assistance in the management of acute kidney injury on chronic kidney disease    He is known group to our group as he follows with Dr Renato Marquez and was last seen in the summer via telemedicine  He is currently in the ER on bipap and unable to wean  We attempted to give him kayexalate but he was unable to take this due to significant hypoxia while off bipap to drink the medication  He is not able to talk due to his shortness of breath      PAST MEDICAL HISTORY:  Past Medical History:   Diagnosis Date    Alkalosis 12/9/2019    Bipolar 1 disorder ([address] )     Cancer ([facility] )     adeno lung  dx 11/2018-lung bx today 4/9/2019-ongoing chemo    Chronic obstructive pulmonary disease with acute exacerbation ([address] ) 6/7/2018    Chronic pain disorder     back and right shoulder    CKD (chronic kidney disease)     COPD (chronic obstructive pulmonary disease) ([facility] )     Depression     Diabetes mellitus ([facility] )     Elevated d-dimer 11/30/2019    Elevated troponin 11/29/2019    Elevated troponin level not due to acute coronary syndrome 11/30/2019    GERD (gastroesophageal reflux disease)     Herniated lumbar intervertebral disc     Hyperkalemia     Hypertension     Insomnia     Latent syphilis     Treated    Low back pain     Lumbosacral disc disease     Lung cancer ([facility] ) 04/2019    Pneumothorax after biopsy     R lung    PTSD (post-traumatic stress disorder)     Pulmonary emphysema ([facility] )     Tobacco abuse 11/13/2018       PAST SURGICAL HISTORY:  Past Surgical History:   Procedure Laterality Date    COLONOSCOPY  2011    CT GUIDED CHEST TUBE  11/21/2018    FL GUIDED CENTRAL VENOUS ACCESS DEVICE INSERTION  2/8/2019    HEMORROIDECTOMY      IR BIOPSY LUNG  11/13/2018    IR CHEST TUBE PLACEMENT  11/14/2018    KNEE SURGERY      SC INSJ TUNNELED CTR VAD W/SUBQ PORT AGE 5 YR/> N/A 2/8/2019    Procedure: PLACEMENT OF PORT-A-CATH;  Surgeon: Dillan Coulter MD;  Location: [facility] MAIN OR;  Service: Vascular       ALLERGIES:  Allergies   Allergen Reactions    Lisinopril Anaphylaxis     Took medication a while ago and had a "swelling reaction"  Does not carry epi-pen       SOCIAL HISTORY:  Social History     Substance and Sexual Activity   Alcohol Use Not Currently     Social History     Substance and Sexual Activity   Drug Use Not Currently    Types: Marijuana    Comment: "I will smoke a joint if I am stressed out but not every day"     Social History     Tobacco Use   Smoking Status Former Smoker    Packs/day: 0 50    Years: 40 00    Pack years: 20 00    Types: Cigarettes    Start date:     Quit date: 2019    Years since quittin 4   Smokeless Tobacco Never Used   Tobacco Comment    will smoke a cigarette on occasion with stress       FAMILY HISTORY:  Family History   Problem Relation Age of Onset    Heart disease Mother     Cancer Mother     Hypertension Father     Diabetes Family     Asthma Family     Heart disease Family     Cancer Family     Cancer Maternal Grandfather     Cancer Paternal Grandfather     Cancer Maternal Aunt        MEDICATIONS:    Current Facility-Administered Medications:     acetaminophen (TYLENOL) tablet 650 mg, 650 mg, Oral, Q6H PRN, Roger Morris, EVINNP    albuterol inhalation solution 2 5 mg, 2 5 mg, Nebulization, 4x Daily, Roger Nicolass, CRNP, 2 5 mg at 22 0844    apixaban (ELIQUIS) tablet 2 5 mg, 2 5 mg, Oral, BID, EVIN MatuteNP, 2 5 mg at 22 6214    ascorbic acid (VITAMIN C) tablet 500 mg, 500 mg, Oral, Daily, EVIN MatuteNP    atorvastatin (LIPITOR) tablet 40 mg, 40 mg, Oral, Daily With Dinner, EVIN MatuteNP    Baricitinib (OLUMIANT) (COVID EUA) tablet 4 mg, 4 mg, Oral, Q24H, Roger Morris, EVINNP    budesonide (PULMICORT) inhalation solution 0 5 mg, 0 5 mg, Nebulization, Q12H, EVIN MatuteNP, 0 5 mg at 22 0844    calcium carbonate (TUMS) chewable tablet 1,000 mg, 1,000 mg, Oral, Daily PRN, EVIN MatuteNP    ceftriaxone (ROCEPHIN) 1 g/50 mL in dextrose IVPB, 1,000 mg, Intravenous, Q24H, EVIN MatuteNP    cholecalciferol (VITAMIN D3) tablet 2,000 Units, 2,000 Units, Oral, Daily, BROOKS Lynch    dexamethasone (DECADRON) 10 1 mg in sodium chloride 0 9 % 50 mL IVPB, 0 1 mg/kg, Intravenous, Q12H, BROOKS Lynch    dextromethorphan-guaiFENesin (ROBITUSSIN DM) oral syrup 10 mL, 10 mL, Oral, Q4H PRN, BROOKS Lynch    diltiazem (CARDIZEM CD) 24 hr capsule 120 mg, 120 mg, Oral, Daily, BROOKS Lynch, 120 mg at 01/02/22 1293    divalproex sodium (DEPAKOTE) EC tablet 250 mg, 250 mg, Oral, BID, BROOKS Lynch, 250 mg at 01/02/22 5872    doxycycline hyclate (VIBRAMYCIN) capsule 100 mg, 100 mg, Oral, Q12H, BROOKS Lynch    FLUoxetine (PROzac) capsule 20 mg, 20 mg, Oral, Daily, BROOKS Lynch, 20 mg at 01/02/22 0826    fluticasone (FLONASE) 50 mcg/act nasal spray 1 spray, 1 spray, Each Nare, BID, BROOKS Lynch    folic acid (FOLVITE) tablet 1,000 mcg, 1,000 mcg, Oral, Daily, BROOKS Lynch, 1,000 mcg at 01/02/22 0827    gabapentin (NEURONTIN) capsule 100 mg, 100 mg, Oral, BID, BROOKS Lynch    guaiFENesin (MUCINEX) 12 hr tablet 600 mg, 600 mg, Oral, Q12H [address], BROOKS Lynch    insulin lispro (HumaLOG) 100 units/mL subcutaneous injection 2-12 Units, 2-12 Units, Subcutaneous, 4x Daily (AC & HS) **AND** Fingerstick Glucose (POCT), , , 4x Daily AC and at bedtime, BROOKS Lynch    ipratropium (ATROVENT) 0 02 % inhalation solution 0 5 mg, 0 5 mg, Nebulization, 4x Daily, BROOKS Lynch    loratadine (CLARITIN) tablet 5 mg, 5 mg, Oral, Daily, BROOKS Lynch    melatonin tablet 3 mg, 3 mg, Oral, HS, BROOKS Lynch    montelukast (SINGULAIR) tablet 10 mg, 10 mg, Oral, HS, BROOKS Lynch    nitroglycerin (NITROSTAT) SL tablet 0 4 mg, 0 4 mg, Sublingual, Once, Morgan B RODNEY Salazar    ondansetron (ZOFRAN) injection 4 mg, 4 mg, Intravenous, Once PRN, Clifton Espinoza PA-C    ondansetron [facility]) injection 4 mg, 4 mg, Intravenous, Q6H PRN, BROOKS Ashley    oxyCODONE (OxyCONTIN) 12 hr tablet 20 mg, 20 mg, Oral, Q12H [facility], BROOKS Ashley    oxyCODONE (ROXICODONE) immediate release tablet 30 mg, 30 mg, Oral, Q4H PRN, BROOKS Ashley, 30 mg at 01/02/22 0714    pantoprazole (PROTONIX) EC tablet 20 mg, 20 mg, Oral, Early Morning, BROOKS Ashley, 20 mg at 01/02/22 3429    QUEtiapine (SEROquel) tablet 12 5 mg, 12 5 mg, Oral, HS, BROOKS Ashley    senna-docusate sodium (SENOKOT S) 8 6-50 mg per tablet 1 tablet, 1 tablet, Oral, BID, BROOKS Ashley    simethicone (MYLICON) chewable tablet 80 mg, 80 mg, Oral, 4x Daily PRN, BROOKS Ashley    sodium chloride (OCEAN) 0 65 % nasal spray 2 spray, 2 spray, Each Nare, Q2H PRN, BROOKS Ashley    sodium polystyrene (KAYEXALATE) powder 30 g, 30 g, Oral, Once, [address], PA-C    tamsulosin (FLOMAX) capsule 0 4 mg, 0 4 mg, Oral, Daily With Dinner, BROOKS Ashley    zinc sulfate (ZINCATE) capsule 220 mg, 220 mg, Oral, Daily, BROOKS Ashley    Current Outpatient Medications:     albuterol (2 5 mg/3 mL) 0 083 % nebulizer solution, Take 1 vial (2 5 mg total) by nebulization every 6 (six) hours as needed for wheezing or shortness of breath, Disp: 360 mL, Rfl: 5    albuterol (PROVENTIL HFA,VENTOLIN HFA) 90 mcg/act inhaler, Inhale 2 puffs every 4 (four) hours as needed for wheezing, Disp: 18 g, Rfl: 5    apixaban (ELIQUIS) 2 5 mg, Take 1 tablet (2 5 mg total) by mouth 2 (two) times a day, Disp: 60 tablet, Rfl: 11    Blood Glucose Monitoring Suppl KIT, by Does not apply route daily, Disp: 1 each, Rfl: 0    budesonide (PULMICORT) 0 5 mg/2 mL nebulizer solution, Take 1 vial (0 5 mg total) by nebulization every 12 (twelve) hours Rinse mouth after use , Disp: 1 vial, Rfl: 0    carbamide peroxide (DEBROX) 6 5 % otic solution, Administer 5 drops into both ears 2 (two) times a day, Disp: 15 mL, Rfl: 0    clotrimazole-betamethasone (LOTRISONE) 1-0 05 % cream, Apply topically 2 (two) times a day, Disp: 45 g, Rfl: 2    diltiazem (CARDIZEM CD) 120 mg 24 hr capsule, Take 1 capsule (120 mg total) by mouth daily, Disp: 90 capsule, Rfl: 3    divalproex sodium (DEPAKOTE) 250 mg EC tablet, Take 1 tablet (250 mg total) by mouth 2 (two) times a day, Disp: 30 tablet, Rfl: 0    ergocalciferol (VITAMIN D2) 50,000 units, Take 1 capsule (50,000 Units total) by mouth once a week, Disp: 12 capsule, Rfl: 1    FLUoxetine (PROzac) 10 MG tablet, Take 1 tablet (10 mg total) by mouth daily, Disp: 30 tablet, Rfl: 5    fluticasone (FLONASE) 50 mcg/act nasal spray, 1-2 sprays each nostril daily for allergies, Disp: 16 g, Rfl: 3    folic acid (FOLVITE) 1 mg tablet, TAKE 1 TABLET BY MOUTH DAILY  , Disp: 30 tablet, Rfl: 0    FREESTYLE LITE test strip, TEST BLOOD SUGAR DAILY, Disp: 100 each, Rfl: 1    gabapentin (NEURONTIN) 100 mg capsule, Take 1 capsule (100 mg total) by mouth 2 (two) times a day, Disp: 60 capsule, Rfl: 1    guaiFENesin (MUCINEX) 600 mg 12 hr tablet, Take 600 mg by mouth every 12 (twelve) hours, Disp: , Rfl:     hydrochlorothiazide (HYDRODIURIL) 50 mg tablet, , Disp: , Rfl:     ipratropium (ATROVENT) 0 02 % nebulizer solution, Take 2 5 mL (0 5 mg total) by nebulization 4 (four) times a day, Disp: 300 mL, Rfl: 11    Lancets (FREESTYLE) lancets, TEST BLOOD SUGAR DAILY, Disp: 100 each, Rfl: 4    loratadine (CLARITIN) 10 mg tablet, Take 1 tablet (10 mg total) by mouth daily, Disp: 90 tablet, Rfl: 3    melatonin 3 mg, Take 1 tablet (3 mg total) by mouth daily at bedtime, Disp: 90 tablet, Rfl: 1    montelukast (SINGULAIR) 10 mg tablet, TAKE 1 TABLET BY MOUTH DAILY AT BEDTIME, Disp: 90 tablet, Rfl: 0    olopatadine (PATANOL) 0 1 % ophthalmic solution, Administer 1 drop to both eyes 2 (two) times a day, Disp: 5 mL, Rfl: 3    omeprazole (PriLOSEC OTC) 20 MG tablet, , Disp: , Rfl:     ondansetron (ZOFRAN) 8 mg tablet, Take 1 tablet (8 mg total) by mouth every 8 (eight) hours as needed for nausea or vomiting, Disp: 20 tablet, Rfl: 3    oxyCODONE (OxyCONTIN) 20 mg 12 hr tablet, Take 1 tablet (20 mg total) by mouth every 12 (twelve) hours Max Daily Amount: 40 mg, Disp: 60 tablet, Rfl: 0    oxyCODONE (ROXICODONE) 30 MG immediate release tablet, Take 1 tablet (30 mg total) by mouth every 4 (four) hours as needed (to relieve cancer pain immediately) Max Daily Amount: 180 mg, Disp: 150 tablet, Rfl: 0    pantoprazole (PROTONIX) 40 mg tablet, Take 1 tablet (40 mg total) by mouth daily, Disp: 90 tablet, Rfl: 3    predniSONE 20 mg tablet, Take 2 tablets (40 mg total) by mouth daily with breakfast Do not taper or adjust with Dr Radha Elmore and Ms Arreaga [place]: 60 tablet, Rfl: 0    QUEtiapine (SEROquel) 25 mg tablet, TAKE 0 5 TABLETS (12 5 MG TOTAL) BY MOUTH DAILY AT BEDTIME, Disp: 15 tablet, Rfl: 0    senna-docusate sodium (SENOKOT-S) 8 6-50 mg per tablet, Take 1 tablet by mouth 2 (two) times a day, Disp: 60 tablet, Rfl: 11    sitaGLIPtin (JANUVIA) 50 mg tablet, Take 1 tablet (50 mg total) by mouth daily, Disp: 90 tablet, Rfl: 0    sodium chloride (OCEAN) 0 65 % nasal spray, 2 sprays into each nostril as needed for congestion, Disp: 60 mL, Rfl: 3    tamsulosin (FLOMAX) 0 4 mg, Take 1 capsule (0 4 mg total) by mouth daily with dinner, Disp: 90 capsule, Rfl: 0    REVIEW OF SYSTEMS:  A complete 10 point review of systems was unable to be performed due to the patient being critically ill, on bipap, unable to speak due to hypoxia      PHYSICAL EXAM:  Current Weight:    First Weight:    Vitals:    01/02/22 0030 01/02/22 0200 01/02/22 0445 01/02/22 0915   BP: 146/73 122/78 137/83 136/67   BP Location: Right arm Right arm Left arm Left arm   Pulse: (!) 114 (!) 120 (!) 114 (!) 114   Resp: (!) 27 20 22 (!) 24   Temp:       TempSrc:       SpO2: 97% 98% 95% 94%       Intake/Output Summary (Last 24 hours) at 1/2/2022 5669  Last data filed at 1/2/2022 0225  Gross per 24 hour   Intake 2183 34 ml   Output --   Net 2183 34 ml     Due to COVID 19 infection, I did not enter the patient's room  The patient is currently on bipap and unable to speak due to hypoxia  General: critically ill  Respiratory: on bipap  CVS: tachycardic on monitor  Extremities: 2+ edema to knees per nurse  Neuro: awake     Lab Results:   Results from last 7 days   Lab Units 01/02/22  0609 01/01/22  2134   WBC Thousand/uL 13 30* 15 92*   HEMOGLOBIN g/dL 10 1* 11 8*   HEMATOCRIT % 33 6* 39 0   PLATELETS Thousands/uL 260 284   POTASSIUM mmol/L 6 0* 6 2*   CHLORIDE mmol/L 104 101   CO2 mmol/L 24 26   BUN mg/dL 78* 83*   CREATININE mg/dL 4 72* 5 33*   CALCIUM mg/dL 8 1* 8 7   PHOSPHORUS mg/dL  --  6 4*   ALK PHOS U/L 65 74   ALT U/L 34 30   AST U/L 45 31     I have personally reviewed the blood work as stated above and in my note  I have personally reviewed CT imaging studies  I have personally reviewed H&P and ER notes

## 2022-01-02 NOTE — PROGRESS NOTES
[facility]  Progress Note - Patricia Constantino Sr  1960, 63 y o  male MRN: [id number]  Unit/Bed#: ED 31 Encounter: [id number]  Primary Care Provider: Iris Reyes MD   Date and time admitted to hospital: 1/1/2022  9:21 PM    Chronic respiratory failure with hypoxia [facility])  Assessment & Plan  · Chronic respiratory failure in setting severe COPD and adenocarcinoma of right lung  Maintained chronically on 3  5LNC    Bilateral lower extremity edema  Assessment & Plan  · Patient reports chronic lower extremity edema  BNP slightly elevated, 147, in setting of COVID  · As per outpatient cardiologist, suspect weight gain and subcutaneous edema is related to steroid use  Outpatient echo ordered; consideration for p r n  Diuretic therapy if edema persists or becomes worse  · Echo in 2019: LVEF 99%, normal systolic function, no RWMA  · Elevate lower extremities  · Monitor daily weight    Acute kidney injury superimposed on CKD [facility])  Assessment & Plan  Lab Results   Component Value Date    EGFR 12 01/02/2022    EGFR 10 01/01/2022    EGFR 27 12/20/2021    CREATININE 4 72 (H) 01/02/2022    CREATININE 5 33 (H) 01/01/2022    CREATININE 2 49 (H) 12/20/2021     · improving  · Hold HCTZ  · Will obtain UA and serum phosphorus  · Monitor intake and output  · Urinary retention protocol  · Avoid nephrotoxins  · Nephrology consult appreciated     Hyperkalemia  Assessment & Plan  · K 6 2 in setting of NURIA  Received hyperkalemia cocktail, repeat BMP k = 6  · Received insulin, d50 today; unable to get armando exylate per nursing and nephro aware  · Monitor on telemetry  · Nephrology consult appreciated     Elevated lipase  Assessment & Plan  · CT abdomen negative for acute findings; pancreas unremarkable  · resolved    Severe sepsis ([facility] [address] )  Assessment & Plan  · Severe sepsis in setting of COVID  Meets severe sepsis criteria with leukocytosis, tachycardia, tachypnea, NURIA and lactic acidosis   Repeat lactate improved  · On IV ceftriaxone and doxycycline for COVID pneumonia  · procalcitonin positive, complete course of ABX  · Follow cultures    Paroxysmal atrial fibrillation (HCC)  Assessment & Plan  · AFib rate controlled with Cardizem, anticoagulated with Eliquis    Elevated troponin level not due to acute coronary syndrome  Assessment & Plan  · Chronically elevated troponin and at baseline  Asymptomatic for chest pain although as per outpatient cardiology- has chronic atypical chest pain mostly related to MSK aches and metastatic disease  · Monitor on telemetry    Cancer related pain  Assessment & Plan  · Followed outpatient by Palliative Medicine, maintained on OxyContin 20 mg b i d  & p r n  Oxycodone 30 mg q  4h   Verified on PDMP    Adenocarcinoma of lung, right ([address] )  Assessment & Plan  · Stage IV metastatic adenocarcinoma of right lung; was on immunotherapy with prednisone and Keytruda although Allyssa Blonder 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  Hold prednisone 40 mg daily while on IV dexamethasone  Type 2 diabetes mellitus without complication, without long-term current use of insulin [facility])  Assessment & Plan  Lab Results   Component Value Date    HGBA1C 6 4 09/24/2021     · Hold Januvia, add sliding scale insulin and ADA diet    Recent Labs     01/02/22  0804   POCGLU 187*       Blood Sugar Average: Last 72 hrs:  (P) 187    Bipolar 1 disorder (HCC)  Assessment & Plan  · Continue home medications fluoxetine and Seroquel    COPD, severe (HCC)  Assessment & Plan  · History of severe COPD with chronic hypoxemic respiratory failure maintained chronically on 3  5LPM    · Outpatient regimen:  · Albuterol nebulizer  · Budesonide nebulizer  · Ipratropium nebulizer  · Singulair and Claritin  · Continue nebulizer tx  · Respiratory protocol  · Recommended pulmonary rehab by outpatient pulmonologist although limited by transportation    * Acute respiratory failure due to COVID-19 [facility])  Assessment & Plan  · O2 sat on arrival 73%, requiring BiPAP to sustain oxygen above 90%  · COVID-19 PCR positive  Unvaccinated  · Chest CT: Bibasilar consolidations with new diffuse bilateral parenchymal reticulation more pronounced in peripheral/subpleural distribution, nonspecific findings  Consider superimposed CHF or infectious etiology although appearance atypical for viral pneumonia in setting of COVID  Given patient history of malignancy, lymphangitic spread of disease should be considered  Clinical correlation and short-term interval follow-up scan recommended as well  as continued serial PET/CT given prior report/findings  · Will admit and treat under severe pathway  · Will obtain CRP, trend as needed  TREATMENT:  · Atorvastatin 40mg qd  · Dexamethasone 0 1mg/kg bid [id number]  · Baricitinib  - given but discontinued as contra-indicated per pharmacy due to renal function  · IV ceftriaxone and p o doxycycline  · Anticoagulated on Eliquis  · remdesivir- not indicated on bipap per recent [facility] guidelines  · Continue supplemental oxygen to maintain O2 sat around 90%  Encourage ambulation and proning  · Pulmonary consult       VTE Pharmacologic Prophylaxis:   Pharmacologic: Apixaban (Eliquis)    Patient Centered Rounds: I have performed bedside rounds with nursing staff today  Education and Discussions with Family / Patient: Ella [facility] [phone]      Current Length of Stay: 0 day(s)    Current Patient Status: Inpatient        Code Status: Level 1 - Full Code      Subjective:   Pt seen  Feels ok with the bipap  Afebrile  When removing the bipap he desaturates significantly     Patient is seen and examined at bedside  All other ROS are negative      Objective:     Vitals:   Temp (24hrs), Av 9 °F (36 6 °C), Min:97 9 °F (36 6 °C), Max:97 9 °F (36 6 °C)    Temp:  [97 9 °F (36 6 °C)] 97 9 °F (36 6 °C)  HR:  [114-132] 114  Resp:  [20-40] 24  BP: (119-146)/(62-83) 136/67  SpO2:  [73 %-98 %] 94 %  There is no height or weight on file to calculate BMI  Input and Output Summary (last 24 hours): Intake/Output Summary (Last 24 hours) at 1/2/2022 1117  Last data filed at 1/2/2022 0225  Gross per 24 hour   Intake 2183 34 ml   Output --   Net 2183 34 ml       Physical Exam:       GEN: tachypneic, wearing bipap, acutely ill  HEEENT: No JVD, PERRLA, no scleral icterus  RESP: rales  CV: tachycardic, +s1/s2   ABD: SOFT NON TENDER, POSITIVE BOWEL SOUNDS, NO DISTENTION  PSYCH: CALM  NEURO: A X O X 3, NO FOCAL DEFICITS  SKIN: NO RASH  EXTREM: 2+ lower extrem edema    Additional Data:     Labs:    Results from last 7 days   Lab Units 01/02/22  0609 01/01/22  2134 01/01/22  2134   WBC Thousand/uL 13 30*   < > 15 92*   HEMOGLOBIN g/dL 10 1*   < > 11 8*   HEMATOCRIT % 33 6*   < > 39 0   PLATELETS Thousands/uL 260   < > 284   BANDS PCT %  --   --  1   LYMPHO PCT %  --   --  8*   MONO PCT %  --   --  4   EOS PCT %  --   --  0    < > = values in this interval not displayed  Results from last 7 days   Lab Units 01/02/22  0609   SODIUM mmol/L 139   POTASSIUM mmol/L 6 0*   CHLORIDE mmol/L 104   CO2 mmol/L 24   BUN mg/dL 78*   CREATININE mg/dL 4 72*   ANION GAP mmol/L 11   CALCIUM mg/dL 8 1*   ALBUMIN g/dL 2 0*   TOTAL BILIRUBIN mg/dL 0 49   ALK PHOS U/L 65   ALT U/L 34   AST U/L 45   GLUCOSE RANDOM mg/dL 150*     Results from last 7 days   Lab Units 01/01/22  2134   INR  1 36*     Results from last 7 days   Lab Units 01/02/22  0804   POC GLUCOSE mg/dl 187*         Results from last 7 days   Lab Units 01/02/22  0608 01/02/22  0342 01/01/22  2335 01/01/22  2134   LACTIC ACID mmol/L 1 7 3 9* 2 9* 3 4*   PROCALCITONIN ng/ml  --   --   --  145 01*           * I Have Reviewed All Lab Data Listed Above  Imaging:     Results for orders placed during the hospital encounter of 01/01/22    XR chest 1 view portable    Narrative  CHEST    INDICATION:   sob  COMPARISON:  Chest radiograph from 12/5/2019 and chest CT from 1/1/2022    PET CT from 10/27/2021  EXAM PERFORMED/VIEWS:  XR CHEST PORTABLE      FINDINGS:  Right port in lower SVC  Cardiomediastinal silhouette appears unremarkable  Moderate bibasilar pneumonia  No pneumothorax  Osseous structures appear within normal limits for patient age  Impression  Moderate bibasilar pneumonia  Workstation performed: [id number]    Results for orders placed during the hospital encounter of 08/29/19    XR chest 2 views    Narrative  CHEST    INDICATION:   chest pain, SOB     COMPARISON:  3/12/2019    EXAM PERFORMED/VIEWS:  XR CHEST PA & LATERAL      FINDINGS:  Right chest wall port  Cardiomediastinal silhouette appears unremarkable  The lungs are clear  Linear scarring right midlung field  Right perihilar opacity better visualized on prior PET/CT  No pneumothorax or pleural effusion  Osseous structures appear within normal limits for patient age  Impression  Stable exam   No acute cardiopulmonary disease  Workstation performed: [id number]      *I have reviewed all imaging reports listed above      Recent Cultures (last 7 days):     Results from last 7 days   Lab Units 01/01/22 2139 01/01/22 2134   BLOOD CULTURE  Received in Microbiology Lab  Culture in Progress  Received in Microbiology Lab  Culture in Progress         Last 24 Hours Medication List:   Current Facility-Administered Medications   Medication Dose Route Frequency Provider Last Rate    acetaminophen  650 mg Oral Q6H PRN BROOKS Begum      albuterol  2 5 mg Nebulization 4x Daily BROOKS Begum      apixaban  2 5 mg Oral BID BROOKS Begum      ascorbic acid  500 mg Oral Daily BROOKS Begum      atorvastatin  40 mg Oral Daily With [employer], BROOKS      budesonide  0 5 mg Nebulization Q12H BROOKS Begum      calcium carbonate  1,000 mg Oral Daily PRN BROOKS Begum      cefTRIAXone  1,000 mg Intravenous Q24H Lisa Cancer Melo Holguin, CRNP      cholecalciferol  2,000 Units Oral Daily Gurney Cables, CRNP      dexamethasone  0 1 mg/kg Intravenous Q12H Gurney Cables, CRNP 10 1 mg (01/02/22 1112)    dextromethorphan-guaiFENesin  10 mL Oral Q4H PRN Gurney Cables, CRNP      diltiazem  120 mg Oral Daily Gurney Cables, CRNP      divalproex sodium  250 mg Oral BID Gurney Cables, CRNP      doxycycline hyclate  100 mg Oral Q12H Gurney Cables, CRNP      FLUoxetine  20 mg Oral Daily Gurney Cables, CRNP      fluticasone  1 spray Each Nare BID rney Cables, CRNP      folic acid  2,980 mcg Oral Daily Gurney Cables, CRNP      gabapentin  100 mg Oral BID Gurney Cables, CRNP      guaiFENesin  600 mg Oral Q12H [facility], CRNP      insulin lispro  2-12 Units Subcutaneous 4x Daily (AC & HS) GurRoswell Cables, CRNP      ipratropium  0 5 mg Nebulization 4x Daily Gurney Cables, CRNP      loratadine  5 mg Oral Daily Gurney Cables, CRNP      melatonin  3 mg Oral HS Gurney Cables, CRNP      montelukast  10 mg Oral HS Gurney Cables, CRNP      nitroglycerin  0 4 mg Sublingual Once Creola Desai, PA-C      ondansetron  4 mg Intravenous Once PRN Creola Desai, PA-C      ondansetron  4 mg Intravenous Q6H PRN rney Cables, CRNP      oxyCODONE  20 mg Oral Q12H [address] [place], [place]      oxyCODONE  30 mg Oral Q4H PRN Gurney Cables, CRNP      pantoprazole  20 mg Oral Early Morning Gurney Cables, CRNP      QUEtiapine  12 5 mg Oral HS Gurney Cables, CRNP      senna-docusate sodium  1 tablet Oral BID Gur[place]well Cables, CRNP      simethicone  80 mg Oral 4x Daily PRN Gurney Cables, CRNP      sodium chloride  2 spray Each Nare Q2H PRN rney Cables, CRNP      sodium polystyrene  30 g Oral Once [place], [place]      tamsulosin  0 4 mg Oral Daily With [employer], CRNP      zinc sulfate  220 mg Oral Daily BROOKS Rosado          Today, Patient Was Seen By: Myesha Blanc MD    ** Please Note: Dictation voice to text software may have been used in the creation of this document   **        High risk situation due to patient's obesity, lung cancer, CKD, diabetes and degree of oxygen support (noninvasive ventillation) required

## 2022-01-02 NOTE — ED NOTES
pts SpO2 dropped to 74% during medication administration   Pt able to be placed back on bipap with SpO2 of 92%     Renuka Wallace RN  01/02/22 5526

## 2022-01-02 NOTE — ASSESSMENT & PLAN NOTE
Lab Results   Component Value Date    EGFR 10 01/01/2022    EGFR 27 12/20/2021    EGFR 27 12/14/2021    CREATININE 5 33 (H) 01/01/2022    CREATININE 2 49 (H) 12/20/2021    CREATININE 2 49 (H) 12/14/2021     · Creatinine 5 3, baseline in the 2  range      · Hold HCTZ  · Will obtain UA and serum phosphorus  · Monitor intake and output  · Urinary retention protocol  · Avoid nephrotoxins  · Nephrology consult

## 2022-01-02 NOTE — ASSESSMENT & PLAN NOTE
· Patient reports chronic lower extremity edema  BNP slightly elevated, 147, in setting of COVID  · As per outpatient cardiologist, suspect weight gain and subcutaneous edema is related to steroid use  Outpatient echo ordered; consideration for p r n   Diuretic therapy if edema persists or becomes worse  · Echo in 2019: LVEF 66%, normal systolic function, no RWMA  · Elevate lower extremities  · Monitor daily weight

## 2022-01-02 NOTE — ASSESSMENT & PLAN NOTE
Lab Results   Component Value Date    HGBA1C 6 4 09/24/2021     · Hold Januvia, add sliding scale insulin and ADA diet    No results for input(s): POCGLU in the last 72 hours      Blood Sugar Average: Last 72 hrs:

## 2022-01-03 PROBLEM — E83.52 HYPERCALCEMIA: Status: ACTIVE | Noted: 2022-01-01

## 2022-01-03 PROBLEM — R77.8 ELEVATED TROPONIN LEVEL NOT DUE TO ACUTE CORONARY SYNDROME: Status: RESOLVED | Noted: 2019-11-30 | Resolved: 2022-01-01

## 2022-01-03 PROBLEM — E87.5 HYPERKALEMIA: Status: RESOLVED | Noted: 2022-01-01 | Resolved: 2022-01-01

## 2022-01-03 PROBLEM — U07.1 ACUTE RESPIRATORY FAILURE DUE TO COVID-19 (HCC): Status: RESOLVED | Noted: 2022-01-01 | Resolved: 2022-01-01

## 2022-01-03 PROBLEM — G89.3 CANCER RELATED PAIN: Status: RESOLVED | Noted: 2019-01-10 | Resolved: 2022-01-01

## 2022-01-03 PROBLEM — J96.00 ACUTE RESPIRATORY FAILURE DUE TO COVID-19 (HCC): Status: RESOLVED | Noted: 2022-01-01 | Resolved: 2022-01-01

## 2022-01-03 PROBLEM — E83.52 HYPERCALCEMIA: Status: RESOLVED | Noted: 2022-01-01 | Resolved: 2022-01-01

## 2022-01-03 PROBLEM — J96.21 ACUTE ON CHRONIC RESPIRATORY FAILURE WITH HYPOXIA (HCC): Status: ACTIVE | Noted: 2022-01-01

## 2022-01-03 NOTE — TELEPHONE ENCOUNTER
Left voicemail making patient aware no longer need to take vitamin d 02790 units weekly  Patient is to take OTC vitamin d 2000 units daily

## 2022-01-03 NOTE — UTILIZATION REVIEW
Inpatient Admission Authorization Request   NOTIFICATION OF INPATIENT ADMISSION/INPATIENT AUTHORIZATION REQUEST   SERVICING FACILITY:   [address] [place] [address]  [address] [place] [address], [address]   Tax ID: [id number]  NPI: [id number]  Place of Service: Inpatient [address] [facility]  [address]  Place of Service Code: 24     ATTENDING PROVIDER:  Attending Name and NPI#: Doris Espinoza [[id number]]  Address: [address] [place] [address], [address]   Phone: [phone]     UTILIZATION REVIEW CONTACT:  Leonardo [facility] Utilization   Network Utilization Review Department  Phone: [phone]  Fax: [phone]  Email: [email or web address]     PHYSICIAN ADVISORY SERVICES:  FOR WYOV-JM-FJPT REVIEW - MEDICAL NECESSITY DENIAL  Phone: [phone]  Fax: [phone]  Email: [email or web address]     TYPE OF REQUEST:  Inpatient Status     ADMISSION INFORMATION:  ADMISSION DATE/TIME: 1/2/22  2:57 AM  PATIENT DIAGNOSIS CODE/DESCRIPTION:  Shortness of breath [R06 02]  DISCHARGE DATE/TIME: No discharge date for patient encounter  DISCHARGE DISPOSITION (IF DISCHARGED): Home/Self Care     IMPORTANT INFORMATION:  Please contact the [facility] directly with any questions or concerns regarding this request  Department voicemails are confidential     Send requests for admission clinical reviews, concurrent reviews, approvals, and administrative denials due to lack of clinical to fax [phone]

## 2022-01-03 NOTE — ASSESSMENT & PLAN NOTE
Lab Results   Component Value Date    EGFR 12 01/03/2022    EGFR 12 01/02/2022    EGFR 12 01/02/2022    CREATININE 4 67 (H) 01/03/2022    CREATININE 4 72 (H) 01/02/2022    CREATININE 4 72 (H) 01/02/2022     · Nephrology following  · No indication for dialysis at this time  · Initially recieved 2 liters normal saline  · 1/2 lasix 80 mg IV x1

## 2022-01-03 NOTE — ED NOTES
Call to RT to ask about pt's nebulizer tx   RT unavailabe at this time     Margo Jones RN  01/02/22 1946

## 2022-01-03 NOTE — ED NOTES
Pt  Assessed, VSS, NAD, pt   Denies any pain or complaints, will continue to monitor,     Nya Kenyon RN  01/03/22 2241

## 2022-01-03 NOTE — ASSESSMENT & PLAN NOTE
· Patient admitted January 1 with Covid pneumonia  He has increasing oxygen demands and is now BIPAP dependent  · Atorvastatin 40mg qd  · Dexamethasone 0 1mg/kg bid [id number]  · Baricitinib  - given but discontinued as contra-indicated per pharmacy due to renal function  · IV ceftriaxone and p o doxycycline  · Anticoagulated on Eliquis  · remdesivir- not indicated on bipap per [facility] guidelines  · Continue supplemental oxygen to maintain O2 sat around 90%   Encourage ambulation and proning

## 2022-01-03 NOTE — PROGRESS NOTES
Progress Note - Pulmonary   Murlene Nasuti Sr  63 y o  male MRN: [id number]  Unit/Bed#: ED 31 Encounter: [id number]      Assessment/Plan:         1  Acute hypoxic respiratory failure secondary to COVID 19  1  Titrate oxygen as needed to maintain SpO2 >82%  2  Pulmonary toilet: IS, cough deep breathe  3  Side lying and prone position  4  Current O2 needs 100% HFNC 60 LPM  5  Baseline O2 needs 3 5 L PM  2  COVID 19 moderate/severe protocol started with superimposed bacterial pneumonia  1  Imaging : chest CT 1/1/22-LUNGS:  Stable areas of discoid atelectasis and scarring are seen bilaterally  Background emphysematous changes are noted with new bilateral reticulation more pronounced in peripheral/subpleural distribution  Bibasilar consolidative opacities are seen  There is no tracheal or endobronchial lesion  2  Recommendations  1  Agree with transfer to ICU for step down level 1  3  Labs  1   2  2  Procalcitonin 145   3    4  Troponin  elevated   5  D-dimer 3 3  4  Medications  1  lipitor  2  Anticoagulation eliquis  3  Remdesivir day 2/5  4  decadron dosing 0 1mg/kg BID  day 2/10  5  Diuretics PRN  6  Baricitinib 1/14-placed on hold due to bacterial pneumonia  7  Antibiotics day 2 cefepime and doxycycline  3  NURIA on CKD IIIB/IV  1  Nephrology following  2  Creatinine slowly improving  3  Dialysis not indicated at this time  4  HTN  5  Hyperkalemia  6  hypercalcemia  7  History of lung adenocarcinoma  1  Follows with Dr José Lao  2  Pina Chant on hold due to  Immune mediated myositis  8  Chronic lower extremity edema  9  Anemia  1  Outpatient oncology evaluation for myeloma negative  2  Management per IM          Subjective:     Claudean Croak was seen in bed with BiPAP in placed upon entering the room  + dyspnea and anxiety reports  Denies: chest pain, fevers or hemoptysis    Objective:         Vitals: Blood pressure 156/75, pulse 99, temperature 98 °F (36 7 °C), temperature source Oral, resp   rate 20, height 5' 8" (1 727 m), weight 101 kg (222 lb 10 6 oz), SpO2 94 %  , BiPAP 80% FiO2, Body mass index is 33 86 kg/m²  Intake/Output Summary (Last 24 hours) at 1/3/2022 1310  Last data filed at 1/3/2022 0129  Gross per 24 hour   Intake 200 ml   Output 1000 ml   Net -800 ml         Physical Exam  Gen: Awake, alert  HEENT: Mucous membranes moist, no oral lesions, no thrush, BiPAP in place  NECK: + accessory muscle use, JVP not elevated  Cardiac: Regular, single S1, single S2, no murmurs, no rubs, no gallops  Lungs: decreased coarse breath sounds  Abdomen: normoactive bowel sounds, soft nontender, nondistended, no rebound or rigidity, no guarding  Extremities: no cyanosis, no clubbing, + bilateral lower extremity edema    Labs: I have personally reviewed pertinent lab results  , CBC:   Lab Results   Component Value Date    WBC 12 99 (H) 01/03/2022    HGB 9 4 (L) 01/03/2022    HCT 30 4 (L) 01/03/2022    MCV 87 01/03/2022     01/03/2022    MCH 26 8 01/03/2022    MCHC 30 9 (L) 01/03/2022    RDW 17 2 (H) 01/03/2022    MPV 10 3 01/03/2022   , CMP:   Lab Results   Component Value Date    SODIUM 137 01/03/2022    K 5 4 (H) 01/03/2022     01/03/2022    CO2 24 01/03/2022    BUN 94 (H) 01/03/2022    CREATININE 4 67 (H) 01/03/2022    CALCIUM 8 7 01/03/2022    AST 51 (H) 01/03/2022    ALT 37 01/03/2022    ALKPHOS 72 01/03/2022    EGFR 12 01/03/2022     Imaging and other studies: I have personally reviewed pertinent reports  Chest CT 1/1/22    CHEST     LUNGS:  Stable areas of discoid atelectasis and scarring are seen bilaterally  Background emphysematous changes are noted with new bilateral reticulation more pronounced in peripheral/subpleural distribution  Bibasilar consolidative opacities are seen  There is no tracheal or endobronchial lesion      PLEURA:  Unremarkable      HEART/GREAT VESSELS: Heart is unremarkable for patient's age   No thoracic aortic aneurysm      MEDIASTINUM AND GARIMA:  Unremarkable      CHEST WALL AND LOWER NECK:   Right chest wall MediPort terminates in the distal SVC        Deon Noel

## 2022-01-03 NOTE — ASSESSMENT & PLAN NOTE
Lab Results   Component Value Date    HGBA1C 6 4 09/24/2021       Recent Labs     01/02/22 2039 01/03/22  0003 01/03/22  0839 01/03/22  1231   POCGLU 135 155* 127 154*       Blood Sugar Average: Last 72 hrs:  (P) 155 [id number]     · accu checks and SSI

## 2022-01-03 NOTE — ASSESSMENT & PLAN NOTE
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

## 2022-01-03 NOTE — PROGRESS NOTES
NEPHROLOGY PROGRESS NOTE   Jet Robles Sr  63 y o  male MRN: [id number]  Unit/Bed#: ED 32 Encounter: [id number]  Reason for Consult: NURIA (POA) om CKD IIIB/IB    ASSESSMENT/PLAN:  Acute kidney injury (POA):   Suspected prerenal with hemodynamics changes with likely progression to ATN in the setting of COVID-19 infection  -presents with creatinine of 5 33   -creatinine is slightly improved  -initially received 2 L of IV fluids   -received Lasix 80 mg IV x1 01/02/2021   -UA:  Small blood, 0-1 RBCs, 0-1 WBCs, moderate bacteria  -previous urine protein to creatinine ratio 0 09   -renal imaging:  Unremarkable kidneys, negative for hydro   -check bladder scan pvr    -no urgent indication for RRT, however if potassium continues to increase, may need initiation on HD    -recommend avoiding nephrotoxins, hypotension, IV contrast     CKD stage IIIB/IV:  Etiology of chronic disease likely secondary to diabetes, hypertension, and nephrotoxic chemotherapy agents   -follows with Dr Ro Heller    -baseline creatinine approximately 2 0-2 5  Hypertension:  Blood pressure above goal   -currently on Cardizem 120 mg daily, tamsulosin 0 4 mg with dinner   -will start hydralazine 25 mg PO every 8 hours    -goal blood pressure less than 140/90 mmHg  -recommend avoiding hypotension or high fluctuations in blood pressure  Hyperkalemia: In the setting of renal failure and COVID infection   -initially received treatment with medical management + Lasix and Kayexalate   -check bladder scan to R/O retention  -will place on low-potassium diet  -will continue to monitor and treat as needed  Hypercalcemia:  History of malignancy   -avoid calcium containing supplements   -initially received 2 L of IV fluids as well as 80 mg IV Lasix x1   -noted hypoalbuminemia with albumin of 1 9   -will check ionized calcium, vitamin-D, and PTH  -would recommend stopping vitamin D and p r n  tums       COVID-19 infection:  Presents with shortness of breath, patient is on vaccinated  -currently on BiPAP  -pulmonary team consulted   -chest x-ray showed bibasilar consolidations, lymphangitic spread of disease could be considered a history of malignancy, superimposed CHF/edema  Stable nonspecific left retroperitoneal soft tissue lesion   -chest x-ray showed moderate bibasilar pneumonia  -currently on antibiotics  -may give diuretics as needed for increased shortness of breath  History of lung adenocarcinoma:  Recently on Keytruda, currently off   -CT of brain negative for metastasis  Anemia:  -previous myeloma workup negative   -continue to monitor and transfuse as needed for hemoglobin less than 7 0  Other:  History of COPD, atrial fibrillation on Eliquis    Disposition:  Requiring additional stay due to medical needs  SUBJECTIVE:  Parts of the exam were done by primary/nursing service and were discussed with our team due to infection control prevention measures related to COVID 19  The patient was viewed through the glass window  He remains on BiPAP  He is resting comfortably      OBJECTIVE:  Current Weight: Weight - Scale: 101 kg (222 lb 10 6 oz)  Vitals:    01/03/22 0445 01/03/22 0545 01/03/22 0645 01/03/22 1008   BP: 138/88 155/89 169/90 (!) 171/86   BP Location: Right arm Right arm Right arm Left arm   Pulse: 90 94 96 98   Resp: (!) 11 17 16 20   Temp:    98 °F (36 7 °C)   TempSrc:    Oral   SpO2: 93% 91% 91% 94%   Weight:       Height:           Intake/Output Summary (Last 24 hours) at 1/3/2022 1041  Last data filed at 1/3/2022 0129  Gross per 24 hour   Intake 250 ml   Output 1000 ml   Net -750 ml     General:  Ill appearance  Skin: warm, dry, intact, no rash  HEENT: Moist mucous membranes, sclera anicteric, normocephalic, atraumatic  Neck: No apparent JVD appreciated  Chest:  on BiPAP  CVS:Regular rate and rhythm, tachycardic at times on monitor, no murmer   Abdomen: Soft, round, non-tender, +BS  Extremities: B/L LE edema present  Neuro: alert and oriented  Psych: appropriate mood and affect     Medications:    Current Facility-Administered Medications:     acetaminophen (TYLENOL) tablet 650 mg, 650 mg, Oral, Q6H PRN, BROOKS Silvestre    albuterol inhalation solution 2 5 mg, 2 5 mg, Nebulization, 4x Daily, BROOKS Silvestre, 2 5 mg at 01/03/22 1010    apixaban (ELIQUIS) tablet 2 5 mg, 2 5 mg, Oral, BID, BROOKS Silvestre, 2 5 mg at 01/03/22 1005    ascorbic acid (VITAMIN C) tablet 500 mg, 500 mg, Oral, Daily, BROOKS Silvestre, 500 mg at 01/03/22 1007    atorvastatin (LIPITOR) tablet 40 mg, 40 mg, Oral, Daily With Dinner, BROOKS Silvestre    budesonide (PULMICORT) inhalation solution 0 5 mg, 0 5 mg, Nebulization, Q12H, BROOKS Silvestre, 0 5 mg at 01/03/22 1028    calcium carbonate (TUMS) chewable tablet 1,000 mg, 1,000 mg, Oral, Daily PRN, BROOKS Silvestre    cefepime (MAXIPIME) 1 g/50 mL dextrose IVPB, 1,000 mg, Intravenous, Q12H, Allyson Vazquez MD, Stopped at 01/03/22 0129    cholecalciferol (VITAMIN D3) tablet 2,000 Units, 2,000 Units, Oral, Daily, BROOKS Silvestre, 2,000 Units at 01/03/22 1007    dexamethasone (DECADRON) 10 1 mg in sodium chloride 0 9 % 50 mL IVPB, 0 1 mg/kg, Intravenous, Q12H, BROOKS Silvestre, Stopped at 01/02/22 2320    dextromethorphan-guaiFENesin (ROBITUSSIN DM) oral syrup 10 mL, 10 mL, Oral, Q4H PRN, BROOKS Silvestre    diltiazem (CARDIZEM CD) 24 hr capsule 120 mg, 120 mg, Oral, Daily, BROOKS Silvestre, 120 mg at 01/03/22 1009    divalproex sodium (DEPAKOTE) EC tablet 250 mg, 250 mg, Oral, BID, BROOKS Silvestre, 250 mg at 01/03/22 1005    doxycycline hyclate (VIBRAMYCIN) capsule 100 mg, 100 mg, Oral, Q12H, BROOKS Silvestre, 100 mg at 01/03/22 1004    FLUoxetine (PROzac) capsule 20 mg, 20 mg, Oral, Daily, BROOKS Silvestre, 20 mg at 01/03/22 1004    fluticasone (FLONASE) 50 mcg/act nasal spray 1 spray, 1 spray, Each Nare, BID, Eliana Delisa, CRNP, 1 spray at 45/48/96 8672    folic acid (FOLVITE) tablet 1,000 mcg, 1,000 mcg, Oral, Daily, Eliana Groveton, CRNP, 1,000 mcg at 01/03/22 1006    gabapentin (NEURONTIN) capsule 100 mg, 100 mg, Oral, BID, Eliana Groveton, CRNP, 100 mg at 01/03/22 1013    guaiFENesin (MUCINEX) 12 hr tablet 600 mg, 600 mg, Oral, Q12H [address], Eliana Groveton, CRNP, 600 mg at 01/03/22 1006    insulin lispro (HumaLOG) 100 units/mL subcutaneous injection 2-12 Units, 2-12 Units, Subcutaneous, 4x Daily (AC & HS) **AND** Fingerstick Glucose (POCT), , , 4x Daily AC and at bedtime, Eliana Groveton, CRNP    ipratropium (ATROVENT) 0 02 % inhalation solution 0 5 mg, 0 5 mg, Nebulization, 4x Daily, Eliana Groveton, CRNP, 0 5 mg at 01/03/22 1003    loratadine (CLARITIN) tablet 5 mg, 5 mg, Oral, Daily, Eliana Groveton, CRNP, 5 mg at 01/03/22 1003    melatonin tablet 3 mg, 3 mg, Oral, HS, Eliana Groveton, CRNP, 3 mg at 01/02/22 2250    montelukast (SINGULAIR) tablet 10 mg, 10 mg, Oral, HS, Eliana Groveton, CRNP, 10 mg at 01/02/22 2250    nitroglycerin (NITROSTAT) SL tablet 0 4 mg, 0 4 mg, Sublingual, Once, Morgan Salazar PA-C    ondansetron (ZOFRAN) injection 4 mg, 4 mg, Intravenous, Once PRN, Lulu Kirby PA-C    ondansetron (ZOFRAN) injection 4 mg, 4 mg, Intravenous, Q6H PRN, Eliana Delisa, CRNP    oxyCODONE (OxyCONTIN) 12 hr tablet 20 mg, 20 mg, Oral, Q12H [address], Eliana Groveton, CRNP, 20 mg at 01/03/22 1009    oxyCODONE (ROXICODONE) immediate release tablet 30 mg, 30 mg, Oral, Q4H PRN, EVIN ClevelandNP, 30 mg at 01/02/22 0714    pantoprazole (PROTONIX) EC tablet 20 mg, 20 mg, Oral, Early Morning, Eliana Hercules, EVINNP, 20 mg at 01/03/22 0530    QUEtiapine (SEROquel) tablet 12 5 mg, 12 5 mg, Oral, HS, EVIN ClevelandNP, 12 5 mg at 01/02/22 2351    senna-docusate sodium (SENOKOT S) 8 6-50 mg per tablet 1 tablet, 1 tablet, Oral, BID, Lilli Lizette, EVINNP, 1 tablet at 01/03/22 1005    simethicone (MYLICON) chewable tablet 80 mg, 80 mg, Oral, 4x Daily PRN, Lilli Lizette CRNP    sodium chloride (OCEAN) 0 65 % nasal spray 2 spray, 2 spray, Each Nare, Q2H PRN, Lilli Lizette CRNP    sodium polystyrene (KAYEXALATE) powder 30 g, 30 g, Oral, Once, RODNEY Jackson    tamsulosin [employer]) capsule 0 4 mg, 0 4 mg, Oral, Daily With Dinner, Lilli EVIN BauerNP, 0 4 mg at 01/02/22 1719    zinc sulfate (ZINCATE) capsule 220 mg, 220 mg, Oral, Daily, Lilli Lizette, CRNP, 220 mg at 01/03/22 1007    Current Outpatient Medications:     albuterol (2 5 mg/3 mL) 0 083 % nebulizer solution, Take 1 vial (2 5 mg total) by nebulization every 6 (six) hours as needed for wheezing or shortness of breath, Disp: 360 mL, Rfl: 5    albuterol (PROVENTIL HFA,VENTOLIN HFA) 90 mcg/act inhaler, Inhale 2 puffs every 4 (four) hours as needed for wheezing, Disp: 18 g, Rfl: 5    apixaban (ELIQUIS) 2 5 mg, Take 1 tablet (2 5 mg total) by mouth 2 (two) times a day, Disp: 60 tablet, Rfl: 11    Blood Glucose Monitoring Suppl KIT, by Does not apply route daily, Disp: 1 each, Rfl: 0    budesonide (PULMICORT) 0 5 mg/2 mL nebulizer solution, Take 1 vial (0 5 mg total) by nebulization every 12 (twelve) hours Rinse mouth after use , Disp: 1 vial, Rfl: 0    carbamide peroxide (DEBROX) 6 5 % otic solution, Administer 5 drops into both ears 2 (two) times a day, Disp: 15 mL, Rfl: 0    clotrimazole-betamethasone (LOTRISONE) 1-0 05 % cream, Apply topically 2 (two) times a day, Disp: 45 g, Rfl: 2    diltiazem (CARDIZEM CD) 120 mg 24 hr capsule, Take 1 capsule (120 mg total) by mouth daily, Disp: 90 capsule, Rfl: 3    divalproex sodium (DEPAKOTE) 250 mg EC tablet, Take 1 tablet (250 mg total) by mouth 2 (two) times a day, Disp: 30 tablet, Rfl: 0    ergocalciferol (VITAMIN D2) 50,000 units, Take 1 capsule (50,000 Units total) by mouth once a week, Disp: 12 capsule, Rfl: 1    FLUoxetine (PROzac) 10 MG tablet, Take 1 tablet (10 mg total) by mouth daily, Disp: 30 tablet, Rfl: 5    fluticasone (FLONASE) 50 mcg/act nasal spray, 1-2 sprays each nostril daily for allergies, Disp: 16 g, Rfl: 3    folic acid (FOLVITE) 1 mg tablet, TAKE 1 TABLET BY MOUTH DAILY  , Disp: 30 tablet, Rfl: 0    FREESTYLE LITE test strip, TEST BLOOD SUGAR DAILY, Disp: 100 each, Rfl: 1    gabapentin (NEURONTIN) 100 mg capsule, Take 1 capsule (100 mg total) by mouth 2 (two) times a day, Disp: 60 capsule, Rfl: 1    guaiFENesin (MUCINEX) 600 mg 12 hr tablet, Take 600 mg by mouth every 12 (twelve) hours, Disp: , Rfl:     hydrochlorothiazide (HYDRODIURIL) 50 mg tablet, , Disp: , Rfl:     ipratropium (ATROVENT) 0 02 % nebulizer solution, Take 2 5 mL (0 5 mg total) by nebulization 4 (four) times a day, Disp: 300 mL, Rfl: 11    Lancets (FREESTYLE) lancets, TEST BLOOD SUGAR DAILY, Disp: 100 each, Rfl: 4    loratadine (CLARITIN) 10 mg tablet, Take 1 tablet (10 mg total) by mouth daily, Disp: 90 tablet, Rfl: 3    melatonin 3 mg, Take 1 tablet (3 mg total) by mouth daily at bedtime, Disp: 90 tablet, Rfl: 1    montelukast (SINGULAIR) 10 mg tablet, TAKE 1 TABLET BY MOUTH DAILY AT BEDTIME, Disp: 90 tablet, Rfl: 0    olopatadine (PATANOL) 0 1 % ophthalmic solution, Administer 1 drop to both eyes 2 (two) times a day, Disp: 5 mL, Rfl: 3    omeprazole (PriLOSEC OTC) 20 MG tablet, , Disp: , Rfl:     ondansetron (ZOFRAN) 8 mg tablet, Take 1 tablet (8 mg total) by mouth every 8 (eight) hours as needed for nausea or vomiting, Disp: 20 tablet, Rfl: 3    oxyCODONE (OxyCONTIN) 20 mg 12 hr tablet, Take 1 tablet (20 mg total) by mouth every 12 (twelve) hours Max Daily Amount: 40 mg, Disp: 60 tablet, Rfl: 0    oxyCODONE (ROXICODONE) 30 MG immediate release tablet, Take 1 tablet (30 mg total) by mouth every 4 (four) hours as needed (to relieve cancer pain immediately) Max Daily Amount: 180 mg, Disp: 150 tablet, Rfl: 0    pantoprazole (PROTONIX) 40 mg tablet, Take 1 tablet (40 mg total) by mouth daily, Disp: 90 tablet, Rfl: 3    predniSONE 20 mg tablet, Take 2 tablets (40 mg total) by mouth daily with breakfast Do not taper or adjust with Dr Kevin Paige and Ms Dye First: 60 tablet, Rfl: 0    QUEtiapine (SEROquel) 25 mg tablet, TAKE 0 5 TABLETS (12 5 MG TOTAL) BY MOUTH DAILY AT BEDTIME, Disp: 15 tablet, Rfl: 0    senna-docusate sodium (SENOKOT-S) 8 6-50 mg per tablet, Take 1 tablet by mouth 2 (two) times a day, Disp: 60 tablet, Rfl: 11    sitaGLIPtin (JANUVIA) 50 mg tablet, Take 1 tablet (50 mg total) by mouth daily, Disp: 90 tablet, Rfl: 0    sodium chloride (OCEAN) 0 65 % nasal spray, 2 sprays into each nostril as needed for congestion, Disp: 60 mL, Rfl: 3    tamsulosin (FLOMAX) 0 4 mg, Take 1 capsule (0 4 mg total) by mouth daily with dinner, Disp: 90 capsule, Rfl: 0    Laboratory Results:  Results from last 7 days   Lab Units 01/03/22  0529 01/02/22  2018 01/02/22  0609 01/01/22  2134 01/01/22  2134   WBC Thousand/uL 12 99*  --  13 30*  --  15 92*   HEMOGLOBIN g/dL 9 4*  --  10 1*  --  11 8*   HEMATOCRIT % 30 4*  --  33 6*  --  39 0   PLATELETS Thousands/uL 242  --  260  --  284   SODIUM mmol/L 137 141 139   < > 142   POTASSIUM mmol/L 5 4* 5 6* 6 0*   < > 6 2*   CHLORIDE mmol/L 101 102 104   < > 101   CO2 mmol/L 24 26 24   < > 26   BUN mg/dL 94* 94* 78*   < > 83*   CREATININE mg/dL 4 67* 4 72* 4 72*   < > 5 33*   CALCIUM mg/dL 8 7 8 7 8 1*   < > 8 7   PHOSPHORUS mg/dL  --   --   --   --  6 4*   ALK PHOS U/L 72  --  65  --  74   ALT U/L 37  --  34  --  30   AST U/L 51*  --  45  --  31    < > = values in this interval not displayed

## 2022-01-03 NOTE — ASSESSMENT & PLAN NOTE
· History of severe COPD with chronic hypoxemic respiratory failure maintained chronically on 3  5LPM    · Continue nebulizer tx  · Respiratory protocol

## 2022-01-03 NOTE — ASSESSMENT & PLAN NOTE
· Patient reports chronic lower extremity edema  BNP slightly elevated, 147, in setting of COVID  · As per outpatient cardiologist, suspect weight gain and subcutaneous edema is related to steroid use  Outpatient echo ordered; consideration for p r n   Diuretic therapy if edema persists or becomes worse  · Echo in 2019: LVEF 34%, normal systolic function, no RWMA  · Elevate lower extremities  · Monitor daily weight

## 2022-01-04 PROBLEM — D64.9 ANEMIA: Status: ACTIVE | Noted: 2022-01-01

## 2022-01-04 NOTE — PROGRESS NOTES
Progress Note - Nephrology   Ghada Amaral  63 y o  male MRN: [id number]  Unit/Bed#: ICU 11 Encounter: [id number]      Assessment / Plan:  1  NURIA, present on admission, on top of CKD stage 3b/4, b/l sCr 2-2 5, with NURIA likely d/t prerenal picture vs ATN in setting of COVID 19 infection  -admit sCr 5 3, had improved to 4 67, remains stable with higher BUN  -thankfully, K improving, may hold off on HD urgently  -sees Dr Sanchez Flair outpatient  -f/u am BMP  -of note, patients who developed NURIA with COVID 19 have poorer prognosis than those without NURIA  Ideally, would want to avoid dialysis if possible as long as patient urinating  2  Hyperkalemia - K 5 5, consider medical management/lasix, defer HD for now    3  Azotemia ? With uremia, unable to assess as patient intubated/sedated - BUN elevated, possibly steroid related as on decadron    4  Anemia - Hgb 9 2, monitor CBC, transfuse prn    5  COVID 19 infection, severe - management per ICU team    6  Acute on chronic respiratory failure with hypoxia likely multifactorial in the setting of COVID infection, bacterial pneumonia and underlying stage IV lung cancer-management per primary team    Other issues: right lung adenoca, DM2, PAF, bipolar disorder, sepsis d/t PNA from COVID 19, COPD    Subjective:   No complaints per RN report  Objective:     Vitals: Blood pressure 138/70, pulse 100, temperature 97 5 °F (36 4 °C), temperature source Oral, resp  rate 20, height 5' 8" (1 727 m), weight 97 8 kg (215 lb 9 8 oz), SpO2 92 %  ,Body mass index is 32 78 kg/m²  Temp (24hrs), Av °F (36 7 °C), Min:97 5 °F (36 4 °C), Max:98 5 °F (36 9 °C)      Weight (last 2 days)     Date/Time Weight    22 0600 97 8 (215 61)    22 1612 97 8 (215 61)    22 0008 101 (222 66)            Intake/Output Summary (Last 24 hours) at 2022 1221  Last data filed at 1/3/2022 1843  Gross per 24 hour   Intake --   Output 725 ml   Net -725 ml     I/O last 24 hours:   In: - Out: 725 [Urine:725]        Physical Exam (based on visualization from outside patient's window to reduce risk of COVID 19 exposure):   Physical Exam  Vitals and nursing note reviewed  Constitutional:       Appearance: Normal appearance  He is not ill-appearing  Comments: Sitting up in chair, on NRB   HENT:      Head: Normocephalic and atraumatic  Nose: Nose normal    Eyes:      General:         Right eye: No discharge  Left eye: No discharge  Cardiovascular:      Rate and Rhythm: Tachycardia present  Comments: On tele  Pulmonary:      Effort: Pulmonary effort is normal       Comments: On NRB  Abdominal:      General: There is no distension  Palpations: Abdomen is soft  Genitourinary:     Comments: External urinary catheter  Musculoskeletal:         General: No swelling  Skin:     Coloration: Skin is not pale  Findings: No rash  Neurological:      Mental Status: He is alert           Invasive Devices  Report    Central Venous Catheter Line            Port A Cath Right Chest -- days          Peripheral Intravenous Line            Peripheral IV 01/01/22 Right Antecubital 2 days    Peripheral IV 01/01/22 Right Wrist 2 days          Drain            External Urinary Catheter 1 day                Medications:    Scheduled Meds:  Current Facility-Administered Medications   Medication Dose Route Frequency Provider Last Rate    acetaminophen  650 mg Oral Q6H PRN Gurney Cables, CRNP      albuterol  2 5 mg Nebulization 4x Daily Gurney Cables, CRNP      apixaban  2 5 mg Oral BID Gurney Cables, CRNP      ascorbic acid  500 mg Oral Daily Gurney Cables, CRNP      atorvastatin  40 mg Oral Daily With 202-206 [facility], CRNP      budesonide  0 5 mg Nebulization Q12H Gurney Cables, CRNP      cefTRIAXone  1,000 mg Intravenous Q24H Donny Hernandez MD      dexamethasone  0 1 mg/kg Intravenous Q12H Gurney Cables, CRNP 10 1 mg (01/04/22 1028)    dexmedetomidine  0 1-0 7 mcg/kg/hr Intravenous Titrated Henrietta Arceo MD      dextromethorphan-guaiFENesin  10 mL Oral Q4H PRN Joao Riverae, CRNP      diltiazem  120 mg Oral Daily Shepherd Saupe, CRNP      divalproex sodium  250 mg Oral BID Shepherd Sajorgee, CRNP      doxycycline hyclate  100 mg Oral Q12H Shepherd Sajorgee, CRNP      FLUoxetine  20 mg Oral Daily Shepherd Saupe, CRNP      fluticasone  1 spray Each Nare BID Shepherd Nicolee, CRNP      folic acid  4,863 mcg Oral Daily Shepherd Sajorgee, CRNP      gabapentin  100 mg Oral BID Shepherd Saupe, CRNP      guaiFENesin  600 mg Oral Q12H [address] [facility], CRNP      hydrALAZINE  25 mg Oral Q8H [address] Viridiana Chum, CRNP      insulin lispro  2-12 Units Subcutaneous 4x Daily (AC & HS) Joao Mancia, CRNP      ipratropium  0 5 mg Nebulization 4x Daily Joao Riverae, CRNP      loratadine  5 mg Oral Daily Shepherd Saupe, CRNP      melatonin  3 mg Oral HS Shepherd Saupe, CRNP      montelukast  10 mg Oral HS Shepherd Sajorgee, CRNP      nitroglycer[facility]  [facility]      ondansetron  4 mg Intravenous Q6H PRN Joao Riverae, CRNP      oxyCODONE  20 mg Oral Q12H [address], [address]      oxyCODONE  30 mg Oral Q4H PRN Jaoo Riverae, CRNP      pantoprazole  20 mg Oral Early Morning Shepherd Saupe, CRNP      QUEtiapine  12 5 mg Oral HS Shepherd Sajorgee, CRNP      senna-docusate sodium  1 tablet Oral BID Joao Riverae, CRNP      simethicone  80 mg Oral 4x Daily PRN Joao Riverae, CRNP      sodium chloride  2 spray Each Nare Q2H PRN Joao Saupe, CRNP      sodium polystyrene  30 g Oral Once [place], [place]      tamsulosin  0 4 mg Oral Daily With [employer], CRNP      zinc sulfate  220 mg Oral Daily Joao Riverae, CRNP         PRN Meds:   acetaminophen    dextromethorphan-guaiFENesin    ondansetron  Dylan Hamlin oxyCODONE    simethicone    sodium chloride    Continuous Infusions:dexmedetomidine, 0 1-0 7 mcg/kg/hr            LAB RESULTS:      Results from last 7 days   Lab Units 01/04/22  0510 01/03/22  0529 01/02/22 2018 01/02/22  0609 01/01/22  2134   WBC Thousand/uL 13 23* 12 99*  --  13 30* 15 92*   HEMOGLOBIN g/dL 9 2* 9 4*  --  10 1* 11 8*   HEMATOCRIT % 28 7* 30 4*  --  33 6* 39 0   PLATELETS Thousands/uL 258 242  --  260 284   LYMPHO PCT %  --   --   --   --  8*   MONO PCT %  --   --   --   --  4   EOS PCT %  --   --   --   --  0   POTASSIUM mmol/L 5 5* 5 4* 5 6* 6 0* 6 2*   CHLORIDE mmol/L 103 101 102 104 101   CO2 mmol/L 24 24 26 24 26   BUN mg/dL 119* 94* 94* 78* 83*   CREATININE mg/dL 4 72* 4 67* 4 72* 4 72* 5 33*   CALCIUM mg/dL 8 5 8 7 8 7 8 1* 8 7   ALK PHOS U/L  --  72  --  65 74   ALT U/L  --  37  --  34 30   AST U/L  --  51*  --  45 31   PHOSPHORUS mg/dL  --   --   --   --  6 4*       CUTURES:  Lab Results   Component Value Date    BLOODCX No Growth at 48 hrs  01/01/2022    BLOODCX No Growth at 48 hrs  01/01/2022    BLOODCX No Growth After 5 Days  01/15/2021    BLOODCX No Growth After 5 Days  01/15/2021                 Portions of the record may have been created with voice recognition software  Occasional wrong word or "sound a like" substitutions may have occurred due to the inherent limitations of voice recognition software  Read the chart carefully and recognize, using context, where substitutions have occurred  If you have any questions, please contact the dictating provider

## 2022-01-04 NOTE — ASSESSMENT & PLAN NOTE
· Currently on cefepime, d3, antibiotic d4  · Continue on dexamethasone, d3  · Remdisivr and barcinitib stopped in the setting of bacterial infection and escalating pathway severity respectively  · Continue his outpatient Eliquis  · Continue oxygen therapy with the goal to maintain his oxygen saturation > 90%- currently on BIPAP and 100% FIO2

## 2022-01-04 NOTE — ASSESSMENT & PLAN NOTE
· COVID positive- escalated to severe pathway  · With secondary bacterial infection as evidenced by his elevated procalcitonin  · On dexamethasone, d3  · Baricitinib on hold for now given evidence of infection  · On cefepime, d3, antibiotic d4  · On Eliquis as an outpatient

## 2022-01-04 NOTE — PLAN OF CARE
Problem: MOBILITY - ADULT  Goal: Maintain or return to baseline ADL function  Description: INTERVENTIONS:  -  Assess patient's ability to carry out ADLs; assess patient's baseline for ADL function and identify physical deficits which impact ability to perform ADLs (bathing, care of mouth/teeth, toileting, grooming, dressing, etc )  - Assess/evaluate cause of self-care deficits   - Assess range of motion  - Assess patient's mobility; develop plan if impaired  - Assess patient's need for assistive devices and provide as appropriate  - Encourage maximum independence but intervene and supervise when necessary  - Involve family in performance of ADLs  - Assess for home care needs following discharge   - Consider OT consult to assist with ADL evaluation and planning for discharge  - Provide patient education as appropriate  Outcome: Progressing  Goal: Maintains/Returns to pre admission functional level  Description: INTERVENTIONS:  - Perform BMAT or MOVE assessment daily    - Set and communicate daily mobility goal to care team and patient/family/caregiver  - Collaborate with rehabilitation services on mobility goals if consulted  - Perform Range of Motion 2 times a day  - Reposition patient every 2 hours    - Dangle patient 3 times a day  - Stand patient 3 times a day  - Ambulate patient 3 times a day  - Out of bed to chair 3 times a day   - Out of bed for meals 3 times a day  - Out of bed for toileting  - Record patient progress and toleration of activity level   Outcome: Progressing     Problem: Prexisting or High Potential for Compromised Skin Integrity  Goal: Skin integrity is maintained or improved  Description: INTERVENTIONS:  - Identify patients at risk for skin breakdown  - Assess and monitor skin integrity  - Assess and monitor nutrition and hydration status  - Monitor labs   - Assess for incontinence   - Turn and reposition patient  - Assist with mobility/ambulation  - Relieve pressure over bony prominences  - Avoid friction and shearing  - Provide appropriate hygiene as needed including keeping skin clean and dry  - Evaluate need for skin moisturizer/barrier cream  - Collaborate with interdisciplinary team   - Patient/family teaching  - Consider wound care consult   Outcome: Progressing     Problem: Potential for Falls  Goal: Patient will remain free of falls  Description: INTERVENTIONS:  - Educate patient/family on patient safety including physical limitations  - Instruct patient to call for assistance with activity   - Consult OT/PT to assist with strengthening/mobility   - Keep Call bell within reach  - Keep bed low and locked with side rails adjusted as appropriate  - Keep care items and personal belongings within reach  - Initiate and maintain comfort rounds  - Make Fall Risk Sign visible to staff  - Offer Toileting every 2 Hours, in advance of need  - Initiate/Maintain bed alarm  - Obtain necessary fall risk management equipment:   - Apply yellow socks and bracelet for high fall risk patients  - Consider moving patient to room near nurses station  Outcome: Progressing

## 2022-01-04 NOTE — PROGRESS NOTES
[facility]  Progress Note - Janeth Zafar Sr  1960, 63 y o  male MRN: [id number]  Unit/Bed#: ICU 11 Encounter: [id number]  Primary Care Provider: Luis Miguel Gonzalez MD   Date and time admitted to hospital: 1/1/2022  9:21 PM    COPD, severe ([facility] [address] )  Assessment & Plan  · Currently on cefepime, d3, antibiotic d4  · Continue on dexamethasone, d3  · Remdisivr and barcinitib stopped in the setting of bacterial infection and escalating pathway severity respectively  · Continue his outpatient Eliquis  · Continue oxygen therapy with the goal to maintain his oxygen saturation > 90%- currently on BIPAP and 100% FIO2    * COVID-19  Assessment & Plan  · COVID positive- escalated to severe pathway  · With secondary bacterial infection as evidenced by his elevated procalcitonin  · On dexamethasone, d3  · Baricitinib on hold for now given evidence of infection  · On cefepime, d3, antibiotic d4  · On Eliquis as an outpatient    Acute on chronic respiratory failure with hypoxia (HCC)  Assessment & Plan  · Likely multifactorial related to his COVID infection, superimposed bacterial pneumonia and underlying stage IV lung cancer  · We will continue BIPAP for now  · Our goal will be to maintain his saturations > 88%  · We will continue cefepime for now, d3  · We will continue dexamethasone for COVID      Severe sepsis ([facility] [address] )  Assessment & Plan  · Secondary to bacterial pneumonia in the setting of his COVID infection  · Continue with cefepime for now  · Trend procalcitonin- elevated at 145    Adenocarcinoma of lung, right (HCC)  Assessment & Plan  · Stage 4 disease  · Was on Revonda Genia- currently on hold secondary to his acute infection  · Would benefit from a palliative care consult/goals of care discussion given his current infection in the setting of his chronic medical conditions    Acute kidney injury superimposed on CKD [facility])  Assessment & Plan  Lab Results   Component Value Date    EGFR 12 01/03/2022    EGFR 12 2022    EGFR 12 2022    CREATININE 4 67 (H) 2022    CREATININE 4 72 (H) 2022    CREATININE 4 72 (H) 2022     · Will monitor his renal indices and urine output closely  · Avoid nephrotoxic agents    Paroxysmal atrial fibrillation ([address] )  Assessment & Plan  · Will continue his outpatient Eliquis    Type 2 diabetes mellitus without complication, without long-term current use of insulin [facility])  Assessment & Plan  Lab Results   Component Value Date    HGBA1C 6 4 2021       Recent Labs     22  0839 22  1231 22  1820 22  2114   POCGLU 127 154* 134 135       Blood Sugar Average: Last 72 hrs:    · We will continue the patient on SSI for now with a goal to maintain his oxygen saturations > 90%    Bipolar 1 disorder (HCC)  Assessment & Plan  · We will continue his outpatient medications    ----------------------------------------------------------------------------------------  HPI/24hr events: No events overnight    Patient appropriate for transfer out of the ICU today?: No  Disposition: Continue Critical Care   Code Status: Level 1 - Full Code  ---------------------------------------------------------------------------------------  SUBJECTIVE  Denies SOB    Review of Systems  Review of systems was reviewed and negative unless stated above in HPI/24-hour events   ---------------------------------------------------------------------------------------  OBJECTIVE    Vitals   Vitals:    22 0051 22 0100 22 0328 22 0443   BP: 133/62 127/63 123/64    BP Location:   Right arm    Pulse:  102 99    Resp:  16 16    Temp:   97 8 °F (36 6 °C)    TempSrc:   Temporal    SpO2:  95% 96% 94%   Weight:       Height:         Temp (24hrs), Av 1 °F (36 7 °C), Min:97 8 °F (36 6 °C), Max:98 5 °F (36 9 °C)  Current: Temperature: 97 8 °F (36 6 °C)          Respiratory:  SpO2: SpO2: 94 %, SpO2 Activity: SpO2 Activity: At Rest, SpO2 Device: O2 Device: High flow nasal cannula       Invasive/non-invasive ventilation settings   Respiratory  Report   Lab Data (Last 4 hours)    None         O2/Vent Data (Last 4 hours)      01/04 0443          Non-Invasive Ventilation Mode HFNC (High flow)                   Physical Exam  Constitutional:       Appearance: He is obese  He is ill-appearing  HENT:      Head: Normocephalic  Mouth/Throat:      Mouth: Mucous membranes are moist    Eyes:      Pupils: Pupils are equal, round, and reactive to light  Cardiovascular:      Rate and Rhythm: Normal rate  Pulmonary:      Effort: Pulmonary effort is normal       Breath sounds: Rales present  Abdominal:      General: There is no distension  Musculoskeletal:         General: Swelling present  Cervical back: Neck supple  Lymphadenopathy:      Cervical: No cervical adenopathy  Skin:     General: Skin is warm and dry     Neurological:      Comments: Sleepy but arousable             Laboratory and Diagnostics:  Results from last 7 days   Lab Units 01/04/22  0510 01/03/22  0529 01/02/22  0609 01/01/22  2134   WBC Thousand/uL 13 23* 12 99* 13 30* 15 92*   HEMOGLOBIN g/dL 9 2* 9 4* 10 1* 11 8*   HEMATOCRIT % 28 7* 30 4* 33 6* 39 0   PLATELETS Thousands/uL 258 242 260 284   BANDS PCT %  --   --   --  1   MONO PCT %  --   --   --  4     Results from last 7 days   Lab Units 01/03/22  0529 01/02/22 2018 01/02/22  0609 01/01/22  2134   SODIUM mmol/L 137 141 139 142   POTASSIUM mmol/L 5 4* 5 6* 6 0* 6 2*   CHLORIDE mmol/L 101 102 104 101   CO2 mmol/L 24 26 24 26   ANION GAP mmol/L 12 13 11 15*   BUN mg/dL 94* 94* 78* 83*   CREATININE mg/dL 4 67* 4 72* 4 72* 5 33*   CALCIUM mg/dL 8 7 8 7 8 1* 8 7   GLUCOSE RANDOM mg/dL 129 145* 150* 137   ALT U/L 37  --  34 30   AST U/L 51*  --  45 31   ALK PHOS U/L 72  --  65 74   ALBUMIN g/dL 1 9*  --  2 0* 2 5*   TOTAL BILIRUBIN mg/dL 0 35  --  0 49 0 79     Results from last 7 days   Lab Units 01/01/22  2134   PHOSPHORUS mg/dL 6 4*      Results from last 7 days   Lab Units 01/01/22  2134   INR  1 36*   PTT seconds 30          Results from last 7 days   Lab Units 01/02/22  0608 01/02/22  0342 01/01/22  2335 01/01/22  2134   LACTIC ACID mmol/L 1 7 3 9* 2 9* 3 4*     ABG:    VBG:    Results from last 7 days   Lab Units 01/01/22  2134   PROCALCITONIN ng/ml 145 01*       Micro  Results from last 7 days   Lab Units 01/01/22  2139 01/01/22 2134   BLOOD CULTURE  No Growth at 24 hrs  No Growth at 24 hrs  EKG:   Imaging: I have personally reviewed pertinent reports  and I have personally reviewed pertinent films in PACS    Intake and Output  I/O       01/02 0701  01/03 0700 01/03 0701 01/04 0700    IV Piggyback 250     Total Intake(mL/kg) 250 (2 5)     Urine (mL/kg/hr) 1000 (0 4) 725 (0 3)    Total Output 1000 725    Net -750 -725                Height and Weights   Height: 5' 8" (172 7 cm)  IBW (Ideal Body Weight): 68 4 kg  Body mass index is 32 78 kg/m²  Weight (last 2 days)     Date/Time Weight    01/03/22 1612 97 8 (215 61)    01/03/22 0008 101 (222 66)            Nutrition       Diet Orders   (From admission, onward)             Start     Ordered    01/03/22 1053  Diet Bear/CHO Controlled; Consistent Carbohydrate Diet Level 2 (5 carb servings/75 grams CHO/meal); Sodium 2 GM, Potassium 2 GM  Diet effective now        References:    Nutrtion Support Algorithm Enteral vs  Parenteral   Question Answer Comment   Diet Type Bear/CHO Controlled    Bear/CHO Controlled Consistent Carbohydrate Diet Level 2 (5 carb servings/75 grams CHO/meal)    Other Restriction(s): Sodium 2 GM    Other Restriction(s): Potassium 2 GM    RD to adjust diet per protocol?  Yes        01/03/22 1053                  Active Medications  Scheduled Meds:  Current Facility-Administered Medications   Medication Dose Route Frequency Provider Last Rate    acetaminophen  650 mg Oral Q6H PRN BROOKS Rosado      albuterol  2 5 mg Nebulization 4x Daily BROOKS Rosado      apixaban 2 5 mg Oral BID Rock Controlland Power, CRNP      ascorbic acid  500 mg Oral Daily [employer] Power, CRNP      atorvastatin  40 mg Oral Daily With [employer], CRNP      budesonide  0 5 mg Nebulization Q12H [employer]roll[employer] Power, CRNP      cefepime  1,000 mg Intravenous Q12H Flower Matute MD 1,000 mg (01/04/22 0139)    dexamethasone  0 1 mg/kg Intravenous Q12H [facility] Power, CRNP 10 1 mg (01/03/22 6046)    dextromethorphan-guaiFENesin  10 mL Oral Q4H PRN Rock Controlland Power, CRNP      diltiazem  120 mg Oral Daily [employer] Power, CRNP      divalproex sodium  250 mg Oral BID [employer] Power, CRNP      doxycycline hyclate  100 mg Oral Q12H [employer] Controlland Power, CRNP      FLUoxetine  20 mg Oral Daily [employer] Controll[employer] Power, CRNP      fluticasone  1 spray Each Nare BID [facility] Power, CRNP      folic acid  8,186 mcg Oral Daily Rock Controlland Power, CRNP      gabapentin  100 mg Oral BID [employer]roll[employer] Power, CRNP      guaiFENesin  600 mg Oral Q12H [address] [facility], CRNP      hydrALAZINE  25 mg Oral Q8H [facility] Debby Dalton, CRNP      insulin lispro  2-12 Units Subcutaneous 4x Daily (AC & HS) [facility] Power, CRNP      ipratropium  0 5 mg Nebulization 4x Daily Rock Controlland Power, CRNP      loratadine  5 mg Oral Daily [employer] Power, CRNP      melatonin  3 mg Oral HS [facility] Power, CRNP      montelukast  10 mg Oral HS [facility] Power, CRNP      nitroglycerin  0 4 mg Sublingual Once Dalton Motley PA-C      ondansetron  4 mg Intravenous Q6H PRN [facility] Power, CRNP      oxyCODONE  20 mg Oral Q12H [address] [place], [place]      oxyCODONE  30 mg Oral Q4H PRN Rock Controlland Power, CRNP      pantoprazole  20 mg Oral Early Morning Rock Controlland Power, CRNP      QUEtiapine  12 5 mg Oral HS Rock Controlland Power, CRNP      senna-docusate sodium  1 tablet Oral BID [facility] Power, CRNP      simethicone  80 mg Oral 4x Daily PRN Rock Controlland Power, CRNP      sodium chloride  2 spray Each Nare Q2H PRN BROOKS Lynch      sodium polystyrene  30 g Oral Once [place], [place]      tamsulosin  0 4 mg Oral Daily With BROOKS Monroy      zinc sulfate  220 mg Oral Daily BROOKS Lynch       Continuous Infusions:     PRN Meds:   acetaminophen, 650 mg, Q6H PRN  dextromethorphan-guaiFENesin, 10 mL, Q4H PRN  ondansetron, 4 mg, Q6H PRN  oxyCODONE, 30 mg, Q4H PRN  simethicone, 80 mg, 4x Daily PRN  sodium chloride, 2 spray, Q2H PRN        Invasive Devices Review  Invasive Devices  Report    Central Venous Catheter Line            Port A Cath Right Chest -- days          Peripheral Intravenous Line            Peripheral IV 01/01/22 Right Antecubital 2 days    Peripheral IV 01/01/22 Right Wrist 2 days          Drain            External Urinary Catheter 1 day                Rationale for remaining devices:   ---------------------------------------------------------------------------------------  Advance Directive and Living Will:      Power of :    POLST:    ---------------------------------------------------------------------------------------  Care Time Delivered:         BROOKS Dela Cruz      Portions of the record may have been created with voice recognition software  Occasional wrong word or "sound a like" substitutions may have occurred due to the inherent limitations of voice recognition software    Read the chart carefully and recognize, using context, where substitutions have occurred

## 2022-01-04 NOTE — ASSESSMENT & PLAN NOTE
Lab Results   Component Value Date    EGFR 12 01/03/2022    EGFR 12 01/02/2022    EGFR 12 01/02/2022    CREATININE 4 67 (H) 01/03/2022    CREATININE 4 72 (H) 01/02/2022    CREATININE 4 72 (H) 01/02/2022     · Will monitor his renal indices and urine output closely  · Avoid nephrotoxic agents

## 2022-01-04 NOTE — ASSESSMENT & PLAN NOTE
Lab Results   Component Value Date    HGBA1C 6 4 09/24/2021       Recent Labs     01/03/22  0839 01/03/22  1231 01/03/22  1820 01/03/22 2114   POCGLU 127 154* 134 135       Blood Sugar Average: Last 72 hrs:    · We will continue the patient on SSI for now with a goal to maintain his oxygen saturations > 90%

## 2022-01-04 NOTE — PLAN OF CARE
Problem: Potential for Falls  Goal: Patient will remain free of falls  Description: INTERVENTIONS:  - Educate patient/family on patient safety including physical limitations  - Instruct patient to call for assistance with activity   - Consult OT/PT to assist with strengthening/mobility   - Keep Call bell within reach  - Keep bed low and locked with side rails adjusted as appropriate  - Keep care items and personal belongings within reach  - Initiate and maintain comfort rounds  - Make Fall Risk Sign visible to staff  Problem: INFECTION - ADULT  Goal: Absence or prevention of progression during hospitalization  Description: INTERVENTIONS:  - Assess and monitor for signs and symptoms of infection  - Monitor lab/diagnostic results  - Monitor all insertion sites, i e  indwelling lines, tubes, and drains  - Monitor endotracheal if appropriate and nasal secretions for changes in amount and color  - [place] appropriate cooling/warming therapies per order  - Administer medications as ordered  - Instruct and encourage patient and family to use good hand hygiene technique  - Identify and instruct in appropriate isolation precautions for identified infection/condition  Outcome: Progressing     Problem: SAFETY ADULT  Goal: Maintain or return to baseline ADL function  Description: INTERVENTIONS:  -  Assess patient's ability to carry out ADLs; assess patient's baseline for ADL function and identify physical deficits which impact ability to perform ADLs (bathing, care of mouth/teeth, toileting, grooming, dressing, etc )  - Assess/evaluate cause of self-care deficits   - Assess range of motion  - Assess patient's mobility; develop plan if impaired  - Assess patient's need for assistive devices and provide as appropriate  - Encourage maximum independence but intervene and supervise when necessary  - Involve family in performance of ADLs  - Assess for home care needs following discharge   - Consider OT consult to assist with ADL evaluation and planning for discharge  - Provide patient education as appropriate  1/4/2022 0903 by Gabino Chang RN  Outcome: Progressing  1/4/2022 0903 by Gabino Chang RN  Outcome: Progressing     Problem: RESPIRATORY - ADULT  Goal: Achieves optimal ventilation and oxygenation  Description: INTERVENTIONS:  - Assess for changes in respiratory status  - Assess for changes in mentation and behavior  - Position to facilitate oxygenation and minimize respiratory effort  - Oxygen administered by appropriate delivery if ordered  - Initiate smoking cessation education as indicated  - Encourage broncho-pulmonary hygiene including cough, deep breathe, Incentive Spirometry  - Assess the need for suctioning and aspirate as needed  - Assess and instruct to report SOB or any respiratory difficulty  - Respiratory Therapy support as indicated  Outcome: Progressing     Problem: METABOLIC, FLUID AND ELECTROLYTES - ADULT  Goal: Electrolytes maintained within normal limits  Description: INTERVENTIONS:  - Monitor labs and assess patient for signs and symptoms of electrolyte imbalances  - Administer electrolyte replacement as ordered  - Monitor response to electrolyte replacements, including repeat lab results as appropriate  - Instruct patient on fluid and nutrition as appropriate  Outcome: Progressing     - Apply yellow socks and bracelet for high fall risk patients  - Consider moving patient to room near nurses station  1/4/2022 0903 by Gabino Chang RN  Outcome: Progressing  1/4/2022 0903 by Gabino Chang RN  Outcome: Progressing

## 2022-01-04 NOTE — CASE MANAGEMENT
Case Management Assessment & Discharge Planning Note    Patient name Cesia Sheth Sr   Location ICU 11/ICU 11 MRN [id number]  : 1960 Date 2022       Current Admission Date: 2022  Current Admission Diagnosis:COVID-19   Patient Active Problem List    Diagnosis Date Noted    Anemia 2022    Severe sepsis ([email or web address] ) 2022    COVID-19 2022    Elevated lipase 2022    Hyperkalemia 2022    Acute kidney injury superimposed on CKD ([email or web address] ) 2022    Bilateral lower extremity edema 2022    Acute on chronic respiratory failure with hypoxia ([email or web address] ) 2022    Interstitial myositis of multiple sites 2021    Limited literacy 2021    Unstable angina ([email or web address] ) 2021    Costochondritis 2021    Secondary hyperparathyroidism of renal origin ([email or web address] ) 2021    Seasonal allergies 2021    L4-L5 disc bulge 2021    Lung cancer ([email or web address] ) 01/15/2021    Tinea versicolor 10/15/2020    Vitamin D deficiency 2020    Personal history of chemotherapy 2020    Encounter for central line care 2020    Herpes zoster with complication     Palliative care patient 2020    Ambulatory dysfunction 2019    Paroxysmal atrial fibrillation (HCC) 2019    Azotemia 2019    Maxillary sinusitis 10/23/2019    Peripheral neuropathy 10/08/2019    Stage 3 chronic kidney disease 2019    Weight loss 2019    Renal dysfunction 2019    Erectile dysfunction 2019    Chemotherapy-induced nausea 2019    Chronic pain disorder 01/10/2019    Panlobular emphysema ([facility] ) 2018    Adenocarcinoma of lung, right ([email or web address] ) 2018    Cigarette nicotine dependence in remission 2018    Type 2 diabetes mellitus without complication, without long-term current use of insulin [facility] ) 2018    Bipolar 1 disorder ([facility] ) 2018    Gastroesophageal reflux disease without esophagitis 08/01/2018    Other specified anxiety disorders 08/01/2018    Cubital tunnel syndrome, bilateral 08/01/2018    Chronic bilateral thoracic back pain 07/27/2018    Chronic right shoulder pain 07/27/2018    Bilateral carpal tunnel syndrome 07/27/2018    COPD, severe ([address] ) 06/07/2018    Essential hypertension 06/07/2018      LOS (days): 2  Geometric Mean LOS (GMLOS) (days):   Days to GMLOS:     OBJECTIVE:    Risk of Unplanned Readmission Score: 44         Current admission status: Inpatient       [facility]   [facility]  [address] [address]  Phone: [phone] Fax: [phone] [facility], [address]  [facility]  [address]  [place] [address]  Phone: [phone] Fax: [phone]    Primary Care Provider: Arlene Tidwell MD    Primary Insurance: [facility] [place]  Secondary Insurance:     ASSESSMENT:  [address] [place] [address] Representative - Daughter   Primary Phone: [phone] (Mobile)               Advance Directives  Does patient have a [address]?: Yes (Daughter Oly Lange)  Does patient have Advance Directives?: No (Sister unsure)  Was patient offered paperwork?: Yes (Given 5 Wishes paperwork)  Primary Contact: POA - elfego Lange         Readmission Root Cause  30 Day Readmission: No    Patient Information  Admitted from[de-identified] Home  Mental Status: Other (Comment) (High level of oxygen; CM contacted emergency contact #1 (sister))  During Assessment patient was accompanied by: Not accompanied during assessment  Assessment information provided by[de-identified] Sister (Sister June)  Primary Caregiver: Self  Support Systems: [address] of Residence: [address] [facility] do you live in?: [address] [facility] entry access options   Select all that apply : No steps to enter home  Type of Current Residence: Apartment  Floor Level: 1  Upon entering residence, is there a bedroom on the main floor (no further steps)?: Yes  Upon entering residence, is there a bathroom on the main floor (no further steps)?: Yes  In the last 12 months, was there a time when you were not able to pay the mortgage or rent on time?: No (Sister said she does not think so since pt does not talk about it, but "money can be tight")  In the last 12 months, how many places have you lived?: 1  In the last 12 months, was there a time when you did not have a steady place to sleep or slept in a shelter (including now)?: No  Living Arrangements: Lives w/ Daughter,Other (Comment) (Two children and brother)    Activities of Daily Living Prior to Admission  Functional Status: Assistance (Needs help with driving occasionally)  Completes ADLs independently?: Yes (Sometimes needed help deping on how he feels)  Ambulates independently?: No  Level of ambulatory dependence: Assistance (Sister reports he "does not go anywhere becuse he can't walk easily")  Does patient use assisted devices?: No  Assisted Devices (DME) used: Home Oxygen concentrator,Portable Oxygen tanks (Just evaluated for a scooter by PCP)  DME Company Name (respiratory supplies):  Unknown by sister  O2 Rate(s): Unknown by sister  Does patient currently own DME?: Yes  What DME does the patient currently own?: Home Oxygen concentrator,Portable Oxygen tanks  Does patient have a history of Outpatient Therapy (PT/OT)?: No  Does the patient have a history of Short-Term Rehab?: [facility])  Does patient have a history of HHC?: Yes  Does patient currently have [place] [address]?: No         Patient Information Continued  Income Source: SSI/SSD  Does patient have prescription coverage?: Yes  Within the past 12 months, you worried that your food would run out before you got the money to buy more : Never true (Gets Mom's meals)  Within the past 12 months, the food you bought just didnt last and you didnt have money to get more : Never true (Gets Mom's meals)  Food insecurity resource given?: N/A  Does patient receive dialysis treatments?: No  Does patient have a history of substance abuse?: No  Does patient have a history of Mental Health Diagnosis?: No         Means of Transportation  Means of Transport to Appts[de-identified] Family transport (Daughter)  In the past 12 months, has lack of transportation kept you from medical appointments or from getting medications?: No  In the past 12 months, has lack of transportation kept you from meetings, work, or from getting things needed for daily living?: No  Was application for public transport provided?: N/A        DISCHARGE DETAILS:    Contacts  Patient Contacts: June Wright  Relationship to Patient[de-identified] Family (Sister)  Contact Method: Phone  Phone Number: [phone]  Reason/Outcome: Emergency Contact (Performed CM open w/ Emergency contact #1 (sister)  Sister states that she believes patient would benefit from a hospital bed after dc)    CM received f/u call from patient's daughter, who states she is POA but does not have access to the document  CM attempted to call patient's room on daughter's behalf to inquire where documentation is, no answer  CM will f/u with patient at a later time            DME Referral Provided  Referral made for DME?:  (Sister believe's patient would be interested in a hosptial bed at dc) Referral not yet made at this time

## 2022-01-04 NOTE — ASSESSMENT & PLAN NOTE
· Secondary to bacterial pneumonia in the setting of his COVID infection  · Continue with cefepime for now  · Trend procalcitonin- elevated at 145

## 2022-01-04 NOTE — ASSESSMENT & PLAN NOTE
· Stage 4 disease  · Was on Morene Majestic- currently on hold secondary to his acute infection  · Would benefit from a palliative care consult/goals of care discussion given his current infection in the setting of his chronic medical conditions

## 2022-01-05 PROBLEM — R74.8 ELEVATED LIPASE: Status: RESOLVED | Noted: 2022-01-01 | Resolved: 2022-01-01

## 2022-01-05 NOTE — ASSESSMENT & PLAN NOTE
· Stage 4 disease  · Was on Revonda Genia but on hold as outpatient PTA secondary to myositis and inflammation, continue holding with secondary bacterial infection   · Would benefit from a palliative care consult/goals of care discussion given his current infection in the setting of his chronic medical conditions

## 2022-01-05 NOTE — PLAN OF CARE
Problem: Prexisting or High Potential for Compromised Skin Integrity  Goal: Skin integrity is maintained or improved  Description: INTERVENTIONS:  - Identify patients at risk for skin breakdown  - Assess and monitor skin integrity  - Assess and monitor nutrition and hydration status  - Monitor labs   - Assess for incontinence   - Turn and reposition patient  - Assist with mobility/ambulation  - Relieve pressure over bony prominences  - Avoid friction and shearing  - Provide appropriate hygiene as needed including keeping skin clean and dry  - Evaluate need for skin moisturizer/barrier cream  - Collaborate with interdisciplinary team   - Patient/family teaching  - Consider wound care consult   Outcome: Progressing     Problem: Potential for Falls  Goal: Patient will remain free of falls  Description: INTERVENTIONS:  - Educate patient/family on patient safety including physical limitations  - Instruct patient to call for assistance with activity   - Consult OT/PT to assist with strengthening/mobility   - Keep Call bell within reach  - Keep bed low and locked with side rails adjusted as appropriate  - Keep care items and personal belongings within reach  - Initiate and maintain comfort rounds  - Make Fall Risk Sign visible to staff  - Offer Toileting every 2 Hours, in advance of need  - Initiate/Maintain bed alarm  - Obtain necessary fall risk management equipment: bed  - Apply yellow socks and bracelet for high fall risk patients  - Consider moving patient to room near nurses station  Outcome: Progressing

## 2022-01-05 NOTE — ASSESSMENT & PLAN NOTE
· COVID positive- escalated to severe pathway requiring HFNC + NRB/BIPAP   · With secondary bacterial infection as evidenced by his elevated procalcitonin   · On dexamethasone, d4  · Baricitinib on hold given evidence of infection  · On cefepime, d4, antibiotic d3  · On Eliquis as an outpatient - continued

## 2022-01-05 NOTE — PROGRESS NOTES
01/05/22 1300   Clinical Encounter Type   Visited With Family; Health care provider   Confucianist Encounters   Confucianist Needs Prayer

## 2022-01-05 NOTE — ASSESSMENT & PLAN NOTE
· Multifactorial secondary to COVID infection, superimposed bacterial pneumonia, baseline severe COPD and underlying stage IV lung cancer  · Now requiring BIPAP  · Readdress code status with patient and daughter with worsening oxygenation    · Goal Spo2 >85%  · Check CXR   · We will continue cefepime for now, d4  · We will continue dexamethasone per COVID pathway

## 2022-01-05 NOTE — QUICK NOTE
I have met with the patient's family did to immediate brothers the sister-in-law and the patient's daughter Kang Xavier  I have updated him 1 more time about Reggie's clinical condition  The plan is to proceed with comfort measures  They will visit with him and 1 of be at the bedside when we withdraw care

## 2022-01-05 NOTE — ASSESSMENT & PLAN NOTE
Lab Results   Component Value Date    EGFR 12 01/04/2022    EGFR 12 01/04/2022    EGFR 12 01/03/2022    CREATININE 4 76 (H) 01/04/2022    CREATININE 4 72 (H) 01/04/2022    CREATININE 4 67 (H) 01/03/2022     · Will monitor his renal indices and urine output closely  · Avoid nephrotoxic agents  · Nephro consulted, ongoing reassessment for HD regarding UOP, electrolytes

## 2022-01-05 NOTE — PLAN OF CARE
Problem: RESPIRATORY - ADULT  Goal: Achieves optimal ventilation and oxygenation  Description: INTERVENTIONS:  - Assess for changes in respiratory status  - Assess for changes in mentation and behavior  - Position to facilitate oxygenation and minimize respiratory effort  - Oxygen administered by appropriate delivery if ordered  - Initiate smoking cessation education as indicated  - Encourage broncho-pulmonary hygiene including cough, deep breathe, Incentive Spirometry  - Assess the need for suctioning and aspirate as needed  - Assess and instruct to report SOB or any respiratory difficulty  - Respiratory Therapy support as indicated  Outcome: Progressing     Problem: METABOLIC, FLUID AND ELECTROLYTES - ADULT  Goal: Electrolytes maintained within normal limits  Description: INTERVENTIONS:  - Monitor labs and assess patient for signs and symptoms of electrolyte imbalances  - Administer electrolyte replacement as ordered  - Monitor response to electrolyte replacements, including repeat lab results as appropriate  - Instruct patient on fluid and nutrition as appropriate  Outcome: Progressing     Problem: METABOLIC, FLUID AND ELECTROLYTES - ADULT  Goal: Fluid balance maintained  Description: INTERVENTIONS:  - Monitor labs   - Monitor I/O and WT  - Instruct patient on fluid and nutrition as appropriate  - Assess for signs & symptoms of volume excess or deficit  Outcome: Progressing     Problem: METABOLIC, FLUID AND ELECTROLYTES - ADULT  Goal: Glucose maintained within target range  Description: INTERVENTIONS:  - Monitor Blood Glucose as ordered  - Assess for signs and symptoms of hyperglycemia and hypoglycemia  - Administer ordered medications to maintain glucose within target range  - Assess nutritional intake and initiate nutrition service referral as needed  Outcome: Progressing

## 2022-01-05 NOTE — ASSESSMENT & PLAN NOTE
Lab Results   Component Value Date    HGBA1C 6 4 09/24/2021       Recent Labs     01/04/22  0727 01/04/22  1157 01/04/22  1549 01/04/22 2059   POCGLU 149* 174* 164* 140       Blood Sugar Average: Last 72 hrs:    · Controlled on SSI with goal -180

## 2022-01-05 NOTE — PROGRESS NOTES
[facility]  Progress Note - Danilo Lopez Sr  1960, 63 y o  male MRN: [id number]  Unit/Bed#: ICU 11 Encounter: [id number]  Primary Care Provider: Sergio Arteaga MD   Date and time admitted to hospital: 1/1/2022  9:21 PM    Acute on chronic respiratory failure with hypoxia [facility])  Assessment & Plan  · Multifactorial secondary to COVID infection, superimposed bacterial pneumonia, baseline severe COPD and underlying stage IV lung cancer  · Now requiring BIPAP  · Readdress code status with patient and daughter with worsening oxygenation    · Goal Spo2 >85%  · Check CXR   · We will continue cefepime for now, d4  · We will continue dexamethasone per COVID pathway       * COVID-19  Assessment & Plan  · COVID positive- escalated to severe pathway requiring HFNC + NRB/BIPAP   · With secondary bacterial infection as evidenced by his elevated procalcitonin   · On dexamethasone, d4  · Baricitinib on hold given evidence of infection  · On cefepime, d4, antibiotic d3  · On Eliquis as an outpatient - continued     Severe sepsis ([address] )  Assessment & Plan  · Secondary to bacterial pneumonia in the setting of his COVID infection  · Continue with cefepime for now  · Trend procalcitonin- elevated at 185 - recheck this AM     Acute kidney injury superimposed on CKD [facility])  Assessment & Plan  Lab Results   Component Value Date    EGFR 12 01/04/2022    EGFR 12 01/04/2022    EGFR 12 01/03/2022    CREATININE 4 76 (H) 01/04/2022    CREATININE 4 72 (H) 01/04/2022    CREATININE 4 67 (H) 01/03/2022     · Will monitor his renal indices and urine output closely  · Avoid nephrotoxic agents  · Nephro consulted, ongoing reassessment for HD regarding UOP, electrolytes     Type 2 diabetes mellitus without complication, without long-term current use of insulin [facility])  Assessment & Plan  Lab Results   Component Value Date    HGBA1C 6 4 09/24/2021       Recent Labs     01/04/22  0727 01/04/22  1157 01/04/22  1549 01/04/22 2059 POCGLU 149* 174* 164* 140       Blood Sugar Average: Last 72 hrs:    · Controlled on SSI with goal -180     Adenocarcinoma of lung, right (HCC)  Assessment & Plan  · Stage 4 disease  · Was on Kaytruda but on hold as outpatient PTA secondary to myositis and inflammation, continue holding with secondary bacterial infection   · Would benefit from a palliative care consult/goals of care discussion given his current infection in the setting of his chronic medical conditions    COPD, severe (HCC)  Assessment & Plan  · Severe COPD   · Wears 3 5 L NC baseline   · Currently on BIPAP to maintain spo2 >82-85%  · Continue scheduled nebs - on albuterol, atrovent and pulmicort   · Continue home singular, flonase, Claritin     Bipolar 1 disorder (HCC)  Assessment & Plan  · We will continue his outpatient medications    Paroxysmal atrial fibrillation (HCC)  Assessment & Plan  · Continue home eliquis   · Rate control with cardizem     Hyperkalemia  Assessment & Plan  · Secondary to NURIA on CKD  · K remains stable around 5 5   · F/u AM K   · Continue medical treatment as needed   · Nephro following, reassessing daily for need for HD    Azotemia  Assessment & Plan  · Secondary to NURIA on CKD  · Nephrology following, monitoring daily for need for dialysis     ----------------------------------------------------------------------------------------  HPI/24hr events: Attempted to be moved from chair back to bed overnight, desaturated to 60% while on max HFNC + NRB  Placed back in chair, transitioned to BIPAP with slow improvement to 82-85%       Patient appropriate for transfer out of the ICU today?: No  Disposition: Continue Stepdown Level 1 level of care   Code Status: Level 1 - Full Code  ---------------------------------------------------------------------------------------  SUBJECTIVE  "I'm thirsty"    Review of Systems  Review of systems was unable to be performed secondary to BIPAP  ---------------------------------------------------------------------------------------  OBJECTIVE    Vitals   Vitals:    22 0300 22 0309 22 0341 22 0400   BP: (!) 180/72   144/87   BP Location: Right arm   Right arm   Pulse: (!) 110   104   Resp: (!) 37   (!) 33   Temp:  (!) 97 1 °F (36 2 °C)     TempSrc:  Temporal     SpO2: (!) 81%  (!) 87% (!) 88%   Weight:       Height:         Temp (24hrs), Av 2 °F (36 2 °C), Min:97 °F (36 1 °C), Max:97 5 °F (36 4 °C)  Current: Temperature: (!) 97 1 °F (36 2 °C)          Respiratory:  SpO2: SpO2: (!) 88 %         Invasive/non-invasive ventilation settings   Respiratory  Report   Lab Data (Last 4 hours)    None         O2/Vent Data (Last 4 hours)       034          Non-Invasive Ventilation Mode BiPAP                   Physical Exam  Vitals reviewed  Constitutional:       General: He is awake  Appearance: He is ill-appearing  Interventions: Face mask in place  HENT:      Head: Normocephalic and atraumatic  Mouth/Throat:      Mouth: Mucous membranes are dry  Cardiovascular:      Rate and Rhythm: Normal rate and regular rhythm  Heart sounds: Normal heart sounds  Pulmonary:      Effort: Tachypnea present  Breath sounds: Decreased breath sounds present  Abdominal:      General: Abdomen is protuberant  There is no distension  Palpations: Abdomen is soft  Musculoskeletal:      Right lower leg: Edema present  Left lower leg: Edema present  Skin:     General: Skin is warm and dry  Neurological:      General: No focal deficit present               Laboratory and Diagnostics:  Results from last 7 days   Lab Units 22  0510 22  0529 22  0609 22  2134   WBC Thousand/uL 13 23* 12 99* 13 30* 15 92*   HEMOGLOBIN g/dL 9 2* 9 4* 10 1* 11 8*   HEMATOCRIT % 28 7* 30 4* 33 6* 39 0   PLATELETS Thousands/uL 258 242 260 284   BANDS PCT %  --   --   --  1   MONO PCT %  --   --   --  4 Results from last 7 days   Lab Units 01/04/22  1544 01/04/22  0510 01/03/22  0529 01/02/22  2018 01/02/22  0609 01/01/22  2134   SODIUM mmol/L 140 140 137 141 139 142   POTASSIUM mmol/L 5 5* 5 5* 5 4* 5 6* 6 0* 6 2*   CHLORIDE mmol/L 103 103 101 102 104 101   CO2 mmol/L 22 24 24 26 24 26   ANION GAP mmol/L 15* 13 12 13 11 15*   BUN mg/dL 134* 119* 94* 94* 78* 83*   CREATININE mg/dL 4 76* 4 72* 4 67* 4 72* 4 72* 5 33*   CALCIUM mg/dL 8 6 8 5 8 7 8 7 8 1* 8 7   GLUCOSE RANDOM mg/dL 151* 140 129 145* 150* 137   ALT U/L  --   --  37  --  34 30   AST U/L  --   --  51*  --  45 31   ALK PHOS U/L  --   --  72  --  65 74   ALBUMIN g/dL  --   --  1 9*  --  2 0* 2 5*   TOTAL BILIRUBIN mg/dL  --   --  0 35  --  0 49 0 79     Results from last 7 days   Lab Units 01/01/22  2134   PHOSPHORUS mg/dL 6 4*      Results from last 7 days   Lab Units 01/01/22  2134   INR  1 36*   PTT seconds 30          Results from last 7 days   Lab Units 01/02/22  0608 01/02/22  0342 01/01/22  2335 01/01/22  2134   LACTIC ACID mmol/L 1 7 3 9* 2 9* 3 4*     ABG:    VBG:    Results from last 7 days   Lab Units 01/04/22  0510 01/01/22  2134   PROCALCITONIN ng/ml 185 35* 145 01*       Micro  Results from last 7 days   Lab Units 01/01/22  2139 01/01/22  2134   BLOOD CULTURE  No Growth at 48 hrs  No Growth at 48 hrs  Imaging: Pending AM CXR     Intake and Output  I/O       01/03 0701  01/04 0700 01/04 0701 01/05 0700    I V  (mL/kg)  22 3 (0 2)    IV Piggyback  100    Total Intake(mL/kg)  122 3 (1 3)    Urine (mL/kg/hr) 725 (0 3) 400 (0 2)    Total Output 722 400    Net -723 -277 7                Height and Weights   Height: 5' 8" (172 7 cm)  IBW (Ideal Body Weight): 68 4 kg  Body mass index is 32 78 kg/m²    Weight (last 2 days)     Date/Time Weight    01/04/22 0600 97 8 (215 61)    01/03/22 1612 97 8 (215 61)    01/03/22 0008 101 (222 66)            Nutrition       Diet Orders   (From admission, onward)             Start     Ordered    01/03/22 1053  Diet Bear/CHO Controlled; Consistent Carbohydrate Diet Level 2 (5 carb servings/75 grams CHO/meal); Sodium 2 GM, Potassium 2 GM  Diet effective now        References:    Nutrtion Support Algorithm Enteral vs  Parenteral   Question Answer Comment   Diet Type Bear/CHO Controlled    Bear/CHO Controlled Consistent Carbohydrate Diet Level 2 (5 carb servings/75 grams CHO/meal)    Other Restriction(s): Sodium 2 GM    Other Restriction(s): Potassium 2 GM    RD to adjust diet per protocol?  Yes        01/03/22 1053                  Active Medications  Scheduled Meds:  Current Facility-Administered Medications   Medication Dose Route Frequency Provider Last Rate    acetaminophen  650 mg Oral Q6H PRN BROOKS Poole      albuterol  2 5 mg Nebulization 4x Daily Kristy Cazares PA-C      apixaban  2 5 mg Oral BID BROOKS Poole      ascorbic acid  500 mg Oral Daily BROOKS Poole      atorvastatin  40 mg Oral Daily With BROOKS Monroy      budesonide  0 5 mg Nebulization Q12H BROOKS Poole      cefTRIAXone  1,000 mg Intravenous Q24H Santiago Machado MD 1,000 mg (01/04/22 1327)    dexamethasone  0 1 mg/kg Intravenous Q12H BROOKS Poole 10 1 mg (01/04/22 2327)    dexmedetomidine  0 1-0 7 mcg/kg/hr Intravenous Titrated Ernestine Son MD 0 2 mcg/kg/hr (01/04/22 1610)    dextromethorphan-guaiFENesin  10 mL Oral Q4H PRN BROOKS Poole      diltiazem  120 mg Oral Daily BROOKS Poole      divalproex sodium  250 mg Oral BID BROOKS Poole      doxycycline hyclate  100 mg Oral Q12H BROOKS Poole      FLUoxetine  20 mg Oral Daily BROOKS Poole      fluticasone  1 spray Each Nare BID BROOKS Poole      folic acid  3,809 mcg Oral Daily BROOKS Poole      gabapentin  100 mg Oral BID BROOKS Poole      guaiFENesin  600 mg Oral Q12H [address]  hydrALAZINE  25 mg Oral Q8H [facility] Dub Guillermina, CRNP      insulin lispro  2-12 Units Subcutaneous 4x Daily (AC & HS) Jobarbara Paz, CRNP      ipratropium  0 5 mg Nebulization 4x Daily Ana Sierra PA-C      loratadine  5 mg Oral Daily Jolena Jobs, [address]      melatonin  3 mg Oral HS Jolena Jobs, CRNP      montelukast  10 mg Oral HS Jolena Jobs, CRNP      ondansetron  4 mg Intravenous Q6H PRN Jojamia Jobs, CRNP      oxyCODONE  20 mg Oral Q12H [address], [address]      oxyCODONE  30 mg Oral Q4H PRN Jolena Jobs, CRNP      pantoprazole  20 mg Oral Early Morning Jojamia Jobs, CRNP      QUEtiapine  12 5 mg Oral HS Jolena Jobs, CRNP      senna-docusate sodium  1 tablet Oral BID Jojamia Jobs, CRNP      simethicone  80 mg Oral 4x Daily PRN Naaa Jobs, CRNP      sodium chloride  2 spray Each Nare Q2H PRN Naaa Jobs, CRNP      sodium polystyrene  30 g Oral Once [place], [place]      tamsulosin  0 4 mg Oral Daily With [employer], CRNP      zinc sulfate  220 mg Oral Daily Jordan Paz, CRNP       Continuous Infusions:  dexmedetomidine, 0 1-0 7 mcg/kg/hr, Last Rate: 0 2 mcg/kg/hr (01/04/22 1610)      PRN Meds:   acetaminophen, 650 mg, Q6H PRN  dextromethorphan-guaiFENesin, 10 mL, Q4H PRN  ondansetron, 4 mg, Q6H PRN  oxyCODONE, 30 mg, Q4H PRN  simethicone, 80 mg, 4x Daily PRN  sodium chloride, 2 spray, Q2H PRN        Invasive Devices Review  Invasive Devices  Report    Central Venous Catheter Line            Port A Cath Right Chest -- days          Peripheral Intravenous Line            Peripheral IV 01/01/22 Left Antecubital 3 days          Drain            External Urinary Catheter 2 days                Rationale for remaining devices: Port - ongoing CA treatment  ---------------------------------------------------------------------------------------  Advance Directive and Living Will:      Power of Attorney:    POLST:    ---------------------------------------------------------------------------------------  Care Time Delivered:   Upon my evaluation, this patient had a high probability of imminent or life-threatening deterioration due to acute hypoxia respiratory failure with worsening desaturation , which required my direct attention, intervention, and personal management  I have personally provided 25 minutes (0000 to 0025) of critical care time, exclusive of procedures, teaching, family meetings, and any prior time recorded by providers other than myself  Blair Mcneill PA-C      Portions of the record may have been created with voice recognition software  Occasional wrong word or "sound a like" substitutions may have occurred due to the inherent limitations of voice recognition software    Read the chart carefully and recognize, using context, where substitutions have occurred

## 2022-01-05 NOTE — ASSESSMENT & PLAN NOTE
· Secondary to NURIA on CKD  · K remains stable around 5 5   · F/u AM K   · Continue medical treatment as needed   · Nephro following, reassessing daily for need for HD

## 2022-01-05 NOTE — ASSESSMENT & PLAN NOTE
· Severe COPD   · Wears 3 5 L NC baseline   · Currently on BIPAP to maintain spo2 >82-85%  · Continue scheduled nebs - on albuterol, atrovent and pulmicort   · Continue home singular, flonase, Claritin

## 2022-01-05 NOTE — NURSING NOTE
Attempted to get pt back to bed however, he became tachypneic and desaturated as low as 69%  Pt bagged with bag valve mask 100% FIO2 but highest sat was 75%  Pt seen at bedside by RODNEY Fisher and placed on Bipap  Pulse ox on BIPAP slowly but steadily increasing as documented per flowsheet  Per RODNEY, pt will remain in chair until sats are approx 88% since he have COPD, Covid and lung cancer  Voicemail left by RODNEY for the patient's daughter to call back for update  Pt states if need be, he would like for us to intubate       Lincoln Montgomery RN BSN CCRN

## 2022-01-05 NOTE — ASSESSMENT & PLAN NOTE
· Secondary to bacterial pneumonia in the setting of his COVID infection  · Continue with cefepime for now  · Trend procalcitonin- elevated at 185 - recheck this AM

## 2022-01-05 NOTE — PROGRESS NOTES
Pastoral Care Progress Note    1/5/2022  Patient: Uriel Cabrera: 1960  Admission Date & Time: 1/1/2022 2121  MRN: [id number] [facility]: [id number]      Family presence, offered prayer support for patient, will continue to follow family and support patient with prayers,  Patient to be put on comfort care today

## 2022-01-06 NOTE — DEATH NOTE
INPATIENT DEATH NOTE  Mehrdad Singh  63 y o  male MRN: [id number]  Unit/Bed#: ICU 11 Encounter: [id number]    Date, Time and Cause of Death    Date of Death: 22  Time of Death:  6:44 PM  Preliminary Cause of Death: Acute respiratory failure due to COVID-19 [facility])  Entered by: Erika GUY[JS1 1]     Attribution     JS1 1 BROOKS Hayes 22 20:49           Patient's Information  Pronounced by: Martin Campo  Did the patient's death occur in the ED?: No  Did the patient's death occur in the OR?: No  Did the patient's death occur less than 10 days post-op?: No  Did the patient's death occur within 24 hours of admission?: No  Was code status DNR at the time of death?: Yes    PHYSICAL EXAM:  Unresponsive to noxious stimuli, Spontaneous respirations absent, Breath sounds absent, Carotid pulse absent, Heart sounds absent, Pupillary light reflex absent and Corneal blink reflex absent    Medical Examiner notification criteria:  NONE APPLICABLE   Medical Examiner's office notified?:  No, does not meet ME notification criteria   Medical Examiner accepted case?: N/A  Name of Medical Examiner: N/A    Family Notification  Was the family notified?: Yes  Date Notified: 22  Time Notified:   Notified by: Martin Campo  Name of Family Notified of Death: Kushal Sheriff   Relationship to Patient: Daughter  Family Notification Route: At bedside  Was the family told to contact a  home?: Yes    Autopsy Options:  Decision for post-mortem examination not yet made by next of kin  Primary Service Attending Physician notified?:  yes - Attending:  Roxane Price MD    Physician/Resident responsible for completing Discharge Summary:  Erika Clark, [address]

## 2022-01-06 NOTE — DISCHARGE SUMMARY
Discharge Summary - Mary Mena  63 y o  male MRN: [id number]    Unit/Bed#: ICU 11 Encounter: [id number] PCP: Bandar Briones MD    Admission Date:   Admission Orders (From admission, onward)     Ordered        22 0257  INPATIENT ADMISSION  Once                        Admitting Diagnosis: Hyperkalemia [E87 5]  Lactic acidosis [E87 2]  ARF (acute renal failure) ([facility]) [N17 9]  NSTEMI (non-ST elevated myocardial infarction) ([facility] [address] ) [I21 4]  Acute respiratory failure with hypoxia ([facility] [address] ) [J96 01]  Shortness of breath [R06 02]  Severe sepsis ([facility] [address] ) [A41 9, R65 20]  Acute kidney injury superimposed on CKD ([facility] [address] ) [N17 9, N18 9]  Pneumonia due to COVID-19 virus [U07 1, J12 82]  Acute respiratory failure due to COVID-19 ([facility] [address] ) [U07 1, J96 00]    HPI: 63year old male, hst of COPD, 3LO2 @home, stage IV adenocarcinoma of Right lung, CKD, Covid 19 positive, Unvaccinated,  presented to [facility] with SOB/acute respiratory distress  Pt placed on bipap and transferred to ICU for higher level of care 22  Pt had worsening kidney function and was maintained on bipap without improvement in respiratory status  Family agreed to comfort measure and discontinuation of care  Procedures Performed:   Orders Placed This Encounter   Procedures   [address] ED ECG Documentation Only    ED ECG Documentation Only       Summary of Hospital Course:  Pt placed on bipap and transferred to ICU for higher level of care 1/3/22  Treatment for Covid 19 severe pathway w/Dexamethasone, Cefepime, Eloquis for paroxysmal Afib  Nephrology consulted for worsening kidney functionPt was maintained on bipap without improvement in respiratory status  Family agreed to comfort measure and discontinuation of care           Significant Findings, Care, Treatment and Services Provided:     Complications:     Disposition:      Final Diagnosis: Acute respiratory failure/Covid 19    Medical Problems             Resolved Problems  Date Reviewed: 2022 Resolved    Cancer related pain 1/3/2022     Resolved by  BROOKS Zhou    Elevated troponin level not due to acute coronary syndrome 1/3/2022     Resolved by  BROOKS Zhou    Elevated lipase 2022     Resolved by  Maria Isabel Corrales PA-C    Hypercalcemia 1/3/2022     Resolved by  BROOKS Zhou                    Date, Time and Cause of Death    Date of Death: 22  Time of Death:  6:44 PM  Preliminary [facility] of Death: [facility]  Entered by: Hernan GUY[JS1 1]     Attribution     JS1 1 BROOKS Corea 22 20:49          Death Note:    INPATIENT DEATH NOTE  Ghada Amaral  63 y o  male MRN: [id number]  Unit/Bed#: ICU 11 Encounter: [id number]    Date, Time and Cause of Death    Date of Death: 22  Time of Death:  6:44 PM  Preliminary Cause of Death: Acute [facility]  Entered by: Hernan GUY[JS1 1]     Attribution     JS1 1 BROOKS Corea 22 20:49           Patient's Information  Pronounced by: Gilberto Cruz  Did the patient's death occur in the ED?: No  Did the patient's death occur in the OR?: No  Did the patient's death occur less than 10 days post-op?: No  Did the patient's death occur within 24 hours of admission?: No  Was code status DNR at the time of death?: Yes    PHYSICAL EXAM:  Unresponsive to noxious stimuli, Spontaneous respirations absent, Breath sounds absent, Carotid pulse absent, Heart sounds absent, Pupillary light reflex absent and Corneal blink reflex absent    Medical Examiner notification criteria:  NONE APPLICABLE   Medical Examiner's office notified?:  No, does not meet ME notification criteria   Medical Examiner accepted case?: N/A  Name of Medical Examiner: N/A    Family Notification  Was the family notified?: Yes  Date Notified: 22  Time Notified:   Notified by: Gilberto Cruz  Name of Family Notified of Death: [place] Ball Person Relationship to Patient: Daughter  Family Notification Route: At bedside  Was the family told to contact a  home?: Yes    Autopsy Options:  Decision for post-mortem examination not yet made by next of kin  Primary Service Attending Physician notified?:  yes - Attending:  Jaren Larios MD    Physician/Resident responsible for completing Discharge Summary:  Parag Elkins, [address] [place] [address]

## 2022-01-06 NOTE — NURSING NOTE
Pts family took all belongings home at 1  Cell phone was taken by other family members earlier in shift

## 2022-01-07 LAB
BACTERIA BLD CULT: NORMAL
BACTERIA BLD CULT: NORMAL

## 2022-11-22 NOTE — ED NOTES
Call placed to Supervisor, Gokul Hewitt, re: status on transport time  Still being arranged at this time        Reshma Emerson RN  11/21/18 7848
Light snacks and drink provided to patient; okay with Obdulia Holbrook  Family at bedside  Aware transport being set up at this time       Brea Rizvi RN  11/21/18 0804
Patient is resting comfortably       Tristian AKI Marina  11/21/18 6537
Patient refusing all RN care from myself at this time; refusing to review medication list not reviewed and no RN assessment completed       [id number] [facility] 28, RN  11/21/18 2019
Patient refusing care from CF,RN and PN,RN--charge RN, R S  At bedside and made aware will switch RN'S       Germaine Navarrete RN  11/21/18 7385
Patient resting in bed; states, "The snacks helped  I'm feeling a little better " Aware still waiting on transport time to [facility]; will notify them as soon as more information is obtained  Family stated, "Okay   Thanks "      Precious Shell RN  11/21/18 5313
Pt and wife looking to speak with charge rn, made aware     [id number] [facility] 28, RN  11/21/18 1954
[facility] called at this time re: transport status  Still being arranged        Maria Luisa Gamez RN  11/21/18 3215
Selina Gillespie RN  11/21/18 5812
Transfer information:  [address] [address] [address] 247  Contact for report: [phone]  [facility] 9-9:30 pm  Dr Lynda Eisenmenger  11/21/18 1930
Stroke Neurologist and ED Provider agree to cancel

## 2024-01-04 NOTE — ASSESSMENT & PLAN NOTE
Pt offers no complaints. Denies any recent abx use or s/s of infection/illness. Tolerated vyepti infusion and vit b12 injection well without incident. Next infusion appointment scheduled 3/28/24 at 3 pm and pt discharged in stable condition. AVS declined.    · Continue pepcid as substitute for zantac

## 2024-03-25 NOTE — PATIENT INSTRUCTIONS
Constipation   WHAT YOU NEED TO KNOW:   Constipation is when you have hard, dry bowel movements, or you go longer than usual between bowel movements  DISCHARGE INSTRUCTIONS:   Return to the emergency department if:   · You have blood in your bowel movements  · You have a fever and abdominal pain with the constipation  Contact your healthcare provider if:   · Your constipation gets worse  · You start to vomit  · You have questions or concerns about your condition or care  Medicines:   · Medicine or a fiber supplement  may help make your bowel movement softer  A laxative may help relax and loosen your intestines to help you have a bowel movement  You may also be given medicine to increase fluid in your intestines  The fluid may help move bowel movements through your intestines  · Take your medicine as directed  Contact your healthcare provider if you think your medicine is not helping or if you have side effects  Tell him of her if you are allergic to any medicine  Keep a list of the medicines, vitamins, and herbs you take  Include the amounts, and when and why you take them  Bring the list or the pill bottles to follow-up visits  Carry your medicine list with you in case of an emergency  Manage your constipation:   · Drink liquids as directed  You may need to drink extra liquids to help soften and move your bowels  Ask how much liquid to drink each day and which liquids are best for you  · Eat high-fiber foods  This may help decrease constipation by adding bulk to your bowel movements  High-fiber foods include fruit, vegetables, whole-grain breads and cereals, and beans  Your healthcare provider or dietitian can help you create a high-fiber meal plan  · Exercise regularly  Regular physical activity can help stimulate your intestines  Ask which exercises are best for you  · Schedule a time each day to have a bowel movement    This may help train your body to have regular bowel Sports Medicine New Patient Note    This patient is being seen in consultation from Pcp, No    CHIEF COMPLAINT(S):   Chief Complaint   Patient presents with    Left Knee - Pain, Stiffness, Swelling    Office Visit     HISTORY OF PRESENT ILLNESS:  Kamari Colin is a 61 year old male had concerns including Pain, Stiffness, and Swelling of the Left Knee and Office Visit.    Patient is a 60 yo M with PMH of DM on metformin and Gout of L knee presenting for initial evaluation of chronic L knee pain. Patient reports on going knee pain for 1 month that comes and goes. Per chart, patient has had multiple ER visit iso L chronic knee pain with most recent 3/3/24. Per chart review, patient has had multiple attempted joint aspirations. Aspiration and fluid analysis on 2/21/24 noted patient having concern for gout. Patient has been provided with norco and ibuprofen on discharge from ER visits and has been using them as directed. XR of L knees on recent ER visit 3/3/24 noting \"Moderate knee joint effusion. No acute fracture or dislocation. Moderate to significant tricompartmental narrowing with osteophyte formation.\"  Patient also had LE doppler on 2/26/24 which was negative for DVT. He was also given a Rheum referral which patient has later appointment for today at 2 pm. Patient continues to endorse pain with ambulation since last ER visit. Rates the pain as 7/10 that is associated with morning stiffness as well as mild sharp/shooting pain into knee.  Denies fever, chills, inability to bear weight. Patient reports PCP is on [facility]. Reports taking 750 mg BID.     REVIEW OF SYSTEMS:  Review of Systems   Constitutional:  Negative for chills, diaphoresis, fatigue and fever.   Endocrine: Negative for cold intolerance and heat intolerance.   Musculoskeletal:  Positive for arthralgias and joint swelling. Negative for back pain, gait problem, myalgias, neck pain and neck stiffness.   Skin:   Negative for color change, pallor, rash and wound.   Allergic/Immunologic: Negative for environmental allergies and food allergies.   Neurological:  Negative for weakness and numbness.       PAST MEDICAL HISTORY:  Past Medical History:   Diagnosis Date    Diabetes mellitus (CMD)     Gastroesophageal reflux disease        PAST SURGICAL HISTORY:  History reviewed. No pertinent surgical history.    FAMILY HISTORY:  Family History   Problem Relation Age of Onset    Rheumatoid Arthritis Neg Hx     Systemic Lupus Erythematosus Neg Hx     Other Neg Hx         Gout       SOCIAL HISTORY:  Social History     Socioeconomic History    Marital status: /Civil Union     Spouse name: Not on file    Number of children: Not on file    Years of education: Not on file    Highest education level: Not on file   Occupational History    Not on file   Tobacco Use    Smoking status: Never    Smokeless tobacco: Never   Substance and Sexual Activity    Alcohol use: Not on file    Drug use: Not on file    Sexual activity: Not on file   Other Topics Concern    Not on file   Social History Narrative    Not on file     Social Determinants of Health     Financial Resource Strain: Not on file   Food Insecurity: Not on file   Transportation Needs: Not on file   Physical Activity: Not on file   Stress: Not on file   Social Connections: Not on file   Interpersonal Safety: Low Risk  (2/21/2024)    Interpersonal Safety     How often physically hurt: Never     How often insulted or talked down to: Never     How often threatened with harm: Never     How often scream or curse at: Never       MEDICATIONS:  Current Medications    COLCHICINE PO        CYCLOBENZAPRINE (FLEXERIL) 5 MG TABLET    Take 1 tablet by mouth 3 times daily as needed for Muscle spasms.    HYDROCODONE-ACETAMINOPHEN (NORCO) 5-325 MG PER TABLET    Take 1 tablet by mouth every 8 hours as needed for Pain.    IBUPROFEN (MOTRIN) 600 MG TABLET    Take 1 tablet by mouth every 6 hours as  movements  Bend forward while you are on the toilet to help move the bowel movement out  Sit on the toilet for at least 10 minutes, even if you do not have a bowel movement  Follow up with your healthcare provider as directed:  Write down your questions so you remember to ask them during your visits  © 2017 [employer] Information is for End User's use only and may not be sold, redistributed or otherwise used for commercial purposes  All illustrations and images included in CareNotes® are the copyrighted property of [employer]  or Alex Torres  The above information is an  only  It is not intended as medical advice for individual conditions or treatments  Talk to your doctor, nurse or pharmacist before following any medical regimen to see if it is safe and effective for you  needed for Pain.    METFORMIN HCL PO        PANTOPRAZOLE (PROTONIX) 40 MG TABLET    Take 40 mg by mouth daily.       ALLERGIES:   ALLERGIES:  Patient has no known allergies.    VITAL SIGNS:  Visit Vitals  BP (!) 143/76 (BP Location: LUE - Left upper extremity)   Pulse 82   Temp 98.2 °F (36.8 °C) (Temporal)       EXAM:  This is a 61 year old male, awake, alert, and cooperative.     Physical Exam  Vitals reviewed.   Constitutional:       General: He is not in acute distress.     Appearance: Normal appearance. He is not ill-appearing.   HENT:      Head: Normocephalic and atraumatic.      Right Ear: External ear normal.      Left Ear: External ear normal.      Nose: Nose normal.   Eyes:      Extraocular Movements: Extraocular movements intact.      Conjunctiva/sclera: Conjunctivae normal.   Pulmonary:      Effort: Pulmonary effort is normal. No respiratory distress.   Neurological:      General: No focal deficit present.      Mental Status: He is alert and oriented to person, place, and time. Mental status is at baseline.   Psychiatric:         Mood and Affect: Mood normal.         Behavior: Behavior normal.         Thought Content: Thought content normal.         Judgment: Judgment normal.       Right Knee:   Skin is without lesions, abrasions, lacerations or bruising.   No evidence of swelling.   No effusion present.   Flexion to 125 degrees.  Extension to 0 degrees.   No tenderness to palpation of the medial or lateral joint lines.   No tenderness to palpation of the patellar facets.  No tenderness to palpation of the patellar or quadriceps tendons.   Negative apprehension testing.   Grade 1A Lachman's   Valgus and Varus stress testing at 0 and 30 degrees normal and non-painful.   Anterior drawer and posterior drawer negative.   Negative Zulay's testing.     Left Knee:   Skin is without lesions, abrasions, lacerations or bruising.   No evidence of swelling.   Moderate effusion present.   Flexion to 110  degrees.  Extension to 0 degrees.   Tenderness to palpation of the medial & lateral joint lines.   Tenderness to palpation of the patellar facets.  No tenderness to palpation of the patellar or quadriceps tendons.   Negative apprehension testing.   Grade 1A Lachman's   Valgus and Varus stress testing at 0 and 30 degrees normal and non-painful.   Anterior drawer and posterior drawer negative.   Positive Zulay's testing.     Neurologic Examination:   Bilateral sensation to light touch is symmetric and intact within dermatomes L2-S1.     IMAGING &TEST:  3 view radiographs left knee obtained on March 3, 2024 with evidence of moderate knee joint effusion, moderate significant tricompartmental degenerative changes most predominantly in the medial and patellofemoral compartments.    Left lower extremity venous duplex obtained on February 26, 2024 without evidence of acute DVT.    Synovial fluid analysis from February 21, 2024 total nucleated cell count 25,318, extracellular uric acid crystals present.  More recent synovial fluid analysis from February 26, 2024 with a total cell count of 19,645 without uric acid crystals seen.  Patient with recent synovial fluid culture result from February 26, 2024 without any growth for 5 days.    PROCEDURES:   L Inj/Asp: L knee on 3/27/2024 10:21 AM  Indications: joint swelling and pain  Details: 18 G needle, ultrasound-guided superolateral approach  Medications: 40 mg methylPREDNISolone ACETATE long-acting DEPOT 40 MG/ML; 4 mL lidocaine 1 %  Aspirate: 35 mL cloudy  Outcome: tolerated well, no immediate complications  Procedure, treatment alternatives, risks and benefits explained, specific risks discussed. Consent was given by the patient. Immediately prior to procedure a time out was called to verify the correct patient, procedure, equipment, support staff and site/side marked as required. Patient was prepped and draped in the usual sterile fashion.           ASSESSMENT &  PLAN:  Kamari Colin is a 61 year old male who presents with left knee swelling and pain consistent on my evaluation with gout of the left knee in the setting of osteoarthritis.    Problem List Items Addressed This Visit          Musculoskeletal and Injuries    Primary osteoarthritis of left knee    Relevant Orders    L Inj/Asp: L knee    Acute gout of left knee - Primary     Patient presents for evaluation of left knee swelling and pain consistent with gout based on recent synovial fluid analysis in the setting of left knee osteoarthritis.  Discussed the diagnosis and prognosis of the condition in detail with the patient.  Discussed treatment options with the patient including oral colchicine therapy, oral NSAID therapy, oral prednisone therapy, ultrasound-guided aspiration and intra-articular corticosteroid injection.  Patient with significant past medical history of diabetes mellitus is currently taking metformin unaware of his most recent A1c.  Discussed risk and benefits of corticosteroid injection therapy including risk of infection, bleeding, elevation in blood sugar, etc.  Discussed the importance given his diabetes to check on his blood glucose over the next 5 days and reach out to his primary care provider with significant elevation levels or symptoms.  Patient voices understanding with this plan.  Patient elected to proceed with the ultrasound-guided aspiration and injection.  Patient was prepped in sterile fashion.  Ultrasound visualization was used throughout the entire procedure including needle visualization and injectate placement.  Patient tolerated the procedure well without complication.  Postprocedural care instructions were provided to the patient.  We will see the patient back on an as-needed basis. Patient voiced understanding with this plan. All of patient's questions were answered.          Relevant Orders    L Inj/Asp: L knee      Return if symptoms worsen or fail to  improve.    Electronically Signed by:  Villa Terrell DO, Sports Medicine 03/27/24
